# Patient Record
Sex: MALE | Race: WHITE | NOT HISPANIC OR LATINO | Employment: OTHER | URBAN - METROPOLITAN AREA
[De-identification: names, ages, dates, MRNs, and addresses within clinical notes are randomized per-mention and may not be internally consistent; named-entity substitution may affect disease eponyms.]

---

## 2017-01-01 ENCOUNTER — MEDICAL CORRESPONDENCE (OUTPATIENT)
Dept: HEALTH INFORMATION MANAGEMENT | Facility: CLINIC | Age: 62
End: 2017-01-01

## 2017-01-03 ENCOUNTER — OFFICE VISIT (OUTPATIENT)
Dept: FAMILY MEDICINE | Facility: CLINIC | Age: 62
End: 2017-01-03
Payer: COMMERCIAL

## 2017-01-03 ENCOUNTER — TELEPHONE (OUTPATIENT)
Dept: PHARMACY | Facility: OTHER | Age: 62
End: 2017-01-03

## 2017-01-03 VITALS
SYSTOLIC BLOOD PRESSURE: 112 MMHG | RESPIRATION RATE: 18 BRPM | TEMPERATURE: 97.6 F | HEIGHT: 72 IN | OXYGEN SATURATION: 96 % | HEART RATE: 84 BPM | BODY MASS INDEX: 33.18 KG/M2 | WEIGHT: 245 LBS | DIASTOLIC BLOOD PRESSURE: 73 MMHG

## 2017-01-03 DIAGNOSIS — E11.9 TYPE 2 DIABETES MELLITUS WITHOUT COMPLICATION, WITHOUT LONG-TERM CURRENT USE OF INSULIN (H): ICD-10-CM

## 2017-01-03 DIAGNOSIS — K21.9 GASTROESOPHAGEAL REFLUX DISEASE WITHOUT ESOPHAGITIS: ICD-10-CM

## 2017-01-03 DIAGNOSIS — M10.022 IDIOPATHIC GOUT OF LEFT ELBOW, UNSPECIFIED CHRONICITY: Primary | ICD-10-CM

## 2017-01-03 DIAGNOSIS — M79.674 PAIN OF TOE OF RIGHT FOOT: ICD-10-CM

## 2017-01-03 LAB
BASOPHILS # BLD AUTO: 0.1 10E9/L (ref 0–0.2)
BASOPHILS NFR BLD AUTO: 0.6 %
DIFFERENTIAL METHOD BLD: NORMAL
EOSINOPHIL # BLD AUTO: 0.1 10E9/L (ref 0–0.7)
EOSINOPHIL NFR BLD AUTO: 1.8 %
ERYTHROCYTE [DISTWIDTH] IN BLOOD BY AUTOMATED COUNT: 13.4 % (ref 10–15)
HBA1C MFR BLD: 6.5 % (ref 4.3–6)
HCT VFR BLD AUTO: 42.2 % (ref 40–53)
HGB BLD-MCNC: 14.5 G/DL (ref 13.3–17.7)
LYMPHOCYTES # BLD AUTO: 1.7 10E9/L (ref 0.8–5.3)
LYMPHOCYTES NFR BLD AUTO: 21 %
MCH RBC QN AUTO: 30.7 PG (ref 26.5–33)
MCHC RBC AUTO-ENTMCNC: 34.4 G/DL (ref 31.5–36.5)
MCV RBC AUTO: 89 FL (ref 78–100)
MONOCYTES # BLD AUTO: 0.6 10E9/L (ref 0–1.3)
MONOCYTES NFR BLD AUTO: 7.8 %
NEUTROPHILS # BLD AUTO: 5.5 10E9/L (ref 1.6–8.3)
NEUTROPHILS NFR BLD AUTO: 68.8 %
PLATELET # BLD AUTO: 198 10E9/L (ref 150–450)
RBC # BLD AUTO: 4.73 10E12/L (ref 4.4–5.9)
WBC # BLD AUTO: 8 10E9/L (ref 4–11)

## 2017-01-03 PROCEDURE — 36415 COLL VENOUS BLD VENIPUNCTURE: CPT | Performed by: FAMILY MEDICINE

## 2017-01-03 PROCEDURE — 85025 COMPLETE CBC W/AUTO DIFF WBC: CPT | Performed by: FAMILY MEDICINE

## 2017-01-03 PROCEDURE — 99214 OFFICE O/P EST MOD 30 MIN: CPT | Performed by: FAMILY MEDICINE

## 2017-01-03 PROCEDURE — 80048 BASIC METABOLIC PNL TOTAL CA: CPT | Performed by: FAMILY MEDICINE

## 2017-01-03 PROCEDURE — 83036 HEMOGLOBIN GLYCOSYLATED A1C: CPT | Performed by: FAMILY MEDICINE

## 2017-01-03 RX ORDER — COLCHICINE 0.6 MG/1
TABLET ORAL
Qty: 90 TABLET | Refills: 1 | Status: SHIPPED | OUTPATIENT
Start: 2017-01-03 | End: 2017-01-19

## 2017-01-03 RX ORDER — PREGABALIN 50 MG/1
50 CAPSULE ORAL 3 TIMES DAILY
Qty: 90 CAPSULE | Refills: 1 | Status: SHIPPED | OUTPATIENT
Start: 2017-01-03 | End: 2017-04-03

## 2017-01-03 ASSESSMENT — ANXIETY QUESTIONNAIRES
IF YOU CHECKED OFF ANY PROBLEMS ON THIS QUESTIONNAIRE, HOW DIFFICULT HAVE THESE PROBLEMS MADE IT FOR YOU TO DO YOUR WORK, TAKE CARE OF THINGS AT HOME, OR GET ALONG WITH OTHER PEOPLE: SOMEWHAT DIFFICULT
7. FEELING AFRAID AS IF SOMETHING AWFUL MIGHT HAPPEN: NOT AT ALL
1. FEELING NERVOUS, ANXIOUS, OR ON EDGE: SEVERAL DAYS
6. BECOMING EASILY ANNOYED OR IRRITABLE: NOT AT ALL
GAD7 TOTAL SCORE: 3
5. BEING SO RESTLESS THAT IT IS HARD TO SIT STILL: NOT AT ALL
2. NOT BEING ABLE TO STOP OR CONTROL WORRYING: SEVERAL DAYS
3. WORRYING TOO MUCH ABOUT DIFFERENT THINGS: NOT AT ALL

## 2017-01-03 ASSESSMENT — PATIENT HEALTH QUESTIONNAIRE - PHQ9: 5. POOR APPETITE OR OVEREATING: SEVERAL DAYS

## 2017-01-03 NOTE — MR AVS SNAPSHOT
After Visit Summary   1/3/2017    Toby Mcgrath    MRN: [id number]           Patient Information     Date Of Birth          1955        Visit Information        Provider Department      1/3/2017 10:30 AM Connie Haskins MD [facility]        Today's Diagnoses     Idiopathic gout of left elbow, unspecified chronicity    -  1     Pain of toe of right foot         Type 2 diabetes mellitus without complication, without long-term current use of insulin (H)         Gastroesophageal reflux disease without esophagitis           Care Instructions    If Lyrica is covered then taper off the gabapentin by replacing it with Lyrica    Start the Colchicine for reducing the gout symptoms and once you are pain free for 2 weeks come back an have a lab only draw for this and see Noelle/MTM at the same time    STOP the Simvastatin while you are on the Colchicine    If insurance does not cover Noelle you can see another provider here to go over the gout and the medications too         Follow-ups after your visit        Additional Services     MED THERAPY MANAGE REFERRAL       Your provider has referred you to: **[facility] Scheduling (numerous locations) [phone]   [email or web address]  [facility]: [facility] [phone]   [email or web address]    Reason for Referral: gout flare and polypharmacy    The [facility] department will contact you to schedule an appointment.  You may also schedule the appointment by calling [phone].  For [facility] patients, please call [phone] to confirm/schedule your appointment on the next business day.    This service is designed to help you get the most from your medications.  A specially trained Pharmacist will work closely with you and your providers to solve any questions,  "concerns, issues or problems related to your medications.    Please bring all of your prescription and non-prescription medications (such as vitamins, over-the-counter medications, and herbals) or a detailed medication list to your appointment.    If you have a glucose meter or other home monitoring information, please also bring this to your appointment (i.e. blood glucose log, blood pressure log, pain log, etc.).                  Your next 10 appointments already scheduled     Jan 23, 2017  9:30 AM   Return Visit with JIMBO Sutton CNP   [facility] ([facility])    [address]  [address]  [facility] [address] [phone]              Future tests that were ordered for you today     Open Standing Orders        Priority Remaining Interval Expires Ordered    Uric acid Routine 8/8 every 2 weeks  1/3/2018 1/3/2017            Who to contact     If you have questions or need follow up information about today's clinic visit or your schedule please contact [facility] directly at [phone].  Normal or non-critical lab and imaging results will be communicated to you by MyChart, letter or phone within 4 business days after the clinic has received the results. If you do not hear from us within 7 days, please contact the clinic through [employer] or phone. If you have a critical or abnormal lab result, we will notify you by phone as soon as possible.  Submit refill requests through [employer] or call your pharmacy and they will forward the refill request to us. Please allow 3 business days for your refill to be completed.          Additional Information About Your Visit        [employer] Information     [employer] lets you send messages to your doctor, view your test results, renew your prescriptions, schedule appointments and more. To sign up, go to [email or web address] . Click on \"Log in\" on the left side of the screen, which will take you to the Welcome page. Then " "click on \"Sign up Now\" on the right side of the page.     You will be asked to enter the access code listed below, as well as some personal information. Please follow the directions to create your username and password.     Your access code is: [email or web address]  Expires: 4/3/2017 11:19 AM     Your access code will  in 90 days. If you need help or a new code, please call your [place] clinic or [phone].        Care EveryWhere ID     This is your Care EveryWhere ID. This could be used by other organizations to access your [place] medical records  [id number]        Your Vitals Were     Pulse Temperature Respirations Height BMI (Body Mass Index) Pulse Oximetry    84 97.6  F (36.4  C) (Oral) 18 6' (1.829 m) 33.22 kg/m2 96%       Blood Pressure from Last 3 Encounters:   17 112/73   16 138/80   16 126/74    Weight from Last 3 Encounters:   17 245 lb (111.131 kg)   16 244 lb 12.8 oz (111.041 kg)   10/24/16 248 lb (112.492 kg)              We Performed the Following     CBC with platelets differential     Hemoglobin A1c     MED THERAPY MANAGE REFERRAL          Today's Medication Changes          These changes are accurate as of: 1/3/17 11:19 AM.  If you have any questions, ask your nurse or doctor.               Start taking these medicines.        Dose/Directions    omeprazole 20 MG CR capsule   Commonly known as:  priLOSEC   Used for:  Gastroesophageal reflux disease without esophagitis   Started by:  Connie Haskins MD        Use every other day as needed   Quantity:  90 capsule   Refills:  1       pregabalin 50 MG capsule   Commonly known as:  LYRICA   Used for:  Type 2 diabetes mellitus without complication, without long-term current use of insulin (H)   Started by:  Connie Haskins MD        Dose:  50 mg   Take 1 capsule (50 mg) by mouth 3 times daily   Quantity:  90 capsule   Refills:  1         These medicines have changed or have updated prescriptions.        " Dose/Directions    colchicine 0.6 MG tablet   Commonly known as:  COLCRYS   This may have changed:  additional instructions   Used for:  Idiopathic gout of left elbow, unspecified chronicity   Changed by:  Connie Haskins MD        Take 2 tablets at the first sign of flare then take 1 daily for acute prevention   Quantity:  90 tablet   Refills:  1            Where to get your medicines      These medications were sent to [facility] [address] [place] [address] - [address] [facility]  [address] [facility] [address]     Phone:  [phone]    - colchicine 0.6 MG tablet  - omeprazole 20 MG CR capsule      Some of these will need a paper prescription and others can be bought over the counter.  Ask your nurse if you have questions.     Bring a paper prescription for each of these medications    - pregabalin 50 MG capsule             Primary Care Provider Office Phone # Fax #    Connie Haskins [phone]       [facility] [address] [address]        Thank you!     Thank you for choosing [facility]  for your care. Our goal is always to provide you with excellent care. Hearing back from our patients is one way we can continue to improve our services. Please take a few minutes to complete the written survey that you may receive in the mail after your visit with us. Thank you!             Your Updated Medication List - Protect others around you: Learn how to safely use, store and throw away your medicines at www.disposemymeds.org.          This list is accurate as of: 1/3/17 11:19 AM.  Always use your most recent med list.                   Brand Name Dispense Instructions for use    aspirin 81 MG tablet     100 tablet    Take 1 tablet (81 mg) by mouth daily       blood glucose monitoring lancets     3 Box    Use to test blood sugars 2-3 times daily as directed (lena contour meter)       blood glucose monitoring test strip     no brand specified    3 Box    Use to test blood sugar 2-3 times daily (lena contour meter)       cetirizine 10 MG tablet    zyrTEC    30 tablet    Take 1 tablet (10 mg) by mouth every evening Use for allergies and nasal congestion       colchicine 0.6 MG tablet    COLCRYS    90 tablet    Take 2 tablets at the first sign of flare then take 1 daily for acute prevention       finasteride 5 MG tablet    PROSCAR    30 tablet    Take 1 tablet (5 mg) by mouth daily For prostate enlargement. Please fill on $4 list       * FLUoxetine 20 MG capsule    PROzac    90 capsule    Take 3 capsules (60 mg) by mouth daily Start with 1 tablet daily for 1 week then increase to 2 tablets daily for 1 week then increase to 3 tablets daily after that       * FLUoxetine 20 MG capsule    PROzac    90 capsule    TAKE THREE CAPSULES BY MOUTH ONCE DAILY       fluticasone 50 MCG/ACT spray    FLONASE    3 Bottle    Spray 1-2 sprays into both nostrils daily       gabapentin 600 MG tablet    NEURONTIN    180 tablet    Take 1 tablet (600 mg) by mouth 3 times daily       metFORMIN 500 MG 24 hr tablet    GLUCOPHAGE-XR    180 tablet    Take 2 tablets (1,000 mg) by mouth daily (with dinner)       omeprazole 20 MG CR capsule    priLOSEC    90 capsule    Use every other day as needed       oxyCODONE 5 MG IR tablet    ROXICODONE    180 tablet    Take 1-2 tablets (5-10 mg) by mouth every 6 hours as needed for moderate to severe pain (Max 6 tabs per day.) May dispense on/after 12/23/16 to start taking on/after 12/27/16       pregabalin 50 MG capsule    LYRICA    90 capsule    Take 1 capsule (50 mg) by mouth 3 times daily       ranitidine 150 MG tablet    ZANTAC    60 tablet    Take 1 tablet (150 mg) by mouth 2 times daily       senna-docusate 8.6-50 MG per tablet    SENOKOT-S;PERICOLACE    60 tablet    Take 1 tablet by mouth 2 times daily       tamsulosin 0.4 MG capsule    FLOMAX    180 capsule    TAKE TWO CAPSULES BY MOUTH ONCE DAILY       triamcinolone  0.1 % cream    KENALOG    30 g    Apply sparingly to affected area twice daily for 7 days. Then stop and use only for flares       * Notice:  This list has 2 medication(s) that are the same as other medications prescribed for you. Read the directions carefully, and ask your doctor or other care provider to review them with you.

## 2017-01-03 NOTE — TELEPHONE ENCOUNTER
MTM referral from: [facility] visit (referral by provider)    MTM referral outreach attempt #1 on January 3, 2017 at 2:40 PM      Outcome: Patient scheduled for MTM appointment on 1/12    Mica Kaufman MTM Coordinator

## 2017-01-03 NOTE — PATIENT INSTRUCTIONS
If Lyrica is covered then taper off the gabapentin by replacing it with Lyrica    Start the Colchicine for reducing the gout symptoms and once you are pain free for 2 weeks come back an have a lab only draw for this and see Noelle/MTM at the same time    STOP the Simvastatin while you are on the Colchicine    If insurance does not cover Noelle you can see another provider here to go over the gout and the medications too

## 2017-01-03 NOTE — PROGRESS NOTES
SUBJECTIVE:                                                    Toby Mcgrath is a 61 year old male who presents to clinic today for the following health issues:      GERD/Heartburn     Onset: this episode has been for about a month     Description:     Burning in chest: no     Intensity: moderate    Progression of Symptoms: worsening and intermittent    Accompanying Signs & Symptoms:  Does it feel like food gets stuck: no  Nausea: YES  Vomiting (bloody?): YES  Abdominal Pain: no   Black-Tarry stools: no :  Bloody stools: no    History:   Previous ulcers: no     Precipitating factors:   Caffeine use: YES  Alcohol use: no   NSAID/Aspirin use: YES  Tobacco use: no   Worse with fatty foods, spicy foods, caffeinated drinks and hot candy.    Alleviating factors:  Nothing          Therapies Tried and outcome:Patient is taking Zantac 3 tablets a day it helps more but not really     Gout/ single inflamed joint      Onset: this episode started 1 week ago      Description:   Location: big toe side of right foot  - right  Joint Swelling: YES  Redness: YES- a little   Pain: YES    Intensity: moderate    Progression of Symptoms:  same and intermittent    Accompanying Signs & Symptoms:  Fevers: no    History:   Trauma to the area: no   Previous history of gout: no    Recent illness:  no     Precipitating factors:   Diet-rich in purine: no  Alcohol use: no   Diuretic use: no     Alleviating factors:       Therapies Tried and outcome: Gabapentin is not helping per patient     Patient states he also want provider to look at right toe that there is dry blood underneath it noticed it yesterday.     (M10.022) Idiopathic gout of left elbow, unspecified chronicity  (primary encounter diagnosis)  Comment: He is here for recurrent gout. He called recently for refills of colchicine because of onset of gout he did take a single days worth of tablets and this helped significantly however symptoms came right back. It has been present for  the last few months. He has had this in the past and we did discuss allopurinol in the past but he's never use this regularly. His right great toe is affected and is significantly tender. He is also worried that the area under the toenail is a little bit darkened he is not sure if this is due to gout. He denies any injuries or falls however. He denies any new medications.  Plan: colchicine (COLCRYS) 0.6 MG tablet, CBC with         platelets differential, Uric acid, Basic         metabolic panel            (M79.674) Pain of toe of right foot  Comment:   Plan: CBC with platelets differential            (E11.9) Type 2 diabetes mellitus without complication, without long-term current use of insulin (H)  Comment: Due for recheck of A1c. Has some chronic pain and the gabapentin is not helping but it's causing a lot of daytime somnolence. He is been advised to try pregabalin and we can go ahead and do this. He usually sees the pain clinic for pain related issues. Is not due to see them until later this month  He does not check sugars regularly is mostly diet controlled his last A1c was 6.6  Plan: pregabalin (LYRICA) 50 MG capsule, Hemoglobin         A1c, Basic metabolic panel            (K21.9) Gastroesophageal reflux disease without esophagitis  Comment: He's also had some worsening heartburn. He like to go back on some thing stronger. Had been taking omeprazole and was using this every other day or as needed. It seemed to work well. We tried switching him to Zantac and this has not covered his symptoms well he takes 2 in the morning and one at night  Plan: omeprazole (PRILOSEC) 20 MG CR capsule               Problem list and histories reviewed & adjusted, as indicated.  Additional history: as documented    Patient Active Problem List   Diagnosis     Thoracic or lumbosacral neuritis or radiculitis, unspecified     Osteoarthrosis, unspecified whether generalized or localized, pelvic region and thigh     Hypertrophy of  prostate with urinary obstruction     Chronic rhinitis     Chronic pain syndrome     Disorder of bursae and tendons in shoulder region     Major depressive disorder, recurrent episode (H)     Anxiety     Gout     Type 2 diabetes mellitus without complication (H)     Hypertension goal BP (blood pressure) < 140/90     Advanced directives, counseling/discussion     DJD (degenerative joint disease), lumbar     Hyperlipidemia LDL goal <100     DDD (degenerative disc disease), cervical     GERD (gastroesophageal reflux disease)     Lumbar radiculopathy     S/P lumbar fusion     Left-sided low back pain with left-sided sciatica     BMI 32.0-32.9,adult     History of hepatitis C     S/P lumbar discectomy     Acute post-operative pain     S/P laminectomy     Chronic pain     Bilateral low back pain without sciatica     Right-sided low back pain with right-sided sciatica     Acute gouty arthritis     Acute gout of right elbow, unspecified cause     Idiopathic gout of left elbow, unspecified chronicity     Gastroesophageal reflux disease without esophagitis     Past Surgical History   Procedure Laterality Date     C nonspecific procedure  1995     neck cyst     C nonspecific procedure  1979     laminectomy L5-S1 x 2     Hc ugi endoscopy diag w or w/o brush/wash  3/04     ok     Hc colonoscopy thru stoma, diagnostic  3/04     normal     C shoulder surg proc unlisted  2005, '07     repairs,later manipulate,inject LT, RT     Right hip replacement  10,2010     Both shoulder repair  2006, 2008     Head & neck surgery       Back surgery       Fusion lumbar anterior, fusion lumbar posterior two levels, combined N/A 9/17/2014     Procedure: COMBINED FUSION LUMBAR ANTERIOR, FUSION LUMBAR POSTERIOR TWO LEVELS;  Surgeon: Chris Lugo MD;  Location: [facility] OR     Hemilaminectomy, discectomy lumbar one level, combined Left 9/8/2015     Procedure: COMBINED HEMILAMINECTOMY, DISCECTOMY LUMBAR ONE LEVEL;  Surgeon: Jer Irby,  MD;  Location:  OR       Social History   Substance Use Topics     Smoking status: Former Smoker -- 40 years     Types: Cigarettes     Smokeless tobacco: Former User     Quit date: 02/01/2013     Alcohol Use: No     Family History   Problem Relation Age of Onset     C.A.D. Father      DIABETES Mother      DIABETES Father      Hypertension Father          Current Outpatient Prescriptions   Medication Sig Dispense Refill     colchicine (COLCRYS) 0.6 MG tablet Take 2 tablets at the first sign of flare then take 1 daily for acute prevention 90 tablet 1     omeprazole (PRILOSEC) 20 MG CR capsule Use every other day as needed 90 capsule 1     pregabalin (LYRICA) 50 MG capsule Take 1 capsule (50 mg) by mouth 3 times daily 90 capsule 1     oxyCODONE (ROXICODONE) 5 MG immediate release tablet Take 1-2 tablets (5-10 mg) by mouth every 6 hours as needed for moderate to severe pain (Max 6 tabs per day.) May dispense on/after 12/23/16 to start taking on/after 12/27/16 180 tablet 0     finasteride (PROSCAR) 5 MG tablet Take 1 tablet (5 mg) by mouth daily For prostate enlargement. Please fill on $4 list 30 tablet 5     fluticasone (FLONASE) 50 MCG/ACT nasal spray Spray 1-2 sprays into both nostrils daily 3 Bottle 11     triamcinolone (KENALOG) 0.1 % cream Apply sparingly to affected area twice daily for 7 days. Then stop and use only for flares 30 g 3     metFORMIN (GLUCOPHAGE-XR) 500 MG 24 hr tablet Take 2 tablets (1,000 mg) by mouth daily (with dinner) 180 tablet 3     ranitidine (ZANTAC) 150 MG tablet Take 1 tablet (150 mg) by mouth 2 times daily 60 tablet 3     FLUoxetine (PROZAC) 20 MG capsule TAKE THREE CAPSULES BY MOUTH ONCE DAILY 90 capsule 3     tamsulosin (FLOMAX) 0.4 MG 24 hr capsule TAKE TWO CAPSULES BY MOUTH ONCE DAILY 180 capsule 1     gabapentin (NEURONTIN) 600 MG tablet Take 1 tablet (600 mg) by mouth 3 times daily 180 tablet 3     blood glucose monitoring (NO BRAND SPECIFIED) test strip Use to test blood sugar  2-3 times daily (lena contour meter) 3 Box 0     blood glucose monitoring (LENA MICROLET) lancets Use to test blood sugars 2-3 times daily as directed (lena contour meter) 3 Box 0     FLUoxetine (PROZAC) 20 MG capsule Take 3 capsules (60 mg) by mouth daily Start with 1 tablet daily for 1 week then increase to 2 tablets daily for 1 week then increase to 3 tablets daily after that 90 capsule 0     aspirin 81 MG tablet Take 1 tablet (81 mg) by mouth daily 100 tablet 3     senna-docusate (SENOKOT-S;PERICOLACE) 8.6-50 MG per tablet Take 1 tablet by mouth 2 times daily 60 tablet 1     cetirizine (ZYRTEC) 10 MG tablet Take 1 tablet (10 mg) by mouth every evening Use for allergies and nasal congestion 30 tablet 11     Recent Labs   Lab Test  01/03/17   1123  07/14/16   1042  02/18/16   1237  09/14/15   1030   08/13/15   1635   11/10/14   1037   10/14/13   1038   01/28/13   1014   08/15/12   0744   A1C  6.5*  6.6*  7.0*   --    --   6.9*   < >   --    < >  6.6*   < >  6.6*   --   6.1*   LDL   --   92   --    --    --   70   --   75   --   60   --   73   --   43   HDL   --   32*   --    --    --    --    --   36*   --   30*   --   31*   --   28*   TRIG   --    --    --    --    --    --    --    --    --   264*   --   240*   --   378*   ALT   --   56  49  26   --    --    --    --    < >  61   --    --    < >   --    CR   --   1.12  1.04   --    < >   --    < >   --    < >  1.26*   --    --    < >   --    GFRESTIMATED   --   66  72   --    < >   --    < >   --    < >  58*   < >   --    < >   --    GFRESTBLACK   --   80  87   --    < >   --    < >   --    < >  71   < >   --    < >   --    POTASSIUM   --   4.8  4.6   --    < >   --    < >   --    < >  4.4   --    --    < >   --    TSH   --    --    --    --    --   1.78   --    --    --   1.32   --    --    --    --     < > = values in this interval not displayed.      BP Readings from Last 3 Encounters:   01/03/17 112/73   11/23/16 138/80   11/03/16 126/74    Wt Readings  from Last 3 Encounters:   01/03/17 245 lb (111.131 kg)   11/03/16 244 lb 12.8 oz (111.041 kg)   10/24/16 248 lb (112.492 kg)                  Labs reviewed in EPIC  Problem list, Medication list, Allergies, and Medical/Social/Surgical histories reviewed in [facility] and updated as appropriate.    ROS:  Constitutional, HEENT, cardiovascular, pulmonary, gi and gu systems are negative, except as otherwise noted.    OBJECTIVE:                                                    /73 mmHg  Pulse 84  Temp(Src) 97.6  F (36.4  C) (Oral)  Resp 18  Ht 6' (1.829 m)  Wt 245 lb (111.131 kg)  BMI 33.22 kg/m2  SpO2 96%  Body mass index is 33.22 kg/(m^2).  GENERAL APPEARANCE: healthy, alert and no distress  MS: He has some darkening of the right great toenail. It does appear to be consistent almost with a small bruise beneath the nail. There is no swelling there is no erythema but the great toe and its distal joint is tender to touch. There is no warmth.  SKIN: no suspicious lesions or rashes    Diagnostic test results:  Diagnostic Test Results:  Results for orders placed or performed in visit on 01/03/17 (from the past 24 hour(s))   CBC with platelets differential   Result Value Ref Range    WBC 8.0 4.0 - 11.0 10e9/L    RBC Count 4.73 4.4 - 5.9 10e12/L    Hemoglobin 14.5 13.3 - 17.7 g/dL    Hematocrit 42.2 40.0 - 53.0 %    MCV 89 78 - 100 fl    MCH 30.7 26.5 - 33.0 pg    MCHC 34.4 31.5 - 36.5 g/dL    RDW 13.4 10.0 - 15.0 %    Platelet Count 198 150 - 450 10e9/L    Diff Method Automated Method     % Neutrophils 68.8 %    % Lymphocytes 21.0 %    % Monocytes 7.8 %    % Eosinophils 1.8 %    % Basophils 0.6 %    Absolute Neutrophil 5.5 1.6 - 8.3 10e9/L    Absolute Lymphocytes 1.7 0.8 - 5.3 10e9/L    Absolute Monocytes 0.6 0.0 - 1.3 10e9/L    Absolute Eosinophils 0.1 0.0 - 0.7 10e9/L    Absolute Basophils 0.1 0.0 - 0.2 10e9/L   Hemoglobin A1c   Result Value Ref Range    Hemoglobin A1C 6.5 (H) 4.3 - 6.0 %        ASSESSMENT/PLAN:                                                     1. Idiopathic gout of left elbow, unspecified chronicity  We discussed acute prophylaxis for his ongoing gout flare. Will have been started acute treatment and then continue daily treatment until his symptoms are gone. Once they're gone for 2 weeks we'll have him come back and check labs for uric acid levels. We'll have him plan to continue the colchicine for several months until his uric acid levels are consistently under 6. And discussed long-term management with combining allopurinol to the colchicine and then slowly discontinuing the colchicine. We'll have him stay hydrated and would like to have him see our pharmacologist for follow-up if he has any questions. But also to review other medications and changes that he might make to help reduce symptoms. Certainly hydration is one thing that he can work on.  - colchicine (COLCRYS) 0.6 MG tablet; Take 2 tablets at the first sign of flare then take 1 daily for acute prevention  Dispense: 90 tablet; Refill: 1  - CBC with platelets differential  - Uric acid; Standing  - Basic metabolic panel    2. Pain of toe of right foot  Toe does not look infected but it does look irritated or inflamed. Possibly due to gout we'll check labs to make sure that there is no left shift and follow this clinically as the gouty flare improves.  - CBC with platelets differential    3. Type 2 diabetes mellitus without complication, without long-term current use of insulin (H)  A1c is excellent. He's been managing this nicely with diet changes on his own. Still awaiting basic metabolic panel and kidney function     - Hemoglobin A1c  - Basic metabolic panel    4. Gastroesophageal reflux disease without esophagitis  Restarting omeprazole he has been on this in the past we'll see if he can use this sparingly to avoid rebound  - omeprazole (PRILOSEC) 20 MG CR capsule; Use every other day as needed  Dispense: 90 capsule; Refill: 1    5. CHronic pain-  pregabalin (LYRICA) 50 MG capsule; Take 1 capsule (50 mg) by mouth 3 times daily  Dispense: 90 capsule; Refill: 1  Follow up with Provider - in 2-3 weeks or once pain improves    See GENE Haskins MD  [facility]

## 2017-01-04 LAB
ANION GAP SERPL CALCULATED.3IONS-SCNC: 10 MMOL/L (ref 3–14)
BUN SERPL-MCNC: 14 MG/DL (ref 7–30)
CALCIUM SERPL-MCNC: 9.1 MG/DL (ref 8.5–10.1)
CHLORIDE SERPL-SCNC: 106 MMOL/L (ref 94–109)
CO2 SERPL-SCNC: 28 MMOL/L (ref 20–32)
CREAT SERPL-MCNC: 1.09 MG/DL (ref 0.66–1.25)
GFR SERPL CREATININE-BSD FRML MDRD: 68 ML/MIN/1.7M2
GLUCOSE SERPL-MCNC: 175 MG/DL (ref 70–99)
POTASSIUM SERPL-SCNC: 4.4 MMOL/L (ref 3.4–5.3)
SODIUM SERPL-SCNC: 144 MMOL/L (ref 133–144)

## 2017-01-04 ASSESSMENT — ANXIETY QUESTIONNAIRES: GAD7 TOTAL SCORE: 3

## 2017-01-06 DIAGNOSIS — F41.9 ANXIETY: Primary | ICD-10-CM

## 2017-01-06 DIAGNOSIS — F33.9 MAJOR DEPRESSIVE DISORDER, RECURRENT EPISODE (H): ICD-10-CM

## 2017-01-06 NOTE — PROGRESS NOTES
Quick Note:    Please call patient with normal results - aic is stable and low - very good  Can you also see of the colchicine was approved      Thank you!    Connie  ______

## 2017-01-06 NOTE — TELEPHONE ENCOUNTER
Prescription approved per [facility] [place] Refill Protocol.  Ivis SMALLS, RN    Pending Prescriptions:                       Disp   Refills    FLUoxetine (PROZAC) 20 MG capsule [Pharma*90 cap*0            Sig: TAKE THREE CAPSULES BY MOUTH ONCE DAILY         Last Written Prescription Date: 9/2/2016  Last Fill Quantity: 90; # refills: 3  Last Office Visit with [facility] [place] [place], [facility] or [facility] prescribing provider:  1/3/2017   Next 5 appointments (look out 90 days)     Jan 12, 2017 11:00 AM   Office Visit with Mary Last RPH   [facility] ([facility])    [address]  [address]  [facility] [id number] [phone]            Jan 23, 2017  9:30 AM   Return Visit with JIMBO Stuton CNP   [facility]    [address]  [address]  [facility] [id number] [phone]                   Last PHQ-9 score on record=   PHQ-9 SCORE 11/3/2016   Total Score -   Total Score 5       AST       34   7/14/2016  ALT       56   7/14/2016

## 2017-01-10 RX ORDER — COLCHICINE 0.6 MG/1
1 CAPSULE ORAL DAILY
Qty: 90 CAPSULE | Refills: 0 | Status: SHIPPED | OUTPATIENT
Start: 2017-01-10 | End: 2017-02-14

## 2017-01-19 ENCOUNTER — OFFICE VISIT (OUTPATIENT)
Dept: PHARMACY | Facility: CLINIC | Age: 62
End: 2017-01-19
Payer: COMMERCIAL

## 2017-01-19 VITALS — DIASTOLIC BLOOD PRESSURE: 74 MMHG | BODY MASS INDEX: 33.13 KG/M2 | SYSTOLIC BLOOD PRESSURE: 138 MMHG | WEIGHT: 244.3 LBS

## 2017-01-19 DIAGNOSIS — M10.9 ACUTE GOUT OF RIGHT ELBOW, UNSPECIFIED CAUSE: Primary | ICD-10-CM

## 2017-01-19 DIAGNOSIS — E78.5 HYPERLIPIDEMIA LDL GOAL <100: ICD-10-CM

## 2017-01-19 DIAGNOSIS — L85.3 DRY SKIN: ICD-10-CM

## 2017-01-19 DIAGNOSIS — N13.8 HYPERTROPHY OF PROSTATE WITH URINARY OBSTRUCTION: ICD-10-CM

## 2017-01-19 DIAGNOSIS — J31.0 CHRONIC RHINITIS: ICD-10-CM

## 2017-01-19 DIAGNOSIS — G89.4 CHRONIC PAIN SYNDROME: ICD-10-CM

## 2017-01-19 DIAGNOSIS — K21.9 GASTROESOPHAGEAL REFLUX DISEASE WITHOUT ESOPHAGITIS: ICD-10-CM

## 2017-01-19 DIAGNOSIS — E11.9 TYPE 2 DIABETES MELLITUS WITHOUT COMPLICATION, WITHOUT LONG-TERM CURRENT USE OF INSULIN (H): ICD-10-CM

## 2017-01-19 DIAGNOSIS — N40.1 HYPERTROPHY OF PROSTATE WITH URINARY OBSTRUCTION: ICD-10-CM

## 2017-01-19 DIAGNOSIS — F33.0 MILD EPISODE OF RECURRENT MAJOR DEPRESSIVE DISORDER (H): ICD-10-CM

## 2017-01-19 DIAGNOSIS — K59.03 DRUG-INDUCED CONSTIPATION: ICD-10-CM

## 2017-01-19 PROCEDURE — 99607 MTMS BY PHARM ADDL 15 MIN: CPT | Performed by: PHARMACIST

## 2017-01-19 PROCEDURE — 99605 MTMS BY PHARM NP 15 MIN: CPT | Performed by: PHARMACIST

## 2017-01-19 RX ORDER — METFORMIN HCL 500 MG
500 TABLET, EXTENDED RELEASE 24 HR ORAL
Qty: 90 TABLET | Refills: 1 | Status: SHIPPED | OUTPATIENT
Start: 2017-01-19 | End: 2017-05-17

## 2017-01-19 RX ORDER — ALLOPURINOL 100 MG/1
100 TABLET ORAL DAILY
Qty: 90 TABLET | Refills: 3 | Status: SHIPPED | OUTPATIENT
Start: 2017-01-19 | End: 2017-02-14

## 2017-01-19 NOTE — PATIENT INSTRUCTIONS
Recommendations from today's MTM visit:                                                    MTM (medication therapy management) is a service provided by a clinical pharmacist designed to help you get the most of out of your medicines.   Today we reviewed what your medicines are for, how to know if they are working, that your medicines are safe and how to make your medicine regimen as easy as possible.     1. When you  the colchicine and allopurinol, call Mary at [phone]  Week 1: Allopurinol 100mg (1 pill) once a day - colchicine 0.6mg once a day  Week 2: Allopurinol 200mg (2 pills) once a day- colchicine 0.6mg once a day  Week 3: Allopurinol 300mg (3 pills) once a day- colchicine 0.6mg once a day  Week 4: Continue Allopurinol 300mg daily (3 pills) - colchicine 0.6mg once a day  Week 5: Uric acid test, stop colchicine     2. For your dry nose/allergies try Nasal saline. In the spring, we can talk about what may work better for your allergies.     3. Decrease metformin to just one tablet once a day. It would be best to take this with Dinner.     4. There are refills of the triamcinolone cream at your pharmacy. Pick that up for your hands - just used a thin layer for 7 days at a time. In between use lotion - Eucerin, Udder Cream, Am-Lactin are good brands.     Next MTM visit: To be determined - call Mary when you are able to  the colchicine and allopurinol for your gout and we'll go from there.     To schedule another MTM appointment, please call the clinic directly or you may call the MTM scheduling line at [phone] or toll-free at [phone].     My Clinical Pharmacist's contact information:                                                      It was a pleasure seeing you today!  Please feel free to contact me with any questions or concerns you have.      Mary Last, Pharm.D., M.B.A., [facility]  MTM Pharmacist, [facility]  Pager: [phone]  Email:  [email or web address]     You may receive a survey about the [facility] services you received.  I would appreciate your feedback to help me serve you better in the future. Please fill it out and return it when you can. Your comments will be anonymous.      My healthcare goals:                                                              Gout    Gout or is an inflammation of a joint due to a build-up of gout crystals in the joint fluid. This occurs when there is an excess of uric acid (a normal waste product) in the body. Uric acid builds up in the body when the kidneys are unable to filter enough of it from the blood. This may occur with age. It is also associated with kidney disease. Gout occurs more often in persons with obesity, diabetes, hypertension, or high levels of fats in the blood. It may be run in families. Gout tends to come and go. A flare up of gout is called an attack. Drinking alcohol or eating certain foods (such as shellfish or foods with additives such as high-fructose corn syrup) may increase uric acid levels in the blood and cause a gout attack.  During a gout attack, the affected joint may become a hot, red, swollen and painful. If you have had one attack of gout, you are likely to have another. An attack of gout can be treated with medicine. If these attacks become frequent, a daily medicine may be prescribed to help the kidneys remove uric acid from the body.  Home care  During a gout attack:    Rest painful joints. If gout affects the joints of your foot or leg, you may want to use crutches for the first few days to keep from bearing weight on the affected joint.    When sitting or lying down, raise the painful joint to a level higher than your heart.    Apply an ice pack (ice cubes in a plastic bag wrapped in a thin towel) over the injured area for 20 minutes every 1-2 hours the first day for pain relief. Continue this 3-4 times a day for swelling and pain.    Avoid alcohol and foods listed below  (see Preventing attacks) during a gout attack. Drink extra fluid to help flush the uric acid through your kidneys.    If you were prescribed a medication to treat gout, take it as your healthcare provider has instructed. Don't skip doses.    Take anti-inflammatory medicine as directed.     If pain medicines have been prescribed, take them exactly as directed.    Preventing attacks    Minimize or avoid alcohol use. Excess alcohol intake can cause a gout attack.    Limit these foods and beverages:    Organ meats, such as kidneys and liver    Certain seafoods (anchovies, sardines, shrimp, scallops, herring, mackerel)    Wild game, meat extracts and meat gravies    Foods and beverages sweetened with high-fructose corn syrup, such as sodas    Eat a healthy diet including low-fat and nonfat dairy, whole grains, and vegetables.    If you are overweight, talk to your healthcare provider about a weight reduction plan. Avoid fasting or extreme low calorie diets (less than 900 calories per day). This will increase uric acid levels in the body.    If you have diabetes or high blood pressure, work with your doctor to manage these conditions.    Protect the joint from injury. Trauma can trigger a gout attack.  Follow-up care  Follow up with your healthcare provider or as advised.   When to seek medical advice  Call your healthcare provider if you have any of the following:    Fever over 100.4 F (38. C) with worsening joint pain    Increasing redness around the joint    Pain developing in another joint    Repeated vomiting, abdominal pain, or blood in the vomit or stool (black or red color)    [id number] [employer]. [address] [place] [address], [place] [address]. All rights reserved. This information is not intended as a substitute for professional medical care. Always follow your healthcare professional's instructions.

## 2017-01-19 NOTE — MR AVS SNAPSHOT
After Visit Summary   1/19/2017    Toby Mcgrath    MRN: [id number]           Patient Information     Date Of Birth          1955        Visit Information        Provider Department      1/19/2017 10:30 AM Mary Last, [facility] MTM        Today's Diagnoses     Acute gout of right elbow, unspecified cause    -  1     Type 2 diabetes mellitus without complication, without long-term current use of insulin (H)           Care Instructions    Recommendations from today's MTM visit:                                                    MTM (medication therapy management) is a service provided by a clinical pharmacist designed to help you get the most of out of your medicines.   Today we reviewed what your medicines are for, how to know if they are working, that your medicines are safe and how to make your medicine regimen as easy as possible.     1. When you  the colchicine and allopurinol, call Mary at [phone]  Week 1: Allopurinol 100mg (1 pill) once a day - colchicine 0.6mg once a day  Week 2: Allopurinol 200mg (2 pills) once a day- colchicine 0.6mg once a day  Week 3: Allopurinol 300mg (3 pills) once a day- colchicine 0.6mg once a day  Week 4: Continue Allopurinol 300mg daily (3 pills) - colchicine 0.6mg once a day  Week 5: Uric acid test, stop colchicine     2. For your dry nose/allergies try Nasal saline. In the spring, we can talk about what may work better for your allergies.     3. Decrease metformin to just one tablet once a day. It would be best to take this with Dinner.     4. There are refills of the triamcinolone cream at your pharmacy. Pick that up for your hands - just used a thin layer for 7 days at a time. In between use lotion - Eucerin, Udder Cream, Am-Lactin are good brands.     Next MTM visit: To be determined - call Mary when you are able to  the colchicine and allopurinol for your gout and we'll go from there.     To schedule  another MTM appointment, please call the clinic directly or you may call the MTM scheduling line at [phone] or toll-free at [phone].     My Clinical Pharmacist's contact information:                                                      It was a pleasure seeing you today!  Please feel free to contact me with any questions or concerns you have.      Mary Last, Pharm.D., M.B.A., BCACP  [facility] Pharmacist, [facility]  Pager: [phone]  Email: [email or web address]     You may receive a survey about the MT services you received.  I would appreciate your feedback to help me serve you better in the future. Please fill it out and return it when you can. Your comments will be anonymous.      My healthcare goals:                                                              Gout    Gout or is an inflammation of a joint due to a build-up of gout crystals in the joint fluid. This occurs when there is an excess of uric acid (a normal waste product) in the body. Uric acid builds up in the body when the kidneys are unable to filter enough of it from the blood. This may occur with age. It is also associated with kidney disease. Gout occurs more often in persons with obesity, diabetes, hypertension, or high levels of fats in the blood. It may be run in families. Gout tends to come and go. A flare up of gout is called an attack. Drinking alcohol or eating certain foods (such as shellfish or foods with additives such as high-fructose corn syrup) may increase uric acid levels in the blood and cause a gout attack.  During a gout attack, the affected joint may become a hot, red, swollen and painful. If you have had one attack of gout, you are likely to have another. An attack of gout can be treated with medicine. If these attacks become frequent, a daily medicine may be prescribed to help the kidneys remove uric acid from the body.  Home care  During a gout attack:    Rest painful joints. If gout affects  the joints of your foot or leg, you may want to use crutches for the first few days to keep from bearing weight on the affected joint.    When sitting or lying down, raise the painful joint to a level higher than your heart.    Apply an ice pack (ice cubes in a plastic bag wrapped in a thin towel) over the injured area for 20 minutes every 1-2 hours the first day for pain relief. Continue this 3-4 times a day for swelling and pain.    Avoid alcohol and foods listed below (see Preventing attacks) during a gout attack. Drink extra fluid to help flush the uric acid through your kidneys.    If you were prescribed a medication to treat gout, take it as your healthcare provider has instructed. Don't skip doses.    Take anti-inflammatory medicine as directed.     If pain medicines have been prescribed, take them exactly as directed.    Preventing attacks    Minimize or avoid alcohol use. Excess alcohol intake can cause a gout attack.    Limit these foods and beverages:    Organ meats, such as kidneys and liver    Certain seafoods (anchovies, sardines, shrimp, scallops, herring, mackerel)    Wild game, meat extracts and meat gravies    Foods and beverages sweetened with high-fructose corn syrup, such as sodas    Eat a healthy diet including low-fat and nonfat dairy, whole grains, and vegetables.    If you are overweight, talk to your healthcare provider about a weight reduction plan. Avoid fasting or extreme low calorie diets (less than 900 calories per day). This will increase uric acid levels in the body.    If you have diabetes or high blood pressure, work with your doctor to manage these conditions.    Protect the joint from injury. Trauma can trigger a gout attack.  Follow-up care  Follow up with your healthcare provider or as advised.   When to seek medical advice  Call your healthcare provider if you have any of the following:    Fever over 100.4 F (38. C) with worsening joint pain    Increasing redness around the  "joint    Pain developing in another joint    Repeated vomiting, abdominal pain, or blood in the vomit or stool (black or red color)    7576-8511 [employer]. [address] [place] [address], [place] [address]. All rights reserved. This information is not intended as a substitute for professional medical care. Always follow your healthcare professional's instructions.              Follow-ups after your visit        Your next 10 appointments already scheduled     Jan 23, 2017  9:30 AM   Return Visit with JIMBO Sutton CNP   [facility] ([facility]    [address] [place] [id number] [phone]              Who to contact     If you have questions or need follow up information about today's clinic visit or your schedule please contact [facility] directly at [phone].  Normal or non-critical lab and imaging results will be communicated to you by MyChart, letter or phone within 4 business days after the clinic has received the results. If you do not hear from us within 7 days, please contact the clinic through MyChart or phone. If you have a critical or abnormal lab result, we will notify you by phone as soon as possible.  Submit refill requests through [facility] or call your pharmacy and they will forward the refill request to us. Please allow 3 business days for your refill to be completed.          Additional Information About Your Visit        MyChart Information     [facility] lets you send messages to your doctor, view your test results, renew your prescriptions, schedule appointments and more. To sign up, go to [email or web address] . Click on \"Log in\" on the left side of the screen, which will take you to the Welcome page. Then click on \"Sign up Now\" on the right side of the page.     You will be asked to enter the access code listed below, as well as some personal information. Please follow the directions to create your " username and password.     Your access code is: [email or web address]  Expires: 4/3/2017 11:19 AM     Your access code will  in 90 days. If you need help or a new code, please call your [facility] or [phone].        Care EveryWhere ID     This is your Care EveryWhere ID. This could be used by other organizations to access your [place] medical records  [id number]         Blood Pressure from Last 3 Encounters:   17 138/74   17 112/73   16 138/80    Weight from Last 3 Encounters:   17 244 lb 4.8 oz (110.814 kg)   17 245 lb (111.131 kg)   16 244 lb 12.8 oz (111.041 kg)              Today, you had the following     No orders found for display         Today's Medication Changes          These changes are accurate as of: 17 11:18 AM.  If you have any questions, ask your nurse or doctor.               Start taking these medicines.        Dose/Directions    allopurinol 100 MG tablet   Commonly known as:  ZYLOPRIM   Used for:  Acute gout of right elbow, unspecified cause        Dose:  100 mg   Take 1 tablet (100 mg) by mouth daily Increase by one tablet every week until you get to a dose of 300mg (3 tablets) once per day.   Quantity:  90 tablet   Refills:  3         These medicines have changed or have updated prescriptions.        Dose/Directions    metFORMIN 500 MG 24 hr tablet   Commonly known as:  GLUCOPHAGE-XR   This may have changed:  how much to take   Used for:  Type 2 diabetes mellitus without complication, without long-term current use of insulin (H)        Dose:  500 mg   Take 1 tablet (500 mg) by mouth daily (with dinner)   Quantity:  90 tablet   Refills:  1            Where to get your medicines      These medications were sent to [facility] [address] [place] [address] - [address] [facility]  [address] [facility] [address]     Phone:  [phone]    - allopurinol 100 MG tablet  - metFORMIN 500 MG 24 hr tablet             Primary Care Provider  Office Phone # Fax #    [place] SydneyNiyah Haskins [phone]       [facility] [address] [facility] [address]        Thank you!     Thank you for choosing [facility]  for your care. Our goal is always to provide you with excellent care. Hearing back from our patients is one way we can continue to improve our services. Please take a few minutes to complete the written survey that you may receive in the mail after your visit with us. Thank you!             Your Updated Medication List - Protect others around you: Learn how to safely use, store and throw away your medicines at www.disposemymeds.org.          This list is accurate as of: 1/19/17 11:18 AM.  Always use your most recent med list.                   Brand Name Dispense Instructions for use    allopurinol 100 MG tablet    ZYLOPRIM    90 tablet    Take 1 tablet (100 mg) by mouth daily Increase by one tablet every week until you get to a dose of 300mg (3 tablets) once per day.       aspirin 81 MG tablet     100 tablet    Take 1 tablet (81 mg) by mouth daily       blood glucose monitoring lancets     3 Box    Use to test blood sugars 2-3 times daily as directed (lena contour meter)       blood glucose monitoring test strip    no brand specified    3 Box    Use to test blood sugar 2-3 times daily (lena contour meter)       Colchicine 0.6 MG Caps    MITIGARE    90 capsule    Take 0.6 mg by mouth daily Start with 2 capsules daily and then use 1 capsule daily thereafter for supression       finasteride 5 MG tablet    PROSCAR    30 tablet    Take 1 tablet (5 mg) by mouth daily For prostate enlargement. Please fill on $4 list       FLUoxetine 20 MG capsule    PROzac    90 capsule    TAKE THREE CAPSULES BY MOUTH ONCE DAILY       fluticasone 50 MCG/ACT spray    FLONASE    3 Bottle    Spray 1-2 sprays into both nostrils daily       metFORMIN 500 MG 24 hr tablet    GLUCOPHAGE-XR    90 tablet    Take 1 tablet (500  mg) by mouth daily (with dinner)       omeprazole 20 MG CR capsule    priLOSEC    90 capsule    Use every other day as needed       oxyCODONE 5 MG IR tablet    ROXICODONE    180 tablet    Take 1-2 tablets (5-10 mg) by mouth every 6 hours as needed for moderate to severe pain (Max 6 tabs per day.) May dispense on/after 12/23/16 to start taking on/after 12/27/16       pregabalin 50 MG capsule    LYRICA    90 capsule    Take 1 capsule (50 mg) by mouth 3 times daily       senna-docusate 8.6-50 MG per tablet    SENOKOT-S;PERICOLACE    60 tablet    Take 1 tablet by mouth 2 times daily       tamsulosin 0.4 MG capsule    FLOMAX    180 capsule    TAKE TWO CAPSULES BY MOUTH ONCE DAILY       triamcinolone 0.1 % cream    KENALOG    30 g    Apply sparingly to affected area twice daily for 7 days. Then stop and use only for flares

## 2017-01-19 NOTE — PROGRESS NOTES
SUBJECTIVE/OBJECTIVE:                                                    Toby Mcgrath is a 61 year old male coming in for an initial visit for Medication Therapy Management (although he is a well-known patient to me)  He was referred to me from Dr. Haskins.     Chief Complaint: Needs help straightening out his med list.  Also won't have enough money for colchicine until after first of the month - did have 3 tabs at home he could take for last flare.     Allergies/ADRs: Reviewed in Epic  Tobacco: No tobacco use   Alcohol: Less than 1 beverage / month  Caffeine: about a pot (maybe about 8 cups)/day of coffee, 3 regular sodas/day  PMH: Reviewed in Epic    Medication Adherence: issues found and discussed below    Depression:  Current medications include: Fluoxetine 60mg once daily. Pt reports that depression symptoms are unchanged. Thinking about decreasing to two capsules/day, but not sure because his son is going through a divorce which is affecting his mental health. He has tried stopping this before, but became depressed so he restarted.   PHQ-9 SCORE 2016 2016 11/3/2016   Total Score - - -   Total Score 1 18 5     Gout: Not currently taking meds. Has prescription for colchicine and has not been able to fill due to price. [facility]. Pt reports recent flare. Pt is experiencing the following medication side effects: none. Has been on allopurinol in the past, chart review shows he stopped this because he hadn't had a gout flare, so he didn't think he needed to continue. He is currently getting >2 gout flares/year and in multiple joints - toe, knee and fingers are the most commonly affected.   URIC ACID   Date Value Ref Range Status   2016 7.1 3.5 - 7.2 mg/dL Final       Diabetes:  Pt currently taking Metformin 500mg - one tablet twice daily.  Pt is experiencing the following side effects:  GI upset, diarrhea.   SMB-2 times a week.   Ranges (patient  "reported): \"good\"  Frequency of hypoglycemia? never.  Recent symptoms of high blood sugar? none  Eye exam: up to date  Foot exam: due  Microalbumin is not < 30 mg/g. Pt is not taking an ACEi/ARB.  Aspirin: Taking 81mg daily and denies side effects    Chronic pain:  Current medications include: Oxycodone 5mg - 6 tablets per day, Lyrica 50mg three times daily. Working with pain clinic on this. Pain relief is going up and down, states he's talked with the pain clinic about changing to something longer acting (mentions Oxycontin).   Pain location: back  Pain is described as sore and stiff.  How is your pain?  Tolerable with discomfort  How has your pain been with medication? better than before  Amount of pain relief you are now obtaining from your current pain reliever(s) enough to make a real difference in your life:   YES   Are you satisfied with your pain control? Could be better  Are you able to sleep? Awake with occasional pain  Patient reports the following side effects:  Denies Side Effects.    Hyperlipidemia: Current therapy includes None, currently holding simvastatin while taking colchicine due to interaction.  Pt reports no significant myalgias or other side effects.     Seasonal Allergies: Currently taking Flonase - works well for sx, but causes bloody noses. Doesn't take this consistently. Trouble allergy times - spring and fall.     BPH: Currently taking finasteride 5 mg daily, tamsulosin 0.8 mg daily. Patient reports no significant side effects.    Constipation: Currently taking senna-docusate 8.6-50 mg twice daily. No issues with bowel movements, but does note diarrhea which he attributes to Metformin.     GERD: Current medications include: Prilosec (omeprazole) 20 mg  Every other day as needed. Patient has tried a trial off of therapy and had terrible sx despite use of ranitidine. He is not interested in trying this again. Patient feels that current regimen is effective.    Skin: Current medications " include triamcinolone 0.1% cream twice daily for 7 day periods as needed.     Current labs include:  BP Readings from Last 3 Encounters:   01/03/17 112/73   11/23/16 138/80   11/03/16 126/74     A1C      6.5   1/3/2017.  CHOL      154   7/14/2016  TRIG      264   10/14/2013  HDL       32   7/14/2016  LDL       92   7/14/2016  LDL       60   10/14/2013    Liver Function Studies -   Recent Labs   Lab Test  07/14/16   1042   PROTTOTAL  7.5   ALBUMIN  4.1   BILITOTAL  0.5   ALKPHOS  61   AST  34   ALT  56       Lab Results   Component Value Date    UCRR 73 07/14/2016    MICROL <5 07/14/2016    UMALCR Unable to calculate due to low value 07/14/2016       Last Basic Metabolic Panel:  NA      144   1/3/2017   POTASSIUM      4.4   1/3/2017  CHLORIDE      106   1/3/2017  BUN       14   1/3/2017  CR     1.09   1/3/2017    CrCl (AdjBW) = 91.3ml/min  GFR ESTIMATE   Date Value Ref Range Status   01/03/2017 68 >60 mL/min/1.7m2 Final     Comment:     Non  GFR Calc   07/14/2016 66 >60 mL/min/1.7m2 Final     Comment:     Non  GFR Calc   02/18/2016 72 >60 mL/min/1.7m2 Final     Comment:     Non  GFR Calc       TSH   Date Value Ref Range Status   08/13/2015 1.78 0.40 - 4.00 mU/L Final   ]  /74 mmHg  Wt 244 lb 4.8 oz (110.814 kg)    Most Recent Immunizations   Administered Date(s) Administered     Influenza (IIV3) 10/08/2014     Influenza Vaccine IM 3yrs+ 4 Valent IIV4 09/22/2016     Pneumococcal 23 valent 08/17/2012     TD (ADULT, 7+) 12/08/2011     TDAP (ADACEL AGES 11-64) 08/14/2009     ASSESSMENT:                                                       Current medications were reviewed today.     Medication Adherence: Issues identified, may benefit from pillbox, however most medications are once daily when he wakes up. We did offer him a pill box today, but he wasn't terribly interested. Many of his issues are around the cost of his meds.     Depression:  Stable - discussed might  not be the best time (due to family stress) to try to change this, will address in the future.     Gout: Decreasing gout-causing foods would help. Patient would benefit from uric acid lowering medication such as allopurinol to prevent further recurrent flares. No dosage adjustment is needed for Layo's renal function.  ACP recommends overlap of an acute-gout treatment agent (NSAID or colchicine) for 8 weeks after starting urate lowering therapy. ACR guidelines recommend 3-6 months of cross coverage depending on uric acid level and presence of tophi and if there is any active gout left. Financially Layo likely can't likely cover for either of these time frames so will try to do this for at least a month. At this point he can't  the meds until after the first of the month. If needed low-dose naproxen (220mg) BID could be used in place of colchicine but will need to be mindful of his renal fxn.     Diabetes:  A1c at goal of <7%. Would benefit from decreasing intake of regular soda for this too.  GI SE on Metformin may be improved with a dose reduction.     Chronic pain:  Plan in place to f/u with pain clinic on the 23rd.     Hyperlipidemia: Stable. Will address once gout flare has subsided    Seasonal Allergies: Stable. Patient reporting adverse effect of dry and bloody nose when taking flonase. Nasal saline spray may help symptoms without drying patient out.      BPH: Stable.     Constipation: Stable    GERD: Stable. Decreasing foods and beverages that cause GERD would help.    Skin: Would benefit from sparingly as needed use of triamcinolone cream, patient has refills for this medications.    PLAN:                                                      1. Will start ULT -   Week 1: Allopurinol 100mg (1 pill) once a day - colchicine 0.6mg once a day  Week 2: Allopurinol 200mg (2 pills) once a day- colchicine 0.6mg once a day  Week 3: Allopurinol 300mg (3 pills) once a day- colchicine 0.6mg once a day  Week 4:  Continue Allopurinol 300mg daily (3 pills) - colchicine 0.6mg once a day  Week 5: Uric acid test, stop colchicine     2. For your dry nose/allergies try Nasal saline. In the spring, we can talk about what may work better for your allergies.     3. Decrease metformin to just one tablet once a day. It would be best to take this with Dinner.     4. There are refills of the triamcinolone cream at your pharmacy. Pick that up for your hands - just used a thin layer for 7 days at a time. In between use lotion - Eucerin, Udder Cream, Am-Lactin are good brands.     I spent 60 minutes with this patient today.  All changes were made via collaborative practice agreement with Connie Haskins. A copy of the visit note was provided to the patient's primary care provider.    Will follow up after the first of the month.    The patient was given a summary of these recommendations as an after visit summary.     Mary Last, LloydD, NEIDA, BCACP  MTM Pharmacist, [facility]

## 2017-01-23 ENCOUNTER — OFFICE VISIT (OUTPATIENT)
Dept: PALLIATIVE MEDICINE | Facility: CLINIC | Age: 62
End: 2017-01-23
Payer: COMMERCIAL

## 2017-01-23 VITALS
OXYGEN SATURATION: 95 % | RESPIRATION RATE: 18 BRPM | DIASTOLIC BLOOD PRESSURE: 75 MMHG | HEART RATE: 80 BPM | SYSTOLIC BLOOD PRESSURE: 149 MMHG

## 2017-01-23 DIAGNOSIS — M96.1 FAILED BACK SYNDROME OF LUMBAR SPINE: Primary | ICD-10-CM

## 2017-01-23 PROCEDURE — 99214 OFFICE O/P EST MOD 30 MIN: CPT | Performed by: NURSE PRACTITIONER

## 2017-01-23 RX ORDER — OXYCODONE HCL 10 MG/1
10 TABLET, FILM COATED, EXTENDED RELEASE ORAL EVERY 12 HOURS
Qty: 60 TABLET | Refills: 0 | Status: SHIPPED | OUTPATIENT
Start: 2017-01-23 | End: 2017-01-23

## 2017-01-23 RX ORDER — OXYCODONE HYDROCHLORIDE 5 MG/1
5-10 TABLET ORAL 2 TIMES DAILY PRN
Qty: 180 TABLET | Refills: 0 | Status: SHIPPED | OUTPATIENT
Start: 2017-01-23 | End: 2017-02-21

## 2017-01-23 RX ORDER — OXYCODONE HYDROCHLORIDE 5 MG/1
5 TABLET ORAL 2 TIMES DAILY PRN
Qty: 60 TABLET | Refills: 0 | Status: SHIPPED | OUTPATIENT
Start: 2017-01-23 | End: 2017-01-23

## 2017-01-23 ASSESSMENT — PAIN SCALES - GENERAL: PAINLEVEL: SEVERE PAIN (7)

## 2017-01-23 NOTE — Clinical Note
Layo Joe decided he wants to try PT again so I will keep him for now. We can revisit transferring care to you after your leave is completed. Best wishes! Jayashree

## 2017-01-23 NOTE — PROGRESS NOTES
"[facility]    CHIEF COMPLAINT: Back pain    INTERVAL HISTORY:  Last seen on 11/23/16.        Recommendations/plan at the last visit included:  1. Schedule follow-up with JIMBO Ernst NP-C in 8 weeks  2. Procedures recommended: Spinal cord stimulator, we will talk about this in the future.    3. Medication recommendations: No change.     Since his last visit, Toby Mcgrath reports:  - Noting a \"heaviness\" in legs. Has not had this evaluated by primary care.    Pain Information:   Pain quality: Aching, Burning, Exhausting, Gnawing, Miserable, Nagging, Numb, Penetrating, Sharp, Shooting, Stabbing, Tender, Throbbing, Tiring and Unbearable    Pain timing: Constant     Pain rating: intensity ranges from 6/10 to 7/10, and averages 6/10 on a 0-10 scale.   Aggravating factors include: Sitting   Relieving factors include: Standing      Annual Controlled Substance Agreement/UDS due date: July 2017    CURRENT RELEVANT PAIN MEDICATIONS:   Oxycodone 5 mg, 1-2 tabs every 6 hours, max 6 tabs per day  Gabapentin 600 mg t.i.d.    Patient is using the medication as prescribed: Yes  Is your medication helpful?  Lyrica was more helpful than gabapentin  Medication side effects? denies any problems    Previous Pain Relevant Medications: (H--helped; HI--Helped initially; NH--No help; W--worse; SE--side effects)   NOTE: This medication information taken from patient's intake form, not medical records.    Opiates: Oxycodone:H   NSAIDS: Ibuprofen:H     Muscle Relaxants: Clonzepam: H   Anti-migraine mediations: none   Anti-depressants: Prozac:H    Sleep aids:none   Anti-convulsants: Gabapentin: H     Topicals: lioderm patches:NH   Other medications not covered above: none     [place] Board of Pharmacy Data Base Reviewed:    YES; As expected, no concern for misuse/abuse.    SELF CARE:   How often do you practice SELF-CARE (relaxing, stretching, pacing, monitoring posture, taking mini-breaks) in a typical day:  " 2 times per week    Medications:  Current Outpatient Prescriptions   Medication Sig Dispense Refill     allopurinol (ZYLOPRIM) 100 MG tablet Take 1 tablet (100 mg) by mouth daily Increase by one tablet every week until you get to a dose of 300mg (3 tablets) once per day. 90 tablet 3     metFORMIN (GLUCOPHAGE-XR) 500 MG 24 hr tablet Take 1 tablet (500 mg) by mouth daily (with dinner) 90 tablet 1     Colchicine (MITIGARE) 0.6 MG CAPS Take 0.6 mg by mouth daily Start with 2 capsules daily and then use 1 capsule daily thereafter for supression 90 capsule 0     FLUoxetine (PROZAC) 20 MG capsule TAKE THREE CAPSULES BY MOUTH ONCE DAILY 90 capsule 0     omeprazole (PRILOSEC) 20 MG CR capsule Use every other day as needed 90 capsule 1     pregabalin (LYRICA) 50 MG capsule Take 1 capsule (50 mg) by mouth 3 times daily 90 capsule 1     oxyCODONE (ROXICODONE) 5 MG immediate release tablet Take 1-2 tablets (5-10 mg) by mouth every 6 hours as needed for moderate to severe pain (Max 6 tabs per day.) May dispense on/after 12/23/16 to start taking on/after 12/27/16 180 tablet 0     finasteride (PROSCAR) 5 MG tablet Take 1 tablet (5 mg) by mouth daily For prostate enlargement. Please fill on $4 list 30 tablet 5     fluticasone (FLONASE) 50 MCG/ACT nasal spray Spray 1-2 sprays into both nostrils daily 3 Bottle 11     triamcinolone (KENALOG) 0.1 % cream Apply sparingly to affected area twice daily for 7 days. Then stop and use only for flares 30 g 3     tamsulosin (FLOMAX) 0.4 MG 24 hr capsule TAKE TWO CAPSULES BY MOUTH ONCE DAILY 180 capsule 1     blood glucose monitoring (NO BRAND SPECIFIED) test strip Use to test blood sugar 2-3 times daily (lena contour meter) 3 Box 0     blood glucose monitoring (LENA MICROLET) lancets Use to test blood sugars 2-3 times daily as directed (lena contour meter) 3 Box 0     aspirin 81 MG tablet Take 1 tablet (81 mg) by mouth daily 100 tablet 3     senna-docusate (SENOKOT-S;PERICOLACE) 8.6-50 MG per  tablet Take 1 tablet by mouth 2 times daily 60 tablet 1       Review of Systems: A 10-point review of systems was negative, with the exception of chronic pain issues.      Social History: Reviewed; unchanged from initial consultation.      Family history: Reviewed; unchanged from initial consultation.     PHYSICAL EXAM    Filed Vitals:    01/23/17 0925   BP: 149/75   Pulse: 80   Resp: 18   SpO2: 95%       Constitutional: healthy, alert, no distress    HEENT: Head atraumatic, normocephalic. Eyes without conjunctival injection or jaundice. Neck supple. No obvious neck masses.  Cardiovascular: Regular rate/rhythm; no murmurs/rubs/gallops appreciated.   Respiratory: Lungs clear to auscultation bilaterally.   Gastrointestinal: Normoactive bowel sounds. Abdomen soft, non-tender, without guarding/rebound.   : Deferred  Skin: No rash, lesions, or petechiae of exposed skin.   Extremities: Peripheral pulses intact. No clubbing, cyanosis, or edema.   Psychiatric/mental status: Alert, without lethargy or stupor. Appropriate affect. Mood normal.   Neurologic exam:  CN:  Cranial nerves 2-12 are grossly normal.  Motor:  4/5 UE and LE strength, which is his baseline    Musculoskeletal exam:  Lumbar/Sacral spine:   Forward Flexion:  60 degrees   Ext: 20 degrees   Rotation/ext to right: painful    Rotation/ext to left:: painful   Tenderness in the lumbar spine at midline:  Yes   Tenderness in the lumbar paraspinal muscles:  Yes   Straight leg exam:positive  Sensory exam:   Light touch: normal bilateral upper and lower extremities    Vibration: normal in LE   No allodynia, dysesthesia, or hyperalgesia.    DIRE Score for ongoing opioid management is calculated as follows:    Diagnosis = 3 pts (advanced condition; severe pain/objective findings)    Intractability = 2 pts (most treatments tried; patient not fully engaged/barriers)    Risk        Psych = 2 pts (personality dysfunction/mental illness that moderately interferes with care)          Chem Hlth = 2 pts (use of medications to cope with stress; chemical dependency in remission)       Reliability = 2 pts (occasional difficulties with compliance; generally reliable)       Social = 2 pts (reduction in some relationships/life rolls)       (Psych + Chem hlth + Reliability + Social) = 13    Efficacy = 2 pts (moderate benefit/function; low med dose; too early/not tried meds)      DIRE Score = 15        7-13: likely NOT suitable candidate for long-term opioid analgesia       14-21: may be a suitable candidate for long-term opioid analgesia       DIAGNOSTIC TESTS:  Imaging Studies:   No new imaging    Assessment:   1.  Degenerative disc disease, lumbar radiculopathy  2.  Long-term use of opiate medications    Layo presents for medication management regarding chronic pain issues. We had an extensive discussion regarding whether or not he needs to continue with the pain center as he has completed the multidisciplinary program. He would like to try a combination of long acting and short acting medication again. Unfortunately his insurance coverage for OxyContin is poor and we decided to stick with the oxycodone alone. He would like to try physical therapy again. I will have him stay with the pain clinic while he completes another course of physical therapy and then will transfer his care back to primary care at that time. I have reviewed with his primary care provider and she is willing to assume prescribing. She is going on maternity leave so I will have him stay with me and tells her leave is completed.    Plan:    Diagnosis reviewed, treatment option addressed, and risk/benifits discussed.  Self-care instructions given.  I am recommending a multidisciplinary treatment plan to help this patient better manage pain.      1. Schedule physical therapy visits   2. Schedule follow-up with JIMBO Ernst, NP-C in 4 weeks after you have been to PT.   3. Medication recommendations:   1. Oxycodone 5 mg:  1-2  tablet 3 times daily as needed for pain. Max 6 tablets per day    Total time spent face to face was 30 minutes and more than 50% of face to face time was spent in counseling and/or coordination of care regarding the diagnosis and recommendations above.      JIMBO Tran, NP-C   [facility]

## 2017-01-23 NOTE — NURSING NOTE
Chief Complaint   Patient presents with     Pain       Initial /75 mmHg  Pulse 80  Resp 18  SpO2 95% Estimated body mass index is 33.13 kg/(m^2) as calculated from the following:    Height as of 1/3/17: 1.829 m (6').    Weight as of 1/19/17: 110.814 kg (244 lb 4.8 oz).  BP completed using cuff size: regular    Artemio Cole MA  [facility]

## 2017-01-23 NOTE — PATIENT INSTRUCTIONS
After Visit Instructions:     Thank you for coming to [facility] for your care. It is my goal to partner with you to help you reach your optimal state of health.     I am recommending multidisciplinary care at this time.  The focus of care will be to continue gradual rehabilitation and pain management with medication adjustments as needed.    Continue daily self-care, identifying contributing factors, and monitoring variations in pain level. Continue to integrate self-care into your life.      1. Schedule physical therapy visits   2. Schedule follow-up with JIMBO Ernst NP-C in 4 weeks after you have been to PT.   3. Medication recommendations:   1. Oxycodone 5 m-2  tablet 3 times daily as needed for pain. Max 6 tablets per day    JIMBO Tran NP-C  [facility] [place]    Contact information: [facility]    Please call if any side effects, questions, or concerns arise.    Nurse Triage line:  [phone]   Call this number with any questions or concerns. You may leave a detailed message anytime. Calls are typically returned Monday through Friday between 8 AM and 4:30 PM. We usually get back to you within 2 business days depending on the issue/request.    Scheduling number: [phone].  Call this number to schedule or change appointments.          Medication Refills Policy:    For non-narcotic medications, please your pharmacy directly to request a refill and the pharmacy will call the Pain Management Center for authorization. Please allow 3-4 days for these refills.    For narcotic refills, call the nurse triage line and leave a message requesting your refill along with the name of the pharmacy that you use. Narcotic prescriptions will be mailed to your pharmacy or you may pick them up at the clinic.  Please call 7-10 days before your refill is due  The above policy allows adequate time so that you do not run out of  medication.    No Show - Late Cancellation - Late Arrival Policy  We believe regular attendance is key to your success in our program.    Any time you are unable to keep your appointment we ask that you call us at  least 24 hours in advance to let us know. This will allow us to offer the appointment time to another patient. The following is our policy for missed appointments. This also applies to appointments cancelled with less than 24 hours notice.    You will receive a letter after missing your 1st and 2nd appointments without contacting the clinic before your scheduled appointment time.     After missing 3 appointments without calling first, we will cancel all of your future appointments at [facility].    At that point, you will not be able to resume services unless approved by your care team  We understand that unforseen circumstances arise, however, out of respect for all concerned and to provide this appointment to another patient, this policy will be enforced.    Please note that most follow up appointments are 30 minutes long. If you arrive late, your provider may not be able to see you for the entire 30 minutes. Please also note that if you arrive more than 15 minutes for any appointment, it may be rescheduled.

## 2017-01-23 NOTE — MR AVS SNAPSHOT
After Visit Summary   2017    Toby Mcgrath    MRN: [id number]           Patient Information     Date Of Birth          1955        Visit Information        Provider Department      2017 9:30 AM Jayashree Goodman APRN CNP [facility]        Today's Diagnoses     Failed back syndrome of lumbar spine    -  1       Care Instructions    After Visit Instructions:     Thank you for coming to [facility] for your care. It is my goal to partner with you to help you reach your optimal state of health.     I am recommending multidisciplinary care at this time.  The focus of care will be to continue gradual rehabilitation and pain management with medication adjustments as needed.    Continue daily self-care, identifying contributing factors, and monitoring variations in pain level. Continue to integrate self-care into your life.      1. Schedule physical therapy visits   2. Schedule follow-up with JIMBO Ernst NP-C in 4 weeks after you have been to PT.   3. Medication recommendations:   1. Oxycodone 5 m-2  tablet 3 times daily as needed for pain. Max 6 tablets per day    JIMBO Tran NP-C  [facility]    Contact information: [facility]    Please call if any side effects, questions, or concerns arise.    Nurse Triage line:  [phone]   Call this number with any questions or concerns. You may leave a detailed message anytime. Calls are typically returned Monday through Friday between 8 AM and 4:30 PM. We usually get back to you within 2 business days depending on the issue/request.    Scheduling number: [phone].  Call this number to schedule or change appointments.          Medication Refills Policy:    For non-narcotic medications, please your pharmacy directly to request a refill and the pharmacy will call the Pain Management Center for authorization. Please allow  3-4 days for these refills.    For narcotic refills, call the nurse triage line and leave a message requesting your refill along with the name of the pharmacy that you use. Narcotic prescriptions will be mailed to your pharmacy or you may pick them up at the clinic.  Please call 7-10 days before your refill is due  The above policy allows adequate time so that you do not run out of medication.    No Show - Late Cancellation - Late Arrival Policy  We believe regular attendance is key to your success in our program.    Any time you are unable to keep your appointment we ask that you call us at  least 24 hours in advance to let us know. This will allow us to offer the appointment time to another patient. The following is our policy for missed appointments. This also applies to appointments cancelled with less than 24 hours notice.    You will receive a letter after missing your 1st and 2nd appointments without contacting the clinic before your scheduled appointment time.     After missing 3 appointments without calling first, we will cancel all of your future appointments at [facility].    At that point, you will not be able to resume services unless approved by your care team  We understand that unforseen circumstances arise, however, out of respect for all concerned and to provide this appointment to another patient, this policy will be enforced.    Please note that most follow up appointments are 30 minutes long. If you arrive late, your provider may not be able to see you for the entire 30 minutes. Please also note that if you arrive more than 15 minutes for any appointment, it may be rescheduled.                                   Follow-ups after your visit        Additional Services     PAIN PT EVAL AND TREAT                 Your next 10 appointments already scheduled     Feb 20, 2017  9:30 AM   Return Visit with JIMBO Sutton CNP   [facility] ([facility]  "[facility])    [address]  [facility] [address] [phone]              Who to contact     If you have questions or need follow up information about today's clinic visit or your schedule please contact [facility] directly at [phone].  Normal or non-critical lab and imaging results will be communicated to you by MyChart, letter or phone within 4 business days after the clinic has received the results. If you do not hear from us within 7 days, please contact the clinic through MyChart or phone. If you have a critical or abnormal lab result, we will notify you by phone as soon as possible.  Submit refill requests through [email or web address] or call your pharmacy and they will forward the refill request to us. Please allow 3 business days for your refill to be completed.          Additional Information About Your Visit        MyChart Information     [email or web address] lets you send messages to your doctor, view your test results, renew your prescriptions, schedule appointments and more. To sign up, go to [email or web address] . Click on \"Log in\" on the left side of the screen, which will take you to the Welcome page. Then click on \"Sign up Now\" on the right side of the page.     You will be asked to enter the access code listed below, as well as some personal information. Please follow the directions to create your username and password.     Your access code is: [email or web address]  Expires: 4/3/2017 11:19 AM     Your access code will  in 90 days. If you need help or a new code, please call your [place] clinic or [phone].        Care EveryWhere ID     This is your Care EveryWhere ID. This could be used by other organizations to access your [place] medical records  [id number]        Your Vitals Were     Pulse Respirations Pulse Oximetry             80 18 95%          Blood Pressure from Last 3 Encounters:   17 149/75   17 138/74   17 112/73    Weight from Last 3 " Encounters:   01/19/17 110.814 kg (244 lb 4.8 oz)   01/03/17 111.131 kg (245 lb)   11/03/16 111.041 kg (244 lb 12.8 oz)              We Performed the Following     PAIN PT EVAL AND TREAT          Today's Medication Changes          These changes are accurate as of: 1/23/17 10:05 AM.  If you have any questions, ask your nurse or doctor.               Start taking these medicines.        Dose/Directions    oxyCODONE 5 MG IR tablet   Commonly known as:  ROXICODONE   Used for:  Failed back syndrome of lumbar spine   Started by:  Jayashree Goodman APRN CNP        Dose:  5-10 mg   Take 1-2 tablets (5-10 mg) by mouth 2 times daily as needed for moderate to severe pain (Max 6 tabs per day.) May dispense on/after 1/23/17 to start taking on/after 1/26/17   Quantity:  180 tablet   Refills:  0            Where to get your medicines      Some of these will need a paper prescription and others can be bought over the counter.  Ask your nurse if you have questions.     Bring a paper prescription for each of these medications    - oxyCODONE 5 MG IR tablet             Primary Care Provider Office Phone # Fax #    [place] [phone]       [place] [id number]        Thank you!     Thank you for choosing [facility]  for your care. Our goal is always to provide you with excellent care. Hearing back from our patients is one way we can continue to improve our services. Please take a few minutes to complete the written survey that you may receive in the mail after your visit with us. Thank you!             Your Updated Medication List - Protect others around you: Learn how to safely use, store and throw away your medicines at www.disposemymeds.org.          This list is accurate as of: 1/23/17 10:05 AM.  Always use your most recent med list.                   Brand Name Dispense Instructions for use    allopurinol 100 MG tablet     ZYLOPRIM    90 tablet    Take 1 tablet (100 mg) by mouth daily Increase by one tablet every week until you get to a dose of 300mg (3 tablets) once per day.       aspirin 81 MG tablet     100 tablet    Take 1 tablet (81 mg) by mouth daily       blood glucose monitoring lancets     3 Box    Use to test blood sugars 2-3 times daily as directed (lena contour meter)       blood glucose monitoring test strip    no brand specified    3 Box    Use to test blood sugar 2-3 times daily (lena contour meter)       Colchicine 0.6 MG Caps    MITIGARE    90 capsule    Take 0.6 mg by mouth daily Start with 2 capsules daily and then use 1 capsule daily thereafter for supression       finasteride 5 MG tablet    PROSCAR    30 tablet    Take 1 tablet (5 mg) by mouth daily For prostate enlargement. Please fill on $4 list       FLUoxetine 20 MG capsule    PROzac    90 capsule    TAKE THREE CAPSULES BY MOUTH ONCE DAILY       fluticasone 50 MCG/ACT spray    FLONASE    3 Bottle    Spray 1-2 sprays into both nostrils daily       metFORMIN 500 MG 24 hr tablet    GLUCOPHAGE-XR    90 tablet    Take 1 tablet (500 mg) by mouth daily (with dinner)       omeprazole 20 MG CR capsule    priLOSEC    90 capsule    Use every other day as needed       oxyCODONE 5 MG IR tablet    ROXICODONE    180 tablet    Take 1-2 tablets (5-10 mg) by mouth 2 times daily as needed for moderate to severe pain (Max 6 tabs per day.) May dispense on/after 1/23/17 to start taking on/after 1/26/17       pregabalin 50 MG capsule    LYRICA    90 capsule    Take 1 capsule (50 mg) by mouth 3 times daily       senna-docusate 8.6-50 MG per tablet    SENOKOT-S;PERICOLACE    60 tablet    Take 1 tablet by mouth 2 times daily       tamsulosin 0.4 MG capsule    FLOMAX    180 capsule    TAKE TWO CAPSULES BY MOUTH ONCE DAILY       triamcinolone 0.1 % cream    KENALOG    30 g    Apply sparingly to affected area twice daily for 7 days. Then stop and use only for flares

## 2017-01-30 ENCOUNTER — TELEPHONE (OUTPATIENT)
Dept: PHARMACY | Facility: CLINIC | Age: 62
End: 2017-01-30

## 2017-01-30 ENCOUNTER — OFFICE VISIT (OUTPATIENT)
Dept: PALLIATIVE MEDICINE | Facility: CLINIC | Age: 62
End: 2017-01-30
Payer: COMMERCIAL

## 2017-01-30 DIAGNOSIS — M96.1 FAILED BACK SYNDROME OF LUMBAR SPINE: ICD-10-CM

## 2017-01-30 DIAGNOSIS — G89.29 CHRONIC PAIN: Primary | ICD-10-CM

## 2017-01-30 PROCEDURE — 97162 PT EVAL MOD COMPLEX 30 MIN: CPT | Mod: GP | Performed by: PHYSICAL THERAPIST

## 2017-01-30 PROCEDURE — 97110 THERAPEUTIC EXERCISES: CPT | Mod: GP | Performed by: PHYSICAL THERAPIST

## 2017-01-30 NOTE — PROGRESS NOTES
"PHYSICAL THERAPY INITIAL EVALUATION and PLAN OF CARE    Patient Name: Toby Mcgrath     : 1955    MRN: [id number]   Pain Management Provider:   Jayashree Goodman NP and Florence Amato PT      SUBJECTIVE:  PRESENTATION AND ETIOLOGY  Chief Complaint: Chronic right LBP and \"nerve pain\" down into both legs (down to feet), decreased ROM and decreased flexibility limiting the ability to perform activities of daily living.      Onset / Etiology: longstanding (20+ years)    Pattern Since Onset: Unchanged    Pertinent Medical  / Surgical History: Epic Snapshot Reviewed:  Contributing factors to the severity / complexity of the patient's chief complaint include: refer to provider's notes in EPIC; note hx lumbar fusion , discectomy/laminectomy      Symptom Pattern: Morning: \"really bad\"     Pain: Frequency: Constant           Intensity: Best 4-5/10, Worst 8-9/10, ADP 4-9/10    LEVEL OF FUNCTION AT START OF CARE  Current Aggrevating Activities / Functional Limitations:   Walking tolerance: during summer was walking about 1.5 miles (RT) but then was uncomfortable (.75 miles was within comfortable range - pain started to increase at this point so he would turn around) ; doesn't know his tolerance currently as he hasn't been doing a lot of walking  Standing tolerance: static: not asked, dynamic 30-60 minutes  Sitting tolerance: \"the worst\" - \"have to shift and get up, shift and get up\" - could tolerate about 30 min with shifting in seated position  Disturbed sleep: up and down to use bathroom so not limited d/t pain  Transitions: most not more challenging (except off the floor and to retrieve things from low positions - doesn't do these things much anymore)  Household Activity: NA  Work limitations: NA - on disability  Recreational limitations: yes has cut back (can't do sports, go on picnics etc)  Other: steps are challenging (going up especially); driving longer distances (> metro distances) - but getting out " "of car after any distance is uncomfortable     Prior Functional Level: Gradual onset of limitations.      Home or Community Barriers: None    Patient's selected goals for physical therapy: \"relax my back and relax my brain\"    CURRENT / PREVIOUS INTERVENTION(S):     Relieving Activities:  - Self Care: None  - Current HEP: Stretching -  Only trunk extension stretch in standing, Aerobic - in nicer weather was getting out for a walk about 1-2x/wk,   - Relaxation Practice: does pause on the couch, puts his head back and takes a deep breath - this helps his anxiety    Previous / Current therapies for current chief complaint: PT: had rehab PT after his most recent back surgery and also worked with this recording therapist here at the pain clinic just prior to his surgery     DEMOGRAPHICS  Employment Status: On disability    Social Support: lives with family    Patient's perceived quality of life:  Reports he is feeing frustrated that he continues to have such high levels of pain and that the pain is limiting him to the degree that it is    Knowledge/understanding of the role of stress/MH on pain experience:  Has some understanding    ===============================================================  OBJECTIVE:  POSTURE/MOBILITY:  Observation: Pleasant and engaged.  Pt up and down from sitting to standing, pacing around throughout the PT eval.  Shoulders elevated in both sitting and standing.    Static and Dynamic: Transitions independently - sit to stand takes effort effort and is slow    GAIT, LOCOMOTION, and BALANCE:  Gait and Locomotion: Antalgic: using SEC      RANGE OF MOTION:   Active: Trunk: flexion decreased by 50% - stops d/t tightness and he reports apprehension that it will hurt so he stops, bilateral SB decreased by 30% - reports increased tightness in contralateral QLs;  LEs WFL - tight and guarded at end ranges of hip ROM      MUSCLE PERFORMANCE:   Strength: 10 heel raises with light HHA for balance; bilateral " DF 4+/5, partial squat to stand with UE assist on furniture and moderate difficulty.  No core engagement observed to support posture.   Flexibility: Note tightness in bilateral QLs, gluts, piriformis, TFLs, HS, gastrocs.    Pain behaviors: Frequent but appropriate shifting in position or moving in/out of sit and stand      ===============================================================  Today's Treatment:  Initial evaluation  Therapeutic Procedures/Exercises: Instructed in walking program - use hallways of his apt building. (10 min)   Educated on pain PT philosophy of 1) HEP instruction 2) posture /kinesthetic awareness education and 3) self care/self regulation.   ===============================================================  ASSESSMENT:    Patient requires PT intervention for the following impairments: Limited knowledge of condition and / or self care - inability to control symptoms, Impaired posture / muscle imbalance, Muscle flexibility limitations, Muscle strength and endurance limitations, Pain and Deconditioning    Anticipated Goals and Expected Outcomes:  STG (attempt to meet in 8-12 weeks):  Pt will report daily HEP/stretching program.  Pt will report two forms of mini breaks taken each day as a self regulation tool.    Pt will report two pacing strategies used to better manage daily activity.  Pt will demonstrate how to find a neutral position in sitting, standing and with ambulation.   Report increased sitting tolerance for 30-40 min.   Retrieve objects from lower positions (low cupboards/off floor) with increased ease.     Rehab potential for achieving goals: good-fair.  Pt appears to have mild-moderate anxiety which will influence his pain experience.  Prognosis will depend on concurrent addressing of psychosocial contributing factors.   ===============================================================  PLAN:   Patient will benefit from skilled physical therapy consisting of:   -neuromuscular  reeducation for improved kinesthetic sense/body awareness and posture as well as education in ANS regulation techniques ;   -education in self care / home management training to include instruction in: daily symptom control techniques and flare management;   -therapeutic activities to achieve improved functional performance in daily activities through use of self care including pacing and energy conservation, good body mechanics and restorative positioning;   -therapeutic exercise to develop: strength, endurance, flexibility and core stability through HEP instruction.    Assessment will be ongoing with changes in treatment as indicated.  Benefits/risks/alternatives to treatment have been reviewed and the patient has been instructed to contact this office if they have any questions or concerns.  This plan of care has been discussed with the patient and the patient is in agreement.     Frequency / Duration:  Patient will be seen for a total of up to 12 visits; at a frequency of QW x 6, QOW x 4, 1x/mo x 2.    Total Visit Time: 50  minutes            Florence Amato            PT                              Date:  1/30/2017    =====================================================  **  Referring Provider Certification: Referring Provider reviewing certifies that the above treatment / plan of care is required and authorized, and that the patient's plan will be reviewed every ninety (90) days **.   ======================================================     PRESENT: NA    MULTIDISCIPLINARY PATIENT / FAMILY EDUCATION RECORD  Department:  Physical Therapy    Readiness to Learn: Ability to understand verbal instructions, Ability to understand written instructions, Knowledge of educational needs / treatment plan  Specific Barriers to Learning: None  Referrals: None  Learning Needs: Pain management education to improve daily activity tolerance.   Who: Patient  How: Demonstration, Verbal instructions, Written  instructions  Response: Appropriate verbal response, Asked questions, Demonstrated ability, Verbalized recall / understanding

## 2017-01-30 NOTE — TELEPHONE ENCOUNTER
Spoke with patient today - has started allopurinol 100mg - doing well, hasn't noticed a difference. Increasing to 200mg starting tomorrow (and then up to 300mg the following week). No signs/sx of gout. He is NOT taking any colchicine at this time (plan at last MTM visit was not to start anything until he could  allopurinol and colchicine after the first of the month). Discussed having it available should he get a gout attack.     Will f/u in one week - pt to call sooner if he runs into issues.     Mary Last, PharmD, NEIDA, BCACP  MTM Pharmacist, [facility]

## 2017-02-03 DIAGNOSIS — F33.0 MILD EPISODE OF RECURRENT MAJOR DEPRESSIVE DISORDER (H): Primary | ICD-10-CM

## 2017-02-06 NOTE — TELEPHONE ENCOUNTER
Prescription approved per [facility] Refill Protocol.  Ivis SMALLS, RN    Pending Prescriptions:                       Disp   Refills    FLUoxetine (PROZAC) 20 MG capsule [Pharma*90 cap*0            Sig: TAKE THREE CAPSULES BY MOUTH ONCE DAILY         Last Written Prescription Date: 1/6/2017  Last Fill Quantity: 90; # refills: 0  Last Office Visit with [facility], [facility] or [facility] prescribing provider:  1/3/2017   Next 5 appointments (look out 90 days)     Feb 14, 2017 11:15 AM   Return Visit with Florence Amato PT   [facility] ([facility]    [address]  [facility] [address]   [phone]            Feb 20, 2017  9:30 AM   Return Visit with JIMBO Sutton CNP   [facility] ([facility])    [address]  [facility] [address] [phone]            Feb 21, 2017 11:15 AM   Return Visit with Florence Amato PT   [facility] ([facility])    [address]  [facility] [address]   [phone]            Feb 28, 2017 11:15 AM   Return Visit with Florence Amato PT   [facility] ([facility])    [address]  [facility] [address] [phone]            Mar 07, 2017 11:15 AM   Return Visit with Florence Amato PT   [facility] ([facility])    [address]  [facility] [address] [phone]                   Last PHQ-9 score on record=   PHQ-9 SCORE 11/3/2016   Total Score -   Total Score 5       AST       34   7/14/2016  ALT       56   7/14/2016

## 2017-02-07 ENCOUNTER — TELEPHONE (OUTPATIENT)
Dept: PHARMACY | Facility: CLINIC | Age: 62
End: 2017-02-07

## 2017-02-07 NOTE — TELEPHONE ENCOUNTER
Message left for patient to f/u on allopurinol start.     Mary Last, LloydD, NEIDA, BCACP  MTM Pharmacist, [facility]

## 2017-02-14 ENCOUNTER — ALLIED HEALTH/NURSE VISIT (OUTPATIENT)
Dept: PHARMACY | Facility: CLINIC | Age: 62
End: 2017-02-14
Payer: COMMERCIAL

## 2017-02-14 DIAGNOSIS — E11.9 TYPE 2 DIABETES MELLITUS WITHOUT COMPLICATION, WITHOUT LONG-TERM CURRENT USE OF INSULIN (H): ICD-10-CM

## 2017-02-14 DIAGNOSIS — M10.9 ACUTE GOUTY ARTHRITIS: ICD-10-CM

## 2017-02-14 DIAGNOSIS — M10.9 ACUTE GOUT OF RIGHT ELBOW, UNSPECIFIED CAUSE: ICD-10-CM

## 2017-02-14 DIAGNOSIS — E78.5 HYPERLIPIDEMIA LDL GOAL <100: Primary | ICD-10-CM

## 2017-02-14 PROCEDURE — 99607 MTMS BY PHARM ADDL 15 MIN: CPT | Mod: U4 | Performed by: PHARMACIST

## 2017-02-14 PROCEDURE — 99606 MTMS BY PHARM EST 15 MIN: CPT | Mod: U4 | Performed by: PHARMACIST

## 2017-02-14 RX ORDER — SIMVASTATIN 20 MG
20 TABLET ORAL AT BEDTIME
Qty: 90 TABLET | Refills: 0 | Status: SHIPPED | OUTPATIENT
Start: 2017-02-14 | End: 2017-05-17

## 2017-02-14 RX ORDER — ALLOPURINOL 100 MG/1
300 TABLET ORAL DAILY
Qty: 270 TABLET | Refills: 0 | Status: SHIPPED | OUTPATIENT
Start: 2017-02-14 | End: 2018-05-31

## 2017-02-14 NOTE — MR AVS SNAPSHOT
After Visit Summary   2/14/2017    Toby Mcgrath    MRN: [id number]           Patient Information     Date Of Birth          1955        Visit Information        Provider Department      2/14/2017 2:00 PM Mary Last, [facility]        Today's Diagnoses     Hyperlipidemia LDL goal <100    -  1    Acute gout of right elbow, unspecified cause        Type 2 diabetes mellitus without complication, without long-term current use of insulin (H)           Follow-ups after your visit        Your next 10 appointments already scheduled     Feb 16, 2017  1:00 PM CST   Return Visit with Florence Amato PT   [facility] ([facility])    [address]  [facility] [address] [phone]            Feb 20, 2017  9:30 AM CST   Return Visit with JIMBO Sutton CNP   [facility] ([facility])    [address]  [facility] [address] [phone]            Feb 21, 2017 11:15 AM CST   Return Visit with Florence Amato PT   [facility] ([facility])    [address]  [facility] [address] [phone]            Feb 28, 2017 11:15 AM CST   Return Visit with Florence Amato PT   [facility] ([facility])    [address]  [facility] [address] [phone]            Mar 07, 2017 11:15 AM CST   Return Visit with Florence Amato PT   [facility] ([facility])    [address]  [facility] [address] [phone]            Mar 14, 2017 11:15 AM CDT   Return Visit with Florence Amato PT   [facility] ([facility])    [address]  [facility] [address] [phone]            Mar 21, 2017 11:15 AM CDT   Return Visit with Florence Amato PT   [facility]    [address] [facility]  [facility] [address]  [facility] [address] [phone]              Who to contact     If you have questions or need follow up information about today's clinic visit or your schedule please contact [facility] directly at [phone].  Normal or non-critical lab and imaging results will be communicated to you by MyChart, letter or phone within 4 business days after the clinic has received the results. If you do not hear from us within 7 days, please contact the clinic through MyChart or phone. If you have a critical or abnormal lab result, we will notify you by phone as soon as possible.  Submit refill requests through [employer] or call your pharmacy and they will forward the refill request to us. Please allow 3 business days for your refill to be completed.          Additional Information About Your Visit        MyChart Information     [employer] lets you send messages to your doctor, view your test results, renew your prescriptions, schedule appointments and more. To sign up, go to [email or web address] . Click on \"Log in\" on the left side of the screen, which will take you to the Welcome page. Then click on \"Sign up Now\" on the right side of the page.     You will be asked to enter the access code listed below, as well as some personal information. Please follow the directions to create your username and password.     Your access code is: [email or web address]  Expires: 4/3/2017 11:19 AM     Your access code will  in 90 days. If you need help or a new code, please call your [place] clinic or [phone].        Care EveryWhere ID     This is your Care EveryWhere ID. This could be used by other organizations to access your [place] medical records  [id number]         Blood Pressure from Last 3 Encounters:   17 149/75   17 138/74   17 112/73    Weight from Last 3 Encounters:   17 244 lb 4.8 oz (110.8 kg)   17 245 lb (111.1 kg)   16 244 " lb 12.8 oz (111 kg)              Today, you had the following     No orders found for display         Today's Medication Changes          These changes are accurate as of: 2/14/17  4:03 PM.  If you have any questions, ask your nurse or doctor.               Start taking these medicines.        Dose/Directions    simvastatin 20 MG tablet   Commonly known as:  ZOCOR   Used for:  Hyperlipidemia LDL goal <100        Dose:  20 mg   Take 1 tablet (20 mg) by mouth At Bedtime   Quantity:  90 tablet   Refills:  0         These medicines have changed or have updated prescriptions.        Dose/Directions    allopurinol 100 MG tablet   Commonly known as:  ZYLOPRIM   This may have changed:    - how much to take  - additional instructions   Used for:  Acute gout of right elbow, unspecified cause        Dose:  300 mg   Take 3 tablets (300 mg) by mouth daily   Quantity:  270 tablet   Refills:  0            Where to get your medicines      These medications were sent to [facility] [address] [place] [address] [address] [facility]  [address] [facility] [address]     Phone:  [phone]     allopurinol 100 MG tablet    simvastatin 20 MG tablet                Primary Care Provider Office Phone # Fax #    Deer ParkNiyah Haskins [phone]       [facility] [address] [address]        Thank you!     Thank you for choosing [facility]  for your care. Our goal is always to provide you with excellent care. Hearing back from our patients is one way we can continue to improve our services. Please take a few minutes to complete the written survey that you may receive in the mail after your visit with us. Thank you!             Your Updated Medication List - Protect others around you: Learn how to safely use, store and throw away your medicines at www.disposemymeds.org.          This list is accurate as of: 2/14/17  4:03 PM.  Always use your most recent med  list.                   Brand Name Dispense Instructions for use    allopurinol 100 MG tablet    ZYLOPRIM    270 tablet    Take 3 tablets (300 mg) by mouth daily       aspirin 81 MG tablet     100 tablet    Take 1 tablet (81 mg) by mouth daily       blood glucose monitoring lancets     3 Box    Use to test blood sugars 2-3 times daily as directed (lena contour meter)       blood glucose monitoring test strip    no brand specified    3 Box    Use to test blood sugar 2-3 times daily (lena contour meter)       finasteride 5 MG tablet    PROSCAR    30 tablet    Take 1 tablet (5 mg) by mouth daily For prostate enlargement. Please fill on $4 list       FLUoxetine 20 MG capsule    PROzac    270 capsule    TAKE THREE CAPSULES BY MOUTH ONCE DAILY       fluticasone 50 MCG/ACT spray    FLONASE    3 Bottle    Spray 1-2 sprays into both nostrils daily       metFORMIN 500 MG 24 hr tablet    GLUCOPHAGE-XR    90 tablet    Take 1 tablet (500 mg) by mouth daily (with dinner)       omeprazole 20 MG CR capsule    priLOSEC    90 capsule    Use every other day as needed       oxyCODONE 5 MG IR tablet    ROXICODONE    180 tablet    Take 1-2 tablets (5-10 mg) by mouth 2 times daily as needed for moderate to severe pain (Max 6 tabs per day.) May dispense on/after 1/23/17 to start taking on/after 1/26/17       pregabalin 50 MG capsule    LYRICA    90 capsule    Take 1 capsule (50 mg) by mouth 3 times daily       senna-docusate 8.6-50 MG per tablet    SENOKOT-S;PERICOLACE    60 tablet    Take 1 tablet by mouth 2 times daily       simvastatin 20 MG tablet    ZOCOR    90 tablet    Take 1 tablet (20 mg) by mouth At Bedtime       tamsulosin 0.4 MG capsule    FLOMAX    180 capsule    TAKE TWO CAPSULES BY MOUTH ONCE DAILY       triamcinolone 0.1 % cream    KENALOG    30 g    Apply sparingly to affected area twice daily for 7 days. Then stop and use only for flares

## 2017-02-14 NOTE — TELEPHONE ENCOUNTER
Called again - no answer, message left.   Mary Last, LloydD, NEIAD, BCACP  MTM Pharmacist, [facility]

## 2017-02-14 NOTE — PROGRESS NOTES
"SUBJECTIVE/OBJECTIVE:                                                    Toby Mcgrath is a 61 year old male called for a follow-up visit for Medication Therapy Management.  He was referred to me from Dr. Haskins.     Chief Complaint: Follow up from our visit on .  Recheck of Allopurinol start.   Tobacco: No tobacco use   Alcohol: not currently using    Medication Adherence: no issues reported    Gout: Currently taking allopurinol 300mg daily. He has had no issues with gout pain/flares since starting this (despite not being able to cross cover with colchicine). He reports dramatic reduction in inflammation all over his body - elbows, knees, finger joints. He's very happy with how he feels.     Diabetes:  Pt currently taking Metformin 500mg - one tablet twice daily.  Pt is experiencing the following side effects:  GI upset, diarrhea.   SMB-2 times a week.   Ranges (patient reported): \"good\"  Frequency of hypoglycemia? never.    Hyperlipidemia: Not currently taking any medications for this. He was previously on simvastatin 20mg once daily, but this was stopped/held to avoid drug interaction with colchicine. He reports no significant myalgias or other side effects when he was taking the medication.      Current labs include:  BP Readings from Last 3 Encounters:   17 149/75   17 138/74   17 112/73     Lab Results   Component Value Date    A1C 6.5 2017   .  Lab Results   Component Value Date    CHOL 154 2016     Lab Results   Component Value Date    TRIG 264 10/14/2013     Lab Results   Component Value Date    HDL 32 2016     Lab Results   Component Value Date    LDL 92 2016    LDL 60 10/14/2013       Liver Function Studies -   Recent Labs   Lab Test  16   1042   PROTTOTAL  7.5   ALBUMIN  4.1   BILITOTAL  0.5   ALKPHOS  61   AST  34   ALT  56       Lab Results   Component Value Date    UCRR 73 2016    MICROL <5 2016    UMALCR Unable to calculate " due to low value 07/14/2016       Last Basic Metabolic Panel:  Lab Results   Component Value Date     01/03/2017      Lab Results   Component Value Date    POTASSIUM 4.4 01/03/2017     Lab Results   Component Value Date    CHLORIDE 106 01/03/2017     Lab Results   Component Value Date    BUN 14 01/03/2017     Lab Results   Component Value Date    CR 1.09 01/03/2017     GFR Estimate   Date Value Ref Range Status   01/03/2017 68 >60 mL/min/1.7m2 Final     Comment:     Non  GFR Calc   07/14/2016 66 >60 mL/min/1.7m2 Final     Comment:     Non  GFR Calc   02/18/2016 72 >60 mL/min/1.7m2 Final     Comment:     Non  GFR Calc       TSH   Date Value Ref Range Status   08/13/2015 1.78 0.40 - 4.00 mU/L Final   ]    Most Recent Immunizations   Administered Date(s) Administered     Influenza (IIV3) 10/08/2014     Influenza Vaccine IM 3yrs+ 4 Valent IIV4 09/22/2016     Pneumococcal 23 valent 08/17/2012     TD (ADULT, 7+) 12/08/2011     TDAP (ADACEL AGES 11-64) 08/14/2009     ASSESSMENT:                                                    Current medications were reviewed today as discussed above.      Medication Adherence: no issues identified    Gout: Doing well - will need uric acid recheck in about a month. Did ask today if he would prefer to switch to allopurinol 300mg tablet vs 3-100mg tablets. He doesn't have a preference and would like to leave it as is. Will refill allopurinol.     Diabetes:  Stable.     Hyperlipidemia: Needs to restart statin. ASCVD risk is > 7.5% so he should be on a high-intensity statin. He's reluctant to make more changes right now, so will restart simvastatin at his previous dose.      PLAN:                                                      1. Continue allopurinol 300mg daily. Uric acid in one month (med refilled)  2. Restart simvastatin 20mg once daily (med refilled)    I spent 30 minutes with this patient today.  All changes were made via  collaborative practice agreement with Connei Haskins. A copy of the visit note was provided to the patient's primary care provider.     Will follow up in 1 month in clinic for labs.    The patient declined a summary of these recommendations as an after visit summary.    Mary Last, LloydD, NEIDA, BCACP  MTM Pharmacist, [facility]

## 2017-02-16 ENCOUNTER — OFFICE VISIT (OUTPATIENT)
Dept: PALLIATIVE MEDICINE | Facility: CLINIC | Age: 62
End: 2017-02-16
Payer: COMMERCIAL

## 2017-02-16 DIAGNOSIS — G89.29 CHRONIC PAIN: Primary | ICD-10-CM

## 2017-02-16 DIAGNOSIS — M96.1 FAILED BACK SYNDROME OF LUMBAR SPINE: ICD-10-CM

## 2017-02-16 PROCEDURE — 97112 NEUROMUSCULAR REEDUCATION: CPT | Mod: GP | Performed by: PHYSICAL THERAPIST

## 2017-02-16 PROCEDURE — 97110 THERAPEUTIC EXERCISES: CPT | Mod: GP | Performed by: PHYSICAL THERAPIST

## 2017-02-16 PROCEDURE — 97530 THERAPEUTIC ACTIVITIES: CPT | Mod: GP | Performed by: PHYSICAL THERAPIST

## 2017-02-16 NOTE — MR AVS SNAPSHOT
After Visit Summary   2/16/2017    Toby Mcgrath    MRN: [id number]           Patient Information     Date Of Birth          1955        Visit Information        Provider Department      2/16/2017 1:00 PM Florence Amato, PT [facility]        Today's Diagnoses     Chronic pain    -  1    Failed back syndrome of lumbar spine           Follow-ups after your visit        Your next 10 appointments already scheduled     Mar 02, 2017  1:00 PM CST   Return Visit with Florence Amato PT   [facility] ([facility])    [address]  [address]  [facility] [address] [phone]            Mar 08, 2017  9:30 AM CST   Return Visit with JIMBO Sutton CNP   [facility] ([facility])    [address]  [address]  [facility] [id number] [phone]            Mar 09, 2017  1:00 PM CST   Return Visit with Florence Amato PT   [facility] ([facility])    [address]  [facility] [id number] [phone]            Mar 16, 2017  1:00 PM CDT   Return Visit with Florence Amato PT   [facility] ([facility])    [address]  [facility] [address] [phone]            Mar 23, 2017  1:00 PM CDT   Return Visit with Florence Amato PT   [facility] ([facility])    [address]  [facility] [id number] [phone]              Who to contact     If you have questions or need follow up information about today's clinic visit or your schedule please contact [facility] directly at [phone].  Normal or non-critical lab and imaging results will be communicated to you by MyChart, letter or phone within 4 business days after the clinic has received the results. If you do not hear from us within 7 days, please contact the clinic through  "Classiphixhart or phone. If you have a critical or abnormal lab result, we will notify you by phone as soon as possible.  Submit refill requests through EventWith or call your pharmacy and they will forward the refill request to us. Please allow 3 business days for your refill to be completed.          Additional Information About Your Visit        Classiphixhart Information     EventWith lets you send messages to your doctor, view your test results, renew your prescriptions, schedule appointments and more. To sign up, go to [email or web address] . Click on \"Log in\" on the left side of the screen, which will take you to the Welcome page. Then click on \"Sign up Now\" on the right side of the page.     You will be asked to enter the access code listed below, as well as some personal information. Please follow the directions to create your username and password.     Your access code is: [email or web address]  Expires: 4/3/2017 11:19 AM     Your access code will  in 90 days. If you need help or a new code, please call your [place] clinic or [phone].        Care EveryWhere ID     This is your Care EveryWhere ID. This could be used by other organizations to access your [place] medical records  [id number]         Blood Pressure from Last 3 Encounters:   17 150/85   17 149/75   17 138/74    Weight from Last 3 Encounters:   17 110.8 kg (244 lb 4.8 oz)   17 111.1 kg (245 lb)   16 111 kg (244 lb 12.8 oz)              We Performed the Following     NEUROMUSCULAR RE-EDUCATION     THERAPEUTIC ACTIVITIES     THERAPEUTIC EXERCISES        Primary Care Provider Office Phone # Fax #    Connie Haskins [phone]       [facility] [address] [address]        Thank you!     Thank you for choosing [facility] [place]  for your care. Our goal is always to provide you with excellent care. Hearing back from our patients is one way we can " continue to improve our services. Please take a few minutes to complete the written survey that you may receive in the mail after your visit with us. Thank you!             Your Updated Medication List - Protect others around you: Learn how to safely use, store and throw away your medicines at www.disposemymeds.org.          This list is accurate as of: 2/16/17 11:59 PM.  Always use your most recent med list.                   Brand Name Dispense Instructions for use    allopurinol 100 MG tablet    ZYLOPRIM    270 tablet    Take 3 tablets (300 mg) by mouth daily       aspirin 81 MG tablet     100 tablet    Take 1 tablet (81 mg) by mouth daily       blood glucose monitoring lancets     3 Box    Use to test blood sugars 2-3 times daily as directed (lena contour meter)       blood glucose monitoring test strip    no brand specified    3 Box    Use to test blood sugar 2-3 times daily (lena contour meter)       finasteride 5 MG tablet    PROSCAR    30 tablet    Take 1 tablet (5 mg) by mouth daily For prostate enlargement. Please fill on $4 list       FLUoxetine 20 MG capsule    PROzac    270 capsule    TAKE THREE CAPSULES BY MOUTH ONCE DAILY       fluticasone 50 MCG/ACT spray    FLONASE    3 Bottle    Spray 1-2 sprays into both nostrils daily       metFORMIN 500 MG 24 hr tablet    GLUCOPHAGE-XR    90 tablet    Take 1 tablet (500 mg) by mouth daily (with dinner)       omeprazole 20 MG CR capsule    priLOSEC    90 capsule    Use every other day as needed       pregabalin 50 MG capsule    LYRICA    90 capsule    Take 1 capsule (50 mg) by mouth 3 times daily       senna-docusate 8.6-50 MG per tablet    SENOKOT-S;PERICOLACE    60 tablet    Take 1 tablet by mouth 2 times daily       simvastatin 20 MG tablet    ZOCOR    90 tablet    Take 1 tablet (20 mg) by mouth At Bedtime       tamsulosin 0.4 MG capsule    FLOMAX    180 capsule    TAKE TWO CAPSULES BY MOUTH ONCE DAILY       triamcinolone 0.1 % cream    KENALOG    30 g     Apply sparingly to affected area twice daily for 7 days. Then stop and use only for flares

## 2017-02-16 NOTE — PROGRESS NOTES
"Patient Name: Toby Mcgrath      YOB: 1955     Medical Record Number: [id number]  Diagnosis:    Chronic pain  Failed back syndrome of lumbar spine  Visit Info Length of Visit: 45 min  Arrived: On Time  Therapeutic Procedures/Exercises: 10 min; Therapeutic Activities: 10 min; Neuromuscular Re-education: 20 min   Self Care / Home Management Trainin min   Exercise/Activity Education Instruction:  Energy Conservation - general strategies including importance of and how it relates to pain management.  Discussed how regular use of his SEC at this point in time would be a form of energy conservation.      Self Care - restorative positioning ideas:  Considerations for improved sitting arrangement on his sofa which can be uncomfortable and at times difficult to get out of - use of foam cushion to raise seat, pillows behind his back if he sits forward on the sofa.   Activity:  Aerobic Conditioning - Three laps of hallway walk (900 ft) with use of SEC, no rests in 7'22\"; focused on neutral posture during walking; (recommended use of SEC for his home walking program for improved stability, improved energy conservation)     Postural Training:  Neutral posture during ambulation - instructed in and practiced during hallway walk.    Neutral positioning for supine and sidelying : focused on feet together vs feet slightly apart (in hooklying, sitting and translated to sidelying with use of supports) comparing and contrasting effect on LBP/hip/LE pain.   He reported almost complete cessation of LE sx / discomfort when his feet were in neutral position (feet slightly apart).  Pt to practice this awareness off and on throughout the day.     Notes: Patient reports: no change from PT eval.      HEP/Self Care/Home Management Training:   Has been doing hallway walking - one long length lap - takes about 15-20 min - hasn't been using his SEC while doing this walk - thinks he should challenge himself.           "   Demonstrates/Verbalizes Technique: 3 (1= poor technique-difficulty performing exercises,significant cues required; 5= good technique-performs exercises without cues)  Body Awareness: 2, 3 (1=low awareness; 5=high awareness)  Posture/Stability: 2, 3 (1= poor posture, stability; 5= good posture, stability)   Motivational Level:   Eager to learn and Cooperative Participation:  Full   Patient Satisfaction:  satisfied   Response to Teaching:   demonstrated ability and verbalized, recall/understanding  Factors that affect learning: None   Plan of Care  Cont PT to support reactivation and integration of self regulation pain management skills. (next visit consider:  Core, LE stretches)   Therapist: Florence Amato PT                 Date: 2/16/2017

## 2017-02-20 ENCOUNTER — TELEPHONE (OUTPATIENT)
Dept: PALLIATIVE MEDICINE | Facility: CLINIC | Age: 62
End: 2017-02-20

## 2017-02-20 NOTE — TELEPHONE ENCOUNTER
Jayashree Goodman is out of the office today. Patient was scheduled to see her. He is on his last dose for medication and will be out today. He wants a call to get his medication refilled today still.     Tram Jenkins    [facility]

## 2017-02-20 NOTE — TELEPHONE ENCOUNTER
Toby's last prescription started on 1/26/17. He should have enough medication to last through the end of 2/24/17.  I called Toby to help figure out a way to reschedule his appointment and to offer the first appointment of the day tomorrow morning. He proceeded to swear and to scream at me into the phone saying he is out of medication. What do I ecxpect him to do followed by many expletives and then he hung up on me.     MARYAM MayN, RN  Care Coordinator  [facility] [place]

## 2017-02-20 NOTE — TELEPHONE ENCOUNTER
I will address his behavior when he is next in clinic.     JIMBO Tran, NP-C  [facility]

## 2017-02-21 ENCOUNTER — OFFICE VISIT (OUTPATIENT)
Dept: PALLIATIVE MEDICINE | Facility: CLINIC | Age: 62
End: 2017-02-21
Payer: COMMERCIAL

## 2017-02-21 VITALS
OXYGEN SATURATION: 96 % | SYSTOLIC BLOOD PRESSURE: 150 MMHG | DIASTOLIC BLOOD PRESSURE: 85 MMHG | HEART RATE: 103 BPM | RESPIRATION RATE: 18 BRPM

## 2017-02-21 DIAGNOSIS — M96.1 FAILED BACK SYNDROME OF LUMBAR SPINE: ICD-10-CM

## 2017-02-21 PROCEDURE — 99214 OFFICE O/P EST MOD 30 MIN: CPT | Performed by: NURSE PRACTITIONER

## 2017-02-21 RX ORDER — OXYCODONE HYDROCHLORIDE 5 MG/1
TABLET ORAL
Qty: 90 TABLET | Refills: 0 | Status: SHIPPED | OUTPATIENT
Start: 2017-02-21 | End: 2017-02-22

## 2017-02-21 ASSESSMENT — PAIN SCALES - GENERAL: PAINLEVEL: EXTREME PAIN (8)

## 2017-02-21 NOTE — MR AVS SNAPSHOT
After Visit Summary   2/21/2017    Toby Mcgrath    MRN: [id number]           Patient Information     Date Of Birth          1955        Visit Information        Provider Department      2/21/2017 9:30 AM Jayashree Goodman APRN CNP [facility] [place]        Today's Diagnoses     Failed back syndrome of lumbar spine          Care Instructions    After Visit Instructions:     Thank you for coming to [facility] for your care. It is my goal to partner with you to help you reach your optimal state of health.     I am recommending multidisciplinary care at this time.  The focus of care will be to continue gradual rehabilitation and pain management with medication adjustments as needed.    Continue daily self-care, identifying contributing factors, and monitoring variations in pain level. Continue to integrate self-care into your life.      1. Continue physical therapy visits  2. Schedule follow-up with JIMBO Ernst NP-C in 2 weeks  3. Bring all pain related medications to every visit.  4. Medication recommendations: No change.   5. You agree to keep all medications locked up at all times and to take your medication only as prescribed. Any violation of this agreement will result in no further controlled medications begin prescribed by this clinic.       JIMBO Tran NP-C  [facility]    Contact information: [facility]    Please call if any side effects, questions, or concerns arise.    Nurse Triage line:  [phone]   Call this number with any questions or concerns. You may leave a detailed message anytime. Calls are typically returned Monday through Friday between 8 AM and 4:30 PM. We usually get back to you within 2 business days depending on the issue/request.    Scheduling number: [phone].  Call this number to schedule or change appointments.          Medication Refills  Policy:    For non-narcotic medications, please your pharmacy directly to request a refill and the pharmacy will call the [facility] for authorization. Please allow 3-4 days for these refills.    For narcotic refills, call the nurse triage line and leave a message requesting your refill along with the name of the pharmacy that you use. Narcotic prescriptions will be mailed to your pharmacy or you may pick them up at the clinic.  Please call 7-10 days before your refill is due  The above policy allows adequate time so that you do not run out of medication.    No Show - Late Cancellation - Late Arrival Policy  We believe regular attendance is key to your success in our program.    Any time you are unable to keep your appointment we ask that you call us at  least 24 hours in advance to let us know. This will allow us to offer the appointment time to another patient. The following is our policy for missed appointments. This also applies to appointments cancelled with less than 24 hours notice.    You will receive a letter after missing your 1st and 2nd appointments without contacting the clinic before your scheduled appointment time.     After missing 3 appointments without calling first, we will cancel all of your future appointments at [facility].    At that point, you will not be able to resume services unless approved by your care team  We understand that unforseen circumstances arise, however, out of respect for all concerned and to provide this appointment to another patient, this policy will be enforced.    Please note that most follow up appointments are 30 minutes long. If you arrive late, your provider may not be able to see you for the entire 30 minutes. Please also note that if you arrive more than 15 minutes for any appointment, it may be rescheduled.                                   Follow-ups after your visit        Your next 10 appointments already scheduled     Mar 02,  "2017  1:00 PM CST   Return Visit with Florence Amato, PT   [facility] ([facility])    [address]  [address]  [facility] [address] [phone]            Mar 09, 2017  1:00 PM CST   Return Visit with Florence Amato, PT   [facility] ([facility])    [address]  [facility] [address] [phone]            Mar 16, 2017  1:00 PM CDT   Return Visit with Florence Amato, PT   [facility] ([facility])    [address]  [facility] [address] [phone]            Mar 23, 2017  1:00 PM CDT   Return Visit with Florence Amato, PT   [facility] ([facility])    [address]  [facility] [address] [phone]              Who to contact     If you have questions or need follow up information about today's clinic visit or your schedule please contact [facility] directly at [phone].  Normal or non-critical lab and imaging results will be communicated to you by MyChart, letter or phone within 4 business days after the clinic has received the results. If you do not hear from us within 7 days, please contact the clinic through evidanzahart or phone. If you have a critical or abnormal lab result, we will notify you by phone as soon as possible.  Submit refill requests through Peoplematics or call your pharmacy and they will forward the refill request to us. Please allow 3 business days for your refill to be completed.          Additional Information About Your Visit        Peoplematics Information     Peoplematics lets you send messages to your doctor, view your test results, renew your prescriptions, schedule appointments and more. To sign up, go to [email or web address] . Click on \"Log in\" on the left side of the screen, which will take you to the Welcome page. Then click on \"Sign up Now\" on the right side of the page. "     You will be asked to enter the access code listed below, as well as some personal information. Please follow the directions to create your username and password.     Your access code is: [email or web address]  Expires: 4/3/2017 11:19 AM     Your access code will  in 90 days. If you need help or a new code, please call your [place] clinic or [phone].        Care EveryWhere ID     This is your Care EveryWhere ID. This could be used by other organizations to access your [place] medical records  [id number]        Your Vitals Were     Pulse Respirations Pulse Oximetry             103 18 96%          Blood Pressure from Last 3 Encounters:   17 150/85   17 149/75   17 138/74    Weight from Last 3 Encounters:   17 110.8 kg (244 lb 4.8 oz)   17 111.1 kg (245 lb)   16 111 kg (244 lb 12.8 oz)              Today, you had the following     No orders found for display         Today's Medication Changes          These changes are accurate as of: 17  9:45 AM.  If you have any questions, ask your nurse or doctor.               These medicines have changed or have updated prescriptions.        Dose/Directions    oxyCODONE 5 MG IR tablet   Commonly known as:  ROXICODONE   This may have changed:    - how much to take  - how to take this  - when to take this  - reasons to take this  - additional instructions   Used for:  Failed back syndrome of lumbar spine   Changed by:  Jayashree Goodman APRN CNP        May dispense and begin taking on/after 17. 15 days supply   Quantity:  90 tablet   Refills:  0            Where to get your medicines      Some of these will need a paper prescription and others can be bought over the counter.  Ask your nurse if you have questions.     Bring a paper prescription for each of these medications     oxyCODONE 5 MG IR tablet                Primary Care Provider Office Phone # Fax #    Connie Haskins [phone]        [facility] [address]  [facility] [address]        Thank you!     Thank you for choosing [facility]  for your care. Our goal is always to provide you with excellent care. Hearing back from our patients is one way we can continue to improve our services. Please take a few minutes to complete the written survey that you may receive in the mail after your visit with us. Thank you!             Your Updated Medication List - Protect others around you: Learn how to safely use, store and throw away your medicines at www.disposemymeds.org.          This list is accurate as of: 2/21/17  9:45 AM.  Always use your most recent med list.                   Brand Name Dispense Instructions for use    allopurinol 100 MG tablet    ZYLOPRIM    270 tablet    Take 3 tablets (300 mg) by mouth daily       aspirin 81 MG tablet     100 tablet    Take 1 tablet (81 mg) by mouth daily       blood glucose monitoring lancets     3 Box    Use to test blood sugars 2-3 times daily as directed (lena contour meter)       blood glucose monitoring test strip    no brand specified    3 Box    Use to test blood sugar 2-3 times daily (lena contour meter)       finasteride 5 MG tablet    PROSCAR    30 tablet    Take 1 tablet (5 mg) by mouth daily For prostate enlargement. Please fill on $4 list       FLUoxetine 20 MG capsule    PROzac    270 capsule    TAKE THREE CAPSULES BY MOUTH ONCE DAILY       fluticasone 50 MCG/ACT spray    FLONASE    3 Bottle    Spray 1-2 sprays into both nostrils daily       metFORMIN 500 MG 24 hr tablet    GLUCOPHAGE-XR    90 tablet    Take 1 tablet (500 mg) by mouth daily (with dinner)       omeprazole 20 MG CR capsule    priLOSEC    90 capsule    Use every other day as needed       oxyCODONE 5 MG IR tablet    ROXICODONE    90 tablet    May dispense and begin taking on/after 2/22/17. 15 days supply       pregabalin 50 MG capsule    LYRICA    90 capsule    Take 1 capsule (50 mg) by  mouth 3 times daily       senna-docusate 8.6-50 MG per tablet    SENOKOT-S;PERICOLACE    60 tablet    Take 1 tablet by mouth 2 times daily       simvastatin 20 MG tablet    ZOCOR    90 tablet    Take 1 tablet (20 mg) by mouth At Bedtime       tamsulosin 0.4 MG capsule    FLOMAX    180 capsule    TAKE TWO CAPSULES BY MOUTH ONCE DAILY       triamcinolone 0.1 % cream    KENALOG    30 g    Apply sparingly to affected area twice daily for 7 days. Then stop and use only for flares

## 2017-02-21 NOTE — NURSING NOTE
Chief Complaint   Patient presents with     Pain       Initial /85 (BP Location: Right arm, Patient Position: Chair, Cuff Size: Adult Regular)  Pulse 103  Resp 18  SpO2 96% Estimated body mass index is 33.13 kg/(m^2) as calculated from the following:    Height as of 1/3/17: 1.829 m (6').    Weight as of 1/19/17: 110.8 kg (244 lb 4.8 oz).  Medication Reconciliation: complete     Artemio Cole MA  [facility]

## 2017-02-21 NOTE — PATIENT INSTRUCTIONS
After Visit Instructions:     Thank you for coming to [facility] for your care. It is my goal to partner with you to help you reach your optimal state of health.     I am recommending multidisciplinary care at this time.  The focus of care will be to continue gradual rehabilitation and pain management with medication adjustments as needed.    Continue daily self-care, identifying contributing factors, and monitoring variations in pain level. Continue to integrate self-care into your life.      1. Continue physical therapy visits  2. Schedule follow-up with JIMBO Ernst NP-C in 2 weeks  3. Bring all pain related medications to every visit.  4. Medication recommendations: No change.   5. You agree to keep all medications locked up at all times and to take your medication only as prescribed. Any violation of this agreement will result in no further controlled medications begin prescribed by this clinic.       JIMBO Tran NP-C  [facility]    Contact information: [facility]    Please call if any side effects, questions, or concerns arise.    Nurse Triage line:  [phone]   Call this number with any questions or concerns. You may leave a detailed message anytime. Calls are typically returned Monday through Friday between 8 AM and 4:30 PM. We usually get back to you within 2 business days depending on the issue/request.    Scheduling number: [phone].  Call this number to schedule or change appointments.          Medication Refills Policy:    For non-narcotic medications, please your pharmacy directly to request a refill and the pharmacy will call the [facility] [place] for authorization. Please allow 3-4 days for these refills.    For narcotic refills, call the nurse triage line and leave a message requesting your refill along with the name of the pharmacy that you use. Narcotic prescriptions will be mailed to  your pharmacy or you may pick them up at the clinic.  Please call 7-10 days before your refill is due  The above policy allows adequate time so that you do not run out of medication.    No Show - Late Cancellation - Late Arrival Policy  We believe regular attendance is key to your success in our program.    Any time you are unable to keep your appointment we ask that you call us at  least 24 hours in advance to let us know. This will allow us to offer the appointment time to another patient. The following is our policy for missed appointments. This also applies to appointments cancelled with less than 24 hours notice.    You will receive a letter after missing your 1st and 2nd appointments without contacting the clinic before your scheduled appointment time.     After missing 3 appointments without calling first, we will cancel all of your future appointments at [facility] [place].    At that point, you will not be able to resume services unless approved by your care team  We understand that unforseen circumstances arise, however, out of respect for all concerned and to provide this appointment to another patient, this policy will be enforced.    Please note that most follow up appointments are 30 minutes long. If you arrive late, your provider may not be able to see you for the entire 30 minutes. Please also note that if you arrive more than 15 minutes for any appointment, it may be rescheduled.

## 2017-02-21 NOTE — LETTER
[facility]    02/21/17    Patient: Toby Mcgrath  YOB: 1955  Medical Record Number: [id number]                                                                  Controlled Substance Agreement  I understand that my care provider has prescribed controlled substances (narcotics, tranquilizers, and/or stimulants) to help manage my condition(s).  I am taking this medicine to help me function or work.  I know that this is strong medicine.  It could have serious side effects and even cause a dependency on the drug.  If I stop these medicines suddenly, I could have severe withdrawal symptoms.    The risks, benefits, and side effects of these medication(s) were explained to me.  I agree that:  1. I will take part in other treatments as advised by my provider.  This may be psychiatry or counseling, physical therapy, behavioral therapy, group treatment, or a referral to a pain clinic.  I will reduce or stop my medicine when my provider tells me to do so.   2. I will take my medicines as prescribed.  I will not change the dose or schedule unless my provider tells me to.  There will be no refills if I  run out early.   I may be contacted at any time without warning and asked to complete a drug test or pill count.   3. I will keep all my appointments at the clinic.  If I miss appointments or fail to follow instructions, my provider may stop my medicine.  4. I will not ask other providers to prescribe controlled substances. And I will not accept controlled substances from other people. If I need another prescribed controlled substance for a new reason, I will notify my provider within one business day.  5. If I enroll in the [place] Medical Marijuana program, I will tell my provider.  I will also sign an agreement to share my medical records with my provider.  6. I will use one pharmacy to fill all of my controlled substance prescriptions.  If my prescription is mailed to my pharmacy, it may  take 5 to 7 days for my medicine to be ready.  7. I understand that my provider, clinic care team, and pharmacy can track controlled substance prescriptions from other providers through a central database (prescription monitoring program).  8. I will bring in my list of medications (or my medicine bottles) each time I come to the clinic.  REV- 04/2016                                                                                                                                            Page 1 of 2      [facility]    02/21/17    Patient: Toby Mcgrath  YOB: 1955  Medical Record Number: [id number]    9. Refills of controlled substances will be made only during office hours.  It is up to me to make sure that I do not run out of my medicines on weekends or holidays.    10. I am responsible for my prescriptions.  If the medicine is lost or stolen, it will not be replaced.   I also agree not to share these medicines with anyone.  11. I agree to not use ANY illegal or recreational drugs.  This includes marijuana, cocaine, bath salts or other drugs.  I agree not to use alcohol unless my provider says I may.  I agree to give urine samples whenever asked.  If I fail to give a urine sample, the provider may stop my medicine.     12. I will tell my nurse or provider right away if I become pregnant or have a new medical problem treated outside of [facility].  13. I understand that this medicine can affect my thinking and judgment.  It may be unsafe for me to drive, use machinery and do dangerous tasks.  I will not do any of these things until I know how the medicine affects me.  If my dose changes, I will wait to see how it affects me.  I will contact my provider if I have concerns about medicine side effects.  I understand that if I do not follow any of the conditions above, my prescriptions or treatment may be stopped.    I agree that my provider, clinic care team, and pharmacy may  work with any city, state or federal law enforcement agency that investigates the misuse, sale, or other diversion of my controlled medicine. I will allow my provider to discuss my care with or share a copy of this agreement with any other treating provider, pharmacy or emergency room where I receive care.  I agree to give up (waive) any right of privacy or confidentiality with respect to these authorizations.   I have read this agreement and have asked questions about anything I did not understand.   ___________________________________    ___________________________  Patient Signature                                                           Date and Time  ___________________________________     ____________________________  Witness                                                                            Date and Time  ___________________________________  JIMBO Sutton CNP  REV-  04/2016                                                                                                                                                                 Page 2 of 2

## 2017-02-22 ENCOUNTER — TELEPHONE (OUTPATIENT)
Dept: PALLIATIVE MEDICINE | Facility: CLINIC | Age: 62
End: 2017-02-22

## 2017-02-22 DIAGNOSIS — M96.1 FAILED BACK SYNDROME OF LUMBAR SPINE: ICD-10-CM

## 2017-02-22 RX ORDER — OXYCODONE HYDROCHLORIDE 5 MG/1
TABLET ORAL
Qty: 90 TABLET | Refills: 0
Start: 2017-02-22 | End: 2017-03-08

## 2017-02-22 NOTE — TELEPHONE ENCOUNTER
"Called [facility] pharmacy and provided updated directions to pharmacistQuang.  He explained that the patient took the prescription with him but that he would document these directions just in case he returns to [facility].     Called pt. He was on his way to the pain clinic in [facility] stating that he needs his medications today and that he is going through withdrawal \"bad.\"  Let him know that I spoke with the pharmacy and that he has the directions and can fill the prescription. Apologized for the problem and inconvenience. Pt states that he is still closer to [place] than [facility] so will return to his pharmacy.      Called [facility] again and informed pharmacist that the pt is returning to get his medication filled there. He states that he will fill it right away.     Medication list updated.     MARYAM SanchezN, RN-BC  Patient Care Supervisor/Care Coordinator  [facility]    "

## 2017-02-22 NOTE — TELEPHONE ENCOUNTER
Message left at 0919:    Quang at [facility] pharmacy in [place]; states that the pt is waiting. Rx written yesterday for oxycodone 5 mg doesn't have directions about how often to take it. Needs a call back immediately to clarify.   ---------  Reviewed chart and the Rx states:    oxyCODONE (ROXICODONE) 5 MG IR tablet 90 tablet 0 2/21/2017  No      Sig: May dispense and begin taking on/after 2/22/17. 15 days supply     Will check with Jayashree for clarification.     MARYAM SanchezN, RN-BC  Patient Care Supervisor/Care Coordinator  [facility]
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
## 2017-03-02 ENCOUNTER — OFFICE VISIT (OUTPATIENT)
Dept: PALLIATIVE MEDICINE | Facility: CLINIC | Age: 62
End: 2017-03-02
Payer: COMMERCIAL

## 2017-03-02 DIAGNOSIS — G89.29 CHRONIC PAIN: Primary | ICD-10-CM

## 2017-03-02 DIAGNOSIS — M96.1 FAILED BACK SYNDROME OF LUMBAR SPINE: ICD-10-CM

## 2017-03-02 PROCEDURE — 97110 THERAPEUTIC EXERCISES: CPT | Mod: GP | Performed by: PHYSICAL THERAPIST

## 2017-03-02 PROCEDURE — 97535 SELF CARE MNGMENT TRAINING: CPT | Mod: GP | Performed by: PHYSICAL THERAPIST

## 2017-03-02 NOTE — MR AVS SNAPSHOT
After Visit Summary   3/2/2017    Toby Mcgrath    MRN: [id number]           Patient Information     Date Of Birth          1955        Visit Information        Provider Department      3/2/2017 1:00 PM Florence Amato, PT [facility]        Today's Diagnoses     Chronic pain    -  1    Failed back syndrome of lumbar spine           Follow-ups after your visit        Your next 10 appointments already scheduled     Mar 21, 2017  9:30 AM CDT   Return Visit with JIMBO Sutton CNP   [facility] ([facility]    [address]  [address]  [facility] [id number] [phone]            Mar 23, 2017  1:00 PM CDT   Return Visit with Florence Amato PT   [facility])    [address]  [address]  [facility] [id number] [phone]            Apr 03, 2017  9:30 AM CDT   Return Visit with JIMBO Sutton CNP   [facility] ([facility])    [address]  [address]  [facility] [id number] [phone]              Who to contact     If you have questions or need follow up information about today's clinic visit or your schedule please contact [facility] directly at [phone].  Normal or non-critical lab and imaging results will be communicated to you by [employer], letter or phone within 4 business days after the clinic has received the results. If you do not hear from us within 7 days, please contact the clinic through MyChart or phone. If you have a critical or abnormal lab result, we will notify you by phone as soon as possible.  Submit refill requests through [employer] or call your pharmacy and they will forward the refill request to us. Please allow 3 business days for your refill to be completed.          Additional Information About Your Visit        [employer] Information     [employer] lets you send messages to your doctor,  "view your test results, renew your prescriptions, schedule appointments and more. To sign up, go to [email or web address] . Click on \"Log in\" on the left side of the screen, which will take you to the Welcome page. Then click on \"Sign up Now\" on the right side of the page.     You will be asked to enter the access code listed below, as well as some personal information. Please follow the directions to create your username and password.     Your access code is: [email or web address]  Expires: 4/3/2017 12:19 PM     Your access code will  in 90 days. If you need help or a new code, please call your [place] clinic or [phone].        Care EveryWhere ID     This is your Care EveryWhere ID. This could be used by other organizations to access your [place] medical records  [id number]         Blood Pressure from Last 3 Encounters:   17 143/73   17 150/85   17 149/75    Weight from Last 3 Encounters:   17 120.2 kg (265 lb)   17 110.8 kg (244 lb 4.8 oz)   17 111.1 kg (245 lb)              We Performed the Following     SELF CARE MNGMENT TRAINING     THERAPEUTIC EXERCISES        Primary Care Provider Office Phone # Fax #    Connie Haskins [phone]       [facility] [address] [address]        Thank you!     Thank you for choosing [facility]  for your care. Our goal is always to provide you with excellent care. Hearing back from our patients is one way we can continue to improve our services. Please take a few minutes to complete the written survey that you may receive in the mail after your visit with us. Thank you!             Your Updated Medication List - Protect others around you: Learn how to safely use, store and throw away your medicines at www.disposemymeds.org.          This list is accurate as of: 3/2/17 11:59 PM.  Always use your most recent med list.                   Brand Name Dispense " Instructions for use    allopurinol 100 MG tablet    ZYLOPRIM    270 tablet    Take 3 tablets (300 mg) by mouth daily       aspirin 81 MG tablet     100 tablet    Take 1 tablet (81 mg) by mouth daily       blood glucose monitoring lancets     3 Box    Use to test blood sugars 2-3 times daily as directed (lena contour meter)       blood glucose monitoring test strip    no brand specified    3 Box    Use to test blood sugar 2-3 times daily (lena contour meter)       finasteride 5 MG tablet    PROSCAR    30 tablet    Take 1 tablet (5 mg) by mouth daily For prostate enlargement. Please fill on $4 list       FLUoxetine 20 MG capsule    PROzac    270 capsule    TAKE THREE CAPSULES BY MOUTH ONCE DAILY       fluticasone 50 MCG/ACT spray    FLONASE    3 Bottle    Spray 1-2 sprays into both nostrils daily       metFORMIN 500 MG 24 hr tablet    GLUCOPHAGE-XR    90 tablet    Take 1 tablet (500 mg) by mouth daily (with dinner)       omeprazole 20 MG CR capsule    priLOSEC    90 capsule    Use every other day as needed       pregabalin 50 MG capsule    LYRICA    90 capsule    Take 1 capsule (50 mg) by mouth 3 times daily       simvastatin 20 MG tablet    ZOCOR    90 tablet    Take 1 tablet (20 mg) by mouth At Bedtime       tamsulosin 0.4 MG capsule    FLOMAX    180 capsule    TAKE TWO CAPSULES BY MOUTH ONCE DAILY       triamcinolone 0.1 % cream    KENALOG    30 g    Apply sparingly to affected area twice daily for 7 days. Then stop and use only for flares

## 2017-03-02 NOTE — PROGRESS NOTES
"Patient Name: Toby Mcgrath      YOB: 1955     Medical Record Number: [id number]  Diagnosis:    Chronic pain  Failed back syndrome of lumbar spine  Visit Info Length of Visit: 45 min  Arrived: On Time  Therapeutic Procedures/Exercises: 30 min; Therapeutic Activities: 5 min; Neuromuscular Re-education: NA   Self Care / Home Management Training: 10 min   Exercise/Activity Education Instruction:  Self Care  - flare management - instructed to use ice 2-3x/day - can follow it with heat if desired; modifying stretches when in a flare.    Activity:  Core Stabilization  - instructed in \"zipper\" (TA) abdominal set - use with functional mobility: when transitioning, moving his LEs.    Aerobic Conditioning - hallway walk - 2 laps (400 ft) 4'38\" with use of SEC.  Reports increased inner thigh pain after ambulation.   Stretching:  Reviewed SKTC (and how to modify according to his pain level), instructed in hip ADDuctor str.  Pt reports \"pain feels better\" after completing stretches.      Instructed in log roll technique for supine --> sit.     Notes: Patient reports: he has had a recent onset of some \"new\" pain in his left posteromedial thigh and buttock.  States it has been very uncomfortable over past couple days - but does note some slow improvement.    Pain ranges: today 5-6/10    Activity limitations: having some increased difficulty with sitting, getting up from sitting or bending activities (to pick things up).      HEP/Self Care/Home Management Training:   Pacing/Mini Breaks/Self Care: hasn't done anything to try and get relief from his pain flare ;   Relaxation Practice: none ;   Posture Corrections: not addressed today.  ;   Walking program: does walk hallways at his apt building but didn't when the pain flared ;   Heat/Ice/TENS: none currently  ;   HEP stopped his exs when pain flared.     Visit 3/12     Demonstrates/Verbalizes Technique: 3 (1= poor technique-difficulty performing " exercises,significant cues required; 5= good technique-performs exercises without cues)  Body Awareness: 1, 2 (1=low awareness; 5=high awareness)  Posture/Stability: 2 (1= poor posture, stability; 5= good posture, stability)   Motivational Level:   Cooperative, somewhat anxious d/t increased pain Participation:  Full   Patient Satisfaction:  satisfied   Response to Teaching:   demonstrated ability and verbalized, recall/understanding  Factors that affect learning: None   Plan of Care  Cont PT to support reactivation and integration of self regulation pain management skills.    Therapist: Florence Amato PT                 Date: 3/2/2017

## 2017-03-08 ENCOUNTER — OFFICE VISIT (OUTPATIENT)
Dept: PALLIATIVE MEDICINE | Facility: CLINIC | Age: 62
End: 2017-03-08
Payer: COMMERCIAL

## 2017-03-08 VITALS
WEIGHT: 265 LBS | DIASTOLIC BLOOD PRESSURE: 73 MMHG | BODY MASS INDEX: 35.94 KG/M2 | OXYGEN SATURATION: 95 % | HEART RATE: 85 BPM | RESPIRATION RATE: 18 BRPM | SYSTOLIC BLOOD PRESSURE: 143 MMHG

## 2017-03-08 DIAGNOSIS — M54.16 LUMBAR RADICULOPATHY: ICD-10-CM

## 2017-03-08 DIAGNOSIS — M96.1 FAILED BACK SYNDROME OF LUMBAR SPINE: ICD-10-CM

## 2017-03-08 PROCEDURE — 99214 OFFICE O/P EST MOD 30 MIN: CPT | Performed by: NURSE PRACTITIONER

## 2017-03-08 RX ORDER — OXYCODONE HYDROCHLORIDE 5 MG/1
TABLET ORAL
Qty: 90 TABLET | Refills: 0 | Status: SHIPPED | OUTPATIENT
Start: 2017-03-08 | End: 2017-03-21

## 2017-03-08 RX ORDER — AMOXICILLIN 250 MG
1 CAPSULE ORAL 2 TIMES DAILY
Qty: 60 TABLET | Refills: 3 | Status: SHIPPED | OUTPATIENT
Start: 2017-03-08 | End: 2018-05-31

## 2017-03-08 ASSESSMENT — PAIN SCALES - GENERAL: PAINLEVEL: SEVERE PAIN (7)

## 2017-03-08 NOTE — MR AVS SNAPSHOT
After Visit Summary   3/8/2017    Toby Mcgrath    MRN: [id number]           Patient Information     Date Of Birth          1955        Visit Information        Provider Department      3/8/2017 9:30 AM Jayashree Goodman APRN CNP [facility]        Today's Diagnoses     Lumbar radiculopathy        Failed back syndrome of lumbar spine          Care Instructions    After Visit Instructions:     Thank you for coming to [facility] for your care. It is my goal to partner with you to help you reach your optimal state of health.     I am recommending multidisciplinary care at this time.  The focus of care will be to continue gradual rehabilitation and pain management with medication adjustments as needed.    Continue daily self-care, identifying contributing factors, and monitoring variations in pain level. Continue to integrate self-care into your life.      1. Schedule physical therapy visits two times per month  2. Schedule follow-up with JIMBO Ernst NP-C in 4 weeks  3. Schedule a nurse visit on  for pill count and to receive your next prescription.   4. Medication recommendations:   1. Oxycodone 5 m tabs max per day.     JIMBO Tran, NP-C  [facility]    Contact information: [facility]    Please call if any side effects, questions, or concerns arise.    Nurse Triage line:  [phone]   Call this number with any questions or concerns. You may leave a detailed message anytime. Calls are typically returned Monday through Friday between 8 AM and 4:30 PM. We usually get back to you within 2 business days depending on the issue/request.    Scheduling number: [phone].  Call this number to schedule or change appointments.          Medication Refills Policy:    For non-narcotic medications, please your pharmacy directly to request a refill and the pharmacy  will call the [facility] for authorization. Please allow 3-4 days for these refills.    For narcotic refills, call the nurse triage line and leave a message requesting your refill along with the name of the pharmacy that you use. Narcotic prescriptions will be mailed to your pharmacy or you may pick them up at the clinic.  Please call 7-10 days before your refill is due  The above policy allows adequate time so that you do not run out of medication.    No Show - Late Cancellation - Late Arrival Policy  We believe regular attendance is key to your success in our program.    Any time you are unable to keep your appointment we ask that you call us at  least 24 hours in advance to let us know. This will allow us to offer the appointment time to another patient. The following is our policy for missed appointments. This also applies to appointments cancelled with less than 24 hours notice.    You will receive a letter after missing your 1st and 2nd appointments without contacting the clinic before your scheduled appointment time.     After missing 3 appointments without calling first, we will cancel all of your future appointments at [facility].    At that point, you will not be able to resume services unless approved by your care team  We understand that unforseen circumstances arise, however, out of respect for all concerned and to provide this appointment to another patient, this policy will be enforced.    Please note that most follow up appointments are 30 minutes long. If you arrive late, your provider may not be able to see you for the entire 30 minutes. Please also note that if you arrive more than 15 minutes for any appointment, it may be rescheduled.                                   Follow-ups after your visit        Your next 10 appointments already scheduled     Mar 09, 2017  1:00 PM CST   Return Visit with Florence Amato, PT   [facility] ([facility]    [address]  [address]  [facility] [address]   [phone]            Mar 16, 2017  1:00 PM CDT   Return Visit with Florence Amato PT   [facility]    [address]  [address]  [facility] [address] [phone]            Mar 23, 2017  1:00 PM CDT   Return Visit with Florence Amato PT   [facility] ([facility])    [address]  [address]  [facility] [address] [phone]              Who to contact     If you have questions or need follow up information about today's clinic visit or your schedule please contact [facility] directly at [phone].  Normal or non-critical lab and imaging results will be communicated to you by you[employer], letter or phone within 4 business days after the clinic has received the results. If you do not hear from us within 7 days, please contact the clinic through you[employer] or phone. If you have a critical or abnormal lab result, we will notify you by phone as soon as possible.  Submit refill requests through [employer] App or call your pharmacy and they will forward the refill request to us. Please allow 3 business days for your refill to be completed.          Additional Information About Your Visit        youbeQ - [employer] Information     [employer] App lets you send messages to your doctor, view your test results, renew your prescriptions, schedule appointments and more. To sign up, go to [email or web address] . Click on \"Log in\" on the left side of the screen, which will take you to the Welcome page. Then click on \"Sign up Now\" on the right side of the page.     You will be asked to enter the access code listed below, as well as some personal information. Please follow the directions to create your username and password.     Your access code is: [email or web address]  Expires: 4/3/2017 11:19 AM     Your access code will  in 90 days. If you need help or a new code, please " call your [facility] clinic or [phone].        Care EveryWhere ID     This is your Care EveryWhere ID. This could be used by other organizations to access your [facility] medical records  [id number]        Your Vitals Were     Pulse Respirations Pulse Oximetry BMI (Body Mass Index)          85 18 95% 35.94 kg/m2         Blood Pressure from Last 3 Encounters:   03/08/17 143/73   02/21/17 150/85   01/23/17 149/75    Weight from Last 3 Encounters:   03/08/17 120.2 kg (265 lb)   01/19/17 110.8 kg (244 lb 4.8 oz)   01/03/17 111.1 kg (245 lb)              Today, you had the following     No orders found for display         Today's Medication Changes          These changes are accurate as of: 3/8/17  9:42 AM.  If you have any questions, ask your nurse or doctor.               These medicines have changed or have updated prescriptions.        Dose/Directions    oxyCODONE 5 MG IR tablet   Commonly known as:  ROXICODONE   This may have changed:  additional instructions   Used for:  Failed back syndrome of lumbar spine   Changed by:  Jayashree Goodman APRN CNP        1 tab q4h, PRN max 6 per day. May dispense on/after 3/8/17. 15 days supply   Quantity:  90 tablet   Refills:  0            Where to get your medicines      These medications were sent to [facility] [address] [place] [address]  [address] [facility] [address]     Phone:  [phone]     senna-docusate 8.6-50 MG per tablet         Some of these will need a paper prescription and others can be bought over the counter.  Ask your nurse if you have questions.     Bring a paper prescription for each of these medications     oxyCODONE 5 MG IR tablet                Primary Care Provider Office Phone # Fax #    Connie Haskins [phone]       [facility] [address] [address]        Thank you!     Thank you for choosing [facility] [place]  for your care.  Our goal is always to provide you with excellent care. Hearing back from our patients is one way we can continue to improve our services. Please take a few minutes to complete the written survey that you may receive in the mail after your visit with us. Thank you!             Your Updated Medication List - Protect others around you: Learn how to safely use, store and throw away your medicines at www.disposemymeds.org.          This list is accurate as of: 3/8/17  9:42 AM.  Always use your most recent med list.                   Brand Name Dispense Instructions for use    allopurinol 100 MG tablet    ZYLOPRIM    270 tablet    Take 3 tablets (300 mg) by mouth daily       aspirin 81 MG tablet     100 tablet    Take 1 tablet (81 mg) by mouth daily       blood glucose monitoring lancets     3 Box    Use to test blood sugars 2-3 times daily as directed (lena contour meter)       blood glucose monitoring test strip    no brand specified    3 Box    Use to test blood sugar 2-3 times daily (lena contour meter)       finasteride 5 MG tablet    PROSCAR    30 tablet    Take 1 tablet (5 mg) by mouth daily For prostate enlargement. Please fill on $4 list       FLUoxetine 20 MG capsule    PROzac    270 capsule    TAKE THREE CAPSULES BY MOUTH ONCE DAILY       fluticasone 50 MCG/ACT spray    FLONASE    3 Bottle    Spray 1-2 sprays into both nostrils daily       metFORMIN 500 MG 24 hr tablet    GLUCOPHAGE-XR    90 tablet    Take 1 tablet (500 mg) by mouth daily (with dinner)       omeprazole 20 MG CR capsule    priLOSEC    90 capsule    Use every other day as needed       oxyCODONE 5 MG IR tablet    ROXICODONE    90 tablet    1 tab q4h, PRN max 6 per day. May dispense on/after 3/8/17. 15 days supply       pregabalin 50 MG capsule    LYRICA    90 capsule    Take 1 capsule (50 mg) by mouth 3 times daily       senna-docusate 8.6-50 MG per tablet    SENOKOT-S;PERICOLACE    60 tablet    Take 1 tablet by mouth 2 times daily        simvastatin 20 MG tablet    ZOCOR    90 tablet    Take 1 tablet (20 mg) by mouth At Bedtime       tamsulosin 0.4 MG capsule    FLOMAX    180 capsule    TAKE TWO CAPSULES BY MOUTH ONCE DAILY       triamcinolone 0.1 % cream    KENALOG    30 g    Apply sparingly to affected area twice daily for 7 days. Then stop and use only for flares

## 2017-03-08 NOTE — PATIENT INSTRUCTIONS
After Visit Instructions:     Thank you for coming to [facility] for your care. It is my goal to partner with you to help you reach your optimal state of health.     I am recommending multidisciplinary care at this time.  The focus of care will be to continue gradual rehabilitation and pain management with medication adjustments as needed.    Continue daily self-care, identifying contributing factors, and monitoring variations in pain level. Continue to integrate self-care into your life.      1. Schedule physical therapy visits two times per month  2. Schedule follow-up with JIMBO Ernst NP-C in 4 weeks  3. Schedule a nurse visit on  for pill count and to receive your next prescription.   4. Medication recommendations:   1. Oxycodone 5 m tabs max per day.     JIMBO Tran NP-C  [facility]    Contact information: [facility]    Please call if any side effects, questions, or concerns arise.    Nurse Triage line:  [phone]   Call this number with any questions or concerns. You may leave a detailed message anytime. Calls are typically returned Monday through Friday between 8 AM and 4:30 PM. We usually get back to you within 2 business days depending on the issue/request.    Scheduling number: [phone].  Call this number to schedule or change appointments.          Medication Refills Policy:    For non-narcotic medications, please your pharmacy directly to request a refill and the pharmacy will call the [facility] for authorization. Please allow 3-4 days for these refills.    For narcotic refills, call the nurse triage line and leave a message requesting your refill along with the name of the pharmacy that you use. Narcotic prescriptions will be mailed to your pharmacy or you may pick them up at the clinic.  Please call 7-10 days before your refill is due  The above policy allows  adequate time so that you do not run out of medication.    No Show - Late Cancellation - Late Arrival Policy  We believe regular attendance is key to your success in our program.    Any time you are unable to keep your appointment we ask that you call us at  least 24 hours in advance to let us know. This will allow us to offer the appointment time to another patient. The following is our policy for missed appointments. This also applies to appointments cancelled with less than 24 hours notice.    You will receive a letter after missing your 1st and 2nd appointments without contacting the clinic before your scheduled appointment time.     After missing 3 appointments without calling first, we will cancel all of your future appointments at [facility].    At that point, you will not be able to resume services unless approved by your care team  We understand that unforseen circumstances arise, however, out of respect for all concerned and to provide this appointment to another patient, this policy will be enforced.    Please note that most follow up appointments are 30 minutes long. If you arrive late, your provider may not be able to see you for the entire 30 minutes. Please also note that if you arrive more than 15 minutes for any appointment, it may be rescheduled.

## 2017-03-08 NOTE — NURSING NOTE
Chief Complaint   Patient presents with     Pain       Initial /73  Pulse 85  Resp 18  Wt 120.2 kg (265 lb)  SpO2 95%  BMI 35.94 kg/m2 Estimated body mass index is 35.94 kg/(m^2) as calculated from the following:    Height as of 1/3/17: 1.829 m (6').    Weight as of this encounter: 120.2 kg (265 lb).  Medication Reconciliation: complete       Artemio Cole MA  [facility]
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
## 2017-03-09 ENCOUNTER — OFFICE VISIT (OUTPATIENT)
Dept: PALLIATIVE MEDICINE | Facility: CLINIC | Age: 62
End: 2017-03-09
Payer: COMMERCIAL

## 2017-03-09 DIAGNOSIS — G89.29 CHRONIC PAIN: Primary | ICD-10-CM

## 2017-03-09 DIAGNOSIS — M96.1 FAILED BACK SYNDROME OF LUMBAR SPINE: ICD-10-CM

## 2017-03-09 PROCEDURE — 97112 NEUROMUSCULAR REEDUCATION: CPT | Mod: GP | Performed by: PHYSICAL THERAPIST

## 2017-03-09 PROCEDURE — 97110 THERAPEUTIC EXERCISES: CPT | Mod: GP | Performed by: PHYSICAL THERAPIST

## 2017-03-09 NOTE — PROGRESS NOTES
"Patient Name: Toby Mcgrath      YOB: 1955     Medical Record Number: [id number]  Diagnosis:    Chronic pain  Failed back syndrome of lumbar spine  Visit Info Length of Visit: 45 min  Arrived: On Time  Therapeutic Procedures/Exercises: 10 min; Therapeutic Activities: NA; Neuromuscular Re-education: 35 min    Exercise/Activity Education Instruction:  Therapeutic Neuroscience Education:  Pt educated on the concept that in some people with chronic pain, the nervous system can become extra sensitive and act like a hypersensitive alarm system.  Discussed the role exercise and body centered imagery/relaxation practices can have on desensitizing/calming the nervous system and reducing pain.      Activity:  Aerobic Conditioning - Hallway walk 3 laps (900 ft) in 7'17\" included one brief rest at 2.5 laps - pt used SEC     Postural Training:  Neutral posture with ambulation - practiced posture awareness with walk (keeping his head up in neutral) - reports increased comfort in his back when he assumes a more neutral head position when walking.  Instructed to set mindful walk paths in his home and in the community to increase his awareness of neutral walking posture.    Led through seated kinesthetic awareness activity (body scan) for taking inventory of how his body is feeling.  Pt will practice this as mini breaks off and on throughout the day to take inventory of how his body feeling and then take 3-5 mindful breaths to relax the nervous system.       Notes: Patient reports: he is feeling better today.  left posteromedial thigh pain is gone.  Today his feet are more problematic.  Usual pains go up and down.    Acknowledges that he is often anxious feeling during the day.      HEP/Self Care/Home Management Training:   Pacing/Mini Breaks/Self Care: just uses frequent change of position out of habit - \"I can't stay in any one position for very long\" ;   Relaxation Practice: nothing consistent ;   Posture " Corrections: none - addressed in session today ;   Walking program: walking hallways for exercise about QOD ;   Heat/Ice/TENS: not addressed today.   ;   HEP working on the stretches about QOD     Visit 4/12     Demonstrates/Verbalizes Technique: 2, 3 (1= poor technique-difficulty performing exercises,significant cues required; 5= good technique-performs exercises without cues)  Body Awareness: 2 (1=low awareness; 5=high awareness)  Posture/Stability: 2 (1= poor posture, stability; 5= good posture, stability)   Motivational Level:   Cooperative Participation:  Full   Patient Satisfaction:  satisfied   Response to Teaching:   demonstrated ability and verbalized, recall/understanding  Factors that affect learning: None   Plan of Care  Cont PT to support reactivation and integration of self regulation pain management skills.    Therapist: Florence Amato PT                 Date: 3/9/2017

## 2017-03-09 NOTE — MR AVS SNAPSHOT
After Visit Summary   3/9/2017    Toby Mcgrath    MRN: [id number]           Patient Information     Date Of Birth          1955        Visit Information        Provider Department      3/9/2017 1:00 PM Florence Amato, PT [facility]        Today's Diagnoses     Chronic pain    -  1    Failed back syndrome of lumbar spine           Follow-ups after your visit        Your next 10 appointments already scheduled     Mar 21, 2017  9:30 AM CDT   (Arrive by 5:30 AM)   Nurse Only with PPC NURSE   [facility] ([facility]    [address]  [id number]  [facility] [id number] [phone]            Mar 23, 2017  1:00 PM CDT   Return Visit with Florence Amato PT   [facility] ([facility])    [address]  [address]  [facility] [id number] [phone]            Apr 03, 2017  9:30 AM CDT   Return Visit with JIMBO Sutton CNP   [facility] ([facility])    [address]  [address]  [facility] [id number] [phone]              Who to contact     If you have questions or need follow up information about today's clinic visit or your schedule please contact [facility] directly at [phone].  Normal or non-critical lab and imaging results will be communicated to you by Technimarkhart, letter or phone within 4 business days after the clinic has received the results. If you do not hear from us within 7 days, please contact the clinic through MyChart or phone. If you have a critical or abnormal lab result, we will notify you by phone as soon as possible.  Submit refill requests through Mantex or call your pharmacy and they will forward the refill request to us. Please allow 3 business days for your refill to be completed.          Additional Information About Your Visit        Mantex Information     Mantex lets you send messages to your doctor,  "view your test results, renew your prescriptions, schedule appointments and more. To sign up, go to [email or web address] . Click on \"Log in\" on the left side of the screen, which will take you to the Welcome page. Then click on \"Sign up Now\" on the right side of the page.     You will be asked to enter the access code listed below, as well as some personal information. Please follow the directions to create your username and password.     Your access code is: [email or web address]  Expires: 4/3/2017 12:19 PM     Your access code will  in 90 days. If you need help or a new code, please call your [place] clinic or [phone].        Care EveryWhere ID     This is your Care EveryWhere ID. This could be used by other organizations to access your [place] medical records  [id number]         Blood Pressure from Last 3 Encounters:   17 143/73   17 150/85   17 149/75    Weight from Last 3 Encounters:   17 120.2 kg (265 lb)   17 110.8 kg (244 lb 4.8 oz)   17 111.1 kg (245 lb)              We Performed the Following     NEUROMUSCULAR RE-EDUCATION     THERAPEUTIC EXERCISES        Primary Care Provider Office Phone # Fax #    Connie Haskins [phone]       [facility] [address] [address]        Thank you!     Thank you for choosing [facility]  for your care. Our goal is always to provide you with excellent care. Hearing back from our patients is one way we can continue to improve our services. Please take a few minutes to complete the written survey that you may receive in the mail after your visit with us. Thank you!             Your Updated Medication List - Protect others around you: Learn how to safely use, store and throw away your medicines at www.disposemymeds.org.          This list is accurate as of: 3/9/17 11:59 PM.  Always use your most recent med list.                   Brand Name Dispense " Instructions for use    allopurinol 100 MG tablet    ZYLOPRIM    270 tablet    Take 3 tablets (300 mg) by mouth daily       aspirin 81 MG tablet     100 tablet    Take 1 tablet (81 mg) by mouth daily       blood glucose monitoring lancets     3 Box    Use to test blood sugars 2-3 times daily as directed (lena contour meter)       blood glucose monitoring test strip    no brand specified    3 Box    Use to test blood sugar 2-3 times daily (lena contour meter)       finasteride 5 MG tablet    PROSCAR    30 tablet    Take 1 tablet (5 mg) by mouth daily For prostate enlargement. Please fill on $4 list       FLUoxetine 20 MG capsule    PROzac    270 capsule    TAKE THREE CAPSULES BY MOUTH ONCE DAILY       fluticasone 50 MCG/ACT spray    FLONASE    3 Bottle    Spray 1-2 sprays into both nostrils daily       metFORMIN 500 MG 24 hr tablet    GLUCOPHAGE-XR    90 tablet    Take 1 tablet (500 mg) by mouth daily (with dinner)       omeprazole 20 MG CR capsule    priLOSEC    90 capsule    Use every other day as needed       oxyCODONE 5 MG IR tablet    ROXICODONE    90 tablet    1 tab q4h, PRN max 6 per day. May dispense on/after 3/8/17. 15 days supply       pregabalin 50 MG capsule    LYRICA    90 capsule    Take 1 capsule (50 mg) by mouth 3 times daily       senna-docusate 8.6-50 MG per tablet    SENOKOT-S;PERICOLACE    60 tablet    Take 1 tablet by mouth 2 times daily       simvastatin 20 MG tablet    ZOCOR    90 tablet    Take 1 tablet (20 mg) by mouth At Bedtime       tamsulosin 0.4 MG capsule    FLOMAX    180 capsule    TAKE TWO CAPSULES BY MOUTH ONCE DAILY       triamcinolone 0.1 % cream    KENALOG    30 g    Apply sparingly to affected area twice daily for 7 days. Then stop and use only for flares

## 2017-03-16 ENCOUNTER — OFFICE VISIT (OUTPATIENT)
Dept: PALLIATIVE MEDICINE | Facility: CLINIC | Age: 62
End: 2017-03-16
Payer: COMMERCIAL

## 2017-03-16 DIAGNOSIS — G89.29 CHRONIC PAIN: Primary | ICD-10-CM

## 2017-03-16 DIAGNOSIS — M96.1 FAILED BACK SYNDROME OF LUMBAR SPINE: ICD-10-CM

## 2017-03-16 PROCEDURE — 97112 NEUROMUSCULAR REEDUCATION: CPT | Mod: GP | Performed by: PHYSICAL THERAPIST

## 2017-03-16 PROCEDURE — 97110 THERAPEUTIC EXERCISES: CPT | Mod: GP | Performed by: PHYSICAL THERAPIST

## 2017-03-16 NOTE — PROGRESS NOTES
"Patient Name: Toby Mcgrath      YOB: 1955     Medical Record Number: [id number]  Diagnosis:    Chronic pain  Failed back syndrome of lumbar spine  Visit Info Length of Visit: 45 min  Arrived: On Time  Therapeutic Procedures/Exercises: 25 min; Therapeutic Activities: NA; Neuromuscular Re-education: 20 min    Exercise/Activity Education Instruction:  Reviewed use of mindful walk \"paths\" in his apt as a way to help him be mindful of walking posture (head position) - any time he walks to bathroom or kitchen.    Activity:  Core Stabilization - reviewed \"zipper\" (TA) abdominal set - set reminder cues and practice throughout the day - red lights, commercial breaks when watching tv.  Aerobic Conditioning - 3 laps hallway walk (900 ft), no rest breaks, 6'02\".   Stretching:  Reviewed SKTC and adductor stretches - pt required moderate v.cues - he was moving his legs as if marching vs stretching.        Led through seated kinesthetic awareness activity (body scan) to practice taking inventory of how he is feeling - especially mm tension.  Reviewed benefits of stopping periodically throughout the day for sx self regulation: taking inventory in how he is feeling to quiet his high levels of mm tension and guarding and / or choose other brief interventions to help his pain.          Notes: Patient reports: he is having a \"good day\" today.    HEP/Self Care/Home Management Training:   Pacing/Mini Breaks/Self Care: forgot about mini breaks - but does get up and move/change position frequently - mostly out of habit ;   Relaxation Practice: none - forgot ;   Posture Corrections: reports improved body awareness: 1) keeping his head up when he is walking in the hallways - it feels better so is more aware - but also using doorways in halls as reminder cues - he is less aware when in his apt ; 2) keeping his feet hip width apart when in sitting and lying down - again notes a positive benefit in his LE sx when he uses this " more neutral positioning  Walking program: hallway walking QOD ;   Heat/Ice/TENS: not addressed today.   ;   HEP stretching most days - not quite every day     Visit 5/12     Demonstrates/Verbalizes Technique: 3 (1= poor technique-difficulty performing exercises,significant cues required; 5= good technique-performs exercises without cues)  Body Awareness: 3 (1=low awareness; 5=high awareness)  Posture/Stability: 3 (1= poor posture, stability; 5= good posture, stability)   Motivational Level:   Cooperative Participation:  Full   Patient Satisfaction:  satisfied   Response to Teaching:   demonstrated ability and verbalized, recall/understanding  Factors that affect learning: None   Plan of Care  Cont PT to support reactivation and integration of self regulation pain management skills. (next visit consider:  HS and gastroc stretches, progress core, check on mini breaks)   Therapist: Florence Amato PT                 Date: 3/16/2017

## 2017-03-21 ENCOUNTER — ALLIED HEALTH/NURSE VISIT (OUTPATIENT)
Dept: PALLIATIVE MEDICINE | Facility: CLINIC | Age: 62
End: 2017-03-21
Payer: COMMERCIAL

## 2017-03-21 DIAGNOSIS — G89.4 CHRONIC PAIN SYNDROME: Primary | ICD-10-CM

## 2017-03-21 DIAGNOSIS — M96.1 FAILED BACK SYNDROME OF LUMBAR SPINE: ICD-10-CM

## 2017-03-21 PROCEDURE — 99207 ZZC NO CHARGE NURSE ONLY: CPT

## 2017-03-21 RX ORDER — OXYCODONE HYDROCHLORIDE 5 MG/1
TABLET ORAL
Qty: 90 TABLET | Refills: 0 | Status: SHIPPED | OUTPATIENT
Start: 2017-03-21 | End: 2017-04-03

## 2017-03-21 NOTE — NURSING NOTE
Toby here this am and a pill count was done on the Oxycodone 5 mg tabs. He had #13 left . This reported to Jayashree Goodman and a 15 day script given to Pt. He will f/u with Jayashree on 4/3/17.  Donna phan rn

## 2017-03-21 NOTE — MR AVS SNAPSHOT
"              After Visit Summary   3/21/2017    Toby Mcgrath    MRN: [id number]           Patient Information     Date Of Birth          1955        Visit Information        Provider Department      3/21/2017 9:30 AM PPC NURSE [facility]        Today's Diagnoses     Chronic pain syndrome    -  1    Failed back syndrome of lumbar spine           Follow-ups after your visit        Your next 10 appointments already scheduled     Mar 23, 2017  1:00 PM CDT   Return Visit with Florence Amato PT   [facility] ([facility])    [address]  [address]  [facility] [address] [phone]            Apr 03, 2017  9:30 AM CDT   Return Visit with JIMBO Sutton CNP   [facility] ([facility])    [address]  [address]  [facility] [address] [phone]              Who to contact     If you have questions or need follow up information about today's clinic visit or your schedule please contact [facility] directly at [phone].  Normal or non-critical lab and imaging results will be communicated to you by Polyerahart, letter or phone within 4 business days after the clinic has received the results. If you do not hear from us within 7 days, please contact the clinic through [employer] or phone. If you have a critical or abnormal lab result, we will notify you by phone as soon as possible.  Submit refill requests through [employer]ar or call your pharmacy and they will forward the refill request to us. Please allow 3 business days for your refill to be completed.          Additional Information About Your Visit        Polyerahart Information     TaxJar lets you send messages to your doctor, view your test results, renew your prescriptions, schedule appointments and more. To sign up, go to [email or web address] . Click on \"Log in\" on the left side of the screen, which will take you to the Welcome page. " "Then click on \"Sign up Now\" on the right side of the page.     You will be asked to enter the access code listed below, as well as some personal information. Please follow the directions to create your username and password.     Your access code is: [email or web address]  Expires: 4/3/2017 12:19 PM     Your access code will  in 90 days. If you need help or a new code, please call your [place] clinic or [phone].        Care EveryWhere ID     This is your Care EveryWhere ID. This could be used by other organizations to access your [place] medical records  [id number]         Blood Pressure from Last 3 Encounters:   17 143/73   17 150/85   17 149/75    Weight from Last 3 Encounters:   17 120.2 kg (265 lb)   17 110.8 kg (244 lb 4.8 oz)   17 111.1 kg (245 lb)              Today, you had the following     No orders found for display         Today's Medication Changes          These changes are accurate as of: 3/21/17 11:59 PM.  If you have any questions, ask your nurse or doctor.               These medicines have changed or have updated prescriptions.        Dose/Directions    oxyCODONE 5 MG IR tablet   Commonly known as:  ROXICODONE   This may have changed:  additional instructions   Used for:  Failed back syndrome of lumbar spine        1 tab q4h, PRN max 6 per day. May dispense on/after 3/22/17. 15 days supply   Quantity:  90 tablet   Refills:  0            Where to get your medicines      Some of these will need a paper prescription and others can be bought over the counter.  Ask your nurse if you have questions.     Bring a paper prescription for each of these medications     oxyCODONE 5 MG IR tablet                Primary Care Provider Office Phone # Fax #    Connie Haskins [phone]       [facility] [address] [address]        Thank you!     Thank you for choosing [facility]  for your care. " Our goal is always to provide you with excellent care. Hearing back from our patients is one way we can continue to improve our services. Please take a few minutes to complete the written survey that you may receive in the mail after your visit with us. Thank you!             Your Updated Medication List - Protect others around you: Learn how to safely use, store and throw away your medicines at www.disposemymeds.org.          This list is accurate as of: 3/21/17 11:59 PM.  Always use your most recent med list.                   Brand Name Dispense Instructions for use    allopurinol 100 MG tablet    ZYLOPRIM    270 tablet    Take 3 tablets (300 mg) by mouth daily       aspirin 81 MG tablet     100 tablet    Take 1 tablet (81 mg) by mouth daily       blood glucose monitoring lancets     3 Box    Use to test blood sugars 2-3 times daily as directed (lena contour meter)       blood glucose monitoring test strip    no brand specified    3 Box    Use to test blood sugar 2-3 times daily (lena contour meter)       finasteride 5 MG tablet    PROSCAR    30 tablet    Take 1 tablet (5 mg) by mouth daily For prostate enlargement. Please fill on $4 list       FLUoxetine 20 MG capsule    PROzac    270 capsule    TAKE THREE CAPSULES BY MOUTH ONCE DAILY       fluticasone 50 MCG/ACT spray    FLONASE    3 Bottle    Spray 1-2 sprays into both nostrils daily       metFORMIN 500 MG 24 hr tablet    GLUCOPHAGE-XR    90 tablet    Take 1 tablet (500 mg) by mouth daily (with dinner)       omeprazole 20 MG CR capsule    priLOSEC    90 capsule    Use every other day as needed       oxyCODONE 5 MG IR tablet    ROXICODONE    90 tablet    1 tab q4h, PRN max 6 per day. May dispense on/after 3/22/17. 15 days supply       pregabalin 50 MG capsule    LYRICA    90 capsule    Take 1 capsule (50 mg) by mouth 3 times daily       senna-docusate 8.6-50 MG per tablet    SENOKOT-S;PERICOLACE    60 tablet    Take 1 tablet by mouth 2 times daily        simvastatin 20 MG tablet    ZOCOR    90 tablet    Take 1 tablet (20 mg) by mouth At Bedtime       tamsulosin 0.4 MG capsule    FLOMAX    180 capsule    TAKE TWO CAPSULES BY MOUTH ONCE DAILY       triamcinolone 0.1 % cream    KENALOG    30 g    Apply sparingly to affected area twice daily for 7 days. Then stop and use only for flares

## 2017-03-23 ENCOUNTER — OFFICE VISIT (OUTPATIENT)
Dept: PALLIATIVE MEDICINE | Facility: CLINIC | Age: 62
End: 2017-03-23
Payer: COMMERCIAL

## 2017-03-23 DIAGNOSIS — G89.29 CHRONIC PAIN: Primary | ICD-10-CM

## 2017-03-23 DIAGNOSIS — M96.1 FAILED BACK SYNDROME OF LUMBAR SPINE: ICD-10-CM

## 2017-03-23 PROCEDURE — 97535 SELF CARE MNGMENT TRAINING: CPT | Mod: GP | Performed by: PHYSICAL THERAPIST

## 2017-03-23 PROCEDURE — 97110 THERAPEUTIC EXERCISES: CPT | Mod: GP | Performed by: PHYSICAL THERAPIST

## 2017-03-23 PROCEDURE — 97112 NEUROMUSCULAR REEDUCATION: CPT | Mod: GP | Performed by: PHYSICAL THERAPIST

## 2017-03-23 NOTE — PROGRESS NOTES
"Patient Name: Toby Mcgrath      YOB: 1955     Medical Record Number: [id number]  Diagnosis:    Chronic pain  Failed back syndrome of lumbar spine  Visit Info Length of Visit: 45 min  Arrived: On Time  Therapeutic Procedures/Exercises: 15 min; Therapeutic Activities: NA; Neuromuscular Re-education: 15 min   Self Care / Home Management Training: 15 min   Exercise/Activity Education Instruction:  Flare management - wrote out a flare plan for when pt encounters a \"throbbing\" flare of his sx so he has tools to use besides just stopping his activity completely.    Activity:  Stretching:  Worked on helping pt modify his stretching routine for when he experiences a flare.  Pt was able to tolerate modifications well.  Encouraged to continue stretching even when in a flare.      Led through supine kinesthetic awareness activity (body scan) with mindful breathing.  Pt repots difficult getting his body to relax during the activity, but by session's end he reported that focus on mindful breathing was a significant help and that his throbbing pain was at a 6-7/10 down from 9/10.  Pt to practice the relaxation breathing frequently throughout his day.     Notes: Patient reports: he is having a bad day today, \"I'm having bad throbbing in my back.\" Indicates he usually just withdraws and sits more when he experiences this throbbing.  No other self care and becomes emotionally reactive (frustrated, worried) by the throbbing.  Indicates he experiences this throbbing episodically so it not a new set of sx and he is aware that he does move through the flare - it is hard to remember this when he is in the midst of it.     Rates his pain at 9/10 today    Reports some elevated stressors currently - son going through a divorce so he anticipates he won't see his grandchildren as frequently.     Standing more problematic when in a \"throbbing flare\" - under 20 min.  Needs to take more frequent sit breaks and then gets " "frustrated.    HEP/Self Care/Home Management Training:   Pacing/Mini Breaks/Self Care: did the mini breaks for a couple days and then became less consistent in using ;   Relaxation Practice: mindful relaxation breathing is very helpful but he doesn't always remember to use it when he is in the midst of the flare;   Posture Corrections: was less consistent over the interval ;   Walking program: hallway walking a few times/wk - stops in times of a flare ;   Heat/Ice/TENS: doesn't use consistently - forgets, \"it would probably help\"  ;   HEP stretching several times/wk - but stops when in a flare.    PT visit 6/12     Demonstrates/Verbalizes Technique: 2 (1= poor technique-difficulty performing exercises,significant cues required; 5= good technique-performs exercises without cues)  Body Awareness: 2 (1=low awareness; 5=high awareness)  Posture/Stability: 3 (1= poor posture, stability; 5= good posture, stability)   Motivational Level:   Cooperative but distracted - anxious and in increased pain Participation:  Full   Patient Satisfaction:  satisfied   Response to Teaching:   demonstrated ability and verbalized, recall/understanding  Factors that affect learning: None   Plan of Care  Cont PT to support reactivation and integration of self regulation pain management skills. (next visit consider:  HS and gastroc stretches, progress core, check on mini breaks and consistent use of mindful breathing )   Therapist: Florence Amato PT                 Date: 3/23/2017                                                                                               "

## 2017-03-23 NOTE — MR AVS SNAPSHOT
After Visit Summary   3/23/2017    Toby Mcgrath    MRN: [id number]           Patient Information     Date Of Birth          1955        Visit Information        Provider Department      3/23/2017 1:00 PM Florence Amato, PT [facility]        Today's Diagnoses     Chronic pain    -  1    Failed back syndrome of lumbar spine           Follow-ups after your visit        Your next 10 appointments already scheduled     Apr 03, 2017  9:30 AM CDT   Return Visit with JIMBO Sutton CNP   [facility] ([facility])    [address]  [address]  [facility] [address] [phone]            Apr 11, 2017  9:15 AM CDT   Return Visit with Florence Amato PT   [facility] ([facility])    [address]  [address]  [facility] [id number] [phone]            Apr 20, 2017  9:15 AM CDT   Return Visit with Florence Amato PT   [facility] ([facility])    [address]  [facility] [address] [phone]            May 04, 2017 10:00 AM CDT   Return Visit with Florence Amato PT   [facility] ([facility])    [address]  [address]  [facility] [address] [phone]            May 16, 2017  9:15 AM CDT   Return Visit with Florence Amato PT   [facility] ([facility])    [address]  [address]  [facility] [address] [phone]              Who to contact     If you have questions or need follow up information about today's clinic visit or your schedule please contact [facility] directly at [phone].  Normal or non-critical lab and imaging results will be communicated to you by MyChart, letter or phone within 4 business days after the clinic has received the results. If you do not hear from us within 7 days, please contact the clinic through  "[employer] or phone. If you have a critical or abnormal lab result, we will notify you by phone as soon as possible.  Submit refill requests through [employer] or call your pharmacy and they will forward the refill request to us. Please allow 3 business days for your refill to be completed.          Additional Information About Your Visit        [employer] Information     [employer] lets you send messages to your doctor, view your test results, renew your prescriptions, schedule appointments and more. To sign up, go to [email or web address] . Click on \"Log in\" on the left side of the screen, which will take you to the Welcome page. Then click on \"Sign up Now\" on the right side of the page.     You will be asked to enter the access code listed below, as well as some personal information. Please follow the directions to create your username and password.     Your access code is: [email or web address]  Expires: 4/3/2017 12:19 PM     Your access code will  in 90 days. If you need help or a new code, please call your [place] clinic or [phone].        Care EveryWhere ID     This is your Care EveryWhere ID. This could be used by other organizations to access your [place] medical records  [id number]         Blood Pressure from Last 3 Encounters:   17 143/73   17 150/85   17 149/75    Weight from Last 3 Encounters:   17 120.2 kg (265 lb)   17 110.8 kg (244 lb 4.8 oz)   17 111.1 kg (245 lb)              We Performed the Following     NEUROMUSCULAR RE-EDUCATION     SELF CARE MNGMENT TRAINING     THERAPEUTIC EXERCISES        Primary Care Provider Office Phone # Fax #    Connie Haskins [phone]       [facility] [address] [address]        Thank you!     Thank you for choosing [facility] [place]  for your care. Our goal is always to provide you with excellent care. Hearing back from our patients is one way we can continue " to improve our services. Please take a few minutes to complete the written survey that you may receive in the mail after your visit with us. Thank you!             Your Updated Medication List - Protect others around you: Learn how to safely use, store and throw away your medicines at www.disposemymeds.org.          This list is accurate as of: 3/23/17  5:48 PM.  Always use your most recent med list.                   Brand Name Dispense Instructions for use    allopurinol 100 MG tablet    ZYLOPRIM    270 tablet    Take 3 tablets (300 mg) by mouth daily       aspirin 81 MG tablet     100 tablet    Take 1 tablet (81 mg) by mouth daily       blood glucose monitoring lancets     3 Box    Use to test blood sugars 2-3 times daily as directed (lena contour meter)       blood glucose monitoring test strip    no brand specified    3 Box    Use to test blood sugar 2-3 times daily (lena contour meter)       finasteride 5 MG tablet    PROSCAR    30 tablet    Take 1 tablet (5 mg) by mouth daily For prostate enlargement. Please fill on $4 list       FLUoxetine 20 MG capsule    PROzac    270 capsule    TAKE THREE CAPSULES BY MOUTH ONCE DAILY       fluticasone 50 MCG/ACT spray    FLONASE    3 Bottle    Spray 1-2 sprays into both nostrils daily       metFORMIN 500 MG 24 hr tablet    GLUCOPHAGE-XR    90 tablet    Take 1 tablet (500 mg) by mouth daily (with dinner)       omeprazole 20 MG CR capsule    priLOSEC    90 capsule    Use every other day as needed       oxyCODONE 5 MG IR tablet    ROXICODONE    90 tablet    1 tab q4h, PRN max 6 per day. May dispense on/after 3/22/17. 15 days supply       pregabalin 50 MG capsule    LYRICA    90 capsule    Take 1 capsule (50 mg) by mouth 3 times daily       senna-docusate 8.6-50 MG per tablet    SENOKOT-S;PERICOLACE    60 tablet    Take 1 tablet by mouth 2 times daily       simvastatin 20 MG tablet    ZOCOR    90 tablet    Take 1 tablet (20 mg) by mouth At Bedtime       tamsulosin 0.4 MG  capsule    FLOMAX    180 capsule    TAKE TWO CAPSULES BY MOUTH ONCE DAILY       triamcinolone 0.1 % cream    KENALOG    30 g    Apply sparingly to affected area twice daily for 7 days. Then stop and use only for flares

## 2017-04-03 ENCOUNTER — OFFICE VISIT (OUTPATIENT)
Dept: PALLIATIVE MEDICINE | Facility: CLINIC | Age: 62
End: 2017-04-03
Payer: COMMERCIAL

## 2017-04-03 VITALS
SYSTOLIC BLOOD PRESSURE: 149 MMHG | WEIGHT: 281 LBS | BODY MASS INDEX: 38.11 KG/M2 | OXYGEN SATURATION: 97 % | HEART RATE: 87 BPM | DIASTOLIC BLOOD PRESSURE: 78 MMHG | RESPIRATION RATE: 18 BRPM

## 2017-04-03 DIAGNOSIS — E08.41 DIABETIC MONONEUROPATHY ASSOCIATED WITH DIABETES MELLITUS DUE TO UNDERLYING CONDITION (H): Primary | ICD-10-CM

## 2017-04-03 DIAGNOSIS — M62.830 BACK MUSCLE SPASM: ICD-10-CM

## 2017-04-03 DIAGNOSIS — M96.1 FAILED BACK SYNDROME OF LUMBAR SPINE: ICD-10-CM

## 2017-04-03 DIAGNOSIS — G89.4 CHRONIC PAIN SYNDROME: ICD-10-CM

## 2017-04-03 DIAGNOSIS — E11.9 TYPE 2 DIABETES MELLITUS WITHOUT COMPLICATION, WITHOUT LONG-TERM CURRENT USE OF INSULIN (H): ICD-10-CM

## 2017-04-03 PROCEDURE — 99214 OFFICE O/P EST MOD 30 MIN: CPT | Performed by: NURSE PRACTITIONER

## 2017-04-03 RX ORDER — PREGABALIN 200 MG/1
200 CAPSULE ORAL 2 TIMES DAILY
Qty: 60 CAPSULE | Refills: 3 | Status: SHIPPED | OUTPATIENT
Start: 2017-04-03 | End: 2017-06-09

## 2017-04-03 RX ORDER — CYCLOBENZAPRINE HCL 5 MG
5-10 TABLET ORAL
Qty: 30 TABLET | Refills: 1 | Status: SHIPPED | OUTPATIENT
Start: 2017-04-03 | End: 2018-05-31

## 2017-04-03 RX ORDER — OXYCODONE HYDROCHLORIDE 5 MG/1
TABLET ORAL
Qty: 180 TABLET | Refills: 0 | Status: SHIPPED | OUTPATIENT
Start: 2017-04-03 | End: 2017-04-26

## 2017-04-03 ASSESSMENT — PAIN SCALES - GENERAL: PAINLEVEL: EXTREME PAIN (8)

## 2017-04-03 NOTE — MR AVS SNAPSHOT
After Visit Summary   4/3/2017    Toby Mcgrath    MRN: [id number]           Patient Information     Date Of Birth          1955        Visit Information        Provider Department      4/3/2017 9:30 AM Jayashree Goodman APRN CNP [facility]        Today's Diagnoses     Diabetic mononeuropathy associated with diabetes mellitus due to underlying condition (H)    -  1    Chronic pain syndrome        Type 2 diabetes mellitus without complication, without long-term current use of insulin (H)        Failed back syndrome of lumbar spine        Back muscle spasm          Care Instructions    After Visit Instructions:     Thank you for coming to [facility] for your care. It is my goal to partner with you to help you reach your optimal state of health.     I am recommending multidisciplinary care at this time.  The focus of care will be to continue gradual rehabilitation and pain management with medication adjustments as needed.    Continue daily self-care, identifying contributing factors, and monitoring variations in pain level. Continue to integrate self-care into your life.      1. Schedule physical therapy visits at least 2-3 times per month  2. Schedule follow-up with JIMBO Ernst NP-C in 4 weeks  3. Medication recommendations:   1. Lyrica 200 mg two times daily. Stay with same dose until the new dose is approved.   2. Oxycodone 5 mg: Stay with same dose. Max 6 tabs per day.   3. Cyclobenzaprine 5 m-2 tabs at bedtime. Start with 1 tablet.       JIMBO Tran NP-C  [facility]    Contact information: [facility]    Please call if any side effects, questions, or concerns arise.    Nurse Triage line:  [phone]   Call this number with any questions or concerns. You may leave a detailed message anytime. Calls are typically returned Monday through Friday between 8 AM and 4:30  PM. We usually get back to you within 2 business days depending on the issue/request.    Scheduling number: [phone].  Call this number to schedule or change appointments.          Medication Refills Policy:    For non-narcotic medications, please your pharmacy directly to request a refill and the pharmacy will call the [facility] for authorization. Please allow 3-4 days for these refills.    For narcotic refills, call the nurse triage line and leave a message requesting your refill along with the name of the pharmacy that you use. Narcotic prescriptions will be mailed to your pharmacy or you may pick them up at the clinic.  Please call 7-10 days before your refill is due  The above policy allows adequate time so that you do not run out of medication.    No Show - Late Cancellation - Late Arrival Policy  We believe regular attendance is key to your success in our program.    Any time you are unable to keep your appointment we ask that you call us at  least 24 hours in advance to let us know. This will allow us to offer the appointment time to another patient. The following is our policy for missed appointments. This also applies to appointments cancelled with less than 24 hours notice.    You will receive a letter after missing your 1st and 2nd appointments without contacting the clinic before your scheduled appointment time.     After missing 3 appointments without calling first, we will cancel all of your future appointments at [facility].    At that point, you will not be able to resume services unless approved by your care team  We understand that unforseen circumstances arise, however, out of respect for all concerned and to provide this appointment to another patient, this policy will be enforced.    Please note that most follow up appointments are 30 minutes long. If you arrive late, your provider may not be able to see you for the entire 30 minutes. Please also note that  if you arrive more than 15 minutes for any appointment, it may be rescheduled.                                   Follow-ups after your visit        Your next 10 appointments already scheduled     Apr 11, 2017  9:15 AM CDT   Return Visit with Florence Amato PT   [facility] ([facility])    [address]  [address]  [facility] [address] [phone]            Apr 20, 2017  9:15 AM CDT   Return Visit with Florence Amato PT   [facility] ([facility])    [address]  [address]  [facility] [address] [phone]            May 04, 2017 10:00 AM CDT   Return Visit with Florence Amato PT   [facility] ([facility])    [address]  [address]  [facility] [address] [phone]            May 16, 2017  9:15 AM CDT   Return Visit with Florence Amato PT   [facility] ([facility])    [address]  [address]  [facility] [id number] [phone]              Who to contact     If you have questions or need follow up information about today's clinic visit or your schedule please contact [facility] directly at [phone].  Normal or non-critical lab and imaging results will be communicated to you by Giftlyhart, letter or phone within 4 business days after the clinic has received the results. If you do not hear from us within 7 days, please contact the clinic through MyChart or phone. If you have a critical or abnormal lab result, we will notify you by phone as soon as possible.  Submit refill requests through Mobypark or call your pharmacy and they will forward the refill request to us. Please allow 3 business days for your refill to be completed.          Additional Information About Your Visit        Mobypark Information     Mobypark lets you send messages to your doctor, view your test results, renew your prescriptions, schedule appointments and  "more. To sign up, go to [email or web address] . Click on \"Log in\" on the left side of the screen, which will take you to the Welcome page. Then click on \"Sign up Now\" on the right side of the page.     You will be asked to enter the access code listed below, as well as some personal information. Please follow the directions to create your username and password.     Your access code is: [email or web address]  Expires: 4/3/2017 12:19 PM     Your access code will  in 90 days. If you need help or a new code, please call your [facility] clinic or [phone].        Care EveryWhere ID     This is your Care EveryWhere ID. This could be used by other organizations to access your [facility] medical records  [id number]        Your Vitals Were     Pulse Respirations Pulse Oximetry BMI (Body Mass Index)          87 18 97% 38.11 kg/m2         Blood Pressure from Last 3 Encounters:   17 149/78   17 143/73   17 150/85    Weight from Last 3 Encounters:   17 127.5 kg (281 lb)   17 120.2 kg (265 lb)   17 110.8 kg (244 lb 4.8 oz)              Today, you had the following     No orders found for display         Today's Medication Changes          These changes are accurate as of: 4/3/17  9:36 AM.  If you have any questions, ask your nurse or doctor.               Start taking these medicines.        Dose/Directions    cyclobenzaprine 5 MG tablet   Commonly known as:  FLEXERIL   Used for:  Back muscle spasm   Started by:  Jayashree Goodman APRN CNP        Dose:  5-10 mg   Take 1-2 tablets (5-10 mg) by mouth nightly as needed for muscle spasms   Quantity:  30 tablet   Refills:  1         These medicines have changed or have updated prescriptions.        Dose/Directions    oxyCODONE 5 MG IR tablet   Commonly known as:  ROXICODONE   This may have changed:  additional instructions   Used for:  Failed back syndrome of lumbar spine   Changed by:  Jayashree Goodman APRN CNP        1 tab q4h, PRN max 6 " per day. May dispense on/after 4/5/17 30 days supply   Quantity:  180 tablet   Refills:  0       Pregabalin 200 MG capsule   Commonly known as:  LYRICA   This may have changed:    - medication strength  - how much to take  - when to take this   Used for:  Type 2 diabetes mellitus without complication, without long-term current use of insulin (H), Diabetic mononeuropathy associated with diabetes mellitus due to underlying condition (H)   Changed by:  Jayashree Goodman APRN CNP        Dose:  200 mg   Take 1 capsule (200 mg) by mouth 2 times daily   Quantity:  60 capsule   Refills:  3            Where to get your medicines      These medications were sent to [facility] [address] [place] [address] [address] [facility]  [address] [facility] [address]     Phone:  [phone]     cyclobenzaprine 5 MG tablet         Some of these will need a paper prescription and others can be bought over the counter.  Ask your nurse if you have questions.     Bring a paper prescription for each of these medications     oxyCODONE 5 MG IR tablet    Pregabalin 200 MG capsule                Primary Care Provider Office Phone # Fax #    [place]ah Haskins [phone]       [facility] [address] [address]        Thank you!     Thank you for choosing [facility]  for your care. Our goal is always to provide you with excellent care. Hearing back from our patients is one way we can continue to improve our services. Please take a few minutes to complete the written survey that you may receive in the mail after your visit with us. Thank you!             Your Updated Medication List - Protect others around you: Learn how to safely use, store and throw away your medicines at www.disposemymeds.org.          This list is accurate as of: 4/3/17  9:36 AM.  Always use your most recent med list.                   Brand Name Dispense Instructions for use     allopurinol 100 MG tablet    ZYLOPRIM    270 tablet    Take 3 tablets (300 mg) by mouth daily       aspirin 81 MG tablet     100 tablet    Take 1 tablet (81 mg) by mouth daily       blood glucose monitoring lancets     3 Box    Use to test blood sugars 2-3 times daily as directed (lena contour meter)       blood glucose monitoring test strip    no brand specified    3 Box    Use to test blood sugar 2-3 times daily (lena contour meter)       cyclobenzaprine 5 MG tablet    FLEXERIL    30 tablet    Take 1-2 tablets (5-10 mg) by mouth nightly as needed for muscle spasms       finasteride 5 MG tablet    PROSCAR    30 tablet    Take 1 tablet (5 mg) by mouth daily For prostate enlargement. Please fill on $4 list       FLUoxetine 20 MG capsule    PROzac    270 capsule    TAKE THREE CAPSULES BY MOUTH ONCE DAILY       fluticasone 50 MCG/ACT spray    FLONASE    3 Bottle    Spray 1-2 sprays into both nostrils daily       metFORMIN 500 MG 24 hr tablet    GLUCOPHAGE-XR    90 tablet    Take 1 tablet (500 mg) by mouth daily (with dinner)       omeprazole 20 MG CR capsule    priLOSEC    90 capsule    Use every other day as needed       oxyCODONE 5 MG IR tablet    ROXICODONE    180 tablet    1 tab q4h, PRN max 6 per day. May dispense on/after 4/5/17 30 days supply       Pregabalin 200 MG capsule    LYRICA    60 capsule    Take 1 capsule (200 mg) by mouth 2 times daily       senna-docusate 8.6-50 MG per tablet    SENOKOT-S;PERICOLACE    60 tablet    Take 1 tablet by mouth 2 times daily       simvastatin 20 MG tablet    ZOCOR    90 tablet    Take 1 tablet (20 mg) by mouth At Bedtime       tamsulosin 0.4 MG capsule    FLOMAX    180 capsule    TAKE TWO CAPSULES BY MOUTH ONCE DAILY       triamcinolone 0.1 % cream    KENALOG    30 g    Apply sparingly to affected area twice daily for 7 days. Then stop and use only for flares

## 2017-04-03 NOTE — PROGRESS NOTES
"[facility]    CHIEF COMPLAINT: back pain    INTERVAL HISTORY:  Last seen on 3/8/17.        Recommendations/plan at the last visit included:    1. Schedule physical therapy visits two times per month  2. Schedule follow-up with JIMBO Ernst NP-C in 4 weeks  3. Schedule a nurse visit on  for pill count and to receive your next prescription.   4. Medication recommendations:   1. Oxycodone 5 m tabs max per day.     Since his last visit, Toby Mcgrath reports:  - Went walking with family on Saturday. Having  more pain in back, right calf. Also notes neuropathic pain is worse. R great toe nail has blood underneath. He is not sure when this started. Doing gentle stretches which help with pain in calf.     Pain Information:   Pain quality: Aching, Burning, Exhausting, Gnawing, Miserable, Nagging, Numb, Penetrating, Sharp, Shooting, Stabbing, Tender, Throbbing and Tiring    Pain timing: worst in the morning     Pain rating: intensity ranges from 8/10 to 8/10, and averages 8/10 on a 0-10 scale.   Aggravating factors include: \"everything\"   Relieving factors include: Nothing noted      Annual Controlled Substance Agreement/UDS due date: 2017     CURRENT RELEVANT PAIN MEDICATIONS:   Oxycodone 5 mg, 1-2 tabs every 6 hours, max 6 tabs per day  Gabapentin 600 mg t.i.d.     Patient is using the medication as prescribed: Yes  Is your medication helpful?  Lyrica was more helpful than gabapentin  Medication side effects? denies any problems     Previous Pain Relevant Medications: (H--helped; HI--Helped initially; NH--No help; W--worse; SE--side effects)   NOTE: This medication information taken from patient's intake form, not medical records.   Opiates: Oxycodone:H  NSAIDS: Ibuprofen:H   Muscle Relaxants: Clonzepam: H  Anti-migraine mediations: none  Anti-depressants: Prozac:H   Sleep aids:none  Anti-convulsants: Gabapentin: H    Topicals: lioderm patches:NH  Other medications " not covered above: none      [place] Board of Pharmacy Data Base Reviewed: YES; As expected, no concern for misuse/abuse.     SELF CARE:   How often do you practice SELF-CARE (relaxing, stretching, pacing, monitoring posture, taking mini-breaks) in a typical day: 2 times per week      Medications:  Current Outpatient Prescriptions   Medication Sig Dispense Refill     oxyCODONE (ROXICODONE) 5 MG IR tablet 1 tab q4h, PRN max 6 per day. May dispense on/after 3/22/17. 15 days supply 90 tablet 0     senna-docusate (SENOKOT-S;PERICOLACE) 8.6-50 MG per tablet Take 1 tablet by mouth 2 times daily 60 tablet 3     allopurinol (ZYLOPRIM) 100 MG tablet Take 3 tablets (300 mg) by mouth daily 270 tablet 0     simvastatin (ZOCOR) 20 MG tablet Take 1 tablet (20 mg) by mouth At Bedtime 90 tablet 0     FLUoxetine (PROZAC) 20 MG capsule TAKE THREE CAPSULES BY MOUTH ONCE DAILY 270 capsule 0     metFORMIN (GLUCOPHAGE-XR) 500 MG 24 hr tablet Take 1 tablet (500 mg) by mouth daily (with dinner) 90 tablet 1     omeprazole (PRILOSEC) 20 MG CR capsule Use every other day as needed 90 capsule 1     pregabalin (LYRICA) 50 MG capsule Take 1 capsule (50 mg) by mouth 3 times daily 90 capsule 1     finasteride (PROSCAR) 5 MG tablet Take 1 tablet (5 mg) by mouth daily For prostate enlargement. Please fill on $4 list 30 tablet 5     fluticasone (FLONASE) 50 MCG/ACT nasal spray Spray 1-2 sprays into both nostrils daily 3 Bottle 11     triamcinolone (KENALOG) 0.1 % cream Apply sparingly to affected area twice daily for 7 days. Then stop and use only for flares 30 g 3     tamsulosin (FLOMAX) 0.4 MG 24 hr capsule TAKE TWO CAPSULES BY MOUTH ONCE DAILY 180 capsule 1     blood glucose monitoring (NO BRAND SPECIFIED) test strip Use to test blood sugar 2-3 times daily (lena contour meter) 3 Box 0     blood glucose monitoring (LENA MICROLET) lancets Use to test blood sugars 2-3 times daily as directed (lena contour meter) 3 Box 0     aspirin 81 MG tablet  Take 1 tablet (81 mg) by mouth daily 100 tablet 3       Review of Systems: A 10-point review of systems was negative, with the exception of chronic pain issues.      Social History: Reviewed; unchanged from initial consultation.      Family history: Reviewed; unchanged from initial consultation.     PHYSICAL EXAM    There were no vitals filed for this visit.    Constitutional: healthy, alert, no distress  HEENT: Head atraumatic, normocephalic. Eyes without conjunctival injection or jaundice. Neck supple. No obvious neck masses.  Cardiovascular: Regular rate/rhythm; no murmurs/rubs/gallops appreciated.   Respiratory: Lungs clear to auscultation bilaterally.   Gastrointestinal: Normoactive bowel sounds. Abdomen soft, non-tender, without guarding/rebound.   : Deferred  Skin: No rash, lesions, or petechiae of exposed skin.   Extremities: Peripheral pulses intact. No clubbing, cyanosis, or edema.   Psychiatric/mental status: Alert, without lethargy or stupor. Appropriate affect. Mood normal.     Neurologic exam:  CN: Cranial nerves 2-12 are grossly normal.  Motor:  4/5 UE and LE strength, which is his baseline     Musculoskeletal exam:  Lumbar/Sacral spine:  Forward Flexion: 60 degrees  Ext: 20 degrees  Rotation/ext to right: painful   Rotation/ext to left:: painful  Tenderness in the lumbar spine at midline: Yes  Tenderness in the lumbar paraspinal muscles: Yes  Straight leg exam:positive  Sensory exam:  Light touch: normal bilateral upper and lower extremities   Vibration: normal in LE  No allodynia, dysesthesia, or hyperalgesia.     DIRE Score for ongoing opioid management is calculated as follows:  Diagnosis = 3 pts (advanced condition; severe pain/objective findings)  Intractability = 2 pts (most treatments tried; patient not fully engaged/barriers)  Risk   Psych = 2 pts (personality dysfunction/mental illness that moderately interferes with care)  inability to control anger, outbursts, verbal abuse of staff  Chem  "Hlth = 2 pts (use of medications to cope with stress; chemical dependency in remission) medication focused  Reliability = 1 pt (medication misuse; missed appointments; rarely follows through) unsafe handling of medication, 36 tablets of oxycodone missing from recent prescription.  Social = 2 pts (reduction in some relationships/life rolls)  (Psych + Chem hlth + Reliability + Social) = 12  Efficacy = 1 pt (poor function; minimal pain relief despite mod/high med dose)  DIRE Score = 13   7-13: likely NOT suitable candidate for long-term opioid analgesia  14-21: may be a suitable candidate for long-term opioid analgesia      DIAGNOSTIC TESTS:  Imaging Studies:   No new imaging     Assessment:   1. Degenerative disc disease, lumbar radiculopathy  2. Long-term use of opiate medications  3. History of inappropriate interactions with staff, verbally abusive    Layo notes increased back and RLE pain after walking at [place] this weekend. States he \"tore the muscles\". On exam, no brusing, warmth, swelling or redness noted. R great toe nail has blood underneath the nail. Advised that he f/u with PCP re: nail. Gentle stretching and continue light walking for LLE and back pain. Will give Flexeril to help with spasm. Increasing Lyrica dose for increased diabetic neuropathy.     Plan:    Diagnosis reviewed, treatment option addressed, and risk/benifits discussed.  Self-care instructions given.  I am recommending a multidisciplinary treatment plan to help this patient better manage pain.      1. Schedule physical therapy visits at least 2-3 times per month  2. Schedule follow-up with JIMBO Ernst, NP-C in 4 weeks  3. Medication recommendations:    Lyrica 200 mg two times daily. Stay with same dose until the new dose is approved.    Oxycodone 5 mg: Stay with same dose. Max 6 tabs per day.    Cyclobenzaprine 5 m-2 tabs at bedtime. Start with 1 tablet.      Total time spent face to face was 30 minutes and more than " 50% of face to face time was spent in counseling and/or coordination of care regarding the diagnosis and recommendations above.      JIMBO Tran, NP-C   [facility]

## 2017-04-03 NOTE — NURSING NOTE
Chief Complaint   Patient presents with     Pain       Initial /78  Pulse 87  Resp 18  Wt 127.5 kg (281 lb)  SpO2 97%  BMI 38.11 kg/m2 Estimated body mass index is 38.11 kg/(m^2) as calculated from the following:    Height as of 1/3/17: 1.829 m (6').    Weight as of this encounter: 127.5 kg (281 lb).  Medication Reconciliation: complete       Artemio Cole MA  [facility]

## 2017-04-03 NOTE — PATIENT INSTRUCTIONS
After Visit Instructions:     Thank you for coming to [facility] for your care. It is my goal to partner with you to help you reach your optimal state of health.     I am recommending multidisciplinary care at this time.  The focus of care will be to continue gradual rehabilitation and pain management with medication adjustments as needed.    Continue daily self-care, identifying contributing factors, and monitoring variations in pain level. Continue to integrate self-care into your life.      1. Schedule physical therapy visits at least 2-3 times per month  2. Schedule follow-up with JIMBO Ernst NP-C in 4 weeks  3. Medication recommendations:   1. Lyrica 200 mg two times daily. Stay with same dose until the new dose is approved.   2. Oxycodone 5 mg: Stay with same dose. Max 6 tabs per day.   3. Cyclobenzaprine 5 m-2 tabs at bedtime. Start with 1 tablet.       JIMBO Tarn NP-C  [facility]3]    Contact information: [facility]    Please call if any side effects, questions, or concerns arise.    Nurse Triage line:  [phone]   Call this number with any questions or concerns. You may leave a detailed message anytime. Calls are typically returned Monday through Friday between 8 AM and 4:30 PM. We usually get back to you within 2 business days depending on the issue/request.    Scheduling number: [phone].  Call this number to schedule or change appointments.          Medication Refills Policy:    For non-narcotic medications, please your pharmacy directly to request a refill and the pharmacy will call the [facility] for authorization. Please allow 3-4 days for these refills.    For narcotic refills, call the nurse triage line and leave a message requesting your refill along with the name of the pharmacy that you use. Narcotic prescriptions will be mailed to your pharmacy or you may pick them up at  the clinic.  Please call 7-10 days before your refill is due  The above policy allows adequate time so that you do not run out of medication.    No Show - Late Cancellation - Late Arrival Policy  We believe regular attendance is key to your success in our program.    Any time you are unable to keep your appointment we ask that you call us at  least 24 hours in advance to let us know. This will allow us to offer the appointment time to another patient. The following is our policy for missed appointments. This also applies to appointments cancelled with less than 24 hours notice.    You will receive a letter after missing your 1st and 2nd appointments without contacting the clinic before your scheduled appointment time.     After missing 3 appointments without calling first, we will cancel all of your future appointments at [facility].    At that point, you will not be able to resume services unless approved by your care team  We understand that unforseen circumstances arise, however, out of respect for all concerned and to provide this appointment to another patient, this policy will be enforced.    Please note that most follow up appointments are 30 minutes long. If you arrive late, your provider may not be able to see you for the entire 30 minutes. Please also note that if you arrive more than 15 minutes for any appointment, it may be rescheduled.

## 2017-04-11 ENCOUNTER — OFFICE VISIT (OUTPATIENT)
Dept: PALLIATIVE MEDICINE | Facility: CLINIC | Age: 62
End: 2017-04-11
Payer: COMMERCIAL

## 2017-04-11 DIAGNOSIS — G89.29 CHRONIC PAIN: Primary | ICD-10-CM

## 2017-04-11 DIAGNOSIS — M96.1 FAILED BACK SYNDROME OF LUMBAR SPINE: ICD-10-CM

## 2017-04-11 PROCEDURE — 97535 SELF CARE MNGMENT TRAINING: CPT | Mod: GP | Performed by: PHYSICAL THERAPIST

## 2017-04-11 PROCEDURE — 97110 THERAPEUTIC EXERCISES: CPT | Mod: GP | Performed by: PHYSICAL THERAPIST

## 2017-04-11 NOTE — PROGRESS NOTES
"Patient Name: Toby Mcgrath      YOB: 1955     Medical Record Number: [id number]  Diagnosis:    Chronic pain  Failed back syndrome of lumbar spine  Visit Info Length of Visit: 45 min  Arrived: On Time  Therapeutic Procedures/Exercises: 15 min; Therapeutic Activities: 10 min; Neuromuscular Re-education: 5 min  Self Care / Home Management Training: 15 min   Exercise/Activity Education Instruction:  Self Care - Reviewed  flare management including how he handled the right calf pain episode.  Pt used some of the strategies he has learned to manage an increase in his sx. - some immediately, some within 24-48H. Reinforced what he did and discussed how he could refine what he did in the aftermath of the flare of pain.  Encouraged pt to continue to use ice and consider alternating ice/heat for his right calf sx.  - 2-3x/day.    Therapeutic Neuroscience Education:  Reviewed the concept that in some people with chronic pain, the nervous system can become extra sensitive and act like a hypersensitive alarm system.  Included how his emotional reactivity contributes to this experience.  Continue to reinforce consistent use of mindful breathing, remind himself that his system is extra sensitive.       Activity:  Aerobic Conditioning: hallway walk 3 laps (900 ft) in 7'07\", no rest breaks.  Reviewed walking program - encouraged pt to increase his frequency of walking to 5x/wk - if motivation is at issue then consider adding shorter walks into his current schedule (eg if 2x/wk he walks 25 min, add 3 walks in at 10-15 min).    Stretching:  Instructed in standing calf str - performed x 3 with mindful breathing.  Issued h/o for ex.  Do several times/day.      Body Mechanics:  Seated reaching (off to side/behind) - pt reports he has a tendency to twist in his torso and this tweaks his back and is painful.  Practiced swiveling from his hips vs his back - keeping his trunk aligned as a unit. Pt could feel a positive " "difference with neutral alignment - he will practice at home.         Notes: Patient reports: he had an episode of debilitating right calf mm pain approximately 10 days ago; went to [employer] and climbed multiple stairs - ended up with large right calf cramp and fell to the ground.  Acknowledges high reactivity to pain when it happens, \"I'm a baby with pain.\" Is worried this right calf mm pain and tightness won't go away - though acknowledges the pain levels are not as high currently as they were when they first started.        Activity limitations: stair climbing is challenging; walking tolerance is about 25 min with rest breaks; needs to take frequent rest breaks with daily activity.    HEP/Self Care/Home Management Training:   Pacing/Mini Breaks/Self Care: takes frequent rest breaks with activity ;   Relaxation Practice: uses mindful breathing off and on during the day ;   Posture Corrections: not addressed today.  ;   Walking program: walks about 2x/wk - hallways laps (a lap of two floors in his apt building - takes about 25 min) - cites motivation as part of why he isn't walking more frequently (days/wk) ;   Heat/Ice/TENS: uses ice/ heat - prefers ice at times - did try using ice a few days after right calf tightness - did not use right away;   HEP stretching several times/wk.     Visit 7/12     Demonstrates/Verbalizes Technique: 3 (1= poor technique-difficulty performing exercises,significant cues required; 5= good technique-performs exercises without cues)  Body Awareness: 3 (1=low awareness; 5=high awareness)  Posture/Stability: 3 (1= poor posture, stability; 5= good posture, stability)   Motivational Level:   Cooperative Participation:  Full   Patient Satisfaction:  satisfied   Response to Teaching:   demonstrated ability and verbalized, recall/understanding  Factors that affect learning: None   Plan of Care  Cont PT to support reactivation and integration of self regulation pain management skills. " (next visit consider:  Progress core, add HS str and functional strengthening (sit to stand, heel raises), check on mini breaks as  sx self regulation.)   Therapist: Florence Amato PT                 Date: 4/11/2017

## 2017-04-11 NOTE — MR AVS SNAPSHOT
After Visit Summary   4/11/2017    Toby Mcgrath    MRN: [id number]           Patient Information     Date Of Birth          1955        Visit Information        Provider Department      4/11/2017 9:15 AM Florence Amato, [facility]LY[facility]        Today's Diagnoses     Chronic pain    -  1    Failed back syndrome of lumbar spine           Follow-ups after your visit        Your next 10 appointments already scheduled     Apr 13, 2017 10:30 AM CDT   Office Visit with Toby Henley MD   [facility] ([facility])    [address] [facility] [id number] [phone]           Bring a current list of meds and any records pertaining to this visit.  For Physicals, please bring immunization records and any forms needing to be filled out.  Please arrive 10 minutes early to complete paperwork.            Apr 20, 2017  9:15 AM CDT   Return Visit with Florence Amato PT   [facility] ([facility])    [address]  [facility] [address] [phone]            Apr 26, 2017  8:30 AM CDT   Return Visit with JIMBO Sutton CNP   [facility] ([facility])    [address]  [address]  [facility] [address] [phone]            May 04, 2017 10:00 AM CDT   Return Visit with Florence Amato PT   [facility] ([facility])    [address]  [facility] [id number] [phone]            May 16, 2017  9:15 AM CDT   Return Visit with Florence Amato PT   [facility] ([facility])    [address]  [address]  [facility] [id number] [phone]              Who to contact     If you have questions or need follow up information about today's clinic visit or your schedule please contact [facility] directly at [phone].  Normal or non-critical lab and  "imaging results will be communicated to you by MyChart, letter or phone within 4 business days after the clinic has received the results. If you do not hear from us within 7 days, please contact the clinic through [employer] or phone. If you have a critical or abnormal lab result, we will notify you by phone as soon as possible.  Submit refill requests through [employer] or call your pharmacy and they will forward the refill request to us. Please allow 3 business days for your refill to be completed.          Additional Information About Your Visit        [employer] Information     [employer] lets you send messages to your doctor, view your test results, renew your prescriptions, schedule appointments and more. To sign up, go to [email or web address] . Click on \"Log in\" on the left side of the screen, which will take you to the Welcome page. Then click on \"Sign up Now\" on the right side of the page.     You will be asked to enter the access code listed below, as well as some personal information. Please follow the directions to create your username and password.     Your access code is: [email or web address]  Expires: 7/10/2017  1:17 PM     Your access code will  in 90 days. If you need help or a new code, please call your [facility] clinic or [phone].        Care EveryWhere ID     This is your Care EveryWhere ID. This could be used by other organizations to access your [facility] medical records  [id number]         Blood Pressure from Last 3 Encounters:   17 149/78   17 143/73   17 150/85    Weight from Last 3 Encounters:   17 127.5 kg (281 lb)   17 120.2 kg (265 lb)   17 110.8 kg (244 lb 4.8 oz)              We Performed the Following     SELF CARE MNGMENT TRAINING     THERAPEUTIC ACTIVITIES     THERAPEUTIC EXERCISES        Primary Care Provider Office Phone # Fax #    Connie Haskins [phone]       [facility] [address] [place] [address] " [phone]        Thank you!     Thank you for choosing [facility]  for your care. Our goal is always to provide you with excellent care. Hearing back from our patients is one way we can continue to improve our services. Please take a few minutes to complete the written survey that you may receive in the mail after your visit with us. Thank you!             Your Updated Medication List - Protect others around you: Learn how to safely use, store and throw away your medicines at www.disposemymeds.org.          This list is accurate as of: 4/11/17  1:17 PM.  Always use your most recent med list.                   Brand Name Dispense Instructions for use    allopurinol 100 MG tablet    ZYLOPRIM    270 tablet    Take 3 tablets (300 mg) by mouth daily       aspirin 81 MG tablet     100 tablet    Take 1 tablet (81 mg) by mouth daily       blood glucose monitoring lancets     3 Box    Use to test blood sugars 2-3 times daily as directed (lena contour meter)       blood glucose monitoring test strip    no brand specified    3 Box    Use to test blood sugar 2-3 times daily (lena contour meter)       cyclobenzaprine 5 MG tablet    FLEXERIL    30 tablet    Take 1-2 tablets (5-10 mg) by mouth nightly as needed for muscle spasms       finasteride 5 MG tablet    PROSCAR    30 tablet    Take 1 tablet (5 mg) by mouth daily For prostate enlargement. Please fill on $4 list       FLUoxetine 20 MG capsule    PROzac    270 capsule    TAKE THREE CAPSULES BY MOUTH ONCE DAILY       fluticasone 50 MCG/ACT spray    FLONASE    3 Bottle    Spray 1-2 sprays into both nostrils daily       metFORMIN 500 MG 24 hr tablet    GLUCOPHAGE-XR    90 tablet    Take 1 tablet (500 mg) by mouth daily (with dinner)       omeprazole 20 MG CR capsule    priLOSEC    90 capsule    Use every other day as needed       oxyCODONE 5 MG IR tablet    ROXICODONE    180 tablet    1 tab q4h, PRN max 6 per day. May dispense on/after 4/5/17 30 days supply        Pregabalin 200 MG capsule    LYRICA    60 capsule    Take 1 capsule (200 mg) by mouth 2 times daily       senna-docusate 8.6-50 MG per tablet    SENOKOT-S;PERICOLACE    60 tablet    Take 1 tablet by mouth 2 times daily       simvastatin 20 MG tablet    ZOCOR    90 tablet    Take 1 tablet (20 mg) by mouth At Bedtime       tamsulosin 0.4 MG capsule    FLOMAX    180 capsule    TAKE TWO CAPSULES BY MOUTH ONCE DAILY       triamcinolone 0.1 % cream    KENALOG    30 g    Apply sparingly to affected area twice daily for 7 days. Then stop and use only for flares

## 2017-04-13 ENCOUNTER — OFFICE VISIT (OUTPATIENT)
Dept: FAMILY MEDICINE | Facility: CLINIC | Age: 62
End: 2017-04-13
Payer: COMMERCIAL

## 2017-04-13 VITALS
RESPIRATION RATE: 14 BRPM | OXYGEN SATURATION: 97 % | HEART RATE: 84 BPM | BODY MASS INDEX: 34.13 KG/M2 | TEMPERATURE: 96.9 F | WEIGHT: 252 LBS | SYSTOLIC BLOOD PRESSURE: 116 MMHG | HEIGHT: 72 IN | DIASTOLIC BLOOD PRESSURE: 66 MMHG

## 2017-04-13 DIAGNOSIS — K59.01 SLOW TRANSIT CONSTIPATION: Primary | ICD-10-CM

## 2017-04-13 DIAGNOSIS — Z12.11 SCREEN FOR COLON CANCER: ICD-10-CM

## 2017-04-13 LAB — TSH SERPL DL<=0.005 MIU/L-ACNC: 1.1 MU/L (ref 0.4–4)

## 2017-04-13 PROCEDURE — 36415 COLL VENOUS BLD VENIPUNCTURE: CPT | Performed by: FAMILY MEDICINE

## 2017-04-13 PROCEDURE — 99213 OFFICE O/P EST LOW 20 MIN: CPT | Performed by: FAMILY MEDICINE

## 2017-04-13 PROCEDURE — 84443 ASSAY THYROID STIM HORMONE: CPT | Performed by: FAMILY MEDICINE

## 2017-04-13 NOTE — NURSING NOTE
Chief Complaint   Patient presents with     Constipation     small nugget shaped stools-works hard to get them out-no blood in stool that pt recognizes but stool is dark in color-usually lasts about a week then dissapates-this episodes lasting 3 weeks-using exlax, fiber and no changes in stool consistancy-chronic prostate enlargement-       Initial /66 (BP Location: Left arm, Patient Position: Chair, Cuff Size: Adult Large)  Pulse 84  Temp 96.9  F (36.1  C) (Oral)  Resp 14  Ht 6' (1.829 m)  Wt 252 lb (114.3 kg)  SpO2 97%  BMI 34.18 kg/m2 Estimated body mass index is 34.18 kg/(m^2) as calculated from the following:    Height as of this encounter: 6' (1.829 m).    Weight as of this encounter: 252 lb (114.3 kg).  BP completed using cuff size: large    Health Maintenance that is potentially due pending provider review:  Health Maintenance Due   Topic Date Due     FOOT EXAM Q1 YEAR( NO INBASKET)  04/14/2016     ADVANCE DIRECTIVE PLANNING Q5 YRS (NO INBASKET)  12/08/2016     DEPRESSION ACTION PLAN Q1 YR (NO INBASKET)  02/18/2017     COLON CANCER SCREEN (SYSTEM ASSIGNED)  03/09/2017     PHQ-9 Q6 MONTHS (NO INBASKET)  05/03/2017         MAMMO/COLON--Gave pt phone number, and pended order for Mammo and/or Colonoscopy, PHQ/ACT/KAYLA--Gave pt questionnare and foot and

## 2017-04-13 NOTE — PROGRESS NOTES
Chief Complaint   Patient presents with     Constipation     small nugget shaped stools-works hard to get them out-no blood in stool that pt recognizes but stool is dark in color-usually lasts about a week then dissapates-this episodes lasting 3 weeks-using exlax, fiber and no changes in stool consistancy-chronic prostate enlargement-     Subjective: He has had problems with constipation in the past but it only lasts a few days, takes a stool softener every day, but about 3 weeks ago he started having trouble and it just keeps  Happening, just getting small nuggets coming out, feels plugged up, makes his BPH symptoms worse when he is constipated, and he tried MiraLAX and it didn't do a thing,, not sure why this is happening. He is not very physically active and he does know that more physical activity will help. He tried taking Metamucil but it gets thick and he doesn't like to take it.He had a colonoscopy 10 years ago and was told to do it again in 7 years and I gave him the number to call to set that up.    Objective: Abdominal exam is normal. Rectal exam is normal. He appears well.    Assessment and plan: Constipation. I think the Metamucil would be the most useful and I explained how to take a tablespoon or 2 at a time, mixes up quick in water and drink it down and then have 2 or 3 glasses of water afterwards. I think he will find that this keeps things regular and it something he can do every day indefinitely.

## 2017-04-13 NOTE — MR AVS SNAPSHOT
After Visit Summary   4/13/2017    Toby Mcgrath    MRN: [id number]           Patient Information     Date Of Birth          1955        Visit Information        Provider Department      4/13/2017 10:30 AM Toby Henley MD [facility]        Today's Diagnoses     Slow transit constipation    -  1    Screen for colon cancer           Follow-ups after your visit        Additional Services     GASTROENTEROLOGY ADULT REF PROCEDURE ONLY       Last Lab Result: Creatinine (mg/dL)       Date                     Value                 01/03/2017               1.09             ----------  Body mass index is 34.18 kg/(m^2).      Patient will be contacted to schedule procedure.     Please be aware that coverage of these services is subject to the terms and limitations of your health insurance plan.  Call member services at your health plan with any benefit or coverage questions.  Any procedures must be performed at a [facility] facility OR coordinated by your clinic's referral office.    Please bring the following with you to your appointment:    (1) Any X-Rays, CTs or MRIs which have been performed.  Contact the facility where they were done to arrange for  prior to your scheduled appointment.    (2) List of current medications   (3) This referral request   (4) Any documents/labs given to you for this referral                  Your next 10 appointments already scheduled     Apr 20, 2017  9:15 AM CDT   Return Visit with Florence Amato PT   [facility] ([facility])    [address]  [facility] [address]   [phone]            Apr 26, 2017  8:30 AM CDT   Return Visit with JIMBO Sutton CNP   [facility] ([facility])    [address]  [facility] [address]   [phone]            May 04, 2017 10:00 AM CDT   Return Visit with Florence Amato PT   [facility] ([facility])    [address]  [address]  [facility] [id number] [phone]            May 16, 2017  9:15 AM CDT   Return Visit with Florence Amato PT   [facility] ([facility])    [address]  [facility] [id number] [phone]              Who to contact     If you have questions or need follow up information about today's clinic visit or your schedule please contact [facility] directly at [phone].  Normal or non-critical lab and imaging results will be communicated to you by [employer], letter or phone within 4 business days after the clinic has received the results. If you do not hear from us within 7 days, please contact the clinic through [employer] or phone. If you have a critical or abnormal lab result, we will notify you by phone as soon as possible.  Submit refill requests through [employer] or call your pharmacy and they will forward the refill request to us. Please allow 3 business days for your refill to be completed.          Additional Information About Your Visit        MyChart Information     [employer] lets you send messages to your doctor, view your test results, renew your prescriptions, schedule appointments and more. To sign up, go to [email or web address] . Click on \"Log in\" on the left side of the screen, which will take you to the Welcome page. Then click on \"Sign up Now\" on the right side of the page.     You will be asked to enter the access code listed below, as well as some personal information. Please follow the directions to create your username and password.     Your access code is: [email or web address]  Expires: 7/10/2017  1:17 PM     Your access code will  in 90 days. If you need help or a new code, please call your [facility] or [phone].        Care EveryWhere ID     This is your Care EveryWhere ID. This could be used by other organizations to access your [facility] medical " records  [id number]        Your Vitals Were     Pulse Temperature Respirations Height Pulse Oximetry BMI (Body Mass Index)    84 96.9  F (36.1  C) (Oral) 14 6' (1.829 m) 97% 34.18 kg/m2       Blood Pressure from Last 3 Encounters:   04/13/17 116/66   04/03/17 149/78   03/08/17 143/73    Weight from Last 3 Encounters:   04/13/17 252 lb (114.3 kg)   04/03/17 281 lb (127.5 kg)   03/08/17 265 lb (120.2 kg)              We Performed the Following     DEPRESSION ACTION PLAN (DAP)     GASTROENTEROLOGY ADULT REF PROCEDURE ONLY     TSH with free T4 reflex        Primary Care Provider Office Phone # Fax #    Connie Haskins [phone]       [facility] [address] [facility] [address]  [facility] [address]        Thank you!     Thank you for choosing [facility]  for your care. Our goal is always to provide you with excellent care. Hearing back from our patients is one way we can continue to improve our services. Please take a few minutes to complete the written survey that you may receive in the mail after your visit with us. Thank you!             Your Updated Medication List - Protect others around you: Learn how to safely use, store and throw away your medicines at www.disposemymeds.org.          This list is accurate as of: 4/13/17 10:59 AM.  Always use your most recent med list.                   Brand Name Dispense Instructions for use    allopurinol 100 MG tablet    ZYLOPRIM    270 tablet    Take 3 tablets (300 mg) by mouth daily       aspirin 81 MG tablet     100 tablet    Take 1 tablet (81 mg) by mouth daily       blood glucose monitoring lancets     3 Box    Use to test blood sugars 2-3 times daily as directed (lena contour meter)       blood glucose monitoring test strip    no brand specified    3 Box    Use to test blood sugar 2-3 times daily (lena contour meter)       cyclobenzaprine 5 MG tablet    FLEXERIL    30 tablet    Take 1-2 tablets (5-10 mg) by mouth nightly as  needed for muscle spasms       finasteride 5 MG tablet    PROSCAR    30 tablet    Take 1 tablet (5 mg) by mouth daily For prostate enlargement. Please fill on $4 list       FLUoxetine 20 MG capsule    PROzac    270 capsule    TAKE THREE CAPSULES BY MOUTH ONCE DAILY       fluticasone 50 MCG/ACT spray    FLONASE    3 Bottle    Spray 1-2 sprays into both nostrils daily       metFORMIN 500 MG 24 hr tablet    GLUCOPHAGE-XR    90 tablet    Take 1 tablet (500 mg) by mouth daily (with dinner)       omeprazole 20 MG CR capsule    priLOSEC    90 capsule    Use every other day as needed       oxyCODONE 5 MG IR tablet    ROXICODONE    180 tablet    1 tab q4h, PRN max 6 per day. May dispense on/after 4/5/17 30 days supply       Pregabalin 200 MG capsule    LYRICA    60 capsule    Take 1 capsule (200 mg) by mouth 2 times daily       senna-docusate 8.6-50 MG per tablet    SENOKOT-S;PERICOLACE    60 tablet    Take 1 tablet by mouth 2 times daily       simvastatin 20 MG tablet    ZOCOR    90 tablet    Take 1 tablet (20 mg) by mouth At Bedtime       tamsulosin 0.4 MG capsule    FLOMAX    180 capsule    TAKE TWO CAPSULES BY MOUTH ONCE DAILY       triamcinolone 0.1 % cream    KENALOG    30 g    Apply sparingly to affected area twice daily for 7 days. Then stop and use only for flares

## 2017-04-20 ENCOUNTER — OFFICE VISIT (OUTPATIENT)
Dept: PALLIATIVE MEDICINE | Facility: CLINIC | Age: 62
End: 2017-04-20
Payer: COMMERCIAL

## 2017-04-20 DIAGNOSIS — G89.29 CHRONIC PAIN: Primary | ICD-10-CM

## 2017-04-20 DIAGNOSIS — M96.1 FAILED BACK SYNDROME OF LUMBAR SPINE: ICD-10-CM

## 2017-04-20 PROCEDURE — 97530 THERAPEUTIC ACTIVITIES: CPT | Mod: GP | Performed by: PHYSICAL THERAPIST

## 2017-04-20 PROCEDURE — 97110 THERAPEUTIC EXERCISES: CPT | Mod: GP | Performed by: PHYSICAL THERAPIST

## 2017-04-20 NOTE — MR AVS SNAPSHOT
After Visit Summary   4/20/2017    oTby Mcgrath    MRN: [id number]           Patient Information     Date Of Birth          1955        Visit Information        Provider Department      4/20/2017 9:15 AM Florence Amato, PT [facility]        Today's Diagnoses     Chronic pain    -  1    Failed back syndrome of lumbar spine           Follow-ups after your visit        Your next 10 appointments already scheduled     Apr 26, 2017  8:30 AM CDT   Return Visit with JIMBO Sutton CNP   [facility] ([facility])    [address]  [address]  [facility] [id number] [phone]            May 04, 2017 10:00 AM CDT   Return Visit with Florence Amato PT   [facility] ([facility])    [address]  [address]  [facility] [id number] [phone]            May 16, 2017  9:15 AM CDT   Return Visit with Florence Amato PT   [facility] ([facility])    [address]  [address]  [facility] [phone] [phone]              Who to contact     If you have questions or need follow up information about today's clinic visit or your schedule please contact [facility] directly at [phone].  Normal or non-critical lab and imaging results will be communicated to you by [employer], letter or phone within 4 business days after the clinic has received the results. If you do not hear from us within 7 days, please contact the clinic through [employer] or phone. If you have a critical or abnormal lab result, we will notify you by phone as soon as possible.  Submit refill requests through [employer] or call your pharmacy and they will forward the refill request to us. Please allow 3 business days for your refill to be completed.          Additional Information About Your Visit        [employer] Information     [employer] lets you send messages to your doctor, view  "your test results, renew your prescriptions, schedule appointments and more. To sign up, go to [email or web address] . Click on \"Log in\" on the left side of the screen, which will take you to the Welcome page. Then click on \"Sign up Now\" on the right side of the page.     You will be asked to enter the access code listed below, as well as some personal information. Please follow the directions to create your username and password.     Your access code is: [email or web address]  Expires: 7/10/2017  1:17 PM     Your access code will  in 90 days. If you need help or a new code, please call your [place] clinic or [phone].        Care EveryWhere ID     This is your Care EveryWhere ID. This could be used by other organizations to access your [place] medical records  [id number]         Blood Pressure from Last 3 Encounters:   17 116/66   17 149/78   17 143/73    Weight from Last 3 Encounters:   17 114.3 kg (252 lb)   17 127.5 kg (281 lb)   17 120.2 kg (265 lb)              We Performed the Following     THERAPEUTIC ACTIVITIES     THERAPEUTIC EXERCISES        Primary Care Provider Office Phone # Fax #    Connie Haskins [phone]       [facility] [address] [address]        Thank you!     Thank you for choosing [facility]  for your care. Our goal is always to provide you with excellent care. Hearing back from our patients is one way we can continue to improve our services. Please take a few minutes to complete the written survey that you may receive in the mail after your visit with us. Thank you!             Your Updated Medication List - Protect others around you: Learn how to safely use, store and throw away your medicines at www.disposemymeds.org.          This list is accurate as of: 17  6:06 PM.  Always use your most recent med list.                   Brand Name Dispense Instructions for " use    allopurinol 100 MG tablet    ZYLOPRIM    270 tablet    Take 3 tablets (300 mg) by mouth daily       aspirin 81 MG tablet     100 tablet    Take 1 tablet (81 mg) by mouth daily       blood glucose monitoring lancets     3 Box    Use to test blood sugars 2-3 times daily as directed (lena contour meter)       blood glucose monitoring test strip    no brand specified    3 Box    Use to test blood sugar 2-3 times daily (lena contour meter)       cyclobenzaprine 5 MG tablet    FLEXERIL    30 tablet    Take 1-2 tablets (5-10 mg) by mouth nightly as needed for muscle spasms       finasteride 5 MG tablet    PROSCAR    30 tablet    Take 1 tablet (5 mg) by mouth daily For prostate enlargement. Please fill on $4 list       FLUoxetine 20 MG capsule    PROzac    270 capsule    TAKE THREE CAPSULES BY MOUTH ONCE DAILY       fluticasone 50 MCG/ACT spray    FLONASE    3 Bottle    Spray 1-2 sprays into both nostrils daily       metFORMIN 500 MG 24 hr tablet    GLUCOPHAGE-XR    90 tablet    Take 1 tablet (500 mg) by mouth daily (with dinner)       omeprazole 20 MG CR capsule    priLOSEC    90 capsule    Use every other day as needed       oxyCODONE 5 MG IR tablet    ROXICODONE    180 tablet    1 tab q4h, PRN max 6 per day. May dispense on/after 4/5/17 30 days supply       Pregabalin 200 MG capsule    LYRICA    60 capsule    Take 1 capsule (200 mg) by mouth 2 times daily       senna-docusate 8.6-50 MG per tablet    SENOKOT-S;PERICOLACE    60 tablet    Take 1 tablet by mouth 2 times daily       simvastatin 20 MG tablet    ZOCOR    90 tablet    Take 1 tablet (20 mg) by mouth At Bedtime       tamsulosin 0.4 MG capsule    FLOMAX    180 capsule    TAKE TWO CAPSULES BY MOUTH ONCE DAILY       triamcinolone 0.1 % cream    KENALOG    30 g    Apply sparingly to affected area twice daily for 7 days. Then stop and use only for flares

## 2017-04-26 ENCOUNTER — OFFICE VISIT (OUTPATIENT)
Dept: PALLIATIVE MEDICINE | Facility: CLINIC | Age: 62
End: 2017-04-26
Payer: COMMERCIAL

## 2017-04-26 VITALS
HEART RATE: 69 BPM | WEIGHT: 252 LBS | SYSTOLIC BLOOD PRESSURE: 134 MMHG | DIASTOLIC BLOOD PRESSURE: 81 MMHG | RESPIRATION RATE: 16 BRPM | BODY MASS INDEX: 34.18 KG/M2

## 2017-04-26 DIAGNOSIS — M96.1 FAILED BACK SYNDROME OF LUMBAR SPINE: ICD-10-CM

## 2017-04-26 PROCEDURE — 99214 OFFICE O/P EST MOD 30 MIN: CPT | Performed by: NURSE PRACTITIONER

## 2017-04-26 RX ORDER — OXYCODONE HYDROCHLORIDE 5 MG/1
TABLET ORAL
Qty: 180 TABLET | Refills: 0 | Status: SHIPPED | OUTPATIENT
Start: 2017-04-26 | End: 2017-05-31

## 2017-04-26 ASSESSMENT — PAIN SCALES - GENERAL: PAINLEVEL: SEVERE PAIN (7)

## 2017-04-26 NOTE — NURSING NOTE
Chief Complaint   Patient presents with     Pain       Initial /81  Pulse 69  Resp 16  Wt 114.3 kg (252 lb)  BMI 34.18 kg/m2 Estimated body mass index is 34.18 kg/(m^2) as calculated from the following:    Height as of 4/13/17: 1.829 m (6').    Weight as of this encounter: 114.3 kg (252 lb).  Medication Reconciliation: complete       Artemio Cole MA  [facility]

## 2017-04-26 NOTE — PATIENT INSTRUCTIONS
After Visit Instructions:     Thank you for coming to [facility] for your care. It is my goal to partner with you to help you reach your optimal state of health.     I am recommending multidisciplinary care at this time.  The focus of care will be to continue gradual rehabilitation and pain management with medication adjustments as needed.    Continue daily self-care, identifying contributing factors, and monitoring variations in pain level. Continue to integrate self-care into your life.      1. Schedule physical therapy visits two times per month  2. Schedule follow-up with JIMBO Ernst NP-C in last week of May or early June.   3. Medication recommendations: No changes to current pain regimen.       JIMBO Tran NP-C  [facility]    Contact information: [facility]    Please call if any side effects, questions, or concerns arise.    Nurse Triage line:  [phone]   Call this number with any questions or concerns. You may leave a detailed message anytime. Calls are typically returned Monday through Friday between 8 AM and 4:30 PM. We usually get back to you within 2 business days depending on the issue/request.    Scheduling number: [phone].  Call this number to schedule or change appointments.          Medication Refills Policy:    For non-narcotic medications, please your pharmacy directly to request a refill and the pharmacy will call the [facility] for authorization. Please allow 3-4 days for these refills.    For narcotic refills, call the nurse triage line and leave a message requesting your refill along with the name of the pharmacy that you use. Narcotic prescriptions will be mailed to your pharmacy or you may pick them up at the clinic.  Please call 7-10 days before your refill is due  The above policy allows adequate time so that you do not run out of medication.    No Show - Late  Cancellation - Late Arrival Policy  We believe regular attendance is key to your success in our program.    Any time you are unable to keep your appointment we ask that you call us at  least 24 hours in advance to let us know. This will allow us to offer the appointment time to another patient. The following is our policy for missed appointments. This also applies to appointments cancelled with less than 24 hours notice.    You will receive a letter after missing your 1st and 2nd appointments without contacting the clinic before your scheduled appointment time.     After missing 3 appointments without calling first, we will cancel all of your future appointments at [facility].    At that point, you will not be able to resume services unless approved by your care team  We understand that unforseen circumstances arise, however, out of respect for all concerned and to provide this appointment to another patient, this policy will be enforced.    Please note that most follow up appointments are 30 minutes long. If you arrive late, your provider may not be able to see you for the entire 30 minutes. Please also note that if you arrive more than 15 minutes for any appointment, it may be rescheduled.

## 2017-04-26 NOTE — PROGRESS NOTES
[facility]    CHIEF COMPLAINT: Back pain    INTERVAL HISTORY:  Last seen on 4/3/17 .        Recommendations/plan at the last visit included:  1. Schedule physical therapy visits at least 2-3 times per month  2. Schedule follow-up with JIMBO Ernst NP-C in 4 weeks  3. Medication recommendations:   Lyrica 200 mg two times daily. Stay with same dose until the new dose is approved.   Oxycodone 5 mg: Stay with same dose. Max 6 tabs per day.   Cyclobenzaprine 5 m-2 tabs at bedtime. Start with 1 tablet.    Since his last visit, Toby IVERSON Mcgrath reports:  - PT x2 since last office visit, notes improvement with nerve pain on Lyrica.   - Saw PCP re: constipation    Pain Information:   Pain quality: Aching, Burning, Exhausting, Gnawing, Miserable, Nagging, Numb, Penetrating, Sharp, Shooting, Stabbing, Tender, Throbbing, Tiring and Unbearable    Pain timing: Morning     Pain rating: intensity ranges from 7/10 to 7/10, and averages 7/10 on a 0-10 scale.   Aggravating factors include: Sitting   Relieving factors include: Walking    Annual Controlled Substance Agreement/UDS due date: 2017      CURRENT RELEVANT PAIN MEDICATIONS:   Oxycodone 5 mg, 1-2 tabs every 6 hours, max 6 tabs per day  Gabapentin 600 mg t.i.d.      Patient is using the medication as prescribed: Yes  Is your medication helpful? Lyrica was more helpful than gabapentin  Medication side effects? denies any problems      Previous Pain Relevant Medications: (H--helped; HI--Helped initially; NH--No help; W--worse; SE--side effects)   NOTE: This medication information taken from patient's intake form, not medical records.   Opiates: Oxycodone:H  NSAIDS: Ibuprofen:H   Muscle Relaxants: Clonzepam: H  Anti-migraine mediations: none  Anti-depressants: Prozac:H   Sleep aids:none  Anti-convulsants: Gabapentin: H   Topicals: lioderm patches:NH  Other medications not covered above: none       [place] Board of Pharmacy Data Base Reviewed:  YES; As expected, no concern for misuse/abuse.      SELF CARE:   How often do you practice SELF-CARE (relaxing, stretching, pacing, monitoring posture, taking mini-breaks) in a typical day: 2 times per week    Medications:  Current Outpatient Prescriptions   Medication Sig Dispense Refill     pregabalin (LYRICA) 200 MG capsule Take 1 capsule (200 mg) by mouth 2 times daily 60 capsule 3     oxyCODONE (ROXICODONE) 5 MG IR tablet 1 tab q4h, PRN max 6 per day. May dispense on/after 4/5/17 30 days supply 180 tablet 0     cyclobenzaprine (FLEXERIL) 5 MG tablet Take 1-2 tablets (5-10 mg) by mouth nightly as needed for muscle spasms 30 tablet 1     senna-docusate (SENOKOT-S;PERICOLACE) 8.6-50 MG per tablet Take 1 tablet by mouth 2 times daily 60 tablet 3     allopurinol (ZYLOPRIM) 100 MG tablet Take 3 tablets (300 mg) by mouth daily 270 tablet 0     simvastatin (ZOCOR) 20 MG tablet Take 1 tablet (20 mg) by mouth At Bedtime 90 tablet 0     FLUoxetine (PROZAC) 20 MG capsule TAKE THREE CAPSULES BY MOUTH ONCE DAILY 270 capsule 0     metFORMIN (GLUCOPHAGE-XR) 500 MG 24 hr tablet Take 1 tablet (500 mg) by mouth daily (with dinner) 90 tablet 1     omeprazole (PRILOSEC) 20 MG CR capsule Use every other day as needed 90 capsule 1     finasteride (PROSCAR) 5 MG tablet Take 1 tablet (5 mg) by mouth daily For prostate enlargement. Please fill on $4 list 30 tablet 5     fluticasone (FLONASE) 50 MCG/ACT nasal spray Spray 1-2 sprays into both nostrils daily 3 Bottle 11     triamcinolone (KENALOG) 0.1 % cream Apply sparingly to affected area twice daily for 7 days. Then stop and use only for flares 30 g 3     tamsulosin (FLOMAX) 0.4 MG 24 hr capsule TAKE TWO CAPSULES BY MOUTH ONCE DAILY 180 capsule 1     blood glucose monitoring (NO BRAND SPECIFIED) test strip Use to test blood sugar 2-3 times daily (lena contour meter) 3 Box 0     blood glucose monitoring (LENA MICROLET) lancets Use to test blood sugars 2-3 times daily as directed  (lena contour meter) 3 Box 0     aspirin 81 MG tablet Take 1 tablet (81 mg) by mouth daily 100 tablet 3       Review of Systems: A 10-point review of systems was negative, with the exception of chronic pain issues.      Social History: Reviewed; unchanged from initial consultation.      Family history: Reviewed; unchanged from initial consultation.     PHYSICAL EXAM    Vitals:    04/26/17 0819   BP: 134/81   Pulse: 69   Resp: 16   Weight: 114.3 kg (252 lb)       Constitutional: healthy, alert, no distress  HEENT: Head atraumatic, normocephalic. Eyes without conjunctival injection or jaundice. Neck supple. No obvious neck masses.  Cardiovascular: Regular rate/rhythm; no murmurs/rubs/gallops appreciated.   Respiratory: Lungs clear to auscultation bilaterally.   Gastrointestinal: Normoactive bowel sounds. Abdomen soft, non-tender, without guarding/rebound.   : Deferred  Skin: No rash, lesions, or petechiae of exposed skin.   Extremities: Peripheral pulses intact. No clubbing, cyanosis, or edema.   Psychiatric/mental status: Alert, without lethargy or stupor. Appropriate affect. Mood normal.      Neurologic exam:  CN: Cranial nerves 2-12 are grossly normal.  Motor:  4/5 UE and LE strength, which is his baseline      Musculoskeletal exam:  Lumbar/Sacral spine:  Forward Flexion: 60 degrees  Ext: 20 degrees  Rotation/ext to right: painful   Rotation/ext to left:: painful  Tenderness in the lumbar spine at midline: Yes  Tenderness in the lumbar paraspinal muscles: Yes  Straight leg exam:positive    Sensory exam:  Light touch: normal bilateral upper and lower extremities   No allodynia, dysesthesia, or hyperalgesia.      DIRE Score for ongoing opioid management is calculated as follows:  Diagnosis = 3 pts (advanced condition; severe pain/objective findings)  Intractability = 2 pts (most treatments tried; patient not fully engaged/barriers)  Risk   Psych = 2 pts (personality dysfunction/mental illness that moderately  interferes with care)  inability to control anger, outbursts, verbal abuse of staff  Chem Hlth = 2 pts (use of medications to cope with stress; chemical dependency in remission) medication focused  Reliability = 1 pt (medication misuse; missed appointments; rarely follows through) unsafe handling of medication, 36 tablets of oxycodone missing from recent prescription.  Social = 2 pts (reduction in some relationships/life rolls)  (Psych + Chem hlth + Reliability + Social) = 12  Efficacy = 1 pt (poor function; minimal pain relief despite mod/high med dose)  DIRE Score = 13   7-13: likely NOT suitable candidate for long-term opioid analgesia  14-21: may be a suitable candidate for long-term opioid analgesia      DIAGNOSTIC TESTS:  Imaging Studies:   No new imaging      Assessment:   1. Degenerative disc disease, lumbar radiculopathy  2. Long-term use of opiate medications  3. History of inappropriate interactions with pain center staff, verbally abusive    Layo notes constipation improving with metamucil from PCP. Nerve pain much better with increase in Lyrica. Pill count is on track for this month. No change to current regimen.     Plan:    Diagnosis reviewed, treatment option addressed, and risk/benifits discussed.  Self-care instructions given.  I am recommending a multidisciplinary treatment plan to help this patient better manage pain.        1. Schedule physical therapy visits two times per month  2. Schedule follow-up with JIMBO Ernst, NP-FLORIN in last week of May or early June.   3. Medication recommendations: No changes to current pain regimen.     Total time spent face to face was 30 minutes and more than 50% of face to face time was spent in counseling and/or coordination of care regarding the diagnosis and recommendations above.      JIMBO Tran, NP-C   [facility]

## 2017-04-26 NOTE — MR AVS SNAPSHOT
After Visit Summary   4/26/2017    Toby Mcgrath    MRN: [id number]           Patient Information     Date Of Birth          1955        Visit Information        Provider Department      4/26/2017 8:30 AM Jayashree Goodman APRN CNP [facility]        Today's Diagnoses     Failed back syndrome of lumbar spine          Care Instructions    After Visit Instructions:     Thank you for coming to [facility] for your care. It is my goal to partner with you to help you reach your optimal state of health.     I am recommending multidisciplinary care at this time.  The focus of care will be to continue gradual rehabilitation and pain management with medication adjustments as needed.    Continue daily self-care, identifying contributing factors, and monitoring variations in pain level. Continue to integrate self-care into your life.      1. Schedule physical therapy visits two times per month  2. Schedule follow-up with JIMBO Ernst, NP-C in last week of May or early June.   3. Medication recommendations: No changes to current pain regimen.       JIMBO Tran, NP-C  [facility]    Contact information: [facility]    Please call if any side effects, questions, or concerns arise.    Nurse Triage line:  [phone]   Call this number with any questions or concerns. You may leave a detailed message anytime. Calls are typically returned Monday through Friday between 8 AM and 4:30 PM. We usually get back to you within 2 business days depending on the issue/request.    Scheduling number: [phone].  Call this number to schedule or change appointments.          Medication Refills Policy:    For non-narcotic medications, please your pharmacy directly to request a refill and the pharmacy will call the [facility] [address] for authorization. Please allow 3-4 days for these refills.    For  narcotic refills, call the nurse triage line and leave a message requesting your refill along with the name of the pharmacy that you use. Narcotic prescriptions will be mailed to your pharmacy or you may pick them up at the clinic.  Please call 7-10 days before your refill is due  The above policy allows adequate time so that you do not run out of medication.    No Show - Late Cancellation - Late Arrival Policy  We believe regular attendance is key to your success in our program.    Any time you are unable to keep your appointment we ask that you call us at  least 24 hours in advance to let us know. This will allow us to offer the appointment time to another patient. The following is our policy for missed appointments. This also applies to appointments cancelled with less than 24 hours notice.    You will receive a letter after missing your 1st and 2nd appointments without contacting the clinic before your scheduled appointment time.     After missing 3 appointments without calling first, we will cancel all of your future appointments at [facility].    At that point, you will not be able to resume services unless approved by your care team  We understand that unforseen circumstances arise, however, out of respect for all concerned and to provide this appointment to another patient, this policy will be enforced.    Please note that most follow up appointments are 30 minutes long. If you arrive late, your provider may not be able to see you for the entire 30 minutes. Please also note that if you arrive more than 15 minutes for any appointment, it may be rescheduled.                                   Follow-ups after your visit        Your next 10 appointments already scheduled     May 04, 2017 10:00 AM CDT   Return Visit with Florence Amato, PT   [facility] ([facility])    [address] [place] [address] [address]  [facility] [address]   [phone]            May 16, 2017   "9:15 AM CDT   Return Visit with Florence Amato PT   [facility] ([facility])    [address]  [facility] [address]  [facility] [address] [phone]              Who to contact     If you have questions or need follow up information about today's clinic visit or your schedule please contact [facility] [place] directly at [phone].  Normal or non-critical lab and imaging results will be communicated to you by [employer], letter or phone within 4 business days after the clinic has received the results. If you do not hear from us within 7 days, please contact the clinic through [employer] or phone. If you have a critical or abnormal lab result, we will notify you by phone as soon as possible.  Submit refill requests through [employer] or call your pharmacy and they will forward the refill request to us. Please allow 3 business days for your refill to be completed.          Additional Information About Your Visit        [employer] Information     [employer] lets you send messages to your doctor, view your test results, renew your prescriptions, schedule appointments and more. To sign up, go to [email or web address] . Click on \"Log in\" on the left side of the screen, which will take you to the Welcome page. Then click on \"Sign up Now\" on the right side of the page.     You will be asked to enter the access code listed below, as well as some personal information. Please follow the directions to create your username and password.     Your access code is: [email or web address]  Expires: 7/10/2017  1:17 PM     Your access code will  in 90 days. If you need help or a new code, please call your [facility] clinic or [phone].        Care EveryWhere ID     This is your Care EveryWhere ID. This could be used by other organizations to access your [facility] medical records  [id number]        Your Vitals Were     Pulse Respirations BMI (Body Mass Index)             69 16 34.18 kg/m2          " Blood Pressure from Last 3 Encounters:   04/26/17 134/81   04/13/17 116/66   04/03/17 149/78    Weight from Last 3 Encounters:   04/26/17 114.3 kg (252 lb)   04/13/17 114.3 kg (252 lb)   04/03/17 127.5 kg (281 lb)              Today, you had the following     No orders found for display         Today's Medication Changes          These changes are accurate as of: 4/26/17  8:40 AM.  If you have any questions, ask your nurse or doctor.               These medicines have changed or have updated prescriptions.        Dose/Directions    oxyCODONE 5 MG IR tablet   Commonly known as:  ROXICODONE   This may have changed:  additional instructions   Used for:  Failed back syndrome of lumbar spine   Changed by:  Jayashree Goodman APRN CNP        1 tab q4h, PRN max 6 per day. May dispense on/after 5/5/17 30 days supply   Quantity:  180 tablet   Refills:  0            Where to get your medicines      Some of these will need a paper prescription and others can be bought over the counter.  Ask your nurse if you have questions.     Bring a paper prescription for each of these medications     oxyCODONE 5 MG IR tablet                Primary Care Provider Office Phone # Fax #    Connie Haskins [phone]       [facility] [address] [address]        Thank you!     Thank you for choosing [facility] [place]  for your care. Our goal is always to provide you with excellent care. Hearing back from our patients is one way we can continue to improve our services. Please take a few minutes to complete the written survey that you may receive in the mail after your visit with us. Thank you!             Your Updated Medication List - Protect others around you: Learn how to safely use, store and throw away your medicines at www.disposemymeds.org.          This list is accurate as of: 4/26/17  8:40 AM.  Always use your most recent med list.                   Brand Name  Dispense Instructions for use    allopurinol 100 MG tablet    ZYLOPRIM    270 tablet    Take 3 tablets (300 mg) by mouth daily       aspirin 81 MG tablet     100 tablet    Take 1 tablet (81 mg) by mouth daily       blood glucose monitoring lancets     3 Box    Use to test blood sugars 2-3 times daily as directed (lena contour meter)       blood glucose monitoring test strip    no brand specified    3 Box    Use to test blood sugar 2-3 times daily (lena contour meter)       cyclobenzaprine 5 MG tablet    FLEXERIL    30 tablet    Take 1-2 tablets (5-10 mg) by mouth nightly as needed for muscle spasms       finasteride 5 MG tablet    PROSCAR    30 tablet    Take 1 tablet (5 mg) by mouth daily For prostate enlargement. Please fill on $4 list       FLUoxetine 20 MG capsule    PROzac    270 capsule    TAKE THREE CAPSULES BY MOUTH ONCE DAILY       fluticasone 50 MCG/ACT spray    FLONASE    3 Bottle    Spray 1-2 sprays into both nostrils daily       metFORMIN 500 MG 24 hr tablet    GLUCOPHAGE-XR    90 tablet    Take 1 tablet (500 mg) by mouth daily (with dinner)       omeprazole 20 MG CR capsule    priLOSEC    90 capsule    Use every other day as needed       oxyCODONE 5 MG IR tablet    ROXICODONE    180 tablet    1 tab q4h, PRN max 6 per day. May dispense on/after 5/5/17 30 days supply       Pregabalin 200 MG capsule    LYRICA    60 capsule    Take 1 capsule (200 mg) by mouth 2 times daily       senna-docusate 8.6-50 MG per tablet    SENOKOT-S;PERICOLACE    60 tablet    Take 1 tablet by mouth 2 times daily       simvastatin 20 MG tablet    ZOCOR    90 tablet    Take 1 tablet (20 mg) by mouth At Bedtime       tamsulosin 0.4 MG capsule    FLOMAX    180 capsule    TAKE TWO CAPSULES BY MOUTH ONCE DAILY       triamcinolone 0.1 % cream    KENALOG    30 g    Apply sparingly to affected area twice daily for 7 days. Then stop and use only for flares

## 2017-05-01 DIAGNOSIS — N13.8 HYPERTROPHY OF PROSTATE WITH URINARY OBSTRUCTION: ICD-10-CM

## 2017-05-01 DIAGNOSIS — N40.1 HYPERTROPHY OF PROSTATE WITH URINARY OBSTRUCTION: ICD-10-CM

## 2017-05-01 RX ORDER — TAMSULOSIN HYDROCHLORIDE 0.4 MG/1
CAPSULE ORAL
Qty: 180 CAPSULE | Refills: 0 | Status: SHIPPED | OUTPATIENT
Start: 2017-05-01 | End: 2017-05-17

## 2017-05-01 NOTE — TELEPHONE ENCOUNTER
Flomax         Last Written Prescription Date: 8-9-16  Last Fill Quantity: 180, # refills: 1    Last Office Visit with [facility] or [facility] prescribing provider:  4-13-17   Future Office Visit:    Next 5 appointments (look out 90 days)     May 04, 2017 10:00 AM CDT   Return Visit with Florence Amato PT   [facility] ([facility])    [address]  [place] [address]  [facility] [address] [phone]            May 16, 2017  9:15 AM CDT   Return Visit with Florence Amato PT   [facility] ([facility])    [address]  [place] [address]  [facility] [address] [phone]            May 31, 2017 10:30 AM CDT   Return Visit with JIMBO Sutton CNP   [facility] ([facility])    [address]  [place] [address]  [facility] [address] [phone]                  BP Readings from Last 3 Encounters:   04/26/17 134/81   04/13/17 116/66   04/03/17 149/78

## 2017-05-01 NOTE — TELEPHONE ENCOUNTER
Prescription approved per [facility] Refill Protocol.  Gayathri Wade RN

## 2017-05-08 DIAGNOSIS — F33.0 MILD EPISODE OF RECURRENT MAJOR DEPRESSIVE DISORDER (H): ICD-10-CM

## 2017-05-08 NOTE — TELEPHONE ENCOUNTER
Medication is being filled for 1 time refill only due to:  Patient needs to be seen because due for diabetes f/u appt and PHQ9.   Ivis SMALLS RN    Pending Prescriptions:                       Disp   Refills    FLUoxetine (PROZAC) 20 MG capsule [Pharmac*270 ca*0        Sig: TAKE THREE CAPSULES BY MOUTH ONCE DAILY         Last Written Prescription Date: 2/6/2017  Last Fill Quantity: 270; # refills: 0  Last Office Visit with [facility] or [facility] prescribing provider:  4/13/2017   Next 5 appointments (look out 90 days)     May 16, 2017  9:15 AM CDT   Return Visit with Florence Amato, PT   [facility] ([facility])    [address]  [place] [address]  [facility] [id number] [phone]            May 31, 2017 10:30 AM CDT   Return Visit with JIMBO Sutton CNP   [facility] ([facility])    [address]  [place] [address]  [facility] [id number] [phone]                   Last PHQ-9 score on record=   PHQ-9 SCORE 11/3/2016   Total Score 5       Lab Results   Component Value Date    AST 34 07/14/2016     Lab Results   Component Value Date    ALT 56 07/14/2016

## 2017-05-11 ENCOUNTER — TELEPHONE (OUTPATIENT)
Dept: PHARMACY | Facility: CLINIC | Age: 62
End: 2017-05-11

## 2017-05-16 ENCOUNTER — OFFICE VISIT (OUTPATIENT)
Dept: PALLIATIVE MEDICINE | Facility: CLINIC | Age: 62
End: 2017-05-16
Payer: COMMERCIAL

## 2017-05-16 DIAGNOSIS — G89.29 CHRONIC PAIN: Primary | ICD-10-CM

## 2017-05-16 DIAGNOSIS — M96.1 FAILED BACK SYNDROME OF LUMBAR SPINE: ICD-10-CM

## 2017-05-16 PROCEDURE — 97112 NEUROMUSCULAR REEDUCATION: CPT | Mod: GP | Performed by: PHYSICAL THERAPIST

## 2017-05-16 PROCEDURE — 97535 SELF CARE MNGMENT TRAINING: CPT | Mod: GP | Performed by: PHYSICAL THERAPIST

## 2017-05-16 PROCEDURE — 97110 THERAPEUTIC EXERCISES: CPT | Mod: GP | Performed by: PHYSICAL THERAPIST

## 2017-05-16 NOTE — PROGRESS NOTES
"Patient Name: Toby Mcgrath      YOB: 1955     Medical Record Number: [id number]  Diagnosis:    Chronic pain  Failed back syndrome of lumbar spine  Visit Info Length of Visit: 50 min  Arrived: On Time  Therapeutic Procedures/Exercises: 15 min; Therapeutic Activities: NA; Neuromuscular Re-education: 15 min   Self Care / Home Management Trainin min   Exercise/Activity Education Instruction:  Self Care - 1) alternate ice with heat - over right SI/LB region; 2) reviewed  flare management tools;  3) continue frequent use of mindful breathing / relaxation breathing - attempt to stay in present moment vs reliving pain events from past (previous surgeries, etc) as that only contributes negatively to pain experience and may increase sympathetic nervous system response; also discussed how his high reactivity tendencies contribute to his pain experience.    Activity:  Aerobic Conditioning - hallway walk x one lap (300 ft).  Stretching:  Attempted SKTC (tolerable on left, not tolerable on right); gentle LTR oscillations, calf str review.      Led through supine kinesthetic awareness activity (body scan) with mindful breathing.  Indicated his right LE was able to relax slightly but his LB and hips stayed tight - expressed frustration about this - \"it's not working like it usually does\".  Discussed how small amounts of change toward relaxation response are helpful and that during a flare relaxation effects may be more challenging to elicit.  Doesn't mean it is \"not working\".       Notes: Patient reports: he is in a bad state today.  Experiencing constant numbness in right LE from buttock to foot - worst from right SI region to knee - also right SI region pain.  This increase in sx has been over past one week.  \"I can't figure out why.\" \"It feels like it is going to lock up.\"  Expresses worry that it is just like what happened when he needed his initial back surgery.    In addition, he is experiencing some " "financial worries so overall he acknowledges he is pretty anxious (pain increase + financial worries).     Pain ranges: today 7/10  Activity tolerance: poor tolerance of most activity over past week - even walking has been uncomfortable.  Activity limitations: sitting feels worse  HEP/Self Care/Home Management Training:   Pacing/Mini Breaks/Self Care: moves/changes position every 15-20 minutes ;   Relaxation Practice: using relaxation breathing off and on throughout the day - \"it doesn't seem to be working\" to help his current flare ;   Posture Corrections: not addressed today.  ;   Walking program: walking almost QD - shortening the time as sx have worsened over this past week (generally out for about 30 min when not in flare) ;   Heat/Ice/TENS: has been using heat  ;   HEP some stretching most days - supine SKTC (but this has been challenging d/t increased pain), standing calf str    Pt with small set back d/t flare of failed back syndrome sx.  Easily reactive to flares when they happen.  Pt would continue to benefit from PT to work on developing sustainable and self directed self care strategies for pain management through HEP instruction, posture / body awareness training and self care education. Additionally, to increase confidence that he can work through flares when they happen.       [facility] visit 9/12 (auth ends 6/30/17)     Demonstrates/Verbalizes Technique: 3 (1= poor technique-difficulty performing exercises,significant cues required; 5= good technique-performs exercises without cues)  Body Awareness: 2, 3 (1=low awareness; 5=high awareness)  Posture/Stability: 3 (1= poor posture, stability; 5= good posture, stability)   Motivational Level:   Cooperative - somewhat anxious d/t increase in pain Participation:  Full; fair tolerance   Patient Satisfaction:  satisfied   Response to Teaching:   demonstrated ability and verbalized, recall/understanding, required reassurance  Factors that affect learning: None "   Plan of Care  Cont PT to support reactivation and integration of self regulation pain management skills. (next visit consider:  Progress core, functional strengthening, mini breaks as  sx self regulation)   Therapist: Florence Amato PT                 Date: 5/16/2017

## 2017-05-16 NOTE — MR AVS SNAPSHOT
After Visit Summary   5/16/2017    Toby Mcgrath    MRN: [id number]           Patient Information     Date Of Birth          1955        Visit Information        Provider Department      5/16/2017 9:15 AM Florence Amato PT [facility]        Today's Diagnoses     Chronic pain    -  1    Failed back syndrome of lumbar spine           Follow-ups after your visit        Your next 10 appointments already scheduled     May 17, 2017  8:30 AM CDT   Office Visit with Connie Haskins MD   [facility] ([facility])    [address] [facility] [id number] [phone]           Bring a current list of meds and any records pertaining to this visit.  For Physicals, please bring immunization records and any forms needing to be filled out.  Please arrive 10 minutes early to complete paperwork.            May 30, 2017 10:00 AM CDT   Return Visit with Florence Amato PT   [facility] ([facility])    [address]  [address] [address]  [facility] [address] [phone]            May 31, 2017 10:30 AM CDT   Return Visit with JIMBO Sutton CNP   [facility])    [address]  [address] [address]  [facility] [address] [phone]            Valentin 15, 2017 10:00 AM CDT   Return Visit with Florence Amato PT   [facility])    [address]  [address] [address]  [facility] [address] [phone]            Jun 27, 2017 11:15 AM CDT   Return Visit with Florence Amato PT   [facility])    [address]  [address] [address]  [facility] [address] [phone]              Who to contact     If you have questions or need follow up information about today's clinic visit or your schedule please contact [facility] directly at [phone].  Normal or non-critical lab  "and imaging results will be communicated to you by MyChart, letter or phone within 4 business days after the clinic has received the results. If you do not hear from us within 7 days, please contact the clinic through [employer] or phone. If you have a critical or abnormal lab result, we will notify you by phone as soon as possible.  Submit refill requests through [employer] or call your pharmacy and they will forward the refill request to us. Please allow 3 business days for your refill to be completed.          Additional Information About Your Visit        [employer] Information     [employer] lets you send messages to your doctor, view your test results, renew your prescriptions, schedule appointments and more. To sign up, go to [email or web address] . Click on \"Log in\" on the left side of the screen, which will take you to the Welcome page. Then click on \"Sign up Now\" on the right side of the page.     You will be asked to enter the access code listed below, as well as some personal information. Please follow the directions to create your username and password.     Your access code is: [email or web address]  Expires: 7/10/2017  1:17 PM     Your access code will  in 90 days. If you need help or a new code, please call your [facility] clinic or [phone].        Care EveryWhere ID     This is your Care EveryWhere ID. This could be used by other organizations to access your [facility] medical records  [id number]         Blood Pressure from Last 3 Encounters:   17 134/81   17 116/66   17 149/78    Weight from Last 3 Encounters:   17 114.3 kg (252 lb)   17 114.3 kg (252 lb)   17 127.5 kg (281 lb)              We Performed the Following     NEUROMUSCULAR RE-EDUCATION     SELF CARE MNGMENT TRAINING     THERAPEUTIC EXERCISES        Primary Care Provider Office Phone # Fax #    Connie Haskins [phone]       [facility] [address] " [address]        Thank you!     Thank you for choosing [facility]  for your care. Our goal is always to provide you with excellent care. Hearing back from our patients is one way we can continue to improve our services. Please take a few minutes to complete the written survey that you may receive in the mail after your visit with us. Thank you!             Your Updated Medication List - Protect others around you: Learn how to safely use, store and throw away your medicines at www.disposemymeds.org.          This list is accurate as of: 5/16/17 11:58 AM.  Always use your most recent med list.                   Brand Name Dispense Instructions for use    allopurinol 100 MG tablet    ZYLOPRIM    270 tablet    Take 3 tablets (300 mg) by mouth daily       aspirin 81 MG tablet     100 tablet    Take 1 tablet (81 mg) by mouth daily       blood glucose monitoring lancets     3 Box    Use to test blood sugars 2-3 times daily as directed (lena contour meter)       blood glucose monitoring test strip    no brand specified    3 Box    Use to test blood sugar 2-3 times daily (lena contour meter)       cyclobenzaprine 5 MG tablet    FLEXERIL    30 tablet    Take 1-2 tablets (5-10 mg) by mouth nightly as needed for muscle spasms       finasteride 5 MG tablet    PROSCAR    30 tablet    Take 1 tablet (5 mg) by mouth daily For prostate enlargement. Please fill on $4 list       FLUoxetine 20 MG capsule    PROzac    90 capsule    TAKE THREE CAPSULES BY MOUTH ONCE DAILY       fluticasone 50 MCG/ACT spray    FLONASE    3 Bottle    Spray 1-2 sprays into both nostrils daily       metFORMIN 500 MG 24 hr tablet    GLUCOPHAGE-XR    90 tablet    Take 1 tablet (500 mg) by mouth daily (with dinner)       omeprazole 20 MG CR capsule    priLOSEC    90 capsule    Use every other day as needed       oxyCODONE 5 MG IR tablet    ROXICODONE    180 tablet    1 tab q4h, PRN max 6 per day. May dispense on/after 5/5/17 30 days supply        Pregabalin 200 MG capsule    LYRICA    60 capsule    Take 1 capsule (200 mg) by mouth 2 times daily       senna-docusate 8.6-50 MG per tablet    SENOKOT-S;PERICOLACE    60 tablet    Take 1 tablet by mouth 2 times daily       simvastatin 20 MG tablet    ZOCOR    90 tablet    Take 1 tablet (20 mg) by mouth At Bedtime       tamsulosin 0.4 MG capsule    FLOMAX    180 capsule    TAKE TWO CAPSULES BY MOUTH ONCE DAILY       triamcinolone 0.1 % cream    KENALOG    30 g    Apply sparingly to affected area twice daily for 7 days. Then stop and use only for flares

## 2017-05-17 ENCOUNTER — MEDICAL CORRESPONDENCE (OUTPATIENT)
Dept: HEALTH INFORMATION MANAGEMENT | Facility: CLINIC | Age: 62
End: 2017-05-17

## 2017-05-17 ENCOUNTER — TELEPHONE (OUTPATIENT)
Dept: FAMILY MEDICINE | Facility: CLINIC | Age: 62
End: 2017-05-17

## 2017-05-17 ENCOUNTER — OFFICE VISIT (OUTPATIENT)
Dept: FAMILY MEDICINE | Facility: CLINIC | Age: 62
End: 2017-05-17
Payer: COMMERCIAL

## 2017-05-17 VITALS
TEMPERATURE: 97.6 F | HEART RATE: 84 BPM | WEIGHT: 251 LBS | BODY MASS INDEX: 34 KG/M2 | OXYGEN SATURATION: 96 % | DIASTOLIC BLOOD PRESSURE: 82 MMHG | HEIGHT: 72 IN | SYSTOLIC BLOOD PRESSURE: 117 MMHG

## 2017-05-17 DIAGNOSIS — N40.1 HYPERTROPHY OF PROSTATE WITH URINARY OBSTRUCTION: ICD-10-CM

## 2017-05-17 DIAGNOSIS — Z13.89 SCREENING FOR DIABETIC PERIPHERAL NEUROPATHY: ICD-10-CM

## 2017-05-17 DIAGNOSIS — N40.0 BENIGN PROSTATIC HYPERPLASIA WITHOUT LOWER URINARY TRACT SYMPTOMS, UNSPECIFIED MORPHOLOGY: ICD-10-CM

## 2017-05-17 DIAGNOSIS — N13.8 HYPERTROPHY OF PROSTATE WITH URINARY OBSTRUCTION: ICD-10-CM

## 2017-05-17 DIAGNOSIS — E78.5 HYPERLIPIDEMIA LDL GOAL <100: ICD-10-CM

## 2017-05-17 DIAGNOSIS — Z12.11 SCREEN FOR COLON CANCER: ICD-10-CM

## 2017-05-17 DIAGNOSIS — M25.50 MULTIPLE JOINT PAIN: ICD-10-CM

## 2017-05-17 DIAGNOSIS — F33.0 MILD EPISODE OF RECURRENT MAJOR DEPRESSIVE DISORDER (H): ICD-10-CM

## 2017-05-17 DIAGNOSIS — E11.9 TYPE 2 DIABETES MELLITUS WITHOUT COMPLICATION, WITHOUT LONG-TERM CURRENT USE OF INSULIN (H): Primary | ICD-10-CM

## 2017-05-17 LAB
CHOLEST SERPL-MCNC: 137 MG/DL
HDLC SERPL-MCNC: 34 MG/DL
LDLC SERPL CALC-MCNC: 63 MG/DL
NONHDLC SERPL-MCNC: 103 MG/DL
RHEUMATOID FACT SER NEPH-ACNC: <20 IU/ML (ref 0–20)
TRIGL SERPL-MCNC: 199 MG/DL
URATE SERPL-MCNC: 4.3 MG/DL (ref 3.5–7.2)

## 2017-05-17 PROCEDURE — 86431 RHEUMATOID FACTOR QUANT: CPT | Performed by: FAMILY MEDICINE

## 2017-05-17 PROCEDURE — 84550 ASSAY OF BLOOD/URIC ACID: CPT | Performed by: FAMILY MEDICINE

## 2017-05-17 PROCEDURE — 99214 OFFICE O/P EST MOD 30 MIN: CPT | Performed by: FAMILY MEDICINE

## 2017-05-17 PROCEDURE — 99207 C FOOT EXAM  NO CHARGE: CPT | Performed by: FAMILY MEDICINE

## 2017-05-17 PROCEDURE — 80061 LIPID PANEL: CPT | Performed by: FAMILY MEDICINE

## 2017-05-17 PROCEDURE — 36415 COLL VENOUS BLD VENIPUNCTURE: CPT | Performed by: FAMILY MEDICINE

## 2017-05-17 PROCEDURE — 86200 CCP ANTIBODY: CPT | Performed by: FAMILY MEDICINE

## 2017-05-17 RX ORDER — METFORMIN HCL 500 MG
500 TABLET, EXTENDED RELEASE 24 HR ORAL
Qty: 90 TABLET | Refills: 1 | Status: SHIPPED | OUTPATIENT
Start: 2017-05-17 | End: 2017-08-07

## 2017-05-17 RX ORDER — FINASTERIDE 5 MG/1
5 TABLET, FILM COATED ORAL DAILY
Qty: 30 TABLET | Refills: 5 | Status: SHIPPED | OUTPATIENT
Start: 2017-05-17 | End: 2017-05-17

## 2017-05-17 RX ORDER — SIMVASTATIN 20 MG
20 TABLET ORAL AT BEDTIME
Qty: 90 TABLET | Refills: 3 | Status: SHIPPED | OUTPATIENT
Start: 2017-05-17 | End: 2018-05-31

## 2017-05-17 RX ORDER — SIMVASTATIN 20 MG
20 TABLET ORAL AT BEDTIME
Qty: 90 TABLET | Refills: 3 | Status: SHIPPED | OUTPATIENT
Start: 2017-05-17 | End: 2017-05-17

## 2017-05-17 RX ORDER — FINASTERIDE 5 MG/1
5 TABLET, FILM COATED ORAL DAILY
Qty: 30 TABLET | Refills: 5 | Status: SHIPPED | OUTPATIENT
Start: 2017-05-17 | End: 2018-05-31

## 2017-05-17 RX ORDER — TAMSULOSIN HYDROCHLORIDE 0.4 MG/1
CAPSULE ORAL
Qty: 180 CAPSULE | Refills: 3 | Status: SHIPPED | OUTPATIENT
Start: 2017-05-17 | End: 2017-08-07

## 2017-05-17 NOTE — PROGRESS NOTES
SUBJECTIVE:                                                    Toby Mcgrath is a 61 year old male who presents to clinic today for the following health issues:  Due for Colonoscopy  - no family hx, doesn't want to do didn't like prep    Medication Followup of  Prozac    Taking Medication as prescribed: yes    Side Effects:  None    Medication Helping Symptoms:  yes       Diabetes Follow-up      Patient is checking blood sugars: rarely  Few times a week Results range from 115 to 175    Diabetic concerns: swelling in hands and face sx for 1 month      Symptoms of hypoglycemia (low blood sugar): none     Paresthesias (numbness or burning in feet) or sores: Yes      Date of last diabetic eye exam:  Almost a year patient will make appointment     He  Here for follow-up   Reviewed medications and refilled them     He's noted good control with diabetes taking metformin and tolerating this without side effects. Last A1c was 6.5 in January we'll repeat this later in the summer. Up-to-date on vaccines due for cholesterol and he is fasting today    Noting more joint pain in several small joints of his hands and wrists and knees. He does have a history of gout and is on allopurinol this seems to be much better however the hand pain is a fairly new change. He has not noticed if it is better with washing dishes are warm environment. He does feel it gets better as the day goes on.    Depression is stable he does have some difficulty with sleep but otherwise is fine. Needs refills of this.    Well adult screens is due for colon screening, does not want to do colonoscopy but is open to doing sit screening.      Problem list and histories reviewed & adjusted, as indicated.  Additional history: as documented    Patient Active Problem List   Diagnosis     Thoracic or lumbosacral neuritis or radiculitis, unspecified     Osteoarthrosis, unspecified whether generalized or localized, pelvic region and thigh     Hypertrophy of  prostate with urinary obstruction     Chronic rhinitis     Chronic pain syndrome     Disorder of bursae and tendons in shoulder region     Major depressive disorder, recurrent episode (H)     Anxiety     Gout     Type 2 diabetes mellitus without complication (H)     Hypertension goal BP (blood pressure) < 140/90     Advanced directives, counseling/discussion     DJD (degenerative joint disease), lumbar     Hyperlipidemia LDL goal <100     DDD (degenerative disc disease), cervical     GERD (gastroesophageal reflux disease)     Lumbar radiculopathy     S/P lumbar fusion     Left-sided low back pain with left-sided sciatica     BMI 32.0-32.9,adult     History of hepatitis C     S/P lumbar discectomy     Acute post-operative pain     S/P laminectomy     Chronic pain     Bilateral low back pain without sciatica     Right-sided low back pain with right-sided sciatica     Acute gouty arthritis     Acute gout of right elbow, unspecified cause     Idiopathic gout of left elbow, unspecified chronicity     Gastroesophageal reflux disease without esophagitis     Failed back syndrome of lumbar spine     Slow transit constipation     Past Surgical History:   Procedure Laterality Date     BACK SURGERY       both shoulder repair  2006, 2008     C NONSPECIFIC PROCEDURE  1995    neck cyst     C NONSPECIFIC PROCEDURE  1979    laminectomy L5-S1 x 2     C SHOULDER SURG PROC UNLISTED  2005, '07    repairs,later manipulate,inject LT, RT     FUSION LUMBAR ANTERIOR, FUSION LUMBAR POSTERIOR TWO LEVELS, COMBINED N/A 9/17/2014    Procedure: COMBINED FUSION LUMBAR ANTERIOR, FUSION LUMBAR POSTERIOR TWO LEVELS;  Surgeon: Chris Lugo MD;  Location: [facility] OR     HC COLONOSCOPY THRU STOMA, DIAGNOSTIC  3/04    normal     HC UGI ENDOSCOPY DIAG W OR W/O BRUSH/WASH  3/04    ok     HEAD & NECK SURGERY       HEMILAMINECTOMY, DISCECTOMY LUMBAR ONE LEVEL, COMBINED Left 9/8/2015    Procedure: COMBINED HEMILAMINECTOMY, DISCECTOMY LUMBAR ONE LEVEL;   Surgeon: Jer Irby MD;  Location: [facility] OR     right hip replacement  10,2010       Social History   Substance Use Topics     Smoking status: Former Smoker     Years: 40.00     Types: Cigarettes     Smokeless tobacco: Former User     Quit date: 2/1/2013     Alcohol use No     Family History   Problem Relation Age of Onset     DIABETES Mother      C.A.D. Father      DIABETES Father      Hypertension Father          Current Outpatient Prescriptions   Medication Sig Dispense Refill     FLUoxetine (PROZAC) 20 MG capsule TAKE THREE CAPSULES BY MOUTH ONCE DAILY 270 capsule 3     tamsulosin (FLOMAX) 0.4 MG capsule TAKE TWO CAPSULES BY MOUTH ONCE DAILY 180 capsule 3     simvastatin (ZOCOR) 20 MG tablet Take 1 tablet (20 mg) by mouth At Bedtime 90 tablet 3     metFORMIN (GLUCOPHAGE-XR) 500 MG 24 hr tablet Take 1 tablet (500 mg) by mouth daily (with dinner) 90 tablet 1     finasteride (PROSCAR) 5 MG tablet Take 1 tablet (5 mg) by mouth daily For prostate enlargement. Please fill on $4 list 30 tablet 5     oxyCODONE (ROXICODONE) 5 MG IR tablet 1 tab q4h, PRN max 6 per day. May dispense on/after 5/5/17 30 days supply 180 tablet 0     pregabalin (LYRICA) 200 MG capsule Take 1 capsule (200 mg) by mouth 2 times daily 60 capsule 3     cyclobenzaprine (FLEXERIL) 5 MG tablet Take 1-2 tablets (5-10 mg) by mouth nightly as needed for muscle spasms 30 tablet 1     senna-docusate (SENOKOT-S;PERICOLACE) 8.6-50 MG per tablet Take 1 tablet by mouth 2 times daily 60 tablet 3     allopurinol (ZYLOPRIM) 100 MG tablet Take 3 tablets (300 mg) by mouth daily 270 tablet 0     omeprazole (PRILOSEC) 20 MG CR capsule Use every other day as needed 90 capsule 1     fluticasone (FLONASE) 50 MCG/ACT nasal spray Spray 1-2 sprays into both nostrils daily 3 Bottle 11     triamcinolone (KENALOG) 0.1 % cream Apply sparingly to affected area twice daily for 7 days. Then stop and use only for flares 30 g 3     blood glucose monitoring (NO BRAND  SPECIFIED) test strip Use to test blood sugar 2-3 times daily (lena contour meter) 3 Box 0     blood glucose monitoring (LENA MICROLET) lancets Use to test blood sugars 2-3 times daily as directed (lena contour meter) 3 Box 0     aspirin 81 MG tablet Take 1 tablet (81 mg) by mouth daily 100 tablet 3     [DISCONTINUED] FLUoxetine (PROZAC) 20 MG capsule TAKE THREE CAPSULES BY MOUTH ONCE DAILY 90 capsule 0     [DISCONTINUED] simvastatin (ZOCOR) 20 MG tablet Take 1 tablet (20 mg) by mouth At Bedtime 90 tablet 0     [DISCONTINUED] metFORMIN (GLUCOPHAGE-XR) 500 MG 24 hr tablet Take 1 tablet (500 mg) by mouth daily (with dinner) 90 tablet 1     [DISCONTINUED] finasteride (PROSCAR) 5 MG tablet Take 1 tablet (5 mg) by mouth daily For prostate enlargement. Please fill on $4 list 30 tablet 5       Reviewed and updated as needed this visit by clinical staff       Reviewed and updated as needed this visit by Provider         ROS:  Constitutional, HEENT, cardiovascular, pulmonary, gi and gu systems are negative, except as otherwise noted.    OBJECTIVE:                                                    /82  Pulse 84  Temp 97.6  F (36.4  C) (Oral)  Ht 6' (1.829 m)  Wt 251 lb (113.9 kg)  SpO2 96%  BMI 34.04 kg/m2  Body mass index is 34.04 kg/(m^2).  GENERAL APPEARANCE: healthy, alert and no distress  RESP: lungs clear to auscultation - no rales, rhonchi or wheezes  CV: regular rates and rhythm, normal S1 S2, no S3 or S4 and no murmur, click or rub  MS: extremities normal- no gross deformities noted and no warmth or redness noted over the joints    Labs are pending      ASSESSMENT/PLAN:                                                    (E11.9) Type 2 diabetes mellitus without complication, without long-term current use of insulin (H)  (primary encounter diagnosis)  Comment: Diabetes under good control. Blood sugars are at goal see notes above. Plan to recheck A1c in the summer  Plan: metFORMIN (GLUCOPHAGE-XR) 500 MG  24 hr tablet,         Lipid panel reflex to direct LDL            (F33.0) Mild episode of recurrent major depressive disorder (H)  Comment: Depression under good control taking medications without side effects refilled  Plan: FLUoxetine (PROZAC) 20 MG capsule            (E78.5) Hyperlipidemia LDL goal <100  Comment: Rechecking cholesterol today we'll plan to refill simvastatin he is tolerating this without side effects  Plan: simvastatin (ZOCOR) 20 MG tablet            (M25.50) Multiple joint pain  Comment: More joint pain and smaller joints such as fingers and hands. We'll run some labs as noted below for rheumatoid arthritis it does sound like this might be osteoarthritis based on his history. We also talked about increasing water and fluids he does not drink a lot of water he admits. We talked about dietary changes that can help with inflammation  Plan: Uric acid, Cyclic Citrullinated Peptide         Antibody IgG, Rheumatoid factor            (N40.1,  N13.8) Hypertrophy of prostate with urinary obstruction  Comment:   Plan: tamsulosin (FLOMAX) 0.4 MG capsule            (N40.0) Benign prostatic hyperplasia without lower urinary tract symptoms, unspecified morphology  Comment:   Plan: finasteride (PROSCAR) 5 MG tablet            (Z12.11) Screen for colon cancer  Comment: Reviewed: Screening okay for fit test however if this is positive would need colonoscopy, he is aware  Plan: Fecal colorectal cancer screen (FIT)             Total time was over 25 minutes with >50% spent in counseling     Follow up with Provider - follow-up in 6 months for a physical and recheck diabetic exam     Connie Haskins MD  [facility]

## 2017-05-17 NOTE — TELEPHONE ENCOUNTER
Received form(s) from [employer]- Rx for Diabetic shoes   Placed form(s) in/on SN's box.  Forms need to be filled out and signed and faxed to number listed on form.    Call pt to verify form was sent: No  Copy needs to be sent for scanning after completion: Yes

## 2017-05-17 NOTE — NURSING NOTE
Chief Complaint   Patient presents with     Recheck Medication     Diabetes     follow up     There were no vitals taken for this visit. Estimated body mass index is 34.18 kg/(m^2) as calculated from the following:    Height as of 4/13/17: 6' (1.829 m).    Weight as of 4/26/17: 252 lb (114.3 kg).  BP completed using cuff size: large       Health Maintenance due pending provider review:  Colonoscopy/FIT    MAMMO/COLON--Gave pt phone number, and pended order   Colonoscopy - no family hx, doesn't want to do didn't like prep    Brandy Jamison, PENELOPE

## 2017-05-17 NOTE — MR AVS SNAPSHOT
After Visit Summary   5/17/2017    Toby Mcgrath    MRN: [id number]           Patient Information     Date Of Birth          1955        Visit Information        Provider Department      5/17/2017 8:30 AM Connie Haskins MD [facility]        Today's Diagnoses     Type 2 diabetes mellitus without complication, without long-term current use of insulin (H)    -  1    Mild episode of recurrent major depressive disorder (H)        Hyperlipidemia LDL goal <100        Multiple joint pain        Hypertrophy of prostate with urinary obstruction        Benign prostatic hyperplasia without lower urinary tract symptoms, unspecified morphology        Screening for diabetic peripheral neuropathy        Screen for colon cancer           Follow-ups after your visit        Your next 10 appointments already scheduled     May 30, 2017 10:00 AM CDT   Return Visit with Florence Amato PT   [facility]    [address]  [address]  [facility] [address] [phone]            May 31, 2017 10:30 AM CDT   Return Visit with JIMBO Sutton CNP   [facility])    [address]  [address]  [facility] [address] [phone]            Valentin 15, 2017 10:00 AM CDT   Return Visit with Florence Amato PT   [facility])    [address]  [address]  [facility] [address] [phone]            Jun 27, 2017 11:15 AM CDT   Return Visit with Florence Amato PT   [facility] ([facility])    [address]  [address]  [facility] [address] [phone]              Future tests that were ordered for you today     Open Future Orders        Priority Expected Expires Ordered    Fecal colorectal cancer screen (FIT) Routine 6/7/2017 8/9/2017 5/17/2017            Who to contact     If you have questions or need follow up  "information about today's clinic visit or your schedule please contact [facility] directly at [phone].  Normal or non-critical lab and imaging results will be communicated to you by MyChart, letter or phone within 4 business days after the clinic has received the results. If you do not hear from us within 7 days, please contact the clinic through [employer] or phone. If you have a critical or abnormal lab result, we will notify you by phone as soon as possible.  Submit refill requests through "[employer] or call your pharmacy and they will forward the refill request to us. Please allow 3 business days for your refill to be completed.          Additional Information About Your Visit        [employer] Information     "[employer] lets you send messages to your doctor, view your test results, renew your prescriptions, schedule appointments and more. To sign up, go to [email or web address] . Click on \"Log in\" on the left side of the screen, which will take you to the Welcome page. Then click on \"Sign up Now\" on the right side of the page.     You will be asked to enter the access code listed below, as well as some personal information. Please follow the directions to create your username and password.     Your access code is: [email or web address]  Expires: 7/10/2017  1:17 PM     Your access code will  in 90 days. If you need help or a new code, please call your [facility] clinic or [phone].        Care EveryWhere ID     This is your Care EveryWhere ID. This could be used by other organizations to access your [facility] medical records  [id number]        Your Vitals Were     Pulse Temperature Height Pulse Oximetry BMI (Body Mass Index)       84 97.6  F (36.4  C) (Oral) 6' (1.829 m) 96% 34.04 kg/m2        Blood Pressure from Last 3 Encounters:   17 117/82   17 134/81   17 116/66    Weight from Last 3 Encounters:   17 251 lb (113.9 kg)   17 252 lb (114.3 kg)   17 252 lb (114.3 kg) "              We Performed the Following     Cyclic Citrullinated Peptide Antibody IgG     FOOT EXAM  NO CHARGE [47289.114]     Lipid panel reflex to direct LDL     Rheumatoid factor     Uric acid          Today's Medication Changes          These changes are accurate as of: 5/17/17  9:14 AM.  If you have any questions, ask your nurse or doctor.               These medicines have changed or have updated prescriptions.        Dose/Directions    FLUoxetine 20 MG capsule   Commonly known as:  PROzac   This may have changed:  See the new instructions.   Used for:  Mild episode of recurrent major depressive disorder (H)   Changed by:  Connie Haskins MD        TAKE THREE CAPSULES BY MOUTH ONCE DAILY   Quantity:  270 capsule   Refills:  3       tamsulosin 0.4 MG capsule   Commonly known as:  FLOMAX   This may have changed:  See the new instructions.   Used for:  Hypertrophy of prostate with urinary obstruction   Changed by:  Connie Haskins MD        TAKE TWO CAPSULES BY MOUTH ONCE DAILY   Quantity:  180 capsule   Refills:  3            Where to get your medicines      These medications were sent to [facility] [address] [place] [id number]     Phone:  [phone]     finasteride 5 MG tablet    FLUoxetine 20 MG capsule    metFORMIN 500 MG 24 hr tablet    simvastatin 20 MG tablet    tamsulosin 0.4 MG capsule                Primary Care Provider Office Phone # Fax #    Connie Haskins [phone]       [facility] [address] [address]        Thank you!     Thank you for choosing [facility]  for your care. Our goal is always to provide you with excellent care. Hearing back from our patients is one way we can continue to improve our services. Please take a few minutes to complete the written survey that you may receive in the mail after your visit with us. Thank you!              Your Updated Medication List - Protect others around you: Learn how to safely use, store and throw away your medicines at www.disposemymeds.org.          This list is accurate as of: 5/17/17  9:14 AM.  Always use your most recent med list.                   Brand Name Dispense Instructions for use    allopurinol 100 MG tablet    ZYLOPRIM    270 tablet    Take 3 tablets (300 mg) by mouth daily       aspirin 81 MG tablet     100 tablet    Take 1 tablet (81 mg) by mouth daily       blood glucose monitoring lancets     3 Box    Use to test blood sugars 2-3 times daily as directed (lena contour meter)       blood glucose monitoring test strip    no brand specified    3 Box    Use to test blood sugar 2-3 times daily (lena contour meter)       cyclobenzaprine 5 MG tablet    FLEXERIL    30 tablet    Take 1-2 tablets (5-10 mg) by mouth nightly as needed for muscle spasms       finasteride 5 MG tablet    PROSCAR    30 tablet    Take 1 tablet (5 mg) by mouth daily For prostate enlargement. Please fill on $4 list       FLUoxetine 20 MG capsule    PROzac    270 capsule    TAKE THREE CAPSULES BY MOUTH ONCE DAILY       fluticasone 50 MCG/ACT spray    FLONASE    3 Bottle    Spray 1-2 sprays into both nostrils daily       metFORMIN 500 MG 24 hr tablet    GLUCOPHAGE-XR    90 tablet    Take 1 tablet (500 mg) by mouth daily (with dinner)       omeprazole 20 MG CR capsule    priLOSEC    90 capsule    Use every other day as needed       oxyCODONE 5 MG IR tablet    ROXICODONE    180 tablet    1 tab q4h, PRN max 6 per day. May dispense on/after 5/5/17 30 days supply       Pregabalin 200 MG capsule    LYRICA    60 capsule    Take 1 capsule (200 mg) by mouth 2 times daily       senna-docusate 8.6-50 MG per tablet    SENOKOT-S;PERICOLACE    60 tablet    Take 1 tablet by mouth 2 times daily       simvastatin 20 MG tablet    ZOCOR    90 tablet    Take 1 tablet (20 mg) by mouth At Bedtime       tamsulosin 0.4 MG capsule    FLOMAX     180 capsule    TAKE TWO CAPSULES BY MOUTH ONCE DAILY       triamcinolone 0.1 % cream    KENALOG    30 g    Apply sparingly to affected area twice daily for 7 days. Then stop and use only for flares

## 2017-05-18 LAB — CCP AB SER IA-ACNC: 1 U/ML

## 2017-05-18 ASSESSMENT — PATIENT HEALTH QUESTIONNAIRE - PHQ9: SUM OF ALL RESPONSES TO PHQ QUESTIONS 1-9: 4

## 2017-05-18 NOTE — PROGRESS NOTES
Please call patient with normal results  His joint pain is likely due to osteoarthritis which is the typical form of arthritis.  No signs of rheumatoid and no sgns of the gout.    Tylenol 1000 mg 2-3 times per day over a few weeks can help the pain  Warm water soaks to the joints can help  Staying hydrated - lots of water also can help    His cholesterol was great!  Meds are helping.  The uric acid was also nice and low - the allopurinol is helping a lot      Thank you!    Connie

## 2017-05-30 ENCOUNTER — OFFICE VISIT (OUTPATIENT)
Dept: PALLIATIVE MEDICINE | Facility: CLINIC | Age: 62
End: 2017-05-30
Payer: COMMERCIAL

## 2017-05-30 DIAGNOSIS — M96.1 FAILED BACK SYNDROME OF LUMBAR SPINE: ICD-10-CM

## 2017-05-30 DIAGNOSIS — G89.29 CHRONIC PAIN: Primary | ICD-10-CM

## 2017-05-30 PROCEDURE — 97110 THERAPEUTIC EXERCISES: CPT | Mod: GP | Performed by: PHYSICAL THERAPIST

## 2017-05-30 PROCEDURE — 97535 SELF CARE MNGMENT TRAINING: CPT | Mod: GP | Performed by: PHYSICAL THERAPIST

## 2017-05-30 PROCEDURE — 97112 NEUROMUSCULAR REEDUCATION: CPT | Mod: GP | Performed by: PHYSICAL THERAPIST

## 2017-05-30 NOTE — MR AVS SNAPSHOT
After Visit Summary   5/30/2017    Toby Mcgrath    MRN: [id number]           Patient Information     Date Of Birth          1955        Visit Information        Provider Department      5/30/2017 10:00 AM Florence Amato, PT [facility]        Today's Diagnoses     Chronic pain    -  1    Failed back syndrome of lumbar spine           Follow-ups after your visit        Your next 10 appointments already scheduled     May 31, 2017 10:30 AM CDT   Return Visit with JIMBO Sutton CNP   [facility] ([facility])    [address]  [address]  [facility] [id number] [phone]            Valentin 15, 2017 10:00 AM CDT   Return Visit with Florence Amato PT   [facility] ([facility])    [address]  [address]  [facility] [phone] [phone]            Jun 27, 2017 11:15 AM CDT   Return Visit with Florence Amato PT   [facility] ([facility])    [address]  [address]  [facility] [id number] [phone]              Who to contact     If you have questions or need follow up information about today's clinic visit or your schedule please contact [facility] directly at [phone].  Normal or non-critical lab and imaging results will be communicated to you by Gigalocalhart, letter or phone within 4 business days after the clinic has received the results. If you do not hear from us within 7 days, please contact the clinic through Gigalocalhart or phone. If you have a critical or abnormal lab result, we will notify you by phone as soon as possible.  Submit refill requests through "[employer]" or call your pharmacy and they will forward the refill request to us. Please allow 3 business days for your refill to be completed.          Additional Information About Your Visit        "[employer]" Information     "[employer]" lets you send messages to your doctor, view  "your test results, renew your prescriptions, schedule appointments and more. To sign up, go to [email or web address] . Click on \"Log in\" on the left side of the screen, which will take you to the Welcome page. Then click on \"Sign up Now\" on the right side of the page.     You will be asked to enter the access code listed below, as well as some personal information. Please follow the directions to create your username and password.     Your access code is: [email or web address]  Expires: 7/10/2017  1:17 PM     Your access code will  in 90 days. If you need help or a new code, please call your [place] clinic or [phone].        Care EveryWhere ID     This is your Care EveryWhere ID. This could be used by other organizations to access your [place] medical records  [id number]         Blood Pressure from Last 3 Encounters:   17 117/82   17 134/81   17 116/66    Weight from Last 3 Encounters:   17 113.9 kg (251 lb)   17 114.3 kg (252 lb)   17 114.3 kg (252 lb)              We Performed the Following     NEUROMUSCULAR RE-EDUCATION     SELF CARE MNGMENT TRAINING     THERAPEUTIC EXERCISES        Primary Care Provider Office Phone # Fax #    Connie Haskins [phone]       [facility] [address] [address]        Thank you!     Thank you for choosing [facility]  for your care. Our goal is always to provide you with excellent care. Hearing back from our patients is one way we can continue to improve our services. Please take a few minutes to complete the written survey that you may receive in the mail after your visit with us. Thank you!             Your Updated Medication List - Protect others around you: Learn how to safely use, store and throw away your medicines at www.disposemymeds.org.          This list is accurate as of: 17  1:06 PM.  Always use your most recent med list.                   " Brand Name Dispense Instructions for use    allopurinol 100 MG tablet    ZYLOPRIM    270 tablet    Take 3 tablets (300 mg) by mouth daily       aspirin 81 MG tablet     100 tablet    Take 1 tablet (81 mg) by mouth daily       blood glucose monitoring lancets     3 Box    Use to test blood sugars 2-3 times daily as directed (lena contour meter)       blood glucose monitoring test strip    no brand specified    3 Box    Use to test blood sugar 2-3 times daily (lena contour meter)       cyclobenzaprine 5 MG tablet    FLEXERIL    30 tablet    Take 1-2 tablets (5-10 mg) by mouth nightly as needed for muscle spasms       finasteride 5 MG tablet    PROSCAR    30 tablet    Take 1 tablet (5 mg) by mouth daily For prostate enlargement. Please fill on $4 list       FLUoxetine 20 MG capsule    PROzac    270 capsule    TAKE THREE CAPSULES BY MOUTH ONCE DAILY       fluticasone 50 MCG/ACT spray    FLONASE    3 Bottle    Spray 1-2 sprays into both nostrils daily       metFORMIN 500 MG 24 hr tablet    GLUCOPHAGE-XR    90 tablet    Take 1 tablet (500 mg) by mouth daily (with dinner)       omeprazole 20 MG CR capsule    priLOSEC    90 capsule    Use every other day as needed       oxyCODONE 5 MG IR tablet    ROXICODONE    180 tablet    1 tab q4h, PRN max 6 per day. May dispense on/after 5/5/17 30 days supply       Pregabalin 200 MG capsule    LYRICA    60 capsule    Take 1 capsule (200 mg) by mouth 2 times daily       senna-docusate 8.6-50 MG per tablet    SENOKOT-S;PERICOLACE    60 tablet    Take 1 tablet by mouth 2 times daily       simvastatin 20 MG tablet    ZOCOR    90 tablet    Take 1 tablet (20 mg) by mouth At Bedtime       tamsulosin 0.4 MG capsule    FLOMAX    180 capsule    TAKE TWO CAPSULES BY MOUTH ONCE DAILY       triamcinolone 0.1 % cream    KENALOG    30 g    Apply sparingly to affected area twice daily for 7 days. Then stop and use only for flares

## 2017-05-30 NOTE — PROGRESS NOTES
"Patient Name: Toby Mcgrath      YOB: 1955     Medical Record Number: [id number]  Diagnosis:    Chronic pain  Failed back syndrome of lumbar spine  Visit Info Length of Visit: 45 min  Arrived: On Time  Therapeutic Procedures/Exercises: 10 min; Therapeutic Activities: NA; Neuromuscular Re-education: 20 min   Self Care / Home Management Training: 15 min   Exercise/Activity Education Instruction:  Self Care - reviewed  flare management strategies.    Therapeutic Neuroscience Education: Reviewed the concept that in some people with chronic pain, the nervous system can become extra sensitive and act like a hypersensitive alarm system.  Discussed how anxiety and worry contribute to this alarm system and ultimately contribute to the pain experience.  Reviewed the role exercise and body centered imagery/relaxation practices can have on desensitizing/calming the nervous system and reducing pain.      Activity:  Aerobic Conditioning - hallway walk, 2 laps in 5'15\".    He declined stretching today d/t increased pain levels.    Led through seated kinesthetic awareness activity (body scan) with mindful breathing.  Pt tolerated for 5 min.  At start of activity pt reported his pain level at 9/10, his anxiety levels at 10/10 and his mm tension levels at 9/10; upon completion he reported his pain levels at 9/10, his anxiety levels down to 8/10 and his mm tension levels at 7/10.  A few moments later he reported further decreases in his anxiety levels (down to 6-7/10) and mm tension levels (down to 0/10).  \"I can feel a big difference throughout my body, I just feel more relaxed\" and \"while the pain is still high, I don't feel as bad\".  Practiced a second time and recorded it on the voice recorder pritesh on his phone so he can use it at home.  Encouraged the patient to practice 1x/H at home during his current flare.       Notes: Patient reports: he's in a great deal of pain and distress today.  Is worried something is " "wrong with his back/right LE.  \"I don't think it is just a flare b/c flares don't last a month!\" Reports over the interval he continued to feel increased sx (numbness mostly) from his right LB into his lateral thigh down to his foot but when he woke up this past Thursday (5 days ago) he had high levels of pain in his lateral thigh, concentrated around his superior lateral knee and his leg was \"locked up\".  He has been in high levels of pain ever since and is having a hard time finding any relief.  He does indicate his knee feels less like it is going to stay \"locked up\".  He continues to worry that something more serious is wrong b/c these sx seem like what he felt back when he needed surgeries initially.      Also reports increased stress levels over weekend - watched his young grandkids over weekend and needed to go with his sister out to [place] [employer] on Memorial Day holiday.      Pain ranges: today rates his pain at 9/10 (anxiety levels at 10/10 and muscle tension levels at 9/10)    Activity limitations: sitting and walking tolerance are further reduced from baseline of 15-20 min, sleep was very disruptive over past few nights - all more limited d/t increased sx  HEP/Self Care/Home Management Training:   Pacing/Mini Breaks/Self Care: is constantly changing positions, taking rest breaks as he doesn't tolerate much activity nor any one position for much of any length of time ;   Relaxation Practice: does use relaxation breathing more consistently - states it has been a bit more difficult as he has gotten more anxious over increased sx. ;   Posture Corrections: not addressed today.  ;   Walking program: reduced over past week as he isn't tolerating much ;   Heat/Ice/TENS: tried ice but it just made things burn and he didn't like it - using heat multiple times/day  ;   HEP didn't do much stretching since sx flared, \"I don't want to do anything to make it worse\"    Pt presents with poor tolerance to activity " today.  Appears very anxious RE: his pain sx, more agitated than usual.      Pt's anxiety and high reactivity response play a significant role in his pain experience.  When he is in a flare he has greater difficulty with self regulation.  Slow progression toward PT goals.  Anticipate continued need for PT beyond two remaining authorized visits d/t recent setback d/t pain flare as well as slow progression toward his goals.       [facility] visit 10/12 (auth ends 6/30/17)     Demonstrates/Verbalizes Technique: 3 (1= poor technique-difficulty performing exercises,significant cues required; 5= good technique-performs exercises without cues)  Body Awareness: 3 (1=low awareness; 5=high awareness)  Posture/Stability: 3 (1= poor posture, stability; 5= good posture, stability)   Motivational Level:   Cooperative but very anxious/agitated at beginning of session; by end of session he was considerably calmer. Participation:  Full   Patient Satisfaction:  satisfied   Response to Teaching:   demonstrated ability and verbalized, recall/understanding  Factors that affect learning: None   Plan of Care  Cont PT to support reactivation and integration of self regulation pain management skills. (next visit consider:  Progress core, functional strengthening, mini breaks as  sx self regulation)   Therapist: Florence Amato PT                 Date: 5/30/2017

## 2017-05-31 ENCOUNTER — OFFICE VISIT (OUTPATIENT)
Dept: PALLIATIVE MEDICINE | Facility: CLINIC | Age: 62
End: 2017-05-31
Payer: COMMERCIAL

## 2017-05-31 VITALS
DIASTOLIC BLOOD PRESSURE: 82 MMHG | HEART RATE: 79 BPM | BODY MASS INDEX: 34.04 KG/M2 | WEIGHT: 251 LBS | SYSTOLIC BLOOD PRESSURE: 142 MMHG

## 2017-05-31 DIAGNOSIS — M96.1 FAILED BACK SYNDROME OF LUMBAR SPINE: ICD-10-CM

## 2017-05-31 PROCEDURE — 99214 OFFICE O/P EST MOD 30 MIN: CPT | Performed by: NURSE PRACTITIONER

## 2017-05-31 RX ORDER — OXYCODONE HYDROCHLORIDE 5 MG/1
TABLET ORAL
Qty: 180 TABLET | Refills: 0 | Status: SHIPPED | OUTPATIENT
Start: 2017-05-31 | End: 2017-06-29

## 2017-05-31 ASSESSMENT — PAIN SCALES - GENERAL: PAINLEVEL: EXTREME PAIN (8)

## 2017-05-31 NOTE — NURSING NOTE
Chief Complaint   Patient presents with     Pain       Initial /82 (BP Location: Right arm, Patient Position: Chair, Cuff Size: Adult Regular)  Pulse 79  Wt 113.9 kg (251 lb)  BMI 34.04 kg/m2 Estimated body mass index is 34.04 kg/(m^2) as calculated from the following:    Height as of 5/17/17: 1.829 m (6').    Weight as of this encounter: 113.9 kg (251 lb).  Medication Reconciliation: complete       Artemio Coel MA  [facility]

## 2017-05-31 NOTE — PATIENT INSTRUCTIONS
After Visit Instructions:     Thank you for coming to [facility] for your care. It is my goal to partner with you to help you reach your optimal state of health.     I am recommending multidisciplinary care at this time.  The focus of care will be to continue gradual rehabilitation and pain management with medication adjustments as needed.    Continue daily self-care, identifying contributing factors, and monitoring variations in pain level. Continue to integrate self-care into your life.      1. Schedule physical therapy visits two times per month  2. Schedule follow-up with JIMBO Ernst NP-C in 4 weeks  3. Follow up with primary care regarding knee pain   4. Medication recommendations: No changes to pain medications.   5. ABSOLUTELY no more than 4 tablets of ibuprofen three times per day. Stay very well hydrated and always eat with this medication.   6. If you decide to try Aleve, follow package instructions. Stay very well hydrated and always eat with this medication. Do not take Aleve if you ibuprofen.       JIMBO Tran NP-C  [facility]    Contact information: [facility]    Please call if any side effects, questions, or concerns arise.    Nurse Triage line:  [phone]   Call this number with any questions or concerns. You may leave a detailed message anytime. Calls are typically returned Monday through Friday between 8 AM and 4:30 PM. We usually get back to you within 2 business days depending on the issue/request.    Scheduling number: [phone].  Call this number to schedule or change appointments.          Medication Refills Policy:    For non-narcotic medications, please your pharmacy directly to request a refill and the pharmacy will call the [facility] for authorization. Please allow 3-4 days for these refills.    For narcotic refills, call the nurse triage line and leave a message  requesting your refill along with the name of the pharmacy that you use. Narcotic prescriptions will be mailed to your pharmacy or you may pick them up at the clinic.  Please call 7-10 days before your refill is due  The above policy allows adequate time so that you do not run out of medication.    No Show - Late Cancellation - Late Arrival Policy  We believe regular attendance is key to your success in our program.    Any time you are unable to keep your appointment we ask that you call us at  least 24 hours in advance to let us know. This will allow us to offer the appointment time to another patient. The following is our policy for missed appointments. This also applies to appointments cancelled with less than 24 hours notice.    You will receive a letter after missing your 1st and 2nd appointments without contacting the clinic before your scheduled appointment time.     After missing 3 appointments without calling first, we will cancel all of your future appointments at [facility].    At that point, you will not be able to resume services unless approved by your care team  We understand that unforseen circumstances arise, however, out of respect for all concerned and to provide this appointment to another patient, this policy will be enforced.    Please note that most follow up appointments are 30 minutes long. If you arrive late, your provider may not be able to see you for the entire 30 minutes. Please also note that if you arrive more than 15 minutes for any appointment, it may be rescheduled.

## 2017-05-31 NOTE — PROGRESS NOTES
[facility]    CHIEF COMPLAINT: Pain     INTERVAL HISTORY:  Last seen on 4/26/17.        Recommendations/plan at the last visit included:  1. Schedule physical therapy visits two times per month  2. Schedule follow-up with JIMBO Ernst NP-C in last week of May or early June.   3. Medication recommendations:                No changes to current pain regimen.     Since his last visit, Toby Mcgrath reports:  - Having a lot of pain in right knee. He suspects that it is a gout flare although he is not having the usual swelling.    Pain Information:   Pain quality: No descriptor given    Pain timing: worst in the morning     Pain rating: intensity ranges from 8/10 to 9/10, and averages 8/10 on a 0-10 scale.   Aggravating factors include: Sitting   Relieving factors include: Walking      Annual Controlled Substance Agreement/UDS due date: July 2017      CURRENT RELEVANT PAIN MEDICATIONS:   Oxycodone 5 mg, 1-2 tabs every 6 hours, max 6 tabs per day  Gabapentin 600 mg t.i.d.      Patient is using the medication as prescribed: Yes  Is your medication helpful? Lyrica was more helpful than gabapentin  Medication side effects? denies any problems      Previous Pain Relevant Medications: (H--helped; HI--Helped initially; NH--No help; W--worse; SE--side effects)   NOTE: This medication information taken from patient's intake form, not medical records.   Opiates: Oxycodone:H  NSAIDS: Ibuprofen:H   Muscle Relaxants: Clonzepam: H  Anti-migraine mediations: none  Anti-depressants: Prozac:H   Sleep aids:none  Anti-convulsants: Gabapentin: H   Topicals: lioderm patches:NH  Other medications not covered above: none       [place] Board of Pharmacy Data Base Reviewed: YES; As expected, no concern for misuse/abuse.       SELF CARE:   How often do you practice SELF-CARE (relaxing, stretching, pacing, monitoring posture, taking mini-breaks) in a typical day:  Not at all      Medications:  Current Outpatient  Prescriptions   Medication Sig Dispense Refill     FLUoxetine (PROZAC) 20 MG capsule TAKE THREE CAPSULES BY MOUTH ONCE DAILY 270 capsule 3     tamsulosin (FLOMAX) 0.4 MG capsule TAKE TWO CAPSULES BY MOUTH ONCE DAILY 180 capsule 3     metFORMIN (GLUCOPHAGE-XR) 500 MG 24 hr tablet Take 1 tablet (500 mg) by mouth daily (with dinner) 90 tablet 1     finasteride (PROSCAR) 5 MG tablet Take 1 tablet (5 mg) by mouth daily For prostate enlargement. Please fill on $4 list 30 tablet 5     simvastatin (ZOCOR) 20 MG tablet Take 1 tablet (20 mg) by mouth At Bedtime 90 tablet 3     oxyCODONE (ROXICODONE) 5 MG IR tablet 1 tab q4h, PRN max 6 per day. May dispense on/after 5/5/17 30 days supply 180 tablet 0     pregabalin (LYRICA) 200 MG capsule Take 1 capsule (200 mg) by mouth 2 times daily 60 capsule 3     cyclobenzaprine (FLEXERIL) 5 MG tablet Take 1-2 tablets (5-10 mg) by mouth nightly as needed for muscle spasms 30 tablet 1     senna-docusate (SENOKOT-S;PERICOLACE) 8.6-50 MG per tablet Take 1 tablet by mouth 2 times daily 60 tablet 3     allopurinol (ZYLOPRIM) 100 MG tablet Take 3 tablets (300 mg) by mouth daily 270 tablet 0     omeprazole (PRILOSEC) 20 MG CR capsule Use every other day as needed 90 capsule 1     fluticasone (FLONASE) 50 MCG/ACT nasal spray Spray 1-2 sprays into both nostrils daily 3 Bottle 11     triamcinolone (KENALOG) 0.1 % cream Apply sparingly to affected area twice daily for 7 days. Then stop and use only for flares 30 g 3     blood glucose monitoring (NO BRAND SPECIFIED) test strip Use to test blood sugar 2-3 times daily (lena contour meter) 3 Box 0     blood glucose monitoring (LENA MICROLET) lancets Use to test blood sugars 2-3 times daily as directed (lena contour meter) 3 Box 0     aspirin 81 MG tablet Take 1 tablet (81 mg) by mouth daily 100 tablet 3       Review of Systems: A 10-point review of systems was negative, with the exception of chronic pain issues.      Social History: Reviewed;  unchanged from initial consultation.      Family history: Reviewed; unchanged from initial consultation.     PHYSICAL EXAM    There were no vitals filed for this visit.    Constitutional: healthy, alert, no distress  HEENT: Head atraumatic, normocephalic. Eyes without conjunctival injection or jaundice. Neck supple. No obvious neck masses.  Cardiovascular: Regular rate/rhythm; no murmurs/rubs/gallops appreciated.   Respiratory: Lungs clear to auscultation bilaterally.   Gastrointestinal: Normoactive bowel sounds. Abdomen soft, non-tender, without guarding/rebound.   : Deferred  Skin: No rash, lesions, or petechiae of exposed skin.   Extremities: Peripheral pulses intact. No clubbing, cyanosis, or edema.   Psychiatric/mental status: Alert, without lethargy or stupor. Appropriate affect. Mood normal.      Neurologic exam:  CN: Cranial nerves 2-12 are grossly normal.  Motor:  4/5 UE and LE strength, which is his baseline      Musculoskeletal exam:  Lumbar/Sacral spine:  Forward Flexion: 60 degrees  Ext: 20 degrees  Rotation/ext to right: painful   Rotation/ext to left:: painful  Tenderness in the lumbar spine at midline: Yes  Tenderness in the lumbar paraspinal muscles: Yes  Straight leg exam:positive     Sensory exam:  Light touch: normal bilateral upper and lower extremities   No allodynia, dysesthesia, or hyperalgesia.      DIRE Score for ongoing opioid management is calculated as follows:  Diagnosis = 3 pts (advanced condition; severe pain/objective findings)  Intractability = 2 pts (most treatments tried; patient not fully engaged/barriers)  Risk   Psych = 2 pts (personality dysfunction/mental illness that moderately interferes with care)  inability to control anger, outbursts, verbal abuse of staff  Chem Hlth = 2 pts (use of medications to cope with stress; chemical dependency in remission) medication focused  Reliability = 1 pt (medication misuse; missed appointments; rarely follows through) unsafe handling of  medication, 36 tablets of oxycodone missing from recent prescription.  Social = 2 pts (reduction in some relationships/life rolls)  (Psych + Chem hlth + Reliability + Social) = 12  Efficacy = 1 pt (poor function; minimal pain relief despite mod/high med dose)  DIRE Score = 13   7-13: likely NOT suitable candidate for long-term opioid analgesia  14-21: may be a suitable candidate for long-term opioid analgesia      DIAGNOSTIC TESTS:  Imaging Studies:   No new imaging      Assessment:   1. Degenerative disc disease, lumbar radiculopathy  2. Long-term use of opiate medications  3. History of inappropriate interactions with pain center staff, verbally abusive    Layo notes significant pain in right knee since last Thursday. No inciting event/accident noted. He vacillates between saying it feels like a flare of gout and that it can't be gout. No redness, warmth of significant swelling noted. As this is an acute issue, will have him see PCP for evaluation. Layo is working on emotional self regulation using skills learned in Pain PT. He is able to demonstrate this today in clinic when he became upset discussing medications. He has been overusing OTC Ibuprofen and Aleve. Reviewed appropriate dosing of these medications and potential consequences of over use.      Plan:    Diagnosis reviewed, treatment option addressed, and risk/benifits discussed.  Self-care instructions given.  I am recommending a multidisciplinary treatment plan to help this patient better manage pain.      1. Schedule physical therapy visits two times per month  2. Schedule follow-up with JIMBO Ernst, NP-C in 4 weeks  3. Follow up with primary care regarding knee pain   4. Medication recommendations: No changes to pain medications.   5. ABSOLUTELY no more than 4 tablets of ibuprofen three times per day. Stay very well hydrated and always eat with this medication.   6. If you decide to try Aleve, follow package instructions. Stay very well  hydrated and always eat with this medication. Do not take Aleve if you ibuprofen.       Total time spent face to face was 30 minutes and more than 50% of face to face time was spent in counseling and/or coordination of care regarding the diagnosis and recommendations above.      JIMBO Tran, NP-C   [facility]

## 2017-05-31 NOTE — MR AVS SNAPSHOT
After Visit Summary   5/31/2017    Toby Mcgrath    MRN: [id number]           Patient Information     Date Of Birth          1955        Visit Information        Provider Department      5/31/2017 10:30 AM Jayashree Goodman APRN CNP [facility] [place]        Today's Diagnoses     Failed back syndrome of lumbar spine          Care Instructions    After Visit Instructions:     Thank you for coming to [facility] for your care. It is my goal to partner with you to help you reach your optimal state of health.     I am recommending multidisciplinary care at this time.  The focus of care will be to continue gradual rehabilitation and pain management with medication adjustments as needed.    Continue daily self-care, identifying contributing factors, and monitoring variations in pain level. Continue to integrate self-care into your life.      1. Schedule physical therapy visits two times per month  2. Schedule follow-up with JIMBO Ernst, NP-C in 4 weeks  3. Follow up with primary care regarding knee pain   4. Medication recommendations: No changes to pain medications.   5. ABSOLUTELY no more than 4 tablets of ibuprofen three times per day. Stay very well hydrated and always eat with this medication.   6. If you decide to try Aleve, follow package instructions. Stay very well hydrated and always eat with this medication. Do not take Aleve if you ibuprofen.       JIMBO Tran, NP-C  [facility]    Contact information: [facility]    Please call if any side effects, questions, or concerns arise.    Nurse Triage line:  [phone]   Call this number with any questions or concerns. You may leave a detailed message anytime. Calls are typically returned Monday through Friday between 8 AM and 4:30 PM. We usually get back to you within 2 business days depending on the issue/request.    Scheduling  number: [phone].  Call this number to schedule or change appointments.          Medication Refills Policy:    For non-narcotic medications, please your pharmacy directly to request a refill and the pharmacy will call the [facility] for authorization. Please allow 3-4 days for these refills.    For narcotic refills, call the nurse triage line and leave a message requesting your refill along with the name of the pharmacy that you use. Narcotic prescriptions will be mailed to your pharmacy or you may pick them up at the clinic.  Please call 7-10 days before your refill is due  The above policy allows adequate time so that you do not run out of medication.    No Show - Late Cancellation - Late Arrival Policy  We believe regular attendance is key to your success in our program.    Any time you are unable to keep your appointment we ask that you call us at  least 24 hours in advance to let us know. This will allow us to offer the appointment time to another patient. The following is our policy for missed appointments. This also applies to appointments cancelled with less than 24 hours notice.    You will receive a letter after missing your 1st and 2nd appointments without contacting the clinic before your scheduled appointment time.     After missing 3 appointments without calling first, we will cancel all of your future appointments at [facility].    At that point, you will not be able to resume services unless approved by your care team  We understand that unforseen circumstances arise, however, out of respect for all concerned and to provide this appointment to another patient, this policy will be enforced.    Please note that most follow up appointments are 30 minutes long. If you arrive late, your provider may not be able to see you for the entire 30 minutes. Please also note that if you arrive more than 15 minutes for any appointment, it may be rescheduled.                     "                 Follow-ups after your visit        Your next 10 appointments already scheduled     Valentin 15, 2017 10:00 AM CDT   Return Visit with Florence Amato PT   [facility] ([facility])    [address]  [address]  [facility] [id number] [phone]            2017 11:15 AM CDT   Return Visit with Florence Amato PT   [facility] ([facility])    [address]  [address]  [facility] [address] [phone]              Who to contact     If you have questions or need follow up information about today's clinic visit or your schedule please contact [facility] directly at [phone].  Normal or non-critical lab and imaging results will be communicated to you by MyChart, letter or phone within 4 business days after the clinic has received the results. If you do not hear from us within 7 days, please contact the clinic through MyChart or phone. If you have a critical or abnormal lab result, we will notify you by phone as soon as possible.  Submit refill requests through [employer] or call your pharmacy and they will forward the refill request to us. Please allow 3 business days for your refill to be completed.          Additional Information About Your Visit        [employer] Information     [employer] lets you send messages to your doctor, view your test results, renew your prescriptions, schedule appointments and more. To sign up, go to [email or web address] . Click on \"Log in\" on the left side of the screen, which will take you to the Welcome page. Then click on \"Sign up Now\" on the right side of the page.     You will be asked to enter the access code listed below, as well as some personal information. Please follow the directions to create your username and password.     Your access code is: [email or web address]  Expires: 7/10/2017  1:17 PM     Your access code will  in 90 days. If you need help or a new code, " please call your [place] clinic or [phone].        Care EveryWhere ID     This is your Care EveryWhere ID. This could be used by other organizations to access your [place] medical records  [id number]        Your Vitals Were     Pulse BMI (Body Mass Index)                79 34.04 kg/m2           Blood Pressure from Last 3 Encounters:   05/31/17 142/82   05/17/17 117/82   04/26/17 134/81    Weight from Last 3 Encounters:   05/31/17 113.9 kg (251 lb)   05/17/17 113.9 kg (251 lb)   04/26/17 114.3 kg (252 lb)              Today, you had the following     No orders found for display         Today's Medication Changes          These changes are accurate as of: 5/31/17 10:38 AM.  If you have any questions, ask your nurse or doctor.               These medicines have changed or have updated prescriptions.        Dose/Directions    oxyCODONE 5 MG IR tablet   Commonly known as:  ROXICODONE   This may have changed:  additional instructions   Used for:  Failed back syndrome of lumbar spine   Changed by:  Jayashree Goodman APRN CNP        1 tab q4h, PRN max 6 per day. May dispense on/after 6/4/17 30 days supply   Quantity:  180 tablet   Refills:  0            Where to get your medicines      Some of these will need a paper prescription and others can be bought over the counter.  Ask your nurse if you have questions.     Bring a paper prescription for each of these medications     oxyCODONE 5 MG IR tablet                Primary Care Provider Office Phone # Fax #    Connie Haskins [phone]       [facility] [address] [address]        Thank you!     Thank you for choosing [facility] [place]  for your care. Our goal is always to provide you with excellent care. Hearing back from our patients is one way we can continue to improve our services. Please take a few minutes to complete the written survey that you may receive in the mail after your  visit with us. Thank you!             Your Updated Medication List - Protect others around you: Learn how to safely use, store and throw away your medicines at www.disposemymeds.org.          This list is accurate as of: 5/31/17 10:38 AM.  Always use your most recent med list.                   Brand Name Dispense Instructions for use    allopurinol 100 MG tablet    ZYLOPRIM    270 tablet    Take 3 tablets (300 mg) by mouth daily       aspirin 81 MG tablet     100 tablet    Take 1 tablet (81 mg) by mouth daily       blood glucose monitoring lancets     3 Box    Use to test blood sugars 2-3 times daily as directed (lena contour meter)       blood glucose monitoring test strip    no brand specified    3 Box    Use to test blood sugar 2-3 times daily (lena contour meter)       cyclobenzaprine 5 MG tablet    FLEXERIL    30 tablet    Take 1-2 tablets (5-10 mg) by mouth nightly as needed for muscle spasms       finasteride 5 MG tablet    PROSCAR    30 tablet    Take 1 tablet (5 mg) by mouth daily For prostate enlargement. Please fill on $4 list       FLUoxetine 20 MG capsule    PROzac    270 capsule    TAKE THREE CAPSULES BY MOUTH ONCE DAILY       fluticasone 50 MCG/ACT spray    FLONASE    3 Bottle    Spray 1-2 sprays into both nostrils daily       metFORMIN 500 MG 24 hr tablet    GLUCOPHAGE-XR    90 tablet    Take 1 tablet (500 mg) by mouth daily (with dinner)       omeprazole 20 MG CR capsule    priLOSEC    90 capsule    Use every other day as needed       oxyCODONE 5 MG IR tablet    ROXICODONE    180 tablet    1 tab q4h, PRN max 6 per day. May dispense on/after 6/4/17 30 days supply       Pregabalin 200 MG capsule    LYRICA    60 capsule    Take 1 capsule (200 mg) by mouth 2 times daily       senna-docusate 8.6-50 MG per tablet    SENOKOT-S;PERICOLACE    60 tablet    Take 1 tablet by mouth 2 times daily       simvastatin 20 MG tablet    ZOCOR    90 tablet    Take 1 tablet (20 mg) by mouth At Bedtime       tamsulosin  0.4 MG capsule    FLOMAX    180 capsule    TAKE TWO CAPSULES BY MOUTH ONCE DAILY       triamcinolone 0.1 % cream    KENALOG    30 g    Apply sparingly to affected area twice daily for 7 days. Then stop and use only for flares

## 2017-06-02 ENCOUNTER — OFFICE VISIT (OUTPATIENT)
Dept: FAMILY MEDICINE | Facility: CLINIC | Age: 62
End: 2017-06-02
Payer: COMMERCIAL

## 2017-06-02 VITALS
TEMPERATURE: 97.7 F | OXYGEN SATURATION: 96 % | WEIGHT: 250.19 LBS | BODY MASS INDEX: 33.89 KG/M2 | HEIGHT: 72 IN | SYSTOLIC BLOOD PRESSURE: 128 MMHG | HEART RATE: 88 BPM | DIASTOLIC BLOOD PRESSURE: 78 MMHG

## 2017-06-02 DIAGNOSIS — M25.561 ACUTE PAIN OF RIGHT KNEE: Primary | ICD-10-CM

## 2017-06-02 PROCEDURE — 99213 OFFICE O/P EST LOW 20 MIN: CPT | Performed by: PHYSICIAN ASSISTANT

## 2017-06-02 RX ORDER — COLCHICINE 0.6 MG/1
1 CAPSULE ORAL 2 TIMES DAILY PRN
Qty: 20 CAPSULE | Refills: 0 | Status: SHIPPED | OUTPATIENT
Start: 2017-06-02 | End: 2018-09-27

## 2017-06-02 NOTE — MR AVS SNAPSHOT
After Visit Summary   6/2/2017    Toby Mcgrath    MRN: [id number]           Patient Information     Date Of Birth          1955        Visit Information        Provider Department      6/2/2017 10:40 AM [place] Kwong PA-C [facility]        Today's Diagnoses     Acute pain of right knee    -  1      Care Instructions    Please hold your simvastatin while you are taking the medicine for your knee.  Call me on Monday to let me know how things are progressing.  Please ice your knee 3 times a day for 10 minutes.          Follow-ups after your visit        Your next 10 appointments already scheduled     Jun 02, 2017 10:40 AM CDT   Office Visit with [place] Kwong PA-C   [facility] ([facility])    [address] [facility] [address] [phone]           Bring a current list of meds and any records pertaining to this visit.  For Physicals, please bring immunization records and any forms needing to be filled out.  Please arrive 10 minutes early to complete paperwork.            Valentin 15, 2017 10:00 AM CDT   Return Visit with Florence Amato PT   [facility] ([facility])    [address]  [address]  [facility] [address] [phone]            Jun 27, 2017 10:00 AM CDT   Return Visit with JIMBO Sutton CNP   [facility] ([facility])    [address]  [address]  [facility] [address] [phone]            Jun 27, 2017 11:15 AM CDT   Return Visit with Florence Amato PT   [facility] ([facility])    [address]  [address]  [facility] [id number] [phone]              Who to contact     If you have questions or need follow up information about today's clinic visit or your schedule please contact [facility] directly at [phone].  Normal or non-critical lab and imaging results will be  "communicated to you by [employer], letter or phone within 4 business days after the clinic has received the results. If you do not hear from us within 7 days, please contact the clinic through [employer] or phone. If you have a critical or abnormal lab result, we will notify you by phone as soon as possible.  Submit refill requests through [employer]izer or call your pharmacy and they will forward the refill request to us. Please allow 3 business days for your refill to be completed.          Additional Information About Your Visit        [employer]izer Information     Organizer lets you send messages to your doctor, view your test results, renew your prescriptions, schedule appointments and more. To sign up, go to [email or web address] . Click on \"Log in\" on the left side of the screen, which will take you to the Welcome page. Then click on \"Sign up Now\" on the right side of the page.     You will be asked to enter the access code listed below, as well as some personal information. Please follow the directions to create your username and password.     Your access code is: [email or web address]  Expires: 7/10/2017  1:17 PM     Your access code will  in 90 days. If you need help or a new code, please call your [place] clinic or [phone].        Care EveryWhere ID     This is your Care EveryWhere ID. This could be used by other organizations to access your [place] medical records  [id number]        Your Vitals Were     Pulse Temperature Height Pulse Oximetry BMI (Body Mass Index)       88 97.7  F (36.5  C) (Oral) 6' (1.829 m) 96% 33.93 kg/m2        Blood Pressure from Last 3 Encounters:   17 128/78   17 142/82   17 117/82    Weight from Last 3 Encounters:   17 250 lb 3 oz (113.5 kg)   17 251 lb (113.9 kg)   17 251 lb (113.9 kg)              Today, you had the following     No orders found for display         Today's Medication Changes          These changes are accurate as of: 17 10:32 AM.  If " you have any questions, ask your nurse or doctor.               Start taking these medicines.        Dose/Directions    Colchicine 0.6 MG Caps   Commonly known as:  MITIGARE   Used for:  Acute pain of right knee   Started by:  Campbell Kwong PA-C        Dose:  1 capsule   Take 0.6 mg by mouth 2 times daily as needed   Quantity:  20 capsule   Refills:  0            Where to get your medicines      These medications were sent to [facility] [address] [place] [address] [address] [facility]  [address] [facility] [place] [place] [address]     Phone:  [phone]     Colchicine 0.6 MG Caps                Primary Care Provider Office Phone # Fax #    [place] Haskins [phone]       [facility] [address] [address]        Thank you!     Thank you for choosing [facility]  for your care. Our goal is always to provide you with excellent care. Hearing back from our patients is one way we can continue to improve our services. Please take a few minutes to complete the written survey that you may receive in the mail after your visit with us. Thank you!             Your Updated Medication List - Protect others around you: Learn how to safely use, store and throw away your medicines at www.disposemymeds.org.          This list is accurate as of: 6/2/17 10:32 AM.  Always use your most recent med list.                   Brand Name Dispense Instructions for use    allopurinol 100 MG tablet    ZYLOPRIM    270 tablet    Take 3 tablets (300 mg) by mouth daily       aspirin 81 MG tablet     100 tablet    Take 1 tablet (81 mg) by mouth daily       blood glucose monitoring lancets     3 Box    Use to test blood sugars 2-3 times daily as directed (lena contour meter)       blood glucose monitoring test strip    no brand specified    3 Box    Use to test blood sugar 2-3 times daily (lena contour meter)       Colchicine 0.6 MG Caps    MITIGARE    20 capsule     Take 0.6 mg by mouth 2 times daily as needed       cyclobenzaprine 5 MG tablet    FLEXERIL    30 tablet    Take 1-2 tablets (5-10 mg) by mouth nightly as needed for muscle spasms       finasteride 5 MG tablet    PROSCAR    30 tablet    Take 1 tablet (5 mg) by mouth daily For prostate enlargement. Please fill on $4 list       FLUoxetine 20 MG capsule    PROzac    270 capsule    TAKE THREE CAPSULES BY MOUTH ONCE DAILY       fluticasone 50 MCG/ACT spray    FLONASE    3 Bottle    Spray 1-2 sprays into both nostrils daily       metFORMIN 500 MG 24 hr tablet    GLUCOPHAGE-XR    90 tablet    Take 1 tablet (500 mg) by mouth daily (with dinner)       omeprazole 20 MG CR capsule    priLOSEC    90 capsule    Use every other day as needed       oxyCODONE 5 MG IR tablet    ROXICODONE    180 tablet    1 tab q4h, PRN max 6 per day. May dispense on/after 6/4/17 30 days supply       Pregabalin 200 MG capsule    LYRICA    60 capsule    Take 1 capsule (200 mg) by mouth 2 times daily       senna-docusate 8.6-50 MG per tablet    SENOKOT-S;PERICOLACE    60 tablet    Take 1 tablet by mouth 2 times daily       simvastatin 20 MG tablet    ZOCOR    90 tablet    Take 1 tablet (20 mg) by mouth At Bedtime       tamsulosin 0.4 MG capsule    FLOMAX    180 capsule    TAKE TWO CAPSULES BY MOUTH ONCE DAILY       triamcinolone 0.1 % cream    KENALOG    30 g    Apply sparingly to affected area twice daily for 7 days. Then stop and use only for flares

## 2017-06-02 NOTE — PROGRESS NOTES
SUBJECTIVE:                                                    Toby Mcgrath is a 61 year old male who presents to clinic today for the following health issues:      Musculoskeletal problem/pain      Duration: Thrusday    Description  Location: Right knee right alteral side     Intensity:  moderate, severe    Accompanying signs and symptoms: radiation of pain to  Upper thigh - cramps     History  Previous similar problem: YES- unsure patient has back issue and gout  Previous evaluation:  none    Precipitating or alleviating factors:  Trauma or overuse: no   Aggravating factors include: sitting, standing, walking, climbing stairs and  With certain movements    Therapies tried and outcome: rest/inactivity and NSAID - advil   All.DIVYA Sorenson          Problem list and histories reviewed & adjusted, as indicated.  Additional history: 62 y/o new to me male here for evaluation of acute onset of right knee pain.  Pain essentially started yesterday.  Pain with movements.  He has had gout in the past, unsure if this feel the same.  The pain is more just on the side, where gout usually affects the whole knee.  Prior to this, he was having some swelling, so he tooks ome advil, and that did help.    He cannot think of any injury, just woke up with some pain.  No redness, some mild swelling.    He did have visit with pain clinic and they did not feel that it is coming from his back.      BP Readings from Last 3 Encounters:   06/02/17 128/78   05/31/17 142/82   05/17/17 117/82    Wt Readings from Last 3 Encounters:   06/02/17 250 lb 3 oz (113.5 kg)   05/31/17 251 lb (113.9 kg)   05/17/17 251 lb (113.9 kg)                    Reviewed and updated as needed this visit by clinical staff       Reviewed and updated as needed this visit by Provider         ROS:  Constitutional, HEENT, cardiovascular, pulmonary, gi and gu systems are negative, except as otherwise noted.    OBJECTIVE:                                                     /78 (BP Location: Left arm, Patient Position: Left side, Cuff Size: Adult Large)  Pulse 88  Temp 97.7  F (36.5  C) (Oral)  Ht 6' (1.829 m)  Wt 250 lb 3 oz (113.5 kg)  SpO2 96%  BMI 33.93 kg/m2  Body mass index is 33.93 kg/(m^2).  GENERAL: alert and no distress  EYES: Eyes grossly normal to inspection  RESP: lungs clear to auscultation - no rales, rhonchi or wheezes  CV: regular rate and rhythm, normal S1 S2, no S3 or S4, no murmur, click or rub, no peripheral edema and peripheral pulses strong  MS: mild swelling over right knee with some generalized tenderness.  Has good active ROM.      Diagnostic Test Results:  none      ASSESSMENT/PLAN:                                                            1. Acute pain of right knee  Very well could be gout with his history.  Will ice, rest, and trial colchicine over the weekend.  If symptoms persist or worsen into next week, will have him follow up.  Will hold simvastatin while on.  - Colchicine (MITIGARE) 0.6 MG CAPS; Take 0.6 mg by mouth 2 times daily as needed  Dispense: 20 capsule; Refill: 0        [place] Kwong PA-C  [facility]

## 2017-06-08 ENCOUNTER — TELEPHONE (OUTPATIENT)
Dept: PALLIATIVE MEDICINE | Facility: CLINIC | Age: 62
End: 2017-06-08

## 2017-06-08 DIAGNOSIS — M54.50 CHRONIC LUMBOSACRAL PAIN: Primary | ICD-10-CM

## 2017-06-08 DIAGNOSIS — G89.29 CHRONIC LUMBOSACRAL PAIN: Primary | ICD-10-CM

## 2017-06-08 NOTE — TELEPHONE ENCOUNTER
Pt calling to say Lyrica medication jumped from $40 to $157.   Cannot afford this medication. Wondering if he stops this medication would he have withdrawals?  Please advise.     Sara BRAVO    [facility] [place]

## 2017-06-08 NOTE — TELEPHONE ENCOUNTER
I called the patient back and left a VM asking that he not quit using his Lyrica abruptly. I asked him to call back and specify his current dose as well as how many capsules he has left. Lyrica sig is 200 MG bid. Will route to provider Barnes for review and instructions.    MARYAM MayN, RN  Care Coordinator  [facility] [place]

## 2017-06-08 NOTE — TELEPHONE ENCOUNTER
VM left at 4:15pm:    Pt would like know if there are any alternatives for lyrica. Pt is unable to  med due to cost. Please adv

## 2017-06-08 NOTE — TELEPHONE ENCOUNTER
Ideally, we would decrease slower than 200mg at a time. If he is unable to purchase a small script , then we could do 200mg daily x 5 days, then stop.  If he is nearly out, then I would do 100mg BID x 3 days, then 100mg daily x 3 days, then stop.    Charisse Chatterjee MD  [facility]

## 2017-06-09 RX ORDER — GABAPENTIN 300 MG/1
600 CAPSULE ORAL 3 TIMES DAILY
Qty: 180 CAPSULE | Refills: 0 | Status: SHIPPED | OUTPATIENT
Start: 2017-06-09 | End: 2017-06-29

## 2017-06-09 NOTE — TELEPHONE ENCOUNTER
The below instructions were provided to the patient. We went over the instructions several times until he said he understood and had no further questions.     DIAN May, RN  Care Coordinator  [facility] [place]

## 2017-06-09 NOTE — TELEPHONE ENCOUNTER
Patient is currently taking Lyrica 200 mg BID. Patient's renal function is within normal limits. Discussed with both inpatient and outpatient pharmacy, who suggested conversion to total daily dose of 1800 mg. Patient would finish his Lyrica today (2 tabs left). As of tomorrow, he would start Gabapentin 600 mg BID. If well tolerated, he would increase after 3-5 days to 600 mg TID. Continue 600 mg TID thereafter. More common risks/side effects include: fatigue, incoordination, weight gain/water retention, mood changes.      Prescription sent to pharmacy. One month provided. Patient has follow up with JOHN Ernst CNP on 06/29/17.    Britany Garduno MD  [facility]

## 2017-06-09 NOTE — TELEPHONE ENCOUNTER
This patient has 2 caps of Lyrica 200 MG left. He cannot afford the next refill. He reports the co-pay is over $150.00. He would like to switch to Gabapentin. He reports he has tolerated that medication in the past. He is requesting a script for Gabapentin be faxed to the [facility] in [place].    MARYAM MayN, RN  Care Coordinator  [facility] [place]

## 2017-06-15 ENCOUNTER — OFFICE VISIT (OUTPATIENT)
Dept: PALLIATIVE MEDICINE | Facility: CLINIC | Age: 62
End: 2017-06-15
Payer: COMMERCIAL

## 2017-06-15 DIAGNOSIS — G89.29 CHRONIC PAIN: Primary | ICD-10-CM

## 2017-06-15 DIAGNOSIS — M96.1 FAILED BACK SYNDROME OF LUMBAR SPINE: ICD-10-CM

## 2017-06-15 PROCEDURE — 97110 THERAPEUTIC EXERCISES: CPT | Mod: GP | Performed by: PHYSICAL THERAPIST

## 2017-06-15 NOTE — MR AVS SNAPSHOT
"              After Visit Summary   6/15/2017    Toby Mcgrath    MRN: [id number]           Patient Information     Date Of Birth          1955        Visit Information        Provider Department      6/15/2017 10:00 AM Florence Amato, PT [facility]        Today's Diagnoses     Chronic pain    -  1    Failed back syndrome of lumbar spine           Follow-ups after your visit        Your next 10 appointments already scheduled     Jun 27, 2017 11:15 AM CDT   Return Visit with Florence Amato PT   [facility] ([facility])    [address]  [address]  [facility] [address] [phone]            Jun 29, 2017  9:30 AM CDT   Return Visit with JIMBO Sutton CNP   [facility] ([facility])    [address]  [address]  [facility] [address] [phone]              Who to contact     If you have questions or need follow up information about today's clinic visit or your schedule please contact [facility] directly at [phone].  Normal or non-critical lab and imaging results will be communicated to you by [facility], letter or phone within 4 business days after the clinic has received the results. If you do not hear from us within 7 days, please contact the clinic through [facility] or phone. If you have a critical or abnormal lab result, we will notify you by phone as soon as possible.  Submit refill requests through [facility] or call your pharmacy and they will forward the refill request to us. Please allow 3 business days for your refill to be completed.          Additional Information About Your Visit        [facility] Information     [facility] lets you send messages to your doctor, view your test results, renew your prescriptions, schedule appointments and more. To sign up, go to [email or web address] . Click on \"Log in\" on the left side of the screen, which will take you to the Welcome " "page. Then click on \"Sign up Now\" on the right side of the page.     You will be asked to enter the access code listed below, as well as some personal information. Please follow the directions to create your username and password.     Your access code is: [email or web address]  Expires: 7/10/2017  1:17 PM     Your access code will  in 90 days. If you need help or a new code, please call your [place] clinic or [phone].        Care EveryWhere ID     This is your Care EveryWhere ID. This could be used by other organizations to access your [facility] medical records  [id number]         Blood Pressure from Last 3 Encounters:   17 128/78   17 142/82   17 117/82    Weight from Last 3 Encounters:   17 113.5 kg (250 lb 3 oz)   17 113.9 kg (251 lb)   17 113.9 kg (251 lb)              We Performed the Following     THERAPEUTIC EXERCISES        Primary Care Provider Office Phone # Fax #    AnvikNiyah Haskins [phone]       [facility] [address] [address]        Equal Access to Services     ALMA WEBER : Hadii aad ku hadasho Soomaali, waaxda luqadaha, qaybta kaalmada adeegyada, waxay idiin hayleahn milagros smith . So [facility] [phone].    ATENCIÓN: Si habla español, tiene a holliday disposición servicios gratuitos de asistencia lingüística. Llame al [phone].    We comply with applicable federal civil rights laws and [place] laws. We do not discriminate on the basis of race, color, national origin, age, disability sex, sexual orientation or gender identity.            Thank you!     Thank you for choosing [facility]  for your care. Our goal is always to provide you with excellent care. Hearing back from our patients is one way we can continue to improve our services. Please take a few minutes to complete the written survey that you may receive in the mail after your visit with us. Thank you!           "   Your Updated Medication List - Protect others around you: Learn how to safely use, store and throw away your medicines at www.disposemymeds.org.          This list is accurate as of: 6/15/17 11:59 PM.  Always use your most recent med list.                   Brand Name Dispense Instructions for use Diagnosis    allopurinol 100 MG tablet    ZYLOPRIM    270 tablet    Take 3 tablets (300 mg) by mouth daily    Acute gout of right elbow, unspecified cause       aspirin 81 MG tablet     100 tablet    Take 1 tablet (81 mg) by mouth daily    Type 2 diabetes, HbA1C goal < 8% (H)       blood glucose monitoring lancets     3 Box    Use to test blood sugars 2-3 times daily as directed (lena contour meter)    Type 2 diabetes, HbA1C goal < 8% (H)       blood glucose monitoring test strip    no brand specified    3 Box    Use to test blood sugar 2-3 times daily (lena contour meter)    Type 2 diabetes, HbA1C goal < 8% (H)       Colchicine 0.6 MG Caps    MITIGARE    20 capsule    Take 0.6 mg by mouth 2 times daily as needed    Acute pain of right knee       cyclobenzaprine 5 MG tablet    FLEXERIL    30 tablet    Take 1-2 tablets (5-10 mg) by mouth nightly as needed for muscle spasms    Back muscle spasm       finasteride 5 MG tablet    PROSCAR    30 tablet    Take 1 tablet (5 mg) by mouth daily For prostate enlargement. Please fill on $4 list    Benign prostatic hyperplasia without lower urinary tract symptoms, unspecified morphology       FLUoxetine 20 MG capsule    PROzac    270 capsule    TAKE THREE CAPSULES BY MOUTH ONCE DAILY    Mild episode of recurrent major depressive disorder (H)       fluticasone 50 MCG/ACT spray    FLONASE    3 Bottle    Spray 1-2 sprays into both nostrils daily    Chronic rhinitis       gabapentin 300 MG capsule    NEURONTIN    180 capsule    Take 2 capsules (600 mg) by mouth 3 times daily . Follow MD instructions for titration.    Chronic lumbosacral pain       metFORMIN 500 MG 24 hr tablet     GLUCOPHAGE-XR    90 tablet    Take 1 tablet (500 mg) by mouth daily (with dinner)    Type 2 diabetes mellitus without complication, without long-term current use of insulin (H)       omeprazole 20 MG CR capsule    priLOSEC    90 capsule    Use every other day as needed    Gastroesophageal reflux disease without esophagitis       oxyCODONE 5 MG IR tablet    ROXICODONE    180 tablet    1 tab q4h, PRN max 6 per day. May dispense on/after 6/4/17 30 days supply    Failed back syndrome of lumbar spine       senna-docusate 8.6-50 MG per tablet    SENOKOT-S;PERICOLACE    60 tablet    Take 1 tablet by mouth 2 times daily    Lumbar radiculopathy       simvastatin 20 MG tablet    ZOCOR    90 tablet    Take 1 tablet (20 mg) by mouth At Bedtime    Hyperlipidemia LDL goal <100       tamsulosin 0.4 MG capsule    FLOMAX    180 capsule    TAKE TWO CAPSULES BY MOUTH ONCE DAILY    Hypertrophy of prostate with urinary obstruction       triamcinolone 0.1 % cream    KENALOG    30 g    Apply sparingly to affected area twice daily for 7 days. Then stop and use only for flares    Eczema, unspecified type

## 2017-06-15 NOTE — PROGRESS NOTES
"Patient Name: Toby Mcgrath      YOB: 1955     Medical Record Number: [id number]  Diagnosis:    Chronic pain  Failed back syndrome of lumbar spine  Visit Info Length of Visit: 30 min (pt had to leave early to get to another appt so PT session shortened)  Arrived: On Time  Therapeutic Procedures/Exercises: 30 min; Therapeutic Activities: NA; Neuromuscular Re-education: NA    Exercise/Activity Education Instruction and Activity:  Strength Training - Wall squat, standing hip ABD with \"zipper\" (TA) abdominal set, supine SLR (with opposite knee bent and \"zipper\" (TA) abdominal set) - mod cues for all;   Core Stabilization - reviewed \"zipper\" (TA) abdominal set - set reminder cues to practice off and on throughout the day.  Reviewed the importance of activating core mms for spine support with his functional mobility.    Stretching: seated outer thigh str - drop knee out to side (hip ER) since he does not tolerate pretzel str.  Perform with mindful breathing.     Notes: Pt reports his right knee pain is about gone - went to his doctor and learned it was his gout - started on meds and sx decreased significantly.     HEP/Self Care: Continues stretching and walking several times / wk.  Has really been focusing his efforts on relaxation breathing and the body scan (made brief recording on his phone at last session for pt to use at home) - states this has been the most helpful tool.  Would like to do more strengthening.  Has forgotten to practice \"zipper\" (TA) abdominal set - \"I don't remember it.\"    Pt more consistently incorporating self regulation techniques for relaxation and pain reactivity response.  He is also more consistently recognizing the impact his anxiety is playing on his pain experience; despite this he is still easily triggered to react emotionally vs respond to his pain when there is a change in his sx but the duration of his reactivity response is lessening.      Pt would continue to benefit " from PT to work on developing sustainable and self directed self care strategies for pain management through HEP instruction, posture / body awareness training and self care education. Pt has one remaining visit in his original authorization period.  Will request authorization for additional visits to further support efforts of pt becoming more independent in sustainable self care strategies for managing his chroinc pain.      [facility] auth visit 11/12     Demonstrates/Verbalizes Technique: 3 (1= poor technique-difficulty performing exercises,significant cues required; 5= good technique-performs exercises without cues)  Body Awareness: 3 (1=low awareness; 5=high awareness)  Posture/Stability: 3 (1= poor posture, stability; 5= good posture, stability)   Motivational Level:   Cooperative Participation:  Full   Patient Satisfaction:  satisfied   Response to Teaching:   demonstrated ability and verbalized, recall/understanding  Factors that affect learning: None   Plan of Care  Cont PT to support reactivation and integration of self regulation pain management skills. (next visit consider:  progress core, pedometer?,  flare management )   Therapist: Florence Amato PT                 Date: 6/15/2017

## 2017-06-27 ENCOUNTER — OFFICE VISIT (OUTPATIENT)
Dept: PALLIATIVE MEDICINE | Facility: CLINIC | Age: 62
End: 2017-06-27
Payer: COMMERCIAL

## 2017-06-27 DIAGNOSIS — G89.29 CHRONIC PAIN: Primary | ICD-10-CM

## 2017-06-27 DIAGNOSIS — M96.1 FAILED BACK SYNDROME OF LUMBAR SPINE: ICD-10-CM

## 2017-06-27 PROCEDURE — 97535 SELF CARE MNGMENT TRAINING: CPT | Mod: GP | Performed by: PHYSICAL THERAPIST

## 2017-06-27 PROCEDURE — 97110 THERAPEUTIC EXERCISES: CPT | Mod: GP | Performed by: PHYSICAL THERAPIST

## 2017-06-27 NOTE — PROGRESS NOTES
"Patient Name: Toby Mcgrath      YOB: 1955     Medical Record Number: [id number]  Diagnosis:    Chronic pain  Failed back syndrome of lumbar spine  Visit Info Length of Visit: 45 min  Arrived: On Time  Therapeutic Procedures/Exercises: 20 min; Therapeutic Activities: NA; Neuromuscular Re-education: NA   Self Care / Home Management Trainin min   Exercise/Activity Education Instruction:  Self Care - reviewed flare management - wrote out a flare plan  Activity:  Strength Training - standing hip ABD, wall squat all x 10 reps - required moderate cueing with each exercise to add stabilization with \"zipper\" (TA) abdominal set and for form (reduce compensatory movements and increase comfort).    Core Stabilization: review of \"zipper\" (TA) abdominal set - instructed to add to his functional mobility /transitional movements  Stretching:  Reviewed seated outer hip stretch.      Offered resource of the [employer] program to inquire about reachers.  Or consider obtaining from [employer].    Notes: Patient reports: his gout pain sx are all gone.  Generally he is feeling pretty good - \"it's a good day\".  Still feels his back pain is flared up (started end of March) but is slowly resolving.  \"Usually when I have a flare it doesn't last this long.\"   Pain ranges: at best 5/10, at worst 9/10, ADP 5-9/10.   Activity tolerance: on a good day walking tolerance is just under a mile before his sx start to bother him (last summer was able to walk 1.5 miles before sx started to increase); sitting tolerance ranges from 15-30 min - states before this latest flare he was able to sit for longer at a stretch before his pain prompted him to get up; continues to have difficulty retrieving things from low position - states he either gets help or leaves it at times until he can manage it.      HEP/Self Care/Home Management Training:   Pacing/Mini Breaks/Self Care: using pacing fairly regularly - partially b/c he " "can't tolerate doing any one activity for very long; is more consistently taking mini breaks to take inventory of how he is feeling for  sx self regulation - but still forgets at times and may not do so for a few days at a stretch ;   Relaxation Practice: turns to relaxation / mindful breathing and use of relaxation/body scan recording frequently throughout the day to manage both his pain and his mood; states this has been the most beneficial tool he has learned - \"it has made the most difference for me\" ;   Posture Corrections: not addressed today.  ;   Walking program: walking at least 4-5x/wk - about 1/4-1/2 mile - as tolerated ;   Heat/Ice/TENS: uses heat and ice regularly  ;   HEP has been focusing on his LE strengthening, does stretches most days.       Pt more consistently incorporating self regulation techniques for relaxation and pain reactivity response.  He is also more consistently recognizing the impact his anxiety is playing on his pain experience; despite this he is still easily triggered to react emotionally vs respond to his pain when there is a change in his sx but the duration of his reactivity response is lessening.  He has made partial progress toward all his PT goals thus far (more consistent use of self care tools and practicing a HEP, siting tolerance, walking tolerance).  PT goals continue to be appropriate.       Pt would continue to benefit from PT to work on developing sustainable and self directed self care strategies for pain management through HEP instruction, posture / body awareness training and self care education. Requesting authorization for 8 additional visits to further support efforts of pt becoming more independent in sustainable self care strategies for managing his chroinc pain.  Plan will be to see patient at reduced frequency of 1x/3 weeks x 4, 1x/mo x 4.        Demonstrates/Verbalizes Technique: 3, 4 (1= poor technique-difficulty performing exercises,significant cues " required; 5= good technique-performs exercises without cues)  Body Awareness: 3 (1=low awareness; 5=high awareness)  Posture/Stability: 3 (1= poor posture, stability; 5= good posture, stability)   Motivational Level:   Eager to learn and Cooperative Participation:  Full   Patient Satisfaction:  satisfied   Response to Teaching:   demonstrated ability and verbalized, recall/understanding  Factors that affect learning: None   Plan of Care  Cont PT to support reactivation and integration of self regulation pain management skills.  (next visit consider:  Progress functional strengthening)   Therapist: Florence Amato PT                 Date: 6/27/2017

## 2017-06-29 ENCOUNTER — OFFICE VISIT (OUTPATIENT)
Dept: PALLIATIVE MEDICINE | Facility: CLINIC | Age: 62
End: 2017-06-29
Payer: COMMERCIAL

## 2017-06-29 VITALS — RESPIRATION RATE: 16 BRPM | HEART RATE: 91 BPM | DIASTOLIC BLOOD PRESSURE: 72 MMHG | SYSTOLIC BLOOD PRESSURE: 120 MMHG

## 2017-06-29 DIAGNOSIS — M96.1 FAILED BACK SYNDROME OF LUMBAR SPINE: ICD-10-CM

## 2017-06-29 DIAGNOSIS — M54.50 CHRONIC LUMBOSACRAL PAIN: ICD-10-CM

## 2017-06-29 DIAGNOSIS — G89.29 CHRONIC LUMBOSACRAL PAIN: ICD-10-CM

## 2017-06-29 PROCEDURE — 99214 OFFICE O/P EST MOD 30 MIN: CPT | Performed by: NURSE PRACTITIONER

## 2017-06-29 RX ORDER — OXYCODONE HYDROCHLORIDE 5 MG/1
TABLET ORAL
Qty: 180 TABLET | Refills: 0 | Status: SHIPPED | OUTPATIENT
Start: 2017-06-29 | End: 2017-07-27

## 2017-06-29 RX ORDER — GABAPENTIN 300 MG/1
600 CAPSULE ORAL 3 TIMES DAILY
Qty: 180 CAPSULE | Refills: 3 | Status: SHIPPED | OUTPATIENT
Start: 2017-06-29 | End: 2017-09-28

## 2017-06-29 ASSESSMENT — PAIN SCALES - GENERAL: PAINLEVEL: SEVERE PAIN (7)

## 2017-06-29 NOTE — MR AVS SNAPSHOT
After Visit Summary   6/29/2017    Toby Mcgrath    MRN: [id number]           Patient Information     Date Of Birth          1955        Visit Information        Provider Department      6/29/2017 9:30 AM Jayashree Goodman APRN CNP [facility]        Today's Diagnoses     Chronic lumbosacral pain        Failed back syndrome of lumbar spine          Care Instructions    After Visit Instructions:     Thank you for coming to [facility] for your care. It is my goal to partner with you to help you reach your optimal state of health.     I am recommending multidisciplinary care at this time.  The focus of care will be to continue gradual rehabilitation and pain management with medication adjustments as needed.    Continue daily self-care, identifying contributing factors, and monitoring variations in pain level. Continue to integrate self-care into your life.        1. Schedule physical therapy visits with Florence  2. Schedule follow-up with JIMBO Ernst, NP-C in 4 weeks  3. Medication recommendations: No change to current medications. Refills of Oxycodone and Gabapentin provided.       JIMBO Tran, NP-C  [facility]    Contact information: [facility]    Please call if any side effects, questions, or concerns arise.    Nurse Triage line:  [phone]   Call this number with any questions or concerns. You may leave a detailed message anytime. Calls are typically returned Monday through Friday between 8 AM and 4:30 PM. We usually get back to you within 2 business days depending on the issue/request.    Scheduling number: [phone].  Call this number to schedule or change appointments.          Medication Refills Policy:    For non-narcotic medications, please your pharmacy directly to request a refill and the pharmacy will call the [facility] for authorization. Please  allow 3-4 days for these refills.    For narcotic refills, call the nurse triage line and leave a message requesting your refill along with the name of the pharmacy that you use. Narcotic prescriptions will be mailed to your pharmacy or you may pick them up at the clinic.  Please call 7-10 days before your refill is due  The above policy allows adequate time so that you do not run out of medication.    No Show - Late Cancellation - Late Arrival Policy  We believe regular attendance is key to your success in our program.    Any time you are unable to keep your appointment we ask that you call us at  least 24 hours in advance to let us know. This will allow us to offer the appointment time to another patient. The following is our policy for missed appointments. This also applies to appointments cancelled with less than 24 hours notice.    You will receive a letter after missing your 1st and 2nd appointments without contacting the clinic before your scheduled appointment time.     After missing 3 appointments without calling first, we will cancel all of your future appointments at [facility].    At that point, you will not be able to resume services unless approved by your care team  We understand that unforseen circumstances arise, however, out of respect for all concerned and to provide this appointment to another patient, this policy will be enforced.    Please note that most follow up appointments are 30 minutes long. If you arrive late, your provider may not be able to see you for the entire 30 minutes. Please also note that if you arrive more than 15 minutes for any appointment, it may be rescheduled.                                     Follow-ups after your visit        Who to contact     If you have questions or need follow up information about today's clinic visit or your schedule please contact [facility] directly at [phone].  Normal or non-critical lab and  "imaging results will be communicated to you by MyChart, letter or phone within 4 business days after the clinic has received the results. If you do not hear from us within 7 days, please contact the clinic through [employer] or phone. If you have a critical or abnormal lab result, we will notify you by phone as soon as possible.  Submit refill requests through [employer] or call your pharmacy and they will forward the refill request to us. Please allow 3 business days for your refill to be completed.          Additional Information About Your Visit        [employer] Information     [employer] lets you send messages to your doctor, view your test results, renew your prescriptions, schedule appointments and more. To sign up, go to [email or web address] . Click on \"Log in\" on the left side of the screen, which will take you to the Welcome page. Then click on \"Sign up Now\" on the right side of the page.     You will be asked to enter the access code listed below, as well as some personal information. Please follow the directions to create your username and password.     Your access code is: [email or web address]  Expires: 7/10/2017  1:17 PM     Your access code will  in 90 days. If you need help or a new code, please call your [place] clinic or [phone].        Care EveryWhere ID     This is your Care EveryWhere ID. This could be used by other organizations to access your [place] medical records  [id number]        Your Vitals Were     Pulse Respirations                91 16           Blood Pressure from Last 3 Encounters:   17 120/72   17 128/78   17 142/82    Weight from Last 3 Encounters:   17 113.5 kg (250 lb 3 oz)   17 113.9 kg (251 lb)   17 113.9 kg (251 lb)              Today, you had the following     No orders found for display         Today's Medication Changes          These changes are accurate as of: 17  9:47 AM.  If you have any questions, ask your nurse or doctor.       "         These medicines have changed or have updated prescriptions.        Dose/Directions    gabapentin 300 MG capsule   Commonly known as:  NEURONTIN   This may have changed:  additional instructions   Used for:  Chronic lumbosacral pain   Changed by:  Jayashree Goodman APRN CNP        Dose:  600 mg   Take 2 capsules (600 mg) by mouth 3 times daily   Quantity:  180 capsule   Refills:  3       oxyCODONE 5 MG IR tablet   Commonly known as:  ROXICODONE   This may have changed:  additional instructions   Used for:  Failed back syndrome of lumbar spine   Changed by:  Jayashree Goodman APRN CNP        1 tab q4h, PRN max 6 per day. May dispense on/after 7/3/17 30 days supply   Quantity:  180 tablet   Refills:  0            Where to get your medicines      These medications were sent to [facility] [address] [place] [address] [address] [facility]  [address] [facility] [address]     Phone:  [phone]     gabapentin 300 MG capsule         Some of these will need a paper prescription and others can be bought over the counter.  Ask your nurse if you have questions.     Bring a paper prescription for each of these medications     oxyCODONE 5 MG IR tablet                Primary Care Provider Office Phone # Fax #    Connie Haskins [phone]       [facility] [address] [address]        Equal Access to Services     ALMA WEBER AH: Hadsharifa stallings hadasho Soomaali, waaxda luqadaha, qaybta kaalmada adeegyada, gayle ramirez. So [facility] [phone].    ATENCIÓN: Si habla español, tiene a holliday disposición servicios gratuitos de asistencia lingüística. Llame al [phone].    We comply with applicable federal civil rights laws and [place] laws. We do not discriminate on the basis of race, color, national origin, age, disability sex, sexual orientation or gender identity.            Thank you!     Thank you for choosing  [facility]  for your care. Our goal is always to provide you with excellent care. Hearing back from our patients is one way we can continue to improve our services. Please take a few minutes to complete the written survey that you may receive in the mail after your visit with us. Thank you!             Your Updated Medication List - Protect others around you: Learn how to safely use, store and throw away your medicines at www.disposemymeds.org.          This list is accurate as of: 6/29/17  9:47 AM.  Always use your most recent med list.                   Brand Name Dispense Instructions for use Diagnosis    allopurinol 100 MG tablet    ZYLOPRIM    270 tablet    Take 3 tablets (300 mg) by mouth daily    Acute gout of right elbow, unspecified cause       aspirin 81 MG tablet     100 tablet    Take 1 tablet (81 mg) by mouth daily    Type 2 diabetes, HbA1C goal < 8% (H)       blood glucose monitoring lancets     3 Box    Use to test blood sugars 2-3 times daily as directed (lena contour meter)    Type 2 diabetes, HbA1C goal < 8% (H)       blood glucose monitoring test strip    no brand specified    3 Box    Use to test blood sugar 2-3 times daily (lena contour meter)    Type 2 diabetes, HbA1C goal < 8% (H)       Colchicine 0.6 MG Caps    MITIGARE    20 capsule    Take 0.6 mg by mouth 2 times daily as needed    Acute pain of right knee       cyclobenzaprine 5 MG tablet    FLEXERIL    30 tablet    Take 1-2 tablets (5-10 mg) by mouth nightly as needed for muscle spasms    Back muscle spasm       finasteride 5 MG tablet    PROSCAR    30 tablet    Take 1 tablet (5 mg) by mouth daily For prostate enlargement. Please fill on $4 list    Benign prostatic hyperplasia without lower urinary tract symptoms, unspecified morphology       FLUoxetine 20 MG capsule    PROzac    270 capsule    TAKE THREE CAPSULES BY MOUTH ONCE DAILY    Mild episode of recurrent major depressive disorder (H)       fluticasone 50  MCG/ACT spray    FLONASE    3 Bottle    Spray 1-2 sprays into both nostrils daily    Chronic rhinitis       gabapentin 300 MG capsule    NEURONTIN    180 capsule    Take 2 capsules (600 mg) by mouth 3 times daily    Chronic lumbosacral pain       metFORMIN 500 MG 24 hr tablet    GLUCOPHAGE-XR    90 tablet    Take 1 tablet (500 mg) by mouth daily (with dinner)    Type 2 diabetes mellitus without complication, without long-term current use of insulin (H)       omeprazole 20 MG CR capsule    priLOSEC    90 capsule    Use every other day as needed    Gastroesophageal reflux disease without esophagitis       oxyCODONE 5 MG IR tablet    ROXICODONE    180 tablet    1 tab q4h, PRN max 6 per day. May dispense on/after 7/3/17 30 days supply    Failed back syndrome of lumbar spine       senna-docusate 8.6-50 MG per tablet    SENOKOT-S;PERICOLACE    60 tablet    Take 1 tablet by mouth 2 times daily    Lumbar radiculopathy       simvastatin 20 MG tablet    ZOCOR    90 tablet    Take 1 tablet (20 mg) by mouth At Bedtime    Hyperlipidemia LDL goal <100       tamsulosin 0.4 MG capsule    FLOMAX    180 capsule    TAKE TWO CAPSULES BY MOUTH ONCE DAILY    Hypertrophy of prostate with urinary obstruction       triamcinolone 0.1 % cream    KENALOG    30 g    Apply sparingly to affected area twice daily for 7 days. Then stop and use only for flares    Eczema, unspecified type

## 2017-06-29 NOTE — PATIENT INSTRUCTIONS
After Visit Instructions:     Thank you for coming to [facility] for your care. It is my goal to partner with you to help you reach your optimal state of health.     I am recommending multidisciplinary care at this time.  The focus of care will be to continue gradual rehabilitation and pain management with medication adjustments as needed.    Continue daily self-care, identifying contributing factors, and monitoring variations in pain level. Continue to integrate self-care into your life.        1. Continue PT with Florence  2. No changes to current opiate doses      JIMBO Tran, NP-C  [facility]    Contact information: [facility]    Please call if any side effects, questions, or concerns arise.    Nurse Triage line:  [phone]   Call this number with any questions or concerns. You may leave a detailed message anytime. Calls are typically returned Monday through Friday between 8 AM and 4:30 PM. We usually get back to you within 2 business days depending on the issue/request.    Scheduling number: [phone].  Call this number to schedule or change appointments.          Medication Refills Policy:    For non-narcotic medications, please your pharmacy directly to request a refill and the pharmacy will call the [facility] for authorization. Please allow 3-4 days for these refills.    For narcotic refills, call the nurse triage line and leave a message requesting your refill along with the name of the pharmacy that you use. Narcotic prescriptions will be mailed to your pharmacy or you may pick them up at the clinic.  Please call 7-10 days before your refill is due  The above policy allows adequate time so that you do not run out of medication.    No Show - Late Cancellation - Late Arrival Policy  We believe regular attendance is key to your success in our program.    Any time you are unable to keep your  appointment we ask that you call us at  least 24 hours in advance to let us know. This will allow us to offer the appointment time to another patient. The following is our policy for missed appointments. This also applies to appointments cancelled with less than 24 hours notice.    You will receive a letter after missing your 1st and 2nd appointments without contacting the clinic before your scheduled appointment time.     After missing 3 appointments without calling first, we will cancel all of your future appointments at [facility].    At that point, you will not be able to resume services unless approved by your care team  We understand that unforseen circumstances arise, however, out of respect for all concerned and to provide this appointment to another patient, this policy will be enforced.    Please note that most follow up appointments are 30 minutes long. If you arrive late, your provider may not be able to see you for the entire 30 minutes. Please also note that if you arrive more than 15 minutes for any appointment, it may be rescheduled.

## 2017-06-29 NOTE — PROGRESS NOTES
[facility]    CHIEF COMPLAINT: Pain     INTERVAL HISTORY:  Last seen on 5/31/17.        Recommendations/plan at the last visit included:  1.                  Schedule physical therapy visits two times per month  2.                  Schedule follow-up with JIMBO Ernst NP-C in 4 weeks  3.                  Follow up with primary care regarding knee pain   4. Medication recommendations:                No changes to pain medications.   5. ABSOLUTELY no more than 4 tablets of ibuprofen three times per day. Stay very well hydrated and always eat with this medication.   6. If you decide to try Aleve, follow package instructions. Stay very well hydrated and always eat with this medication. Do not take Aleve if you ibuprofen.     Since his last visit, Toby Mcgrath reports:  - Having a pretty good day today as far as his pain goes.. Has transitioned to gabapentin due to insurance issues. Notes that it is not as good as Lyrica but Lyrica is just too expensive. Had an episode with knee pain which was determined to be gout and was treated with colchicine. Has been to physical therapy ×2 and feels that it is going well.    Pain Information:   Pain quality: Aching, Burning, Exhausting, Gnawing, Miserable, Nagging, Numb, Penetrating, Sharp, Shooting, Stabbing, Tender, Throbbing and Tiring    Pain timing: Pain is worst in the morning     Pain rating: intensity ranges from 6/10 to 9/10, and averages 7/10 on a 0-10 scale.   Aggravating factors include: Sitting, twisting   Relieving factors include: Standing, walking      Annual Controlled Substance Agreement/UDS due date: July 2017      CURRENT RELEVANT PAIN MEDICATIONS:   Oxycodone 5 mg, 1-2 tabs every 6 hours, max 6 tabs per day  Gabapentin 600 mg t.i.d.      Patient is using the medication as prescribed: Yes  Is your medication helpful? Lyrica was more helpful than gabapentin  Medication side effects? denies any problems      Previous Pain Relevant  Medications: (H--helped; HI--Helped initially; NH--No help; W--worse; SE--side effects)   NOTE: This medication information taken from patient's intake form, not medical records.   Opiates: Oxycodone:H  NSAIDS: Ibuprofen:H   Muscle Relaxants: Clonzepam: H  Anti-migraine mediations: none  Anti-depressants: Prozac:H   Sleep aids:none  Anti-convulsants: Gabapentin: H   Topicals: lioderm patches:NH  Other medications not covered above: none       [place] Board of Pharmacy Data Base Reviewed: YES; As expected, no concern for misuse/abuse.         SELF CARE:   How often do you practice SELF-CARE (relaxing, stretching, pacing, monitoring posture, taking mini-breaks) in a typical day:  Not at all     Medications:  Current Outpatient Prescriptions   Medication Sig Dispense Refill     gabapentin (NEURONTIN) 300 MG capsule Take 2 capsules (600 mg) by mouth 3 times daily . Follow MD instructions for titration. 180 capsule 0     Colchicine (MITIGARE) 0.6 MG CAPS Take 0.6 mg by mouth 2 times daily as needed 20 capsule 0     oxyCODONE (ROXICODONE) 5 MG IR tablet 1 tab q4h, PRN max 6 per day. May dispense on/after 6/4/17 30 days supply 180 tablet 0     FLUoxetine (PROZAC) 20 MG capsule TAKE THREE CAPSULES BY MOUTH ONCE DAILY 270 capsule 3     tamsulosin (FLOMAX) 0.4 MG capsule TAKE TWO CAPSULES BY MOUTH ONCE DAILY 180 capsule 3     metFORMIN (GLUCOPHAGE-XR) 500 MG 24 hr tablet Take 1 tablet (500 mg) by mouth daily (with dinner) 90 tablet 1     finasteride (PROSCAR) 5 MG tablet Take 1 tablet (5 mg) by mouth daily For prostate enlargement. Please fill on $4 list 30 tablet 5     simvastatin (ZOCOR) 20 MG tablet Take 1 tablet (20 mg) by mouth At Bedtime 90 tablet 3     cyclobenzaprine (FLEXERIL) 5 MG tablet Take 1-2 tablets (5-10 mg) by mouth nightly as needed for muscle spasms 30 tablet 1     senna-docusate (SENOKOT-S;PERICOLACE) 8.6-50 MG per tablet Take 1 tablet by mouth 2 times daily 60 tablet 3     allopurinol (ZYLOPRIM) 100 MG  tablet Take 3 tablets (300 mg) by mouth daily 270 tablet 0     omeprazole (PRILOSEC) 20 MG CR capsule Use every other day as needed 90 capsule 1     fluticasone (FLONASE) 50 MCG/ACT nasal spray Spray 1-2 sprays into both nostrils daily 3 Bottle 11     triamcinolone (KENALOG) 0.1 % cream Apply sparingly to affected area twice daily for 7 days. Then stop and use only for flares 30 g 3     blood glucose monitoring (NO BRAND SPECIFIED) test strip Use to test blood sugar 2-3 times daily (lena contour meter) 3 Box 0     blood glucose monitoring (LENA MICROLET) lancets Use to test blood sugars 2-3 times daily as directed (lena contour meter) 3 Box 0     aspirin 81 MG tablet Take 1 tablet (81 mg) by mouth daily 100 tablet 3       Review of Systems: A 10-point review of systems was negative, with the exception of chronic pain issues.      Social History: Reviewed; unchanged from initial consultation.      Family history: Reviewed; unchanged from initial consultation.     PHYSICAL EXAM    Vitals:    06/29/17 0921   BP: 120/72   BP Location: Left arm   Patient Position: Chair   Cuff Size: Adult Regular   Pulse: 91   Resp: 16       Constitutional: healthy, alert, no distress  HEENT: Head atraumatic, normocephalic. Eyes without conjunctival injection or jaundice. Neck supple. No obvious neck masses.  Cardiovascular: Regular rate/rhythm; no murmurs/rubs/gallops appreciated.   Respiratory: Lungs clear to auscultation bilaterally.   Gastrointestinal: Normoactive bowel sounds. Abdomen soft, non-tender, without guarding/rebound.   : Deferred  Skin: No rash, lesions, or petechiae of exposed skin.   Extremities: Peripheral pulses intact. No clubbing, cyanosis, or edema.   Psychiatric/mental status: Alert, without lethargy or stupor. Appropriate affect. Mood normal.      Neurologic exam:  CN: Cranial nerves 2-12 are grossly normal.  Motor:  4/5 UE and LE strength, which is his baseline      Musculoskeletal exam:  Lumbar/Sacral  spine:  Forward Flexion: 60 degrees  Ext: 20 degrees  Rotation/ext to right: painful   Rotation/ext to left:: painful  Tenderness in the lumbar spine at midline: Yes  Tenderness in the lumbar paraspinal muscles: Yes  Straight leg exam:positive      Sensory exam:  Light touch: normal bilateral upper and lower extremities   No allodynia, dysesthesia, or hyperalgesia.      DIRE Score for ongoing opioid management is calculated as follows:  Diagnosis = 3 pts (advanced condition; severe pain/objective findings)  Intractability = 2 pts (most treatments tried; patient not fully engaged/barriers)  Risk   Psych = 2 pts (personality dysfunction/mental illness that moderately interferes with care)  inability to control anger, outbursts, verbal abuse of staff  Chem Hlth = 2 pts (use of medications to cope with stress; chemical dependency in remission) medication focused  Reliability = 1 pt (medication misuse; missed appointments; rarely follows through) unsafe handling of medication, 36 tablets of oxycodone missing from recent prescription.  Social = 2 pts (reduction in some relationships/life rolls)  (Psych + Chem hlth + Reliability + Social) = 12  Efficacy = 1 pt (poor function; minimal pain relief despite mod/high med dose)  DIRE Score = 13   7-13: likely NOT suitable candidate for long-term opioid analgesia  14-21: may be a suitable candidate for long-term opioid analgesia      DIAGNOSTIC TESTS:  Imaging Studies:   No new imaging      Assessment:   1. Degenerative disc disease, lumbar radiculopathy  2. Long-term use of opiate medications    Layo returns for monthly medication management appointment. He is doing quite well and says he is having a very good day related to pain. He is attending physical therapy and really working on behavioral modification, calming himself down as he is becoming more aware that his emotional upset increases his pain. His interactions with staff have been pleasant and  appropriate.    Plan:    Diagnosis reviewed, treatment option addressed, and risk/benifits discussed.  Self-care instructions given.  I am recommending a multidisciplinary treatment plan to help this patient better manage pain.      1. Continue PT with Florence  2. No changes to current opiate doses      Total time spent face to face was 30 minutes and more than 50% of face to face time was spent in counseling and/or coordination of care regarding the diagnosis and recommendations above.      JIMBO Tran, NP-C   [facility]

## 2017-07-26 NOTE — PROGRESS NOTES
"[facility]    CHIEF COMPLAINT: Pain    INTERVAL HISTORY:  Last seen on 6/29/17.        Recommendations/plan at the last visit included:  1. Continue PT with [facility]  2. No changes to current opiate doses    Since his last visit, Toby Mcgrath reports:  - Change in bowel movements. Coming in \"balls and strings\". Does not feel constipated or bloated. Has appt with PCP for physical and knows he is overdue for colonoscopy.   - Otherwise pain management is going well. Pill count is accurate.    Pain Information:   Pain quality: Aching, Burning, Exhausting, Gnawing, Miserable, Nagging, Numb, Penetrating, Sharp, Shooting, Stabbing, Tender, Throbbing, Tiring and Unbearable    Pain timing: Constant     Pain rating: intensity ranges from 7/10 to 8/10, and averages 7/10 on a 0-10 scale.   Aggravating factors include: Standing, lay down   Relieving factors include: Sitting    Annual Controlled Substance Agreement/UDS due date: Today       CURRENT RELEVANT PAIN MEDICATIONS:   Oxycodone 5 mg, 1-2 tabs every 6 hours, max 6 tabs per day   Total   Gabapentin 600 mg t.i.d.      Patient is using the medication as prescribed: Yes  Is your medication helpful? Lyrica was more helpful than gabapentin  Medication side effects? denies any problems      Previous Pain Relevant Medications: (H--helped; HI--Helped initially; NH--No help; W--worse; SE--side effects)   NOTE: This medication information taken from patient's intake form, not medical records.   Opiates: Oxycodone:H  NSAIDS: Ibuprofen:H   Muscle Relaxants: Clonzepam: H  Anti-migraine mediations: none  Anti-depressants: Prozac:H   Sleep aids:none  Anti-convulsants: Gabapentin: H   Topicals: lioderm patches:NH  Other medications not covered above: none       [place] Board of Pharmacy Data Base Reviewed: YES; As expected, no concern for misuse/abuse.    SELF CARE:   How often do you practice SELF-CARE (relaxing, stretching, pacing, monitoring posture, taking " mini-breaks) in a typical day:  rare      Medications:  Current Outpatient Prescriptions   Medication Sig Dispense Refill     gabapentin (NEURONTIN) 300 MG capsule Take 2 capsules (600 mg) by mouth 3 times daily 180 capsule 3     oxyCODONE (ROXICODONE) 5 MG IR tablet 1 tab q4h, PRN max 6 per day. May dispense on/after 7/3/17 30 days supply 180 tablet 0     Colchicine (MITIGARE) 0.6 MG CAPS Take 0.6 mg by mouth 2 times daily as needed 20 capsule 0     FLUoxetine (PROZAC) 20 MG capsule TAKE THREE CAPSULES BY MOUTH ONCE DAILY 270 capsule 3     tamsulosin (FLOMAX) 0.4 MG capsule TAKE TWO CAPSULES BY MOUTH ONCE DAILY 180 capsule 3     metFORMIN (GLUCOPHAGE-XR) 500 MG 24 hr tablet Take 1 tablet (500 mg) by mouth daily (with dinner) 90 tablet 1     finasteride (PROSCAR) 5 MG tablet Take 1 tablet (5 mg) by mouth daily For prostate enlargement. Please fill on $4 list 30 tablet 5     simvastatin (ZOCOR) 20 MG tablet Take 1 tablet (20 mg) by mouth At Bedtime 90 tablet 3     cyclobenzaprine (FLEXERIL) 5 MG tablet Take 1-2 tablets (5-10 mg) by mouth nightly as needed for muscle spasms 30 tablet 1     senna-docusate (SENOKOT-S;PERICOLACE) 8.6-50 MG per tablet Take 1 tablet by mouth 2 times daily 60 tablet 3     allopurinol (ZYLOPRIM) 100 MG tablet Take 3 tablets (300 mg) by mouth daily 270 tablet 0     omeprazole (PRILOSEC) 20 MG CR capsule Use every other day as needed 90 capsule 1     fluticasone (FLONASE) 50 MCG/ACT nasal spray Spray 1-2 sprays into both nostrils daily 3 Bottle 11     triamcinolone (KENALOG) 0.1 % cream Apply sparingly to affected area twice daily for 7 days. Then stop and use only for flares 30 g 3     blood glucose monitoring (NO BRAND SPECIFIED) test strip Use to test blood sugar 2-3 times daily (lena contour meter) 3 Box 0     blood glucose monitoring (LENA MICROLET) lancets Use to test blood sugars 2-3 times daily as directed (lena contour meter) 3 Box 0     aspirin 81 MG tablet Take 1 tablet (81 mg)  by mouth daily 100 tablet 3       Review of Systems: A 10-point review of systems was negative, with the exception of chronic pain issues.      Social History: Reviewed; unchanged from initial consultation.      Family history: Reviewed; unchanged from initial consultation.     PHYSICAL EXAM    Vitals:    07/27/17 0758   BP: 146/86   BP Location: Right arm   Patient Position: Chair   Cuff Size: Adult Regular   Pulse: 100       Constitutional: healthy, alert, no distress  HEENT: Head atraumatic, normocephalic. Eyes without conjunctival injection or jaundice. Neck supple. No obvious neck masses.  Cardiovascular: Regular rate/rhythm; no murmurs/rubs/gallops appreciated.   Respiratory: Lungs clear to auscultation bilaterally.   Gastrointestinal: Normoactive bowel sounds. Abdomen soft, non-tender, without guarding/rebound.   : Deferred  Skin: No rash, lesions, or petechiae of exposed skin.   Extremities: Peripheral pulses intact. No clubbing, cyanosis, or edema.   Psychiatric/mental status: Alert, without lethargy or stupor. Appropriate affect. Mood normal.      Neurologic exam:  CN: Cranial nerves 2-12 are grossly normal.  Motor:  4/5 UE and LE strength, which is his baseline      Musculoskeletal exam:  Lumbar/Sacral spine:  Forward Flexion: 60 degrees  Ext: 20 degrees  Rotation/ext to right: painful   Rotation/ext to left:: painful  Tenderness in the lumbar spine at midline: Yes  Tenderness in the lumbar paraspinal muscles: Yes  Straight leg exam:positive      Sensory exam:  Light touch: normal bilateral upper and lower extremities   No allodynia, dysesthesia, or hyperalgesia.      DIRE Score for ongoing opioid management is calculated as follows:    Diagnosis = 3 pts (advanced condition; severe pain/objective findings)    Intractability = 2 pts (most treatments tried; patient not fully engaged/barriers)    Risk        Psych = 2 pts (personality dysfunction/mental illness that moderately interferes with care)          Chem Hlth = 2 pts (use of medications to cope with stress; chemical dependency in remission) Medication focused        Reliability = 2 pts (occasional difficulties with compliance; generally reliable)       Social = 2 pts (reduction in some relationships/life rolls)       (Psych + Chem hlth + Reliability + Social) = 13    Efficacy = 3 pts (good improvement/function; good quality of life; stable med doses)    DIRE Score = 16        7-13: likely NOT suitable candidate for long-term opioid analgesia       14-21: may be a suitable candidate for long-term opioid analgesia      DIAGNOSTIC TESTS:  Imaging Studies:   No new imaging      Assessment:   1. Degenerative disc disease, lumbar radiculopathy  2. Long-term use of opiate medications    Layo returns for medication management appt. He feels he is done with PT and no monger wishes to remain engaged in the multidisciplinary pain management program. At this time we will transfer care and prescribing back to PCP. I had previously communicated with Connie Haskins MD, prior to her maternity this Spring and she was willing to assume care. If his pain management needs change in the future, he is always welcome to return to the pain center. If it is more than one year he will need a new referral from his pain management provider.     As he is due for annual UDS, we will collect that today so PCP does not have to do so at his visit with her. We are not renewing the controlled substance agreement.      Plan:    Diagnosis reviewed, treatment option addressed, and risk/benifits discussed.  Self-care instructions given.     At this time you have completed the multidisciplinary pain management program and are ready to transfer care to your primary care provider.     1. Labs: Urine drug screen today.   2. Medication recommendations: No change to current medications. Future refills to come from primary care provider.   3. Follow up with Jayashree as needed. If it is more than 1  year, you will need a new referral from primary care provider.     Total time spent face to face was 30 minutes and more than 50% of face to face time was spent in counseling and/or coordination of care regarding the diagnosis and recommendations above.      JIMBO Tran, NP-C   [facility]

## 2017-07-27 ENCOUNTER — HOSPITAL ENCOUNTER (OUTPATIENT)
Facility: CLINIC | Age: 62
Setting detail: SPECIMEN
Discharge: HOME | End: 2017-07-27
Attending: NURSE PRACTITIONER | Admitting: NURSE PRACTITIONER
Payer: COMMERCIAL

## 2017-07-27 ENCOUNTER — OFFICE VISIT (OUTPATIENT)
Dept: PALLIATIVE MEDICINE | Facility: CLINIC | Age: 62
End: 2017-07-27
Payer: COMMERCIAL

## 2017-07-27 VITALS — DIASTOLIC BLOOD PRESSURE: 86 MMHG | HEART RATE: 100 BPM | SYSTOLIC BLOOD PRESSURE: 146 MMHG

## 2017-07-27 DIAGNOSIS — Z79.891 LONG TERM (CURRENT) USE OF OPIATE ANALGESIC: Primary | ICD-10-CM

## 2017-07-27 DIAGNOSIS — M96.1 FAILED BACK SYNDROME OF LUMBAR SPINE: ICD-10-CM

## 2017-07-27 PROCEDURE — 80307 DRUG TEST PRSMV CHEM ANLYZR: CPT | Performed by: NURSE PRACTITIONER

## 2017-07-27 PROCEDURE — 99214 OFFICE O/P EST MOD 30 MIN: CPT | Performed by: NURSE PRACTITIONER

## 2017-07-27 RX ORDER — OXYCODONE HYDROCHLORIDE 5 MG/1
TABLET ORAL
Qty: 180 TABLET | Refills: 0 | Status: SHIPPED | OUTPATIENT
Start: 2017-07-27 | End: 2017-08-30

## 2017-07-27 ASSESSMENT — PAIN SCALES - GENERAL: PAINLEVEL: EXTREME PAIN (8)

## 2017-07-27 NOTE — PATIENT INSTRUCTIONS
After Visit Instructions:     Thank you for coming to [facility] for your care. It is my goal to partner with you to help you reach your optimal state of health.     At this time you have completed the multidisciplinary pain management program and are ready to transfer care to your primary care provider.     1. Labs: Urine drug screen today.   2. Medication recommendations: No change to current medications. Future refills to come from primary care provider.   3. Follow up with Jayashree as needed. If it is more than 1 year, you will need a new referral from primary care provider.       JIMBO Tran, NP-C  [facility]    Contact information: [facility]    Please call if any side effects, questions, or concerns arise.    Nurse Triage line:  [phone]   Call this number with any questions or concerns. You may leave a detailed message anytime. Calls are typically returned Monday through Friday between 8 AM and 4:30 PM. We usually get back to you within 2 business days depending on the issue/request.    Scheduling number: [phone].  Call this number to schedule or change appointments.          Medication Refills Policy:    For non-narcotic medications, please your pharmacy directly to request a refill and the pharmacy will call the [facility] for authorization. Please allow 3-4 days for these refills.    For narcotic refills, call the nurse triage line and leave a message requesting your refill along with the name of the pharmacy that you use. Narcotic prescriptions will be mailed to your pharmacy or you may pick them up at the clinic.  Please call 7-10 days before your refill is due  The above policy allows adequate time so that you do not run out of medication.    No Show - Late Cancellation - Late Arrival Policy  We believe regular attendance is key to your success in our program.    Any time you are unable to keep  your appointment we ask that you call us at  least 24 hours in advance to let us know. This will allow us to offer the appointment time to another patient. The following is our policy for missed appointments. This also applies to appointments cancelled with less than 24 hours notice.    You will receive a letter after missing your 1st and 2nd appointments without contacting the clinic before your scheduled appointment time.     After missing 3 appointments without calling first, we will cancel all of your future appointments at [facility].    At that point, you will not be able to resume services unless approved by your care team  We understand that unforseen circumstances arise, however, out of respect for all concerned and to provide this appointment to another patient, this policy will be enforced.    Please note that most follow up appointments are 30 minutes long. If you arrive late, your provider may not be able to see you for the entire 30 minutes. Please also note that if you arrive more than 15 minutes for any appointment, it may be rescheduled.

## 2017-07-27 NOTE — MR AVS SNAPSHOT
After Visit Summary   7/27/2017    Toby Mcgrath    MRN: [id number]           Patient Information     Date Of Birth          1955        Visit Information        Provider Department      7/27/2017 8:00 AM Jayashree Goodman APRN CNP [facility]        Today's Diagnoses     Long term (current) use of opiate analgesic    -  1    Failed back syndrome of lumbar spine          Care Instructions    After Visit Instructions:     Thank you for coming to [facility] for your care. It is my goal to partner with you to help you reach your optimal state of health.     At this time you have completed the multidisciplinary pain management program and are ready to transfer care to your primary care provider.     1. Labs: Urine drug screen today.   2. Medication recommendations: No change to current medications. Future refills to come from primary care provider.   3. Follow up with Jayashree as needed. If it is more than 1 year, you will need a new referral from primary care provider.       JIMBO Tran, NP-C  [facility]    Contact information: [facility]    Please call if any side effects, questions, or concerns arise.    Nurse Triage line:  [phone]   Call this number with any questions or concerns. You may leave a detailed message anytime. Calls are typically returned Monday through Friday between 8 AM and 4:30 PM. We usually get back to you within 2 business days depending on the issue/request.    Scheduling number: [phone].  Call this number to schedule or change appointments.          Medication Refills Policy:    For non-narcotic medications, please your pharmacy directly to request a refill and the pharmacy will call the [facility] for authorization. Please allow 3-4 days for these refills.    For narcotic refills, call the nurse triage line and leave a message requesting your  refill along with the name of the pharmacy that you use. Narcotic prescriptions will be mailed to your pharmacy or you may pick them up at the clinic.  Please call 7-10 days before your refill is due  The above policy allows adequate time so that you do not run out of medication.    No Show - Late Cancellation - Late Arrival Policy  We believe regular attendance is key to your success in our program.    Any time you are unable to keep your appointment we ask that you call us at  least 24 hours in advance to let us know. This will allow us to offer the appointment time to another patient. The following is our policy for missed appointments. This also applies to appointments cancelled with less than 24 hours notice.    You will receive a letter after missing your 1st and 2nd appointments without contacting the clinic before your scheduled appointment time.     After missing 3 appointments without calling first, we will cancel all of your future appointments at [facility].    At that point, you will not be able to resume services unless approved by your care team  We understand that unforseen circumstances arise, however, out of respect for all concerned and to provide this appointment to another patient, this policy will be enforced.    Please note that most follow up appointments are 30 minutes long. If you arrive late, your provider may not be able to see you for the entire 30 minutes. Please also note that if you arrive more than 15 minutes for any appointment, it may be rescheduled.                                     Follow-ups after your visit        Your next 10 appointments already scheduled     Aug 07, 2017  8:00 AM CDT   PHYSICAL with Connie Haskins MD   [facility]    [address] [place]  [facility] [address] [phone]              Who to contact     If you have questions or need follow up information about today's clinic visit  "or your schedule please contact [facility] directly at [phone].  Normal or non-critical lab and imaging results will be communicated to you by MyChart, letter or phone within 4 business days after the clinic has received the results. If you do not hear from us within 7 days, please contact the clinic through [employer] or phone. If you have a critical or abnormal lab result, we will notify you by phone as soon as possible.  Submit refill requests through [employer] or call your pharmacy and they will forward the refill request to us. Please allow 3 business days for your refill to be completed.          Additional Information About Your Visit        [employer] Information     [employer] lets you send messages to your doctor, view your test results, renew your prescriptions, schedule appointments and more. To sign up, go to [email or web address] . Click on \"Log in\" on the left side of the screen, which will take you to the Welcome page. Then click on \"Sign up Now\" on the right side of the page.     You will be asked to enter the access code listed below, as well as some personal information. Please follow the directions to create your username and password.     Your access code is: [email or web address]  Expires: 10/25/2017  8:18 AM     Your access code will  in 90 days. If you need help or a new code, please call your [facility] clinic or [phone].        Care EveryWhere ID     This is your Care EveryWhere ID. This could be used by other organizations to access your [facility] medical records  [id number]        Your Vitals Were     Pulse                   100            Blood Pressure from Last 3 Encounters:   17 146/86   17 120/72   17 128/78    Weight from Last 3 Encounters:   17 113.5 kg (250 lb 3 oz)   17 113.9 kg (251 lb)   17 113.9 kg (251 lb)              We Performed the Following     Pain Drug Scr UR W Rptd Meds          Today's Medication Changes        "   These changes are accurate as of: 7/27/17  8:18 AM.  If you have any questions, ask your nurse or doctor.               These medicines have changed or have updated prescriptions.        Dose/Directions    oxyCODONE 5 MG IR tablet   Commonly known as:  ROXICODONE   This may have changed:  additional instructions   Used for:  Failed back syndrome of lumbar spine        1 tab q4h, PRN max 6 per day. May dispense on/after 8/2/17 30 days supply   Quantity:  180 tablet   Refills:  0            Where to get your medicines      Some of these will need a paper prescription and others can be bought over the counter.  Ask your nurse if you have questions.     Bring a paper prescription for each of these medications     oxyCODONE 5 MG IR tablet                Primary Care Provider Office Phone # Fax #    GrapevineNiyah Haskins [phone]       [facility] [address] [address]        Equal Access to Services     ALMA WEBER : Hadii joselito ku hadasho Soomaali, waaxda luqadaha, qaybta kaalmada adeegyada, gayle loyolain liv smith . So [facility] [phone].    ATENCIÓN: Si habla español, tiene a holliday disposición servicios gratuitos de asistencia lingüística. Llame al [phone].    We comply with applicable federal civil rights laws and [place] laws. We do not discriminate on the basis of race, color, national origin, age, disability sex, sexual orientation or gender identity.            Thank you!     Thank you for choosing [facility] [employer]  for your care. Our goal is always to provide you with excellent care. Hearing back from our patients is one way we can continue to improve our services. Please take a few minutes to complete the written survey that you may receive in the mail after your visit with us. Thank you!             Your Updated Medication List - Protect others around you: Learn how to safely use, store and throw away your medicines at  www.disposemymeds.org.          This list is accurate as of: 7/27/17  8:18 AM.  Always use your most recent med list.                   Brand Name Dispense Instructions for use Diagnosis    allopurinol 100 MG tablet    ZYLOPRIM    270 tablet    Take 3 tablets (300 mg) by mouth daily    Acute gout of right elbow, unspecified cause       aspirin 81 MG tablet     100 tablet    Take 1 tablet (81 mg) by mouth daily    Type 2 diabetes, HbA1C goal < 8% (H)       blood glucose monitoring lancets     3 Box    Use to test blood sugars 2-3 times daily as directed (lena contour meter)    Type 2 diabetes, HbA1C goal < 8% (H)       blood glucose monitoring test strip    no brand specified    3 Box    Use to test blood sugar 2-3 times daily (lena contour meter)    Type 2 diabetes, HbA1C goal < 8% (H)       Colchicine 0.6 MG Caps    MITIGARE    20 capsule    Take 0.6 mg by mouth 2 times daily as needed    Acute pain of right knee       cyclobenzaprine 5 MG tablet    FLEXERIL    30 tablet    Take 1-2 tablets (5-10 mg) by mouth nightly as needed for muscle spasms    Back muscle spasm       finasteride 5 MG tablet    PROSCAR    30 tablet    Take 1 tablet (5 mg) by mouth daily For prostate enlargement. Please fill on $4 list    Benign prostatic hyperplasia without lower urinary tract symptoms, unspecified morphology       FLUoxetine 20 MG capsule    PROzac    270 capsule    TAKE THREE CAPSULES BY MOUTH ONCE DAILY    Mild episode of recurrent major depressive disorder (H)       fluticasone 50 MCG/ACT spray    FLONASE    3 Bottle    Spray 1-2 sprays into both nostrils daily    Chronic rhinitis       gabapentin 300 MG capsule    NEURONTIN    180 capsule    Take 2 capsules (600 mg) by mouth 3 times daily    Chronic lumbosacral pain       metFORMIN 500 MG 24 hr tablet    GLUCOPHAGE-XR    90 tablet    Take 1 tablet (500 mg) by mouth daily (with dinner)    Type 2 diabetes mellitus without complication, without long-term current use of  insulin (H)       omeprazole 20 MG CR capsule    priLOSEC    90 capsule    Use every other day as needed    Gastroesophageal reflux disease without esophagitis       oxyCODONE 5 MG IR tablet    ROXICODONE    180 tablet    1 tab q4h, PRN max 6 per day. May dispense on/after 8/2/17 30 days supply    Failed back syndrome of lumbar spine       senna-docusate 8.6-50 MG per tablet    SENOKOT-S;PERICOLACE    60 tablet    Take 1 tablet by mouth 2 times daily    Lumbar radiculopathy       simvastatin 20 MG tablet    ZOCOR    90 tablet    Take 1 tablet (20 mg) by mouth At Bedtime    Hyperlipidemia LDL goal <100       tamsulosin 0.4 MG capsule    FLOMAX    180 capsule    TAKE TWO CAPSULES BY MOUTH ONCE DAILY    Hypertrophy of prostate with urinary obstruction       triamcinolone 0.1 % cream    KENALOG    30 g    Apply sparingly to affected area twice daily for 7 days. Then stop and use only for flares    Eczema, unspecified type

## 2017-07-27 NOTE — NURSING NOTE
Chief Complaint   Patient presents with     Pain       Initial /86 (BP Location: Right arm, Patient Position: Chair, Cuff Size: Adult Regular)  Pulse 100 Estimated body mass index is 33.93 kg/(m^2) as calculated from the following:    Height as of 6/2/17: 1.829 m (6').    Weight as of 6/2/17: 113.5 kg (250 lb 3 oz).  Medication Reconciliation: complete       Artemio Cole MA  [facility]

## 2017-08-04 DIAGNOSIS — N13.8 HYPERTROPHY OF PROSTATE WITH URINARY OBSTRUCTION: ICD-10-CM

## 2017-08-04 DIAGNOSIS — N40.1 HYPERTROPHY OF PROSTATE WITH URINARY OBSTRUCTION: ICD-10-CM

## 2017-08-04 RX ORDER — TAMSULOSIN HYDROCHLORIDE 0.4 MG/1
CAPSULE ORAL
Refills: 0
Start: 2017-08-04

## 2017-08-06 LAB — PAIN DRUG SCR UR W RPTD MEDS: NORMAL

## 2017-08-07 ENCOUNTER — TELEPHONE (OUTPATIENT)
Dept: GASTROENTEROLOGY | Facility: CLINIC | Age: 62
End: 2017-08-07

## 2017-08-07 ENCOUNTER — OFFICE VISIT (OUTPATIENT)
Dept: FAMILY MEDICINE | Facility: CLINIC | Age: 62
End: 2017-08-07
Payer: COMMERCIAL

## 2017-08-07 VITALS
WEIGHT: 250 LBS | SYSTOLIC BLOOD PRESSURE: 130 MMHG | DIASTOLIC BLOOD PRESSURE: 80 MMHG | BODY MASS INDEX: 33.86 KG/M2 | HEIGHT: 72 IN | RESPIRATION RATE: 16 BRPM | TEMPERATURE: 98 F | HEART RATE: 74 BPM | OXYGEN SATURATION: 95 %

## 2017-08-07 DIAGNOSIS — N13.8 HYPERTROPHY OF PROSTATE WITH URINARY OBSTRUCTION: ICD-10-CM

## 2017-08-07 DIAGNOSIS — G89.4 CHRONIC PAIN SYNDROME: ICD-10-CM

## 2017-08-07 DIAGNOSIS — N40.1 HYPERTROPHY OF PROSTATE WITH URINARY OBSTRUCTION: ICD-10-CM

## 2017-08-07 DIAGNOSIS — Z00.00 ROUTINE GENERAL MEDICAL EXAMINATION AT A HEALTH CARE FACILITY: Primary | ICD-10-CM

## 2017-08-07 DIAGNOSIS — E11.9 TYPE 2 DIABETES MELLITUS WITHOUT COMPLICATION, WITHOUT LONG-TERM CURRENT USE OF INSULIN (H): ICD-10-CM

## 2017-08-07 LAB
CREAT UR-MCNC: 198 MG/DL
HBA1C MFR BLD: 6.7 % (ref 4.3–6)
MICROALBUMIN UR-MCNC: 5 MG/L
MICROALBUMIN/CREAT UR: 2.65 MG/G CR (ref 0–17)

## 2017-08-07 PROCEDURE — 36415 COLL VENOUS BLD VENIPUNCTURE: CPT | Performed by: FAMILY MEDICINE

## 2017-08-07 PROCEDURE — 99213 OFFICE O/P EST LOW 20 MIN: CPT | Mod: 25 | Performed by: FAMILY MEDICINE

## 2017-08-07 PROCEDURE — 82043 UR ALBUMIN QUANTITATIVE: CPT | Performed by: FAMILY MEDICINE

## 2017-08-07 PROCEDURE — 83036 HEMOGLOBIN GLYCOSYLATED A1C: CPT | Performed by: FAMILY MEDICINE

## 2017-08-07 PROCEDURE — 99396 PREV VISIT EST AGE 40-64: CPT | Performed by: FAMILY MEDICINE

## 2017-08-07 RX ORDER — TAMSULOSIN HYDROCHLORIDE 0.4 MG/1
CAPSULE ORAL
Qty: 180 CAPSULE | Refills: 3 | Status: SHIPPED | OUTPATIENT
Start: 2017-08-07 | End: 2018-02-26

## 2017-08-07 RX ORDER — METFORMIN HCL 500 MG
1000 TABLET, EXTENDED RELEASE 24 HR ORAL
Qty: 180 TABLET | Refills: 3 | Status: SHIPPED | OUTPATIENT
Start: 2017-08-07 | End: 2018-03-29

## 2017-08-07 NOTE — Clinical Note
Can you let him know that I spoke to Jayashree at the pain clinic and it is fine for me to take over his pain medications.  I will need to see him in 3 weeks to sign a new CSA and refill meds then and will see him monthly for this  Thanks Sn

## 2017-08-07 NOTE — PROGRESS NOTES
"  SUBJECTIVE:   CC: Toby Mcgrath is an 62 year old male who presents for preventative health visit.     Healthy Habits:    Do you get at least three servings of calcium containing foods daily (dairy, green leafy vegetables, etc.)? yes    Amount of exercise or daily activities, outside of work: 2-3 day(s) per week    Problems taking medications regularly No    Medication side effects: No    Have you had an eye exam in the past two years? yes    Do you see a dentist twice per year? no    Do you have sleep apnea, excessive snoring or daytime drowsiness?no      (Z00.00) Routine general medical examination at a health care facility  (primary encounter diagnosis)  Comment: due for colon screening  Plan: GASTROENTEROLOGY ADULT REF PROCEDURE ONLY            (E11.9) Type 2 diabetes mellitus without complication, without long-term current use of insulin (H)  Comment: drinking soda - knows this is bad  sugars have risen to 250s in morning  Also stopped metformin for about a week   Now is back on this  Sugars still high in Ams  Due for eye exam - [facility]  Due for a1c and other labs  Lab Results   Component Value Date    A1C 6.7 08/07/2017    A1C 6.5 01/03/2017    A1C 6.6 07/14/2016    A1C 7.0 02/18/2016    A1C 6.9 08/13/2015         Plan: HEMOGLOBIN A1C, Albumin Random Urine         Quantitative, metFORMIN (GLUCOPHAGE-XR) 500 MG         24 hr tablet            (N40.1,  N13.8) Hypertrophy of prostate with urinary obstruction  Comment: needs refills  Plan: tamsulosin (FLOMAX) 0.4 MG capsule            (G89.4) Chronic pain syndrome  Comment: does not want to do therapy with his pain control program  Does feel parts if this have helepd - the anxiety piece is better  learinign about deep breathing and exercises have been good  Just feels \"he could teach this\" at this point  Pain is stable - has not increased the dose of meds  Takes 6 tablets per day maximum  Uses gabapentin 600 mg as well  Hs " participated in PT and therapy   Plan:            Today's PHQ-2 Score: PHQ-2 ( 1999 Pfizer) 1/3/2017 4/1/2016   Q1: Little interest or pleasure in doing things 0 3   Q2: Feeling down, depressed or hopeless 0 3   PHQ-2 Score 0 6         Abuse: Current or Past(Physical, Sexual or Emotional)- No  Do you feel safe in your environment - Yes  Social History   Substance Use Topics     Smoking status: Former Smoker     Years: 40.00     Types: Cigarettes     Smokeless tobacco: Former User     Quit date: 2/1/2013     Alcohol use No     The patient does not drink >3 drinks per day nor >7 drinks per week.    Last PSA:   PSA   Date Value Ref Range Status   05/09/2013 1.45 0 - 4 ug/L Final       Reviewed orders with patient. Reviewed health maintenance and updated orders accordingly - Yes  Labs reviewed in EPIC  BP Readings from Last 3 Encounters:   08/07/17 130/80   07/27/17 146/86   06/29/17 120/72    Wt Readings from Last 3 Encounters:   08/07/17 250 lb (113.4 kg)   06/02/17 250 lb 3 oz (113.5 kg)   05/31/17 251 lb (113.9 kg)                  Patient Active Problem List   Diagnosis     Thoracic or lumbosacral neuritis or radiculitis, unspecified     Osteoarthrosis, unspecified whether generalized or localized, pelvic region and thigh     Hypertrophy of prostate with urinary obstruction     Chronic rhinitis     Chronic pain syndrome     Disorder of bursae and tendons in shoulder region     Major depressive disorder, recurrent episode (H)     Anxiety     Gout     Type 2 diabetes mellitus without complication (H)     Hypertension goal BP (blood pressure) < 140/90     Advanced directives, counseling/discussion     DJD (degenerative joint disease), lumbar     Hyperlipidemia LDL goal <100     DDD (degenerative disc disease), cervical     GERD (gastroesophageal reflux disease)     Lumbar radiculopathy     S/P lumbar fusion     Left-sided low back pain with left-sided sciatica     BMI 32.0-32.9,adult     History of hepatitis C     S/P  lumbar discectomy     Acute post-operative pain     S/P laminectomy     Chronic pain     Bilateral low back pain without sciatica     Right-sided low back pain with right-sided sciatica     Acute gouty arthritis     Acute gout of right elbow, unspecified cause     Idiopathic gout of left elbow, unspecified chronicity     Gastroesophageal reflux disease without esophagitis     Failed back syndrome of lumbar spine     Slow transit constipation     Past Surgical History:   Procedure Laterality Date     BACK SURGERY       both shoulder repair  2006, 2008     C NONSPECIFIC PROCEDURE  1995    neck cyst     C NONSPECIFIC PROCEDURE  1979    laminectomy L5-S1 x 2     C SHOULDER SURG PROC UNLISTED  2005, '07    repairs,later manipulate,inject LT, RT     FUSION LUMBAR ANTERIOR, FUSION LUMBAR POSTERIOR TWO LEVELS, COMBINED N/A 9/17/2014    Procedure: COMBINED FUSION LUMBAR ANTERIOR, FUSION LUMBAR POSTERIOR TWO LEVELS;  Surgeon: Chris Lugo MD;  Location: [facility] OR     HC COLONOSCOPY THRU STOMA, DIAGNOSTIC  3/04    normal     HC UGI ENDOSCOPY DIAG W OR W/O BRUSH/WASH  3/04    ok     HEAD & NECK SURGERY       HEMILAMINECTOMY, DISCECTOMY LUMBAR ONE LEVEL, COMBINED Left 9/8/2015    Procedure: COMBINED HEMILAMINECTOMY, DISCECTOMY LUMBAR ONE LEVEL;  Surgeon: Jer Irby MD;  Location: [facility] OR     right hip replacement  10,2010       Social History   Substance Use Topics     Smoking status: Former Smoker     Years: 40.00     Types: Cigarettes     Smokeless tobacco: Former User     Quit date: 2/1/2013     Alcohol use No     Family History   Problem Relation Age of Onset     DIABETES Mother      C.A.D. Father      DIABETES Father      Hypertension Father          Current Outpatient Prescriptions   Medication Sig Dispense Refill     tamsulosin (FLOMAX) 0.4 MG capsule TAKE TWO CAPSULES BY MOUTH ONCE DAILY 180 capsule 3     metFORMIN (GLUCOPHAGE-XR) 500 MG 24 hr tablet Take 2 tablets (1,000 mg) by mouth daily (with dinner)  180 tablet 3     oxyCODONE (ROXICODONE) 5 MG IR tablet 1 tab q4h, PRN max 6 per day. May dispense on/after 8/2/17 30 days supply 180 tablet 0     gabapentin (NEURONTIN) 300 MG capsule Take 2 capsules (600 mg) by mouth 3 times daily 180 capsule 3     Colchicine (MITIGARE) 0.6 MG CAPS Take 0.6 mg by mouth 2 times daily as needed 20 capsule 0     FLUoxetine (PROZAC) 20 MG capsule TAKE THREE CAPSULES BY MOUTH ONCE DAILY 270 capsule 3     finasteride (PROSCAR) 5 MG tablet Take 1 tablet (5 mg) by mouth daily For prostate enlargement. Please fill on $4 list 30 tablet 5     simvastatin (ZOCOR) 20 MG tablet Take 1 tablet (20 mg) by mouth At Bedtime 90 tablet 3     cyclobenzaprine (FLEXERIL) 5 MG tablet Take 1-2 tablets (5-10 mg) by mouth nightly as needed for muscle spasms 30 tablet 1     senna-docusate (SENOKOT-S;PERICOLACE) 8.6-50 MG per tablet Take 1 tablet by mouth 2 times daily 60 tablet 3     omeprazole (PRILOSEC) 20 MG CR capsule Use every other day as needed 90 capsule 1     fluticasone (FLONASE) 50 MCG/ACT nasal spray Spray 1-2 sprays into both nostrils daily 3 Bottle 11     triamcinolone (KENALOG) 0.1 % cream Apply sparingly to affected area twice daily for 7 days. Then stop and use only for flares 30 g 3     blood glucose monitoring (NO BRAND SPECIFIED) test strip Use to test blood sugar 2-3 times daily (lena contour meter) 3 Box 0     blood glucose monitoring (LENA MICROLET) lancets Use to test blood sugars 2-3 times daily as directed (lena contour meter) 3 Box 0     aspirin 81 MG tablet Take 1 tablet (81 mg) by mouth daily 100 tablet 3     [DISCONTINUED] metFORMIN (GLUCOPHAGE-XR) 500 MG 24 hr tablet Take 1 tablet (500 mg) by mouth daily (with dinner) 90 tablet 1     allopurinol (ZYLOPRIM) 100 MG tablet Take 3 tablets (300 mg) by mouth daily 270 tablet 0           Reviewed and updated as needed this visit by clinical staffTobacco  Allergies  Meds  Med Hx  Surg Hx  Fam Hx  Soc Hx        Reviewed and  updated as needed this visit by Provider        Past Medical History:   Diagnosis Date     Anxiety state, unspecified      BMI 32.0-32.9,adult 9/4/2015     Chronic pain syndrome 3/29/2007    Narcotic refill protocol Will return every 2-3 months for clinic visits Controlled substance aggreement on file from this  6/26/12 Documentation in problem list: no, appears to be compliant based on last visit He is responding best to Oxycontin 10 mg twice daily # 60 per month.  This is costly and looking into [place] for coverage.  Nausea with MS Contin Has meds refilled 16 of every month Last [facility] website verification:  done on 7/8/2015  [email or web address]   2/10/2015:  PCP will take over narcotic prescription Tapering course: using 5 mg tablets 10 mg morning 15 mg noon, 15 mg night for 1 week then 10 mg morning, 10 mg noon, 15 mg night for 1 week then 10 mg TID for 1 week then see me for refills  Then ongoing taper: 5 mg morning, 10 mg noon and 10 mg night for 1 week then  5 mg morning and noon and 10 mg night for 1 week then 5 mg tid for 1 week Then 5 mg morning no noon dose and 5 mg night for 1 week then No morning or non dose and 5 mg nightly for 1 week then off  Goal is co     Chronic rhinitis 3/29/2007     DDD (degenerative disc disease), cervical 2/8/2013    Normal.  C2-C3: Normal disc, facet joints, spinal canal and neural foramina.  C3-C4: Mild broad-based posterior disc bulge. Otherwise normal.  C4-C5: Normal disc, facet joints, spinal canal and neural foramina.  C5-C6: Moderate degenerative disc disease with loss of disc height, circumferential disc bulge and vertebral endplate osteophytes. Mild spinal canal stenosis and moderate left foraminal stenosis. Normal right foramen and facet joints.  C6-C7: Mild circumferential disc bulge. Slight impression on the thecal sac. Mild left foraminal stenosis. Normal right foramen and facet joints.  C7-T1: Normal disc, facet joints, spinal canal and neural foramina.   T1-T2: Mild central posterior disc protrusion.  T2-T3: Mild central posterior disc protrusion. Slight impression on the spinal cord.         DJD (degenerative joint disease), lumbar 1/10/2012     Esophageal reflux     ,refluxes on upper GI     Gout 4/8/2011     Gout, unspecified      Hyperlipidemia LDL goal <100 10/25/2012     Hypertension goal BP (blood pressure) < 140/90 10/21/2011     HYPERTROPHY PROSTATE WITH OBST 8/3/2006     Impotence of organic origin      Lumbar radiculopathy 9/17/2014     Major depressive disorder, recurrent episode, unspecified 7/9/2008     Osteoarthrosis, unspecified whether generalized or localized, pelvic region and thigh      Pure hyperglyceridemia      ROTATOR CUFF SYND NOS 4/17/2008     S/P lumbar fusion 10/14/2014     Thoracic or lumbosacral neuritis or radiculitis, unspecified      Tobacco use disorder      Type 2 diabetes, HbA1C goal < 8% (H) 9/9/2011      Past Surgical History:   Procedure Laterality Date     BACK SURGERY       both shoulder repair  2006, 2008     C NONSPECIFIC PROCEDURE  1995    neck cyst     C NONSPECIFIC PROCEDURE  1979    laminectomy L5-S1 x 2     C SHOULDER SURG PROC UNLISTED  2005, '07    repairs,later manipulate,inject LT, RT     FUSION LUMBAR ANTERIOR, FUSION LUMBAR POSTERIOR TWO LEVELS, COMBINED N/A 9/17/2014    Procedure: COMBINED FUSION LUMBAR ANTERIOR, FUSION LUMBAR POSTERIOR TWO LEVELS;  Surgeon: Chris Lugo MD;  Location: [facility] OR     HC COLONOSCOPY THRU STOMA, DIAGNOSTIC  3/04    normal     HC UGI ENDOSCOPY DIAG W OR W/O BRUSH/WASH  3/04    ok     HEAD & NECK SURGERY       HEMILAMINECTOMY, DISCECTOMY LUMBAR ONE LEVEL, COMBINED Left 9/8/2015    Procedure: COMBINED HEMILAMINECTOMY, DISCECTOMY LUMBAR ONE LEVEL;  Surgeon: Jer Irby MD;  Location: [facility] OR     right hip replacement  10,2010       ROS:  C: NEGATIVE for fever, chills, change in weight  I: NEGATIVE for worrisome rashes, moles or lesions  E: NEGATIVE for vision changes or  irritation  ENT: NEGATIVE for ear, mouth and throat problems  R: NEGATIVE for significant cough or SOB  CV: NEGATIVE for chest pain, palpitations or peripheral edema  GI: NEGATIVE for nausea, abdominal pain, heartburn, or change in bowel habits   male: negative for dysuria, hematuria, decreased urinary stream, erectile dysfunction, urethral discharge  M: NEGATIVE for significant arthralgias or myalgia  N: NEGATIVE for weakness, dizziness or paresthesias  P: NEGATIVE for changes in mood or affect    OBJECTIVE:   /80  Pulse 74  Temp 98  F (36.7  C) (Oral)  Resp 16  Ht 6' (1.829 m)  Wt 250 lb (113.4 kg)  SpO2 95%  BMI 33.91 kg/m2  EXAM:  GENERAL: healthy, alert and no distress  EYES: Eyes grossly normal to inspection, PERRL and conjunctivae and sclerae normal  HENT: ear canals and TM's normal, nose and mouth without ulcers or lesions  NECK: no adenopathy, no asymmetry, masses, or scars and thyroid normal to palpation  RESP: lungs clear to auscultation - no rales, rhonchi or wheezes  CV: regular rate and rhythm, normal S1 S2, no S3 or S4, no murmur, click or rub, no peripheral edema and peripheral pulses strong  ABDOMEN: soft, nontender, no hepatosplenomegaly, no masses and bowel sounds normal  MS: no gross musculoskeletal defects noted, no edema  SKIN: no suspicious lesions or rashes  NEURO: Normal strength and tone, mentation intact and speech normal  PSYCH: mentation appears normal, affect normal/bright    ASSESSMENT/PLAN:   1. Routine general medical examination at a health care facility  See AVS  - GASTROENTEROLOGY ADULT REF PROCEDURE ONLY    2. Type 2 diabetes mellitus without complication, without long-term current use of insulin (H)    - HEMOGLOBIN A1C  - Albumin Random Urine Quantitative  - metFORMIN (GLUCOPHAGE-XR) 500 MG 24 hr tablet; Take 2 tablets (1,000 mg) by mouth daily (with dinner)  Dispense: 180 tablet; Refill: 3    3. Hypertrophy of prostate with urinary obstruction  refilled  -  tamsulosin (FLOMAX) 0.4 MG capsule; TAKE TWO CAPSULES BY MOUTH ONCE DAILY  Dispense: 180 capsule; Refill: 3    4. Chronic pain syndrome  Reviewed this with his pain team - appreciate their service  I can see the patient monthly for his chronic narcotics and other medications  Will need to see him in 1 month to refill these and fill out new CSA  Discussed with Toby that I think comprehensive approach to pain including therapy for depression/anxiety as well as PT and medications is ideal    Total time in addition to routine physical was over 15 minutes with >50% spent in counseling    COUNSELING:  Reviewed preventive health counseling, as reflected in patient instructions       Regular exercise       Healthy diet/nutrition       Colon cancer screening         reports that he has quit smoking. His smoking use included Cigarettes. He quit after 40.00 years of use. He quit smokeless tobacco use about 4 years ago.      Estimated body mass index is 33.91 kg/(m^2) as calculated from the following:    Height as of this encounter: 6' (1.829 m).    Weight as of this encounter: 250 lb (113.4 kg).         Counseling Resources:  ATP IV Guidelines  Pooled Cohorts Equation Calculator  FRAX Risk Assessment  ICSI Preventive Guidelines  Dietary Guidelines for Americans, 2010  USDA's MyPlate  ASA Prophylaxis  Lung CA Screening    Connie Haskins MD  [facility]

## 2017-08-07 NOTE — PROGRESS NOTES
Test results were as expected with no cause for concern. Will discuss further at the next appointment.     JIMBO Tran, NP-C   [facility] [place]

## 2017-08-07 NOTE — MR AVS SNAPSHOT
After Visit Summary   8/7/2017    Toby Mcgrath    MRN: [id number]           Patient Information     Date Of Birth          1955        Visit Information        Provider Department      8/7/2017 8:00 AM Connie Haskins MD [facility]        Today's Diagnoses     Routine general medical examination at a health care facility    -  1    Hypertrophy of prostate with urinary obstruction        Type 2 diabetes mellitus without complication, without long-term current use of insulin (H)          Care Instructions      Preventive Health Recommendations  Male Ages 50 - 64    Yearly exam:             See your health care provider every year in order to  o   Review health changes.   o   Discuss preventive care.    o   Review your medicines if your doctor has prescribed any.     Have a cholesterol test every 5 years, or more frequently if you are at risk for high cholesterol/heart disease.     Have a diabetes test (fasting glucose) every three years. If you are at risk for diabetes, you should have this test more often.     Have a colonoscopy at age 50, or have a yearly FIT test (stool test). These exams will check for colon cancer.      Talk with your health care provider about whether or not a prostate cancer screening test (PSA) is right for you.    You should be tested each year for STDs (sexually transmitted diseases), if you re at risk.     Shots: Get a flu shot each year. Get a tetanus shot every 10 years.     Nutrition:    Eat at least 5 servings of fruits and vegetables daily.     Eat whole-grain bread, whole-wheat pasta and brown rice instead of white grains and rice.     Talk to your provider about Calcium and Vitamin D.     Lifestyle    Exercise for at least 150 minutes a week (30 minutes a day, 5 days a week). This will help you control your weight and prevent disease.     Limit alcohol to one drink per day.     No smoking.     Wear sunscreen to prevent skin cancer.     See  "your dentist every six months for an exam and cleaning.     See your eye doctor every 1 to 2 years.            Follow-ups after your visit        Who to contact     If you have questions or need follow up information about today's clinic visit or your schedule please contact [facility] directly at [phone].  Normal or non-critical lab and imaging results will be communicated to you by MyChart, letter or phone within 4 business days after the clinic has received the results. If you do not hear from us within 7 days, please contact the clinic through [employer] or phone. If you have a critical or abnormal lab result, we will notify you by phone as soon as possible.  Submit refill requests through [employer] or call your pharmacy and they will forward the refill request to us. Please allow 3 business days for your refill to be completed.          Additional Information About Your Visit        MyChart Information     [employer] lets you send messages to your doctor, view your test results, renew your prescriptions, schedule appointments and more. To sign up, go to [email or web address] . Click on \"Log in\" on the left side of the screen, which will take you to the Welcome page. Then click on \"Sign up Now\" on the right side of the page.     You will be asked to enter the access code listed below, as well as some personal information. Please follow the directions to create your username and password.     Your access code is: [email or web address]  Expires: 10/25/2017  8:18 AM     Your access code will  in 90 days. If you need help or a new code, please call your [facility] clinic or [phone].        Care EveryWhere ID     This is your Care EveryWhere ID. This could be used by other organizations to access your [facility] medical records  [id number]        Your Vitals Were     Pulse Temperature Respirations Height Pulse Oximetry BMI (Body Mass Index)    74 98  F (36.7  C) (Oral) 16 6' (1.829 m) 95% 33.91 kg/m2       " Blood Pressure from Last 3 Encounters:   08/07/17 130/80   07/27/17 146/86   06/29/17 120/72    Weight from Last 3 Encounters:   08/07/17 250 lb (113.4 kg)   06/02/17 250 lb 3 oz (113.5 kg)   05/31/17 251 lb (113.9 kg)              Today, you had the following     No orders found for display       Primary Care Provider Office Phone # Fax #    JensenNiyah Haskins [phone]       [facility] [address] [facility] [address]  [facility] [address]        Equal Access to Services     [facility] : Hadii joselito Ansari, wagerardo hamilton, qaybta kaalmada francis, gayle smith . So [facility] [phone].    ATENCIÓN: Si habla español, tiene a holliday disposición servicios gratuitos de asistencia lingüística. [facility] [phone].    We comply with applicable federal civil rights laws and [place] laws. We do not discriminate on the basis of race, color, national origin, age, disability sex, sexual orientation or gender identity.            Thank you!     Thank you for choosing [facility]  for your care. Our goal is always to provide you with excellent care. Hearing back from our patients is one way we can continue to improve our services. Please take a few minutes to complete the written survey that you may receive in the mail after your visit with us. Thank you!             Your Updated Medication List - Protect others around you: Learn how to safely use, store and throw away your medicines at www.disposemymeds.org.          This list is accurate as of: 8/7/17  8:34 AM.  Always use your most recent med list.                   Brand Name Dispense Instructions for use Diagnosis    allopurinol 100 MG tablet    ZYLOPRIM    270 tablet    Take 3 tablets (300 mg) by mouth daily    Acute gout of right elbow, unspecified cause       aspirin 81 MG tablet     100 tablet    Take 1 tablet (81 mg) by mouth daily    Type 2 diabetes, HbA1C goal < 8% (H)       blood  glucose monitoring lancets     3 Box    Use to test blood sugars 2-3 times daily as directed (lena contour meter)    Type 2 diabetes, HbA1C goal < 8% (H)       blood glucose monitoring test strip    no brand specified    3 Box    Use to test blood sugar 2-3 times daily (lena contour meter)    Type 2 diabetes, HbA1C goal < 8% (H)       Colchicine 0.6 MG Caps    MITIGARE    20 capsule    Take 0.6 mg by mouth 2 times daily as needed    Acute pain of right knee       cyclobenzaprine 5 MG tablet    FLEXERIL    30 tablet    Take 1-2 tablets (5-10 mg) by mouth nightly as needed for muscle spasms    Back muscle spasm       finasteride 5 MG tablet    PROSCAR    30 tablet    Take 1 tablet (5 mg) by mouth daily For prostate enlargement. Please fill on $4 list    Benign prostatic hyperplasia without lower urinary tract symptoms, unspecified morphology       FLUoxetine 20 MG capsule    PROzac    270 capsule    TAKE THREE CAPSULES BY MOUTH ONCE DAILY    Mild episode of recurrent major depressive disorder (H)       fluticasone 50 MCG/ACT spray    FLONASE    3 Bottle    Spray 1-2 sprays into both nostrils daily    Chronic rhinitis       gabapentin 300 MG capsule    NEURONTIN    180 capsule    Take 2 capsules (600 mg) by mouth 3 times daily    Chronic lumbosacral pain       metFORMIN 500 MG 24 hr tablet    GLUCOPHAGE-XR    90 tablet    Take 1 tablet (500 mg) by mouth daily (with dinner)    Type 2 diabetes mellitus without complication, without long-term current use of insulin (H)       omeprazole 20 MG CR capsule    priLOSEC    90 capsule    Use every other day as needed    Gastroesophageal reflux disease without esophagitis       oxyCODONE 5 MG IR tablet    ROXICODONE    180 tablet    1 tab q4h, PRN max 6 per day. May dispense on/after 8/2/17 30 days supply    Failed back syndrome of lumbar spine       senna-docusate 8.6-50 MG per tablet    SENOKOT-S;PERICOLACE    60 tablet    Take 1 tablet by mouth 2 times daily    Lumbar  radiculopathy       simvastatin 20 MG tablet    ZOCOR    90 tablet    Take 1 tablet (20 mg) by mouth At Bedtime    Hyperlipidemia LDL goal <100       tamsulosin 0.4 MG capsule    FLOMAX    180 capsule    TAKE TWO CAPSULES BY MOUTH ONCE DAILY    Hypertrophy of prostate with urinary obstruction       triamcinolone 0.1 % cream    KENALOG    30 g    Apply sparingly to affected area twice daily for 7 days. Then stop and use only for flares    Eczema, unspecified type

## 2017-08-07 NOTE — NURSING NOTE
Chief Complaint   Patient presents with     Physical     /80  Pulse 74  Temp 98  F (36.7  C) (Oral)  Resp 16  Ht 6' (1.829 m)  Wt 250 lb (113.4 kg)  SpO2 95%  BMI 33.91 kg/m2 Estimated body mass index is 33.91 kg/(m^2) as calculated from the following:    Height as of this encounter: 6' (1.829 m).    Weight as of this encounter: 250 lb (113.4 kg).  bp completed using cuff size: large       Health Maintenance addressed:  Colonoscopy/FIT    Pt will schedule colon appt.states he needs to find a ride     Heather Pierson MA

## 2017-08-08 ENCOUNTER — TELEPHONE (OUTPATIENT)
Dept: FAMILY MEDICINE | Facility: CLINIC | Age: 62
End: 2017-08-08

## 2017-08-08 NOTE — TELEPHONE ENCOUNTER
Copied from cc'd charts:    Can you let him know that I spoke to Jayashree at the pain clinic and it is fine for me to take over his pain medications.  I will need to see him in 3 weeks to sign a new CSA and refill meds then and will see him monthly for this     Thanks       Pt informed and scheduled f/u appointment for 8/30/17

## 2017-08-10 ENCOUNTER — TELEPHONE (OUTPATIENT)
Dept: GASTROENTEROLOGY | Facility: CLINIC | Age: 62
End: 2017-08-10

## 2017-08-14 NOTE — PROGRESS NOTES
Please call patient with normal results - the a1c is starting to rise - lets have him cut out the soda and sugar - this will help us limit the medications we need to use    Recheck  this in 3 months  Thank you!    Connie

## 2017-08-30 ENCOUNTER — TELEPHONE (OUTPATIENT)
Dept: FAMILY MEDICINE | Facility: CLINIC | Age: 62
End: 2017-08-30

## 2017-08-30 ENCOUNTER — TELEPHONE (OUTPATIENT)
Dept: PHARMACY | Facility: OTHER | Age: 62
End: 2017-08-30

## 2017-08-30 ENCOUNTER — OFFICE VISIT (OUTPATIENT)
Dept: FAMILY MEDICINE | Facility: CLINIC | Age: 62
End: 2017-08-30
Payer: COMMERCIAL

## 2017-08-30 VITALS
TEMPERATURE: 98.5 F | WEIGHT: 250 LBS | HEIGHT: 72 IN | DIASTOLIC BLOOD PRESSURE: 74 MMHG | RESPIRATION RATE: 14 BRPM | HEART RATE: 66 BPM | BODY MASS INDEX: 33.86 KG/M2 | SYSTOLIC BLOOD PRESSURE: 126 MMHG | OXYGEN SATURATION: 97 %

## 2017-08-30 DIAGNOSIS — E11.9 TYPE 2 DIABETES MELLITUS WITHOUT COMPLICATION, WITHOUT LONG-TERM CURRENT USE OF INSULIN (H): ICD-10-CM

## 2017-08-30 DIAGNOSIS — G89.4 CHRONIC PAIN SYNDROME: ICD-10-CM

## 2017-08-30 DIAGNOSIS — M50.30 DDD (DEGENERATIVE DISC DISEASE), CERVICAL: ICD-10-CM

## 2017-08-30 DIAGNOSIS — M54.16 LUMBAR RADICULOPATHY: Primary | ICD-10-CM

## 2017-08-30 DIAGNOSIS — Z98.1 S/P LUMBAR FUSION: ICD-10-CM

## 2017-08-30 DIAGNOSIS — M96.1 FAILED BACK SYNDROME OF LUMBAR SPINE: ICD-10-CM

## 2017-08-30 DIAGNOSIS — Z12.11 SPECIAL SCREENING FOR MALIGNANT NEOPLASMS, COLON: ICD-10-CM

## 2017-08-30 PROCEDURE — 99214 OFFICE O/P EST MOD 30 MIN: CPT | Performed by: FAMILY MEDICINE

## 2017-08-30 RX ORDER — OXYCODONE HYDROCHLORIDE 5 MG/1
TABLET ORAL
Qty: 180 TABLET | Refills: 0 | Status: SHIPPED | OUTPATIENT
Start: 2017-09-01 | End: 2017-08-30

## 2017-08-30 RX ORDER — OXYCODONE HYDROCHLORIDE 5 MG/1
TABLET ORAL
Qty: 180 TABLET | Refills: 0 | Status: SHIPPED | OUTPATIENT
Start: 2017-09-01 | End: 2017-09-28

## 2017-08-30 RX ORDER — OXYCODONE HYDROCHLORIDE 5 MG/1
TABLET ORAL
Qty: 180 TABLET | Refills: 0 | Status: SHIPPED | OUTPATIENT
Start: 2017-08-30 | End: 2017-08-30

## 2017-08-30 NOTE — MR AVS SNAPSHOT
After Visit Summary   8/30/2017    Toby Mcgrath    MRN: [id number]           Patient Information     Date Of Birth          1955        Visit Information        Provider Department      8/30/2017 10:30 AM Connie Haskins MD [facility]        Today's Diagnoses     Lumbar radiculopathy    -  1    Failed back syndrome of lumbar spine        S/P lumbar fusion        DDD (degenerative disc disease), cervical        Type 2 diabetes mellitus without complication, without long-term current use of insulin (H)        Special screening for malignant neoplasms, colon          Care Instructions    Jer Schneider - neurosurgery at [facility]          Follow-ups after your visit        Additional Services     MED THERAPY MANAGE REFERRAL       Your provider has referred you to: **[facility] (numerous locations) [phone]   [email or web address]  [facility]: [facility] [phone]   [email or web address]    Reason for Referral: diabetes management  Help with diabetes glucometer and rising sugars    The [facility] department will contact you to schedule an appointment.  You may also schedule the appointment by calling [phone].  For [facility] [place] patients, please call [phone] to confirm/schedule your appointment on the next business day.    This service is designed to help you get the most from your medications.  A specially trained Pharmacist will work closely with you and your providers to solve any questions, concerns, issues or problems related to your medications.    Please bring all of your prescription and non-prescription medications (such as vitamins, over-the-counter medications, and herbals) or a detailed medication list to your appointment.    If you have a glucose meter or other home  monitoring information, please also bring this to your appointment (i.e. blood glucose log, blood pressure log, pain log, etc.).            NEUROSURGERY REFERRAL       Your provider has referred you to: [facility]: Neurosurgery Clinic [facility] [phone]   [email or web address]  Jer Schneider    Please be aware that coverage of these services is subject to the terms and limitations of your health insurance plan.  Call member services at your health plan with any benefit or coverage questions.      Please bring the following with you to your appointment:    (1) Any X-Rays, CTs or MRIs which have been performed.  Contact the facility where they were done to arrange for  prior to your scheduled appointment.   (2) List of current medications  (3) This referral request   (4) Any documents/labs given to you for this referral            OPHTHALMOLOGY ADULT REFERRAL       Your provider has referred you to:  [facility] [phone]   [email or web address]      Please be aware that coverage of these services is subject to the terms and limitations of your health insurance plan.  Call member services at your health plan with any benefit or coverage questions.      Please bring the following to your appointment:  >>   Any x-rays, CTs or MRIs which have been performed.  Contact the facility where they were done to arrange for  prior to your scheduled appointment.  Any new CT, MRI or other procedures ordered by your specialist must be performed at a [facility] facility or coordinated by your clinic's referral office.    >>   List of current medications   >>   This referral request   >>   Any documents/labs given to you for this referral                  Future tests that were ordered for you today     Open Future Orders        Priority Expected Expires Ordered    Fecal  "colorectal cancer screen FIT Routine 2017            Who to contact     If you have questions or need follow up information about today's clinic visit or your schedule please contact [facility] directly at [phone].  Normal or non-critical lab and imaging results will be communicated to you by MyChart, letter or phone within 4 business days after the clinic has received the results. If you do not hear from us within 7 days, please contact the clinic through MyChart or phone. If you have a critical or abnormal lab result, we will notify you by phone as soon as possible.  Submit refill requests through [employer] or call your pharmacy and they will forward the refill request to us. Please allow 3 business days for your refill to be completed.          Additional Information About Your Visit        [facility] Information     [employer] lets you send messages to your doctor, view your test results, renew your prescriptions, schedule appointments and more. To sign up, go to [email or web address] . Click on \"Log in\" on the left side of the screen, which will take you to the Welcome page. Then click on \"Sign up Now\" on the right side of the page.     You will be asked to enter the access code listed below, as well as some personal information. Please follow the directions to create your username and password.     Your access code is: [email or web address]  Expires: 10/25/2017  8:18 AM     Your access code will  in 90 days. If you need help or a new code, please call your [place] clinic or [phone].        Care EveryWhere ID     This is your Care EveryWhere ID. This could be used by other organizations to access your [place] medical records  [id number]        Your Vitals Were     Pulse Temperature Respirations Height Pulse Oximetry BMI (Body Mass Index)    66 98.5  F (36.9  C) (Oral) 14 6' (1.829 m) 97% 33.91 kg/m2       Blood Pressure from Last 3 Encounters:   17 126/74 "   08/07/17 130/80   07/27/17 146/86    Weight from Last 3 Encounters:   08/30/17 250 lb (113.4 kg)   08/07/17 250 lb (113.4 kg)   06/02/17 250 lb 3 oz (113.5 kg)              We Performed the Following     MED THERAPY MANAGE REFERRAL     NEUROSURGERY REFERRAL     OPHTHALMOLOGY ADULT REFERRAL     PAF COMPLETED          Today's Medication Changes          These changes are accurate as of: 8/30/17 10:59 AM.  If you have any questions, ask your nurse or doctor.               Start taking these medicines.        Dose/Directions    blood glucose monitoring meter device kit   Commonly known as:  no brand specified   Used for:  Type 2 diabetes mellitus without complication, without long-term current use of insulin (H)   Started by:  Connie Haskins MD        Use to test blood sugar 2 times daily or as directed.   Quantity:  1 kit   Refills:  0       oxyCODONE 5 MG IR tablet   Commonly known as:  ROXICODONE   Used for:  Failed back syndrome of lumbar spine   Started by:  Connie Haskins MD        Start taking on:  9/1/2017   1 tab q4h, PRN max 6 per day. May dispense on/after 9/1/17 30 days supply   Quantity:  180 tablet   Refills:  0         These medicines have changed or have updated prescriptions.        Dose/Directions    * blood glucose monitoring test strip   Commonly known as:  no brand specified   This may have changed:  Another medication with the same name was added. Make sure you understand how and when to take each.   Used for:  Type 2 diabetes, HbA1C goal < 8% (H)   Changed by:  Connie Haskins MD        Use to test blood sugar 2-3 times daily (lena contour meter)   Quantity:  3 Box   Refills:  0       * blood glucose monitoring test strip   Commonly known as:  no brand specified   This may have changed:  You were already taking a medication with the same name, and this prescription was added. Make sure you understand how and when to take each.   Used for:  Type 2 diabetes mellitus  without complication, without long-term current use of insulin (H)   Changed by:  Connie Haskins MD        Use to test blood sugars 2 times daily or as directed   Quantity:  100 strip   Refills:  3       * Notice:  This list has 2 medication(s) that are the same as other medications prescribed for you. Read the directions carefully, and ask your doctor or other care provider to review them with you.         Where to get your medicines      These medications were sent to [facility] [address] [place] [address] [place] [address] [place] [facility] [address]     Phone:  [phone]     blood glucose monitoring meter device kit    blood glucose monitoring test strip         Some of these will need a paper prescription and others can be bought over the counter.  Ask your nurse if you have questions.     Bring a paper prescription for each of these medications     oxyCODONE 5 MG IR tablet                Primary Care Provider Office Phone # Fax #    Connie Haskins [phone] [address] [address]        Equal Access to Services     [facility]: Hadii aad ku hadasho Soomaali, waaxda luqadaha, qaybta kaalmada adeegyada, waxay nirin hayhemanth smith . So [facility] [place] [phone].    ATENCIÓN: Si habla español, tiene a holliday disposición servicios gratuitos de asistencia lingüística. Llame al [phone].    We comply with applicable federal civil rights laws and [place] laws. We do not discriminate on the basis of race, color, national origin, age, disability sex, sexual orientation or gender identity.            Thank you!     Thank you for choosing [facility]  for your care. Our goal is always to provide you with excellent care. Hearing back from our patients is one way we can continue to improve our services. Please take a few minutes to complete the written survey that you may receive in the mail after your visit  with us. Thank you!             Your Updated Medication List - Protect others around you: Learn how to safely use, store and throw away your medicines at www.disposemymeds.org.          This list is accurate as of: 8/30/17 10:59 AM.  Always use your most recent med list.                   Brand Name Dispense Instructions for use Diagnosis    allopurinol 100 MG tablet    ZYLOPRIM    270 tablet    Take 3 tablets (300 mg) by mouth daily    Acute gout of right elbow, unspecified cause       aspirin 81 MG tablet     100 tablet    Take 1 tablet (81 mg) by mouth daily    Type 2 diabetes, HbA1C goal < 8% (H)       blood glucose monitoring lancets     3 Box    Use to test blood sugars 2-3 times daily as directed (lena contour meter)    Type 2 diabetes, HbA1C goal < 8% (H)       blood glucose monitoring meter device kit    no brand specified    1 kit    Use to test blood sugar 2 times daily or as directed.    Type 2 diabetes mellitus without complication, without long-term current use of insulin (H)       * blood glucose monitoring test strip    no brand specified    3 Box    Use to test blood sugar 2-3 times daily (lena contour meter)    Type 2 diabetes, HbA1C goal < 8% (H)       * blood glucose monitoring test strip    no brand specified    100 strip    Use to test blood sugars 2 times daily or as directed    Type 2 diabetes mellitus without complication, without long-term current use of insulin (H)       Colchicine 0.6 MG Caps    MITIGARE    20 capsule    Take 0.6 mg by mouth 2 times daily as needed    Acute pain of right knee       cyclobenzaprine 5 MG tablet    FLEXERIL    30 tablet    Take 1-2 tablets (5-10 mg) by mouth nightly as needed for muscle spasms    Back muscle spasm       finasteride 5 MG tablet    PROSCAR    30 tablet    Take 1 tablet (5 mg) by mouth daily For prostate enlargement. Please fill on $4 list    Benign prostatic hyperplasia without lower urinary tract symptoms, unspecified morphology        FLUoxetine 20 MG capsule    PROzac    270 capsule    TAKE THREE CAPSULES BY MOUTH ONCE DAILY    Mild episode of recurrent major depressive disorder (H)       fluticasone 50 MCG/ACT spray    FLONASE    3 Bottle    Spray 1-2 sprays into both nostrils daily    Chronic rhinitis       gabapentin 300 MG capsule    NEURONTIN    180 capsule    Take 2 capsules (600 mg) by mouth 3 times daily    Chronic lumbosacral pain       metFORMIN 500 MG 24 hr tablet    GLUCOPHAGE-XR    180 tablet    Take 2 tablets (1,000 mg) by mouth daily (with dinner)    Type 2 diabetes mellitus without complication, without long-term current use of insulin (H)       omeprazole 20 MG CR capsule    priLOSEC    90 capsule    Use every other day as needed    Gastroesophageal reflux disease without esophagitis       oxyCODONE 5 MG IR tablet   Start taking on:  9/1/2017    ROXICODONE    180 tablet    1 tab q4h, PRN max 6 per day. May dispense on/after 9/1/17 30 days supply    Failed back syndrome of lumbar spine       senna-docusate 8.6-50 MG per tablet    SENOKOT-S;PERICOLACE    60 tablet    Take 1 tablet by mouth 2 times daily    Lumbar radiculopathy       simvastatin 20 MG tablet    ZOCOR    90 tablet    Take 1 tablet (20 mg) by mouth At Bedtime    Hyperlipidemia LDL goal <100       tamsulosin 0.4 MG capsule    FLOMAX    180 capsule    TAKE TWO CAPSULES BY MOUTH ONCE DAILY    Hypertrophy of prostate with urinary obstruction       triamcinolone 0.1 % cream    KENALOG    30 g    Apply sparingly to affected area twice daily for 7 days. Then stop and use only for flares    Eczema, unspecified type       * Notice:  This list has 2 medication(s) that are the same as other medications prescribed for you. Read the directions carefully, and ask your doctor or other care provider to review them with you.

## 2017-08-30 NOTE — NURSING NOTE
Chief Complaint   Patient presents with     Recheck Medication     follow up for refills and CSA       Initial /74  Pulse 66  Temp 98.5  F (36.9  C) (Oral)  Resp 14  Ht 6' (1.829 m)  Wt 250 lb (113.4 kg)  SpO2 97%  BMI 33.91 kg/m2 Estimated body mass index is 33.91 kg/(m^2) as calculated from the following:    Height as of this encounter: 6' (1.829 m).    Weight as of this encounter: 250 lb (113.4 kg).  BP completed using cuff size: large    Health Maintenance that is potentially due pending provider review:  Health Maintenance Due   Topic Date Due     ADVANCE DIRECTIVE PLANNING Q5 YRS  12/08/2016     COLON CANCER SCREEN (SYSTEM ASSIGNED)  03/09/2017     EYE EXAM Q1 YEAR  08/22/2017         MAMMO/COLON--Gave pt phone number, and pended order for Mammo and/or Colonoscopy and eye exam ordered

## 2017-08-30 NOTE — TELEPHONE ENCOUNTER
MTM referral from: [place] clinic visit (referral by provider)    MTM referral outreach attempt #1 on August 30, 2017 at 12:58 PM      Outcome: Left Message    Mica Kaufman MTM Coordinator

## 2017-08-30 NOTE — TELEPHONE ENCOUNTER
Reviewed his most recent labs since returning to our clinic for pain management  His last urine screen for pain medication use showed oxazepam and  Temazepam both of which could have metabolized from diazepam.  It does not look like he was prescribed any of these based on  data.    I noticed this after he had left  Tried to call but no answer    Will reach out to him later today or tomorrow    SN

## 2017-08-30 NOTE — LETTER
[facility]    08/30/17    Patient: Toby Mcgrath  YOB: 1955  Medical Record Number: [id number]                                                                  Controlled Substance Agreement  I understand that my care provider has prescribed controlled substances (narcotics, tranquilizers, and/or stimulants) to help manage my condition(s).  I am taking this medicine to help me function or work.  I know that this is strong medicine.  It could have serious side effects and even cause a dependency on the drug.  If I stop these medicines suddenly, I could have severe withdrawal symptoms.    The risks, benefits, and side effects of these medication(s) were explained to me.  I agree that:  1. I will take part in other treatments as advised by my provider.  This may be psychiatry or counseling, physical therapy, behavioral therapy, group treatment, or a referral to a pain clinic.  I will reduce or stop my medicine when my provider tells me to do so.   2. I will take my medicines as prescribed.  I will not change the dose or schedule unless my provider tells me to.  There will be no refills if I  run out early.   I may be contacted at any time without warning and asked to complete a drug test or pill count.   3. I will keep all my appointments at the clinic.  If I miss appointments or fail to follow instructions, my provider may stop my medicine.  4. I will not ask other providers to prescribe controlled substances. And I will not accept controlled substances from other people. If I need another prescribed controlled substance for a new reason, I will notify my provider within one business day.  5. If I enroll in the [place] Medical Marijuana program, I will tell my provider.  I will also sign an agreement to share my medical records with my provider.  6. I will use one pharmacy to fill all of my controlled substance prescriptions.  If my prescription is mailed to my pharmacy, it may take 5 to  7 days for my medicine to be ready.  7. I understand that my provider, clinic care team, and pharmacy can track controlled substance prescriptions from other providers through a central database (prescription monitoring program).  8. I will bring in my list of medications (or my medicine bottles) each time I come to the clinic.  REV- 04/2016                                                                                                                                            Page 1 of 2      [facility]    08/30/17    Patient: Toby Mcgrath  YOB: 1955  Medical Record Number: [id number]    9. Refills of controlled substances will be made only during office hours.  It is up to me to make sure that I do not run out of my medicines on weekends or holidays.    10. I am responsible for my prescriptions.  If the medicine is lost or stolen, it will not be replaced.   I also agree not to share these medicines with anyone.  11. I agree to not use ANY illegal or recreational drugs.  This includes marijuana, cocaine, bath salts or other drugs.  I agree not to use alcohol unless my provider says I may.  I agree to give urine samples whenever asked.  If I fail to give a urine sample, the provider may stop my medicine.     12. I will tell my nurse or provider right away if I become pregnant or have a new medical problem treated outside of [facility].  13. I understand that this medicine can affect my thinking and judgment.  It may be unsafe for me to drive, use machinery and do dangerous tasks.  I will not do any of these things until I know how the medicine affects me.  If my dose changes, I will wait to see how it affects me.  I will contact my provider if I have concerns about medicine side effects.  I understand that if I do not follow any of the conditions above, my prescriptions or treatment may be stopped.    I agree that my provider, clinic care team, and pharmacy may work with any city,  state or federal law enforcement agency that investigates the misuse, sale, or other diversion of my controlled medicine. I will allow my provider to discuss my care with or share a copy of this agreement with any other treating provider, pharmacy or emergency room where I receive care.  I agree to give up (waive) any right of privacy or confidentiality with respect to these authorizations.   I have read this agreement and have asked questions about anything I did not understand.   ___________________________________    ___________________________  Patient Signature                                                           Date and Time  ___________________________________     ____________________________  Witness                                                                            Date and Time  ___________________________________  Connie Haskins MD  REV-  04/2016                                                                                                                                                                 Page 2 of 2  Opioid Pain Medicines (also known as Narcotics)  What You Need to Know      What are opioids?   Opioids are pain medicines that must be prescribed by a doctor. Examples are:     morphine (MS Contin, Danitza)    oxycodone (Oxycontin)    oxycodone and acetaminophen (Percocet)    hydrocodone and acetaminophen (Vicodin, Norco)     fentanyl patch (Duragesic)     hydromorphone (Dilaudid)     methadone     What do opioids do well?   Opioids are best for short-term pain after a surgery or injury. They also work well for cancer pain. Unlike other pain medicines, they do not cause liver or kidney failure or ulcers. They may help some people with long-lasting (chronic) pain.     What do opioids NOT do well?   Opioids never get rid of pain entirely, and they do not work well for most patients with chronic pain. Opioids do not reduce swelling, one of the causes of pain. They also don t  work well for nerve pain.     Side effects  Talk to your doctor before you start or decide to keep taking one of these medicines. Side effects include:    Lowers your breathing rate enough that it could cause death    Death due to taking more than the prescribed dose    Serious lifelong opioid use      Dependence is not the same as addiction. Addiction is when people keep using a substance that harms their body, their mind or their relations with others. If you have a history of drug or alcohol abuse, taking opioids can cause a relapse.  Over time, opioids don t work as well. Most people will need higher and higher doses. The higher the dose, the more serious the side effects. We don t know the long-term effects of opioids.   People who have used opioids for a long time have a lower quality of life, worse depression, higher levels of pain and more visits to doctors.  Overdose from prescription drugs is the second leading cause of death in the [place] The risk of overdose rises when opioids are taken with other drugs such as:    Medicines used for anxiety and panic attacks (such as lorazepam, alprazolam, clonazepam    Other sedatives    Alcohol    Illegal drugs such as heroin  Never share your opioids with others. Be sure to store opioids in a secure place, locked if possible.Young children can easily swallow them and overdose.     Are there other ways to manage pain?  Ways to help reduce pain:    Exercise every day.    Treat health problems that may be causing pain.    Treat mental health problems like depression and anxiety.     Worse depression symptoms; Less pleasure in things you usually enjoy    Feeling tired or sluggish    Slower thoughts or cloudy thinking    Being more sensitive to pain over time; Pain is harder to control.    Trouble sleeping or restless sleep    Changes in hormone levels (for example, less testosterone).     Changes in sex drive or ability to have sex    Long lasting nausea and  constipation    Trouble breathing while asleep; This is worse with lung problems like COPD or sleep apnea.    Unsafe driving    Getting sick more often    Itching    Feeling dizzy    Dry mouth    Sweating    Trouble emptying the bladder (peeing). This is worse if you have an enlarged prostate or get urinary tract infections (UTIs).    What else should I know about opioids?  When someone takes opioids for too long or too often, they become dependent. This means that if you stop or reduce the medicine too quickly, you will have withdrawal symptoms.          Practice good sleep habits.  Try to go to bed and get up at the same time every day.    Stop smoking.  Tobacco use can make pain worse.    Do things that you enjoy.    Find a way to work through pain without drugs.  Try deep breathing, meditation, visual imagery and aromatherapy.    Ask your doctor to help you create a plan to manage your pain.

## 2017-08-30 NOTE — PROGRESS NOTES
SUBJECTIVE:   Toby Mcgrath is a 62 year old male who presents to clinic today for the following health issues:      Medication Followup of Roxicodone    Taking Medication as prescribed: yes    Side Effects:  None    Medication Helping Symptoms:  yes     Chief Complaint   Patient presents with     Recheck Medication     follow up for refills and CSA     Has been working with the pain clinic for failed back pain syndrome  Has had a good response  Is maintained on stable dose of Roxicodone up to 6 tablets per day in addition to gabapentin and home PT exercises.  Now transferring care back to PCP      Due for new CSA  Had urine testing done recently per pain clinic    (Z98.1) S/P lumbar fusion    (M50.30) DDD (degenerative disc disease), cervical    (E11.9) Type 2 diabetes mellitus without complication, without long-term current use of insulin (H)  Comment: stable control with diet and taking metformin tolerating this well  Lab Results   Component Value Date    A1C 6.7 08/07/2017    A1C 6.5 01/03/2017    A1C 6.6 07/14/2016    A1C 7.0 02/18/2016    A1C 6.9 08/13/2015       Plan: OPHTHALMOLOGY ADULT REFERRAL, blood glucose         monitoring (NO BRAND SPECIFIED) meter device         kit, blood glucose monitoring (NO BRAND         SPECIFIED) test strip            (Z12.11) Special screening for malignant neoplasms, colon  Comment: due for screening  Plan: Fecal colorectal cancer screen FIT             Current Outpatient Prescriptions   Medication     blood glucose monitoring (NO BRAND SPECIFIED) meter device kit     blood glucose monitoring (NO BRAND SPECIFIED) test strip     oxyCODONE (ROXICODONE) 5 MG IR tablet     tamsulosin (FLOMAX) 0.4 MG capsule     metFORMIN (GLUCOPHAGE-XR) 500 MG 24 hr tablet     gabapentin (NEURONTIN) 300 MG capsule     Colchicine (MITIGARE) 0.6 MG CAPS     FLUoxetine (PROZAC) 20 MG capsule     finasteride (PROSCAR) 5 MG tablet     simvastatin (ZOCOR) 20 MG tablet     cyclobenzaprine  (FLEXERIL) 5 MG tablet     senna-docusate (SENOKOT-S;PERICOLACE) 8.6-50 MG per tablet     allopurinol (ZYLOPRIM) 100 MG tablet     omeprazole (PRILOSEC) 20 MG CR capsule     fluticasone (FLONASE) 50 MCG/ACT nasal spray     triamcinolone (KENALOG) 0.1 % cream     blood glucose monitoring (NO BRAND SPECIFIED) test strip     blood glucose monitoring (WOODY MICROLET) lancets     aspirin 81 MG tablet     hydrOXYzine (ATARAX) 25 MG tablet     No current facility-administered medications for this visit.              Problem list and histories reviewed & adjusted, as indicated.  Additional history: as documented    Patient Active Problem List   Diagnosis     Thoracic or lumbosacral neuritis or radiculitis, unspecified     Osteoarthrosis, unspecified whether generalized or localized, pelvic region and thigh     Hypertrophy of prostate with urinary obstruction     Chronic rhinitis     Chronic pain syndrome     Disorder of bursae and tendons in shoulder region     Major depressive disorder, recurrent episode (H)     Anxiety     Gout     Type 2 diabetes mellitus without complication (H)     Hypertension goal BP (blood pressure) < 140/90     Advanced directives, counseling/discussion     DJD (degenerative joint disease), lumbar     Hyperlipidemia LDL goal <100     DDD (degenerative disc disease), cervical     GERD (gastroesophageal reflux disease)     Lumbar radiculopathy     S/P lumbar fusion     Left-sided low back pain with left-sided sciatica     BMI 32.0-32.9,adult     History of hepatitis C     S/P lumbar discectomy     Acute post-operative pain     S/P laminectomy     Chronic pain     Bilateral low back pain without sciatica     Right-sided low back pain with right-sided sciatica     Acute gouty arthritis     Acute gout of right elbow, unspecified cause     Idiopathic gout of left elbow, unspecified chronicity     Gastroesophageal reflux disease without esophagitis     Failed back syndrome of lumbar spine     Slow transit  constipation     Past Surgical History:   Procedure Laterality Date     BACK SURGERY       both shoulder repair  2006, 2008     C NONSPECIFIC PROCEDURE  1995    neck cyst     C NONSPECIFIC PROCEDURE  1979    laminectomy L5-S1 x 2     C SHOULDER SURG PROC UNLISTED  2005, '07    repairs,later manipulate,inject LT, RT     FUSION LUMBAR ANTERIOR, FUSION LUMBAR POSTERIOR TWO LEVELS, COMBINED N/A 9/17/2014    Procedure: COMBINED FUSION LUMBAR ANTERIOR, FUSION LUMBAR POSTERIOR TWO LEVELS;  Surgeon: Chris Lugo MD;  Location: [facility] OR     HC COLONOSCOPY THRU STOMA, DIAGNOSTIC  3/04    normal     HC UGI ENDOSCOPY DIAG W OR W/O BRUSH/WASH  3/04    ok     HEAD & NECK SURGERY       HEMILAMINECTOMY, DISCECTOMY LUMBAR ONE LEVEL, COMBINED Left 9/8/2015    Procedure: COMBINED HEMILAMINECTOMY, DISCECTOMY LUMBAR ONE LEVEL;  Surgeon: Jer Irby MD;  Location: [facility] OR     right hip replacement  10,2010       Social History   Substance Use Topics     Smoking status: Former Smoker     Years: 40.00     Types: Cigarettes     Smokeless tobacco: Former User     Quit date: 2/1/2013     Alcohol use No     Family History   Problem Relation Age of Onset     DIABETES Mother      C.A.D. Father      DIABETES Father      Hypertension Father          Allergies   Allergen Reactions     Codeine Nausea and Vomiting     Tolerating other opiates      Recent Labs   Lab Test  08/07/17   0840  05/17/17   0905  04/13/17   1022  01/03/17   1123  07/14/16   1042  02/18/16   1237  09/14/15   1030   08/13/15   1635   11/10/14   1037   10/14/13   1038   01/28/13   1014   A1C  6.7*   --    --   6.5*  6.6*  7.0*   --    --   6.9*   < >   --    < >  6.6*   < >  6.6*   LDL   --   63   --    --   92   --    --    --   70   --   75   --   60   --   73   HDL   --   34*   --    --   32*   --    --    --    --    --   36*   --   30*   --   31*   TRIG   --   199*   --    --    --    --    --    --    --    --    --    --   264*   --   240*   ALT   --    --     --    --   56  49  26   --    --    --    --    < >  61   --    --    CR   --    --    --   1.09  1.12  1.04   --    < >   --    < >   --    < >  1.26*   --    --    GFRESTIMATED   --    --    --   68  66  72   --    < >   --    < >   --    < >  58*   < >   --    GFRESTBLACK   --    --    --   83  80  87   --    < >   --    < >   --    < >  71   < >   --    POTASSIUM   --    --    --   4.4  4.8  4.6   --    < >   --    < >   --    < >  4.4   --    --    TSH   --    --   1.10   --    --    --    --    --   1.78   --    --    --   1.32   --    --     < > = values in this interval not displayed.      BP Readings from Last 3 Encounters:   09/07/17 130/78   08/30/17 126/74   08/07/17 130/80    Wt Readings from Last 3 Encounters:   09/07/17 247 lb (112 kg)   08/30/17 250 lb (113.4 kg)   08/07/17 250 lb (113.4 kg)                          Reviewed and updated as needed this visit by clinical staffTobacco  Allergies  Meds  Med Hx  Surg Hx  Fam Hx  Soc Hx      Reviewed and updated as needed this visit by Provider         ROS:  Constitutional, HEENT, cardiovascular, pulmonary, gi and gu systems are negative, except as otherwise noted.      OBJECTIVE:                                                    /74  Pulse 66  Temp 98.5  F (36.9  C) (Oral)  Resp 14  Ht 6' (1.829 m)  Wt 250 lb (113.4 kg)  SpO2 97%  BMI 33.91 kg/m2  Body mass index is 33.91 kg/(m^2).  GENERAL APPEARANCE: healthy, alert and no distress  MS: extremities normal- no gross deformities noted and decreased range of motion at LS spine - walks with use of a cane  Slow transition to standing         ASSESSMENT/PLAN:                                                    1. Failed back syndrome of lumbar spine  He would like to talk to his surgeon about his procedure - he has some questions  Referred back for consultation  - NEUROSURGERY REFERRAL  - oxyCODONE (ROXICODONE) 5 MG IR tablet; 1 tab q4h, PRN max 6 per day. May dispense on/after 9/1/17 30  days supply  Dispense: 180 tablet; Refill: 0    2. Lumbar radiculopathy    - NEUROSURGERY REFERRAL    3. S/P lumbar fusion      4. DDD (degenerative disc disease), cervical      5. Type 2 diabetes mellitus without complication, without long-term current use of insulin (H)  Excellent A1C and control  Continue to monitor Q 6 months  Tolerating metformin well  Due for eye exam  - OPHTHALMOLOGY ADULT REFERRAL  - blood glucose monitoring (NO BRAND SPECIFIED) meter device kit; Use to test blood sugar 2 times daily or as directed.  Dispense: 1 kit; Refill: 0  - blood glucose monitoring (NO BRAND SPECIFIED) test strip; Use to test blood sugars 2 times daily or as directed  Dispense: 100 strip; Refill: 3    6. Special screening for malignant neoplasms, colon    - Fecal colorectal cancer screen FIT; Future      Follow up with Provider - in 3 months for routine chronic pain management     Connie Haskins MD  [facility]    Addenda:  Upon reviewing results of his urine drug testing there were signs of benzodiazepine present  The patient had left at this point  Unable to contact him by phone  Contacted his pain team and this was not prescribed by them  Asked him to return to clinic to discuss this    Connie Haskins MD

## 2017-08-30 NOTE — TELEPHONE ENCOUNTER
----- Message from JIMBO Sutton CNP sent at 8/30/2017  1:57 PM CDT -----  Regarding: RE: drug urine screening  Connie,     I do not believe Lyrica would show as a benzo. To my knowledge he does not have any prescriptions for benzos.     Let me know if there is anything I can help with.     Jayashree  ----- Message -----     From: Connie Haskins MD     Sent: 8/30/2017  11:45 AM       To: JIMBO Sutton CNP  Subject: drug urine screening                             Christian Blanc,    I just saw Toby and I noted that he had benzos on his last urine test - are you aware of any new scripts that would cause this?  If not I'll ask him directly  His  did not show any  But did show Lyrica recently - not sure if this would trigger?    Connie

## 2017-08-31 PROCEDURE — 82274 ASSAY TEST FOR BLOOD FECAL: CPT | Performed by: FAMILY MEDICINE

## 2017-09-01 DIAGNOSIS — Z12.11 SPECIAL SCREENING FOR MALIGNANT NEOPLASMS, COLON: ICD-10-CM

## 2017-09-04 LAB — HEMOCCULT STL QL IA: NEGATIVE

## 2017-09-06 NOTE — TELEPHONE ENCOUNTER
Can you let him know I need to see him again this next month  Please schedule for follow up on some of his labs which were abnormal  Can be this week or next but prior to his next fill for narcotics    Thanks  SN

## 2017-09-07 ENCOUNTER — OFFICE VISIT (OUTPATIENT)
Dept: FAMILY MEDICINE | Facility: CLINIC | Age: 62
End: 2017-09-07
Payer: COMMERCIAL

## 2017-09-07 VITALS
WEIGHT: 247 LBS | SYSTOLIC BLOOD PRESSURE: 130 MMHG | HEIGHT: 72 IN | BODY MASS INDEX: 33.46 KG/M2 | HEART RATE: 75 BPM | TEMPERATURE: 98.1 F | DIASTOLIC BLOOD PRESSURE: 78 MMHG | RESPIRATION RATE: 17 BRPM | OXYGEN SATURATION: 99 %

## 2017-09-07 DIAGNOSIS — G89.4 CHRONIC PAIN SYNDROME: ICD-10-CM

## 2017-09-07 PROCEDURE — 99000 SPECIMEN HANDLING OFFICE-LAB: CPT | Performed by: FAMILY MEDICINE

## 2017-09-07 PROCEDURE — 80307 DRUG TEST PRSMV CHEM ANLYZR: CPT | Mod: 90 | Performed by: FAMILY MEDICINE

## 2017-09-07 PROCEDURE — 99214 OFFICE O/P EST MOD 30 MIN: CPT | Performed by: FAMILY MEDICINE

## 2017-09-07 RX ORDER — HYDROXYZINE HYDROCHLORIDE 25 MG/1
50-100 TABLET, FILM COATED ORAL
Qty: 30 TABLET | Refills: 1 | Status: SHIPPED | OUTPATIENT
Start: 2017-09-07 | End: 2017-11-27

## 2017-09-07 NOTE — MR AVS SNAPSHOT
After Visit Summary   9/7/2017    Toby Mcgrath    MRN: [id number]           Patient Information     Date Of Birth          1955        Visit Information        Provider Department      9/7/2017 12:00 PM Connie Haskins MD [facility]        Today's Diagnoses     Chronic pain syndrome           Follow-ups after your visit        Your next 10 appointments already scheduled     Sep 12, 2017 10:30 AM CDT   Office Visit with [facility] Last [facility] ([facility] [place])    [address]  [address]  [facility] [id number] [phone]           Bring a current list of meds and any records pertaining to this visit. For Physicals, please bring immunization records and any forms needing to be filled out. Please arrive 10 minutes early to complete paperwork.            Oct 30, 2017 10:30 AM CDT   Office Visit with Connie Haskins MD   [facility] ([facility]    [address] [id number] [phone]           Bring a current list of meds and any records pertaining to this visit. For Physicals, please bring immunization records and any forms needing to be filled out. Please arrive 10 minutes early to complete paperwork.              Who to contact     If you have questions or need follow up information about today's clinic visit or your schedule please contact [facility] directly at [phone].  Normal or non-critical lab and imaging results will be communicated to you by MyChart, letter or phone within 4 business days after the clinic has received the results. If you do not hear from us within 7 days, please contact the clinic through Ounerhart or phone. If you have a critical or abnormal lab result, we will notify you by phone as soon as possible.  Submit refill requests through Discourse or call your pharmacy and they will forward the refill request to us. Please  "allow 3 business days for your refill to be completed.          Additional Information About Your Visit        MyChart Information     Deeplinkhart lets you send messages to your doctor, view your test results, renew your prescriptions, schedule appointments and more. To sign up, go to [email or web address] . Click on \"Log in\" on the left side of the screen, which will take you to the Welcome page. Then click on \"Sign up Now\" on the right side of the page.     You will be asked to enter the access code listed below, as well as some personal information. Please follow the directions to create your username and password.     Your access code is: [email or web address]  Expires: 10/25/2017  8:18 AM     Your access code will  in 90 days. If you need help or a new code, please call your [facility] clinic or [phone].        Care EveryWhere ID     This is your Care EveryWhere ID. This could be used by other organizations to access your [facility] medical records  [id number]        Your Vitals Were     Pulse Temperature Respirations Height Pulse Oximetry BMI (Body Mass Index)    75 98.1  F (36.7  C) (Oral) 17 6' (1.829 m) 99% 33.5 kg/m2       Blood Pressure from Last 3 Encounters:   17 130/78   17 126/74   17 130/80    Weight from Last 3 Encounters:   17 247 lb (112 kg)   17 250 lb (113.4 kg)   17 250 lb (113.4 kg)              We Performed the Following     Pain Drug Scr UR W Rptd Meds          Today's Medication Changes          These changes are accurate as of: 17 12:39 PM.  If you have any questions, ask your nurse or doctor.               Start taking these medicines.        Dose/Directions    hydrOXYzine 25 MG tablet   Commonly known as:  ATARAX   Used for:  Chronic pain syndrome   Started by:  Connie Haskins MD        Dose:   mg   Take 2-4 tablets ( mg) by mouth nightly as needed for anxiety (for panic at night)   Quantity:  30 tablet   Refills:  1          "   Where to get your medicines      These medications were sent to [facility] [address] [place] [address] - [address]  [address] [facility] [address]     Phone:  [phone]     hydrOXYzine 25 MG tablet                Primary Care Provider Office Phone # Fax #    [place] Haskins [phone] [address] [address]        Equal Access to Services     ALMA WEBER : Hadii aad ku hadasho Soomaali, waaxda luqadaha, qaybta kaalmada adeegyada, waxay idiin hayaan adeeg kharalynette la'hemanth . So [facility] [phone].    ATENCIÓN: Si habla español, tiene a holliday disposición servicios gratuitos de asistencia lingüística. [facility] [phone].    We comply with applicable federal civil rights laws and [place] laws. We do not discriminate on the basis of race, color, national origin, age, disability sex, sexual orientation or gender identity.            Thank you!     Thank you for choosing [facility]  for your care. Our goal is always to provide you with excellent care. Hearing back from our patients is one way we can continue to improve our services. Please take a few minutes to complete the written survey that you may receive in the mail after your visit with us. Thank you!             Your Updated Medication List - Protect others around you: Learn how to safely use, store and throw away your medicines at www.disposemymeds.org.          This list is accurate as of: 9/7/17 12:39 PM.  Always use your most recent med list.                   Brand Name Dispense Instructions for use Diagnosis    allopurinol 100 MG tablet    ZYLOPRIM    270 tablet    Take 3 tablets (300 mg) by mouth daily    Acute gout of right elbow, unspecified cause       aspirin 81 MG tablet     100 tablet    Take 1 tablet (81 mg) by mouth daily    Type 2 diabetes, HbA1C goal < 8% (H)       blood glucose monitoring lancets     3 Box    Use to test blood sugars 2-3 times daily  as directed (lena contour meter)    Type 2 diabetes, HbA1C goal < 8% (H)       blood glucose monitoring meter device kit    no brand specified    1 kit    Use to test blood sugar 2 times daily or as directed.    Type 2 diabetes mellitus without complication, without long-term current use of insulin (H)       * blood glucose monitoring test strip    no brand specified    3 Box    Use to test blood sugar 2-3 times daily (lena contour meter)    Type 2 diabetes, HbA1C goal < 8% (H)       * blood glucose monitoring test strip    no brand specified    100 strip    Use to test blood sugars 2 times daily or as directed    Type 2 diabetes mellitus without complication, without long-term current use of insulin (H)       Colchicine 0.6 MG Caps    MITIGARE    20 capsule    Take 0.6 mg by mouth 2 times daily as needed    Acute pain of right knee       cyclobenzaprine 5 MG tablet    FLEXERIL    30 tablet    Take 1-2 tablets (5-10 mg) by mouth nightly as needed for muscle spasms    Back muscle spasm       finasteride 5 MG tablet    PROSCAR    30 tablet    Take 1 tablet (5 mg) by mouth daily For prostate enlargement. Please fill on $4 list    Benign prostatic hyperplasia without lower urinary tract symptoms, unspecified morphology       FLUoxetine 20 MG capsule    PROzac    270 capsule    TAKE THREE CAPSULES BY MOUTH ONCE DAILY    Mild episode of recurrent major depressive disorder (H)       fluticasone 50 MCG/ACT spray    FLONASE    3 Bottle    Spray 1-2 sprays into both nostrils daily    Chronic rhinitis       gabapentin 300 MG capsule    NEURONTIN    180 capsule    Take 2 capsules (600 mg) by mouth 3 times daily    Chronic lumbosacral pain       hydrOXYzine 25 MG tablet    ATARAX    30 tablet    Take 2-4 tablets ( mg) by mouth nightly as needed for anxiety (for panic at night)    Chronic pain syndrome       metFORMIN 500 MG 24 hr tablet    GLUCOPHAGE-XR    180 tablet    Take 2 tablets (1,000 mg) by mouth daily (with  dinner)    Type 2 diabetes mellitus without complication, without long-term current use of insulin (H)       omeprazole 20 MG CR capsule    priLOSEC    90 capsule    Use every other day as needed    Gastroesophageal reflux disease without esophagitis       oxyCODONE 5 MG IR tablet    ROXICODONE    180 tablet    1 tab q4h, PRN max 6 per day. May dispense on/after 9/1/17 30 days supply    Failed back syndrome of lumbar spine       senna-docusate 8.6-50 MG per tablet    SENOKOT-S;PERICOLACE    60 tablet    Take 1 tablet by mouth 2 times daily    Lumbar radiculopathy       simvastatin 20 MG tablet    ZOCOR    90 tablet    Take 1 tablet (20 mg) by mouth At Bedtime    Hyperlipidemia LDL goal <100       tamsulosin 0.4 MG capsule    FLOMAX    180 capsule    TAKE TWO CAPSULES BY MOUTH ONCE DAILY    Hypertrophy of prostate with urinary obstruction       triamcinolone 0.1 % cream    KENALOG    30 g    Apply sparingly to affected area twice daily for 7 days. Then stop and use only for flares    Eczema, unspecified type       * Notice:  This list has 2 medication(s) that are the same as other medications prescribed for you. Read the directions carefully, and ask your doctor or other care provider to review them with you.

## 2017-09-07 NOTE — PROGRESS NOTES
"  SUBJECTIVE:   Toby Mcgrath is a 62 year old male who presents to clinic today for the following health issues:      Here for follow up discussion on urine findings  Had urine testing done at the end of last month for routine chronic pain surveillance and signs of diazepam were noted (oxazepam and temazepam)  See recent results  Reviewed this with his last provider through the pain clinic ad this was not prescribed hbe her    Was unable to reach the patient by phone    I asked him to be seen to talk about this  He admitted that he took one of his brother's diazepam tablets due to anxiety at night.  Has done this a few times but \"not every night\"  We reviewed that this is a high risk use of both his narcotic and the diazepam due to respiratory depression  Reviewed  that both together can cause death even at normal and appropriate doses.  Advised that he must not do this  I did ask if I would find anything on a repeat urine test which we should do today and he reported nothing should be found other than prescribed medications    Reviewed  other safer options for panic and anxiety at night - discussed trial of hydroxyzine which he agreed to      Problem list and histories reviewed & adjusted, as indicated.  Additional history: as documented    Patient Active Problem List   Diagnosis     Thoracic or lumbosacral neuritis or radiculitis, unspecified     Osteoarthrosis, unspecified whether generalized or localized, pelvic region and thigh     Hypertrophy of prostate with urinary obstruction     Chronic rhinitis     Chronic pain syndrome     Disorder of bursae and tendons in shoulder region     Major depressive disorder, recurrent episode (H)     Anxiety     Gout     Type 2 diabetes mellitus without complication (H)     Hypertension goal BP (blood pressure) < 140/90     Advanced directives, counseling/discussion     DJD (degenerative joint disease), lumbar     Hyperlipidemia LDL goal <100     DDD (degenerative disc " disease), cervical     GERD (gastroesophageal reflux disease)     Lumbar radiculopathy     S/P lumbar fusion     Left-sided low back pain with left-sided sciatica     BMI 32.0-32.9,adult     History of hepatitis C     S/P lumbar discectomy     Acute post-operative pain     S/P laminectomy     Chronic pain     Bilateral low back pain without sciatica     Right-sided low back pain with right-sided sciatica     Acute gouty arthritis     Acute gout of right elbow, unspecified cause     Idiopathic gout of left elbow, unspecified chronicity     Gastroesophageal reflux disease without esophagitis     Failed back syndrome of lumbar spine     Slow transit constipation     Past Surgical History:   Procedure Laterality Date     BACK SURGERY       both shoulder repair  2006, 2008     C NONSPECIFIC PROCEDURE  1995    neck cyst     C NONSPECIFIC PROCEDURE  1979    laminectomy L5-S1 x 2     C SHOULDER SURG PROC UNLISTED  2005, '07    repairs,later manipulate,inject LT, RT     FUSION LUMBAR ANTERIOR, FUSION LUMBAR POSTERIOR TWO LEVELS, COMBINED N/A 9/17/2014    Procedure: COMBINED FUSION LUMBAR ANTERIOR, FUSION LUMBAR POSTERIOR TWO LEVELS;  Surgeon: Chris Lugo MD;  Location: [facility] OR     HC COLONOSCOPY THRU STOMA, DIAGNOSTIC  3/04    normal     HC UGI ENDOSCOPY DIAG W OR W/O BRUSH/WASH  3/04    ok     HEAD & NECK SURGERY       HEMILAMINECTOMY, DISCECTOMY LUMBAR ONE LEVEL, COMBINED Left 9/8/2015    Procedure: COMBINED HEMILAMINECTOMY, DISCECTOMY LUMBAR ONE LEVEL;  Surgeon: Jer Irby MD;  Location: [facility] OR     right hip replacement  10,2010       Social History   Substance Use Topics     Smoking status: Former Smoker     Years: 40.00     Types: Cigarettes     Smokeless tobacco: Former User     Quit date: 2/1/2013     Alcohol use No     Family History   Problem Relation Age of Onset     DIABETES Mother      C.A.D. Father      DIABETES Father      Hypertension Father          Current Outpatient Prescriptions    Medication Sig Dispense Refill     hydrOXYzine (ATARAX) 25 MG tablet Take 2-4 tablets ( mg) by mouth nightly as needed for anxiety (for panic at night) 30 tablet 1     blood glucose monitoring (NO BRAND SPECIFIED) meter device kit Use to test blood sugar 2 times daily or as directed. 1 kit 0     blood glucose monitoring (NO BRAND SPECIFIED) test strip Use to test blood sugars 2 times daily or as directed 100 strip 3     oxyCODONE (ROXICODONE) 5 MG IR tablet 1 tab q4h, PRN max 6 per day. May dispense on/after 9/1/17 30 days supply 180 tablet 0     tamsulosin (FLOMAX) 0.4 MG capsule TAKE TWO CAPSULES BY MOUTH ONCE DAILY 180 capsule 3     metFORMIN (GLUCOPHAGE-XR) 500 MG 24 hr tablet Take 2 tablets (1,000 mg) by mouth daily (with dinner) 180 tablet 3     gabapentin (NEURONTIN) 300 MG capsule Take 2 capsules (600 mg) by mouth 3 times daily 180 capsule 3     Colchicine (MITIGARE) 0.6 MG CAPS Take 0.6 mg by mouth 2 times daily as needed 20 capsule 0     FLUoxetine (PROZAC) 20 MG capsule TAKE THREE CAPSULES BY MOUTH ONCE DAILY 270 capsule 3     finasteride (PROSCAR) 5 MG tablet Take 1 tablet (5 mg) by mouth daily For prostate enlargement. Please fill on $4 list 30 tablet 5     simvastatin (ZOCOR) 20 MG tablet Take 1 tablet (20 mg) by mouth At Bedtime 90 tablet 3     cyclobenzaprine (FLEXERIL) 5 MG tablet Take 1-2 tablets (5-10 mg) by mouth nightly as needed for muscle spasms 30 tablet 1     senna-docusate (SENOKOT-S;PERICOLACE) 8.6-50 MG per tablet Take 1 tablet by mouth 2 times daily 60 tablet 3     allopurinol (ZYLOPRIM) 100 MG tablet Take 3 tablets (300 mg) by mouth daily 270 tablet 0     omeprazole (PRILOSEC) 20 MG CR capsule Use every other day as needed 90 capsule 1     fluticasone (FLONASE) 50 MCG/ACT nasal spray Spray 1-2 sprays into both nostrils daily 3 Bottle 11     triamcinolone (KENALOG) 0.1 % cream Apply sparingly to affected area twice daily for 7 days. Then stop and use only for flares 30 g 3      blood glucose monitoring (NO BRAND SPECIFIED) test strip Use to test blood sugar 2-3 times daily (lena contour meter) 3 Box 0     blood glucose monitoring (LENA MICROLET) lancets Use to test blood sugars 2-3 times daily as directed (lena contour meter) 3 Box 0     aspirin 81 MG tablet Take 1 tablet (81 mg) by mouth daily 100 tablet 3         Reviewed and updated as needed this visit by clinical staffTobacco  Allergies  Meds  Problems  Med Hx  Surg Hx  Fam Hx  Soc Hx        Reviewed and updated as needed this visit by Provider         ROS:  Constitutional, HEENT, cardiovascular, pulmonary, gi and gu systems are negative, except as otherwise noted.      OBJECTIVE:                                                    /78  Pulse 75  Temp 98.1  F (36.7  C) (Oral)  Resp 17  Ht 6' (1.829 m)  Wt 247 lb (112 kg)  SpO2 99%  BMI 33.5 kg/m2  Body mass index is 33.5 kg/(m^2).  GENERAL APPEARANCE: healthy, alert and no distress         ASSESSMENT/PLAN:                                                    1. Chronic pain syndrome  Repeat urine testing due to inappropriate use of diazepam  Reviewed  risks of combining diazepam and any narcotics including Oxycodone see notes above  Reviewed trial of a safer medication to try   - Pain Drug Scr UR W Rptd Meds  - hydrOXYzine (ATARAX) 25 MG tablet; Take 2-4 tablets ( mg) by mouth nightly as needed for anxiety (for panic at night)  Dispense: 30 tablet; Refill: 1      Follow up with Provider - will contact him with results  Follow up in 2 months  Will see him every 3 months      Connie Haskins MD  [facility]

## 2017-09-12 ENCOUNTER — OFFICE VISIT (OUTPATIENT)
Dept: PHARMACY | Facility: CLINIC | Age: 62
End: 2017-09-12
Payer: COMMERCIAL

## 2017-09-12 DIAGNOSIS — F41.9 ANXIETY: ICD-10-CM

## 2017-09-12 DIAGNOSIS — F33.0 MILD EPISODE OF RECURRENT MAJOR DEPRESSIVE DISORDER (H): ICD-10-CM

## 2017-09-12 DIAGNOSIS — G89.4 CHRONIC PAIN SYNDROME: ICD-10-CM

## 2017-09-12 DIAGNOSIS — K21.9 GASTROESOPHAGEAL REFLUX DISEASE WITHOUT ESOPHAGITIS: Primary | ICD-10-CM

## 2017-09-12 DIAGNOSIS — N40.1 HYPERTROPHY OF PROSTATE WITH URINARY OBSTRUCTION: ICD-10-CM

## 2017-09-12 DIAGNOSIS — K59.03 DRUG-INDUCED CONSTIPATION: ICD-10-CM

## 2017-09-12 DIAGNOSIS — E78.5 HYPERLIPIDEMIA LDL GOAL <100: ICD-10-CM

## 2017-09-12 DIAGNOSIS — M10.9 ACUTE GOUTY ARTHRITIS: ICD-10-CM

## 2017-09-12 DIAGNOSIS — E11.9 TYPE 2 DIABETES MELLITUS WITHOUT COMPLICATION, WITHOUT LONG-TERM CURRENT USE OF INSULIN (H): ICD-10-CM

## 2017-09-12 DIAGNOSIS — N13.8 HYPERTROPHY OF PROSTATE WITH URINARY OBSTRUCTION: ICD-10-CM

## 2017-09-12 DIAGNOSIS — J31.0 CHRONIC RHINITIS, UNSPECIFIED TYPE: ICD-10-CM

## 2017-09-12 DIAGNOSIS — E11.42 DIABETIC POLYNEUROPATHY ASSOCIATED WITH TYPE 2 DIABETES MELLITUS (H): ICD-10-CM

## 2017-09-12 PROCEDURE — 99607 MTMS BY PHARM ADDL 15 MIN: CPT | Performed by: PHARMACIST

## 2017-09-12 PROCEDURE — 99606 MTMS BY PHARM EST 15 MIN: CPT | Performed by: PHARMACIST

## 2017-09-12 NOTE — PROGRESS NOTES
SUBJECTIVE/OBJECTIVE:                                                    Toby Mcgrath is a 61 year old male called for a follow-up visit for Medication Therapy Management.  He was referred to me from Dr. Haskins.     Chief Complaint: Follow up from our visit on .  Gabapentin not touching nerve pain and making him feel loopy. Recently has been having trouble with his meter  Tobacco: No tobacco use   Alcohol: not currently using    Medication Adherence: issues found and discussed below - most concerns are related to eliminating or decreasing doses of drugs due to cost of medications    Gout: Currently not taking allopurinol or colchicine. Reports allopurinol was making pain worse which lasted for a while. Pt is not currently experiencing any gout symptoms. Uric acid was 4.3 mg/dL on 17.    Diabetes:  Pt currently taking Metformin 500mg - one tablet twice daily.  Reports skipping metformin if has low sugars (120s). Pt is not experiencing any side effects. Pt recently got new meter which is working much better than his old meter.  SMB-2 times a week, before dinner.  Ranges (patient reported): 130-190, was 250 with old meter.  Patient is not experiencing hypoglycemia  Recent symptoms of high blood sugar? neuropathy  Microalbumin is < 30 mg/g. Pt is not taking an ACEi/ARB.  Aspirin: Taking 81mg daily and denies side effects    Hyperlipidemia: Current therapy includes simvastatin 20mg once daily.  Pt reports no significant myalgias or other side effects.     Neuropathy: Currently taking gabapentin 300mg - 2 tablets 3 times daily. Pt reports gabapentin isn't working well and is making him feel loopy and tired. Has been on Lyrica in past which worked well, but switched because he was having trouble affording it.  He is asking if he can switch back to Lyrica today.     GERD: Current medications include: Omeprazole 20mg as needed. Pt c/o heartburn especially at night. Pt feels that current regimen is not  effective.    Anxiety: Hasn't started taking hydroxyzine yet (see last OV note with SN for additional details).     Chronic pain:  Current medications include: oxycodone 5mg as needed (takes 6 tablets daily) and cyclobenzaprine 5mg as needed (rarely uses). Pt reports current therapy is maintaining his pain - pain is still there but he is able to do normal activities.    BPH: Currently taking finasteride and tamsulosin, but doesn't feel like it's working well. Pt reports difficulty emptying bladder and is getting worse over time. Pt feels like he should go see a urologist. Cost of finasteride and tamsulosin is burdensome to him.     Constipation: Currently taking senna-S every other day because taking it every day causes diarrhea. Pt is also using fiber supplement. Despite this regimen, he's still not happy with his inconsistent BMs.     Depression:  Current medications include: fluoxetine 60mg (sometimes 40mg- decreases due to cost) daily.   PHQ-9 SCORE 4/1/2016 11/3/2016 5/17/2017   Total Score - - -   Total Score 18 5 4     Allergic rhinitis: Current medications include fluticasone nasal spray as needed (uses more in winter time). Pt reports side effects of nose bleeds. Pt feels that current therapy is effective when he needs it.    Current labs include:  BP Readings from Last 3 Encounters:   09/07/17 130/78   08/30/17 126/74   08/07/17 130/80     /74  Wt 247 lb 6.4 oz (112.2 kg)  BMI 33.55 kg/m2  Lab Results   Component Value Date    A1C 6.5 01/03/2017   .  Lab Results   Component Value Date    CHOL 154 07/14/2016     Lab Results   Component Value Date    TRIG 264 10/14/2013     Lab Results   Component Value Date    HDL 32 07/14/2016     Lab Results   Component Value Date    LDL 92 07/14/2016    LDL 60 10/14/2013       Liver Function Studies -   Recent Labs   Lab Test  07/14/16   1042   PROTTOTAL  7.5   ALBUMIN  4.1   BILITOTAL  0.5   ALKPHOS  61   AST  34   ALT  56       Lab Results   Component Value  Date    UCRR 198 08/07/2017    MICROL 5 08/07/2017    UMALCR 2.65 08/07/2017       Last Basic Metabolic Panel:  Lab Results   Component Value Date     01/03/2017      Lab Results   Component Value Date    POTASSIUM 4.4 01/03/2017     Lab Results   Component Value Date    CHLORIDE 106 01/03/2017     Lab Results   Component Value Date    BUN 14 01/03/2017     Lab Results   Component Value Date    CR 1.09 01/03/2017     GFR Estimate   Date Value Ref Range Status   01/03/2017 68 >60 mL/min/1.7m2 Final     Comment:     Non  GFR Calc   07/14/2016 66 >60 mL/min/1.7m2 Final     Comment:     Non  GFR Calc   02/18/2016 72 >60 mL/min/1.7m2 Final     Comment:     Non  GFR Calc       TSH   Date Value Ref Range Status   04/13/2017 1.10 0.40 - 4.00 mU/L Final   ]    Most Recent Immunizations   Administered Date(s) Administered     Influenza (IIV3) 10/08/2014     Influenza Vaccine IM 3yrs+ 4 Valent IIV4 09/22/2016     Pneumococcal 23 valent 08/17/2012     TD (ADULT, 7+) 12/08/2011     TDAP Vaccine (Adacel) 08/14/2009     ASSESSMENT:                                                    Current medications were reviewed today as discussed above.      Medication Adherence: needs improvement - see below. Pt would benefit from patient assistance to help with medication costs.    Gout: Stable. Consider restarting allopurinol in future.    Diabetes: Needs Improvement. Patient is meeting A1c goal of < 7%. Pt would benefit from taking metformin twice daily as directed.    Hyperlipidemia: Stable. Pt is on moderate intensity statin which is indicated based on 2013 ACC/AHA guidelines for lipid management.      Neuropathy: Needs improvement. Pt reports Lyrica to be more effective and have fewer side effects than gabapentin. Pt would benefit from switching gabapentin to Lyrica, but will need to ask patient assistance to help with cost. Per Epic review, pt's last Lyrica dose was at 200mg BID and  this would be an appropriate dose to restart.    GERD: Needs improvement. Taking PPIs as needed is not expected to be effective. Pt would benefit from trying famotidine as needed. Also educated pt about lifestyle modifications.    Anxiety: Stable.     Chronic pain:  Stable.     BPH: Needs improvement. Pt may benefit from visit with urology or PCP.    Constipation: Needs improvement. Pt would benefit from using senna-S on a daily basis for more consistent bowel movements.     Depression: Stable.     Allergic rhinitis: Stable.     PLAN:                                                      1. Pt to try famotidine 1 tablet up to twice daily as needed. Rx sent to his pharmacy. Use this instead of omeprazole.      2. Refer to patient assistance to help with medication costs. Will ask Dr. Haskins about switching gabapentin to Lyrica at a dose of 200mg BID.    3. Pt to try taking Senna-S half tablet every day.    4. Pt to take metformin twice daily every day.    Pt gave verbal permission to leave detailed voicemail messages regarding his MTM care.     I spent 40 minutes with this patient today.  All changes were made via collaborative practice agreement with Connie Haskins. A copy of the visit note was provided to the patient's primary care provider.     Will follow up in 1 month.      The patient was given a summary of these recommendations as an after visit summary.    Jessi Bashir, PharmD  Medication Therapy Management Resident  Mary Last, PharmD, NEIDA, BCACP   Medication Therapy Management Pharmacist

## 2017-09-12 NOTE — MR AVS SNAPSHOT
After Visit Summary   9/12/2017    Toby Mcgrath    MRN: [id number]           Patient Information     Date Of Birth          1955        Visit Information        Provider Department      9/12/2017 10:30 AM Mary Last, [facility] MTM        Care Instructions    Recommendations from today's MTM visit:                                                      1. Start using Pepcid (famotidine) 1 tablet up to twice daily as needed for heartburn. This should work better for on demand heartburn than omeprazole (the purple pill). Also try to avoid heartburn triggers such as laying down after eating and drinking less coffee.    2. We will talk to patient assistance to see if we can get you some help with your medication costs.    3. Try taking Senna-S half tablet every day.    4. We will look into switching gabapentin to Lyrica, if possible.    5. Take your metformin twice daily every day.    Next MTM visit: 1 month    To schedule another MTM appointment, please call the clinic directly or you may call the MTM scheduling line at [phone] or toll-free at [phone].     My Clinical Pharmacist's contact information:                                                      It was a pleasure seeing you today!  Please feel free to contact me with any questions or concerns you have.      Lloyd ShipmanD  Medication Therapy Management Resident, [facility]  Pager: [phone]  Email: [email or web address]     Mary Last, Pharm.D., M.B.A., [facility]  Medication Therapy Management Pharmacist, [facility]  Pager: [phone]  Email: [email or web address]    You may receive a survey about the MTM services you received.  I would appreciate your feedback to help me serve you better in the future. Please fill it out and return it when you can. Your comments will be anonymous.                Follow-ups after your visit        Your next 10  "appointments already scheduled     Oct 30, 2017 10:30 AM CDT   Office Visit with Connie Haskins MD   [facility]    [address] [facility] [address] [address] [phone]           Bring a current list of meds and any records pertaining to this visit. For Physicals, please bring immunization records and any forms needing to be filled out. Please arrive 10 minutes early to complete paperwork.              Who to contact     If you have questions or need follow up information about today's clinic visit or your schedule please contact [facility] MTM directly at [phone].  Normal or non-critical lab and imaging results will be communicated to you by QuinStreethart, letter or phone within 4 business days after the clinic has received the results. If you do not hear from us within 7 days, please contact the clinic through QuinStreethart or phone. If you have a critical or abnormal lab result, we will notify you by phone as soon as possible.  Submit refill requests through [employer] [facility] or call your pharmacy and they will forward the refill request to us. Please allow 3 business days for your refill to be completed.          Additional Information About Your Visit        QuinStreetharInter[facility] ENT Information     The Muse lets you send messages to your doctor, view your test results, renew your prescriptions, schedule appointments and more. To sign up, go to [email or web address] . Click on \"Log in\" on the left side of the screen, which will take you to the Welcome page. Then click on \"Sign up Now\" on the right side of the page.     You will be asked to enter the access code listed below, as well as some personal information. Please follow the directions to create your username and password.     Your access code is: [email or web address]  Expires: 10/25/2017  8:18 AM     Your access code will  in 90 days. If you need help or a new code, please call your [place] clinic or [phone].      "   Care EveryWhere ID     This is your Care EveryWhere ID. This could be used by other organizations to access your [place] medical records  [id number]         Blood Pressure from Last 3 Encounters:   09/07/17 130/78   08/30/17 126/74   08/07/17 130/80    Weight from Last 3 Encounters:   09/07/17 247 lb (112 kg)   08/30/17 250 lb (113.4 kg)   08/07/17 250 lb (113.4 kg)              Today, you had the following     No orders found for display       Primary Care Provider Office Phone # Fax #    FargoNiyah Haskins [phone]       [address] [address]        Equal Access to Services     ALMA WEBER : Hadii joselito bettso Somaral, waaxda luqadaha, qaybta kaalmada adeegyada, gayle smith . So [facility] [phone].    ATENCIÓN: Si habla español, tiene a holliday disposición servicios gratuitos de asistencia lingüística. Rylie al [phone].    We comply with applicable federal civil rights laws and [place] laws. We do not discriminate on the basis of race, color, national origin, age, disability sex, sexual orientation or gender identity.            Thank you!     Thank you for choosing [facility]  for your care. Our goal is always to provide you with excellent care. Hearing back from our patients is one way we can continue to improve our services. Please take a few minutes to complete the written survey that you may receive in the mail after your visit with us. Thank you!             Your Updated Medication List - Protect others around you: Learn how to safely use, store and throw away your medicines at www.disposemymeds.org.          This list is accurate as of: 9/12/17 11:06 AM.  Always use your most recent med list.                   Brand Name Dispense Instructions for use Diagnosis    allopurinol 100 MG tablet    ZYLOPRIM    270 tablet    Take 3 tablets (300 mg) by mouth daily    Acute gout of right elbow, unspecified cause        aspirin 81 MG tablet     100 tablet    Take 1 tablet (81 mg) by mouth daily    Type 2 diabetes, HbA1C goal < 8% (H)       blood glucose monitoring lancets     3 Box    Use to test blood sugars 2-3 times daily as directed (lena contour meter)    Type 2 diabetes, HbA1C goal < 8% (H)       blood glucose monitoring meter device kit    no brand specified    1 kit    Use to test blood sugar 2 times daily or as directed.    Type 2 diabetes mellitus without complication, without long-term current use of insulin (H)       * blood glucose monitoring test strip    no brand specified    3 Box    Use to test blood sugar 2-3 times daily (lena contour meter)    Type 2 diabetes, HbA1C goal < 8% (H)       * blood glucose monitoring test strip    no brand specified    100 strip    Use to test blood sugars 2 times daily or as directed    Type 2 diabetes mellitus without complication, without long-term current use of insulin (H)       Colchicine 0.6 MG Caps    MITIGARE    20 capsule    Take 0.6 mg by mouth 2 times daily as needed    Acute pain of right knee       cyclobenzaprine 5 MG tablet    FLEXERIL    30 tablet    Take 1-2 tablets (5-10 mg) by mouth nightly as needed for muscle spasms    Back muscle spasm       finasteride 5 MG tablet    PROSCAR    30 tablet    Take 1 tablet (5 mg) by mouth daily For prostate enlargement. Please fill on $4 list    Benign prostatic hyperplasia without lower urinary tract symptoms, unspecified morphology       FLUoxetine 20 MG capsule    PROzac    270 capsule    TAKE THREE CAPSULES BY MOUTH ONCE DAILY    Mild episode of recurrent major depressive disorder (H)       fluticasone 50 MCG/ACT spray    FLONASE    3 Bottle    Spray 1-2 sprays into both nostrils daily    Chronic rhinitis       gabapentin 300 MG capsule    NEURONTIN    180 capsule    Take 2 capsules (600 mg) by mouth 3 times daily    Chronic lumbosacral pain       hydrOXYzine 25 MG tablet    ATARAX    30 tablet    Take 2-4 tablets  ( mg) by mouth nightly as needed for anxiety (for panic at night)    Chronic pain syndrome       metFORMIN 500 MG 24 hr tablet    GLUCOPHAGE-XR    180 tablet    Take 2 tablets (1,000 mg) by mouth daily (with dinner)    Type 2 diabetes mellitus without complication, without long-term current use of insulin (H)       omeprazole 20 MG CR capsule    priLOSEC    90 capsule    Use every other day as needed    Gastroesophageal reflux disease without esophagitis       oxyCODONE 5 MG IR tablet    ROXICODONE    180 tablet    1 tab q4h, PRN max 6 per day. May dispense on/after 9/1/17 30 days supply    Failed back syndrome of lumbar spine       senna-docusate 8.6-50 MG per tablet    SENOKOT-S;PERICOLACE    60 tablet    Take 1 tablet by mouth 2 times daily    Lumbar radiculopathy       simvastatin 20 MG tablet    ZOCOR    90 tablet    Take 1 tablet (20 mg) by mouth At Bedtime    Hyperlipidemia LDL goal <100       tamsulosin 0.4 MG capsule    FLOMAX    180 capsule    TAKE TWO CAPSULES BY MOUTH ONCE DAILY    Hypertrophy of prostate with urinary obstruction       triamcinolone 0.1 % cream    KENALOG    30 g    Apply sparingly to affected area twice daily for 7 days. Then stop and use only for flares    Eczema, unspecified type       * Notice:  This list has 2 medication(s) that are the same as other medications prescribed for you. Read the directions carefully, and ask your doctor or other care provider to review them with you.

## 2017-09-12 NOTE — PATIENT INSTRUCTIONS
Recommendations from today's MTM visit:                                                      1. Start using Pepcid (famotidine) 1 tablet up to twice daily as needed for heartburn. This should work better for on demand heartburn than omeprazole (the purple pill). Also try to avoid heartburn triggers such as laying down after eating and drinking less coffee.    2. We will talk to patient assistance to see if we can get you some help with your medication costs.    3. Try taking Senna-S half tablet every day.    4. We will look into switching gabapentin to Lyrica, if possible.    5. Take your metformin twice daily every day.    Next MTM visit: 1 month    To schedule another MTM appointment, please call the clinic directly or you may call the MTM scheduling line at [phone] or toll-free at [phone].     My Clinical Pharmacist's contact information:                                                      It was a pleasure seeing you today!  Please feel free to contact me with any questions or concerns you have.      Jessi Bashir PharmD  Medication Therapy Management Resident, [facility]  Pager: [phone]  Email: [email or web address]     Mary Last, Pharm.D., M.B.A., BCACP  Medication Therapy Management Pharmacist, [facility]  Pager: [phone]  Email: [email or web address]    You may receive a survey about the MTM services you received.  I would appreciate your feedback to help me serve you better in the future. Please fill it out and return it when you can. Your comments will be anonymous.

## 2017-09-13 NOTE — PROGRESS NOTES
Please send this patient a normal results letter    Good news.  Normal FIT test results  Repeat this in a year    Thank you!    Connie

## 2017-09-14 ENCOUNTER — TELEPHONE (OUTPATIENT)
Dept: PHARMACY | Facility: CLINIC | Age: 62
End: 2017-09-14

## 2017-09-14 VITALS — BODY MASS INDEX: 33.55 KG/M2 | DIASTOLIC BLOOD PRESSURE: 74 MMHG | WEIGHT: 247.4 LBS | SYSTOLIC BLOOD PRESSURE: 122 MMHG

## 2017-09-14 DIAGNOSIS — G89.4 CHRONIC PAIN SYNDROME: Primary | ICD-10-CM

## 2017-09-14 LAB — PAIN DRUG SCR UR W RPTD MEDS: NORMAL

## 2017-09-14 RX ORDER — PREGABALIN 100 MG/1
200 CAPSULE ORAL 2 TIMES DAILY
Qty: 120 CAPSULE | Refills: 5 | Status: SHIPPED | OUTPATIENT
Start: 2017-09-14 | End: 2017-09-27

## 2017-09-14 RX ORDER — FAMOTIDINE 20 MG/1
20 TABLET, FILM COATED ORAL 2 TIMES DAILY PRN
Qty: 60 TABLET | Refills: 1 | Status: SHIPPED | OUTPATIENT
Start: 2017-09-14 | End: 2017-11-27

## 2017-09-14 NOTE — TELEPHONE ENCOUNTER
Thanks for jennifer ing me know  Agree with taper of gabapentin off and review of safety with the Lyrica and other sedating medications    Will place with triage for pt    SN

## 2017-09-14 NOTE — TELEPHONE ENCOUNTER
Pt requesting to switch back to Lyrica from gabapentin (he was initially switched due to cost issues). See MTM note from 9/12 for additional details. MTM happy to call patient once complete and can create cross taper plan - please route back to us if approved.     Mary Last, LloydD, NEIDA, BCACP  MTM Pharmacist, [facility]

## 2017-09-15 NOTE — TELEPHONE ENCOUNTER
Called and left pt message about changing prescriptions - asked for a return call.     Mary Last PharmD, NEIDA, BCACP  MTM Pharmacist, [facility]

## 2017-09-21 ENCOUNTER — TELEPHONE (OUTPATIENT)
Dept: FAMILY MEDICINE | Facility: CLINIC | Age: 62
End: 2017-09-21

## 2017-09-21 NOTE — TELEPHONE ENCOUNTER
Reviewed SN's note with pt, see below message  Scheduled appt for end of the month  Sara Mclaughlin RN

## 2017-09-21 NOTE — PROGRESS NOTES
Please call patient  -  I'd like him to return to see me at the end of this month  He is not to use any medications that are not directly prescribed to him    Thank you!    Connie

## 2017-09-21 NOTE — TELEPHONE ENCOUNTER
Connie Haskins MD  P Up [place] Triage                   Please call patient  -  I'd like him to return to see me at the end of this month   He is not to use any medications that are not directly prescribed to him     Thank you!     Connie

## 2017-09-27 ENCOUNTER — TELEPHONE (OUTPATIENT)
Dept: FAMILY MEDICINE | Facility: CLINIC | Age: 62
End: 2017-09-27

## 2017-09-27 DIAGNOSIS — G89.4 CHRONIC PAIN SYNDROME: ICD-10-CM

## 2017-09-27 RX ORDER — PREGABALIN 100 MG/1
200 CAPSULE ORAL 2 TIMES DAILY
Qty: 120 CAPSULE | Refills: 5 | Status: SHIPPED | OUTPATIENT
Start: 2017-09-27 | End: 2017-10-06

## 2017-09-27 NOTE — TELEPHONE ENCOUNTER
Pt was approved for  Pharmacy Assistance Fund.  This gives him up to $500 of free medication.  He has asked us to get an rx for Lyrica to fulfill this.    Thank you!  Treasure Almanzar, Pharmacy [facility]

## 2017-09-27 NOTE — TELEPHONE ENCOUNTER
FYI~ Sept 21, 2017, I spoke with Layo, he is in need of financial assistance for medication.    We reviewed the Pharmacy Assistance Fund $500, the Prescription Assistance Program for manfacturer brand name assistance programs, gross income, insurance and Rx list.    I have approved the Pharmacy Assistance Fund $500 to be filled at the [facility].  I will complete applications for Lyrica & Colcrys.  Once approved, Layo will receive these medications at no cost through December 2017.    Zenaida Plasencia  Prescription

## 2017-09-27 NOTE — TELEPHONE ENCOUNTER
Written RX for Lyrica 100mg caps-#120, Refill x5 wasa faxed to [facility] at [phone].ELIJAH Ewing, CMA

## 2017-09-28 ENCOUNTER — OFFICE VISIT (OUTPATIENT)
Dept: FAMILY MEDICINE | Facility: CLINIC | Age: 62
End: 2017-09-28
Payer: COMMERCIAL

## 2017-09-28 VITALS
WEIGHT: 246 LBS | OXYGEN SATURATION: 96 % | SYSTOLIC BLOOD PRESSURE: 110 MMHG | HEART RATE: 74 BPM | TEMPERATURE: 98.3 F | DIASTOLIC BLOOD PRESSURE: 64 MMHG | RESPIRATION RATE: 14 BRPM | HEIGHT: 72 IN | BODY MASS INDEX: 33.32 KG/M2

## 2017-09-28 DIAGNOSIS — G89.4 CHRONIC PAIN SYNDROME: Primary | ICD-10-CM

## 2017-09-28 DIAGNOSIS — M96.1 FAILED BACK SYNDROME OF LUMBAR SPINE: ICD-10-CM

## 2017-09-28 PROCEDURE — 99000 SPECIMEN HANDLING OFFICE-LAB: CPT | Performed by: FAMILY MEDICINE

## 2017-09-28 PROCEDURE — 99213 OFFICE O/P EST LOW 20 MIN: CPT | Performed by: FAMILY MEDICINE

## 2017-09-28 PROCEDURE — 80307 DRUG TEST PRSMV CHEM ANLYZR: CPT | Mod: 90 | Performed by: FAMILY MEDICINE

## 2017-09-28 RX ORDER — OXYCODONE HYDROCHLORIDE 5 MG/1
TABLET ORAL
Qty: 42 TABLET | Refills: 0 | Status: SHIPPED | OUTPATIENT
Start: 2017-09-28 | End: 2017-10-05

## 2017-09-28 NOTE — PROGRESS NOTES
"  SUBJECTIVE:   Toby Mcgrath is a 62 year old male who presents to clinic today for the following health issues:      Medication Followup of Lyrica    Taking Medication as prescribed: yes- using 1 tab twice daily    Side Effects:  None    Medication Helping Symptoms:  Yes-much improvement     Follow-up on chronic pain control:    See previous notes. He has recently transferred management of his chronic pain back to our clinic and to my care. He had worked with chronic pain team for the last few years and has had good improvement in stable control. Unfortunately his last urine screen did show signs of unauthorized medications including what appears to be diazepam. Did admit that he took a few tablets of his brothers medications because of problems with anxiety and sleep however he reports that he took one dose and that was it. He later reported that he didn't \"do this often\" . Unfortunately on repeat lab which was about 10 days later there were still elements of days of pain present in the urine. He is here today to discuss this. He reports he cannot explain why this is happening. Denies taking unauthorized medication. Reports that he is very compliant and will submit urine tests until they are clear. I did review diazepam half life and there is a 2-3 day half-life. Which might result in persistent signs after a few days. I'm not sure if this would last for linger for 10 days. He agrees to repeat urine today and reports that he has not taken any medicines that are not his were not prescribed.    Separately he did have clearance and approval of Lyrica this is now described and this is helping he started this just a few days ago side effects.    Problem list and histories reviewed & adjusted, as indicated.  Additional history: as documented    Patient Active Problem List   Diagnosis     Thoracic or lumbosacral neuritis or radiculitis, unspecified     Osteoarthrosis, unspecified whether generalized or localized, " pelvic region and thigh     Hypertrophy of prostate with urinary obstruction     Chronic rhinitis     Chronic pain syndrome     Disorder of bursae and tendons in shoulder region     Major depressive disorder, recurrent episode (H)     Anxiety     Gout     Type 2 diabetes mellitus without complication (H)     Hypertension goal BP (blood pressure) < 140/90     Advanced directives, counseling/discussion     DJD (degenerative joint disease), lumbar     Hyperlipidemia LDL goal <100     DDD (degenerative disc disease), cervical     GERD (gastroesophageal reflux disease)     Lumbar radiculopathy     S/P lumbar fusion     Left-sided low back pain with left-sided sciatica     BMI 32.0-32.9,adult     History of hepatitis C     S/P lumbar discectomy     Acute post-operative pain     S/P laminectomy     Chronic pain     Bilateral low back pain without sciatica     Right-sided low back pain with right-sided sciatica     Acute gouty arthritis     Acute gout of right elbow, unspecified cause     Idiopathic gout of left elbow, unspecified chronicity     Gastroesophageal reflux disease without esophagitis     Failed back syndrome of lumbar spine     Slow transit constipation        Reviewed and updated as needed this visit by clinical staffTobacco  Allergies  Meds  Med Hx  Surg Hx  Fam Hx  Soc Hx      Reviewed and updated as needed this visit by Provider         ROS:  Constitutional, HEENT, cardiovascular, pulmonary, gi and gu systems are negative, except as otherwise noted.      OBJECTIVE:                                                    /64  Pulse 74  Temp 98.3  F (36.8  C) (Oral)  Resp 14  Ht 6' (1.829 m)  Wt 246 lb (111.6 kg)  SpO2 96%  BMI 33.36 kg/m2  Body mass index is 33.36 kg/(m^2).  GENERAL APPEARANCE: healthy, alert and no distress    Diagnostic test results:  Diagnostic Test Results:  Labs are pending       ASSESSMENT/PLAN:                                                    1. Chronic pain  syndrome  Here for repeat urine testing. Has had unauthorized substances including what appears to be diazepam on recent urine testing  This was discussed with him over the phone after his last visit. At that point he denied taking any unauthorized medicines. Will repeat urine today and look for clearance  If there are still on arthritis medications in the urine will have to have him transferred back to the pain clinic   Discussed risks of combining benzodiazepines and chronic pain medication and specific concerns of respiratory depression with  - Pain Drug Scr UR W Rptd Meds; Standing  - Pain Drug Scr UR W Rptd Meds    2. Failed back syndrome of lumbar spine  I gave one week supply to the patient  Plans to see him again in 1 week  - oxyCODONE (ROXICODONE) 5 MG IR tablet; 1 tab q4h, PRN max 6 per day. May dispense on/after 9/30/17 seven days supply  Dispense: 42 tablet; Refill: 0      Follow up with Provider - 1 week     Connie Haskins MD  [facility]

## 2017-09-28 NOTE — MR AVS SNAPSHOT
After Visit Summary   9/28/2017    Toby Mcgrath    MRN: [id number]           Patient Information     Date Of Birth          1955        Visit Information        Provider Department      9/28/2017 12:00 PM Connie Haskins MD [facility]        Today's Diagnoses     Chronic pain syndrome    -  1    Failed back syndrome of lumbar spine           Follow-ups after your visit        Your next 10 appointments already scheduled     Oct 05, 2017 11:30 AM CDT   Office Visit with Connie Haskins MD   [facility] ([facility]    [address] [id number] [phone]           Bring a current list of meds and any records pertaining to this visit. For Physicals, please bring immunization records and any forms needing to be filled out. Please arrive 10 minutes early to complete paperwork.            Oct 30, 2017 10:30 AM CDT   Office Visit with Connie Haskins MD   [facility] ([facility]    [address] [address] [phone]           Bring a current list of meds and any records pertaining to this visit. For Physicals, please bring immunization records and any forms needing to be filled out. Please arrive 10 minutes early to complete paperwork.              Future tests that were ordered for you today     Open Standing Orders        Priority Remaining Interval Expires Ordered    Pain Drug Scr UR W Rptd Meds Routine 6/6 as needed 9/28/2018 9/28/2017            Who to contact     If you have questions or need follow up information about today's clinic visit or your schedule please contact [facility] directly at [phone].  Normal or non-critical lab and imaging results will be communicated to you by MyChart, letter or phone within 4 business days after the clinic has received the results. If you do not hear from us within 7 days, please contact the clinic  "through [employer] or phone. If you have a critical or abnormal lab result, we will notify you by phone as soon as possible.  Submit refill requests through [employer] or call your pharmacy and they will forward the refill request to us. Please allow 3 business days for your refill to be completed.          Additional Information About Your Visit        [employer] Information     [employer] lets you send messages to your doctor, view your test results, renew your prescriptions, schedule appointments and more. To sign up, go to [email or web address] . Click on \"Log in\" on the left side of the screen, which will take you to the Welcome page. Then click on \"Sign up Now\" on the right side of the page.     You will be asked to enter the access code listed below, as well as some personal information. Please follow the directions to create your username and password.     Your access code is: [email or web address]  Expires: 10/25/2017  8:18 AM     Your access code will  in 90 days. If you need help or a new code, please call your [facility] clinic or [phone].        Care EveryWhere ID     This is your Care EveryWhere ID. This could be used by other organizations to access your [facility] medical records  [id number]        Your Vitals Were     Pulse Temperature Respirations Height Pulse Oximetry BMI (Body Mass Index)    74 98.3  F (36.8  C) (Oral) 14 6' (1.829 m) 96% 33.36 kg/m2       Blood Pressure from Last 3 Encounters:   17 110/64   17 122/74   17 130/78    Weight from Last 3 Encounters:   17 246 lb (111.6 kg)   17 247 lb 6.4 oz (112.2 kg)   17 247 lb (112 kg)              We Performed the Following     Pain Drug Scr UR W Rptd Meds          Today's Medication Changes          These changes are accurate as of: 17  4:26 PM.  If you have any questions, ask your nurse or doctor.               These medicines have changed or have updated prescriptions.        Dose/Directions    oxyCODONE 5 MG IR " tablet   Commonly known as:  ROXICODONE   This may have changed:  additional instructions   Used for:  Failed back syndrome of lumbar spine   Changed by:  Connie Haskins MD        1 tab q4h, PRN max 6 per day. May dispense on/after 9/30/17 seven days supply   Quantity:  42 tablet   Refills:  0       pregabalin 100 MG capsule   Commonly known as:  LYRICA   This may have changed:  how much to take   Used for:  Chronic pain syndrome        Dose:  200 mg   Take 2 capsules (200 mg) by mouth 2 times daily   Quantity:  120 capsule   Refills:  5            Where to get your medicines      Some of these will need a paper prescription and others can be bought over the counter.  Ask your nurse if you have questions.     Bring a paper prescription for each of these medications     oxyCODONE 5 MG IR tablet                Primary Care Provider Office Phone # Fax #    Connie Haskins [phone] [address] [address]        Equal Access to Services     [facility] AH: Hadii aad ku hadasho Soomaali, waaxda luqadaha, qaybta kaalmada adeegyada, waxay idiin hayhemanth smith . So [facility] [phone].    ATENCIÓN: Si habla español, tiene a holliday disposición servicios gratuitos de asistencia lingüística. [facility] [phone].    We comply with applicable federal civil rights laws and [place] laws. We do not discriminate on the basis of race, color, national origin, age, disability sex, sexual orientation or gender identity.            Thank you!     Thank you for choosing [facility]  for your care. Our goal is always to provide you with excellent care. Hearing back from our patients is one way we can continue to improve our services. Please take a few minutes to complete the written survey that you may receive in the mail after your visit with us. Thank you!             Your Updated Medication List - Protect others around you: Learn how to safely use,  store and throw away your medicines at www.disposemymeds.org.          This list is accurate as of: 9/28/17  4:26 PM.  Always use your most recent med list.                   Brand Name Dispense Instructions for use Diagnosis    allopurinol 100 MG tablet    ZYLOPRIM    270 tablet    Take 3 tablets (300 mg) by mouth daily    Acute gout of right elbow, unspecified cause       aspirin 81 MG tablet     100 tablet    Take 1 tablet (81 mg) by mouth daily    Type 2 diabetes, HbA1C goal < 8% (H)       blood glucose monitoring lancets     3 Box    Use to test blood sugars 2-3 times daily as directed (lena contour meter)    Type 2 diabetes, HbA1C goal < 8% (H)       blood glucose monitoring meter device kit    no brand specified    1 kit    Use to test blood sugar 2 times daily or as directed.    Type 2 diabetes mellitus without complication, without long-term current use of insulin (H)       * blood glucose monitoring test strip    no brand specified    3 Box    Use to test blood sugar 2-3 times daily (lena contour meter)    Type 2 diabetes, HbA1C goal < 8% (H)       * blood glucose monitoring test strip    no brand specified    100 strip    Use to test blood sugars 2 times daily or as directed    Type 2 diabetes mellitus without complication, without long-term current use of insulin (H)       Colchicine 0.6 MG Caps    MITIGARE    20 capsule    Take 0.6 mg by mouth 2 times daily as needed    Acute pain of right knee       cyclobenzaprine 5 MG tablet    FLEXERIL    30 tablet    Take 1-2 tablets (5-10 mg) by mouth nightly as needed for muscle spasms    Back muscle spasm       famotidine 20 MG tablet    PEPCID    60 tablet    Take 1 tablet (20 mg) by mouth 2 times daily as needed    Gastroesophageal reflux disease without esophagitis       finasteride 5 MG tablet    PROSCAR    30 tablet    Take 1 tablet (5 mg) by mouth daily For prostate enlargement. Please fill on $4 list    Benign prostatic hyperplasia without lower urinary  tract symptoms, unspecified morphology       FLUoxetine 20 MG capsule    PROzac    270 capsule    TAKE THREE CAPSULES BY MOUTH ONCE DAILY    Mild episode of recurrent major depressive disorder (H)       fluticasone 50 MCG/ACT spray    FLONASE    3 Bottle    Spray 1-2 sprays into both nostrils daily    Chronic rhinitis       hydrOXYzine 25 MG tablet    ATARAX    30 tablet    Take 2-4 tablets ( mg) by mouth nightly as needed for anxiety (for panic at night)    Chronic pain syndrome       metFORMIN 500 MG 24 hr tablet    GLUCOPHAGE-XR    180 tablet    Take 2 tablets (1,000 mg) by mouth daily (with dinner)    Type 2 diabetes mellitus without complication, without long-term current use of insulin (H)       omeprazole 20 MG CR capsule    priLOSEC    90 capsule    Use every other day as needed    Gastroesophageal reflux disease without esophagitis       oxyCODONE 5 MG IR tablet    ROXICODONE    42 tablet    1 tab q4h, PRN max 6 per day. May dispense on/after 9/30/17 seven days supply    Failed back syndrome of lumbar spine       pregabalin 100 MG capsule    LYRICA    120 capsule    Take 2 capsules (200 mg) by mouth 2 times daily    Chronic pain syndrome       senna-docusate 8.6-50 MG per tablet    SENOKOT-S;PERICOLACE    60 tablet    Take 1 tablet by mouth 2 times daily    Lumbar radiculopathy       simvastatin 20 MG tablet    ZOCOR    90 tablet    Take 1 tablet (20 mg) by mouth At Bedtime    Hyperlipidemia LDL goal <100       tamsulosin 0.4 MG capsule    FLOMAX    180 capsule    TAKE TWO CAPSULES BY MOUTH ONCE DAILY    Hypertrophy of prostate with urinary obstruction       triamcinolone 0.1 % cream    KENALOG    30 g    Apply sparingly to affected area twice daily for 7 days. Then stop and use only for flares    Eczema, unspecified type       * Notice:  This list has 2 medication(s) that are the same as other medications prescribed for you. Read the directions carefully, and ask your doctor or other care provider to  review them with you.

## 2017-09-28 NOTE — NURSING NOTE
Chief Complaint   Patient presents with     Recheck Medication      follow up Lyrica       Initial /64  Pulse 74  Temp 98.3  F (36.8  C) (Oral)  Resp 14  Ht 6' (1.829 m)  Wt 246 lb (111.6 kg)  SpO2 96%  BMI 33.36 kg/m2 Estimated body mass index is 33.36 kg/(m^2) as calculated from the following:    Height as of this encounter: 6' (1.829 m).    Weight as of this encounter: 246 lb (111.6 kg).  BP completed using cuff size: large    Health Maintenance that is potentially due pending provider review:  Health Maintenance Due   Topic Date Due     ADVANCE DIRECTIVE PLANNING Q5 YRS  12/08/2016     COLON CANCER SCREEN (SYSTEM ASSIGNED)  03/09/2017     EYE EXAM Q1 YEAR  08/22/2017     INFLUENZA VACCINE (SYSTEM ASSIGNED)  09/01/2017

## 2017-10-04 LAB — PAIN DRUG SCR UR W RPTD MEDS: NORMAL

## 2017-10-05 ENCOUNTER — OFFICE VISIT (OUTPATIENT)
Dept: FAMILY MEDICINE | Facility: CLINIC | Age: 62
End: 2017-10-05
Payer: COMMERCIAL

## 2017-10-05 VITALS
HEART RATE: 80 BPM | TEMPERATURE: 95.8 F | OXYGEN SATURATION: 97 % | DIASTOLIC BLOOD PRESSURE: 66 MMHG | BODY MASS INDEX: 33.32 KG/M2 | SYSTOLIC BLOOD PRESSURE: 116 MMHG | HEIGHT: 72 IN | RESPIRATION RATE: 14 BRPM | WEIGHT: 246 LBS

## 2017-10-05 DIAGNOSIS — Z23 NEED FOR PROPHYLACTIC VACCINATION AND INOCULATION AGAINST INFLUENZA: ICD-10-CM

## 2017-10-05 DIAGNOSIS — G89.4 CHRONIC PAIN SYNDROME: Primary | ICD-10-CM

## 2017-10-05 DIAGNOSIS — M96.1 FAILED BACK SYNDROME OF LUMBAR SPINE: ICD-10-CM

## 2017-10-05 PROCEDURE — 90471 IMMUNIZATION ADMIN: CPT | Performed by: FAMILY MEDICINE

## 2017-10-05 PROCEDURE — 90686 IIV4 VACC NO PRSV 0.5 ML IM: CPT | Performed by: FAMILY MEDICINE

## 2017-10-05 PROCEDURE — 99213 OFFICE O/P EST LOW 20 MIN: CPT | Mod: 25 | Performed by: FAMILY MEDICINE

## 2017-10-05 RX ORDER — OXYCODONE HYDROCHLORIDE 5 MG/1
TABLET ORAL
Qty: 180 TABLET | Refills: 0 | Status: SHIPPED | OUTPATIENT
Start: 2017-10-05 | End: 2017-10-05

## 2017-10-05 RX ORDER — OXYCODONE HYDROCHLORIDE 5 MG/1
TABLET ORAL
Qty: 180 TABLET | Refills: 0 | Status: SHIPPED | OUTPATIENT
Start: 2017-10-05 | End: 2017-11-01

## 2017-10-05 NOTE — PROGRESS NOTES
SUBJECTIVE:   Toby Mcgrath is a 62 year old male who presents to clinic today for the following health issues:    1 week follow up    Medication Followup of Oxycodone    Taking Medication as prescribed: yes    Side Effects:  None    Medication Helping Symptoms:  yes     Here for follow up on abnormal urine results.  His last few urine levels have shown diazepam and related metabolites.  He admitted to taking a brothers mediation but reorts he has stopped this and was not doing this habitually.  His last urine did not show any unauthorized medications/substances.  Reviewed  with him dangers of taking other medications not prescribed to him especially with narcotics on board.  Reviewed riks of respiratiory depression and drug drug interaction.    He agrees to no longer use any other medications         Problem list and histories reviewed & adjusted, as indicated.  Additional history: as documented    Patient Active Problem List   Diagnosis     Thoracic or lumbosacral neuritis or radiculitis, unspecified     Osteoarthrosis, unspecified whether generalized or localized, pelvic region and thigh     Hypertrophy of prostate with urinary obstruction     Chronic rhinitis     Chronic pain syndrome     Disorder of bursae and tendons in shoulder region     Major depressive disorder, recurrent episode (H)     Anxiety     Gout     Type 2 diabetes mellitus without complication (H)     Hypertension goal BP (blood pressure) < 140/90     Advanced directives, counseling/discussion     DJD (degenerative joint disease), lumbar     Hyperlipidemia LDL goal <100     DDD (degenerative disc disease), cervical     GERD (gastroesophageal reflux disease)     Lumbar radiculopathy     S/P lumbar fusion     Left-sided low back pain with left-sided sciatica     BMI 32.0-32.9,adult     History of hepatitis C     S/P lumbar discectomy     Acute post-operative pain     S/P laminectomy     Chronic pain     Bilateral low back pain without sciatica      Right-sided low back pain with right-sided sciatica     Acute gouty arthritis     Acute gout of right elbow, unspecified cause     Idiopathic gout of left elbow, unspecified chronicity     Gastroesophageal reflux disease without esophagitis     Failed back syndrome of lumbar spine     Slow transit constipation     Past Surgical History:   Procedure Laterality Date     BACK SURGERY       both shoulder repair  2006, 2008     C NONSPECIFIC PROCEDURE  1995    neck cyst     C NONSPECIFIC PROCEDURE  1979    laminectomy L5-S1 x 2     C SHOULDER SURG PROC UNLISTED  2005, '07    repairs,later manipulate,inject LT, RT     FUSION LUMBAR ANTERIOR, FUSION LUMBAR POSTERIOR TWO LEVELS, COMBINED N/A 9/17/2014    Procedure: COMBINED FUSION LUMBAR ANTERIOR, FUSION LUMBAR POSTERIOR TWO LEVELS;  Surgeon: Chris Lugo MD;  Location: [facility] OR     HC COLONOSCOPY THRU STOMA, DIAGNOSTIC  3/04    normal     HC UGI ENDOSCOPY DIAG W OR W/O BRUSH/WASH  3/04    ok     HEAD & NECK SURGERY       HEMILAMINECTOMY, DISCECTOMY LUMBAR ONE LEVEL, COMBINED Left 9/8/2015    Procedure: COMBINED HEMILAMINECTOMY, DISCECTOMY LUMBAR ONE LEVEL;  Surgeon: Jer Irby MD;  Location: [facility] OR     right hip replacement  10,2010       Social History   Substance Use Topics     Smoking status: Former Smoker     Years: 40.00     Types: Cigarettes     Smokeless tobacco: Former User     Quit date: 2/1/2013     Alcohol use No     Family History   Problem Relation Age of Onset     DIABETES Mother      C.A.D. Father      DIABETES Father      Hypertension Father          Current Outpatient Prescriptions   Medication Sig Dispense Refill     oxyCODONE (ROXICODONE) 5 MG IR tablet 1 tab q4h, PRN max 6 per day. May dispense on/after 10/5/2017 180 tablet 0     pregabalin (LYRICA) 100 MG capsule Take 2 capsules (200 mg) by mouth 2 times daily (Patient taking differently: Take 100 mg by mouth 2 times daily ) 120 capsule 5     famotidine (PEPCID) 20 MG tablet Take 1  tablet (20 mg) by mouth 2 times daily as needed 60 tablet 1     hydrOXYzine (ATARAX) 25 MG tablet Take 2-4 tablets ( mg) by mouth nightly as needed for anxiety (for panic at night) 30 tablet 1     blood glucose monitoring (NO BRAND SPECIFIED) meter device kit Use to test blood sugar 2 times daily or as directed. 1 kit 0     blood glucose monitoring (NO BRAND SPECIFIED) test strip Use to test blood sugars 2 times daily or as directed 100 strip 3     tamsulosin (FLOMAX) 0.4 MG capsule TAKE TWO CAPSULES BY MOUTH ONCE DAILY 180 capsule 3     metFORMIN (GLUCOPHAGE-XR) 500 MG 24 hr tablet Take 2 tablets (1,000 mg) by mouth daily (with dinner) 180 tablet 3     Colchicine (MITIGARE) 0.6 MG CAPS Take 0.6 mg by mouth 2 times daily as needed 20 capsule 0     FLUoxetine (PROZAC) 20 MG capsule TAKE THREE CAPSULES BY MOUTH ONCE DAILY 270 capsule 3     finasteride (PROSCAR) 5 MG tablet Take 1 tablet (5 mg) by mouth daily For prostate enlargement. Please fill on $4 list 30 tablet 5     simvastatin (ZOCOR) 20 MG tablet Take 1 tablet (20 mg) by mouth At Bedtime 90 tablet 3     cyclobenzaprine (FLEXERIL) 5 MG tablet Take 1-2 tablets (5-10 mg) by mouth nightly as needed for muscle spasms 30 tablet 1     senna-docusate (SENOKOT-S;PERICOLACE) 8.6-50 MG per tablet Take 1 tablet by mouth 2 times daily 60 tablet 3     allopurinol (ZYLOPRIM) 100 MG tablet Take 3 tablets (300 mg) by mouth daily 270 tablet 0     omeprazole (PRILOSEC) 20 MG CR capsule Use every other day as needed 90 capsule 1     fluticasone (FLONASE) 50 MCG/ACT nasal spray Spray 1-2 sprays into both nostrils daily 3 Bottle 11     triamcinolone (KENALOG) 0.1 % cream Apply sparingly to affected area twice daily for 7 days. Then stop and use only for flares 30 g 3     blood glucose monitoring (NO BRAND SPECIFIED) test strip Use to test blood sugar 2-3 times daily (lena contour meter) 3 Box 0     blood glucose monitoring (LENA MICROLET) lancets Use to test blood sugars  2-3 times daily as directed (lena contour meter) 3 Box 0     aspirin 81 MG tablet Take 1 tablet (81 mg) by mouth daily 100 tablet 3     Labs reviewed in EPIC  Results for orders placed or performed in visit on 09/28/17   Pain Drug Scr UR W Rptd Meds   Result Value Ref Range    Pain Drug SCR UR W RPTD Meds FINAL      *Note: Due to a large number of results and/or encounters for the requested time period, some results have not been displayed. A complete set of results can be found in Results Review.       Reviewed and updated as needed this visit by clinical staffTobacco  Allergies  Meds  Med Hx  Surg Hx  Fam Hx  Soc Hx      Reviewed and updated as needed this visit by Provider         ROS:  Constitutional, HEENT, cardiovascular, pulmonary, gi and gu systems are negative, except as otherwise noted.      OBJECTIVE:                                                    /66  Pulse 80  Temp 95.8  F (35.4  C) (Oral)  Resp 14  Ht 6' (1.829 m)  Wt 246 lb (111.6 kg)  SpO2 97%  BMI 33.36 kg/m2  Body mass index is 33.36 kg/(m^2).  GENERAL APPEARANCE: healthy, alert and no distress    Diagnostic test results:  Reviewed see chart       ASSESSMENT/PLAN:                                                    1. Chronic pain syndrome  refills for the next month are given  Can call and  next month of medications does not need to be seen  He was very sincere in his desire to avoid potentially harmful medications and voiced understanding of potential drug interactions with narcotics    2. Failed back syndrome of lumbar spine    - oxyCODONE (ROXICODONE) 5 MG IR tablet; 1 tab q4h, PRN max 6 per day. May dispense on/after 10/5/2017  Dispense: 180 tablet; Refill: 0    3. Need for prophylactic vaccination and inoculation against influenza    - HC FLU VAC PRESRV FREE QUAD SPLIT VIR 3+YRS IM      Follow up with Provider - for diabetes in 5 months and for pain control every 3 months     Connie Haskins MD  [place]  [facility] [place]

## 2017-10-05 NOTE — PATIENT INSTRUCTIONS
You had a normal FIT test this year - no need for colonoscopy  Repeat the FIT every year    Due for an Eye exam - give them my card when this is done and we will get records    Return in 1 month for the narcotic medications - no visit needed just  in clinic

## 2017-10-05 NOTE — MR AVS SNAPSHOT
After Visit Summary   10/5/2017    Toby Mcgrath    MRN: [id number]           Patient Information     Date Of Birth          1955        Visit Information        Provider Department      10/5/2017 11:30 AM Connie Haskins MD [facility]        Today's Diagnoses     Need for prophylactic vaccination and inoculation against influenza    -  1    Failed back syndrome of lumbar spine          Care Instructions    You had a normal FIT test this year - no need for colonoscopy  Repeat the FIT every year    Due for an Eye exam - give them my card when this is done and we will get records    Return in 1 month for the narcotic medications - no visit needed just  in clinic              Follow-ups after your visit        Your next 10 appointments already scheduled     Oct 05, 2017 11:30 AM CDT   Office Visit with Connie Haskins MD   [facility] ([facility]    [address] [place] [id number] [phone]           Bring a current list of meds and any records pertaining to this visit. For Physicals, please bring immunization records and any forms needing to be filled out. Please arrive 10 minutes early to complete paperwork.            Oct 30, 2017 10:30 AM CDT   Office Visit with Connie Haskins MD   [facility] ([facility]    [address] [id number] [phone]           Bring a current list of meds and any records pertaining to this visit. For Physicals, please bring immunization records and any forms needing to be filled out. Please arrive 10 minutes early to complete paperwork.              Who to contact     If you have questions or need follow up information about today's clinic visit or your schedule please contact [facility] directly at [phone].  Normal or non-critical lab and imaging results will be communicated to you by MyChart, letter or  "phone within 4 business days after the clinic has received the results. If you do not hear from us within 7 days, please contact the clinic through [employer] or phone. If you have a critical or abnormal lab result, we will notify you by phone as soon as possible.  Submit refill requests through [employer] or call your pharmacy and they will forward the refill request to us. Please allow 3 business days for your refill to be completed.          Additional Information About Your Visit        [employer] Information     [employer] lets you send messages to your doctor, view your test results, renew your prescriptions, schedule appointments and more. To sign up, go to [email or web address] . Click on \"Log in\" on the left side of the screen, which will take you to the Welcome page. Then click on \"Sign up Now\" on the right side of the page.     You will be asked to enter the access code listed below, as well as some personal information. Please follow the directions to create your username and password.     Your access code is: [email or web address]  Expires: 10/25/2017  8:18 AM     Your access code will  in 90 days. If you need help or a new code, please call your [place] clinic or [phone].        Care EveryWhere ID     This is your Care EveryWhere ID. This could be used by other organizations to access your [place] medical records  [id number]        Your Vitals Were     Pulse Temperature Respirations Height Pulse Oximetry BMI (Body Mass Index)    80 95.8  F (35.4  C) (Oral) 14 6' (1.829 m) 97% 33.36 kg/m2       Blood Pressure from Last 3 Encounters:   10/05/17 116/66   17 110/64   17 122/74    Weight from Last 3 Encounters:   10/05/17 246 lb (111.6 kg)   17 246 lb (111.6 kg)   17 247 lb 6.4 oz (112.2 kg)              We Performed the Following     HC FLU VAC PRESRV FREE QUAD SPLIT VIR 3+YRS IM          Today's Medication Changes          These changes are accurate as of: 10/5/17 11:25 AM.  If you " have any questions, ask your nurse or doctor.               Start taking these medicines.        Dose/Directions    oxyCODONE 5 MG IR tablet   Commonly known as:  ROXICODONE   Used for:  Failed back syndrome of lumbar spine   Started by:  Connie Haskins MD        1 tab q4h, PRN max 6 per day. May dispense on/after 10/5/2017   Quantity:  180 tablet   Refills:  0         These medicines have changed or have updated prescriptions.        Dose/Directions    pregabalin 100 MG capsule   Commonly known as:  LYRICA   This may have changed:  how much to take   Used for:  Chronic pain syndrome        Dose:  200 mg   Take 2 capsules (200 mg) by mouth 2 times daily   Quantity:  120 capsule   Refills:  5            Where to get your medicines      Some of these will need a paper prescription and others can be bought over the counter.  Ask your nurse if you have questions.     Bring a paper prescription for each of these medications     oxyCODONE 5 MG IR tablet                Primary Care Provider Office Phone # Fax #    Connie Haskins [phone]       [facility] [address]        Equal Access to Services     : Hadii joselito stallings hadasho Somaral, waaxda luqadaha, qaybta kaalmada francis, gayle ramirez. So [facility] [phone].    ATENCIÓN: Si habla español, tiene a holliday disposición servicios gratuitos de asistencia lingüística. [facility] [phone].    We comply with applicable federal civil rights laws and [place] laws. We do not discriminate on the basis of race, color, national origin, age, disability, sex, sexual orientation, or gender identity.            Thank you!     Thank you for choosing [facility]  for your care. Our goal is always to provide you with excellent care. Hearing back from our patients is one way we can continue to improve our services. Please take a few minutes to complete the written survey that you  may receive in the mail after your visit with us. Thank you!             Your Updated Medication List - Protect others around you: Learn how to safely use, store and throw away your medicines at www.disposemymeds.org.          This list is accurate as of: 10/5/17 11:25 AM.  Always use your most recent med list.                   Brand Name Dispense Instructions for use Diagnosis    allopurinol 100 MG tablet    ZYLOPRIM    270 tablet    Take 3 tablets (300 mg) by mouth daily    Acute gout of right elbow, unspecified cause       aspirin 81 MG tablet     100 tablet    Take 1 tablet (81 mg) by mouth daily    Type 2 diabetes, HbA1C goal < 8% (H)       blood glucose monitoring lancets     3 Box    Use to test blood sugars 2-3 times daily as directed (lena contour meter)    Type 2 diabetes, HbA1C goal < 8% (H)       blood glucose monitoring meter device kit    no brand specified    1 kit    Use to test blood sugar 2 times daily or as directed.    Type 2 diabetes mellitus without complication, without long-term current use of insulin (H)       * blood glucose monitoring test strip    no brand specified    3 Box    Use to test blood sugar 2-3 times daily (lena contour meter)    Type 2 diabetes, HbA1C goal < 8% (H)       * blood glucose monitoring test strip    no brand specified    100 strip    Use to test blood sugars 2 times daily or as directed    Type 2 diabetes mellitus without complication, without long-term current use of insulin (H)       Colchicine 0.6 MG Caps    MITIGARE    20 capsule    Take 0.6 mg by mouth 2 times daily as needed    Acute pain of right knee       cyclobenzaprine 5 MG tablet    FLEXERIL    30 tablet    Take 1-2 tablets (5-10 mg) by mouth nightly as needed for muscle spasms    Back muscle spasm       famotidine 20 MG tablet    PEPCID    60 tablet    Take 1 tablet (20 mg) by mouth 2 times daily as needed    Gastroesophageal reflux disease without esophagitis       finasteride 5 MG tablet     PROSCAR    30 tablet    Take 1 tablet (5 mg) by mouth daily For prostate enlargement. Please fill on $4 list    Benign prostatic hyperplasia without lower urinary tract symptoms, unspecified morphology       FLUoxetine 20 MG capsule    PROzac    270 capsule    TAKE THREE CAPSULES BY MOUTH ONCE DAILY    Mild episode of recurrent major depressive disorder (H)       fluticasone 50 MCG/ACT spray    FLONASE    3 Bottle    Spray 1-2 sprays into both nostrils daily    Chronic rhinitis       hydrOXYzine 25 MG tablet    ATARAX    30 tablet    Take 2-4 tablets ( mg) by mouth nightly as needed for anxiety (for panic at night)    Chronic pain syndrome       metFORMIN 500 MG 24 hr tablet    GLUCOPHAGE-XR    180 tablet    Take 2 tablets (1,000 mg) by mouth daily (with dinner)    Type 2 diabetes mellitus without complication, without long-term current use of insulin (H)       omeprazole 20 MG CR capsule    priLOSEC    90 capsule    Use every other day as needed    Gastroesophageal reflux disease without esophagitis       oxyCODONE 5 MG IR tablet    ROXICODONE    180 tablet    1 tab q4h, PRN max 6 per day. May dispense on/after 10/5/2017    Failed back syndrome of lumbar spine       pregabalin 100 MG capsule    LYRICA    120 capsule    Take 2 capsules (200 mg) by mouth 2 times daily    Chronic pain syndrome       senna-docusate 8.6-50 MG per tablet    SENOKOT-S;PERICOLACE    60 tablet    Take 1 tablet by mouth 2 times daily    Lumbar radiculopathy       simvastatin 20 MG tablet    ZOCOR    90 tablet    Take 1 tablet (20 mg) by mouth At Bedtime    Hyperlipidemia LDL goal <100       tamsulosin 0.4 MG capsule    FLOMAX    180 capsule    TAKE TWO CAPSULES BY MOUTH ONCE DAILY    Hypertrophy of prostate with urinary obstruction       triamcinolone 0.1 % cream    KENALOG    30 g    Apply sparingly to affected area twice daily for 7 days. Then stop and use only for flares    Eczema, unspecified type       * Notice:  This list has 2  medication(s) that are the same as other medications prescribed for you. Read the directions carefully, and ask your doctor or other care provider to review them with you.

## 2017-10-05 NOTE — NURSING NOTE
Chief Complaint   Patient presents with     RECHECK     1 week follow up for med refill       Initial /66  Pulse 80  Temp 95.8  F (35.4  C) (Oral)  Resp 14  Ht 6' (1.829 m)  Wt 246 lb (111.6 kg)  SpO2 97%  BMI 33.36 kg/m2 Estimated body mass index is 33.36 kg/(m^2) as calculated from the following:    Height as of this encounter: 6' (1.829 m).    Weight as of this encounter: 246 lb (111.6 kg).  BP completed using cuff size: large    Health Maintenance that is potentially due pending provider review:  Health Maintenance Due   Topic Date Due     ADVANCE DIRECTIVE PLANNING Q5 YRS  12/08/2016     COLON CANCER SCREEN (SYSTEM ASSIGNED)  03/09/2017     EYE EXAM Q1 YEAR  08/22/2017     INFLUENZA VACCINE (SYSTEM ASSIGNED)  09/01/2017         Flu vaccine today  Will make eye appt today

## 2017-10-06 DIAGNOSIS — G89.4 CHRONIC PAIN SYNDROME: ICD-10-CM

## 2017-10-06 NOTE — TELEPHONE ENCOUNTER
SN,  Please see below message  You wrote Rx for patient 9/27/2017 #120 with 5 refills.  Will you write as noted below so pt can get assistance?  Please advise.  Ivis SMALLS RN

## 2017-10-06 NOTE — TELEPHONE ENCOUNTER
I am in process of applying to the Lyrica assistance program through [employer].  [employer] requires a hand signed, brand name, hard copy script be submitted with their application.    Please hand sign a BRAND name, hard copy script for: LYRICA     Please write script as you want Layo to take it, not as he is choosing to taking it.           Please write for a 90 day supply with 1 refill    Please send the script to me via interoffice mail F[employer] Julio, to Zenaida Plasencia or via US mail  at:   [facility]   Zenaida Plasencia   [address] [place]  [address]    Thanks so much for your help!    Zenaida Plasencia  Prescription

## 2017-10-12 RX ORDER — PREGABALIN 100 MG
200 CAPSULE ORAL 2 TIMES DAILY
Qty: 360 CAPSULE | Refills: 1 | Status: SHIPPED | OUTPATIENT
Start: 2017-10-12 | End: 2018-05-01

## 2017-10-12 NOTE — TELEPHONE ENCOUNTER
Please see instructions - needs to be sent in to pharmacy directly  ? Mailed with original signature I think - please confirm    Connie Haskins MD

## 2017-10-18 ENCOUNTER — TELEPHONE (OUTPATIENT)
Dept: FAMILY MEDICINE | Facility: CLINIC | Age: 62
End: 2017-10-18

## 2017-10-18 NOTE — TELEPHONE ENCOUNTER
GREGG Vasques has been approved to the [employer] assistance program for Colcrys through December 2017.  He will receive this medication at no cost for the enrollment period.    A 90 day supply of COLCRYS will be delivered to Layo's home within 7-10 business days.    Layo's application for Lyrica will be submitted to the [employer] assistance program when we receive a current, clear copy of his [place] 's license.    Thanks so much for your help!    Zenaida Plasencia  Prescription

## 2017-11-01 DIAGNOSIS — M96.1 FAILED BACK SYNDROME OF LUMBAR SPINE: ICD-10-CM

## 2017-11-01 DIAGNOSIS — G89.4 CHRONIC PAIN SYNDROME: ICD-10-CM

## 2017-11-01 NOTE — TELEPHONE ENCOUNTER
Reason for Call:   Prescription refill     Detailed comments: calling in refill for Oxycodone for  plz call when Ready for     Phone Number Patient can be reached at: Home number on file [phone] (home)    Best Time: by Friday    Can we leave a detailed message on this number? YES    Call taken on 11/1/2017 at 8:11 AM by Glenna Kay

## 2017-11-02 NOTE — TELEPHONE ENCOUNTER
Controlled Substance Refill Request for Oxycodone 5 mg    Last refill: 10/6/2017 - #180    Last clinic visit: 10/4/2017     Clinic visit frequency required: Q 3 months  Next appt: No future OV scheduled    Controlled substance agreement on file: Yes:  Date 7/30/2016.    Documentation in problem list reviewed:  Yes    Processing:  Patient will  in clinic    [place] reviewed:  reviewed- no concerns  [place]  [email or web address]  Please authorize if appropriate.  Thanks, Gayathri Wade RN

## 2017-11-03 RX ORDER — OXYCODONE HYDROCHLORIDE 5 MG/1
TABLET ORAL
Qty: 180 TABLET | Refills: 0 | Status: SHIPPED | OUTPATIENT
Start: 2017-11-06 | End: 2017-11-27

## 2017-11-03 NOTE — TELEPHONE ENCOUNTER
Patient informed Rx ready to .  Aware refill not due until Monday.  Verbalizes understanding.  Gayathri Wade RN

## 2017-11-03 NOTE — TELEPHONE ENCOUNTER
Pt calling to check on status of Rx. Pt would like to stress that he wants the Rx filled before the weekend. States he does not want to have to wait until Monday

## 2017-11-07 ENCOUNTER — TELEPHONE (OUTPATIENT)
Dept: FAMILY MEDICINE | Facility: CLINIC | Age: 62
End: 2017-11-07

## 2017-11-07 NOTE — TELEPHONE ENCOUNTER
GREGG Vasques has been approved to the [employer] assistance program for Lyrica until December 2018.  He will receive this medication at no cost for the enrollment period.    A 90 day supply of LYRICA will be delivered to Layo's home within 7-10 business days.    Layo will contact my office for refills as we must work directly with the .  I will note EPIC as each refill is requested.    Thanks so much for your help!    Zenaida Plasencia  Prescription

## 2017-11-08 NOTE — TELEPHONE ENCOUNTER
Thanks  Let me know if you need anything  OR if we need to adjust this dose due to his narcotics    Connie Haskins MD    no loss of consciousness, no gait abnormality, no headache, no sensory deficits, and no weakness.

## 2017-11-27 ENCOUNTER — OFFICE VISIT (OUTPATIENT)
Dept: FAMILY MEDICINE | Facility: CLINIC | Age: 62
End: 2017-11-27
Payer: COMMERCIAL

## 2017-11-27 ENCOUNTER — VIRTUAL VISIT (OUTPATIENT)
Dept: FAMILY MEDICINE | Facility: CLINIC | Age: 62
End: 2017-11-27
Payer: COMMERCIAL

## 2017-11-27 VITALS
TEMPERATURE: 97.3 F | BODY MASS INDEX: 33.46 KG/M2 | HEIGHT: 72 IN | OXYGEN SATURATION: 98 % | SYSTOLIC BLOOD PRESSURE: 130 MMHG | WEIGHT: 247 LBS | RESPIRATION RATE: 16 BRPM | HEART RATE: 92 BPM | DIASTOLIC BLOOD PRESSURE: 73 MMHG

## 2017-11-27 DIAGNOSIS — K21.9 GASTROESOPHAGEAL REFLUX DISEASE WITHOUT ESOPHAGITIS: ICD-10-CM

## 2017-11-27 DIAGNOSIS — M96.1 FAILED BACK SYNDROME OF LUMBAR SPINE: ICD-10-CM

## 2017-11-27 DIAGNOSIS — R35.0 BENIGN PROSTATIC HYPERPLASIA WITH URINARY FREQUENCY: ICD-10-CM

## 2017-11-27 DIAGNOSIS — M62.830 BACK MUSCLE SPASM: ICD-10-CM

## 2017-11-27 DIAGNOSIS — G89.4 CHRONIC PAIN SYNDROME: ICD-10-CM

## 2017-11-27 DIAGNOSIS — M75.41 IMPINGEMENT SYNDROME OF SHOULDER REGION, RIGHT: Primary | ICD-10-CM

## 2017-11-27 DIAGNOSIS — N40.1 BENIGN PROSTATIC HYPERPLASIA WITH URINARY FREQUENCY: ICD-10-CM

## 2017-11-27 DIAGNOSIS — Z98.890 HISTORY OF SHOULDER SURGERY: ICD-10-CM

## 2017-11-27 PROCEDURE — 99442 ZZC PHYSICIAN TELEPHONE EVALUATION 11-20 MIN: CPT | Performed by: FAMILY MEDICINE

## 2017-11-27 RX ORDER — FINASTERIDE 5 MG/1
5 TABLET, FILM COATED ORAL DAILY
Qty: 90 TABLET | Refills: 3 | Status: SHIPPED | OUTPATIENT
Start: 2017-11-27 | End: 2018-04-20

## 2017-11-27 RX ORDER — FAMOTIDINE 20 MG/1
20 TABLET, FILM COATED ORAL 2 TIMES DAILY PRN
Qty: 60 TABLET | Refills: 1 | Status: CANCELLED | OUTPATIENT
Start: 2017-11-27

## 2017-11-27 RX ORDER — HYDROXYZINE HYDROCHLORIDE 25 MG/1
50-100 TABLET, FILM COATED ORAL
Qty: 30 TABLET | Refills: 1 | Status: CANCELLED | OUTPATIENT
Start: 2017-11-27

## 2017-11-27 RX ORDER — HYDROXYZINE HYDROCHLORIDE 25 MG/1
50-100 TABLET, FILM COATED ORAL
Qty: 30 TABLET | Refills: 5 | Status: SHIPPED | OUTPATIENT
Start: 2017-11-27 | End: 2018-05-31

## 2017-11-27 RX ORDER — OXYCODONE HYDROCHLORIDE 5 MG/1
TABLET ORAL
Qty: 180 TABLET | Refills: 0 | Status: SHIPPED | OUTPATIENT
Start: 2017-12-06 | End: 2017-12-28

## 2017-11-27 RX ORDER — OXYCODONE HYDROCHLORIDE 5 MG/1
TABLET ORAL
Qty: 180 TABLET | Refills: 0 | Status: CANCELLED | OUTPATIENT
Start: 2017-11-27

## 2017-11-27 RX ORDER — FINASTERIDE 5 MG/1
5 TABLET, FILM COATED ORAL DAILY
Qty: 30 TABLET | Refills: 5 | Status: CANCELLED | OUTPATIENT
Start: 2017-11-27

## 2017-11-27 RX ORDER — CYCLOBENZAPRINE HCL 5 MG
5-10 TABLET ORAL
Qty: 30 TABLET | Refills: 1 | Status: CANCELLED | OUTPATIENT
Start: 2017-11-27

## 2017-11-27 NOTE — PROGRESS NOTES
SUBJECTIVE:   Toby Mcgrath is a 62 year old male who presents to clinic today for the following health issues:      Joint Pain    Onset: 1 month     Description:   Location: right arm and shoulder   Character: Sharp and Dull ache    Intensity: moderate    Progression of Symptoms: intermittent    Accompanying Signs & Symptoms:  Other symptoms: numbness and tingling    History:   Previous similar pain: YES      Precipitating factors:   Trauma or overuse: no     Alleviating factors:  Improved by: nothing    Therapies Tried and outcome: medications prescribed and ibuprofen     Provider note:  Patient left prior to being seen. I called the patient to ask what had happened. He reported that he was here early. He thought his appointment was at 11 a.m. Was actually roomed over an hour early and felt that the room was warm and that he would've been more comfortable in the waiting room. He reports that he voiced this to rooming staff and  but he was roomed anyways. When I did go to see him at his 1145 a.m. appointment time he was gone. I did hear from staff that there was some swearing as he was leaving in frustration.    Review additional telephone notes for this encounter    Connie Haskins MD

## 2017-11-27 NOTE — PROGRESS NOTES
"Toby Mcgrath is a 62 year old male who is being evaluated via a telephone visit.      The patient has been notified of following:     \"This telephone visit will be conducted via a call between you and your physician/provider. We have found that certain health care needs can be provided without the need for a physical exam.  This service lets us provide the care you need with a short phone conversation.  If a prescription is necessary we can send it directly to your pharmacy.  If lab work is needed we can place an order for that and you can then stop by our lab to have the test done at a later time.    We will bill your insurance company for this service.  Please check with your medical insurance if this type of visit is covered. You may be responsible for the cost of this type of visit if insurance coverage is denied.  The typical cost is $30 (10min), $59 (11-20min) and $85 (21-30min).  Most often these visits are shorter than 10 minutes.    If during the course of the call the physician/provider feels a telephone visit is not appropriate, you will not be charged for this service.\"       Consent has been obtained for this service by 2 care team members: yes. See the scanned image in the medical record.    Toby Mcgrath complains of  Musculoskeletal Problem (Right arm and shoulder pain numbness and tingling )      I have reviewed and updated the patient's Past Medical History, Social History, Family History and Medication List.    ALLERGIES  Dejuanine    Heather Pierson MA    (MA signature)    Additional provider notes: Provider note  See previous notes from today, patient was to be seen for an in person visit but left prior to being seen.  Telephone visit was arranged to discuss medications, shoulder pain and chronic narcotics.    #1: Right arm pain, describes numbness and tingling. He does have a history of previous shoulder replacement several years ago. He was seen a different orthopedic group. No " complications but he does have some weakness as a result. He does not do any heavy lifting and has not had any recent injury.    #2: Refills of Proscar for BPH, refills of hydroxyzine which he uses as needed at night for anxiety.    #3: GERD: Has persistent heartburn. Still eats some spicy foods, feels that omeprazole really helped his symptoms. Was on this for several years until we discussed potential side effects and specifically concern for bone density effect and possible kidney cancer. We opted to try histamine blocker instead however he has not had good response to Pepcid. We talked about trying a higher dose of his different histamine blocker such as ranitidine and he agreed to that. He has not had an EGD and we talked about this as another option given ongoing symptoms for several years. He has not had any weight loss.    #4: Chronic pain: His last prescription for oxycodone was filled November sixth. Discussed that we can have next month medication ready for him to  anytime this week to be filled December sixth.    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 He was concerned that last month he had to have another provider fill the medication because I was not available. Discussed that it's a good idea to try to request these medications at least 3 days before when he are needed to allow for this. He is fine with this plan  Plan: oxyCODONE IR (ROXICODONE) 5 MG  tablet            (K21.9) Gastroesophageal reflux disease without esophagitis  Comment: Plans for EGD and trial of a new medication. If this is not working can resume omeprazole. I did discuss potential risks of prolonged use including bone density and possible kidney cancer  Plan: ranitidine (ZANTAC) 300 MG tablet,         GASTROENTEROLOGY ADULT REF PROCEDURE ONLY            (N40.1,  R35.0) Benign prostatic hyperplasia with urinary frequency  Comment:   Plan: finasteride (PROSCAR) 5 MG tablet             I have reviewed the note as documented above.  This accurately captures the substance of my conversation with the patient,    Total time of call between patient and provider was 17 minutes

## 2017-11-27 NOTE — MR AVS SNAPSHOT
After Visit Summary   11/27/2017    Toby Mcgrath    MRN: [id number]           Patient Information     Date Of Birth          1955        Visit Information        Provider Department      11/27/2017 1:30 PM Connie Haskins MD [facility]        Today's Diagnoses     Impingement syndrome of shoulder region, right    -  1    History of shoulder surgery        Chronic pain syndrome        Failed back syndrome of lumbar spine        Gastroesophageal reflux disease without esophagitis        Benign prostatic hyperplasia with urinary frequency           Follow-ups after your visit        Additional Services     GASTROENTEROLOGY ADULT REF PROCEDURE ONLY       Last Lab Result: Creatinine (mg/dL)       Date                     Value                 01/03/2017               1.09             ----------  There is no height or weight on file to calculate BMI.      Patient will be contacted to schedule procedure.     Please be aware that coverage of these services is subject to the terms and limitations of your health insurance plan.  Call member services at your health plan with any benefit or coverage questions.  Any procedures must be performed at a [facility] facility OR coordinated by your clinic's referral office.    Please bring the following with you to your appointment:    (1) Any X-Rays, CTs or MRIs which have been performed.  Contact the facility where they were done to arrange for  prior to your scheduled appointment.    (2) List of current medications   (3) This referral request   (4) Any documents/labs given to you for this referral            ORTHO  REFERRAL       [facility] is referring you to the Orthopedic  Services at [facility].       The  Representative will assist you in the coordination of your Orthopedic and Musculoskeletal Care as prescribed by your physician.    The  Representative will  "call you within 1 business day to help schedule your appointment, or you may contact the [facility] Representative at:    All areas ~ [phone]     Type of Referral : Non Surgical right shoulder weakness numbness and tingling and history of surgery to both shoulders      Timeframe requested: Within 2 weeks    Coverage of these services is subject to the terms and limitations of your health insurance plan.  Please call member services at your health plan with any benefit or coverage questions.      If X-rays, CT or MRI's have been performed, please contact the facility where they were done to arrange for , prior to your scheduled appointment.  Please bring this referral request to your appointment and present it to your specialist.                  Who to contact     If you have questions or need follow up information about today's clinic visit or your schedule please contact [facility] directly at [phone].  Normal or non-critical lab and imaging results will be communicated to you by MyChart, letter or phone within 4 business days after the clinic has received the results. If you do not hear from us within 7 days, please contact the clinic through MyChart or phone. If you have a critical or abnormal lab result, we will notify you by phone as soon as possible.  Submit refill requests through [employer] or call your pharmacy and they will forward the refill request to us. Please allow 3 business days for your refill to be completed.          Additional Information About Your Visit        [facility] Information     [employer] lets you send messages to your doctor, view your test results, renew your prescriptions, schedule appointments and more. To sign up, go to [email or web address] . Click on \"Log in\" on the left side of the screen, which will take you to the Welcome page. Then click on \"Sign up Now\" on the right side of the page.     You will be asked to enter the access code listed below, as " well as some personal information. Please follow the directions to create your username and password.     Your access code is: [email or web address]  Expires: 2018  3:06 PM     Your access code will  in 90 days. If you need help or a new code, please call your [place] clinic or [phone].        Care EveryWhere ID     This is your Care EveryWhere ID. This could be used by other organizations to access your [place] medical records  [id number]         Blood Pressure from Last 3 Encounters:   17 130/73   10/05/17 116/66   17 110/64    Weight from Last 3 Encounters:   17 247 lb (112 kg)   10/05/17 246 lb (111.6 kg)   17 246 lb (111.6 kg)              We Performed the Following     GASTROENTEROLOGY ADULT REF PROCEDURE ONLY     ORTHO  REFERRAL          Today's Medication Changes          These changes are accurate as of: 17  4:01 PM.  If you have any questions, ask your nurse or doctor.               Start taking these medicines.        Dose/Directions    ranitidine 300 MG tablet   Commonly known as:  ZANTAC   Used for:  Gastroesophageal reflux disease without esophagitis   Started by:  Connie Haskins MD        Dose:  300 mg   Take 1 tablet (300 mg) by mouth At Bedtime To reduce stomach acid   Quantity:  90 tablet   Refills:  3         These medicines have changed or have updated prescriptions.        Dose/Directions    * finasteride 5 MG tablet   Commonly known as:  PROSCAR   This may have changed:  Another medication with the same name was added. Make sure you understand how and when to take each.   Used for:  Benign prostatic hyperplasia without lower urinary tract symptoms, unspecified morphology   Changed by:  Connie Haskins MD        Dose:  5 mg   Take 1 tablet (5 mg) by mouth daily For prostate enlargement. Please fill on $4 list   Quantity:  30 tablet   Refills:  5       * finasteride 5 MG tablet   Commonly known as:  PROSCAR   This may have changed:   You were already taking a medication with the same name, and this prescription was added. Make sure you understand how and when to take each.   Used for:  Benign prostatic hyperplasia with urinary frequency   Changed by:  Connie Haskins MD        Dose:  5 mg   Take 1 tablet (5 mg) by mouth daily   Quantity:  90 tablet   Refills:  3       oxyCODONE IR 5 MG tablet   Commonly known as:  ROXICODONE   This may have changed:  additional instructions   Used for:  Failed back syndrome of lumbar spine   Changed by:  Connie Haskins MD        Start taking on:  12/6/2017   1 tab q4h, PRN max 6 per day. May dispense on or after 12/6/17   Quantity:  180 tablet   Refills:  0       * Notice:  This list has 2 medication(s) that are the same as other medications prescribed for you. Read the directions carefully, and ask your doctor or other care provider to review them with you.      Stop taking these medicines if you haven't already. Please contact your care team if you have questions.     famotidine 20 MG tablet   Commonly known as:  PEPCID   Stopped by:  Connie Haskins MD                Where to get your medicines      These medications were sent to [facility] [address] [place] [address]     Phone:  [phone]     finasteride 5 MG tablet    hydrOXYzine 25 MG tablet    ranitidine 300 MG tablet         Some of these will need a paper prescription and others can be bought over the counter.  Ask your nurse if you have questions.     Bring a paper prescription for each of these medications     oxyCODONE IR 5 MG tablet                Primary Care Provider Office Phone # Fax #    Connie Haskins [phone] [address] [address]        Equal Access to Services     [facility] : Nellie Ansari, marge hamilton, gayle marie  la'hemanth ramirez. So [facility] [phone].    ATENCIÓN: Si habla jenifer, tiene a holliday disposición servicios gratuitos de asistencia lingüística. Rylie al [phone].    We comply with applicable federal civil rights laws and [place] laws. We do not discriminate on the basis of race, color, national origin, age, disability, sex, sexual orientation, or gender identity.            Thank you!     Thank you for choosing [facility]  for your care. Our goal is always to provide you with excellent care. Hearing back from our patients is one way we can continue to improve our services. Please take a few minutes to complete the written survey that you may receive in the mail after your visit with us. Thank you!             Your Updated Medication List - Protect others around you: Learn how to safely use, store and throw away your medicines at www.disposemymeds.org.          This list is accurate as of: 11/27/17  4:01 PM.  Always use your most recent med list.                   Brand Name Dispense Instructions for use Diagnosis    allopurinol 100 MG tablet    ZYLOPRIM    270 tablet    Take 3 tablets (300 mg) by mouth daily    Acute gout of right elbow, unspecified cause       aspirin 81 MG tablet     100 tablet    Take 1 tablet (81 mg) by mouth daily    Type 2 diabetes, HbA1C goal < 8% (H)       blood glucose monitoring lancets     3 Box    Use to test blood sugars 2-3 times daily as directed (lena contour meter)    Type 2 diabetes, HbA1C goal < 8% (H)       blood glucose monitoring meter device kit    no brand specified    1 kit    Use to test blood sugar 2 times daily or as directed.    Type 2 diabetes mellitus without complication, without long-term current use of insulin (H)       * blood glucose monitoring test strip    no brand specified    3 Box    Use to test blood sugar 2-3 times daily (lena contour meter)    Type 2 diabetes, HbA1C goal < 8% (H)       * blood glucose monitoring test strip    no brand specified     100 strip    Use to test blood sugars 2 times daily or as directed    Type 2 diabetes mellitus without complication, without long-term current use of insulin (H)       Colchicine 0.6 MG Caps    MITIGARE    20 capsule    Take 0.6 mg by mouth 2 times daily as needed    Acute pain of right knee       cyclobenzaprine 5 MG tablet    FLEXERIL    30 tablet    Take 1-2 tablets (5-10 mg) by mouth nightly as needed for muscle spasms    Back muscle spasm       * finasteride 5 MG tablet    PROSCAR    30 tablet    Take 1 tablet (5 mg) by mouth daily For prostate enlargement. Please fill on $4 list    Benign prostatic hyperplasia without lower urinary tract symptoms, unspecified morphology       * finasteride 5 MG tablet    PROSCAR    90 tablet    Take 1 tablet (5 mg) by mouth daily    Benign prostatic hyperplasia with urinary frequency       FLUoxetine 20 MG capsule    PROzac    270 capsule    TAKE THREE CAPSULES BY MOUTH ONCE DAILY    Mild episode of recurrent major depressive disorder (H)       fluticasone 50 MCG/ACT spray    FLONASE    3 Bottle    Spray 1-2 sprays into both nostrils daily    Chronic rhinitis       hydrOXYzine 25 MG tablet    ATARAX    30 tablet    Take 2-4 tablets ( mg) by mouth nightly as needed for anxiety (for panic at night)    Chronic pain syndrome       LYRICA 100 MG capsule   Generic drug:  pregabalin     360 capsule    Take 2 capsules (200 mg) by mouth 2 times daily    Chronic pain syndrome       metFORMIN 500 MG 24 hr tablet    GLUCOPHAGE-XR    180 tablet    Take 2 tablets (1,000 mg) by mouth daily (with dinner)    Type 2 diabetes mellitus without complication, without long-term current use of insulin (H)       oxyCODONE IR 5 MG tablet   Start taking on:  12/6/2017    ROXICODONE    180 tablet    1 tab q4h, PRN max 6 per day. May dispense on or after 12/6/17    Failed back syndrome of lumbar spine       ranitidine 300 MG tablet    ZANTAC    90 tablet    Take 1 tablet (300 mg) by mouth At  Bedtime To reduce stomach acid    Gastroesophageal reflux disease without esophagitis       senna-docusate 8.6-50 MG per tablet    SENOKOT-S;PERICOLACE    60 tablet    Take 1 tablet by mouth 2 times daily    Lumbar radiculopathy       simvastatin 20 MG tablet    ZOCOR    90 tablet    Take 1 tablet (20 mg) by mouth At Bedtime    Hyperlipidemia LDL goal <100       tamsulosin 0.4 MG capsule    FLOMAX    180 capsule    TAKE TWO CAPSULES BY MOUTH ONCE DAILY    Hypertrophy of prostate with urinary obstruction       triamcinolone 0.1 % cream    KENALOG    30 g    Apply sparingly to affected area twice daily for 7 days. Then stop and use only for flares    Eczema, unspecified type       * Notice:  This list has 4 medication(s) that are the same as other medications prescribed for you. Read the directions carefully, and ask your doctor or other care provider to review them with you.

## 2017-11-27 NOTE — MR AVS SNAPSHOT
"              After Visit Summary   2017    Toby Mcgrath    MRN: [id number]           Patient Information     Date Of Birth          1955        Visit Information        Provider Department      2017 11:45 AM Connie Haskins MD [facility]        Today's Diagnoses     Failed back syndrome of lumbar spine        Gastroesophageal reflux disease without esophagitis        Chronic pain syndrome        Back muscle spasm           Follow-ups after your visit        Who to contact     If you have questions or need follow up information about today's clinic visit or your schedule please contact [facility] directly at [phone].  Normal or non-critical lab and imaging results will be communicated to you by [employer], letter or phone within 4 business days after the clinic has received the results. If you do not hear from us within 7 days, please contact the clinic through [employer] or phone. If you have a critical or abnormal lab result, we will notify you by phone as soon as possible.  Submit refill requests through [employer] or call your pharmacy and they will forward the refill request to us. Please allow 3 business days for your refill to be completed.          Additional Information About Your Visit        MyChart Information     [employer] lets you send messages to your doctor, view your test results, renew your prescriptions, schedule appointments and more. To sign up, go to [email or web address] . Click on \"Log in\" on the left side of the screen, which will take you to the Welcome page. Then click on \"Sign up Now\" on the right side of the page.     You will be asked to enter the access code listed below, as well as some personal information. Please follow the directions to create your username and password.     Your access code is: [email or web address]  Expires: 2018  3:06 PM     Your access code will  in 90 days. If you need help or a new code, please call your " [facility] or [phone].        Care EveryWhere ID     This is your Care EveryWhere ID. This could be used by other organizations to access your [place] medical records  [id number]        Your Vitals Were     Pulse Temperature Respirations Height Pulse Oximetry BMI (Body Mass Index)    92 97.3  F (36.3  C) (Oral) 16 6' (1.829 m) 98% 33.5 kg/m2       Blood Pressure from Last 3 Encounters:   11/27/17 130/73   10/05/17 116/66   09/28/17 110/64    Weight from Last 3 Encounters:   11/27/17 247 lb (112 kg)   10/05/17 246 lb (111.6 kg)   09/28/17 246 lb (111.6 kg)              Today, you had the following     No orders found for display         Today's Medication Changes          These changes are accurate as of: 11/27/17  3:06 PM.  If you have any questions, ask your nurse or doctor.               Start taking these medicines.        Dose/Directions    ranitidine 300 MG tablet   Commonly known as:  ZANTAC   Used for:  Gastroesophageal reflux disease without esophagitis   Started by:  Connie Haskins MD        Dose:  300 mg   Take 1 tablet (300 mg) by mouth At Bedtime To reduce stomach acid   Quantity:  90 tablet   Refills:  3         These medicines have changed or have updated prescriptions.        Dose/Directions    * finasteride 5 MG tablet   Commonly known as:  PROSCAR   This may have changed:  Another medication with the same name was added. Make sure you understand how and when to take each.   Used for:  Benign prostatic hyperplasia without lower urinary tract symptoms, unspecified morphology   Changed by:  Connie Haskins MD        Dose:  5 mg   Take 1 tablet (5 mg) by mouth daily For prostate enlargement. Please fill on $4 list   Quantity:  30 tablet   Refills:  5       * finasteride 5 MG tablet   Commonly known as:  PROSCAR   This may have changed:  You were already taking a medication with the same name, and this prescription was added. Make sure you understand how and when to take  each.   Used for:  Benign prostatic hyperplasia with urinary frequency   Changed by:  Connie Haskins MD        Dose:  5 mg   Take 1 tablet (5 mg) by mouth daily   Quantity:  90 tablet   Refills:  3       oxyCODONE IR 5 MG tablet   Commonly known as:  ROXICODONE   This may have changed:  additional instructions   Used for:  Failed back syndrome of lumbar spine   Changed by:  Connie Haskins MD        Start taking on:  12/6/2017   1 tab q4h, PRN max 6 per day. May dispense on or after 12/6/17   Quantity:  180 tablet   Refills:  0       * Notice:  This list has 2 medication(s) that are the same as other medications prescribed for you. Read the directions carefully, and ask your doctor or other care provider to review them with you.      Stop taking these medicines if you haven't already. Please contact your care team if you have questions.     famotidine 20 MG tablet   Commonly known as:  PEPCID   Stopped by:  Connie Haskins MD                Where to get your medicines      These medications were sent to [facility] [address] [place] [address]     Phone:  [phone]     finasteride 5 MG tablet    hydrOXYzine 25 MG tablet    ranitidine 300 MG tablet         Some of these will need a paper prescription and others can be bought over the counter.  Ask your nurse if you have questions.     Bring a paper prescription for each of these medications     oxyCODONE IR 5 MG tablet                Primary Care Provider Office Phone # Fax #    Connie Haskins [phone] [address] [address]        Equal Access to Services     [facility]: Hadsharifa dixon Somaral, waaxda luqadaha, qaybta kaalmagayle cannon. So [facility] [phone].    ATENCIÓN: Si habla español, tiene a holliday disposición servicios gratuitos de asistencia lingüística. Maríaame  al [phone].    We comply with applicable federal civil rights laws and [place] laws. We do not discriminate on the basis of race, color, national origin, age, disability, sex, sexual orientation, or gender identity.            Thank you!     Thank you for choosing [facility]  for your care. Our goal is always to provide you with excellent care. Hearing back from our patients is one way we can continue to improve our services. Please take a few minutes to complete the written survey that you may receive in the mail after your visit with us. Thank you!             Your Updated Medication List - Protect others around you: Learn how to safely use, store and throw away your medicines at www.disposemymeds.org.          This list is accurate as of: 11/27/17  3:06 PM.  Always use your most recent med list.                   Brand Name Dispense Instructions for use Diagnosis    allopurinol 100 MG tablet    ZYLOPRIM    270 tablet    Take 3 tablets (300 mg) by mouth daily    Acute gout of right elbow, unspecified cause       aspirin 81 MG tablet     100 tablet    Take 1 tablet (81 mg) by mouth daily    Type 2 diabetes, HbA1C goal < 8% (H)       blood glucose monitoring lancets     3 Box    Use to test blood sugars 2-3 times daily as directed (lena contour meter)    Type 2 diabetes, HbA1C goal < 8% (H)       blood glucose monitoring meter device kit    no brand specified    1 kit    Use to test blood sugar 2 times daily or as directed.    Type 2 diabetes mellitus without complication, without long-term current use of insulin (H)       * blood glucose monitoring test strip    no brand specified    3 Box    Use to test blood sugar 2-3 times daily (lena contour meter)    Type 2 diabetes, HbA1C goal < 8% (H)       * blood glucose monitoring test strip    no brand specified    100 strip    Use to test blood sugars 2 times daily or as directed    Type 2 diabetes mellitus without complication, without long-term  current use of insulin (H)       Colchicine 0.6 MG Caps    MITIGARE    20 capsule    Take 0.6 mg by mouth 2 times daily as needed    Acute pain of right knee       cyclobenzaprine 5 MG tablet    FLEXERIL    30 tablet    Take 1-2 tablets (5-10 mg) by mouth nightly as needed for muscle spasms    Back muscle spasm       * finasteride 5 MG tablet    PROSCAR    30 tablet    Take 1 tablet (5 mg) by mouth daily For prostate enlargement. Please fill on $4 list    Benign prostatic hyperplasia without lower urinary tract symptoms, unspecified morphology       * finasteride 5 MG tablet    PROSCAR    90 tablet    Take 1 tablet (5 mg) by mouth daily    Benign prostatic hyperplasia with urinary frequency       FLUoxetine 20 MG capsule    PROzac    270 capsule    TAKE THREE CAPSULES BY MOUTH ONCE DAILY    Mild episode of recurrent major depressive disorder (H)       fluticasone 50 MCG/ACT spray    FLONASE    3 Bottle    Spray 1-2 sprays into both nostrils daily    Chronic rhinitis       hydrOXYzine 25 MG tablet    ATARAX    30 tablet    Take 2-4 tablets ( mg) by mouth nightly as needed for anxiety (for panic at night)    Chronic pain syndrome       LYRICA 100 MG capsule   Generic drug:  pregabalin     360 capsule    Take 2 capsules (200 mg) by mouth 2 times daily    Chronic pain syndrome       metFORMIN 500 MG 24 hr tablet    GLUCOPHAGE-XR    180 tablet    Take 2 tablets (1,000 mg) by mouth daily (with dinner)    Type 2 diabetes mellitus without complication, without long-term current use of insulin (H)       oxyCODONE IR 5 MG tablet   Start taking on:  12/6/2017    ROXICODONE    180 tablet    1 tab q4h, PRN max 6 per day. May dispense on or after 12/6/17    Failed back syndrome of lumbar spine       ranitidine 300 MG tablet    ZANTAC    90 tablet    Take 1 tablet (300 mg) by mouth At Bedtime To reduce stomach acid    Gastroesophageal reflux disease without esophagitis       senna-docusate 8.6-50 MG per tablet     SENOKOT-S;PERICOLACE    60 tablet    Take 1 tablet by mouth 2 times daily    Lumbar radiculopathy       simvastatin 20 MG tablet    ZOCOR    90 tablet    Take 1 tablet (20 mg) by mouth At Bedtime    Hyperlipidemia LDL goal <100       tamsulosin 0.4 MG capsule    FLOMAX    180 capsule    TAKE TWO CAPSULES BY MOUTH ONCE DAILY    Hypertrophy of prostate with urinary obstruction       triamcinolone 0.1 % cream    KENALOG    30 g    Apply sparingly to affected area twice daily for 7 days. Then stop and use only for flares    Eczema, unspecified type       * Notice:  This list has 4 medication(s) that are the same as other medications prescribed for you. Read the directions carefully, and ask your doctor or other care provider to review them with you.

## 2017-11-29 ENCOUNTER — OFFICE VISIT (OUTPATIENT)
Dept: URGENT CARE | Facility: URGENT CARE | Age: 62
End: 2017-11-29
Payer: COMMERCIAL

## 2017-11-29 VITALS
WEIGHT: 247.13 LBS | TEMPERATURE: 97.3 F | OXYGEN SATURATION: 93 % | HEART RATE: 88 BPM | DIASTOLIC BLOOD PRESSURE: 77 MMHG | SYSTOLIC BLOOD PRESSURE: 114 MMHG | BODY MASS INDEX: 33.52 KG/M2

## 2017-11-29 DIAGNOSIS — E11.9 TYPE 2 DIABETES MELLITUS WITHOUT COMPLICATION, WITHOUT LONG-TERM CURRENT USE OF INSULIN (H): ICD-10-CM

## 2017-11-29 DIAGNOSIS — R05.8 PRODUCTIVE COUGH: ICD-10-CM

## 2017-11-29 DIAGNOSIS — K04.7 INFECTED DENTAL CARRIES: ICD-10-CM

## 2017-11-29 DIAGNOSIS — A46 ERYSIPELAS: Primary | ICD-10-CM

## 2017-11-29 DIAGNOSIS — K02.9 INFECTED DENTAL CARRIES: ICD-10-CM

## 2017-11-29 PROCEDURE — 99214 OFFICE O/P EST MOD 30 MIN: CPT | Performed by: FAMILY MEDICINE

## 2017-11-29 NOTE — PROGRESS NOTES
SUBJECTIVE: Toby Mcgrath is a 62 year old male presenting with a chief complaint of facial pain/pressure and productive cough.  Onset of symptoms was day(s) ago.  Course of illness is worsening.    Severity moderate  Current and Associated symptoms: none  Treatment measures tried include None tried.  Predisposing factors include dental carries and diabetes.    Past Medical History:   Diagnosis Date     Anxiety state, unspecified      BMI 32.0-32.9,adult 9/4/2015     Chronic pain syndrome 3/29/2007    Narcotic refill protocol Will return every 2-3 months for clinic visits Controlled substance aggreement on file from this  6/26/12 Documentation in problem list: no, appears to be compliant based on last visit He is responding best to Oxycontin 10 mg twice daily # 60 per month.  This is costly and looking into PA for coverage.  Nausea with MS Contin Has meds refilled 16 of every month Last [facility] website verification:  done on 7/8/2015  [email or web address]   2/10/2015:  PCP will take over narcotic prescription Tapering course: using 5 mg tablets 10 mg morning 15 mg noon, 15 mg night for 1 week then 10 mg morning, 10 mg noon, 15 mg night for 1 week then 10 mg TID for 1 week then see me for refills  Then ongoing taper: 5 mg morning, 10 mg noon and 10 mg night for 1 week then  5 mg morning and noon and 10 mg night for 1 week then 5 mg tid for 1 week Then 5 mg morning no noon dose and 5 mg night for 1 week then No morning or non dose and 5 mg nightly for 1 week then off  Goal is co     Chronic rhinitis 3/29/2007     DDD (degenerative disc disease), cervical 2/8/2013    Normal.  C2-C3: Normal disc, facet joints, spinal canal and neural foramina.  C3-C4: Mild broad-based posterior disc bulge. Otherwise normal.  C4-C5: Normal disc, facet joints, spinal canal and neural foramina.  C5-C6: Moderate degenerative disc disease with loss of disc height, circumferential disc bulge and vertebral endplate osteophytes. Mild  spinal canal stenosis and moderate left foraminal stenosis. Normal right foramen and facet joints.  C6-C7: Mild circumferential disc bulge. Slight impression on the thecal sac. Mild left foraminal stenosis. Normal right foramen and facet joints.  C7-T1: Normal disc, facet joints, spinal canal and neural foramina.  T1-T2: Mild central posterior disc protrusion.  T2-T3: Mild central posterior disc protrusion. Slight impression on the spinal cord.         DJD (degenerative joint disease), lumbar 1/10/2012     Esophageal reflux     ,refluxes on upper GI     Gout 4/8/2011     Gout, unspecified      Hyperlipidemia LDL goal <100 10/25/2012     Hypertension goal BP (blood pressure) < 140/90 10/21/2011     HYPERTROPHY PROSTATE WITH OBST 8/3/2006     Impotence of organic origin      Lumbar radiculopathy 9/17/2014     Major depressive disorder, recurrent episode, unspecified 7/9/2008     Osteoarthrosis, unspecified whether generalized or localized, pelvic region and thigh      Pure hyperglyceridemia      ROTATOR CUFF SYND NOS 4/17/2008     S/P lumbar fusion 10/14/2014     Thoracic or lumbosacral neuritis or radiculitis, unspecified      Tobacco use disorder      Type 2 diabetes, HbA1C goal < 8% (H) 9/9/2011     Allergies   Allergen Reactions     Codeine Nausea and Vomiting     Tolerating other opiates      Social History   Substance Use Topics     Smoking status: Former Smoker     Years: 40.00     Types: Cigarettes     Smokeless tobacco: Former User     Quit date: 2/1/2013     Alcohol use No       ROS:  SKIN: no rash  GI: no vomiting    OBJECTIVE:  /77  Pulse 88  Temp 97.3  F (36.3  C) (Oral)  Wt 247 lb 2 oz (112.1 kg)  SpO2 93%  BMI 33.52 kg/g3LBNSIMS APPEARANCE: healthy, alert and no distress  EYES: EOMI,  PERRL, conjunctiva clear  HENT: TM's normal bilaterally, oral mucous membranes moist, no erythema noted and dental carries  NECK: supple, nontender, no lymphadenopathy  RESP: lungs clear to auscultation - no  rales, rhonchi or wheezes  CV: regular rates and rhythm, normal S1 S2, no murmur noted  SKIN: left facial redness/tenderness and swelling      ICD-10-CM    1. Erysipelas A46    2. Productive cough R05    3. Infected dental carries K02.9     K04.7    4. Type 2 diabetes mellitus without complication, without long-term current use of insulin (H) E11.9      F/u Dental  Fluids/Rest, f/u if worse/not any better

## 2017-11-29 NOTE — NURSING NOTE
Chief Complaint   Patient presents with     Facial Swelling     facial swelling with pain for two days.      Cough     deep chest cough, sob chest congestion for one week/       Initial /77  Pulse 88  Temp 97.3  F (36.3  C) (Oral)  Wt 247 lb 2 oz (112.1 kg)  SpO2 93%  BMI 33.52 kg/m2 Estimated body mass index is 33.52 kg/(m^2) as calculated from the following:    Height as of 11/27/17: 6' (1.829 m).    Weight as of this encounter: 247 lb 2 oz (112.1 kg).  Medication Reconciliation: complete

## 2017-12-28 DIAGNOSIS — M96.1 FAILED BACK SYNDROME OF LUMBAR SPINE: ICD-10-CM

## 2017-12-28 DIAGNOSIS — G89.4 CHRONIC PAIN SYNDROME: ICD-10-CM

## 2017-12-28 NOTE — TELEPHONE ENCOUNTER
Reason for Call:  Medication or medication refill:    Do you use a [facility]?  Name of the pharmacy and phone number for the current request:     WRITTEN PRESCRIPTION      Name of the medication requested: oxyCODONE IR (ROXICODONE) 5 MG tablet    Can we leave a detailed message on this number? YES    Phone number patient can be reached at: Home number on file [phone] (home)    Best Time: Anytime    Call taken on 12/28/2017 at 9:13 AM by Lisa Alas

## 2018-01-02 NOTE — TELEPHONE ENCOUNTER
Too early.  Last [place] 12/6/17 [place].  Maximum 6 daily. #180 needs to last a month.  Will call patient to inform.  Gayathri Wade RN

## 2018-01-02 NOTE — TELEPHONE ENCOUNTER
SN  Controlled Substance Refill Request for Oxycodone    Last refill: 12/6/2017 - #180    Last clinic visit: 11/27/2017     Clinic visit frequency required: Q 3 months  Next appt: No future OV scheduled    Controlled substance agreement on file: Yes:  Date 7/27/2016.    Documentation in problem list reviewed:  Yes    Processing:  Patient will  in clinic    [place] reviewed:  reviewed- no concerns  [place]  [email or web address]  Patient aware refill not due until 1/6.  Will be in Friday 1/5 to .  Thanks, Gayathri Wade RN

## 2018-01-04 RX ORDER — OXYCODONE HYDROCHLORIDE 5 MG/1
TABLET ORAL
Qty: 180 TABLET | Refills: 0 | Status: SHIPPED | OUTPATIENT
Start: 2018-01-06 | End: 2018-02-05

## 2018-01-04 NOTE — TELEPHONE ENCOUNTER
ANN MARIE left informing patient Rx ready to  at [facility].  Gayathri Wade RN

## 2018-02-01 ENCOUNTER — APPOINTMENT (OUTPATIENT)
Dept: GENERAL RADIOLOGY | Facility: CLINIC | Age: 63
End: 2018-02-01
Attending: EMERGENCY MEDICINE
Payer: COMMERCIAL

## 2018-02-01 ENCOUNTER — HOSPITAL ENCOUNTER (EMERGENCY)
Facility: CLINIC | Age: 63
Discharge: HOME | End: 2018-02-01
Attending: EMERGENCY MEDICINE | Admitting: EMERGENCY MEDICINE
Payer: COMMERCIAL

## 2018-02-01 VITALS
TEMPERATURE: 97.7 F | BODY MASS INDEX: 31.69 KG/M2 | HEIGHT: 72 IN | RESPIRATION RATE: 20 BRPM | SYSTOLIC BLOOD PRESSURE: 146 MMHG | OXYGEN SATURATION: 99 % | WEIGHT: 234 LBS | DIASTOLIC BLOOD PRESSURE: 88 MMHG

## 2018-02-01 DIAGNOSIS — M96.1 FAILED BACK SYNDROME OF LUMBAR SPINE: ICD-10-CM
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
## 2018-02-01 DIAGNOSIS — S20.211A CHEST WALL CONTUSION, RIGHT, INITIAL ENCOUNTER: ICD-10-CM

## 2018-02-01 DIAGNOSIS — S80.02XA CONTUSION OF LEFT KNEE, INITIAL ENCOUNTER: ICD-10-CM

## 2018-02-01 PROCEDURE — 73562 X-RAY EXAM OF KNEE 3: CPT | Mod: LT

## 2018-02-01 PROCEDURE — 99285 EMERGENCY DEPT VISIT HI MDM: CPT

## 2018-02-01 PROCEDURE — 25000132 ZZH RX MED GY IP 250 OP 250 PS 637: Performed by: EMERGENCY MEDICINE

## 2018-02-01 PROCEDURE — 71101 X-RAY EXAM UNILAT RIBS/CHEST: CPT | Mod: RT

## 2018-02-01 RX ORDER — OXYCODONE HYDROCHLORIDE 5 MG/1
TABLET ORAL
Qty: 180 TABLET | Refills: 0 | Status: CANCELLED | OUTPATIENT
Start: 2018-02-01

## 2018-02-01 RX ORDER — OXYCODONE HYDROCHLORIDE 5 MG/1
5 TABLET ORAL ONCE
Status: COMPLETED | OUTPATIENT
Start: 2018-02-01 | End: 2018-02-01

## 2018-02-01 RX ADMIN — OXYCODONE HYDROCHLORIDE 5 MG: 5 TABLET ORAL at 17:33

## 2018-02-01 ASSESSMENT — ENCOUNTER SYMPTOMS
VOMITING: 0
HEADACHES: 0
NECK PAIN: 0
ARTHRALGIAS: 1
CONFUSION: 0

## 2018-02-01 NOTE — ED AVS SNAPSHOT
[facility] Emergency Department    [address]    [facility] [address]    Phone:  [phone]    Fax:  [phone]                                       Toby Mcgrath   MRN: [id number]    Department:  [facility] Emergency Department   Date of Visit:  2/1/2018           After Visit Summary Signature Page     I have received my discharge instructions, and my questions have been answered. I have discussed any challenges I see with this plan with the nurse or doctor.    ..........................................................................................................................................  Patient/Patient Representative Signature      ..........................................................................................................................................  Patient Representative Print Name and Relationship to Patient    ..................................................               ................................................  Date                                            Time    ..........................................................................................................................................  Reviewed by Signature/Title    ...................................................              ..............................................  Date                                                            Time

## 2018-02-01 NOTE — TELEPHONE ENCOUNTER
Reason for Call:  Other prescription    Detailed comments: Pt called this morning and is calling for his February oxycodone. Pt said he would be able to pick it up on Monday. Please give pt a call once this is ready.     Phone Number Patient can be reached at: Home number on file [phone] (home)    Best Time:     Can we leave a detailed message on this number? YES    Call taken on 2/1/2018 at 8:03 AM by Lynn Camara

## 2018-02-01 NOTE — ED PROVIDER NOTES
History     Chief Complaint:  Fall    The history is provided by the patient.      Toby Mcgrath is a 62 year old male with history of chronic back pain, HTN, HLD, and DM2 who presents for evaluation after a fall. The patient slipped on a patch of ice outside his apartment at 1230 this afternoon and fell onto the pavement. He fell forward and landed on his anterior chest. Since then he has had pain in his right chest wall as well as his right side, worse with breathing. He also endorses pain in his left knee, worse with flexion. The patient reports that he did hit his head but he denies any loss of consciousness, headache, neck pain, confusion, or vomiting. The patient has no other medical concerns.    Allergies:  Codeine      Medications:    Oxycodone  Zantac  Lyrica  Flomax  Metformin  Prozac  Proscar  Zocor  Zyloprim  Aspirin  Atarax  Proscar  Flexeril  Senokot-S    Past Medical History:    Anxiety state, unspecified   BMI 32.0-32.9,adult   Chronic pain syndrome   Chronic rhinitis   DDD (degenerative disc disease), cervical   DJD (degenerative joint disease), lumbar   Esophageal reflux   Gout   Gout, unspecified   Hyperlipidemia LDL goal <100   Hypertension goal BP (blood pressure) < 140/90   HYPERTROPHY PROSTATE WITH OBST   Impotence of organic origin   Lumbar radiculopathy   Major depressive disorder, recurrent episode, unspecified   Osteoarthrosis, unspecified whether generalized or localized, pelvic region and thigh   Pure hyperglyceridemia   ROTATOR CUFF SYND NOS   S/P lumbar fusion   Thoracic or lumbosacral neuritis or radiculitis, unspecified   Tobacco use disorder   Type 2 diabetes, HbA1C goal < 8%      Past Surgical History:   Neck cyst removal  Laminectomy  Repair shoulder - bilateral  Fusion lumbar anterior, fusion lumbar posterior, two levels  Colonoscopy  Endoscopy  Hemilaminectomy, discectomy lumbar  Right hip replacement    Family History:    Diabetes  CAD  HTN    Social History:  Presents  with son   Tobacco use: Former smoker of 40 years. QD 2/1/2013  Alcohol use: No  PCP: Connie Haskins    Marital Status:       Review of Systems   Cardiovascular: Positive for chest pain.   Gastrointestinal: Negative for vomiting.   Musculoskeletal: Positive for arthralgias and gait problem. Negative for neck pain.   Neurological: Negative for syncope and headaches.   Psychiatric/Behavioral: Negative for confusion.   All other systems reviewed and are negative.    Physical Exam     Patient Vitals for the past 24 hrs:   BP Temp Temp src Heart Rate Resp SpO2 Height Weight   02/01/18 1730 114/82 - - - - - - -   02/01/18 1715 (!) 132/109 - - - - 97 % - -   02/01/18 1700 (!) 131/94 - - - - - - -   02/01/18 1423 128/88 97.7  F (36.5  C) Temporal 74 20 97 % 1.829 m (6') 106.1 kg (234 lb)      Physical Exam  General: Patient is alert and cooperative.  HENT:  Normal nose, oropharynx.  Minor abrasion right brow, no hematoma.  Eyes: EOMI. Normal conjunctiva.  Neck:  Normal range of motion and appearance. No tenderness.   Cardiovascular:  Normal rate, regular rhythm and normal heart sounds.   Pulmonary/Chest:  Effort normal. No wheezing or crackles.  Tender right lateral chest wall, no deformity, abrasion, or bruising.   Abdominal: Soft. No distension or tenderness.     Musculoskeletal: superficial abrasion anterior left knee, minimal swelling, no deformity; limited ROM.  Remainder musculoskeletal no trauma.  Neurological: oriented, normal strength, sensation, and coordination.   Skin: Warm and dry. No rash or bruising.   Psychiatric: Normal mood and affect. Normal behavior and judgement.      Emergency Department Course   Imaging:  Radiographic findings were communicated with the patient who voiced understanding of the findings.  Ribs XR, unilat 3 views + PA chest, right  IMPRESSION: No evidence of acute rib fracture. The lungs are clear. No focal pulmonary opacities. Heart and mediastinum are unremarkable. No  acute cardiopulmonary abnormalities.    BURTON MUNSON MD    Knee XR, 3 views, left  IMPRESSION: No evidence of acute fracture or malalignment. There is complete lateral patellofemoral joint space loss with subchondral sclerosis and osteophyte formation. No knee effusion.    BURTON MUNSON MD    Imaging independently reviewed and agree with radiologist interpretation.     Interventions:  1733: Oxycodone 5 mg PO    Emergency Department Course:  Past medical records, nursing notes, and vitals reviewed.  1714: I performed an exam of the patient and obtained history, as documented above.     1822: I rechecked the patient. Findings and plan explained to the Patient. Patient discharged home with instructions regarding supportive care, medications, and reasons to return. The importance of close follow-up was reviewed.    Impression & Plan    Medical Decision Making:  Toby Mcgrath is a sober 62 year old male who slipped on the ice and fell, sustaining right chest wall and left knee contusions. He did bump his head but did not lose consciousness nor does he have a headache or neck pain. Testing was limited to plain films of the right ribs, chest, and left knee, which are negative for fractures or other acute abnormalities. We discussed indications for neuro imaging and will be deferring this but will be discharged with closed head injury instructions as well as information on chest wall and knee injuries. He was offered crutches but he declined as he has a cane at home which he uses for his chronic back discomfort. He also takes oxycodone for the same reason and does not need any additional prescriptions. He can follow up as needed.    Diagnosis:    ICD-10-CM   1. Chest wall contusion, right, initial encounter S20.211A   2. Contusion of left knee, initial encounter S80.02XA   3. Fall from slipping on ice, initial encounter W00.9XXA       Disposition:  Discharged to home with plan as outlined.      Jer Staples  2/1/2018    [facility] EMERGENCY DEPARTMENT  I, Jer Staples, am serving as a scribe at 5:14 PM on 2/1/2018 to document services personally performed by Praveen Wong MD based on my observations and the provider's statements to me.       Praveen Wong MD  02/02/18 1002

## 2018-02-01 NOTE — ED NOTES
Pt slipped and fell on ice about 2 hours ago at his apartment.  He has pain in right side of chest and right back.  He also has left knee pain.  He hit head on the ice.   He has scrapes on hands.

## 2018-02-01 NOTE — TELEPHONE ENCOUNTER
Due for OV every 3 months.  Last seen in November  VM left asking patient to call and schedule f/u appointment.  Last refill per [facility] - 1/16/18 - #180.  Gayathri Wade RN

## 2018-02-01 NOTE — ED AVS SNAPSHOT
[facility] Emergency Department    [address]    [facility] [address]    Phone:  [phone]    Fax:  [phone]                                       Toby Mcgrath   MRN: [id number]    Department:  [facility] Emergency Department   Date of Visit:  2/1/2018           Patient Information     Date Of Birth          1955        Your diagnoses for this visit were:     Chest wall contusion, right, initial encounter     Contusion of left knee, initial encounter     Fall from slipping on ice, initial encounter        You were seen by Praveen Wong MD.      Follow-up Information     Follow up with Connie Haskins MD.    Specialty:  Family Practice    Why:  As needed, for re-evaluation of your symptoms    Contact information:    [address] [address]  [phone]          Discharge Instructions         Chest Contusion    A contusion is a bruise to the skin, muscle, or ribs. It may cause pain, tenderness, and swelling. It may turn the skin purple until it heals. Contusions take a few days to a few weeks to heal.  Home care  Follow these guidelines when caring for yourself at home:    Rest. Don t do any heavy lifting or strenuous activity. Don t do any activity that causes pain.    Put an ice pack on the injured area. Do this for 20 minutes every 1 to 2 hours the first day. You can make an ice pack by wrapping a plastic bag of ice cubes in a thin towel. Continue to use the ice pack 3 to 4 times a day for the next 2 days. Then use the ice pack as needed to ease pain and swelling.    After 1 to 2 days you may put a warm compress on the area. Do this for 10 minutes several times a day. A warm compress is a clean cloth that s damp with warm water.    Hold a pillow to the affected area when you cough. This will help ease pain.    You may use over-the-counter pain medicine such as acetaminophen or ibuprofen to control pain, unless another pain  medicine was prescribed. If you have chronic liver or kidney disease, talk with your healthcare provider before using these medicines. Also talk with your provider if you ve had a stomach ulcer or gastrointestinal bleeding.  Follow-up care  Follow up with your healthcare provider, or as advised.  When to seek medical advice  Call your healthcare provider right away if any of these occur:    New abdominal pain or abdominal pain that gets worse    Fever of 100.4 F (38 C) or higher, or as directed by your healthcare provider  When to call 911  Call 911 or get immediate medical attention if any of these occur:     Dizziness, weakness, or fainting    Shortness of breath, trouble breathing, or breathing fast    Chest pain gets worse when you breathe    Severe pain that comes on suddenly or lasts more than an hour  Date Last Reviewed: 5/1/2017 2000-2017 [employer]. [address] [place] [address]. All rights reserved. This information is not intended as a substitute for professional medical care. Always follow your healthcare professional's instructions.      Discharge Instructions  Head Injury    You have been seen today for a head injury. Your evaluation included a history and physical examination. You may have had a CT (CAT) scan performed, though most head injuries do not require a scan. Based on this evaluation, your provider today does not feel that your head injury is serious.    Generally, every Emergency Department visit should have a follow-up clinic visit with either a primary or a specialty clinic/provider. Please follow-up as instructed by your emergency provider today.  Return to the Emergency Department if:    You are confused or you are not acting right.    Your headache gets worse or you start to have a really bad headache even with your recommended treatment plan.    You vomit (throw up) more than once.    You have a seizure.    You have trouble walking.    You have weakness or  paralysis (cannot move) in an arm or a leg.    You have blood or fluid coming from your ears or nose.    You have new symptoms or anything that worries you.    Sleeping:  It is okay for you to sleep, but someone should wake you up if instructed by your provider, and someone should check on you at your usual time to wake up.     Activity:    Do not drive for at least 24 hours.    Do not drive if you have dizzy spells or trouble concentrating, or remembering things.    Do not return to any contact sports until cleared by your regular provider.     MORE INFORMATION:    Concussion:  A concussion is a minor head injury that may cause temporary problems with the way the brain works. Although concussions are important, they are generally not an emergency or a reason that a person needs to be hospitalized. Some concussion symptoms include confusion, amnesia (forgetful), nausea (sick to your stomach) and vomiting (throwing up), dizziness, fatigue, memory or concentration problems, irritability and sleep problems. For most people, concussions are mild and temporary but some will have more severe and persistent symptoms that require on-going care and treatment.  CT Scans: Your evaluation today may have included a CT scan (CAT scan) to look for things like bleeding or a skull fracture (broken bone).  CT scans involve radiation and too many CT scans can cause serious health problems like cancer, especially in children.  Because of this, your provider may not have ordered a CT scan today if they think you are at low risk for a serious or life threatening problem.    If you were given a prescription for medicine here today, be sure to read all of the information (including the package insert) that comes with your prescription.  This will include important information about the medicine, its side effects, and any warnings that you need to know about.  The pharmacist who fills the prescription can provide more information and answer  questions you may have about the medicine.  If you have questions or concerns that the pharmacist cannot address, please call or return to the Emergency Department.     Remember that you can always come back to the Emergency Department if you are not able to see your regular provider in the amount of time listed above, if you get any new symptoms, or if there is anything that worries you.    Knee Sprain    A sprain is an injury to the ligaments or capsule that holds a joint together. There are no broken bones. Most sprains take 3 to 6 weeks to heal. If it a severe sprain where the ligament is completely torn, it can take months to recover.  Most knee sprains are treated with a splint, knee immobilizer brace, or elastic wrap for support. Severe sprains may require surgery.  Home care    Stay off the injured leg as much as possible until you can walk on it without pain. If you have a lot of pain with walking, crutches or a walker may be prescribed. (These can be rented or purchased at many pharmacies and surgical or orthopedic supply stores). Follow your healthcare provider's advice about when to begin putting weight on that leg.    Keep your leg elevated to reduce pain and swelling. When sleeping, place a pillow under the injured leg. When sitting, support the injured leg so it is level with your waist. This is very important during the first 48 hours.    Apply an ice pack over the injured area for 15 to 20 minutes every 3 to 6 hours. You should do this for the first 24 to 48 hours. You can make an ice pack by filling a plastic bag that seals at the top with ice cubes and then wrapping it with a thin towel. Continue to use ice packs for relief of pain and swelling as needed. As the ice melts, be careful to avoid getting your wrap, splint, or cast wet. After 48 hours, apply heat (warm shower or warm bath) for 15 to 20 minutes several times a day, or alternate ice and heat. You can place the ice pack directly over the  splint. If you have to wear a hook-and-loop knee brace, you can open it to apply the ice pack, or heat, directly to the knee. Never put ice directly on the skin. Always wrap the ice in a towel or other type of cloth.    You may use over-the-counter pain medicine to control pain, unless another pain medicine was prescribed.If you have chronic liver or kidney disease or ever had a stomach ulcer or GI bleeding, talk with your healthcare provider before using these medicines.    If you were given a splint, keep it completely dry at all times. Bathe with your splint out of the water, protected with 2 large plastic bags, rubber-banded at the top end. If a fiberglass splint gets wet, you can dry it with a hair dryer. If you have a hook-and-loop knee brace, you can remove this to bathe, unless told otherwise.  Follow-up care  Follow up with your doctor as advised. Any X-rays you had today don t show any broken bones, breaks, or fractures. Sometimes fractures don t show up on the first X-ray. Bruises and sprains can sometimes hurt as much as a fracture. These injuries can take time to heal completely. If your symptoms don t improve or they get worse, talk with your doctor. You may need a repeat X-ray. If X-rays were taken, you will be told of any new findings that may affect your care.  Call 911  Call 911 if you have:     Shortness of breath     Chest pain  When to seek medical advice  Call your healthcare provider right away if any of these occur:    The splint or knee immobilizer brace becomes wet or soft    The fiberglass cast or splint remains wet for more than 24 hours    Pain or swelling increases    The injured leg or toes become cold, blue, numb, or tingly  Date Last Reviewed: 11/20/2015 2000-2017 [employer]. [address] [place] [address], [place] [address]. All rights reserved. This information is not intended as a substitute for professional medical care. Always follow your healthcare professional's  instructions.          24 Hour Appointment Hotline       To make an appointment at any [facility], call [phone] ([phone]). If you don't have a family doctor or clinic, we will help you find one. [place] clinics are conveniently located to serve the needs of you and your family.             Review of your medicines      Our records show that you are taking the medicines listed below. If these are incorrect, please call your family doctor or clinic.        Dose / Directions Last dose taken    allopurinol 100 MG tablet   Commonly known as:  ZYLOPRIM   Dose:  300 mg   Quantity:  270 tablet        Take 3 tablets (300 mg) by mouth daily   Refills:  0        aspirin 81 MG tablet   Dose:  81 mg   Quantity:  100 tablet        Take 1 tablet (81 mg) by mouth daily   Refills:  3        blood glucose monitoring lancets   Quantity:  3 Box        Use to test blood sugars 2-3 times daily as directed (lena contour meter)   Refills:  0        blood glucose monitoring meter device kit   Commonly known as:  no brand specified   Quantity:  1 kit        Use to test blood sugar 2 times daily or as directed.   Refills:  0        * blood glucose monitoring test strip   Commonly known as:  no brand specified   Quantity:  3 Box        Use to test blood sugar 2-3 times daily (lena contour meter)   Refills:  0        * blood glucose monitoring test strip   Commonly known as:  no brand specified   Quantity:  100 strip        Use to test blood sugars 2 times daily or as directed   Refills:  3        Colchicine 0.6 MG Caps   Commonly known as:  MITIGARE   Dose:  1 capsule   Quantity:  20 capsule        Take 0.6 mg by mouth 2 times daily as needed   Refills:  0        cyclobenzaprine 5 MG tablet   Commonly known as:  FLEXERIL   Dose:  5-10 mg   Quantity:  30 tablet        Take 1-2 tablets (5-10 mg) by mouth nightly as needed for muscle spasms   Refills:  1        * finasteride 5 MG tablet   Commonly known as:  PROSCAR   Dose:   5 mg   Quantity:  30 tablet        Take 1 tablet (5 mg) by mouth daily For prostate enlargement. Please fill on $4 list   Refills:  5        * finasteride 5 MG tablet   Commonly known as:  PROSCAR   Dose:  5 mg   Quantity:  90 tablet        Take 1 tablet (5 mg) by mouth daily   Refills:  3        FLUoxetine 20 MG capsule   Commonly known as:  PROzac   Quantity:  270 capsule        TAKE THREE CAPSULES BY MOUTH ONCE DAILY   Refills:  3        fluticasone 50 MCG/ACT spray   Commonly known as:  FLONASE   Dose:  1-2 spray   Quantity:  3 Bottle        Spray 1-2 sprays into both nostrils daily   Refills:  11        hydrOXYzine 25 MG tablet   Commonly known as:  ATARAX   Dose:   mg   Quantity:  30 tablet        Take 2-4 tablets ( mg) by mouth nightly as needed for anxiety (for panic at night)   Refills:  5        LYRICA 100 MG capsule   Dose:  200 mg   Quantity:  360 capsule   Generic drug:  pregabalin        Take 2 capsules (200 mg) by mouth 2 times daily   Refills:  1        metFORMIN 500 MG 24 hr tablet   Commonly known as:  GLUCOPHAGE-XR   Dose:  1000 mg   Quantity:  180 tablet        Take 2 tablets (1,000 mg) by mouth daily (with dinner)   Refills:  3        oxyCODONE IR 5 MG tablet   Commonly known as:  ROXICODONE   Quantity:  180 tablet        1 tab q4h, PRN max 6 per day.   Refills:  0        ranitidine 300 MG tablet   Commonly known as:  ZANTAC   Dose:  300 mg   Quantity:  90 tablet        Take 1 tablet (300 mg) by mouth At Bedtime To reduce stomach acid   Refills:  3        senna-docusate 8.6-50 MG per tablet   Commonly known as:  SENOKOT-S;PERICOLACE   Dose:  1 tablet   Quantity:  60 tablet        Take 1 tablet by mouth 2 times daily   Refills:  3        simvastatin 20 MG tablet   Commonly known as:  ZOCOR   Dose:  20 mg   Quantity:  90 tablet        Take 1 tablet (20 mg) by mouth At Bedtime   Refills:  3        tamsulosin 0.4 MG capsule   Commonly known as:  FLOMAX   Quantity:  180 capsule         TAKE TWO CAPSULES BY MOUTH ONCE DAILY   Refills:  3        triamcinolone 0.1 % cream   Commonly known as:  KENALOG   Quantity:  30 g        Apply sparingly to affected area twice daily for 7 days. Then stop and use only for flares   Refills:  3        * Notice:  This list has 4 medication(s) that are the same as other medications prescribed for you. Read the directions carefully, and ask your doctor or other care provider to review them with you.            Procedures and tests performed during your visit     Knee XR, 3 views, left    Ribs XR, unilat 3 views + PA chest, right      Orders Needing Specimen Collection     None      Pending Results     No orders found from 1/30/2018 to 2/2/2018.            Pending Culture Results     No orders found from 1/30/2018 to 2/2/2018.            Pending Results Instructions     If you had any lab results that were not finalized at the time of your Discharge, you can call the ED Lab Result RN at [phone]. You will be contacted by this team for any positive Lab results or changes in treatment. The nurses are available 7 days a week from 10A to 6:30P.  You can leave a message 24 hours per day and they will return your call.        Test Results From Your Hospital Stay        2/1/2018  6:02 PM      Narrative     XR RIBS & CHEST RT 3VW 2/1/2018 5:58 PM    HISTORY: Trauma.    COMPARISON: February 18, 2016.        Impression     IMPRESSION: No evidence of acute rib fracture. The lungs are clear. No  focal pulmonary opacities. Heart and mediastinum are unremarkable. No  acute cardiopulmonary abnormalities.    BURTON MUNSON MD         2/1/2018  6:03 PM      Narrative     XR KNEE LT 3 VW 2/1/2018 5:59 PM    HISTORY: Trauma.    COMPARISON: None.        Impression     IMPRESSION: No evidence of acute fracture or malalignment. There is  complete lateral patellofemoral joint space loss with subchondral  sclerosis and osteophyte formation. No knee effusion.    UBRTON MUNSON MD                 Clinical Quality Measure: Blood Pressure Screening     Your blood pressure was checked while you were in the emergency department today. The last reading we obtained was  BP: 114/82 . Please read the guidelines below about what these numbers mean and what you should do about them.  If your systolic blood pressure (the top number) is less than 120 and your diastolic blood pressure (the bottom number) is less than 80, then your blood pressure is normal. There is nothing more that you need to do about it.  If your systolic blood pressure (the top number) is 120-139 or your diastolic blood pressure (the bottom number) is 80-89, your blood pressure may be higher than it should be. You should have your blood pressure rechecked within a year by a primary care provider.  If your systolic blood pressure (the top number) is 140 or greater or your diastolic blood pressure (the bottom number) is 90 or greater, you may have high blood pressure. High blood pressure is treatable, but if left untreated over time it can put you at risk for heart attack, stroke, or kidney failure. You should have your blood pressure rechecked by a primary care provider within the next 4 weeks.  If your provider in the emergency department today gave you specific instructions to follow-up with your doctor or provider even sooner than that, you should follow that instruction and not wait for up to 4 weeks for your follow-up visit.        Thank you for choosing [facility]       Thank you for choosing [facility] for your care. Our goal is always to provide you with excellent care. Hearing back from our patients is one way we can continue to improve our services. Please take a few minutes to complete the written survey that you may receive in the mail after you visit with us. Thank you!        [facility] Information     [employer] lets you send messages to your doctor, view your test results, renew your prescriptions, schedule appointments and more. To sign up,  "go to [email or web address] . Click on \"Log in\" on the left side of the screen, which will take you to the Welcome page. Then click on \"Sign up Now\" on the right side of the page.     You will be asked to enter the access code listed below, as well as some personal information. Please follow the directions to create your username and password.     Your access code is: [email or web address]  Expires: 2018  3:06 PM     Your access code will  in 90 days. If you need help or a new code, please call your [facility] clinic or [phone].        Care EveryWhere ID     This is your Care EveryWhere ID. This could be used by other organizations to access your [facility] medical records  [id number]        Equal Access to Services     ALMA WEBER : Nellie Ansari, marge hamilton, shirley blanco, gayle ramirez. [facility] [phone].    ATENCIÓN: Si habla español, tiene a holliday disposición servicios gratuitos de asistencia lingüística. Rylie al [phone].    We comply with applicable federal civil rights laws and [place] laws. We do not discriminate on the basis of race, color, national origin, age, disability, sex, sexual orientation, or gender identity.            After Visit Summary       This is your record. Keep this with you and show to your community pharmacist(s) and doctor(s) at your next visit.                  "

## 2018-02-02 NOTE — DISCHARGE INSTRUCTIONS
Chest Contusion    A contusion is a bruise to the skin, muscle, or ribs. It may cause pain, tenderness, and swelling. It may turn the skin purple until it heals. Contusions take a few days to a few weeks to heal.  Home care  Follow these guidelines when caring for yourself at home:    Rest. Don t do any heavy lifting or strenuous activity. Don t do any activity that causes pain.    Put an ice pack on the injured area. Do this for 20 minutes every 1 to 2 hours the first day. You can make an ice pack by wrapping a plastic bag of ice cubes in a thin towel. Continue to use the ice pack 3 to 4 times a day for the next 2 days. Then use the ice pack as needed to ease pain and swelling.    After 1 to 2 days you may put a warm compress on the area. Do this for 10 minutes several times a day. A warm compress is a clean cloth that s damp with warm water.    Hold a pillow to the affected area when you cough. This will help ease pain.    You may use over-the-counter pain medicine such as acetaminophen or ibuprofen to control pain, unless another pain medicine was prescribed. If you have chronic liver or kidney disease, talk with your healthcare provider before using these medicines. Also talk with your provider if you ve had a stomach ulcer or gastrointestinal bleeding.  Follow-up care  Follow up with your healthcare provider, or as advised.  When to seek medical advice  Call your healthcare provider right away if any of these occur:    New abdominal pain or abdominal pain that gets worse    Fever of 100.4 F (38 C) or higher, or as directed by your healthcare provider  When to call 911  Call 911 or get immediate medical attention if any of these occur:     Dizziness, weakness, or fainting    Shortness of breath, trouble breathing, or breathing fast    Chest pain gets worse when you breathe    Severe pain that comes on suddenly or lasts more than an hour  Date Last Reviewed: 5/1/2017 2000-2017 [employer] [address]  [place] [address]. All rights reserved. This information is not intended as a substitute for professional medical care. Always follow your healthcare professional's instructions.      Discharge Instructions  Head Injury    You have been seen today for a head injury. Your evaluation included a history and physical examination. You may have had a CT (CAT) scan performed, though most head injuries do not require a scan. Based on this evaluation, your provider today does not feel that your head injury is serious.    Generally, every Emergency Department visit should have a follow-up clinic visit with either a primary or a specialty clinic/provider. Please follow-up as instructed by your emergency provider today.  Return to the Emergency Department if:    You are confused or you are not acting right.    Your headache gets worse or you start to have a really bad headache even with your recommended treatment plan.    You vomit (throw up) more than once.    You have a seizure.    You have trouble walking.    You have weakness or paralysis (cannot move) in an arm or a leg.    You have blood or fluid coming from your ears or nose.    You have new symptoms or anything that worries you.    Sleeping:  It is okay for you to sleep, but someone should wake you up if instructed by your provider, and someone should check on you at your usual time to wake up.     Activity:    Do not drive for at least 24 hours.    Do not drive if you have dizzy spells or trouble concentrating, or remembering things.    Do not return to any contact sports until cleared by your regular provider.     MORE INFORMATION:    Concussion:  A concussion is a minor head injury that may cause temporary problems with the way the brain works. Although concussions are important, they are generally not an emergency or a reason that a person needs to be hospitalized. Some concussion symptoms include confusion, amnesia (forgetful), nausea (sick to  your stomach) and vomiting (throwing up), dizziness, fatigue, memory or concentration problems, irritability and sleep problems. For most people, concussions are mild and temporary but some will have more severe and persistent symptoms that require on-going care and treatment.  CT Scans: Your evaluation today may have included a CT scan (CAT scan) to look for things like bleeding or a skull fracture (broken bone).  CT scans involve radiation and too many CT scans can cause serious health problems like cancer, especially in children.  Because of this, your provider may not have ordered a CT scan today if they think you are at low risk for a serious or life threatening problem.    If you were given a prescription for medicine here today, be sure to read all of the information (including the package insert) that comes with your prescription.  This will include important information about the medicine, its side effects, and any warnings that you need to know about.  The pharmacist who fills the prescription can provide more information and answer questions you may have about the medicine.  If you have questions or concerns that the pharmacist cannot address, please call or return to the Emergency Department.     Remember that you can always come back to the Emergency Department if you are not able to see your regular provider in the amount of time listed above, if you get any new symptoms, or if there is anything that worries you.    Knee Sprain    A sprain is an injury to the ligaments or capsule that holds a joint together. There are no broken bones. Most sprains take 3 to 6 weeks to heal. If it a severe sprain where the ligament is completely torn, it can take months to recover.  Most knee sprains are treated with a splint, knee immobilizer brace, or elastic wrap for support. Severe sprains may require surgery.  Home care    Stay off the injured leg as much as possible until you can walk on it without pain. If you  have a lot of pain with walking, crutches or a walker may be prescribed. (These can be rented or purchased at many pharmacies and surgical or orthopedic supply stores). Follow your healthcare provider's advice about when to begin putting weight on that leg.    Keep your leg elevated to reduce pain and swelling. When sleeping, place a pillow under the injured leg. When sitting, support the injured leg so it is level with your waist. This is very important during the first 48 hours.    Apply an ice pack over the injured area for 15 to 20 minutes every 3 to 6 hours. You should do this for the first 24 to 48 hours. You can make an ice pack by filling a plastic bag that seals at the top with ice cubes and then wrapping it with a thin towel. Continue to use ice packs for relief of pain and swelling as needed. As the ice melts, be careful to avoid getting your wrap, splint, or cast wet. After 48 hours, apply heat (warm shower or warm bath) for 15 to 20 minutes several times a day, or alternate ice and heat. You can place the ice pack directly over the splint. If you have to wear a hook-and-loop knee brace, you can open it to apply the ice pack, or heat, directly to the knee. Never put ice directly on the skin. Always wrap the ice in a towel or other type of cloth.    You may use over-the-counter pain medicine to control pain, unless another pain medicine was prescribed.If you have chronic liver or kidney disease or ever had a stomach ulcer or GI bleeding, talk with your healthcare provider before using these medicines.    If you were given a splint, keep it completely dry at all times. Bathe with your splint out of the water, protected with 2 large plastic bags, rubber-banded at the top end. If a fiberglass splint gets wet, you can dry it with a hair dryer. If you have a hook-and-loop knee brace, you can remove this to bathe, unless told otherwise.  Follow-up care  Follow up with your doctor as advised. Any X-rays you had  today don t show any broken bones, breaks, or fractures. Sometimes fractures don t show up on the first X-ray. Bruises and sprains can sometimes hurt as much as a fracture. These injuries can take time to heal completely. If your symptoms don t improve or they get worse, talk with your doctor. You may need a repeat X-ray. If X-rays were taken, you will be told of any new findings that may affect your care.  Call 911  Call 911 if you have:     Shortness of breath     Chest pain  When to seek medical advice  Call your healthcare provider right away if any of these occur:    The splint or knee immobilizer brace becomes wet or soft    The fiberglass cast or splint remains wet for more than 24 hours    Pain or swelling increases    The injured leg or toes become cold, blue, numb, or tingly  Date Last Reviewed: 11/20/2015 2000-2017 [employer]. [address] [place] [address], [place] [address]. All rights reserved. This information is not intended as a substitute for professional medical care. Always follow your healthcare professional's instructions.

## 2018-02-05 ENCOUNTER — TELEPHONE (OUTPATIENT)
Dept: FAMILY MEDICINE | Facility: CLINIC | Age: 63
End: 2018-02-05

## 2018-02-05 ENCOUNTER — NURSE TRIAGE (OUTPATIENT)
Dept: NURSING | Facility: CLINIC | Age: 63
End: 2018-02-05

## 2018-02-05 DIAGNOSIS — M96.1 FAILED BACK SYNDROME OF LUMBAR SPINE: ICD-10-CM

## 2018-02-05 RX ORDER — OXYCODONE HYDROCHLORIDE 5 MG/1
TABLET ORAL
Qty: 180 TABLET | Refills: 0 | Status: SHIPPED | OUTPATIENT
Start: 2018-02-05 | End: 2018-02-26

## 2018-02-05 NOTE — TELEPHONE ENCOUNTER
SN  Patient stopped by clinic thinking he had an appointment with you today  Needs OV every 3 months  Last seen 11/27/17.    Needs Oxycodone refill.  Per [facility]: Last refill 1/6/18 for #180    Can you refill today and have him schedule follow up with you in March/when next refill due?  Patient currently waiting in [facility]  Thanks, Gayathri Wade RN

## 2018-02-05 NOTE — TELEPHONE ENCOUNTER
Rx given to patient.  Will schedule him for OV in March before he leaves clinic.  Gayathri Wade RN

## 2018-02-06 NOTE — TELEPHONE ENCOUNTER
"  Additional Information    Negative: Drug overdose and nurse unable to answer question    Negative: Caller requesting information not related to medicine    Negative: Caller requesting a prescription for Strep throat and has a positive culture result    Negative: Rash while taking a medication or within 3 days of stopping it    Negative: Immunization reaction suspected    Negative: [1] Asthma and [2] having symptoms of asthma (cough, wheezing, etc)    Negative: MORE THAN A DOUBLE DOSE of a prescription or over-the-counter (OTC) drug    Negative: [1] DOUBLE DOSE (an extra dose or lesser amount) of over-the-counter (OTC) drug AND [2] any symptoms (e.g., dizziness, nausea, pain, sleepiness)    Negative: [1] DOUBLE DOSE (an extra dose or lesser amount) of prescription drug AND [2] any symptoms (e.g., dizziness, nausea, pain, sleepiness)    Negative: Took another person's prescription drug    Negative: [1] DOUBLE DOSE (an extra dose or lesser amount) of prescription drug AND [2] NO symptoms (Exception: a double dose of antibiotics)    Negative: Diabetes drug error or overdose (e.g., insulin or extra dose)    Negative: [1] Request for URGENT new prescription or refill of \"essential\" medication (i.e., likelihood of harm to patient if not taken) AND [2] triager unable to fill per unit policy    Negative: [1] Prescription not at pharmacy AND [2] was prescribed today by PCP    Negative: Pharmacy calling with prescription questions and triager unable to answer question    Negative: Caller has URGENT medication question about med that PCP prescribed and triager unable to answer question    Negative: Caller has NON-URGENT medication question about med that PCP prescribed and triager unable to answer question    Negative: Caller requesting a NON-URGENT new prescription or refill and triager unable to refill per unit policy    Negative: Caller has medication question about med not prescribed by PCP and triager unable to answer " question (e.g., compatibility with other med, storage)    Negative: [1] DOUBLE DOSE (an extra dose or lesser amount) of over-the-counter (OTC) drug AND [2] NO symptoms (all triage questions negative)    Negative: [1] DOUBLE DOSE (an extra dose or lesser amount) of antibiotic drug AND [2] NO symptoms (all triage questions negative)    Negative: Caller has medication question only, adult not sick, and triager answers question    Negative: Caller has medication question, adult has minor symptoms, caller declines triage, and triager answers question    Negative: Caller requesting information about medication during pregnancy; adult is not ill and triager answers question    Negative: Caller requesting information about medication use with breastfeeding; neither adult nor infant is ill, and triager answers question    Negative: Caller requesting a refill, no triage required, and triager able to refill per unit policy    Pharmacy calling with prescription question and triager answers question     Pharmacy is calling for medication clarifcation regarding pain medication see epic: Note oxyCODONE IR (ROXICODONE) 5 MG tablet 180 tablet 0 2018  No  Si tab q4h, PRN max 6 per day.           Ok to fill     Will see him next month     SN      Per MD    Protocols used: MEDICATION QUESTION CALL-ADULT-

## 2018-02-23 ENCOUNTER — TELEPHONE (OUTPATIENT)
Dept: FAMILY MEDICINE | Facility: CLINIC | Age: 63
End: 2018-02-23

## 2018-02-23 NOTE — TELEPHONE ENCOUNTER
FYI~ A refill request has been made to the [employer] assistance program for Lyrica.    A 90 day supply of LYRICA was delivered to to Layo's home.    Zenaida Plasencia  Prescription

## 2018-02-26 ENCOUNTER — TELEPHONE (OUTPATIENT)
Dept: FAMILY MEDICINE | Facility: CLINIC | Age: 63
End: 2018-02-26

## 2018-02-26 ENCOUNTER — OFFICE VISIT (OUTPATIENT)
Dept: FAMILY MEDICINE | Facility: CLINIC | Age: 63
End: 2018-02-26
Payer: COMMERCIAL

## 2018-02-26 VITALS
TEMPERATURE: 98.1 F | DIASTOLIC BLOOD PRESSURE: 69 MMHG | HEART RATE: 89 BPM | BODY MASS INDEX: 33.26 KG/M2 | OXYGEN SATURATION: 97 % | HEIGHT: 72 IN | WEIGHT: 245.6 LBS | SYSTOLIC BLOOD PRESSURE: 108 MMHG

## 2018-02-26 DIAGNOSIS — I10 HYPERTENSION GOAL BP (BLOOD PRESSURE) < 140/90: ICD-10-CM

## 2018-02-26 DIAGNOSIS — R07.89 OTHER CHEST PAIN: ICD-10-CM

## 2018-02-26 DIAGNOSIS — Z98.1 S/P LUMBAR FUSION: ICD-10-CM

## 2018-02-26 DIAGNOSIS — E11.9 TYPE 2 DIABETES MELLITUS WITHOUT COMPLICATION, WITHOUT LONG-TERM CURRENT USE OF INSULIN (H): ICD-10-CM

## 2018-02-26 DIAGNOSIS — M54.16 LUMBAR RADICULOPATHY: ICD-10-CM

## 2018-02-26 DIAGNOSIS — M96.1 FAILED BACK SYNDROME OF LUMBAR SPINE: ICD-10-CM

## 2018-02-26 DIAGNOSIS — G89.4 CHRONIC PAIN SYNDROME: Primary | ICD-10-CM

## 2018-02-26 LAB
ALBUMIN SERPL-MCNC: 4.2 G/DL (ref 3.4–5)
ALP SERPL-CCNC: 97 U/L (ref 40–150)
ALT SERPL W P-5'-P-CCNC: 44 U/L (ref 0–70)
ANION GAP SERPL CALCULATED.3IONS-SCNC: 7 MMOL/L (ref 3–14)
AST SERPL W P-5'-P-CCNC: 28 U/L (ref 0–45)
BILIRUB SERPL-MCNC: 0.4 MG/DL (ref 0.2–1.3)
BUN SERPL-MCNC: 16 MG/DL (ref 7–30)
CALCIUM SERPL-MCNC: 9.4 MG/DL (ref 8.5–10.1)
CHLORIDE SERPL-SCNC: 103 MMOL/L (ref 94–109)
CO2 SERPL-SCNC: 29 MMOL/L (ref 20–32)
CREAT SERPL-MCNC: 1.15 MG/DL (ref 0.66–1.25)
GFR SERPL CREATININE-BSD FRML MDRD: 64 ML/MIN/1.7M2
GLUCOSE SERPL-MCNC: 105 MG/DL (ref 70–99)
HBA1C MFR BLD: 6.6 % (ref 4.3–6)
POTASSIUM SERPL-SCNC: 4.2 MMOL/L (ref 3.4–5.3)
PROT SERPL-MCNC: 8.1 G/DL (ref 6.8–8.8)
SODIUM SERPL-SCNC: 139 MMOL/L (ref 133–144)

## 2018-02-26 PROCEDURE — 80053 COMPREHEN METABOLIC PANEL: CPT | Performed by: FAMILY MEDICINE

## 2018-02-26 PROCEDURE — 93000 ELECTROCARDIOGRAM COMPLETE: CPT | Performed by: FAMILY MEDICINE

## 2018-02-26 PROCEDURE — 36415 COLL VENOUS BLD VENIPUNCTURE: CPT | Performed by: FAMILY MEDICINE

## 2018-02-26 PROCEDURE — 83036 HEMOGLOBIN GLYCOSYLATED A1C: CPT | Performed by: FAMILY MEDICINE

## 2018-02-26 PROCEDURE — 99214 OFFICE O/P EST MOD 30 MIN: CPT | Performed by: FAMILY MEDICINE

## 2018-02-26 RX ORDER — OXYCODONE HYDROCHLORIDE 5 MG/1
TABLET ORAL
Qty: 180 TABLET | Refills: 0 | Status: SHIPPED | OUTPATIENT
Start: 2018-03-05 | End: 2018-03-29

## 2018-02-26 ASSESSMENT — ANXIETY QUESTIONNAIRES
5. BEING SO RESTLESS THAT IT IS HARD TO SIT STILL: NOT AT ALL
1. FEELING NERVOUS, ANXIOUS, OR ON EDGE: SEVERAL DAYS
3. WORRYING TOO MUCH ABOUT DIFFERENT THINGS: NOT AT ALL
2. NOT BEING ABLE TO STOP OR CONTROL WORRYING: SEVERAL DAYS
IF YOU CHECKED OFF ANY PROBLEMS ON THIS QUESTIONNAIRE, HOW DIFFICULT HAVE THESE PROBLEMS MADE IT FOR YOU TO DO YOUR WORK, TAKE CARE OF THINGS AT HOME, OR GET ALONG WITH OTHER PEOPLE: NOT DIFFICULT AT ALL
GAD7 TOTAL SCORE: 3
7. FEELING AFRAID AS IF SOMETHING AWFUL MIGHT HAPPEN: NOT AT ALL
6. BECOMING EASILY ANNOYED OR IRRITABLE: NOT AT ALL

## 2018-02-26 ASSESSMENT — PATIENT HEALTH QUESTIONNAIRE - PHQ9: 5. POOR APPETITE OR OVEREATING: SEVERAL DAYS

## 2018-02-26 NOTE — LETTER
March 2, 2018      Toby Mcgrath  [id number]   [facility] [address]        Dear ,    We are writing to inform you of your test results.    Your test results fall within the expected range(s) or remain unchanged from previous results.  Please continue with current treatment plan.    Resulted Orders   Hemoglobin A1c   Result Value Ref Range    Hemoglobin A1C 6.6 (H) 4.3 - 6.0 %   Comprehensive metabolic panel   Result Value Ref Range    Sodium 139 133 - 144 mmol/L    Potassium 4.2 3.4 - 5.3 mmol/L    Chloride 103 94 - 109 mmol/L    Carbon Dioxide 29 20 - 32 mmol/L    Anion Gap 7 3 - 14 mmol/L    Glucose 105 (H) 70 - 99 mg/dL    Urea Nitrogen 16 7 - 30 mg/dL    Creatinine 1.15 0.66 - 1.25 mg/dL    GFR Estimate 64 >60 mL/min/1.7m2      Comment:      Non  GFR Calc    GFR Estimate If Black 77 >60 mL/min/1.7m2      Comment:       GFR Calc    Calcium 9.4 8.5 - 10.1 mg/dL    Bilirubin Total 0.4 0.2 - 1.3 mg/dL    Albumin 4.2 3.4 - 5.0 g/dL    Protein Total 8.1 6.8 - 8.8 g/dL    Alkaline Phosphatase 97 40 - 150 U/L    ALT 44 0 - 70 U/L    AST 28 0 - 45 U/L       If you have any questions or concerns, please call the clinic at the number listed above.       Sincerely,        Connie Haskins MD

## 2018-02-26 NOTE — Clinical Note
Can you give him the number for [facility] for his back.  He can call to schedule physical therapy  Thanks Sn

## 2018-02-26 NOTE — TELEPHONE ENCOUNTER
Left message for patient to callback.  [facility] should call him to schedule appt  Can give him # though if he doesn't hear from them ([phone])  Ivis SMALLS RN

## 2018-02-26 NOTE — TELEPHONE ENCOUNTER
Connie Haskins MD  [facility] T[facility]                   Can you give him the number for [facility] for his back.  He can call to schedule physical therapy     Thanks   Sn

## 2018-02-26 NOTE — NURSING NOTE
Chief Complaint   Patient presents with     Medication Request       Initial /69  Pulse 89  Temp 98.1  F (36.7  C) (Oral)  Ht 6' (1.829 m)  Wt 245 lb 9.6 oz (111.4 kg)  SpO2 97%  BMI 33.31 kg/m2 Estimated body mass index is 33.31 kg/(m^2) as calculated from the following:    Height as of this encounter: 6' (1.829 m).    Weight as of this encounter: 245 lb 9.6 oz (111.4 kg).  Medication Reconciliation: complete      Health Maintenance that is potentially due pending provider review:  PHQ9 and GAD7    Completing forms today.    TERRENCE Kaufman

## 2018-02-26 NOTE — PROGRESS NOTES
SUBJECTIVE:   Toby Mcgrath is a 62 year old male who presents to clinic today for the following health issues:      Medication Followup of Oxycodone 5mg    Taking Medication as prescribed: yes    Side Effects:  None    Medication Helping Symptoms:  yes     (E11.9) Type 2 diabetes mellitus without complication, without long-term current use of insulin (H)  (primary encounter diagnosis)  Comment: He has had well-controlled diabetes. Taking metformin alone. Last A1c was at goal. Due for labs as noted below he is due for an eye exam due to financial constriction he is going to defer this until later in the year.  Plan: EKG 12-lead complete w/read - Clinics,         Hemoglobin A1c, Comprehensive metabolic panel            (R07.89) Other chest pain  Comment: He's noted some chest pain which is left-sided in the last several weeks. He does not associate it with exercise and does describe it as sharp but deep. He does have a history of several years of smoking ever since the age of 12 up until about 48. He also has diagnosis of diabetes in the last several  Years and more recently has been much more sedate since his back surgeries. In the past we have sent him for stress testing because of exertional chest pain and this did show small blockage which was noted but not considerable for coronary artery disease.   plan: EKG 12-lead complete w/read - Clinics, NM         Lexiscan stress test, CARDIOLOGY EVAL ADULT         REFERRAL             (I10) Hypertension goal BP (blood pressure) < 140/90  Comment:  blood pressure is at goal  maggie: Hemoglobin A1c, Comprehensive metabolic panel            (Z98.1) S/P lumbar fusion  CommContinues to have chronic pain. Managed with 6 tablets of oxycodone daily. He does have a controlled-release substance signed and has been seeing the pain clinic. He transferred care to us after several months of working with the pain clinic he does feel he would benefit from ongoing physical therapy and  would like a referral for this.  Plan:     (G89.4) Chronic pain syndrome  Comment: See notes above  Plan:     (M54.16) Lumbar radiculopathy  Comment: See notes above  Plan:     (M96.1) Failed back syndrome of lumbar spine  Comment: See notes above  Plan: oxyCODONE IR (ROXICODONE) 5 MG tablet             Current Outpatient Prescriptions   Medication     [START ON 3/5/2018] oxyCODONE IR (ROXICODONE) 5 MG tablet     hydrOXYzine (ATARAX) 25 MG tablet     finasteride (PROSCAR) 5 MG tablet     ranitidine (ZANTAC) 300 MG tablet     LYRICA 100 MG capsule     blood glucose monitoring (NO BRAND SPECIFIED) meter device kit     blood glucose monitoring (NO BRAND SPECIFIED) test strip     metFORMIN (GLUCOPHAGE-XR) 500 MG 24 hr tablet     Colchicine (MITIGARE) 0.6 MG CAPS     FLUoxetine (PROZAC) 20 MG capsule     finasteride (PROSCAR) 5 MG tablet     simvastatin (ZOCOR) 20 MG tablet     cyclobenzaprine (FLEXERIL) 5 MG tablet     senna-docusate (SENOKOT-S;PERICOLACE) 8.6-50 MG per tablet     allopurinol (ZYLOPRIM) 100 MG tablet     fluticasone (FLONASE) 50 MCG/ACT nasal spray     triamcinolone (KENALOG) 0.1 % cream     blood glucose monitoring (NO BRAND SPECIFIED) test strip     blood glucose monitoring (WOODY MICROLET) lancets     aspirin 81 MG tablet     No current facility-administered medications for this visit.            Problem list and histories reviewed & adjusted, as indicated.  Additional history: as documented    Patient Active Problem List   Diagnosis     Thoracic or lumbosacral neuritis or radiculitis, unspecified     Osteoarthrosis, unspecified whether generalized or localized, pelvic region and thigh     Hypertrophy of prostate with urinary obstruction     Chronic rhinitis     Chronic pain syndrome     Disorder of bursae and tendons in shoulder region     Major depressive disorder, recurrent episode (H)     Anxiety     Gout     Type 2 diabetes mellitus without complication (H)     Hypertension goal BP (blood  pressure) < 140/90     Advanced directives, counseling/discussion     DJD (degenerative joint disease), lumbar     Hyperlipidemia LDL goal <100     DDD (degenerative disc disease), cervical     GERD (gastroesophageal reflux disease)     Lumbar radiculopathy     S/P lumbar fusion     Left-sided low back pain with left-sided sciatica     BMI 32.0-32.9,adult     History of hepatitis C     S/P lumbar discectomy     Acute post-operative pain     S/P laminectomy     Chronic pain     Bilateral low back pain without sciatica     Right-sided low back pain with right-sided sciatica     Acute gouty arthritis     Acute gout of right elbow, unspecified cause     Idiopathic gout of left elbow, unspecified chronicity     Gastroesophageal reflux disease without esophagitis     Failed back syndrome of lumbar spine     Slow transit constipation     Benign prostatic hyperplasia with urinary frequency     Past Surgical History:   Procedure Laterality Date     BACK SURGERY       both shoulder repair  2006, 2008     C NONSPECIFIC PROCEDURE  1995    neck cyst     C NONSPECIFIC PROCEDURE  1979    laminectomy L5-S1 x 2     C SHOULDER SURG PROC UNLISTED  2005, '07    repairs,later manipulate,inject LT, RT     FUSION LUMBAR ANTERIOR, FUSION LUMBAR POSTERIOR TWO LEVELS, COMBINED N/A 9/17/2014    Procedure: COMBINED FUSION LUMBAR ANTERIOR, FUSION LUMBAR POSTERIOR TWO LEVELS;  Surgeon: Chris Lugo MD;  Location: [facility] OR     HC COLONOSCOPY THRU STOMA, DIAGNOSTIC  3/04    normal     HC UGI ENDOSCOPY DIAG W OR W/O BRUSH/WASH  3/04    ok     HEAD & NECK SURGERY       HEMILAMINECTOMY, DISCECTOMY LUMBAR ONE LEVEL, COMBINED Left 9/8/2015    Procedure: COMBINED HEMILAMINECTOMY, DISCECTOMY LUMBAR ONE LEVEL;  Surgeon: Jer Irby MD;  Location: [facility] OR     right hip replacement  10,2010       Social History   Substance Use Topics     Smoking status: Former Smoker     Years: 40.00     Types: Cigarettes     Smokeless tobacco: Former User      Quit date: 2/1/2013     Alcohol use No     Family History   Problem Relation Age of Onset     DIABETES Mother      C.A.D. Father      DIABETES Father      Hypertension Father          Current Outpatient Prescriptions   Medication Sig Dispense Refill     [START ON 3/5/2018] oxyCODONE IR (ROXICODONE) 5 MG tablet 1 tab q4h, PRN max 6 per day.  Ok to fill 3/5 180 tablet 0     hydrOXYzine (ATARAX) 25 MG tablet Take 2-4 tablets ( mg) by mouth nightly as needed for anxiety (for panic at night) 30 tablet 5     finasteride (PROSCAR) 5 MG tablet Take 1 tablet (5 mg) by mouth daily 90 tablet 3     ranitidine (ZANTAC) 300 MG tablet Take 1 tablet (300 mg) by mouth At Bedtime To reduce stomach acid 90 tablet 3     LYRICA 100 MG capsule Take 2 capsules (200 mg) by mouth 2 times daily 360 capsule 1     blood glucose monitoring (NO BRAND SPECIFIED) meter device kit Use to test blood sugar 2 times daily or as directed. 1 kit 0     blood glucose monitoring (NO BRAND SPECIFIED) test strip Use to test blood sugars 2 times daily or as directed 100 strip 3     metFORMIN (GLUCOPHAGE-XR) 500 MG 24 hr tablet Take 2 tablets (1,000 mg) by mouth daily (with dinner) 180 tablet 3     Colchicine (MITIGARE) 0.6 MG CAPS Take 0.6 mg by mouth 2 times daily as needed 20 capsule 0     FLUoxetine (PROZAC) 20 MG capsule TAKE THREE CAPSULES BY MOUTH ONCE DAILY 270 capsule 3     finasteride (PROSCAR) 5 MG tablet Take 1 tablet (5 mg) by mouth daily For prostate enlargement. Please fill on $4 list 30 tablet 5     simvastatin (ZOCOR) 20 MG tablet Take 1 tablet (20 mg) by mouth At Bedtime 90 tablet 3     cyclobenzaprine (FLEXERIL) 5 MG tablet Take 1-2 tablets (5-10 mg) by mouth nightly as needed for muscle spasms 30 tablet 1     senna-docusate (SENOKOT-S;PERICOLACE) 8.6-50 MG per tablet Take 1 tablet by mouth 2 times daily 60 tablet 3     allopurinol (ZYLOPRIM) 100 MG tablet Take 3 tablets (300 mg) by mouth daily 270 tablet 0     fluticasone (FLONASE) 50  MCG/ACT nasal spray Spray 1-2 sprays into both nostrils daily 3 Bottle 11     triamcinolone (KENALOG) 0.1 % cream Apply sparingly to affected area twice daily for 7 days. Then stop and use only for flares 30 g 3     blood glucose monitoring (NO BRAND SPECIFIED) test strip Use to test blood sugar 2-3 times daily (lena contour meter) 3 Box 0     blood glucose monitoring (LENA MICROLET) lancets Use to test blood sugars 2-3 times daily as directed (lena contour meter) 3 Box 0     aspirin 81 MG tablet Take 1 tablet (81 mg) by mouth daily 100 tablet 3       Reviewed and updated as needed this visit by clinical staff       Reviewed and updated as needed this visit by Provider         ROS:  Constitutional, HEENT, cardiovascular, pulmonary, gi and gu systems are negative, except as otherwise noted.    OBJECTIVE:                                                    /69  Pulse 89  Temp 98.1  F (36.7  C) (Oral)  Ht 6' (1.829 m)  Wt 245 lb 9.6 oz (111.4 kg)  SpO2 97%  BMI 33.31 kg/m2  Body mass index is 33.31 kg/(m^2).  GENERAL APPEARANCE: healthy, alert and no distress  RESP: lungs clear to auscultation - no rales, rhonchi or wheezes  CV: regular rates and rhythm, normal S1 S2, no S3 or S4 and no murmur, click or rub  MS: decreased range of motion noted along lumbar spine. He does maneuver about the room stiffly, often standing to avoid seated positioning which exacerbates pain. Walks with the aid of a cane  PSYCH: mentation appears normal and worried    Diagnostic test results:  Diagnostic Test Results:  Results for orders placed or performed in visit on 02/26/18 (from the past 24 hour(s))   Hemoglobin A1c   Result Value Ref Range    Hemoglobin A1C 6.6 (H) 4.3 - 6.0 %     *Note: Due to a large number of results and/or encounters for the requested time period, some results have not been displayed. A complete set of results can be found in Results Review.        ASSESSMENT/PLAN:                                                     1. Type 2 diabetes mellitus without complication, without long-term current use of insulin (H)   Diabetes continues to be well controlled with minimal medication and diet changes. Encouraged eye exam later in the year we'll see him again in 6 months  - EKG 12-lead complete w/read - Clinics  - Hemoglobin A1c  - Comprehensive metabolic panel    2. Other chest pain   EKG did show a new right bundle branch. His clinical symptoms of sharp chest pain seem more likely to be chest wall related however given his risk factors of diabetes age and prolonged tobacco use I recommended that he repeat a stress test. In the past he was able to do a bike test currently he does not feel he would be able to do that. Alternately if he has concerns about the cost of stress testing he could see cardiology but I strongly recommended stress testing given the above.  - EKG 12-lead complete w/read - Clinics  - NM Lexiscan stress test; Future  - CARDIOLOGY EVAL ADULT REFERRAL    3. Hypertension goal BP (blood pressure) < 140/90   Blood pressure is at goal routine labs for renal function and electrolytes  - Hemoglobin A1c  - Comprehensive metabolic panel    4. S/P lumbar fusion   Chronic pain managed with 6 tablets of oxycodone daily. Will resume physical therapy for his back and if symptoms are progressing can always consider resumed care to the pain clinic medications are refilled    5. Chronic pain syndrome      6. Lumbar radiculopathy      7. Failed back syndrome of lumbar spine    - oxyCODONE IR (ROXICODONE) 5 MG tablet; 1 tab q4h, PRN max 6 per day.  Ok to fill 3/5  Dispense: 180 tablet; Refill: 0      Follow up with Provider - if worsening symptoms will see him for diabetes in 6 months and for pain medications every 3-6 months     Connie Haskins MD  [facility]

## 2018-02-26 NOTE — MR AVS SNAPSHOT
"              After Visit Summary   2/26/2018    Toby Mcgrath    MRN: [id number]           Patient Information     Date Of Birth          1955        Visit Information        Provider Department      2/26/2018 9:30 AM Connie Haskins MD [facility]        Today's Diagnoses     Type 2 diabetes mellitus without complication, without long-term current use of insulin (H)    -  1    Hypertension goal BP (blood pressure) < 140/90        S/P lumbar fusion        Lumbar radiculopathy        Chronic pain syndrome        Other chest pain        Failed back syndrome of lumbar spine           Follow-ups after your visit        Future tests that were ordered for you today     Open Future Orders        Priority Expected Expires Ordered    NM Lexiscan stress test Routine  2/26/2019 2/26/2018            Who to contact     If you have questions or need follow up information about today's clinic visit or your schedule please contact [facility] directly at [phone].  Normal or non-critical lab and imaging results will be communicated to you by MyChart, letter or phone within 4 business days after the clinic has received the results. If you do not hear from us within 7 days, please contact the clinic through [employer] or phone. If you have a critical or abnormal lab result, we will notify you by phone as soon as possible.  Submit refill requests through [employer] or call your pharmacy and they will forward the refill request to us. Please allow 3 business days for your refill to be completed.          Additional Information About Your Visit        [employer] Information     [employer] lets you send messages to your doctor, view your test results, renew your prescriptions, schedule appointments and more. To sign up, go to [email or web address] . Click on \"Log in\" on the left side of the screen, which will take you to the Welcome page. Then click on \"Sign up Now\" on the right side of the page.     You " will be asked to enter the access code listed below, as well as some personal information. Please follow the directions to create your username and password.     Your access code is: [email or web address]  Expires: 2018 10:34 AM     Your access code will  in 90 days. If you need help or a new code, please call your [facility] or [phone].        Care EveryWhere ID     This is your Care EveryWhere ID. This could be used by other organizations to access your [facility] medical records  [id number]        Your Vitals Were     Pulse Temperature Height Pulse Oximetry BMI (Body Mass Index)       89 98.1  F (36.7  C) (Oral) 6' (1.829 m) 97% 33.31 kg/m2        Blood Pressure from Last 3 Encounters:   18 108/69   18 146/88   17 114/77    Weight from Last 3 Encounters:   18 245 lb 9.6 oz (111.4 kg)   18 234 lb (106.1 kg)   17 247 lb 2 oz (112.1 kg)              We Performed the Following     Comprehensive metabolic panel     EKG 12-lead complete w/read - Clinics     Hemoglobin A1c          Today's Medication Changes          These changes are accurate as of 18 10:34 AM.  If you have any questions, ask your nurse or doctor.               These medicines have changed or have updated prescriptions.        Dose/Directions    oxyCODONE IR 5 MG tablet   Commonly known as:  ROXICODONE   This may have changed:  additional instructions   Used for:  Failed back syndrome of lumbar spine   Changed by:  Connie Haskins MD        Start taking on:  3/5/2018   1 tab q4h, PRN max 6 per day.  Ok to fill 3/5   Quantity:  180 tablet   Refills:  0            Where to get your medicines      Some of these will need a paper prescription and others can be bought over the counter.  Ask your nurse if you have questions.     Bring a paper prescription for each of these medications     oxyCODONE IR 5 MG tablet                Primary Care Provider Office Phone # Fax #    Connie Haskins MD  [phone]       [address] [facility] [address]  [facility] [address]        Equal Access to Services     ALMA WEBER : Hadii joselito stallings zack Ansari, waluchoda luqrubio, qatravista kaalmada francis, gayle ramirez. So [facility] [phone].    ATENCIÓN: Si habla español, tiene a holliday disposición servicios gratuitos de asistencia lingüística. Llame al [phone].    We comply with applicable federal civil rights laws and [place] laws. We do not discriminate on the basis of race, color, national origin, age, disability, sex, sexual orientation, or gender identity.            Thank you!     Thank you for choosing [facility]  for your care. Our goal is always to provide you with excellent care. Hearing back from our patients is one way we can continue to improve our services. Please take a few minutes to complete the written survey that you may receive in the mail after your visit with us. Thank you!             Your Updated Medication List - Protect others around you: Learn how to safely use, store and throw away your medicines at www.disposemymeds.org.          This list is accurate as of 2/26/18 10:34 AM.  Always use your most recent med list.                   Brand Name Dispense Instructions for use Diagnosis    allopurinol 100 MG tablet    ZYLOPRIM    270 tablet    Take 3 tablets (300 mg) by mouth daily    Acute gout of right elbow, unspecified cause       aspirin 81 MG tablet     100 tablet    Take 1 tablet (81 mg) by mouth daily    Type 2 diabetes, HbA1C goal < 8% (H)       blood glucose monitoring lancets     3 Box    Use to test blood sugars 2-3 times daily as directed (lena contour meter)    Type 2 diabetes, HbA1C goal < 8% (H)       blood glucose monitoring meter device kit    no brand specified    1 kit    Use to test blood sugar 2 times daily or as directed.    Type 2 diabetes mellitus without complication, without long-term current use of insulin (H)       *  blood glucose monitoring test strip    no brand specified    3 Box    Use to test blood sugar 2-3 times daily (lena contour meter)    Type 2 diabetes, HbA1C goal < 8% (H)       * blood glucose monitoring test strip    no brand specified    100 strip    Use to test blood sugars 2 times daily or as directed    Type 2 diabetes mellitus without complication, without long-term current use of insulin (H)       Colchicine 0.6 MG Caps    MITIGARE    20 capsule    Take 0.6 mg by mouth 2 times daily as needed    Acute pain of right knee       cyclobenzaprine 5 MG tablet    FLEXERIL    30 tablet    Take 1-2 tablets (5-10 mg) by mouth nightly as needed for muscle spasms    Back muscle spasm       * finasteride 5 MG tablet    PROSCAR    30 tablet    Take 1 tablet (5 mg) by mouth daily For prostate enlargement. Please fill on $4 list    Benign prostatic hyperplasia without lower urinary tract symptoms, unspecified morphology       * finasteride 5 MG tablet    PROSCAR    90 tablet    Take 1 tablet (5 mg) by mouth daily    Benign prostatic hyperplasia with urinary frequency       FLUoxetine 20 MG capsule    PROzac    270 capsule    TAKE THREE CAPSULES BY MOUTH ONCE DAILY    Mild episode of recurrent major depressive disorder (H)       fluticasone 50 MCG/ACT spray    FLONASE    3 Bottle    Spray 1-2 sprays into both nostrils daily    Chronic rhinitis       hydrOXYzine 25 MG tablet    ATARAX    30 tablet    Take 2-4 tablets ( mg) by mouth nightly as needed for anxiety (for panic at night)    Chronic pain syndrome       LYRICA 100 MG capsule   Generic drug:  pregabalin     360 capsule    Take 2 capsules (200 mg) by mouth 2 times daily    Chronic pain syndrome       metFORMIN 500 MG 24 hr tablet    GLUCOPHAGE-XR    180 tablet    Take 2 tablets (1,000 mg) by mouth daily (with dinner)    Type 2 diabetes mellitus without complication, without long-term current use of insulin (H)       oxyCODONE IR 5 MG tablet   Start taking on:   3/5/2018    ROXICODONE    180 tablet    1 tab q4h, PRN max 6 per day.  Ok to fill 3/5    Failed back syndrome of lumbar spine       ranitidine 300 MG tablet    ZANTAC    90 tablet    Take 1 tablet (300 mg) by mouth At Bedtime To reduce stomach acid    Gastroesophageal reflux disease without esophagitis       senna-docusate 8.6-50 MG per tablet    SENOKOT-S;PERICOLACE    60 tablet    Take 1 tablet by mouth 2 times daily    Lumbar radiculopathy       simvastatin 20 MG tablet    ZOCOR    90 tablet    Take 1 tablet (20 mg) by mouth At Bedtime    Hyperlipidemia LDL goal <100       triamcinolone 0.1 % cream    KENALOG    30 g    Apply sparingly to affected area twice daily for 7 days. Then stop and use only for flares    Eczema, unspecified type       * Notice:  This list has 4 medication(s) that are the same as other medications prescribed for you. Read the directions carefully, and ask your doctor or other care provider to review them with you.

## 2018-02-27 ASSESSMENT — ANXIETY QUESTIONNAIRES: GAD7 TOTAL SCORE: 3

## 2018-02-27 ASSESSMENT — PATIENT HEALTH QUESTIONNAIRE - PHQ9: SUM OF ALL RESPONSES TO PHQ QUESTIONS 1-9: 4

## 2018-03-01 ENCOUNTER — TELEPHONE (OUTPATIENT)
Dept: FAMILY MEDICINE | Facility: CLINIC | Age: 63
End: 2018-03-01

## 2018-03-01 DIAGNOSIS — E11.9 TYPE 2 DIABETES MELLITUS WITHOUT COMPLICATION, WITHOUT LONG-TERM CURRENT USE OF INSULIN (H): ICD-10-CM

## 2018-03-01 DIAGNOSIS — E11.9 TYPE 2 DIABETES, HBA1C GOAL < 8% (H): ICD-10-CM

## 2018-03-01 NOTE — TELEPHONE ENCOUNTER
Reason for Call:   prescription    Detailed comments: pt states that his insurance is no longer paying for his glucose monitoring test strips. He is currently completely out of test strips. He's requesting a different RX to be sent over.      [facility] [address] [place] [address]        Phone Number Patient can be reached at: Home number on file [phone] (home)    Best Time: any    Can we leave a detailed message on this number? YES    Call taken on 3/1/2018 at 9:58 AM by Jose Garvey

## 2018-03-01 NOTE — TELEPHONE ENCOUNTER
Sent Rx as generic so pharmacy can fill whatever his insurance will cover  Pt informed  Ivis SMALLS RN

## 2018-03-16 ENCOUNTER — TELEPHONE (OUTPATIENT)
Dept: FAMILY MEDICINE | Facility: CLINIC | Age: 63
End: 2018-03-16

## 2018-03-16 NOTE — TELEPHONE ENCOUNTER
Received form(s) from [facility] pharmacy service for PAP.  Placed form(s) in/on SN's box.  Forms need to be signed and mailed..    Call pt to verify form was sent: No  Copy needs to be sent for scanning after completion: No

## 2018-03-29 ENCOUNTER — OFFICE VISIT (OUTPATIENT)
Dept: FAMILY MEDICINE | Facility: CLINIC | Age: 63
End: 2018-03-29
Payer: COMMERCIAL

## 2018-03-29 VITALS
BODY MASS INDEX: 33.79 KG/M2 | DIASTOLIC BLOOD PRESSURE: 78 MMHG | SYSTOLIC BLOOD PRESSURE: 118 MMHG | HEART RATE: 88 BPM | WEIGHT: 249.5 LBS | HEIGHT: 72 IN | OXYGEN SATURATION: 95 % | RESPIRATION RATE: 18 BRPM | TEMPERATURE: 97.3 F

## 2018-03-29 DIAGNOSIS — J01.00 ACUTE NON-RECURRENT MAXILLARY SINUSITIS: Primary | ICD-10-CM

## 2018-03-29 DIAGNOSIS — M96.1 FAILED BACK SYNDROME OF LUMBAR SPINE: ICD-10-CM

## 2018-03-29 DIAGNOSIS — E11.9 TYPE 2 DIABETES MELLITUS WITHOUT COMPLICATION, WITHOUT LONG-TERM CURRENT USE OF INSULIN (H): ICD-10-CM

## 2018-03-29 DIAGNOSIS — J31.0 CHRONIC RHINITIS, UNSPECIFIED TYPE: ICD-10-CM

## 2018-03-29 PROCEDURE — 99214 OFFICE O/P EST MOD 30 MIN: CPT | Performed by: FAMILY MEDICINE

## 2018-03-29 RX ORDER — AMOXICILLIN 500 MG/1
500 CAPSULE ORAL 3 TIMES DAILY
Qty: 30 CAPSULE | Refills: 0 | Status: SHIPPED | OUTPATIENT
Start: 2018-03-29 | End: 2018-04-20

## 2018-03-29 RX ORDER — OXYCODONE HYDROCHLORIDE 5 MG/1
TABLET ORAL
Qty: 180 TABLET | Refills: 0 | Status: SHIPPED | OUTPATIENT
Start: 2018-03-29 | End: 2018-05-01

## 2018-03-29 RX ORDER — METFORMIN HCL 500 MG
1500 TABLET, EXTENDED RELEASE 24 HR ORAL
Qty: 180 TABLET | Refills: 3 | Status: SHIPPED | OUTPATIENT
Start: 2018-03-29 | End: 2018-05-31

## 2018-03-29 RX ORDER — FLUTICASONE PROPIONATE 50 MCG
1-2 SPRAY, SUSPENSION (ML) NASAL DAILY
Qty: 1 BOTTLE | Refills: 11 | Status: SHIPPED | OUTPATIENT
Start: 2018-03-29 | End: 2018-06-28

## 2018-03-29 NOTE — PROGRESS NOTES
"  SUBJECTIVE:   Toby Mcgrath is a 62 year old male who presents to clinic today for the following health issues:      Sore throat      Duration: intermittently for few weeks    Description (location/character/radiation): sore throat comes and goes, \"tickle in throat\", scratchy and irritated throat    Intensity:  moderate    Accompanying signs and symptoms: none    History (similar episodes/previous evaluation): None    Precipitating or alleviating factors: None    Therapies tried and outcome: None     Has lots of phlegm and post nasal drainage  Left side feels most of the drainage  Has facial pain and pressure bilaterally    emily any Lower respi symptoms     Due for refills of medications.  He does take a maximum of 6 tablets per day and has his medications filled on the fifth of every month.  Has had recent urinary screening which is in accordance with his medications.      Problem list and histories reviewed & adjusted, as indicated.  Additional history: as documented    Patient Active Problem List   Diagnosis     Thoracic or lumbosacral neuritis or radiculitis, unspecified     Osteoarthrosis, unspecified whether generalized or localized, pelvic region and thigh     Hypertrophy of prostate with urinary obstruction     Chronic rhinitis     Chronic pain syndrome     Disorder of bursae and tendons in shoulder region     Major depressive disorder, recurrent episode (H)     Anxiety     Gout     Type 2 diabetes mellitus without complication (H)     Hypertension goal BP (blood pressure) < 140/90     Advanced directives, counseling/discussion     DJD (degenerative joint disease), lumbar     Hyperlipidemia LDL goal <100     DDD (degenerative disc disease), cervical     GERD (gastroesophageal reflux disease)     Lumbar radiculopathy     S/P lumbar fusion     Left-sided low back pain with left-sided sciatica     BMI 32.0-32.9,adult     History of hepatitis C     S/P lumbar discectomy     Acute post-operative pain     " S/P laminectomy     Chronic pain     Bilateral low back pain without sciatica     Right-sided low back pain with right-sided sciatica     Acute gouty arthritis     Acute gout of right elbow, unspecified cause     Idiopathic gout of left elbow, unspecified chronicity     Gastroesophageal reflux disease without esophagitis     Failed back syndrome of lumbar spine     Slow transit constipation     Benign prostatic hyperplasia with urinary frequency     Past Surgical History:   Procedure Laterality Date     BACK SURGERY       both shoulder repair  2006, 2008     C NONSPECIFIC PROCEDURE  1995    neck cyst     C NONSPECIFIC PROCEDURE  1979    laminectomy L5-S1 x 2     C SHOULDER SURG PROC UNLISTED  2005, '07    repairs,later manipulate,inject LT, RT     FUSION LUMBAR ANTERIOR, FUSION LUMBAR POSTERIOR TWO LEVELS, COMBINED N/A 9/17/2014    Procedure: COMBINED FUSION LUMBAR ANTERIOR, FUSION LUMBAR POSTERIOR TWO LEVELS;  Surgeon: Chris Lugo MD;  Location: [facility] OR     HC COLONOSCOPY THRU STOMA, DIAGNOSTIC  3/04    normal     HC UGI ENDOSCOPY DIAG W OR W/O BRUSH/WASH  3/04    ok     HEAD & NECK SURGERY       HEMILAMINECTOMY, DISCECTOMY LUMBAR ONE LEVEL, COMBINED Left 9/8/2015    Procedure: COMBINED HEMILAMINECTOMY, DISCECTOMY LUMBAR ONE LEVEL;  Surgeon: Jer Irby MD;  Location: [facility] OR     right hip replacement  10,2010       Social History   Substance Use Topics     Smoking status: Former Smoker     Years: 40.00     Types: Cigarettes     Smokeless tobacco: Former User     Quit date: 2/1/2013     Alcohol use No     Family History   Problem Relation Age of Onset     DIABETES Mother      C.A.D. Father      DIABETES Father      Hypertension Father          Current Outpatient Prescriptions   Medication Sig Dispense Refill     amoxicillin (AMOXIL) 500 MG capsule Take 1 capsule (500 mg) by mouth 3 times daily 30 capsule 0     oxyCODONE IR (ROXICODONE) 5 MG tablet 1 tab q4h, PRN max 6 per day.  Ok to fill 4/5 180  tablet 0     fluticasone (FLONASE) 50 MCG/ACT spray Spray 1-2 sprays into both nostrils daily 1 Bottle 11     metFORMIN (GLUCOPHAGE-XR) 500 MG 24 hr tablet Take 3 tablets (1,500 mg) by mouth daily (with dinner) 180 tablet 3     blood glucose monitoring (NO BRAND SPECIFIED) test strip Use to test blood sugars 2 times daily or as directed 200 strip 1     blood glucose monitoring (NO BRAND SPECIFIED) meter device kit Use to test blood sugar 2 times daily or as directed. 1 kit 0     blood glucose monitoring (LENA MICROLET) lancets Use to test blood sugars 2-3 times daily as directed (lena contour meter) 200 each 1     hydrOXYzine (ATARAX) 25 MG tablet Take 2-4 tablets ( mg) by mouth nightly as needed for anxiety (for panic at night) 30 tablet 5     finasteride (PROSCAR) 5 MG tablet Take 1 tablet (5 mg) by mouth daily 90 tablet 3     ranitidine (ZANTAC) 300 MG tablet Take 1 tablet (300 mg) by mouth At Bedtime To reduce stomach acid 90 tablet 3     LYRICA 100 MG capsule Take 2 capsules (200 mg) by mouth 2 times daily 360 capsule 1     Colchicine (MITIGARE) 0.6 MG CAPS Take 0.6 mg by mouth 2 times daily as needed 20 capsule 0     FLUoxetine (PROZAC) 20 MG capsule TAKE THREE CAPSULES BY MOUTH ONCE DAILY 270 capsule 3     finasteride (PROSCAR) 5 MG tablet Take 1 tablet (5 mg) by mouth daily For prostate enlargement. Please fill on $4 list 30 tablet 5     simvastatin (ZOCOR) 20 MG tablet Take 1 tablet (20 mg) by mouth At Bedtime 90 tablet 3     cyclobenzaprine (FLEXERIL) 5 MG tablet Take 1-2 tablets (5-10 mg) by mouth nightly as needed for muscle spasms 30 tablet 1     senna-docusate (SENOKOT-S;PERICOLACE) 8.6-50 MG per tablet Take 1 tablet by mouth 2 times daily 60 tablet 3     allopurinol (ZYLOPRIM) 100 MG tablet Take 3 tablets (300 mg) by mouth daily 270 tablet 0     triamcinolone (KENALOG) 0.1 % cream Apply sparingly to affected area twice daily for 7 days. Then stop and use only for flares 30 g 3     blood  glucose monitoring (NO BRAND SPECIFIED) test strip Use to test blood sugar 2-3 times daily (lena contour meter) 3 Box 0     aspirin 81 MG tablet Take 1 tablet (81 mg) by mouth daily 100 tablet 3     [DISCONTINUED] metFORMIN (GLUCOPHAGE-XR) 500 MG 24 hr tablet Take 2 tablets (1,000 mg) by mouth daily (with dinner) 180 tablet 3       Reviewed and updated as needed this visit by clinical staff       Reviewed and updated as needed this visit by Provider         ROS:  Constitutional, HEENT, cardiovascular, pulmonary, gi and gu systems are negative, except as otherwise noted.    OBJECTIVE:                                                    /78  Pulse 88  Temp 97.3  F (36.3  C)  Resp 18  Ht 6' (1.829 m)  Wt 249 lb 8 oz (113.2 kg)  SpO2 95%  BMI 33.84 kg/m2  Body mass index is 33.84 kg/(m^2).  GENERAL APPEARANCE: healthy, alert and no distress  HENT: ear canals and TM's normal, nose and mouth without ulcers or lesions and maxillary sinus tenderness bilateral  NECK: no adenopathy, no asymmetry, masses, or scars and thyroid normal to palpation         ASSESSMENT/PLAN:                                                    1. Acute non-recurrent maxillary sinusitis   We will plan for antibiotics because of ongoing sinus pain and pressure also in the face of diabetes.    - amoxicillin (AMOXIL) 500 MG capsule; Take 1 capsule (500 mg) by mouth 3 times daily  Dispense: 30 capsule; Refill: 0    2. Failed back syndrome of lumbar spine  Refills of medicationsFor the month of April  - oxyCODONE IR (ROXICODONE) 5 MG tablet; 1 tab q4h, PRN max 6 per day.  Ok to fill 4/5  Dispense: 180 tablet; Refill: 0    3. Chronic rhinitis, unspecified type  Discussed importance of self-cares but Flonase nasal saline and warm compresses.  - fluticasone (FLONASE) 50 MCG/ACT spray; Spray 1-2 sprays into both nostrils daily  Dispense: 1 Bottle; Refill: 11    4. Type 2 diabetes mellitus without complication, without long-term current use of  insulin (H)  Has had recent A1c of 6.7.  Discussed 3 tablets of metformin daily.  - metFORMIN (GLUCOPHAGE-XR) 500 MG 24 hr tablet; Take 3 tablets (1,500 mg) by mouth daily (with dinner)  Dispense: 180 tablet; Refill: 3      Follow up with Provider -we will see him monthly for chronic pain medication and can repeat A1c in 3 months from last   Labs.    Connie Haskins MD  [facility]

## 2018-03-29 NOTE — MR AVS SNAPSHOT
"              After Visit Summary   3/29/2018    Toby Mcgrath    MRN: [id number]           Patient Information     Date Of Birth          1955        Visit Information        Provider Department      3/29/2018 9:30 AM Connie Haskins MD [facility]        Today's Diagnoses     Acute non-recurrent maxillary sinusitis    -  1    Failed back syndrome of lumbar spine        Chronic rhinitis, unspecified type        Type 2 diabetes mellitus without complication, without long-term current use of insulin (H)           Follow-ups after your visit        Who to contact     If you have questions or need follow up information about today's clinic visit or your schedule please contact [facility] directly at [phone].  Normal or non-critical lab and imaging results will be communicated to you by MyChart, letter or phone within 4 business days after the clinic has received the results. If you do not hear from us within 7 days, please contact the clinic through [facility] or phone. If you have a critical or abnormal lab result, we will notify you by phone as soon as possible.  Submit refill requests through [employer] or call your pharmacy and they will forward the refill request to us. Please allow 3 business days for your refill to be completed.          Additional Information About Your Visit        MyChart Information     [employer] lets you send messages to your doctor, view your test results, renew your prescriptions, schedule appointments and more. To sign up, go to [email or web address] . Click on \"Log in\" on the left side of the screen, which will take you to the Welcome page. Then click on \"Sign up Now\" on the right side of the page.     You will be asked to enter the access code listed below, as well as some personal information. Please follow the directions to create your username and password.     Your access code is: [email or web address]  Expires: 5/27/2018 11:34 AM     Your access code " will  in 90 days. If you need help or a new code, please call your [place] clinic or [phone].        Care EveryWhere ID     This is your Care EveryWhere ID. This could be used by other organizations to access your [place] medical records  [id number]        Your Vitals Were     Pulse Temperature Respirations Height Pulse Oximetry BMI (Body Mass Index)    88 97.3  F (36.3  C) 18 6' (1.829 m) 95% 33.84 kg/m2       Blood Pressure from Last 3 Encounters:   18 118/78   18 108/69   18 146/88    Weight from Last 3 Encounters:   18 249 lb 8 oz (113.2 kg)   18 245 lb 9.6 oz (111.4 kg)   18 234 lb (106.1 kg)              Today, you had the following     No orders found for display         Today's Medication Changes          These changes are accurate as of 3/29/18  4:53 PM.  If you have any questions, ask your nurse or doctor.               Start taking these medicines.        Dose/Directions    amoxicillin 500 MG capsule   Commonly known as:  AMOXIL   Used for:  Acute non-recurrent maxillary sinusitis   Started by:  Connie Haskins MD        Dose:  500 mg   Take 1 capsule (500 mg) by mouth 3 times daily   Quantity:  30 capsule   Refills:  0         These medicines have changed or have updated prescriptions.        Dose/Directions    metFORMIN 500 MG 24 hr tablet   Commonly known as:  GLUCOPHAGE-XR   This may have changed:  how much to take   Used for:  Type 2 diabetes mellitus without complication, without long-term current use of insulin (H)   Changed by:  Connie Haskins MD        Dose:  1500 mg   Take 3 tablets (1,500 mg) by mouth daily (with dinner)   Quantity:  180 tablet   Refills:  3       oxyCODONE IR 5 MG tablet   Commonly known as:  ROXICODONE   This may have changed:  additional instructions   Used for:  Failed back syndrome of lumbar spine   Changed by:  Connie Haskins MD        1 tab q4h, PRN max 6 per day.  Ok to fill 4/5   Quantity:   180 tablet   Refills:  0            Where to get your medicines      These medications were sent to [facility] [address]  [address] [facility] [address]     Phone:  [phone]     amoxicillin 500 MG capsule    fluticasone 50 MCG/ACT spray    metFORMIN 500 MG 24 hr tablet         Some of these will need a paper prescription and others can be bought over the counter.  Ask your nurse if you have questions.     Bring a paper prescription for each of these medications     oxyCODONE IR 5 MG tablet                Primary Care Provider Office Phone # Fax #    [place] Haskins [phone] [address] [address]        Equal Access to Services     ALMA WEBER : Nellie Ansari, waaxda luqadaha, qaybta kaalmada francis, gayle ramirez. So [facility] [phone].    ATENCIÓN: Si habla español, tiene a holliday disposición servicios gratuitos de asistencia lingüística. [facility] [phone].    We comply with applicable federal civil rights laws and [place] laws. We do not discriminate on the basis of race, color, national origin, age, disability, sex, sexual orientation, or gender identity.            Thank you!     Thank you for choosing [facility]  for your care. Our goal is always to provide you with excellent care. Hearing back from our patients is one way we can continue to improve our services. Please take a few minutes to complete the written survey that you may receive in the mail after your visit with us. Thank you!             Your Updated Medication List - Protect others around you: Learn how to safely use, store and throw away your medicines at www.disposemymeds.org.          This list is accurate as of 3/29/18  4:53 PM.  Always use your most recent med list.                   Brand Name Dispense Instructions for use Diagnosis    allopurinol 100 MG tablet     ZYLOPRIM    270 tablet    Take 3 tablets (300 mg) by mouth daily    Acute gout of right elbow, unspecified cause       amoxicillin 500 MG capsule    AMOXIL    30 capsule    Take 1 capsule (500 mg) by mouth 3 times daily    Acute non-recurrent maxillary sinusitis       aspirin 81 MG tablet     100 tablet    Take 1 tablet (81 mg) by mouth daily    Type 2 diabetes, HbA1C goal < 8% (H)       blood glucose monitoring lancets     200 each    Use to test blood sugars 2-3 times daily as directed (lena contour meter)    Type 2 diabetes, HbA1C goal < 8% (H)       blood glucose monitoring meter device kit    no brand specified    1 kit    Use to test blood sugar 2 times daily or as directed.    Type 2 diabetes mellitus without complication, without long-term current use of insulin (H)       * blood glucose monitoring test strip    no brand specified    3 Box    Use to test blood sugar 2-3 times daily (lena contour meter)    Type 2 diabetes, HbA1C goal < 8% (H)       * blood glucose monitoring test strip    no brand specified    200 strip    Use to test blood sugars 2 times daily or as directed    Type 2 diabetes mellitus without complication, without long-term current use of insulin (H)       Colchicine 0.6 MG Caps    MITIGARE    20 capsule    Take 0.6 mg by mouth 2 times daily as needed    Acute pain of right knee       cyclobenzaprine 5 MG tablet    FLEXERIL    30 tablet    Take 1-2 tablets (5-10 mg) by mouth nightly as needed for muscle spasms    Back muscle spasm       * finasteride 5 MG tablet    PROSCAR    30 tablet    Take 1 tablet (5 mg) by mouth daily For prostate enlargement. Please fill on $4 list    Benign prostatic hyperplasia without lower urinary tract symptoms, unspecified morphology       * finasteride 5 MG tablet    PROSCAR    90 tablet    Take 1 tablet (5 mg) by mouth daily    Benign prostatic hyperplasia with urinary frequency       FLUoxetine 20 MG capsule    PROzac    270 capsule    TAKE THREE CAPSULES  BY MOUTH ONCE DAILY    Mild episode of recurrent major depressive disorder (H)       fluticasone 50 MCG/ACT spray    FLONASE    1 Bottle    Spray 1-2 sprays into both nostrils daily    Chronic rhinitis, unspecified type       hydrOXYzine 25 MG tablet    ATARAX    30 tablet    Take 2-4 tablets ( mg) by mouth nightly as needed for anxiety (for panic at night)    Chronic pain syndrome       LYRICA 100 MG capsule   Generic drug:  pregabalin     360 capsule    Take 2 capsules (200 mg) by mouth 2 times daily    Chronic pain syndrome       metFORMIN 500 MG 24 hr tablet    GLUCOPHAGE-XR    180 tablet    Take 3 tablets (1,500 mg) by mouth daily (with dinner)    Type 2 diabetes mellitus without complication, without long-term current use of insulin (H)       oxyCODONE IR 5 MG tablet    ROXICODONE    180 tablet    1 tab q4h, PRN max 6 per day.  Ok to fill 4/5    Failed back syndrome of lumbar spine       ranitidine 300 MG tablet    ZANTAC    90 tablet    Take 1 tablet (300 mg) by mouth At Bedtime To reduce stomach acid    Gastroesophageal reflux disease without esophagitis       senna-docusate 8.6-50 MG per tablet    SENOKOT-S;PERICOLACE    60 tablet    Take 1 tablet by mouth 2 times daily    Lumbar radiculopathy       simvastatin 20 MG tablet    ZOCOR    90 tablet    Take 1 tablet (20 mg) by mouth At Bedtime    Hyperlipidemia LDL goal <100       triamcinolone 0.1 % cream    KENALOG    30 g    Apply sparingly to affected area twice daily for 7 days. Then stop and use only for flares    Eczema, unspecified type       * Notice:  This list has 4 medication(s) that are the same as other medications prescribed for you. Read the directions carefully, and ask your doctor or other care provider to review them with you.

## 2018-03-29 NOTE — NURSING NOTE
Chief Complaint   Patient presents with     Pharyngitis     with cough     /78  Pulse 88  Temp 97.3  F (36.3  C)  Resp 18  Ht 6' (1.829 m)  Wt 249 lb 8 oz (113.2 kg)  SpO2 95%  BMI 33.84 kg/m2 Estimated body mass index is 33.84 kg/(m^2) as calculated from the following:    Height as of this encounter: 6' (1.829 m).    Weight as of this encounter: 249 lb 8 oz (113.2 kg).  Medication Reconciliation: complete      Health Maintenance due pending provider review:  NONE    n/a    Mary Beth Cotton CMA

## 2018-04-20 ENCOUNTER — OFFICE VISIT (OUTPATIENT)
Dept: FAMILY MEDICINE | Facility: CLINIC | Age: 63
End: 2018-04-20
Payer: COMMERCIAL

## 2018-04-20 VITALS
TEMPERATURE: 96.2 F | SYSTOLIC BLOOD PRESSURE: 134 MMHG | BODY MASS INDEX: 34.3 KG/M2 | RESPIRATION RATE: 16 BRPM | DIASTOLIC BLOOD PRESSURE: 76 MMHG | OXYGEN SATURATION: 97 % | WEIGHT: 252.9 LBS | HEART RATE: 94 BPM

## 2018-04-20 DIAGNOSIS — K02.9 DENTAL CARIES: ICD-10-CM

## 2018-04-20 DIAGNOSIS — D18.01 HEMANGIOMA OF SKIN: ICD-10-CM

## 2018-04-20 DIAGNOSIS — K04.7 DENTAL ABSCESS: Primary | ICD-10-CM

## 2018-04-20 PROCEDURE — 99213 OFFICE O/P EST LOW 20 MIN: CPT | Performed by: FAMILY MEDICINE

## 2018-04-20 NOTE — PROGRESS NOTES
SUBJECTIVE:   Toby Mcgrath is a 62 year old male who presents to clinic today for the following health issues:      Facial and dental pain       Duration: x3-4 days    Description (location/character/radiation): right side of the face and upper gum area    Intensity:  Mild, due to taking ibuprofen     Accompanying signs and symptoms: redness in face     History (similar episodes/previous evaluation): a couple years ago     Precipitating or alleviating factors: None    Therapies tried and outcome: ibuprofen     62-year-old who presents to the clinic for evaluation of right facial swelling as well as quite calm pain.  The patient has multiple tooth caries, (all of his molars and premolars).  He had recurrent episodes of dental pain over the past 5 years.  Current episode started around 3-4 days ago.  He was using toothpicks to clean the roots of his teeth which might have triggered an infection.  He denies any fevers or chills.  Pain is well controlled with over-the-counter medications.    Also, the patient has 2 small lesions one on the right cheek and the other one on the right upper chest just below the clavicle.  He noticed that the color of the lesion in the right anterior chest is getting darker.  Size of the lesion on the right cheek is getting bigger.  Desires to have both of them removed.    Problem list and histories reviewed & adjusted, as indicated.  Additional history: as documented    Patient Active Problem List   Diagnosis     Thoracic or lumbosacral neuritis or radiculitis, unspecified     Osteoarthrosis, unspecified whether generalized or localized, pelvic region and thigh     Hypertrophy of prostate with urinary obstruction     Chronic rhinitis     Chronic pain syndrome     Disorder of bursae and tendons in shoulder region     Major depressive disorder, recurrent episode (H)     Anxiety     Gout     Type 2 diabetes mellitus without complication (H)     Hypertension goal BP (blood pressure) <  140/90     Advanced directives, counseling/discussion     DJD (degenerative joint disease), lumbar     Hyperlipidemia LDL goal <100     DDD (degenerative disc disease), cervical     GERD (gastroesophageal reflux disease)     Lumbar radiculopathy     S/P lumbar fusion     Left-sided low back pain with left-sided sciatica     BMI 32.0-32.9,adult     History of hepatitis C     S/P lumbar discectomy     Acute post-operative pain     S/P laminectomy     Chronic pain     Bilateral low back pain without sciatica     Right-sided low back pain with right-sided sciatica     Acute gouty arthritis     Acute gout of right elbow, unspecified cause     Idiopathic gout of left elbow, unspecified chronicity     Gastroesophageal reflux disease without esophagitis     Failed back syndrome of lumbar spine     Slow transit constipation     Benign prostatic hyperplasia with urinary frequency     Past Surgical History:   Procedure Laterality Date     BACK SURGERY       both shoulder repair  2006, 2008     C NONSPECIFIC PROCEDURE  1995    neck cyst     C NONSPECIFIC PROCEDURE  1979    laminectomy L5-S1 x 2     C SHOULDER SURG PROC UNLISTED  2005, '07    repairs,later manipulate,inject LT, RT     FUSION LUMBAR ANTERIOR, FUSION LUMBAR POSTERIOR TWO LEVELS, COMBINED N/A 9/17/2014    Procedure: COMBINED FUSION LUMBAR ANTERIOR, FUSION LUMBAR POSTERIOR TWO LEVELS;  Surgeon: Chris Lugo MD;  Location: [facility] OR     HC COLONOSCOPY THRU STOMA, DIAGNOSTIC  3/04    normal     HC UGI ENDOSCOPY DIAG W OR W/O BRUSH/WASH  3/04    ok     HEAD & NECK SURGERY       HEMILAMINECTOMY, DISCECTOMY LUMBAR ONE LEVEL, COMBINED Left 9/8/2015    Procedure: COMBINED HEMILAMINECTOMY, DISCECTOMY LUMBAR ONE LEVEL;  Surgeon: Jer Irby MD;  Location: [facility] OR     right hip replacement  10,2010       Social History   Substance Use Topics     Smoking status: Former Smoker     Years: 40.00     Types: Cigarettes     Smokeless tobacco: Former User     Quit date:  2/1/2013     Alcohol use No     Family History   Problem Relation Age of Onset     DIABETES Mother      C.A.D. Father      DIABETES Father      Hypertension Father            Reviewed and updated as needed this visit by clinical staff  Tobacco  Allergies  Med Hx  Surg Hx  Fam Hx       ROS:  Constitutional, HEENT, cardiovascular, pulmonary, gi and gu systems are negative, except as otherwise noted.    OBJECTIVE:     /76  Pulse 94  Temp 96.2  F (35.7  C) (Tympanic)  Resp 16  Wt 252 lb 14.4 oz (114.7 kg)  SpO2 97%  BMI 34.3 kg/m2  Body mass index is 34.3 kg/(m^2).  GENERAL: healthy, alert and no distress  HENT: ear canals and TM's normal. Multiple tooth caries with erosion of all molars and premolars. Erythema and swelling of gum around tooth # 13 and # 14.   RESP: lungs clear to auscultation - no rales, rhonchi or wheezes  CV: regular rate and rhythm, normal S1 S2.  SKIN: Lesion 1: 0.5 cm ×0.5 cm raised lesion on the lower right cheek.  Purple/red with small capillaries at the base.  Another lesion on the anterior chest 0.5 cmx 0.5 cm raised lesion on the lower right cheek.  Purple/red with small capillaries at the base.    ASSESSMENT/PLAN:   1. Dental abscess  Assessment: Dental abscess in the root of tooth #13 of 14.  Patient was started on antibiotics and was advised to continue with NSAIDs as well as opiates prescription given previously.  He was informed that if symptoms does not get better he must be seen by a dentist.  Plan:  - amoxicillin-clavulanate (AUGMENTIN) 875-125 MG per tablet; Take 1 tablet by mouth 2 times daily  Dispense: 20 tablet; Refill: 0    2. Dental caries  Assessment: Multiple dental caries.  Patient will benefit from extraction remaining teeth.  Plan:   -Patient was advised to see a dentist for teeth extractions.    3. Hemangioma of skin  - DERMATOLOGY REFERRAL    Amy Guo MD  [facility]

## 2018-04-20 NOTE — PATIENT INSTRUCTIONS
"  Dental Abscess    An abscess is a pocket of pus at the tip of a tooth root in your jaw bone. It is caused by an infection at the root of the tooth. It can cause pain and swelling of the gum, cheek, or jaw. Pain may spread from the tooth to your ear or the area of your jaw on the same side. If the abscess isn t treated, it appears as a bubble or swelling on the gum near the tooth. The pressure that builds in this swelling is the source of the pain. More serious infections cause your face to swell.  An abscess can be caused by a crack in the tooth, a cavity, a gum infection, or a combination of these. Once the pulp of the tooth is exposed, bacteria can spread down the roots to the tip. If the bacteria are not stopped, they can damage the bone and soft tissue, and an abscess can form.  Home care  Follow these guidelines when caring for yourself at home:    Don't have hot and cold foods and drinks. Your tooth may be sensitive to changes in temperature. Don t chew on the side of the infected tooth.    If your tooth is chipped or cracked, or if there is a large open cavity, put oil of cloves directly on the tooth to relieve pain. You can buy oil of cloves at drugstores. Some pharmacies carry an over-the-counter \"toothache kit.\" This contains a paste that you can put on the exposed tooth to make it less sensitive.    Put a cold pack on your jaw over the sore area to help reduce pain.    You may use over-the-counter medicine to ease pain, unless another medicine was prescribed. If you have chronic liver or kidney disease, talk with your healthcare provider before using acetaminophen or ibuprofen. Also talk with your provider if you ve had a stomach ulcer or GI bleeding.    An antibiotic will be prescribed. Take it until finished, even if you are feeling better after a few days.  Follow-up care  Follow up with your dentist or an oral surgeon, or as advised. Once an infection occurs in a tooth, it will continue to be a " problem until the infection is drained. This is done through surgery or a root canal. Or you may need to have your tooth pulled.  Call 911  Call 911 if any of these occur:    Unusual drowsiness    Headache or stiff neck    Weakness or fainting    Difficulty swallowing, breathing, or opening your mouth    Swollen eyelids  When to seek medical advice  Call your healthcare provider right away if any of these occur:    Your face becomes more swollen or red    Pain gets worse or spreads to your neck    Fever of 100.4  F (38.0  C) or higher, or as directed by your healthcare provider    Pus drains from the tooth  Date Last Reviewed: 10/1/2016    5608-4755 [employer]. [address] [place] [address], [place] [address]. All rights reserved. This information is not intended as a substitute for professional medical care. Always follow your healthcare professional's instructions.

## 2018-04-20 NOTE — MR AVS SNAPSHOT
"              After Visit Summary   4/20/2018    Toby Mcgrath    MRN: [id number]           Patient Information     Date Of Birth          1955        Visit Information        Provider Department      4/20/2018 9:00 AM Amy Guo MD [facility]        Today's Diagnoses     Erysipelas    -  1    Dental abscess        Hemangioma of skin          Care Instructions      Dental Abscess    An abscess is a pocket of pus at the tip of a tooth root in your jaw bone. It is caused by an infection at the root of the tooth. It can cause pain and swelling of the gum, cheek, or jaw. Pain may spread from the tooth to your ear or the area of your jaw on the same side. If the abscess isn t treated, it appears as a bubble or swelling on the gum near the tooth. The pressure that builds in this swelling is the source of the pain. More serious infections cause your face to swell.  An abscess can be caused by a crack in the tooth, a cavity, a gum infection, or a combination of these. Once the pulp of the tooth is exposed, bacteria can spread down the roots to the tip. If the bacteria are not stopped, they can damage the bone and soft tissue, and an abscess can form.  Home care  Follow these guidelines when caring for yourself at home:    Don't have hot and cold foods and drinks. Your tooth may be sensitive to changes in temperature. Don t chew on the side of the infected tooth.    If your tooth is chipped or cracked, or if there is a large open cavity, put oil of cloves directly on the tooth to relieve pain. You can buy oil of cloves at drugstores. Some pharmacies carry an over-the-counter \"toothache kit.\" This contains a paste that you can put on the exposed tooth to make it less sensitive.    Put a cold pack on your jaw over the sore area to help reduce pain.    You may use over-the-counter medicine to ease pain, unless another medicine was prescribed. If you have chronic liver or kidney disease, talk with " your healthcare provider before using acetaminophen or ibuprofen. Also talk with your provider if you ve had a stomach ulcer or GI bleeding.    An antibiotic will be prescribed. Take it until finished, even if you are feeling better after a few days.  Follow-up care  Follow up with your dentist or an oral surgeon, or as advised. Once an infection occurs in a tooth, it will continue to be a problem until the infection is drained. This is done through surgery or a root canal. Or you may need to have your tooth pulled.  Call 911  Call 911 if any of these occur:    Unusual drowsiness    Headache or stiff neck    Weakness or fainting    Difficulty swallowing, breathing, or opening your mouth    Swollen eyelids  When to seek medical advice  Call your healthcare provider right away if any of these occur:    Your face becomes more swollen or red    Pain gets worse or spreads to your neck    Fever of 100.4  F (38.0  C) or higher, or as directed by your healthcare provider    Pus drains from the tooth  Date Last Reviewed: 10/1/2016    4834-3164 [employer]. [address] [place] [address]. All rights reserved. This information is not intended as a substitute for professional medical care. Always follow your healthcare professional's instructions.                Follow-ups after your visit        Additional Services     DERMATOLOGY REFERRAL       Your provider has referred you to: [facility]: Dermatology Clinic - [place] [phone]   [email or web address]    Please be aware that coverage of these services is subject to the terms and limitations of your health insurance plan.  Call member services at your health plan with any benefit or coverage questions.      Please bring the following with you to your appointment:    (1) Any X-Rays, CTs or MRIs which have been performed.  Contact the facility where they were done to arrange for  prior to your scheduled  "appointment.    (2) List of current medications  (3) This referral request   (4) Any documents/labs given to you for this referral                  Who to contact     If you have questions or need follow up information about today's clinic visit or your schedule please contact [facility] directly at [phone].  Normal or non-critical lab and imaging results will be communicated to you by MyChart, letter or phone within 4 business days after the clinic has received the results. If you do not hear from us within 7 days, please contact the clinic through [employer] or phone. If you have a critical or abnormal lab result, we will notify you by phone as soon as possible.  Submit refill requests through [email or web address] or call your pharmacy and they will forward the refill request to us. Please allow 3 business days for your refill to be completed.          Additional Information About Your Visit        MyChart Information     [email or web address] lets you send messages to your doctor, view your test results, renew your prescriptions, schedule appointments and more. To sign up, go to [email or web address] . Click on \"Log in\" on the left side of the screen, which will take you to the Welcome page. Then click on \"Sign up Now\" on the right side of the page.     You will be asked to enter the access code listed below, as well as some personal information. Please follow the directions to create your username and password.     Your access code is: [email or web address]  Expires: 2018 11:34 AM     Your access code will  in 90 days. If you need help or a new code, please call your [facility] clinic or [phone].        Care EveryWhere ID     This is your Care EveryWhere ID. This could be used by other organizations to access your [facility] medical records  [id number]        Your Vitals Were     Pulse Temperature Respirations Pulse Oximetry BMI (Body Mass Index)       94 96.2  F (35.7  C) (Tympanic) 16 97% 34.3 kg/m2        Blood " Pressure from Last 3 Encounters:   04/20/18 134/76   03/29/18 118/78   02/26/18 108/69    Weight from Last 3 Encounters:   04/20/18 252 lb 14.4 oz (114.7 kg)   03/29/18 249 lb 8 oz (113.2 kg)   02/26/18 245 lb 9.6 oz (111.4 kg)              We Performed the Following     DERMATOLOGY REFERRAL          Today's Medication Changes          These changes are accurate as of 4/20/18  9:13 AM.  If you have any questions, ask your nurse or doctor.               Start taking these medicines.        Dose/Directions    amoxicillin-clavulanate 875-125 MG per tablet   Commonly known as:  AUGMENTIN   Used for:  Dental abscess   Started by:  Amy Guo MD        Dose:  1 tablet   Take 1 tablet by mouth 2 times daily   Quantity:  20 tablet   Refills:  0            Where to get your medicines      These medications were sent to [facility] [address] [place] [address]     Phone:  [phone]     amoxicillin-clavulanate 875-125 MG per tablet                Primary Care Provider Office Phone # Fax #    Connie Haskins [phone] [address] [place] [id number]        Equal Access to Services     ALMA WEBER AH: Hadii joselito ku hadasho Soomaali, waaxda luqadaha, qaybta kaalmada adeegyada, gayle malcolm hayleahn milagros ramirez. So [facility] [phone].    ATENCIÓN: Si habla español, tiene a holliday disposición servicios gratuitos de asistencia lingüística. Rylie al [phone].    We comply with applicable federal civil rights laws and [place] laws. We do not discriminate on the basis of race, color, national origin, age, disability, sex, sexual orientation, or gender identity.            Thank you!     Thank you for choosing [facility]  for your care. Our goal is always to provide you with excellent care. Hearing back from our patients is one way we can continue to improve our services. Please take a few  minutes to complete the written survey that you may receive in the mail after your visit with us. Thank you!             Your Updated Medication List - Protect others around you: Learn how to safely use, store and throw away your medicines at www.disposemymeds.org.          This list is accurate as of 4/20/18  9:13 AM.  Always use your most recent med list.                   Brand Name Dispense Instructions for use Diagnosis    allopurinol 100 MG tablet    ZYLOPRIM    270 tablet    Take 3 tablets (300 mg) by mouth daily    Acute gout of right elbow, unspecified cause       amoxicillin 500 MG capsule    AMOXIL    30 capsule    Take 1 capsule (500 mg) by mouth 3 times daily    Acute non-recurrent maxillary sinusitis       amoxicillin-clavulanate 875-125 MG per tablet    AUGMENTIN    20 tablet    Take 1 tablet by mouth 2 times daily    Dental abscess       aspirin 81 MG tablet     100 tablet    Take 1 tablet (81 mg) by mouth daily    Type 2 diabetes, HbA1C goal < 8% (H)       blood glucose monitoring lancets     200 each    Use to test blood sugars 2-3 times daily as directed (lena contour meter)    Type 2 diabetes, HbA1C goal < 8% (H)       blood glucose monitoring meter device kit    no brand specified    1 kit    Use to test blood sugar 2 times daily or as directed.    Type 2 diabetes mellitus without complication, without long-term current use of insulin (H)       * blood glucose monitoring test strip    no brand specified    3 Box    Use to test blood sugar 2-3 times daily (lena contour meter)    Type 2 diabetes, HbA1C goal < 8% (H)       * blood glucose monitoring test strip    no brand specified    200 strip    Use to test blood sugars 2 times daily or as directed    Type 2 diabetes mellitus without complication, without long-term current use of insulin (H)       Colchicine 0.6 MG Caps    MITIGARE    20 capsule    Take 0.6 mg by mouth 2 times daily as needed    Acute pain of right knee       cyclobenzaprine 5  MG tablet    FLEXERIL    30 tablet    Take 1-2 tablets (5-10 mg) by mouth nightly as needed for muscle spasms    Back muscle spasm       * finasteride 5 MG tablet    PROSCAR    30 tablet    Take 1 tablet (5 mg) by mouth daily For prostate enlargement. Please fill on $4 list    Benign prostatic hyperplasia without lower urinary tract symptoms, unspecified morphology       * finasteride 5 MG tablet    PROSCAR    90 tablet    Take 1 tablet (5 mg) by mouth daily    Benign prostatic hyperplasia with urinary frequency       FLUoxetine 20 MG capsule    PROzac    270 capsule    TAKE THREE CAPSULES BY MOUTH ONCE DAILY    Mild episode of recurrent major depressive disorder (H)       fluticasone 50 MCG/ACT spray    FLONASE    1 Bottle    Spray 1-2 sprays into both nostrils daily    Chronic rhinitis, unspecified type       hydrOXYzine 25 MG tablet    ATARAX    30 tablet    Take 2-4 tablets ( mg) by mouth nightly as needed for anxiety (for panic at night)    Chronic pain syndrome       LYRICA 100 MG capsule   Generic drug:  pregabalin     360 capsule    Take 2 capsules (200 mg) by mouth 2 times daily    Chronic pain syndrome       metFORMIN 500 MG 24 hr tablet    GLUCOPHAGE-XR    180 tablet    Take 3 tablets (1,500 mg) by mouth daily (with dinner)    Type 2 diabetes mellitus without complication, without long-term current use of insulin (H)       oxyCODONE IR 5 MG tablet    ROXICODONE    180 tablet    1 tab q4h, PRN max 6 per day.  Ok to fill 4/5    Failed back syndrome of lumbar spine       ranitidine 300 MG tablet    ZANTAC    90 tablet    Take 1 tablet (300 mg) by mouth At Bedtime To reduce stomach acid    Gastroesophageal reflux disease without esophagitis       senna-docusate 8.6-50 MG per tablet    SENOKOT-S;PERICOLACE    60 tablet    Take 1 tablet by mouth 2 times daily    Lumbar radiculopathy       simvastatin 20 MG tablet    ZOCOR    90 tablet    Take 1 tablet (20 mg) by mouth At Bedtime    Hyperlipidemia LDL goal  <100       triamcinolone 0.1 % cream    KENALOG    30 g    Apply sparingly to affected area twice daily for 7 days. Then stop and use only for flares    Eczema, unspecified type       * Notice:  This list has 4 medication(s) that are the same as other medications prescribed for you. Read the directions carefully, and ask your doctor or other care provider to review them with you.

## 2018-04-21 ASSESSMENT — PATIENT HEALTH QUESTIONNAIRE - PHQ9: SUM OF ALL RESPONSES TO PHQ QUESTIONS 1-9: 4

## 2018-04-23 ENCOUNTER — TELEPHONE (OUTPATIENT)
Dept: FAMILY MEDICINE | Facility: CLINIC | Age: 63
End: 2018-04-23

## 2018-04-23 ENCOUNTER — OFFICE VISIT (OUTPATIENT)
Dept: URGENT CARE | Facility: URGENT CARE | Age: 63
End: 2018-04-23
Payer: COMMERCIAL

## 2018-04-23 VITALS
WEIGHT: 252.2 LBS | SYSTOLIC BLOOD PRESSURE: 120 MMHG | DIASTOLIC BLOOD PRESSURE: 68 MMHG | TEMPERATURE: 98 F | RESPIRATION RATE: 20 BRPM | OXYGEN SATURATION: 95 % | HEART RATE: 94 BPM | BODY MASS INDEX: 34.2 KG/M2

## 2018-04-23 DIAGNOSIS — E11.9 TYPE 2 DIABETES MELLITUS WITHOUT COMPLICATION, WITHOUT LONG-TERM CURRENT USE OF INSULIN (H): ICD-10-CM

## 2018-04-23 DIAGNOSIS — K21.9 GASTROESOPHAGEAL REFLUX DISEASE WITHOUT ESOPHAGITIS: Primary | ICD-10-CM

## 2018-04-23 DIAGNOSIS — I45.10 INCOMPLETE RIGHT BUNDLE BRANCH BLOCK: ICD-10-CM

## 2018-04-23 DIAGNOSIS — R61 GENERALIZED HYPERHIDROSIS: ICD-10-CM

## 2018-04-23 LAB
ALBUMIN SERPL-MCNC: 3.9 G/DL (ref 3.4–5)
ALP SERPL-CCNC: 60 U/L (ref 40–150)
ALT SERPL W P-5'-P-CCNC: 51 U/L (ref 0–70)
ANION GAP SERPL CALCULATED.3IONS-SCNC: 6 MMOL/L (ref 3–14)
AST SERPL W P-5'-P-CCNC: 33 U/L (ref 0–45)
BASOPHILS # BLD AUTO: 0 10E9/L (ref 0–0.2)
BASOPHILS NFR BLD AUTO: 0.5 %
BILIRUB SERPL-MCNC: 0.3 MG/DL (ref 0.2–1.3)
BUN SERPL-MCNC: 15 MG/DL (ref 7–30)
CALCIUM SERPL-MCNC: 9.1 MG/DL (ref 8.5–10.1)
CHLORIDE SERPL-SCNC: 112 MMOL/L (ref 94–109)
CO2 SERPL-SCNC: 26 MMOL/L (ref 20–32)
CREAT SERPL-MCNC: 1.14 MG/DL (ref 0.66–1.25)
DIFFERENTIAL METHOD BLD: NORMAL
EOSINOPHIL # BLD AUTO: 0.1 10E9/L (ref 0–0.7)
EOSINOPHIL NFR BLD AUTO: 1.1 %
ERYTHROCYTE [DISTWIDTH] IN BLOOD BY AUTOMATED COUNT: 14.2 % (ref 10–15)
GFR SERPL CREATININE-BSD FRML MDRD: 65 ML/MIN/1.7M2
GLUCOSE SERPL-MCNC: 173 MG/DL (ref 70–99)
HCT VFR BLD AUTO: 40.8 % (ref 40–53)
HGB BLD-MCNC: 13.9 G/DL (ref 13.3–17.7)
LYMPHOCYTES # BLD AUTO: 1.5 10E9/L (ref 0.8–5.3)
LYMPHOCYTES NFR BLD AUTO: 19.2 %
MCH RBC QN AUTO: 30.2 PG (ref 26.5–33)
MCHC RBC AUTO-ENTMCNC: 34.1 G/DL (ref 31.5–36.5)
MCV RBC AUTO: 89 FL (ref 78–100)
MONOCYTES # BLD AUTO: 0.5 10E9/L (ref 0–1.3)
MONOCYTES NFR BLD AUTO: 6.6 %
NEUTROPHILS # BLD AUTO: 5.5 10E9/L (ref 1.6–8.3)
NEUTROPHILS NFR BLD AUTO: 72.6 %
PLATELET # BLD AUTO: 170 10E9/L (ref 150–450)
POTASSIUM SERPL-SCNC: 4.6 MMOL/L (ref 3.4–5.3)
PROT SERPL-MCNC: 7.4 G/DL (ref 6.8–8.8)
RBC # BLD AUTO: 4.6 10E12/L (ref 4.4–5.9)
SODIUM SERPL-SCNC: 144 MMOL/L (ref 133–144)
TSH SERPL DL<=0.005 MIU/L-ACNC: 0.91 MU/L (ref 0.4–4)
WBC # BLD AUTO: 7.5 10E9/L (ref 4–11)

## 2018-04-23 PROCEDURE — 84443 ASSAY THYROID STIM HORMONE: CPT | Performed by: PHYSICIAN ASSISTANT

## 2018-04-23 PROCEDURE — 99214 OFFICE O/P EST MOD 30 MIN: CPT | Performed by: PHYSICIAN ASSISTANT

## 2018-04-23 PROCEDURE — 85025 COMPLETE CBC W/AUTO DIFF WBC: CPT | Performed by: PHYSICIAN ASSISTANT

## 2018-04-23 PROCEDURE — 80053 COMPREHEN METABOLIC PANEL: CPT | Performed by: PHYSICIAN ASSISTANT

## 2018-04-23 PROCEDURE — 93000 ELECTROCARDIOGRAM COMPLETE: CPT | Performed by: PHYSICIAN ASSISTANT

## 2018-04-23 PROCEDURE — 36415 COLL VENOUS BLD VENIPUNCTURE: CPT | Performed by: PHYSICIAN ASSISTANT

## 2018-04-23 NOTE — TELEPHONE ENCOUNTER
SN,  Patient called because he was sweating and became confused during lunchtime  Thinks it was side effect of not having Lyrica  States he left home without any Lyrica on hand this AM  Patient states he takes Lyrica 200mg BID - sometimes will take 100mg in the afternoon if needed and then 100mg at supper time - breaks up the dosing more  Took 200mg this AM at 5am, just ran home and took 200mg more  Feeling better now - confusion improved and no longer sweating  Denies chest pain, SOB, heart racing  Feels weak   He wanted you to know about this episode  Please advise.  Ivis SMALLS RN

## 2018-04-23 NOTE — TELEPHONE ENCOUNTER
Reason for call:  Patient reporting a symptom    Symptom or request: sweats/confusion    Duration (how long have symptoms been present): today    Have you been treated for this before? No    Additional comments: pt has been having sweats and confusion. Pt thinks from not taken his rx lyrica. Trans to triage    Phone Number patient can be reached at:  Home number on file [phone] (home)    Best Time:  any    Can we leave a detailed message on this number:  YES    Call taken on 4/23/2018 at 11:46 AM by Erickson Barry

## 2018-04-23 NOTE — PROGRESS NOTES
SUBJECTIVE:   Toby Mcgrath is a 62 year old male presenting with a chief complaint of having an episode of sweating while eating today at lunch. .  Onset of symptoms was 1 hour(s) ago.  Course of illness is resolved.    Severity moderate  Current and Associated symptoms: none at this time.  He did not experience any chest pain, pressure SOB, nausea, vomiting or feeling of weakness, dizziness, headaches  Treatment measures tried include fluids.  Predisposing factors include non known .    Past Medical History:   Diagnosis Date     Anxiety state, unspecified      BMI 32.0-32.9,adult 9/4/2015     Chronic pain syndrome 3/29/2007    Narcotic refill protocol Will return every 2-3 months for clinic visits Controlled substance aggreement on file from this  6/26/12 Documentation in problem list: no, appears to be compliant based on last visit He is responding best to Oxycontin 10 mg twice daily # 60 per month.  This is costly and looking into [place] for coverage.  Nausea with MS Contin Has meds refilled 16 of every month Last [facility] website verification:  done on 7/8/2015  [email or web address]   2/10/2015:  PCP will take over narcotic prescription Tapering course: using 5 mg tablets 10 mg morning 15 mg noon, 15 mg night for 1 week then 10 mg morning, 10 mg noon, 15 mg night for 1 week then 10 mg TID for 1 week then see me for refills  Then ongoing taper: 5 mg morning, 10 mg noon and 10 mg night for 1 week then  5 mg morning and noon and 10 mg night for 1 week then 5 mg tid for 1 week Then 5 mg morning no noon dose and 5 mg night for 1 week then No morning or non dose and 5 mg nightly for 1 week then off  Goal is co     Chronic rhinitis 3/29/2007     DDD (degenerative disc disease), cervical 2/8/2013    Normal.  C2-C3: Normal disc, facet joints, spinal canal and neural foramina.  C3-C4: Mild broad-based posterior disc bulge. Otherwise normal.  C4-C5: Normal disc, facet joints, spinal canal and neural foramina.  C5-C6:  Moderate degenerative disc disease with loss of disc height, circumferential disc bulge and vertebral endplate osteophytes. Mild spinal canal stenosis and moderate left foraminal stenosis. Normal right foramen and facet joints.  C6-C7: Mild circumferential disc bulge. Slight impression on the thecal sac. Mild left foraminal stenosis. Normal right foramen and facet joints.  C7-T1: Normal disc, facet joints, spinal canal and neural foramina.  T1-T2: Mild central posterior disc protrusion.  T2-T3: Mild central posterior disc protrusion. Slight impression on the spinal cord.         DJD (degenerative joint disease), lumbar 1/10/2012     Esophageal reflux     ,refluxes on upper GI     Gout 4/8/2011     Gout, unspecified      Hyperlipidemia LDL goal <100 10/25/2012     Hypertension goal BP (blood pressure) < 140/90 10/21/2011     HYPERTROPHY PROSTATE WITH OBST 8/3/2006     Impotence of organic origin      Lumbar radiculopathy 9/17/2014     Major depressive disorder, recurrent episode, unspecified 7/9/2008     Osteoarthrosis, unspecified whether generalized or localized, pelvic region and thigh      Pure hyperglyceridemia      ROTATOR CUFF SYND NOS 4/17/2008     S/P lumbar fusion 10/14/2014     Thoracic or lumbosacral neuritis or radiculitis, unspecified      Tobacco use disorder      Type 2 diabetes, HbA1C goal < 8% (H) 9/9/2011     Fam HX  Diabetes  HTN  OA  Depression  Back Pain  Thyroid Disorder    Allergies   Allergen Reactions     Codeine Nausea and Vomiting     Tolerating other opiates          Social History   Substance Use Topics     Smoking status: Former Smoker     Years: 40.00     Types: Cigarettes     Smokeless tobacco: Former User     Quit date: 2/1/2013     Alcohol use No       ROS:  CONSTITUTIONAL:NEGATIVE  for dizziness, vertigo, headache  INTEGUMENTARY/SKIN: Positive for episode of sweating profusely   EYES: NEGATIVE for vision changes or irritation  ENT/MOUTH: NEGATIVE for ear, mouth and throat  problems  RESP:NEGATIVE for significant cough or SOB  CV: NEGATIVE for chest pain, palpitations or peripheral edema  GI: NEGATIVE for nausea, abdominal pain, heartburn, or change in bowel habits  : negative for dysuria, hematuria, decreased urinary stream, erectile dysfunction  MUSCULOSKELETAL: NEGATIVE for significant arthralgias or myalgia  NEURO: NEGATIVE for weakness, dizziness or paresthesias    OBJECTIVE  :/68  Pulse 94  Temp 98  F (36.7  C) (Oral)  Resp 20  Wt 252 lb 3.2 oz (114.4 kg)  SpO2 95%  BMI 34.2 kg/m2  GENERAL APPEARANCE: healthy, alert and no distress  EYES: EOMI,  PERRL, conjunctiva clear  HENT: ear canals and TM's normal.  Nose and mouth without ulcers, erythema or lesions  NECK: supple, nontender, no lymphadenopathy  RESP: lungs clear to auscultation - no rales, rhonchi or wheezes  CV: regular rates and rhythm, normal S1 S2, no murmur noted  ABDOMEN:  soft, nontender, no HSM or masses and bowel sounds normal  Extremities: no peripheral edema or tenderness, peripheral pulses normal  MS: extremities normal- no gross deformities noted, no erythema, FROM noted in all extremities  NEURO: Normal strength and tone, sensory exam grossly normal,  normal speech and mentation  SKIN: no suspicious lesions or rashes    EKG:  Positive for incomplete right bundle branch block, normal sinus rhythm     Results for orders placed or performed in visit on 04/23/18   Comprehensive metabolic panel   Result Value Ref Range    Sodium 144 133 - 144 mmol/L    Potassium 4.6 3.4 - 5.3 mmol/L    Chloride 112 (H) 94 - 109 mmol/L    Carbon Dioxide 26 20 - 32 mmol/L    Anion Gap 6 3 - 14 mmol/L    Glucose 173 (H) 70 - 99 mg/dL    Urea Nitrogen 15 7 - 30 mg/dL    Creatinine 1.14 0.66 - 1.25 mg/dL    GFR Estimate 65 >60 mL/min/1.7m2    GFR Estimate If Black 78 >60 mL/min/1.7m2    Calcium 9.1 8.5 - 10.1 mg/dL    Bilirubin Total 0.3 0.2 - 1.3 mg/dL    Albumin 3.9 3.4 - 5.0 g/dL    Protein Total 7.4 6.8 - 8.8 g/dL     Alkaline Phosphatase 60 40 - 150 U/L    ALT 51 0 - 70 U/L    AST 33 0 - 45 U/L   CBC with platelets differential   Result Value Ref Range    WBC 7.5 4.0 - 11.0 10e9/L    RBC Count 4.60 4.4 - 5.9 10e12/L    Hemoglobin 13.9 13.3 - 17.7 g/dL    Hematocrit 40.8 40.0 - 53.0 %    MCV 89 78 - 100 fl    MCH 30.2 26.5 - 33.0 pg    MCHC 34.1 31.5 - 36.5 g/dL    RDW 14.2 10.0 - 15.0 %    Platelet Count 170 150 - 450 10e9/L    Diff Method Automated Method     % Neutrophils 72.6 %    % Lymphocytes 19.2 %    % Monocytes 6.6 %    % Eosinophils 1.1 %    % Basophils 0.5 %    Absolute Neutrophil 5.5 1.6 - 8.3 10e9/L    Absolute Lymphocytes 1.5 0.8 - 5.3 10e9/L    Absolute Monocytes 0.5 0.0 - 1.3 10e9/L    Absolute Eosinophils 0.1 0.0 - 0.7 10e9/L    Absolute Basophils 0.0 0.0 - 0.2 10e9/L   TSH with free T4 reflex   Result Value Ref Range    TSH 0.91 0.40 - 4.00 mU/L     *Note: Due to a large number of results and/or encounters for the requested time period, some results have not been displayed. A complete set of results can be found in Results Review.       ASSESSMENT/PLAN:      ICD-10-CM    1. Gastroesophageal reflux disease without esophagitis K21.9 Comprehensive metabolic panel   2. Type 2 diabetes mellitus without complication, without long-term current use of insulin (H) E11.9 Comprehensive metabolic panel   3. Generalized hyperhidrosis R61 CBC with platelets differential     TSH with free T4 reflex     EKG 12-lead complete w/read - Clinics   4. Incomplete right bundle branch block I45.10 EKG 12-lead complete w/read - Clinics       Orders Placed This Encounter     Comprehensive metabolic panel     CBC with platelets differential     TSH with free T4 reflex       No source of patients sweating was found.  EKG shows no changes  CMP to evaluate for blood sugar levels  CBC to evaluate for hemoglobin   TSH to evaluate for thyroid disease  Patients symptoms are fully resolved, at this time he feels normal.  Evaluate for any returning  symptoms, especially with eating.  If this occurs then go to the ED for further evaluation  See orders in Epic

## 2018-04-23 NOTE — MR AVS SNAPSHOT
"              After Visit Summary   4/23/2018    Toby Mcgrath    MRN: [id number]           Patient Information     Date Of Birth          1955        Visit Information        Provider Department      4/23/2018 1:35 PM Alhaji Pierre PA-C [facility]        Today's Diagnoses     Gastroesophageal reflux disease without esophagitis    -  1    Type 2 diabetes mellitus without complication, without long-term current use of insulin (H)        Generalized hyperhidrosis        Incomplete right bundle branch block           Follow-ups after your visit        Who to contact     If you have questions or need follow up information about today's clinic visit or your schedule please contact [facility] directly at [phone].  Normal or non-critical lab and imaging results will be communicated to you by [email or web address], letter or phone within 4 business days after the clinic has received the results. If you do not hear from us within 7 days, please contact the clinic through [email or web address] or phone. If you have a critical or abnormal lab result, we will notify you by phone as soon as possible.  Submit refill requests through [employer] or call your pharmacy and they will forward the refill request to us. Please allow 3 business days for your refill to be completed.          Additional Information About Your Visit        MyChart Information     [employer] lets you send messages to your doctor, view your test results, renew your prescriptions, schedule appointments and more. To sign up, go to [email or web address] . Click on \"Log in\" on the left side of the screen, which will take you to the Welcome page. Then click on \"Sign up Now\" on the right side of the page.     You will be asked to enter the access code listed below, as well as some personal information. Please follow the directions to create your username and password.     Your access code is: [email or web address]  Expires: 5/27/2018 " 11:34 AM     Your access code will  in 90 days. If you need help or a new code, please call your [facility] clinic or [phone].        Care EveryWhere ID     This is your Care EveryWhere ID. This could be used by other organizations to access your [facility] medical records  [id number]        Your Vitals Were     Pulse Temperature Respirations Pulse Oximetry BMI (Body Mass Index)       94 98  F (36.7  C) (Oral) 20 95% 34.2 kg/m2        Blood Pressure from Last 3 Encounters:   18 120/68   18 134/76   18 118/78    Weight from Last 3 Encounters:   18 252 lb 3.2 oz (114.4 kg)   18 252 lb 14.4 oz (114.7 kg)   18 249 lb 8 oz (113.2 kg)              We Performed the Following     CBC with platelets differential     Comprehensive metabolic panel     EKG 12-lead complete w/read - Clinics     TSH with free T4 reflex        Primary Care Provider Office Phone # Fax #    [facility] [phone] [address] [address]        Equal Access to Services     NICK WEBER : Hadii aad ku hadcarisao Somaral, waaxda luqadaha, qaybta kaalmada adedanielleyada, gayle ramirez. So [facility] [phone].    ATENCIÓN: Si habla español, tiene a holliday disposición servicios gratuitos de asistencia lingüística. [facility] [phone].    We comply with applicable federal civil rights laws and [place] laws. We do not discriminate on the basis of race, color, national origin, age, disability, sex, sexual orientation, or gender identity.            Thank you!     Thank you for choosing [facility]  for your care. Our goal is always to provide you with excellent care. Hearing back from our patients is one way we can continue to improve our services. Please take a few minutes to complete the written survey that you may receive in the mail after your visit with us. Thank you!             Your Updated Medication  List - Protect others around you: Learn how to safely use, store and throw away your medicines at www.disposemymeds.org.          This list is accurate as of 4/23/18  4:20 PM.  Always use your most recent med list.                   Brand Name Dispense Instructions for use Diagnosis    allopurinol 100 MG tablet    ZYLOPRIM    270 tablet    Take 3 tablets (300 mg) by mouth daily    Acute gout of right elbow, unspecified cause       amoxicillin-clavulanate 875-125 MG per tablet    AUGMENTIN    20 tablet    Take 1 tablet by mouth 2 times daily    Dental abscess       aspirin 81 MG tablet     100 tablet    Take 1 tablet (81 mg) by mouth daily    Type 2 diabetes, HbA1C goal < 8% (H)       blood glucose monitoring lancets     200 each    Use to test blood sugars 2-3 times daily as directed (lena contour meter)    Type 2 diabetes, HbA1C goal < 8% (H)       blood glucose monitoring meter device kit    no brand specified    1 kit    Use to test blood sugar 2 times daily or as directed.    Type 2 diabetes mellitus without complication, without long-term current use of insulin (H)       blood glucose monitoring test strip    no brand specified    200 strip    Use to test blood sugars 2 times daily or as directed    Type 2 diabetes mellitus without complication, without long-term current use of insulin (H)       Colchicine 0.6 MG Caps    MITIGARE    20 capsule    Take 0.6 mg by mouth 2 times daily as needed    Acute pain of right knee       cyclobenzaprine 5 MG tablet    FLEXERIL    30 tablet    Take 1-2 tablets (5-10 mg) by mouth nightly as needed for muscle spasms    Back muscle spasm       finasteride 5 MG tablet    PROSCAR    30 tablet    Take 1 tablet (5 mg) by mouth daily For prostate enlargement. Please fill on $4 list    Benign prostatic hyperplasia without lower urinary tract symptoms, unspecified morphology       FLUoxetine 20 MG capsule    PROzac    270 capsule    TAKE THREE CAPSULES BY MOUTH ONCE DAILY    Mild  episode of recurrent major depressive disorder (H)       fluticasone 50 MCG/ACT spray    FLONASE    1 Bottle    Spray 1-2 sprays into both nostrils daily    Chronic rhinitis, unspecified type       hydrOXYzine 25 MG tablet    ATARAX    30 tablet    Take 2-4 tablets ( mg) by mouth nightly as needed for anxiety (for panic at night)    Chronic pain syndrome       LYRICA 100 MG capsule   Generic drug:  pregabalin     360 capsule    Take 2 capsules (200 mg) by mouth 2 times daily    Chronic pain syndrome       metFORMIN 500 MG 24 hr tablet    GLUCOPHAGE-XR    180 tablet    Take 3 tablets (1,500 mg) by mouth daily (with dinner)    Type 2 diabetes mellitus without complication, without long-term current use of insulin (H)       oxyCODONE IR 5 MG tablet    ROXICODONE    180 tablet    1 tab q4h, PRN max 6 per day.  Ok to fill 4/5    Failed back syndrome of lumbar spine       ranitidine 300 MG tablet    ZANTAC    90 tablet    Take 1 tablet (300 mg) by mouth At Bedtime To reduce stomach acid    Gastroesophageal reflux disease without esophagitis       senna-docusate 8.6-50 MG per tablet    SENOKOT-S;PERICOLACE    60 tablet    Take 1 tablet by mouth 2 times daily    Lumbar radiculopathy       simvastatin 20 MG tablet    ZOCOR    90 tablet    Take 1 tablet (20 mg) by mouth At Bedtime    Hyperlipidemia LDL goal <100       triamcinolone 0.1 % cream    KENALOG    30 g    Apply sparingly to affected area twice daily for 7 days. Then stop and use only for flares    Eczema, unspecified type

## 2018-05-01 DIAGNOSIS — G89.4 CHRONIC PAIN SYNDROME: ICD-10-CM

## 2018-05-01 DIAGNOSIS — M96.1 FAILED BACK SYNDROME OF LUMBAR SPINE: ICD-10-CM

## 2018-05-01 NOTE — TELEPHONE ENCOUNTER
Reason for Call:  Medication or medication refill:    Do you use a [facility]?  Name of the pharmacy and phone number for the current request:    Pt will  in clinic    Name of the medication requested:   oxyCODONE IR (ROXICODONE) 5 MG tablet 180 tablet 0       Can we leave a detailed message on this number? YES    Phone number patient can be reached at: Cell number on file:    Telephone Information:   Mobile [phone]       Best Time: Any     Call taken on 5/1/2018 at 8:09 AM by Sierra Martínez

## 2018-05-01 NOTE — TELEPHONE ENCOUNTER
It is time to refill Layo's Lyrica through the [employer] assistance program.  [employer] requires a hand signed, hard copy, brand name script for this fill.    Please hand sign a BRAND name, hard copy script for:      LYRICA    Please send the script to me via interoffice mail FPS Julio, to Zenaida Plasencia or via [place] mail  at:   [facility]   Zenaida Plasencia   [address] [place]  [address]    Thanks so much for your help!    Zenaida Plasencia  Prescription

## 2018-05-01 NOTE — TELEPHONE ENCOUNTER
SN  Controlled Substance Refill Request for Oxycodone    Last refill: 4/5/2018 - #180    Last clinic visit: 3/29/2018     Clinic visit frequency required: Q 3 months  Next appt: No future OV scheduled    Controlled substance agreement on file: Yes:  Date 7/12/2016.    Documentation in problem list reviewed:  Yes    Processing:  Patient will  in clinic    [place] reviewed:  reviewed- no concerns  [place]  [email or web address]  Thanks, Gayathri Wade RN

## 2018-05-01 NOTE — TELEPHONE ENCOUNTER
SN  Please see message below.  Last refill: 2/20/18 - #360 [place]  Last OV: 3/29/18.    Thanks, Gayathri Wade RN

## 2018-05-02 RX ORDER — OXYCODONE HYDROCHLORIDE 5 MG/1
TABLET ORAL
Qty: 180 TABLET | Refills: 0 | Status: SHIPPED | OUTPATIENT
Start: 2018-05-05 | End: 2018-05-31

## 2018-05-02 RX ORDER — PREGABALIN 100 MG
200 CAPSULE ORAL 2 TIMES DAILY
Qty: 360 CAPSULE | Refills: 1 | Status: SHIPPED | OUTPATIENT
Start: 2018-05-20 | End: 2018-05-31

## 2018-05-02 NOTE — TELEPHONE ENCOUNTER
Informed Rx is ready for  and placed at . He will try to p/u today this afternoon. Told to bring photo ID.    Sara Mclaughlin RN

## 2018-05-18 ENCOUNTER — TELEPHONE (OUTPATIENT)
Dept: FAMILY MEDICINE | Facility: CLINIC | Age: 63
End: 2018-05-18

## 2018-05-18 NOTE — TELEPHONE ENCOUNTER
FYI~ A refill request was been made to the [employer] assistance program for Lyrica.    A 90 day supply of LYRICA will be delivered to Layo's home within 3-5 business days.    Zenaida Plasencia  Prescription

## 2018-05-31 ENCOUNTER — OFFICE VISIT (OUTPATIENT)
Dept: FAMILY MEDICINE | Facility: CLINIC | Age: 63
End: 2018-05-31
Payer: COMMERCIAL

## 2018-05-31 VITALS
HEIGHT: 72 IN | BODY MASS INDEX: 34.13 KG/M2 | DIASTOLIC BLOOD PRESSURE: 79 MMHG | TEMPERATURE: 98.4 F | SYSTOLIC BLOOD PRESSURE: 121 MMHG | WEIGHT: 252 LBS | RESPIRATION RATE: 18 BRPM | HEART RATE: 68 BPM | OXYGEN SATURATION: 97 %

## 2018-05-31 DIAGNOSIS — G89.4 CHRONIC PAIN SYNDROME: ICD-10-CM

## 2018-05-31 DIAGNOSIS — N40.0 BENIGN PROSTATIC HYPERPLASIA WITHOUT LOWER URINARY TRACT SYMPTOMS: ICD-10-CM

## 2018-05-31 DIAGNOSIS — K21.9 GASTROESOPHAGEAL REFLUX DISEASE WITHOUT ESOPHAGITIS: ICD-10-CM

## 2018-05-31 DIAGNOSIS — E11.9 TYPE 2 DIABETES MELLITUS WITHOUT COMPLICATION, WITHOUT LONG-TERM CURRENT USE OF INSULIN (H): Primary | ICD-10-CM

## 2018-05-31 DIAGNOSIS — J01.90 ACUTE SINUSITIS TREATED WITH ANTIBIOTICS IN THE PAST 60 DAYS: ICD-10-CM

## 2018-05-31 DIAGNOSIS — K59.03 DRUG-INDUCED CONSTIPATION: ICD-10-CM

## 2018-05-31 DIAGNOSIS — L30.9 ECZEMA, UNSPECIFIED TYPE: ICD-10-CM

## 2018-05-31 DIAGNOSIS — E78.5 HYPERLIPIDEMIA LDL GOAL <100: ICD-10-CM

## 2018-05-31 DIAGNOSIS — M10.9 ACUTE GOUT OF RIGHT ELBOW, UNSPECIFIED CAUSE: ICD-10-CM

## 2018-05-31 DIAGNOSIS — M62.830 BACK MUSCLE SPASM: ICD-10-CM

## 2018-05-31 DIAGNOSIS — M96.1 FAILED BACK SYNDROME OF LUMBAR SPINE: ICD-10-CM

## 2018-05-31 LAB
ALBUMIN SERPL-MCNC: 4 G/DL (ref 3.4–5)
ALP SERPL-CCNC: 61 U/L (ref 40–150)
ALT SERPL W P-5'-P-CCNC: 65 U/L (ref 0–70)
ANION GAP SERPL CALCULATED.3IONS-SCNC: 8 MMOL/L (ref 3–14)
AST SERPL W P-5'-P-CCNC: 41 U/L (ref 0–45)
BILIRUB SERPL-MCNC: 0.3 MG/DL (ref 0.2–1.3)
BUN SERPL-MCNC: 14 MG/DL (ref 7–30)
CALCIUM SERPL-MCNC: 9.1 MG/DL (ref 8.5–10.1)
CHLORIDE SERPL-SCNC: 107 MMOL/L (ref 94–109)
CHOLEST SERPL-MCNC: 122 MG/DL
CO2 SERPL-SCNC: 26 MMOL/L (ref 20–32)
CREAT SERPL-MCNC: 1.18 MG/DL (ref 0.66–1.25)
CREAT UR-MCNC: 94 MG/DL
GFR SERPL CREATININE-BSD FRML MDRD: 62 ML/MIN/1.7M2
GLUCOSE SERPL-MCNC: 171 MG/DL (ref 70–99)
HBA1C MFR BLD: 6.7 % (ref 0–5.6)
HDLC SERPL-MCNC: 29 MG/DL
LDLC SERPL DIRECT ASSAY-MCNC: 74 MG/DL
MICROALBUMIN UR-MCNC: <5 MG/L
MICROALBUMIN/CREAT UR: NORMAL MG/G CR (ref 0–17)
POTASSIUM SERPL-SCNC: 4.6 MMOL/L (ref 3.4–5.3)
PROT SERPL-MCNC: 7.7 G/DL (ref 6.8–8.8)
SODIUM SERPL-SCNC: 141 MMOL/L (ref 133–144)
URATE SERPL-MCNC: 6.8 MG/DL (ref 3.5–7.2)

## 2018-05-31 PROCEDURE — 82043 UR ALBUMIN QUANTITATIVE: CPT | Performed by: FAMILY MEDICINE

## 2018-05-31 PROCEDURE — 36415 COLL VENOUS BLD VENIPUNCTURE: CPT | Performed by: FAMILY MEDICINE

## 2018-05-31 PROCEDURE — 83721 ASSAY OF BLOOD LIPOPROTEIN: CPT | Mod: 59 | Performed by: FAMILY MEDICINE

## 2018-05-31 PROCEDURE — 83718 ASSAY OF LIPOPROTEIN: CPT | Performed by: FAMILY MEDICINE

## 2018-05-31 PROCEDURE — 83036 HEMOGLOBIN GLYCOSYLATED A1C: CPT | Performed by: FAMILY MEDICINE

## 2018-05-31 PROCEDURE — 82465 ASSAY BLD/SERUM CHOLESTEROL: CPT | Performed by: FAMILY MEDICINE

## 2018-05-31 PROCEDURE — 99215 OFFICE O/P EST HI 40 MIN: CPT | Performed by: FAMILY MEDICINE

## 2018-05-31 PROCEDURE — 80053 COMPREHEN METABOLIC PANEL: CPT | Performed by: FAMILY MEDICINE

## 2018-05-31 PROCEDURE — 84550 ASSAY OF BLOOD/URIC ACID: CPT | Performed by: FAMILY MEDICINE

## 2018-05-31 RX ORDER — DOXYCYCLINE 100 MG/1
100 CAPSULE ORAL 2 TIMES DAILY
Qty: 20 CAPSULE | Refills: 0 | Status: SHIPPED | OUTPATIENT
Start: 2018-05-31 | End: 2018-06-10

## 2018-05-31 RX ORDER — PREDNISONE 20 MG/1
40 TABLET ORAL DAILY
Qty: 10 TABLET | Refills: 0 | Status: SHIPPED | OUTPATIENT
Start: 2018-05-31 | End: 2018-06-05

## 2018-05-31 RX ORDER — OXYCODONE HYDROCHLORIDE 5 MG/1
TABLET ORAL
Qty: 180 TABLET | Refills: 0 | Status: SHIPPED | OUTPATIENT
Start: 2018-05-31 | End: 2018-07-02

## 2018-05-31 RX ORDER — OXYCODONE HYDROCHLORIDE 5 MG/1
TABLET ORAL
Qty: 180 TABLET | Refills: 0 | Status: SHIPPED | OUTPATIENT
Start: 2018-05-31 | End: 2018-05-31

## 2018-05-31 RX ORDER — TRIAMCINOLONE ACETONIDE 1 MG/G
CREAM TOPICAL
Qty: 30 G | Refills: 3 | Status: SHIPPED | OUTPATIENT
Start: 2018-05-31 | End: 2020-07-16

## 2018-05-31 RX ORDER — ALLOPURINOL 100 MG/1
300 TABLET ORAL DAILY
Qty: 270 TABLET | Refills: 0 | Status: SHIPPED | OUTPATIENT
Start: 2018-05-31 | End: 2018-07-12

## 2018-05-31 RX ORDER — PREGABALIN 100 MG
200 CAPSULE ORAL 2 TIMES DAILY
Qty: 360 CAPSULE | Refills: 1 | Status: SHIPPED | OUTPATIENT
Start: 2018-05-31 | End: 2018-05-31

## 2018-05-31 RX ORDER — METFORMIN HCL 500 MG
2000 TABLET, EXTENDED RELEASE 24 HR ORAL
Qty: 360 TABLET | Refills: 3 | Status: SHIPPED | OUTPATIENT
Start: 2018-05-31 | End: 2019-04-08

## 2018-05-31 RX ORDER — AMOXICILLIN 250 MG
1 CAPSULE ORAL 2 TIMES DAILY
Qty: 60 TABLET | Refills: 3 | Status: SHIPPED | OUTPATIENT
Start: 2018-05-31 | End: 2018-09-27

## 2018-05-31 RX ORDER — SIMVASTATIN 20 MG
20 TABLET ORAL AT BEDTIME
Qty: 90 TABLET | Refills: 3 | Status: SHIPPED | OUTPATIENT
Start: 2018-05-31 | End: 2019-06-11

## 2018-05-31 RX ORDER — CYCLOBENZAPRINE HCL 5 MG
5-10 TABLET ORAL
Qty: 30 TABLET | Refills: 1 | Status: SHIPPED | OUTPATIENT
Start: 2018-05-31 | End: 2018-09-27

## 2018-05-31 RX ORDER — HYDROXYZINE HYDROCHLORIDE 25 MG/1
50-100 TABLET, FILM COATED ORAL
Qty: 30 TABLET | Refills: 5 | Status: SHIPPED | OUTPATIENT
Start: 2018-05-31 | End: 2018-09-27

## 2018-05-31 RX ORDER — FINASTERIDE 5 MG/1
5 TABLET, FILM COATED ORAL DAILY
Qty: 30 TABLET | Refills: 5 | Status: SHIPPED | OUTPATIENT
Start: 2018-05-31 | End: 2018-09-27

## 2018-05-31 RX ORDER — COLCHICINE 0.6 MG/1
0.6 CAPSULE ORAL 2 TIMES DAILY PRN
Qty: 20 CAPSULE | Refills: 0 | Status: CANCELLED | OUTPATIENT
Start: 2018-05-31

## 2018-05-31 NOTE — PROGRESS NOTES
SUBJECTIVE:   Toby Mcgrath is a 62 year old male who presents to clinic today for the following health issues:      Chronic Pain Follow-Up     Type / Location of Pain: all over  Analgesia/pain control:       Recent changes:  Same, intermittent sits around 7/8      Overall control: Tolerable with discomfort  Activity level/function:      Daily activities:  Getting worst with chores but driving is okay, going to the restroom is hard     Work:  Unable to work  Adverse effects:  No  Adherance    Taking medication as directed?  Yes    Participating in other treatments: yes  Risk Factors:    Sleep:  Good    Mood/anxiety:  Short temper is getting worst     Recent family or social stressors:  none noted    Other aggravating factors: none  PHQ-9 SCORE 5/17/2017 2/26/2018 4/20/2018   Total Score - - -   Total Score 4 4 4     KAYLA-7 SCORE 1/3/2017 2/26/2018   Total Score 3 3     Encounter-Level CSA - 08/30/2017:          Controlled Substance Agreement - Scan on 8/31/2017  8:35 AM : CONTROLLED SUBSTANCE AGREEMENT 08/30/17 (below)            Encounter-Level CSA - 08/30/2017:          Controlled Substance Agreement - Scan on 2/24/2017 11:25 AM : CONTROLLED SUBSTANCE AGREEMENT (below)            Encounter-Level CSA - 08/30/2017:          Controlled Substance Agreement - Scan on 7/30/2016  7:23 AM : CONTROLLED SUBSTANCE AGREEMENT (below)            Encounter-Level CSA - 08/30/2017:          Controlled Substance Agreement - Scan on 8/10/2015  1:58 PM : CONTROLLED SUBSTANCE AGREEMENT 08/07/15 (below)                Amount of exercise or physical activity: 2-3 days/week for an average of 15-30 minutes    Problems taking medications regularly: Yes,  side effects    Medication side effects: constipation     Diet: eating a lot of greasy foods and bread       (E11.9) Type 2 diabetes mellitus without complication, without long-term current use of insulin (H)  (primary encounter diagnosis)  Comment: He does report his blood sugars have  been higher in the 200s-260 sometimes 190.  Was recently ill with a sinus infection and was on antibiotics for this.  He is due for an A1c today and other labs as noted below.  Due for an eye exam as well.  Plan: metFORMIN (GLUCOPHAGE-XR) 500 MG 24 hr tablet,         Hemoglobin A1c, Albumin Random Urine         Quantitative with Creat Ratio, LDL cholesterol         direct, HDL cholesterol, Cholesterol            (M62.830) Back muscle spasm  Comment: Refills  Plan: cyclobenzaprine (FLEXERIL) 5 MG tablet             (M96.1) Failed back syndrome of lumbar spine  Comment: Also here for his monthly refill of chronic pain medications.  He continues to takes no more than 6 tablets per day and he fills his medications on the fifth of every month.  I did review the [place] prescribing site and there are no concerns.  Plan: oxyCODONE IR (ROXICODONE) 5 MG tablet,         DISCONTINUED: oxyCODONE IR (ROXICODONE) 5 MG         tablet            (E78.5) Hyperlipidemia LDL goal <100  Comment: Due for fasting lipid panel to check on progress of Zocor  Plan: simvastatin (ZOCOR) 20 MG tablet            Constipation  Comment:   Plan: senna-docusate (SENOKOT-S;PERICOLACE) 8.6-50 MG        per tablet        PRn use    (J01.90) Acute sinusitis treated with antibiotics in the past 60 days  Comment: As noted above did have a recent sinus infection at the end of last month.  Did complete a course of antibiotic including Augmentin.  Did feel that this helped a little bit while he was on the medication but now has puffiness swelling and pain over both cheeks left side more than right side.  Plan: predniSONE (DELTASONE) 20 MG tablet,         doxycycline (VIBRAMYCIN) 100 MG capsule            (L30.9) Eczema, unspecified type  Comment:   Plan: triamcinolone (KENALOG) 0.1 % cream            (G89.4) Chronic pain syndrome  Comment: He recently had Lyrica prescribed by our pharmacologist on 16 May so he should be good with his refills.  Hydroxyzine  is used as needed  Plan: hydrOXYzine (ATARAX) 25 MG tablet,         DISCONTINUED: LYRICA 100 MG capsule          (M10.9) Acute gout of right elbow, unspecified cause  Comment: He is not sure if he is taking this medication or not.  I recommended a visit with our pharmacist to go over all of his medications.  I also recommended that we check his kidney function before starting it.  He has been using colchicine intermittently for gouty flares but has not been on this recently.  Discussed that this controller medication should be started along with an acute rescue to avoid flares  Plan: allopurinol (ZYLOPRIM) 100 MG tablet, Uric         acid, Comprehensive metabolic panel            (K21.9) Gastroesophageal reflux disease without esophagitis  Comment:   Plan: ranitidine (ZANTAC) 300 MG tablet        Has been taking Zantac twice daily feels that this works really well for his heartburn.  Has been off of omeprazole and feels that that is okay but does still have heartburn    (N40.0) Benign prostatic hyperplasia without lower urinary tract symptoms  Comment:    Plan: finasteride (PROSCAR) 5 MG tablet                  Problem list and histories reviewed & adjusted, as indicated.  Additional history: as documented    Patient Active Problem List   Diagnosis     Thoracic or lumbosacral neuritis or radiculitis, unspecified     Osteoarthrosis, unspecified whether generalized or localized, pelvic region and thigh     Hypertrophy of prostate with urinary obstruction     Chronic rhinitis     Chronic pain syndrome     Disorder of bursae and tendons in shoulder region     Major depressive disorder, recurrent episode (H)     Anxiety     Gout     Type 2 diabetes mellitus without complication (H)     Hypertension goal BP (blood pressure) < 140/90     Advanced directives, counseling/discussion     DJD (degenerative joint disease), lumbar     Hyperlipidemia LDL goal <100     DDD (degenerative disc disease), cervical     GERD  (gastroesophageal reflux disease)     Lumbar radiculopathy     S/P lumbar fusion     Left-sided low back pain with left-sided sciatica     BMI 32.0-32.9,adult     History of hepatitis C     S/P lumbar discectomy     Acute post-operative pain     S/P laminectomy     Chronic pain     Bilateral low back pain without sciatica     Right-sided low back pain with right-sided sciatica     Acute gouty arthritis     Acute gout of right elbow, unspecified cause     Idiopathic gout of left elbow, unspecified chronicity     Gastroesophageal reflux disease without esophagitis     Failed back syndrome of lumbar spine     Slow transit constipation     Benign prostatic hyperplasia with urinary frequency     Past Surgical History:   Procedure Laterality Date     BACK SURGERY       both shoulder repair  2006, 2008     C NONSPECIFIC PROCEDURE  1995    neck cyst     C NONSPECIFIC PROCEDURE  1979    laminectomy L5-S1 x 2     C SHOULDER SURG PROC UNLISTED  2005, '07    repairs,later manipulate,inject LT, RT     FUSION LUMBAR ANTERIOR, FUSION LUMBAR POSTERIOR TWO LEVELS, COMBINED N/A 9/17/2014    Procedure: COMBINED FUSION LUMBAR ANTERIOR, FUSION LUMBAR POSTERIOR TWO LEVELS;  Surgeon: Chris Lugo MD;  Location: [facility] OR     HC COLONOSCOPY THRU STOMA, DIAGNOSTIC  3/04    normal     HC UGI ENDOSCOPY DIAG W OR W/O BRUSH/WASH  3/04    ok     HEAD & NECK SURGERY       HEMILAMINECTOMY, DISCECTOMY LUMBAR ONE LEVEL, COMBINED Left 9/8/2015    Procedure: COMBINED HEMILAMINECTOMY, DISCECTOMY LUMBAR ONE LEVEL;  Surgeon: Jer Irby MD;  Location: [facility] OR     right hip replacement  10,2010       Social History   Substance Use Topics     Smoking status: Former Smoker     Years: 40.00     Types: Cigarettes     Smokeless tobacco: Former User     Quit date: 2/1/2013     Alcohol use No     Family History   Problem Relation Age of Onset     DIABETES Mother      C.A.D. Father      DIABETES Father      Hypertension Father          Current  Outpatient Prescriptions   Medication Sig Dispense Refill     allopurinol (ZYLOPRIM) 100 MG tablet Take 3 tablets (300 mg) by mouth daily 270 tablet 0     aspirin 81 MG tablet Take 1 tablet (81 mg) by mouth daily 100 tablet 3     blood glucose monitoring (LENA MICROLET) lancets Use to test blood sugars 2-3 times daily as directed (lena contour meter) 200 each 1     blood glucose monitoring (NO BRAND SPECIFIED) meter device kit Use to test blood sugar 2 times daily or as directed. 1 kit 0     blood glucose monitoring (NO BRAND SPECIFIED) test strip Use to test blood sugars 2 times daily or as directed 200 strip 1     Colchicine (MITIGARE) 0.6 MG CAPS Take 0.6 mg by mouth 2 times daily as needed 20 capsule 0     cyclobenzaprine (FLEXERIL) 5 MG tablet Take 1-2 tablets (5-10 mg) by mouth nightly as needed for muscle spasms 30 tablet 1     doxycycline (VIBRAMYCIN) 100 MG capsule Take 1 capsule (100 mg) by mouth 2 times daily for 10 days 20 capsule 0     finasteride (PROSCAR) 5 MG tablet Take 1 tablet (5 mg) by mouth daily For prostate enlargement. Please fill on $4 list 30 tablet 5     FLUoxetine (PROZAC) 20 MG capsule TAKE THREE CAPSULES BY MOUTH ONCE DAILY 270 capsule 3     fluticasone (FLONASE) 50 MCG/ACT spray Spray 1-2 sprays into both nostrils daily 1 Bottle 11     hydrOXYzine (ATARAX) 25 MG tablet Take 2-4 tablets ( mg) by mouth nightly as needed for anxiety (for panic at night) 30 tablet 5     metFORMIN (GLUCOPHAGE-XR) 500 MG 24 hr tablet Take 4 tablets (2,000 mg) by mouth daily (with dinner) 360 tablet 3     oxyCODONE IR (ROXICODONE) 5 MG tablet 1 tab q4h, PRN max 6 per day. OK to fill on 6/5/2018 180 tablet 0     predniSONE (DELTASONE) 20 MG tablet Take 2 tablets (40 mg) by mouth daily for 5 days 10 tablet 0     ranitidine (ZANTAC) 300 MG tablet Take 1 tablet (300 mg) by mouth 2 times daily To reduce stomach acid 180 tablet 3     senna-docusate (SENOKOT-S;PERICOLACE) 8.6-50 MG per tablet Take 1 tablet  by mouth 2 times daily 60 tablet 3     simvastatin (ZOCOR) 20 MG tablet Take 1 tablet (20 mg) by mouth At Bedtime 90 tablet 3     triamcinolone (KENALOG) 0.1 % cream Apply sparingly to affected area twice daily for 7 days. Then stop and use only for flares 30 g 3     [DISCONTINUED] finasteride (PROSCAR) 5 MG tablet Take 1 tablet (5 mg) by mouth daily For prostate enlargement. Please fill on $4 list 30 tablet 5     [DISCONTINUED] metFORMIN (GLUCOPHAGE-XR) 500 MG 24 hr tablet Take 3 tablets (1,500 mg) by mouth daily (with dinner) 180 tablet 3     [DISCONTINUED] simvastatin (ZOCOR) 20 MG tablet Take 1 tablet (20 mg) by mouth At Bedtime 90 tablet 3     Labs reviewed in EPIC    Reviewed and updated as needed this visit by clinical staff       Reviewed and updated as needed this visit by Provider         ROS:  Constitutional, HEENT, cardiovascular, pulmonary, gi and gu systems are negative, except as otherwise noted.    OBJECTIVE:                                                    /79  Pulse 68  Temp 98.4  F (36.9  C) (Oral)  Resp 18  Ht 6' (1.829 m)  Wt 252 lb (114.3 kg)  SpO2 97%  BMI 34.18 kg/m2  Body mass index is 34.18 kg/(m^2).  GENERAL APPEARANCE: healthy, alert and no distress  PSYCH: mentation appears normal and affect normal/bright    Diagnostic test results:  Diagnostic Test Results:  Results for orders placed or performed in visit on 05/31/18 (from the past 24 hour(s))   Hemoglobin A1c   Result Value Ref Range    Hemoglobin A1C 6.7 (H) 0 - 5.6 %     *Note: Due to a large number of results and/or encounters for the requested time period, some results have not been displayed. A complete set of results can be found in Results Review.        ASSESSMENT/PLAN:                                                    1. Type 2 diabetes mellitus without complication, without long-term current use of insulin (H)  His A1c did come back higher than it had been in the past.  I recommended that he increase metformin  to 4 tablets daily I also talked about the importance of careful diabetic diet and walking or exercise as tolerated.  Other labs are pending.  He is due for an eye exam  - metFORMIN (GLUCOPHAGE-XR) 500 MG 24 hr tablet; Take 4 tablets (2,000 mg) by mouth daily (with dinner)  Dispense: 360 tablet; Refill: 3  - Hemoglobin A1c  - Albumin Random Urine Quantitative with Creat Ratio  - LDL cholesterol direct  - HDL cholesterol  - Cholesterol    2. Back muscle spasm    - cyclobenzaprine (FLEXERIL) 5 MG tablet; Take 1-2 tablets (5-10 mg) by mouth nightly as needed for muscle spasms  Dispense: 30 tablet; Refill: 1    3. Failed back syndrome of lumbar spine  Refills of his chronic narcotic today.  He continues to use it appropriately and feels medication is working to help maintain pain.  See notes above he is refilling it on a regular basis no concerns on prescribing website  - oxyCODONE IR (ROXICODONE) 5 MG tablet; 1 tab q4h, PRN max 6 per day. OK to fill on 6/5/2018  Dispense: 180 tablet; Refill: 0    4. Hyperlipidemia LDL goal <100    - simvastatin (ZOCOR) 20 MG tablet; Take 1 tablet (20 mg) by mouth At Bedtime  Dispense: 90 tablet; Refill: 3    5. Chronic constipation/narcotic use   has intermittent use of this  - senna-docusate (SENOKOT-S;PERICOLACE) 8.6-50 MG per tablet; Take 1 tablet by mouth 2 times daily  Dispense: 60 tablet; Refill: 3    6. Acute sinusitis treated with antibiotics in the past 60 days  He did recently have Augmentin in the last 60 days and today has reproducible pressure and pain over both maxillary sinuses.  Recommended that he try a combination of 5 days of prednisone along with doxycycline.  Discussed unfortunately that his sugars will go up but I think that this will help get his symptoms to improve and resolve.  - predniSONE (DELTASONE) 20 MG tablet; Take 2 tablets (40 mg) by mouth daily for 5 days  Dispense: 10 tablet; Refill: 0  - doxycycline (VIBRAMYCIN) 100 MG capsule; Take 1 capsule (100  mg) by mouth 2 times daily for 10 days  Dispense: 20 capsule; Refill: 0    7. Eczema, unspecified type    - triamcinolone (KENALOG) 0.1 % cream; Apply sparingly to affected area twice daily for 7 days. Then stop and use only for flares  Dispense: 30 g; Refill: 3    8. Chronic pain syndrome    - hydrOXYzine (ATARAX) 25 MG tablet; Take 2-4 tablets ( mg) by mouth nightly as needed for anxiety (for panic at night)  Dispense: 30 tablet; Refill: 5    9. Acute gout of right elbow, unspecified cause  Discussed importance of not taking medication until he meets with our pharmacologist and until lab results are back.  Reviewed that chronic gout is usually managed with a controller medication however we will need to review his labs and if they are stable talk about the option of starting this medication along with seeing which she does have at home.  - allopurinol (ZYLOPRIM) 100 MG tablet; Take 3 tablets (300 mg) by mouth daily  Dispense: 270 tablet; Refill: 0  - Uric acid  - Comprehensive metabolic panel    11. Gastroesophageal reflux disease without esophagitis    - ranitidine (ZANTAC) 300 MG tablet; Take 1 tablet (300 mg) by mouth 2 times daily To reduce stomach acid  Dispense: 180 tablet; Refill: 3    12. Benign prostatic hyperplasia without lower urinary tract symptoms    - finasteride (PROSCAR) 5 MG tablet; Take 1 tablet (5 mg) by mouth daily For prostate enlargement. Please fill on $4 list  Dispense: 30 tablet; Refill: 5    Total time was over 40 minutes with >50% spent in counseling     Follow up with Provider -  1 month for pete Haskins MD  [facility]

## 2018-05-31 NOTE — NURSING NOTE
No chief complaint on file.    /79  Pulse 68  Temp 98.4  F (36.9  C) (Oral)  Resp 18  Ht 6' (1.829 m)  Wt 252 lb (114.3 kg)  SpO2 97%  BMI 34.18 kg/m2 Estimated body mass index is 34.18 kg/(m^2) as calculated from the following:    Height as of this encounter: 6' (1.829 m).    Weight as of this encounter: 252 lb (114.3 kg).  bp completed using cuff size: large       Health Maintenance addressed:  NONE    n/a    Heather Pierson MA

## 2018-05-31 NOTE — Clinical Note
Please call and remind him NOT to start the Allopurinol until he meets with Mary/CASEY.  He should bring all his meds to that visit with her  Thanks Sn

## 2018-05-31 NOTE — MR AVS SNAPSHOT
"              After Visit Summary   5/31/2018    Toby Mcgrath    MRN: [id number]           Patient Information     Date Of Birth          1955        Visit Information        Provider Department      5/31/2018 10:00 AM Connie Haskins MD [facility]        Today's Diagnoses     Acute sinusitis treated with antibiotics in the past 60 days    -  1    Back muscle spasm        Failed back syndrome of lumbar spine        Hyperlipidemia LDL goal <100        Lumbar radiculopathy        Eczema, unspecified type        Chronic pain syndrome        Acute pain of right knee        Acute gout of right elbow, unspecified cause        Gastroesophageal reflux disease without esophagitis        Type 2 diabetes mellitus without complication, without long-term current use of insulin (H)        Benign prostatic hyperplasia without lower urinary tract symptoms           Follow-ups after your visit        Who to contact     If you have questions or need follow up information about today's clinic visit or your schedule please contact [facility] directly at [phone].  Normal or non-critical lab and imaging results will be communicated to you by MyChart, letter or phone within 4 business days after the clinic has received the results. If you do not hear from us within 7 days, please contact the clinic through [employer] or phone. If you have a critical or abnormal lab result, we will notify you by phone as soon as possible.  Submit refill requests through [employer] or call your pharmacy and they will forward the refill request to us. Please allow 3 business days for your refill to be completed.          Additional Information About Your Visit        [employer] Information     [employer]ault lets you send messages to your doctor, view your test results, renew your prescriptions, schedule appointments and more. To sign up, go to [email or web address] . Click on \"Log in\" on the left side of the screen, which " "will take you to the Welcome page. Then click on \"Sign up Now\" on the right side of the page.     You will be asked to enter the access code listed below, as well as some personal information. Please follow the directions to create your username and password.     Your access code is: [email or web address]  Expires: 2018 10:45 AM     Your access code will  in 90 days. If you need help or a new code, please call your [facility] or [phone].        Care EveryWhere ID     This is your Care EveryWhere ID. This could be used by other organizations to access your [place] medical records  [id number]        Your Vitals Were     Pulse Temperature Respirations Height Pulse Oximetry BMI (Body Mass Index)    68 98.4  F (36.9  C) (Oral) 18 6' (1.829 m) 97% 34.18 kg/m2       Blood Pressure from Last 3 Encounters:   18 121/79   18 120/68   18 134/76    Weight from Last 3 Encounters:   18 252 lb (114.3 kg)   18 252 lb 3.2 oz (114.4 kg)   18 252 lb 14.4 oz (114.7 kg)              We Performed the Following     Albumin Random Urine Quantitative with Creat Ratio     Cholesterol     Comprehensive metabolic panel     HDL cholesterol     Hemoglobin A1c     LDL cholesterol direct     PAF COMPLETED     Uric acid          Today's Medication Changes          These changes are accurate as of 18 10:45 AM.  If you have any questions, ask your nurse or doctor.               Start taking these medicines.        Dose/Directions    doxycycline 100 MG capsule   Commonly known as:  VIBRAMYCIN   Used for:  Acute sinusitis treated with antibiotics in the past 60 days   Started by:  Connie Haskins MD        Dose:  100 mg   Take 1 capsule (100 mg) by mouth 2 times daily for 10 days   Quantity:  20 capsule   Refills:  0       predniSONE 20 MG tablet   Commonly known as:  DELTASONE   Used for:  Acute sinusitis treated with antibiotics in the past 60 days   Started by:  Connie Haskins MD "        Dose:  40 mg   Take 2 tablets (40 mg) by mouth daily for 5 days   Quantity:  10 tablet   Refills:  0         These medicines have changed or have updated prescriptions.        Dose/Directions    metFORMIN 500 MG 24 hr tablet   Commonly known as:  GLUCOPHAGE-XR   This may have changed:  how much to take   Used for:  Type 2 diabetes mellitus without complication, without long-term current use of insulin (H)   Changed by:  Connie Haskins MD        Dose:  2000 mg   Take 4 tablets (2,000 mg) by mouth daily (with dinner)   Quantity:  360 tablet   Refills:  3       ranitidine 300 MG tablet   Commonly known as:  ZANTAC   This may have changed:  when to take this   Used for:  Gastroesophageal reflux disease without esophagitis   Changed by:  Connie Haskins MD        Dose:  300 mg   Take 1 tablet (300 mg) by mouth 2 times daily To reduce stomach acid   Quantity:  180 tablet   Refills:  3         Stop taking these medicines if you haven't already. Please contact your care team if you have questions.     amoxicillin-clavulanate 875-125 MG per tablet   Commonly known as:  AUGMENTIN   Stopped by:  Connie Haskins MD                Where to get your medicines      These medications were sent to [facility] [address] [place] [address]     Phone:  [phone]     allopurinol 100 MG tablet    cyclobenzaprine 5 MG tablet    doxycycline 100 MG capsule    finasteride 5 MG tablet    hydrOXYzine 25 MG tablet    metFORMIN 500 MG 24 hr tablet    ranitidine 300 MG tablet    senna-docusate 8.6-50 MG per tablet    simvastatin 20 MG tablet    triamcinolone 0.1 % cream         Some of these will need a paper prescription and others can be bought over the counter.  Ask your nurse if you have questions.     Bring a paper prescription for each of these medications     LYRICA 100 MG capsule    oxyCODONE IR 5 MG tablet         Call your pharmacy to  confirm that your medication is ready for pickup. It may take up to 24 hours for them to receive the prescription. If the prescription is not ready within 3 business days, please contact your clinic or your provider.     We will let you know when these medications are ready. If you don't hear back within 3 business days, please contact us.     predniSONE 20 MG tablet               Information about OPIOIDS     PRESCRIPTION OPIOIDS: WHAT YOU NEED TO KNOW   You have a prescription for an opioid (narcotic) pain medicine. Opioids can cause addiction. If you have a history of chemical dependency of any type, you are at a higher risk of becoming addicted to opioids. Only take this medicine after all other options have been tried. Take it for as short a time and as few doses as possible.     Do not:    Drive. If you drive while taking these medicines, you could be arrested for driving under the influence (DUI).    Operate heavy machinery    Do any other dangerous activities while taking these medicines.     Drink any alcohol while taking these medicines.      Take with any other medicines that contain acetaminophen. Read all labels carefully. Look for the word  acetaminophen  or  Tylenol.  Ask your pharmacist if you have questions or are unsure.    Store your pills in a secure place, locked if possible. We will not replace any lost or stolen medicine. If you don t finish your medicine, please throw away (dispose) as directed by your pharmacist. The [place] [employer]ution [employer] has more information about safe disposal: https://www.pca.state.[place]/living-green/managing-unwanted-medications    All opioids tend to cause constipation. Drink plenty of water and eat foods that have a lot of fiber, such as fruits, vegetables, prune juice, apple juice and high-fiber cereal. Take a laxative (Miralax, milk of magnesia, Colace, Senna) if you don t move your bowels at least every other day.          Primary Care Provider Office  Phone # Fax #    BoykinNiyah Haskins [phone] [address] [address]        Equal Access to Services     ALMA WEBER : Hadsharifa joselito stallings roxaneo Sopranavali, waaxda luqadaha, qaybta kaalmada adezach, gayle rivera laAnilhemanth ramirez. So [facility] [phone].    ATENCIÓN: Si habla español, tiene a holliday disposición servicios gratuitos de asistencia lingüística. Llame al [phone].    We comply with applicable federal civil rights laws and [place] laws. We do not discriminate on the basis of race, color, national origin, age, disability, sex, sexual orientation, or gender identity.            Thank you!     Thank you for choosing [facility]  for your care. Our goal is always to provide you with excellent care. Hearing back from our patients is one way we can continue to improve our services. Please take a few minutes to complete the written survey that you may receive in the mail after your visit with us. Thank you!             Your Updated Medication List - Protect others around you: Learn how to safely use, store and throw away your medicines at www.disposemymeds.org.          This list is accurate as of 5/31/18 10:45 AM.  Always use your most recent med list.                   Brand Name Dispense Instructions for use Diagnosis    allopurinol 100 MG tablet    ZYLOPRIM    270 tablet    Take 3 tablets (300 mg) by mouth daily    Acute gout of right elbow, unspecified cause       aspirin 81 MG tablet     100 tablet    Take 1 tablet (81 mg) by mouth daily    Type 2 diabetes, HbA1C goal < 8% (H)       blood glucose monitoring lancets     200 each    Use to test blood sugars 2-3 times daily as directed (lena contour meter)    Type 2 diabetes, HbA1C goal < 8% (H)       blood glucose monitoring meter device kit    no brand specified    1 kit    Use to test blood sugar 2 times daily or as directed.    Type 2 diabetes mellitus without complication, without  long-term current use of insulin (H)       blood glucose monitoring test strip    no brand specified    200 strip    Use to test blood sugars 2 times daily or as directed    Type 2 diabetes mellitus without complication, without long-term current use of insulin (H)       colchicine 0.6 MG tablet    MITIGARE    20 capsule    Take 0.6 mg by mouth 2 times daily as needed    Acute pain of right knee       cyclobenzaprine 5 MG tablet    FLEXERIL    30 tablet    Take 1-2 tablets (5-10 mg) by mouth nightly as needed for muscle spasms    Back muscle spasm       doxycycline 100 MG capsule    VIBRAMYCIN    20 capsule    Take 1 capsule (100 mg) by mouth 2 times daily for 10 days    Acute sinusitis treated with antibiotics in the past 60 days       finasteride 5 MG tablet    PROSCAR    30 tablet    Take 1 tablet (5 mg) by mouth daily For prostate enlargement. Please fill on $4 list    Benign prostatic hyperplasia without lower urinary tract symptoms       FLUoxetine 20 MG capsule    PROzac    270 capsule    TAKE THREE CAPSULES BY MOUTH ONCE DAILY    Mild episode of recurrent major depressive disorder (H)       fluticasone 50 MCG/ACT spray    FLONASE    1 Bottle    Spray 1-2 sprays into both nostrils daily    Chronic rhinitis, unspecified type       hydrOXYzine 25 MG tablet    ATARAX    30 tablet    Take 2-4 tablets ( mg) by mouth nightly as needed for anxiety (for panic at night)    Chronic pain syndrome       LYRICA 100 MG capsule   Generic drug:  pregabalin     360 capsule    Take 2 capsules (200 mg) by mouth 2 times daily    Chronic pain syndrome       metFORMIN 500 MG 24 hr tablet    GLUCOPHAGE-XR    360 tablet    Take 4 tablets (2,000 mg) by mouth daily (with dinner)    Type 2 diabetes mellitus without complication, without long-term current use of insulin (H)       oxyCODONE IR 5 MG tablet    ROXICODONE    180 tablet    1 tab q4h, PRN max 6 per day.    Failed back syndrome of lumbar spine       predniSONE 20 MG  tablet    DELTASONE    10 tablet    Take 2 tablets (40 mg) by mouth daily for 5 days    Acute sinusitis treated with antibiotics in the past 60 days       ranitidine 300 MG tablet    ZANTAC    180 tablet    Take 1 tablet (300 mg) by mouth 2 times daily To reduce stomach acid    Gastroesophageal reflux disease without esophagitis       senna-docusate 8.6-50 MG per tablet    SENOKOT-S;PERICOLACE    60 tablet    Take 1 tablet by mouth 2 times daily    Lumbar radiculopathy       simvastatin 20 MG tablet    ZOCOR    90 tablet    Take 1 tablet (20 mg) by mouth At Bedtime    Hyperlipidemia LDL goal <100       triamcinolone 0.1 % cream    KENALOG    30 g    Apply sparingly to affected area twice daily for 7 days. Then stop and use only for flares    Eczema, unspecified type

## 2018-05-31 NOTE — Clinical Note
He is not sure about all of his medications.  I asked him to meet with your team and NOT start Allopurinol until he see you.  Needs to have renal functionand uric acid leves return and to talk about startin this with colchicine on board    Connie

## 2018-06-06 ENCOUNTER — TELEPHONE (OUTPATIENT)
Dept: FAMILY MEDICINE | Facility: CLINIC | Age: 63
End: 2018-06-06

## 2018-06-06 NOTE — PROGRESS NOTES
Please call patient with  Results    He does have a rising uric acid level and this placed him at risk for another gout flare.  I would recommend that he start Mitigare which is generic colchicine once daily and then talk to Mary about when to start the Allopurinol.  He should not start this Allopurinol yet.  If he has any pain he needs to hydrate and take the colchicine daily and once pain is gone 2 weeks can then start Allopurinol  Can he schedule with MTM?    Thank you!    Connie

## 2018-06-06 NOTE — TELEPHONE ENCOUNTER
SN,  Informed pt below.  Pt is scheduled with Mary 6/28 and says he will not take any allopurinol until he speaks with her.    His bottle of colcrys (Colchicine) is 0.6 mg bid instead of once daily as noted below.  Ok to just continue his bid dosing?  Please advise.  Thanks,  Lavinia Ortega RN    Ok to leave a detailed message [phone]     Result note 6/6  Notes Recorded by Connie Haskins MD on 6/6/2018 at 2:15 PM  Please call patient with  Results    He does have a rising uric acid level and this placed him at risk for another gout flare.  I would recommend that he start Mitigare which is generic colchicine once daily and then talk to Mary about when to start the Allopurinol.  He should not start this Allopurinol yet.  If he has any pain he needs to hydrate and take the colchicine daily and once pain is gone 2 weeks can then start Allopurinol  Can he schedule with MTM?    Thank you!    Connie

## 2018-06-09 DIAGNOSIS — F33.0 MILD EPISODE OF RECURRENT MAJOR DEPRESSIVE DISORDER (H): ICD-10-CM

## 2018-06-11 NOTE — TELEPHONE ENCOUNTER
"Prescription approved per [facility] Refill Protocol.  Ivis SMALLS RN    Requested Prescriptions   Pending Prescriptions Disp Refills     FLUoxetine (PROZAC) 20 MG capsule [Pharmacy Med Name: FLUOXETINE 20MG     CAP] 90 capsule 11     Sig: TAKE THREE CAPSULES BY MOUTH ONCE DAILY    SSRIs Protocol Passed    6/9/2018 10:21 AM       Passed - PHQ-9 score less than 5 in past 6 months    Please review last PHQ-9 score.          Passed - Patient is age 18 or older       Passed - Recent (6 mo) or future (30 days) visit within the authorizing provider's specialty    Patient had office visit in the last 6 months or has a visit in the next 30 days with authorizing provider or within the authorizing provider's specialty.  See \"Patient Info\" tab in inbasket, or \"Choose Columns\" in Meds & Orders section of the refill encounter.            Next 5 appointments (look out 90 days)     Jun 28, 2018 10:30 AM CDT   Office Visit with Mary Last RPH   [facility] ([facility])    [address]  [address] [place] [id number] [phone]                  "

## 2018-06-25 ENCOUNTER — OFFICE VISIT (OUTPATIENT)
Dept: URGENT CARE | Facility: URGENT CARE | Age: 63
End: 2018-06-25
Payer: COMMERCIAL

## 2018-06-25 VITALS
TEMPERATURE: 98.2 F | SYSTOLIC BLOOD PRESSURE: 124 MMHG | RESPIRATION RATE: 16 BRPM | BODY MASS INDEX: 34.08 KG/M2 | WEIGHT: 251.31 LBS | DIASTOLIC BLOOD PRESSURE: 90 MMHG | HEART RATE: 86 BPM

## 2018-06-25 DIAGNOSIS — J31.0 CHRONIC RHINITIS, UNSPECIFIED TYPE: ICD-10-CM

## 2018-06-25 DIAGNOSIS — E11.9 TYPE 2 DIABETES MELLITUS WITHOUT COMPLICATION, WITHOUT LONG-TERM CURRENT USE OF INSULIN (H): ICD-10-CM

## 2018-06-25 DIAGNOSIS — R22.0 FACIAL SWELLING: Primary | ICD-10-CM

## 2018-06-25 PROCEDURE — 99214 OFFICE O/P EST MOD 30 MIN: CPT | Performed by: PHYSICIAN ASSISTANT

## 2018-06-25 RX ORDER — LORATADINE 10 MG/1
10 TABLET ORAL DAILY
Qty: 30 TABLET | Refills: 3 | Status: SHIPPED | OUTPATIENT
Start: 2018-06-25 | End: 2021-05-06

## 2018-06-25 RX ORDER — FLUTICASONE PROPIONATE 50 MCG
1-2 SPRAY, SUSPENSION (ML) NASAL DAILY
Qty: 1 BOTTLE | Refills: 3 | Status: SHIPPED | OUTPATIENT
Start: 2018-06-25 | End: 2018-08-23

## 2018-06-25 NOTE — MR AVS SNAPSHOT
After Visit Summary   6/25/2018    Toby Mcgrath    MRN: [id number]           Patient Information     Date Of Birth          1955        Visit Information        Provider Department      6/25/2018 9:35 AM Alhaji Pierre PA-C [facility]        Today's Diagnoses     Facial swelling    -  1    Type 2 diabetes mellitus without complication, without long-term current use of insulin (H)        Chronic rhinitis, unspecified type           Follow-ups after your visit        Additional Services     OTOLARYNGOLOGY REFERRAL       Your provider has referred you to: N: [facility] [phone]   [email or web address]    Please be aware that coverage of these services is subject to the terms and limitations of your health insurance plan.  Call member services at your health plan with any benefit or coverage questions.      Please bring the following with you to your appointment:    (1) Any X-Rays, CTs or MRIs which have been performed.  Contact the facility where they were done to arrange for  prior to your scheduled appointment.   (2) List of current medications  (3) This referral request   (4) Any documents/labs given to you for this referral                  Your next 10 appointments already scheduled     Jun 28, 2018 10:30 AM CDT   Office Visit with [facility] ([facility])    [address]  [address]  [facility] [address] [phone]           Bring a current list of meds and any records pertaining to this visit. For Physicals, please bring immunization records and any forms needing to be filled out. Please arrive 10 minutes early to complete paperwork.              Who to contact     If you have questions or need follow up information about today's clinic visit or your schedule please contact [place]  [facility] directly at [phone].  Normal or non-critical lab and imaging results will be communicated to you by MyChart, letter or phone within 4 business days after the clinic has received the results. If you do not hear from us within 7 days, please contact the clinic through [employer] or phone. If you have a critical or abnormal lab result, we will notify you by phone as soon as possible.  Submit refill requests through [employer] or call your pharmacy and they will forward the refill request to us. Please allow 3 business days for your refill to be completed.          Additional Information About Your Visit        [employer]" Information     [employer] lets you send messages to your doctor, view your test results, renew your prescriptions, schedule appointments and more. To sign up, go to [email or web address] . Click on \"Log in\" on the left side of the screen, which will take you to the Welcome page. Then click on \"Sign up Now\" on the right side of the page.     You will be asked to enter the access code listed below, as well as some personal information. Please follow the directions to create your username and password.     Your access code is: [email or web address]  Expires: 2018 10:45 AM     Your access code will  in 90 days. If you need help or a new code, please call your [place] clinic or [phone].        Care EveryWhere ID     This is your Care EveryWhere ID. This could be used by other organizations to access your [facility] medical records  [id number]        Your Vitals Were     Pulse Temperature Respirations BMI (Body Mass Index)          86 98.2  F (36.8  C) (Oral) 16 34.08 kg/m2         Blood Pressure from Last 3 Encounters:   18 124/90   18 121/79   18 120/68    Weight from Last 3 Encounters:   18 251 lb 5 oz (114 kg)   18 252 lb (114.3 kg)   18 252 lb 3.2 oz (114.4 kg)              We Performed the Following     OTOLARYNGOLOGY REFERRAL     "      Today's Medication Changes          These changes are accurate as of 6/25/18 11:46 AM.  If you have any questions, ask your nurse or doctor.               Start taking these medicines.        Dose/Directions    loratadine 10 MG tablet   Commonly known as:  CLARITIN   Used for:  Facial swelling, Chronic rhinitis, unspecified type   Started by:  Alhaji Pierer PA-C        Dose:  10 mg   Take 1 tablet (10 mg) by mouth daily   Quantity:  30 tablet   Refills:  3         These medicines have changed or have updated prescriptions.        Dose/Directions    * fluticasone 50 MCG/ACT spray   Commonly known as:  FLONASE   This may have changed:  Another medication with the same name was added. Make sure you understand how and when to take each.   Used for:  Chronic rhinitis, unspecified type   Changed by:  Alhaji Pierre PA-C        Dose:  1-2 spray   Spray 1-2 sprays into both nostrils daily   Quantity:  1 Bottle   Refills:  11       * fluticasone 50 MCG/ACT spray   Commonly known as:  FLONASE   This may have changed:  You were already taking a medication with the same name, and this prescription was added. Make sure you understand how and when to take each.   Used for:  Chronic rhinitis, unspecified type, Facial swelling   Changed by:  Alhaji Pierre PA-C        Dose:  1-2 spray   Spray 1-2 sprays into both nostrils daily   Quantity:  1 Bottle   Refills:  3       * Notice:  This list has 2 medication(s) that are the same as other medications prescribed for you. Read the directions carefully, and ask your doctor or other care provider to review them with you.         Where to get your medicines      These medications were sent to [address] [address]     Phone:  [phone]     fluticasone 50 MCG/ACT spray    loratadine 10 MG tablet                Primary Care Provider Office Phone # Fax #    Connie Haskins MD  [phone]       [address] [facility]   [facility] [address]        Equal Access to Services     ALMA WEBER : Hadii joselito stallings zack Ansari, waluchoda luqadaha, qaybta kaalmada francis, gayle ramirez. So [facility] [phone].    ATENCIÓN: Si habla español, tiene a holliday disposición servicios gratuitos de asistencia lingüística. Llame al [phone].    We comply with applicable federal civil rights laws and [place] laws. We do not discriminate on the basis of race, color, national origin, age, disability, sex, sexual orientation, or gender identity.            Thank you!     Thank you for choosing [facility]  for your care. Our goal is always to provide you with excellent care. Hearing back from our patients is one way we can continue to improve our services. Please take a few minutes to complete the written survey that you may receive in the mail after your visit with us. Thank you!             Your Updated Medication List - Protect others around you: Learn how to safely use, store and throw away your medicines at www.disposemymeds.org.          This list is accurate as of 6/25/18 11:46 AM.  Always use your most recent med list.                   Brand Name Dispense Instructions for use Diagnosis    allopurinol 100 MG tablet    ZYLOPRIM    270 tablet    Take 3 tablets (300 mg) by mouth daily    Acute gout of right elbow, unspecified cause       aspirin 81 MG tablet     100 tablet    Take 1 tablet (81 mg) by mouth daily    Type 2 diabetes, HbA1C goal < 8% (H)       blood glucose monitoring lancets     200 each    Use to test blood sugars 2-3 times daily as directed (lena contour meter)    Type 2 diabetes, HbA1C goal < 8% (H)       blood glucose monitoring meter device kit    no brand specified    1 kit    Use to test blood sugar 2 times daily or as directed.    Type 2 diabetes mellitus without complication, without long-term current use of  insulin (H)       blood glucose monitoring test strip    no brand specified    200 strip    Use to test blood sugars 2 times daily or as directed    Type 2 diabetes mellitus without complication, without long-term current use of insulin (H)       colchicine 0.6 MG tablet    MITIGARE    20 capsule    Take 0.6 mg by mouth 2 times daily as needed    Acute pain of right knee       cyclobenzaprine 5 MG tablet    FLEXERIL    30 tablet    Take 1-2 tablets (5-10 mg) by mouth nightly as needed for muscle spasms    Back muscle spasm       finasteride 5 MG tablet    PROSCAR    30 tablet    Take 1 tablet (5 mg) by mouth daily For prostate enlargement. Please fill on $4 list    Benign prostatic hyperplasia without lower urinary tract symptoms       FLUoxetine 20 MG capsule    PROzac    270 capsule    TAKE THREE CAPSULES BY MOUTH ONCE DAILY    Mild episode of recurrent major depressive disorder (H)       * fluticasone 50 MCG/ACT spray    FLONASE    1 Bottle    Spray 1-2 sprays into both nostrils daily    Chronic rhinitis, unspecified type       * fluticasone 50 MCG/ACT spray    FLONASE    1 Bottle    Spray 1-2 sprays into both nostrils daily    Chronic rhinitis, unspecified type, Facial swelling       hydrOXYzine 25 MG tablet    ATARAX    30 tablet    Take 2-4 tablets ( mg) by mouth nightly as needed for anxiety (for panic at night)    Chronic pain syndrome       loratadine 10 MG tablet    CLARITIN    30 tablet    Take 1 tablet (10 mg) by mouth daily    Facial swelling, Chronic rhinitis, unspecified type       metFORMIN 500 MG 24 hr tablet    GLUCOPHAGE-XR    360 tablet    Take 4 tablets (2,000 mg) by mouth daily (with dinner)    Type 2 diabetes mellitus without complication, without long-term current use of insulin (H)       oxyCODONE IR 5 MG tablet    ROXICODONE    180 tablet    1 tab q4h, PRN max 6 per day. OK to fill on 6/5/2018    Failed back syndrome of lumbar spine       ranitidine 300 MG tablet    ZANTAC    180  tablet    Take 1 tablet (300 mg) by mouth 2 times daily To reduce stomach acid    Gastroesophageal reflux disease without esophagitis       senna-docusate 8.6-50 MG per tablet    SENOKOT-S;PERICOLACE    60 tablet    Take 1 tablet by mouth 2 times daily    Drug-induced constipation       simvastatin 20 MG tablet    ZOCOR    90 tablet    Take 1 tablet (20 mg) by mouth At Bedtime    Hyperlipidemia LDL goal <100       triamcinolone 0.1 % cream    KENALOG    30 g    Apply sparingly to affected area twice daily for 7 days. Then stop and use only for flares    Eczema, unspecified type       * Notice:  This list has 2 medication(s) that are the same as other medications prescribed for you. Read the directions carefully, and ask your doctor or other care provider to review them with you.

## 2018-06-25 NOTE — PROGRESS NOTES
SUBJECTIVE:  Toby Mcgrath is a 62 year old male who presents to the clinic today for a rash.  Onset of rash was weeks ago but seems to keep coming back .   Rash is around forehead and face.  Location of the rash: forehead and face.  Quality/symptoms of rash: swelling, localized   Symptoms are mild to moderate and rash seems to be coming and going.  Previous history of a similar rash? yes  Recent exposure history: allergies potentially  Recent new medications: none  Associated symptoms include: swelling of face, cheeks.    Past Medical History:   Diagnosis Date     Anxiety state, unspecified      BMI 32.0-32.9,adult 9/4/2015     Chronic pain syndrome 3/29/2007    Narcotic refill protocol Will return every 2-3 months for clinic visits Controlled substance aggreement on file from this  6/26/12 Documentation in problem list: no, appears to be compliant based on last visit He is responding best to Oxycontin 10 mg twice daily # 60 per month.  This is costly and looking into PA for coverage.  Nausea with MS Contin Has meds refilled 16 of every month Last [facility] website verification:  done on 7/8/2015  [email or web address]   2/10/2015:  PCP will take over narcotic prescription Tapering course: using 5 mg tablets 10 mg morning 15 mg noon, 15 mg night for 1 week then 10 mg morning, 10 mg noon, 15 mg night for 1 week then 10 mg TID for 1 week then see me for refills  Then ongoing taper: 5 mg morning, 10 mg noon and 10 mg night for 1 week then  5 mg morning and noon and 10 mg night for 1 week then 5 mg tid for 1 week Then 5 mg morning no noon dose and 5 mg night for 1 week then No morning or non dose and 5 mg nightly for 1 week then off  Goal is co     Chronic rhinitis 3/29/2007     DDD (degenerative disc disease), cervical 2/8/2013    Normal.  C2-C3: Normal disc, facet joints, spinal canal and neural foramina.  C3-C4: Mild broad-based posterior disc bulge. Otherwise normal.  C4-C5: Normal disc, facet joints, spinal  canal and neural foramina.  C5-C6: Moderate degenerative disc disease with loss of disc height, circumferential disc bulge and vertebral endplate osteophytes. Mild spinal canal stenosis and moderate left foraminal stenosis. Normal right foramen and facet joints.  C6-C7: Mild circumferential disc bulge. Slight impression on the thecal sac. Mild left foraminal stenosis. Normal right foramen and facet joints.  C7-T1: Normal disc, facet joints, spinal canal and neural foramina.  T1-T2: Mild central posterior disc protrusion.  T2-T3: Mild central posterior disc protrusion. Slight impression on the spinal cord.         DJD (degenerative joint disease), lumbar 1/10/2012     Esophageal reflux     ,refluxes on upper GI     Gout 4/8/2011     Gout, unspecified      Hyperlipidemia LDL goal <100 10/25/2012     Hypertension goal BP (blood pressure) < 140/90 10/21/2011     HYPERTROPHY PROSTATE WITH OBST 8/3/2006     Impotence of organic origin      Lumbar radiculopathy 9/17/2014     Major depressive disorder, recurrent episode, unspecified 7/9/2008     Osteoarthrosis, unspecified whether generalized or localized, pelvic region and thigh      Pure hyperglyceridemia      ROTATOR CUFF SYND NOS 4/17/2008     S/P lumbar fusion 10/14/2014     Thoracic or lumbosacral neuritis or radiculitis, unspecified      Tobacco use disorder      Type 2 diabetes, HbA1C goal < 8% (H) 9/9/2011        Allergies   Allergen Reactions     Codeine Nausea and Vomiting     Tolerating other opiates      Social History   Substance Use Topics     Smoking status: Former Smoker     Years: 40.00     Types: Cigarettes     Smokeless tobacco: Former User     Quit date: 2/1/2013     Alcohol use No       ROS:  CONSTITUTIONAL:NEGATIVE for fever, chills, change in weight  INTEGUMENTARY/SKIN: POSITIVE for facial swelling, rash, localized  EYES: NEGATIVE for vision changes or irritation  ENT/MOUTH: NEGATIVE for ear, mouth and throat problems  RESP:NEGATIVE for significant  cough or SOB  CV: NEGATIVE for chest pain, palpitations or peripheral edema  GI: NEGATIVE for nausea, abdominal pain, heartburn, or change in bowel habits  MUSCULOSKELETAL: NEGATIVE for significant arthralgias or myalgia  NEURO: NEGATIVE for weakness, dizziness or paresthesias    EXAM:   /90 (Cuff Size: Adult Large)  Pulse 86  Temp 98.2  F (36.8  C) (Oral)  Resp 16  Wt 251 lb 5 oz (114 kg)  BMI 34.08 kg/m2  GENERAL: alert, no acute distress.  SKIN: Rash description:    Distribution: localized  Location: above eyebrows    Color: skin color,  Lesion type: localized swelling of eyebrows and face, isolated   NECK: supple, non-tender to palpation, no adenopathy noted  RESP: lungs clear to auscultation - no rales, rhonchi or wheezes  CV: regular rates and rhythm, normal S1 S2, no murmur noted  NEURO: Normal strength and tone, sensory exam grossly normal,  normal speech and mentation    ASSESSMENT/PLAN:    ICD-10-CM    1. Facial swelling R22.0 loratadine (CLARITIN) 10 MG tablet     fluticasone (FLONASE) 50 MCG/ACT spray     OTOLARYNGOLOGY REFERRAL   2. Type 2 diabetes mellitus without complication, without long-term current use of insulin (H) E11.9    3. Chronic rhinitis, unspecified type J31.0 loratadine (CLARITIN) 10 MG tablet     fluticasone (FLONASE) 50 MCG/ACT spray     OTOLARYNGOLOGY REFERRAL       Orders Placed This Encounter     OTOLARYNGOLOGY REFERRAL     loratadine (CLARITIN) 10 MG tablet     fluticasone (FLONASE) 50 MCG/ACT spray       1) See today's orders.  2) Follow-up with primary clinic if not improving

## 2018-06-27 ENCOUNTER — TELEPHONE (OUTPATIENT)
Dept: FAMILY MEDICINE | Facility: CLINIC | Age: 63
End: 2018-06-27

## 2018-06-27 NOTE — TELEPHONE ENCOUNTER
Received form(s) from [employer] for diabetic shoes  Placed form(s) in/on SN's desk.  Forms need to be filled out and signed and faxed to [phone]..    Call pt to verify form was sent: No  Copy needs to be sent for scanning after completion: Yes    Thank you, ELIJAH Ewing, CMA

## 2018-06-28 ENCOUNTER — OFFICE VISIT (OUTPATIENT)
Dept: PHARMACY | Facility: CLINIC | Age: 63
End: 2018-06-28
Payer: COMMERCIAL

## 2018-06-28 VITALS — WEIGHT: 253.6 LBS | BODY MASS INDEX: 34.39 KG/M2 | SYSTOLIC BLOOD PRESSURE: 115 MMHG | DIASTOLIC BLOOD PRESSURE: 75 MMHG

## 2018-06-28 DIAGNOSIS — E11.9 TYPE 2 DIABETES MELLITUS WITHOUT COMPLICATION, WITHOUT LONG-TERM CURRENT USE OF INSULIN (H): ICD-10-CM

## 2018-06-28 DIAGNOSIS — M10.9 ACUTE GOUTY ARTHRITIS: Primary | ICD-10-CM

## 2018-06-28 DIAGNOSIS — E11.42 DIABETIC POLYNEUROPATHY ASSOCIATED WITH TYPE 2 DIABETES MELLITUS (H): ICD-10-CM

## 2018-06-28 DIAGNOSIS — J31.0 CHRONIC RHINITIS, UNSPECIFIED TYPE: ICD-10-CM

## 2018-06-28 PROCEDURE — 99605 MTMS BY PHARM NP 15 MIN: CPT | Performed by: PHARMACIST

## 2018-06-28 PROCEDURE — 99607 MTMS BY PHARM ADDL 15 MIN: CPT | Performed by: PHARMACIST

## 2018-06-28 NOTE — MR AVS SNAPSHOT
After Visit Summary   6/28/2018    Toby Mcgrath    MRN: [id number]           Patient Information     Date Of Birth          1955        Visit Information        Provider Department      6/28/2018 10:30 AM Mary Last, [facility] MTM        Care Instructions    Recommendations from today's MTM visit:                                                    MTM (medication therapy management) is a service provided by a clinical pharmacist designed to help you get the most of out of your medicines.   Today we reviewed what your medicines are for, how to know if they are working, that your medicines are safe and how to make your medicine regimen as easy as possible.     1. When you  the colchicine and allopurinol, call Mary at [phone].  Week 1: Allopurinol 100mg (1 pill) once a day - colchicine 0.6mg once a day  Week 2: Allopurinol 200mg (2 pills) once a day- colchicine 0.6mg once a day  Week 3: Allopurinol 300mg (3 pills) once a day- colchicine 0.6mg once a day  Week 4-8: Continue Allopurinol 300mg daily (3 pills) - colchicine 0.6mg once a day    2. Try to cut down to 1 pop per day to lower your blood sugars.     Next MTM visit: 1 month to recheck labs    To schedule another MTM appointment, please call the clinic directly or you may call the MTM scheduling line at [phone] or toll-free at [phone].     My Clinical Pharmacist's contact information:                                                      It was a pleasure seeing you today!  Please feel free to contact me with any questions or concerns you have.      Jessi Bashir, PharmD  Pharmaceutical Care Resident  Pager: [phone]    You may receive a survey about the MTM services you received.  I would appreciate your feedback to help me serve you better in the future. Please fill it out and return it when you can. Your comments will be anonymous.            Follow-ups after your visit        Who to  "contact     If you have questions or need follow up information about today's clinic visit or your schedule please contact [facility] directly at [phone].  Normal or non-critical lab and imaging results will be communicated to you by MyChart, letter or phone within 4 business days after the clinic has received the results. If you do not hear from us within 7 days, please contact the clinic through MyChart or phone. If you have a critical or abnormal lab result, we will notify you by phone as soon as possible.  Submit refill requests through [employer] or call your pharmacy and they will forward the refill request to us. Please allow 3 business days for your refill to be completed.          Additional Information About Your Visit        [facility] Information     [employer] lets you send messages to your doctor, view your test results, renew your prescriptions, schedule appointments and more. To sign up, go to [email or web address] . Click on \"Log in\" on the left side of the screen, which will take you to the Welcome page. Then click on \"Sign up Now\" on the right side of the page.     You will be asked to enter the access code listed below, as well as some personal information. Please follow the directions to create your username and password.     Your access code is: [email or web address]  Expires: 2018 10:45 AM     Your access code will  in 90 days. If you need help or a new code, please call your [facility] clinic or [phone].        Care EveryWhere ID     This is your Care EveryWhere ID. This could be used by other organizations to access your [facility] medical records  [id number]        Your Vitals Were     BMI (Body Mass Index)                   34.39 kg/m2            Blood Pressure from Last 3 Encounters:   18 115/75   18 124/90   18 121/79    Weight from Last 3 Encounters:   18 253 lb 9.6 oz (115 kg)   18 251 lb 5 oz (114 kg)   18 252 lb (114.3 kg)    "           Today, you had the following     No orders found for display       Primary Care Provider Office Phone # Fax #    Connie Haskins [phone] [address] [address]        Equal Access to Services     ARIANNANICK TIA : Hadii aad ku hadcarisao Soomaali, waaxda luqadaha, qaybta kaalmada adeegyada, waxay nirin hayleahn adedanielle rivera lakaylioswald ramirez. So [facility] [phone].    ATENCIÓN: Si habla español, tiene a holliday disposición servicios gratuitos de asistencia lingüística. Llame al [phone].    We comply with applicable federal civil rights laws and [place] laws. We do not discriminate on the basis of race, color, national origin, age, disability, sex, sexual orientation, or gender identity.            Thank you!     Thank you for choosing [facility]  for your care. Our goal is always to provide you with excellent care. Hearing back from our patients is one way we can continue to improve our services. Please take a few minutes to complete the written survey that you may receive in the mail after your visit with us. Thank you!             Your Updated Medication List - Protect others around you: Learn how to safely use, store and throw away your medicines at www.disposemymeds.org.          This list is accurate as of 6/28/18 11:03 AM.  Always use your most recent med list.                   Brand Name Dispense Instructions for use Diagnosis    allopurinol 100 MG tablet    ZYLOPRIM    270 tablet    Take 3 tablets (300 mg) by mouth daily    Acute gout of right elbow, unspecified cause       aspirin 81 MG tablet     100 tablet    Take 1 tablet (81 mg) by mouth daily    Type 2 diabetes, HbA1C goal < 8% (H)       blood glucose monitoring lancets     200 each    Use to test blood sugars 2-3 times daily as directed (lena contour meter)    Type 2 diabetes, HbA1C goal < 8% (H)       blood glucose monitoring meter device kit    no brand specified    1 kit    Use  to test blood sugar 2 times daily or as directed.    Type 2 diabetes mellitus without complication, without long-term current use of insulin (H)       blood glucose monitoring test strip    no brand specified    200 strip    Use to test blood sugars 2 times daily or as directed    Type 2 diabetes mellitus without complication, without long-term current use of insulin (H)       colchicine 0.6 MG tablet    MITIGARE    20 capsule    Take 0.6 mg by mouth 2 times daily as needed    Acute pain of right knee       cyclobenzaprine 5 MG tablet    FLEXERIL    30 tablet    Take 1-2 tablets (5-10 mg) by mouth nightly as needed for muscle spasms    Back muscle spasm       finasteride 5 MG tablet    PROSCAR    30 tablet    Take 1 tablet (5 mg) by mouth daily For prostate enlargement. Please fill on $4 list    Benign prostatic hyperplasia without lower urinary tract symptoms       FLUoxetine 20 MG capsule    PROzac    270 capsule    TAKE THREE CAPSULES BY MOUTH ONCE DAILY    Mild episode of recurrent major depressive disorder (H)       fluticasone 50 MCG/ACT spray    FLONASE    1 Bottle    Spray 1-2 sprays into both nostrils daily    Chronic rhinitis, unspecified type, Facial swelling       hydrOXYzine 25 MG tablet    ATARAX    30 tablet    Take 2-4 tablets ( mg) by mouth nightly as needed for anxiety (for panic at night)    Chronic pain syndrome       loratadine 10 MG tablet    CLARITIN    30 tablet    Take 1 tablet (10 mg) by mouth daily    Facial swelling, Chronic rhinitis, unspecified type       LYRICA PO      Take 200 mg by mouth 2 times daily        metFORMIN 500 MG 24 hr tablet    GLUCOPHAGE-XR    360 tablet    Take 4 tablets (2,000 mg) by mouth daily (with dinner)    Type 2 diabetes mellitus without complication, without long-term current use of insulin (H)       oxyCODONE IR 5 MG tablet    ROXICODONE    180 tablet    1 tab q4h, PRN max 6 per day. OK to fill on 6/5/2018    Failed back syndrome of lumbar spine        ranitidine 300 MG tablet    ZANTAC    180 tablet    Take 1 tablet (300 mg) by mouth 2 times daily To reduce stomach acid    Gastroesophageal reflux disease without esophagitis       senna-docusate 8.6-50 MG per tablet    SENOKOT-S;PERICOLACE    60 tablet    Take 1 tablet by mouth 2 times daily    Drug-induced constipation       simvastatin 20 MG tablet    ZOCOR    90 tablet    Take 1 tablet (20 mg) by mouth At Bedtime    Hyperlipidemia LDL goal <100       triamcinolone 0.1 % cream    KENALOG    30 g    Apply sparingly to affected area twice daily for 7 days. Then stop and use only for flares    Eczema, unspecified type

## 2018-06-28 NOTE — PATIENT INSTRUCTIONS
Recommendations from today's MTM visit:                                                    MTM (medication therapy management) is a service provided by a clinical pharmacist designed to help you get the most of out of your medicines.   Today we reviewed what your medicines are for, how to know if they are working, that your medicines are safe and how to make your medicine regimen as easy as possible.     1. When you  the colchicine and allopurinol, call Mary at [phone].  Week 1: Allopurinol 100mg (1 pill) once a day - colchicine 0.6mg once a day  Week 2: Allopurinol 200mg (2 pills) once a day- colchicine 0.6mg once a day  Week 3: Allopurinol 300mg (3 pills) once a day- colchicine 0.6mg once a day  Week 4-8: Continue Allopurinol 300mg daily (3 pills) - colchicine 0.6mg once a day    2. Try to cut down to 1 pop per day to lower your blood sugars.     Next MTM visit: 1 month to recheck labs    To schedule another MTM appointment, please call the clinic directly or you may call the MTM scheduling line at [phone] or toll-free at [phone].     My Clinical Pharmacist's contact information:                                                      It was a pleasure seeing you today!  Please feel free to contact me with any questions or concerns you have.      Jessi Bashir, PharmD  Pharmaceutical Care Resident  Pager: [phone]    You may receive a survey about the MTM services you received.  I would appreciate your feedback to help me serve you better in the future. Please fill it out and return it when you can. Your comments will be anonymous.

## 2018-06-28 NOTE — PROGRESS NOTES
SUBJECTIVE/OBJECTIVE:                Toby Mcgrath is a 62 year old male coming in for a follow-up visit for Medication Therapy Management.  He was referred to me from [facility].     Chief Complaint: Follow up from visit on 17 with Mary Last, PharmD. Here to f/up on gout management.    Tobacco: No tobacco use  Alcohol: not currently using    Medication Adherence/Access:  Issues reported - see below.    Gout: Pt hasn't been taking colchicine 0.6mg once daily as directed. Gout hasn't been bothering him for a long time, last major flare was years ago. Sometimes he has minor pain in his big toe, but none in last 2 weeks. Pt has issues affording medications. He was able to get a large bottle of colchicine for a good price. He thinks he can start allopurinol on Tuesday after he gets paid. Reports allopurinol made pain worse in the past. Uric acid was 6.8 mg/dL on 18.    Diabetes:  Pt currently taking Metformin XR 1000mg BID. Metformin dose was recently increased due to rising A1c. Pt is not experiencing any side effects.   SMB-2 times a week, before dinner.  Ranges (patient reported): 200s  Patient is not experiencing hypoglycemia.  Recent symptoms of high blood sugar? neuropathy  Microalbumin is < 30 mg/g. Pt is not taking an ACEi/ARB.  Aspirin: Taking 81mg daily and denies side effects  Diet/exercise: He has been eating more bread lately, lots of sandwiches. He has been drinking a lot of pop, has 2-3 cans per day.     Neuropathy: Pt is currently taking Lyrica 200mg BID. This has been working really well. He reports no side effects.     Allergic rhinitis: Current medications include fluticasone nasal spray daily and loratadine 10mg daily. He complains of swollen sinuses. In the mornings he has a lot of nasal symptoms - sneezing and rhinorrhea. This improves over the course of the day. Pt reports side effects of nose bleeds.    Other medications/conditions were not discussed in detail due to  time.    Current labs include:  Today's Vitals: /75  Wt 253 lb 9.6 oz (115 kg)  BMI 34.39 kg/m2  BP Readings from Last 3 Encounters:   06/25/18 124/90   05/31/18 121/79   04/23/18 120/68     Lab Results   Component Value Date    A1C 6.7 05/31/2018   .  Lab Results   Component Value Date    CHOL 122 05/31/2018     Lab Results   Component Value Date    TRIG 199 05/17/2017     Lab Results   Component Value Date    HDL 29 05/31/2018     Lab Results   Component Value Date    LDL 74 05/31/2018    LDL 63 05/17/2017       Liver Function Studies -   Recent Labs   Lab Test  05/31/18   1057   PROTTOTAL  7.7   ALBUMIN  4.0   BILITOTAL  0.3   ALKPHOS  61   AST  41   ALT  65       Lab Results   Component Value Date    UCRR 94 05/31/2018    MICROL <5 05/31/2018    UMALCR Unable to calculate due to low value 05/31/2018       Last Basic Metabolic Panel:  Lab Results   Component Value Date     05/31/2018      Lab Results   Component Value Date    POTASSIUM 4.6 05/31/2018     Lab Results   Component Value Date    CHLORIDE 107 05/31/2018     Lab Results   Component Value Date    BUN 14 05/31/2018     Lab Results   Component Value Date    CR 1.18 05/31/2018     GFR Estimate   Date Value Ref Range Status   05/31/2018 62 >60 mL/min/1.7m2 Final     Comment:     Non  GFR Calc   04/23/2018 65 >60 mL/min/1.7m2 Final     Comment:     Non  GFR Calc   02/26/2018 64 >60 mL/min/1.7m2 Final     Comment:     Non  GFR Calc       Most Recent Immunizations   Administered Date(s) Administered     Influenza (IIV3) PF 10/08/2014     Influenza Vaccine IM 3yrs+ 4 Valent IIV4 10/05/2017     Pneumococcal 23 valent 08/17/2012     TD (ADULT, 7+) 12/08/2011     TDAP Vaccine (Adacel) 08/14/2009       ASSESSMENT:              Current medications were reviewed today as discussed above.      Medication Adherence: Needs improvement - see below    Gout: Needs improvement. Pt is not at goal of uric acid  <6mg/dl. Pt may benefit from uric acid lowering therapy such as allopurinol to prevent recurrent gout flares. ACP recommends overlap of an acute-gout treatment agent (NSAID or colchicine) for 8 weeks after starting urate lowering therapy. ACR guidelines recommend 3-6 months of cross coverage depending on uric acid level and presence of tophi and if there is any active gout left.     Diabetes: Needs Improvement. Patient is meeting A1c goal of < 7%. Self monitoring of blood glucose is not at goal of pre-prandial  mg/dL. Pt would benefit from lifestyle modifications.     Neuropathy: Stable.    Allergic rhinitis: Needs improvement. Discussed trying to elevate head of bed to see if helps with nasal symptoms. Could consider trying ipratropium nasal spray in the future.      PLAN:                  1. Pt set goal for himself to decrease pop intake to 1 pop per day.    2. Start urate lowering therapy (Dr. Haskins already sent allopurinol order to pharmacy on 5/31/18).  Week 1: Allopurinol 100mg (1 pill) once a day - colchicine 0.6mg once a day  Week 2: Allopurinol 200mg (2 pills) once a day- colchicine 0.6mg once a day  Week 3: Allopurinol 300mg (3 pills) once a day- colchicine 0.6mg once a day  Week 4-8: Continue Allopurinol 300mg daily (3 pills) - colchicine 0.6mg once a day  *Recheck uric acid level after 1-2 months on urate lowering therapy     3. Future considerations: try ipratropium nasal spray    I spent 30 minutes with this patient today. I offer these suggestions for consideration by the PCP. A copy of the visit note was provided to the patient's primary care provider.     Will follow up in 2 weeks by phone and 1 month in clinic.    The patient was given a summary of these recommendations as an after visit summary.    Jessi Bashir, PharmD  Pharmaceutical Care Resident  Pager: [phone]    Tie-in: Toby Mcgrath was seen independently by Dr. Bashir. I have reviewed the assessment and plan. Mary SOLIZ  Berhane, PharmD, NEIDA, BCACP

## 2018-07-02 DIAGNOSIS — M96.1 FAILED BACK SYNDROME OF LUMBAR SPINE: ICD-10-CM

## 2018-07-02 DIAGNOSIS — G89.4 CHRONIC PAIN SYNDROME: ICD-10-CM

## 2018-07-03 RX ORDER — OXYCODONE HYDROCHLORIDE 5 MG/1
TABLET ORAL
Qty: 180 TABLET | Refills: 0 | Status: SHIPPED | OUTPATIENT
Start: 2018-07-03 | End: 2018-07-30

## 2018-07-03 RX ORDER — OXYCODONE HYDROCHLORIDE 5 MG/1
TABLET ORAL
Qty: 180 TABLET | Refills: 0 | Status: SHIPPED | OUTPATIENT
Start: 2018-07-03 | End: 2018-07-03

## 2018-07-03 NOTE — TELEPHONE ENCOUNTER
The patient said he is almost out and that he will be out on Thursday   He wants to come and get this today if possible

## 2018-07-03 NOTE — TELEPHONE ENCOUNTER
Rx signed.   The patient needs to schedule an appointment with PCP for more refills of controlled substances. Please inform patient that he should contact the clinic or schedule an appointment at least 2 weeks prior to running out of his prescription.     Sari

## 2018-07-03 NOTE — TELEPHONE ENCOUNTER
FS  Rx was printed.  Start date said 7/3, comment in sig was to be filled on 7/5 or after.  Need new Rx.  Thanks, Gayathri Wade RN

## 2018-07-03 NOTE — TELEPHONE ENCOUNTER
FS  Please address due to SN's absence.  Controlled Substance Refill Request for Oxycodone    Last refill: 7/5/2018 - #180    Last clinic visit: 5/31/2018    Clinic visit frequency required: Q 3 months  Next appt: No future OV scheduled    Controlled substance agreement on file: Yes:  Date 8/10/2015.    Documentation in problem list reviewed:  Yes    Processing:  Patient will  in clinic    [place] reviewed:  reviewed- no concerns  [place]:  [email or web address]  Refill not needed until 7/5 when SN is back.  Do you want to have SN address when she returns?  Gayathri Wade RN

## 2018-07-12 ENCOUNTER — RADIANT APPOINTMENT (OUTPATIENT)
Dept: GENERAL RADIOLOGY | Facility: CLINIC | Age: 63
End: 2018-07-12
Attending: FAMILY MEDICINE
Payer: COMMERCIAL

## 2018-07-12 ENCOUNTER — OFFICE VISIT (OUTPATIENT)
Dept: FAMILY MEDICINE | Facility: CLINIC | Age: 63
End: 2018-07-12
Payer: COMMERCIAL

## 2018-07-12 VITALS
SYSTOLIC BLOOD PRESSURE: 122 MMHG | HEIGHT: 72 IN | OXYGEN SATURATION: 95 % | DIASTOLIC BLOOD PRESSURE: 81 MMHG | RESPIRATION RATE: 18 BRPM | WEIGHT: 252 LBS | BODY MASS INDEX: 34.13 KG/M2 | HEART RATE: 68 BPM | TEMPERATURE: 98.3 F

## 2018-07-12 DIAGNOSIS — R07.89 CHEST WALL PAIN: ICD-10-CM

## 2018-07-12 DIAGNOSIS — Z12.11 SPECIAL SCREENING FOR MALIGNANT NEOPLASMS, COLON: ICD-10-CM

## 2018-07-12 DIAGNOSIS — M10.9 ACUTE GOUT OF RIGHT ELBOW, UNSPECIFIED CAUSE: ICD-10-CM

## 2018-07-12 DIAGNOSIS — E11.9 TYPE 2 DIABETES MELLITUS WITHOUT COMPLICATION, WITHOUT LONG-TERM CURRENT USE OF INSULIN (H): Primary | ICD-10-CM

## 2018-07-12 DIAGNOSIS — F33.0 MILD EPISODE OF RECURRENT MAJOR DEPRESSIVE DISORDER (H): ICD-10-CM

## 2018-07-12 PROCEDURE — 71046 X-RAY EXAM CHEST 2 VIEWS: CPT | Mod: FY

## 2018-07-12 PROCEDURE — 99214 OFFICE O/P EST MOD 30 MIN: CPT | Performed by: FAMILY MEDICINE

## 2018-07-12 PROCEDURE — 71101 X-RAY EXAM UNILAT RIBS/CHEST: CPT | Mod: LT

## 2018-07-12 RX ORDER — ALLOPURINOL 100 MG/1
300 TABLET ORAL DAILY
Qty: 270 TABLET | Refills: 0 | Status: SHIPPED | OUTPATIENT
Start: 2018-07-12 | End: 2018-09-27

## 2018-07-12 RX ORDER — LIDOCAINE 50 MG/G
PATCH TOPICAL
Qty: 30 PATCH | Refills: 0 | Status: SHIPPED | OUTPATIENT
Start: 2018-07-12 | End: 2018-10-31

## 2018-07-12 NOTE — PROGRESS NOTES
SUBJECTIVE:   Toby Mcgrath is a 62 year old male who presents to clinic today for the following health issues:      Concern - right side under arm pit lesion   Onset: a couple of month ago     Description:   Right side under arm pit lesion    Intensity: moderate    Progression of Symptoms:  intermittent    Accompanying Signs & Symptoms:  Sharp pain and numb pain right side back     Previous history of similar problem:   None     Precipitating factors:   Worsened by: nothing     Alleviating factors:  Improved by: nothing     Therapies Tried and outcome: nothing    Worse with reaching or stretching    (E11.9) Type 2 diabetes mellitus without complication, without long-term current use of insulin (H)  (primary encounter diagnosis)  Comment: Has had some higher sugars in the evenings sometimes in the 200s at one point it was as high as 290.  He feels that this is due to dietary indiscretion and possibly not enough exercise.  He does continue to take the metformin and takes 2 tablets twice daily and we discussed that if his sugars continue to be elevated we can add medications to this.  His last A1c was at goal at 6.8.  Plan: ACE/ARB/ARNI NOT PRESCRIBED, INTENTIONAL, due for eye exam - he'll schedule            (F33.0) Mild episode of recurrent major depressive disorder (H)  Comment: Refilled medications in the past and these are working well  Plan:     (M10.9) Acute gout of right elbow, unspecified cause  Comment: He was recently seen by our pharmacology team and unfortunately did not get the allopurinol.  I resent this with strict instructions on ramping up slowly starting with 1 tablet daily.  See previous MTM notes and see after visit summary.  Plan: allopurinol (ZYLOPRIM) 100 MG tablet        Due to have a uric acid checked in the next 1-2 months    (R07.89) Chest wall pain  Comment: Has noticed pain as noted above  Plan: XR Chest 2 Views, XR Ribs & Chest Left G/E 3         Views, lidocaine (LIDODERM) 5 %  Patch            (Z12.11) Special screening for malignant neoplasms, colon  Comment:   Plan: Fecal colorectal cancer screen (FIT)                   Problem list and histories reviewed & adjusted, as indicated.  Additional history: as documented    Patient Active Problem List   Diagnosis     Thoracic or lumbosacral neuritis or radiculitis, unspecified     Osteoarthrosis, unspecified whether generalized or localized, pelvic region and thigh     Hypertrophy of prostate with urinary obstruction     Chronic rhinitis     Chronic pain syndrome     Disorder of bursae and tendons in shoulder region     Major depressive disorder, recurrent episode (H)     Anxiety     Gout     Type 2 diabetes mellitus without complication (H)     Hypertension goal BP (blood pressure) < 140/90     Advanced directives, counseling/discussion     DJD (degenerative joint disease), lumbar     Hyperlipidemia LDL goal <100     DDD (degenerative disc disease), cervical     GERD (gastroesophageal reflux disease)     Lumbar radiculopathy     S/P lumbar fusion     Left-sided low back pain with left-sided sciatica     BMI 32.0-32.9,adult     History of hepatitis C     S/P lumbar discectomy     Acute post-operative pain     S/P laminectomy     Chronic pain     Bilateral low back pain without sciatica     Right-sided low back pain with right-sided sciatica     Acute gouty arthritis     Acute gout of right elbow, unspecified cause     Idiopathic gout of left elbow, unspecified chronicity     Gastroesophageal reflux disease without esophagitis     Failed back syndrome of lumbar spine     Slow transit constipation     Benign prostatic hyperplasia with urinary frequency     Past Surgical History:   Procedure Laterality Date     BACK SURGERY       both shoulder repair  2006, 2008     C NONSPECIFIC PROCEDURE  1995    neck cyst     C NONSPECIFIC PROCEDURE  1979    laminectomy L5-S1 x 2     C SHOULDER SURG PROC UNLISTED  2005, '07    repairs,later manipulate,inject  LT, RT     FUSION LUMBAR ANTERIOR, FUSION LUMBAR POSTERIOR TWO LEVELS, COMBINED N/A 9/17/2014    Procedure: COMBINED FUSION LUMBAR ANTERIOR, FUSION LUMBAR POSTERIOR TWO LEVELS;  Surgeon: Chris Lugo MD;  Location: [facility] OR     HC COLONOSCOPY THRU STOMA, DIAGNOSTIC  3/04    normal     HC UGI ENDOSCOPY DIAG W OR W/O BRUSH/WASH  3/04    ok     HEAD & NECK SURGERY       HEMILAMINECTOMY, DISCECTOMY LUMBAR ONE LEVEL, COMBINED Left 9/8/2015    Procedure: COMBINED HEMILAMINECTOMY, DISCECTOMY LUMBAR ONE LEVEL;  Surgeon: Jer Irby MD;  Location: [facility] OR     right hip replacement  10,2010       Social History   Substance Use Topics     Smoking status: Former Smoker     Years: 40.00     Types: Cigarettes     Smokeless tobacco: Former User     Quit date: 2/1/2013     Alcohol use No     Family History   Problem Relation Age of Onset     Diabetes Mother      C.A.D. Father      Diabetes Father      Hypertension Father          Current Outpatient Prescriptions   Medication Sig Dispense Refill     ACE/ARB/ARNI NOT PRESCRIBED, INTENTIONAL, Please choose reason not prescribed, below       allopurinol (ZYLOPRIM) 100 MG tablet Take 3 tablets (300 mg) by mouth daily 270 tablet 0     aspirin 81 MG tablet Take 1 tablet (81 mg) by mouth daily 100 tablet 3     blood glucose monitoring (LENA MICROLET) lancets Use to test blood sugars 2-3 times daily as directed (lena contour meter) 200 each 1     blood glucose monitoring (NO BRAND SPECIFIED) meter device kit Use to test blood sugar 2 times daily or as directed. 1 kit 0     blood glucose monitoring (NO BRAND SPECIFIED) test strip Use to test blood sugars 2 times daily or as directed 200 strip 1     Colchicine (MITIGARE) 0.6 MG CAPS Take 0.6 mg by mouth 2 times daily as needed 20 capsule 0     cyclobenzaprine (FLEXERIL) 5 MG tablet Take 1-2 tablets (5-10 mg) by mouth nightly as needed for muscle spasms 30 tablet 1     finasteride (PROSCAR) 5 MG tablet Take 1 tablet (5 mg) by  mouth daily For prostate enlargement. Please fill on $4 list 30 tablet 5     FLUoxetine (PROZAC) 20 MG capsule TAKE THREE CAPSULES BY MOUTH ONCE DAILY 270 capsule 0     fluticasone (FLONASE) 50 MCG/ACT spray Spray 1-2 sprays into both nostrils daily 1 Bottle 3     hydrOXYzine (ATARAX) 25 MG tablet Take 2-4 tablets ( mg) by mouth nightly as needed for anxiety (for panic at night) 30 tablet 5     lidocaine (LIDODERM) 5 % Patch Apply up to 3 patches to painful area at once for up to 12 h within a 24 h period.  Remove after 12 hours. 30 patch 0     loratadine (CLARITIN) 10 MG tablet Take 1 tablet (10 mg) by mouth daily 30 tablet 3     metFORMIN (GLUCOPHAGE-XR) 500 MG 24 hr tablet Take 4 tablets (2,000 mg) by mouth daily (with dinner) 360 tablet 3     oxyCODONE IR (ROXICODONE) 5 MG tablet 1 tab q4h, PRN max 6 per day. 180 tablet 0     Pregabalin (LYRICA PO) Take 200 mg by mouth 2 times daily       ranitidine (ZANTAC) 300 MG tablet Take 1 tablet (300 mg) by mouth 2 times daily To reduce stomach acid 180 tablet 3     senna-docusate (SENOKOT-S;PERICOLACE) 8.6-50 MG per tablet Take 1 tablet by mouth 2 times daily 60 tablet 3     simvastatin (ZOCOR) 20 MG tablet Take 1 tablet (20 mg) by mouth At Bedtime 90 tablet 3     triamcinolone (KENALOG) 0.1 % cream Apply sparingly to affected area twice daily for 7 days. Then stop and use only for flares 30 g 3       Reviewed and updated as needed this visit by clinical staff  Allergies  Meds       Reviewed and updated as needed this visit by Provider         ROS:  Constitutional, HEENT, cardiovascular, pulmonary, gi and gu systems are negative, except as otherwise noted.    OBJECTIVE:                                                    /81  Pulse 68  Temp 98.3  F (36.8  C) (Oral)  Resp 18  Ht 6' (1.829 m)  Wt 252 lb (114.3 kg)  SpO2 95%  BMI 34.18 kg/m2  Body mass index is 34.18 kg/(m^2).  GENERAL APPEARANCE: healthy, alert and no distress  RESP: lungs clear to  auscultation - no rales, rhonchi or wheezes  CV: regular rates and rhythm, normal S1 S2, no S3 or S4 and no murmur, click or rub  MS: extremities normal- no gross deformities noted and Focal reproducible superficial pain noted along leftlateral chest wall area just inferior to the axilla.      Diagnostic test results:  Diagnostic Test Results:   Chest x-ray and rib films show no signs of pulmonary disease mass and no signs of rib fracture, awaiting radiology interpretation as well     ASSESSMENT/PLAN:                                                    1. Type 2 diabetes mellitus without complication, without long-term current use of insulin (H)  Discussed the need to follow his blood sugars closely.  We will see him back in the next 6 weeks and discuss if they are still elevated whether we should add new medications  - ACE/ARB/ARNI NOT PRESCRIBED, INTENTIONAL,; Please choose reason not prescribed, below    2. Mild episode of recurrent major depressive disorder (H)     PHQ-9 SCORE 5/17/2017 2/26/2018 4/20/2018   Total Score - - -   Total Score 4 4 4     Stable and tolerating medications well    3. Acute gout of right elbow, unspecified cause  Recent the allopurinol and specified in the instructions that he will slowly increase to 3 tablets daily see after visit summary notes which I highlighted for him and we reviewed these together in clinic.  Will repeat uric acid level in 6 weeks when I see him  - allopurinol (ZYLOPRIM) 100 MG tablet; Take 3 tablets (300 mg) by mouth daily  Dispense: 270 tablet; Refill: 0    4. Chest wall pain  Very reproducible chest wall pain and no signs of underlying disease.  His symptoms are sharp and superficial and likely represent nerve or muscle tenderness in between the ribs.  This could have been from coughing or from reaching.  Recommended either warm or cool compresses, application of lidocaine patch or possibly BenGay or icy hot he has used these in the past without much help in  other areas.  - XR Chest 2 Views; Future  - XR Ribs & Chest Left G/E 3 Views; Future  - lidocaine (LIDODERM) 5 % Patch; Apply up to 3 patches to painful area at once for up to 12 h within a 24 h period.  Remove after 12 hours.  Dispense: 30 patch; Refill: 0    5. Special screening for malignant neoplasms, colon    - Fecal colorectal cancer screen (FIT); Future      Follow up with Provider - 6 weeks     Connie Haskins MD  [facility]

## 2018-07-12 NOTE — MR AVS SNAPSHOT
After Visit Summary   7/12/2018    Toby Mcgrath    MRN: [id number]           Patient Information     Date Of Birth          1955        Visit Information        Provider Department      7/12/2018 10:30 AM Connie Haskins MD [facility]        Today's Diagnoses     Type 2 diabetes mellitus without complication, without long-term current use of insulin (H)    -  1    Mild episode of recurrent major depressive disorder (H)        Acute gout of right elbow, unspecified cause        Chest wall pain        Tobacco abuse        Tobacco abuse counseling        Special screening for malignant neoplasms, colon          Care Instructions    Start urate lowering therapy (Dr. Haskins already sent allopurinol order to pharmacy on 5/31/18).  Week 1: Allopurinol 100mg (1 pill) once a day - colchicine 0.6mg once a day  Week 2: Allopurinol 200mg (2 pills) once a day- colchicine 0.6mg once a day  Week 3: Allopurinol 300mg (3 pills) once a day- colchicine 0.6mg once a day  Week 4-8: Continue Allopurinol 300mg daily (3 pills) - colchicine 0.6mg once a day  *Recheck uric acid level after 1-2 months on urate lowering therapy     HOW TO QUIT SMOKING  Smoking is one of the hardest habits to break. About half of all those who have ever smoked have been able to quit, and most of those (about 70%) who still smoke want to quit. Here are some of the best ways to stop smoking.     KEEP TRYING:  It takes most smokers about 8 tries before they are finally able to fully quit. So, the more often you try and fail, the better your chance of quitting the next time! So, don't give up!    GO COLD TURKEY:  Most ex-smokers quit cold turkey. Trying to cut back gradually doesn't seem to work as well, perhaps because it continues the smoking habit. Also, it is possible to fool yourself by inhaling more while smoking fewer cigarettes. This results in the same amount of nicotine in your body!    GET SUPPORT:  Support  programs can make an important difference, especially for the heavy smoker. These groups offer lectures, methods to change your behavior and peer support. Call the free national Quitline for more information. 800-QUIT-NOW (879-283-7528). Low-cost or free programs are offered by many hospitals, local chapters of the American Lung Association (095-485-8253) and the American Cancer Society (797-381-0186). Support at home is important too. Non-smokers can help by offering praise and encouragement. If the smoker fails to quit, encourage them to try again!    OVER-THE-COUNTER MEDICINES:  For those who can't quit on their own, Nicotine Replacement Therapy (NRT) may make quitting much easier. Certain aids such as the nicotine patch, gum and lozenge are available without a prescription. However, it is best to use these under the guidance of your doctor. The skin patch provides a steady supply of nicotine to the body. Nicotine gum and lozenge gives temporary bursts of low levels of nicotine. Both methods take the edge off the craving for cigarettes. WARNING: If you feel symptoms of nicotine overdose, such as nausea, vomiting, dizziness, weakness, or fast heartbeat, stop using these and see your doctor.    PRESCRIPTION MEDICINES:  After evaluating your smoking patterns and prior attempts at quitting, your doctor may offer a prescription medicine such as bupropion (Zyban, Wellbutrin), varenicline (Chantix, Champix), a niocotine inhaler or nasal spray. Each has its unique advantage and side effects which your doctor can review with you.    HEALTH BENEFITS OF QUITTING:  The benefits of quitting start right away and keep improving the longer you go without smokin minutes: blood pressure and pulse return to normal  8 hours: oxygen levels return to normal  2 days: ability to smell and taste begins to improve as damaged nerves start to regrow  2-3 weeks: circulation and lung function improves  1-9 months: decreased cough,  congestion and shortness of breath; less tired  1 year: risk of heart attack decreases by half  5 years: risk of lung cancer decreases by half; risk of stroke becomes the same as a non-smoker  For information about how to quit smoking, visit the following links:  National Cancer [place] ,   Clearing the Air, Quit Smoking Today   - an online booklet. http://www.smokefree.gov/pubs/clearing_the_air.pdf  Smokefree.gov http://smokefree.gov/  QuitNet http://www.quitnet.com/    2930-7301 [employer], [address] [place] [address]. All rights reserved. This information is not intended as a substitute for professional medical care. Always follow your healthcare professional's instructions.    The Benefits of Living Smoke Free  What do you want to gain from quitting? Check off some reasons to quit.  Health Benefits  ___ Reduce my risk of lung cancer, heart disease, chronic lung disease  ___ Have fewer wrinkles and softer skin  ___ Improve my sense of taste and smell  ___ For pregnant women--reduce the risk of having a miscarriage, stillbirth, premature birth, or low-birth-weight baby  Personal Benefits  ___ Feel more in control of my life  ___ Have better-smelling hair, breath, clothes, home, and car  ___ Save time by not having to take smoke breaks, buy cigarettes, or hunt for a light  ___ Have whiter teeth  Family Benefits  ___ Reduce my children s respiratory tract infections  ___ Set a good example for my children  ___ Reduce my family s cancer risk  Financial Benefits  ___ Save hundreds of dollars each year that would be spent on cigarettes  ___ Save money on medical bills  ___ Save on life, health, and car insurance premiums    Those Dollars Add Up!  Cigarettes are expensive, and getting more expensive all the time. Do you realize how much money you are spending on cigarettes per year? What is the average amount you spend on a pack of cigarettes? What is the average number of packs that you smoke per  day? Using your answers to these questions, fill in this formula to help you find out:  ($ _____ per pack) ×  ( _____ number of packs per day) × (365 days) =  $ _____ yearly cost of smoking  Besides tobacco, there are other costs, including extra cleaning bills and replacement costs for clothing and furniture; medical expenses for smoking-related illnesses; and higher health, life, and car insurance premiums.    Cigars and Pipes Count Too!  Cigars and pipes are also dangerous. So are smokeless (chewing) tobacco and snuff. All of these products contain nicotine, a highly addictive substance that has harmful effects on your body. Quitting smoking means giving up all tobacco products.      9694-3626 [facility], [address] [place] [address], [place] [address]. All rights reserved. This information is not intended as a substitute for professional medical care. Always follow your healthcare professional's instructions.          Follow-ups after your visit        Future tests that were ordered for you today     Open Future Orders        Priority Expected Expires Ordered    Fecal colorectal cancer screen (FIT) Routine 8/2/2018 10/4/2018 7/12/2018            Who to contact     If you have questions or need follow up information about today's clinic visit or your schedule please contact [facility] directly at [phone].  Normal or non-critical lab and imaging results will be communicated to you by MyChart, letter or phone within 4 business days after the clinic has received the results. If you do not hear from us within 7 days, please contact the clinic through MyChart or phone. If you have a critical or abnormal lab result, we will notify you by phone as soon as possible.  Submit refill requests through [employer] or call your pharmacy and they will forward the refill request to us. Please allow 3 business days for your refill to be completed.          Additional Information About Your Visit        [facility]  "Information     [employer] lets you send messages to your doctor, view your test results, renew your prescriptions, schedule appointments and more. To sign up, go to [email or web address] . Click on \"Log in\" on the left side of the screen, which will take you to the Welcome page. Then click on \"Sign up Now\" on the right side of the page.     You will be asked to enter the access code listed below, as well as some personal information. Please follow the directions to create your username and password.     Your access code is: [email or web address]  Expires: 2018 10:45 AM     Your access code will  in 90 days. If you need help or a new code, please call your [place] clinic or [phone].        Care EveryWhere ID     This is your [facility] EveryWhere ID. This could be used by other organizations to access your [place] medical records  [id number]        Your Vitals Were     Pulse Temperature Respirations Height Pulse Oximetry BMI (Body Mass Index)    68 98.3  F (36.8  C) (Oral) 18 6' (1.829 m) 95% 34.18 kg/m2       Blood Pressure from Last 3 Encounters:   18 122/81   18 115/75   18 124/90    Weight from Last 3 Encounters:   18 252 lb (114.3 kg)   18 253 lb 9.6 oz (115 kg)   18 251 lb 5 oz (114 kg)                 Today's Medication Changes          These changes are accurate as of 18 11:44 AM.  If you have any questions, ask your nurse or doctor.               Start taking these medicines.        Dose/Directions    ACE/ARB/ARNI NOT PRESCRIBED (INTENTIONAL)   Used for:  Type 2 diabetes mellitus without complication, without long-term current use of insulin (H)   Started by:  Connie Haskins MD        Please choose reason not prescribed, below   Refills:  0       lidocaine 5 % Patch   Commonly known as:  LIDODERM   Used for:  Chest wall pain   Started by:  Connie Haskins MD        Apply up to 3 patches to painful area at once for up to 12 h within a 24 h period. "  Remove after 12 hours.   Quantity:  30 patch   Refills:  0            Where to get your medicines      These medications were sent to [facility] [address]  [address] [address]     Phone:  [phone]     allopurinol 100 MG tablet         Some of these will need a paper prescription and others can be bought over the counter.  Ask your nurse if you have questions.     You don't need a prescription for these medications     ACE/ARB/ARNI NOT PRESCRIBED (INTENTIONAL)         Call your pharmacy to confirm that your medication is ready for pickup. It may take up to 24 hours for them to receive the prescription. If the prescription is not ready within 3 business days, please contact your clinic or your provider.     We will let you know when these medications are ready. If you don't hear back within 3 business days, please contact us.     lidocaine 5 % Patch                Primary Care Provider Office Phone # Fax #    Connie Haskins [phone] [address] [id number]        Equal Access to Services     [facility]: Hadii joselito stallings hadasho Soomaali, waaxda luqadaha, qaybta kaalmada adezach, gayle smith . So [facility] [phone].    ATENCIÓN: Si habla español, tiene a holliday disposición servicios gratuitos de asistencia lingüística. Rylie al [phone].    We comply with applicable federal civil rights laws and [place] laws. We do not discriminate on the basis of race, color, national origin, age, disability, sex, sexual orientation, or gender identity.            Thank you!     Thank you for choosing [facility]  for your care. Our goal is always to provide you with excellent care. Hearing back from our patients is one way we can continue to improve our services. Please take a few minutes to complete the written survey that you may receive in the mail after your visit  with us. Thank you!             Your Updated Medication List - Protect others around you: Learn how to safely use, store and throw away your medicines at www.disposemymeds.org.          This list is accurate as of 7/12/18 11:44 AM.  Always use your most recent med list.                   Brand Name Dispense Instructions for use Diagnosis    ACE/ARB/ARNI NOT PRESCRIBED (INTENTIONAL)      Please choose reason not prescribed, below    Type 2 diabetes mellitus without complication, without long-term current use of insulin (H)       allopurinol 100 MG tablet    ZYLOPRIM    270 tablet    Take 3 tablets (300 mg) by mouth daily    Acute gout of right elbow, unspecified cause       aspirin 81 MG tablet     100 tablet    Take 1 tablet (81 mg) by mouth daily    Type 2 diabetes, HbA1C goal < 8% (H)       blood glucose monitoring lancets     200 each    Use to test blood sugars 2-3 times daily as directed (Animoca contour meter)    Type 2 diabetes, HbA1C goal < 8% (H)       blood glucose monitoring meter device kit    no brand specified    1 kit    Use to test blood sugar 2 times daily or as directed.    Type 2 diabetes mellitus without complication, without long-term current use of insulin (H)       blood glucose monitoring test strip    no brand specified    200 strip    Use to test blood sugars 2 times daily or as directed    Type 2 diabetes mellitus without complication, without long-term current use of insulin (H)       colchicine 0.6 MG capsule    MITIGARE    20 capsule    Take 0.6 mg by mouth 2 times daily as needed    Acute pain of right knee       cyclobenzaprine 5 MG tablet    FLEXERIL    30 tablet    Take 1-2 tablets (5-10 mg) by mouth nightly as needed for muscle spasms    Back muscle spasm       finasteride 5 MG tablet    PROSCAR    30 tablet    Take 1 tablet (5 mg) by mouth daily For prostate enlargement. Please fill on $4 list    Benign prostatic hyperplasia without lower urinary tract symptoms       FLUoxetine 20  MG capsule    PROzac    270 capsule    TAKE THREE CAPSULES BY MOUTH ONCE DAILY    Mild episode of recurrent major depressive disorder (H)       fluticasone 50 MCG/ACT spray    FLONASE    1 Bottle    Spray 1-2 sprays into both nostrils daily    Chronic rhinitis, unspecified type, Facial swelling       hydrOXYzine 25 MG tablet    ATARAX    30 tablet    Take 2-4 tablets ( mg) by mouth nightly as needed for anxiety (for panic at night)    Chronic pain syndrome       lidocaine 5 % Patch    LIDODERM    30 patch    Apply up to 3 patches to painful area at once for up to 12 h within a 24 h period.  Remove after 12 hours.    Chest wall pain       loratadine 10 MG tablet    CLARITIN    30 tablet    Take 1 tablet (10 mg) by mouth daily    Facial swelling, Chronic rhinitis, unspecified type       LYRICA PO      Take 200 mg by mouth 2 times daily        metFORMIN 500 MG 24 hr tablet    GLUCOPHAGE-XR    360 tablet    Take 4 tablets (2,000 mg) by mouth daily (with dinner)    Type 2 diabetes mellitus without complication, without long-term current use of insulin (H)       oxyCODONE IR 5 MG tablet    ROXICODONE    180 tablet    1 tab q4h, PRN max 6 per day.    Failed back syndrome of lumbar spine       ranitidine 300 MG tablet    ZANTAC    180 tablet    Take 1 tablet (300 mg) by mouth 2 times daily To reduce stomach acid    Gastroesophageal reflux disease without esophagitis       senna-docusate 8.6-50 MG per tablet    SENOKOT-S;PERICOLACE    60 tablet    Take 1 tablet by mouth 2 times daily    Drug-induced constipation       simvastatin 20 MG tablet    ZOCOR    90 tablet    Take 1 tablet (20 mg) by mouth At Bedtime    Hyperlipidemia LDL goal <100       triamcinolone 0.1 % cream    KENALOG    30 g    Apply sparingly to affected area twice daily for 7 days. Then stop and use only for flares    Eczema, unspecified type

## 2018-07-12 NOTE — NURSING NOTE
Chief Complaint   Patient presents with     Pain     /81  Pulse 68  Temp 98.3  F (36.8  C) (Oral)  Resp 18  Ht 6' (1.829 m)  Wt 252 lb (114.3 kg)  SpO2 95%  BMI 34.18 kg/m2 Estimated body mass index is 34.18 kg/(m^2) as calculated from the following:    Height as of this encounter: 6' (1.829 m).    Weight as of this encounter: 252 lb (114.3 kg).  bp completed using cuff size: large       Health Maintenance addressed:  NONE    Possibly completing today per provider review.    Heather Pierson MA

## 2018-07-12 NOTE — PATIENT INSTRUCTIONS
Start urate lowering therapy (Dr. Haskins already sent allopurinol order to pharmacy on 5/31/18).  Week 1: Allopurinol 100mg (1 pill) once a day - colchicine 0.6mg once a day  Week 2: Allopurinol 200mg (2 pills) once a day- colchicine 0.6mg once a day  Week 3: Allopurinol 300mg (3 pills) once a day- colchicine 0.6mg once a day  Week 4-8: Continue Allopurinol 300mg daily (3 pills) - colchicine 0.6mg once a day  *Recheck uric acid level after 1-2 months on urate lowering therapy

## 2018-07-13 PROCEDURE — 82274 ASSAY TEST FOR BLOOD FECAL: CPT | Performed by: FAMILY MEDICINE

## 2018-07-16 NOTE — PROGRESS NOTES
Please call patient with normal results - the radiologist did not see any abnormalities on his xrays either  This is likely a sore muscle or nerve.  Lets see if the patches and time helps this resolve    Thank you!    Connie

## 2018-07-20 LAB — HEMOCCULT STL QL IA: NEGATIVE

## 2018-07-25 ENCOUNTER — TELEPHONE (OUTPATIENT)
Dept: FAMILY MEDICINE | Facility: CLINIC | Age: 63
End: 2018-07-25

## 2018-07-25 NOTE — TELEPHONE ENCOUNTER
FYI~ A refill has been made to the  assistance program for Lyrica.    A 90 day supply of LYRICA will be delivered to Toby's home within 3-5 business days.    Thank you,    Ana Luisa Cabrera  Prescription Assistance

## 2018-07-30 DIAGNOSIS — M96.1 FAILED BACK SYNDROME OF LUMBAR SPINE: ICD-10-CM

## 2018-07-30 DIAGNOSIS — G89.4 CHRONIC PAIN SYNDROME: ICD-10-CM

## 2018-07-30 NOTE — TELEPHONE ENCOUNTER
Reason for Call:  Medication or medication refill:    Do you use a [facility]?  Name of the pharmacy and phone number for the current request:    [facility] [address] [place] [address]      Name of the medication requested: Oxycodone    Other request: call once ready for     Can we leave a detailed message on this number? YES    Phone number patient can be reached at: Home number on file [phone] (home)    Best Time: anytime    Call taken on 7/30/2018 at 8:13 AM by Seamus Cosby

## 2018-07-31 NOTE — TELEPHONE ENCOUNTER
SN  Controlled Substance Refill Request for Oxycodone    Last refill: 7/3/2018 - #180    Last clinic visit: 7/12/2018    Clinic visit frequency required: Q 3 months  Next appt: No future OV scheduled    Controlled substance agreement on file: Yes:  Date 8/30/2017.   Per CSA patient is to fill at one pharmacy.  He fills at different pharmacy each time    Documentation in problem list reviewed:  Yes    Processing:  Patient will  in clinic    RX [place] reviewed:  reviewed- recommend provider review. Per CSA patient is to fill at one pharmacy.  He fills at different pharmacy each time    [place]:  [email or web address]  Thanks, Gayathri Wade RN

## 2018-08-01 RX ORDER — OXYCODONE HYDROCHLORIDE 5 MG/1
TABLET ORAL
Qty: 180 TABLET | Refills: 0 | Status: SHIPPED | OUTPATIENT
Start: 2018-08-01 | End: 2018-08-23

## 2018-08-02 NOTE — TELEPHONE ENCOUNTER
Rx in tower- called informed patient Rx is Ready at   [facility] Unit Coordinator

## 2018-08-23 ENCOUNTER — OFFICE VISIT (OUTPATIENT)
Dept: FAMILY MEDICINE | Facility: CLINIC | Age: 63
End: 2018-08-23
Payer: COMMERCIAL

## 2018-08-23 VITALS
DIASTOLIC BLOOD PRESSURE: 77 MMHG | SYSTOLIC BLOOD PRESSURE: 135 MMHG | RESPIRATION RATE: 16 BRPM | HEART RATE: 69 BPM | WEIGHT: 250 LBS | TEMPERATURE: 98 F | BODY MASS INDEX: 33.91 KG/M2 | OXYGEN SATURATION: 95 %

## 2018-08-23 DIAGNOSIS — M96.1 FAILED BACK SYNDROME OF LUMBAR SPINE: ICD-10-CM

## 2018-08-23 DIAGNOSIS — J31.0 CHRONIC RHINITIS, UNSPECIFIED TYPE: ICD-10-CM

## 2018-08-23 DIAGNOSIS — E11.9 TYPE 2 DIABETES MELLITUS WITHOUT COMPLICATION, WITHOUT LONG-TERM CURRENT USE OF INSULIN (H): Primary | ICD-10-CM

## 2018-08-23 DIAGNOSIS — Z23 NEED FOR PNEUMOCOCCAL VACCINATION: ICD-10-CM

## 2018-08-23 DIAGNOSIS — Z87.891 PERSONAL HISTORY OF TOBACCO USE: ICD-10-CM

## 2018-08-23 DIAGNOSIS — R22.0 FACIAL SWELLING: ICD-10-CM

## 2018-08-23 DIAGNOSIS — M10.9 GOUT, UNSPECIFIED CAUSE, UNSPECIFIED CHRONICITY, UNSPECIFIED SITE: ICD-10-CM

## 2018-08-23 DIAGNOSIS — F33.0 MILD EPISODE OF RECURRENT MAJOR DEPRESSIVE DISORDER (H): ICD-10-CM

## 2018-08-23 PROCEDURE — 36415 COLL VENOUS BLD VENIPUNCTURE: CPT | Performed by: FAMILY MEDICINE

## 2018-08-23 PROCEDURE — 99207 C FOOT EXAM  NO CHARGE: CPT | Performed by: FAMILY MEDICINE

## 2018-08-23 PROCEDURE — 84550 ASSAY OF BLOOD/URIC ACID: CPT | Performed by: FAMILY MEDICINE

## 2018-08-23 PROCEDURE — 99214 OFFICE O/P EST MOD 30 MIN: CPT | Mod: 25 | Performed by: FAMILY MEDICINE

## 2018-08-23 PROCEDURE — 90670 PCV13 VACCINE IM: CPT | Performed by: FAMILY MEDICINE

## 2018-08-23 PROCEDURE — G0296 VISIT TO DETERM LDCT ELIG: HCPCS | Performed by: FAMILY MEDICINE

## 2018-08-23 PROCEDURE — 90471 IMMUNIZATION ADMIN: CPT | Performed by: FAMILY MEDICINE

## 2018-08-23 PROCEDURE — 80048 BASIC METABOLIC PNL TOTAL CA: CPT | Performed by: FAMILY MEDICINE

## 2018-08-23 RX ORDER — FLUTICASONE PROPIONATE 50 MCG
1-2 SPRAY, SUSPENSION (ML) NASAL DAILY
Qty: 1 BOTTLE | Refills: 3 | Status: SHIPPED | OUTPATIENT
Start: 2018-08-23 | End: 2019-01-28

## 2018-08-23 RX ORDER — OXYCODONE HYDROCHLORIDE 5 MG/1
TABLET ORAL
Qty: 180 TABLET | Refills: 0 | Status: SHIPPED | OUTPATIENT
Start: 2018-08-23 | End: 2018-09-27

## 2018-08-23 NOTE — PATIENT INSTRUCTIONS
Call Cristine Betancourt for nail trimming - [phone]    Always eat food before taking metformin    I will let you know if you can stop the Colchicine/mitigare    See me in NOvember

## 2018-08-23 NOTE — MR AVS SNAPSHOT
"              After Visit Summary   8/23/2018    Toby Mcgrath    MRN: [id number]           Patient Information     Date Of Birth          1955        Visit Information        Provider Department      8/23/2018 10:30 AM Connie Haskins MD [facility]        Today's Diagnoses     Mild episode of recurrent major depressive disorder (H)    -  1    Chronic rhinitis, unspecified type        Facial swelling        Failed back syndrome of lumbar spine        Need for pneumococcal vaccination        Gout, unspecified cause, unspecified chronicity, unspecified site          Care Instructions    Call [facility] for nail trimming - [phone]    Always eat food before taking metformin    I will let you know if you can stop the Colchicine/mitigare    See me in NOvember          Follow-ups after your visit        Who to contact     If you have questions or need follow up information about today's clinic visit or your schedule please contact [facility] directly at [phone].  Normal or non-critical lab and imaging results will be communicated to you by Quality Systemshart, letter or phone within 4 business days after the clinic has received the results. If you do not hear from us within 7 days, please contact the clinic through Quality Systemshart or phone. If you have a critical or abnormal lab result, we will notify you by phone as soon as possible.  Submit refill requests through JustShareIt or call your pharmacy and they will forward the refill request to us. Please allow 3 business days for your refill to be completed.          Additional Information About Your Visit        Quality SystemsharRotaryView Information     JustShareIt lets you send messages to your doctor, view your test results, renew your prescriptions, schedule appointments and more. To sign up, go to [email or web address] . Click on \"Log in\" on the left side of the screen, which will take you to the Welcome page. Then click on \"Sign up Now\" on the right side of " the page.     You will be asked to enter the access code listed below, as well as some personal information. Please follow the directions to create your username and password.     Your access code is: [email or web address]  Expires: 2018 10:45 AM     Your access code will  in 90 days. If you need help or a new code, please call your [facility] clinic or [phone].        Care EveryWhere ID     This is your Care EveryWhere ID. This could be used by other organizations to access your [facility] medical records  [id number]        Your Vitals Were     Pulse Temperature Respirations Pulse Oximetry BMI (Body Mass Index)       69 98  F (36.7  C) (Oral) 16 95% 33.91 kg/m2        Blood Pressure from Last 3 Encounters:   18 135/77   18 122/81   18 115/75    Weight from Last 3 Encounters:   18 250 lb (113.4 kg)   18 252 lb (114.3 kg)   18 253 lb 9.6 oz (115 kg)              We Performed the Following     Basic metabolic panel     PNEUMOCOCCAL CONJ VACCINE 13 VALENT IM     Uric acid          Today's Medication Changes          These changes are accurate as of 18 11:14 AM.  If you have any questions, ask your nurse or doctor.               These medicines have changed or have updated prescriptions.        Dose/Directions    oxyCODONE IR 5 MG tablet   Commonly known as:  ROXICODONE   This may have changed:  additional instructions   Used for:  Failed back syndrome of lumbar spine   Changed by:  Connie Haskins MD        1 tab q4h, PRN max 6 per day. fill 9/3/18   Quantity:  180 tablet   Refills:  0            Where to get your medicines      These medications were sent to [facility] [address] [place] [id number]     Phone:  [phone]     FLUoxetine 20 MG capsule    fluticasone 50 MCG/ACT spray         Some of these will need a paper prescription and others can be bought over the counter.  Ask your nurse if you  have questions.     Bring a paper prescription for each of these medications     oxyCODONE IR 5 MG tablet               Information about OPIOIDS     PRESCRIPTION OPIOIDS: WHAT YOU NEED TO KNOW   We gave you an opioid (narcotic) pain medicine. It is important to manage your pain, but opioids are not always the best choice. You should first try all the other options your care team gave you. Take this medicine for as short a time (and as few doses) as possible.    Some activities can increase your pain, such as bandage changes or therapy sessions. It may help to take your pain medicine 30 to 60 minutes before these activities. Reduce your stress by getting enough sleep, working on hobbies you enjoy and practicing relaxation or meditation. Talk to your care team about ways to manage your pain beyond prescription opioids.    These medicines have risks:    DO NOT drive when on new or higher doses of pain medicine. These medicines can affect your alertness and reaction times, and you could be arrested for driving under the influence (DUI). If you need to use opioids long-term, talk to your care team about driving.    DO NOT operate heavy machinery    DO NOT do any other dangerous activities while taking these medicines.    DO NOT drink any alcohol while taking these medicines.     If the opioid prescribed includes acetaminophen, DO NOT take with any other medicines that contain acetaminophen. Read all labels carefully. Look for the word  acetaminophen  or  Tylenol.  Ask your pharmacist if you have questions or are unsure.    You can get addicted to pain medicines, especially if you have a history of addiction (chemical, alcohol or substance dependence). Talk to your care team about ways to reduce this risk.    All opioids tend to cause constipation. Drink plenty of water and eat foods that have a lot of fiber, such as fruits, vegetables, prune juice, apple juice and high-fiber cereal. Take a laxative (Miralax, milk of  magnesia, Colace, Senna) if you don t move your bowels at least every other day. Other side effects include upset stomach, sleepiness, dizziness, throwing up, tolerance (needing more of the medicine to have the same effect), physical dependence and slowed breathing.    Store your pills in a secure place, locked if possible. We will not replace any lost or stolen medicine. If you don t finish your medicine, please throw away (dispose) as directed by your pharmacist. The [place] [employer]ution [employer] has more information about safe disposal: [email or web address]         Primary Care Provider Office Phone # Fax #    Connie Haskins [phone]       [address] [address]        Equal Access to Services     ALMA WEBER : Nellie dixon Somaral, waaxda lushastaadaha, qaybta kaalmagilson blanco, gayle smith . So [facility] [phone].    ATENCIÓN: Si habla español, tiene a holliday disposición servicios gratuitos de asistencia lingüística. Rylie al [phone].    We comply with applicable federal civil rights laws and [place] laws. We do not discriminate on the basis of race, color, national origin, age, disability, sex, sexual orientation, or gender identity.            Thank you!     Thank you for choosing [facility]  for your care. Our goal is always to provide you with excellent care. Hearing back from our patients is one way we can continue to improve our services. Please take a few minutes to complete the written survey that you may receive in the mail after your visit with us. Thank you!             Your Updated Medication List - Protect others around you: Learn how to safely use, store and throw away your medicines at www.disposemymeds.org.          This list is accurate as of 8/23/18 11:14 AM.  Always use your most recent med list.                   Brand Name Dispense  Instructions for use Diagnosis    ACE/ARB/ARNI NOT PRESCRIBED (INTENTIONAL)      Please choose reason not prescribed, below    Type 2 diabetes mellitus without complication, without long-term current use of insulin (H)       allopurinol 100 MG tablet    ZYLOPRIM    270 tablet    Take 3 tablets (300 mg) by mouth daily    Acute gout of right elbow, unspecified cause       aspirin 81 MG tablet     100 tablet    Take 1 tablet (81 mg) by mouth daily    Type 2 diabetes, HbA1C goal < 8% (H)       blood glucose monitoring lancets     200 each    Use to test blood sugars 2-3 times daily as directed (lena contour meter)    Type 2 diabetes, HbA1C goal < 8% (H)       blood glucose monitoring meter device kit    no brand specified    1 kit    Use to test blood sugar 2 times daily or as directed.    Type 2 diabetes mellitus without complication, without long-term current use of insulin (H)       blood glucose monitoring test strip    no brand specified    200 strip    Use to test blood sugars 2 times daily or as directed    Type 2 diabetes mellitus without complication, without long-term current use of insulin (H)       colchicine 0.6 MG capsule    MITIGARE    20 capsule    Take 0.6 mg by mouth 2 times daily as needed    Acute pain of right knee       cyclobenzaprine 5 MG tablet    FLEXERIL    30 tablet    Take 1-2 tablets (5-10 mg) by mouth nightly as needed for muscle spasms    Back muscle spasm       finasteride 5 MG tablet    PROSCAR    30 tablet    Take 1 tablet (5 mg) by mouth daily For prostate enlargement. Please fill on $4 list    Benign prostatic hyperplasia without lower urinary tract symptoms       FLUoxetine 20 MG capsule    PROzac    270 capsule    TAKE THREE CAPSULES BY MOUTH ONCE DAILY    Mild episode of recurrent major depressive disorder (H)       fluticasone 50 MCG/ACT spray    FLONASE    1 Bottle    Spray 1-2 sprays into both nostrils daily    Chronic rhinitis, unspecified type, Facial swelling        hydrOXYzine 25 MG tablet    ATARAX    30 tablet    Take 2-4 tablets ( mg) by mouth nightly as needed for anxiety (for panic at night)    Chronic pain syndrome       lidocaine 5 % Patch    LIDODERM    30 patch    Apply up to 3 patches to painful area at once for up to 12 h within a 24 h period.  Remove after 12 hours.    Chest wall pain       loratadine 10 MG tablet    CLARITIN    30 tablet    Take 1 tablet (10 mg) by mouth daily    Facial swelling, Chronic rhinitis, unspecified type       LYRICA PO      Take 200 mg by mouth 2 times daily        metFORMIN 500 MG 24 hr tablet    GLUCOPHAGE-XR    360 tablet    Take 4 tablets (2,000 mg) by mouth daily (with dinner)    Type 2 diabetes mellitus without complication, without long-term current use of insulin (H)       oxyCODONE IR 5 MG tablet    ROXICODONE    180 tablet    1 tab q4h, PRN max 6 per day. fill 9/3/18    Failed back syndrome of lumbar spine       ranitidine 300 MG tablet    ZANTAC    180 tablet    Take 1 tablet (300 mg) by mouth 2 times daily To reduce stomach acid    Gastroesophageal reflux disease without esophagitis       senna-docusate 8.6-50 MG per tablet    SENOKOT-S;PERICOLACE    60 tablet    Take 1 tablet by mouth 2 times daily    Drug-induced constipation       simvastatin 20 MG tablet    ZOCOR    90 tablet    Take 1 tablet (20 mg) by mouth At Bedtime    Hyperlipidemia LDL goal <100       triamcinolone 0.1 % cream    KENALOG    30 g    Apply sparingly to affected area twice daily for 7 days. Then stop and use only for flares    Eczema, unspecified type

## 2018-08-23 NOTE — PROGRESS NOTES
Lung Cancer Screening Shared Decision Making Visit     I discussed screening with the patient. Toby Mcgrath is eligible for lung cancer screening on the basis of his smoking history:  History   Smoking Status     Former Smoker     Years: 40.00     Types: Cigarettes   Smokeless Tobacco     Former User     Quit date: 2/1/2013       I have discussed with patient the risks and benefits of screening for lung cancer with low-dose CT.     The risks include:  radiation exposure: one low dose chest CT has as much ionizing radiation as about 15 chest x-rays or 6 months of background radiation living in [place]    false positives: 96% of positive findings/nodules are NOT cancer, but some might still require additional diagnostic evaluation, including biopsy  over-diagnosis: some slow growing cancers that might never have been clinically significant will be detected and treated unnecessarily }    The benefit of early detection of lung cancer is contingent upon adherence to annual screening or more frequent follow up if indicated.     Furthermore, reaping the benefits of screening requires Toby Mcgrath to be willing and physically able to undergo diagnostic procedures, if indicated. Although no specific guide is available for determining severity of comorbidities, it is reasonable to withhold screening in patients who have greater mortality risk from other diseases.     We did discuss that the only way to prevent lung cancer is to not smoke. Smoking cessation assistance was not offered since he is no longer smoking  I did not offer risk estimation using a calculator such as this one:    ShouldIScreen    Toby Mcgrath declines lung cancer screening on the basis of would like to think about this further - readdress at his next visit.

## 2018-08-23 NOTE — PROGRESS NOTES
SUBJECTIVE:   Toby Mcgrath is a 63 year old male who presents to clinic today for the following health issues:      Diabetes Follow-up    Patient is checking blood sugars: once daily.  Results are as follows:         am - 200         bedtime - 177    Diabetic concerns: other - sweating and nauseated      Symptoms of hypoglycemia (low blood sugar): unsure if nausea and sweats are related to low sugars      Paresthesias (numbness or burning in feet) or sores: No     Date of last diabetic eye exam: DUE     BP Readings from Last 2 Encounters:   07/12/18 122/81   06/28/18 115/75     Hemoglobin A1C (%)   Date Value   05/31/2018 6.7 (H)   02/26/2018 6.6 (H)     LDL Cholesterol Calculated (mg/dL)   Date Value   05/17/2017 63   10/14/2013 60     LDL Cholesterol Direct (mg/dL)   Date Value   05/31/2018 74   07/14/2016 92     He does report that he had 2 episodes where he felt lightheaded and sweaty after taking his metformin on an empty stomach.  He also felt nauseous.  We discussed trying to always couple this with the meal.  It happened on 2 occasions in the evening when he took it before he ate.  Usually in the morning when he eats breakfast he has no symptoms.    Separate from this he does notice some numbness generalized along both feet and feels that this stems from his back surgery.  He is taking Lyrica and feels that this helps a lot with chronic pain but has not really helped with numbness in his feet he does have diabetic shoes and inserts  He would like toenail clipping referral    Diabetes Management Resources    Amount of exercise or physical activity: 2-3 days/week for an average of walking     Problems taking medications regularly: No    Medication side effects: none    Diet: regular (no restrictions)      Patient state that armpit is feeling better but still has a little pain.    (F33.0) Mild episode of recurrent major depressive disorder (H)  Comment:   Plan: FLUoxetine (PROZAC) 20 MG capsule             (M10.9) Gout, unspecified cause, unspecified chronicity, unspecified site  Comment: He has been using allopurinol 300 mg daily coupled with colchicine.  Currently not having any pain due for uric acid and creatinine recheck  Plan: Uric acid, Basic metabolic panel            (M96.1) Failed back syndrome of lumbar spine  Comment:  He feels his medications on the third of every month  Plan: oxyCODONE IR (ROXICODONE) 5 MG tablet            (Z87.891) Personal history of tobacco use  Comment: We briefly reviewed lung cancer screening.  He was pre-contemplative about this but I did bring up the issue and he would like to think about it more.  Plan: Prof Fee: Shared Decision Making Visit for Lung        Cancer Screening            (J31.0) Chronic rhinitis, unspecified type  Comment:   Plan: fluticasone (FLONASE) 50 MCG/ACT spray            (R22.0) Facial swelling  Comment:   Plan: fluticasone (FLONASE) 50 MCG/ACT spray            (Z23) Need for pneumococcal vaccination  Comment:   Plan: PNEUMOCOCCAL CONJ VACCINE 13 VALENT IM                 Problem list and histories reviewed & adjusted, as indicated.  Additional history: as documented    Patient Active Problem List   Diagnosis     Thoracic or lumbosacral neuritis or radiculitis, unspecified     Osteoarthrosis, unspecified whether generalized or localized, pelvic region and thigh     Hypertrophy of prostate with urinary obstruction     Chronic rhinitis     Chronic pain syndrome     Disorder of bursae and tendons in shoulder region     Major depressive disorder, recurrent episode (H)     Anxiety     Gout     Type 2 diabetes mellitus without complication (H)     Hypertension goal BP (blood pressure) < 140/90     Advanced directives, counseling/discussion     DJD (degenerative joint disease), lumbar     Hyperlipidemia LDL goal <100     DDD (degenerative disc disease), cervical     GERD (gastroesophageal reflux disease)     Lumbar radiculopathy     S/P lumbar fusion      Left-sided low back pain with left-sided sciatica     BMI 32.0-32.9,adult     History of hepatitis C     S/P lumbar discectomy     Acute post-operative pain     S/P laminectomy     Chronic pain     Bilateral low back pain without sciatica     Right-sided low back pain with right-sided sciatica     Acute gouty arthritis     Acute gout of right elbow, unspecified cause     Idiopathic gout of left elbow, unspecified chronicity     Gastroesophageal reflux disease without esophagitis     Failed back syndrome of lumbar spine     Slow transit constipation     Benign prostatic hyperplasia with urinary frequency     Past Surgical History:   Procedure Laterality Date     BACK SURGERY       both shoulder repair  2006, 2008     C NONSPECIFIC PROCEDURE  1995    neck cyst     C NONSPECIFIC PROCEDURE  1979    laminectomy L5-S1 x 2     C SHOULDER SURG PROC UNLISTED  2005, '07    repairs,later manipulate,inject LT, RT     FUSION LUMBAR ANTERIOR, FUSION LUMBAR POSTERIOR TWO LEVELS, COMBINED N/A 9/17/2014    Procedure: COMBINED FUSION LUMBAR ANTERIOR, FUSION LUMBAR POSTERIOR TWO LEVELS;  Surgeon: Chris Lugo MD;  Location: [facility] OR     HC COLONOSCOPY THRU STOMA, DIAGNOSTIC  3/04    normal     HC UGI ENDOSCOPY DIAG W OR W/O BRUSH/WASH  3/04    ok     HEAD & NECK SURGERY       HEMILAMINECTOMY, DISCECTOMY LUMBAR ONE LEVEL, COMBINED Left 9/8/2015    Procedure: COMBINED HEMILAMINECTOMY, DISCECTOMY LUMBAR ONE LEVEL;  Surgeon: Jer Irby MD;  Location: [facility] OR     right hip replacement  10,2010       Social History   Substance Use Topics     Smoking status: Former Smoker     Years: 40.00     Types: Cigarettes     Smokeless tobacco: Former User     Quit date: 2/1/2013     Alcohol use No     Family History   Problem Relation Age of Onset     Diabetes Mother      C.A.D. Father      Diabetes Father      Hypertension Father          Current Outpatient Prescriptions   Medication Sig Dispense Refill     ACE/ARB/ARNI NOT PRESCRIBED,  INTENTIONAL, Please choose reason not prescribed, below       allopurinol (ZYLOPRIM) 100 MG tablet Take 3 tablets (300 mg) by mouth daily 270 tablet 0     aspirin 81 MG tablet Take 1 tablet (81 mg) by mouth daily 100 tablet 3     blood glucose monitoring (LENA MICROLET) lancets Use to test blood sugars 2-3 times daily as directed (lena contour meter) 200 each 1     blood glucose monitoring (NO BRAND SPECIFIED) meter device kit Use to test blood sugar 2 times daily or as directed. 1 kit 0     blood glucose monitoring (NO BRAND SPECIFIED) test strip Use to test blood sugars 2 times daily or as directed 200 strip 1     Colchicine (MITIGARE) 0.6 MG CAPS Take 0.6 mg by mouth 2 times daily as needed 20 capsule 0     cyclobenzaprine (FLEXERIL) 5 MG tablet Take 1-2 tablets (5-10 mg) by mouth nightly as needed for muscle spasms 30 tablet 1     finasteride (PROSCAR) 5 MG tablet Take 1 tablet (5 mg) by mouth daily For prostate enlargement. Please fill on $4 list 30 tablet 5     FLUoxetine (PROZAC) 20 MG capsule TAKE THREE CAPSULES BY MOUTH ONCE DAILY 270 capsule 3     fluticasone (FLONASE) 50 MCG/ACT spray Spray 1-2 sprays into both nostrils daily 1 Bottle 3     hydrOXYzine (ATARAX) 25 MG tablet Take 2-4 tablets ( mg) by mouth nightly as needed for anxiety (for panic at night) 30 tablet 5     lidocaine (LIDODERM) 5 % Patch Apply up to 3 patches to painful area at once for up to 12 h within a 24 h period.  Remove after 12 hours. 30 patch 0     loratadine (CLARITIN) 10 MG tablet Take 1 tablet (10 mg) by mouth daily 30 tablet 3     metFORMIN (GLUCOPHAGE-XR) 500 MG 24 hr tablet Take 4 tablets (2,000 mg) by mouth daily (with dinner) 360 tablet 3     oxyCODONE IR (ROXICODONE) 5 MG tablet 1 tab q4h, PRN max 6 per day. fill 9/3/18 180 tablet 0     Pregabalin (LYRICA PO) Take 200 mg by mouth 2 times daily       ranitidine (ZANTAC) 300 MG tablet Take 1 tablet (300 mg) by mouth 2 times daily To reduce stomach acid 180 tablet 3      senna-docusate (SENOKOT-S;PERICOLACE) 8.6-50 MG per tablet Take 1 tablet by mouth 2 times daily 60 tablet 3     simvastatin (ZOCOR) 20 MG tablet Take 1 tablet (20 mg) by mouth At Bedtime 90 tablet 3     triamcinolone (KENALOG) 0.1 % cream Apply sparingly to affected area twice daily for 7 days. Then stop and use only for flares 30 g 3     [DISCONTINUED] FLUoxetine (PROZAC) 20 MG capsule TAKE THREE CAPSULES BY MOUTH ONCE DAILY 270 capsule 0       Reviewed and updated as needed this visit by clinical staff       Reviewed and updated as needed this visit by Provider         ROS:  Constitutional, HEENT, cardiovascular, pulmonary, gi and gu systems are negative, except as otherwise noted.    OBJECTIVE:                                                    /77 (BP Location: Left arm, Patient Position: Sitting, Cuff Size: Adult Large)  Pulse 69  Temp 98  F (36.7  C) (Oral)  Resp 16  Wt 250 lb (113.4 kg)  SpO2 95%  BMI 33.91 kg/m2  Body mass index is 33.91 kg/(m^2).  GENERAL APPEARANCE: healthy, alert and no distress  RESP: lungs clear to auscultation - no rales, rhonchi or wheezes  CV: regular rates and rhythm, normal S1 S2, no S3 or S4 and no murmur, click or rub  ABDOMEN: soft, nontender, without hepatosplenomegaly or masses and bowel sounds normal  DIABETIC FOOT EXAM: normal DP and PT pulses, no trophic changes or ulcerative lesions and reduced sensation at the top of both feet and at the ball of his left foot there is some numbness as well no ulcerations noted  PSYCH: mentation appears normal and affect normal/bright         ASSESSMENT/PLAN:                                                    1. Type 2 diabetes mellitus without complication, without long-term current use of insulin (H)  Will recheck A1c with his next visit in November  - FOOT EXAM    2. Mild episode of recurrent major depressive disorder (H)     - FLUoxetine (PROZAC) 20 MG capsule; TAKE THREE CAPSULES BY MOUTH ONCE DAILY  Dispense: 270  capsule; Refill: 3    3. Gout, unspecified cause, unspecified chronicity, unspecified site  Rechecking labs as noted below if his uric acid is below 6 can stop colchicine and continue allopurinol as long as kidney function is adequate  - Uric acid  - Basic metabolic panel    4. Failed back syndrome of lumbar spine     - oxyCODONE IR (ROXICODONE) 5 MG tablet; 1 tab q4h, PRN max 6 per day. fill 9/3/18  Dispense: 180 tablet; Refill: 0    5. Personal history of tobacco use  See notes above.  We did discuss lung cancer screening he is pre-contemplative at this point I would like to readdress this at our next visit  - Prof Fee: Shared Decision Making Visit for Lung Cancer Screening    6. Chronic rhinitis, unspecified type    - fluticasone (FLONASE) 50 MCG/ACT spray; Spray 1-2 sprays into both nostrils daily  Dispense: 1 Bottle; Refill: 3    7. Facial swelling    - fluticasone (FLONASE) 50 MCG/ACT spray; Spray 1-2 sprays into both nostrils daily  Dispense: 1 Bottle; Refill: 3    8. Need for pneumococcal vaccination    - PNEUMOCOCCAL CONJ VACCINE 13 VALENT IM      Follow up with Provider -November    Connie Haskins MD  [facility]

## 2018-08-24 ENCOUNTER — TELEPHONE (OUTPATIENT)
Dept: FAMILY MEDICINE | Facility: CLINIC | Age: 63
End: 2018-08-24

## 2018-08-24 LAB
ANION GAP SERPL CALCULATED.3IONS-SCNC: 7 MMOL/L (ref 3–14)
BUN SERPL-MCNC: 14 MG/DL (ref 7–30)
CALCIUM SERPL-MCNC: 9.1 MG/DL (ref 8.5–10.1)
CHLORIDE SERPL-SCNC: 109 MMOL/L (ref 94–109)
CO2 SERPL-SCNC: 28 MMOL/L (ref 20–32)
CREAT SERPL-MCNC: 1.24 MG/DL (ref 0.66–1.25)
GFR SERPL CREATININE-BSD FRML MDRD: 59 ML/MIN/1.7M2
GLUCOSE SERPL-MCNC: 119 MG/DL (ref 70–99)
POTASSIUM SERPL-SCNC: 4.4 MMOL/L (ref 3.4–5.3)
SODIUM SERPL-SCNC: 144 MMOL/L (ref 133–144)
URATE SERPL-MCNC: 7.2 MG/DL (ref 3.5–7.2)

## 2018-08-24 NOTE — PROGRESS NOTES
Please call patient  - his labs show that his URic acid is still a little too high - meaning he could get another gout flare if he stops his 2 meds.  Lets have him stay on the 300 mg allopurinol as well as the daily colchicine/mitigare for another month and then recheck.  Have him really hydrate as this will help reduce levels too  Let me kow if he needs refills  Thank you!    Connie

## 2018-08-24 NOTE — TELEPHONE ENCOUNTER
Left message for pt to call.  Lavinia Ortega RN      Notes Recorded by Candie Avalos MA on 8/24/2018 at 9:38 AM  No notes recorded by provider  ------    Notes Recorded by Connie Haskins MD on 8/24/2018 at 9:21 AM  Please call patient  - his labs show that his URic acid is still a little too high - meaning he could get another gout flare if he stops his 2 meds.  Lets have him stay on the 300 mg allopurinol as well as the daily colchicine/mitigare for another month and then recheck.  Have him really hydrate as this will help reduce levels too  Let me kow if he needs refills  Thank you!    Connie

## 2018-09-18 DIAGNOSIS — M54.16 LUMBAR RADICULOPATHY: ICD-10-CM

## 2018-09-18 DIAGNOSIS — G89.4 CHRONIC PAIN SYNDROME: Primary | ICD-10-CM

## 2018-09-18 NOTE — TELEPHONE ENCOUNTER
It is time to refill Layo's Lyrica through the [employer] assistance program.  [employer] requires a hand signed, hard copy, brand name script be submitted for this fill.    Please hand sign a BRAND name, hard copy script for:  LYRICA     Please send the script to me via interoffice mail [facility] Kim or via [place] mail  at:      [facility]   Zenaida Plasencia   [address] [place]  [address]    Thanks so much for your help!    Zenaida Plasencia  Prescription

## 2018-09-18 NOTE — TELEPHONE ENCOUNTER
SN,   See below   Rx needs to be printed and mailed to Rx assistance person    Reviewed [place]   Last filled 8/3/2018 #360   No concerns noted    Please authorize if appropriate.  Thanks,  Ivis SMALLS RN

## 2018-09-19 RX ORDER — PREGABALIN 100 MG/1
200 CAPSULE ORAL 2 TIMES DAILY
Qty: 360 CAPSULE | Refills: 1 | Status: SHIPPED | OUTPATIENT
Start: 2018-11-02 | End: 2018-09-27

## 2018-09-27 ENCOUNTER — OFFICE VISIT (OUTPATIENT)
Dept: FAMILY MEDICINE | Facility: CLINIC | Age: 63
End: 2018-09-27
Payer: COMMERCIAL

## 2018-09-27 ENCOUNTER — TELEPHONE (OUTPATIENT)
Dept: FAMILY MEDICINE | Facility: CLINIC | Age: 63
End: 2018-09-27

## 2018-09-27 VITALS
DIASTOLIC BLOOD PRESSURE: 74 MMHG | SYSTOLIC BLOOD PRESSURE: 108 MMHG | BODY MASS INDEX: 33.59 KG/M2 | WEIGHT: 248 LBS | HEART RATE: 82 BPM | TEMPERATURE: 97.9 F | RESPIRATION RATE: 16 BRPM | OXYGEN SATURATION: 98 % | HEIGHT: 72 IN

## 2018-09-27 DIAGNOSIS — E11.9 TYPE 2 DIABETES MELLITUS WITHOUT COMPLICATION, WITHOUT LONG-TERM CURRENT USE OF INSULIN (H): ICD-10-CM

## 2018-09-27 DIAGNOSIS — M96.1 FAILED BACK SYNDROME OF LUMBAR SPINE: Primary | ICD-10-CM

## 2018-09-27 DIAGNOSIS — R07.89 CHEST WALL PAIN: ICD-10-CM

## 2018-09-27 DIAGNOSIS — M25.561 ACUTE PAIN OF RIGHT KNEE: ICD-10-CM

## 2018-09-27 DIAGNOSIS — N40.0 BENIGN PROSTATIC HYPERPLASIA WITHOUT LOWER URINARY TRACT SYMPTOMS: ICD-10-CM

## 2018-09-27 DIAGNOSIS — K59.03 DRUG-INDUCED CONSTIPATION: ICD-10-CM

## 2018-09-27 DIAGNOSIS — M62.830 BACK MUSCLE SPASM: ICD-10-CM

## 2018-09-27 DIAGNOSIS — M10.9 ACUTE GOUT OF RIGHT ELBOW, UNSPECIFIED CAUSE: ICD-10-CM

## 2018-09-27 DIAGNOSIS — Z23 NEED FOR PROPHYLACTIC VACCINATION AND INOCULATION AGAINST INFLUENZA: ICD-10-CM

## 2018-09-27 DIAGNOSIS — M54.16 LUMBAR RADICULOPATHY: ICD-10-CM

## 2018-09-27 DIAGNOSIS — G89.4 CHRONIC PAIN SYNDROME: ICD-10-CM

## 2018-09-27 LAB
HBA1C MFR BLD: 6.6 % (ref 0–5.6)
URATE SERPL-MCNC: 7.7 MG/DL (ref 3.5–7.2)

## 2018-09-27 PROCEDURE — 84550 ASSAY OF BLOOD/URIC ACID: CPT | Performed by: FAMILY MEDICINE

## 2018-09-27 PROCEDURE — 83036 HEMOGLOBIN GLYCOSYLATED A1C: CPT | Performed by: FAMILY MEDICINE

## 2018-09-27 PROCEDURE — 99214 OFFICE O/P EST MOD 30 MIN: CPT | Mod: 25 | Performed by: FAMILY MEDICINE

## 2018-09-27 PROCEDURE — 36415 COLL VENOUS BLD VENIPUNCTURE: CPT | Performed by: FAMILY MEDICINE

## 2018-09-27 PROCEDURE — 90471 IMMUNIZATION ADMIN: CPT | Performed by: FAMILY MEDICINE

## 2018-09-27 PROCEDURE — 90662 IIV NO PRSV INCREASED AG IM: CPT | Performed by: FAMILY MEDICINE

## 2018-09-27 RX ORDER — AMOXICILLIN 250 MG
1 CAPSULE ORAL 2 TIMES DAILY
Qty: 60 TABLET | Refills: 11 | Status: SHIPPED | OUTPATIENT
Start: 2018-09-27 | End: 2019-06-26

## 2018-09-27 RX ORDER — PREGABALIN 100 MG/1
200 CAPSULE ORAL 2 TIMES DAILY
Qty: 360 CAPSULE | Refills: 3 | Status: SHIPPED | OUTPATIENT
Start: 2018-11-02 | End: 2019-01-23

## 2018-09-27 RX ORDER — ALLOPURINOL 100 MG/1
300 TABLET ORAL DAILY
Qty: 270 TABLET | Refills: 3 | Status: SHIPPED | OUTPATIENT
Start: 2018-09-27 | End: 2020-11-05

## 2018-09-27 RX ORDER — OXYCODONE HYDROCHLORIDE 5 MG/1
TABLET ORAL
Qty: 180 TABLET | Refills: 0 | Status: SHIPPED | OUTPATIENT
Start: 2018-09-27 | End: 2018-10-30

## 2018-09-27 RX ORDER — LIDOCAINE 50 MG/G
PATCH TOPICAL
Qty: 30 PATCH | Refills: 0 | Status: CANCELLED | OUTPATIENT
Start: 2018-09-27

## 2018-09-27 RX ORDER — FINASTERIDE 5 MG/1
5 TABLET, FILM COATED ORAL DAILY
Qty: 30 TABLET | Refills: 5 | Status: SHIPPED | OUTPATIENT
Start: 2018-09-27 | End: 2019-04-08

## 2018-09-27 RX ORDER — COLCHICINE 0.6 MG/1
0.6 CAPSULE ORAL DAILY
Qty: 30 CAPSULE | Refills: 3 | Status: SHIPPED | OUTPATIENT
Start: 2018-09-27 | End: 2020-07-16

## 2018-09-27 NOTE — NURSING NOTE
Screening Questionnaire for Adult Immunization    Are you sick today?   No   Do you have allergies to medications, food, a vaccine component or latex?   No   Have you ever had a serious reaction after receiving a vaccination?   No   Do you have a long-term health problem with heart disease, lung disease, asthma, kidney disease, metabolic disease (e.g. diabetes), anemia, or other blood disorder?   No   Do you have cancer, leukemia, HIV/AIDS, or any other immune system problem?   No   In the past 3 months, have you taken medications that affect  your immune system, such as prednisone, other steroids, or anticancer drugs; drugs for the treatment of rheumatoid arthritis, Crohn s disease, or psoriasis; or have you had radiation treatments?   No   Have you had a seizure, or a brain or other nervous system problem?   No   During the past year, have you received a transfusion of blood or blood     products, or been given immune (gamma) globulin or antiviral drug?   No   For women: Are you pregnant or is there a chance you could become        pregnant during the next month?   No   Have you received any vaccinations in the past 4 weeks?   No     Immunization questionnaire answers were all negative.      Prior to injection verified patient identity using patient's name and date of birth.  Due to injection administration, patient instructed to remain in clinic for 15 minutes  afterwards, and to report any adverse reaction to me immediately.      Per orders of Dr. Rosales, injection of Flu given by Heather Pierson. Patient instructed to remain in clinic for 15 minutes afterwards, and to report any adverse reaction to me immediately.       Screening performed by Heather Pierson on 9/27/2018 at 11:30 AM.

## 2018-09-27 NOTE — PATIENT INSTRUCTIONS
Gout Diet  Gout is a painful condition caused by an excess of uric acid, a waste product made by the body. Uric acid forms crystals that collect in the joints. The immune response to these crystals brings on symptoms of joint pain and swelling. This is called a gout attack. Often, medications and diet changes are combined to manage gout. Below are some guidelines for changing your diet to help you manage gout and prevent attacks. Your health care provider will help you determine the best eating plan for you.     Eating to manage gout  Weight loss for those who are overweight may help reduce gout attacks.  Eat less of these foods  Eating too many foods containing purines may raise the levels of uric acid in your body. This raises your risk for a gout attack. Try to limit these foods and drinks:    Alcohol, such as beer and red wine. You may be told to avoid alcohol completely.    Soft drinks that contain sugar or high fructose corn syrup    Certain fish, including anchovies, sardines, fish eggs, and herring    Shellfish    Certain meats, such as red meat, hot dogs, luncheon meats, and turkey    Organ meats, such as liver, kidneys, and sweetbreads    Legumes, such as dried beans and peas    Other high fat foods such as gravy, whole milk, and high fat cheeses    Vegetables such as asparagus, cauliflower, spinach, and mushrooms used to be thought to contribute to an increased risk for a gout attack, but recent studies show that high purine vegetables don't increase the risk for a gout attack.  Eat more of these foods  Other foods may be helpful for people with gout. Add some of these foods to your diet:    Cherries contain chemicals that may lower uric acid.    Omega fatty acids. These are found in some fatty fish such as salmon, certain oils (flax, olive, or nut), and nuts themselves. Omega fatty acids may help prevent inflammation due to gout.    Dairy products that are low-fat or fat-free, such as cheese and  yogurt    Complex carbohydrate foods, including whole grains, brown rice, oats, and beans    Coffee, in moderation    Water, approximately 64 ounces per day  Follow-up care  Follow up with your healthcare provider as advised.  When to seek medical advice  Call your healthcare provider right away if any of these occur:    Return of gout symptoms, usually at night:    Severe pain, swelling, and heat in a joint, especially the base of the big toe    Affected joint is hard to move    Skin of the affected joint is purple or red    Fever of 100.4 F (38 C) or higher    Pain that doesn't get better even with prescribed medicine   Date Last Reviewed: 1/12/2016 2000-2017 [employer]. [address] [place] [address], [place] [address]. All rights reserved. This information is not intended as a substitute for professional medical care. Always follow your healthcare professional's instructions.

## 2018-09-27 NOTE — PROGRESS NOTES
SUBJECTIVE:   Toby Mcgrath is a 63 year old male who presents to clinic today for the following health issues:      RESPIRATORY SYMPTOMS      Duration: 1 week     Description  Cough deep cough     Severity: moderate    Accompanying signs and symptoms: wheezing and ear plugs     History (predisposing factors):  none    Precipitating or alleviating factors: None    Therapies tried and outcome:  none    (M96.1) Failed back syndrome of lumbar spine  (primary encounter diagnosis)  Comment:  here for refills.  Medications working well no changes  Plan: oxyCODONE IR (ROXICODONE) 5 MG tablet            (E11.9) Type 2 diabetes mellitus without complication, without long-term current use of insulin (H)  Comment:  Due for labs  Plan: blood glucose monitoring (NO BRAND SPECIFIED)         test strip, Hemoglobin A1c            (M10.9) Acute gout of right elbow, unspecified cause  Comment: due for labs - not taking colchicine  Has been on allopurinol but ran out of colchicine  Need to recheck UA levels  No pain noted  Plan: Uric acid, allopurinol (ZYLOPRIM) 100 MG tablet            (G89.4) Chronic pain syndrome  Comment: meds refilled  Plan: pregabalin (LYRICA) 100 MG capsule            (M54.16) Lumbar radiculopathy  Comment:   Plan: pregabalin (LYRICA) 100 MG capsule            (K59.03) Drug-induced constipation  Comment: has had constipation - has a medication at home not sure the name feels it is not working  Plan: senna-docusate (SENOKOT-S;PERICOLACE) 8.6-50 MG        per tablet            (N40.0) Benign prostatic hyperplasia without lower urinary tract symptoms  Comment:   Plan: finasteride (PROSCAR) 5 MG tablet            (Z23) Need for prophylactic vaccination and inoculation against influenza  Comment:   Plan: FLU VACCINE, INCREASED ANTIGEN, PRESV FREE,         ADMIN 1st VACCINE                   Problem list and histories reviewed & adjusted, as indicated.  Additional history: as documented    Patient Active Problem  List   Diagnosis     Thoracic or lumbosacral neuritis or radiculitis, unspecified     Osteoarthrosis, unspecified whether generalized or localized, pelvic region and thigh     Hypertrophy of prostate with urinary obstruction     Chronic rhinitis     Chronic pain syndrome     Disorder of bursae and tendons in shoulder region     Major depressive disorder, recurrent episode (H)     Anxiety     Gout     Type 2 diabetes mellitus without complication (H)     Hypertension goal BP (blood pressure) < 140/90     Advanced directives, counseling/discussion     DJD (degenerative joint disease), lumbar     Hyperlipidemia LDL goal <100     DDD (degenerative disc disease), cervical     GERD (gastroesophageal reflux disease)     Lumbar radiculopathy     S/P lumbar fusion     Left-sided low back pain with left-sided sciatica     BMI 32.0-32.9,adult     History of hepatitis C     S/P lumbar discectomy     Acute post-operative pain     S/P laminectomy     Chronic pain     Bilateral low back pain without sciatica     Right-sided low back pain with right-sided sciatica     Acute gouty arthritis     Acute gout of right elbow, unspecified cause     Idiopathic gout of left elbow, unspecified chronicity     Gastroesophageal reflux disease without esophagitis     Failed back syndrome of lumbar spine     Slow transit constipation     Benign prostatic hyperplasia with urinary frequency     Past Surgical History:   Procedure Laterality Date     BACK SURGERY       both shoulder repair  2006, 2008     C NONSPECIFIC PROCEDURE  1995    neck cyst     C NONSPECIFIC PROCEDURE  1979    laminectomy L5-S1 x 2     C SHOULDER SURG PROC UNLISTED  2005, '07    repairs,later manipulate,inject LT, RT     FUSION LUMBAR ANTERIOR, FUSION LUMBAR POSTERIOR TWO LEVELS, COMBINED N/A 9/17/2014    Procedure: COMBINED FUSION LUMBAR ANTERIOR, FUSION LUMBAR POSTERIOR TWO LEVELS;  Surgeon: Chris Lugo MD;  Location: [facility] OR     HC COLONOSCOPY THRU STOMA, DIAGNOSTIC   3/04    normal     HC UGI ENDOSCOPY DIAG W OR W/O BRUSH/WASH  3/04    ok     HEAD & NECK SURGERY       HEMILAMINECTOMY, DISCECTOMY LUMBAR ONE LEVEL, COMBINED Left 9/8/2015    Procedure: COMBINED HEMILAMINECTOMY, DISCECTOMY LUMBAR ONE LEVEL;  Surgeon: Jer Irby MD;  Location: [facility] OR     right hip replacement  10,2010       Social History   Substance Use Topics     Smoking status: Former Smoker     Years: 40.00     Types: Cigarettes     Smokeless tobacco: Former User     Quit date: 2/1/2013     Alcohol use No     Family History   Problem Relation Age of Onset     Diabetes Mother      C.A.D. Father      Diabetes Father      Hypertension Father          Current Outpatient Prescriptions   Medication Sig Dispense Refill     ACE/ARB/ARNI NOT PRESCRIBED, INTENTIONAL, Please choose reason not prescribed, below       allopurinol (ZYLOPRIM) 100 MG tablet Take 3 tablets (300 mg) by mouth daily 270 tablet 3     aspirin 81 MG tablet Take 1 tablet (81 mg) by mouth daily 100 tablet 3     blood glucose monitoring (LENA MICROLET) lancets Use to test blood sugars 2-3 times daily as directed (lena contour meter) 200 each 1     blood glucose monitoring (NO BRAND SPECIFIED) meter device kit Use to test blood sugar 2 times daily or as directed. 1 kit 0     blood glucose monitoring (NO BRAND SPECIFIED) test strip Use to test blood sugars 2 times daily or as directed 200 strip 3     colchicine (MITIGARE) 0.6 MG capsule Take 1 capsule (0.6 mg) by mouth daily 30 capsule 3     finasteride (PROSCAR) 5 MG tablet Take 1 tablet (5 mg) by mouth daily For prostate enlargement. Please fill on $4 list 30 tablet 5     FLUoxetine (PROZAC) 20 MG capsule TAKE THREE CAPSULES BY MOUTH ONCE DAILY 270 capsule 3     fluticasone (FLONASE) 50 MCG/ACT spray Spray 1-2 sprays into both nostrils daily 1 Bottle 3     lidocaine (LIDODERM) 5 % Patch Apply up to 3 patches to painful area at once for up to 12 h within a 24 h period.  Remove after 12 hours.  30 patch 0     loratadine (CLARITIN) 10 MG tablet Take 1 tablet (10 mg) by mouth daily 30 tablet 3     metFORMIN (GLUCOPHAGE-XR) 500 MG 24 hr tablet Take 4 tablets (2,000 mg) by mouth daily (with dinner) 360 tablet 3     oxyCODONE IR (ROXICODONE) 5 MG tablet 1 tab q4h, PRN max 6 per day. fill 10/3/18 180 tablet 0     [START ON 11/2/2018] pregabalin (LYRICA) 100 MG capsule Take 2 capsules (200 mg) by mouth 2 times daily 360 capsule 3     ranitidine (ZANTAC) 300 MG tablet Take 1 tablet (300 mg) by mouth 2 times daily To reduce stomach acid 180 tablet 3     senna-docusate (SENOKOT-S;PERICOLACE) 8.6-50 MG per tablet Take 1 tablet by mouth 2 times daily Use for constipation due to your medication 60 tablet 11     simvastatin (ZOCOR) 20 MG tablet Take 1 tablet (20 mg) by mouth At Bedtime 90 tablet 3     triamcinolone (KENALOG) 0.1 % cream Apply sparingly to affected area twice daily for 7 days. Then stop and use only for flares 30 g 3     [DISCONTINUED] blood glucose monitoring (NO BRAND SPECIFIED) test strip Use to test blood sugars 2 times daily or as directed 200 strip 1     [DISCONTINUED] finasteride (PROSCAR) 5 MG tablet Take 1 tablet (5 mg) by mouth daily For prostate enlargement. Please fill on $4 list 30 tablet 5     [DISCONTINUED] pregabalin (LYRICA) 100 MG capsule Take 2 capsules (200 mg) by mouth 2 times daily 360 capsule 1       Reviewed and updated as needed this visit by clinical staff       Reviewed and updated as needed this visit by Provider         ROS:  Constitutional, HEENT, cardiovascular, pulmonary, gi and gu systems are negative, except as otherwise noted.    OBJECTIVE:                                                    /74  Pulse 82  Temp 97.9  F (36.6  C) (Oral)  Resp 16  Ht 6' (1.829 m)  Wt 248 lb (112.5 kg)  SpO2 98%  BMI 33.63 kg/m2  Body mass index is 33.63 kg/(m^2).  GENERAL APPEARANCE: healthy, alert and no distress  RESP: lungs clear to auscultation - no rales, rhonchi or  wheezes  CV: regular rates and rhythm, normal S1 S2, no S3 or S4 and no murmur, click or rub  ABDOMEN: soft, nontender, without hepatosplenomegaly or masses, bowel sounds normal and obese  PSYCH: mentation appears normal and affect normal/bright    Labs pending     ASSESSMENT/PLAN:                                                    1. Failed back syndrome of lumbar spine   refills given  No concerns withuse  Will plan for repeat urine drug screen with next visit  - oxyCODONE IR (ROXICODONE) 5 MG tablet; 1 tab q4h, PRN max 6 per day. fill 10/3/18  Dispense: 180 tablet; Refill: 0    2. Type 2 diabetes mellitus without complication, without long-term current use of insulin (H)   flu vaccine due  Due for eye exam - has to schedule  Repeat labs  - blood glucose monitoring (NO BRAND SPECIFIED) test strip; Use to test blood sugars 2 times daily or as directed  Dispense: 200 strip; Refill: 3  - Hemoglobin A1c    3. Acute gout of right elbow, unspecified cause   has not odell on cochicine/mitigare  Will check labs and inform him if this is needed  Stay on allopurinol for now  Stable renal function  - Uric acid  - allopurinol (ZYLOPRIM) 100 MG tablet; Take 3 tablets (300 mg) by mouth daily  Dispense: 270 tablet; Refill: 3    4. Chronic pain syndrome  Refills needed seeing MTM soon   - pregabalin (LYRICA) 100 MG capsule; Take 2 capsules (200 mg) by mouth 2 times daily  Dispense: 360 capsule; Refill: 3    5. Lumbar radiculopathy     - pregabalin (LYRICA) 100 MG capsule; Take 2 capsules (200 mg) by mouth 2 times daily  Dispense: 360 capsule; Refill: 3    6. Drug-induced constipation   will have him start senna  Should return with all medications to review what he has been taking ? Colace?  - senna-docusate (SENOKOT-S;PERICOLACE) 8.6-50 MG per tablet; Take 1 tablet by mouth 2 times daily Use for constipation due to your medication  Dispense: 60 tablet; Refill: 11    9. Acute pain of right knee   send in medications but will contact  him first to see if medications are indicated  - colchicine (MITIGARE) 0.6 MG capsule; Take 1 capsule (0.6 mg) by mouth daily  Dispense: 30 capsule; Refill: 3    10. Benign prostatic hyperplasia without lower urinary tract symptoms    - finasteride (PROSCAR) 5 MG tablet; Take 1 tablet (5 mg) by mouth daily For prostate enlargement. Please fill on $4 list  Dispense: 30 tablet; Refill: 5    11. Need for prophylactic vaccination and inoculation against influenza    - FLU VACCINE, INCREASED ANTIGEN, PRESV FREE  - ADMIN 1st VACCINE      Follow up with Provider - based on results     Connie Haskins MD  [facility]

## 2018-09-27 NOTE — TELEPHONE ENCOUNTER
Patient was in seeing SN today  Asked her about his Lyrica Rx    See TE from 9/18/2018  Pt in Rx Assistance Program for this med  I mailed Lyrica Rx to [facility]  on 9/19    SN brought new Lyrica Rx to me today   [facility]  She has Lyrica Rx but it's too early to fill at this time  She cannot sent to [facility] yet  She states she will be sending Rx to them soon and med will be mailed to pt's home address    Left message for patient to callback.  Destroyed Lyrica SN wrote for today with witness DIVYA Null, RN

## 2018-09-27 NOTE — NURSING NOTE
Chief Complaint   Patient presents with     Cough     Recheck Medication     Lyrica      /74  Pulse 82  Temp 97.9  F (36.6  C) (Oral)  Resp 16  Ht 6' (1.829 m)  Wt 248 lb (112.5 kg)  SpO2 98%  BMI 33.63 kg/m2 Estimated body mass index is 33.63 kg/(m^2) as calculated from the following:    Height as of this encounter: 6' (1.829 m).    Weight as of this encounter: 248 lb (112.5 kg).  bp completed using cuff size: large       Health Maintenance addressed:  PHQ9    Possibly completing today per provider review.    Heather Pierson MA

## 2018-09-27 NOTE — MR AVS SNAPSHOT
After Visit Summary   9/27/2018    Toby Mcgrath    MRN: [id number]           Patient Information     Date Of Birth          1955        Visit Information        Provider Department      9/27/2018 10:30 AM Connie Haskins MD [facility]        Today's Diagnoses     Need for prophylactic vaccination and inoculation against influenza    -  1    Failed back syndrome of lumbar spine        Chronic pain syndrome        Lumbar radiculopathy        Drug-induced constipation        Chest wall pain        Back muscle spasm        Acute pain of right knee        Type 2 diabetes mellitus without complication, without long-term current use of insulin (H)        Acute gout of right elbow, unspecified cause        Benign prostatic hyperplasia without lower urinary tract symptoms          Care Instructions      Gout Diet  Gout is a painful condition caused by an excess of uric acid, a waste product made by the body. Uric acid forms crystals that collect in the joints. The immune response to these crystals brings on symptoms of joint pain and swelling. This is called a gout attack. Often, medications and diet changes are combined to manage gout. Below are some guidelines for changing your diet to help you manage gout and prevent attacks. Your health care provider will help you determine the best eating plan for you.     Eating to manage gout  Weight loss for those who are overweight may help reduce gout attacks.  Eat less of these foods  Eating too many foods containing purines may raise the levels of uric acid in your body. This raises your risk for a gout attack. Try to limit these foods and drinks:    Alcohol, such as beer and red wine. You may be told to avoid alcohol completely.    Soft drinks that contain sugar or high fructose corn syrup    Certain fish, including anchovies, sardines, fish eggs, and herring    Shellfish    Certain meats, such as red meat, hot dogs, luncheon meats, and  turkey    Organ meats, such as liver, kidneys, and sweetbreads    Legumes, such as dried beans and peas    Other high fat foods such as gravy, whole milk, and high fat cheeses    Vegetables such as asparagus, cauliflower, spinach, and mushrooms used to be thought to contribute to an increased risk for a gout attack, but recent studies show that high purine vegetables don't increase the risk for a gout attack.  Eat more of these foods  Other foods may be helpful for people with gout. Add some of these foods to your diet:    Cherries contain chemicals that may lower uric acid.    Omega fatty acids. These are found in some fatty fish such as salmon, certain oils (flax, olive, or nut), and nuts themselves. Omega fatty acids may help prevent inflammation due to gout.    Dairy products that are low-fat or fat-free, such as cheese and yogurt    Complex carbohydrate foods, including whole grains, brown rice, oats, and beans    Coffee, in moderation    Water, approximately 64 ounces per day  Follow-up care  Follow up with your healthcare provider as advised.  When to seek medical advice  Call your healthcare provider right away if any of these occur:    Return of gout symptoms, usually at night:    Severe pain, swelling, and heat in a joint, especially the base of the big toe    Affected joint is hard to move    Skin of the affected joint is purple or red    Fever of 100.4 F (38 C) or higher    Pain that doesn't get better even with prescribed medicine   Date Last Reviewed: 1/12/2016 2000-2017 [employer]. [address] [place] [address]. All rights reserved. This information is not intended as a substitute for professional medical care. Always follow your healthcare professional's instructions.                Follow-ups after your visit        Your next 10 appointments already scheduled     Nov 14, 2018 10:30 AM CST   Office Visit with Connie Haskins MD   [facility] ([place]  [facility])    [address]  [facility] [address] [phone]           Bring a current list of meds and any records pertaining to this visit. For Physicals, please bring immunization records and any forms needing to be filled out. Please arrive 10 minutes early to complete paperwork.              Who to contact     If you have questions or need follow up information about today's clinic visit or your schedule please contact [facility] directly at [phone].  Normal or non-critical lab and imaging results will be communicated to you by mobiliThinkhart, letter or phone within 4 business days after the clinic has received the results. If you do not hear from us within 7 days, please contact the clinic through [employer] or phone. If you have a critical or abnormal lab result, we will notify you by phone as soon as possible.  Submit refill requests through [employer] or call your pharmacy and they will forward the refill request to us. Please allow 3 business days for your refill to be completed.          Additional Information About Your Visit        [facility] [employer] Information     DropMat lets you send messages to your doctor, view your test results, renew your prescriptions, schedule appointments and more. To sign up, go to [email or web address] . Click on \"Log in\" on the left side of the screen, which will take you to the Welcome page. Then click on \"Sign up Now\" on the right side of the page.     You will be asked to enter the access code listed below, as well as some personal information. Please follow the directions to create your username and password.     Your access code is: [email or web address]  Expires: 2018 11:19 AM     Your access code will  in 90 days. If you need help or a new code, please call your [facility] clinic or [phone].        Care EveryWhere ID     This is your Care EveryWhere ID. This could be used by other organizations to access your [facility] medical " records  [id number]        Your Vitals Were     Pulse Temperature Respirations Height Pulse Oximetry BMI (Body Mass Index)    82 97.9  F (36.6  C) (Oral) 16 6' (1.829 m) 98% 33.63 kg/m2       Blood Pressure from Last 3 Encounters:   09/27/18 108/74   08/23/18 135/77   07/12/18 122/81    Weight from Last 3 Encounters:   09/27/18 248 lb (112.5 kg)   08/23/18 250 lb (113.4 kg)   07/12/18 252 lb (114.3 kg)              We Performed the Following     ADMIN 1st VACCINE     FLU VACCINE, INCREASED ANTIGEN, PRESV FREE     Hemoglobin A1c     Uric acid          Today's Medication Changes          These changes are accurate as of 9/27/18 11:19 AM.  If you have any questions, ask your nurse or doctor.               These medicines have changed or have updated prescriptions.        Dose/Directions    colchicine 0.6 MG capsule   Commonly known as:  MITIGARE   This may have changed:    - when to take this  - reasons to take this   Used for:  Acute pain of right knee   Changed by:  Connie Haskins MD        Dose:  0.6 mg   Take 1 capsule (0.6 mg) by mouth daily   Quantity:  30 capsule   Refills:  3       oxyCODONE IR 5 MG tablet   Commonly known as:  ROXICODONE   This may have changed:  additional instructions   Used for:  Failed back syndrome of lumbar spine   Changed by:  Connie Haskins MD        1 tab q4h, PRN max 6 per day. fill 10/3/18   Quantity:  180 tablet   Refills:  0       pregabalin 100 MG capsule   Commonly known as:  LYRICA   This may have changed:  These instructions start on 11/2/2018. If you are unsure what to do until then, ask your doctor or other care provider.   Used for:  Chronic pain syndrome, Lumbar radiculopathy   Changed by:  Connie Haskins MD        Dose:  200 mg   Start taking on:  11/2/2018   Take 2 capsules (200 mg) by mouth 2 times daily   Quantity:  360 capsule   Refills:  3       senna-docusate 8.6-50 MG per tablet   Commonly known as:  SENOKOT-S;PERICOLACE   This may have  changed:  additional instructions   Used for:  Drug-induced constipation   Changed by:  Connie Haskins MD        Dose:  1 tablet   Take 1 tablet by mouth 2 times daily Use for constipation due to your medication   Quantity:  60 tablet   Refills:  11         Stop taking these medicines if you haven't already. Please contact your care team if you have questions.     cyclobenzaprine 5 MG tablet   Commonly known as:  FLEXERIL   Stopped by:  Connie Haskins MD           hydrOXYzine 25 MG tablet   Commonly known as:  ATARAX   Stopped by:  Connie Haskins MD                Where to get your medicines      These medications were sent to [facility] [address] [place] [address]     Phone:  [phone]     allopurinol 100 MG tablet    blood glucose monitoring test strip    colchicine 0.6 MG capsule    finasteride 5 MG tablet    senna-docusate 8.6-50 MG per tablet         Some of these will need a paper prescription and others can be bought over the counter.  Ask your nurse if you have questions.     Bring a paper prescription for each of these medications     oxyCODONE IR 5 MG tablet    pregabalin 100 MG capsule               Information about OPIOIDS     PRESCRIPTION OPIOIDS: WHAT YOU NEED TO KNOW   We gave you an opioid (narcotic) pain medicine. It is important to manage your pain, but opioids are not always the best choice. You should first try all the other options your care team gave you. Take this medicine for as short a time (and as few doses) as possible.    Some activities can increase your pain, such as bandage changes or therapy sessions. It may help to take your pain medicine 30 to 60 minutes before these activities. Reduce your stress by getting enough sleep, working on hobbies you enjoy and practicing relaxation or meditation. Talk to your care team about ways to manage your pain beyond prescription opioids.    These  medicines have risks:    DO NOT drive when on new or higher doses of pain medicine. These medicines can affect your alertness and reaction times, and you could be arrested for driving under the influence (DUI). If you need to use opioids long-term, talk to your care team about driving.    DO NOT operate heavy machinery    DO NOT do any other dangerous activities while taking these medicines.    DO NOT drink any alcohol while taking these medicines.     If the opioid prescribed includes acetaminophen, DO NOT take with any other medicines that contain acetaminophen. Read all labels carefully. Look for the word  acetaminophen  or  Tylenol.  Ask your pharmacist if you have questions or are unsure.    You can get addicted to pain medicines, especially if you have a history of addiction (chemical, alcohol or substance dependence). Talk to your care team about ways to reduce this risk.    All opioids tend to cause constipation. Drink plenty of water and eat foods that have a lot of fiber, such as fruits, vegetables, prune juice, apple juice and high-fiber cereal. Take a laxative (Miralax, milk of magnesia, Colace, Senna) if you don t move your bowels at least every other day. Other side effects include upset stomach, sleepiness, dizziness, throwing up, tolerance (needing more of the medicine to have the same effect), physical dependence and slowed breathing.    Store your pills in a secure place, locked if possible. We will not replace any lost or stolen medicine. If you don t finish your medicine, please throw away (dispose) as directed by your pharmacist. The [place] Pollution Control Agency has more information about safe disposal: [email or web address]         Primary Care Provider Office Phone # Fax #    Connie Haskins [phone]       [address] [address]        Equal Access to Services     ALMA MAYORGA: Nellie yee  chandrakant Ansari, waluchoda luqadaha, qaybta kaalmada francis, gayle nirin hayaan briandanielle elleroswald ashley. So [facility] [phone].    ATENCIÓN: Si habla quinnañol, tiene a holliday disposición servicios gratuitos de asistencia lingüística. Rylie al [phone].    We comply with applicable federal civil rights laws and [place] laws. We do not discriminate on the basis of race, color, national origin, age, disability, sex, sexual orientation, or gender identity.            Thank you!     Thank you for choosing [facility]  for your care. Our goal is always to provide you with excellent care. Hearing back from our patients is one way we can continue to improve our services. Please take a few minutes to complete the written survey that you may receive in the mail after your visit with us. Thank you!             Your Updated Medication List - Protect others around you: Learn how to safely use, store and throw away your medicines at www.disposemymeds.org.          This list is accurate as of 9/27/18 11:19 AM.  Always use your most recent med list.                   Brand Name Dispense Instructions for use Diagnosis    ACE/ARB/ARNI NOT PRESCRIBED (INTENTIONAL)      Please choose reason not prescribed, below    Type 2 diabetes mellitus without complication, without long-term current use of insulin (H)       allopurinol 100 MG tablet    ZYLOPRIM    270 tablet    Take 3 tablets (300 mg) by mouth daily    Acute gout of right elbow, unspecified cause       aspirin 81 MG tablet     100 tablet    Take 1 tablet (81 mg) by mouth daily    Type 2 diabetes, HbA1C goal < 8% (H)       blood glucose monitoring lancets     200 each    Use to test blood sugars 2-3 times daily as directed (lena contour meter)    Type 2 diabetes, HbA1C goal < 8% (H)       blood glucose monitoring meter device kit    no brand specified    1 kit    Use to test blood sugar 2 times daily or as directed.    Type 2 diabetes mellitus without complication,  without long-term current use of insulin (H)       blood glucose monitoring test strip    no brand specified    200 strip    Use to test blood sugars 2 times daily or as directed    Type 2 diabetes mellitus without complication, without long-term current use of insulin (H)       colchicine 0.6 MG capsule    MITIGARE    30 capsule    Take 1 capsule (0.6 mg) by mouth daily    Acute pain of right knee       finasteride 5 MG tablet    PROSCAR    30 tablet    Take 1 tablet (5 mg) by mouth daily For prostate enlargement. Please fill on $4 list    Benign prostatic hyperplasia without lower urinary tract symptoms       FLUoxetine 20 MG capsule    PROzac    270 capsule    TAKE THREE CAPSULES BY MOUTH ONCE DAILY    Mild episode of recurrent major depressive disorder (H)       fluticasone 50 MCG/ACT spray    FLONASE    1 Bottle    Spray 1-2 sprays into both nostrils daily    Chronic rhinitis, unspecified type, Facial swelling       lidocaine 5 % Patch    LIDODERM    30 patch    Apply up to 3 patches to painful area at once for up to 12 h within a 24 h period.  Remove after 12 hours.    Chest wall pain       loratadine 10 MG tablet    CLARITIN    30 tablet    Take 1 tablet (10 mg) by mouth daily    Facial swelling, Chronic rhinitis, unspecified type       metFORMIN 500 MG 24 hr tablet    GLUCOPHAGE-XR    360 tablet    Take 4 tablets (2,000 mg) by mouth daily (with dinner)    Type 2 diabetes mellitus without complication, without long-term current use of insulin (H)       oxyCODONE IR 5 MG tablet    ROXICODONE    180 tablet    1 tab q4h, PRN max 6 per day. fill 10/3/18    Failed back syndrome of lumbar spine       pregabalin 100 MG capsule   Start taking on:  11/2/2018    LYRICA    360 capsule    Take 2 capsules (200 mg) by mouth 2 times daily    Chronic pain syndrome, Lumbar radiculopathy       ranitidine 300 MG tablet    ZANTAC    180 tablet    Take 1 tablet (300 mg) by mouth 2 times daily To reduce stomach acid     Gastroesophageal reflux disease without esophagitis       senna-docusate 8.6-50 MG per tablet    SENOKOT-S;PERICOLACE    60 tablet    Take 1 tablet by mouth 2 times daily Use for constipation due to your medication    Drug-induced constipation       simvastatin 20 MG tablet    ZOCOR    90 tablet    Take 1 tablet (20 mg) by mouth At Bedtime    Hyperlipidemia LDL goal <100       triamcinolone 0.1 % cream    KENALOG    30 g    Apply sparingly to affected area twice daily for 7 days. Then stop and use only for flares    Eczema, unspecified type

## 2018-10-02 ENCOUNTER — TELEPHONE (OUTPATIENT)
Dept: FAMILY MEDICINE | Facility: CLINIC | Age: 63
End: 2018-10-02

## 2018-10-02 DIAGNOSIS — E11.9 TYPE 2 DIABETES MELLITUS WITHOUT COMPLICATION, WITHOUT LONG-TERM CURRENT USE OF INSULIN (H): ICD-10-CM

## 2018-10-02 NOTE — TELEPHONE ENCOUNTER
[facility] pharmacy message to prescriber: The patient now has [employer] and Onetouch Ultra Blue test strips are no longer covered.  Formulary alternatives: AccuChek or True Metrix.  [facility] ph#[phone]

## 2018-10-04 NOTE — TELEPHONE ENCOUNTER
Pt is at pharmacy and needing to  strips.  The pharmacy is saying that they have no Rx for such.  Please fax over rx for accuchek test strips so that pt can  as soon as possible.  Pt is completely out of strips at the moment.

## 2018-10-05 ENCOUNTER — TELEPHONE (OUTPATIENT)
Dept: FAMILY MEDICINE | Facility: CLINIC | Age: 63
End: 2018-10-05

## 2018-10-05 DIAGNOSIS — E11.9 TYPE 2 DIABETES MELLITUS WITHOUT COMPLICATION (H): Primary | ICD-10-CM

## 2018-10-05 RX ORDER — BLOOD-GLUCOSE METER
EACH MISCELLANEOUS
Qty: 1 KIT | Refills: 0 | Status: SHIPPED | OUTPATIENT
Start: 2018-10-05 | End: 2020-02-21

## 2018-10-05 NOTE — TELEPHONE ENCOUNTER
"SN,    Spoke to pharm and ordered new glucometer and lancets (Accucheck Valeria Plus- per pharm).  Pt aware.    Pt says he has \"a whole bunch at home\" of the colchicine and is going to get the Allopurinol today.     He says he needs a new glucometer as his insurance only covers accuchek now. He has accuchek strips (his glucometer is a one touch ultra 2 however he says his insurance will not cover strips for his machine anymore.)  He says he has not checked his BG in a couple of days.    If anything else,  Let us know,  Thanks,  Lavinia Haskins, Connie Linder MD  [facility] [place] [facility]                   He needs to go back on the colchicine - mitigare and stay on the allopurinol  - can you see if he was able to fill the colchicine?  He needs to be on both until his uric acid level is under 6 - might take 2 months   Then we can recheck labs     SN           "

## 2018-10-30 DIAGNOSIS — G89.4 CHRONIC PAIN SYNDROME: ICD-10-CM

## 2018-10-30 DIAGNOSIS — M96.1 FAILED BACK SYNDROME OF LUMBAR SPINE: ICD-10-CM

## 2018-10-30 NOTE — TELEPHONE ENCOUNTER
Reason for Call:  Medication or medication refill:    Do you use a [facility]?  Name of the pharmacy and phone number for the current request:    Patient will  in clinic    Name of the medication requested: oxyCODONE IR (ROXICODONE) 5 MG tablet    Other request: please call patient when rx is ready for     Can we leave a detailed message on this number? YES    Phone number patient can be reached at: Home number on file [phone] (home)    Best Time: anytime    Call taken on 10/30/2018 at 12:07 PM by Sara Hamm

## 2018-10-31 ENCOUNTER — OFFICE VISIT (OUTPATIENT)
Dept: FAMILY MEDICINE | Facility: CLINIC | Age: 63
End: 2018-10-31
Payer: COMMERCIAL

## 2018-10-31 VITALS
HEIGHT: 72 IN | TEMPERATURE: 97.6 F | BODY MASS INDEX: 33.72 KG/M2 | DIASTOLIC BLOOD PRESSURE: 70 MMHG | WEIGHT: 249 LBS | SYSTOLIC BLOOD PRESSURE: 111 MMHG | HEART RATE: 69 BPM | OXYGEN SATURATION: 96 %

## 2018-10-31 DIAGNOSIS — M1A.9XX0 CHRONIC GOUT WITHOUT TOPHUS, UNSPECIFIED CAUSE, UNSPECIFIED SITE: ICD-10-CM

## 2018-10-31 DIAGNOSIS — E11.9 TYPE 2 DIABETES MELLITUS WITHOUT COMPLICATION, WITHOUT LONG-TERM CURRENT USE OF INSULIN (H): ICD-10-CM

## 2018-10-31 DIAGNOSIS — L03.032 PARONYCHIA OF TOE, LEFT: Primary | ICD-10-CM

## 2018-10-31 PROCEDURE — 99214 OFFICE O/P EST MOD 30 MIN: CPT | Performed by: FAMILY MEDICINE

## 2018-10-31 RX ORDER — CEPHALEXIN 500 MG/1
500 CAPSULE ORAL 4 TIMES DAILY
Qty: 40 CAPSULE | Refills: 0 | Status: SHIPPED | OUTPATIENT
Start: 2018-10-31 | End: 2019-02-28

## 2018-10-31 RX ORDER — OXYCODONE HYDROCHLORIDE 5 MG/1
TABLET ORAL
Qty: 180 TABLET | Refills: 0 | Status: SHIPPED | OUTPATIENT
Start: 2018-11-03 | End: 2018-11-27

## 2018-10-31 NOTE — Clinical Note
Diabetic foot exam through podiatry he is looking for a comprehensive exam and toenail trim.  Can you see who does this our podiatrist does toenail trim  Connie Haskins MD

## 2018-10-31 NOTE — TELEPHONE ENCOUNTER
SN,     Controlled Substance Refill Request for Oxycodone    Last refill: 10/3/2018 #180    Last clinic visit: 9/27/2018     Clinic visit frequency required: Q 3 months  Next appt:   Next 5 appointments (look out 90 days)     Nov 14, 2018 10:30 AM CST   Office Visit with Connie Haskins MD   [facility] ([facility])    [address] [facility] [id number] [phone]                Controlled substance agreement on file: Yes:  Date 8/30/2017.    Documentation in problem list reviewed:  Yes    Processing:  Patient will  in clinic    [place]  reviewed- [place] concerns  [place]:  [email or web address]    Please authorize if appropriate.  Thanks,  Ivis SMALLS RN

## 2018-10-31 NOTE — MR AVS SNAPSHOT
After Visit Summary   10/31/2018    Toby Mcgrath    MRN: [id number]           Patient Information     Date Of Birth          1955        Visit Information        Provider Department      10/31/2018 11:30 AM Connie Haskins MD [facility]        Today's Diagnoses     Paronychia of toe, left    -  1    Chronic gout without tophus, unspecified cause, unspecified site        Type 2 diabetes mellitus without complication, without long-term current use of insulin (H)           Follow-ups after your visit        Additional Services     MED THERAPY MANAGE REFERRAL       Your provider has referred you to: **[facility]uling (numerous locations) [phone]   [email or web address]  [facility]: [facility] [phone]   [email or web address]    Reason for Referral: needs help with gout management - unable ot afford allopurinol.  Has been taking mitigare/colchicine but this is expensive also    The [facility] department will contact you to schedule an appointment.  You may also schedule the appointment by calling [phone].  For [place] patients, please call [phone] to confirm/schedule your appointment on the next business day.    This service is designed to help you get the most from your medications.  A specially trained Pharmacist will work closely with you and your providers to solve any questions, concerns, issues or problems related to your medications.    Please bring all of your prescription and non-prescription medications (such as vitamins, over-the-counter medications, and herbals) or a detailed medication list to your appointment.    If you have a glucose meter or other home monitoring information, please also bring this to your appointment (i.e. blood glucose log, blood pressure log,  pain log, etc.).            PODIATRY/FOOT & ANKLE SURGERY REFERRAL       Your provider has referred you to: [facility]: [facility] [phone]   [email or web address]  [facility]: [facility]: [address] [place] [address] Patient Scheduling Line: [phone] option 1 (scheduling and directions)    Please be aware that coverage of these services is subject to the terms and limitations of your health insurance plan.  Call member services at your health plan with any benefit or coverage questions.      Please bring the following to your appointment:  >>   Any x-rays, CTs or MRIs which have been performed.  Contact the facility where they were done to arrange for  prior to your scheduled appointment.    >>   List of current medications   >>   This referral request   >>   Any documents/labs given to you for this referral                  Your next 10 appointments already scheduled     Nov 14, 2018 10:30 AM CST   Office Visit with Connie Haskins MD   [facility] ([facility])    [address] [address] [phone]           Bring a current list of meds and any records pertaining to this visit. For Physicals, please bring immunization records and any forms needing to be filled out. Please arrive 10 minutes early to complete paperwork.              Who to contact     If you have questions or need follow up information about today's clinic visit or your schedule please contact [facility] directly at [phone].  Normal or non-critical lab and imaging results will be communicated to you by MyChart, letter or phone within 4 business days after the clinic has received the results. If you do not hear from us within 7 days, please contact the clinic through MyChart or phone. If you have a critical or abnormal lab result, we will notify you by phone as soon as possible.  Submit refill requests  through [facility] or call your pharmacy and they will forward the refill request to us. Please allow 3 business days for your refill to be completed.          Additional Information About Your Visit        Care EveryWhere ID     This is your Care EveryWhere ID. This could be used by other organizations to access your [facility] medical records  [id number]        Your Vitals Were     Pulse Temperature Height Pulse Oximetry BMI (Body Mass Index)       69 97.6  F (36.4  C) (Oral) 6' (1.829 m) 96% 33.77 kg/m2        Blood Pressure from Last 3 Encounters:   10/31/18 111/70   09/27/18 108/74   08/23/18 135/77    Weight from Last 3 Encounters:   10/31/18 249 lb (112.9 kg)   09/27/18 248 lb (112.5 kg)   08/23/18 250 lb (113.4 kg)              We Performed the Following     MED THERAPY MANAGE REFERRAL     PODIATRY/FOOT & ANKLE SURGERY REFERRAL          Today's Medication Changes          These changes are accurate as of 10/31/18 12:25 PM.  If you have any questions, ask your nurse or doctor.               Start taking these medicines.        Dose/Directions    cephALEXin 500 MG capsule   Commonly known as:  KEFLEX   Used for:  Paronychia of toe, left   Started by:  Connie Haskins MD        Dose:  500 mg   Take 1 capsule (500 mg) by mouth 4 times daily for 10 days   Quantity:  40 capsule   Refills:  0         These medicines have changed or have updated prescriptions.        Dose/Directions    blood glucose monitoring lancets   This may have changed:  Another medication with the same name was removed. Continue taking this medication, and follow the directions you see here.   Used for:  Type 2 diabetes mellitus without complication (H)   Changed by:  Connie Haskins MD        Use to test blood sugar 2 times daily or as directed.   Quantity:  200 each   Refills:  1            Where to get your medicines      These medications were sent to [address]  [facility] [address]     Phone:  [phone]     cephALEXin 500 MG capsule                Primary Care Provider Office Phone # Fax #    MinaNiyah Haskins [phone]       [address] [address]        Equal Access to Services     ALMA WEBRE : Hadii aad ku hadasho Soomaali, waaxda luqadaha, qaybta kaalmada adeegyada, gayle malcolm rashaunn milagros ruckersharanlynette smith . So [facility] [phone].    ATENCIÓN: Si habla español, tiene a holliday disposición servicios gratuitos de asistencia lingüística. Rylie al [phone].    We comply with applicable federal civil rights laws and [place] laws. We do not discriminate on the basis of race, color, national origin, age, disability, sex, sexual orientation, or gender identity.            Thank you!     Thank you for choosing [facility]  for your care. Our goal is always to provide you with excellent care. Hearing back from our patients is one way we can continue to improve our services. Please take a few minutes to complete the written survey that you may receive in the mail after your visit with us. Thank you!             Your Updated Medication List - Protect others around you: Learn how to safely use, store and throw away your medicines at www.disposemymeds.org.          This list is accurate as of 10/31/18 12:25 PM.  Always use your most recent med list.                   Brand Name Dispense Instructions for use Diagnosis    ACE/ARB/ARNI NOT PRESCRIBED (INTENTIONAL)      Please choose reason not prescribed, below    Type 2 diabetes mellitus without complication, without long-term current use of insulin (H)       allopurinol 100 MG tablet    ZYLOPRIM    270 tablet    Take 3 tablets (300 mg) by mouth daily    Acute gout of right elbow, unspecified cause       aspirin 81 MG tablet     100 tablet    Take 1 tablet (81 mg) by mouth daily    Type 2 diabetes, HbA1C goal < 8% (H)       blood glucose monitoring lancets      200 each    Use to test blood sugar 2 times daily or as directed.    Type 2 diabetes mellitus without complication (H)       blood glucose monitoring meter device kit     1 kit    Use to test blood sugars 2 times daily or as directed.    Type 2 diabetes mellitus without complication (H)       blood glucose monitoring test strip    ACCU-CHEK DANIELE    200 strip    Use to test blood sugars 2 times daily or as directed.    Type 2 diabetes mellitus without complication, without long-term current use of insulin (H)       cephALEXin 500 MG capsule    KEFLEX    40 capsule    Take 1 capsule (500 mg) by mouth 4 times daily for 10 days    Paronychia of toe, left       colchicine 0.6 MG capsule    MITIGARE    30 capsule    Take 1 capsule (0.6 mg) by mouth daily    Acute pain of right knee       finasteride 5 MG tablet    PROSCAR    30 tablet    Take 1 tablet (5 mg) by mouth daily For prostate enlargement. Please fill on $4 list    Benign prostatic hyperplasia without lower urinary tract symptoms       FLUoxetine 20 MG capsule    PROzac    270 capsule    TAKE THREE CAPSULES BY MOUTH ONCE DAILY    Mild episode of recurrent major depressive disorder (H)       fluticasone 50 MCG/ACT spray    FLONASE    1 Bottle    Spray 1-2 sprays into both nostrils daily    Chronic rhinitis, unspecified type, Facial swelling       loratadine 10 MG tablet    CLARITIN    30 tablet    Take 1 tablet (10 mg) by mouth daily    Facial swelling, Chronic rhinitis, unspecified type       metFORMIN 500 MG 24 hr tablet    GLUCOPHAGE-XR    360 tablet    Take 4 tablets (2,000 mg) by mouth daily (with dinner)    Type 2 diabetes mellitus without complication, without long-term current use of insulin (H)       oxyCODONE IR 5 MG tablet   Start taking on:  11/3/2018    ROXICODONE    180 tablet    1 tab q4h, PRN max 6 per day. fill 11/3/18    Failed back syndrome of lumbar spine       pregabalin 100 MG capsule   Start taking on:  11/2/2018    LYRICA    360 capsule     Take 2 capsules (200 mg) by mouth 2 times daily    Chronic pain syndrome, Lumbar radiculopathy       ranitidine 300 MG tablet    ZANTAC    180 tablet    Take 1 tablet (300 mg) by mouth 2 times daily To reduce stomach acid    Gastroesophageal reflux disease without esophagitis       senna-docusate 8.6-50 MG per tablet    SENOKOT-S;PERICOLACE    60 tablet    Take 1 tablet by mouth 2 times daily Use for constipation due to your medication    Drug-induced constipation       simvastatin 20 MG tablet    ZOCOR    90 tablet    Take 1 tablet (20 mg) by mouth At Bedtime    Hyperlipidemia LDL goal <100       triamcinolone 0.1 % cream    KENALOG    30 g    Apply sparingly to affected area twice daily for 7 days. Then stop and use only for flares    Eczema, unspecified type

## 2018-10-31 NOTE — PROGRESS NOTES
SUBJECTIVE:   Toby Mcgrath is a 63 year old male who presents to clinic today for the following health issues:      Left big toe pain x 2 days  Redness tenderness  Has diabetes - does not check sugars regularly.  Is taking max dose of metformin and tolerating this well.  Has not seen podiatry  Lab Results   Component Value Date    A1C 6.6 09/27/2018    A1C 6.7 05/31/2018    A1C 6.6 02/26/2018    A1C 6.7 08/07/2017    A1C 6.5 01/03/2017     Last A1c was excellent    Is having a hard time getting allopurinol covered.  Has chronic gout  Does have assistance and has a large bottle of colchicine at home but struggling to keep up with allopurinol.  Will refer to MTM for their opinion        Problem list and histories reviewed & adjusted, as indicated.  Additional history: as documented    Patient Active Problem List   Diagnosis     Thoracic or lumbosacral neuritis or radiculitis, unspecified     Osteoarthrosis, unspecified whether generalized or localized, pelvic region and thigh     Hypertrophy of prostate with urinary obstruction     Chronic rhinitis     Chronic pain syndrome     Disorder of bursae and tendons in shoulder region     Major depressive disorder, recurrent episode (H)     Anxiety     Gout     Type 2 diabetes mellitus without complication (H)     Hypertension goal BP (blood pressure) < 140/90     Advanced directives, counseling/discussion     DJD (degenerative joint disease), lumbar     Hyperlipidemia LDL goal <100     DDD (degenerative disc disease), cervical     GERD (gastroesophageal reflux disease)     Lumbar radiculopathy     S/P lumbar fusion     Left-sided low back pain with left-sided sciatica     BMI 32.0-32.9,adult     History of hepatitis C     S/P lumbar discectomy     Acute post-operative pain     S/P laminectomy     Chronic pain     Bilateral low back pain without sciatica     Right-sided low back pain with right-sided sciatica     Acute gouty arthritis     Acute gout of right elbow,  unspecified cause     Idiopathic gout of left elbow, unspecified chronicity     Gastroesophageal reflux disease without esophagitis     Failed back syndrome of lumbar spine     Slow transit constipation     Benign prostatic hyperplasia with urinary frequency     Past Surgical History:   Procedure Laterality Date     BACK SURGERY       both shoulder repair  2006, 2008     C NONSPECIFIC PROCEDURE  1995    neck cyst     C NONSPECIFIC PROCEDURE  1979    laminectomy L5-S1 x 2     C SHOULDER SURG PROC UNLISTED  2005, '07    repairs,later manipulate,inject LT, RT     FUSION LUMBAR ANTERIOR, FUSION LUMBAR POSTERIOR TWO LEVELS, COMBINED N/A 9/17/2014    Procedure: COMBINED FUSION LUMBAR ANTERIOR, FUSION LUMBAR POSTERIOR TWO LEVELS;  Surgeon: Chris Lugo MD;  Location: [facility] OR     HC COLONOSCOPY THRU STOMA, DIAGNOSTIC  3/04    normal     HC UGI ENDOSCOPY DIAG W OR W/O BRUSH/WASH  3/04    ok     HEAD & NECK SURGERY       HEMILAMINECTOMY, DISCECTOMY LUMBAR ONE LEVEL, COMBINED Left 9/8/2015    Procedure: COMBINED HEMILAMINECTOMY, DISCECTOMY LUMBAR ONE LEVEL;  Surgeon: Jer Irby MD;  Location: [facility] OR     right hip replacement  10,2010       Social History   Substance Use Topics     Smoking status: Former Smoker     Years: 40.00     Types: Cigarettes     Smokeless tobacco: Former User     Quit date: 2/1/2013     Alcohol use No     Family History   Problem Relation Age of Onset     Diabetes Mother      C.A.D. Father      Diabetes Father      Hypertension Father          Current Outpatient Prescriptions   Medication Sig Dispense Refill     ACE/ARB/ARNI NOT PRESCRIBED, INTENTIONAL, Please choose reason not prescribed, below       allopurinol (ZYLOPRIM) 100 MG tablet Take 3 tablets (300 mg) by mouth daily 270 tablet 3     aspirin 81 MG tablet Take 1 tablet (81 mg) by mouth daily 100 tablet 3     blood glucose monitoring (ACCU-CHEK DANIELE PLUS) meter device kit Use to test blood sugars 2 times daily or as directed. 1  kit 0     blood glucose monitoring (ACCU-CHEK DANIELE) test strip Use to test blood sugars 2 times daily or as directed. 200 strip 1     blood glucose monitoring (ACCU-CHEK MULTICLIX) lancets Use to test blood sugar 2 times daily or as directed. 200 each 1     cephALEXin (KEFLEX) 500 MG capsule Take 1 capsule (500 mg) by mouth 4 times daily for 10 days 40 capsule 0     colchicine (MITIGARE) 0.6 MG capsule Take 1 capsule (0.6 mg) by mouth daily 30 capsule 3     finasteride (PROSCAR) 5 MG tablet Take 1 tablet (5 mg) by mouth daily For prostate enlargement. Please fill on $4 list 30 tablet 5     FLUoxetine (PROZAC) 20 MG capsule TAKE THREE CAPSULES BY MOUTH ONCE DAILY 270 capsule 3     fluticasone (FLONASE) 50 MCG/ACT spray Spray 1-2 sprays into both nostrils daily 1 Bottle 3     loratadine (CLARITIN) 10 MG tablet Take 1 tablet (10 mg) by mouth daily 30 tablet 3     metFORMIN (GLUCOPHAGE-XR) 500 MG 24 hr tablet Take 4 tablets (2,000 mg) by mouth daily (with dinner) 360 tablet 3     [START ON 11/3/2018] oxyCODONE IR (ROXICODONE) 5 MG tablet 1 tab q4h, PRN max 6 per day. fill 11/3/18 180 tablet 0     [START ON 11/2/2018] pregabalin (LYRICA) 100 MG capsule Take 2 capsules (200 mg) by mouth 2 times daily 360 capsule 3     ranitidine (ZANTAC) 300 MG tablet Take 1 tablet (300 mg) by mouth 2 times daily To reduce stomach acid 180 tablet 3     senna-docusate (SENOKOT-S;PERICOLACE) 8.6-50 MG per tablet Take 1 tablet by mouth 2 times daily Use for constipation due to your medication 60 tablet 11     simvastatin (ZOCOR) 20 MG tablet Take 1 tablet (20 mg) by mouth At Bedtime 90 tablet 3     triamcinolone (KENALOG) 0.1 % cream Apply sparingly to affected area twice daily for 7 days. Then stop and use only for flares 30 g 3     [DISCONTINUED] blood glucose monitoring (LENA MICROLET) lancets Use to test blood sugars 2-3 times daily as directed (lena contour meter) 200 each 1     [DISCONTINUED] blood glucose monitoring (NO BRAND  SPECIFIED) test strip Use to test blood sugars 2 times daily or as directed 200 strip 3       Reviewed and updated as needed this visit by clinical staff  Tobacco  Allergies  Meds  Soc Hx      Reviewed and updated as needed this visit by Provider         ROS:  Constitutional, HEENT, cardiovascular, pulmonary, gi and gu systems are negative, except as otherwise noted.    OBJECTIVE:                                                    /70 (BP Location: Right arm, Cuff Size: Adult Large)  Pulse 69  Temp 97.6  F (36.4  C) (Oral)  Ht 6' (1.829 m)  Wt 249 lb (112.9 kg)  SpO2 96%  BMI 33.77 kg/m2  Body mass index is 33.77 kg/(m^2).  GENERAL APPEARANCE: healthy, alert and no distress  MS:   Left great toe has redness distal to the DIP joint.  It does look like the nail might be partially ingrown and there is some additional or adjacent erythema and tenderness where the nail is embedded.         ASSESSMENT/PLAN:                                                    1. Paronychia of toe, left     Will treat with antibiotics have him do daily foot soaks  And apply topical bacitracin to the crease where the nail meets the skin if no better  In 1 week needs to return  - cephALEXin (KEFLEX) 500 MG capsule; Take 1 capsule (500 mg) by mouth 4 times daily for 10 days  Dispense: 40 capsule; Refill: 0  - PODIATRY/FOOT & ANKLE SURGERY REFERRAL    2. Chronic gout without tophus, unspecified cause, unspecified site       3. Type 2 diabetes mellitus without complication, without long-term current use of insulin (H)    - PODIATRY/FOOT & ANKLE SURGERY REFERRAL      Follow up with Provider -  1 week as needed     Connie Haskins MD  [facility]

## 2018-10-31 NOTE — TELEPHONE ENCOUNTER
Informed pt Rx is ready for p/u. Mentioned he was going to [facility], noticed red area on his left big toe. Looks swollen and red, possibly infected. Is diabetic so would like to have it checked out. Offered appt with SN (PCP) and can p/u Rx at same time.    Scheduled pt with SN at 11:30 today Sara Mclaughlin RN

## 2018-11-02 ENCOUNTER — TELEPHONE (OUTPATIENT)
Dept: PHARMACY | Facility: OTHER | Age: 63
End: 2018-11-02

## 2018-11-02 NOTE — TELEPHONE ENCOUNTER
MTM referral from: [facility] visit (referral by provider)    MTM referral outreach attempt #2 on November 2, 2018 at 10:23 AM      Outcome: Patient not reachable after several attempts, will route to MTM Pharmacist/Provider as an FYI. Thank you for the referral.    Mica Kaufman, MTM Coordinator

## 2018-11-02 NOTE — TELEPHONE ENCOUNTER
Patient called back and stated since he is not covered for MTM he does not want to schedule.  Thanks!  Mica Kaufman, MTM Coordinator

## 2018-11-27 DIAGNOSIS — G89.4 CHRONIC PAIN SYNDROME: ICD-10-CM

## 2018-11-27 DIAGNOSIS — M96.1 FAILED BACK SYNDROME OF LUMBAR SPINE: ICD-10-CM

## 2018-11-27 NOTE — TELEPHONE ENCOUNTER
Reason for Call:  Medication or medication refill:    Do you use a [facility]?  Name of the pharmacy and phone number for the current request:    Written RX Only      Name of the medication requested: oxyCODONE IR (ROXICODONE) 5 MG tablet    Other request: Please call to     Can we leave a detailed message on this number? YES    Phone number patient can be reached at: Home number on file [phone] (home)    Best Time: anytime    Call taken on 11/27/2018 at 1:06 PM by Mignon Apodaca

## 2018-11-28 NOTE — TELEPHONE ENCOUNTER
Roxicodone     Last Written Prescription Date:  11-3-18  Last Fill Quantity: 180,   # refills: 0  Last Office Visit: 10-31-18  Future Office visit:       Routing refill request to provider for review/approval because:  Drug not on the [facility], [facility] or [facility] refill protocol or controlled substance

## 2018-11-28 NOTE — TELEPHONE ENCOUNTER
LS  Please address due to SN's absence.  Controlled Substance Refill Request for Roxicodone    Last refill: 11/3/2018 - #180    Last clinic visit: 10/31/2018     Clinic visit frequency required: Q 3 months  Next appt: No future OV scheduled    Controlled substance agreement on file: Yes:  Date 7/27/2016.    Documentation in problem list reviewed:  Yes    Processing:  Patient will  in clinic    [place] reviewed:  reviewed- no concerns  [place]  [email or web address]  Thanks, Gayathri Wade RN

## 2018-11-30 RX ORDER — OXYCODONE HYDROCHLORIDE 5 MG/1
TABLET ORAL
Qty: 180 TABLET | Refills: 0 | Status: SHIPPED | OUTPATIENT
Start: 2018-12-02 | End: 2018-12-26

## 2018-11-30 NOTE — TELEPHONE ENCOUNTER
LS  Refill request sent to you Wed. 11/28)  (Pt. Aware refills are addressed within 72 hours)  Thanks, Gayathri Wade RN

## 2018-11-30 NOTE — TELEPHONE ENCOUNTER
Pt calling back about the status of his refill. Pt frustrated that he cannot   Speak with the RN. I let pt know that the RX has been sent to MD for review

## 2018-11-30 NOTE — TELEPHONE ENCOUNTER
"Pt is calling to check on the status of this RX. Pt would like a callback from  \"Dr Rosales's RN\" pt phone number [phone]  "

## 2018-11-30 NOTE — TELEPHONE ENCOUNTER
Patient called at 12 to see if his prescription is ready.  He was VERY rude and frustrated that this clinic cannot get their sh** together!  He is coming into the clinic to get this straightened out NOW!

## 2018-12-12 ENCOUNTER — OFFICE VISIT (OUTPATIENT)
Dept: FAMILY MEDICINE | Facility: CLINIC | Age: 63
End: 2018-12-12
Payer: COMMERCIAL

## 2018-12-12 VITALS
BODY MASS INDEX: 34.46 KG/M2 | RESPIRATION RATE: 17 BRPM | WEIGHT: 254.4 LBS | HEIGHT: 72 IN | OXYGEN SATURATION: 99 % | TEMPERATURE: 98.4 F | HEART RATE: 70 BPM | SYSTOLIC BLOOD PRESSURE: 135 MMHG | DIASTOLIC BLOOD PRESSURE: 84 MMHG

## 2018-12-12 DIAGNOSIS — E11.9 TYPE 2 DIABETES MELLITUS WITHOUT COMPLICATION, WITHOUT LONG-TERM CURRENT USE OF INSULIN (H): ICD-10-CM

## 2018-12-12 DIAGNOSIS — K05.00 GINGIVITIS, ACUTE: Primary | ICD-10-CM

## 2018-12-12 PROCEDURE — 99214 OFFICE O/P EST MOD 30 MIN: CPT | Performed by: FAMILY MEDICINE

## 2018-12-12 ASSESSMENT — MIFFLIN-ST. JEOR: SCORE: 1976.95

## 2018-12-12 NOTE — PATIENT INSTRUCTIONS
Plan: good oral hygiene,.warm water gargles . Flossing if possible- dental consultation  - amoxicillin-clavulanate (AUGMENTIN) 875-125 MG tablet; Take 1 tablet by mouth 2 times daily for 10 days  Dispense: 20 tablet; Refill: 0  Discussed with patient in detail that the premolars infected and needs dental surgeon attention- if pain continues to increase as antibiotic alone may not be sufficient   He has his pain medications- does not need refill

## 2018-12-12 NOTE — PROGRESS NOTES
SUBJECTIVE:   Toby Mcgrath is a 63 year old male who presents to clinic today for the following health issues:      JAW PAIN      Duration: 1 day    Description (location/character/radiation): right jaw    Intensity:  mild    Accompanying signs and symptoms: none    History (similar episodes/previous evaluation): has had issues with his jaw/teeth/mouth in the past    Precipitating or alleviating factors: None    Therapies tried and outcome: Advil with some relief.       PROBLEMS TO ADD ON...acute pain quinn in right lower jaw- started yesterday  Has tried to find a dentist but unable to find one with his insurance- changing insurance from [employer] to [employer]  Has Diabetes  As well- and accheck is looking good.    Problem list and histories reviewed & adjusted, as indicated.  Additional history: as documented    Patient Active Problem List   Diagnosis     Thoracic or lumbosacral neuritis or radiculitis, unspecified     Osteoarthrosis, unspecified whether generalized or localized, pelvic region and thigh     Hypertrophy of prostate with urinary obstruction     Chronic rhinitis     Chronic pain syndrome     Disorder of bursae and tendons in shoulder region     Major depressive disorder, recurrent episode (H)     Anxiety     Gout     Type 2 diabetes mellitus without complication (H)     Hypertension goal BP (blood pressure) < 140/90     Advanced directives, counseling/discussion     DJD (degenerative joint disease), lumbar     Hyperlipidemia LDL goal <100     DDD (degenerative disc disease), cervical     GERD (gastroesophageal reflux disease)     Lumbar radiculopathy     S/P lumbar fusion     Left-sided low back pain with left-sided sciatica     BMI 32.0-32.9,adult     History of hepatitis C     S/P lumbar discectomy     Acute post-operative pain     S/P laminectomy     Chronic pain     Bilateral low back pain without sciatica     Right-sided low back pain with right-sided sciatica     Acute gouty arthritis     Acute  gout of right elbow, unspecified cause     Idiopathic gout of left elbow, unspecified chronicity     Gastroesophageal reflux disease without esophagitis     Failed back syndrome of lumbar spine     Slow transit constipation     Benign prostatic hyperplasia with urinary frequency     Past Surgical History:   Procedure Laterality Date     BACK SURGERY       both shoulder repair  2006, 2008     C NONSPECIFIC PROCEDURE  1995    neck cyst     C NONSPECIFIC PROCEDURE  1979    laminectomy L5-S1 x 2     C SHOULDER SURG PROC UNLISTED  2005, '07    repairs,later manipulate,inject LT, RT     FUSION LUMBAR ANTERIOR, FUSION LUMBAR POSTERIOR TWO LEVELS, COMBINED N/A 9/17/2014    Procedure: COMBINED FUSION LUMBAR ANTERIOR, FUSION LUMBAR POSTERIOR TWO LEVELS;  Surgeon: Chris Lugo MD;  Location: [facility] OR     HC COLONOSCOPY THRU STOMA, DIAGNOSTIC  3/04    normal     HC UGI ENDOSCOPY DIAG W OR W/O BRUSH/WASH  3/04    ok     HEAD & NECK SURGERY       HEMILAMINECTOMY, DISCECTOMY LUMBAR ONE LEVEL, COMBINED Left 9/8/2015    Procedure: COMBINED HEMILAMINECTOMY, DISCECTOMY LUMBAR ONE LEVEL;  Surgeon: Jer Irby MD;  Location: [facility] OR     right hip replacement  10,2010       Social History     Tobacco Use     Smoking status: Former Smoker     Years: 40.00     Types: Cigarettes     Smokeless tobacco: Former User     Quit date: 2/1/2013   Substance Use Topics     Alcohol use: No     Alcohol/week: 0.0 oz     Family History   Problem Relation Age of Onset     Diabetes Mother      C.A.D. Father      Diabetes Father      Hypertension Father            Reviewed and updated as needed this visit by clinical staff  Tobacco  Allergies  Meds       Reviewed and updated as needed this visit by Provider         ROS:  Constitutional, HEENT, cardiovascular, pulmonary, gi and gu systems are negative, except as otherwise noted.    OBJECTIVE:     /84   Pulse 70   Temp 98.4  F (36.9  C) (Oral)   Resp 17   Ht 1.829 m (6')   Wt  115.4 kg (254 lb 6.4 oz)   SpO2 99%   BMI 34.50 kg/m    Body mass index is 34.5 kg/m .  GENERAL: healthy, alert and no distress  Oral exam- general gingivitis- more tender over the left lower pre-molar- that appear to be inflammed- without abcess. Multiple missing teeth.  NECK: no adenopathy, no asymmetry, masses, or scars and thyroid normal to palpation  RESP: lungs clear to auscultation - no rales, rhonchi or wheezes  CV: regular rate and rhythm  Diagnostic Test Results:    ASSESSMENT/PLAN:   1. Gingivitis, acute  Plan: good oral hygiene,.warm water gargles . Flossing if possible- dental consultation  - amoxicillin-clavulanate (AUGMENTIN) 875-125 MG tablet; Take 1 tablet by mouth 2 times daily for 10 days  Dispense: 20 tablet; Refill: 0  Discussed with patient in detail that the premolars infected and needs dental surgeon attention- if pain continues to increase as antibiotic alone may not be sufficient   He has his pain medications- does not need refill      2. Diabetes  -appears to be controlled  Lab Results   Component Value Date    A1C 6.6 09/27/2018    A1C 6.7 05/31/2018    A1C 6.6 02/26/2018    A1C 6.7 08/07/2017    A1C 6.5 01/03/2017       Potential medication side effects were discussed with the patient; let me know if any occur.    Margo Delgado MD  [facility]

## 2018-12-24 DIAGNOSIS — M96.1 FAILED BACK SYNDROME OF LUMBAR SPINE: ICD-10-CM

## 2018-12-24 DIAGNOSIS — G89.4 CHRONIC PAIN SYNDROME: ICD-10-CM

## 2018-12-24 NOTE — TELEPHONE ENCOUNTER
Reason for Call:  Medication or medication refill:    Do you use a [facility]?  Name of the pharmacy and phone number for the current request:  PT will  paper copy    Name of the medication requested: oxyCODONE (ROXICODONE) 5 MG tablet    Other request:     Can we leave a detailed message on this number? YES    Phone number patient can be reached at: Home number on file [phone] (home)    Best Time: any    Call taken on 12/24/2018 at 7:49 AM by Betty Drake

## 2018-12-26 RX ORDER — OXYCODONE HYDROCHLORIDE 5 MG/1
TABLET ORAL
Qty: 180 TABLET | Refills: 0 | Status: SHIPPED | OUTPATIENT
Start: 2018-12-31 | End: 2019-01-28

## 2018-12-26 NOTE — TELEPHONE ENCOUNTER
LS,   Please advise in SN's absence    Controlled Substance Refill Request for Oxycodone    Last refill: 12/2/2018 #180 (refill 1 wk early - post dated)    Last clinic visit: 9/27/2018 for pain, seen since then for other reasons    Clinic visit frequency required: Q 3 months  Next appt: none    Controlled substance agreement on file: Yes:  Date 8/30/2017.    Documentation in problem list reviewed:  Yes    Processing:  Patient will  in clinic    RX [place] reviewed:  reviewed- no concerns  [place]:  [email or web address]    Please authorize if appropriate.  Thanks,  Ivis SMALLS RN

## 2018-12-27 NOTE — TELEPHONE ENCOUNTER
Spoke with pt let him know Rx is ready for p/u. He will p/u personally either today or tomorrow.    Placed at .    Sara Mclaughlin RN

## 2019-01-15 ENCOUNTER — TELEPHONE (OUTPATIENT)
Dept: FAMILY MEDICINE | Facility: CLINIC | Age: 64
End: 2019-01-15

## 2019-01-15 DIAGNOSIS — M54.16 LUMBAR RADICULOPATHY: ICD-10-CM

## 2019-01-15 DIAGNOSIS — G89.4 CHRONIC PAIN SYNDROME: ICD-10-CM

## 2019-01-15 NOTE — TELEPHONE ENCOUNTER
FYI~ Lawrence 15, 2019 I spoke with Layo, he is in need of financial assistance for medication.    We reviewed the Prescription Assistance Program for manfacturer brand name assistance programs, gross income, insurance and Rx list.    I will apply for the Colcrys & Lyrica programs.  If approved, Layo will receive these medications at no cost for 2019.    Zenaida Plasencia  Prescription

## 2019-01-17 NOTE — TELEPHONE ENCOUNTER
Pt called this morning asking for his Lyrica prescription be sent for   1 month worth to the [facility] [address] [place] [address] [address] [place] [address].    Please call pt. [phone]

## 2019-01-18 NOTE — TELEPHONE ENCOUNTER
Rx was sent to pharmacy 11/2/18 for #360 with 3 refills.  [facility]  Will call [facility] to see if they have the Rx from 11/2/18.  Pharmacy not open until 9:00 am.  Gayathri Wade RN

## 2019-01-18 NOTE — TELEPHONE ENCOUNTER
Pt is calling back today again regarding his Lyrica. He really needs this by today.  Please call pt back     [phone]

## 2019-01-18 NOTE — TELEPHONE ENCOUNTER
Called patient.   He will need to get one Lyrica refill from [facility] while he is waiting for re-application for prescription assistance program.  Last refill for Lyrica 12/30/18  Next refill 1/30/19  Gayathri Wade RN

## 2019-01-21 NOTE — TELEPHONE ENCOUNTER
Patient informed us in previous message he received refill 12/30  Per [place] received refill on 10/25/18 for #360 (3 month supply).    If this is correct then refill due 1/25/19.  He would need one month supply to get him through until prescription assistance paper work is complete.  Gayathri Wade RN

## 2019-01-21 NOTE — TELEPHONE ENCOUNTER
Patient called regarding his 1 month request for a 1 month Reill on his Lyrica  Pt called [facility] pharmacy in [facility] and they do not have the prescription.    Please call pt back to verify its been sent    Home#[phone]

## 2019-01-23 ENCOUNTER — TELEPHONE (OUTPATIENT)
Dept: FAMILY MEDICINE | Facility: CLINIC | Age: 64
End: 2019-01-23

## 2019-01-23 DIAGNOSIS — M54.16 LUMBAR RADICULOPATHY: ICD-10-CM

## 2019-01-23 DIAGNOSIS — G89.4 CHRONIC PAIN SYNDROME: ICD-10-CM

## 2019-01-23 RX ORDER — PREGABALIN 100 MG/1
200 CAPSULE ORAL 2 TIMES DAILY
Qty: 120 CAPSULE | Refills: 0 | Status: SHIPPED | OUTPATIENT
Start: 2019-01-23 | End: 2019-02-06

## 2019-01-23 NOTE — TELEPHONE ENCOUNTER
Pt is calling in requesting Gabapentin and also to speak with a nurse.  He says he is having withdrawals from not having his lyrica filled.  He is currently working on assistance, however the state could take up some time.    [phone] and it is ok to leave [facility] [address] [facility] [address] [place] [address]

## 2019-01-23 NOTE — TELEPHONE ENCOUNTER
Reason for Call:  Other Pharmacy Change     Detailed comments: Per the patient His Lyrica need to go to  [facility]  He just found out him self  Can we please send to [facility]  PH # [phone]    Phone Number Patient can be reached at: Home number on file [phone] (home)    Best Time: anytime    Can we leave a detailed message on this number? YES    Call taken on 1/23/2019 at 2:02 PM by Mignon Apodaca

## 2019-01-23 NOTE — TELEPHONE ENCOUNTER
FS  Please address due to SN's absence.  Patient needs one month supply of Lyrica sent to [facility].    Usually gets from prescription assistance program.  Due to New Year he has to reapply.  Application has be completed. Could take up to one month before he hears if approved or not.    Out of Lyrica.  Thanks, Gayathri Wade RN

## 2019-01-23 NOTE — TELEPHONE ENCOUNTER
Rx faxed to [facility].  Called pharmacy and they have received and will refill right away.    Informed patient Rx faxed successfully.  Gayathri Wade RN

## 2019-01-23 NOTE — TELEPHONE ENCOUNTER
FYI~ I have aproved Layo for $500 of the Pharmacy Assistance Fund to be filled at the [facility].    Zenaida Plasencia  Prescription

## 2019-01-28 DIAGNOSIS — R22.0 FACIAL SWELLING: ICD-10-CM

## 2019-01-28 DIAGNOSIS — J31.0 CHRONIC RHINITIS: ICD-10-CM

## 2019-01-28 DIAGNOSIS — M96.1 FAILED BACK SYNDROME OF LUMBAR SPINE: ICD-10-CM

## 2019-01-28 RX ORDER — OXYCODONE HYDROCHLORIDE 5 MG/1
TABLET ORAL
Qty: 180 TABLET | Refills: 0 | Status: SHIPPED | OUTPATIENT
Start: 2019-01-30 | End: 2019-02-28

## 2019-01-28 RX ORDER — FLUTICASONE PROPIONATE 50 MCG
1-2 SPRAY, SUSPENSION (ML) NASAL DAILY
Qty: 48 G | Refills: 1 | Status: SHIPPED | OUTPATIENT
Start: 2019-01-28 | End: 2019-05-30

## 2019-01-28 NOTE — TELEPHONE ENCOUNTER
"Prescription approved per [facility].  Ivis SMALLS RN    Requested Prescriptions   Pending Prescriptions Disp Refills     fluticasone (FLONASE) 50 MCG/ACT nasal spray [Pharmacy Med Name: FLUTICASONE 50MCG   [facility]]  3     Sig: SPRAY 1-2 SPRAYS INTO BOTH NOSTRILS DAILY    Inhaled Steroids Protocol Passed - 1/28/2019  9:55 AM       Passed - Patient is age 12 or older       Passed - Recent (12 mo) or future (30 days) visit within the authorizing provider's specialty    Patient had office visit in the last 12 months or has a visit in the next 30 days with authorizing provider or within the authorizing provider's specialty.  See \"Patient Info\" tab in inbasket, or \"Choose Columns\" in Meds & Orders section of the refill encounter.             Passed - Medication is active on med list            "

## 2019-01-28 NOTE — TELEPHONE ENCOUNTER
JF  Please address due to SN's absence.    Controlled Substance Refill Request for Oxycodone    Last refill: 12/31/2017 - #180    Last clinic visit: Oxycodone/chronic pain last addressed 9/27/18.   Damian been seen twice since for diabetes    Clinic visit frequency required: Q 3 months  Next appt: No future OV scheduled    Controlled substance agreement on file: Yes:  Date 8/30/2017.    Documentation in problem list reviewed:  Yes    Processing:  Patient will  in clinic    RX [place] program [place]) reviewed:  reviewed- no concerns  [place] profile:  [email or web address]    Thanks,   Gayathri Wade RN

## 2019-01-28 NOTE — TELEPHONE ENCOUNTER
Patient informed Rx ready to be picked up.  States he was told earlier by person who answered phone today that he was to call his pharmacy which he's never done before, he has always called clinic.  Informed patient he can continue to call clinic since controlled substance has to go through PCP anyway.    Rx at .  Gayathri Wade RN

## 2019-01-28 NOTE — TELEPHONE ENCOUNTER
Reason for Call:  Medication or medication refill:    Do you use a [facility]?  Name of the pharmacy and phone number for the current request:  Pt will  Rx    Name of the medication requested: oxyCODONE (ROXICODONE) 5 MG tablet    Other request:     Can we leave a detailed message on this number? YES    Phone number patient can be reached at: Home number on file [phone] (home)    Best Time: any    Call taken on 1/28/2019 at 8:17 AM by Betty Drake

## 2019-01-30 ENCOUNTER — TELEPHONE (OUTPATIENT)
Dept: FAMILY MEDICINE | Facility: CLINIC | Age: 64
End: 2019-01-30

## 2019-01-30 NOTE — TELEPHONE ENCOUNTER
Reason for Call:  Medication Prior Authorization      Name of the medication requested: oxyCODONE (ROXICODONE) 5 MG tablet    Other request: this is a new medication for patient, and [facility] is needing a PA type 5 for him to be able to get a full prescription for this medication.    Please call patient with questions/concerns.    Can we leave a detailed message on this number? YES    Phone number patient can be reached at: Home number on file [phone] (home)    Best Time: anytime    Call taken on 1/30/2019 at 12:31 PM by Tawnya Mclaughlin  .

## 2019-01-31 NOTE — TELEPHONE ENCOUNTER
Prior Authorization Retail Medication Request    Medication/DoseoxyCODONE (ROXICODONE) 5 MG tablet:   ICD code (if different than what is on RX):    Previously Tried and Failed:MS Contin, Naproxen, Morphine Sulfate, Meloxicam, Percocet, Dilaudid, Flexeril, Elavil, Lyrica, Tylenol, Cyclobenzaprine, Gabapentin    Rationale:      Insurance Name:  [facility] [phone]  Insurance ID:  [id number]      Pharmacy Information (if different than what is on RX)  Name:  [facility]  Phone: [phone]

## 2019-02-05 NOTE — TELEPHONE ENCOUNTER
CENTRAL PRIOR AUTHORIZATION  [phone]    PA Initiation    Medication: oxyCODONE (ROXICODONE) 5 MG tablet - INITAITED 02/05/2019  Insurance Company: [employer] Part D - Phone [phone] Fax [phone]  Pharmacy Filling the Rx: [facility] [address] [place] [address]  Filling Pharmacy Phone: [phone]  Filling Pharmacy Fax:    Start Date: 2/5/2019

## 2019-02-06 DIAGNOSIS — M54.16 LUMBAR RADICULOPATHY: ICD-10-CM

## 2019-02-06 DIAGNOSIS — G89.4 CHRONIC PAIN SYNDROME: ICD-10-CM

## 2019-02-06 RX ORDER — PREGABALIN 100 MG
200 CAPSULE ORAL 2 TIMES DAILY
Qty: 360 CAPSULE | Refills: 3 | Status: SHIPPED | OUTPATIENT
Start: 2019-02-06 | End: 2019-07-19

## 2019-02-06 NOTE — TELEPHONE ENCOUNTER
Prior Authorization Approval    Authorization Effective Date: 2/5/2019  Authorization Expiration Date: 12/31/2019  Medication: oxyCODONE (ROXICODONE) 5 MG tablet -APPROVED 02/05/2019  Approved Dose/Quantity:   Reference #: [id number]   Insurance Company: [email or web address] Part D - Phone [phone] Fax [phone]  Expected CoPay:       CoPay Card Available:      Foundation Assistance Needed:    Which Pharmacy is filling the prescription (Not needed for infusion/clinic administered): [facility] [address] [place] [address]  Pharmacy Notified: Yes  Patient Notified: Yes      I did have to call [employer] a call to find out why the claim was still not processing as the start was effective as of 02/05/2019. The patient did pay out of pocket for the Rx on 01/30/2019 and per the [employer] representative, his plan did allow for the prior authorization start date of the 01/30/2019 as he did pay out of pocket.  I was instructed by the  that if the pharmacy was still not getting a paid claim, that they should call the help desk at [phone]. They were able to get the claim to process through. The pharmacy did take my name whom I was for their documentation.

## 2019-02-06 NOTE — TELEPHONE ENCOUNTER
I am in process of applying to the  Lyrica assistance program through [employer].  [employer] requires a hand signed, brand name, hard copy script be submitted with their application.    Please hand sign a BRAND name, hard copy script for:    LYRICA, 100mg, 2 po bid, #360, 3 refills    *Please have Dr. Delgado sign the script as she signed the application.*    Please send the script to me via interoffice mail FPS Zenaida Kim or via [place] mail  at:      [facility]   Zenaida Plasencia   [address] [place]  [address]    Thanks so much for your help!    Zenaida Plasencia  Prescription

## 2019-02-08 ENCOUNTER — TELEPHONE (OUTPATIENT)
Dept: FAMILY MEDICINE | Facility: CLINIC | Age: 64
End: 2019-02-08

## 2019-02-08 NOTE — TELEPHONE ENCOUNTER
Layo has been approved to the Colcrys assistance program for  Through [employer].   He will receive this medication at no cost through the enrollment period.    A 90 day supply of Colcrys will be delivered to Layo's home within 7-10 business days.  He has been informed.    Layo will contact my office for refills as we must work directly with the .  I will note EPIC as each refill is requested.    Thanks so much for your help!    Zenaida Plasencia  Prescription

## 2019-02-15 ENCOUNTER — TELEPHONE (OUTPATIENT)
Dept: FAMILY MEDICINE | Facility: CLINIC | Age: 64
End: 2019-02-15

## 2019-02-15 NOTE — TELEPHONE ENCOUNTER
Layo has been approved to the [employer] assistance program for Lyrica through December 2019.  He will receive this medication at no cost through the enrollment period.    A 90 day supply of LYRICA will be delivered to Layo's home within 7-10 business days. Layo has been informed of this approval.    Layo will contact my office for refills as we must work directly with the .  I will note EPIC as each refill is requested.  .  Thanks so much for your help!    Zenaida Plasencia  Prescription

## 2019-02-25 DIAGNOSIS — M96.1 FAILED BACK SYNDROME OF LUMBAR SPINE: ICD-10-CM

## 2019-02-25 RX ORDER — OXYCODONE HYDROCHLORIDE 5 MG/1
TABLET ORAL
Qty: 180 TABLET | Refills: 0 | Status: CANCELLED | OUTPATIENT
Start: 2019-02-25

## 2019-02-25 NOTE — TELEPHONE ENCOUNTER
Reason for Call:  Medication or medication refill:    Do you use a [facility]?  Name of the pharmacy and phone number for the current request:    [facility] [address] [place] [address]      Name of the medication requested: Oxycodone 5mg    Other request: call once the hard copy is ready for     Can we leave a detailed message on this number? YES    Phone number patient can be reached at: Home number on file [phone] (home)    Best Time: anytime    Call taken on 2/25/2019 at 8:00 AM by Seamus Cosby

## 2019-02-26 NOTE — TELEPHONE ENCOUNTER
Due for OV.  Last seen for chronic pain 9/2018.  Has been seen since but not for chronic pain.  OV required every 3 months.    Called patient.  Scheduled him to see SN Thursday 2/28 at 9:45 am.  Gayathri Wade RN

## 2019-02-28 ENCOUNTER — OFFICE VISIT (OUTPATIENT)
Dept: FAMILY MEDICINE | Facility: CLINIC | Age: 64
End: 2019-02-28
Payer: COMMERCIAL

## 2019-02-28 VITALS
HEIGHT: 72 IN | BODY MASS INDEX: 33.32 KG/M2 | DIASTOLIC BLOOD PRESSURE: 78 MMHG | HEART RATE: 74 BPM | WEIGHT: 246 LBS | TEMPERATURE: 97.5 F | OXYGEN SATURATION: 95 % | SYSTOLIC BLOOD PRESSURE: 114 MMHG

## 2019-02-28 DIAGNOSIS — G89.4 CHRONIC PAIN SYNDROME: Primary | ICD-10-CM

## 2019-02-28 DIAGNOSIS — F33.0 MILD EPISODE OF RECURRENT MAJOR DEPRESSIVE DISORDER (H): ICD-10-CM

## 2019-02-28 DIAGNOSIS — R49.0 DYSPHONIA: ICD-10-CM

## 2019-02-28 DIAGNOSIS — M96.1 FAILED BACK SYNDROME OF LUMBAR SPINE: ICD-10-CM

## 2019-02-28 DIAGNOSIS — M54.16 LUMBAR RADICULOPATHY: ICD-10-CM

## 2019-02-28 DIAGNOSIS — E11.42 DIABETIC POLYNEUROPATHY ASSOCIATED WITH TYPE 2 DIABETES MELLITUS (H): ICD-10-CM

## 2019-02-28 DIAGNOSIS — M10.9 ACUTE GOUTY ARTHRITIS: ICD-10-CM

## 2019-02-28 DIAGNOSIS — K21.9 GASTROESOPHAGEAL REFLUX DISEASE, ESOPHAGITIS PRESENCE NOT SPECIFIED: ICD-10-CM

## 2019-02-28 LAB
ANION GAP SERPL CALCULATED.3IONS-SCNC: 8 MMOL/L (ref 3–14)
BUN SERPL-MCNC: 15 MG/DL (ref 7–30)
CALCIUM SERPL-MCNC: 9.5 MG/DL (ref 8.5–10.1)
CHLORIDE SERPL-SCNC: 105 MMOL/L (ref 94–109)
CO2 SERPL-SCNC: 28 MMOL/L (ref 20–32)
CREAT SERPL-MCNC: 1.13 MG/DL (ref 0.66–1.25)
GFR SERPL CREATININE-BSD FRML MDRD: 68 ML/MIN/{1.73_M2}
GLUCOSE SERPL-MCNC: 139 MG/DL (ref 70–99)
HBA1C MFR BLD: 6.7 % (ref 0–5.6)
POTASSIUM SERPL-SCNC: 4.2 MMOL/L (ref 3.4–5.3)
SODIUM SERPL-SCNC: 141 MMOL/L (ref 133–144)
URATE SERPL-MCNC: 6.7 MG/DL (ref 3.5–7.2)

## 2019-02-28 PROCEDURE — 84550 ASSAY OF BLOOD/URIC ACID: CPT | Performed by: FAMILY MEDICINE

## 2019-02-28 PROCEDURE — 83036 HEMOGLOBIN GLYCOSYLATED A1C: CPT | Performed by: FAMILY MEDICINE

## 2019-02-28 PROCEDURE — 99214 OFFICE O/P EST MOD 30 MIN: CPT | Performed by: FAMILY MEDICINE

## 2019-02-28 PROCEDURE — 36415 COLL VENOUS BLD VENIPUNCTURE: CPT | Performed by: FAMILY MEDICINE

## 2019-02-28 PROCEDURE — 80048 BASIC METABOLIC PNL TOTAL CA: CPT | Performed by: FAMILY MEDICINE

## 2019-02-28 RX ORDER — OMEPRAZOLE 20 MG/1
20 TABLET, DELAYED RELEASE ORAL DAILY
Qty: 90 TABLET | Refills: 3 | Status: SHIPPED | OUTPATIENT
Start: 2019-02-28 | End: 2019-03-01

## 2019-02-28 RX ORDER — OXYCODONE HYDROCHLORIDE 5 MG/1
TABLET ORAL
Qty: 180 TABLET | Refills: 0 | Status: SHIPPED | OUTPATIENT
Start: 2019-02-28 | End: 2019-03-26

## 2019-02-28 ASSESSMENT — MIFFLIN-ST. JEOR: SCORE: 1938.85

## 2019-02-28 ASSESSMENT — PATIENT HEALTH QUESTIONNAIRE - PHQ9: SUM OF ALL RESPONSES TO PHQ QUESTIONS 1-9: 2

## 2019-02-28 NOTE — Clinical Note
Hello - I'm trying ot see if he can afford the allopurinol and eventually wean off the colchicine - do you have any suggestions?Connie Haskins MD

## 2019-02-28 NOTE — PROGRESS NOTES
SUBJECTIVE:   Toby Mcgrath is a 63 year old male who presents to clinic today for the following health issues:      Back Pain Follow Up      Description:   Location of pain:  Back pain, chronic nerve pain, sciatic  Character of pain: constant  Pain radiation: Does not radiate  Since last visit, pain is:  unchanged  New numbness or weakness in legs, not attributed to pain:  no     Intensity:moderate    History:   Pain interferes with job: YES,   Therapies tried without relief: multiple  Therapies tried with relief: fusion in 2014 , oxycodone, lyrica          Accompanying Signs & Symptoms:  Risk of Fracture:  None  Risk of Cauda Equina:  None  Risk of Infection:  None  Risk of Cancer:  None        Amount of exercise or physical activity: None    Problems taking medications regularly: No    Medication side effects: none    Diet: diabetic    (G89.4) Chronic pain syndrome  (primary encounter diagnosis)  Comment: Here for refill of pain medication patient has prescribed concerns  Plan:     (M54.16) Lumbar radiculopathy  Comment: See notes above pain continues to be managed with medications but does wake him up at night he is able to get back to sleep.  Plan:     (E11.42) Diabetic polyneuropathy associated with type 2 diabetes mellitus (H)  Comment: Blood sugars are improved at her last visit they were elevated rechecking A1c today  Plan: Hemoglobin A1c            (F33.0) Mild episode of recurrent major depressive disorder (H)  Comment: Mood is stable his PHQ is at goal he continues to take medications as indicated no side effects or problems  Plan:     (M96.1) Failed back syndrome of lumbar spine  Comment:   Plan: oxyCODONE (ROXICODONE) 5 MG tablet            (M10.9) Acute gouty arthritis  Comment: He is unable to afford allopurinol.  Has been taking colchicine regularly.  I will reach out to his pharmacist assistance and see if they have any thoughts or suggestions.  Rechecking labs today  Plan: Uric acid, Basic  metabolic panel            (R49.0) Dysphonia  Comment: He has had some increasing heartburn and GERD.  We recently transitioned him off of omeprazole to ranitidine but this does not seem to be working.  He has been not had imaging of the esophagus or stomach in several years.  He does have a history of smoking actually has noticed some voice changes.  Will check H. pylori today  Plan: H Pylori antigen, stool            (K21.9) Gastroesophageal reflux disease, esophagitis presence not specified  Comment:   Plan: GASTROENTEROLOGY ADULT REF PROCEDURE ONLY         Giancar[facility] Jude [phone]; No Provider        Preference, omeprazole (PRILOSEC OTC) 20 MG EC         tablet, H Pylori antigen, stool             Scratchy throat  Voice being affected  Has humidifier  Smoked in the past - stopped 5 years ago  Has gerd          Problem list and histories reviewed & adjusted, as indicated.  Additional history: as documented    Patient Active Problem List   Diagnosis     Thoracic or lumbosacral neuritis or radiculitis, unspecified     Osteoarthrosis, unspecified whether generalized or localized, pelvic region and thigh     Hypertrophy of prostate with urinary obstruction     Chronic rhinitis     Chronic pain syndrome     Disorder of bursae and tendons in shoulder region     Major depressive disorder, recurrent episode (H)     Anxiety     Gout     Type 2 diabetes mellitus without complication (H)     Hypertension goal BP (blood pressure) < 140/90     Advanced directives, counseling/discussion     DJD (degenerative joint disease), lumbar     Hyperlipidemia LDL goal <100     DDD (degenerative disc disease), cervical     GERD (gastroesophageal reflux disease)     Lumbar radiculopathy     S/P lumbar fusion     Left-sided low back pain with left-sided sciatica     BMI 32.0-32.9,adult     History of hepatitis C     S/P lumbar discectomy     Acute post-operative pain     S/P laminectomy     Chronic pain     Bilateral low back  pain without sciatica     Right-sided low back pain with right-sided sciatica     Acute gouty arthritis     Acute gout of right elbow, unspecified cause     Idiopathic gout of left elbow, unspecified chronicity     Gastroesophageal reflux disease without esophagitis     Failed back syndrome of lumbar spine     Slow transit constipation     Benign prostatic hyperplasia with urinary frequency     Diabetic polyneuropathy associated with type 2 diabetes mellitus (H)     Past Surgical History:   Procedure Laterality Date     BACK SURGERY       both shoulder repair  2006, 2008     C NONSPECIFIC PROCEDURE  1995    neck cyst     C NONSPECIFIC PROCEDURE  1979    laminectomy L5-S1 x 2     C SHOULDER SURG PROC UNLISTED  2005, '07    repairs,later manipulate,inject LT, RT     FUSION LUMBAR ANTERIOR, FUSION LUMBAR POSTERIOR TWO LEVELS, COMBINED N/A 9/17/2014    Procedure: COMBINED FUSION LUMBAR ANTERIOR, FUSION LUMBAR POSTERIOR TWO LEVELS;  Surgeon: Chris Lugo MD;  Location: [facility] OR     HC COLONOSCOPY THRU STOMA, DIAGNOSTIC  3/04    normal     HC UGI ENDOSCOPY DIAG W OR W/O BRUSH/WASH  3/04    ok     HEAD & NECK SURGERY       HEMILAMINECTOMY, DISCECTOMY LUMBAR ONE LEVEL, COMBINED Left 9/8/2015    Procedure: COMBINED HEMILAMINECTOMY, DISCECTOMY LUMBAR ONE LEVEL;  Surgeon: Jer Irby MD;  Location: [facility] OR     right hip replacement  10,2010       Social History     Tobacco Use     Smoking status: Former Smoker     Years: 40.00     Types: Cigarettes     Smokeless tobacco: Former User     Quit date: 2/1/2013   Substance Use Topics     Alcohol use: No     Alcohol/week: 0.0 oz     Family History   Problem Relation Age of Onset     Diabetes Mother      C.A.D. Father      Diabetes Father      Hypertension Father          Current Outpatient Medications   Medication Sig Dispense Refill     ACE/ARB/ARNI NOT PRESCRIBED, INTENTIONAL, Please choose reason not prescribed, below       allopurinol (ZYLOPRIM) 100 MG tablet Take  3 tablets (300 mg) by mouth daily 270 tablet 3     aspirin 81 MG tablet Take 1 tablet (81 mg) by mouth daily 100 tablet 3     blood glucose monitoring (ACCU-CHEK DANIELE PLUS) meter device kit Use to test blood sugars 2 times daily or as directed. 1 kit 0     blood glucose monitoring (ACCU-CHEK DANIELE) test strip Use to test blood sugars 2 times daily or as directed. 200 strip 1     blood glucose monitoring (ACCU-CHEK MULTICLIX) lancets Use to test blood sugar 2 times daily or as directed. 200 each 1     colchicine (MITIGARE) 0.6 MG capsule Take 1 capsule (0.6 mg) by mouth daily 30 capsule 3     finasteride (PROSCAR) 5 MG tablet Take 1 tablet (5 mg) by mouth daily For prostate enlargement. Please fill on $4 list 30 tablet 5     FLUoxetine (PROZAC) 20 MG capsule TAKE THREE CAPSULES BY MOUTH ONCE DAILY 270 capsule 3     fluticasone (FLONASE) 50 MCG/ACT nasal spray SPRAY 1-2 SPRAYS INTO BOTH NOSTRILS DAILY 48 g 1     loratadine (CLARITIN) 10 MG tablet Take 1 tablet (10 mg) by mouth daily 30 tablet 3     LYRICA 100 MG capsule Take 2 capsules (200 mg) by mouth 2 times daily 360 capsule 3     metFORMIN (GLUCOPHAGE-XR) 500 MG 24 hr tablet Take 4 tablets (2,000 mg) by mouth daily (with dinner) 360 tablet 3     omeprazole (PRILOSEC OTC) 20 MG EC tablet Take 1 tablet (20 mg) by mouth daily 90 tablet 3     oxyCODONE (ROXICODONE) 5 MG tablet 1 tab q4h, PRN max 6 per day. 180 tablet 0     ranitidine (ZANTAC) 300 MG tablet Take 1 tablet (300 mg) by mouth 2 times daily To reduce stomach acid 180 tablet 3     senna-docusate (SENOKOT-S;PERICOLACE) 8.6-50 MG per tablet Take 1 tablet by mouth 2 times daily Use for constipation due to your medication 60 tablet 11     simvastatin (ZOCOR) 20 MG tablet Take 1 tablet (20 mg) by mouth At Bedtime 90 tablet 3     triamcinolone (KENALOG) 0.1 % cream Apply sparingly to affected area twice daily for 7 days. Then stop and use only for flares 30 g 3     Recent Labs   Lab Test 09/27/18  1123  08/23/18  1125 05/31/18  1057 04/23/18  1401 02/26/18  1045  05/17/17  0905 04/13/17  1022  07/14/16  1042  10/14/13  1038  01/28/13  1014   A1C 6.6*  --  6.7*  --  6.6*   < >  --   --    < > 6.6*   < > 6.6*   < > 6.6*   LDL  --   --  74  --   --   --  63  --   --  92   < > 60  --  73   HDL  --   --  29*  --   --   --  34*  --   --  32*   < > 30*  --  31*   TRIG  --   --   --   --   --   --  199*  --   --   --   --  264*  --  240*   ALT  --   --  65 51 44  --   --   --   --  56   < > 61  --   --    CR  --  1.24 1.18 1.14 1.15  --   --   --    < > 1.12   < > 1.26*  --   --    GFRESTIMATED  --  59* 62 65 64  --   --   --    < > 66   < > 58*   < >  --    GFRESTBLACK  --  71 75 78 77  --   --   --    < > 80   < > 71   < >  --    POTASSIUM  --  4.4 4.6 4.6 4.2  --   --   --    < > 4.8   < > 4.4  --   --    TSH  --   --   --  0.91  --   --   --  1.10  --   --    < > 1.32  --   --     < > = values in this interval not displayed.      Labs reviewed in EPIC    Reviewed and updated as needed this visit by clinical staff       Reviewed and updated as needed this visit by Provider         ROS:  Constitutional, neuro, ENT, endocrine, pulmonary, cardiac, gastrointestinal, genitourinary, musculoskeletal, integument and psychiatric systems are negative, except as otherwise noted.    OBJECTIVE:                                                    /78   Pulse 74   Temp 97.5  F (36.4  C) (Tympanic)   Ht 1.829 m (6')   Wt 111.6 kg (246 lb)   SpO2 95%   BMI 33.36 kg/m    Body mass index is 33.36 kg/m .  GENERAL APPEARANCE: healthy, alert and no distress  PSYCH: mentation appears normal and affect normal/bright    Diagnostic test results:  Diagnostic Test Results:  Labs are pending     ASSESSMENT/PLAN:                                                    1. Chronic pain syndrome  Refilling medications he is using them appropriately    2. Lumbar radiculopathy  Seen    3. Diabetic polyneuropathy associated with type 2 diabetes  mellitus (H)  Due for recheck of his A1c his numbers have been elevated but are now improved with dietary changes.  He is also due for an eye exam we talked about scheduling as he has the referral and phone number  - Hemoglobin A1c    4. Mild episode of recurrent major depressive disorder (H)  Medications are stable and he still continue to help his PHQ is at goal    5. Failed back syndrome of lumbar spine     - oxyCODONE (ROXICODONE) 5 MG tablet; 1 tab q4h, PRN max 6 per day.  Dispense: 180 tablet; Refill: 0    6. Acute gouty arthritis  Has had ongoing gouty symptoms but colchicine symptoms are much better.  Unfortunately he has not been able to afford allopurinol will look into seeing if coupons or initial systems can help this.  Ideally would like to transition him off colchicine once he is on allopurinol labs as noted below for evaluation and surveillance  - Uric acid  - Basic metabolic panel    7. Dysphonia  Concern for history of prolonged smoking chronic GERD and now voice changes.  We will send him for an EGD and check H. pylori  - H Pylori antigen, stool; Future    8. Gastroesophageal reflux disease, esophagitis presence not specified  As noted above  - GASTROENTEROLOGY ADULT REF PROCEDURE ONLY [facility] [phone]; No Provider Preference  - omeprazole (PRILOSEC OTC) 20 MG EC tablet; Take 1 tablet (20 mg) by mouth daily  Dispense: 90 tablet; Refill: 3  - H Pylori antigen, stool; Future    Total time was over 25 minutes with >50% spent in counseling     Follow up with Provider - 3 months     Connie Haskins MD  [facility]

## 2019-02-28 NOTE — NURSING NOTE
Chief Complaint   Patient presents with     Pain     check     Lab Only     A1C     /78   Pulse 74   Temp 97.5  F (36.4  C) (Tympanic)   Ht 1.829 m (6')   Wt 111.6 kg (246 lb)   SpO2 95%   BMI 33.36 kg/m   Estimated body mass index is 33.36 kg/m  as calculated from the following:    Height as of this encounter: 1.829 m (6').    Weight as of this encounter: 111.6 kg (246 lb).        Health Maintenance due pending provider review:  PHQ9    Given to pt    Mary Beth Cotton, CMA

## 2019-02-28 NOTE — PATIENT INSTRUCTIONS
Call to set up the eye exam with the [facility] eye clinic    Ask your insurance - pharmacy about the SHINGRIX vaccine

## 2019-03-01 ENCOUNTER — TELEPHONE (OUTPATIENT)
Dept: FAMILY MEDICINE | Facility: CLINIC | Age: 64
End: 2019-03-01

## 2019-03-01 DIAGNOSIS — K21.00 GASTROESOPHAGEAL REFLUX DISEASE WITH ESOPHAGITIS: Primary | ICD-10-CM

## 2019-03-01 NOTE — TELEPHONE ENCOUNTER
Reason for Call:  Other prescription    Detailed comments: Pt needs omeprazole (PRILOSEC OTC) 20 MG EC tablet-  **  ins doesnt cover tablet, Pt needs capsuls **    Please give PT call once sent to [facility] [address] [place] [address]      Phone Number Patient can be reached at: Cell number on file:    Telephone Information:   Mobile [phone]       Best Time: any    Can we leave a detailed message on this number? YES    Call taken on 3/1/2019 at 10:05 AM by Subha Lugo

## 2019-03-04 NOTE — RESULT ENCOUNTER NOTE
Please call patient with normal results - his uric acid level is much lower - this is great.  He can stop the colchicine.  And have him stay hydrated to avoid flares of gout  Also avoiding lots of high protein meals is a good thing to try  The allopurinol was too expensive to start so we can skip this.    The diabetes is slowly creeping up - no need to change medications yet but he should really try to avoid carbs and work on weight loss.  Repeat the A1c in 6 months    The kidney function is stable and this is good      Thank you!    Connie

## 2019-03-25 DIAGNOSIS — G89.29 CHRONIC PAIN: ICD-10-CM

## 2019-03-25 DIAGNOSIS — G89.4 CHRONIC PAIN SYNDROME: ICD-10-CM

## 2019-03-25 DIAGNOSIS — M96.1 FAILED BACK SYNDROME OF LUMBAR SPINE: ICD-10-CM

## 2019-03-25 NOTE — TELEPHONE ENCOUNTER
Reason for Call:  Other prescription    Detailed comments: calling in refill for  of Oxycodone, please call when ready for     Phone Number Patient can be reached at: Home number on file [phone] (home)    Best Time: when Ready    Can we leave a detailed message on this number? YES    Call taken on 3/25/2019 at 7:51 AM by Glenna Kay

## 2019-03-26 NOTE — TELEPHONE ENCOUNTER
SN  Controlled Substance Refill Request for Oxycodone    Last refill: 2/28/2019 - #180    Last clinic visit: 2/28/2019     Clinic visit frequency required: Q 3 months  Next appt: No future OV scheduled    Controlled substance agreement on file: Yes:  Date 7/27/2016.    Documentation in problem list reviewed:  Yes    Processing:  Patient will  in clinic    [place] reviewed:  reviewed- no concerns  [place]  [email or web address]    Thanks,  Gayathri Wade RN

## 2019-03-27 RX ORDER — OXYCODONE HYDROCHLORIDE 5 MG/1
TABLET ORAL
Qty: 180 TABLET | Refills: 0 | Status: SHIPPED | OUTPATIENT
Start: 2019-03-29 | End: 2019-04-23

## 2019-03-27 NOTE — TELEPHONE ENCOUNTER
Pt informed Rx is ready for p/u, placed at [facility]. Informed to bring photo ID.    Sara Mclaughlin RN

## 2019-03-28 RX ORDER — OXYCODONE HYDROCHLORIDE 5 MG/1
TABLET ORAL
Qty: 180 TABLET | Refills: 0 | Status: CANCELLED | OUTPATIENT
Start: 2019-03-28

## 2019-03-28 NOTE — TELEPHONE ENCOUNTER
Huddled with SN  Disregard below  Fill date of tomorrow is correct  Cannot fill today  Pt informed by    Ivis SMALLS RN

## 2019-04-05 DIAGNOSIS — F33.0 MILD EPISODE OF RECURRENT MAJOR DEPRESSIVE DISORDER (H): ICD-10-CM

## 2019-04-05 DIAGNOSIS — E11.9 TYPE 2 DIABETES MELLITUS WITHOUT COMPLICATION, WITHOUT LONG-TERM CURRENT USE OF INSULIN (H): ICD-10-CM

## 2019-04-05 DIAGNOSIS — N40.0 BENIGN PROSTATIC HYPERPLASIA WITHOUT LOWER URINARY TRACT SYMPTOMS: ICD-10-CM

## 2019-04-08 RX ORDER — FINASTERIDE 5 MG/1
5 TABLET, FILM COATED ORAL DAILY
Qty: 90 TABLET | Refills: 0 | Status: SHIPPED | OUTPATIENT
Start: 2019-04-08 | End: 2019-06-06

## 2019-04-08 RX ORDER — METFORMIN HCL 500 MG
2000 TABLET, EXTENDED RELEASE 24 HR ORAL
Qty: 360 TABLET | Refills: 0 | Status: SHIPPED | OUTPATIENT
Start: 2019-04-08 | End: 2019-06-26

## 2019-04-08 NOTE — TELEPHONE ENCOUNTER
"Prescription approved per [facility] Refill Protocol.  Ivis SMALLS RN    Requested Prescriptions   Pending Prescriptions Disp Refills     FLUoxetine (PROZAC) 20 MG capsule 270 capsule 3     Sig: TAKE THREE CAPSULES BY MOUTH ONCE DAILY       SSRIs Protocol Passed - 4/5/2019  9:29 AM        Passed - PHQ-9 score less than 5 in past 6 months     Please review last PHQ-9 score.           Passed - Medication is active on med list        Passed - Patient is age 18 or older        Passed - Recent (6 mo) or future (30 days) visit within the authorizing provider's specialty     Patient had office visit in the last 6 months or has a visit in the next 30 days with authorizing provider or within the authorizing provider's specialty.  See \"Patient Info\" tab in inbasket, or \"Choose Columns\" in Meds & Orders section of the refill encounter.            metFORMIN (GLUCOPHAGE-XR) 500 MG 24 hr tablet 360 tablet 3     Sig: Take 4 tablets (2,000 mg) by mouth daily (with dinner)       Biguanide Agents Passed - 4/5/2019  9:29 AM        Passed - Blood pressure less than 140/90 in past 6 months     BP Readings from Last 3 Encounters:   02/28/19 114/78   12/12/18 135/84   10/31/18 111/70                 Passed - Patient has documented LDL within the past 12 mos.     Recent Labs   Lab Test 05/31/18  1057   LDL 74             Passed - Patient has had a Microalbumin in the past 15 mos.     Recent Labs   Lab Test 05/31/18  1106   MICROL <5   UMALCR Unable to calculate due to low value             Passed - Patient is age 10 or older        Passed - Patient has documented A1c within the specified period of time.     If HgbA1C is 8 or greater, it needs to be on file within the past 3 months.  If less than 8, must be on file within the past 6 months.     Recent Labs   Lab Test 02/28/19  1036   A1C 6.7*             Passed - Patient's CR is NOT>1.4 OR Patient's EGFR is NOT<45 within past 12 mos.     Recent Labs   Lab Test 02/28/19  1036   GFRESTIMATED 68 " "  GFRESTBLACK 78       Recent Labs   Lab Test 02/28/19  1036   CR 1.13             Passed - Patient does NOT have a diagnosis of CHF.        Passed - Medication is active on med list        Passed - Recent (6 mo) or future (30 days) visit within the authorizing provider's specialty     Patient had office visit in the last 6 months or has a visit in the next 30 days with authorizing provider or within the authorizing provider's specialty.  See \"Patient Info\" tab in inbasket, or \"Choose Columns\" in Meds & Orders section of the refill encounter.            finasteride (PROSCAR) 5 MG tablet 30 tablet 5     Sig: Take 1 tablet (5 mg) by mouth daily For prostate enlargement. Please fill on $4 list       Alpha Blockers Passed - 4/5/2019  9:29 AM        Passed - Blood pressure under 140/90 in past 12 months     BP Readings from Last 3 Encounters:   02/28/19 114/78   12/12/18 135/84   10/31/18 111/70                 Passed - Recent (12 mo) or future (30 days) visit within the authorizing provider's specialty     Patient had office visit in the last 12 months or has a visit in the next 30 days with authorizing provider or within the authorizing provider's specialty.  See \"Patient Info\" tab in inbasket, or \"Choose Columns\" in Meds & Orders section of the refill encounter.              Passed - Patient does not have Tadalafil, Vardenafil, or Sildenafil on their medication list        Passed - Medication is active on med list        Passed - Patient is 18 years of age or older            "

## 2019-04-22 DIAGNOSIS — M96.1 FAILED BACK SYNDROME OF LUMBAR SPINE: ICD-10-CM

## 2019-04-22 DIAGNOSIS — G89.4 CHRONIC PAIN SYNDROME: ICD-10-CM

## 2019-04-22 NOTE — TELEPHONE ENCOUNTER
Patient called today.    Patient would like a medication request for Oxycodone.    Please contact patient.    Thank you.    Central Scheduling  Carolina ANGEL

## 2019-04-23 NOTE — TELEPHONE ENCOUNTER
SN    Controlled Substance Refill Request for Oxycodone 5 mg    Last refill: 3/29/19 #180    Last clinic visit: 2/28/19     Clinic visit frequency required: Q 3 months  Next appt: None    Controlled substance agreement on file: Yes:  Date 7/30/16.    Documentation in problem list reviewed:  Yes    Processing:  Patient will  in clinic    [place] reviewed:  reviewed- no concerns  [place]  [email or web address]  Please approve if appropriate  Sara Mclaughlin RN

## 2019-04-24 ENCOUNTER — TELEPHONE (OUTPATIENT)
Dept: FAMILY MEDICINE | Facility: CLINIC | Age: 64
End: 2019-04-24

## 2019-04-24 NOTE — TELEPHONE ENCOUNTER
FYI~ A refill has been made to the  assistance program for Lyrica.    A 90 day supply of LYRICA will be delivered to Layo's home within 7-10 business days.    Thank you,    Ana Luisa Cabrera  Prescription Assistance

## 2019-04-25 RX ORDER — OXYCODONE HYDROCHLORIDE 5 MG/1
TABLET ORAL
Qty: 180 TABLET | Refills: 0 | Status: SHIPPED | OUTPATIENT
Start: 2019-04-29 | End: 2019-05-28

## 2019-05-08 ENCOUNTER — TELEPHONE (OUTPATIENT)
Dept: FAMILY MEDICINE | Facility: CLINIC | Age: 64
End: 2019-05-08

## 2019-05-08 ENCOUNTER — MEDICAL CORRESPONDENCE (OUTPATIENT)
Dept: HEALTH INFORMATION MANAGEMENT | Facility: CLINIC | Age: 64
End: 2019-05-08

## 2019-05-08 NOTE — TELEPHONE ENCOUNTER
Forms received from [facility] for completion and signature  Placed forms:  On your desk  Forms need to be faxed to [phone]    Patient call? no  Copy sent to scanning? yes    All.TERRENCE Sorenson

## 2019-05-20 ENCOUNTER — OFFICE VISIT (OUTPATIENT)
Dept: FAMILY MEDICINE | Facility: CLINIC | Age: 64
End: 2019-05-20
Payer: COMMERCIAL

## 2019-05-20 ENCOUNTER — ANCILLARY PROCEDURE (OUTPATIENT)
Dept: GENERAL RADIOLOGY | Facility: CLINIC | Age: 64
End: 2019-05-20
Attending: FAMILY MEDICINE
Payer: COMMERCIAL

## 2019-05-20 VITALS
OXYGEN SATURATION: 98 % | TEMPERATURE: 98.5 F | HEART RATE: 75 BPM | WEIGHT: 253.7 LBS | DIASTOLIC BLOOD PRESSURE: 78 MMHG | BODY MASS INDEX: 34.36 KG/M2 | RESPIRATION RATE: 15 BRPM | HEIGHT: 72 IN | SYSTOLIC BLOOD PRESSURE: 118 MMHG

## 2019-05-20 DIAGNOSIS — T84.7XXS: ICD-10-CM

## 2019-05-20 DIAGNOSIS — M16.11 ARTHRITIS OF RIGHT HIP: Primary | ICD-10-CM

## 2019-05-20 DIAGNOSIS — L82.1 SEBORRHEIC KERATOSES: ICD-10-CM

## 2019-05-20 DIAGNOSIS — L21.9 SEBORRHEIC DERMATITIS: ICD-10-CM

## 2019-05-20 PROCEDURE — 73502 X-RAY EXAM HIP UNI 2-3 VIEWS: CPT | Mod: FY

## 2019-05-20 PROCEDURE — 99214 OFFICE O/P EST MOD 30 MIN: CPT | Performed by: FAMILY MEDICINE

## 2019-05-20 RX ORDER — KETOCONAZOLE 20 MG/ML
SHAMPOO TOPICAL DAILY PRN
Qty: 120 ML | Refills: 1 | Status: SHIPPED | OUTPATIENT
Start: 2019-05-20 | End: 2019-09-02

## 2019-05-20 ASSESSMENT — ANXIETY QUESTIONNAIRES
7. FEELING AFRAID AS IF SOMETHING AWFUL MIGHT HAPPEN: NOT AT ALL
2. NOT BEING ABLE TO STOP OR CONTROL WORRYING: SEVERAL DAYS
1. FEELING NERVOUS, ANXIOUS, OR ON EDGE: SEVERAL DAYS
6. BECOMING EASILY ANNOYED OR IRRITABLE: NOT AT ALL
5. BEING SO RESTLESS THAT IT IS HARD TO SIT STILL: NOT AT ALL
GAD7 TOTAL SCORE: 2
IF YOU CHECKED OFF ANY PROBLEMS ON THIS QUESTIONNAIRE, HOW DIFFICULT HAVE THESE PROBLEMS MADE IT FOR YOU TO DO YOUR WORK, TAKE CARE OF THINGS AT HOME, OR GET ALONG WITH OTHER PEOPLE: NOT DIFFICULT AT ALL
3. WORRYING TOO MUCH ABOUT DIFFERENT THINGS: NOT AT ALL

## 2019-05-20 ASSESSMENT — MIFFLIN-ST. JEOR: SCORE: 1973.78

## 2019-05-20 ASSESSMENT — PATIENT HEALTH QUESTIONNAIRE - PHQ9
SUM OF ALL RESPONSES TO PHQ QUESTIONS 1-9: 2
5. POOR APPETITE OR OVEREATING: NOT AT ALL

## 2019-05-20 NOTE — NURSING NOTE
Chief Complaint   Patient presents with     RECHECK     Pt here today for follow up appt- Right hip pain and dry spots on scalp.     /78   Pulse 75   Temp 98.5  F (36.9  C) (Oral)   Resp 15   Ht 1.829 m (6')   Wt 115.1 kg (253 lb 11.2 oz)   SpO2 98%   BMI 34.41 kg/m   Estimated body mass index is 34.41 kg/m  as calculated from the following:    Height as of this encounter: 1.829 m (6').    Weight as of this encounter: 115.1 kg (253 lb 11.2 oz).  Medication Reconciliation: complete        Health Maintenance Due Pending Provider Review:  GAD7    Completing today.    Reba Dodson MA  [facility]  [phone]

## 2019-05-20 NOTE — RESULT ENCOUNTER NOTE
Send lab & letter      Dear Toby    The surgical change of the right hip within Normal limits.  The hardware is intact- that's good  See orthopedic for worsening pain as discussed today .    Please keep us posted with questions or concerns .      Best Regards,    Margo Delgado MD  [facility]  [phone]

## 2019-05-20 NOTE — PROGRESS NOTES
Subjective     Toby Mcgrath is a 63 year old male who presents to clinic today for the following health issues:    HPI   Pt has been experiencing some Right hip pain recently- he did have surgery on this hip in 2010, but recently he's having some of the same pain sx that he has had in the past.    He would also like to discuss some dry patches/bumps that he has on top of his head.    Patient Active Problem List   Diagnosis     Thoracic or lumbosacral neuritis or radiculitis, unspecified     Osteoarthrosis, unspecified whether generalized or localized, pelvic region and thigh     Hypertrophy of prostate with urinary obstruction     Chronic rhinitis     Chronic pain syndrome     Disorder of bursae and tendons in shoulder region     Major depressive disorder, recurrent episode (H)     Anxiety     Gout     Type 2 diabetes mellitus without complication (H)     Hypertension goal BP (blood pressure) < 140/90     Advanced directives, counseling/discussion     DJD (degenerative joint disease), lumbar     Hyperlipidemia LDL goal <100     DDD (degenerative disc disease), cervical     GERD (gastroesophageal reflux disease)     Lumbar radiculopathy     S/P lumbar fusion     Left-sided low back pain with left-sided sciatica     BMI 32.0-32.9,adult     History of hepatitis C     S/P lumbar discectomy     Acute post-operative pain     S/P laminectomy     Chronic pain     Bilateral low back pain without sciatica     Right-sided low back pain with right-sided sciatica     Acute gouty arthritis     Acute gout of right elbow, unspecified cause     Idiopathic gout of left elbow, unspecified chronicity     Gastroesophageal reflux disease without esophagitis     Failed back syndrome of lumbar spine     Slow transit constipation     Benign prostatic hyperplasia with urinary frequency     Diabetic polyneuropathy associated with type 2 diabetes mellitus (H)     Seborrheic keratoses     Past Surgical History:   Procedure Laterality Date      BACK SURGERY       both shoulder repair  2006, 2008     C NONSPECIFIC PROCEDURE  1995    neck cyst     C NONSPECIFIC PROCEDURE  1979    laminectomy L5-S1 x 2     C SHOULDER SURG PROC UNLISTED  2005, '07    repairs,later manipulate,inject LT, RT     FUSION LUMBAR ANTERIOR, FUSION LUMBAR POSTERIOR TWO LEVELS, COMBINED N/A 9/17/2014    Procedure: COMBINED FUSION LUMBAR ANTERIOR, FUSION LUMBAR POSTERIOR TWO LEVELS;  Surgeon: Chris Lugo MD;  Location: [facility] OR     HC COLONOSCOPY THRU STOMA, DIAGNOSTIC  3/04    normal     HC UGI ENDOSCOPY DIAG W OR W/O BRUSH/WASH  3/04    ok     HEAD & NECK SURGERY       HEMILAMINECTOMY, DISCECTOMY LUMBAR ONE LEVEL, COMBINED Left 9/8/2015    Procedure: COMBINED HEMILAMINECTOMY, DISCECTOMY LUMBAR ONE LEVEL;  Surgeon: Jer Irby MD;  Location: [facility] OR     right hip replacement  10,2010       Social History     Tobacco Use     Smoking status: Former Smoker     Years: 40.00     Types: Cigarettes     Smokeless tobacco: Former User     Quit date: 2/1/2013   Substance Use Topics     Alcohol use: No     Alcohol/week: 0.0 oz     Family History   Problem Relation Age of Onset     Diabetes Mother      C.A.D. Father      Diabetes Father      Hypertension Father            Reviewed and updated as needed this visit by Provider  Meds         Review of Systems   ROS COMP: Constitutional, HEENT, cardiovascular, pulmonary, GI, , musculoskeletal, neuro, skin, endocrine and psych systems are negative, except as otherwise noted.      Objective    /78   Pulse 75   Temp 98.5  F (36.9  C) (Oral)   Resp 15   Ht 1.829 m (6')   Wt 115.1 kg (253 lb 11.2 oz)   SpO2 98%   BMI 34.41 kg/m    Body mass index is 34.41 kg/m .  Physical Exam   GENERAL: healthy, alert and no distress  NECK: no adenopathy, no asymmetry, masses, or scars and thyroid normal to palpation  RESP: lungs clear to auscultation - no rales, rhonchi or wheezes  CV: regular rate and rhythm, normal S1 S2, no S3 or S4,  no murmur, click or rub, no peripheral edema and peripheral pulses strong  ABDOMEN: soft, nontender, no hepatosplenomegaly, no masses and bowel sounds normal  Skin; seborrheic keratosis on scalp, waxy uniflammed 2-3 mm, kenn stuck on waxy lesion on scaple. Some dry patches of flky skin on nasolabial folds and eye brows.  MSK. He is walking with a cane- unable to flex the hip, limited ROM because of pain    Diagnostic Test Results:  Labs reviewed in Epic        Assessment & Plan     (M16.11) Arthritis of right hip  (primary encounter diagnosis)  Comment: Plan: XR Pelvis and Hip Right 2 Views,   ORTHO        REFERRAL          (T84.7XXS) Internal fixation device (pin, alea, or screw) infection-inflammation, sequela  Comment: Plan: ORTHO  REFERRAL          (L82.1) Seborrheic keratoses  Comment: Plan: benign- avoid scraping it. No treatment     (L21.9) Seborrheic dermatitis  Comment: Plan: ketoconazole (NIZORAL) 2 % external shampoo    Leave on, rinse in 2-3 mins, use 2-3d/w  Follow up as needed                       Return in about 1 month (around 6/17/2019) for concerns,unresolved.    Margo Delgado MD  [facility]

## 2019-05-20 NOTE — LETTER
May 21, 2019      Toby Mcgrath  [address]    [place] [address]        Dear ,    We are writing to inform you of your test results.    The surgical change of the right hip within Normal limits.  The hardware is intact- that's good  See orthopedic for worsening pain as discussed today .    Resulted Orders   XR Pelvis and Hip Right 2 Views    Narrative    PELVIS AND RIGHT HIP TWO VIEWS   5/20/2019 12:05 PM    HISTORY: Arthritis of right hip.    COMPARISON: None.    FINDINGS: There are surgical changes of a right total hip  arthroplasty. The hardware is intact and unremarkable. There are  surgical changes in the visualized upper pelvis and sacrum. The left  hip joint space is well-preserved. There are scattered phleboliths in  the pelvis. No other abnormality is seen.      Impression    IMPRESSION: Surgical changes with no acute abnormality demonstrated.     GAIL TODD MD   If you have any questions or concerns, please call the clinic at the number listed above.     Sincerely,      Margo Delgado MD

## 2019-05-20 NOTE — NURSING NOTE
Chief Complaint   Patient presents with     RECHECK     /78   Pulse 75   Temp 98.5  F (36.9  C) (Oral)   Resp 15   Ht 1.829 m (6')   Wt 115.1 kg (253 lb 11.2 oz)   SpO2 98%   BMI 34.41 kg/m   Estimated body mass index is 34.41 kg/m  as calculated from the following:    Height as of this encounter: 1.829 m (6').    Weight as of this encounter: 115.1 kg (253 lb 11.2 oz).  Medication Reconciliation: complete        Health Maintenance Due Pending Provider Review:  [id number]    Completing today.    Reba Dodson MA  [facility]  [phone]

## 2019-05-20 NOTE — PATIENT INSTRUCTIONS
M16.11) Arthritis of right hip  (primary encounter diagnosis)  Comment: Plan: XR Pelvis and Hip Right 2 Views,   ORTHO        REFERRAL          (T84.7XXS) Internal fixation device (pin, alea, or screw) infection-inflammation, sequela  Comment: Plan: ORTHO  REFERRAL          (L82.1) Seborrheic keratoses  Comment: Plan: benign- avoid scraping it. No treatment     (L21.9) Seborrheic dermatitis  Comment: Plan: ketoconazole (NIZORAL) 2 % external shampoo    Leave on, rinse in 2-3 mins, use 2-3d/w  Follow up as needed

## 2019-05-21 ASSESSMENT — ANXIETY QUESTIONNAIRES: GAD7 TOTAL SCORE: 2

## 2019-05-24 DIAGNOSIS — M96.1 FAILED BACK SYNDROME OF LUMBAR SPINE: ICD-10-CM

## 2019-05-24 DIAGNOSIS — G89.4 CHRONIC PAIN SYNDROME: Primary | ICD-10-CM

## 2019-05-24 NOTE — TELEPHONE ENCOUNTER
Reason for Call:  Medication or medication refill:    Do you use a [facility]?  Name of the pharmacy and phone number for the current request:      Name of the medication requested: oxyCODONE (ROXICODONE) 5 MG tablet     Other request: refill available 5.29.19    Can we leave a detailed message on this number? YES    Phone number patient can be reached at: Cell number on file:    Telephone Information:   Mobile [phone]     Best Time: any    Call taken on 5/24/2019 at 9:32 AM by Meliza Alfonso

## 2019-05-27 DIAGNOSIS — E11.9 TYPE 2 DIABETES MELLITUS WITHOUT COMPLICATION, WITHOUT LONG-TERM CURRENT USE OF INSULIN (H): ICD-10-CM

## 2019-05-27 DIAGNOSIS — F33.0 MILD EPISODE OF RECURRENT MAJOR DEPRESSIVE DISORDER (H): ICD-10-CM

## 2019-05-28 RX ORDER — OXYCODONE HYDROCHLORIDE 5 MG/1
TABLET ORAL
Qty: 180 TABLET | Refills: 0 | Status: SHIPPED | OUTPATIENT
Start: 2019-05-28 | End: 2019-06-26

## 2019-05-28 RX ORDER — METFORMIN HCL 500 MG
TABLET, EXTENDED RELEASE 24 HR ORAL
Start: 2019-05-28

## 2019-05-28 NOTE — TELEPHONE ENCOUNTER
Oxycodone   Last Written Prescription Date:  2019  Last Fill Quantity: 180,   # refills: 0  Last Office Visit: 2019  Future Office visit:       Routing refill request to provider for review/approval because:  Drug not on the [facility], [facility] or [facility] refill protocol or controlled substance  Requested Prescriptions   Pending Prescriptions Disp Refills     oxyCODONE (ROXICODONE) 5 MG tablet 180 tablet 0     Si tab q4h, PRN max 6 per day.       There is no refill protocol information for this order

## 2019-05-28 NOTE — TELEPHONE ENCOUNTER
"Metformin   Last Written Prescription Date:  04/08/2019  Last Fill Quantity: 360,  # refills: 0   Last office visit: 5/20/2019 with prescribing provider:  Yes    Future Office Visit:      Prozac  Last Written Prescription Date:  04/08/2019  Last Fill Quantity: 270,  # refills: 0   Last office visit: 5/20/2019 with prescribing provider:  Yes    Future Office Visit:      Requested Prescriptions   Pending Prescriptions Disp Refills     FLUoxetine (PROZAC) 20 MG capsule [Pharmacy Med Name: FLUOXETINE  20MG  CAP] 270 capsule 0     Sig: TAKE THREE CAPSULES BY  MOUTH ONCE DAILY       SSRIs Protocol Passed - 5/28/2019 10:31 AM        Passed - PHQ-9 score less than 5 in past 6 months     Please review last PHQ-9 score.           Passed - Medication is active on med list        Passed - Patient is age 18 or older        Passed - Recent (6 mo) or future (30 days) visit within the authorizing provider's specialty     Patient had office visit in the last 6 months or has a visit in the next 30 days with authorizing provider or within the authorizing provider's specialty.  See \"Patient Info\" tab in inbasket, or \"Choose Columns\" in Meds & Orders section of the refill encounter.            metFORMIN (GLUCOPHAGE-XR) 500 MG 24 hr tablet [Pharmacy Med Name: METFORMIN ER 500MG TABLET] 360 tablet 0     Sig: TAKE 4 TABLETS BY MOUTH  DAILY WITH DINNER       Biguanide Agents Passed - 5/28/2019 10:31 AM        Passed - Blood pressure less than 140/90 in past 6 months     BP Readings from Last 3 Encounters:   05/20/19 118/78   02/28/19 114/78   12/12/18 135/84                 Passed - Patient has documented LDL within the past 12 mos.     Recent Labs   Lab Test 05/31/18  1057   LDL 74             Passed - Patient has had a Microalbumin in the past 15 mos.     Recent Labs   Lab Test 05/31/18  1106   MICROL <5   UMALCR Unable to calculate due to low value             Passed - Patient is age 10 or older        Passed - Patient has documented A1c " "within the specified period of time.     If HgbA1C is 8 or greater, it needs to be on file within the past 3 months.  If less than 8, must be on file within the past 6 months.     Recent Labs   Lab Test 02/28/19  1036   A1C 6.7*             Passed - Patient's CR is NOT>1.4 OR Patient's EGFR is NOT<45 within past 12 mos.     Recent Labs   Lab Test 02/28/19  1036   GFRESTIMATED 68   GFRESTBLACK 78       Recent Labs   Lab Test 02/28/19  1036   CR 1.13             Passed - Patient does NOT have a diagnosis of CHF.        Passed - Medication is active on med list        Passed - Recent (6 mo) or future (30 days) visit within the authorizing provider's specialty     Patient had office visit in the last 6 months or has a visit in the next 30 days with authorizing provider or within the authorizing provider's specialty.  See \"Patient Info\" tab in inbasket, or \"Choose Columns\" in Meds & Orders section of the refill encounter.              "

## 2019-05-29 DIAGNOSIS — J31.0 CHRONIC RHINITIS: ICD-10-CM

## 2019-05-29 DIAGNOSIS — R22.0 FACIAL SWELLING: ICD-10-CM

## 2019-05-30 ENCOUNTER — TELEPHONE (OUTPATIENT)
Dept: FAMILY MEDICINE | Facility: CLINIC | Age: 64
End: 2019-05-30

## 2019-05-30 RX ORDER — FLUTICASONE PROPIONATE 50 MCG
1-2 SPRAY, SUSPENSION (ML) NASAL DAILY
Qty: 48 G | Refills: 1 | Status: SHIPPED | OUTPATIENT
Start: 2019-05-30 | End: 2019-09-02

## 2019-05-30 NOTE — TELEPHONE ENCOUNTER
"Prescription approved per [facility] Refill Protocol.  Ivis SMALLS RN    Last Written Prescription Date:  1/28/2019  Last Fill Quantity: 48,  # refills: 1   Last office visit: 5/20/2019 with prescribing provider:     Future Office Visit:    Requested Prescriptions   Pending Prescriptions Disp Refills     fluticasone (FLONASE) 50 MCG/ACT nasal spray 48 g 1     Sig: Spray 1-2 sprays into both nostrils daily       Inhaled Steroids Protocol Passed - 5/29/2019  4:23 PM        Passed - Patient is age 12 or older        Passed - Recent (12 mo) or future (30 days) visit within the authorizing provider's specialty     Patient had office visit in the last 12 months or has a visit in the next 30 days with authorizing provider or within the authorizing provider's specialty.  See \"Patient Info\" tab in inbasket, or \"Choose Columns\" in Meds & Orders section of the refill encounter.              Passed - Medication is active on med list          "

## 2019-05-31 NOTE — TELEPHONE ENCOUNTER
SN,  Below reference # wrong FYI, correct is [id number]  Per mail order, clarification needed on Ketoconazole  Two directions listed - daily PRN and 2-3x/wk  Wanting to know which direction you want   Please advise  Thanks,  Ivis SMALLS RN

## 2019-06-06 DIAGNOSIS — N40.0 BENIGN PROSTATIC HYPERPLASIA WITHOUT LOWER URINARY TRACT SYMPTOMS: ICD-10-CM

## 2019-06-06 RX ORDER — FINASTERIDE 5 MG/1
TABLET, FILM COATED ORAL
Qty: 90 TABLET | Refills: 1 | Status: SHIPPED | OUTPATIENT
Start: 2019-06-06 | End: 2019-11-06

## 2019-06-06 NOTE — TELEPHONE ENCOUNTER
Prescription approved per [facility] Refill Protocol.  Ivis SMALLS RN

## 2019-06-06 NOTE — TELEPHONE ENCOUNTER
"Proscar  Last Written Prescription Date:  04/08/2019  Last Fill Quantity: 90,  # refills: 0   Last office visit: 5/20/2019 with prescribing provider:  Connie, last office visit with pcp was on 02/28/2019   Future Office Visit:      Requested Prescriptions   Pending Prescriptions Disp Refills     finasteride (PROSCAR) 5 MG tablet [Pharmacy Med Name: FINASTERIDE 5MG TABLET] 90 tablet 0     Sig: TAKE 1 TABLET BY MOUTH  DAILY FOR PROSTATE  ENLARGEMENT       Alpha Blockers Passed - 6/6/2019  5:12 AM        Passed - Blood pressure under 140/90 in past 12 months     BP Readings from Last 3 Encounters:   05/20/19 118/78   02/28/19 114/78   12/12/18 135/84                 Passed - Recent (12 mo) or future (30 days) visit within the authorizing provider's specialty     Patient had office visit in the last 12 months or has a visit in the next 30 days with authorizing provider or within the authorizing provider's specialty.  See \"Patient Info\" tab in inbasket, or \"Choose Columns\" in Meds & Orders section of the refill encounter.              Passed - Patient does not have Tadalafil, Vardenafil, or Sildenafil on their medication list        Passed - Medication is active on med list        Passed - Patient is 18 years of age or older          "

## 2019-06-11 DIAGNOSIS — E78.5 HYPERLIPIDEMIA LDL GOAL <100: ICD-10-CM

## 2019-06-11 RX ORDER — SIMVASTATIN 20 MG
20 TABLET ORAL AT BEDTIME
Qty: 30 TABLET | Refills: 0 | Status: SHIPPED | OUTPATIENT
Start: 2019-06-11 | End: 2019-07-05

## 2019-06-11 NOTE — TELEPHONE ENCOUNTER
Medication is being filled for 1 time refill only due to:  Patient needs to be seen because due for fasting physical.   Ivis SMALLS RN

## 2019-06-11 NOTE — TELEPHONE ENCOUNTER
"simvastatin (ZOCOR) 20 MG tablet  Last Written Prescription Date:  05/31/2018  Last Fill Quantity: 90,  # refills: 3   Last office visit: 5/20/2019 with prescribing provider:  AS   Future Office Visit:  Nothing at this time scheduled.    Requested Prescriptions   Pending Prescriptions Disp Refills     simvastatin (ZOCOR) 20 MG tablet 90 tablet 3     Sig: Take 1 tablet (20 mg) by mouth At Bedtime       Statins Protocol Failed - 6/11/2019  9:55 AM        Failed - LDL on file in past 12 months     Recent Labs   Lab Test 05/31/18  1057   LDL 74             Passed - No abnormal creatine kinase in past 12 months     No lab results found.             Passed - Recent (12 mo) or future (30 days) visit within the authorizing provider's specialty     Patient had office visit in the last 12 months or has a visit in the next 30 days with authorizing provider or within the authorizing provider's specialty.  See \"Patient Info\" tab in inbasket, or \"Choose Columns\" in Meds & Orders section of the refill encounter.              Passed - Medication is active on med list        Passed - Patient is age 18 or older          "

## 2019-06-17 DIAGNOSIS — E78.5 HYPERLIPIDEMIA LDL GOAL <100: ICD-10-CM

## 2019-06-17 DIAGNOSIS — F33.0 MILD EPISODE OF RECURRENT MAJOR DEPRESSIVE DISORDER (H): ICD-10-CM

## 2019-06-17 DIAGNOSIS — E11.9 TYPE 2 DIABETES MELLITUS WITHOUT COMPLICATION, WITHOUT LONG-TERM CURRENT USE OF INSULIN (H): ICD-10-CM

## 2019-06-18 RX ORDER — SIMVASTATIN 20 MG
TABLET ORAL
Start: 2019-06-18

## 2019-06-18 RX ORDER — METFORMIN HCL 500 MG
TABLET, EXTENDED RELEASE 24 HR ORAL
Qty: 360 TABLET | Refills: 0 | OUTPATIENT
Start: 2019-06-18

## 2019-06-18 NOTE — TELEPHONE ENCOUNTER
"Metformin  Last Written Prescription Date:  04/08/2019  Last Fill Quantity: 360,  # refills: 0   Last office visit: 5/20/2019 with prescribing provider:  Connie, last office visit with provider was on 02/28/2019   Future Office Visit:      Simvastatin  Last Written Prescription Date:  06/11/2019  Last Fill Quantity: 30,  # refills: 0   Last office visit: 5/20/2019 with prescribing provider:  Connie, last office visit with provider was on 02/28/2019   Future Office Visit:      Prozac  Last Written Prescription Date:  04/08/2019  Last Fill Quantity: 270,  # refills: 0   Last office visit: 5/20/2019 with prescribing provider:  Connie, last office visit with provider was on 02/28/219   Future Office Visit:      Requested Prescriptions   Pending Prescriptions Disp Refills     simvastatin (ZOCOR) 20 MG tablet [Pharmacy Med Name: SIMVASTATIN  20MG  TAB] 30 tablet 0     Sig: TAKE 1 TABLET BY MOUTH AT  BEDTIME       Statins Protocol Failed - 6/17/2019  8:34 AM        Failed - LDL on file in past 12 months     Recent Labs   Lab Test 05/31/18  1057   LDL 74             Passed - No abnormal creatine kinase in past 12 months     No lab results found.             Passed - Recent (12 mo) or future (30 days) visit within the authorizing provider's specialty     Patient had office visit in the last 12 months or has a visit in the next 30 days with authorizing provider or within the authorizing provider's specialty.  See \"Patient Info\" tab in inbasket, or \"Choose Columns\" in Meds & Orders section of the refill encounter.              Passed - Medication is active on med list        Passed - Patient is age 18 or older        FLUoxetine (PROZAC) 20 MG capsule [Pharmacy Med Name: FLUOXETINE  20MG  CAP] 270 capsule 0     Sig: TAKE THREE CAPSULES BY  MOUTH ONCE DAILY       SSRIs Protocol Passed - 6/17/2019  8:34 AM        Passed - PHQ-9 score less than 5 in past 6 months     Please review last PHQ-9 score.           Passed - Medication is active on " "med list        Passed - Patient is age 18 or older        Passed - Recent (6 mo) or future (30 days) visit within the authorizing provider's specialty     Patient had office visit in the last 6 months or has a visit in the next 30 days with authorizing provider or within the authorizing provider's specialty.  See \"Patient Info\" tab in inbasket, or \"Choose Columns\" in Meds & Orders section of the refill encounter.            metFORMIN (GLUCOPHAGE-XR) 500 MG 24 hr tablet [Pharmacy Med Name: METFORMIN ER 500MG TABLET] 360 tablet 0     Sig: TAKE 4 TABLETS BY MOUTH  DAILY WITH DINNER       Biguanide Agents Failed - 6/17/2019  8:34 AM        Failed - Patient has documented LDL within the past 12 mos.     Recent Labs   Lab Test 05/31/18  1057   LDL 74             Passed - Blood pressure less than 140/90 in past 6 months     BP Readings from Last 3 Encounters:   05/20/19 118/78   02/28/19 114/78   12/12/18 135/84                 Passed - Patient has had a Microalbumin in the past 15 mos.     Recent Labs   Lab Test 05/31/18  1106   MICROL <5   UMALCR Unable to calculate due to low value             Passed - Patient is age 10 or older        Passed - Patient has documented A1c within the specified period of time.     If HgbA1C is 8 or greater, it needs to be on file within the past 3 months.  If less than 8, must be on file within the past 6 months.     Recent Labs   Lab Test 02/28/19  1036   A1C 6.7*             Passed - Patient's CR is NOT>1.4 OR Patient's EGFR is NOT<45 within past 12 mos.     Recent Labs   Lab Test 02/28/19  1036   GFRESTIMATED 68   GFRESTBLACK 78       Recent Labs   Lab Test 02/28/19  1036   CR 1.13             Passed - Patient does NOT have a diagnosis of CHF.        Passed - Medication is active on med list        Passed - Recent (6 mo) or future (30 days) visit within the authorizing provider's specialty     Patient had office visit in the last 6 months or has a visit in the next 30 days with " "authorizing provider or within the authorizing provider's specialty.  See \"Patient Info\" tab in inbasket, or \"Choose Columns\" in Meds & Orders section of the refill encounter.              "

## 2019-06-18 NOTE — TELEPHONE ENCOUNTER
Too soon.  Fluoxetine/Metformin:  3 month supply sent 4/8/19  Simvastatin: 1 month supply sent 6/11/19.    Due for physical and fasting labs.  Gayathri Wade RN

## 2019-06-24 DIAGNOSIS — L21.9 SEBORRHEIC DERMATITIS: ICD-10-CM

## 2019-06-24 DIAGNOSIS — F33.0 MILD EPISODE OF RECURRENT MAJOR DEPRESSIVE DISORDER (H): ICD-10-CM

## 2019-06-24 NOTE — TELEPHONE ENCOUNTER
Reason for Call:  Medication or medication refill:    Do you use a [facility]?  Name of the pharmacy and phone number for the current request:         [facility] - [address]      Name of the medication requested: Ketoconazole 2%    Other request: none Pharmacy Tech called this request in      Call taken on 6/24/2019 at 2:47 PM by Seamus Cosby

## 2019-06-25 RX ORDER — KETOCONAZOLE 20 MG/ML
SHAMPOO TOPICAL DAILY PRN
Qty: 120 ML | Refills: 1 | Status: CANCELLED | OUTPATIENT
Start: 2019-06-25

## 2019-06-25 NOTE — TELEPHONE ENCOUNTER
"Not sure which med was requested notes from rep say one med but different med pended to get refilled so I did both notes.     Ketoconazole   Last Written Prescription Date:  05/20/2019  Last Fill Quantity: 120ml,  # refills: 1   Last office visit: 5/20/2019 with prescribing provider:  No, last office visit with prescribing provider was on 02/28/2019   Future Office Visit:   Next 5 appointments (look out 90 days)    Jun 26, 2019 11:00 AM CDT  Office Visit with Connie Haskins MD  [facility] [address] [address]  [phone]         Prozac  Last Written Prescription Date:  04/08/2019  Last Fill Quantity: 270,  # refills: 0   Last office visit: 5/20/2019 with prescribing provider:  No, last office visit with prescribing provider was on 02/28/2019   Future Office Visit:   Next 5 appointments (look out 90 days)    Jun 26, 2019 11:00 AM CDT  Office Visit with Connie Haskins MD  [facility] ([facility]) [address] [address]  [phone]         Requested Prescriptions   Pending Prescriptions Disp Refills     FLUoxetine (PROZAC) 20 MG capsule [Pharmacy Med Name: FLUOXETINE  20MG  CAP] 270 capsule 0     Sig: TAKE THREE CAPSULES BY  MOUTH ONCE DAILY       SSRIs Protocol Passed - 6/24/2019  2:52 PM        Passed - PHQ-9 score less than 5 in past 6 months     Please review last PHQ-9 score.           Passed - Medication is active on med list        Passed - Patient is age 18 or older        Passed - Recent (6 mo) or future (30 days) visit within the authorizing provider's specialty     Patient had office visit in the last 6 months or has a visit in the next 30 days with authorizing provider or within the authorizing provider's specialty.  See \"Patient Info\" tab in inbasket, or \"Choose Columns\" in Meds & Orders section of the refill encounter.            ketoconazole (NIZORAL) 2 % external " shampoo 120 mL 1     Sig: Apply topically daily as needed for itching or irritation Leave on 2 mins, wash off, use 2-3/week       There is no refill protocol information for this order

## 2019-06-26 ENCOUNTER — OFFICE VISIT (OUTPATIENT)
Dept: FAMILY MEDICINE | Facility: CLINIC | Age: 64
End: 2019-06-26
Payer: COMMERCIAL

## 2019-06-26 VITALS
DIASTOLIC BLOOD PRESSURE: 79 MMHG | OXYGEN SATURATION: 98 % | WEIGHT: 247 LBS | BODY MASS INDEX: 33.46 KG/M2 | HEART RATE: 79 BPM | RESPIRATION RATE: 12 BRPM | SYSTOLIC BLOOD PRESSURE: 119 MMHG | TEMPERATURE: 98 F | HEIGHT: 72 IN

## 2019-06-26 DIAGNOSIS — N40.1 BENIGN PROSTATIC HYPERPLASIA WITH URINARY FREQUENCY: ICD-10-CM

## 2019-06-26 DIAGNOSIS — M10.9 GOUT, UNSPECIFIED CAUSE, UNSPECIFIED CHRONICITY, UNSPECIFIED SITE: ICD-10-CM

## 2019-06-26 DIAGNOSIS — E78.5 HYPERLIPIDEMIA LDL GOAL <100: ICD-10-CM

## 2019-06-26 DIAGNOSIS — F33.0 MILD EPISODE OF RECURRENT MAJOR DEPRESSIVE DISORDER (H): ICD-10-CM

## 2019-06-26 DIAGNOSIS — D22.9 BENIGN MOLE: ICD-10-CM

## 2019-06-26 DIAGNOSIS — M96.1 FAILED BACK SYNDROME OF LUMBAR SPINE: Primary | ICD-10-CM

## 2019-06-26 DIAGNOSIS — E11.9 TYPE 2 DIABETES MELLITUS WITHOUT COMPLICATION, WITHOUT LONG-TERM CURRENT USE OF INSULIN (H): ICD-10-CM

## 2019-06-26 DIAGNOSIS — F11.90 CHRONIC, CONTINUOUS USE OF OPIOIDS: ICD-10-CM

## 2019-06-26 DIAGNOSIS — K59.03 DRUG-INDUCED CONSTIPATION: ICD-10-CM

## 2019-06-26 DIAGNOSIS — R35.0 BENIGN PROSTATIC HYPERPLASIA WITH URINARY FREQUENCY: ICD-10-CM

## 2019-06-26 DIAGNOSIS — Z00.00 WELL ADULT EXAM: ICD-10-CM

## 2019-06-26 DIAGNOSIS — L91.8 SKIN TAG: ICD-10-CM

## 2019-06-26 LAB
ALBUMIN UR-MCNC: NEGATIVE MG/DL
APPEARANCE UR: CLEAR
BILIRUB UR QL STRIP: NEGATIVE
COLOR UR AUTO: YELLOW
GLUCOSE UR STRIP-MCNC: NEGATIVE MG/DL
HBA1C MFR BLD: 7.1 % (ref 0–5.6)
HGB UR QL STRIP: NEGATIVE
KETONES UR STRIP-MCNC: NEGATIVE MG/DL
LEUKOCYTE ESTERASE UR QL STRIP: NEGATIVE
NITRATE UR QL: NEGATIVE
PH UR STRIP: 5 PH (ref 5–7)
RBC #/AREA URNS AUTO: NORMAL /HPF
SOURCE: NORMAL
SP GR UR STRIP: 1.02 (ref 1–1.03)
UROBILINOGEN UR STRIP-ACNC: 0.2 EU/DL (ref 0.2–1)
WBC #/AREA URNS AUTO: NORMAL /HPF

## 2019-06-26 PROCEDURE — 84550 ASSAY OF BLOOD/URIC ACID: CPT | Performed by: FAMILY MEDICINE

## 2019-06-26 PROCEDURE — 80307 DRUG TEST PRSMV CHEM ANLYZR: CPT | Performed by: FAMILY MEDICINE

## 2019-06-26 PROCEDURE — 99215 OFFICE O/P EST HI 40 MIN: CPT | Performed by: FAMILY MEDICINE

## 2019-06-26 PROCEDURE — 83036 HEMOGLOBIN GLYCOSYLATED A1C: CPT | Performed by: FAMILY MEDICINE

## 2019-06-26 PROCEDURE — 82043 UR ALBUMIN QUANTITATIVE: CPT | Performed by: FAMILY MEDICINE

## 2019-06-26 PROCEDURE — 36415 COLL VENOUS BLD VENIPUNCTURE: CPT | Performed by: FAMILY MEDICINE

## 2019-06-26 PROCEDURE — 80061 LIPID PANEL: CPT | Performed by: FAMILY MEDICINE

## 2019-06-26 PROCEDURE — 84153 ASSAY OF PSA TOTAL: CPT | Performed by: FAMILY MEDICINE

## 2019-06-26 PROCEDURE — 81001 URINALYSIS AUTO W/SCOPE: CPT | Mod: 59 | Performed by: FAMILY MEDICINE

## 2019-06-26 PROCEDURE — 99000 SPECIMEN HANDLING OFFICE-LAB: CPT | Performed by: FAMILY MEDICINE

## 2019-06-26 RX ORDER — OXYCODONE HYDROCHLORIDE 5 MG/1
TABLET ORAL
Qty: 180 TABLET | Refills: 0 | Status: SHIPPED | OUTPATIENT
Start: 2019-06-26 | End: 2019-07-22

## 2019-06-26 RX ORDER — METFORMIN HCL 500 MG
2000 TABLET, EXTENDED RELEASE 24 HR ORAL
Qty: 360 TABLET | Refills: 0 | Status: SHIPPED | OUTPATIENT
Start: 2019-06-26 | End: 2019-09-02

## 2019-06-26 RX ORDER — AMOXICILLIN 250 MG
1 CAPSULE ORAL 2 TIMES DAILY
Qty: 60 TABLET | Refills: 11 | Status: SHIPPED | OUTPATIENT
Start: 2019-06-26 | End: 2020-01-07

## 2019-06-26 RX ORDER — TAMSULOSIN HYDROCHLORIDE 0.4 MG/1
0.4 CAPSULE ORAL DAILY
Qty: 90 CAPSULE | Refills: 3 | Status: SHIPPED | OUTPATIENT
Start: 2019-06-26 | End: 2019-09-23

## 2019-06-26 ASSESSMENT — MIFFLIN-ST. JEOR: SCORE: 1943.38

## 2019-06-26 NOTE — NURSING NOTE
Chief Complaint   Patient presents with     Recheck Medication     /79 (BP Location: Right arm, Patient Position: Sitting, Cuff Size: Adult Large)   Pulse 79   Temp 98  F (36.7  C) (Oral)   Resp 12   Ht 1.829 m (6')   Wt 112 kg (247 lb)   SpO2 98%   BMI 33.50 kg/m   Estimated body mass index is 33.5 kg/m  as calculated from the following:    Height as of this encounter: 1.829 m (6').    Weight as of this encounter: 112 kg (247 lb).  bp completed using cuff size: large       Health Maintenance addressed:  shingles and FIT     Possibly completing today per provider review.    Nicolas Hoskins MA

## 2019-06-26 NOTE — PROGRESS NOTES
Subjective     Toby Mcgrath is a 63 year old male who presents to clinic today for the following health issues:    HPI   Chronic/Recurring Back Pain Follow Up      Where is your back pain located? (Select all that apply) low back bilateral    How would you describe your back pain?  dull ache and sharp    Where does your back pain spread? the right and left buttock and legs     Since your last clinic visit for back pain, how has your pain changed? unchanged    Does your back pain interfere with your job? No    Since your last visit, have you tried any new treatment? No      Amount of exercise or physical activity: patient states that he tries to do a lot of walking almost everyday     Problems taking medications regularly: No    Medication side effects: none    Diet: regular (no restrictions)      Derm       Duration: noticed about 7-8 months ago     Description (location/character/radiation): skin tag located on left leg inner thigh     Intensity:  7/10    Accompanying signs and symptoms: none     History (similar episodes/previous evaluation): None    Precipitating or alleviating factors: None    Therapies tried and outcome: None       (Z00.00) Well adult exam  (primary encounter diagnosis)  Comment: due for routine screens  Discussed zoster vaccine  Plan: Fecal colorectal cancer screen (FIT), UROLOGY         ADULT REFERRAL            (F33.0) Mild episode of recurrent major depressive disorder (H)  Comment: refills and meds working well he feels  Plan: FLUoxetine (PROZAC) 20 MG capsule            (E11.9) Type 2 diabetes mellitus without complication, without long-term current use of insulin (H)  Comment: due for recheck has been taking metformin 4 times per day 1 tablet and feels this might be causing some lower sugars  Plan: Albumin Random Urine Quantitative with Creat         Ratio, Lipid panel reflex to direct LDL         Fasting, Hemoglobin A1c, metFORMIN         (GLUCOPHAGE-XR) 500 MG 24 hr tablet         "    (M96.1) Failed back syndrome of lumbar spine  Comment: due for labs and refills of meds  Plan: oxyCODONE (ROXICODONE) 5 MG tablet, Pain Drug         Scr UR W Rptd Meds            (G89.4) Chronic pain syndrome  Comment:   Plan: oxyCODONE (ROXICODONE) 5 MG tablet            (K59.03) Drug-induced constipation  Comment:   Plan: senna-docusate (SENOKOT-S/PERICOLACE) 8.6-50 MG        tablet            (E78.5) Hyperlipidemia LDL goal <100  Comment:  Due for lipids is taking cholestero lowering meds.  Tolerating them well  Plan:     (N40.1,  R35.0) Benign prostatic hyperplasia with urinary frequency  Comment: worsening urination  -- feels his streamis \"thicker\" wonders if this is prostate related?  No nblood noted no change to frequency - slower stream noted   Plan: tamsulosin (FLOMAX) 0.4 MG capsule, UA with         Microscopic reflex to Culture, PSA, screen            (M10.9) Gout, unspecified cause, unspecified chronicity, unspecified site  Comment: stbale and no flares - taking both colchicine and allopurinol daily due for labs  Plan: Uric acid                 Reviewed and updated as needed this visit by Provider         Review of Systems   ROS COMP: Constitutional, HEENT, cardiovascular, pulmonary, gi and gu systems are negative, except as otherwise noted.      Objective    There were no vitals taken for this visit.  There is no height or weight on file to calculate BMI.  Physical Exam   GENERAL: healthy, alert and no distress  EYES: Eyes grossly normal to inspection, PERRL and conjunctivae and sclerae normal  HENT: ear canals and TM's normal, nose and mouth without ulcers or lesions  NECK: no adenopathy, no asymmetry, masses, or scars and thyroid normal to palpation  RESP: lungs clear to auscultation - no rales, rhonchi or wheezes  CV: regular rate and rhythm, normal S1 S2, no S3 or S4, no murmur, click or rub, no peripheral edema and peripheral pulses strong  ABDOMEN: soft, nontender, no hepatosplenomegaly, no " masses and bowel sounds normal  MS: no gross musculoskeletal defects noted, no edema  SKIN: no suspicious lesions or rashes  SKIN: small flat skin tag noted along buttock area left side normal pigmentation - slightly tender  NEURO: Normal strength and tone, mentation intact and speech normal  PSYCH: mentation appears normal, affect normal/bright    Diagnostic Test Results:  pending        Assessment & Plan     1. Well adult exam  Reviewed well adult screens and guidance   - Fecal colorectal cancer screen (FIT); Future  - UROLOGY ADULT REFERRAL    2. Mild episode of recurrent major depressive disorder (H)  Refills   - FLUoxetine (PROZAC) 20 MG capsule; TAKE THREE CAPSULES BY MOUTH ONCE DAILY  Dispense: 270 capsule; Refill: 0    3. Type 2 diabetes mellitus without complication, without long-term current use of insulin (H)  recehcking A1C  Discussed that metformin cannot cause lows but he might be getting more accustomed to normal blood sugars  - Albumin Random Urine Quantitative with Creat Ratio  - Lipid panel reflex to direct LDL Fasting  - Hemoglobin A1c  - metFORMIN (GLUCOPHAGE-XR) 500 MG 24 hr tablet; Take 4 tablets (2,000 mg) by mouth daily (with dinner)  Dispense: 360 tablet; Refill: 0    4. Failed back syndrome of lumbar spine  Refills and he is using medications as prescribed  - oxyCODONE (ROXICODONE) 5 MG tablet; 1 tab q4h, PRN max 6 per day. Fill on 6/28/19  Dispense: 180 tablet; Refill: 0  - Pain Drug Scr UR W Rptd Meds    5. Chronic pain syndrome    - oxyCODONE (ROXICODONE) 5 MG tablet; 1 tab q4h, PRN max 6 per day. Fill on 6/28/19  Dispense: 180 tablet; Refill: 0    6. Drug-induced constipation    - senna-docusate (SENOKOT-S/PERICOLACE) 8.6-50 MG tablet; Take 1 tablet by mouth 2 times daily Use for constipation due to your medication  Dispense: 60 tablet; Refill: 11    7. Hyperlipidemia LDL goal <100  Labs today    8. Benign prostatic hyperplasia with urinary frequency  Restarting flomax and psa today if  no better in 6 weeks will see urology  - tamsulosin (FLOMAX) 0.4 MG capsule; Take 1 capsule (0.4 mg) by mouth daily  Dispense: 90 capsule; Refill: 3  - UA with Microscopic reflex to Culture  - PSA, screen    9. Gout, unspecified cause, unspecified chronicity, unspecified site  Due for labs might be able to stop colchicine  - Uric acid    10. Skin tag  Referred - has a few moles which are vascular appearing    11. Benign mole    - SKIN CARE REFERRAL     BMI:   Estimated body mass index is 33.5 kg/m  as calculated from the following:    Height as of this encounter: 1.829 m (6').    Weight as of this encounter: 112 kg (247 lb).   Weight management plan: Discussed healthy diet and exercise guidelines    Total time was over 40 minutes with >50% spent in counseling     See Patient Instructions    No follow-ups on file.    Connie Haskins MD  [facility]

## 2019-06-27 LAB
CHOLEST SERPL-MCNC: 124 MG/DL
CREAT UR-MCNC: 155 MG/DL
HDLC SERPL-MCNC: 33 MG/DL
LDLC SERPL CALC-MCNC: 50 MG/DL
MICROALBUMIN UR-MCNC: 5 MG/L
MICROALBUMIN/CREAT UR: 3.41 MG/G CR (ref 0–17)
NONHDLC SERPL-MCNC: 91 MG/DL
PSA SERPL-ACNC: 0.36 UG/L (ref 0–4)
TRIGL SERPL-MCNC: 206 MG/DL
URATE SERPL-MCNC: 6.8 MG/DL (ref 3.5–7.2)

## 2019-06-30 LAB — PAIN DRUG SCR UR W RPTD MEDS: NORMAL

## 2019-07-05 DIAGNOSIS — E78.5 HYPERLIPIDEMIA LDL GOAL <100: ICD-10-CM

## 2019-07-08 NOTE — TELEPHONE ENCOUNTER
"Routing refill request to provider for review/approval because:  Drug interaction warning  Ivis SMALLS RN    Last Written Prescription Date:  6/11/2019  Last Fill Quantity: 30,  # refills: 0   Last office visit: 6/26/2019 with prescribing provider:     Future Office Visit:    Requested Prescriptions   Pending Prescriptions Disp Refills     simvastatin (ZOCOR) 20 MG tablet [Pharmacy Med Name: SIMVASTATIN  20MG  TAB] 30 tablet 0     Sig: TAKE 1 TABLET BY MOUTH AT  BEDTIME       Statins Protocol Passed - 7/5/2019  3:58 PM        Passed - LDL on file in past 12 months     Recent Labs   Lab Test 06/26/19  1149   LDL 50             Passed - No abnormal creatine kinase in past 12 months     No lab results found.             Passed - Recent (12 mo) or future (30 days) visit within the authorizing provider's specialty     Patient had office visit in the last 12 months or has a visit in the next 30 days with authorizing provider or within the authorizing provider's specialty.  See \"Patient Info\" tab in inbasket, or \"Choose Columns\" in Meds & Orders section of the refill encounter.              Passed - Medication is active on med list        Passed - Patient is age 18 or older          "

## 2019-07-10 RX ORDER — SIMVASTATIN 20 MG
TABLET ORAL
Qty: 90 TABLET | Refills: 3 | Status: SHIPPED | OUTPATIENT
Start: 2019-07-10 | End: 2020-04-29

## 2019-07-16 ENCOUNTER — OFFICE VISIT (OUTPATIENT)
Dept: URGENT CARE | Facility: URGENT CARE | Age: 64
End: 2019-07-16
Payer: COMMERCIAL

## 2019-07-16 VITALS
OXYGEN SATURATION: 94 % | WEIGHT: 256 LBS | HEART RATE: 57 BPM | DIASTOLIC BLOOD PRESSURE: 88 MMHG | BODY MASS INDEX: 34.72 KG/M2 | SYSTOLIC BLOOD PRESSURE: 152 MMHG | TEMPERATURE: 97.2 F

## 2019-07-16 DIAGNOSIS — R60.0 BILATERAL LOWER EXTREMITY EDEMA: Primary | ICD-10-CM

## 2019-07-16 DIAGNOSIS — R03.0 ELEVATED BP WITHOUT DIAGNOSIS OF HYPERTENSION: ICD-10-CM

## 2019-07-16 DIAGNOSIS — I44.4 LEFT ANTERIOR FASCICULAR BLOCK: ICD-10-CM

## 2019-07-16 DIAGNOSIS — I45.10 RIGHT BUNDLE BRANCH BLOCK: ICD-10-CM

## 2019-07-16 DIAGNOSIS — E11.42 DIABETIC POLYNEUROPATHY ASSOCIATED WITH TYPE 2 DIABETES MELLITUS (H): ICD-10-CM

## 2019-07-16 LAB
ALBUMIN SERPL-MCNC: 3.6 G/DL (ref 3.4–5)
ALP SERPL-CCNC: 59 U/L (ref 40–150)
ALT SERPL W P-5'-P-CCNC: 65 U/L (ref 0–70)
ANION GAP SERPL CALCULATED.3IONS-SCNC: 3 MMOL/L (ref 3–14)
AST SERPL W P-5'-P-CCNC: 44 U/L (ref 0–45)
BILIRUB SERPL-MCNC: 0.4 MG/DL (ref 0.2–1.3)
BUN SERPL-MCNC: 12 MG/DL (ref 7–30)
CALCIUM SERPL-MCNC: 8.6 MG/DL (ref 8.5–10.1)
CHLORIDE SERPL-SCNC: 111 MMOL/L (ref 94–109)
CO2 SERPL-SCNC: 29 MMOL/L (ref 20–32)
CREAT SERPL-MCNC: 1.03 MG/DL (ref 0.66–1.25)
ERYTHROCYTE [DISTWIDTH] IN BLOOD BY AUTOMATED COUNT: 13.5 % (ref 10–15)
GFR SERPL CREATININE-BSD FRML MDRD: 75 ML/MIN/{1.73_M2}
GLUCOSE SERPL-MCNC: 128 MG/DL (ref 70–99)
HCT VFR BLD AUTO: 38.1 % (ref 40–53)
HGB BLD-MCNC: 13.2 G/DL (ref 13.3–17.7)
MCH RBC QN AUTO: 29.9 PG (ref 26.5–33)
MCHC RBC AUTO-ENTMCNC: 34.6 G/DL (ref 31.5–36.5)
MCV RBC AUTO: 86 FL (ref 78–100)
PLATELET # BLD AUTO: 169 10E9/L (ref 150–450)
POTASSIUM SERPL-SCNC: 4.3 MMOL/L (ref 3.4–5.3)
PROT SERPL-MCNC: 6.8 G/DL (ref 6.8–8.8)
RBC # BLD AUTO: 4.41 10E12/L (ref 4.4–5.9)
SODIUM SERPL-SCNC: 143 MMOL/L (ref 133–144)
TROPONIN I SERPL-MCNC: <0.015 UG/L (ref 0–0.04)
WBC # BLD AUTO: 5.6 10E9/L (ref 4–11)

## 2019-07-16 PROCEDURE — 36415 COLL VENOUS BLD VENIPUNCTURE: CPT | Performed by: FAMILY MEDICINE

## 2019-07-16 PROCEDURE — 93000 ELECTROCARDIOGRAM COMPLETE: CPT | Performed by: FAMILY MEDICINE

## 2019-07-16 PROCEDURE — 85027 COMPLETE CBC AUTOMATED: CPT | Performed by: FAMILY MEDICINE

## 2019-07-16 PROCEDURE — 99214 OFFICE O/P EST MOD 30 MIN: CPT | Performed by: FAMILY MEDICINE

## 2019-07-16 PROCEDURE — 84484 ASSAY OF TROPONIN QUANT: CPT | Performed by: FAMILY MEDICINE

## 2019-07-16 PROCEDURE — 80053 COMPREHEN METABOLIC PANEL: CPT | Performed by: FAMILY MEDICINE

## 2019-07-16 NOTE — PROGRESS NOTES
SUBJECTIVE:  Chief Complaint   Patient presents with     Urgent Care     Pt states swollen feet and ankles sxs      Toby Mcgrath is a 63 year old male who presents with a chief complaint of bilateral leg and  Foot swelling and tenderness.  Symptoms began 2 week(s) ago, are moderate and constant  Has had a little foot puffiness in the past year, but now worse with edema to the knee level for 2 weeks  With finger pressure to the swelling a depression remains in the skin  no  open wounds or weeping of the legs    The swelling decreases some with elevating the extremities and in the morning after sleeping    history of past DVT or hypercoagulable state  - none    history of varicose veins/ venous insufficiency - - little  History of heart failure/ chest pain/  Shortness of breath- none  Had treadmill evaluation 2014,    History of kidney or liver disease - none  Has history of prolonged sitting or inactivity - not more than usual  Increased salt in the diet - uses little added salt- but he likes chinese food and soup  Medications that are associated with the swelling -  Takes  Oxycodone for chronic back pain and lyrica-   Seasonal or other allergic exposures  none    Injury:No.    Has diabetes with polyneuropathy     He treated it initially with no therapy.    Patient denies chest pain, shortness of breath, orthopnea, , weakness, pallor, dysuria, change in urine characteristics,     Past Medical History:   Diagnosis Date     Anxiety state, unspecified      BMI 32.0-32.9,adult 9/4/2015     Chronic pain syndrome 3/29/2007    Narcotic refill protocol Will return every 2-3 months for clinic visits Controlled substance aggreement on file from this  6/26/12 Documentation in problem list: no, appears to be compliant based on last visit He is responding best to Oxycontin 10 mg twice daily # 60 per month.  This is costly and looking into [place] for coverage.  Nausea with MS Contin Has meds refilled 16 of every month Last [place]  website verification:  done on 7/8/2015  [email or web address]   2/10/2015:  PCP will take over narcotic prescription Tapering course: using 5 mg tablets 10 mg morning 15 mg noon, 15 mg night for 1 week then 10 mg morning, 10 mg noon, 15 mg night for 1 week then 10 mg TID for 1 week then see me for refills  Then ongoing taper: 5 mg morning, 10 mg noon and 10 mg night for 1 week then  5 mg morning and noon and 10 mg night for 1 week then 5 mg tid for 1 week Then 5 mg morning no noon dose and 5 mg night for 1 week then No morning or non dose and 5 mg nightly for 1 week then off  Goal is co     Chronic rhinitis 3/29/2007     DDD (degenerative disc disease), cervical 2/8/2013    Normal.  C2-C3: Normal disc, facet joints, spinal canal and neural foramina.  C3-C4: Mild broad-based posterior disc bulge. Otherwise normal.  C4-C5: Normal disc, facet joints, spinal canal and neural foramina.  C5-C6: Moderate degenerative disc disease with loss of disc height, circumferential disc bulge and vertebral endplate osteophytes. Mild spinal canal stenosis and moderate left foraminal stenosis. Normal right foramen and facet joints.  C6-C7: Mild circumferential disc bulge. Slight impression on the thecal sac. Mild left foraminal stenosis. Normal right foramen and facet joints.  C7-T1: Normal disc, facet joints, spinal canal and neural foramina.  T1-T2: Mild central posterior disc protrusion.  T2-T3: Mild central posterior disc protrusion. Slight impression on the spinal cord.         DJD (degenerative joint disease), lumbar 1/10/2012     Esophageal reflux     ,refluxes on upper GI     Gout 4/8/2011     Gout, unspecified      Hyperlipidemia LDL goal <100 10/25/2012     Hypertension goal BP (blood pressure) < 140/90 10/21/2011     HYPERTROPHY PROSTATE WITH OBST 8/3/2006     Impotence of organic origin      Lumbar radiculopathy 9/17/2014     Major depressive disorder, recurrent episode, unspecified 7/9/2008     Osteoarthrosis,  unspecified whether generalized or localized, pelvic region and thigh      Pure hyperglyceridemia      ROTATOR CUFF SYND NOS 4/17/2008     S/P lumbar fusion 10/14/2014     Thoracic or lumbosacral neuritis or radiculitis, unspecified      Tobacco use disorder      Type 2 diabetes, HbA1C goal < 8% (H) 9/9/2011     Patient Active Problem List   Diagnosis     Thoracic or lumbosacral neuritis or radiculitis, unspecified     Osteoarthrosis, unspecified whether generalized or localized, pelvic region and thigh     Hypertrophy of prostate with urinary obstruction     Chronic rhinitis     Chronic pain syndrome     Disorder of bursae and tendons in shoulder region     Major depressive disorder, recurrent episode (H)     Anxiety     Gout     Type 2 diabetes mellitus without complication (H)     Hypertension goal BP (blood pressure) < 140/90     Advanced directives, counseling/discussion     DJD (degenerative joint disease), lumbar     Hyperlipidemia LDL goal <100     DDD (degenerative disc disease), cervical     GERD (gastroesophageal reflux disease)     Lumbar radiculopathy     S/P lumbar fusion     Left-sided low back pain with left-sided sciatica     BMI 32.0-32.9,adult     History of hepatitis C     S/P lumbar discectomy     Acute post-operative pain     S/P laminectomy     Chronic, continuous use of opioids     Bilateral low back pain without sciatica     Right-sided low back pain with right-sided sciatica     Acute gouty arthritis     Acute gout of right elbow, unspecified cause     Idiopathic gout of left elbow, unspecified chronicity     Gastroesophageal reflux disease without esophagitis     Failed back syndrome of lumbar spine     Slow transit constipation     Benign prostatic hyperplasia with urinary frequency     Diabetic polyneuropathy associated with type 2 diabetes mellitus (H)     Seborrheic keratoses       ALLERGIES:  Codeine      Current Outpatient Medications on File Prior to Visit:  ACE/ARB/ARNI NOT  PRESCRIBED, INTENTIONAL, Please choose reason not prescribed, below   allopurinol (ZYLOPRIM) 100 MG tablet Take 3 tablets (300 mg) by mouth daily   aspirin 81 MG tablet Take 1 tablet (81 mg) by mouth daily   blood glucose monitoring (ACCU-CHEK DANIELE PLUS) meter device kit Use to test blood sugars 2 times daily or as directed.   blood glucose monitoring (ACCU-CHEK DANIELE) test strip Use to test blood sugars 2 times daily or as directed.   blood glucose monitoring (ACCU-CHEK MULTICLIX) lancets Use to test blood sugar 2 times daily or as directed.   colchicine (MITIGARE) 0.6 MG capsule Take 1 capsule (0.6 mg) by mouth daily   finasteride (PROSCAR) 5 MG tablet TAKE 1 TABLET BY MOUTH  DAILY FOR PROSTATE  ENLARGEMENT   FLUoxetine (PROZAC) 20 MG capsule TAKE THREE CAPSULES BY MOUTH ONCE DAILY   fluticasone (FLONASE) 50 MCG/ACT nasal spray Spray 1-2 sprays into both nostrils daily   ketoconazole (NIZORAL) 2 % external shampoo Apply topically daily as needed for itching or irritation Leave on 2 mins, wash off, use 2-3/week   loratadine (CLARITIN) 10 MG tablet Take 1 tablet (10 mg) by mouth daily   LYRICA 100 MG capsule Take 2 capsules (200 mg) by mouth 2 times daily   metFORMIN (GLUCOPHAGE-XR) 500 MG 24 hr tablet Take 4 tablets (2,000 mg) by mouth daily (with dinner)   omeprazole (PRILOSEC) 20 MG DR capsule Take 1 capsule (20 mg) by mouth daily   oxyCODONE (ROXICODONE) 5 MG tablet 1 tab q4h, PRN max 6 per day. Fill on 6/28/19   ranitidine (ZANTAC) 300 MG tablet Take 1 tablet (300 mg) by mouth 2 times daily To reduce stomach acid   senna-docusate (SENOKOT-S/PERICOLACE) 8.6-50 MG tablet Take 1 tablet by mouth 2 times daily Use for constipation due to your medication   simvastatin (ZOCOR) 20 MG tablet TAKE 1 TABLET BY MOUTH AT  BEDTIME   tamsulosin (FLOMAX) 0.4 MG capsule Take 1 capsule (0.4 mg) by mouth daily   triamcinolone (KENALOG) 0.1 % cream Apply sparingly to affected area twice daily for 7 days. Then stop and use only  for flares     No current facility-administered medications on file prior to visit.     Social History     Tobacco Use     Smoking status: Former Smoker     Years: 40.00     Types: Cigarettes     Smokeless tobacco: Former User     Quit date: 2/1/2013   Substance Use Topics     Alcohol use: No     Alcohol/week: 0.0 oz       Family History   Problem Relation Age of Onset     Diabetes Mother      C.A.D. Father      Diabetes Father      Hypertension Father          ROS:  CONSTITUTIONAL:NEGATIVE for fever, chills   INTEGUMENTARY/SKIN: NEGATIVE for worrisome rashes,  or lesions  EYES: NEGATIVE for vision changes or irritation  ENT/MOUTH: NEGATIVE for ear, mouth and throat problems  RESP:NEGATIVE for significant cough or SOB  GI: NEGATIVE for nausea, abdominal pain,  or change in bowel habits       EXAM:   /81   Pulse 57   Temp 97.2  F (36.2  C) (Oral)   Wt 116.1 kg (256 lb)   SpO2 94%   BMI 34.72 kg/m    M/S Exam:bilateral lower leg edema, with tenderness, without erythema     Right leg  Edema with pitting is noted to below the knee  Left  leg  Edema with pitting is noted to below the knee  Skin:  has varicose veins  Little sparse bronze discoloration to the skin of the lower legs  no skin thickening / fibrosis of the skin of the bilateral lower legs    GENERAL APPEARANCE: alert, mild distress and cooperative  NECK: supple, non-tender to palpation, FROM   CHEST: clear to auscultation  CV: regular rate and rhythm  EXTREMITIES: peripheral pulses normal  MS: no gross deformities noted, no evidence of inflammation in joints, FROM in all extremities.  SKIN: no suspicious lesions or rashes  NEURO: Normal strength and tone, sensory exam grossly normal, mentation intact and speech normal    EKG:  Rate 63-  No ischemic ST changes,  Incomplete RBBB (present in the past 10 years)-  Left axis anterior fascicular block-  (present 4/23/2019)        ASSESSMENT:  Bilateral lower extremity edema      - EKG 12-lead complete  w/read - Clinics  - CBC with platelets  - Comprehensive metabolic panel  - Troponin I  - CARDIOLOGY EVAL ADULT REFERRAL    Patient reassured that physical exam and labs show no acute changes of   renal,   or hematologic function.    He does not have signs of acute cardiac ischemia, but serial EKG shows progressive heart damage/ scarring in the past months  We discussed that he has many conditions may produce edema/ swelling including    Inactivity, use of  ,  Some  Medications, like his opiates and Lyrica,  Venous insufficiency,  Hot weather, increased salt intake, heart disease,     He has no signs of skin breakdown from the edema  His weight is up 9 lbs - likely from water weight with elevated BP- (also likely related to retained fluid)         Recommend elevation of extremities to help reduce swelling and to perform ROM exercises to help with mobilize the fluid in the tissue    Given elastic bandages to compress the edema areas  He will try to reduce salt in the diet    Symptomatic treatment with OTC acetaminophen/ Ibuprofen for discomfort associated with the swelling  Follow-up with primary care if persistent concerns         Right bundle branch block  Left anterior fascicular block    - CARDIOLOGY EVAL ADULT REFERRAL-  For further evaluation and management of his progressive heart damage noted on EKG-  Does not require acute hospitalization    Also follow-up with primary care for further evaluation in the next week     Diabetic polyneuropathy associated with type 2 diabetes mellitus (H)  Discussed that diabetic damage to blood vessels can damage blood flow to his lower extremities- worsening edema     Elevated BP without diagnosis of hypertension  Blood pressure taken today was   elevated.     . NSAIDs should be used sparingly only for severe pain,  Instead acetaminophen or topical muscle rubs should be used primarily for musculoskeletal pain.     Frequent home/ store blood pressure checks are encouraged at  different times of day.  Patient should keep a diary of blood pressure levels and share that information with the primary care provider.

## 2019-07-16 NOTE — PATIENT INSTRUCTIONS
Patient Education     Leg Swelling in Both Legs    Swelling of the feet, ankles, and legs is called edema. It is caused by excess fluid that has collected in the tissues. Extra fluid in the body settles in the lowest part because of gravity. This is why the legs and feet are most affected.  Some of the causes for edema include:    Disease of the heart like congestive heart failure    Standing or sitting for long periods of time    Infection of the feet or legs    Blood pooling in the veins of your legs (venous insufficiency)    Dilated veins in your lower leg (varicose veins)    Garters or other clothing that is tight on your legs. This will cause blood to pool in your legs because the clothing limits blood flow.    Some medicines such as hormones like birth control pills, some blood pressure medicines like calcium channel blockers (amlodipine) and steroids, some antidepressants like MAO inhibitors and tricyclics    Menstrual periods that cause you to retain fluids    Many types of renal disease    Liver failure or cirrhosis    Pregnancy, some swelling is normal, but a sudden increase in leg swelling or weight gain can be a sign of a dangerous complication of pregnancy    Poor nutrition    Thyroid disease  Medical treatment will depend on what is causing the swelling in your legs. Your healthcare provider may prescribe water pills (diuretics) to get rid of the extra fluid.  Home care  Follow these guidelines when caring for yourself at home:    Don't wear clothing like garters that is tight on your legs.    Keep your legs up while lying or sitting.    If infection, injury, or recent surgery is causing the swelling, stay off your legs as much as possible until symptoms get better.    If your healthcare provider says that your leg swelling is caused by venous insufficiency or varicose veins, don't sit or  one place for long periods of time. Take breaks and walk about every few hours. Brisk walking is a good  exercise. It helps circulate the blood that has collected in your leg. Talk with your provider about using support stockings to stop daytime leg swelling.    If your provider says that heart disease is causing your leg swelling, follow a low-salt diet to stop extra fluid from staying in your body. You may also need medicine.  Follow-up care  Follow up with your healthcare provider, or as advised.  When to seek medical advice  Call your healthcare provider right away if any of these occur:    New shortness of breath or chest pain    Shortness of breath or chest pain that gets worse    Swelling in both legs or ankles that gets worse    Swelling of the abdomen    Redness, warmth, or swelling in one leg    Fever of 100.4 F (38 C) or higher, or as directed by your healthcare provider    Yellow color to your skin or eyes    Rapid, unexplained weight gain    Having to sleep upright or use an increased number of pillows  Date Last Reviewed: 3/31/2016    [id number] [employer]. [address] [place] [address]. All rights reserved. This information is not intended as a substitute for professional medical care. Always follow your healthcare professional's instructions.           Patient Education     Right Bundle Branch Block    Right bundle branch block is a problem in the heart s electrical system. Your heart uses electrical signals to keep pumping normally. Normal heartbeats are generated in the upper right heart chamber called the right atrium. The electrical signals reach the ventricles, the main heart pumping chambers. They travel over specialized wires called bundle branches. There are 2 main bundle branches with one in the right ventricle and the other in the left ventricle. In right bundle branch block, the right bundle branch fails to conduct electricity. The signals from the atria reach the ventricles only through the left bundle branch. Instead of the left and right ventricles squeezing at  the same time, the right ventricle squeezes just a little later.  What causes right bundle branch block?  Right bundle branch block can result from a number of conditions, such as:    Heart disease from high blood pressure    Chronic obstructive lung disease (COPD)    Pulmonary embolism    Cardiomyopathy    Myocarditis    Heart attack    Congenital heart disease    Surgery or other procedures on the heart  In some case, the cause of right bundle branch block is not known. The heart otherwise appears normal.  What are the symptoms of right bundle branch block?  Right bundle branch block does not cause symptoms on its own. It may make symptoms of other heart conditions worse, such as heart failure.  How is right bundle branch block diagnosed?  Right bundle branch block is diagnosed with an electrocardiogram (ECG). This test looks at the heart s rhythm. The condition is often found during an ECG for another reason. Your doctor may want to check you for other health conditions. He or she may ask about your medical history and give you a physical exam. You may also have other tests, such as:    Echocardiogram, to look at your heart s motion and blood flow through the heart    Testing to check the health and function of your lungs    Blood tests to look at your overall health  How is right bundle branch block treated?  If you have no symptoms and no other heart conditions, no treatment is recommended. If you have or may have heart disease, your healthcare provider will want to keep track of your heart health. In particular, if you develop right bundle branch block after a heart attack, it increases the risk of death and therefore requires more intensive treatment.  Living with right bundle branch block  Your doctor may give you more instructions about how to manage your overall heart health. You might need to make lifestyle changes. These may include losing weight, quitting smoking, or improving your diet.  Keep track of  any heart symptoms you have. See your doctor regularly, even if you don t have any symptoms. Make sure all your healthcare providers know about your right bundle branch block.     When should I call my healthcare provider?  Call your healthcare provider right away if you have any of these:    New symptoms    Chest pain    Fainting    Shortness of breath   Date Last Reviewed: 5/1/2016 2000-2018 [employer]. [address] [place] [address]. All rights reserved. This information is not intended as a substitute for professional medical care. Always follow your healthcare professional's instructions.

## 2019-07-19 ENCOUNTER — TELEPHONE (OUTPATIENT)
Dept: FAMILY MEDICINE | Facility: CLINIC | Age: 64
End: 2019-07-19

## 2019-07-19 DIAGNOSIS — F11.90 CHRONIC, CONTINUOUS USE OF OPIOIDS: ICD-10-CM

## 2019-07-19 DIAGNOSIS — M54.16 LUMBAR RADICULOPATHY: ICD-10-CM

## 2019-07-19 DIAGNOSIS — M96.1 FAILED BACK SYNDROME OF LUMBAR SPINE: ICD-10-CM

## 2019-07-19 DIAGNOSIS — G89.4 CHRONIC PAIN SYNDROME: ICD-10-CM

## 2019-07-19 NOTE — TELEPHONE ENCOUNTER
Dr. Haskins~    It is time to refill Layo's Lyrica through the [employer] assistance program.  [employer] requires a hand signed, hard copy, brand name script for this fill.    Please hand sign a BRAND name, hard copy script for:     LYRICA 10mg, 2 po bid, #360, 1 refill    Please send the script to me via interoffice mail FPS Zenaida Kim or via [place] mail  at:      [facility]   Zenaida Plasencia   [address] [place]  [address]    Thanks so much for your help!    Zenaida Plasencia  Prescription   Pharmacy Assistance  [phone]

## 2019-07-22 ENCOUNTER — TELEPHONE (OUTPATIENT)
Dept: FAMILY MEDICINE | Facility: CLINIC | Age: 64
End: 2019-07-22

## 2019-07-22 RX ORDER — OXYCODONE HYDROCHLORIDE 5 MG/1
TABLET ORAL
Qty: 180 TABLET | Refills: 0 | Status: SHIPPED | OUTPATIENT
Start: 2019-07-28 | End: 2019-08-23

## 2019-07-22 RX ORDER — PREGABALIN 100 MG
200 CAPSULE ORAL 2 TIMES DAILY
Qty: 360 CAPSULE | Refills: 1 | Status: SHIPPED | OUTPATIENT
Start: 2019-07-22 | End: 2019-12-13

## 2019-07-22 NOTE — TELEPHONE ENCOUNTER
FYI~ A refill has been made to the  assistance program for Colcrys.    A 90 day supply of COLCRYS will be delivered to Layo's home within 3-5 business days.    Thank you,    Ana Luisa Cabrera  Prescription Assistance

## 2019-07-22 NOTE — TELEPHONE ENCOUNTER
SN    Pt says he also needs oxycodone.  And wanting to know if you want to see him.  Please advise.  Thanks,  Lavinia Ortega RN    Controlled Substance Refill Request for Oxycodone    Last refill: 6/28/19    Last clinic visit: 6/26/19     Clinic visit frequency required: Q3 months  Next appt:     Controlled substance agreement on file: Yes:  Date 7/30/16.    Documentation in problem list reviewed:  Yes    Processing:  Patient will  in clinic    RX [place] reviewed:  reviewed- no concerns  [place] profile:  [email or web address]        Informed pt and mailed to Joni via interoffice mail.    Pt says he was told to schedule apt with SN.He was questioning his simvastatin order.  Did let him know simvastatin rx was ordered for a year. He says he got a letter that he needed to schedule an apt. He will martin his pharm to clarify.

## 2019-07-22 NOTE — TELEPHONE ENCOUNTER
Patient calling back re his Medications and would like a call back re this  # [phone]    Thank you

## 2019-08-02 ENCOUNTER — TELEPHONE (OUTPATIENT)
Dept: FAMILY MEDICINE | Facility: CLINIC | Age: 64
End: 2019-08-02

## 2019-08-02 NOTE — TELEPHONE ENCOUNTER
FYI~ A refill has been made to the  assistance program for Lyrica.    A 90 day supply of LYRICA will be delivered to Layo's home within 3-5 business days.    Thank you,    Ana Luisa Cabrera  Prescription Assistance

## 2019-08-11 ENCOUNTER — OFFICE VISIT (OUTPATIENT)
Dept: URGENT CARE | Facility: URGENT CARE | Age: 64
End: 2019-08-11
Payer: COMMERCIAL

## 2019-08-11 VITALS
RESPIRATION RATE: 20 BRPM | SYSTOLIC BLOOD PRESSURE: 130 MMHG | DIASTOLIC BLOOD PRESSURE: 90 MMHG | BODY MASS INDEX: 34.18 KG/M2 | HEART RATE: 84 BPM | WEIGHT: 252 LBS

## 2019-08-11 DIAGNOSIS — Z23 VACCINE FOR DIPHTHERIA-TETANUS: ICD-10-CM

## 2019-08-11 DIAGNOSIS — S61.219A FINGER LACERATION, INITIAL ENCOUNTER: Primary | ICD-10-CM

## 2019-08-11 DIAGNOSIS — S61.213A LACERATION OF LEFT MIDDLE FINGER WITHOUT FOREIGN BODY WITHOUT DAMAGE TO NAIL, INITIAL ENCOUNTER: ICD-10-CM

## 2019-08-11 PROCEDURE — 12001 RPR S/N/AX/GEN/TRNK 2.5CM/<: CPT | Performed by: FAMILY MEDICINE

## 2019-08-11 PROCEDURE — 90471 IMMUNIZATION ADMIN: CPT | Performed by: FAMILY MEDICINE

## 2019-08-11 PROCEDURE — 90714 TD VACC NO PRESV 7 YRS+ IM: CPT | Performed by: FAMILY MEDICINE

## 2019-08-11 NOTE — NURSING NOTE
Screening Questionnaire for Adult Immunization    Are you sick today?   No   Do you have allergies to medications, food, a vaccine component or latex?   yes   Have you ever had a serious reaction after receiving a vaccination?   No   Do you have a long-term health problem with heart disease, lung disease, asthma, kidney disease, metabolic disease (e.g. diabetes), anemia, or other blood disorder?   No   Do you have cancer, leukemia, HIV/AIDS, or any other immune system problem?   No   In the past 3 months, have you taken medications that affect  your immune system, such as prednisone, other steroids, or anticancer drugs; drugs for the treatment of rheumatoid arthritis, Crohn s disease, or psoriasis; or have you had radiation treatments?   No   Have you had a seizure, or a brain or other nervous system problem?   No   During the past year, have you received a transfusion of blood or blood     products, or been given immune (gamma) globulin or antiviral drug?   No   For women: Are you pregnant or is there a chance you could become        pregnant during the next month?   n/a   Have you received any vaccinations in the past 4 weeks?   No     Immunization questionnaire was positive for at least one answer.  Notified Dr Najera.        Per orders of Dr. Najera, injection of TD given by Mercedes Abel. Patient instructed to remain in clinic for 15 minutes afterwards, and to report any adverse reaction to me immediately.       Screening performed by Mercedes Abel on 8/11/2019 at 12:19 PM.

## 2019-08-11 NOTE — PATIENT INSTRUCTIONS
Patient Education     Extremity Laceration: Stitches, Staples, or Tape  A laceration is a cut through the skin. If it is deep, it may require stitches or staples to close so it can heal. Minor cuts may be treated with surgical tape closures, or skin glue.  X-rays may be done if something may have entered the skin through the cut. You may also need a tetanus shot if you are not up to date on this vaccine.  Home care    Follow the healthcare provider s instructions on how to care for the cut.    Wash your hands with soap and warm water before and after caring for your wound. This is to help prevent infection.    Keep the wound clean and dry. If a bandage was applied and it becomes wet or dirty, replace it. Otherwise, leave it in place for the first 24 hours, then change it once a day or as directed.    If stitches or staples were used, clean the wound daily:  ? After removing the bandage, wash the area with soap and water. Use a wet cotton swab to loosen and remove any blood or crust that forms.  ? After cleaning, keep the wound clean and dry. Talk with your healthcare provider before putting any antibiotic ointment on the wound. Reapply the bandage.    You may remove the bandage to shower as usual after the first 24 hours, but don't soak the area in water (no swimming) until the stitches or staples are removed.    If surgical tape closures were used, keep the area clean and dry. If it becomes wet, blot it dry with a towel. Let the surgical tape fall off on its own.    The healthcare provider may prescribe an antibiotic cream or ointment to prevent infection. He or she may also prescribe an antibiotic pill. Don't stop taking this medicine until you have finished it all or the provider tells you to stop.    The provider may also prescribe medicine for pain. Follow the instructions for taking these medicines.    Don't do activities that may reopen your wound.  Follow-up care  Follow up with your healthcare provider,  or as advised. Most skin wounds heal within 10 days. But an infection may sometimes occur even with proper treatment. Check the wound daily for the signs of infection listed below. Stitches and staples should be removed within 7 to14 days. If surgical tape closures were used, you may remove them after 10 days if they have not fallen off by then.   When to seek medical advice  Call your healthcare provider right away if any of these occur:    Wound bleeding not controlled by direct pressure    Signs of infection, including increasing pain in the wound, increasing wound redness or swelling, or pus or bad odor coming from the wound    Fever of 100.4 F (38 C) or higher, or as directed by your healthcare provider    Stitches or staples come apart or fall out or surgical tape falls off before 7 days    Wound edges reopen    Wound changes colors    Numbness occurs around the wound     Decreased movement around the injured area  Date Last Reviewed: 7/1/2017 2000-2018 [employer]. [address] [place] [address]. All rights reserved. This information is not intended as a substitute for professional medical care. Always follow your healthcare professional's instructions.

## 2019-08-11 NOTE — PROGRESS NOTES
SUBJECTIVE:     Chief Complaint   Patient presents with     Laceration     laceration to lt 3rd finger sustained on a knife aprox 1/2 hr ago     Toby Mcgrath is a 64 year old male who presents to the clinic with a laceration on the left finger sustained 30 minutes ago.  This is a non-work related injury.    Mechanism of injury: knife.while cutting pizza pockets     Associated symptoms: Denies numbness, weakness, or loss of function  Last tetanus booster within 10 years: yes    EXAM:   The patient appears today in alert,no apparent distress distress  VITALS: BP (!) 130/90 (Cuff Size: Adult Regular)   Pulse 84   Resp 20   Wt 114.3 kg (252 lb)   BMI 34.18 kg/m      Size of laceration: 1.5 centimeters  Characteristics of the laceration: active bleeding  Tendon function intact: yes  Sensation to light touch intact: yes  Pulses intact: not applicable  Picture included in patient's chart: no    Assessment:     Finger laceration, initial encounter  Laceration of left middle finger without foreign body without damage to nail, initial encounter  Vaccine for diphtheria-tetanus  Toby was seen today for laceration.    Diagnoses and all orders for this visit:    Finger laceration, initial encounter    Laceration of left middle finger without foreign body without damage to nail, initial encounter  -     REPAIR SUPERFICIAL, WOUND BODY < =2.5CM    Vaccine for diphtheria-tetanus    Other orders  -     TD PRESERV FREE, IM (7+ YRS)        PLAN:  PROCEDURE NOTE::  Wound was locally injected with 1 cc's of Lidocaine 1% plain  Prepped and draped in the usual sterile fashion  Wound cleaned with HIBICLENS  Laceration was closed using 4 0 sutures interrupted sutures  After care instructions:  Keep wound clean and dry for the next 24-48 hours  Sutures out in 8 days  Signs of infection discussed today  Follow up if  symptoms fail to improve or worsens   Pt understood and agreed with plan     Flaca Najera MD

## 2019-08-14 DIAGNOSIS — F33.0 MILD EPISODE OF RECURRENT MAJOR DEPRESSIVE DISORDER (H): ICD-10-CM

## 2019-08-14 DIAGNOSIS — E11.9 TYPE 2 DIABETES MELLITUS WITHOUT COMPLICATION, WITHOUT LONG-TERM CURRENT USE OF INSULIN (H): ICD-10-CM

## 2019-08-14 RX ORDER — METFORMIN HCL 500 MG
TABLET, EXTENDED RELEASE 24 HR ORAL
Start: 2019-08-14

## 2019-08-14 NOTE — TELEPHONE ENCOUNTER
Prozac 20MG  Last Written Prescription Date:  06/26/2019  Last Fill Quantity: 270,  # refills: 0   Last office visit: 6/26/2019 with prescribing provider:  Yes    Future Office Visit:   Next 5 appointments (look out 90 days)    Aug 19, 2019 10:20 AM CDT  Office Visit with Amy Guo MD  [facility] ([facility]) [address] [address]  [phone]         Metformin 500MG  Last Written Prescription Date:  06/26/2019  Last Fill Quantity: 360,  # refills: 0   Last office visit: 6/26/2019 with prescribing provider:  Yes    Future Office Visit:   Next 5 appointments (look out 90 days)    Aug 19, 2019 10:20 AM CDT  Office Visit with Amy Guo MD  [facility] ([facility]) [address] [address]  [phone]         Requested Prescriptions   Pending Prescriptions Disp Refills     metFORMIN (GLUCOPHAGE-XR) 500 MG 24 hr tablet [Pharmacy Med Name: METFORMIN ER 500MG TABLET] 360 tablet 0     Sig: TAKE 4 TABLETS BY MOUTH  DAILY WITH DINNER       Biguanide Agents Failed - 8/14/2019  4:29 AM        Failed - Blood pressure less than 140/90 in past 6 months     BP Readings from Last 3 Encounters:   08/11/19 (!) 130/90   07/16/19 152/88   06/26/19 119/79                 Passed - Patient has documented LDL within the past 12 mos.     Recent Labs   Lab Test 06/26/19  1149   LDL 50             Passed - Patient has had a Microalbumin in the past 15 mos.     Recent Labs   Lab Test 06/26/19  1149   MICROL 5   UMALCR 3.41             Passed - Patient is age 10 or older        Passed - Patient has documented A1c within the specified period of time.     If HgbA1C is 8 or greater, it needs to be on file within the past 3 months.  If less than 8, must be on file within the past 6 months.     Recent Labs   Lab Test 06/26/19  1149   A1C 7.1*             Passed - Patient's CR is NOT>1.4 OR Patient's EGFR is NOT<45 within  "past 12 mos.     Recent Labs   Lab Test 07/16/19  1048   GFRESTIMATED 75   GFRESTBLACK 87       Recent Labs   Lab Test 07/16/19  1048   CR 1.03             Passed - Patient does NOT have a diagnosis of CHF.        Passed - Medication is active on med list        Passed - Recent (6 mo) or future (30 days) visit within the authorizing provider's specialty     Patient had office visit in the last 6 months or has a visit in the next 30 days with authorizing provider or within the authorizing provider's specialty.  See \"Patient Info\" tab in inbasket, or \"Choose Columns\" in Meds & Orders section of the refill encounter.            FLUoxetine (PROZAC) 20 MG capsule [Pharmacy Med Name: FLUOXETINE  20MG  CAP] 270 capsule 0     Sig: TAKE THREE CAPSULES BY  MOUTH ONCE DAILY       SSRIs Protocol Passed - 8/14/2019  4:29 AM        Passed - PHQ-9 score less than 5 in past 6 months     Please review last PHQ-9 score.           Passed - Medication is active on med list        Passed - Patient is age 18 or older        Passed - Recent (6 mo) or future (30 days) visit within the authorizing provider's specialty     Patient had office visit in the last 6 months or has a visit in the next 30 days with authorizing provider or within the authorizing provider's specialty.  See \"Patient Info\" tab in inbasket, or \"Choose Columns\" in Meds & Orders section of the refill encounter.              "

## 2019-08-19 ENCOUNTER — OFFICE VISIT (OUTPATIENT)
Dept: FAMILY MEDICINE | Facility: CLINIC | Age: 64
End: 2019-08-19
Payer: COMMERCIAL

## 2019-08-19 VITALS
OXYGEN SATURATION: 96 % | DIASTOLIC BLOOD PRESSURE: 74 MMHG | TEMPERATURE: 97.9 F | BODY MASS INDEX: 34.23 KG/M2 | HEART RATE: 81 BPM | HEIGHT: 72 IN | WEIGHT: 252.7 LBS | SYSTOLIC BLOOD PRESSURE: 124 MMHG

## 2019-08-19 DIAGNOSIS — S61.219D LACERATION OF SKIN OF FINGER, SUBSEQUENT ENCOUNTER: Primary | ICD-10-CM

## 2019-08-19 PROCEDURE — 99213 OFFICE O/P EST LOW 20 MIN: CPT | Performed by: FAMILY MEDICINE

## 2019-08-19 ASSESSMENT — MIFFLIN-ST. JEOR: SCORE: 1964.24

## 2019-08-19 NOTE — NURSING NOTE
Chief Complaint   Patient presents with     Suture Removal     4 stitches on LT middle finger      /74   Pulse 81   Temp 97.9  F (36.6  C) (Oral)   Ht 1.829 m (6')   Wt 114.6 kg (252 lb 11.2 oz)   SpO2 96%   BMI 34.27 kg/m   Estimated body mass index is 34.27 kg/m  as calculated from the following:    Height as of this encounter: 1.829 m (6').    Weight as of this encounter: 114.6 kg (252 lb 11.2 oz).  Medication Reconciliation: complete      Health Maintenance that is potentially due pending provider review:  Colonoscopy/FIT    Pt will discuss FIT with provider.    TERRENCE Kaufman

## 2019-08-19 NOTE — PROGRESS NOTES
Subjective     Toby Mcgrath is a 64 year old male who presents to clinic today for the following health issues:    HPI   ED/UC Followup:    Facility: [facility]  Date of visit: 8/11/19  Reason for visit: finger laceration  Current Status: healing well, needs 4 stitches removed from LT middle finger      Patient accidentally lacerated the tip of his middle finger on the left. He went to the ER, received tdap vaccine and had 4 sutures placed.   He continues to heal as expected.   States numbness at the tip of the finger.     Patient Active Problem List   Diagnosis     Thoracic or lumbosacral neuritis or radiculitis, unspecified     Osteoarthrosis, unspecified whether generalized or localized, pelvic region and thigh     Hypertrophy of prostate with urinary obstruction     Chronic rhinitis     Chronic pain syndrome     Disorder of bursae and tendons in shoulder region     Major depressive disorder, recurrent episode (H)     Anxiety     Gout     Type 2 diabetes mellitus without complication (H)     Hypertension goal BP (blood pressure) < 140/90     Advanced directives, counseling/discussion     DJD (degenerative joint disease), lumbar     Hyperlipidemia LDL goal <100     DDD (degenerative disc disease), cervical     GERD (gastroesophageal reflux disease)     Lumbar radiculopathy     S/P lumbar fusion     Left-sided low back pain with left-sided sciatica     BMI 32.0-32.9,adult     History of hepatitis C     S/P lumbar discectomy     Acute post-operative pain     S/P laminectomy     Chronic, continuous use of opioids     Bilateral low back pain without sciatica     Right-sided low back pain with right-sided sciatica     Acute gouty arthritis     Acute gout of right elbow, unspecified cause     Idiopathic gout of left elbow, unspecified chronicity     Gastroesophageal reflux disease without esophagitis     Failed back syndrome of lumbar spine     Slow transit constipation     Benign prostatic  hyperplasia with urinary frequency     Diabetic polyneuropathy associated with type 2 diabetes mellitus (H)     Seborrheic keratoses     Past Surgical History:   Procedure Laterality Date     BACK SURGERY       both shoulder repair  2006, 2008     C NONSPECIFIC PROCEDURE  1995    neck cyst     C NONSPECIFIC PROCEDURE  1979    laminectomy L5-S1 x 2     C SHOULDER SURG PROC UNLISTED  2005, '07    repairs,later manipulate,inject LT, RT     FUSION LUMBAR ANTERIOR, FUSION LUMBAR POSTERIOR TWO LEVELS, COMBINED N/A 9/17/2014    Procedure: COMBINED FUSION LUMBAR ANTERIOR, FUSION LUMBAR POSTERIOR TWO LEVELS;  Surgeon: Chris Lugo MD;  Location: [facility] OR     HC COLONOSCOPY THRU STOMA, DIAGNOSTIC  3/04    normal     HC UGI ENDOSCOPY DIAG W OR W/O BRUSH/WASH  3/04    ok     HEAD & NECK SURGERY       HEMILAMINECTOMY, DISCECTOMY LUMBAR ONE LEVEL, COMBINED Left 9/8/2015    Procedure: COMBINED HEMILAMINECTOMY, DISCECTOMY LUMBAR ONE LEVEL;  Surgeon: Jer Irby MD;  Location: [facility] OR     right hip replacement  10,2010       Social History     Tobacco Use     Smoking status: Former Smoker     Years: 40.00     Types: Cigarettes     Smokeless tobacco: Former User     Quit date: 2/1/2013   Substance Use Topics     Alcohol use: No     Alcohol/week: 0.0 oz     Family History   Problem Relation Age of Onset     Diabetes Mother      C.A.D. Father      Diabetes Father      Hypertension Father          Reviewed and updated as needed this visit by Provider         Review of Systems   ROS COMP: Constitutional, HEENT, cardiovascular, pulmonary, gi and gu systems are negative, except as otherwise noted.      Objective    /74   Pulse 81   Temp 97.9  F (36.6  C) (Oral)   Ht 1.829 m (6')   Wt 114.6 kg (252 lb 11.2 oz)   SpO2 96%   BMI 34.27 kg/m    Body mass index is 34.27 kg/m .  Physical Exam   GENERAL: healthy, alert and no distress  SKIN: 4 nylon sutures removed. Skin is intact.     Toby Mcgrath presents to the  clinic today for removal of sutures.  The patient has had the sutures in place for 8 days.  There has been no history of infection or drainage.  4 sutures are seen located on the tip of the finger.  The wound is healing well with no signs of infection.  Tetanus status is up to date.   All sutures were easily removed today.  Routine wound care discussed.  The patient will follow up as needed.  Diagnostic Test Results:  Labs reviewed in Epic        Assessment & Plan       ICD-10-CM    1. Laceration of skin of finger, subsequent encounter S61.219D       return to clinic as needed.     Return in about 6 months (around 2/19/2020) for Physical Exam.    Amy Guo MD  [facility]

## 2019-08-23 DIAGNOSIS — M96.1 FAILED BACK SYNDROME OF LUMBAR SPINE: ICD-10-CM

## 2019-08-23 DIAGNOSIS — F11.90 CHRONIC, CONTINUOUS USE OF OPIOIDS: ICD-10-CM

## 2019-08-23 DIAGNOSIS — G89.4 CHRONIC PAIN SYNDROME: ICD-10-CM

## 2019-08-23 NOTE — TELEPHONE ENCOUNTER
SN,     Controlled Substance Refill Request for Oxycodone    Last refill: 7/28/2019 #180    Last clinic visit: 6/26/2019     Clinic visit frequency required: Q 3 months  Next appt: none    Controlled substance agreement on file: Yes:  Date 8/30/2017.    Documentation in problem list reviewed:  Yes    Processing:  Unsure if SN can eRx this yet    RX [place] ([place] reviewed:  reviewed- no concerns  [place] profile:  [email or web address]    Please authorize if appropriate.  Thanks,  Ivis SMALLS RN

## 2019-08-23 NOTE — TELEPHONE ENCOUNTER
Reason for Call:  Medication or medication refill:    Do you use a [facility]?  Name of the pharmacy and phone number for the current request:       [facility] [address] [place] [address]      Name of the medication requested: Oxycodone    Other request: call once the hard copy is ready for        Can we leave a detailed message on this number? YES    Phone number patient can be reached at: Home number on file [phone] (home)    Best Time: anytime    Call taken on 8/23/2019 at 10:44 AM by Seamus Cosby

## 2019-08-23 NOTE — TELEPHONE ENCOUNTER
oxyCODONE (ROXICODONE) 5 MG tablet  Last Written Prescription Date:  07/22/2019  Last Fill Quantity: 180,   # refills: 0  Last Office Visit: [facility] 08/19/2019  Future Office visit:   Nothing at this time scheduled.      Routing refill request to provider for review/approval because:  Drug not on the [facility], [facility] or [facility] refill protocol or controlled substance

## 2019-08-27 RX ORDER — OXYCODONE HYDROCHLORIDE 5 MG/1
5 TABLET ORAL EVERY 4 HOURS PRN
Qty: 180 TABLET | Refills: 0 | Status: SHIPPED | OUTPATIENT
Start: 2019-08-27 | End: 2019-09-26

## 2019-08-27 NOTE — TELEPHONE ENCOUNTER
Pt called again and is upset about his refill of Oxycodone.  He will be out tomorrow and requesting to   rx today.  Pt has a car today but will not have access to transportation tomorrow.  Toby may be reached at [phone].  Ok to leave a detailed message.

## 2019-09-02 DIAGNOSIS — F33.0 MILD EPISODE OF RECURRENT MAJOR DEPRESSIVE DISORDER (H): ICD-10-CM

## 2019-09-02 DIAGNOSIS — R22.0 FACIAL SWELLING: ICD-10-CM

## 2019-09-02 DIAGNOSIS — E11.9 TYPE 2 DIABETES MELLITUS WITHOUT COMPLICATION, WITHOUT LONG-TERM CURRENT USE OF INSULIN (H): ICD-10-CM

## 2019-09-02 DIAGNOSIS — L21.9 SEBORRHEIC DERMATITIS: ICD-10-CM

## 2019-09-02 DIAGNOSIS — J31.0 CHRONIC RHINITIS: ICD-10-CM

## 2019-09-03 RX ORDER — FLUTICASONE PROPIONATE 50 MCG
SPRAY, SUSPENSION (ML) NASAL
Qty: 48 G | Refills: 3 | Status: SHIPPED | OUTPATIENT
Start: 2019-09-03 | End: 2020-07-16

## 2019-09-03 RX ORDER — METFORMIN HCL 500 MG
TABLET, EXTENDED RELEASE 24 HR ORAL
Qty: 360 TABLET | Refills: 1 | Status: SHIPPED | OUTPATIENT
Start: 2019-09-03 | End: 2019-09-23

## 2019-09-03 RX ORDER — KETOCONAZOLE 20 MG/ML
SHAMPOO TOPICAL
Qty: 240 ML | Refills: 1 | Status: SHIPPED | OUTPATIENT
Start: 2019-09-03 | End: 2019-12-16

## 2019-09-03 NOTE — TELEPHONE ENCOUNTER
"FLUOXETINE  20MG  CAP  Last Written Prescription Date:  06/26/2019  Last Fill Quantity: 270,  # refills: 0   Last office visit: 8/19/2019 with prescribing provider:  SRIDHAR   Future Office Visit: Nothing at this time scheduled.       METFORMIN ER 500MG TABLET  Last Written Prescription Date:  06/26/2019  Last Fill Quantity: 360,  # refills: 0   Last office visit: 8/19/2019 with prescribing provider:  SRIDHAR   Future Office Visit:  Nothing at this time scheduled.      FLUTICASONE  50MCG  SPRAY  PROP.  Last Written Prescription Date:  05/30/2019  Last Fill Quantity: 48G,  # refills: 1   Last office visit: 8/19/2019 with prescribing provider:  SRIDHAR   Future Office Visit:  Nothing at this time scheduled.    Requested Prescriptions   Pending Prescriptions Disp Refills     FLUoxetine (PROZAC) 20 MG capsule [Pharmacy Med Name: FLUOXETINE  20MG  CAP] 270 capsule 0     Sig: TAKE THREE CAPSULES BY  MOUTH ONCE DAILY       SSRIs Protocol Passed - 9/2/2019  3:54 PM        Passed - PHQ-9 score less than 5 in past 6 months     Please review last PHQ-9 score.           Passed - Medication is active on med list        Passed - Patient is age 18 or older        Passed - Recent (6 mo) or future (30 days) visit within the authorizing provider's specialty     Patient had office visit in the last 6 months or has a visit in the next 30 days with authorizing provider or within the authorizing provider's specialty.  See \"Patient Info\" tab in inbasket, or \"Choose Columns\" in Meds & Orders section of the refill encounter.            metFORMIN (GLUCOPHAGE-XR) 500 MG 24 hr tablet [Pharmacy Med Name: METFORMIN ER 500MG TABLET] 360 tablet 0     Sig: TAKE 4 TABLETS BY MOUTH  DAILY WITH DINNER       Biguanide Agents Passed - 9/2/2019  3:54 PM        Passed - Blood pressure less than 140/90 in past 6 months     BP Readings from Last 3 Encounters:   08/19/19 124/74   08/11/19 (!) 130/90   07/16/19 152/88                 Passed - Patient has documented LDL " "within the past 12 mos.     Recent Labs   Lab Test 06/26/19  1149   LDL 50             Passed - Patient has had a Microalbumin in the past 15 mos.     Recent Labs   Lab Test 06/26/19  1149   MICROL 5   UMALCR 3.41             Passed - Patient is age 10 or older        Passed - Patient has documented A1c within the specified period of time.     If HgbA1C is 8 or greater, it needs to be on file within the past 3 months.  If less than 8, must be on file within the past 6 months.     Recent Labs   Lab Test 06/26/19  1149   A1C 7.1*             Passed - Patient's CR is NOT>1.4 OR Patient's EGFR is NOT<45 within past 12 mos.     Recent Labs   Lab Test 07/16/19  1048   GFRESTIMATED 75   GFRESTBLACK 87       Recent Labs   Lab Test 07/16/19  1048   CR 1.03             Passed - Patient does NOT have a diagnosis of CHF.        Passed - Medication is active on med list        Passed - Recent (6 mo) or future (30 days) visit within the authorizing provider's specialty     Patient had office visit in the last 6 months or has a visit in the next 30 days with authorizing provider or within the authorizing provider's specialty.  See \"Patient Info\" tab in inbasket, or \"Choose Columns\" in Meds & Orders section of the refill encounter.            fluticasone (FLONASE) 50 MCG/ACT nasal spray [Pharmacy Med Name: FLUTICASONE  50MCG  SPRAY  PROP.] 48 g 1     Sig: USE 1 TO 2 SPRAYS INTO BOTH NOSTRILS DAILY       Inhaled Steroids Protocol Passed - 9/2/2019  3:54 PM        Passed - Patient is age 12 or older        Passed - Recent (12 mo) or future (30 days) visit within the authorizing provider's specialty     Patient had office visit in the last 12 months or has a visit in the next 30 days with authorizing provider or within the authorizing provider's specialty.  See \"Patient Info\" tab in inbasket, or \"Choose Columns\" in Meds & Orders section of the refill encounter.              Passed - Medication is active on med list        "

## 2019-09-03 NOTE — TELEPHONE ENCOUNTER
"KETOCONAZOLE  2% SHAMPOO  SHA  Last Written Prescription Date:  05/20/2019  Last Fill Quantity: 120ML,  # refills: 1   Last office visit: 8/19/2019 with prescribing provider:  SRIDHAR   Future Office Visit:  Nothing at this time scheduled.    Requested Prescriptions   Pending Prescriptions Disp Refills     ketoconazole (NIZORAL) 2 % external shampoo [Pharmacy Med Name: KETOCONAZOLE  2% SHAMPOO  SHA] 240 mL      Sig: APPLY TOPICALLY 2 TO 3  TIMES PER WEEK AS NEEDED  FOR ITCHING OR IRRITATION,  LEAVE ON FOR 2 MINUTES,  THEN WASH OFF       Antifungal Agents Passed - 9/2/2019  3:54 PM        Passed - Recent (12 mo) or future (30 days) visit within the authorizing provider's specialty     Patient had office visit in the last 12 months or has a visit in the next 30 days with authorizing provider or within the authorizing provider's specialty.  See \"Patient Info\" tab in inbasket, or \"Choose Columns\" in Meds & Orders section of the refill encounter.              Passed - Not Fluconazole or Terconazole      If oral Fluconazole or Terconazole, may refill if indicated in progress notes.           Passed - Medication is active on med list        "

## 2019-09-23 ENCOUNTER — OFFICE VISIT (OUTPATIENT)
Dept: FAMILY MEDICINE | Facility: CLINIC | Age: 64
End: 2019-09-23
Payer: COMMERCIAL

## 2019-09-23 VITALS
SYSTOLIC BLOOD PRESSURE: 138 MMHG | OXYGEN SATURATION: 96 % | TEMPERATURE: 98.1 F | HEART RATE: 74 BPM | DIASTOLIC BLOOD PRESSURE: 77 MMHG

## 2019-09-23 DIAGNOSIS — H90.41 SENSORINEURAL HEARING LOSS (SNHL) OF RIGHT EAR WITH UNRESTRICTED HEARING OF LEFT EAR: ICD-10-CM

## 2019-09-23 DIAGNOSIS — M25.551 HIP PAIN, RIGHT: ICD-10-CM

## 2019-09-23 DIAGNOSIS — M96.1 FAILED BACK SYNDROME OF LUMBAR SPINE: ICD-10-CM

## 2019-09-23 DIAGNOSIS — G89.4 CHRONIC PAIN SYNDROME: Primary | ICD-10-CM

## 2019-09-23 DIAGNOSIS — R35.0 BENIGN PROSTATIC HYPERPLASIA WITH URINARY FREQUENCY: ICD-10-CM

## 2019-09-23 DIAGNOSIS — E11.9 TYPE 2 DIABETES MELLITUS WITHOUT COMPLICATION, WITHOUT LONG-TERM CURRENT USE OF INSULIN (H): ICD-10-CM

## 2019-09-23 DIAGNOSIS — N40.1 BENIGN PROSTATIC HYPERPLASIA WITH URINARY FREQUENCY: ICD-10-CM

## 2019-09-23 DIAGNOSIS — F11.90 CHRONIC, CONTINUOUS USE OF OPIOIDS: ICD-10-CM

## 2019-09-23 PROCEDURE — G0008 ADMIN INFLUENZA VIRUS VAC: HCPCS | Performed by: FAMILY MEDICINE

## 2019-09-23 PROCEDURE — 90662 IIV NO PRSV INCREASED AG IM: CPT | Performed by: FAMILY MEDICINE

## 2019-09-23 PROCEDURE — 99214 OFFICE O/P EST MOD 30 MIN: CPT | Mod: 25 | Performed by: FAMILY MEDICINE

## 2019-09-23 RX ORDER — METFORMIN HCL 500 MG
TABLET, EXTENDED RELEASE 24 HR ORAL
Qty: 360 TABLET | Refills: 3 | Status: SHIPPED | OUTPATIENT
Start: 2019-09-23 | End: 2020-02-03

## 2019-09-23 RX ORDER — TAMSULOSIN HYDROCHLORIDE 0.4 MG/1
0.8 CAPSULE ORAL DAILY
Qty: 180 CAPSULE | Refills: 3 | Status: SHIPPED | OUTPATIENT
Start: 2019-09-23 | End: 2019-10-25

## 2019-09-23 NOTE — PROGRESS NOTES
"Chief Complaint   Patient presents with     Recheck Medication     initial /77 (BP Location: Left arm, Patient Position: Sitting, Cuff Size: Adult Regular)   Pulse 74   Temp 98.1  F (36.7  C)   SpO2 96%  Estimated body mass index is 34.27 kg/m  as calculated from the following:    Height as of 8/19/19: 1.829 m (6').    Weight as of 8/19/19: 114.6 kg (252 lb 11.2 oz).  BP completed using cuff size: regular.  L  R arm      Oxycodone refill.  Right ear feels \"plugged.\"  Proscar  Discussion.    Lavinia Ortega RN          Right ear pain  Stopped using q tips  Hard to hear    Subjective     Toby IVERSON Mcgrath is a 64 year old male who presents to clinic today for the following health issues:    HPI   Needs pain medications refilled  Has worsening pain in back and right hip  Had surgery to lower back  Has been taking meds as prescribed by lately has been taking 3 tablets at one time in morning then 1 tablet every 6 hours afterwards for total of 6 per day.  Has stopped doing PT but arnold feel this helped.  Was finding this hard to pay for.  Was seeing the pain clinic and graduated from their program.    Concern that right ear if plugged - hard to hear out of  present for few weeks  No pain no fluid or drainage  Denies using Q tips - used to hit his TM and caused pain  Denies allergies        Patient Active Problem List   Diagnosis     Thoracic or lumbosacral neuritis or radiculitis, unspecified     Osteoarthrosis, unspecified whether generalized or localized, pelvic region and thigh     Hypertrophy of prostate with urinary obstruction     Chronic rhinitis     Chronic pain syndrome     Disorder of bursae and tendons in shoulder region     Major depressive disorder, recurrent episode (H)     Anxiety     Gout     Type 2 diabetes mellitus without complication (H)     Hypertension goal BP (blood pressure) < 140/90     Advanced directives, counseling/discussion     DJD (degenerative joint disease), lumbar     Hyperlipidemia LDL " goal <100     DDD (degenerative disc disease), cervical     GERD (gastroesophageal reflux disease)     Lumbar radiculopathy     S/P lumbar fusion     Left-sided low back pain with left-sided sciatica     BMI 32.0-32.9,adult     History of hepatitis C     S/P lumbar discectomy     Acute post-operative pain     S/P laminectomy     Chronic, continuous use of opioids     Bilateral low back pain without sciatica     Right-sided low back pain with right-sided sciatica     Acute gouty arthritis     Acute gout of right elbow, unspecified cause     Idiopathic gout of left elbow, unspecified chronicity     Gastroesophageal reflux disease without esophagitis     Failed back syndrome of lumbar spine     Slow transit constipation     Benign prostatic hyperplasia with urinary frequency     Diabetic polyneuropathy associated with type 2 diabetes mellitus (H)     Seborrheic keratoses     Past Surgical History:   Procedure Laterality Date     BACK SURGERY       both shoulder repair  2006, 2008     C NONSPECIFIC PROCEDURE  1995    neck cyst     C NONSPECIFIC PROCEDURE  1979    laminectomy L5-S1 x 2     C SHOULDER SURG PROC UNLISTED  2005, '07    repairs,later manipulate,inject LT, RT     FUSION LUMBAR ANTERIOR, FUSION LUMBAR POSTERIOR TWO LEVELS, COMBINED N/A 9/17/2014    Procedure: COMBINED FUSION LUMBAR ANTERIOR, FUSION LUMBAR POSTERIOR TWO LEVELS;  Surgeon: Chris Lugo MD;  Location: [facility] OR     HC COLONOSCOPY THRU STOMA, DIAGNOSTIC  3/04    normal     HC UGI ENDOSCOPY DIAG W OR W/O BRUSH/WASH  3/04    ok     HEAD & NECK SURGERY       HEMILAMINECTOMY, DISCECTOMY LUMBAR ONE LEVEL, COMBINED Left 9/8/2015    Procedure: COMBINED HEMILAMINECTOMY, DISCECTOMY LUMBAR ONE LEVEL;  Surgeon: Jer Irby MD;  Location: [facility] OR     right hip replacement  10,2010       Social History     Tobacco Use     Smoking status: Former Smoker     Years: 40.00     Types: Cigarettes     Smokeless tobacco: Former User     Quit date: 2/1/2013    Substance Use Topics     Alcohol use: No     Alcohol/week: 0.0 standard drinks     Family History   Problem Relation Age of Onset     Diabetes Mother      C.A.D. Father      Diabetes Father      Hypertension Father          Current Outpatient Medications   Medication Sig Dispense Refill     allopurinol (ZYLOPRIM) 100 MG tablet Take 3 tablets (300 mg) by mouth daily 270 tablet 3     aspirin 81 MG tablet Take 1 tablet (81 mg) by mouth daily 100 tablet 3     blood glucose monitoring (ACCU-CHEK DANIELE PLUS) meter device kit Use to test blood sugars 2 times daily or as directed. 1 kit 0     blood glucose monitoring (ACCU-CHEK DANIELE) test strip Use to test blood sugars 2 times daily or as directed. 200 strip 1     blood glucose monitoring (ACCU-CHEK MULTICLIX) lancets Use to test blood sugar 2 times daily or as directed. 200 each 1     colchicine (MITIGARE) 0.6 MG capsule Take 1 capsule (0.6 mg) by mouth daily 30 capsule 3     finasteride (PROSCAR) 5 MG tablet TAKE 1 TABLET BY MOUTH  DAILY FOR PROSTATE  ENLARGEMENT 90 tablet 1     FLUoxetine (PROZAC) 20 MG capsule TAKE THREE CAPSULES BY  MOUTH ONCE DAILY 270 capsule 1     fluticasone (FLONASE) 50 MCG/ACT nasal spray USE 1 TO 2 SPRAYS INTO BOTH NOSTRILS DAILY 48 g 3     ketoconazole (NIZORAL) 2 % external shampoo APPLY TOPICALLY 2 TO 3  TIMES PER WEEK AS NEEDED  FOR ITCHING OR IRRITATION,  LEAVE ON FOR 2 MINUTES,  THEN WASH  mL 1     loratadine (CLARITIN) 10 MG tablet Take 1 tablet (10 mg) by mouth daily 30 tablet 3     LYRICA 100 MG capsule Take 2 capsules (200 mg) by mouth 2 times daily 360 capsule 1     metFORMIN (GLUCOPHAGE-XR) 500 MG 24 hr tablet TAKE 4 TABLETS BY MOUTH  DAILY WITH DINNER 360 tablet 3     naloxone (NARCAN) 4 MG/0.1ML nasal spray Spray 1 spray (4 mg) into one nostril alternating nostrils once as needed for opioid reversal Every 2-3 minutes until patient responsive or EMS arrives 0.2 mL 0     omeprazole (PRILOSEC) 20 MG DR capsule Take 1 capsule  (20 mg) by mouth daily 90 capsule 3     oxyCODONE (ROXICODONE) 5 MG tablet Take 1 tablet (5 mg) by mouth every 4 hours as needed for severe pain Fill on 8/27  Max #6 per day 180 tablet 0     ranitidine (ZANTAC) 300 MG tablet Take 1 tablet (300 mg) by mouth 2 times daily To reduce stomach acid 180 tablet 3     simvastatin (ZOCOR) 20 MG tablet TAKE 1 TABLET BY MOUTH AT  BEDTIME 90 tablet 3     tamsulosin (FLOMAX) 0.4 MG capsule Take 2 capsules (0.8 mg) by mouth daily 180 capsule 3     ACE/ARB/ARNI NOT PRESCRIBED, INTENTIONAL, Please choose reason not prescribed, below       senna-docusate (SENOKOT-S/PERICOLACE) 8.6-50 MG tablet Take 1 tablet by mouth 2 times daily Use for constipation due to your medication (Patient not taking: Reported on 9/23/2019) 60 tablet 11     triamcinolone (KENALOG) 0.1 % cream Apply sparingly to affected area twice daily for 7 days. Then stop and use only for flares (Patient not taking: Reported on 9/23/2019) 30 g 3         Reviewed and updated as needed this visit by Provider         Review of Systems   ROS COMP: Constitutional, HEENT, cardiovascular, pulmonary, gi and gu systems are negative, except as otherwise noted.      Objective    /77 (BP Location: Left arm, Patient Position: Sitting, Cuff Size: Adult Regular)   Pulse 74   Temp 98.1  F (36.7  C)   SpO2 96%   There is no height or weight on file to calculate BMI.  Physical Exam   GENERAL: healthy, alert and no distress  HENT: ear canals and TM's normal, nose and mouth without ulcers or lesions  NECK: no adenopathy, no asymmetry, masses, or scars and thyroid normal to palpation  MS: no gross musculoskeletal defects noted, no edema  NEURO: weakness of core muscles - slow to arise from seated position.  Uses gregorio to walk and arise, sensory exam grossly normal and mentation intact    Diagnostic Test Results:  Labs reviewed in Epic        Assessment & Plan     1. Chronic pain syndrome  Discussed safety with chronic  narcotics  prescribed narcan to have on hand  Discussed possible physiatry referral or return to pain clinic  - [facility] REFERR[facility]; Future  - naloxone (NARCAN) 4 MG/0.1ML nasal spray; Spray 1 spray (4 mg) into one nostril alternating nostrils once as needed for opioid reversal Every 2-3 minutes until patient responsive or EMS arrives  Dispense: 0.2 mL; Refill: 0    2. Failed back syndrome of lumbar spine    - [facility], [facility]RA[facility] REFERRAL; Future  - PHYSIATRY REFERRAL  - naloxone (NARCAN) 4 MG/0.1ML nasal spray; Spray 1 spray (4 mg) into one nostril alternating nostrils once as needed for opioid reversal Every 2-3 minutes until patient responsive or EMS arrives  Dispense: 0.2 mL; Refill: 0    3. Hip pain, right    - PHYSIATRY REFERRAL    4. Chronic, continuous use of opioids  As noted above    5. Benign prostatic hyperplasia with urinary frequency  Increased flomax   Continue finasteride  - tamsulosin (FLOMAX) 0.4 MG capsule; Take 2 capsules (0.8 mg) by mouth daily  Dispense: 180 capsule; Refill: 3    6. Type 2 diabetes mellitus without complication, without long-term current use of insulin (H)  Due for visit for this - will return to discuss  - metFORMIN (GLUCOPHAGE-XR) 500 MG 24 hr tablet; TAKE 4 TABLETS BY MOUTH  DAILY WITH DINNER  Dispense: 360 tablet; Refill: 3    7. Sensorineural hearing loss (SNHL) of right ear with unrestricted hearing of left ear  Normal exam, possibly has some fluid behind TM - discussed valsalva, trial of antihistamines flonase and if no better hearing eval  - AUDIOLOGY ADULT REFERRAL     BMI:   Estimated body mass index is 34.27 kg/m  as calculated from the following:    Height as of 8/19/19: 1.829 m (6').    Weight as of 8/19/19: 114.6 kg (252 lb 11.2 oz).   Weight management plan: Discussed healthy diet and exercise guidelines        See Patient Instructions    No follow-ups on file.    Connie Haskins MD  [facility]

## 2019-09-26 ENCOUNTER — TELEPHONE (OUTPATIENT)
Dept: FAMILY MEDICINE | Facility: CLINIC | Age: 64
End: 2019-09-26

## 2019-09-26 DIAGNOSIS — F11.90 CHRONIC, CONTINUOUS USE OF OPIOIDS: ICD-10-CM

## 2019-09-26 DIAGNOSIS — M96.1 FAILED BACK SYNDROME OF LUMBAR SPINE: ICD-10-CM

## 2019-09-26 DIAGNOSIS — G89.4 CHRONIC PAIN SYNDROME: ICD-10-CM

## 2019-09-26 RX ORDER — OXYCODONE HYDROCHLORIDE 5 MG/1
5 TABLET ORAL EVERY 4 HOURS PRN
Qty: 180 TABLET | Refills: 0 | Status: SHIPPED | OUTPATIENT
Start: 2019-09-27 | End: 2019-10-23

## 2019-09-26 NOTE — TELEPHONE ENCOUNTER
SN  Controlled Substance Refill Request for Oxycodone    Last refill: 8/27/2019    Last clinic visit: 9/23/2019    Clinic visit frequency required: Q 3 months  Next appt: None    Controlled substance agreement on file: Yes:  Date 7/27/2016.    Documentation in problem list reviewed:  Yes    Processing:  [facility] in [place]    [place] reviewed:  reviewed- no concerns  [place] profile:  [email or web address]    Thanks,  Gayathri Wade RN

## 2019-09-26 NOTE — TELEPHONE ENCOUNTER
Reason for Call:  Medication or medication refill:    Do you use a [facility]?  Name of the pharmacy and phone number for the current request:       [facility] [address] [place] [address]    Name of the medication requested: oxyCODONE (ROXICODONE) 5 MG tablet    Other request: Please Send today if possible  Patient called upset that everything else was sent except this     Can we leave a detailed message on this number? YES    Phone number patient can be reached at: Home number on file [phone] (home)    Best Time: anytime    Call taken on 9/26/2019 at 3:01 PM by Mignon Apodaca

## 2019-10-14 ENCOUNTER — TELEPHONE (OUTPATIENT)
Dept: FAMILY MEDICINE | Facility: CLINIC | Age: 64
End: 2019-10-14

## 2019-10-14 NOTE — TELEPHONE ENCOUNTER
FYI~ A refill has been made to the  assistance program for Lyrica.    A 90 day supply of LYRICA will be delivered to Layo's home on October 17th, 2019.    Thank you,    Ana Luisa Cabrera  Prescription Assistance

## 2019-10-22 DIAGNOSIS — M96.1 FAILED BACK SYNDROME OF LUMBAR SPINE: ICD-10-CM

## 2019-10-22 DIAGNOSIS — F11.90 CHRONIC, CONTINUOUS USE OF OPIOIDS: ICD-10-CM

## 2019-10-22 DIAGNOSIS — G89.4 CHRONIC PAIN SYNDROME: ICD-10-CM

## 2019-10-22 NOTE — TELEPHONE ENCOUNTER
Reason for Call:  Medication or medication refill: oxyCODONE (ROXICODONE) 5 MG tablet    Do you use a [facility]?  Name of the pharmacy and phone number for the current request:      [facility] [address] [place], [place] [id number] [address] [place] [address]      Name of the medication requested:oxyCODONE (ROXICODONE) 5 MG tablet    Other request: Pt called requesting a refill of the above medication.     Can we leave a detailed message on this number? YES    Phone number patient can be reached at: Home number on file [phone] (home)    Best Time: Anytime     Call taken on 10/22/2019 at 9:10 AM by Tyler Burgos

## 2019-10-22 NOTE — TELEPHONE ENCOUNTER
Oxycodone 5MG      Last Written Prescription Date:  09/27/2019  Last Fill Quantity: 180,   # refills: 0  Last Office Visit: 09/23/2019  Future Office visit:       Routing refill request to provider for review/approval because:  Drug not on the [facility], [facility] or [facility] refill protocol or controlled substance    Requested Prescriptions   Pending Prescriptions Disp Refills     oxyCODONE (ROXICODONE) 5 MG tablet 180 tablet 0     Sig: Take 1 tablet (5 mg) by mouth every 4 hours as needed for severe pain Fill on 9/27  Max #6 per day       There is no refill protocol information for this order

## 2019-10-23 NOTE — TELEPHONE ENCOUNTER
SN    Controlled Substance Refill Request for Oxycodone    Last refill: 9/27/2019 - #180. Put start date on Rx at 10/27    Last clinic visit: 9/23/2019     Clinic visit frequency required: Q 3 months  Next appt: None scheduled    Controlled substance agreement on file: Yes:  Date 7/27/2016.    Documentation in problem list reviewed:  Yes    Processing:  Rx to be electronically transmitted to pharmacy by provider     [place] reviewed:  reviewed- no concerns  [place]:  [email or web address]    Thanks,  Gayathri Wade RN

## 2019-10-25 ENCOUNTER — TELEPHONE (OUTPATIENT)
Dept: FAMILY MEDICINE | Facility: CLINIC | Age: 64
End: 2019-10-25

## 2019-10-25 DIAGNOSIS — N40.1 BENIGN PROSTATIC HYPERPLASIA WITH URINARY FREQUENCY: ICD-10-CM

## 2019-10-25 DIAGNOSIS — R35.0 BENIGN PROSTATIC HYPERPLASIA WITH URINARY FREQUENCY: ICD-10-CM

## 2019-10-25 RX ORDER — OXYCODONE HYDROCHLORIDE 5 MG/1
5 TABLET ORAL EVERY 4 HOURS PRN
Qty: 180 TABLET | Refills: 0 | Status: SHIPPED | OUTPATIENT
Start: 2019-10-27 | End: 2019-10-25

## 2019-10-25 RX ORDER — TAMSULOSIN HYDROCHLORIDE 0.4 MG/1
0.8 CAPSULE ORAL DAILY
Qty: 180 CAPSULE | Refills: 2 | Status: SHIPPED | OUTPATIENT
Start: 2019-10-25 | End: 2020-05-21

## 2019-10-25 RX ORDER — OXYCODONE HYDROCHLORIDE 5 MG/1
5 TABLET ORAL EVERY 4 HOURS PRN
Qty: 180 TABLET | Refills: 0 | Status: SHIPPED | OUTPATIENT
Start: 2019-10-27 | End: 2019-12-26

## 2019-10-25 NOTE — TELEPHONE ENCOUNTER
Pt is still waiting for his rx and wanted to  at pharmacy on 10/27 . Please call he wants to speak with a nurse

## 2019-10-25 NOTE — TELEPHONE ENCOUNTER
CW,  Please advise in SN's absence.  Pt is calling upset he does not have a refill yet.  He is concerned he will not be able to  on Sunday at pharm when he is due.    He would also like a call back from triage when addressed.    Please advise.  Thanks,  Lavinia Ortega RN

## 2019-10-25 NOTE — TELEPHONE ENCOUNTER
Reviewed chart, seems appropriate, just waited a bit to see if his PCP had a chance to review and sign as she knows pt's situation.  Rx sent now, but next refill should be done by PCP.  First rx sent mistakenly to [facility] - pharmacy set to there, unsure why.  Triage calling to cancel, and will resend to [facility] as was initially requested.  Thanks,  CW

## 2019-10-25 NOTE — TELEPHONE ENCOUNTER
Called patient and informed Rx was sent  Realize Rx sent to mail order  Pt won't be able to get it Sunday - needs at [facility] per below  Called pt back to confirm he needed at [facility]  Called [facility] to cancel Rx - cancelled  Ivis SMALLS RN

## 2019-10-25 NOTE — TELEPHONE ENCOUNTER
SN,    FYI-    Pt says he requested Flomax to go to [facility] mail order pharm instead of [facility].  Reordered at mail order per previous order.  I called [facility] in [place] and they said Flomax was cancelled on 9/23/19 but they had no notes of who cancelled it and was still active in current med list.    I just wanted to confirm with you that it is a current med as I reordered at mail order pharm.    Please advise.  Thanks,  Lavinia Ortega RN

## 2019-11-01 ENCOUNTER — TELEPHONE (OUTPATIENT)
Dept: FAMILY MEDICINE | Facility: CLINIC | Age: 64
End: 2019-11-01

## 2019-11-01 NOTE — TELEPHONE ENCOUNTER
Spoke with patient  Received a letter from [facility] that they can fill his Oxycodone if he wants.    Patient does not want Oxycodone Rx/refills to go [facility]. Doesn't want to have to wait to get in the mail.  Requests Oxycodone Rx/refills be sent to [facility] in [place].    Put note in demographics, clinical information tab.  Advised patient when he call for refill to let person taking  Message know that he wants sent to [facility] in     Gayathri Wade RN

## 2019-11-01 NOTE — TELEPHONE ENCOUNTER
Reason for Call:  Other call back    Detailed comments: Pt called and reported that he got a letter from his pharmacy about one of his medications. He is confused about what this means. Caller is going to call [facility] and ask why they sent this letter. However, he would like a call back to talk to talk about this with someone.       Phone Number Patient can be reached at: Cell number on file:    Telephone Information:   Mobile [phone]       Best Time: Any    Can we leave a detailed message on this number? YES    Call taken on 11/1/2019 at 12:51 PM by Mica Kemp

## 2019-11-06 DIAGNOSIS — N40.0 BENIGN PROSTATIC HYPERPLASIA WITHOUT LOWER URINARY TRACT SYMPTOMS: ICD-10-CM

## 2019-11-06 RX ORDER — FINASTERIDE 5 MG/1
TABLET, FILM COATED ORAL
Qty: 90 TABLET | Refills: 1 | Status: SHIPPED | OUTPATIENT
Start: 2019-11-06 | End: 2020-06-09

## 2019-11-06 NOTE — TELEPHONE ENCOUNTER
"Last Written Prescription Date:  6/6/2019  Last Fill Quantity: 90,  # refills: 1   Last office visit: 9/23/2019 with prescribing provider:  Connie   Future Office Visit:      Requested Prescriptions   Pending Prescriptions Disp Refills     finasteride (PROSCAR) 5 MG tablet [Pharmacy Med Name: FINASTERIDE 5MG TABLET] 90 tablet 1     Sig: TAKE 1 TABLET BY MOUTH  DAILY FOR PROSTATE  ENLARGEMENT       Alpha Blockers Passed - 11/6/2019  5:09 AM        Passed - Blood pressure under 140/90 in past 12 months     BP Readings from Last 3 Encounters:   09/23/19 138/77   08/19/19 124/74   08/11/19 (!) 130/90                 Passed - Recent (12 mo) or future (30 days) visit within the authorizing provider's specialty     Patient has had an office visit with the authorizing provider or a provider within the authorizing providers department within the previous 12 mos or has a future within next 30 days. See \"Patient Info\" tab in inbasket, or \"Choose Columns\" in Meds & Orders section of the refill encounter.              Passed - Patient does not have Tadalafil, Vardenafil, or Sildenafil on their medication list        Passed - Medication is active on med list        Passed - Patient is 18 years of age or older          "

## 2019-11-22 DIAGNOSIS — M96.1 FAILED BACK SYNDROME OF LUMBAR SPINE: ICD-10-CM

## 2019-11-22 DIAGNOSIS — F11.90 CHRONIC, CONTINUOUS USE OF OPIOIDS: ICD-10-CM

## 2019-11-22 NOTE — TELEPHONE ENCOUNTER
Reason for Call:  Medication or medication refill:    Do you use a [facility]?  Name of the pharmacy and phone number for the current request:     [facility] [address] [place] [address]    Name of the medication requested:   oxyCODONE (ROXICODONE) 5 MG tablet    Other request: none    Can we leave a detailed message on this number? YES    Phone number patient can be reached at: Cell number on file:    Telephone Information:   Mobile [phone]       Best Time: any     Call taken on 11/22/2019 at 10:41 AM by Dolly Murdock

## 2019-11-25 ENCOUNTER — NURSE TRIAGE (OUTPATIENT)
Dept: NURSING | Facility: CLINIC | Age: 64
End: 2019-11-25

## 2019-11-25 NOTE — TELEPHONE ENCOUNTER
Clinic Action Needed:Yes, Patient requesting call back [phone] (home)   Reason for Call:Patient calling stating he had made a request for a refill of Oxycodone. Reporting he had called after finding out this was sent to his mail order pharmacy. Stating Dr Haskins was going to cancel this order. Patient reporting it came by mail.  Patient is requesting to speak with Dr Haskins nursing team regarding Oxycodone refill.    Connie Marquez RN  [facility]

## 2019-11-25 NOTE — TELEPHONE ENCOUNTER
Clinic Action Needed:Yes, Patient requesting call back [phone] (home)   Reason for Call:Patient calling stating he had made a request for a refill of Oxycodone. Reporting he had called after finding out this was sent to his mail order pharmacy. Stating Dr Haskins was going to cancel this order. Patient reporting it came by mail.  Patient is requesting to speak with Dr Haskins nursing team regarding Oxycodone refill.    Connie Marquez RN  [facility]

## 2019-11-27 RX ORDER — OXYCODONE HYDROCHLORIDE 5 MG/1
5 TABLET ORAL EVERY 4 HOURS PRN
Qty: 180 TABLET | Refills: 0 | Status: CANCELLED | OUTPATIENT
Start: 2019-11-27

## 2019-11-27 NOTE — TELEPHONE ENCOUNTER
Hello - I am confused.  It sounds like he has 2 montsh of medications already so does he need another month now?  Could you look into this further?    SN

## 2019-11-27 NOTE — TELEPHONE ENCOUNTER
Called pt   He's upset - only wants SN to refill his meds because it causes issues when other providers get involved  Told pt SN was out of office when he needed last refill  That's why pt got refill from [facility] instead  I personally called and cancelled Rx at mail order (see TE from 10/22/2019)  Unsure why they still mailed out to him   Pt scheduled to see SN    Next 5 appointments (look out 90 days)    Dec 26, 2019 10:15 AM CST  Office Visit with Connie Haskins MD  [facility] ([facility]) [address]  [facility] [address] [phone]        Doesn't need refill of Oxycodone  Ivis SMALLS RN

## 2019-12-13 DIAGNOSIS — G89.4 CHRONIC PAIN SYNDROME: ICD-10-CM

## 2019-12-13 DIAGNOSIS — M54.16 LUMBAR RADICULOPATHY: ICD-10-CM

## 2019-12-13 NOTE — TELEPHONE ENCOUNTER
Laoy has requested a refill for his Lyrica through the [employer] assistance program. [employer] requires a hard copy, hand signed, brand name script for this fill.    Please hand sign a BRAND name, hard copy script for:   LYRICA    Please send the script to me via interoffice mail FPS Zenaida Kim or via US mail  at:      [facility]   Zenaida Plasencia   [address] [place]  [address]    Thanks so much for your help!    Zenaida Plasencia  Prescription   Pharmacy Assistance  [phone]

## 2019-12-16 DIAGNOSIS — F33.0 MILD EPISODE OF RECURRENT MAJOR DEPRESSIVE DISORDER (H): ICD-10-CM

## 2019-12-16 DIAGNOSIS — L21.9 SEBORRHEIC DERMATITIS: ICD-10-CM

## 2019-12-16 RX ORDER — PREGABALIN 100 MG
200 CAPSULE ORAL 2 TIMES DAILY
Qty: 360 CAPSULE | Refills: 1 | Status: SHIPPED | OUTPATIENT
Start: 2019-12-16 | End: 2020-01-31

## 2019-12-16 NOTE — TELEPHONE ENCOUNTER
ARELIS Velazquez this has been sent to you via interoffice.  Thanks,  Lavinia Ortega RN      Placed in ineroffice mail to Zenaida.  Lavinia Ortega RN

## 2019-12-17 RX ORDER — KETOCONAZOLE 20 MG/ML
SHAMPOO TOPICAL
Qty: 240 ML | Refills: 1 | Status: SHIPPED | OUTPATIENT
Start: 2019-12-17 | End: 2020-11-05

## 2019-12-17 NOTE — TELEPHONE ENCOUNTER
"Prescription approved per [facility] [facility].  Ivis SMALLS RN    Last Written Prescription Date:  9/3/2019  Last Fill Quantity: 240,  # refills: 1   Last office visit: 9/23/2019 with prescribing provider:     Future Office Visit:   Next 5 appointments (look out 90 days)    Dec 26, 2019 10:15 AM CST  Office Visit with Connie Haskins MD  [facility] ([facility]) [address] [facility] [address] [phone]         Requested Prescriptions   Pending Prescriptions Disp Refills     ketoconazole (NIZORAL) 2 % external shampoo [Pharmacy Med Name: KETOCONAZOLE  2% SHAMPOO  SHA] 240 mL 1     Sig: APPLY TOPICALLY 2 TO 3  TIMES PER WEEK AS NEEDED  FOR ITCHING OR IRRITATION,  LEAVE ON FOR 2 MINUTES,  THEN WASH OFF       Antifungal Agents Passed - 12/16/2019  3:54 PM        Passed - Recent (12 mo) or future (30 days) visit within the authorizing provider's specialty     Patient has had an office visit with the authorizing provider or a provider within the authorizing providers department within the previous 12 mos or has a future within next 30 days. See \"Patient Info\" tab in inbasket, or \"Choose Columns\" in Meds & Orders section of the refill encounter.              Passed - Not Fluconazole or Terconazole      If oral Fluconazole or Terconazole, may refill if indicated in progress notes.           Passed - Medication is active on med list        "

## 2019-12-17 NOTE — TELEPHONE ENCOUNTER
"Denied  Too early; Rx sent 9/3/2019 for 6 months  Ivis SMALLS RN    Last Written Prescription Date:  9/3/2019  Last Fill Quantity: 270,  # refills: 1   Last office visit: 9/23/2019 with prescribing provider:     Future Office Visit:   Next 5 appointments (look out 90 days)    Dec 26, 2019 10:15 AM CST  Office Visit with Connie Haskins MD  [facility] ([facility]) [address] [facility] [address] [phone]         Requested Prescriptions   Pending Prescriptions Disp Refills     FLUoxetine (PROZAC) 20 MG capsule [Pharmacy Med Name: FLUOXETINE  20MG  CAP] 270 capsule 1     Sig: TAKE THREE CAPSULES BY  MOUTH ONCE DAILY       SSRIs Protocol Failed - 12/16/2019  3:54 PM        Failed - PHQ-9 score less than 5 in past 6 months     Please review last PHQ-9 score.           Passed - Medication is active on med list        Passed - Patient is age 18 or older        Passed - Recent (6 mo) or future (30 days) visit within the authorizing provider's specialty     Patient had office visit in the last 6 months or has a visit in the next 30 days with authorizing provider or within the authorizing provider's specialty.  See \"Patient Info\" tab in inbasket, or \"Choose Columns\" in Meds & Orders section of the refill encounter.            "

## 2019-12-20 ENCOUNTER — TELEPHONE (OUTPATIENT)
Dept: FAMILY MEDICINE | Facility: CLINIC | Age: 64
End: 2019-12-20

## 2019-12-20 NOTE — TELEPHONE ENCOUNTER
FYI~ A refill has been made to the  assistance program for Lyrica.    A 90 day supply of Lyrica will be delivered to Layo's home on December 24th.    Thank you,    Ana Luisa Cabrera  Prescription Assistance

## 2019-12-26 ENCOUNTER — OFFICE VISIT (OUTPATIENT)
Dept: FAMILY MEDICINE | Facility: CLINIC | Age: 64
End: 2019-12-26
Payer: COMMERCIAL

## 2019-12-26 VITALS
SYSTOLIC BLOOD PRESSURE: 136 MMHG | BODY MASS INDEX: 33.72 KG/M2 | TEMPERATURE: 97.4 F | OXYGEN SATURATION: 98 % | WEIGHT: 249 LBS | DIASTOLIC BLOOD PRESSURE: 82 MMHG | HEART RATE: 81 BPM | HEIGHT: 72 IN

## 2019-12-26 DIAGNOSIS — E11.9 TYPE 2 DIABETES MELLITUS WITHOUT COMPLICATION, WITHOUT LONG-TERM CURRENT USE OF INSULIN (H): Primary | ICD-10-CM

## 2019-12-26 DIAGNOSIS — M96.1 FAILED BACK SYNDROME OF LUMBAR SPINE: ICD-10-CM

## 2019-12-26 DIAGNOSIS — M47.26 OSTEOARTHRITIS OF SPINE WITH RADICULOPATHY, LUMBAR REGION: ICD-10-CM

## 2019-12-26 DIAGNOSIS — F11.90 CHRONIC, CONTINUOUS USE OF OPIOIDS: ICD-10-CM

## 2019-12-26 LAB — HBA1C MFR BLD: 6.8 % (ref 0–5.6)

## 2019-12-26 PROCEDURE — 36415 COLL VENOUS BLD VENIPUNCTURE: CPT | Performed by: FAMILY MEDICINE

## 2019-12-26 PROCEDURE — 99207 C FOOT EXAM  NO CHARGE: CPT | Performed by: FAMILY MEDICINE

## 2019-12-26 PROCEDURE — 99214 OFFICE O/P EST MOD 30 MIN: CPT | Performed by: FAMILY MEDICINE

## 2019-12-26 PROCEDURE — 83036 HEMOGLOBIN GLYCOSYLATED A1C: CPT | Performed by: FAMILY MEDICINE

## 2019-12-26 RX ORDER — OXYCODONE HYDROCHLORIDE 5 MG/1
5 TABLET ORAL EVERY 4 HOURS PRN
Qty: 180 TABLET | Refills: 0 | Status: SHIPPED | OUTPATIENT
Start: 2019-12-26 | End: 2020-01-22

## 2019-12-26 ASSESSMENT — MIFFLIN-ST. JEOR: SCORE: 1947.46

## 2019-12-26 NOTE — PROGRESS NOTES
Subjective     Toby Mcgrath is a 64 year old male who presents to clinic today for the following health issues:    HPI   Patient here to recheck medication.     (E11.9) Type 2 diabetes mellitus without complication, without long-term current use of insulin (H)  (primary encounter diagnosis)  Comment:  Due for labs does not check sugars regularly  Due for eye exam and foot exam    Plan: Hemoglobin A1c, FOOT EXAM            (M96.1) Failed back syndrome of lumbar spine  Comment: worsening pain into both legs - pain starts around back of calves and goes down into tops of both feet.  Gets better with walking or standing  pain is burning  Or numb at times  Has had some heaviness of his feet and elt some weakness.  No bowel or bladder issues  Some swelling in legs but this resolves with elevation    Plan: oxyCODONE (ROXICODONE) 5 MG tablet, SPINE         SURGERY REFERRAL        Refills of pain meds. Refilled meds on 10/27 and 11/18 just now running out of meds    (F11.90) Chronic, continuous use of opioids  Comment:   Plan: oxyCODONE (ROXICODONE) 5 MG tablet             (M47.26) Osteoarthritis of spine with radiculopathy, lumbar region  Comment:    Plan:           Patient Active Problem List   Diagnosis     Thoracic or lumbosacral neuritis or radiculitis, unspecified     Osteoarthrosis, unspecified whether generalized or localized, pelvic region and thigh     Hypertrophy of prostate with urinary obstruction     Chronic rhinitis     Chronic pain syndrome     Disorder of bursae and tendons in shoulder region     Major depressive disorder, recurrent episode (H)     Anxiety     Gout     Type 2 diabetes mellitus without complication (H)     Hypertension goal BP (blood pressure) < 140/90     Advanced directives, counseling/discussion     DJD (degenerative joint disease), lumbar     Hyperlipidemia LDL goal <100     DDD (degenerative disc disease), cervical     GERD (gastroesophageal reflux disease)     Lumbar radiculopathy      S/P lumbar fusion     Left-sided low back pain with left-sided sciatica     BMI 32.0-32.9,adult     History of hepatitis C     S/P lumbar discectomy     Acute post-operative pain     S/P laminectomy     Chronic, continuous use of opioids     Bilateral low back pain without sciatica     Right-sided low back pain with right-sided sciatica     Acute gouty arthritis     Acute gout of right elbow, unspecified cause     Idiopathic gout of left elbow, unspecified chronicity     Gastroesophageal reflux disease without esophagitis     Failed back syndrome of lumbar spine     Slow transit constipation     Benign prostatic hyperplasia with urinary frequency     Diabetic polyneuropathy associated with type 2 diabetes mellitus (H)     Seborrheic keratoses     Past Surgical History:   Procedure Laterality Date     BACK SURGERY       both shoulder repair  2006, 2008     C NONSPECIFIC PROCEDURE  1995    neck cyst     C NONSPECIFIC PROCEDURE  1979    laminectomy L5-S1 x 2     C SHOULDER SURG PROC UNLISTED  2005, '07    repairs,later manipulate,inject LT, RT     FUSION LUMBAR ANTERIOR, FUSION LUMBAR POSTERIOR TWO LEVELS, COMBINED N/A 9/17/2014    Procedure: COMBINED FUSION LUMBAR ANTERIOR, FUSION LUMBAR POSTERIOR TWO LEVELS;  Surgeon: Chris Lugo MD;  Location: [facility] OR     HC COLONOSCOPY THRU STOMA, DIAGNOSTIC  3/04    normal     HC UGI ENDOSCOPY DIAG W OR W/O BRUSH/WASH  3/04    ok     HEAD & NECK SURGERY       HEMILAMINECTOMY, DISCECTOMY LUMBAR ONE LEVEL, COMBINED Left 9/8/2015    Procedure: COMBINED HEMILAMINECTOMY, DISCECTOMY LUMBAR ONE LEVEL;  Surgeon: Jer Irby MD;  Location: [facility] OR     right hip replacement  10,2010       Social History     Tobacco Use     Smoking status: Former Smoker     Years: 40.00     Types: Cigarettes     Smokeless tobacco: Former User     Quit date: 2/1/2013   Substance Use Topics     Alcohol use: No     Alcohol/week: 0.0 standard drinks     Family History   Problem Relation Age  of Onset     Diabetes Mother      C.A.D. Father      Diabetes Father      Hypertension Father          Current Outpatient Medications   Medication Sig Dispense Refill     ACE/ARB/ARNI NOT PRESCRIBED, INTENTIONAL, Please choose reason not prescribed, below       allopurinol (ZYLOPRIM) 100 MG tablet Take 3 tablets (300 mg) by mouth daily 270 tablet 3     aspirin 81 MG tablet Take 1 tablet (81 mg) by mouth daily 100 tablet 3     blood glucose monitoring (ACCU-CHEK DANIELE PLUS) meter device kit Use to test blood sugars 2 times daily or as directed. 1 kit 0     blood glucose monitoring (ACCU-CHEK DANIELE) test strip Use to test blood sugars 2 times daily or as directed. 200 strip 1     blood glucose monitoring (ACCU-CHEK MULTICLIX) lancets Use to test blood sugar 2 times daily or as directed. 200 each 1     colchicine (MITIGARE) 0.6 MG capsule Take 1 capsule (0.6 mg) by mouth daily 30 capsule 3     finasteride (PROSCAR) 5 MG tablet TAKE 1 TABLET BY MOUTH  DAILY FOR PROSTATE  ENLARGEMENT 90 tablet 1     FLUoxetine (PROZAC) 20 MG capsule TAKE THREE CAPSULES BY  MOUTH ONCE DAILY 270 capsule 1     fluticasone (FLONASE) 50 MCG/ACT nasal spray USE 1 TO 2 SPRAYS INTO BOTH NOSTRILS DAILY 48 g 3     ketoconazole (NIZORAL) 2 % external shampoo APPLY TOPICALLY 2 TO 3  TIMES PER WEEK AS NEEDED  FOR ITCHING OR IRRITATION,  LEAVE ON FOR 2 MINUTES,  THEN WASH  mL 1     loratadine (CLARITIN) 10 MG tablet Take 1 tablet (10 mg) by mouth daily 30 tablet 3     LYRICA 100 MG capsule Take 2 capsules (200 mg) by mouth 2 times daily 360 capsule 1     metFORMIN (GLUCOPHAGE-XR) 500 MG 24 hr tablet TAKE 4 TABLETS BY MOUTH  DAILY WITH DINNER 360 tablet 3     naloxone (NARCAN) 4 MG/0.1ML nasal spray Spray 1 spray (4 mg) into one nostril alternating nostrils once as needed for opioid reversal Every 2-3 minutes until patient responsive or EMS arrives 0.2 mL 0     omeprazole (PRILOSEC) 20 MG DR capsule Take 1 capsule (20 mg) by mouth daily 90  capsule 3     oxyCODONE (ROXICODONE) 5 MG tablet Take 1 tablet (5 mg) by mouth every 4 hours as needed for severe pain Fill on 12/27 Max #6 per day 180 tablet 0     ranitidine (ZANTAC) 300 MG tablet Take 1 tablet (300 mg) by mouth 2 times daily To reduce stomach acid 180 tablet 3     senna-docusate (SENOKOT-S/PERICOLACE) 8.6-50 MG tablet Take 1 tablet by mouth 2 times daily Use for constipation due to your medication 60 tablet 11     simvastatin (ZOCOR) 20 MG tablet TAKE 1 TABLET BY MOUTH AT  BEDTIME 90 tablet 3     tamsulosin (FLOMAX) 0.4 MG capsule Take 2 capsules (0.8 mg) by mouth daily 180 capsule 2     triamcinolone (KENALOG) 0.1 % cream Apply sparingly to affected area twice daily for 7 days. Then stop and use only for flares 30 g 3       Reviewed and updated as needed this visit by Provider         Review of Systems   ROS COMP: Constitutional, HEENT, cardiovascular, pulmonary, gi and gu systems are negative, except as otherwise noted.      Objective    /82   Pulse 81   Temp 97.4  F (36.3  C) (Oral)   Ht 1.829 m (6')   Wt 112.9 kg (249 lb)   SpO2 98%   BMI 33.77 kg/m    Body mass index is 33.77 kg/m .  Physical Exam   GENERAL: healthy, alert and no distress  MS: no clots of coreds no selling of legs  Tender along both calves  Better/resolved with standing up    NEURO: Normal strength and tone, sensory exam grossly normal and mentation intact    Diagnostic Test Results:  Labs reviewed in Epic        Assessment & Plan     1. Type 2 diabetes mellitus without complication, without long-term current use of insulin (H)     - Hemoglobin A1c  - FOOT EXAM    2. Failed back syndrome of lumbar spine   referred to see a spine specialist - has failed injection sin the past but might be open to trying this again  - oxyCODONE (ROXICODONE) 5 MG tablet; Take 1 tablet (5 mg) by mouth every 4 hours as needed for severe pain Fill on 12/27 Max #6 per day  Dispense: 180 tablet; Refill: 0  - SPINE SURGERY REFERRAL    3.  Chronic, continuous use of opioids     - oxyCODONE (ROXICODONE) 5 MG tablet; Take 1 tablet (5 mg) by mouth every 4 hours as needed for severe pain Fill on 12/27 Max #6 per day  Dispense: 180 tablet; Refill: 0    4. Osteoarthritis of spine with radiculopathy, lumbar region            See Patient Instructions    No follow-ups on file.    Connie Haskins MD  [facility]

## 2020-01-07 ENCOUNTER — OFFICE VISIT (OUTPATIENT)
Dept: FAMILY MEDICINE | Facility: CLINIC | Age: 65
End: 2020-01-07
Payer: COMMERCIAL

## 2020-01-07 VITALS
WEIGHT: 250 LBS | OXYGEN SATURATION: 96 % | HEART RATE: 68 BPM | TEMPERATURE: 97.6 F | BODY MASS INDEX: 33.86 KG/M2 | SYSTOLIC BLOOD PRESSURE: 128 MMHG | HEIGHT: 72 IN | DIASTOLIC BLOOD PRESSURE: 78 MMHG

## 2020-01-07 DIAGNOSIS — K64.4 EXTERNAL HEMORRHOIDS: ICD-10-CM

## 2020-01-07 DIAGNOSIS — K59.03 DRUG-INDUCED CONSTIPATION: Primary | ICD-10-CM

## 2020-01-07 DIAGNOSIS — K21.00 GASTROESOPHAGEAL REFLUX DISEASE WITH ESOPHAGITIS: ICD-10-CM

## 2020-01-07 PROCEDURE — 99213 OFFICE O/P EST LOW 20 MIN: CPT | Performed by: PHYSICIAN ASSISTANT

## 2020-01-07 RX ORDER — POLYETHYLENE GLYCOL 3350 17 G/17G
1 POWDER, FOR SOLUTION ORAL DAILY
Qty: 100 PACKET | Refills: 3 | Status: SHIPPED | OUTPATIENT
Start: 2020-01-07 | End: 2021-05-06

## 2020-01-07 ASSESSMENT — MIFFLIN-ST. JEOR: SCORE: 1951.99

## 2020-01-07 NOTE — PROGRESS NOTES
"Subjective     Toby Mcgrath is a 64 year old male who presents to clinic today for the following health issues:    HPI   Constipation      Duration: Chronic     Description:       Frequency of bowel movements: 1 week       Consistency of stool: \"like nuggets/bricks\"    Intensity:  severe    Accompanying signs and symptoms: Hemorrhoids       Abdominal pain: no        Rectal pain: YES       Blood in stool: YES       Nausea/vomitting: no     History:        Similar problems in past: YES    Precipitating or alleviating factors: unkown       Medications worsening symptoms: YES- possibly pain medication - Oxycodone    Therapies tried and outcome: multiple OTC therapies tried - colace, ex-lax, milk of magnesia. None are working. States he is NOT taking senna, it doesn't work.       Chronic laxative use: no     Long standing constipation that ebbs and flows.  He has tried multiple OTC, and they do not have long term help.  Colace worked the best, but expensive.  Is on chronic narcotics, most likely cause.  Never tried miralax.  Has been getting flares of hemorrhoids due to this.  Has used rx cream in the past, nothing recent.    BP Readings from Last 3 Encounters:   01/07/20 128/78   12/26/19 136/82   09/23/19 138/77    Wt Readings from Last 3 Encounters:   01/07/20 113.4 kg (250 lb)   12/26/19 112.9 kg (249 lb)   08/19/19 114.6 kg (252 lb 11.2 oz)                    Reviewed and updated as needed this visit by Provider         Review of Systems   ROS COMP: Constitutional, HEENT, cardiovascular, pulmonary, gi and gu systems are negative, except as otherwise noted.      Objective    /78   Pulse 68   Temp 97.6  F (36.4  C) (Tympanic)   Ht 1.829 m (6')   Wt 113.4 kg (250 lb)   SpO2 96%   BMI 33.91 kg/m    Body mass index is 33.91 kg/m .  Physical Exam   GENERAL: alert and no distress  EYES: Eyes grossly normal to inspection  RESP: lungs clear to auscultation - no rales, rhonchi or wheezes  CV: regular rate and " rhythm, normal S1 S2, no S3 or S4, no murmur, click or rub, no peripheral edema and peripheral pulses strong  PSYCH: mentation appears normal, affect normal/bright    Diagnostic Test Results:  Labs reviewed in Epic        Assessment & Plan     1. Drug-induced constipation  Will get him on daily miralax.  Also talked about lifestyle changes to help with bowels.  - polyethylene glycol (MIRALAX/GLYCOLAX) packet; Take 17 g by mouth daily  Dispense: 100 packet; Refill: 3    2. Gastroesophageal reflux disease with esophagitis    - omeprazole (PRILOSEC) 20 MG DR capsule; Take 1 capsule (20 mg) by mouth daily  Dispense: 90 capsule; Refill: 3    3. External hemorrhoids    - hydrocortisone (ANUSOL-HC) 2.5 % cream; Place rectally 2 times daily as needed for hemorrhoids  Dispense: 30 g; Refill: 0           Return in about 1 week (around 1/14/2020) for If symptoms persist or worsen.    Campbell Kwong PA-C  [facility]

## 2020-01-15 ENCOUNTER — TELEPHONE (OUTPATIENT)
Dept: FAMILY MEDICINE | Facility: CLINIC | Age: 65
End: 2020-01-15

## 2020-01-15 NOTE — TELEPHONE ENCOUNTER
Summary:    Patient is due/failing the following:   Diabetic Eye Exam    Type of outreach:  Phone, spoke to patient.  He will call to schedule eye exam soon - will get this completed before Spring 2020  Action needed: Followup with pt to make sure completed    Ivis SMALLS RN

## 2020-01-21 ENCOUNTER — TELEPHONE (OUTPATIENT)
Dept: FAMILY MEDICINE | Facility: CLINIC | Age: 65
End: 2020-01-21

## 2020-01-21 DIAGNOSIS — F11.90 CHRONIC, CONTINUOUS USE OF OPIOIDS: ICD-10-CM

## 2020-01-21 DIAGNOSIS — M96.1 FAILED BACK SYNDROME OF LUMBAR SPINE: ICD-10-CM

## 2020-01-21 DIAGNOSIS — G89.4 CHRONIC PAIN SYNDROME: ICD-10-CM

## 2020-01-21 NOTE — TELEPHONE ENCOUNTER
oxyCODONE (ROXICODONE) 5 MG tablet        Last Written Prescription Date:  12/26/2019  Last Fill Quantity: 180,   # refills: 0  Last Office Visit: 01/07/2020 WITH JS  Future Office visit:   Nothing at this time scheduled.      Routing refill request to provider for review/approval because:  Drug not on the [facility], [facility] or [facility] refill protocol or controlled substance  Requested Prescriptions   Pending Prescriptions Disp Refills     oxyCODONE (ROXICODONE) 5 MG tablet 180 tablet 0     Sig: Take 1 tablet (5 mg) by mouth every 4 hours as needed for severe pain Fill on 12/27 Max #6 per day       There is no refill protocol information for this order

## 2020-01-21 NOTE — TELEPHONE ENCOUNTER
Reason for Call:  Medication or medication refill:    Do you use a [facility]?  Name of the pharmacy and phone number for the current request:     [facility] [address]  [facility] - [address]    Name of the medication requested: oxyCODONE (ROXICODONE) 5 MG tablet     Other request:     Can we leave a detailed message on this number? YES    Phone number patient can be reached at: Cell number on file:    Telephone Information:   Mobile [phone]     Best Time: any    Call taken on 1/21/2020 at 8:11 AM by Meliza Alfonso

## 2020-01-21 NOTE — TELEPHONE ENCOUNTER
FYI~ January 21, 2020 I spoke with Layo, he is in need of financial assistance for medication.     We reviewed the Pharmacy Assistance Fund $500, the Prescription Assistance Program for manfacturer brand name assistance programs, gross income, Rx insurance and Rx list.     Layo stated he does not need the Pharmacy Assistance Funding at this time.  I will be completing  assistance re-enrollment applications for - Lyrica.    Thank you,    Ana Luisa Cabrera  Prescription Assistance

## 2020-01-22 RX ORDER — OXYCODONE HYDROCHLORIDE 5 MG/1
5 TABLET ORAL EVERY 4 HOURS PRN
Qty: 180 TABLET | Refills: 0 | Status: SHIPPED | OUTPATIENT
Start: 2020-01-22 | End: 2020-02-21

## 2020-01-22 NOTE — TELEPHONE ENCOUNTER
SN    Controlled Substance Refill Request for Oxycodone    Last refill: 12/27/2019 - #180    Last clinic visit: 12/26/2019     Clinic visit frequency required: Q 3 months  Next appt: none    Controlled substance agreement on file: Yes:  Date 7/27/2016.    Documentation in problem list reviewed:  Yes    Processing:  Rx to be electronically transmitted to pharmacy by provider     RX [place] reviewed: Narx scores:   Narcotic = 541  Sedative = 551  Overdose risk score = 650 (range 000-999)  [place] profile:  [email or web address]  Thanks,  Gayathri Wade RN

## 2020-01-31 DIAGNOSIS — G89.4 CHRONIC PAIN SYNDROME: ICD-10-CM

## 2020-01-31 DIAGNOSIS — M54.16 LUMBAR RADICULOPATHY: ICD-10-CM

## 2020-01-31 DIAGNOSIS — E11.9 TYPE 2 DIABETES MELLITUS WITHOUT COMPLICATION, WITHOUT LONG-TERM CURRENT USE OF INSULIN (H): ICD-10-CM

## 2020-01-31 DIAGNOSIS — K64.4 EXTERNAL HEMORRHOIDS: ICD-10-CM

## 2020-01-31 DIAGNOSIS — F33.0 MILD EPISODE OF RECURRENT MAJOR DEPRESSIVE DISORDER (H): ICD-10-CM

## 2020-01-31 NOTE — TELEPHONE ENCOUNTER
Dr. Memo AMAYA am in process of applying to the Lyrica assistance program through [employer].  [employer] requires a hand signed, brand name, hard copy script be submitted with their application.    Please hand sign a BRAND name, hard copy script for:    LYRICA 100mg 2 tablets twice daily, #360, 3 refills    Please send the script to me via interoffice mail FPS Zenaida Kim or via US mail  at:      [facility]   Zenaida Plasencia   [address] [place]  [address]    Thanks so much for your help!    Zenaida Plasencia  Prescription   Pharmacy Assistance  [phone]

## 2020-01-31 NOTE — TELEPHONE ENCOUNTER
METFORMIN ER 500MG TABLET  Last Written Prescription Date:  009/23/2019  Last Fill Quantity: 360,  # refills: 3   Last office visit: 1/7/2020 with prescribing provider:  GREGORY   Future Office Visit:  Nothing at this time scheduled.      FLUOXETINE  20MG  CAP  Last Written Prescription Date:  09/03/2019  Last Fill Quantity: 270,  # refills: 1   Last office visit: 1/7/2020 with prescribing provider:  GREGORY   Future Office Visit: Nothing at this time scheduled.      Requested Prescriptions   Pending Prescriptions Disp Refills     metFORMIN (GLUCOPHAGE-XR) 500 MG 24 hr tablet [Pharmacy Med Name: METFORMIN ER 500MG TABLET] 360 tablet 3     Sig: TAKE 4 TABLETS BY MOUTH  DAILY WITH DINNER       Biguanide Agents Passed - 1/31/2020  1:43 PM        Passed - Blood pressure less than 140/90 in past 6 months     BP Readings from Last 3 Encounters:   01/07/20 128/78   12/26/19 136/82   09/23/19 138/77                 Passed - Patient has documented LDL within the past 12 mos.     Recent Labs   Lab Test 06/26/19  1149   LDL 50             Passed - Patient has had a Microalbumin in the past 15 mos.     Recent Labs   Lab Test 06/26/19  1149   MICROL 5   UMALCR 3.41             Passed - Patient is age 10 or older        Passed - Patient has documented A1c within the specified period of time.     If HgbA1C is 8 or greater, it needs to be on file within the past 3 months.  If less than 8, must be on file within the past 6 months.     Recent Labs   Lab Test 12/26/19  1117   A1C 6.8*             Passed - Patient's CR is NOT>1.4 OR Patient's EGFR is NOT<45 within past 12 mos.     Recent Labs   Lab Test 07/16/19  1048   GFRESTIMATED 75   GFRESTBLACK 87       Recent Labs   Lab Test 07/16/19  1048   CR 1.03             Passed - Patient does NOT have a diagnosis of CHF.        Passed - Medication is active on med list        Passed - Recent (6 mo) or future (30 days) visit within the authorizing provider's specialty     Patient had office visit in  "the last 6 months or has a visit in the next 30 days with authorizing provider or within the authorizing provider's specialty.  See \"Patient Info\" tab in inbasket, or \"Choose Columns\" in Meds & Orders section of the refill encounter.            FLUoxetine (PROZAC) 20 MG capsule [Pharmacy Med Name: FLUOXETINE  20MG  CAP] 270 capsule 1     Sig: TAKE THREE CAPSULES BY  MOUTH ONCE DAILY       SSRIs Protocol Failed - 1/31/2020  1:43 PM        Failed - PHQ-9 score less than 5 in past 6 months     Please review last PHQ-9 score.           Passed - Medication is active on med list        Passed - Patient is age 18 or older        Passed - Recent (6 mo) or future (30 days) visit within the authorizing provider's specialty     Patient had office visit in the last 6 months or has a visit in the next 30 days with authorizing provider or within the authorizing provider's specialty.  See \"Patient Info\" tab in inbasket, or \"Choose Columns\" in Meds & Orders section of the refill encounter.            "

## 2020-02-03 RX ORDER — METFORMIN HCL 500 MG
TABLET, EXTENDED RELEASE 24 HR ORAL
Qty: 360 TABLET | Refills: 0 | Status: SHIPPED | OUTPATIENT
Start: 2020-02-03 | End: 2020-05-21

## 2020-02-03 NOTE — TELEPHONE ENCOUNTER
Prescription approved per [facility] Refill Protocol.  Ivis SMALLS RN

## 2020-02-10 RX ORDER — PREGABALIN 100 MG
200 CAPSULE ORAL 2 TIMES DAILY
Qty: 360 CAPSULE | Refills: 3 | Status: SHIPPED | OUTPATIENT
Start: 2020-02-10 | End: 2020-02-11

## 2020-02-11 DIAGNOSIS — G89.4 CHRONIC PAIN SYNDROME: ICD-10-CM

## 2020-02-11 DIAGNOSIS — M54.16 LUMBAR RADICULOPATHY: ICD-10-CM

## 2020-02-11 RX ORDER — PREGABALIN 100 MG
200 CAPSULE ORAL 2 TIMES DAILY
Qty: 360 CAPSULE | Refills: 3 | Status: CANCELLED | OUTPATIENT
Start: 2020-02-11

## 2020-02-11 NOTE — TELEPHONE ENCOUNTER
January 31, 2020 I requested a Lyrica script be sent to me to attach to the Pfizer assistance application.    This script was sent to [facility].  He cannot afford this medication.    Please hand sign a BRAND name, hard copy script for:    LYRICA, 100mg, 2 capsules by mouth 2 times a day, #360, 3 refills.    Please send the script to me via interoffice mail FPS Zenaida Kim or via [place] mail  at:      [facility]   Zenaida Plasencia   [address] [place]  [address]    Thanks so much for your help!    Zenaida Plasencia  Prescription   Pharmacy Assistance  [id number]

## 2020-02-12 RX ORDER — PREGABALIN 100 MG
200 CAPSULE ORAL 2 TIMES DAILY
Qty: 360 CAPSULE | Refills: 3 | Status: SHIPPED | OUTPATIENT
Start: 2020-02-12 | End: 2020-07-16

## 2020-02-17 ENCOUNTER — TELEPHONE (OUTPATIENT)
Dept: FAMILY MEDICINE | Facility: CLINIC | Age: 65
End: 2020-02-17

## 2020-02-18 ENCOUNTER — TELEPHONE (OUTPATIENT)
Dept: FAMILY MEDICINE | Facility: CLINIC | Age: 65
End: 2020-02-18

## 2020-02-18 DIAGNOSIS — M96.1 FAILED BACK SYNDROME OF LUMBAR SPINE: Primary | ICD-10-CM

## 2020-02-18 NOTE — TELEPHONE ENCOUNTER
Reason for Call: Request for an order or referral:    Order or referral being requested: Updated MRI with or with out contrast of the Lumbar Spine    Date needed: as soon as possible    Has the patient been seen by the PCP for this problem? YES    Additional comments: Charisse with the [facility] Neurosurgery department calling to request this.  Needs to be done before he can get schedule with them    Phone number Patient can be reached at:  [phone]-Charisse at the [facility]    Best Time:      Can we leave a detailed message on this number?  YES    Call taken on 2/18/2020 at 11:34 AM by Teresa Bradley

## 2020-02-18 NOTE — TELEPHONE ENCOUNTER
SN,   Please see below   You referred pt to spine surgeon  Per below they need updated MRI before pt can see them   Please advise on order if appropriate  Thanks,  Ivis SMALLS RN

## 2020-02-19 NOTE — TELEPHONE ENCOUNTER
Patient informed  States he decided not to go to spine specialist  States he's been dealing with this since 2014  Tired of trying to figure out back issues  Told pt referral and MRI order is in place if changes his mind  Ivis SMALLS RN

## 2020-02-20 DIAGNOSIS — E11.9 TYPE 2 DIABETES MELLITUS WITHOUT COMPLICATION (H): ICD-10-CM

## 2020-02-20 DIAGNOSIS — E11.9 TYPE 2 DIABETES MELLITUS WITHOUT COMPLICATION, WITHOUT LONG-TERM CURRENT USE OF INSULIN (H): ICD-10-CM

## 2020-02-21 ENCOUNTER — TELEPHONE (OUTPATIENT)
Dept: FAMILY MEDICINE | Facility: CLINIC | Age: 65
End: 2020-02-21

## 2020-02-21 DIAGNOSIS — M96.1 FAILED BACK SYNDROME OF LUMBAR SPINE: ICD-10-CM

## 2020-02-21 DIAGNOSIS — F11.90 CHRONIC, CONTINUOUS USE OF OPIOIDS: ICD-10-CM

## 2020-02-21 RX ORDER — BLOOD-GLUCOSE METER
EACH MISCELLANEOUS
Qty: 1 KIT | Refills: 0 | Status: SHIPPED | OUTPATIENT
Start: 2020-02-21 | End: 2020-06-15

## 2020-02-21 NOTE — TELEPHONE ENCOUNTER
Reason for Call:  Medication or medication refill:    Do you use a [facility]?  Name of the pharmacy and phone number for the current request:       [facility] [address] [place] [address]    Name of the medication requested: oxyCODONE (ROXICODONE) 5 MG tablet    Other request: Layo calling in to get refill on medication. Please refill.    Can we leave a detailed message on this number? YES    Phone number patient can be reached at: Cell number on file:    Telephone Information:   Mobile [phone]       Best Time: any    Call taken on 2/21/2020 at 2:39 PM by Mina Cannon

## 2020-02-21 NOTE — TELEPHONE ENCOUNTER
"Prescription approved per [facility] Refill Protocol.  Ivis SMALLS RN    Last Written Prescription Date:    Last Fill Quantity: ,  # refills:    Last office visit: 1/7/2020 with prescribing provider:    Future Office Visit:    Requested Prescriptions   Pending Prescriptions Disp Refills     blood glucose monitoring XX lancets 200 each 1     Sig: Use to test blood sugar 2 times daily or as directed.       Diabetic Supplies Protocol Passed - 2/20/2020  3:42 PM        Passed - Medication is active on med list        Passed - Patient is 18 years of age or older        Passed - Recent (6 mo) or future (30 days) visit within the authorizing provider's specialty     Patient had office visit in the last 6 months or has a visit in the next 30 days with authorizing provider.  See \"Patient Info\" tab in inbasket, or \"Choose Columns\" in Meds & Orders section of the refill encounter.            blood glucose monitoring XX meter device kit 1 kit 0     Sig: Use to test blood sugars 2 times daily or as directed.       Diabetic Supplies Protocol Passed - 2/20/2020  3:42 PM        Passed - Medication is active on med list        Passed - Patient is 18 years of age or older        Passed - Recent (6 mo) or future (30 days) visit within the authorizing provider's specialty     Patient had office visit in the last 6 months or has a visit in the next 30 days with authorizing provider.  See \"Patient Info\" tab in inbasket, or \"Choose Columns\" in Meds & Orders section of the refill encounter.            blood glucose (ACCU-CHEK DANIELE) VI test strip 200 strip 1     Sig: Use to test blood sugars 2 times daily or as directed.       Diabetic Supplies Protocol Passed - 2/20/2020  3:42 PM        Passed - Medication is active on med list        Passed - Patient is 18 years of age or older        Passed - Recent (6 mo) or future (30 days) visit within the authorizing provider's specialty     Patient had office visit in the last 6 months or has a visit in " "the next 30 days with authorizing provider.  See \"Patient Info\" tab in inbasket, or \"Choose Columns\" in Meds & Orders section of the refill encounter.            "

## 2020-02-24 RX ORDER — OXYCODONE HYDROCHLORIDE 5 MG/1
5 TABLET ORAL EVERY 4 HOURS PRN
Qty: 180 TABLET | Refills: 0 | Status: SHIPPED | OUTPATIENT
Start: 2020-02-24 | End: 2020-03-18

## 2020-02-24 NOTE — TELEPHONE ENCOUNTER
Oxycodone 5MG       Last Written Prescription Date:  01/22/2020  Last Fill Quantity: 180,   # refills: 0  Last Office Visit: 12/26/2019  Future Office visit:       Routing refill request to provider for review/approval because:  Drug not on the [facility], [facility] or [facility] refill protocol or controlled substance    Requested Prescriptions   Pending Prescriptions Disp Refills     oxyCODONE (ROXICODONE) 5 MG tablet 180 tablet 0     Sig: Take 1 tablet (5 mg) by mouth every 4 hours as needed for severe pain Fill on 1/27 Max #6 per day       There is no refill protocol information for this order

## 2020-02-24 NOTE — TELEPHONE ENCOUNTER
SN,     Controlled Substance Refill Request for Oxycodone    Last refill: 1/26/2020 #180    Last clinic visit: 12/26/2019     Clinic visit frequency required: Q 3 months  Next appt: none    Controlled substance agreement on file: Yes:  Date 8/30/2017.    Documentation in problem list reviewed:  Yes    Processing:  Rx to be electronically transmitted to pharmacy by provider     [place] reviewed:  reviewed- no concerns  [place]  [email or web address]    Please authorize if appropriate.  Thanks,  Ivis SMALLS RN

## 2020-02-25 NOTE — TELEPHONE ENCOUNTER
Patient is calling because he says last month refill was 01/25/2020 and now the refill date is 02/27/20 and he will run out of medication for 2 days. Can we change the date on the refill?

## 2020-02-26 NOTE — TELEPHONE ENCOUNTER
Called pharmacy to give ok on early fill, per pharmacist patient already picked up the oxycodone 5 mg.    Sara Mclaughlin RN

## 2020-03-17 DIAGNOSIS — F11.90 CHRONIC, CONTINUOUS USE OF OPIOIDS: ICD-10-CM

## 2020-03-17 DIAGNOSIS — F33.0 MILD EPISODE OF RECURRENT MAJOR DEPRESSIVE DISORDER (H): ICD-10-CM

## 2020-03-17 DIAGNOSIS — M96.1 FAILED BACK SYNDROME OF LUMBAR SPINE: ICD-10-CM

## 2020-03-17 NOTE — TELEPHONE ENCOUNTER
Fluoxetine:   Prescription approved per [facility] Refill Protocol.    Oxycodone:   Reviewed [facility]   No concerns  Last filled 2/26/2020 #180    Ivis SMALLS RN

## 2020-03-17 NOTE — TELEPHONE ENCOUNTER
Reason for Call:  Medication or medication refill:    Do you use a [facility]?  Name of the pharmacy and phone number for the current request:      [facility] [address]      [facility] [address]      Name of the medication requested:   oxyCODONE (ROXICODONE) 5 MG tablet  180 tablet      FLUoxetine (PROZAC) 20 MG capsule  270 capsule     Other request: not needed until 3/26/2020  [facility]    FLUoxetine to [employer] mail service    Can we leave a detailed message on this number? YES    Phone number patient can be reached at: Cell number on file:    Telephone Information:   Mobile [phone]       Best Time: any    Call taken on 3/17/2020 at 9:48 AM by Demi Lugo

## 2020-03-17 NOTE — TELEPHONE ENCOUNTER
"FLUoxetine (PROZAC) 20 MG capsule   Last Written Prescription Date:  02/03/2020  Last Fill Quantity: 270,  # refills: 0   Last office visit: 1/7/2020 with prescribing provider:  GREGORY   Future Office Visit:  Nothing at this time scheduled.          oxyCODONE (ROXICODONE) 5 MG tablet   Last Written Prescription Date:  02/24/2020  Last Fill Quantity: 180,   # refills: 0  Last Office Visit: GREGORY 01/07/2020  Future Office visit:   Nothing at this time scheduled.      Routing refill request to provider for review/approval because:  Drug not on the [facility], [facility] or [facility] refill protocol or controlled substance    Requested Prescriptions   Pending Prescriptions Disp Refills     FLUoxetine (PROZAC) 20 MG capsule 270 capsule 0     Sig: Take 3 capsules (60 mg) by mouth daily       SSRIs Protocol Failed - 3/17/2020 10:03 AM        Failed - PHQ-9 score less than 5 in past 6 months     Please review last PHQ-9 score.           Passed - Medication is active on med list        Passed - Patient is age 18 or older        Passed - Recent (6 mo) or future (30 days) visit within the authorizing provider's specialty     Patient had office visit in the last 6 months or has a visit in the next 30 days with authorizing provider or within the authorizing provider's specialty.  See \"Patient Info\" tab in inbasket, or \"Choose Columns\" in Meds & Orders section of the refill encounter.               oxyCODONE (ROXICODONE) 5 MG tablet 180 tablet 0     Sig: Take 1 tablet (5 mg) by mouth every 4 hours as needed for severe pain Fill on 1/27 Max #6 per day       There is no refill protocol information for this order          "

## 2020-03-18 RX ORDER — OXYCODONE HYDROCHLORIDE 5 MG/1
5 TABLET ORAL EVERY 4 HOURS PRN
Qty: 180 TABLET | Refills: 0 | Status: SHIPPED | OUTPATIENT
Start: 2020-03-18 | End: 2020-04-22

## 2020-04-12 DIAGNOSIS — E78.5 HYPERLIPIDEMIA LDL GOAL <100: ICD-10-CM

## 2020-04-13 RX ORDER — SIMVASTATIN 20 MG
TABLET ORAL
Start: 2020-04-13

## 2020-04-13 NOTE — TELEPHONE ENCOUNTER
"Denied  Too early; Rx sent 7/10/2019 for 1 year  Ivis SMALLS RN    Last Written Prescription Date:  7/10/2019  Last Fill Quantity: 90,  # refills: 3   Last office visit: 1/7/2020 with prescribing provider:     Future Office Visit:    Requested Prescriptions   Pending Prescriptions Disp Refills     simvastatin (ZOCOR) 20 MG tablet [Pharmacy Med Name: SIMVASTATIN  20MG  TAB] 90 tablet 3     Sig: TAKE 1 TABLET BY MOUTH AT  BEDTIME       Statins Protocol Passed - 4/12/2020  8:58 PM        Passed - LDL on file in past 12 months     Recent Labs   Lab Test 06/26/19  1149   LDL 50             Passed - No abnormal creatine kinase in past 12 months     No lab results found.             Passed - Recent (12 mo) or future (30 days) visit within the authorizing provider's specialty     Patient has had an office visit with the authorizing provider or a provider within the authorizing providers department within the previous 12 mos or has a future within next 30 days. See \"Patient Info\" tab in inbasket, or \"Choose Columns\" in Meds & Orders section of the refill encounter.              Passed - Medication is active on med list        Passed - Patient is age 18 or older           "

## 2020-04-21 DIAGNOSIS — M96.1 FAILED BACK SYNDROME OF LUMBAR SPINE: ICD-10-CM

## 2020-04-21 DIAGNOSIS — F11.90 CHRONIC, CONTINUOUS USE OF OPIOIDS: ICD-10-CM

## 2020-04-21 NOTE — TELEPHONE ENCOUNTER
Reason for Call:  Medication or medication refill:    Do you use a [facility]?  Name of the pharmacy and phone number for the current request:       [facility] [address] [place] [address]    Name of the medication requested: oxyCODONE (ROXICODONE) 5 MG tablet     Other request: Please refill medication.    Can we leave a detailed message on this number? YES    Phone number patient can be reached at: Cell number on file:    Telephone Information:   Mobile [phone]       Best Time: any    Call taken on 4/21/2020 at 8:27 AM by Mina Cannon

## 2020-04-21 NOTE — TELEPHONE ENCOUNTER
SN    Controlled Substance Refill Request for Roxicodone    Last refill: 3/26/20    Last clinic visit: 1/7/20     Clinic visit frequency required: Q 3 months  Next appt:     Controlled substance agreement on file: Yes:  Date 7/30/16.    Documentation in problem list reviewed:  Yes    Processing:  Rx to be electronically transmitted to pharmacy by provider     [place] reviewed:  reviewed- no concerns  [place]:  [email or web address]  Please approve if appropriate  Sara Mclaughlin RN

## 2020-04-21 NOTE — TELEPHONE ENCOUNTER
oxyCODONE (ROXICODONE) 5 MG tablet   Last Written Prescription Date:  03/18/2020  Last Fill Quantity: 180,   # refills: 0  Last Office Visit: GREGORY ON 01/07/2020  Future Office visit:   Nothing at this time scheduled.      Routing refill request to provider for review/approval because:  Drug not on the [facility], [facility] or [facility] refill protocol or controlled substance  Requested Prescriptions   Pending Prescriptions Disp Refills     oxyCODONE (ROXICODONE) 5 MG tablet 180 tablet 0     Sig: Take 1 tablet (5 mg) by mouth every 4 hours as needed for severe pain Fill on 3/26/20 Max #6 per day       There is no refill protocol information for this order

## 2020-04-22 RX ORDER — OXYCODONE HYDROCHLORIDE 5 MG/1
5 TABLET ORAL EVERY 4 HOURS PRN
Qty: 180 TABLET | Refills: 0 | Status: SHIPPED | OUTPATIENT
Start: 2020-04-22 | End: 2020-05-21

## 2020-04-23 DIAGNOSIS — E11.9 TYPE 2 DIABETES MELLITUS WITHOUT COMPLICATION (H): ICD-10-CM

## 2020-04-23 DIAGNOSIS — E11.9 TYPE 2 DIABETES MELLITUS WITHOUT COMPLICATION, WITHOUT LONG-TERM CURRENT USE OF INSULIN (H): ICD-10-CM

## 2020-04-23 RX ORDER — BLOOD SUGAR DIAGNOSTIC
STRIP MISCELLANEOUS
Qty: 200 STRIP | Refills: 0 | Status: SHIPPED | OUTPATIENT
Start: 2020-04-23 | End: 2022-01-14

## 2020-04-23 RX ORDER — LANCETS
EACH MISCELLANEOUS
Qty: 200 EACH | Refills: 0 | Status: SHIPPED | OUTPATIENT
Start: 2020-04-23 | End: 2022-01-14

## 2020-04-23 NOTE — TELEPHONE ENCOUNTER
"Prescription approved per [facility] Refill Protocol.  Ivis SMALLS RN    Last Written Prescription Date:    Last Fill Quantity: ,  # refills:    Last office visit: 1/7/2020 with prescribing provider:     Future Office Visit:    Requested Prescriptions   Pending Prescriptions Disp Refills     blood glucose monitoring (ACCU-CHEK FASTCLIX) lancets [Pharmacy Med Name: LANCET   FASTCLIX] 102 each      Sig: USE TO TEST BLOOD SUGAR TWO TIMES DAILY OR AS DIRECTED       Diabetic Supplies Protocol Passed - 4/23/2020  4:46 AM        Passed - Medication is active on med list        Passed - Patient is 18 years of age or older        Passed - Recent (6 mo) or future (30 days) visit within the authorizing provider's specialty     Patient had office visit in the last 6 months or has a visit in the next 30 days with authorizing provider.  See \"Patient Info\" tab in inbasket, or \"Choose Columns\" in Meds & Orders section of the refill encounter.               blood glucose (ACCU-CHEK DANIELE PLUS) test strip [Pharmacy Med Name: T STRIP  S ACCU CHK DANIELE PLUS] 200 strip 0     Sig: USE TO TEST BLOOD SUGAR TWO TIMES DAILY OR AS DIRECTED       Diabetic Supplies Protocol Passed - 4/23/2020  4:46 AM        Passed - Medication is active on med list        Passed - Patient is 18 years of age or older        Passed - Recent (6 mo) or future (30 days) visit within the authorizing provider's specialty     Patient had office visit in the last 6 months or has a visit in the next 30 days with authorizing provider.  See \"Patient Info\" tab in inbasket, or \"Choose Columns\" in Meds & Orders section of the refill encounter.               "

## 2020-04-28 DIAGNOSIS — E78.5 HYPERLIPIDEMIA LDL GOAL <100: ICD-10-CM

## 2020-04-29 RX ORDER — SIMVASTATIN 20 MG
TABLET ORAL
Qty: 90 TABLET | Refills: 0 | Status: SHIPPED | OUTPATIENT
Start: 2020-04-29 | End: 2020-06-24

## 2020-04-29 NOTE — TELEPHONE ENCOUNTER
"Requested Prescriptions   Pending Prescriptions Disp Refills     simvastatin (ZOCOR) 20 MG tablet [Pharmacy Med Name: SIMVASTATIN  20MG  TAB]  Last Written Prescription Date:  7/10/2019  Last Fill Quantity: 90 tablet,  # refills: 3   Last office visit: 1/7/2020 with prescribing provider:  Elenita   Future Office Visit:     90 tablet 3     Sig: TAKE 1 TABLET BY MOUTH AT  BEDTIME       Statins Protocol Passed - 4/28/2020  9:07 PM        Passed - LDL on file in past 12 months     Recent Labs   Lab Test 06/26/19  1149   LDL 50             Passed - No abnormal creatine kinase in past 12 months     No lab results found.             Passed - Recent (12 mo) or future (30 days) visit within the authorizing provider's specialty     Patient has had an office visit with the authorizing provider or a provider within the authorizing providers department within the previous 12 mos or has a future within next 30 days. See \"Patient Info\" tab in inbasket, or \"Choose Columns\" in Meds & Orders section of the refill encounter.              Passed - Medication is active on med list        Passed - Patient is age 18 or older              "

## 2020-05-20 DIAGNOSIS — M96.1 FAILED BACK SYNDROME OF LUMBAR SPINE: ICD-10-CM

## 2020-05-20 DIAGNOSIS — F11.90 CHRONIC, CONTINUOUS USE OF OPIOIDS: ICD-10-CM

## 2020-05-20 RX ORDER — OXYCODONE HYDROCHLORIDE 5 MG/1
5 TABLET ORAL EVERY 4 HOURS PRN
Qty: 180 TABLET | Refills: 0 | Status: CANCELLED | OUTPATIENT
Start: 2020-05-20

## 2020-05-20 NOTE — TELEPHONE ENCOUNTER
Reason for Call:  Medication or medication refill:    Do you use a [facility]?  Name of the pharmacy and phone number for the current request:  [facility] [address] [place] [address]    Name of the medication requested: oxyCODONE (ROXICODONE) 5 MG tablet    Other request:     Can we leave a detailed message on this number? YES    Phone number patient can be reached at: Cell number on file:    Telephone Information:   Mobile [phone]       Best Time:     Call taken on 5/20/2020 at 7:42 AM by Vannesa Weeks

## 2020-05-21 ENCOUNTER — VIRTUAL VISIT (OUTPATIENT)
Dept: FAMILY MEDICINE | Facility: CLINIC | Age: 65
End: 2020-05-21
Payer: COMMERCIAL

## 2020-05-21 DIAGNOSIS — M96.1 FAILED BACK SYNDROME OF LUMBAR SPINE: ICD-10-CM

## 2020-05-21 DIAGNOSIS — E11.9 TYPE 2 DIABETES MELLITUS WITHOUT COMPLICATION, WITHOUT LONG-TERM CURRENT USE OF INSULIN (H): ICD-10-CM

## 2020-05-21 DIAGNOSIS — Z12.11 COLON CANCER SCREENING: ICD-10-CM

## 2020-05-21 DIAGNOSIS — R35.0 BENIGN PROSTATIC HYPERPLASIA WITH URINARY FREQUENCY: ICD-10-CM

## 2020-05-21 DIAGNOSIS — F11.90 CHRONIC, CONTINUOUS USE OF OPIOIDS: ICD-10-CM

## 2020-05-21 DIAGNOSIS — E11.42 DIABETIC POLYNEUROPATHY ASSOCIATED WITH TYPE 2 DIABETES MELLITUS (H): Primary | ICD-10-CM

## 2020-05-21 DIAGNOSIS — N40.1 BENIGN PROSTATIC HYPERPLASIA WITH URINARY FREQUENCY: ICD-10-CM

## 2020-05-21 PROCEDURE — 99214 OFFICE O/P EST MOD 30 MIN: CPT | Mod: 95 | Performed by: FAMILY MEDICINE

## 2020-05-21 RX ORDER — METFORMIN HCL 500 MG
TABLET, EXTENDED RELEASE 24 HR ORAL
Qty: 360 TABLET | Refills: 1 | Status: SHIPPED | OUTPATIENT
Start: 2020-05-21 | End: 2020-11-04

## 2020-05-21 RX ORDER — OXYCODONE HYDROCHLORIDE 5 MG/1
5 TABLET ORAL EVERY 4 HOURS PRN
Qty: 180 TABLET | Refills: 0 | Status: SHIPPED | OUTPATIENT
Start: 2020-05-21 | End: 2020-06-22

## 2020-05-21 RX ORDER — TAMSULOSIN HYDROCHLORIDE 0.4 MG/1
0.8 CAPSULE ORAL DAILY
Qty: 180 CAPSULE | Refills: 2 | Status: SHIPPED | OUTPATIENT
Start: 2020-05-21 | End: 2020-11-05

## 2020-05-21 NOTE — TELEPHONE ENCOUNTER
Due for visit.  Per problem list OV required every 3 months.  Last seen 1/7/2020 by GREGORY SIMPSON schedule full.  Will schedule with MAYA today.    VM left asking patient to call back.  Gayathri Wade RN

## 2020-05-21 NOTE — NURSING NOTE
Chief Complaint   Patient presents with     Recheck Medication     oxycodone refill     initial There were no vitals taken for this visit. Estimated body mass index is 33.91 kg/m  as calculated from the following:    Height as of 1/7/20: 1.829 m (6').    Weight as of 1/7/20: 113.4 kg (250 lb).  BP completed using cuff size: regular.  L  R arm      Health Maintenance that is potentially due pending provider review:      Yordan Melara ma

## 2020-05-21 NOTE — PROGRESS NOTES
"Toby Mcgrath is a 64 year old male who is being evaluated via a billable telephone visit.      The patient has been notified of following:     \"This telephone visit will be conducted via a call between you and your physician/provider. We have found that certain health care needs can be provided without the need for a physical exam.  This service lets us provide the care you need with a short phone conversation.  If a prescription is necessary we can send it directly to your pharmacy.  If lab work is needed we can place an order for that and you can then stop by our lab to have the test done at a later time.    Telephone visits are billed at different rates depending on your insurance coverage. During this emergency period, for some insurers they may be billed the same as an in-person visit.  Please reach out to your insurance provider with any questions.    If during the course of the call the physician/provider feels a telephone visit is not appropriate, you will not be charged for this service.\"    Patient has given verbal consent for Telephone visit?  Yes    What phone number would you like to be contacted at? [phone]    How would you like to obtain your AVS? P[employer]    Subjective     Toby Mcgrath is a 64 year old male who presents via phone visit today for the following health issues:    HPI  Medication Followup of Oxycodone    Taking Medication as prescribed: yes    Side Effects:  None    Medication Helping Symptoms:  yes        (E11.9) Type 2 diabetes mellitus without complication, without long-term current use of insulin (H)  (primary encounter diagnosis)  Comment:  Due for labs does not check sugars regularly  D    (M96.1) Failed back syndrome of lumbar spine  Comment: worsening pain into both legs - pain starts around back of calves and goes down into tops of both feet.  Gets better with walking or standing  pain is burning  Or numb at times  Has had some heaviness of his feet and elt some " weakness.  No bowel or bladder issues  Some swelling in legs but this resolves with elevation         Review of Systems   CONSTITUTIONAL: NEGATIVE for fever, chills, change in weight  ENT/MOUTH: NEGATIVE for ear, mouth and throat problems  RESP: NEGATIVE for significant cough or SOB  CV: NEGATIVE for chest pain, palpitations or peripheral edema       Objective   Reported vitals:  There were no vitals taken for this visit.   healthy, alert and no distress  PSYCH: Alert and oriented times 3; coherent speech, normal   rate and volume, able to articulate logical thoughts, able   to abstract reason, no tangential thoughts, no hallucinations   or delusions  His affect is normal  RESP: No cough, no audible wheezing, able to talk in full sentences  Remainder of exam unable to be completed due to telephone visits    Diagnostic Test Results:  Labs reviewed in Epic        Assessment/Plan:  1. Diabetic polyneuropathy associated with type 2 diabetes mellitus (H)    - Lipid panel reflex to direct LDL Fasting  - Hemoglobin A1c  - TSH with free T4 reflex  - Basic metabolic panel  - Albumin Random Urine Quantitative with Creat Ratio    2. Colon cancer screening    - Fecal colorectal cancer screen (FIT); Future    3. Failed back syndrome of lumbar spine    - oxyCODONE (ROXICODONE) 5 MG tablet; Take 1 tablet (5 mg) by mouth every 4 hours as needed for severe pain Fill on 3/26/20 Max #6 per day  Dispense: 180 tablet; Refill: 0    4. Chronic, continuous use of opioids    - oxyCODONE (ROXICODONE) 5 MG tablet; Take 1 tablet (5 mg) by mouth every 4 hours as needed for severe pain Fill on 3/26/20 Max #6 per day  Dispense: 180 tablet; Refill: 0    5. Type 2 diabetes mellitus without complication, without long-term current use of insulin (H)    - metFORMIN (GLUCOPHAGE-XR) 500 MG 24 hr tablet; TAKE 4 TABLETS BY MOUTH  DAILY WITH DINNER  Dispense: 360 tablet; Refill: 1    6. Benign prostatic hyperplasia with urinary frequency    - tamsulosin  (FLOMAX) 0.4 MG capsule; Take 2 capsules (0.8 mg) by mouth daily  Dispense: 180 capsule; Refill: 2    No follow-ups on file.      Phone call duration:  11 minutes    Raman Ordaz MD

## 2020-05-24 DIAGNOSIS — F33.0 MILD EPISODE OF RECURRENT MAJOR DEPRESSIVE DISORDER (H): ICD-10-CM

## 2020-05-26 NOTE — TELEPHONE ENCOUNTER
"Requested Prescriptions   Pending Prescriptions Disp Refills     FLUoxetine (PROZAC) 20 MG capsule [Pharmacy Med Name: FLUOXETINE  20MG  CAP] 270 capsule 1     Sig: TAKE 3 CAPSULES BY MOUTH  ONCE DAILY       SSRIs Protocol Failed - 5/24/2020  7:18 AM        Failed - PHQ-9 score less than 5 in past 6 months     Please review last PHQ-9 score.           Passed - Medication is active on med list        Passed - Patient is age 18 or older        Passed - Recent (6 mo) or future (30 days) visit within the authorizing provider's specialty     Patient had office visit in the last 6 months or has a visit in the next 30 days with authorizing provider or within the authorizing provider's specialty.  See \"Patient Info\" tab in inbasket, or \"Choose Columns\" in Meds & Orders section of the refill encounter.                 Sent remainder of Rx to new pharmacy    Sara Mclaughlin RN    "

## 2020-05-28 DIAGNOSIS — E11.42 DIABETIC POLYNEUROPATHY ASSOCIATED WITH TYPE 2 DIABETES MELLITUS (H): ICD-10-CM

## 2020-05-28 LAB
ANION GAP SERPL CALCULATED.3IONS-SCNC: 5 MMOL/L (ref 3–14)
BUN SERPL-MCNC: 14 MG/DL (ref 7–30)
CALCIUM SERPL-MCNC: 8.8 MG/DL (ref 8.5–10.1)
CHLORIDE SERPL-SCNC: 109 MMOL/L (ref 94–109)
CHOLEST SERPL-MCNC: 125 MG/DL
CO2 SERPL-SCNC: 28 MMOL/L (ref 20–32)
CREAT SERPL-MCNC: 1.09 MG/DL (ref 0.66–1.25)
CREAT UR-MCNC: 72 MG/DL
GFR SERPL CREATININE-BSD FRML MDRD: 70 ML/MIN/{1.73_M2}
GLUCOSE SERPL-MCNC: 164 MG/DL (ref 70–99)
HBA1C MFR BLD: 7.8 % (ref 0–5.6)
HDLC SERPL-MCNC: 29 MG/DL
LDLC SERPL CALC-MCNC: 48 MG/DL
MICROALBUMIN UR-MCNC: <5 MG/L
MICROALBUMIN/CREAT UR: NORMAL MG/G CR (ref 0–17)
NONHDLC SERPL-MCNC: 96 MG/DL
POTASSIUM SERPL-SCNC: 4.4 MMOL/L (ref 3.4–5.3)
SODIUM SERPL-SCNC: 142 MMOL/L (ref 133–144)
TRIGL SERPL-MCNC: 239 MG/DL
TSH SERPL DL<=0.005 MIU/L-ACNC: 1.64 MU/L (ref 0.4–4)

## 2020-05-28 PROCEDURE — 83036 HEMOGLOBIN GLYCOSYLATED A1C: CPT | Performed by: FAMILY MEDICINE

## 2020-05-28 PROCEDURE — 82043 UR ALBUMIN QUANTITATIVE: CPT | Performed by: FAMILY MEDICINE

## 2020-05-28 PROCEDURE — 80048 BASIC METABOLIC PNL TOTAL CA: CPT | Performed by: FAMILY MEDICINE

## 2020-05-28 PROCEDURE — 80061 LIPID PANEL: CPT | Performed by: FAMILY MEDICINE

## 2020-05-28 PROCEDURE — 36415 COLL VENOUS BLD VENIPUNCTURE: CPT | Performed by: FAMILY MEDICINE

## 2020-05-28 PROCEDURE — 84443 ASSAY THYROID STIM HORMONE: CPT | Performed by: FAMILY MEDICINE

## 2020-06-07 PROCEDURE — 82274 ASSAY TEST FOR BLOOD FECAL: CPT | Performed by: FAMILY MEDICINE

## 2020-06-08 DIAGNOSIS — N40.0 BENIGN PROSTATIC HYPERPLASIA WITHOUT LOWER URINARY TRACT SYMPTOMS: ICD-10-CM

## 2020-06-09 DIAGNOSIS — Z12.11 COLON CANCER SCREENING: ICD-10-CM

## 2020-06-09 RX ORDER — FINASTERIDE 5 MG/1
TABLET, FILM COATED ORAL
Qty: 90 TABLET | Refills: 0 | Status: SHIPPED | OUTPATIENT
Start: 2020-06-09 | End: 2020-08-18

## 2020-06-09 NOTE — TELEPHONE ENCOUNTER
"Last Written Prescription Date:  11/6/2019  Last Fill Quantity: 90,  # refills: 1   Last office visit: 1/7/2020 with prescribing provider:  GREGORY   Virtual visit with MAYA 5/21/2020  Future Office Visit:      Requested Prescriptions   Pending Prescriptions Disp Refills     finasteride (PROSCAR) 5 MG tablet [Pharmacy Med Name: FINASTERIDE 5MG TABLET] 90 tablet 1     Sig: TAKE 1 TABLET BY MOUTH  DAILY FOR PROSTATE  ENLARGEMENT       BPH Agents Passed - 6/9/2020  9:03 AM        Passed - Recent (12 mo) or future (30 days) visit within the authorizing provider's department     Patient has had an office visit with the authorizing provider or a provider within the authorizing providers department within the previous 12 mos or has a future within next 30 days. See \"Patient Info\" tab in inbasket, or \"Choose Columns\" in Meds & Orders section of the refill encounter.              Passed - Medication is active on med list        Passed - Patient is 18 years of age or older           Prescription approved per [facility] Refill Protocol.  Gayathri Wade RN        "

## 2020-06-09 NOTE — TELEPHONE ENCOUNTER
"Requested Prescriptions   Pending Prescriptions Disp Refills     finasteride (PROSCAR) 5 MG tablet [Pharmacy Med Name: FINASTERIDE 5MG TABLET]  Last Written Prescription Date:  11/6/2019  Last Fill Quantity: 90 tablet,  # refills: 1   Last office visit: 5/21/2020 with prescribing provider:  Sushma   Future Office Visit:     90 tablet 1     Sig: TAKE 1 TABLET BY MOUTH  DAILY FOR PROSTATE  ENLARGEMENT       BPH Agents Passed - 6/8/2020  5:27 AM        Passed - Recent (12 mo) or future (30 days) visit within the authorizing provider's department     Patient has had an office visit with the authorizing provider or a provider within the authorizing providers department within the previous 12 mos or has a future within next 30 days. See \"Patient Info\" tab in inbasket, or \"Choose Columns\" in Meds & Orders section of the refill encounter.              Passed - Medication is active on med list        Passed - Patient is 18 years of age or older              "

## 2020-06-11 LAB — HEMOCCULT STL QL IA: NEGATIVE

## 2020-06-15 DIAGNOSIS — E11.9 TYPE 2 DIABETES MELLITUS WITHOUT COMPLICATION (H): ICD-10-CM

## 2020-06-15 DIAGNOSIS — E78.5 HYPERLIPIDEMIA LDL GOAL <100: ICD-10-CM

## 2020-06-15 RX ORDER — BLOOD-GLUCOSE METER
EACH MISCELLANEOUS
Qty: 1 KIT | Refills: 0 | Status: SHIPPED | OUTPATIENT
Start: 2020-06-15 | End: 2022-05-17

## 2020-06-15 RX ORDER — SIMVASTATIN 20 MG
TABLET ORAL
Start: 2020-06-15

## 2020-06-15 NOTE — TELEPHONE ENCOUNTER
Simvastatin:   Denied  Too early; Rx sent 4/29/2020 for 90 days    Blood Sugar Kit:  Prescription [place] per [facility] [place].    Ivis SMALLS RN

## 2020-06-17 ENCOUNTER — TELEPHONE (OUTPATIENT)
Dept: FAMILY MEDICINE | Facility: CLINIC | Age: 65
End: 2020-06-17

## 2020-06-17 NOTE — TELEPHONE ENCOUNTER
FYI~ A refill has been made to the  assistance program for Lyrica.    A 90 day supply of Lyrica will be delivered to Layo's home on June 19th, 2020.    Thank you,    Ana Luisa Cabrera  Prescription Assistance

## 2020-06-22 ENCOUNTER — TELEPHONE (OUTPATIENT)
Dept: FAMILY MEDICINE | Facility: CLINIC | Age: 65
End: 2020-06-22

## 2020-06-22 DIAGNOSIS — F11.90 CHRONIC, CONTINUOUS USE OF OPIOIDS: ICD-10-CM

## 2020-06-22 DIAGNOSIS — M96.1 FAILED BACK SYNDROME OF LUMBAR SPINE: ICD-10-CM

## 2020-06-22 RX ORDER — OXYCODONE HYDROCHLORIDE 5 MG/1
5 TABLET ORAL EVERY 4 HOURS PRN
Qty: 180 TABLET | Refills: 0 | Status: SHIPPED | OUTPATIENT
Start: 2020-06-22 | End: 2020-07-21

## 2020-06-22 NOTE — TELEPHONE ENCOUNTER
Patient called today.    Patient has 1 day left of medication Oxycodone.    Patient would like to pickup at [facility] in [place].    Please contact patient.    Thank you.    Central Scheduling  Carolina ANGEL

## 2020-06-22 NOTE — TELEPHONE ENCOUNTER
JF,  Please authorize if appropriate in SN's absence.  Thanks,  Lavinia Ortega RN      Controlled Substance Refill Request for Oxycodone    Last refill: 5/21/20 #180    Last clinic visit: 5/21/20     Clinic visit frequency required: Q 3 months  Next appt:     Controlled substance agreement on file: Yes:  Date 7/30/16.    Documentation in problem list reviewed:  Yes    Processing:  Rx to be electronically transmitted to pharmacy by provider     [place] reviewed:  reviewed,  [place]:  [email or web address]

## 2020-06-23 DIAGNOSIS — E78.5 HYPERLIPIDEMIA LDL GOAL <100: ICD-10-CM

## 2020-06-24 RX ORDER — SIMVASTATIN 20 MG
TABLET ORAL
Qty: 90 TABLET | Refills: 1 | Status: SHIPPED | OUTPATIENT
Start: 2020-06-24 | End: 2020-11-05

## 2020-06-24 NOTE — TELEPHONE ENCOUNTER
"Requested Prescriptions   Pending Prescriptions Disp Refills     simvastatin (ZOCOR) 20 MG tablet [Pharmacy Med Name: SIMVASTATIN  20MG  TAB] 90 tablet 0     Sig: TAKE 1 TABLET BY MOUTH AT  BEDTIME       Statins Protocol Passed - 6/23/2020  9:27 PM        Passed - LDL on file in past 12 months     Recent Labs   Lab Test 05/28/20  0903   LDL 48             Passed - No abnormal creatine kinase in past 12 months     No lab results found.             Passed - Recent (12 mo) or future (30 days) visit within the authorizing provider's specialty     Patient has had an office visit with the authorizing provider or a provider within the authorizing providers department within the previous 12 mos or has a future within next 30 days. See \"Patient Info\" tab in inbasket, or \"Choose Columns\" in Meds & Orders section of the refill encounter.              Passed - Medication is active on med list        Passed - Patient is age 18 or older             Prescription approved per [facility] [place].  "

## 2020-07-16 ENCOUNTER — HOSPITAL ENCOUNTER (OUTPATIENT)
Facility: CLINIC | Age: 65
Setting detail: OBSERVATION
Discharge: HOME | DRG: 310 | End: 2020-07-18
Attending: EMERGENCY MEDICINE | Admitting: HOSPITALIST
Payer: COMMERCIAL

## 2020-07-16 ENCOUNTER — OFFICE VISIT (OUTPATIENT)
Dept: FAMILY MEDICINE | Facility: CLINIC | Age: 65
End: 2020-07-16
Payer: COMMERCIAL

## 2020-07-16 ENCOUNTER — ANCILLARY PROCEDURE (OUTPATIENT)
Dept: GENERAL RADIOLOGY | Facility: CLINIC | Age: 65
End: 2020-07-16
Attending: FAMILY MEDICINE
Payer: COMMERCIAL

## 2020-07-16 VITALS
HEIGHT: 72 IN | HEART RATE: 126 BPM | RESPIRATION RATE: 18 BRPM | DIASTOLIC BLOOD PRESSURE: 83 MMHG | OXYGEN SATURATION: 96 % | BODY MASS INDEX: 34.61 KG/M2 | TEMPERATURE: 98 F | WEIGHT: 255.5 LBS | SYSTOLIC BLOOD PRESSURE: 117 MMHG

## 2020-07-16 DIAGNOSIS — R00.2 PALPITATIONS: ICD-10-CM

## 2020-07-16 DIAGNOSIS — R06.00 DYSPNEA, UNSPECIFIED TYPE: ICD-10-CM

## 2020-07-16 DIAGNOSIS — I48.91 ATRIAL FIBRILLATION, UNSPECIFIED TYPE (H): ICD-10-CM

## 2020-07-16 DIAGNOSIS — I48.92 ATRIAL FLUTTER WITH RAPID VENTRICULAR RESPONSE (H): ICD-10-CM

## 2020-07-16 DIAGNOSIS — I48.91 ATRIAL FIBRILLATION WITH RVR (H): Primary | ICD-10-CM

## 2020-07-16 DIAGNOSIS — I24.9 ACS (ACUTE CORONARY SYNDROME) (H): Primary | ICD-10-CM

## 2020-07-16 LAB
ALBUMIN SERPL-MCNC: 3.7 G/DL (ref 3.4–5)
ALP SERPL-CCNC: 65 U/L (ref 40–150)
ALT SERPL W P-5'-P-CCNC: 48 U/L (ref 0–70)
ANION GAP SERPL CALCULATED.3IONS-SCNC: 7 MMOL/L (ref 3–14)
AST SERPL W P-5'-P-CCNC: 39 U/L (ref 0–45)
BASOPHILS # BLD AUTO: 0.1 10E9/L (ref 0–0.2)
BASOPHILS NFR BLD AUTO: 1 %
BILIRUB SERPL-MCNC: 0.5 MG/DL (ref 0.2–1.3)
BUN SERPL-MCNC: 14 MG/DL (ref 7–30)
CALCIUM SERPL-MCNC: 9 MG/DL (ref 8.5–10.1)
CHLORIDE SERPL-SCNC: 109 MMOL/L (ref 94–109)
CO2 SERPL-SCNC: 25 MMOL/L (ref 20–32)
CREAT SERPL-MCNC: 1.1 MG/DL (ref 0.66–1.25)
DIFFERENTIAL METHOD BLD: ABNORMAL
EOSINOPHIL # BLD AUTO: 0.2 10E9/L (ref 0–0.7)
EOSINOPHIL NFR BLD AUTO: 2.8 %
ERYTHROCYTE [DISTWIDTH] IN BLOOD BY AUTOMATED COUNT: 13.6 % (ref 10–15)
GFR SERPL CREATININE-BSD FRML MDRD: 69 ML/MIN/{1.73_M2}
GLUCOSE BLDC GLUCOMTR-MCNC: 153 MG/DL (ref 70–99)
GLUCOSE SERPL-MCNC: 133 MG/DL (ref 70–99)
HCT VFR BLD AUTO: 39.7 % (ref 40–53)
HGB BLD-MCNC: 13.7 G/DL (ref 13.3–17.7)
IMM GRANULOCYTES # BLD: 0 10E9/L (ref 0–0.4)
IMM GRANULOCYTES NFR BLD: 0.3 %
LMWH PPP CHRO-ACNC: 0.32 IU/ML
LYMPHOCYTES # BLD AUTO: 1.9 10E9/L (ref 0.8–5.3)
LYMPHOCYTES NFR BLD AUTO: 31.5 %
MAGNESIUM SERPL-MCNC: 2.2 MG/DL (ref 1.6–2.3)
MCH RBC QN AUTO: 29.6 PG (ref 26.5–33)
MCHC RBC AUTO-ENTMCNC: 34.5 G/DL (ref 31.5–36.5)
MCV RBC AUTO: 86 FL (ref 78–100)
MONOCYTES # BLD AUTO: 0.5 10E9/L (ref 0–1.3)
MONOCYTES NFR BLD AUTO: 8.7 %
NEUTROPHILS # BLD AUTO: 3.3 10E9/L (ref 1.6–8.3)
NEUTROPHILS NFR BLD AUTO: 55.7 %
NRBC # BLD AUTO: 0 10*3/UL
NRBC BLD AUTO-RTO: 0 /100
NT-PROBNP SERPL-MCNC: 464 PG/ML (ref 0–900)
PLATELET # BLD AUTO: 185 10E9/L (ref 150–450)
POTASSIUM SERPL-SCNC: 4.6 MMOL/L (ref 3.4–5.3)
PROT SERPL-MCNC: 7.5 G/DL (ref 6.8–8.8)
RBC # BLD AUTO: 4.63 10E12/L (ref 4.4–5.9)
SODIUM SERPL-SCNC: 141 MMOL/L (ref 133–144)
TROPONIN I SERPL-MCNC: <0.015 UG/L (ref 0–0.04)
TROPONIN I SERPL-MCNC: <0.015 UG/L (ref 0–0.04)
TSH SERPL DL<=0.005 MIU/L-ACNC: 1.12 MU/L (ref 0.4–4)
WBC # BLD AUTO: 6 10E9/L (ref 4–11)

## 2020-07-16 PROCEDURE — 12000011 ZZH R&B MS OVERFLOW

## 2020-07-16 PROCEDURE — G0378 HOSPITAL OBSERVATION PER HR: HCPCS

## 2020-07-16 PROCEDURE — 99223 1ST HOSP IP/OBS HIGH 75: CPT | Mod: AI | Performed by: HOSPITALIST

## 2020-07-16 PROCEDURE — 96376 TX/PRO/DX INJ SAME DRUG ADON: CPT | Mod: 59

## 2020-07-16 PROCEDURE — 93005 ELECTROCARDIOGRAM TRACING: CPT

## 2020-07-16 PROCEDURE — 96366 THER/PROPH/DIAG IV INF ADDON: CPT

## 2020-07-16 PROCEDURE — 71046 X-RAY EXAM CHEST 2 VIEWS: CPT | Mod: FY

## 2020-07-16 PROCEDURE — 25000132 ZZH RX MED GY IP 250 OP 250 PS 637: Performed by: EMERGENCY MEDICINE

## 2020-07-16 PROCEDURE — 25000131 ZZH RX MED GY IP 250 OP 636 PS 637: Performed by: HOSPITALIST

## 2020-07-16 PROCEDURE — 96375 TX/PRO/DX INJ NEW DRUG ADDON: CPT

## 2020-07-16 PROCEDURE — 99285 EMERGENCY DEPT VISIT HI MDM: CPT | Mod: 25

## 2020-07-16 PROCEDURE — U0003 INFECTIOUS AGENT DETECTION BY NUCLEIC ACID (DNA OR RNA); SEVERE ACUTE RESPIRATORY SYNDROME CORONAVIRUS 2 (SARS-COV-2) (CORONAVIRUS DISEASE [COVID-19]), AMPLIFIED PROBE TECHNIQUE, MAKING USE OF HIGH THROUGHPUT TECHNOLOGIES AS DESCRIBED BY CMS-2020-01-R: HCPCS | Performed by: EMERGENCY MEDICINE

## 2020-07-16 PROCEDURE — 83880 ASSAY OF NATRIURETIC PEPTIDE: CPT | Performed by: EMERGENCY MEDICINE

## 2020-07-16 PROCEDURE — 85025 COMPLETE CBC W/AUTO DIFF WBC: CPT | Performed by: EMERGENCY MEDICINE

## 2020-07-16 PROCEDURE — 96372 THER/PROPH/DIAG INJ SC/IM: CPT | Mod: XS

## 2020-07-16 PROCEDURE — 80053 COMPREHEN METABOLIC PANEL: CPT | Performed by: EMERGENCY MEDICINE

## 2020-07-16 PROCEDURE — 84484 ASSAY OF TROPONIN QUANT: CPT | Mod: 91 | Performed by: HOSPITALIST

## 2020-07-16 PROCEDURE — 25000132 ZZH RX MED GY IP 250 OP 250 PS 637: Performed by: HOSPITALIST

## 2020-07-16 PROCEDURE — 84484 ASSAY OF TROPONIN QUANT: CPT | Performed by: EMERGENCY MEDICINE

## 2020-07-16 PROCEDURE — C9803 HOPD COVID-19 SPEC COLLECT: HCPCS

## 2020-07-16 PROCEDURE — 85520 HEPARIN ASSAY: CPT | Performed by: INTERNAL MEDICINE

## 2020-07-16 PROCEDURE — 25800030 ZZH RX IP 258 OP 636: Performed by: EMERGENCY MEDICINE

## 2020-07-16 PROCEDURE — 96365 THER/PROPH/DIAG IV INF INIT: CPT | Mod: 59

## 2020-07-16 PROCEDURE — 93000 ELECTROCARDIOGRAM COMPLETE: CPT | Performed by: FAMILY MEDICINE

## 2020-07-16 PROCEDURE — 00000146 ZZHCL STATISTIC GLUCOSE BY METER IP

## 2020-07-16 PROCEDURE — 99215 OFFICE O/P EST HI 40 MIN: CPT | Performed by: FAMILY MEDICINE

## 2020-07-16 PROCEDURE — 36415 COLL VENOUS BLD VENIPUNCTURE: CPT | Performed by: HOSPITALIST

## 2020-07-16 PROCEDURE — 25000128 H RX IP 250 OP 636: Performed by: EMERGENCY MEDICINE

## 2020-07-16 PROCEDURE — 84443 ASSAY THYROID STIM HORMONE: CPT | Performed by: EMERGENCY MEDICINE

## 2020-07-16 PROCEDURE — 25000125 ZZHC RX 250: Performed by: EMERGENCY MEDICINE

## 2020-07-16 PROCEDURE — 83735 ASSAY OF MAGNESIUM: CPT | Performed by: EMERGENCY MEDICINE

## 2020-07-16 RX ORDER — HEPARIN SODIUM 10000 [USP'U]/100ML
1100 INJECTION, SOLUTION INTRAVENOUS CONTINUOUS
Status: DISCONTINUED | OUTPATIENT
Start: 2020-07-16 | End: 2020-07-18

## 2020-07-16 RX ORDER — POTASSIUM CL/LIDO/0.9 % NACL 10MEQ/0.1L
10 INTRAVENOUS SOLUTION, PIGGYBACK (ML) INTRAVENOUS
Status: DISCONTINUED | OUTPATIENT
Start: 2020-07-16 | End: 2020-07-18 | Stop reason: HOSPADM

## 2020-07-16 RX ORDER — COLCHICINE 0.6 MG/1
0.6 TABLET ORAL DAILY PRN
Status: DISCONTINUED | OUTPATIENT
Start: 2020-07-16 | End: 2020-07-18 | Stop reason: HOSPADM

## 2020-07-16 RX ORDER — COLCHICINE 0.6 MG/1
0.6 TABLET ORAL DAILY PRN
COMMUNITY
End: 2020-11-05

## 2020-07-16 RX ORDER — LANOLIN ALCOHOL/MO/W.PET/CERES
3 CREAM (GRAM) TOPICAL
Status: DISCONTINUED | OUTPATIENT
Start: 2020-07-16 | End: 2020-07-18 | Stop reason: HOSPADM

## 2020-07-16 RX ORDER — PREGABALIN 100 MG/1
100 CAPSULE ORAL ONCE
Status: COMPLETED | OUTPATIENT
Start: 2020-07-16 | End: 2020-07-16

## 2020-07-16 RX ORDER — ALLOPURINOL 300 MG/1
300 TABLET ORAL DAILY
Status: DISCONTINUED | OUTPATIENT
Start: 2020-07-17 | End: 2020-07-18 | Stop reason: HOSPADM

## 2020-07-16 RX ORDER — POTASSIUM CHLORIDE 1500 MG/1
20-40 TABLET, EXTENDED RELEASE ORAL
Status: DISCONTINUED | OUTPATIENT
Start: 2020-07-16 | End: 2020-07-18 | Stop reason: HOSPADM

## 2020-07-16 RX ORDER — OXYCODONE HYDROCHLORIDE 5 MG/1
5 TABLET ORAL EVERY 4 HOURS PRN
Status: DISCONTINUED | OUTPATIENT
Start: 2020-07-16 | End: 2020-07-18 | Stop reason: HOSPADM

## 2020-07-16 RX ORDER — ONDANSETRON 4 MG/1
4 TABLET, ORALLY DISINTEGRATING ORAL EVERY 6 HOURS PRN
Status: DISCONTINUED | OUTPATIENT
Start: 2020-07-16 | End: 2020-07-18 | Stop reason: HOSPADM

## 2020-07-16 RX ORDER — FAMOTIDINE 20 MG/1
40 TABLET, FILM COATED ORAL 2 TIMES DAILY PRN
Status: DISCONTINUED | OUTPATIENT
Start: 2020-07-16 | End: 2020-07-18 | Stop reason: HOSPADM

## 2020-07-16 RX ORDER — DEXTROSE MONOHYDRATE 25 G/50ML
25-50 INJECTION, SOLUTION INTRAVENOUS
Status: DISCONTINUED | OUTPATIENT
Start: 2020-07-16 | End: 2020-07-18 | Stop reason: HOSPADM

## 2020-07-16 RX ORDER — MAGNESIUM SULFATE HEPTAHYDRATE 40 MG/ML
4 INJECTION, SOLUTION INTRAVENOUS EVERY 4 HOURS PRN
Status: DISCONTINUED | OUTPATIENT
Start: 2020-07-16 | End: 2020-07-18 | Stop reason: HOSPADM

## 2020-07-16 RX ORDER — TAMSULOSIN HYDROCHLORIDE 0.4 MG/1
0.8 CAPSULE ORAL DAILY
Status: DISCONTINUED | OUTPATIENT
Start: 2020-07-17 | End: 2020-07-18 | Stop reason: HOSPADM

## 2020-07-16 RX ORDER — POTASSIUM CHLORIDE 1.5 G/1.58G
20-40 POWDER, FOR SOLUTION ORAL
Status: DISCONTINUED | OUTPATIENT
Start: 2020-07-16 | End: 2020-07-18 | Stop reason: HOSPADM

## 2020-07-16 RX ORDER — FLUTICASONE PROPIONATE 50 MCG
1 SPRAY, SUSPENSION (ML) NASAL DAILY PRN
Status: DISCONTINUED | OUTPATIENT
Start: 2020-07-16 | End: 2020-07-18 | Stop reason: HOSPADM

## 2020-07-16 RX ORDER — POTASSIUM CHLORIDE 7.45 MG/ML
10 INJECTION INTRAVENOUS
Status: DISCONTINUED | OUTPATIENT
Start: 2020-07-16 | End: 2020-07-18 | Stop reason: HOSPADM

## 2020-07-16 RX ORDER — LIDOCAINE 40 MG/G
CREAM TOPICAL
Status: DISCONTINUED | OUTPATIENT
Start: 2020-07-16 | End: 2020-07-18 | Stop reason: HOSPADM

## 2020-07-16 RX ORDER — DILTIAZEM HYDROCHLORIDE 5 MG/ML
25 INJECTION INTRAVENOUS ONCE
Status: COMPLETED | OUTPATIENT
Start: 2020-07-16 | End: 2020-07-16

## 2020-07-16 RX ORDER — NALOXONE HYDROCHLORIDE 0.4 MG/ML
.1-.4 INJECTION, SOLUTION INTRAMUSCULAR; INTRAVENOUS; SUBCUTANEOUS
Status: DISCONTINUED | OUTPATIENT
Start: 2020-07-16 | End: 2020-07-18 | Stop reason: HOSPADM

## 2020-07-16 RX ORDER — METOPROLOL TARTRATE 25 MG/1
25 TABLET, FILM COATED ORAL 2 TIMES DAILY
Status: DISCONTINUED | OUTPATIENT
Start: 2020-07-16 | End: 2020-07-18

## 2020-07-16 RX ORDER — LORATADINE 10 MG/1
10 TABLET ORAL DAILY
Status: DISCONTINUED | OUTPATIENT
Start: 2020-07-17 | End: 2020-07-18 | Stop reason: HOSPADM

## 2020-07-16 RX ORDER — PREGABALIN 100 MG/1
100 CAPSULE ORAL 3 TIMES DAILY
Status: DISCONTINUED | OUTPATIENT
Start: 2020-07-16 | End: 2020-07-18 | Stop reason: HOSPADM

## 2020-07-16 RX ORDER — POLYETHYLENE GLYCOL 3350 17 G/17G
17 POWDER, FOR SOLUTION ORAL DAILY
Status: DISCONTINUED | OUTPATIENT
Start: 2020-07-17 | End: 2020-07-18 | Stop reason: HOSPADM

## 2020-07-16 RX ORDER — FUROSEMIDE 10 MG/ML
20 INJECTION INTRAMUSCULAR; INTRAVENOUS ONCE
Status: COMPLETED | OUTPATIENT
Start: 2020-07-16 | End: 2020-07-16

## 2020-07-16 RX ORDER — POTASSIUM CHLORIDE 29.8 MG/ML
20 INJECTION INTRAVENOUS
Status: DISCONTINUED | OUTPATIENT
Start: 2020-07-16 | End: 2020-07-18 | Stop reason: HOSPADM

## 2020-07-16 RX ORDER — FUROSEMIDE 10 MG/ML
40 INJECTION INTRAMUSCULAR; INTRAVENOUS
Status: DISCONTINUED | OUTPATIENT
Start: 2020-07-17 | End: 2020-07-17

## 2020-07-16 RX ORDER — METOPROLOL TARTRATE 1 MG/ML
5 INJECTION, SOLUTION INTRAVENOUS EVERY 4 HOURS PRN
Status: DISCONTINUED | OUTPATIENT
Start: 2020-07-16 | End: 2020-07-18 | Stop reason: HOSPADM

## 2020-07-16 RX ORDER — FINASTERIDE 5 MG/1
5 TABLET, FILM COATED ORAL DAILY
Status: DISCONTINUED | OUTPATIENT
Start: 2020-07-17 | End: 2020-07-18 | Stop reason: HOSPADM

## 2020-07-16 RX ORDER — NICOTINE POLACRILEX 4 MG
15-30 LOZENGE BUCCAL
Status: DISCONTINUED | OUTPATIENT
Start: 2020-07-16 | End: 2020-07-18 | Stop reason: HOSPADM

## 2020-07-16 RX ORDER — POTASSIUM CHLORIDE 1500 MG/1
20 TABLET, EXTENDED RELEASE ORAL 2 TIMES DAILY
Status: DISCONTINUED | OUTPATIENT
Start: 2020-07-16 | End: 2020-07-18 | Stop reason: HOSPADM

## 2020-07-16 RX ORDER — HEPARIN SODIUM 10000 [USP'U]/100ML
12 INJECTION, SOLUTION INTRAVENOUS ONCE
Status: COMPLETED | OUTPATIENT
Start: 2020-07-16 | End: 2020-07-16

## 2020-07-16 RX ORDER — SIMVASTATIN 10 MG
20 TABLET ORAL AT BEDTIME
Status: DISCONTINUED | OUTPATIENT
Start: 2020-07-17 | End: 2020-07-18 | Stop reason: HOSPADM

## 2020-07-16 RX ORDER — PREGABALIN 100 MG/1
100 CAPSULE ORAL 3 TIMES DAILY
COMMUNITY
End: 2020-07-30

## 2020-07-16 RX ORDER — FLUTICASONE PROPIONATE 50 MCG
1 SPRAY, SUSPENSION (ML) NASAL DAILY PRN
COMMUNITY
End: 2020-09-17

## 2020-07-16 RX ORDER — ONDANSETRON 2 MG/ML
4 INJECTION INTRAMUSCULAR; INTRAVENOUS EVERY 6 HOURS PRN
Status: DISCONTINUED | OUTPATIENT
Start: 2020-07-16 | End: 2020-07-18 | Stop reason: HOSPADM

## 2020-07-16 RX ORDER — OXYCODONE HYDROCHLORIDE 5 MG/1
5 TABLET ORAL ONCE
Status: COMPLETED | OUTPATIENT
Start: 2020-07-16 | End: 2020-07-16

## 2020-07-16 RX ADMIN — PREGABALIN 100 MG: 100 CAPSULE ORAL at 17:18

## 2020-07-16 RX ADMIN — FUROSEMIDE 20 MG: 10 INJECTION, SOLUTION INTRAVENOUS at 14:48

## 2020-07-16 RX ADMIN — INSULIN ASPART 1 UNITS: 100 INJECTION, SOLUTION INTRAVENOUS; SUBCUTANEOUS at 20:33

## 2020-07-16 RX ADMIN — DILTIAZEM HYDROCHLORIDE 5 MG/HR: 5 INJECTION INTRAVENOUS at 15:24

## 2020-07-16 RX ADMIN — METOPROLOL TARTRATE 25 MG: 25 TABLET, FILM COATED ORAL at 20:13

## 2020-07-16 RX ADMIN — HEPARIN SODIUM 12 UNITS/KG/HR: 10000 INJECTION, SOLUTION INTRAVENOUS at 15:31

## 2020-07-16 RX ADMIN — POTASSIUM CHLORIDE 20 MEQ: 1500 TABLET, EXTENDED RELEASE ORAL at 20:14

## 2020-07-16 RX ADMIN — OXYCODONE HYDROCHLORIDE 5 MG: 5 TABLET ORAL at 16:56

## 2020-07-16 RX ADMIN — Medication 5600 UNITS: at 15:30

## 2020-07-16 RX ADMIN — DILTIAZEM HYDROCHLORIDE 25 MG: 5 INJECTION INTRAVENOUS at 14:48

## 2020-07-16 ASSESSMENT — ENCOUNTER SYMPTOMS
SHORTNESS OF BREATH: 1
LIGHT-HEADEDNESS: 1
FEVER: 0

## 2020-07-16 ASSESSMENT — ACTIVITIES OF DAILY LIVING (ADL): ADLS_ACUITY_SCORE: 15

## 2020-07-16 ASSESSMENT — MIFFLIN-ST. JEOR: SCORE: 1976.94

## 2020-07-16 NOTE — H&P
[facility]  History and Physical - Hospitalist Service       Date of Admission:  7/16/2020    Assessment & Plan   Toby Mcgrath is a 64 year old male with medical history significant for DM 2, HTN, HL, gout, MDD, anxiety, GERD, chronic pain was sent to ER from his PCPs clinic when he was found to be in A. fib with RVR, is being admitted on 7/16/2020 for further management.    A. fib/flutter with RVR  Exertional dyspnea, rate related vs CHF vs angina equivalent  Patient with worsening exertional dyspnea without chest pain orthopnea PND but has increased leg swelling.  Noted A. fib/flutter with RVR at PCPs clinic. Chest x-ray does not show volume overload.  proBNP pending.  TSH normal.  -Feels better once his heart rate was controlled with Cardizem drip in ER and after receiving Lasix already.  -Continue Cardizem drip. Metoprolol 25 mg p.o. twice daily as well started.  -Has chronic feet swelling and neuropathic pain, but patient now has swelling around ankle and distal leg, also with pain. Rule out DVT as well.  -I will also diurese him with Lasix 40 mg IV twice daily.  Start KCl 20 M EQ p.o. twice daily while on IV diuresis.  -ITD3LD2 Vas score of minimum 3 (age, HTN, DM), pending echo. 2D echo ordered. Started on heparin drip in ER, continue.  -Cardiology consult  -Telemetry. Repeat troponin, check FLP.     DM 2  PTA is on metformin.  He is HbA1c was 7.82 months ago. Blood sugar 133 in ER.  -Hold PTA metformin, I will start him on basal Lantus 10 units subcu at bedtime and medium insulin resistance sliding scale.  -Hypoglycemia protocol order in place  -Moderate carb diet.    HTN, HL  PTA simvastatin 20 mg p.o. daily resumed.    Gout  PTA colchicine and allopurinol resumed.    Major depressive disorder/anxiety  Back pain/radiculopathy  Chronic pain  PTA fluoxetine 60 mg p.o. daily Lyrica 100 mg p.o. 3 times daily, oxycodone 5 mg p.o. every 4 hours as needed resumed.  Uses Cane for  mobility.     GERD  PTA PPI resumed, also takes Zantac as needed    BPH  -PTA finasteride and tamsulosin resumed    Symptomatic COVID-19 testing  Patient's presenting symptom of dyspnea and fatigue are likely related to underlying cardiac issues.  But given the pandemic, it is also possible that he could have contracted it and leading to the symptoms.  COVID-19 swab sent in ER was asymptomatic, but since he has above symptoms, will keep him on isolation until the test result are available.  -If negative, discontinue isolation.     Diet: Cardiac and moderate carbohydrate diet  DVT Prophylaxis: On heparin drip  Mckenzie Catheter: not present  Code Status: Full code by default         Disposition Plan   Expected discharge: 2 - 3 days, recommended to prior living arrangement once Rate controlled, cardiac work-up complete.  Entered: Kathleen Dong MD 07/16/2020, 4:44 PM     The patient's care was discussed with the Patient.    Kathleen Dong MD  [facility]    ______________________________________________________________________    Chief Complaint   Dyspnea and fatigue    History is obtained from the patient, chart review, discussion with ER physician    History of Present Illness   Toby Mcgrath is a 64 year old male with medical history significant for DM 2, HTN, HL, gout, MDD, anxiety, GERD, chronic pain was sent to ER from his PCPs clinic when he was found to be in A. fib with RVR.    Patient reports worsening shortness of breath for 1 week.  It has gradually progressed and he feels fatigue/with weakness in general.  No fall or injuries, no unilateral weakness.  Patient denies fever, cough, orthopnea, PND.  He used to have some swelling in the feet but has progressed to ankle/distal leg.  Also has neuropathy and leg pain but reports increased pain over his both calves also.      Patient went to his primary care provider's clinic to be evaluated, EKG there showed A. fib/flutter with RVR and  given his symptoms, patient was sent to ER for evaluation.    In ER, patient was evaluated by Dr. Banks.  Patient was in a flutter with RVR, with restarted on heparin and Cardizem drip.  Lasix 40 mg IV was given.  He feels a lot better already.  CMP unremarkable, troponin negative, TSH normal, CBC unremarkable, blood sugar 133.  Chest x-ray done before coming to ER was negative for any pulmonary vascular congestion.  An admission was recommended for further management.    Review of Systems    The 10 point Review of Systems is negative other than noted in the HPI or here.      Past Medical History    I have reviewed this patient's medical history and updated it with pertinent information if needed.   Past Medical History:   Diagnosis Date     Anxiety state, unspecified      BMI 32.0-32.9,adult 9/4/2015     Chronic pain syndrome 3/29/2007    Narcotic refill protocol Will return every 2-3 months for clinic visits Controlled substance aggreement on file from this  6/26/12 Documentation in problem list: no, appears to be compliant based on last visit He is responding best to Oxycontin 10 mg twice daily # 60 per month.  This is costly and looking into [place] for coverage.  Nausea with MS Contin Has meds refilled 16 of every month Last [facility] website verification:  done on 7/8/2015  [email or web address]   2/10/2015:  PCP will take over narcotic prescription Tapering course: using 5 mg tablets 10 mg morning 15 mg noon, 15 mg night for 1 week then 10 mg morning, 10 mg noon, 15 mg night for 1 week then 10 mg TID for 1 week then see me for refills  Then ongoing taper: 5 mg morning, 10 mg noon and 10 mg night for 1 week then  5 mg morning and noon and 10 mg night for 1 week then 5 mg tid for 1 week Then 5 mg morning no noon dose and 5 mg night for 1 week then No morning or non dose and 5 mg nightly for 1 week then off  Goal is co     Chronic rhinitis 3/29/2007     DDD (degenerative disc disease), cervical 2/8/2013     Normal.  C2-C3: Normal disc, facet joints, spinal canal and neural foramina.  C3-C4: Mild broad-based posterior disc bulge. Otherwise normal.  C4-C5: Normal disc, facet joints, spinal canal and neural foramina.  C5-C6: Moderate degenerative disc disease with loss of disc height, circumferential disc bulge and vertebral endplate osteophytes. Mild spinal canal stenosis and moderate left foraminal stenosis. Normal right foramen and facet joints.  C6-C7: Mild circumferential disc bulge. Slight impression on the thecal sac. Mild left foraminal stenosis. Normal right foramen and facet joints.  C7-T1: Normal disc, facet joints, spinal canal and neural foramina.  T1-T2: Mild central posterior disc protrusion.  T2-T3: Mild central posterior disc protrusion. Slight impression on the spinal cord.         DJD (degenerative joint disease), lumbar 1/10/2012     Esophageal reflux     ,refluxes on upper GI     Gout 4/8/2011     Gout, unspecified      Hyperlipidemia LDL goal <100 10/25/2012     Hypertension goal BP (blood pressure) < 140/90 10/21/2011     HYPERTROPHY PROSTATE WITH OBST 8/3/2006     Impotence of organic origin      Lumbar radiculopathy 9/17/2014     Major depressive disorder, recurrent episode, unspecified 7/9/2008     Osteoarthrosis, unspecified whether generalized or localized, pelvic region and thigh      Pure hyperglyceridemia      ROTATOR CUFF SYND NOS 4/17/2008     S/P lumbar fusion 10/14/2014     Thoracic or lumbosacral neuritis or radiculitis, unspecified      Tobacco use disorder      Type 2 diabetes, HbA1C goal < 8% (H) 9/9/2011       Past Surgical History   I have reviewed this patient's surgical history and updated it with pertinent information if needed.  Past Surgical History:   Procedure Laterality Date     BACK SURGERY       both shoulder repair  2006, 2008     C NONSPECIFIC PROCEDURE  1995    neck cyst     C NONSPECIFIC PROCEDURE  1979    laminectomy L5-S1 x 2     C SHOULDER SURG PROC UNLISTED  2005,  '07    repairs,later manipulate,inject LT, RT     FUSION LUMBAR ANTERIOR, FUSION LUMBAR POSTERIOR TWO LEVELS, COMBINED N/A 9/17/2014    Procedure: COMBINED FUSION LUMBAR ANTERIOR, FUSION LUMBAR POSTERIOR TWO LEVELS;  Surgeon: Chris Lugo MD;  Location: [facility] OR     HC COLONOSCOPY THRU STOMA, DIAGNOSTIC  3/04    normal     HC UGI ENDOSCOPY DIAG W OR W/O BRUSH/WASH  3/04    ok     HEAD & NECK SURGERY       HEMILAMINECTOMY, DISCECTOMY LUMBAR ONE LEVEL, COMBINED Left 9/8/2015    Procedure: COMBINED HEMILAMINECTOMY, DISCECTOMY LUMBAR ONE LEVEL;  Surgeon: Jer Irby MD;  Location: [facility] OR     right hip replacement  10,2010       Social History   I have reviewed this patient's social history and updated it with pertinent information if needed.      Family History   I have reviewed this patient's family history and updated it with pertinent information if needed.  Family History   Problem Relation Age of Onset     Diabetes Mother      C.A.D. Father      Diabetes Father      Hypertension Father        Prior to Admission Medications   Prior to Admission Medications   Prescriptions Last Dose Informant Patient Reported? Taking?   ACE/ARB/ARNI NOT PRESCRIBED, INTENTIONAL,  Self No No   Sig: Please choose reason not prescribed, below   FLUoxetine (PROZAC) 20 MG capsule 7/16/2020 at Unknown time Self No Yes   Sig: TAKE 3 CAPSULES BY MOUTH  ONCE DAILY   allopurinol (ZYLOPRIM) 100 MG tablet 7/16/2020 at Unknown time Self No Yes   Sig: Take 3 tablets (300 mg) by mouth daily   aspirin 81 MG tablet 7/16/2020 at Unknown time Self No Yes   Sig: Take 1 tablet (81 mg) by mouth daily   blood glucose (ACCU-CHEK DANIELE PLUS) test strip  Self No No   Sig: USE TO TEST BLOOD SUGAR TWO TIMES DAILY OR AS DIRECTED   blood glucose monitoring (ACCU-CHEK DANIELE PLUS) meter device kit  Self No No   Sig: USE TO TEST BLOOD SUGARS 2  TIMES DAILY OR AS DIRECTED.   blood glucose monitoring (ACCU-CHEK FASTCLIX) lancets  Self No No   Sig: USE  TO TEST BLOOD SUGAR TWO TIMES DAILY OR AS DIRECTED   colchicine (COLCYRS) 0.6 MG tablet Past Month at Unknown time Self Yes Yes   Sig: Take 0.6 mg by mouth daily as needed for moderate pain   finasteride (PROSCAR) 5 MG tablet 7/16/2020 at Unknown time Self No Yes   Sig: TAKE 1 TABLET BY MOUTH  DAILY FOR PROSTATE  ENLARGEMENT   fluticasone (FLONASE) 50 MCG/ACT nasal spray Past Month at Unknown time Self Yes Yes   Sig: Spray 1 spray into both nostrils daily as needed for rhinitis or allergies   ketoconazole (NIZORAL) 2 % external shampoo Past Month at Unknown time Self No Yes   Sig: APPLY TOPICALLY 2 TO 3  TIMES PER WEEK AS NEEDED  FOR ITCHING OR IRRITATION,  LEAVE ON FOR 2 MINUTES,  THEN WASH OFF   loratadine (CLARITIN) 10 MG tablet 7/16/2020 at Unknown time Self No Yes   Sig: Take 1 tablet (10 mg) by mouth daily   metFORMIN (GLUCOPHAGE-XR) 500 MG 24 hr tablet 7/15/2020 at Unknown time Self No Yes   Sig: TAKE 4 TABLETS BY MOUTH  DAILY WITH DINNER   naloxone (NARCAN) 4 MG/0.1ML nasal spray More than a month at Unknown time Self No No   Sig: Spray 1 spray (4 mg) into one nostril alternating nostrils once as needed for opioid reversal Every 2-3 minutes until patient responsive or EMS arrives   omeprazole (PRILOSEC) 20 MG DR capsule 7/16/2020 at Unknown time Self No Yes   Sig: Take 1 capsule (20 mg) by mouth daily   oxyCODONE (ROXICODONE) 5 MG tablet 7/16/2020 at Unknown time Self No Yes   Sig: Take 1 tablet (5 mg) by mouth every 4 hours as needed for severe pain Fill on 3/26/20 Max #6 per day   polyethylene glycol (MIRALAX/GLYCOLAX) packet 7/16/2020 at Unknown time Self No Yes   Sig: Take 17 g by mouth daily   pregabalin (LYRICA) 100 MG capsule 7/16/2020 at Unknown time Self Yes Yes   Sig: Take 100 mg by mouth 3 times daily   ranitidine (ZANTAC) 150 MG tablet More than a month at Unknown time Self Yes No   Sig: Take 300 mg by mouth 2 times daily as needed for heartburn   simvastatin (ZOCOR) 20 MG tablet 7/16/2020 at  Unknown time Self No Yes   Sig: TAKE 1 TABLET BY MOUTH AT  BEDTIME   tamsulosin (FLOMAX) 0.4 MG capsule 7/16/2020 at Unknown time Self No Yes   Sig: Take 2 capsules (0.8 mg) by mouth daily      Facility-Administered Medications: None     Allergies   Allergies   Allergen Reactions     Codeine Nausea and Vomiting     Tolerating other opiates        Physical Exam   Vital Signs: Temp: 98.8  F (37.1  C) Temp src: Temporal BP: 132/78 Pulse: 81 Heart Rate: 83 Resp: 12 SpO2: 94 %      Weight: 0 lbs 0 oz    General: AAOx3, appears comfortable.  HEENT: PERRLA EOMI. Mucosa moist.   Lungs: Bilateral equal air entry. Clear to auscultation, normal work of breathing.   CVS: S1S2 irregular, mildly tachycardic.  No murmur.  Abdomen: Soft, obese/distended ND. BS heard.  MSK: Bilateral ankle edema 2+.  Patient is tender over his calf and also ankle.  Neuro: AAOX3. CN 2-12 normal. Strength symmetrical.  Skin: No rash.       Data   Data reviewed today: I reviewed all medications, new labs and imaging results over the last 24 hours. I personally reviewed the EKG tracing showing A flutter with 2 is to 1 block, heart rate in 120s.  He does have ST depression in inferior leads.  CXR is negative for pulmonary edema.    Recent Labs   Lab 07/16/20  1440   WBC 6.0   HGB 13.7   MCV 86         POTASSIUM 4.6   CHLORIDE 109   CO2 25   BUN 14   CR 1.10   ANIONGAP 7   JOSE 9.0   *   ALBUMIN 3.7   PROTTOTAL 7.5   BILITOTAL 0.5   ALKPHOS 65   ALT 48   AST 39   TROPI <0.015     Recent Results (from the past 24 hour(s))   XR Chest 2 Views    Narrative    CHEST TWO VIEWS 7/16/2020 1:25 PM     HISTORY: Dyspnea, unspecified type. Palpitations.    COMPARISON: 7/12/2018    FINDINGS: Heart size and pulmonary vascularity are within normal  limits. The lungs are clear. No pneumothorax or pleural effusion.       Impression    IMPRESSION: No radiographic evidence of acute chest abnormality.     REGINA DAVIS MD

## 2020-07-16 NOTE — PHARMACY-MEDICATION REGIMEN REVIEW
RX [place] reviewed:  reviewed- no concerns    Recent fill history is as followed    Oxycodone 5mg tab #180 filled 6/23/20    pregabalin 100mg #360 filled 6/17/20     Phoebe Saunders PharmD

## 2020-07-16 NOTE — PHARMACY-ADMISSION MEDICATION HISTORY
Pharmacy Medication History  Admission medication history interview status for the 7/16/2020  admission is complete. See EPIC admission navigator for prior to admission medications     Medication history sources: Patient and sure scripts   Medication history source reliability: Good  Adherence assessment: Good    Significant changes made to the medication list:        Additional medication history information:   2  Medication reconciliation completed by provider prior to medication history? No    Time spent in this activity: 25min       Prior to Admission medications    Medication Sig Last Dose Taking? Auth Provider   allopurinol (ZYLOPRIM) 100 MG tablet Take 3 tablets (300 mg) by mouth daily 7/16/2020 at Unknown time Yes Connie Haskins MD   aspirin 81 MG tablet Take 1 tablet (81 mg) by mouth daily 7/16/2020 at Unknown time Yes Julio Hurley MD   colchicine (COLCYRS) 0.6 MG tablet Take 0.6 mg by mouth daily as needed for moderate pain Past Month at Unknown time Yes Unknown, Entered By History   finasteride (PROSCAR) 5 MG tablet TAKE 1 TABLET BY MOUTH  DAILY FOR PROSTATE  ENLARGEMENT 7/16/2020 at Unknown time Yes Connie Haskins MD   FLUoxetine (PROZAC) 20 MG capsule TAKE 3 CAPSULES BY MOUTH  ONCE DAILY 7/16/2020 at Unknown time Yes Connie Haskins MD   fluticasone (FLONASE) 50 MCG/ACT nasal spray Spray 1 spray into both nostrils daily as needed for rhinitis or allergies Past Month at Unknown time Yes Unknown, Entered By History   ketoconazole (NIZORAL) 2 % external shampoo APPLY TOPICALLY 2 TO 3  TIMES PER WEEK AS NEEDED  FOR ITCHING OR IRRITATION,  LEAVE ON FOR 2 MINUTES,  THEN WASH OFF Past Month at Unknown time Yes Margo Delgado MD   loratadine (CLARITIN) 10 MG tablet Take 1 tablet (10 mg) by mouth daily 7/16/2020 at Unknown time Yes Alhaji Pierre PA-C   metFORMIN (GLUCOPHAGE-XR) 500 MG 24 hr tablet TAKE 4 TABLETS BY MOUTH  DAILY WITH DINNER 7/15/2020 at Unknown time Yes  Raman Ordaz MD   omeprazole (PRILOSEC) 20 MG DR capsule Take 1 capsule (20 mg) by mouth daily 7/16/2020 at Unknown time Yes Campbell Kwong PA-C   oxyCODONE (ROXICODONE) 5 MG tablet Take 1 tablet (5 mg) by mouth every 4 hours as needed for severe pain Fill on 3/26/20 Max #6 per day 7/16/2020 at Unknown time Yes Raman Ordaz MD   polyethylene glycol (MIRALAX/GLYCOLAX) packet Take 17 g by mouth daily 7/16/2020 at Unknown time Yes Campbell Kwong PA-C   pregabalin (LYRICA) 100 MG capsule Take 100 mg by mouth 3 times daily 7/16/2020 at Unknown time Yes Unknown, Entered By History   simvastatin (ZOCOR) 20 MG tablet TAKE 1 TABLET BY MOUTH AT  BEDTIME 7/16/2020 at Unknown time Yes Connie Haskins MD   tamsulosin (FLOMAX) 0.4 MG capsule Take 2 capsules (0.8 mg) by mouth daily 7/16/2020 at Unknown time Yes Raman Ordaz MD   ACE/ARB/ARNI NOT PRESCRIBED, INTENTIONAL, Please choose reason not prescribed, below   Connie Haskins MD   blood glucose (ACCU-CHEK DANIELE PLUS) test strip USE TO TEST BLOOD SUGAR TWO TIMES DAILY OR AS DIRECTED   Connie Haskins MD   blood glucose monitoring (ACCU-CHEK DANIELE PLUS) meter device kit USE TO TEST BLOOD SUGARS 2  TIMES DAILY OR AS DIRECTED.   Connie Haskins MD   blood glucose monitoring (ACCU-CHEK FASTCLIX) lancets USE TO TEST BLOOD SUGAR TWO TIMES DAILY OR AS DIRECTED   Connie Haskins MD   naloxone (NARCAN) 4 MG/0.1ML nasal spray Spray 1 spray (4 mg) into one nostril alternating nostrils once as needed for opioid reversal Every 2-3 minutes until patient responsive or EMS arrives More than a month at Unknown time  Connie Haskins MD   ranitidine (ZANTAC) 150 MG tablet Take 300 mg by mouth 2 times daily as needed for heartburn More than a month at Unknown time  Unknown, Entered By History

## 2020-07-16 NOTE — PROGRESS NOTES
RECEIVING UNIT ED HANDOFF REVIEW    ED Nurse Handoff Report was reviewed by: Emperatriz Spencer RN on July 16, 2020 at 6:04 PM

## 2020-07-16 NOTE — ED NOTES
Bed: ED06  Expected date:   Expected time:   Means of arrival:   Comments:  [facility] [place] [facility] - 421 - 64 M SOB atrial fib eta 1410

## 2020-07-16 NOTE — ED PROVIDER NOTES
History   Chief Complaint:  Shortness of Breath     HPI   Toby Mcgrath is a 64 year old male with a history of hypertension, hyperlipidemia, and diabetes who presents via EMS with shortness of breath. The patient reports that one week ago he developed exertional shortness of breath that has worsened in the past 1-2 days. He has no shortness of breath at rest. He endorses developing lightheadedness as well after even small amounts of movement. He states that he has been lightheaded all day today. The patient went to the clinic for evaluation of these symptoms today where he was observed to be in a-fib flutter with RVR, therefore he was sent to the ED via EMS. The patient did have an x-ray at the clinic which was unremarkable (see below). EMS also noted the patient to be in a-flutter and administered aspirin. The patient's sats were around 90% en route. In the ED, the patient also attests to having recent leg swelling which is new for him. He does endorse baseline foot swelling but notes the swelling in his legs is new. He denies chest pain and fever. He does have a history of hypertension, hyperlipidemia, diabetes, and chronic back pain but denies history of heart disease.     XR Chest 2 Views- 07/16/2020:  No radiographic evidence of acute chest abnormality.    Allergies:  Codeine    Medications:   ACE/ARB/ARNI not prescribed, intentional   Allopurinol  Aspirin  Colchicine  Finasteride  Prozac  Lyrica  Metformin  Narcan  Prilosec  Oxycodone  Miralax  Zantac  Simvastatin  Flomax    Past Medical History:    Anxiety state, unspecified    Chronic pain syndrome   Chronic rhinitis   DDD, cervical   DJD, lumbar   GERD  Gout   Hyperlipidemia    Hypertension  Impotence of organic origin   Lumbar radiculopathy   Major depressive disorder, recurrent episode, unspecified   Osteoarthrosis, unspecified whether generalized or localized, pelvic region and thigh   Pure hyperglyceridemia    Thoracic or lumbosacral neuritis or  radiculitis, unspecified   Tobacco use disorder   Type 2 diabetes mellitus without complication  Seborrheic keratoses  Diabetic polyneuropathy associated with type II diabetes  BPH  Sow transit constipation  Failed back syndrome of lumbar spine  Idiopathic gout of left elbow  Bilateral low back pain without sciatica  History of hep C  Major depressive disorder, recurrent episode  Chronic rhinitis    Past Surgical History:    Shoulder repair, left  Shoulder repair, right  Neck cyst   Laminectomy x2  Fusion lumbar anterior, fusion lumbar posterior two levels   Head and neck surgery  Hemilaminectomy, discectomy lumbar one level  Right hip arthroplasty     Family History:    Diabetes  CAD  Hypertension     Social History:  Smoking status- former smoker  Alcohol use- no  Drug use- no  Marital status:      Review of Systems   Constitutional: Negative for fever.   Respiratory: Positive for shortness of breath (exertional).    Cardiovascular: Positive for leg swelling. Negative for chest pain.   Neurological: Positive for light-headedness.   All other systems reviewed and are negative.    Physical Exam     Patient Vitals for the past 24 hrs:   BP Temp Temp src Pulse Heart Rate Resp SpO2   07/16/20 1600 132/78 -- -- 81 83 12 --   07/16/20 1545 (!) 121/95 -- -- 81 80 9 --   07/16/20 1530 (!) 141/89 -- -- 101 93 16 --   07/16/20 1515 117/72 -- -- 72 -- -- 94 %   07/16/20 1500 99/74 -- -- 109 81 16 --   07/16/20 1459 -- -- -- -- 89 16 --   07/16/20 1458 -- -- -- -- 109 12 --   07/16/20 1457 99/74 -- -- 109 121 23 --   07/16/20 1456 93/75 -- -- 121 114 8 --   07/16/20 1419 -- -- -- -- -- 9 96 %   07/16/20 1418 (!) 118/98 98.8  F (37.1  C) Temporal 123 -- -- --     Physical Exam  Constitutional: Middle aged male, heavy-set, sitting. No respiratory distress, no stridor.  HENT: No signs of trauma.   Eyes: EOM are normal. Pupils are equal, round, and reactive to light.   Neck: Normal range of motion. No JVD present. No  cervical adenopathy.  Cardiovascular: Rapid rate, irregularly irregular rhythm. Exam reveals no gallop and no friction rub.    No murmur heard.  Pulmonary/Chest: Bilateral breath sounds normal. No wheezes, rhonchi or rales.  Abdominal: Soft. No tenderness. No rebound or guarding. Left lower quadrant incision. 1+ fem pulse.  Musculoskeletal: Trace lower extremity edema bilaterally. No tenderness.   Lymphadenopathy: No lymphadenopathy.   Neurological: Alert and oriented to person, place, and time. Normal strength. Coordination normal.   Skin: Skin is warm and dry. No rash noted. No erythema.     Emergency Department Course   ECG (14:15:14):  Rate 121 bpm. NH interval *. QRS duration 106. QT/QTc 344/488. P-R-T axes 260 -54 -9. Atrial flutter with 2:1 AV conduction. Pulmonary disease pattern. Incomplete RBBB. Left anterior fascicular block. ST depression, consider subendocardial injury. Interpreted by Francisco Roy MD.    Laboratory:  Laboratory findings were communicated with the patient who voiced understanding of the findings.    CBC: WBC 6.0, 13.7, 185  CMP: Glucose 133 o/w WNL (Creatinine 1.10)  Troponin (collected 1440): <0.015  TSH with free T4 reflex: 1.12    Asymptomatic COVID-19 virus PCR: pending    Interventions:  1448 Lasix 20 mg IV   diltiazem 25 mg IV  1524 diltiazem infusion  IV  1530 heparin loading dose 5,600 units IV  1531 heparin infusion  IV  1656 oxycodone 5 mg PO    Emergency Department Course:  The patient presents to the ED via EMS.     Past medical records, nursing notes, and vitals reviewed.   1411 I performed an exam of the patient and obtained history, as documented above.     EKG obtained see results above.    IV inserted and blood drawn. The patient was placed on continuous cardiac monitoring and pulse oximetry.    1530 I rechecked the patient and discussed the results of his workup thus far. Patient is feeling improved.    1539 I spoke to Dr. Dong of the hospitalist service who  accepts the patient for admission.     Findings and plan explained to the patient who consents to admission. Discussed the patient with Dr. Dong, who will admit the patient to a [facility] bed for further monitoring, evaluation, and treatment.    Impression & Plan   Covid-19  Toby Mcgrath was evaluated during a global COVID-19 pandemic, which necessitated consideration that the patient might be at risk for infection with the SARS-CoV-2 virus that causes COVID-19.   Applicable protocols for evaluation were followed during the patient's care.     COVID-19 was considered as part of the patient's evaluation. The plan for testing is: a test was obtained during this visit.    Medical Decision Making:  Toby Mcgrath is a 64 year old male who has not been feeling good the past week. Any time he tries to exert himself he gets short of breath and he feels lightheaded. He denies any chest pain or pressure. He has noted some increase swelling in his legs. He has chronic back pain issues. He has diabetes and hypertension. He went to the clinic today and they noticed he was in atrial fibrillation, gave him aspirin and he came to the hospital by paramedics. On exam here, he is in atrial flutter. Blood pressure is good, his heart rate is in the 120's. He does have some lower extremity edema. Patient was worked up, his chest x-ray was reviewed, this had been done out of hospital. He was started on both IV diltiazem bolus and drip and IV heparin bolus and drip. He also received a small dose of Lasix. He is feeling much better, his rate has come down. His blood pressure remains stable and we weill admit him for further evaluation and treatment.       Diagnosis:    ICD-10-CM   1. Atrial flutter with rapid ventricular response (H)  I48.92         Disposition:  Admitted to [facility].          Scribe Disclosure:  Don AMAYA, am serving as a scribe at 2:11 PM on 7/16/2020 to document services personally performed by Francisco Roy,  MD based on my observations and the provider's statements to me.        Francisco Roy MD  07/16/20 2123

## 2020-07-16 NOTE — PROGRESS NOTES
"Subjective     Toby Mcgrath is a 64 year old male who presents to clinic today for the following health issues:    HPI       Shortness of breath      Duration: couple of weeks    Dizzy and SOB    Head is light    Burning sensation in head and I chest    \"rushing feeling in head\"    Dyspnea worse with walking  Better with sitting and rest      Description (location/character/radiation): SOB    Intensity:  severe    Accompanying signs and symptoms: has to catch his breath after movement and dizziness, patient can walk a half a block before symptoms arrive    History (similar episodes/previous evaluation): None    Precipitating or alleviating factors: None    Therapies tried and outcome: sit down and then it passes         Patient Active Problem List   Diagnosis     Thoracic or lumbosacral neuritis or radiculitis, unspecified     Osteoarthrosis, unspecified whether generalized or localized, pelvic region and thigh     Hypertrophy of prostate with urinary obstruction     Chronic rhinitis     Chronic pain syndrome     Disorder of bursae and tendons in shoulder region     Major depressive disorder, recurrent episode (H)     Anxiety     Gout     Type 2 diabetes mellitus without complication (H)     Hypertension goal BP (blood pressure) < 140/90     Advanced directives, counseling/discussion     DJD (degenerative joint disease), lumbar     Hyperlipidemia LDL goal <100     DDD (degenerative disc disease), cervical     GERD (gastroesophageal reflux disease)     Lumbar radiculopathy     S/P lumbar fusion     Left-sided low back pain with left-sided sciatica     BMI 32.0-32.9,adult     History of hepatitis C     S/P lumbar discectomy     Acute post-operative pain     S/P laminectomy     Chronic, continuous use of opioids     Bilateral low back pain without sciatica     Right-sided low back pain with right-sided sciatica     Acute gouty arthritis     Acute gout of right elbow, unspecified cause     Idiopathic gout of left " elbow, unspecified chronicity     Gastroesophageal reflux disease without esophagitis     Failed back syndrome of lumbar spine     Slow transit constipation     Benign prostatic hyperplasia with urinary frequency     Diabetic polyneuropathy associated with type 2 diabetes mellitus (H)     Seborrheic keratoses     Past Surgical History:   Procedure Laterality Date     BACK SURGERY       both shoulder repair  2006, 2008     C NONSPECIFIC PROCEDURE  1995    neck cyst     C NONSPECIFIC PROCEDURE  1979    laminectomy L5-S1 x 2     C SHOULDER SURG PROC UNLISTED  2005, '07    repairs,later manipulate,inject LT, RT     FUSION LUMBAR ANTERIOR, FUSION LUMBAR POSTERIOR TWO LEVELS, COMBINED N/A 9/17/2014    Procedure: COMBINED FUSION LUMBAR ANTERIOR, FUSION LUMBAR POSTERIOR TWO LEVELS;  Surgeon: Chris Lugo MD;  Location: [facility] OR     HC COLONOSCOPY THRU STOMA, DIAGNOSTIC  3/04    normal     HC UGI ENDOSCOPY DIAG W OR W/O BRUSH/WASH  3/04    ok     HEAD & NECK SURGERY       HEMILAMINECTOMY, DISCECTOMY LUMBAR ONE LEVEL, COMBINED Left 9/8/2015    Procedure: COMBINED HEMILAMINECTOMY, DISCECTOMY LUMBAR ONE LEVEL;  Surgeon: Jer Irby MD;  Location: [facility] OR     right hip replacement  10,2010       Social History     Tobacco Use     Smoking status: Former Smoker     Years: 40.00     Types: Cigarettes     Smokeless tobacco: Former User     Quit date: 2/1/2013   Substance Use Topics     Alcohol use: No     Alcohol/week: 0.0 standard drinks     Family History   Problem Relation Age of Onset     Diabetes Mother      C.A.D. Father      Diabetes Father      Hypertension Father          Current Outpatient Medications   Medication Sig Dispense Refill     ACE/ARB/ARNI NOT PRESCRIBED, INTENTIONAL, Please choose reason not prescribed, below       allopurinol (ZYLOPRIM) 100 MG tablet Take 3 tablets (300 mg) by mouth daily 270 tablet 3     aspirin 81 MG tablet Take 1 tablet (81 mg) by mouth daily 100 tablet 3     blood glucose  (ACCU-CHEK DANIELE PLUS) test strip USE TO TEST BLOOD SUGAR TWO TIMES DAILY OR AS DIRECTED 200 strip 0     blood glucose monitoring (ACCU-CHEK DANIELE PLUS) meter device kit USE TO TEST BLOOD SUGARS 2  TIMES DAILY OR AS DIRECTED. 1 kit 0     blood glucose monitoring (ACCU-CHEK FASTCLIX) lancets USE TO TEST BLOOD SUGAR TWO TIMES DAILY OR AS DIRECTED 200 each 0     colchicine (MITIGARE) 0.6 MG capsule Take 1 capsule (0.6 mg) by mouth daily 30 capsule 3     finasteride (PROSCAR) 5 MG tablet TAKE 1 TABLET BY MOUTH  DAILY FOR PROSTATE  ENLARGEMENT 90 tablet 0     FLUoxetine (PROZAC) 20 MG capsule TAKE 3 CAPSULES BY MOUTH  ONCE DAILY 270 capsule 0     fluticasone (FLONASE) 50 MCG/ACT nasal spray USE 1 TO 2 SPRAYS INTO BOTH NOSTRILS DAILY 48 g 3     ketoconazole (NIZORAL) 2 % external shampoo APPLY TOPICALLY 2 TO 3  TIMES PER WEEK AS NEEDED  FOR ITCHING OR IRRITATION,  LEAVE ON FOR 2 MINUTES,  THEN WASH  mL 1     loratadine (CLARITIN) 10 MG tablet Take 1 tablet (10 mg) by mouth daily 30 tablet 3     LYRICA 100 MG capsule Take 2 capsules (200 mg) by mouth 2 times daily 360 capsule 3     metFORMIN (GLUCOPHAGE-XR) 500 MG 24 hr tablet TAKE 4 TABLETS BY MOUTH  DAILY WITH DINNER 360 tablet 1     naloxone (NARCAN) 4 MG/0.1ML nasal spray Spray 1 spray (4 mg) into one nostril alternating nostrils once as needed for opioid reversal Every 2-3 minutes until patient responsive or EMS arrives 0.2 mL 0     omeprazole (PRILOSEC) 20 MG DR capsule Take 1 capsule (20 mg) by mouth daily 90 capsule 3     oxyCODONE (ROXICODONE) 5 MG tablet Take 1 tablet (5 mg) by mouth every 4 hours as needed for severe pain Fill on 3/26/20 Max #6 per day 180 tablet 0     polyethylene glycol (MIRALAX/GLYCOLAX) packet Take 17 g by mouth daily 100 packet 3     PROCTOZONE-HC 2.5 % cream PLACE RECTALLY 2 TIMES  DAILY AS NEEDED FOR  HEMORRHOIDS 30 g 1     ranitidine (ZANTAC) 300 MG tablet Take 1 tablet (300 mg) by mouth 2 times daily To reduce stomach acid 180  tablet 3     simvastatin (ZOCOR) 20 MG tablet TAKE 1 TABLET BY MOUTH AT  BEDTIME 90 tablet 1     tamsulosin (FLOMAX) 0.4 MG capsule Take 2 capsules (0.8 mg) by mouth daily 180 capsule 2     triamcinolone (KENALOG) 0.1 % cream Apply sparingly to affected area twice daily for 7 days. Then stop and use only for flares 30 g 3     Recent Labs   Lab Test 05/28/20  0903 12/26/19  1117 07/16/19  1048 06/26/19  1149  05/31/18  1057 04/23/18  1401  05/17/17  0905   A1C 7.8* 6.8*  --  7.1*   < > 6.7*  --    < >  --    LDL 48  --   --  50  --  74  --   --  63   HDL 29*  --   --  33*  --  29*  --   --  34*   TRIG 239*  --   --  206*  --   --   --   --  199*   ALT  --   --  65  --   --  65 51   < >  --    CR 1.09  --  1.03  --    < > 1.18 1.14   < >  --    GFRESTIMATED 70  --  75  --    < > 62 65   < >  --    GFRESTBLACK 81  --  87  --    < > 75 78   < >  --    POTASSIUM 4.4  --  4.3  --    < > 4.6 4.6   < >  --    TSH 1.64  --   --   --   --   --  0.91  --   --     < > = values in this interval not displayed.      BP Readings from Last 3 Encounters:   07/16/20 117/83   01/07/20 128/78   12/26/19 136/82    Wt Readings from Last 3 Encounters:   07/16/20 115.9 kg (255 lb 8 oz)   01/07/20 113.4 kg (250 lb)   12/26/19 112.9 kg (249 lb)                    Reviewed and updated as needed this visit by Provider         Review of Systems   Constitutional, HEENT, cardiovascular, pulmonary, gi and gu systems are negative, except as otherwise noted.      Objective    /83   Pulse 126   Temp 98  F (36.7  C) (Tympanic)   Resp 18   Ht 1.829 m (6')   Wt 115.9 kg (255 lb 8 oz)   SpO2 96%   BMI 34.65 kg/m    Body mass index is 34.65 kg/m .  Physical Exam   GENERAL: moderate  distress  NECK: no adenopathy, no asymmetry, masses, or scars and thyroid normal to palpation  RESP: lungs clear to auscultation - no rales, rhonchi or wheezes  CV: tachycardia    Diagnostic Test Results:  Labs reviewed in Epic  No results found. However, due to  the size of the patient record, not all encounters were searched. Please check Results Review for a complete set of results.  EKG shows A fib Flutter and acute coronary changes        Assessment & Plan     1. Dyspnea, unspecified type  Sent to the ER via Ambulance for ACS and A fib flutter with RVR  - XR Chest 2 Views; Future  - EKG 12-lead complete w/read - Clinics  - CBC with platelets and differential  - TSH with free T4 reflex  - **Comprehensive metabolic panel FUTURE anytime  - Troponin I    2. Palpitations    - XR Chest 2 Views; Future  - EKG 12-lead complete w/read - Clinics  - CBC with platelets and differential  - TSH with free T4 reflex  - **Comprehensive metabolic panel FUTURE anytime  - Troponin I    3. ACS (acute coronary syndrome) (H)      4. Atrial fibrillation, unspecified type (H)  With RVR       BMI:   Estimated body mass index is 34.65 kg/m  as calculated from the following:    Height as of this encounter: 1.829 m (6').    Weight as of this encounter: 115.9 kg (255 lb 8 oz).         Total time was over 40 minutes in face to face consultation with >50% spent in counseling       No follow-ups on file.    Connie Haskins MD  [facility]

## 2020-07-16 NOTE — ED NOTES
[facility]  ED Nurse Handoff Report    ED Chief complaint: Shortness of Breath      ED Diagnosis:   Final diagnoses:   Atrial flutter with rapid ventricular response (H)       Code Status: Not addressed in ED    Allergies:   Allergies   Allergen Reactions     Codeine Nausea and Vomiting     Tolerating other opiates        Patient Story: SOB and dizziness for two weeks  Focused Assessment:  New onset afib with RVR    Treatments and/or interventions provided: Caredizem bolus and gtt, heparin, oxycontin,lyrica, lasix  Patient's response to treatments and/or interventions: A fib rate controlled, 80's currently, 1800mL of urine outa    To be done/followed up on inpatient unit:  none currently    Does this patient have any cognitive concerns?: none    Activity level - Baseline/Home:  Cane  Activity Level - Current:   Cane    Patient's Preferred language: English   Needed?: No    Isolation: None  Infection: Not Applicable  Bariatric?: No    Vital Signs:   Vitals:    07/16/20 1515 07/16/20 1530 07/16/20 1545 07/16/20 1600   BP: 117/72 (!) 141/89 (!) 121/95 132/78   Pulse: 72 101 81 81   Resp:  16 9 12   Temp:       TempSrc:       SpO2: 94%          Cardiac Rhythm:     Was the PSS-3 completed:   Yes  What interventions are required if any?               Family Comments: none  OBS brochure/video discussed/provided to patient/family: Yes               Name of person given brochure if not patient: na              Relationship to patient: na    For the majority of the shift this patient's behavior was Green.   Behavioral interventions performed were none.    ED NURSE PHONE NUMBER: [phone]

## 2020-07-17 ENCOUNTER — APPOINTMENT (OUTPATIENT)
Dept: ULTRASOUND IMAGING | Facility: CLINIC | Age: 65
DRG: 310 | End: 2020-07-17
Attending: HOSPITALIST
Payer: COMMERCIAL

## 2020-07-17 ENCOUNTER — APPOINTMENT (OUTPATIENT)
Dept: CARDIOLOGY | Facility: CLINIC | Age: 65
DRG: 310 | End: 2020-07-17
Attending: HOSPITALIST
Payer: COMMERCIAL

## 2020-07-17 LAB
ANION GAP SERPL CALCULATED.3IONS-SCNC: 2 MMOL/L (ref 3–14)
BUN SERPL-MCNC: 16 MG/DL (ref 7–30)
CALCIUM SERPL-MCNC: 9.5 MG/DL (ref 8.5–10.1)
CHLORIDE SERPL-SCNC: 105 MMOL/L (ref 94–109)
CHOLEST SERPL-MCNC: 124 MG/DL
CO2 SERPL-SCNC: 32 MMOL/L (ref 20–32)
CREAT SERPL-MCNC: 1.18 MG/DL (ref 0.66–1.25)
GFR SERPL CREATININE-BSD FRML MDRD: 63 ML/MIN/{1.73_M2}
GLUCOSE BLDC GLUCOMTR-MCNC: 155 MG/DL (ref 70–99)
GLUCOSE BLDC GLUCOMTR-MCNC: 159 MG/DL (ref 70–99)
GLUCOSE BLDC GLUCOMTR-MCNC: 160 MG/DL (ref 70–99)
GLUCOSE BLDC GLUCOMTR-MCNC: 174 MG/DL (ref 70–99)
GLUCOSE BLDC GLUCOMTR-MCNC: 195 MG/DL (ref 70–99)
GLUCOSE SERPL-MCNC: 193 MG/DL (ref 70–99)
HDLC SERPL-MCNC: 35 MG/DL
INTERPRETATION ECG - MUSE: NORMAL
LABORATORY COMMENT REPORT: NORMAL
LDLC SERPL CALC-MCNC: 55 MG/DL
LMWH PPP CHRO-ACNC: 0.33 IU/ML
NONHDLC SERPL-MCNC: 89 MG/DL
POTASSIUM SERPL-SCNC: 4.5 MMOL/L (ref 3.4–5.3)
SARS-COV-2 RNA SPEC QL NAA+PROBE: NEGATIVE
SARS-COV-2 RNA SPEC QL NAA+PROBE: NORMAL
SARS-COV-2 RNA SPEC QL NAA+PROBE: NOT DETECTED
SODIUM SERPL-SCNC: 139 MMOL/L (ref 133–144)
SPECIMEN SOURCE: NORMAL
TRIGL SERPL-MCNC: 169 MG/DL
TROPONIN I SERPL-MCNC: <0.015 UG/L (ref 0–0.04)

## 2020-07-17 PROCEDURE — 12000000 ZZH R&B MED SURG/OB

## 2020-07-17 PROCEDURE — 96372 THER/PROPH/DIAG INJ SC/IM: CPT

## 2020-07-17 PROCEDURE — 25000128 H RX IP 250 OP 636: Performed by: INTERNAL MEDICINE

## 2020-07-17 PROCEDURE — 80048 BASIC METABOLIC PNL TOTAL CA: CPT | Performed by: INTERNAL MEDICINE

## 2020-07-17 PROCEDURE — G0378 HOSPITAL OBSERVATION PER HR: HCPCS

## 2020-07-17 PROCEDURE — 93325 DOPPLER ECHO COLOR FLOW MAPG: CPT | Mod: 26 | Performed by: INTERNAL MEDICINE

## 2020-07-17 PROCEDURE — 99222 1ST HOSP IP/OBS MODERATE 55: CPT | Performed by: INTERNAL MEDICINE

## 2020-07-17 PROCEDURE — 25500064 ZZH RX 255 OP 636: Performed by: INTERNAL MEDICINE

## 2020-07-17 PROCEDURE — 80061 LIPID PANEL: CPT | Performed by: HOSPITALIST

## 2020-07-17 PROCEDURE — 93321 DOPPLER ECHO F-UP/LMTD STD: CPT | Mod: 26 | Performed by: INTERNAL MEDICINE

## 2020-07-17 PROCEDURE — 96376 TX/PRO/DX INJ SAME DRUG ADON: CPT | Mod: 59

## 2020-07-17 PROCEDURE — 93970 EXTREMITY STUDY: CPT

## 2020-07-17 PROCEDURE — 93308 TTE F-UP OR LMTD: CPT | Mod: 26 | Performed by: INTERNAL MEDICINE

## 2020-07-17 PROCEDURE — 85520 HEPARIN ASSAY: CPT | Performed by: HOSPITALIST

## 2020-07-17 PROCEDURE — 00000146 ZZHCL STATISTIC GLUCOSE BY METER IP

## 2020-07-17 PROCEDURE — 36415 COLL VENOUS BLD VENIPUNCTURE: CPT | Performed by: HOSPITALIST

## 2020-07-17 PROCEDURE — U0003 INFECTIOUS AGENT DETECTION BY NUCLEIC ACID (DNA OR RNA); SEVERE ACUTE RESPIRATORY SYNDROME CORONAVIRUS 2 (SARS-COV-2) (CORONAVIRUS DISEASE [COVID-19]), AMPLIFIED PROBE TECHNIQUE, MAKING USE OF HIGH THROUGHPUT TECHNOLOGIES AS DESCRIBED BY CMS-2020-01-R: HCPCS | Performed by: INTERNAL MEDICINE

## 2020-07-17 PROCEDURE — 93325 DOPPLER ECHO COLOR FLOW MAPG: CPT

## 2020-07-17 PROCEDURE — 96366 THER/PROPH/DIAG IV INF ADDON: CPT

## 2020-07-17 PROCEDURE — 25000132 ZZH RX MED GY IP 250 OP 250 PS 637: Performed by: HOSPITALIST

## 2020-07-17 PROCEDURE — 25000128 H RX IP 250 OP 636: Performed by: HOSPITALIST

## 2020-07-17 PROCEDURE — 84484 ASSAY OF TROPONIN QUANT: CPT | Performed by: HOSPITALIST

## 2020-07-17 PROCEDURE — 25000131 ZZH RX MED GY IP 250 OP 636 PS 637: Performed by: HOSPITALIST

## 2020-07-17 PROCEDURE — 99233 SBSQ HOSP IP/OBS HIGH 50: CPT | Performed by: INTERNAL MEDICINE

## 2020-07-17 PROCEDURE — 36415 COLL VENOUS BLD VENIPUNCTURE: CPT | Performed by: INTERNAL MEDICINE

## 2020-07-17 RX ORDER — FUROSEMIDE 10 MG/ML
20 INJECTION INTRAMUSCULAR; INTRAVENOUS
Status: DISCONTINUED | OUTPATIENT
Start: 2020-07-17 | End: 2020-07-18

## 2020-07-17 RX ADMIN — FINASTERIDE 5 MG: 5 TABLET, FILM COATED ORAL at 08:10

## 2020-07-17 RX ADMIN — PREGABALIN 100 MG: 100 CAPSULE ORAL at 08:10

## 2020-07-17 RX ADMIN — PREGABALIN 100 MG: 100 CAPSULE ORAL at 13:06

## 2020-07-17 RX ADMIN — METOPROLOL TARTRATE 25 MG: 25 TABLET, FILM COATED ORAL at 08:10

## 2020-07-17 RX ADMIN — ALLOPURINOL 300 MG: 300 TABLET ORAL at 08:10

## 2020-07-17 RX ADMIN — POLYETHYLENE GLYCOL 3350 17 G: 17 POWDER, FOR SOLUTION ORAL at 08:09

## 2020-07-17 RX ADMIN — FUROSEMIDE 20 MG: 10 INJECTION, SOLUTION INTRAMUSCULAR; INTRAVENOUS at 08:32

## 2020-07-17 RX ADMIN — OXYCODONE HYDROCHLORIDE 5 MG: 5 TABLET ORAL at 05:12

## 2020-07-17 RX ADMIN — OXYCODONE HYDROCHLORIDE 5 MG: 5 TABLET ORAL at 13:06

## 2020-07-17 RX ADMIN — INSULIN ASPART 1 UNITS: 100 INJECTION, SOLUTION INTRAVENOUS; SUBCUTANEOUS at 08:28

## 2020-07-17 RX ADMIN — OMEPRAZOLE 20 MG: 20 CAPSULE, DELAYED RELEASE ORAL at 08:09

## 2020-07-17 RX ADMIN — OXYCODONE HYDROCHLORIDE 5 MG: 5 TABLET ORAL at 17:15

## 2020-07-17 RX ADMIN — TAMSULOSIN HYDROCHLORIDE 0.8 MG: 0.4 CAPSULE ORAL at 08:09

## 2020-07-17 RX ADMIN — LORATADINE 10 MG: 10 TABLET ORAL at 08:10

## 2020-07-17 RX ADMIN — SIMVASTATIN 20 MG: 20 TABLET, FILM COATED ORAL at 21:11

## 2020-07-17 RX ADMIN — POTASSIUM CHLORIDE 20 MEQ: 1500 TABLET, EXTENDED RELEASE ORAL at 20:35

## 2020-07-17 RX ADMIN — HEPARIN SODIUM 1100 UNITS/HR: 10000 INJECTION, SOLUTION INTRAVENOUS at 08:41

## 2020-07-17 RX ADMIN — INSULIN GLARGINE 10 UNITS: 100 INJECTION, SOLUTION SUBCUTANEOUS at 21:19

## 2020-07-17 RX ADMIN — PREGABALIN 100 MG: 100 CAPSULE ORAL at 00:02

## 2020-07-17 RX ADMIN — INSULIN GLARGINE 10 UNITS: 100 INJECTION, SOLUTION SUBCUTANEOUS at 00:04

## 2020-07-17 RX ADMIN — PREGABALIN 100 MG: 100 CAPSULE ORAL at 20:35

## 2020-07-17 RX ADMIN — HUMAN ALBUMIN MICROSPHERES AND PERFLUTREN 9 ML: 10; .22 INJECTION, SOLUTION INTRAVENOUS at 15:31

## 2020-07-17 RX ADMIN — OXYCODONE HYDROCHLORIDE 5 MG: 5 TABLET ORAL at 09:36

## 2020-07-17 RX ADMIN — METOPROLOL TARTRATE 25 MG: 25 TABLET, FILM COATED ORAL at 20:35

## 2020-07-17 RX ADMIN — INSULIN ASPART 1 UNITS: 100 INJECTION, SOLUTION INTRAVENOUS; SUBCUTANEOUS at 17:15

## 2020-07-17 RX ADMIN — POTASSIUM CHLORIDE 20 MEQ: 1500 TABLET, EXTENDED RELEASE ORAL at 08:10

## 2020-07-17 RX ADMIN — FLUOXETINE 60 MG: 20 CAPSULE ORAL at 08:09

## 2020-07-17 RX ADMIN — INSULIN ASPART 1 UNITS: 100 INJECTION, SOLUTION INTRAVENOUS; SUBCUTANEOUS at 13:05

## 2020-07-17 RX ADMIN — OXYCODONE HYDROCHLORIDE 5 MG: 5 TABLET ORAL at 22:23

## 2020-07-17 RX ADMIN — FUROSEMIDE 20 MG: 10 INJECTION, SOLUTION INTRAMUSCULAR; INTRAVENOUS at 16:30

## 2020-07-17 ASSESSMENT — ACTIVITIES OF DAILY LIVING (ADL)
ADLS_ACUITY_SCORE: 13
ADLS_ACUITY_SCORE: 13
ADLS_ACUITY_SCORE: 11

## 2020-07-17 NOTE — PHARMACY-RX INSURANCE COVERAGE
Anticoagulation outpatient coverage check.   Patient has Medicare D with an unmet deductible.    Xarelto/Eliquis  July:  Upon receipt of RX, Discharge Pharmacy can dispense 1 month free.  Aug: $442 (fulfills $395 deductible)  Sept-Dec: $47/mo    Connie Moody CphT  [facility]  Discharge Pharmacy Liaison  Liaison Cell: [phone]

## 2020-07-17 NOTE — UTILIZATION REVIEW
Admission Status; Secondary Review Determination       Under the authority of the Utilization Management Committee, the utilization review process indicated a secondary review on the above patient. The review outcome is based on review of the medical records, discussions with staff, and applying clinical experience noted on the date of the review.     (x) Inpatient Status Appropriate - This patient's medical care is consistent with medical management for inpatient care and reasonable inpatient medical practice.     RATIONALE FOR DETERMINATION   64 year old male with medical history significant for DM 2, HTN, HL, gout, MDD, anxiety, GERD, chronic pain was sent to ER from his PCPs clinic when he was found to be in A. fib with RVR, is being admitted on 7/16/2020 for further management. Patient with worsening exertional dyspnea without chest pain orthopnea PND but has increased leg swelling.  Shunt was on Cardizem drip, he is still on heparin drip, still receiving intravenous Lasix on hospital day 2 requiring close monitoring of electrolytes, kidney function. The expected length of stay at the time of admission was more than 2 nights because of the severity of illness, intensity of service provided, and risk for adverse outcome. Inpatient admission is appropriate.     This document was produced using voice recognition software       The information on this document is developed by the utilization review team in order for the business office to ensure compliance. This only denotes the appropriateness of proper admission status and does not reflect the quality of care rendered.   The definitions of Inpatient Status and Observation Status used in making the determination above are those provided in the CMS Coverage Manual, Chapter 1 and Chapter 6, section 70.4.   Sincerely,   RIDGE MCKEE MD   System Medical Director   Utilization Management   [facility].

## 2020-07-17 NOTE — PROGRESS NOTES
[facility]    HOSPITALIST PROGRESS NOTE :   --------------------------------------------------    Date of Admission:  7/16/2020    Cumulative Summary: Toby Mcgrath is a 64 year old male with past medical history significant for type 2 diabetes mellitus, essential hypertension, hyperlipidemia, gout, major depressive disorder with anxiety, GERD and chronic pain who was sent to the emergency room from his PCP clinic when he was found to be in new onset atrial fibrillation/atrial flutter with RVR, patient was seen in the ER and was admitted on July 16 for further evaluation and management.    Assessment & Plan     Active Problems:  Atrial fibrillation/ flutter  with RVR (H) (7/16/2020): Patient seems to be having worsening exertional dyspnea without any chest pain with increased leg swelling for the past week.  Patient was found to be in new onset atrial fibrillation with RVR in the PCP office and was requested to be evaluated in the ER. Patient seems to be in atrial flutter this morning at the time of my evaluation.   Exertional dyspnea, secondary to rapid ventricular rate versus congestive heart failure versus angina equivalent: Patient has noticed worsening exertional dyspnea without chest pain.  He is denying any orthopnea or PND but has noticed increased leg swelling.  His chest x-ray did not show any volume overload and his proBNP came back in normal range his TSH is normal he did receive IV Lasix yesterday and is a scheduled to receive 40 mg of Lasix IV twice daily.    -- continue to monitor patient closely on telemetry, rate is controlled but seems to be in persistent atrial flutter   -- As  was concern about patient being high risk due to his presentation, will do the test as symptomatic so we can have results available soon for his further evaluation with ECHO and cards eval, might need EP evaluation and Ablation if continues to be in A flutter  -- Although his BP was slightly  towards the low side last night and required Cardizem to be stopped ,he was able to receive oral metoprolol which is started yesterday   -- Off Cardizem infusion  -- Continue heparin infusion for anticoagulation , will recalculate HENRIQUE's after ECHO is available , Current score is 3 due to age , HTN and DM , might need one of the DOAC agents , will ask pharmacy to check insurance coverage   -- will go ahead and will decrease Lasix to 20 mg IV BID from 40 mg BID  -- check BMP tomorrow   --Fasting lipid profile is checked, came back total cholesterol of 124, HDL of 35, LDL of 55 and triglycerides of 169.    DM 2:  PTA is on metformin.  He is HbA1c was 7.82 months ago. Blood sugar 133 in ER.    --Hold PTA metformin, Continue on basal Lantus 10 units subcu at bedtime  -- Continue medium insulin resistance sliding scale.  --Hypoglycemia protocol order in place  - -Moderate carb diet.     HTN, HL  -- PTA simvastatin 20 mg p.o. daily resumed.     Gout  --PTA colchicine and allopurinol resumed.     Major depressive disorder/anxiety  Back pain/radiculopathy  Chronic pain  --PTA fluoxetine 60 mg p.o. daily Lyrica 100 mg p.o. 3 times daily, oxycodone 5 mg p.o. every 4 hours as needed resumed.  --Uses Cane for mobility.      GERD  --PTA PPI resumed, also takes Zantac as needed     BPH  --PTA finasteride and tamsulosin resumed    Diet: Moderate Consistent CHO Diet    Mckenzie Catheter: not present  DVT Prophylaxis: on heparin infusion   Code Status: Full Code    The patient's care was discussed with the Bedside Nurse and Patient.    Disposition Plan   Expected discharge: 1-2 days , recommended to prior living arrangement once his cardiac workup is complete and he is making clinical improvement.    Stella Latif MD, FACP  Text Page (7am - 6pm)    ----------------------------------------------------------------------------------------------------------------------    Interval History   Patient care was assumed this morning, patient was  seen and examined.  Patient is sitting in bed, wearing facemask, currently on room air, denying any fever or chills, he is feeling much better as compared to the time when he came to the ER he is denying any palpitations at this point.  Reviewed plan of care in detail with patient discussed with him that his rate is controlled but he continues to be in atrial fibrillation he is aware about plan for echocardiogram and cardiology evaluation.  We also discussed about COVID testing due to his new symptoms of SOB, he is denying any contact with known patients and is always using masks when leaving home .    -Data reviewed today: I reviewed all new labs and imaging results over the last 24 hours.    I personally reviewed no images or EKG's today.    Physical Exam   Temp: 97.7  F (36.5  C) Temp src: Oral BP: (!) 125/90 Pulse: 58 Heart Rate: 80 Resp: 16 SpO2: 95 % O2 Device: None (Room air)    There were no vitals filed for this visit.  Vital Signs with Ranges  Temp:  [97.7  F (36.5  C)-98.8  F (37.1  C)] 97.7  F (36.5  C)  Pulse:  [] 58  Heart Rate:  [] 80  Resp:  [8-23] 16  BP: ()/() 125/90  SpO2:  [94 %-96 %] 95 %  I/O last 3 completed shifts:  In: 270 [P.O.:270]  Out: 1400 [Urine:1400]    GENERAL: Alert , awake and oriented. NAD. Conversational, appropriate.   HEENT: Normocephalic. EOMI. No icterus or injection. Nares normal.   LUNGS: Clear to auscultation. No dyspnea at rest.   HEART: Irregular rhythm with controlled  rate. Extremities perfused.   ABDOMEN: Soft, nontender, and nondistended. Positive bowel sounds.   EXTREMITIES: Trace LE edema noted.   NEUROLOGIC: Moves extremities x4 on command. No acute focal neurologic abnormalities noted.     Medications     diltiazem (CARDIZEM) infusion ADULT Stopped (07/17/20 0123)     HEParin 1,100 Units/hr (07/17/20 0841)     Reason anticoagulant not prescribed for atrial fibrillation       - MEDICATION INSTRUCTIONS -         allopurinol  300 mg Oral  Daily     finasteride  5 mg Oral Daily     FLUoxetine  60 mg Oral Daily     furosemide  20 mg Intravenous BID     insulin aspart  1-7 Units Subcutaneous TID AC     insulin aspart  1-5 Units Subcutaneous At Bedtime     insulin glargine  10 Units Subcutaneous At Bedtime     loratadine  10 mg Oral Daily     metoprolol tartrate  25 mg Oral BID     omeprazole  20 mg Oral Daily     polyethylene glycol  17 g Oral Daily     potassium chloride  20 mEq Oral BID     pregabalin  100 mg Oral TID     simvastatin  20 mg Oral At Bedtime     sodium chloride (PF)  3 mL Intracatheter Q8H     tamsulosin  0.8 mg Oral Daily       Data   Recent Labs   Lab 07/17/20  0317 07/16/20  2129 07/16/20  1440   WBC  --   --  6.0   HGB  --   --  13.7   MCV  --   --  86   PLT  --   --  185   NA  --   --  141   POTASSIUM  --   --  4.6   CHLORIDE  --   --  109   CO2  --   --  25   BUN  --   --  14   CR  --   --  1.10   ANIONGAP  --   --  7   JOSE  --   --  9.0   GLC  --   --  133*   ALBUMIN  --   --  3.7   PROTTOTAL  --   --  7.5   BILITOTAL  --   --  0.5   ALKPHOS  --   --  65   ALT  --   --  48   AST  --   --  39   TROPI <0.015 <0.015 <0.015       Imaging:   Recent Results (from the past 24 hour(s))   XR Chest 2 Views    Narrative    CHEST TWO VIEWS 7/16/2020 1:25 PM     HISTORY: Dyspnea, unspecified type. Palpitations.    COMPARISON: 7/12/2018    FINDINGS: Heart size and pulmonary vascularity are within normal  limits. The lungs are clear. No pneumothorax or pleural effusion.       Impression    IMPRESSION: No radiographic evidence of acute chest abnormality.     REGINA DAVIS MD

## 2020-07-17 NOTE — PLAN OF CARE
Pt admitted to the unit @ 1830. No complaints of pain. Aflutter RVR. Dilt gtt titrated up to 10/hr. Hep gtt verified. Pt settled, meal received. Will continue to monitor.

## 2020-07-17 NOTE — PROVIDER NOTIFICATION
Notified MD at 1801 PM regarding lab results.      Spoke with: Paged dr. Latif    Orders: Obtained orders to discontinue special precautioned.    Comments: COVID-19 Undetected.

## 2020-07-17 NOTE — PLAN OF CARE
Rate controlled, still in Aflutter, stable BP, COVID-19 PCR negative. Possible discharge tomorrow once anticoagulation therapy determined.

## 2020-07-17 NOTE — CONSULTS
CARDIOLOGY CONSULT    REASON FOR CONSULT: atrial flutter    PRIMARY CARE PHYSICIAN:  Connie Haskins    HISTORY OF PRESENT ILLNESS:  Mr. Mcgrath is a 63 y/o gentleman with PMH significant for hypertension, diabetes, tobacco abuse who presents with progressive shortness of breath over the past week.  He noted generalized fatigue and weakness.  He denies any chest pain, chest discomfort.  He went to his primary MD and was noted to be in atrial fibrillation/atrial flutter with RVR.  He was sent to the ED and started on a diltiazem gtt.  He is now in rate controlled flutter.  He feels well.  Reports shortness of breath is much improved.  He denies any chest pain, chest discomfort or shortness of breath.  He is otherwise without complaints.      PAST MEDICAL HISTORY:  1.  Anxiety  2.  Chronic pain syndrome  3.  Obesity  4.  GERD  5.  DJD  6.  Hypertension  7.  Diabetes Type II  8.  Tobacco abuse      MEDICATIONS:  Current Facility-Administered Medications   Medication     allopurinol (ZYLOPRIM) tablet 300 mg     colchicine (COLCYRS) tablet 0.6 mg     glucose gel 15-30 g    Or     dextrose 50 % injection 25-50 mL    Or     glucagon injection 1 mg     diltiazem (CARDIZEM) 125 mg in sodium chloride 0.9 % 125 mL infusion     famotidine (PEPCID) tablet 40 mg     finasteride (PROSCAR) tablet 5 mg     FLUoxetine (PROzac) capsule 60 mg     fluticasone (FLONASE) 50 MCG/ACT spray 1 spray     furosemide (LASIX) injection 20 mg     heparin 25,000 units in 0.45% NaCl 250 mL ANTICOAGULANT  infusion     insulin aspart (NovoLOG) injection (RAPID ACTING)     insulin aspart (NovoLOG) injection (RAPID ACTING)     insulin glargine (LANTUS PEN) injection 10 Units     lidocaine (LMX4) cream     lidocaine 1 % 0.1-1 mL     loratadine (CLARITIN) tablet 10 mg     magnesium sulfate 4 g in 100 mL sterile water (premade)     melatonin tablet 3 mg     metoprolol (LOPRESSOR) injection 5 mg     metoprolol tartrate (LOPRESSOR) tablet 25 mg      naloxone (NARCAN) injection 0.1-0.4 mg     No anticoagulants IF patient has had acute trauma/surgery or recent intracranial, GI or urinary tract bleeding.      omeprazole (priLOSEC) CR capsule 20 mg     ondansetron (ZOFRAN-ODT) ODT tab 4 mg    Or     ondansetron (ZOFRAN) injection 4 mg     oxyCODONE (ROXICODONE) tablet 5 mg     Patient is already receiving anticoagulation with heparin, enoxaparin (LOVENOX), warfarin (COUMADIN)  or other anticoagulant medication     polyethylene glycol (MIRALAX) Packet 17 g     potassium chloride (KLOR-CON) Packet 20-40 mEq     potassium chloride 10 mEq in 100 mL intermittent infusion with 10 mg lidocaine     potassium chloride 10 mEq in 100 mL sterile water intermittent infusion (premix)     potassium chloride 20 mEq in 50 mL intermittent infusion     potassium chloride ER (KLOR-CON M) CR tablet 20 mEq     potassium chloride ER (KLOR-CON M) CR tablet 20-40 mEq     pregabalin (LYRICA) capsule 100 mg     simvastatin (ZOCOR) tablet 20 mg     sodium chloride (PF) 0.9% PF flush 3 mL     sodium chloride (PF) 0.9% PF flush 3 mL     tamsulosin (FLOMAX) capsule 0.8 mg       ALLERGIES:  Allergies   Allergen Reactions     Codeine Nausea and Vomiting     Tolerating other opiates        SOCIAL HISTORY:  Remote smoking history.  No significant ETOH.      FAMILY HISTORY:  I have reviewed this patient's family history and updated it with pertinent information if needed.   Family History   Problem Relation Age of Onset     Diabetes Mother      C.A.D. Father      Diabetes Father      Hypertension Father        REVIEW OF SYSTEMS:  A complete ROS was obtained and the pertinent positives are outlined in the history of present illness above.  The remainder of systems is negative.        PHYSICAL EXAM:  Temp: 97.9  F (36.6  C) Temp src: Oral BP: 111/78 Pulse: 58 Heart Rate: 80 Resp: 17 SpO2: 95 % O2 Device: None (Room air)    Vital Signs with Ranges  Temp:  [97.7  F (36.5  C)-98.8  F (37.1  C)] 97.9  F  (36.6  C)  Pulse:  [] 58  Heart Rate:  [] 80  Resp:  [8-23] 17  BP: ()/() 111/78  SpO2:  [94 %-96 %] 95 %  0 lbs 0 oz    Constitutional: awake, alert, no distress  Eyes: PERRL, sclera nonicteric  ENT: trachea midline  Respiratory: CTAB  Cardiovascular: RRR no /r/g, no JVD  GI: nondistended, nontender, bowel sounds present  Lymph/Hematologic: no lymphadenopathy  Skin: dry, no rash  Musculoskeletal: good muscle tone, no edema bilaterally  Neurologic: no focal deficits  Neuropsychiatric: appropriate affact    DATA:  Labs: Reviewed in EPIC    EKG: ECG dated 7/16/2020 at 1:10 pm demonstrates afib/flutter at .  Serial ECG appears more consistent with atrial flutter with 2:1 conduction    Echocardiogram:  Pending          ASSESSMENT:  1.  New onset atrial fibrillation/atrial flutter:  Now rate controlled  2.  Hypertension  3.  Diabetes type II  4.  Tobacco abuse      RECOMMENDATIONS:  1. Reviewed pathophysiology of atrial fib/flutter, thromboembolic risk and treatment options including rate versus rhythm control.  Given atrial flutter, he may be a candidate for a flutter ablation, although less clear given concomitant presence of atrial fibrillation. For now will start with rate control strategy since he is rate controlled and minimally symptomatic and have him follow up with EP in the outpatient setting for definitive management.  Plan as follows:  -Pharmacy liason consult for NOAC pricing  -Echocardiogram pending  -Continue metoprolol  -Recommend outpatient sleep study  -If echocardiogram unremarkable, may discharge from a cardiac standpoint with follow up with EP cardiology in 2 weeks.      Tawnya Sidhu MD  Cardiology - [facility]  Pager:  [phone]  July 17, 2020

## 2020-07-17 NOTE — PLAN OF CARE
"A&O x4, tele Aflutter CVR with variable conduction. Diltiazem gtts stopped at 0123, due to low HR 55. Soft BP at times. Denies SOB/CP/palpitations/dizziness. Reported \"fine\" this am. Up ad racquel. Voids adequate urine. Pt plans to have Echo & cardiology today.    "

## 2020-07-18 VITALS
OXYGEN SATURATION: 95 % | DIASTOLIC BLOOD PRESSURE: 58 MMHG | RESPIRATION RATE: 16 BRPM | SYSTOLIC BLOOD PRESSURE: 112 MMHG | HEART RATE: 123 BPM | TEMPERATURE: 97.9 F

## 2020-07-18 LAB
GLUCOSE BLDC GLUCOMTR-MCNC: 161 MG/DL (ref 70–99)
GLUCOSE BLDC GLUCOMTR-MCNC: 161 MG/DL (ref 70–99)
GLUCOSE SERPL-MCNC: 186 MG/DL (ref 70–99)
LMWH PPP CHRO-ACNC: 0.28 IU/ML

## 2020-07-18 PROCEDURE — 85520 HEPARIN ASSAY: CPT | Performed by: HOSPITALIST

## 2020-07-18 PROCEDURE — 00000146 ZZHCL STATISTIC GLUCOSE BY METER IP

## 2020-07-18 PROCEDURE — 96372 THER/PROPH/DIAG INJ SC/IM: CPT

## 2020-07-18 PROCEDURE — 25000128 H RX IP 250 OP 636: Performed by: INTERNAL MEDICINE

## 2020-07-18 PROCEDURE — 25000128 H RX IP 250 OP 636: Performed by: HOSPITALIST

## 2020-07-18 PROCEDURE — 96376 TX/PRO/DX INJ SAME DRUG ADON: CPT

## 2020-07-18 PROCEDURE — 96366 THER/PROPH/DIAG IV INF ADDON: CPT

## 2020-07-18 PROCEDURE — G0378 HOSPITAL OBSERVATION PER HR: HCPCS

## 2020-07-18 PROCEDURE — 25000132 ZZH RX MED GY IP 250 OP 250 PS 637: Performed by: INTERNAL MEDICINE

## 2020-07-18 PROCEDURE — 99239 HOSP IP/OBS DSCHRG MGMT >30: CPT | Performed by: INTERNAL MEDICINE

## 2020-07-18 PROCEDURE — 36415 COLL VENOUS BLD VENIPUNCTURE: CPT | Performed by: HOSPITALIST

## 2020-07-18 PROCEDURE — 25000132 ZZH RX MED GY IP 250 OP 250 PS 637: Performed by: HOSPITALIST

## 2020-07-18 PROCEDURE — 82947 ASSAY GLUCOSE BLOOD QUANT: CPT | Performed by: HOSPITALIST

## 2020-07-18 RX ORDER — METOPROLOL TARTRATE 50 MG
50 TABLET ORAL 2 TIMES DAILY
Status: DISCONTINUED | OUTPATIENT
Start: 2020-07-18 | End: 2020-07-18 | Stop reason: HOSPADM

## 2020-07-18 RX ORDER — HEPARIN SODIUM 10000 [USP'U]/100ML
1100 INJECTION, SOLUTION INTRAVENOUS CONTINUOUS
Status: DISCONTINUED | OUTPATIENT
Start: 2020-07-18 | End: 2020-07-18

## 2020-07-18 RX ORDER — METOPROLOL TARTRATE 25 MG/1
25 TABLET, FILM COATED ORAL ONCE
Status: COMPLETED | OUTPATIENT
Start: 2020-07-18 | End: 2020-07-18

## 2020-07-18 RX ORDER — METOPROLOL TARTRATE 25 MG/1
25 TABLET, FILM COATED ORAL 3 TIMES DAILY
Status: DISCONTINUED | OUTPATIENT
Start: 2020-07-18 | End: 2020-07-18

## 2020-07-18 RX ORDER — METOPROLOL TARTRATE 50 MG
50 TABLET ORAL 2 TIMES DAILY
Qty: 60 TABLET | Refills: 0 | Status: SHIPPED | OUTPATIENT
Start: 2020-07-18 | End: 2020-07-31

## 2020-07-18 RX ADMIN — PREGABALIN 100 MG: 100 CAPSULE ORAL at 08:14

## 2020-07-18 RX ADMIN — METOPROLOL TARTRATE 25 MG: 25 TABLET, FILM COATED ORAL at 09:49

## 2020-07-18 RX ADMIN — OXYCODONE HYDROCHLORIDE 5 MG: 5 TABLET ORAL at 06:06

## 2020-07-18 RX ADMIN — OXYCODONE HYDROCHLORIDE 5 MG: 5 TABLET ORAL at 13:57

## 2020-07-18 RX ADMIN — FUROSEMIDE 20 MG: 10 INJECTION, SOLUTION INTRAMUSCULAR; INTRAVENOUS at 08:20

## 2020-07-18 RX ADMIN — INSULIN ASPART 1 UNITS: 100 INJECTION, SOLUTION INTRAVENOUS; SUBCUTANEOUS at 07:20

## 2020-07-18 RX ADMIN — FINASTERIDE 5 MG: 5 TABLET, FILM COATED ORAL at 08:15

## 2020-07-18 RX ADMIN — POTASSIUM CHLORIDE 20 MEQ: 1500 TABLET, EXTENDED RELEASE ORAL at 08:15

## 2020-07-18 RX ADMIN — PREGABALIN 100 MG: 100 CAPSULE ORAL at 13:23

## 2020-07-18 RX ADMIN — OXYCODONE HYDROCHLORIDE 5 MG: 5 TABLET ORAL at 09:49

## 2020-07-18 RX ADMIN — RIVAROXABAN 20 MG: 10 TABLET, FILM COATED ORAL at 10:15

## 2020-07-18 RX ADMIN — ALLOPURINOL 300 MG: 300 TABLET ORAL at 08:17

## 2020-07-18 RX ADMIN — OMEPRAZOLE 20 MG: 20 CAPSULE, DELAYED RELEASE ORAL at 08:16

## 2020-07-18 RX ADMIN — TAMSULOSIN HYDROCHLORIDE 0.8 MG: 0.4 CAPSULE ORAL at 08:15

## 2020-07-18 RX ADMIN — HEPARIN SODIUM 1100 UNITS/HR: 10000 INJECTION, SOLUTION INTRAVENOUS at 06:06

## 2020-07-18 RX ADMIN — FLUOXETINE 60 MG: 20 CAPSULE ORAL at 08:15

## 2020-07-18 RX ADMIN — POLYETHYLENE GLYCOL 3350 17 G: 17 POWDER, FOR SOLUTION ORAL at 08:20

## 2020-07-18 RX ADMIN — LORATADINE 10 MG: 10 TABLET ORAL at 08:19

## 2020-07-18 RX ADMIN — METOPROLOL TARTRATE 25 MG: 25 TABLET, FILM COATED ORAL at 08:16

## 2020-07-18 ASSESSMENT — ACTIVITIES OF DAILY LIVING (ADL)
ADLS_ACUITY_SCORE: 11

## 2020-07-18 NOTE — DISCHARGE SUMMARY
[facility]  Hospitalist Discharge Summary      Date of Admission:  7/16/2020  Date of Discharge:  7/18/2020  2:40 PM  Discharging Provider: Stella Latif MD, FACP    Discharge Diagnoses   Atrial fibrillation and flutter with RVR.  Exertional dyspnea, secondary to rapid ventricular rate.  Type 2 diabetes mellitus.  History of essential hypertension.  Hyperlipidemia.  History of gout.  Major depressive disorder and anxiety, stable.  History of chronic back pain with radiculopathy, stable.  GERD.  BPH.    Follow-ups Needed After Discharge   Follow-up Appointments     Follow-up and recommended labs and tests      Follow up with primary care provider, Connie Haskins, within 7   days to evaluate treatment change and for hospital follow- up.  The   following labs/tests are recommended: BMP.  Follow up with Cardiology EP in 1-2 weeks             Unresulted Labs Ordered in the Past 30 Days of this Admission     No orders found from 6/16/2020 to 7/17/2020.        Discharge Disposition   Discharged to home  Condition at discharge: Stable    Hospital Course     Toby Mcgrath is a 64 year old male with past medical history significant for type 2 diabetes mellitus, essential hypertension, hyperlipidemia, gout, major depressive disorder with anxiety, GERD and chronic pain who was sent to the emergency room from his PCP clinic when he was found to be in new onset atrial fibrillation/atrial flutter with RVR, patient was seen in the ER and was admitted on July 16th for further evaluation and management.  Here are further details regarding his current hospitalization.       Atrial fibrillation/ flutter  with RVR (H) (7/16/2020):  Patient presented with new onset atrial fibrillation flipping into atrial flutter with RVR.  He presented to the ER with worsening exertional dyspnea without any chest pain with increased leg swelling for the past week.His EKG was concerning for A. fib going into atrial flutter.   Patient seemed to be mostly in atrial flutter under my care during the hospitalization.  Exertional dyspnea, secondary to rapid ventricular rate versus congestive heart failure versus angina equivalent:   Patient has noticed worsening exertional dyspnea without chest pain.  He is denying any orthopnea or PND but has noticed increased leg swelling.  His chest x-ray did not show any volume overload and his proBNP came back in normal range his TSH is normal .  Initially patient was also given IV Lasix during the hospitalization.    --Patient was monitored closely on telemetry during the hospitalization.  --He was also ruled out for COVID-19 which came back negative.  --Cardiology was also consulted, echocardiogram was done.  Echo showed left ventricular ejection fraction around 55 to 60%, no clear wall motion abnormalities were noticed however global systolic function is probably at least mildly reduced.  --Initially he was also started on Cardizem infusion which was a stopped and he was a started on oral beta-blocker.  His beta-blocker was increased to 50 mg p.o. twice daily on the day of discharge.  --Initially he was maintained on heparin infusion for anticoagulation, after calculating his chads score which was 3 for age, hypertension and diabetes he was more interested in getting started on 1 of the DOAC agents.  --Pharmacy was consulted for insurance coverage, his Xarelto/apixaban coverage initially required 1 month payment of $442 before he can meet his deductible, he was able to receive 1 month of free prescription through the hospital.  --At this time patient is opting to be started on apixaban, he told me that he will work with his PCP and he has a financial medical assistance person who might be able to help him get his future prescription of Eliquis.  --We discussed about he can always be switched to warfarin if he is not able to afford apixaban.  --Patient was also evaluated by Dr. Sidhu from cardiology  who is in agreement with the plan, and recommended patient to be continued on beta-blocker and outpatient follow-up with EP in 2 weeks.  Cardiology also recommended outpatient sleep study.   --During the hospitalization his fasting lipid profile is checked, came back total cholesterol of 124, HDL of 35, LDL of 55 and triglycerides of 169.     DM 2:  PTA is on metformin.  He is HbA1c was 7.82 months ago. Blood sugar 133 in ER.     --Patient was discharged on his PTA metformin.     HTN, HL  -- As patient fasting lipid profile showed controlled lipid panel, he will be continued on his PTA simvastatin 20 mg p.o. daily on discharge.     Gout  --PTA colchicine and allopurinol resumed.     Major depressive disorder/anxiety  Back pain/radiculopathy  Chronic pain  --PTA fluoxetine 60 mg p.o. daily Lyrica 100 mg p.o. 3 times daily, oxycodone 5 mg p.o. every 4 hours as needed resumed.  --Uses Cane for mobility.      GERD  --PTA PPI resumed, also takes Zantac as needed     BPH  --PTA finasteride and tamsulosin resumed    Patient was seen and examined on the day of discharge , he is feeling well, does not have any complaints , I did review the discharge medications and instructions with the patient and plan for him to follow up with the PCP after the hospitalization .patient was in agreement , he is discharged in stable condition back to his home.      Consultations This Hospital Stay   CARDIOLOGY IP CONSULT  PHARMACY TO DOSE HEPARIN  PHARMACY LIAISON FOR MEDICATION COVERAGE CONSULT    Code Status   Full Code    Time Spent on this Encounter   I, Stella Latif MD, personally saw the patient today and spent greater than 30 minutes discharging this patient.     Stella Latif MD, FACP  [facility]  ______________________________________________________________________    Physical Exam   Vital Signs: Temp: 98  F (36.7  C) Temp src: Oral BP: 112/79 Pulse: 120 Heart Rate: 120 Resp: 16 SpO2: 94 % O2 Device: None (Room air)     Weight: 0 lbs 0 oz    Physical Exam  Vitals signs and nursing note reviewed.   Constitutional:       Appearance: He is well-developed.   HENT:      Head: Normocephalic and atraumatic.   Eyes:      Pupils: Pupils are equal, round, and reactive to light.   Neck:      Musculoskeletal: Normal range of motion and neck supple.      Thyroid: No thyromegaly.   Cardiovascular:      Rate and Rhythm: Normal rate and regular rhythm.      Heart sounds: Normal heart sounds.   Pulmonary:      Effort: Pulmonary effort is normal. No respiratory distress.      Breath sounds: Normal breath sounds.   Abdominal:      General: Bowel sounds are normal. There is no distension.      Palpations: Abdomen is soft.   Musculoskeletal: Normal range of motion.         General: No tenderness.   Skin:     General: Skin is warm and dry.   Neurological:      Mental Status: He is alert and oriented to person, place, and time.   Psychiatric:         Behavior: Behavior normal.          Primary Care Physician   Connie Haskins    Discharge Orders      Reason for your hospital stay    You were admitted to the hospital secondary to atrial fibrillation and atrial flutter with fast heart rate.     Follow-up and recommended labs and tests    Follow up with primary care provider, Connie Haskins, within 7 days to evaluate treatment change and for hospital follow- up.  The following labs/tests are recommended: BMP.  Follow up with Cardiology EP in 1-2 weeks     Activity    Your activity upon discharge: activity as tolerated and no driving for today     Discharge Instructions    Please stop taking your Aspirin as you will be taking Rivaroxiban 20 mg daily for blood thinner , first dose this evening , you will be discharged with one month free supply , please discuss with your family doctor, cardiology and insurance company for future coverage as you are planning to do . Please make sure you have a plan for anticoagulation ( blood thinner ) before  you run out of 30 day supply . You are also started on metoprolol 50 mg two times a day to control your heart rate . You are recommended to have close follow up with PCP and Cardiology , if you do not feel good in future , check your heart rate and call cardiology office or return to ER.     Full Code     Diet    Follow this diet upon discharge: Orders Placed This Encounter      Moderate Consistent CHO Diet         Significant Results and Procedures   Results for orders placed or performed during the hospital encounter of 20   US Lower Extremity Venous Duplex Bilateral    Narrative    VENOUS ULTRASOUND BILATERAL  2020 7:10 PM     HISTORY: Rule out deep venous thrombosis, leg swelling and pain.    COMPARISON: 2010.    FINDINGS: Examination of the deep veins with graded compression and  color flow Doppler with spectral wave form analysis shows no evidence  of thrombus in the common femoral vein, femoral vein, popliteal vein  or calf veins.      Impression    IMPRESSION: No evidence of deep venous thrombosis.    REBECCA FOURNIER MD   Echocardiogram Limited    Narrative    [id number]  [id number]  [id number]  [id number]^R           [facility]  Echocardiography Laboratory  [address] [place] [address]        Name: SANA ALMONTE  MRN: [id number]  : 1955  Study Date: 2020 03:01 PM  Age: 64 yrs  Gender: Male  Patient Location: [facility]  Reason For Study: Af  Ordering Physician: FREDA LIM  Referring Physician: KOLTON SALAZAR  Performed By: Alhaji Palomares RDCS     BSA: 2.4 m2  Height: 72 in  Weight: 255 lb  HR: 80  BP: 124/101 mmHg  _____________________________________________________________________________  __        Procedure  Limited Portable Echo Adult. Optison (NDC #7928-8810) given intravenously.  _____________________________________________________________________________  __        Interpretation Summary     Technically challenging study due to  patient body habitus, even with the use  of contrast imaging.     Left ventricular size, global systolic function are normal, estimated LVEF 55-  60%.  Endocardial borders are not optimally visualized, however no clear wall motion  abnormalities are noted.  Right ventricle is not well visualized, however global systolic function is  probably at least mildly reduced.  No significant valvular abnormalities, however visualization is limited.  Mild aortic root dilatation. Max diameter of the visualized portion 4 cm.     There are no prior studies available for comparison.  _____________________________________________________________________________  __        Left Ventricle  The left ventricle is normal in size. There is concentric remodeling present.  Left ventricular systolic function is normal. The visual ejection fraction is  estimated at 55-60%. Regional wall motion abnormalities cannot be excluded due  to limited visualization. Endocardial borders are not optimally visualized,  however no clear wall motion abnormalities are noted.     Right Ventricle  The right ventricle is not well visualized. Right ventricle is not well  visualized, however global systolic function is probably at least mildly  reduced.     Atria  Normal left atrial size. Right atrial size is normal.     Mitral Valve  The mitral valve is not well visualized. Valve morphology is not well  visualized, however it is functioning normally on Doppler interrogation.        Tricuspid Valve  The tricuspid valve is not well visualized, but is grossly normal. There is  trace tricuspid regurgitation. The right ventricular systolic pressure is  approximated at 16.1 mmHg plus the right atrial pressure. Right ventricular  systolic pressure could not be approximated due to inadequate tricuspid  regurgitation.     Aortic Valve  The aortic valve is not well visualized. Valve morphology is not well  visualized, however it is functioning normally on Doppler  interrogation.     Pulmonic Valve  The pulmonic valve is not well visualized. Valve morphology is not well  visualized, however it is functioning normally on Doppler interrogation.     Vessels  Mild aortic root dilatation. Max diameter of the visualized portion 4 cm.  Normal size ascending aorta. The inferior vena cava cannot be assessed.     Pericardium  There is no pericardial effusion. Prominent epicardial fat noted.     _____________________________________________________________________________  __  MMode/2D Measurements & Calculations  IVSd: 1.3 cm  LVIDd: 4.8 cm  LVIDs: 3.6 cm  LVPWd: 1.4 cm  FS: 25.4 %  LV mass(C)d: 263.3 grams  LV mass(C)dI: 111.5 grams/m2     Ao root diam: 4.0 cm  LA dimension: 3.8 cm  asc Aorta Diam: 3.6 cm  LA/Ao: 0.95  RWT: 0.57        Doppler Measurements & Calculations  TR max apple: 200.9 cm/sec  TR max P.1 mmHg           _____________________________________________________________________________  __           Report approved by: Davi Thibodeaux 2020 04:06 PM        *Note: Due to a large number of results and/or encounters for the requested time period, some results have not been displayed. A complete set of results can be found in Results Review.       Discharge Medications   Current Discharge Medication List      START taking these medications    Details   metoprolol tartrate (LOPRESSOR) 50 MG tablet Take 1 tablet (50 mg) by mouth 2 times daily  Qty: 60 tablet, Refills: 0    Comments: Future refills by PCP Dr. Connie Haskins with phone number [phone].  Associated Diagnoses: Atrial flutter with rapid ventricular response (H); Atrial fibrillation with RVR (H)      rivaroxaban ANTICOAGULANT (XARELTO) 20 MG TABS tablet Take 1 tablet (20 mg) by mouth daily (with dinner)  Qty: 30 tablet, Refills: 0    Comments: Please give patient one month free supply , he will be working with his PCP and insurance to look into future medication coverage .Future refills by PCP   Connie Haskins with phone number [phone].  Associated Diagnoses: Atrial flutter with rapid ventricular response (H); Atrial fibrillation with RVR (H)         CONTINUE these medications which have NOT CHANGED    Details   allopurinol (ZYLOPRIM) 100 MG tablet Take 3 tablets (300 mg) by mouth daily  Qty: 270 tablet, Refills: 3    Associated Diagnoses: Acute gout of right elbow, unspecified cause      colchicine (COLCYRS) 0.6 MG tablet Take 0.6 mg by mouth daily as needed for moderate pain      finasteride (PROSCAR) 5 MG tablet TAKE 1 TABLET BY MOUTH  DAILY FOR PROSTATE  ENLARGEMENT  Qty: 90 tablet, Refills: 0    Associated Diagnoses: Benign prostatic hyperplasia without lower urinary tract symptoms      FLUoxetine (PROZAC) 20 MG capsule TAKE 3 CAPSULES BY MOUTH  ONCE DAILY  Qty: 270 capsule, Refills: 0    Associated Diagnoses: Mild episode of recurrent major depressive disorder (H)      fluticasone (FLONASE) 50 MCG/ACT nasal spray Spray 1 spray into both nostrils daily as needed for rhinitis or allergies      ketoconazole (NIZORAL) 2 % external shampoo APPLY TOPICALLY 2 TO 3  TIMES PER WEEK AS NEEDED  FOR ITCHING OR IRRITATION,  LEAVE ON FOR 2 MINUTES,  THEN WASH OFF  Qty: 240 mL, Refills: 1    Associated Diagnoses: Seborrheic dermatitis      loratadine (CLARITIN) 10 MG tablet Take 1 tablet (10 mg) by mouth daily  Qty: 30 tablet, Refills: 3    Associated Diagnoses: Facial swelling; Chronic rhinitis, unspecified type      metFORMIN (GLUCOPHAGE-XR) 500 MG 24 hr tablet TAKE 4 TABLETS BY MOUTH  DAILY WITH DINNER  Qty: 360 tablet, Refills: 1    Associated Diagnoses: Type 2 diabetes mellitus without complication, without long-term current use of insulin (H)      omeprazole (PRILOSEC) 20 MG DR capsule Take 1 capsule (20 mg) by mouth daily  Qty: 90 capsule, Refills: 3    Associated Diagnoses: Gastroesophageal reflux disease with esophagitis      oxyCODONE (ROXICODONE) 5 MG tablet Take 1 tablet (5 mg) by mouth every  4 hours as needed for severe pain Fill on 3/26/20 Max #6 per day  Qty: 180 tablet, Refills: 0    Associated Diagnoses: Failed back syndrome of lumbar spine; Chronic, continuous use of opioids      polyethylene glycol (MIRALAX/GLYCOLAX) packet Take 17 g by mouth daily  Qty: 100 packet, Refills: 3    Associated Diagnoses: Drug-induced constipation      pregabalin (LYRICA) 100 MG capsule Take 100 mg by mouth 3 times daily      simvastatin (ZOCOR) 20 MG tablet TAKE 1 TABLET BY MOUTH AT  BEDTIME  Qty: 90 tablet, Refills: 1    Associated Diagnoses: Hyperlipidemia LDL goal <100      tamsulosin (FLOMAX) 0.4 MG capsule Take 2 capsules (0.8 mg) by mouth daily  Qty: 180 capsule, Refills: 2    Associated Diagnoses: Benign prostatic hyperplasia with urinary frequency      ACE/ARB/ARNI NOT PRESCRIBED, INTENTIONAL, Please choose reason not prescribed, below    Associated Diagnoses: Type 2 diabetes mellitus without complication, without long-term current use of insulin (H)      blood glucose (ACCU-CHEK DANIELE PLUS) test strip USE TO TEST BLOOD SUGAR TWO TIMES DAILY OR AS DIRECTED  Qty: 200 strip, Refills: 0    Associated Diagnoses: Type 2 diabetes mellitus without complication, without long-term current use of insulin (H)      blood glucose monitoring (ACCU-CHEK DANIELE PLUS) meter device kit USE TO TEST BLOOD SUGARS 2  TIMES DAILY OR AS DIRECTED.  Qty: 1 kit, Refills: 0    Associated Diagnoses: Type 2 diabetes mellitus without complication (H)      blood glucose monitoring (ACCU-CHEK FASTCLIX) lancets USE TO TEST BLOOD SUGAR TWO TIMES DAILY OR AS DIRECTED  Qty: 200 each, Refills: 0    Associated Diagnoses: Type 2 diabetes mellitus without complication (H)      naloxone (NARCAN) 4 MG/0.1ML nasal spray Spray 1 spray (4 mg) into one nostril alternating nostrils once as needed for opioid reversal Every 2-3 minutes until patient responsive or EMS arrives  Qty: 0.2 mL, Refills: 0    Associated Diagnoses: Chronic pain syndrome; Failed back  syndrome of lumbar spine      ranitidine (ZANTAC) 150 MG tablet Take 300 mg by mouth 2 times daily as needed for heartburn         STOP taking these medications       aspirin 81 MG tablet Comments:   Reason for Stopping:             Allergies   Allergies   Allergen Reactions     Codeine Nausea and Vomiting     Tolerating other opiates

## 2020-07-18 NOTE — PLAN OF CARE
Pt A&Ox4. VSS on RA. CMS intact. Tele A. Flutter, rate controlled. Voiding in urinal. Up in room independently. Chronic back pain managed with PRN oxycodone. No chest pain noted this shift. Will continue to montitor.

## 2020-07-18 NOTE — PLAN OF CARE
CMS intact.  Pain managed with oxy.  Up independent.  Started on xarelto.  discharge instruction went over with pt, verbalized understanding.

## 2020-07-18 NOTE — DISCHARGE INSTRUCTIONS
Please stop taking your Aspirin as you will be taking Rivaroxiban 20 mg daily for blood thinner , first dose this evening , you will be discharged with one month free supply , please discuss with your family doctor, cardiology and insurance company for future coverage as you are planning to do . Please make sure you have a plan for anticoagulation ( blood thinner ) before you run out of 30 day supply . You are also started on metoprolol 50 mg two times a day to control your heart rate . You are recommended to have close follow up with PCP and Cardiology , if you do not feel good in future , check your heart rate and call cardiology office or return to ER.

## 2020-07-20 ENCOUNTER — TELEPHONE (OUTPATIENT)
Dept: CARDIOLOGY | Facility: CLINIC | Age: 65
End: 2020-07-20

## 2020-07-20 ENCOUNTER — TELEPHONE (OUTPATIENT)
Dept: FAMILY MEDICINE | Facility: CLINIC | Age: 65
End: 2020-07-20

## 2020-07-20 DIAGNOSIS — M96.1 FAILED BACK SYNDROME OF LUMBAR SPINE: ICD-10-CM

## 2020-07-20 DIAGNOSIS — G89.4 CHRONIC PAIN SYNDROME: ICD-10-CM

## 2020-07-20 DIAGNOSIS — F11.90 CHRONIC, CONTINUOUS USE OF OPIOIDS: ICD-10-CM

## 2020-07-20 NOTE — TELEPHONE ENCOUNTER
Hospital F/U 7/18/2020 DX: Atrial Flutter With Rapid Ventricular Response (H), Atrial Fibrillation With Rvr (H) ED/ip 0/1    [phone] (home)

## 2020-07-20 NOTE — TELEPHONE ENCOUNTER
Reason for Call:  Medication or medication refill:    Do you use a [facility]?  Name of the pharmacy and phone number for the current request:     [facility] [address] [place] [address]      Name of the medication requested: oxyCODONE (ROXICODONE) 5 MG tablet [[id number]] (Order [id number])       Other request:     Can we leave a detailed message on this number? YES    Phone number patient can be reached at: Home number on file [phone] (home)    Best Time: any    Call taken on 7/20/2020 at 7:46 AM by Garcia Owen

## 2020-07-20 NOTE — TELEPHONE ENCOUNTER
Patient was evaluated by cardiology while inpatient for increased SOB x one week, fatigue and weakness. Found to be in A. Flutter with RVR by PMD and sent to ED. Rates controlled while on Diltiazem gtt. Goal is rate control and started on OAC and f/u with EP for subsequent plan of care. Started on Metoprolol and Xarelto prior to discharge. PTA ASA discontinued. Writer attempted to call patient to discuss any post hospital d/c questions he may have, review medication changes, and confirm f/u appts, but no answer. VM left instructing pt to call back if he has any non emergent concerns or medication questions. RN left reminder for patient that he has a video visit appt scheduled on 7/31/20 at 1310 with PRITESH Meron Whitaker. Patient advised to call clinic with any cardiac related questions or concerns prior to this pritesh't. Scheduling and writer's phone numbers were provided.  ANTONIETTA Triana RN.

## 2020-07-20 NOTE — TELEPHONE ENCOUNTER
ED/Discharge Protocol      VM left asking patient to call clinic.  Future OV for follow up 7/30/2020.    Gayathri Wade RN    Date of Admission:  7/16/2020  Date of Discharge:  7/18/2020  2:40 PM    Discharge Diagnoses     Atrial fibrillation and flutter with RVR.  Exertional dyspnea, secondary to rapid ventricular rate.  Type 2 diabetes mellitus.    Follow up with primary care provider, Connie Haskins, within 7   days to evaluate treatment change and for hospital follow- up.  The   following labs/tests are recommended: BMP.  Follow up with Cardiology EP in 1-2 weeks       Gayathri Wade RN

## 2020-07-21 RX ORDER — OXYCODONE HYDROCHLORIDE 5 MG/1
5 TABLET ORAL EVERY 4 HOURS PRN
Qty: 180 TABLET | Refills: 0 | Status: SHIPPED | OUTPATIENT
Start: 2020-07-23 | End: 2020-08-19

## 2020-07-21 NOTE — TELEPHONE ENCOUNTER
CW,    Please address due to SN's absence.    Controlled Substance Refill Request for Oxycodone    Last refill: 6/23/2020 - #180    Last clinic visit: 7/16/2020 - sent to ER    Clinic visit frequency required: Q 3 months  Next appt: None    Controlled substance agreement on file: Yes:  Date 7/27/2016.    Documentation in problem list reviewed:  Yes      [place] reviewed:  reviewed- recommend provider review  Overdose risk score = 540 (range 000-999)  [place]:  [email or web address]    Thanks,  Gayathri Wade RN

## 2020-07-30 ENCOUNTER — OFFICE VISIT (OUTPATIENT)
Dept: FAMILY MEDICINE | Facility: CLINIC | Age: 65
End: 2020-07-30
Payer: COMMERCIAL

## 2020-07-30 ENCOUNTER — OFFICE VISIT (OUTPATIENT)
Dept: PHARMACY | Facility: CLINIC | Age: 65
End: 2020-07-30
Payer: COMMERCIAL

## 2020-07-30 VITALS
OXYGEN SATURATION: 97 % | HEART RATE: 73 BPM | WEIGHT: 252.7 LBS | BODY MASS INDEX: 34.23 KG/M2 | RESPIRATION RATE: 18 BRPM | HEIGHT: 72 IN | TEMPERATURE: 97.2 F | SYSTOLIC BLOOD PRESSURE: 115 MMHG | DIASTOLIC BLOOD PRESSURE: 73 MMHG

## 2020-07-30 DIAGNOSIS — M54.50 CHRONIC BILATERAL LOW BACK PAIN WITHOUT SCIATICA: Primary | ICD-10-CM

## 2020-07-30 DIAGNOSIS — I48.91 ATRIAL FIBRILLATION WITH RVR (H): Primary | ICD-10-CM

## 2020-07-30 DIAGNOSIS — G89.29 CHRONIC BILATERAL LOW BACK PAIN WITHOUT SCIATICA: Primary | ICD-10-CM

## 2020-07-30 DIAGNOSIS — E11.9 TYPE 2 DIABETES MELLITUS WITHOUT COMPLICATION, WITHOUT LONG-TERM CURRENT USE OF INSULIN (H): Primary | ICD-10-CM

## 2020-07-30 PROCEDURE — 99214 OFFICE O/P EST MOD 30 MIN: CPT | Performed by: FAMILY MEDICINE

## 2020-07-30 PROCEDURE — 99207 ZZC NO CHARGE LOS: CPT | Performed by: PHARMACIST

## 2020-07-30 RX ORDER — GLIPIZIDE 5 MG/1
5 TABLET, FILM COATED, EXTENDED RELEASE ORAL DAILY
Qty: 90 TABLET | Refills: 0 | Status: SHIPPED | OUTPATIENT
Start: 2020-07-30 | End: 2020-10-27

## 2020-07-30 RX ORDER — PREGABALIN 100 MG/1
100 CAPSULE ORAL 3 TIMES DAILY
Qty: 270 CAPSULE | Refills: 3 | Status: SHIPPED | OUTPATIENT
Start: 2020-09-02 | End: 2020-08-14

## 2020-07-30 ASSESSMENT — PATIENT HEALTH QUESTIONNAIRE - PHQ9: SUM OF ALL RESPONSES TO PHQ QUESTIONS 1-9: 3

## 2020-07-30 ASSESSMENT — ANXIETY QUESTIONNAIRES
2. NOT BEING ABLE TO STOP OR CONTROL WORRYING: SEVERAL DAYS
7. FEELING AFRAID AS IF SOMETHING AWFUL MIGHT HAPPEN: NOT AT ALL
6. BECOMING EASILY ANNOYED OR IRRITABLE: SEVERAL DAYS
3. WORRYING TOO MUCH ABOUT DIFFERENT THINGS: NOT AT ALL
5. BEING SO RESTLESS THAT IT IS HARD TO SIT STILL: NOT AT ALL
1. FEELING NERVOUS, ANXIOUS, OR ON EDGE: SEVERAL DAYS

## 2020-07-30 ASSESSMENT — MIFFLIN-ST. JEOR: SCORE: 1964.24

## 2020-07-30 NOTE — PROGRESS NOTES
Subjective     Toby Mcgrath is a 64 year old male who presents to clinic today for the following health issues:    HPI         Hospital Follow-up Visit:    Hospital/Nursing Home/IP Rehab Facility: [facility]  Date of Admission: 07/16/2020  Date of Discharge: 07/18/2020  Reason(s) for Admission: Atrial fibrillation and flutter with RVR.      Was your hospitalization related to COVID-19? No   Problems taking medications regularly:  None  Medication changes since discharge: xarelto, metoprolol,   Problems adhering to non-medication therapy:  None    Summary of hospitalization:  [facility] discharge summary reviewed  Diagnostic Tests/Treatments reviewed.  Follow up needed:     Advised to have a sleep study  Plans to see cardiology electrophysiology in 1 to 2 weeks    Other Healthcare Providers Involved in Patient s Care:         Specialist appointment - Cardiology  Update since discharge: improved. Post Discharge Medication Reconciliation: discharge medications reconciled, continue medications without change.  Plan of care communicated with patient              Patient Active Problem List   Diagnosis     Thoracic or lumbosacral neuritis or radiculitis, unspecified     Osteoarthrosis, unspecified whether generalized or localized, pelvic region and thigh     Hypertrophy of prostate with urinary obstruction     Chronic rhinitis     Chronic pain syndrome     Disorder of bursae and tendons in shoulder region     Major depressive disorder, recurrent episode (H)     Anxiety     Gout     Type 2 diabetes mellitus without complication (H)     Hypertension goal BP (blood pressure) < 140/90     Advanced directives, counseling/discussion     DJD (degenerative joint disease), lumbar     Hyperlipidemia LDL goal <100     DDD (degenerative disc disease), cervical     GERD (gastroesophageal reflux disease)     Lumbar radiculopathy     S/P lumbar fusion     Left-sided low back pain with left-sided sciatica      BMI 32.0-32.9,adult     History of hepatitis C     S/P lumbar discectomy     Acute post-operative pain     S/P laminectomy     Chronic, continuous use of opioids     Bilateral low back pain without sciatica     Right-sided low back pain with right-sided sciatica     Acute gouty arthritis     Acute gout of right elbow, unspecified cause     Idiopathic gout of left elbow, unspecified chronicity     Gastroesophageal reflux disease without esophagitis     Failed back syndrome of lumbar spine     Slow transit constipation     Benign prostatic hyperplasia with urinary frequency     Diabetic polyneuropathy associated with type 2 diabetes mellitus (H)     Seborrheic keratoses     Atrial fibrillation with RVR (H)     Past Surgical History:   Procedure Laterality Date     BACK SURGERY       both shoulder repair  2006, 2008     C NONSPECIFIC PROCEDURE  1995    neck cyst     C NONSPECIFIC PROCEDURE  1979    laminectomy L5-S1 x 2     C SHOULDER SURG PROC UNLISTED  2005, '07    repairs,later manipulate,inject LT, RT     FUSION LUMBAR ANTERIOR, FUSION LUMBAR POSTERIOR TWO LEVELS, COMBINED N/A 9/17/2014    Procedure: COMBINED FUSION LUMBAR ANTERIOR, FUSION LUMBAR POSTERIOR TWO LEVELS;  Surgeon: Chris Lugo MD;  Location: [facility] OR     HC COLONOSCOPY THRU STOMA, DIAGNOSTIC  3/04    normal     HC UGI ENDOSCOPY DIAG W OR W/O BRUSH/WASH  3/04    ok     HEAD & NECK SURGERY       HEMILAMINECTOMY, DISCECTOMY LUMBAR ONE LEVEL, COMBINED Left 9/8/2015    Procedure: COMBINED HEMILAMINECTOMY, DISCECTOMY LUMBAR ONE LEVEL;  Surgeon: Jer Irby MD;  Location: [facility] OR     right hip replacement  10,2010       Social History     Tobacco Use     Smoking status: Former Smoker     Years: 40.00     Types: Cigarettes     Smokeless tobacco: Former User     Quit date: 2/1/2013   Substance Use Topics     Alcohol use: Yes     Alcohol/week: 0.0 standard drinks     Comment: occ.     Family History   Problem Relation Age of Onset     Diabetes  Mother      C.A.D. Father      Diabetes Father      Hypertension Father          Current Outpatient Medications   Medication Sig Dispense Refill     ACE/ARB/ARNI NOT PRESCRIBED, INTENTIONAL, Please choose reason not prescribed, below       allopurinol (ZYLOPRIM) 100 MG tablet Take 3 tablets (300 mg) by mouth daily 270 tablet 3     blood glucose (ACCU-CHEK DANIELE PLUS) test strip USE TO TEST BLOOD SUGAR TWO TIMES DAILY OR AS DIRECTED 200 strip 0     blood glucose monitoring (ACCU-CHEK DANIELE PLUS) meter device kit USE TO TEST BLOOD SUGARS 2  TIMES DAILY OR AS DIRECTED. 1 kit 0     blood glucose monitoring (ACCU-CHEK FASTCLIX) lancets USE TO TEST BLOOD SUGAR TWO TIMES DAILY OR AS DIRECTED 200 each 0     colchicine (COLCYRS) 0.6 MG tablet Take 0.6 mg by mouth daily as needed for moderate pain       finasteride (PROSCAR) 5 MG tablet TAKE 1 TABLET BY MOUTH  DAILY FOR PROSTATE  ENLARGEMENT 90 tablet 0     FLUoxetine (PROZAC) 20 MG capsule TAKE 3 CAPSULES BY MOUTH  ONCE DAILY 270 capsule 0     fluticasone (FLONASE) 50 MCG/ACT nasal spray Spray 1 spray into both nostrils daily as needed for rhinitis or allergies       glipiZIDE (GLUCOTROL XL) 5 MG 24 hr tablet Take 1 tablet (5 mg) by mouth daily 90 tablet 0     ketoconazole (NIZORAL) 2 % external shampoo APPLY TOPICALLY 2 TO 3  TIMES PER WEEK AS NEEDED  FOR ITCHING OR IRRITATION,  LEAVE ON FOR 2 MINUTES,  THEN WASH  mL 1     loratadine (CLARITIN) 10 MG tablet Take 1 tablet (10 mg) by mouth daily 30 tablet 3     metFORMIN (GLUCOPHAGE-XR) 500 MG 24 hr tablet TAKE 4 TABLETS BY MOUTH  DAILY WITH DINNER 360 tablet 1     metoprolol tartrate (LOPRESSOR) 50 MG tablet Take 1 tablet (50 mg) by mouth 2 times daily 180 tablet 3     naloxone (NARCAN) 4 MG/0.1ML nasal spray Spray 1 spray (4 mg) into one nostril alternating nostrils once as needed for opioid reversal Every 2-3 minutes until patient responsive or EMS arrives 0.2 mL 0     omeprazole (PRILOSEC) 20 MG DR capsule Take 1  capsule (20 mg) by mouth daily 90 capsule 3     oxyCODONE (ROXICODONE) 5 MG tablet Take 1 tablet (5 mg) by mouth every 4 hours as needed for severe pain Max #6 per day 180 tablet 0     polyethylene glycol (MIRALAX/GLYCOLAX) packet Take 17 g by mouth daily 100 packet 3     [START ON 9/2/2020] pregabalin (LYRICA) 100 MG capsule Take 1 capsule (100 mg) by mouth 3 times daily 270 capsule 3     ranitidine (ZANTAC) 150 MG tablet Take 300 mg by mouth 2 times daily as needed for heartburn       rivaroxaban ANTICOAGULANT (XARELTO) 20 MG TABS tablet Take 1 tablet (20 mg) by mouth daily (with dinner) 90 tablet 3     simvastatin (ZOCOR) 20 MG tablet TAKE 1 TABLET BY MOUTH AT  BEDTIME 90 tablet 1     tamsulosin (FLOMAX) 0.4 MG capsule Take 2 capsules (0.8 mg) by mouth daily 180 capsule 2     BP Readings from Last 3 Encounters:   07/30/20 115/73   07/18/20 112/58   07/16/20 117/83    Wt Readings from Last 3 Encounters:   07/30/20 114.6 kg (252 lb 11.2 oz)   07/16/20 115.9 kg (255 lb 8 oz)   01/07/20 113.4 kg (250 lb)                    Reviewed and updated as needed this visit by Provider         Review of Systems   Constitutional, HEENT, cardiovascular, pulmonary, gi and gu systems are negative, except as otherwise noted.      Objective    /73   Pulse 73   Temp 97.2  F (36.2  C) (Oral)   Resp 18   Ht 1.829 m (6')   Wt 114.6 kg (252 lb 11.2 oz)   SpO2 97%   BMI 34.27 kg/m    There is no height or weight on file to calculate BMI.  Physical Exam   GENERAL: healthy, alert and no distress  RESP: lungs clear to auscultation - no rales, rhonchi or wheezes  CV: regular rate and rhythm, normal S1 S2, no S3 or S4, no murmur, click or rub, no peripheral edema and peripheral pulses strong    Diagnostic Test Results:  Labs reviewed in Epic        Assessment & Plan     1. Atrial fibrillation with RVR (H)  He did start metoprolol and has good rate control.  He has plans to see cardiology in the next 1 to 2 weeks and we did set him  up for a sleep study.  He has plans to stay on the Eliquis and we will try to see if we can continue him on this.  If not we can we switch to Coumadin if cost is an issue.  - SLEEP EVALUATION & MANAGEMENT REFERRAL - ADULT -[facility] - [facility] [phone]  (Age 18 and up); Future     BMI:   Estimated body mass index is 34.27 kg/m  as calculated from the following:    Height as of this encounter: 1.829 m (6').    Weight as of this encounter: 114.6 kg (252 lb 11.2 oz).   Weight management plan: Discussed healthy diet and exercise guidelines            No follow-ups on file.    Connie Haskins MD  [facility]

## 2020-07-30 NOTE — TELEPHONE ENCOUNTER
It is time to refill Layo's Lyrica through the [employer] assistance program.  [employer] requires a hand signed, hard copy, brand name script for this fill and showing the dose decrease.    Please hand sign a BRAND name, hard copy script for:      LYRICA    Please send the HARD COPY script to me via interoffice mail FPS Zenaida Kim or via US mail  at:      [facility]   Zenaida Plasencia   [address] [place]  [address]    Thanks so much for your help!    Zenaida Plasencia  Prescription   Pharmacy Assistance  [id number]

## 2020-07-30 NOTE — TELEPHONE ENCOUNTER
SN,   Reviewed MNPMP  Lyrica early - last Rx 6/17/2020 #360  Pended for future Sept date (9/2)  Due for refill 9/9 but pended for 1 week sooner to make sure it gets to pt?  Please advise  Thanks,  Ivis SMALLS RN

## 2020-07-30 NOTE — PATIENT INSTRUCTIONS
1. Start Glipizide 5mg once a day in the morning.     Start testing your blood sugar twice a day - first thing in the morning (fasting) and 2 hours after a meal (post-prandial).    Goals: Fasting     Post prandial <180    2. Mary is going to check on getting your Xarelto and possibly a different diabetes med for free (like you do the Lyrica).     Mary Last, Pharm.D., M.B.A., BCACP  MT Pharmacist, [facility]  Pager: [phone]  Email: [email or web address]

## 2020-07-30 NOTE — PROGRESS NOTES
Clinical Pharmacy Consult:                                                    Toby Mcgrath is a 64 year old male coming in for a clinical pharmacist consult.  He was referred to me from Dr. Moore.     Reason for Consult: New onset of A.Flutter - having a hard time affording Xarelto. Also higher sugars - wondering about starting another agent. GLP1 preferred, but cost is a concern.     Discussion: Discussed DOAC vs. Warfarin - he would like to stick with Xarelto as long as we can get that through PAP like he does Lyrica.   Rgarding higher BG - he's eating a lot more fast foods due to COVID. He is bothered by his high BG and thinks he's noticed more blurred vision.     Plan:  1. Huddled with SN - Ok to start glipizide ER 5mg daily. Will see if we can get him a GLP-1 RA through PAP and then switch.   2. Emailed PAP team about PAP for a DOAC (either Xarelto or Eliquis) and a GLP1 RA  3. Answered pt questions about when to test glucose (BID fasting and 2hr PP)  4. Printed AVS from SN visit and reminded him about cardiology appointment tomorrow and to call and schedule sleep medicine visit.     Will f/u with pt in 1 week regarding information about patient assistance and make sure he got glipizide from mail order pharmacy.     Post Discharge Medication Reconciliation Status: discharge medications reconciled and changed, per note/orders.    Mary Last, LloydD, NEIDA, BCACP  MTM Pharmacist, [facility]

## 2020-07-31 ENCOUNTER — VIRTUAL VISIT (OUTPATIENT)
Dept: CARDIOLOGY | Facility: CLINIC | Age: 65
End: 2020-07-31
Payer: COMMERCIAL

## 2020-07-31 DIAGNOSIS — I48.92 ATRIAL FLUTTER WITH RAPID VENTRICULAR RESPONSE (H): ICD-10-CM

## 2020-07-31 DIAGNOSIS — I48.91 ATRIAL FIBRILLATION WITH RVR (H): Primary | ICD-10-CM

## 2020-07-31 PROCEDURE — 99213 OFFICE O/P EST LOW 20 MIN: CPT | Mod: 95 | Performed by: NURSE PRACTITIONER

## 2020-07-31 RX ORDER — METOPROLOL TARTRATE 50 MG
50 TABLET ORAL 2 TIMES DAILY
Qty: 180 TABLET | Refills: 3 | Status: SHIPPED | OUTPATIENT
Start: 2020-07-31 | End: 2020-11-05

## 2020-07-31 NOTE — LETTER
7/31/2020    Connie Haskins MD  [address]  [facility] [address]    RE: Toby Mcgrath       Dear Colleague,    I had the pleasure of seeing Toby Mcgrath in the [facility].    Toby Mcgrath is a 64 year old male who is being evaluated via a billable telephone visit.        Toby Mcgrath is a 64 year old male who is following up after being hospitalized.   This visit is being conducted as a virtual visit due to the emphasis on mitigation of the COVID-19 virus pandemic. The clinician has decided that the risk of an in-office visit outweighs the benefit for this patient.   He is a patient of Dr. Sidhu.  HE medical history includes     1. Atrial fibrillation and flutter with RVR.  Dx 7/2020  2. Tobacco abuse  3. Type 2 diabetes mellitus  4. essential hypertension  5. Hyperlipidemia  6. History of gout  7. Major depressive disorder and anxiety  8. chronic back pain with radiculopathy  9. GERD  10. BPH    Diagnostics:  ECHO (7/2020) revealed LVEF 55-60%.No significant valvular abnormalities, however visualization is limited Mild aortic root dilatation. Max diameter of the visualized portion 4 cm    In 7/16/2020, pt presented to the ED with 1-2 day history of exertional shortness of breath, LE edema, and lightheadedness.  He was found to be in atrial fibrillation with RVR.  He was started on IV diltiazem, IV heparin and a small dose of Lasix.  A rate control method was elected until evaluated by EP.  He was discharged on metoprolol tartrate twice daily and apixaban 5 mg twice daily     Today he reports his exertional shortness of breath, LE edema, and lightheadedness have disappeared.  He hasn't been taking his BP at home. At an OV visit on 7/30/2020, his blood pressure and HR were controlled.  He denies chest pain or pressure, dizziness, syncope, angina, dyspnea at rest or with exertion, orthopnea, PND, or edema.  Reports taking medications as prescribed  including Xarelto and denies bleeding, hematuria, melena, epistaxis and signs/symptoms of stroke.      ASSESSMENT AND PLAN    Atrial fibrillation/flutter    Dx in 7/2020 with symptoms of shortness of breath, LE edema, and lightheadedness.      For anticoagulation for CHADS VASC 2 (age, DM, HTN) he is taking Eliquis 5 mg twice daily    For rate control he currently taking metoprolol tartrate 50 mg twice daily     Today pt feels better but is unsure what rhythm he is in.  Will order a monitor.    For lifestyle interventions, recommended follow up with sleep medicine which has already been ordered    Tobacco abuse    Former smoker quit in 2013    Follow up with monitor to determine rhythm and rate.  Once this is determined will call pt, make adjustments to medications and plan follow up with an MD in [place].     20 telephone visit    Platform used for Video Visit: JIMBO Santos CNP          Thank you for allowing me to participate in the care of your patient.    Sincerely,     JIMBO Eli CNP     [facility]

## 2020-07-31 NOTE — PROGRESS NOTES
"Toby Mcgrath is a 64 year old male who is being evaluated via a billable telephone visit.      The patient has been notified of following:     \"This video visit will be conducted via a call between you and your physician/provider. We have found that certain health care needs can be provided without the need for an in-person physical exam.  This service lets us provide the care you need with a video conversation.  If a prescription is necessary we can send it directly to your pharmacy.  If lab work is needed we can place an order for that and you can then stop by our lab to have the test done at a later time.    Video visits are billed at different rates depending on your insurance coverage.  Please reach out to your insurance provider with any questions.    If during the course of the call the physician/provider feels a video visit is not appropriate, you will not be charged for this service.\"    Patient has given verbal consent for Video visit? Yes  How would you like to obtain your AVS? Mail a copy  If you are dropped from the video visit, the video invite should be resent to: Text to cell phone: [phone]  Will anyone else be joining your video visit? No        Review Of Systems  Skin: NEGATIVE  Eyes:Ears/Nose/Throat: NEGATIVE  Respiratory: NEGATIVE  Cardiovascular: light headedness, dizziness, edema  Gastrointestinal: nausea  Genitourinary:NEGATIVE   Musculoskeletal: joint pain/ chronic back pain  Neurologic: NEGATIVE  Psychiatric: NEGATIVE  Hematologic/Lymphatic/Immunologic: NEGATIVE  Endocrine:  Diabetic    Kim Mancuso LPN    Toby Mcgrath is a 64 year old male who is following up after being hospitalized.   This visit is being conducted as a virtual visit due to the emphasis on mitigation of the COVID-19 virus pandemic. The clinician has decided that the risk of an in-office visit outweighs the benefit for this patient.   He is a patient of Dr. Sidhu.  HE medical history includes     1. Atrial " fibrillation and flutter with RVR.  Dx 7/2020  2. Tobacco abuse  3. Type 2 diabetes mellitus  4. essential hypertension  5. Hyperlipidemia  6. History of gout  7. Major depressive disorder and anxiety  8. chronic back pain with radiculopathy  9. GERD  10. BPH    Diagnostics:  ECHO (7/2020) revealed LVEF 55-60%.No significant valvular abnormalities, however visualization is limited Mild aortic root dilatation. Max diameter of the visualized portion 4 cm    In 7/16/2020, pt presented to the ED with 1-2 day history of exertional shortness of breath, LE edema, and lightheadedness.  He was found to be in atrial fibrillation with RVR.  He was started on IV diltiazem, IV heparin and a small dose of Lasix.  A rate control method was elected until evaluated by EP.  He was discharged on metoprolol tartrate twice daily and apixaban 5 mg twice daily     Today he reports his exertional shortness of breath, LE edema, and lightheadedness have disappeared.  He hasn't been taking his BP at home. At an OV visit on 7/30/2020, his blood pressure and HR were controlled.  He denies chest pain or pressure, dizziness, syncope, angina, dyspnea at rest or with exertion, orthopnea, PND, or edema.  Reports taking medications as prescribed including Xarelto and denies bleeding, hematuria, melena, epistaxis and signs/symptoms of stroke.      ASSESSMENT AND PLAN    Atrial fibrillation/flutter    Dx in 7/2020 with symptoms of shortness of breath, LE edema, and lightheadedness.      For anticoagulation for CHADS VASC 2 (age, DM, HTN) he is taking Eliquis 5 mg twice daily    For rate control he currently taking metoprolol tartrate 50 mg twice daily     Today pt feels better but is unsure what rhythm he is in.  Will order a monitor.    For lifestyle interventions, recommended follow up with sleep medicine which has already been ordered    Tobacco abuse    Former smoker quit in 2013    Follow up with monitor to determine rhythm and rate.  Once this is  determined will call pt, make adjustments to medications and plan follow up with an MD in [place].     20 telephone visit    Platform used for Video Visit: JIMBO Santos CNP

## 2020-08-05 ENCOUNTER — TELEPHONE (OUTPATIENT)
Dept: PHARMACY | Facility: CLINIC | Age: 65
End: 2020-08-05

## 2020-08-05 NOTE — TELEPHONE ENCOUNTER
Checked into patient assistance for pt - Can get Trulicity and Ozempic just by filling out form.     Xarelto & Eliquis both require out of pocket expense to qualify.  He fills at [facility], so,  not sure if or how soon he would qualify   Xarelto/J&J  require 4% $825 and Eliquis 3% $620,     Message left for Layo to call back so we can talk about his out of pocket expenses and next steps.     Mary Last, LloydD, NEIDA, BCACP  MTM Pharmacist, [facility]

## 2020-08-06 NOTE — TELEPHONE ENCOUNTER
Called pt to follow up with the information below:     BG are still high in the AM - but he's taking his BG in the morning after eating. Seeing numbers in the 200's, but has seen some improvement.  In the afternoon his BG is down 170-180's.  We talked about switching to a once weekly injection, but he wants to give the glipizide a little bit longer.     Discussed blood thinners - he doesn't think he can afford the $825 (or %620) out of pocket. Discussed switching to warfarin with lovenox bridge.  This is very confusing for him over the phone.  In discussions about this (which he's not too excited about warfarin) it was determined that he has not yet checked with his pharmacy about the ongoing cost of Xarelto. Will have him do this before proceeding.      Mary Last, PharmD, NEIDA, BCACP  MTM Pharmacist, [facility]

## 2020-08-10 ENCOUNTER — TELEPHONE (OUTPATIENT)
Dept: CARDIOLOGY | Facility: CLINIC | Age: 65
End: 2020-08-10

## 2020-08-11 ENCOUNTER — TELEPHONE (OUTPATIENT)
Dept: FAMILY MEDICINE | Facility: CLINIC | Age: 65
End: 2020-08-11

## 2020-08-11 ENCOUNTER — TELEPHONE (OUTPATIENT)
Dept: PHARMACY | Facility: CLINIC | Age: 65
End: 2020-08-11

## 2020-08-11 ENCOUNTER — HOSPITAL ENCOUNTER (OUTPATIENT)
Dept: CARDIOLOGY | Facility: CLINIC | Age: 65
Discharge: HOME | End: 2020-08-11
Attending: NURSE PRACTITIONER | Admitting: NURSE PRACTITIONER
Payer: COMMERCIAL

## 2020-08-11 ENCOUNTER — TELEPHONE (OUTPATIENT)
Dept: CARDIOLOGY | Facility: CLINIC | Age: 65
End: 2020-08-11

## 2020-08-11 DIAGNOSIS — M54.50 CHRONIC BILATERAL LOW BACK PAIN WITHOUT SCIATICA: ICD-10-CM

## 2020-08-11 DIAGNOSIS — G89.29 CHRONIC BILATERAL LOW BACK PAIN WITHOUT SCIATICA: ICD-10-CM

## 2020-08-11 DIAGNOSIS — I48.91 ATRIAL FIBRILLATION WITH RVR (H): ICD-10-CM

## 2020-08-11 PROCEDURE — 0296T LEADLESS EKG MONITOR 3 TO 14 DAYS: CPT

## 2020-08-11 PROCEDURE — 0298T LEADLESS EKG MONITOR 3 TO 14 DAYS: CPT | Performed by: INTERNAL MEDICINE

## 2020-08-11 NOTE — TELEPHONE ENCOUNTER
FYI~ A refill has been made to the  assistance program for Lyrica.    A 90 day supply of Lyrica will be delivered to Layo's home on August 13th by end of day Via [employer].    Thank you,    Ana Luisa Cabrera  Prescription Assistance

## 2020-08-11 NOTE — TELEPHONE ENCOUNTER
Called pt to follow-up on addition of glipizide and ask about Xarelto and cost.     Layo went ahead and paid $500 out of pocket for Xarelto.  To be able to qualify for any PAP he needs to spend >$800/year which he won't hit in 2020.  Will need to decide what to do (continue Xarelto or switch to warfarin) prior to his next refill in November.     Regarding BG - reports they are doing much better - 118, 124, 140 are the few numbers he reads me. He was having some hypoglycemia sx so he moved his glipizide to nighttime admin so he wouldn't feel them (he did not test on his meter to see where his BG was at when he was having sx). We discussed I would rather have him feel this way during the day when he can treat it vs. Sleep through it, and that it may mean we need to lower the dose of glipizide. He will change back to morning dose and MTM will f/u on hypoglycemia in 1-2 weeks (he will call sooner if it's happening often, or if he sees numbers on his meter <70).     While on the phone he asks about his Lyrica Rx.  He has been taking Lyrica 2- 100mg capsules by mouth twice a day.  Recent Rx that was sent in was for 100mg TID.  It looks like this was incorrectly changed on his med list while he was in the hospital on 7/16. Dr. Haskins does not mention a dose change in her note when the Rx was refilled on 7/30.   Called Ana Luisa at [facility] to let her know we're working on this.  Routing note to Dr. Haskins to determine if this dose decrease was intentional or if Rx should be re-written to reflect pregabalin 100mg 2 tablets twice daily.     Mary Last, LloydD, NEIDA, BCACP  MTM Pharmacist, [facility]

## 2020-08-11 NOTE — TELEPHONE ENCOUNTER
PATIENT WELLNESS TELEPHONE SCREENING     Step 1 Screening Questions    In the past 3 weeks, have you been exposed to someone with a suspected or known illness?  COVID-19? No  Chickenpox? No   Measles? No  Pertussis? No    In the past 2 weeks, have you had any of the following symptoms?   Fever/Chills? No   Cough? No   Shortness of breath? No   New loss of taste or smell? No  Sore throat? No  Muscle or body aches? No  Headaches? No  Fatigue? No  Vomiting or diarrhea? No    Step 2 Screening Results (Skip if the patient is negative for symptoms)    If the patient is positive for new or worsening symptoms, contact the ordering provider to determine if the procedure is deemed necessary. Determine if patient can be re-scheduled when the patient is symptom free or has a negative COVID test.     If ordering provider deems the procedure is necessary, notify your manager/supervisor. Provide the patient with the procedural department phone number and inform the patient to call the procedural department upon arrival.  The patient will be registered over the phone.    Step 3 Review Visitor Policy  Patient informed of the updated visitor policy   1 visitor allowed per patient   Visitor must screen negative for COVID symptoms   Visitor must wear a mask    Pancho Ahumada

## 2020-08-14 ENCOUNTER — TELEPHONE (OUTPATIENT)
Dept: FAMILY MEDICINE | Facility: CLINIC | Age: 65
End: 2020-08-14

## 2020-08-14 RX ORDER — PREGABALIN 100 MG/1
200 CAPSULE ORAL 2 TIMES DAILY
Qty: 360 CAPSULE | Refills: 3 | Status: SHIPPED | OUTPATIENT
Start: 2020-08-14 | End: 2020-09-22

## 2020-08-14 NOTE — LETTER
[facility]  [address]  [facility] [id number] [phone]       August 14, 2020    Toby Mcgrath  [address] [address]    [place] [address]    Dear Layo,    We care about your health and have reviewed your health plan and are making recommendations based on this review, to optimize your health.    You are in particular need of attention regarding:  -Wellness (Physical) Visit     We are recommending that you:  -schedule a WELLNESS (Physical) APPOINTMENT with me.   I will check fasting labs the same day - nothing to eat except water and meds for 8-10 hours prior.      In addition, here is a list of due or overdue Health Maintenance reminders.    Health Maintenance Due   Topic Date Due     TREATMENT AGREEMENT FOR CHRONIC PAIN MANAGEMENT  1955     Hepatitis B Vaccine (1 of 3 - Risk 3-dose series) 08/03/1972     Zoster (Shingles) Vaccine (1 of 2) 08/03/2003     Discuss Advance Care Planning  12/08/2016     Eye Exam  08/22/2017     AORTIC ANEURYSM SCREENING (SYSTEM ASSIGNED)  08/03/2018     Annual Wellness Visit  08/07/2018     Pneumococcal Vaccine (2 of 2 - PPSV23) 08/23/2019     Anxiety Assessment  05/20/2020     URINE DRUG SCREEN  06/26/2020       To address the above recommendations, we encourage you to contact us at [phone], via [facility] or by contacting Central Scheduling toll free at [phone] 24 hours a day. They will assist you with finding the most convenient time and location.    Thank you for trusting [facility] and we appreciate the opportunity to serve you.  We look forward to supporting your healthcare needs in the future.    Healthy Regards,    Your [facility] Team

## 2020-08-14 NOTE — TELEPHONE ENCOUNTER
Summary:    Patient is due/failing the following:   PHYSICAL    Type of outreach:  Sent letter.  Action needed: Patient needs office visit for AW.    If need for provider review:    Please indicate OV, lab, MTM, or nurse appt if needed.  Indicate fasting or not fasting.                                                                                                                                          Noelle Sorenson, PENELOPE on 8/14/2020 at 4:26 PM

## 2020-08-17 VITALS — HEIGHT: 72 IN | WEIGHT: 230 LBS | BODY MASS INDEX: 31.15 KG/M2

## 2020-08-17 ASSESSMENT — MIFFLIN-ST. JEOR: SCORE: 1856.27

## 2020-08-17 NOTE — PROGRESS NOTES
"Toby Mcgrath is a 65 year old male who is being evaluated via a billable telephone visit.      The patient has been notified of following:     \"This telephone visit will be conducted via a call between you and your physician/provider. We have found that certain health care needs can be provided without the need for a physical exam.  This service lets us provide the care you need with a short phone conversation.  If a prescription is necessary we can send it directly to your pharmacy.  If lab work is needed we can place an order for that and you can then stop by our lab to have the test done at a later time.    Telephone visits are billed at different rates depending on your insurance coverage. During this emergency period, for some insurers they may be billed the same as an in-person visit.  Please reach out to your insurance provider with any questions.    If during the course of the call the physician/provider feels a telephone visit is not appropriate, you will not be charged for this service.\"    Patient has given verbal consent for Telephone visit?  Yes    What phone number would you like to be contacted at? [phone]    How would you like to obtain your AVS? MyChart    Phone call duration: 30 minutes  Phone call start time: 12:05 PM  Phone call end time: 12:35 PM    Brielle Alas MD  [facility]   Floor [address], [address]   [address] [place] [id number]   Appointments: [phone]        Sleep Consultation Note:    Date on this visit: 8/18/2020    Toby Mcgrath is sent by Connie Haskins for a sleep consultation regarding evaluation for possible sleep apnea    Primary Physician: Connie Haskins     Chief complaint: Evaluation for possible sleep apnea    Toby Mcgrath is a 65 year old male with A.fib, chr.pain,chronic opioid use, hypertension, hyperlipidemia, chronic opioid use, diabetes " mellitus type 2, diabetic neuropathy, anxiety and depression. He  was recently hospitalized for atrial fibrillation/flutter with rapid ventricular rate (at [facility] from 7/16/2020-7/18/2020).  He presents to sleep clinic for a telephone visit to obtain  evaluation for possible sleep apnea.  He has not had a previous sleep study.     Sleep Disordered Breathing  Toby reports  snoring, witnessed apneas, dry mouth, morning headaches.   Denies snort arousals, choking/gasping for air or morning headaches.    Sleep Schedule/Sleep Complaints//Daytime Functioning  Toby goes to bed at 8:30 PM and wakes up at 4:30AM(spontaneously).  Denies difficulty falling asleep. It usually takes 20 minutes to fall asleep.  He wakes up 3-4 times throughout the night.  Night time awakenings are usually to use bathroom  After awakening, He is able to fall back asleep fairly quickly though sometimes if awakens 3 AM it may be hard to resume sleep  Feels rested most mornings, but towards afternoon he feels fatigued, but denies daytime sleepiness  He naps 1-2 days/week for 1 hour, feels better after nap   Patient  denies drowsiness while driving.    Patient does not use electronics in bed.    SLEEP SCALES:  Patient's [place] Sleepiness score 6/24   Insomnia severity index:5    Restless Legs symptoms  Toby does not complain of restlessness feelings in the legs.    Sleep Behaviors  He denies any cataplexy, sleep paralysis, sleep hallucinations.  He denies any night time behaviors - sleep walking, sleep talking, sleep eating.  He does not complain acting out dreams.      Social History  Toby is retired. He is   2009. Lives with his son. He drinks coffee-1/2 pot, about 5 cups/day. Sometimes 5 can of pop until 3-4 pm . Last caffeine intake is not within 6 hours of bed time. He does drink alcohol. Patient is a former smoker - quit 8 years ago. Patient used cocaine at age 18-19 years old, but  no illicit drug use  since then.     Allergies:    Allergies   Allergen Reactions     Codeine Nausea and Vomiting     Tolerating other opiates        Medications:    Current Outpatient Medications   Medication Sig Dispense Refill     ACE/ARB/ARNI NOT PRESCRIBED, INTENTIONAL, Please choose reason not prescribed, below       allopurinol (ZYLOPRIM) 100 MG tablet Take 3 tablets (300 mg) by mouth daily 270 tablet 3     blood glucose (ACCU-CHEK DANIELE PLUS) test strip USE TO TEST BLOOD SUGAR TWO TIMES DAILY OR AS DIRECTED 200 strip 0     blood glucose monitoring (ACCU-CHEK DANIELE PLUS) meter device kit USE TO TEST BLOOD SUGARS 2  TIMES DAILY OR AS DIRECTED. 1 kit 0     blood glucose monitoring (ACCU-CHEK FASTCLIX) lancets USE TO TEST BLOOD SUGAR TWO TIMES DAILY OR AS DIRECTED 200 each 0     colchicine (COLCYRS) 0.6 MG tablet Take 0.6 mg by mouth daily as needed for moderate pain       fluticasone (FLONASE) 50 MCG/ACT nasal spray Spray 1 spray into both nostrils daily as needed for rhinitis or allergies       glipiZIDE (GLUCOTROL XL) 5 MG 24 hr tablet Take 1 tablet (5 mg) by mouth daily 90 tablet 0     ketoconazole (NIZORAL) 2 % external shampoo APPLY TOPICALLY 2 TO 3  TIMES PER WEEK AS NEEDED  FOR ITCHING OR IRRITATION,  LEAVE ON FOR 2 MINUTES,  THEN WASH  mL 1     loratadine (CLARITIN) 10 MG tablet Take 1 tablet (10 mg) by mouth daily 30 tablet 3     metFORMIN (GLUCOPHAGE-XR) 500 MG 24 hr tablet TAKE 4 TABLETS BY MOUTH  DAILY WITH DINNER 360 tablet 1     metoprolol tartrate (LOPRESSOR) 50 MG tablet Take 1 tablet (50 mg) by mouth 2 times daily 180 tablet 3     naloxone (NARCAN) 4 MG/0.1ML nasal spray Spray 1 spray (4 mg) into one nostril alternating nostrils once as needed for opioid reversal Every 2-3 minutes until patient responsive or EMS arrives 0.2 mL 0     omeprazole (PRILOSEC) 20 MG DR capsule Take 1 capsule (20 mg) by mouth daily 90 capsule 3     oxyCODONE (ROXICODONE) 5 MG tablet Take 1 tablet (5 mg) by mouth every 4 hours as  needed for severe pain Max #6 per day 180 tablet 0     polyethylene glycol (MIRALAX/GLYCOLAX) packet Take 17 g by mouth daily 100 packet 3     pregabalin (LYRICA) 100 MG capsule Take 2 capsules (200 mg) by mouth 2 times daily 360 capsule 3     ranitidine (ZANTAC) 150 MG tablet Take 300 mg by mouth 2 times daily as needed for heartburn       rivaroxaban ANTICOAGULANT (XARELTO) 20 MG TABS tablet Take 1 tablet (20 mg) by mouth daily (with dinner) 90 tablet 3     simvastatin (ZOCOR) 20 MG tablet TAKE 1 TABLET BY MOUTH AT  BEDTIME 90 tablet 1     tamsulosin (FLOMAX) 0.4 MG capsule Take 2 capsules (0.8 mg) by mouth daily 180 capsule 2     finasteride (PROSCAR) 5 MG tablet TAKE 1 TABLET BY MOUTH  DAILY FOR PROSTATE  ENLARGEMENT 90 tablet 1     FLUoxetine (PROZAC) 20 MG capsule TAKE 3 CAPSULES BY MOUTH  ONCE DAILY 270 capsule 1       Problem List:  Patient Active Problem List    Diagnosis Date Noted     Type 2 diabetes mellitus without complication (H) 09/09/2011     Priority: High     Atrial fibrillation with RVR (H) 07/16/2020     Priority: Medium     Seborrheic keratoses 05/20/2019     Priority: Medium     Diabetic polyneuropathy associated with type 2 diabetes mellitus (H) 02/28/2019     Priority: Medium     Benign prostatic hyperplasia with urinary frequency 11/27/2017     Priority: Medium     Slow transit constipation 04/13/2017     Priority: Medium     Failed back syndrome of lumbar spine 01/30/2017     Priority: Medium     Idiopathic gout of left elbow, unspecified chronicity 01/03/2017     Priority: Medium     Gastroesophageal reflux disease without esophagitis 01/03/2017     Priority: Medium     Acute gout of right elbow, unspecified cause 07/14/2016     Priority: Medium     Acute gouty arthritis 03/03/2016     Priority: Medium     Right-sided low back pain with right-sided sciatica 12/15/2015     Priority: Medium     Bilateral low back pain without sciatica 11/17/2015     Priority: Medium     Chronic, continuous  use of opioids 10/29/2015     Priority: Medium     See notes under chronic pain syndrome  Patient is followed by Connie Haskins for primary care management (who will be long term prescriber of pain medications).     Opioid medications are currently prescribed through [facility]  Lab [id number]    All chronic pain medication refills should be approved by pain clinic provider only at face-to-face appointments - not by phone request unless otherwise specified in [facility] office notes    Medication(s): See last Pain Center office note  Maximum quantity per month: See last Pain Center office note  Clinic visit frequency required: Schedule per pain clinic.    Controlled substance agreement on file: Yes       Date(s): July 2017    Pain Clinic evaluation in the past: Yes       Date/Location:  [facility], ongoing care    DIRE Total Score(s): Per [facility] notes.    Last [facility] website verification: Performed at each pain clinic visit and prior to each prescription refill.   [email or web address]           S/P laminectomy 10/15/2015     Priority: Medium     L2-3 hemilaminectomy 9/2015       Acute post-operative pain 09/15/2015     Priority: Medium     History of hepatitis C 09/14/2015     Priority: Medium     S/P lumbar discectomy 09/14/2015     Priority: Medium     BMI 32.0-32.9,adult 09/04/2015     Priority: Medium     Left-sided low back pain with left-sided sciatica 08/13/2015     Priority: Medium     S/P lumbar fusion 10/14/2014     Priority: Medium     Lumbar radiculopathy 09/17/2014     Priority: Medium     GERD (gastroesophageal reflux disease) 08/12/2013     Priority: Medium     DDD (degenerative disc disease), cervical 02/08/2013     Priority: Medium     Normal.    C2-C3: Normal disc, facet joints, spinal canal and neural foramina.    C3-C4: Mild broad-based posterior disc bulge. Otherwise normal.    C4-C5: Normal disc, facet joints, spinal canal and neural  foramina.    C5-C6: Moderate degenerative disc disease with loss of disc height,  circumferential disc bulge and vertebral endplate osteophytes. Mild  spinal canal stenosis and moderate left foraminal stenosis. Normal  right foramen and facet joints.    C6-C7: Mild circumferential disc bulge. Slight impression on the  thecal sac. Mild left foraminal stenosis. Normal right foramen and  facet joints.    C7-T1: Normal disc, facet joints, spinal canal and neural foramina.    T1-T2: Mild central posterior disc protrusion.    T2-T3: Mild central posterior disc protrusion. Slight impression on  the spinal cord.             Hyperlipidemia LDL goal <100 10/25/2012     Priority: Medium     DJD (degenerative joint disease), lumbar 01/10/2012     Priority: Medium     Advanced directives, counseling/discussion 12/08/2011     Priority: Medium     Advance Directive Problem List Overview:   Name Relationship Phone    Primary Health Care Agent            Alternative Health Care Agent          Discussed advance care planning with patient; information given to patient to review. 12/8/2011 Maranda Espinosa MA           Hypertension goal BP (blood pressure) < 140/90 10/21/2011     Priority: Medium     Gout 04/08/2011     Priority: Medium     Anxiety 04/05/2010     Priority: Medium     Major depressive disorder, recurrent episode (H) 07/09/2008     Priority: Medium     Problem list name updated by automated process. Provider to review       Disorder of bursae and tendons in shoulder region 04/17/2008     Priority: Medium     Problem list name updated by automated process. Provider to review       Chronic rhinitis 03/29/2007     Priority: Medium     Chronic pain syndrome 03/29/2007     Priority: Medium     Patient is followed by Connie Haskins MD for ongoing prescription of pain medication.  All refills should be approved by this provider, or covering partner.    Medication(s): Oxycodone  5 mg  Maximum quantity per month: 180  (max 6 per day)  Clinic visit frequency required: Q 3 months    Controlled substance agreement:  Encounter-Level CSA - 7/27/16:               Controlled Substance Agreement - Scan on 7/30/2016  7:23 AM : CONTROLLED SUBSTANCE AGREEMENT (below)          Encounter-Level CSA - 8/7/15:               Controlled Substance Agreement - Scan on 8/10/2015  1:58 PM : CONTROLLED SUBSTANCE AGREEMENT 08/07/15 (below)            Pain Clinic evaluation in the past: Yes       Date/Location:      DIRE Total Score(s):  No flowsheet data found.    Last [facility] website verification: 7/21/2020   [email or web address]               Osteoarthrosis, unspecified whether generalized or localized, pelvic region and thigh 08/03/2006     Priority: Medium     Sees [facility] for DJD hips       Hypertrophy of prostate with urinary obstruction 08/03/2006     Priority: Medium     Problem list name updated by automated process. Provider to review       Thoracic or lumbosacral neuritis or radiculitis, unspecified 10/13/2004     Priority: Medium        Past Medical/Surgical History:  Past Medical History:   Diagnosis Date     Anxiety state, unspecified      BMI 32.0-32.9,adult 9/4/2015     Chronic pain syndrome 3/29/2007    Narcotic refill protocol Will return every 2-3 months for clinic visits Controlled substance aggreement on file from this  6/26/12 Documentation in problem list: no, appears to be compliant based on last visit He is responding best to Oxycontin 10 mg twice daily # 60 per month.  This is costly and looking into [place] for coverage.  Nausea with MS Contin Has meds refilled 16 of every month Last [facility] website verification:  done on 7/8/2015  [email or web address]   2/10/2015:  PCP will take over narcotic prescription Tapering course: using 5 mg tablets 10 mg morning 15 mg noon, 15 mg night for 1 week then 10 mg morning, 10 mg noon, 15 mg night for 1 week then 10 mg TID for 1 week then see me for refills  Then ongoing taper: 5 mg  morning, 10 mg noon and 10 mg night for 1 week then  5 mg morning and noon and 10 mg night for 1 week then 5 mg tid for 1 week Then 5 mg morning no noon dose and 5 mg night for 1 week then No morning or non dose and 5 mg nightly for 1 week then off  Goal is co     Chronic rhinitis 3/29/2007     DDD (degenerative disc disease), cervical 2/8/2013    Normal.  C2-C3: Normal disc, facet joints, spinal canal and neural foramina.  C3-C4: Mild broad-based posterior disc bulge. Otherwise normal.  C4-C5: Normal disc, facet joints, spinal canal and neural foramina.  C5-C6: Moderate degenerative disc disease with loss of disc height, circumferential disc bulge and vertebral endplate osteophytes. Mild spinal canal stenosis and moderate left foraminal stenosis. Normal right foramen and facet joints.  C6-C7: Mild circumferential disc bulge. Slight impression on the thecal sac. Mild left foraminal stenosis. Normal right foramen and facet joints.  C7-T1: Normal disc, facet joints, spinal canal and neural foramina.  T1-T2: Mild central posterior disc protrusion.  T2-T3: Mild central posterior disc protrusion. Slight impression on the spinal cord.         DJD (degenerative joint disease), lumbar 1/10/2012     Esophageal reflux     ,refluxes on upper GI     Gout 4/8/2011     Gout, unspecified      Hyperlipidemia LDL goal <100 10/25/2012     Hypertension goal BP (blood pressure) < 140/90 10/21/2011     HYPERTROPHY PROSTATE WITH OBST 8/3/2006     Impotence of organic origin      Lumbar radiculopathy 9/17/2014     Major depressive disorder, recurrent episode, unspecified 7/9/2008     Osteoarthrosis, unspecified whether generalized or localized, pelvic region and thigh      Pure hyperglyceridemia      ROTATOR CUFF SYND NOS 4/17/2008     S/P lumbar fusion 10/14/2014     Thoracic or lumbosacral neuritis or radiculitis, unspecified      Tobacco use disorder      Type 2 diabetes, HbA1C goal < 8% (H) 9/9/2011     Past Surgical History:    Procedure Laterality Date     BACK SURGERY       both shoulder repair  2006, 2008     C NONSPECIFIC PROCEDURE  1995    neck cyst     C NONSPECIFIC PROCEDURE  1979    laminectomy L5-S1 x 2     C SHOULDER SURG PROC UNLISTED  2005, '07    repairs,later manipulate,inject LT, RT     FUSION LUMBAR ANTERIOR, FUSION LUMBAR POSTERIOR TWO LEVELS, COMBINED N/A 9/17/2014    Procedure: COMBINED FUSION LUMBAR ANTERIOR, FUSION LUMBAR POSTERIOR TWO LEVELS;  Surgeon: Chris Lugo MD;  Location: [facility] OR     HC COLONOSCOPY THRU STOMA, DIAGNOSTIC  3/04    normal     HC UGI ENDOSCOPY DIAG W OR W/O BRUSH/WASH  3/04    ok     HEAD & NECK SURGERY       HEMILAMINECTOMY, DISCECTOMY LUMBAR ONE LEVEL, COMBINED Left 9/8/2015    Procedure: COMBINED HEMILAMINECTOMY, DISCECTOMY LUMBAR ONE LEVEL;  Surgeon: Jer Irby MD;  Location: [facility] OR     right hip replacement  10,2010       Social History:  Social History     Socioeconomic History     Marital status:      Spouse name: Not on file     Number of children: 2     Years of education: 12     Highest education level: Not on file   Occupational History     Employer: DISABLED     Employer: UNEMPLOYED   Social Needs     Financial resource strain: Not on file     Food insecurity     Worry: Not on file     Inability: Not on file     Transportation needs     Medical: Not on file     Non-medical: Not on file   Tobacco Use     Smoking status: Former Smoker     Years: 40.00     Types: Cigarettes     Smokeless tobacco: Former User     Quit date: 2/1/2013   Substance and Sexual Activity     Alcohol use: Yes     Alcohol/week: 0.0 standard drinks     Comment: occ.     Drug use: No     Sexual activity: Never   Lifestyle     Physical activity     Days per week: Not on file     Minutes per session: Not on file     Stress: Not on file   Relationships     Social connections     Talks on phone: Not on file     Gets together: Not on file     Attends Buddhism service: Not on file     Active  member of club or organization: Not on file     Attends meetings of clubs or organizations: Not on file     Relationship status: Not on file     Intimate partner violence     Fear of current or ex partner: Not on file     Emotionally abused: Not on file     Physically abused: Not on file     Forced sexual activity: Not on file   Other Topics Concern      Service Yes     Blood Transfusions No     Caffeine Concern No     Occupational Exposure No     Hobby Hazards No     Sleep Concern No     Stress Concern Yes     Weight Concern No     Special Diet No     Back Care No     Exercise No     Bike Helmet Not Asked     Seat Belt Yes     Self-Exams No     Parent/sibling w/ CABG, MI or angioplasty before 65F 55M? No   Social History Narrative    On disability since 2009 for hip and shoulder. Worked for [employer] at [employer] for 20 yrs. Kids 2.  since 2009.               Family History:  Family history pertinent to sleep disorders: brother sleep walking during childhood  Family History   Problem Relation Age of Onset     Diabetes Mother      C.A.D. Father      Diabetes Father      Hypertension Father        Review of Systems:  A complete review of systems reviewed by me is negative with the exeption of what has been mentioned in the history of present illness,  and symptoms listed below, highlighted in red:  CONSTITUTIONAL: weight gain,night sweats, NEGATIVE for fever, chills    EYES: NEGATIVE for changes in vision, blind spots, double vision.  ENT: NEGATIVE for ear pain, sore throat, sinus pain, post-nasal drip, runny nose, bloody nose  CARDIAC: swollen legs or swollen feet. NEGATIVE for fast heartbeats or fluttering in chest, chest pain or pressure, breathlessness when lying flat  NEUROLOGIC: NEGATIVE headaches, weakness or numbness in the arms or legs.  DERMATOLOGIC: NEGATIVE for rashes, new moles or change in mole(s)  PULMONARY: NEGATIVE SOB at rest, SOB with activity, dry cough, productive  cough, coughing up blood, wheezing or whistling when breathing.    GASTROINTESTINAL: NEGATIVE for nausea or vomitting, loose or watery stools, fat or grease in stools, constipation, abdominal pain, bowel movements black in color or blood noted.  GENITOURINARY: , urinating more frequently than usual(prostate) NEGATIVE for pain during urination, blood in urine  MUSCULOSKELETAL:  joint pains-back.  ENDOCRINE: diabetes. NEGATIVE for increased thirst or urination  LYMPHATIC: NEGATIVE for swollen lymph nodes, lumps or bumps in the breasts or nipple discharge.  PSYCHE:  anxiety NEGATIVE for depression    Physical Examination:  Vitals: Ht 1.829 m (6')   Wt 104.3 kg (230 lb)   BMI 31.19 kg/m    BMI= Body mass index is 31.19 kg/m .         [place] Total Score 8/17/2020   Total score - [place] 6     General: No apparent distress   Chest: No cough, no audible wheezing, able to talk in full sentences  Psych: coherent speech, normal rate and volume, able to articulate logical thoughts, able   to abstract reason, no tangential thoughts, no hallucinations   or delusions  His affect is normal  Neuro:  Mental status: Alert and  Oriented X 3  Speech: normal     OTHER TESTS:  Last Comprehensive Metabolic Panel:  Sodium   Date Value Ref Range Status   07/17/2020 139 133 - 144 mmol/L Final     Potassium   Date Value Ref Range Status   07/17/2020 4.5 3.4 - 5.3 mmol/L Final     Chloride   Date Value Ref Range Status   07/17/2020 105 94 - 109 mmol/L Final     Carbon Dioxide   Date Value Ref Range Status   07/17/2020 32 20 - 32 mmol/L Final     Anion Gap   Date Value Ref Range Status   07/17/2020 2 (L) 3 - 14 mmol/L Final     Glucose   Date Value Ref Range Status   07/18/2020 186 (H) 70 - 99 mg/dL Final     Urea Nitrogen   Date Value Ref Range Status   07/17/2020 16 7 - 30 mg/dL Final     Creatinine   Date Value Ref Range Status   07/17/2020 1.18 0.66 - 1.25 mg/dL Final     GFR Estimate   Date Value Ref Range Status   07/17/2020 63 >60  mL/min/[1.73_m2] Final     Comment:     Non  GFR Calc  Starting 12/18/2018, serum creatinine based estimated GFR (eGFR) will be   calculated using the Chronic Kidney Disease Epidemiology Collaboration   (CKD-EPI) equation.       Calcium   Date Value Ref Range Status   07/17/2020 9.5 8.5 - 10.1 mg/dL Final     Bilirubin Total   Date Value Ref Range Status   07/16/2020 0.5 0.2 - 1.3 mg/dL Final     Alkaline Phosphatase   Date Value Ref Range Status   07/16/2020 65 40 - 150 U/L Final     ALT   Date Value Ref Range Status   07/16/2020 48 0 - 70 U/L Final     AST   Date Value Ref Range Status   07/16/2020 39 0 - 45 U/L Final     Comment:     Specimen is hemolyzed which can falsely elevate AST. Analysis of a   non-hemolyzed specimen may result in a lower value.       Echocardiogram(7/17/2020)  Interpretation Summary     Technically challenging study due to patient body habitus, even with the use  of contrast imaging.     Left ventricular size, global systolic function are normal, estimated LVEF 55-  60%.  Endocardial borders are not optimally visualized, however no clear wall motion  abnormalities are noted.  Right ventricle is not well visualized, however global systolic function is  probably at least mildly reduced.  No significant valvular abnormalities, however visualization is limited.  Mild aortic root dilatation. Max diameter of the visualized portion 4 cm.     There are no prior studies available for comparison.    Lipid panel:      Lab Results   Component Value Date    CHOL 124 07/17/2020     Lab Results   Component Value Date    HDL 35 07/17/2020     Lab Results   Component Value Date    LDL 55 07/17/2020     Lab Results   Component Value Date    TRIG 169 07/17/2020     HbA1C:  Lab Results   Component Value Date    A1C 7.8 05/28/2020         Impression/Report/Plan:  Snoring, observed apneas, fatigue, in the setting of A.Fib and HTN. Possible Obstructive sleep apnea  Patient has h/o chr opioid use  that can be associated with sleep related breathing disorder(SRBD) such as CSA, hypoxemia,hypoventilation.  STOP BANG score is 6/8. Patient likely has sleep apnea based on clinical history, male gender, body habitus and  h/o HTN.   HST/ Polysomnography reviewed.  Recommend in-lab sleep study to evaluate for SRBD particularly given the possibility of coexistent hypoventilation with the chr. opioid use, with elevated serum CO2 of 32 mmol/L(7/16/2020).  Obstructive sleep apnea reviewed.  Complications of untreated sleep apnea were reviewed .   Treatment for SARBJIT have been discussed.     Encouraged to follow good sleep hygiene/behavioral techniques. We discussed reducing caffeine consumption.    Atrial fibrillation, h/o HTN: he is on metoprolol tartrate 50 mg twice daily for rate control and is on anticoagulation.  We discussed the association of SARBJIT with  atrial fibrillation   and or other arrhythmias and cardiovascular disease. He will continue follow-up with cardiology.    Chronic pain syndrome. He is on chronic opioid use. We discussed the association of opioid use  with sleep disordered breathing.    Hyperlipidemia: He is on simvastatin 20 mg daily.    Diabetes mellitus type 2: last Hb A1C checked on 5/28/20 was high at 7.8%.  He is on glipizide and metformin.     Anxiety/depression: He is being treated with fluoxetine.  He will continue follow-up with his primary care provider for optimizing the management of  mood disorder.   Patient is a  former smoker and has been encouraged to continue to not smoke.    Obesity: We discussed weight management with healthy diet, and exercise.    Patient was strongly advised to avoid driving, operating any heavy machinery or other hazardous situations while drowsy or sleepy.  Patient was counseled on the importance of driving while alert, to pull over if drowsy, or nap before getting into the vehicle if sleepy.      Plan is to  communicate results of PSG with patient via telephone  "visit in 7-10 days after the test is completed.    CC: Connie Haskins      The above note was dictated using voice recognition software. Although reviewed after completion, some word and grammatical error may remain . Please contact the author for any clarifications.    \"I spent a total of 30 minutes with Toby Mcgrath during today's  Telephone visit. Over 50% of this time was spent counseling the patient and  coordinating care regarding sleep-related breathing disorder, HST/PSG, SRBD and cardiovascular disease, opioids and SRBD, sleep hygiene, weight management and chart review..\"     Brielle Alas MD  [facility]   [address], [address]   [address] [place] [address]. [place] [address]   Appointments: [phone]                    "

## 2020-08-17 NOTE — PATIENT INSTRUCTIONS
Your BMI is Body mass index is 31.19 kg/m .  Weight management is a personal decision.  If you are interested in exploring weight loss strategies, the following discussion covers the approaches that may be successful. Body mass index (BMI) is one way to tell whether you are at a healthy weight, overweight, or obese. It measures your weight in relation to your height.  A BMI of 18.5 to 24.9 is in the healthy range. A person with a BMI of 25 to 29.9 is considered overweight, and someone with a BMI of 30 or greater is considered obese. More than two-thirds of American adults are considered overweight or obese.  Being overweight or obese increases the risk for further weight gain. Excess weight may lead to heart disease and diabetes.  Creating and following plans for healthy eating and physical activity may help you improve your health.  Weight control is part of healthy lifestyle and includes exercise, emotional health, and healthy eating habits. Careful eating habits lifelong are the mainstay of weight control. Though there are significant health benefits from weight loss, long-term weight loss with diet alone may be very difficult to achieve- studies show long-term success with dietary management in less than 10% of people. Attaining a healthy weight may be especially difficult to achieve in those with severe obesity. In some cases, medications, devices and surgical management might be considered.  What can you do?  If you are overweight or obese and are interested in methods for weight loss, you should discuss this with your provider.     Consider reducing daily calorie intake by 500 calories.     Keep a food journal.     Avoiding skipping meals, consider cutting portions instead.    Diet combined with exercise helps maintain muscle while optimizing fat loss. Strength training is particularly important for building and maintaining muscle mass. Exercise helps reduce stress, increase energy, and improves fitness.  "Increasing exercise without diet control, however, may not burn enough calories to loose weight.       Start walking three days a week 10-20 minutes at a time    Work towards walking thirty minutes five days a week     Eventually, increase the speed of your walking for 1-2 minutes at time    In addition, we recommend that you review healthy lifestyles and methods for weight loss available through the National Institutes of Health patient information sites:  http://win.niddk.nih.gov/publications/index.htm    And look into health and wellness programs that may be available through your health insurance provider, employer, local community center, or hanh club.    Weight management plan: Patient was referred to their PCP to discuss a diet and exercise plan.  MY TREATMENT INFORMATION FOR SLEEP APNEA-  Toby Mcgrath    DOCTOR : Brielle Alas MD  SLEEP CENTER :      MY CONTACT NUMBER:     Am I having a sleep study at a sleep center?  Make sure you have an appointment for the study before you leave!    Am I having a home sleep study?  Watch this video:  /drop off device-   [email or web address]?v=yGGFBdELGhk  Disposable device sent out require phone/computer application-   [email or web address]?v=BCce_vbiwxE  Please verify your insurance coverage with your insurance carrier    Frequently asked questions:  1. What is Obstructive Sleep Apnea (SARBJIT)? SARBJIT is the most common type of sleep apnea. Apnea means, \"without breath.\"  Apnea is most often caused by narrowing or collapse of the upper airway as muscles relax during sleep.   Almost everyone has occasional apneas. Most people with sleep apnea have had brief interruptions at night frequently for many years.  The severity of sleep apnea is related to how frequent and severe the events are.   2. What are the consequences of SARBJIT? Symptoms include: feeling sleepy during the day, snoring loudly, gasping or stopping of breathing, " trouble sleeping, and occasionally morning headaches or heartburn at night.  Sleepiness can be serious and even increase the risk of falling asleep while driving. Other health consequences may include development of high blood pressure and other cardiovascular disease in persons who are susceptible. Untreated SARBJIT  can contribute to heart disease, stroke and diabetes.   3. What are the treatment options? In most situations, sleep apnea is a lifelong disease that must be managed with daily therapy. Medications are not effective for sleep apnea and surgery is generally not considered until other therapies have been tried. Your treatment is your choice . Continuous Positive Airway (CPAP) works right away and is the therapy that is effective in nearly everyone. An oral device to hold your jaw forward is usually the next most reliable option. Other options include postioning devices (to keep you off your back), weight loss, and surgery including a tongue pacing device. There is more detail about some of these options below.    Important tips for using CPAP and similar devices   Know your equipment:  CPAP is continuous positive airway pressure that prevents obstructive sleep apnea by keeping the throat from collapsing while you are sleeping. In most cases, the device is  smart  and can slowly self-adjusts if your throat collapses and keeps a record every day of how well you are treated-this information is available to you and your care team.  BPAP is bilevel positive airway pressure that keeps your throat open and also assists each breath with a pressure boost to maintain adequate breathing.  Special kinds of BPAP are used in patients who have inadequate breathing from lung or heart disease. In most cases, the device is  smart  and can slowly self-adjusts to assist breathing. Like CPAP, the device keeps a record of how well you are treated.  Your mask is your connection to the device. You get to choose what feels most  comfortable and the staff will help to make sure if fits. Here: are some examples of the different masks that are available:       Key points to remember on your journey with sleep apnea:  1. Sleep study.  PAP devices often need to be adjusted during a sleep study to show that they are effective and adjusted right.  2. Good tips to remember: Try wearing just the mask during a quiet time during the day so your body adapts to wearing it. A humidifier is recommended for comfort in most cases to prevent drying of your nose and throat. Allergy medication from your provider may help you if you are having nasal congestion.  3. Getting settled-in. It takes more than one night for most of us to get used to wearing a mask. Try wearing just the mask during a quiet time during the day so your body adapts to wearing it. A humidifier is recommended for comfort in most cases. Our team will work with you carefully on the first day and will be in contact within 4 days and again at 2 and 4 weeks for advice and remote device adjustments. Your therapy is evaluated by the device each day.   4. Use it every night. The more you are able to sleep naturally for 7-8 hours, the more likely you will have good sleep and to prevent health risks or symptoms from sleep apnea. Even if you use it 4 hours it helps. Occasionally all of us are unable to use a medical therapy, in sleep apnea, it is not dangerous to miss one night.   5. Communicate. Call our skilled team on the number provided on the first day if your visit for problems that make it difficult to wear the device. Over 2 out of 3 patients can learn to wear the device long-term with help from our team. Remember to call our team or your sleep providers if you are unable to wear the device as we may have other solutions for those who cannot adapt to mask CPAP therapy. It is recommended that you sleep your sleep provider within the first 3 months and yearly after that if you are not having  problems.   6. Use it for your health. We encourage use of CPAP masks during daytime quiet periods to allow your face and brain to adapt to the sensation of CPAP so that it will be a more natural sensation to awaken to at night or during naps. This can be very useful during the first few weeks or months of adapting to CPAP though it does not help medically to wear CPAP during wakefulness and  should not be used as a strategy just to meet guidelines.  7. Take care of your equipment. Make sure you clean your mask and tubing using directions every day and that your filter and mask are replaced as recommended or if they are not working.     BESIDES CPAP, WHAT OTHER THERAPIES ARE THERE?    Positioning Device  Positioning devices are generally used when sleep apnea is mild and only occurs on your back.This example shows a pillow that straps around the waist. It may be appropriate for those whose sleep study shows milder sleep apnea that occurs primarily when lying flat on one's back. Preliminary studies have shown benefit but effectiveness at home may need to be verified by a home sleep test. These devices are generally not covered by medical insurance.  Examples of devices that maintain sleeping on the back to prevent snoring and mild sleep apnea.    Belt type body positioner  Http://[employer]/    Electronic reminder  Http://nightshifttherapy.com/  Http://www.[employer].com.au/      Oral Appliance  What is oral appliance therapy?  An oral appliance device fits on your teeth at night like a retainer used after having braces. The device is made by a specialized dentist and requires several visits over 1-2 months before a manufactured device is made to fit your teeth and is adjusted to prevent your sleep apnea. Once an oral device is working properly, snoring should be improved. A home sleep test may be recommended at that time if to determine whether the sleep apnea is adequately treated.       Some things to  remember:  -Oral devices are often, but not always, covered by your medical insurance. Be sure to check with your insurance provider.   -If you are referred for oral therapy, you will be given a list of specialized dentists to consider or you may choose to visit the Web site of the American Academy of Dental Sleep Medicine  -Oral devices are less likely to work if you have severe sleep apnea or are extremely overweight.     More detailed information  An oral appliance is a small acrylic device that fits over the upper and lower teeth  (similar to a retainer or a mouth guard). This device slightly moves jaw forward, which moves the base of the tongue forward, opens the airway, improves breathing for effective treat snoring and obstructive sleep apnea in perhaps 7 out of 10 people .  The best working devices are custom-made by a dental device  after a mold is made of the teeth 1, 2, 3.  When is an oral appliance indicated?  Oral appliance therapy is recommended as a first-line treatment for patients with primary snoring, mild sleep apnea, and for patients with moderate sleep apnea who prefer appliance therapy to use of CPAP4, 5. Severity of sleep apnea is determined by sleep testing and is based on the number of respiratory events per hour of sleep.   How successful is oral appliance therapy?  The success rate of oral appliance therapy in patients with mild sleep apnea is 75-80% while in patients with moderate sleep apnea it is 50-70%. The chance of success in patients with severe sleep apnea is 40-50%. The research also shows that oral appliances have a beneficial effect on the cardiovascular health of SARBJIT patients at the same magnitude as CPAP therapy7.  Oral appliances should be a second-line treatment in cases of severe sleep apnea, but if not completely successful then a combination therapy utilizing CPAP plus oral appliance therapy may be effective. Oral appliances tend to be effective in a broad  range of patients although studies show that the patients who have the highest success are females, younger patients, those with milder disease, and less severe obesity. 3, 6.   Finding a dentist that practices dental sleep medicine  Specific training is available through the American Academy of Dental Sleep Medicine for dentists interested in working in the field of sleep. To find a dentist who is educated in the field of sleep and the use of oral appliances, near you, visit the Web site of the American Academy of Dental Sleep Medicine.    References  1. Moraima et al. Objectively measured vs self-reported compliance during oral appliance therapy for sleep-disordered breathing. Chest 2013; 144(5): 2915-0243.  2. Ari, et al. Objective measurement of compliance during oral appliance therapy for sleep-disordered breathing. Thorax 2013; 68(1): 91-96.  3. Mariana et al. Mandibular advancement devices in 620 men and women with SARBJIT and snoring: tolerability and predictors of treatment success. Chest 2004; 125: 2877-6847.  4. Paulino, et al. Oral appliances for snoring and SARBJIT: a review. Sleep 2006; 29: 244-262.  5. Maria Ines et al. Oral appliance treatment for SARBJIT: an update. J Clin Sleep Med 2014; 10(2): 215-227.  6. Michaela et al. Predictors of OSAH treatment outcome. J Dent Res 2007; 86: 3499-2001.      Weight Loss:    Weight loss is a long-term strategy that may improve sleep apnea in some patients.    Weight management is a personal decision and the decision should be based on your interest and the potential benefits.  If you are interested in exploring weight loss strategies, the following discussion covers the impact on weight loss on sleep apnea and the approaches that may be successful.    Being overweight does not necessarily mean you will have health consequences.  Those who have BMI over 35 or over 27 with existing medical conditions carries greater risk.   Weight loss decreases severity of  sleep apnea in most people with obesity. For those with mild obesity who have developed snoring with weight gain, even 15-30 pound weight loss can improve and occasionally eliminate sleep apnea.  Structured and life-long dietary and health habits are necessary to lose weight and keep healthier weight levels.     Though there may be significant health benefits from weight loss, long-term weight loss is very difficult to achieve- studies show success with dietary management in less than 10% of people. In addition, substantial weight loss may require years of dietary control and may be difficult if patients have severe obesity. In these cases, surgical management may be considered.  Finally, older individuals who have tolerated obesity without health complications may be less likely to benefit from weight loss strategies.        Your BMI is Body mass index is 31.19 kg/m .  Weight management is a personal decision.  If you are interested in exploring weight loss strategies, the following discussion covers the approaches that may be successful. Body mass index (BMI) is one way to tell whether you are at a healthy weight, overweight, or obese. It measures your weight in relation to your height.  A BMI of 18.5 to 24.9 is in the healthy range. A person with a BMI of 25 to 29.9 is considered overweight, and someone with a BMI of 30 or greater is considered obese. More than two-thirds of American adults are considered overweight or obese.  Being overweight or obese increases the risk for further weight gain. Excess weight may lead to heart disease and diabetes.  Creating and following plans for healthy eating and physical activity may help you improve your health.  Weight control is part of healthy lifestyle and includes exercise, emotional health, and healthy eating habits. Careful eating habits lifelong are the mainstay of weight control. Though there are significant health benefits from weight loss, long-term weight loss  with diet alone may be very difficult to achieve- studies show long-term success with dietary management in less than 10% of people. Attaining a healthy weight may be especially difficult to achieve in those with severe obesity. In some cases, medications, devices and surgical management might be considered.  What can you do?  If you are overweight or obese and are interested in methods for weight loss, you should discuss this with your provider.     Consider reducing daily calorie intake by 500 calories.     Keep a food journal.     Avoiding skipping meals, consider cutting portions instead.    Diet combined with exercise helps maintain muscle while optimizing fat loss. Strength training is particularly important for building and maintaining muscle mass. Exercise helps reduce stress, increase energy, and improves fitness. Increasing exercise without diet control, however, may not burn enough calories to loose weight.       Start walking three days a week 10-20 minutes at a time    Work towards walking thirty minutes five days a week     Eventually, increase the speed of your walking for 1-2 minutes at time    In addition, we recommend that you review healthy lifestyles and methods for weight loss available through the National Institutes of Health patient information sites:  http://win.niddk.nih.gov/publications/index.htm    And look into health and wellness programs that may be available through your health insurance provider, employer, local community center, or hanh club.    Surgery:    Surgery for obstructive sleep apnea is considered generally only when other therapies fail to work. Surgery may be discussed with you if you are having a difficult time tolerating CPAP and or when there is an abnormal structure that requires surgical correction.  Nose and throat surgeries often enlarge the airway to prevent collapse.  Most of these surgeries create pain for 1-2 weeks and up to half of the most common surgeries  are not effective throughout life.  You should carefully discuss the benefits and drawbacks to surgery with your sleep provider and surgeon to determine if it is the best solution for you.   More information  Surgery for SARBJIT is directed at areas that are responsible for narrowing or complete obstruction of the airway during sleep.  There are a wide range of procedures available to enlarge and/or stabilize the airway to prevent blockage of breathing in the three major areas where it can occur: the palate, tongue, and nasal regions.  Successful surgical treatment depends on the accurate identification of the factors responsible for obstructive sleep apnea in each person.  A personalized approach is required because there is no single treatment that works well for everyone.  Because of anatomic variation, consultation with an examination by a sleep surgeon is a critical first step in determining what surgical options are best for each patient.  In some cases, examination during sedation may be recommended in order to guide the selection of procedures.  Patients will be counseled about risks and benefits as well as the typical recovery course after surgery. Surgery is typically not a cure for a person s SARBJIT.  However, surgery will often significantly improve one s SARBJIT severity (termed  success rate ).  Even in the absence of a cure, surgery will decrease the cardiovascular risk associated with OSA7; improve overall quality of life8 (sleepiness, functionality, sleep quality, etc).      Palate Procedures:  Patients with SARBJIT often have narrowing of their airway in the region of their tonsils and uvula.  The goals of palate procedures are to widen the airway in this region as well as to help the tissues resist collapse.  Modern palate procedure techniques focus on tissue conservation and soft tissue rearrangement, rather than tissue removal.  Often the uvula is preserved in this procedure. Residual sleep apnea is common in  patient after pharyngoplasty with an average reduction in sleep apnea events of 33%2.      Tongue Procedures:  ExamWhile patients are awake, the muscles that surround the throat are active and keep this region open for breathing. These muscles relax during sleep, allowing the tongue and other structures to collapse and block breathing.  There are several different tongue procedures available.  Selection of a tongue base procedure depends on characteristics seen on physical exam.  Generally, procedures are aimed at removing bulky tissues in this area or preventing the back of the tongue from falling back during sleep.  Success rates for tongue surgery range from 50-62%3.    Hypoglossal Nerve Stimulation:  Hypoglossal nerve stimulation has recently received approval from the [place] Food and Drug Administration for the treatment of obstructive sleep apnea.  This is based on research showing that the system was safe and effective in treating sleep apnea6.  Results showed that the median AHI score decreased 68%, from 29.3 to 9.0. This therapy uses an implant system that senses breathing patterns and delivers mild stimulation to airway muscles, which keeps the airway open during sleep.  The system consists of three fully implanted components: a small generator (similar in size to a pacemaker), a breathing sensor, and a stimulation lead.  Using a small handheld remote, a patient turns the therapy on before bed and off upon awakening.    Candidates for this device must be greater than 22 years of age, have moderate to severe SARBJIT (AHI between 20-65), BMI less than 32, have tried CPAP/oral appliance without success, and have appropriate upper airway anatomy (determined by a sleep endoscopy performed by Dr. Williamson).    Hypoglossal Nerve Stimulation Pathway:    The sleep surgeon s office will work with the patient through the insurance prior-authorization process (including communications and appeals).    Nasal  Procedures:  Nasal obstruction can interfere with nasal breathing during the day and night.  Studies have shown that relief of nasal obstruction can improve the ability of some patients to tolerate positive airway pressure therapy for obstructive sleep apnea1.  Treatment options include medications such as nasal saline, topical corticosteroid and antihistamine sprays, and oral medications such as antihistamines or decongestants. Non-surgical treatments can include external nasal dilators for selected patients. If these are not successful by themselves, surgery can improve the nasal airway either alone or in combination with these other options.      Combination Procedures:  Combination of surgical procedures and other treatments may be recommended, particularly if patients have more than one area of narrowing or persistent positional disease.  The success rate of combination surgery ranges from 66-80%2,3.    References  1. Tristin CATHERINE. The Role of the Nose in Snoring and Obstructive Sleep Apnoea: An Update.  Eur Arch Otorhinolaryngol. 2011; 268: 1365-73.  2.  Elías SM; Herson JA; Florencio JR; Pallanch JF; Dangelo MB; Jennifer SG; Yariel MIRAMONTES. Surgical modifications of the upper airway for obstructive sleep apnea in adults: a systematic review and meta-analysis. SLEEP 2010;33(10):7864-9952. Lunarijessee FRANCO. Hypopharyngeal surgery in obstructive sleep apnea: an evidence-based medicine review.  Arch Otolaryngol Head Neck Surg. 2006 Feb;132(2):206-13.  3. Erick YH1, Joselin Y, Alejo VICTORIANO. The efficacy of anatomically based multilevel surgery for obstructive sleep apnea. Otolaryngol Head Neck Surg. 2003 Oct;129(4):327-35.  4. Boris FRANCO, Goldberg A. Hypopharyngeal Surgery in Obstructive Sleep Apnea: An Evidence-Based Medicine Review. Arch Otolaryngol Head Neck Surg. 2006 Feb;132(2):206-13.  5. Zaina DUNCAN et al. Upper-Airway Stimulation for Obstructive Sleep Apnea.  N Engl J Med. 2014 Jan 9;370(2):139-49.  6. Sue GR et al. Increased  Incidence of Cardiovascular Disease in Middle-aged Men with Obstructive Sleep Apnea. Am J Respir Crit Care Med; 2002 166: 159-165  7. Effie CONTRERAS et al. Studying Life Effects and Effectiveness of Palatopharyngoplasty (SLEEP) study: Subjective Outcomes of Isolated Uvulopalatopharyngoplasty. Otolaryngol Head Neck Surg. 2011; 144: 623-631.

## 2020-08-18 ENCOUNTER — VIRTUAL VISIT (OUTPATIENT)
Dept: SLEEP MEDICINE | Facility: CLINIC | Age: 65
End: 2020-08-18
Payer: COMMERCIAL

## 2020-08-18 DIAGNOSIS — F33.0 MILD EPISODE OF RECURRENT MAJOR DEPRESSIVE DISORDER (H): ICD-10-CM

## 2020-08-18 DIAGNOSIS — N40.0 BENIGN PROSTATIC HYPERPLASIA WITHOUT LOWER URINARY TRACT SYMPTOMS: ICD-10-CM

## 2020-08-18 DIAGNOSIS — G47.9 SLEEP DISTURBANCE: Primary | ICD-10-CM

## 2020-08-18 DIAGNOSIS — I48.91 ATRIAL FIBRILLATION WITH RVR (H): ICD-10-CM

## 2020-08-18 PROCEDURE — 99203 OFFICE O/P NEW LOW 30 MIN: CPT | Mod: 95 | Performed by: INTERNAL MEDICINE

## 2020-08-18 RX ORDER — FINASTERIDE 5 MG/1
TABLET, FILM COATED ORAL
Qty: 90 TABLET | Refills: 1 | Status: SHIPPED | OUTPATIENT
Start: 2020-08-18 | End: 2020-11-05

## 2020-08-18 NOTE — TELEPHONE ENCOUNTER
Thanks!  Let Zenaida and Ana Luisa know for the patient assistance program purposes.     Let Toby know over the phone.     Lloyd TempleD, NEIDA, BCACP  MTM Pharmacist, [facility]

## 2020-08-19 ENCOUNTER — TELEPHONE (OUTPATIENT)
Dept: FAMILY MEDICINE | Facility: CLINIC | Age: 65
End: 2020-08-19

## 2020-08-19 ENCOUNTER — TELEPHONE (OUTPATIENT)
Dept: PHARMACY | Facility: CLINIC | Age: 65
End: 2020-08-19

## 2020-08-19 NOTE — TELEPHONE ENCOUNTER
Central Prior Authorization Team   Phone: [phone]    PA Initiation    Medication: pregabalin (LYRICA) 100 MG capsule -Initiated  Insurance Company: [employer] Part D - Phone [phone] Fax [phone]  Pharmacy Filling the Rx: [facility] [address] [place] [address]  Filling Pharmacy Phone: [phone]  Filling Pharmacy Fax:    Start Date: 8/19/2020

## 2020-08-19 NOTE — TELEPHONE ENCOUNTER
Prior Authorization Approval    Authorization Effective Date: 8/19/2020  Authorization Expiration Date: 12/31/2020  Medication: pregabalin (LYRICA) 100 MG capsule -APPROVED  Approved Dose/Quantity:   Reference #:     Insurance Company: [employer] Part D - Phone [phone] Fax [phone]  Expected CoPay:       CoPay Card Available:      Foundation Assistance Needed:    Which Pharmacy is filling the prescription (Not needed for infusion/clinic administered): [facility] [address] [place] [address]  Pharmacy Notified: Yes  Patient Notified: No    Pharmacy will notify patient when medication is ready.

## 2020-08-19 NOTE — TELEPHONE ENCOUNTER
Called pt to discuss cost of Xarelto - just learned of a program for Medicare patients to help decrease the cost. Info below, I'm not sure if he qualifies, but will have him call and find out.      Also let him know about Lyrica dose staying at 200mg BID and that the PAP is aware and filling out new paperwork. Lyrica accidentally went to pharmacy - [facility] and let them know that he will not be picking this up.     Lastly, reviewed BG. He is in the 110's - 120's in the AM and 140-150's in the evening after eating. He is feeling a little woozy during the day and has seen some readings in the 80's (nothing lower than 80). He cannot tell me how often that is happening nor if there is a pattern.  Will have him track and call him back about this next week, potentially decreasing dose if needed.     Mary Last, LloydD, NEIDA, BCACP  MTM Pharmacist, [facility]     If your patient is being asked to pay more than $85 to fill their monthly prescription, "[employer] may be able to help. "[employer] from [employer] is a program for patients facing affordability challenges caused by gaps in drug insurance coverage such as high deductibles or the Medicare Part D Coverage Gap.      Eligible commercial patients or government-insured patients will have the option to pay $85/month (plus sales tax if applicable) outside of their insurance plans to have XARELTO  shipped directly from a dedicated pharmacy.      Your patients can learn more and hear full eligibility requirements by calling a "[employer] CarePath Care Coordinator at [phone] ([phone]) Monday - Friday, 8:00 AM to 8:00 PM ET. Multilingual phone support is available.       [employer] Program Requirements       Eligibility     To be eligible for [employer], your patient must:   ?  Currently have prescription drug insurance covering XARELTO     ?  Have met an out-of-pocket cost responsibility of $200 on XARELTO   in the current calendar year           This program excludes patients covered by certain health plans.         Other Requirements        This program is currently only available through the mail-order pharmacy contracted through [employer] It is not available at any other pharmacy or dispensary.         Your patient must authorize each monthly refill via text or phone call from the dedicated pharmacy.         This program is only available for patients who are taking XARELTO  in accordance with the Prescribing Information. It is not valid for any prescription written with off-label dosing.         This program is not insurance and should not take the place of insurance.         This program offer may not be combined with any other coupon, discount, prescription savings card, free trial, or other offer. Offer good only in the [place] and [place]. Void where prohibited or otherwise restricted by law. Program terms will  at the end of each calendar year. Before the calendar year ends, your patient will receive information and eligibility requirements for future participation. The program is subject to change or discontinuation without notice, including in specific states.         The $85 monthly cost paid to this program, plus sales tax if applicable (the Cost), will not count towards your patient's deductible or cumulative out-of-pocket spend. The Cost may not be submitted as a claim for payment to any third-party payer or pharmaceutical patient assistance foundation.         If your patient chooses, they may submit the Cost to a Flexible Spending Account (FSA), or a Health Savings Account (HSA). The patient should check with their employer if the cost may be submitted to a Health Reimbursement Account (HRA).         Before your patient enrolls in the Care IT program it is important they understand that they will be asked to provide personal information that may include their  name, address, phone number, email address, and information related to their prescription insurance and treatment. This information will be used by the mail-order pharmacy for the program, in accordance with their privacy practices, to determine their eligibility, enroll them in the program, and administer the program. The information will also be used to learn more about the people who use the program and will be shared with companies supporting the program and their insurance provider, as required by law.

## 2020-08-19 NOTE — TELEPHONE ENCOUNTER
Prior Authorization Retail Medication Request    Medication/Dose: pregabalin (LYRICA) 100 MG capsule   ICD code (if different than what is on RX):    Previously Tried and Failed:  Morphine, gabapentin  Rationale:  Has taken since 2/04/2016    Insurance Name:  [employer] [phone]  Insurance ID:  [id number]      Pharmacy Information (if different than what is on RX)  Name:  [facility]  Phone:  [phone]

## 2020-08-25 ENCOUNTER — TELEPHONE (OUTPATIENT)
Dept: PHARMACY | Facility: CLINIC | Age: 65
End: 2020-08-25

## 2020-08-25 NOTE — TELEPHONE ENCOUNTER
Called pt to follow-up on glipizide and low blood sugars. No answer, message left.     Mary Last, PharmD, NEIDA, BCACP  MTM Pharmacist, [facility]

## 2020-09-17 ENCOUNTER — APPOINTMENT (OUTPATIENT)
Dept: CT IMAGING | Facility: CLINIC | Age: 65
End: 2020-09-17
Attending: EMERGENCY MEDICINE
Payer: COMMERCIAL

## 2020-09-17 ENCOUNTER — HOSPITAL ENCOUNTER (EMERGENCY)
Facility: CLINIC | Age: 65
Discharge: HOME | End: 2020-09-17
Attending: EMERGENCY MEDICINE | Admitting: EMERGENCY MEDICINE
Payer: COMMERCIAL

## 2020-09-17 VITALS
SYSTOLIC BLOOD PRESSURE: 130 MMHG | HEART RATE: 63 BPM | RESPIRATION RATE: 16 BRPM | DIASTOLIC BLOOD PRESSURE: 93 MMHG | OXYGEN SATURATION: 96 % | TEMPERATURE: 98.4 F

## 2020-09-17 DIAGNOSIS — S00.03XA CONTUSION OF SCALP, INITIAL ENCOUNTER: ICD-10-CM

## 2020-09-17 DIAGNOSIS — R42 LIGHTHEADEDNESS: ICD-10-CM

## 2020-09-17 LAB
ANION GAP SERPL CALCULATED.3IONS-SCNC: 6 MMOL/L (ref 3–14)
BASOPHILS # BLD AUTO: 0.1 10E9/L (ref 0–0.2)
BASOPHILS NFR BLD AUTO: 1.1 %
BUN SERPL-MCNC: 17 MG/DL (ref 7–30)
CALCIUM SERPL-MCNC: 8.6 MG/DL (ref 8.5–10.1)
CHLORIDE SERPL-SCNC: 109 MMOL/L (ref 94–109)
CO2 SERPL-SCNC: 26 MMOL/L (ref 20–32)
CREAT SERPL-MCNC: 1.32 MG/DL (ref 0.66–1.25)
DIFFERENTIAL METHOD BLD: NORMAL
EOSINOPHIL # BLD AUTO: 0.2 10E9/L (ref 0–0.7)
EOSINOPHIL NFR BLD AUTO: 3 %
ERYTHROCYTE [DISTWIDTH] IN BLOOD BY AUTOMATED COUNT: 13.8 % (ref 10–15)
GFR SERPL CREATININE-BSD FRML MDRD: 55 ML/MIN/{1.73_M2}
GLUCOSE BLDC GLUCOMTR-MCNC: 164 MG/DL (ref 70–99)
GLUCOSE SERPL-MCNC: 151 MG/DL (ref 70–99)
HCT VFR BLD AUTO: 40 % (ref 40–53)
HGB BLD-MCNC: 13.3 G/DL (ref 13.3–17.7)
IMM GRANULOCYTES # BLD: 0 10E9/L (ref 0–0.4)
IMM GRANULOCYTES NFR BLD: 0.6 %
LYMPHOCYTES # BLD AUTO: 1.5 10E9/L (ref 0.8–5.3)
LYMPHOCYTES NFR BLD AUTO: 24 %
MCH RBC QN AUTO: 29.4 PG (ref 26.5–33)
MCHC RBC AUTO-ENTMCNC: 33.3 G/DL (ref 31.5–36.5)
MCV RBC AUTO: 89 FL (ref 78–100)
MONOCYTES # BLD AUTO: 0.6 10E9/L (ref 0–1.3)
MONOCYTES NFR BLD AUTO: 9.4 %
NEUTROPHILS # BLD AUTO: 3.9 10E9/L (ref 1.6–8.3)
NEUTROPHILS NFR BLD AUTO: 61.9 %
NRBC # BLD AUTO: 0 10*3/UL
NRBC BLD AUTO-RTO: 0 /100
PLATELET # BLD AUTO: 171 10E9/L (ref 150–450)
POTASSIUM SERPL-SCNC: 4.4 MMOL/L (ref 3.4–5.3)
RBC # BLD AUTO: 4.52 10E12/L (ref 4.4–5.9)
SODIUM SERPL-SCNC: 141 MMOL/L (ref 133–144)
TROPONIN I SERPL-MCNC: <0.015 UG/L (ref 0–0.04)
WBC # BLD AUTO: 6.3 10E9/L (ref 4–11)

## 2020-09-17 PROCEDURE — 99285 EMERGENCY DEPT VISIT HI MDM: CPT | Mod: 25

## 2020-09-17 PROCEDURE — 85025 COMPLETE CBC W/AUTO DIFF WBC: CPT | Performed by: EMERGENCY MEDICINE

## 2020-09-17 PROCEDURE — 80048 BASIC METABOLIC PNL TOTAL CA: CPT | Performed by: EMERGENCY MEDICINE

## 2020-09-17 PROCEDURE — 00000146 ZZHCL STATISTIC GLUCOSE BY METER IP

## 2020-09-17 PROCEDURE — 72125 CT NECK SPINE W/O DYE: CPT

## 2020-09-17 PROCEDURE — 25000132 ZZH RX MED GY IP 250 OP 250 PS 637: Performed by: EMERGENCY MEDICINE

## 2020-09-17 PROCEDURE — 70450 CT HEAD/BRAIN W/O DYE: CPT

## 2020-09-17 PROCEDURE — 84484 ASSAY OF TROPONIN QUANT: CPT | Performed by: EMERGENCY MEDICINE

## 2020-09-17 PROCEDURE — 93005 ELECTROCARDIOGRAM TRACING: CPT

## 2020-09-17 RX ORDER — OXYCODONE HYDROCHLORIDE 5 MG/1
5 TABLET ORAL ONCE
Status: COMPLETED | OUTPATIENT
Start: 2020-09-17 | End: 2020-09-17

## 2020-09-17 RX ADMIN — OXYCODONE HYDROCHLORIDE 5 MG: 5 TABLET ORAL at 15:45

## 2020-09-17 ASSESSMENT — ENCOUNTER SYMPTOMS
BACK PAIN: 1
COUGH: 0
FEVER: 0
ABDOMINAL PAIN: 0
WEAKNESS: 0
LIGHT-HEADEDNESS: 1
HEADACHES: 0
NECK PAIN: 0
NAUSEA: 0
CHILLS: 0

## 2020-09-17 NOTE — ED NOTES
Bed: ED30  Expected date: 9/17/20  Expected time: 2:44 PM  Means of arrival:   Comments:  [facility] 65 M

## 2020-09-17 NOTE — ED PROVIDER NOTES
"  History   Chief Complaint:  Lighteadedness and fall    HPI  Toby Mcgrath is a 65 year old male with a history of atrial fibrillation, on Xarelto, chronic pain syndrome, hypertension, hyperlipidemia, and type II diabetes who presents via EMS for evaluation of lightheadedness in the setting of a fall. Just prior to arrival, the patient was leaning forwards to fill up his tires with air at a gas station. While doing this, he reports starting to become lightheaded. As he tried to stand back up, he reports the \"dizziness\" worsened and caused him to lose his balance and fall onto his right side. He hit his the right side of his head on the ground but denies losing consciousness. As he could not get up on his own after, bystanders called 911. En route, the paramedics report he was normotensive with a blood glucose level of 138. Here, he reports pain to his upper back but denies any other new injuries or pain. He states the back pain is worse with movement. He denies any headache, chest pain, nausea/vomiting, stool changes, or other concerns. He also denies any alcohol use today.     Allergies:  Codeine    Medications:    Allopurinol  Colchicine   Finasteride  Prozac  Glipizide  Claritin  Metformin   Metoprolol  Narcan   Prilosec   Oxycodone   Miralax  Lyrica  Zantac  Xarelto  Simvastatin   Flomax    Past Medical History:    Anxiety   Chronic pain syndrome   Chronic rhinitis  Chronic back pain secondary to degenerative joint disease   Esophageal reflux   Gout   Hyperlipidemia   Hypertension   BPH   Lumbar radiculopathy   Depression   Osteoarthrosis   Type II diabetes  Rotator cuff syndrome   Atrial fibrillation     Past Surgical History:    Lumbar laminectomy x2  Neck cyst removal   Bilateral rotator cuff repairs   Lumbar fusion, two levels   Left hemilaminectomy/discectomy, one level   Right hip arthroplasty       Family History:    Diabetes  CAD  Hypertension     Social History:  Marital Status: .   Presents " with EMS; Reports son can pick him up.   Former smoker. Quit 2013  Positive for occasional alcohol use.   Negative for drug use.     Review of Systems   Constitutional: Negative for chills and fever.   Respiratory: Negative for cough.    Cardiovascular: Negative for chest pain.   Gastrointestinal: Negative for abdominal pain and nausea.   Musculoskeletal: Positive for back pain. Negative for neck pain.   Neurological: Positive for light-headedness. Negative for syncope, weakness and headaches.   All other systems reviewed and are negative.    Physical Exam     Patient Vitals for the past 24 hrs:   BP Temp Temp src Pulse Resp SpO2   09/17/20 1501 133/78 98.4  F (36.9  C) Oral -- 18 --   09/17/20 1500 133/78 -- -- 56 -- 96 %        Physical Exam  General: Alert. Appears comfortable  Head:  The scalp is with R. Frontal contusion  Eyes:  Sclera white; Pupils are equal and round  ENT:    External ears normal.  No hemotympanum.      External nares normal.  No septal hematoma.    Neck:  Mild paracervical tenderness, no bony step offs  CV:  Rate as above with regular rhythm   No murmur   2/2 radial and dorsal pedal pulses  Resp:  Breath sounds clear and equal bilaterally    Non-labored, no retractions or accessory muscle use  GI:  Abdomen soft, non-tender, non-distended    No rebound tenderness or guarding  MSK:  No midline tenderness or bony step-off    No deformity    Moves all extremities equally and symmetrically  Skin:  No rash or lesions noted.  Neuro:   No apparent deficit.    Strength 5/5 x4.  Sensation intact x4.     GCS: 15  Psych:  Normal affect.      Emergency Department Course     ECG:  Indication: Fall  Time: 1514  Vent. Rate 60 bpm. MI interval 150. QRS duration 110. QT/QTc 452/452. P-R-T axis 36 -45 11.    Normal sinus rhythm  Incomplete RBBB.  Left anterior fascicular block.   Abnormal ECG. Read time: 1515.    Imaging:  Radiologic findings were communicated with the patient who voiced understanding of the  findings.  CT Head without contrast:   No evidence of acute intracranial hemorrhage, mass, or   Herniation, as per radiology.    CT Cervical Spine without contrast:   1. No evidence of acute fracture or subluxation in the cervical spine.   2. Degenerative changes in the cervical spine, as per radiology.    Laboratory:  Laboratory findings were communicated with the patient who voiced understanding of the findings.  CBC: WBC: 6.3, HGB: 13.3, PLT: 171  BMP: Glucose 151 (H), Creatinine: 1.32 (H), GFR: 55 (L), o/w WNL     1510 Troponin: <0.015    1523 Glucose by meter: 164 (H)     Interventions:  1545 Oxycodone, 5 mg, PO    Emergency Department Course:  Nursing notes and vitals reviewed.   1513: I performed an exam of the patient as documented above.      IV was inserted and blood was drawn for laboratory testing, results above. Medicine administered as documented above.   EKG obtained in the ED, see results above.   The patient was sent for head and C-spine CTs while in the emergency department, results above.      1742: I rechecked the patient and discussed the results of he workup and recommendations for home management.     Findings and plan explained to the Patient. Patient discharged home with instructions regarding supportive care, medications, and reasons to return. The importance of close follow-up was reviewed.     I personally reviewed the laboratory and imaging results with the Patient and answered all related questions prior to discharge.    Impression & Plan      Medical Decision Making:   Toby Mcgrath is a 65 year old male who presents with lightheadedness s/p recent fall.  Kylah patient has a known history of anticoagulation.  He is without obvious focal neuro deficits though is noted to have a head contusion on exam.  Fortunately head CT without acute ischemia, hemorrhage or acute process.  CT cervical without focal fracture or dislocation.  EKG with noted incomplete right bundle branch block though  no STEMI.  He denies any active chest pain, screening troponin negative. No profound anemia or electrolyte derangements.  Strong suspicion patient's presentation was more secondary to a mechanical fall.  He ambulated with steady gait and feels comfortable going home.  I did discuss with patient in setting of anticoagulation he is at risk for intracranial bleeding and should he develop worsening headache, focal weakness, paresthesias, changes in mentation he should present emergently to the ER for evaluation.  Patient takes oxycodone at home for pain control.  Close PCP follow-up recommended.    Diagnosis:     ICD-10-CM    1. Contusion of scalp, initial encounter  S00.03XA    2. Lightheadedness  R42        Disposition:   Discharged to home.    Scribe Disclosure:  I, Connie Amaya, am serving as a scribe on 9/17/2020 at 3:13 PM to personally document services performed by Quin Bond DO based on my observations and the provider's statements to me.       EMERGENCY DEPARTMENT     Quin Bond DO  09/17/20 1488

## 2020-09-17 NOTE — ED AVS SNAPSHOT
[facility]  [address]  [facility] [address]  Phone:  [phone]  Fax:  [phone]                                    Toby Mcgrath   MRN: [id number]    Department:  [facility] Emergency Department   Date of Visit:  9/17/2020           After Visit Summary Signature Page    I have received my discharge instructions, and my questions have been answered. I have discussed any challenges I see with this plan with the nurse or doctor.    ..........................................................................................................................................  Patient/Patient Representative Signature      ..........................................................................................................................................  Patient Representative Print Name and Relationship to Patient    ..................................................               ................................................  Date                                   Time    ..........................................................................................................................................  Reviewed by Signature/Title    ...................................................              ..............................................  Date                                               Time          22EPIC Rev 08/18

## 2020-09-17 NOTE — ED TRIAGE NOTES
"Pt presents via EMS from the gas station. Per medics, pt was kneeling down to fill tire with air, had difficulty standing and fell on right side. Struck right side of head, abrasions noted. Denies LOC but felt \"everything was blurry for a little while after.\"  Pt is on Xarelto for A. Fib.  BG for medics 138, [id number], SB.  ABCs intact A&Ox4.  "

## 2020-09-18 ENCOUNTER — PATIENT OUTREACH (OUTPATIENT)
Dept: CARE COORDINATION | Facility: CLINIC | Age: 65
End: 2020-09-18

## 2020-09-18 DIAGNOSIS — G89.4 CHRONIC PAIN SYNDROME: ICD-10-CM

## 2020-09-18 DIAGNOSIS — J31.0 CHRONIC RHINITIS: ICD-10-CM

## 2020-09-18 DIAGNOSIS — F11.90 CHRONIC, CONTINUOUS USE OF OPIOIDS: ICD-10-CM

## 2020-09-18 DIAGNOSIS — M96.1 FAILED BACK SYNDROME OF LUMBAR SPINE: ICD-10-CM

## 2020-09-18 LAB — INTERPRETATION ECG - MUSE: NORMAL

## 2020-09-18 NOTE — PROGRESS NOTES
Clinic Care Coordination Contact  [facility]/Voicemail       Clinical Data: Care Coordinator Outreach  Outreach attempted x 1.  Left message on patient's voicemail with call back information and requested return call.  Plan:  Care Coordinator will try to reach patient again in 1-2 business days.

## 2020-09-18 NOTE — LETTER
[facility] CARE COORDINATION  [address]  [facility] [address]      September 21, 2020    Toby Mcgrath  [id number]    [facility] [address]      Dear Toby,    I am a clinic community health worker who works with Connie Haskins MD at [facility]. I have been trying to reach you recently to introduce Clinic Care Coordination and to see if there was anything I could assist you with.  Below is a description of clinic care coordination and how I can further assist you.      The clinic care coordination team is made up of a registered nurse,  and community health worker who understand the health care system. The goal of clinic care coordination is to help you manage your health and improve access to the health care system in the most efficient manner. The team can assist you in meeting your health care goals by providing education, coordinating services, strengthening the communication among your providers and supporting you with any resource needs.    Please feel free to contact me at [phone] with any questions or concerns. We are focused on providing you with the highest-quality healthcare experience possible and that all starts with you.     Sincerely,     Gabriela

## 2020-09-18 NOTE — TELEPHONE ENCOUNTER
Reason for Call:  Medication or medication refill:    Do you use a [facility]?  Name of the pharmacy and phone number for the current request:  [facility] [address] [place] [address]     Name of the medication requested: oxyCODONE (ROXICODONE) 5 MG tablet    Other request:     Can we leave a detailed message on this number? YES    Phone number patient can be reached at: Home number on file [phone] (home)    Best Time: any    Call taken on 9/18/2020 at 10:26 AM by Vannesa Weeks

## 2020-09-18 NOTE — TELEPHONE ENCOUNTER
SN,    Controlled Substance Refill Request for Oxycodone    Last refill: 8/22/2020 - #180    Last clinic visit: 7/30/2020    Clinic visit frequency required: Q 3 months  Next appt: None    Controlled substance agreement on file: Yes:  Date 7/27/2016.    Documentation in problem list reviewed:  Yes    Processing:  Rx to be electronically transmitted to pharmacy by provider     [place] reviewed:  reviewed- no concerns  [place]:  [email or web address]    Thanks,  Gayathri Wade RN

## 2020-09-21 RX ORDER — OXYCODONE HYDROCHLORIDE 5 MG/1
5 TABLET ORAL EVERY 4 HOURS PRN
Qty: 180 TABLET | Refills: 0 | Status: SHIPPED | OUTPATIENT
Start: 2020-09-21 | End: 2020-10-20

## 2020-09-21 NOTE — PROGRESS NOTES
Clinic Care Coordination Contact  [facility]/Voicemail       Clinical Data: Care Coordinator Outreach  Outreach attempted x 2.  Left message on patient's voicemail with call back information and requested return call.  Plan: Care Coordinator will send care coordination introduction letter with care coordinator contact information and explanation of care coordination services via [place]. Care Coordinator will do no further outreaches at this time.

## 2020-09-22 DIAGNOSIS — M54.50 CHRONIC BILATERAL LOW BACK PAIN WITHOUT SCIATICA: ICD-10-CM

## 2020-09-22 DIAGNOSIS — G89.29 CHRONIC BILATERAL LOW BACK PAIN WITHOUT SCIATICA: ICD-10-CM

## 2020-09-22 DIAGNOSIS — G89.4 CHRONIC PAIN SYNDROME: ICD-10-CM

## 2020-09-22 NOTE — TELEPHONE ENCOUNTER
It is time to refill Layo's Lyrica through the [employer] assistance program.  [employer] requires a hand signed, brand name, hard copy script for this fill.    Please hand sign a BRAND name, hard copy script for:      LYRICA    Please send the HARD COPY script to me via interoffice mail FPS Zenaida Kim or via [place] mail  at:      [facility]   Zenaida Plasencia   [address] [place]  [address]    Thanks so much for your help!    Zenaida Plasencia  Prescription   Pharmacy Assistance  [phone]

## 2020-09-23 RX ORDER — PREGABALIN 100 MG/1
200 CAPSULE ORAL 2 TIMES DAILY
Qty: 360 CAPSULE | Refills: 3 | Status: SHIPPED | OUTPATIENT
Start: 2020-09-23 | End: 2021-02-10

## 2020-09-23 NOTE — TELEPHONE ENCOUNTER
SN,     Controlled Substance Refill Request for Lyrica    Last refill: 8/11/2020 #270    Last clinic visit: 7/30/2020     Clinic visit frequency required: Q 3 months  Next appt: none    Controlled substance agreement on file: Yes:  Date 8/30/2017.    Documentation in problem list reviewed:  Yes    Processing:  Mail to Joni Plasencia - see below    RX [place] reviewed:  reviewed- no concerns  [place] profile:  [email or web address]    Please authorize if appropriate.  Thanks,  Ivis SMALLS RN

## 2020-10-20 ENCOUNTER — OFFICE VISIT (OUTPATIENT)
Dept: FAMILY MEDICINE | Facility: CLINIC | Age: 65
End: 2020-10-20
Payer: COMMERCIAL

## 2020-10-20 VITALS
SYSTOLIC BLOOD PRESSURE: 111 MMHG | OXYGEN SATURATION: 95 % | WEIGHT: 255.5 LBS | HEIGHT: 72 IN | DIASTOLIC BLOOD PRESSURE: 67 MMHG | BODY MASS INDEX: 34.61 KG/M2 | HEART RATE: 56 BPM | TEMPERATURE: 98.8 F | RESPIRATION RATE: 16 BRPM

## 2020-10-20 DIAGNOSIS — G89.4 CHRONIC PAIN SYNDROME: ICD-10-CM

## 2020-10-20 DIAGNOSIS — M96.1 FAILED BACK SYNDROME OF LUMBAR SPINE: ICD-10-CM

## 2020-10-20 DIAGNOSIS — F11.90 CHRONIC, CONTINUOUS USE OF OPIOIDS: ICD-10-CM

## 2020-10-20 DIAGNOSIS — F33.0 MILD EPISODE OF RECURRENT MAJOR DEPRESSIVE DISORDER (H): ICD-10-CM

## 2020-10-20 PROCEDURE — 99214 OFFICE O/P EST MOD 30 MIN: CPT | Performed by: FAMILY MEDICINE

## 2020-10-20 RX ORDER — OXYCODONE HYDROCHLORIDE 5 MG/1
5 TABLET ORAL EVERY 4 HOURS PRN
Qty: 180 TABLET | Refills: 0 | Status: SHIPPED | OUTPATIENT
Start: 2020-10-20 | End: 2020-11-18

## 2020-10-20 ASSESSMENT — MIFFLIN-ST. JEOR: SCORE: 1971.94

## 2020-10-20 NOTE — PROGRESS NOTES
Subjective     Toby Mcgrath is a 65 year old male who presents to clinic today for the following health issues:    HPI         Medication Followup of pain medication- he has been on the oxycodone 5 mg every 4 hrs or total of 6 pills per day for a few years now , for chronic low back pain, per pt he has had several surgeries , last one in 2014 , he has done PT steroid injections , nothing has helped him. He feels stable on the currently dose and denies any side effects . He has been taking metamucil daily and that helps with the constipation.    Taking Medication as prescribed: yes    Side Effects:  None    Medication Helping Symptoms:  yes         Review of Systems   Constitutional, HEENT, cardiovascular, pulmonary, GI, , musculoskeletal, neuro, skin, endocrine and psych systems are negative, except as otherwise noted.      Objective    Ht 1.829 m (6')   Wt 115.9 kg (255 lb 8 oz)   BMI 34.65 kg/m    Body mass index is 34.65 kg/m .  Physical Exam   GENERAL: healthy, alert and no distress  EYES: Eyes grossly normal to inspection, PERRL and conjunctivae and sclerae normal  NECK: no adenopathy, no asymmetry, masses, or scars and thyroid normal to palpation  RESP: lungs clear to auscultation - no rales, rhonchi or wheezes  CV: regular rate and rhythm, normal S1 S2, no S3 or S4, no murmur, click or rub, no peripheral edema and peripheral pulses strong  ABDOMEN: soft, nontender, no hepatosplenomegaly, no masses and bowel sounds normal  MS: no gross musculoskeletal defects noted, no edema EXCEPT there is a scar in the lumbar spine area and muscle spasms in the paraspinal muscles in the lumbar area   NEURO: Normal strength and tone, mentation intact and speech normal    No results found. However, due to the size of the patient record, not all encounters were searched. Please check Results Review for a complete set of results.        Assessment & Plan     Failed back syndrome of lumbar spine  Discussed seeing a spine  specialist but he states that he has seen them before and has had steroid injections and they did not help, he states that there have been no issues with his medication use and he does not have any side effects , no escalation of the dose - would recommend seeing a pain specialist at some point if he has not .  - oxyCODONE (ROXICODONE) 5 MG tablet; Take 1 tablet (5 mg) by mouth every 4 hours as needed for severe pain Max #6 per day  - SPINE CARE REFERRAL  - naloxone (NARCAN) 4 MG/0.1ML nasal spray; Spray 1 spray (4 mg) into one nostril alternating nostrils once as needed for opioid reversal Every 2-3 minutes until patient responsive or EMS arrives    Chronic, continuous use of opioids  As above   - oxyCODONE (ROXICODONE) 5 MG tablet; Take 1 tablet (5 mg) by mouth every 4 hours as needed for severe pain Max #6 per day    Chronic pain syndrome  Discuss with the pt the need to have this nd Rx given.  - naloxone (NARCAN) 4 MG/0.1ML nasal spray; Spray 1 spray (4 mg) into one nostril alternating nostrils once as needed for opioid reversal Every 2-3 minutes until patient responsive or EMS arrives          RTC if no improving or worsening.  Pt is aware  and comfortable with the current plan.      Franchesca Feng MD  [facility]

## 2020-10-20 NOTE — NURSING NOTE
Chief Complaint   Patient presents with     Recheck Medication     refill pain meds     initial /67 (BP Location: Left arm, Cuff Size: Adult Large)   Pulse 56   Temp 98.8  F (37.1  C) (Oral)   Resp 16   Ht 1.829 m (6')   Wt 115.9 kg (255 lb 8 oz)   SpO2 95%   BMI 34.65 kg/m   Estimated body mass index is 34.65 kg/m  as calculated from the following:    Height as of this encounter: 1.829 m (6').    Weight as of this encounter: 115.9 kg (255 lb 8 oz).  BP completed using cuff size: large.  L  arm      Health Maintenance that is potentially due pending provider review:  NONE    n/a    Yordan Melara ma

## 2020-10-23 DIAGNOSIS — E11.9 TYPE 2 DIABETES MELLITUS WITHOUT COMPLICATION, WITHOUT LONG-TERM CURRENT USE OF INSULIN (H): ICD-10-CM

## 2020-10-26 NOTE — TELEPHONE ENCOUNTER
"SN,  Routing refill request to provider for review/approval because:  Labs out of range:  Creatinine = 1.32 (9/17/2020).  A1C = 7.8 (5/28/2020)    ThanksGayathri RN    Last Written Prescription Date:  7/30/2020  Last Fill Quantity: 90,  # refills: 0   Last office visit: 7/30/2020 with prescribing provider:  Yes,   Future Office Visit:      Requested Prescriptions   Pending Prescriptions Disp Refills     glipiZIDE (GLUCOTROL XL) 5 MG 24 hr tablet [Pharmacy Med Name: GLIPIZIDE  5MG  TAB  XL] 90 tablet 3     Sig: TAKE 1 TABLET BY MOUTH  DAILY       Sulfonylurea Agents Failed - 10/23/2020  2:58 PM        Failed - Patient has a recent creatinine (normal) within the past 12 mos.     Recent Labs   Lab Test 09/17/20  1510 05/15/13  1457 05/15/13  1457   CR 1.32*   < >  --    CREAT  --   --  1.1    < > = values in this interval not displayed.       Ok to refill medication if creatinine is low          Passed - Patient has documented A1c within the specified period of time.     If HgbA1C is 8 or greater, it needs to be on file within the past 3 months.  If less than 8, must be on file within the past 6 months.     Recent Labs   Lab Test 05/28/20  0903   A1C 7.8*             Passed - Medication is active on med list        Passed - Patient is age 18 or older        Passed - Recent (6 mo) or future (30 days) visit within the authorizing provider's specialty     Patient had office visit in the last 6 months or has a visit in the next 30 days with authorizing provider or within the authorizing provider's specialty.  See \"Patient Info\" tab in inbasket, or \"Choose Columns\" in Meds & Orders section of the refill encounter.                 "

## 2020-10-27 ENCOUNTER — TELEPHONE (OUTPATIENT)
Dept: PHARMACY | Facility: CLINIC | Age: 65
End: 2020-10-27

## 2020-10-27 RX ORDER — GLIPIZIDE 5 MG/1
TABLET, FILM COATED, EXTENDED RELEASE ORAL
Qty: 90 TABLET | Refills: 3 | OUTPATIENT
Start: 2020-10-27

## 2020-10-27 NOTE — TELEPHONE ENCOUNTER
Spoke with pt, let him know if symptoms continue then should be seen sooner and we will DB him with SN.  Pt acknowledges understanding.    Sara Mclaughlin RN

## 2020-10-27 NOTE — TELEPHONE ENCOUNTER
"Called pt to f/u on blood sugars - states \"I'm going to stop taking that little one.  I keep having low blood sugars and I've fallen three times, I even hit my head\"  Asked about low blood sugars and he states that he is  in the AM and <120 later in the day. When feeling dizzy, shaky and sweaty he states his BG is 83.     Reviewed ED note - his BG after his fall was 138. ED physician felt this was related to a mechanical fall vs. Low blood sugars. BP at admission was 133/78, but this could be orthostatic (as the pt was bent over and stood up prior to the fall). Since he feels this was due to low blood sugars, will have him stop the glipizide for now and f/u with SN next week (pt not covered for MTM at this time and has significant cont concerns).  Could consider starting a lower dose of glipizide, Januvia (if able to afford). Pt also due for labs.     Scheduled pt on 11/5 with SN.   Mary Last, LloydD, NEIDA, BCACP  MTM Pharmacist, [facility]   "

## 2020-10-27 NOTE — TELEPHONE ENCOUNTER
Thanks Mary    Team -  if he continues to feel light headed or has low BGM we can get him in sooner a DB just ;let me know    SN

## 2020-10-27 NOTE — TELEPHONE ENCOUNTER
Spoke with pt today - see separate TE. Will have him stop this and f/u with SN.     Mary Last, PharmD, NEIDA, BCACP  MTM Pharmacist, [facility]

## 2020-11-03 ENCOUNTER — TELEPHONE (OUTPATIENT)
Dept: FAMILY MEDICINE | Facility: CLINIC | Age: 65
End: 2020-11-03

## 2020-11-03 DIAGNOSIS — E11.9 TYPE 2 DIABETES MELLITUS WITHOUT COMPLICATION, WITHOUT LONG-TERM CURRENT USE OF INSULIN (H): ICD-10-CM

## 2020-11-03 NOTE — TELEPHONE ENCOUNTER
FYI~ A refill has been made to the  assistance program for Lyrica.    A 90 day supply of Lyrica will be delivered to Layo's home on November 5th, 2020    Thank you,    Ana Luisa Cabrera  Prescription Assistance

## 2020-11-04 RX ORDER — METFORMIN HCL 500 MG
TABLET, EXTENDED RELEASE 24 HR ORAL
Qty: 360 TABLET | Refills: 3 | Status: SHIPPED | OUTPATIENT
Start: 2020-11-04 | End: 2020-11-05

## 2020-11-05 ENCOUNTER — OFFICE VISIT (OUTPATIENT)
Dept: FAMILY MEDICINE | Facility: CLINIC | Age: 65
End: 2020-11-05
Payer: COMMERCIAL

## 2020-11-05 VITALS
DIASTOLIC BLOOD PRESSURE: 78 MMHG | BODY MASS INDEX: 34.38 KG/M2 | SYSTOLIC BLOOD PRESSURE: 127 MMHG | RESPIRATION RATE: 20 BRPM | HEART RATE: 59 BPM | TEMPERATURE: 97.5 F | OXYGEN SATURATION: 97 % | WEIGHT: 253.8 LBS | HEIGHT: 72 IN

## 2020-11-05 DIAGNOSIS — L21.9 SEBORRHEIC DERMATITIS: ICD-10-CM

## 2020-11-05 DIAGNOSIS — I10 HYPERTENSION GOAL BP (BLOOD PRESSURE) < 140/90: ICD-10-CM

## 2020-11-05 DIAGNOSIS — J31.0 CHRONIC RHINITIS: ICD-10-CM

## 2020-11-05 DIAGNOSIS — K43.9 VENTRAL HERNIA WITHOUT OBSTRUCTION OR GANGRENE: ICD-10-CM

## 2020-11-05 DIAGNOSIS — K21.00 GASTROESOPHAGEAL REFLUX DISEASE WITH ESOPHAGITIS WITHOUT HEMORRHAGE: ICD-10-CM

## 2020-11-05 DIAGNOSIS — J44.9 CHRONIC OBSTRUCTIVE PULMONARY DISEASE, UNSPECIFIED COPD TYPE (H): ICD-10-CM

## 2020-11-05 DIAGNOSIS — I48.92 ATRIAL FLUTTER WITH RAPID VENTRICULAR RESPONSE (H): ICD-10-CM

## 2020-11-05 DIAGNOSIS — M10.9 ACUTE GOUTY ARTHRITIS: ICD-10-CM

## 2020-11-05 DIAGNOSIS — F33.0 MILD EPISODE OF RECURRENT MAJOR DEPRESSIVE DISORDER (H): ICD-10-CM

## 2020-11-05 DIAGNOSIS — R35.0 BENIGN PROSTATIC HYPERPLASIA WITH URINARY FREQUENCY: ICD-10-CM

## 2020-11-05 DIAGNOSIS — E11.42 DIABETIC POLYNEUROPATHY ASSOCIATED WITH TYPE 2 DIABETES MELLITUS (H): Primary | ICD-10-CM

## 2020-11-05 DIAGNOSIS — N40.1 BENIGN PROSTATIC HYPERPLASIA WITH URINARY FREQUENCY: ICD-10-CM

## 2020-11-05 DIAGNOSIS — E78.5 HYPERLIPIDEMIA LDL GOAL <100: ICD-10-CM

## 2020-11-05 DIAGNOSIS — G56.02 CARPAL TUNNEL SYNDROME OF LEFT WRIST: ICD-10-CM

## 2020-11-05 DIAGNOSIS — G89.4 CHRONIC PAIN SYNDROME: ICD-10-CM

## 2020-11-05 LAB — HBA1C MFR BLD: 6 % (ref 0–5.6)

## 2020-11-05 PROCEDURE — 80053 COMPREHEN METABOLIC PANEL: CPT | Performed by: FAMILY MEDICINE

## 2020-11-05 PROCEDURE — 36415 COLL VENOUS BLD VENIPUNCTURE: CPT | Performed by: FAMILY MEDICINE

## 2020-11-05 PROCEDURE — 99215 OFFICE O/P EST HI 40 MIN: CPT | Performed by: FAMILY MEDICINE

## 2020-11-05 PROCEDURE — 83036 HEMOGLOBIN GLYCOSYLATED A1C: CPT | Performed by: FAMILY MEDICINE

## 2020-11-05 PROCEDURE — 84550 ASSAY OF BLOOD/URIC ACID: CPT | Performed by: FAMILY MEDICINE

## 2020-11-05 RX ORDER — METFORMIN HCL 500 MG
TABLET, EXTENDED RELEASE 24 HR ORAL
Qty: 360 TABLET | Refills: 3 | Status: SHIPPED | OUTPATIENT
Start: 2020-11-05 | End: 2021-11-11

## 2020-11-05 RX ORDER — SIMVASTATIN 20 MG
20 TABLET ORAL AT BEDTIME
Qty: 90 TABLET | Refills: 3 | Status: SHIPPED | OUTPATIENT
Start: 2020-11-05 | End: 2021-12-06

## 2020-11-05 RX ORDER — BLOOD-GLUCOSE METER
EACH MISCELLANEOUS
Qty: 1 KIT | Refills: 1 | Status: SHIPPED | OUTPATIENT
Start: 2020-11-05 | End: 2022-05-17

## 2020-11-05 RX ORDER — FINASTERIDE 5 MG/1
TABLET, FILM COATED ORAL
Qty: 90 TABLET | Refills: 3 | Status: SHIPPED | OUTPATIENT
Start: 2020-11-05 | End: 2021-11-11

## 2020-11-05 RX ORDER — FLUTICASONE PROPIONATE 50 MCG
SPRAY, SUSPENSION (ML) NASAL
Qty: 48 G | Refills: 3 | Status: SHIPPED | OUTPATIENT
Start: 2020-11-05 | End: 2021-09-10

## 2020-11-05 RX ORDER — KETOCONAZOLE 20 MG/ML
SHAMPOO TOPICAL
Qty: 240 ML | Refills: 3 | Status: SHIPPED | OUTPATIENT
Start: 2020-11-05 | End: 2021-05-06

## 2020-11-05 RX ORDER — METOPROLOL TARTRATE 50 MG
50 TABLET ORAL 2 TIMES DAILY
Qty: 180 TABLET | Refills: 3 | Status: SHIPPED | OUTPATIENT
Start: 2020-11-05 | End: 2021-08-30

## 2020-11-05 RX ORDER — TAMSULOSIN HYDROCHLORIDE 0.4 MG/1
0.8 CAPSULE ORAL DAILY
Qty: 180 CAPSULE | Refills: 3 | Status: SHIPPED | OUTPATIENT
Start: 2020-11-05 | End: 2021-08-30

## 2020-11-05 ASSESSMENT — ANXIETY QUESTIONNAIRES
5. BEING SO RESTLESS THAT IT IS HARD TO SIT STILL: NOT AT ALL
1. FEELING NERVOUS, ANXIOUS, OR ON EDGE: MORE THAN HALF THE DAYS
2. NOT BEING ABLE TO STOP OR CONTROL WORRYING: SEVERAL DAYS
GAD7 TOTAL SCORE: 6
7. FEELING AFRAID AS IF SOMETHING AWFUL MIGHT HAPPEN: NOT AT ALL
3. WORRYING TOO MUCH ABOUT DIFFERENT THINGS: MORE THAN HALF THE DAYS
6. BECOMING EASILY ANNOYED OR IRRITABLE: NOT AT ALL

## 2020-11-05 ASSESSMENT — PATIENT HEALTH QUESTIONNAIRE - PHQ9
5. POOR APPETITE OR OVEREATING: SEVERAL DAYS
SUM OF ALL RESPONSES TO PHQ QUESTIONS 1-9: 5

## 2020-11-05 ASSESSMENT — MIFFLIN-ST. JEOR: SCORE: 1964.23

## 2020-11-05 NOTE — PATIENT INSTRUCTIONS
Check blood sugars in the morning and again after lunch and see if you have any low numbers.  If you can do this twice daily for a week and call me with your numbers that would be great.    Tapering the narcotic even down to a lower dose will definitely help with some of the confusion that you are having.  I would recommend that you simply drop 1 tablet/week and see how you feel.  For example try taking 1 tablet in the morning 2 in the afternoon 2 at night for a week and the following week try 1 tablet in the morning and 1 tablet at noon and 2 at night for another week and see how you feel if you can get down to 1 tablet 3 times a day you might feel less forgetful but lets see if you can tolerate it.  If there is a time of the day that is really bad may be continue to take 2 tablets at that time.    I also think getting you back involved in physical therapy will help if we do want to try a taper of your dose.  Would you be interested in a referral for this?

## 2020-11-05 NOTE — Clinical Note
Could you review his checkout note with him and see if he has any questions.  I apologize if long but we had a lot to talk about today.  I wanted to make sure he has a good plan if you would like to taper medications.  I would also like to arrange for a follow-up visit maybe in a week and this could be a telephone visit perhaps that would be a better way to address everything  Let me know and thanks  Connie Haskins MD

## 2020-11-05 NOTE — PROGRESS NOTES
Subjective     Toby Mcgrath is a 65 year old male who presents to clinic today for the following health issues:    HPI          Medication Followup of glipizide    Taking Medication as prescribed: no - pt called 10/27/20 and talked to pharm d he was taking the glipizide at night and then waking up with low blood sugars and falls.    He has since stopped taking the medication but did feel that it was helping although increasing his falls.  He admits that he often would not eat breakfast and sometimes skip other meals.    He is open to trying a lower dose like 2.5 mg daily with the idea of taking it in the morning.    Side Effects:  Low blood sugar in the morning     Medication Helping Symptoms:  NO - see above      Hand pain:  Pain in fingers and hand in particular the base of the thumb on his left hand.  He is left-hand dominant.  Does a lot of excessive use of his hand.  Sore all over  Fingers  And left hand left hand pain    Diabetes Follow-up    How often are you checking your blood sugar? Two times daily  Blood sugar testing frequency justification:  Risk of hypoglycemia with medication(s)  What time of day are you checking your blood sugars (select all that apply)?  After meals  Have you had any blood sugars above 200?  No  Have you had any blood sugars below 70?  Yes     What symptoms do you notice when your blood sugar is low?  Shaky, Dizzy, Weak, Blurred vision, Confusion and Other: sweating    What concerns do you have today about your diabetes? None and Low blood sugar     Do you have any of these symptoms? (Select all that apply)  Blurry vision    Have you had a diabetic eye exam in the last 12 months? No        BP Readings from Last 2 Encounters:   11/05/20 127/78   10/20/20 111/67     Hemoglobin A1C (%)   Date Value   05/28/2020 7.8 (H)   12/26/2019 6.8 (H)     LDL Cholesterol Calculated (mg/dL)   Date Value   07/17/2020 55   05/28/2020 48            How many servings of fruits and vegetables do you  eat daily?  0-1    On average, how many sweetened beverages do you drink each day (Examples: soda, juice, sweet tea, etc.  Do NOT count diet or artificially sweetened beverages)?   3 cans of soda     How many days per week do you exercise enough to make your heart beat faster? 3 or less    How many minutes a day do you exercise enough to make your heart beat faster? 9 or less  How many days per week do you miss taking your medication? 5  What makes it hard for you to take your medications?  remembering to take          (I48.92) Atrial flutter with rapid ventricular response (H)  Comment: History of atrial flutter this is stable with metoprolol he takes this twice daily.  Tolerating it well.  Discussed that this could also blunt his hypoglycemic response pulse well controlled    (F33.0) Mild episode of recurrent major depressive disorder (H)  Comment: Mood is fairly good.  He does feel the Prozac is helping.  He does feel at times that he has difficulty with concentration and focus.  Sometimes forgets why he is in a room.  He is not sure why that is.  Denies any anxiety.    (J44.9) Chronic obstructive pulmonary disease, unspecified COPD type (H)  Comment: Breathing is stable no concerns    (I10) Hypertension goal BP (blood pressure) < 140/90  Comment: Blood pressure is excellently controlled as is his pulse    (G89.4) Chronic pain syndrome  Comment: Discussed that he has been taking the oxycodone 6 tablets daily for several years now.  Reviewed that this certainly could be part of why he is feeling somewhat confused and having issues with remembering things.  He absolutely feels this could be the case.  Also wonders if he should start to taper off medication because it does not seem to really be working.  Describes that it does help ease the pain but is not really taking away all the way.    (M10.9) Acute gouty arthritis  Comment: This has been stable we did discuss checking a urinalysis or uric acid level today  however he has not needed colchicine and he stopped the allopurinol.  He is in hydrating had continues to monitor for any triggers        (E78.5) Hyperlipidemia LDL goal <100  Comment: Due for routine labs his last cholesterol was excellent and done well in the hospital tolerating medication well    (N40.1,  R35.0) Benign prostatic hyperplasia with urinary frequency  Comment: Refills of medications this definitely is helping  Plan: Comprehensive metabolic panel (BMP + Alb, Alk         Phos, ALT, AST, Total. Bili, TP), Hemoglobin         A1c, Uric acid, tamsulosin (FLOMAX) 0.4 MG         capsule, EYE ADULT REFERRAL, CT Abdomen Pelvis         w/o & w Contrast             (K21.00) Gastroesophageal reflux disease with esophagitis without hemorrhage  Comment: Medications are working well has not been able to come down on this medication does take a daily  Plan: omeprazole (PRILOSEC) 20 MG DR capsule            (K43.9) Ventral hernia without obstruction or gangrene  Comment: Has noted pain in his abdomen and oftentimes is sharp and sometimes last for 30 to 45 minutes or longer.  Takes his breath away does have a history of a hernia but is not had this evaluated a long time.  He does have his appendix although he is not noticed any fevers or stool changes  Plan: GENERAL SURG ADULT REFERRAL               Review of Systems   Constitutional, HEENT, cardiovascular, pulmonary, gi and gu systems are negative, except as otherwise noted.      Objective    /78 (Patient Position: Sitting, Cuff Size: Adult Regular)   Pulse 59   Temp 97.5  F (36.4  C) (Tympanic)   Resp 20   Ht 1.829 m (6')   Wt 115.1 kg (253 lb 12.8 oz)   SpO2 97%   BMI 34.42 kg/m    Body mass index is 34.42 kg/m .  Physical Exam   GENERAL: healthy, alert and no distress  HENT: ear canals and TM's normal, nose and mouth without ulcers or lesions  NECK: no adenopathy, no asymmetry, masses, or scars and thyroid normal to palpation  RESP: lungs clear to  auscultation - no rales, rhonchi or wheezes  CV: regular rate and rhythm, normal S1 S2, no S3 or S4, no murmur, click or rub, no peripheral edema and peripheral pulses strong  ABDOMEN: tenderness umbilical and hernia noted along umbilical area right sided tenderness also noted with some guarding  MS: Muscle wasting noted over the left thenar prominence of his hand.  Tenderness at the base of that thenar prominence as well  NEURO: Normal strength and tone, sensory exam grossly normal and mentation intact    Results for orders placed or performed in visit on 11/05/20 (from the past 24 hour(s))   Hemoglobin A1c   Result Value Ref Range    Hemoglobin A1C 6.0 (H) 0 - 5.6 %     *Note: Due to a large number of results and/or encounters for the requested time period, some results have not been displayed. A complete set of results can be found in Results Review.           Assessment & Plan    Diabetic polyneuropathy associated with type 2 diabetes mellitus (H)  Concerns for hypoglycemia with glipizide 5 mg extended release.  Patient reports that he was taking it at night before going to bed and also reports that he was not eating regularly while taking it.  We discussed the option of trying to cut tablets in half and go with a 2.5 mg dose and take it in the morning with food and then ensure that he does eat a few hours later or at least at noon.  I would like him to check his sugars in the morning and after lunch to ensure that they are not getting low and asked him to call me back with numbers.  If he is tolerating this well we can prescribe a slightly lower 2.5 mg dose.  Unfortunately cost does seem to be an issue appreciate guidance from pharmacology.  He may have to go with either Januvia or possibly a TZD  Referred for an eye exam labs as noted below  - Comprehensive metabolic panel (BMP + Alb, Alk Phos, ALT, AST, Total. Bili, TP)  - Hemoglobin A1c  - blood glucose monitoring (ACCU-CHEK DANIELE PLUS) meter device kit   Dispense: 1 kit; Refill: 1  - Uric acid  - metFORMIN (GLUCOPHAGE-XR) 500 MG 24 hr tablet  Dispense: 360 tablet; Refill: 3  - EYE ADULT REFERRAL  -  Atrial flutter with rapid ventricular response (H)  Stable and rate controlled with metoprolol tolerating medication well  -  Mild episode of recurrent major depressive disorder (H)  Stable no concerns  Chronic obstructive pulmonary disease, unspecified COPD type (H)  Breathing is okay no concerns stable      Hypertension goal BP (blood pressure) < 140/90  Excellent blood pressure tolerating medications well  -   Chronic pain syndrome  Reviewed trying to reduce the dose of his medications.  He is currently taking oxycodone 10 mg 3 times daily.  Discussed that certainly over time this can contribute to confusion and forgetfulness and some memory issues.  Discussed that it does not take away pain but often dulls it and therefore chronic use is really not a great idea.  I asked him to see if he could reduce the dose to maybe 1 tablet 3 times daily or even a slower taper of coming down 1 tablet/week until he is at 1 tablet 3 times a day and see how his symptoms are  -Acute gouty arthritis    - Comprehensive metabolic panel (BMP + Alb, Alk Phos, ALT, AST, Total. Bili, TP)  - Hemoglobin A1c  - Uric acid  - EYE ADULT REFERRAL  - CT Abdomen Pelvis w/o & w Contrast    Carpal tunnel syndrome of left wrist  Referred for evaluation  - Orthopedic & Spine  Referral  - Comprehensive metabolic panel (BMP + Alb, Alk Phos, ALT, AST, Total. Bili, TP)  - Hemoglobin A1c  - Uric acid  - EYE ADULT REFERRAL  - CT Abdomen Pelvis w/o & w Contrast    Chronic rhinitis     - Comprehensive metabolic panel (BMP + Alb, Alk Phos, ALT, AST, Total. Bili, TP)  - Hemoglobin A1c  - Uric acid  - fluticasone (FLONASE) 50 MCG/ACT nasal spray  Dispense: 48 g; Refill: 3  - EYE ADULT REFERRAL  - CT Abdomen Pelvis w/o & w Contrast    Seborrheic dermatitis     - Comprehensive metabolic panel (BMP + Alb, Alk  Phos, ALT, AST, Total. Bili, TP)  - Hemoglobin A1c  - Uric acid  - ketoconazole (NIZORAL) 2 % external shampoo  Dispense: 240 mL; Refill: 3  - EYE ADULT REFERRAL  - CT Abdomen Pelvis w/o & w Contrast    Hyperlipidemia LDL goal <100     - Comprehensive metabolic panel (BMP + Alb, Alk Phos, ALT, AST, Total. Bili, TP)  - Hemoglobin A1c  - Uric acid  - simvastatin (ZOCOR) 20 MG tablet  Dispense: 90 tablet; Refill: 3  - EYE ADULT REFERRAL  - CT Abdomen Pelvis w/o & w Contrast    Benign prostatic hyperplasia with urinary frequency     - Comprehensive metabolic panel (BMP + Alb, Alk Phos, ALT, AST, Total. Bili, TP)  - Hemoglobin A1c  - Uric acid  - tamsulosin (FLOMAX) 0.4 MG capsule  Dispense: 180 capsule; Refill: 3  - EYE ADULT REFERRAL  - CT Abdomen Pelvis w/o & w Contrast    Gastroesophageal reflux disease with esophagitis without hemorrhage     - omeprazole (PRILOSEC) 20 MG DR capsule  Dispense: 90 capsule; Refill: 3    Ventral hernia without obstruction or gangrene  Referred for general surgery evaluation and CT abdomen pelvis ordered  - GENERAL SURG ADULT REFERRAL        Total time was over 40 minutes in face to face consultation with >50% spent in counseling   See Patient Instructions    No follow-ups on file.    Connie Haskins MD  [facility]

## 2020-11-06 ENCOUNTER — TELEPHONE (OUTPATIENT)
Dept: FAMILY MEDICINE | Facility: CLINIC | Age: 65
End: 2020-11-06

## 2020-11-06 LAB
ALBUMIN SERPL-MCNC: 3.9 G/DL (ref 3.4–5)
ALP SERPL-CCNC: 63 U/L (ref 40–150)
ALT SERPL W P-5'-P-CCNC: 32 U/L (ref 0–70)
ANION GAP SERPL CALCULATED.3IONS-SCNC: 3 MMOL/L (ref 3–14)
AST SERPL W P-5'-P-CCNC: 23 U/L (ref 0–45)
BILIRUB SERPL-MCNC: 0.4 MG/DL (ref 0.2–1.3)
BUN SERPL-MCNC: 18 MG/DL (ref 7–30)
CALCIUM SERPL-MCNC: 9.4 MG/DL (ref 8.5–10.1)
CHLORIDE SERPL-SCNC: 109 MMOL/L (ref 94–109)
CO2 SERPL-SCNC: 31 MMOL/L (ref 20–32)
CREAT SERPL-MCNC: 1.26 MG/DL (ref 0.66–1.25)
GFR SERPL CREATININE-BSD FRML MDRD: 59 ML/MIN/{1.73_M2}
GLUCOSE SERPL-MCNC: 118 MG/DL (ref 70–99)
POTASSIUM SERPL-SCNC: 4.3 MMOL/L (ref 3.4–5.3)
PROT SERPL-MCNC: 7.5 G/DL (ref 6.8–8.8)
SODIUM SERPL-SCNC: 143 MMOL/L (ref 133–144)
URATE SERPL-MCNC: 6.9 MG/DL (ref 3.5–7.2)

## 2020-11-06 ASSESSMENT — ANXIETY QUESTIONNAIRES: GAD7 TOTAL SCORE: 6

## 2020-11-06 NOTE — TELEPHONE ENCOUNTER
Left message for patient to callback.  Ivis SMALLS RN      Per patient instructions from 11/5/2020 visit:

## 2020-11-06 NOTE — TELEPHONE ENCOUNTER
Connie Haskins MD  [facility] Triage             Could you review his checkout note with him and see if he has any questions.  I apologize if long but we had a lot to talk about today.  I wanted to make sure he has a good plan if you would like to taper medications.   I would also like to arrange for a follow-up visit maybe in a week and this could be a telephone visit perhaps that would be a better way to address everything   Let me know and thanks   Connie Haskins MD

## 2020-11-09 NOTE — TELEPHONE ENCOUNTER
,   HELLENI:  Spoke with patient   He hasn't tapered down yet  Wants to stay at 6/day  Discussed with him though - he will try tapering down by 1 pill for a week and then f/u with you on how that went    Told him about PT   States he cannot go to PT  Doesn't have money for copays he said     Ivis SMALLS RN

## 2020-11-10 DIAGNOSIS — E11.9 TYPE 2 DIABETES MELLITUS WITHOUT COMPLICATION, WITHOUT LONG-TERM CURRENT USE OF INSULIN (H): ICD-10-CM

## 2020-11-10 RX ORDER — GLIPIZIDE 5 MG/1
TABLET, FILM COATED, EXTENDED RELEASE ORAL
Refills: 0
Start: 2020-11-10

## 2020-11-11 NOTE — RESULT ENCOUNTER NOTE
Please call patient with normal results -his kidney function is slightly improved which is excellent.  His liver enzymes are great.  His uric acid is excellent and remains low.  Have him continue to hydrate and avoid exacerbating foods.  Lastly the A1c was excellent at 6.0.  Everything he is doing right now seems to be working for him especially the medications.  Lets recheck this in 6 months      Thank you!    Connie

## 2020-11-17 DIAGNOSIS — G89.4 CHRONIC PAIN SYNDROME: ICD-10-CM

## 2020-11-17 DIAGNOSIS — F11.90 CHRONIC, CONTINUOUS USE OF OPIOIDS: ICD-10-CM

## 2020-11-17 DIAGNOSIS — M96.1 FAILED BACK SYNDROME OF LUMBAR SPINE: ICD-10-CM

## 2020-11-17 NOTE — TELEPHONE ENCOUNTER
Reason for Call:  Medication or medication refill:    Do you use a [facility]?  Name of the pharmacy and phone number for the current request:     [facility] [address] [place] [address]    Name of the medication requested: oxyCODONE (ROXICODONE) 5 MG tablet     Other request:     Can we leave a detailed message on this number? YES    Phone number patient can be reached at: Cell number on file:    Telephone Information:   Mobile [phone]     Best Time: any    Call taken on 11/17/2020 at 7:27 AM by Meliza Alfonso

## 2020-11-18 NOTE — TELEPHONE ENCOUNTER
SN,    Controlled Substance Refill Request for Oxycodone    Last refill: 10/20/2020 - #180    Last clinic visit: 11/5/2020    Clinic visit frequency required: Q 3 months  Next appt: none    Controlled substance agreement on file: Yes:  Date 7/27/2016.    Documentation in problem list reviewed:  Yes    Processing:  Rx to be electronically transmitted to pharmacy by provider     [place] reviewed: Overdose risk score = 450 (range 000-999)  [place] profile:  [email or web address]    Thanks,  Gayathri Wade RN

## 2020-11-19 NOTE — TELEPHONE ENCOUNTER
Patient calling in about refill status of oxycodone. Also calling about glipazide 5 mg patient said last visit the doctor was going to change dose to 2.5 mg.    Please fill

## 2020-11-20 RX ORDER — OXYCODONE HYDROCHLORIDE 5 MG/1
5 TABLET ORAL EVERY 4 HOURS PRN
Qty: 180 TABLET | Refills: 0 | Status: SHIPPED | OUTPATIENT
Start: 2020-11-20 | End: 2020-12-18

## 2020-11-20 NOTE — TELEPHONE ENCOUNTER
SN,    JF addressed Oxycodone for patient. Patient informed Rx sent in.  Patient upset this wasn't addressed when message sent on 11/18.  States this has happened before and doesn't understand why can't be addressed in a timely manner.    Patient verbalizes appreciation of getting this addressed today      Patient states Glipizide 2.5 mg is working well.  Medication not on current medication list.    Note from 11/5/2020 OV:    Diabetic polyneuropathy associated with type 2 diabetes mellitus (H)  Concerns for hypoglycemia with glipizide 5 mg extended release.  Patient reports that he was taking it at night before going to bed and also reports that he was not eating regularly while taking it.  We discussed the option of trying to cut tablets in half and go with a 2.5 mg dose and take it in the morning with food and then ensure that he does eat a few hours later or at least at noon.  I would like him to check his sugars in the morning and after lunch to ensure that they are not getting low and asked him to call me back with numbers.  If he is tolerating this well we can prescribe a slightly lower 2.5 mg dose.  Unfortunately cost does seem to be an issue appreciate guidance from pharmacology.  He may have to go with either Januvia or possibly a GRACIELA    Thanks,  Gayathri Wade RN

## 2020-11-20 NOTE — TELEPHONE ENCOUNTER
JF,    Please address due to SN's absence.    Refill for Oxycodone sent to PCP 11/18  Has not been addressed.    Will have SN address Glipizide question.    Please route back to triage so we can call patient to let him know Rx was sent in.    Thank you!  Gayathri Wade RN

## 2020-12-15 ENCOUNTER — TELEPHONE (OUTPATIENT)
Dept: FAMILY MEDICINE | Facility: CLINIC | Age: 65
End: 2020-12-15

## 2020-12-15 DIAGNOSIS — M96.1 FAILED BACK SYNDROME OF LUMBAR SPINE: ICD-10-CM

## 2020-12-15 DIAGNOSIS — F11.90 CHRONIC, CONTINUOUS USE OF OPIOIDS: ICD-10-CM

## 2020-12-15 NOTE — TELEPHONE ENCOUNTER
Reason for Call:  Other prescription    Detailed comments: Pt wants to request his med for oxyCODONE (ROXICODONE) 5 MG tablet. He uses the [facility] in [place].    Phone Number Patient can be reached at: Cell number on file:    Telephone Information:   Mobile [phone]       Best Time: Anytime    Can we leave a detailed message on this number? YES    Call taken on 12/15/2020 at 8:03 AM by Jaylene Love

## 2020-12-18 RX ORDER — OXYCODONE HYDROCHLORIDE 5 MG/1
5 TABLET ORAL EVERY 4 HOURS PRN
Qty: 180 TABLET | Refills: 0 | Status: SHIPPED | OUTPATIENT
Start: 2020-12-18 | End: 2021-01-15

## 2020-12-18 NOTE — TELEPHONE ENCOUNTER
Patient calling to f/u on refill. Needs to be able to  on Sunday, 12/20. Would like a call back to discuss.

## 2020-12-18 NOTE — TELEPHONE ENCOUNTER
Reviewed  - due for Rx on 12/20/20  Wrote for today   Not sure what the issue was but the routing to entire pool and accidentally closing the chart did not help.    Perhaps some education with patient about using [employer] messaging for requests?  PN

## 2021-01-15 ENCOUNTER — HEALTH MAINTENANCE LETTER (OUTPATIENT)
Age: 66
End: 2021-01-15

## 2021-01-15 DIAGNOSIS — M96.1 FAILED BACK SYNDROME OF LUMBAR SPINE: ICD-10-CM

## 2021-01-15 DIAGNOSIS — F11.90 CHRONIC, CONTINUOUS USE OF OPIOIDS: ICD-10-CM

## 2021-01-15 NOTE — TELEPHONE ENCOUNTER
Reason for Call:  Medication or medication refill:    Do you use a [facility]?  Name of the pharmacy and phone number for the current request:  [id number]    Name of the medication requested: oxyCODONE (ROXICODONE) 5 MG tablet    Other request: Pt would like a call when Rx is sent.    Can we leave a detailed message on this number? YES    Phone number patient can be reached at: Home number on file [phone] (home)    Best Time: Anytime    Call taken on 1/15/2021 at 8:14 AM by Lizet Payne

## 2021-01-18 ENCOUNTER — NURSE TRIAGE (OUTPATIENT)
Dept: NURSING | Facility: CLINIC | Age: 66
End: 2021-01-18

## 2021-01-18 RX ORDER — OXYCODONE HYDROCHLORIDE 5 MG/1
5 TABLET ORAL EVERY 4 HOURS PRN
Qty: 180 TABLET | Refills: 0 | Status: SHIPPED | OUTPATIENT
Start: 2021-01-18 | End: 2021-01-19

## 2021-01-18 RX ORDER — OXYCODONE HYDROCHLORIDE 5 MG/1
5 TABLET ORAL EVERY 6 HOURS PRN
Qty: 180 TABLET | Refills: 0 | Status: SHIPPED | OUTPATIENT
Start: 2021-01-18 | End: 2021-01-20

## 2021-01-18 NOTE — TELEPHONE ENCOUNTER
RN Triage:    Spoke with PCP who is ordering oxycodone 5 mg tablets.  Rx order did not process electronically.  Print version.  Request to have staff at [facility] find and discard the printed Rx.      Phoned [facility] back line and spoke with Gayathri in triage.  Relayed PCP request.  Gayathri will find printed Rx and discard.    Mirna Torres RN  [facility]    Additional Information    Negative: Drug overdose and triager unable to answer question    Negative: Caller requesting information unrelated to medicine    Negative: Caller requesting a prescription for Strep throat and has a positive culture result    Negative: Rash while taking a medication or within 3 days of stopping it    Negative: Immunization reaction suspected    Negative: Asthma and having symptoms of asthma (cough, wheezing, etc.)    Negative: Breastfeeding questions about mother's medicines and diet    Negative: MORE THAN A DOUBLE DOSE of a prescription or over-the-counter (OTC) drug    Negative: DOUBLE DOSE (an extra dose or lesser amount) of over-the-counter (OTC) drug and any symptoms (e.g., dizziness, nausea, pain, sleepiness)    Negative: DOUBLE DOSE (an extra dose or lesser amount) of prescription drug and any symptoms (e.g., dizziness, nausea, pain, sleepiness)    Negative: Took another person's prescription drug    Negative: DOUBLE DOSE (an extra dose or lesser amount) of prescription drug and NO symptoms (Exception: a double dose of antibiotics)    Negative: Diabetes drug error or overdose (e.g., took wrong type of insulin or took extra dose)    Negative: Caller has medication question about med not prescribed by PCP and triager unable to answer question (e.g., compatibility with other med, storage)    Negative: Request for URGENT new prescription or refill of 'essential' medication (i.e., likelihood of harm to patient if not taken) and triager unable to fill per department policy    Negative: Prescription not at pharmacy and was prescribed  today by PCP    Negative: Pharmacy calling with prescription questions and triager unable to answer question    Negative: Caller has urgent medication question about med that PCP prescribed and triager unable to answer question    Negative: Caller has NON-URGENT medication question about med that PCP prescribed and triager unable to answer question    Negative: Caller requesting a NON-URGENT new prescription or refill and triager unable to refill per department policy    Negative: DOUBLE DOSE (an extra dose or lesser amount) of over-the-counter (OTC) drug and NO symptoms    Negative: DOUBLE DOSE (an extra dose or lesser amount) of antibiotic drug and NO symptoms    Caller has medication question only, adult not sick, and triager answers question     PCP requested clinic staff contacted to discard a printed Rx.    Protocols used: MEDICATION QUESTION CALL-A-OH

## 2021-01-18 NOTE — TELEPHONE ENCOUNTER
FS,  Please auth oxycodone if appropriate in SN's absence.  Thanks,  Lavinia Ortega RN          Controlled Substance Refill Request for oxycodone    Last refill: 12/18/20 per med list    Last clinic visit: 11/5/20     Clinic visit frequency required: Q 3 months  Next appt:     Controlled substance agreement on file: Yes:  Date 7/27/16.    Documentation in problem list reviewed:  Yes    Processing:  Rx to be electronically transmitted to pharmacy by provider     RX monitoring program ([place]) reviewed: Unable top get into  today- tried 2x  [place] profile:  [email or web address]

## 2021-01-18 NOTE — TELEPHONE ENCOUNTER
I just received a call from this patient I am a TC at [facility]. Patient very angry that he is not getting anyone from his clinic when he calls, and wants to know why that is. Patient said's that he keeps getting lied to about getting this medication filled it is due today to be filled. Patient said 's that he has been told that his provider is in clinic then he told she is not it clinic. He wants to get a call back from someone from his clinic today. He has no more medication left.

## 2021-01-18 NOTE — TELEPHONE ENCOUNTER
Routing refill request to provider for review/approval because:  Drug not on the [facility] refill protocol   [place]MP not working  Ivis SMALLS RN    Last Written Prescription Date:  12/18/2020  Last Fill Quantity: 180,  # refills: 0   Last office visit: 11/5/2020 with prescribing provider:     Future Office Visit:

## 2021-01-19 RX ORDER — OXYCODONE HYDROCHLORIDE 5 MG/1
5 TABLET ORAL EVERY 4 HOURS PRN
Qty: 180 TABLET | Refills: 0 | Status: SHIPPED | OUTPATIENT
Start: 2021-01-19 | End: 2021-02-17

## 2021-01-19 NOTE — TELEPHONE ENCOUNTER
Patient calling [facility] and would like us to Expedite the New Rx sent to [facility] ASAP  [facility] Unit Coordinator

## 2021-01-19 NOTE — TELEPHONE ENCOUNTER
JS,   Please see below in SN's absence    Pt called for 3rd time today   States he was told SN worked Tuesdays  Told him that's not true    Very upset this isn't being handled  Explained to him we couldn't forsee his pharmacy not having enough Oxycodone to fill his whole Rx for him yesterday    Pt wants Rx to [facility] [place] instead  Pended if you can advise  Thanks,  Ivis SMALLS RN

## 2021-01-19 NOTE — TELEPHONE ENCOUNTER
Pretty from [facility]  is calling advised that [facility] in [facility] has the     oxyCODONE (ROXICODONE) 5 MG tablet  Ph# is [phone]    Can you order it over there. Ok with patient as soon possible  Bea Rae Pat. Rep

## 2021-01-20 NOTE — TELEPHONE ENCOUNTER
SN,    Pharmacy Rx sent to [facility] didn't have medication/enough in stock so sent  Rx to another pharmacy.    Gayathri Wade RN

## 2021-01-20 NOTE — TELEPHONE ENCOUNTER
Hello - I sent this script over on the 18th.  Can you ensure that he only has one script?    THanks    Connie

## 2021-01-26 ENCOUNTER — TELEPHONE (OUTPATIENT)
Dept: FAMILY MEDICINE | Facility: CLINIC | Age: 66
End: 2021-01-26

## 2021-01-26 NOTE — TELEPHONE ENCOUNTER
FYI~ January 14, 2020 I spoke with Layo, He is in need of financial assistance for medication.     We reviewed the Pharmacy Assistance Fund $500, the Prescription Assistance Program for manfacturer brand name assistance programs, gross income, Rx insurance and Rx list.     He is applying for the Pharmacy Assistance Fund.  I will be completing  assistance re-enrollment applications for Lyrica & Xarelto.    Thank you,    Ana Luisa Cabrera  Prescription Assistance

## 2021-01-27 DIAGNOSIS — I48.92 ATRIAL FLUTTER WITH RAPID VENTRICULAR RESPONSE (H): ICD-10-CM

## 2021-01-27 NOTE — TELEPHONE ENCOUNTER
Pt was approved for the  Pharmacy Assistance Fund; he is request a new rx for Xarelto be sent in to use for that.    Thank you!  Treasure Almanzar, Pharmacy [facility]  [phone]

## 2021-01-29 ENCOUNTER — TELEPHONE (OUTPATIENT)
Dept: FAMILY MEDICINE | Facility: CLINIC | Age: 66
End: 2021-01-29

## 2021-01-29 NOTE — TELEPHONE ENCOUNTER
Reason for Call:  Form, our goal is to have forms completed with 72 hours, however, some forms may require a visit or additional information.    Type of letter, form or note:  rx ass't form     Who is the form from?: [facility] (if other please explain)    Where did the form come from: form was faxed in    What clinic location was the form placed at?: [facility]    Where the form was placed: given to SN    What number is listed as a contact on the form?:   [phone]       Additional comments:  Please interoffice back to pharmacy when signed . Interoffice envelope included     Call taken on 1/29/2021 at 2:09 PM by Erickson Barry

## 2021-02-10 DIAGNOSIS — G89.29 CHRONIC BILATERAL LOW BACK PAIN WITHOUT SCIATICA: ICD-10-CM

## 2021-02-10 DIAGNOSIS — M54.50 CHRONIC BILATERAL LOW BACK PAIN WITHOUT SCIATICA: ICD-10-CM

## 2021-02-10 RX ORDER — PREGABALIN 100 MG/1
200 CAPSULE ORAL 2 TIMES DAILY
Qty: 360 CAPSULE | Refills: 3 | Status: SHIPPED | OUTPATIENT
Start: 2021-02-10 | End: 2021-07-30

## 2021-02-10 NOTE — TELEPHONE ENCOUNTER
I am in process of applying to the Lyrica assistance program through [employer].  [employer] requires a hand signed, brand name, hard copy script be submitted with their application.    Please hand sign a BRAND name, hard copy script for:       LYRICA    Please send the HARD COPY script to me     via interoffice mail  FPS Zenaida Kim    or via US mail  at:  [facility]  Zenaida Plasencia  [address] [place]  [address]    Thanks so much for your help!    Zenaida Plasencia  Prescription   Pharmacy Assistance  [phone]

## 2021-02-15 DIAGNOSIS — F11.90 CHRONIC, CONTINUOUS USE OF OPIOIDS: ICD-10-CM

## 2021-02-15 DIAGNOSIS — M96.1 FAILED BACK SYNDROME OF LUMBAR SPINE: ICD-10-CM

## 2021-02-15 NOTE — TELEPHONE ENCOUNTER
Reason for Call:  Medication or medication refill:    Do you use a [facility]?  Name of the pharmacy and phone number for the current request:     [facility] [address] [place] [address]      Name of the medication requested: oxyCODONE (ROXICODONE) 5 MG tablet    Other request:     Can we leave a detailed message on this number? YES    Phone number patient can be reached at: Home number on file [phone] (home)    Best Time: any    Call taken on 2/15/2021 at 7:39 AM by Peggy Shabazz

## 2021-02-15 NOTE — TELEPHONE ENCOUNTER
SN,     Controlled Substance Refill Request for Oxycodone    Last refill: 1/19/2021 #180    Last clinic visit: 11/5/2020     Clinic visit frequency required: Q 3 months  Next appt: none    Controlled substance agreement on file: Yes:  Date 8/30/2017.    Documentation in problem list reviewed:  Yes    Processing:  Rx to be electronically transmitted to pharmacy by provider     RX monitoring program ([place]) reviewed:  reviewed- no concerns  [place] profile:  [email or web address]    Please authorize if appropriate.  Thanks,  Ivis SMALLS RN

## 2021-02-16 ENCOUNTER — TELEPHONE (OUTPATIENT)
Dept: FAMILY MEDICINE | Facility: CLINIC | Age: 66
End: 2021-02-16

## 2021-02-16 DIAGNOSIS — M54.50 CHRONIC BILATERAL LOW BACK PAIN WITHOUT SCIATICA: Primary | ICD-10-CM

## 2021-02-16 DIAGNOSIS — G89.29 CHRONIC BILATERAL LOW BACK PAIN WITHOUT SCIATICA: Primary | ICD-10-CM

## 2021-02-16 NOTE — TELEPHONE ENCOUNTER
FYI~ A refill has been made to the  assistance program for Lyrica.    A 90 day supply of Lyrica will be delivered to [facility] within 3-5 business days.    Thank you,    Ana Luisa Cabrera  Prescription Assistance

## 2021-02-16 NOTE — TELEPHONE ENCOUNTER
Pt was approved for the  Pt Assistance Fund and is looking for a 14 day supply of Lyrica why he waits for his to come in the mail    Thanks!  Treasure Almanzar, Pharmacy [facility]  [phone]

## 2021-02-17 RX ORDER — PREGABALIN 100 MG/1
200 CAPSULE ORAL 2 TIMES DAILY
Qty: 56 CAPSULE | Refills: 0 | Status: SHIPPED | OUTPATIENT
Start: 2021-02-17 | End: 2021-05-06

## 2021-02-17 RX ORDER — OXYCODONE HYDROCHLORIDE 5 MG/1
5 TABLET ORAL EVERY 4 HOURS PRN
Qty: 180 TABLET | Refills: 0 | Status: SHIPPED | OUTPATIENT
Start: 2021-02-17 | End: 2021-03-18

## 2021-02-17 NOTE — TELEPHONE ENCOUNTER
OK for refill    My new phone is not allowing electronic refills    Will send to PN to ask if she can fill    SN

## 2021-02-17 NOTE — TELEPHONE ENCOUNTER
Pt called and stated he wanted the prescription sent to the [facility] in [place].     Address:   [address], [place] [address]  Ph:  [phone]      Pt also stated that he is out of his Lyrica now and would like it refilled as soon as possible.     Thank you,  Linda Leslie    [facility]

## 2021-02-17 NOTE — TELEPHONE ENCOUNTER
SN,   Patient calling about Lyrica  Has been approved to get this through Rx Assistance Program   Will take 3-5 days to arrive though   Requesting 2 week supply in the meantime (pt was told to ask for a couple week supply by med assistance person Ana Luisa he said)   Pended  Please authorize if appropriate.  Thanks,  Ivis SMALLS RN

## 2021-03-15 ENCOUNTER — OFFICE VISIT (OUTPATIENT)
Dept: FAMILY MEDICINE | Facility: CLINIC | Age: 66
End: 2021-03-15
Payer: COMMERCIAL

## 2021-03-15 VITALS
RESPIRATION RATE: 20 BRPM | SYSTOLIC BLOOD PRESSURE: 128 MMHG | BODY MASS INDEX: 34.24 KG/M2 | DIASTOLIC BLOOD PRESSURE: 80 MMHG | HEART RATE: 54 BPM | WEIGHT: 252.8 LBS | OXYGEN SATURATION: 95 % | TEMPERATURE: 97.5 F | HEIGHT: 72 IN

## 2021-03-15 DIAGNOSIS — R06.09 DOE (DYSPNEA ON EXERTION): Primary | ICD-10-CM

## 2021-03-15 DIAGNOSIS — R74.9 ABNORMAL SERUM ENZYME LEVEL, UNSPECIFIED: ICD-10-CM

## 2021-03-15 DIAGNOSIS — J44.9 CHRONIC OBSTRUCTIVE PULMONARY DISEASE, UNSPECIFIED COPD TYPE (H): ICD-10-CM

## 2021-03-15 DIAGNOSIS — E11.42 DIABETIC POLYNEUROPATHY ASSOCIATED WITH TYPE 2 DIABETES MELLITUS (H): ICD-10-CM

## 2021-03-15 DIAGNOSIS — I48.92 ATRIAL FLUTTER WITH RAPID VENTRICULAR RESPONSE (H): ICD-10-CM

## 2021-03-15 DIAGNOSIS — F33.0 MILD EPISODE OF RECURRENT MAJOR DEPRESSIVE DISORDER (H): ICD-10-CM

## 2021-03-15 LAB
ANION GAP SERPL CALCULATED.3IONS-SCNC: 4 MMOL/L (ref 3–14)
BUN SERPL-MCNC: 16 MG/DL (ref 7–30)
CALCIUM SERPL-MCNC: 9.2 MG/DL (ref 8.5–10.1)
CHLORIDE SERPL-SCNC: 106 MMOL/L (ref 94–109)
CHOLEST SERPL-MCNC: 124 MG/DL
CO2 SERPL-SCNC: 30 MMOL/L (ref 20–32)
CREAT SERPL-MCNC: 1.16 MG/DL (ref 0.66–1.25)
GFR SERPL CREATININE-BSD FRML MDRD: 65 ML/MIN/{1.73_M2}
GLUCOSE SERPL-MCNC: 146 MG/DL (ref 70–99)
HBA1C MFR BLD: 7.2 % (ref 0–5.6)
HDLC SERPL-MCNC: 27 MG/DL
LDLC SERPL CALC-MCNC: 42 MG/DL
NONHDLC SERPL-MCNC: 97 MG/DL
POTASSIUM SERPL-SCNC: 4.4 MMOL/L (ref 3.4–5.3)
SODIUM SERPL-SCNC: 140 MMOL/L (ref 133–144)
TRIGL SERPL-MCNC: 273 MG/DL

## 2021-03-15 PROCEDURE — 80048 BASIC METABOLIC PNL TOTAL CA: CPT | Performed by: FAMILY MEDICINE

## 2021-03-15 PROCEDURE — 36415 COLL VENOUS BLD VENIPUNCTURE: CPT | Performed by: FAMILY MEDICINE

## 2021-03-15 PROCEDURE — 83036 HEMOGLOBIN GLYCOSYLATED A1C: CPT | Performed by: FAMILY MEDICINE

## 2021-03-15 PROCEDURE — 99214 OFFICE O/P EST MOD 30 MIN: CPT | Performed by: FAMILY MEDICINE

## 2021-03-15 PROCEDURE — 80061 LIPID PANEL: CPT | Performed by: FAMILY MEDICINE

## 2021-03-15 ASSESSMENT — MIFFLIN-ST. JEOR: SCORE: 1959.69

## 2021-03-15 NOTE — PATIENT INSTRUCTIONS
Vaccine appointments are being added as they become available. Please check your [employer] account frequently for availability. If you have technical difficulty using [employer], call [phone] for assistance.    You can learn more about the state's phased approach to administering the vaccine, with details on each phase, [email or web address].     As vaccine supply increases and we are able to open appointments to more groups, we will share that information on our website [email or web address]. Check this website to stay up to date on COVID-19 vaccination information.    Call [phone]  To set up the stress test

## 2021-03-15 NOTE — PROGRESS NOTES
Assessment & Plan   Dyspnea:  Chronic obstructive pulmonary disease, unspecified COPD type (H)  Discussed that his symptoms are moderate at times.  Sometimes not sure if he is getting good air.  Will try Spiriva and see if this is covered and tolerated.  He also feels that wearing the mask is also affected how he breathes wonders if this will improve once wearing a mask lifts some.  Still has not completed the stress test that I ordered in the past.  Given that his breathing is somewhat bothering him with exertion encouraging him to get this done soon as possible    Mild episode of recurrent major depressive disorder (H)  Mood does seem stable.  He has had a lot of stressors with low pandemic and loss of friends and limited ability to walk.    Diabetic polyneuropathy associated with type 2 diabetes mellitus (H)  Labs as noted below  - **A1C FUTURE anytime    Atrial flutter with rapid ventricular response (H)  Tolerating medications well labs as noted below  - Lipid panel reflex to direct LDL Non-fasting  - Basic metabolic panel  (Ca, Cl, CO2, Creat, Gluc, K, Na, BUN)    Abnormal serum enzyme level, unspecified     - Lipid panel reflex to direct LDL Non-fasting      25 minutes spent on the date of the encounter doing chart review, history and exam, documentation and further activities as noted above       BMI:   Estimated body mass index is 34.29 kg/m  as calculated from the following:    Height as of this encounter: 1.829 m (6').    Weight as of this encounter: 114.7 kg (252 lb 12.8 oz).   Weight management plan: Discussed healthy diet and exercise guidelines    Would like to see him in 3 months to see how he is doing        Connie Haskins MD  [facility]    Hussain Vasques is a 65 year old who presents for the following health issues     HPI     Medication Followup of oxycodone     Taking Medication as prescribed: yes    Side Effects:  None    Medication Helping Symptoms:  yes   Is  continue to take medication as prescribed.  Does have chronic low back pain weakness and does require a cane to walk.    Does feel that his breathing has worsened is not sure if this is due to wearing a mask or deconditioning or if it is because of his history of smoking.  Currently not on an inhaler but we did discuss possibly trying 1.    Diabetes Follow-up    How often are you checking your blood sugar? One time daily  What time of day are you checking your blood sugars (select all that apply)?  Before meals  Have you had any blood sugars above 200?  No  Have you had any blood sugars below 70?  No    What symptoms do you notice when your blood sugar is low?  None    What concerns do you have today about your diabetes? None     Do you have any of these symptoms? (Select all that apply)  Nothing new - pt does have polyneuropathy     Have you had a diabetic eye exam in the last 12 months? No    Due for an eye exam and labs as noted below.    BP Readings from Last 2 Encounters:   03/15/21 128/80   11/05/20 127/78     Hemoglobin A1C (%)   Date Value   11/05/2020 6.0 (H)   05/28/2020 7.8 (H)     LDL Cholesterol Calculated (mg/dL)   Date Value   07/17/2020 55   05/28/2020 48                 How many servings of fruits and vegetables do you eat daily?  0-1    On average, how many sweetened beverages do you drink each day (Examples: soda, juice, sweet tea, etc.  Do NOT count diet or artificially sweetened beverages)?   3    How many days per week do you exercise enough to make your heart beat faster? 3 or less    How many minutes a day do you exercise enough to make your heart beat faster? 9 or less    How many days per week do you miss taking your medication? 0        Review of Systems   Constitutional, HEENT, cardiovascular, pulmonary, gi and gu systems are negative, except as otherwise noted.      Objective    /80 (Patient Position: Sitting, Cuff Size: Adult Large)   Pulse 54   Temp 97.5  F (36.4  C) (Tympanic)    Resp 20   Ht 1.829 m (6')   Wt 114.7 kg (252 lb 12.8 oz)   SpO2 95%   BMI 34.29 kg/m    Body mass index is 34.29 kg/m .  Physical Exam   GENERAL: healthy, alert and no distress  RESP: lungs clear to auscultation - no rales, rhonchi or wheezes  CV: regular rate and rhythm, normal S1 S2, no S3 or S4, no murmur, click or rub, no peripheral edema and peripheral pulses strong  PSYCH: mentation appears normal, affect normal/bright  Extremities: Weakness noted of his lumbar spine and difficulty with getting up to the table.  Uses his cane for mobilization.        BMI:   Estimated body mass index is 34.29 kg/m  as calculated from the following:    Height as of this encounter: 1.829 m (6').    Weight as of this encounter: 114.7 kg (252 lb 12.8 oz).         See Patient Instructions    No follow-ups on file.    Connie Haskins MD  [facility]

## 2021-03-17 ENCOUNTER — IMMUNIZATION (OUTPATIENT)
Dept: NURSING | Facility: CLINIC | Age: 66
End: 2021-03-17
Payer: COMMERCIAL

## 2021-03-17 PROCEDURE — 0001A PR COVID VAC PFIZER DIL RECON 30 MCG/0.3 ML IM: CPT

## 2021-03-17 PROCEDURE — 91300 PR COVID VAC PFIZER DIL RECON 30 MCG/0.3 ML IM: CPT

## 2021-03-18 DIAGNOSIS — F11.90 CHRONIC, CONTINUOUS USE OF OPIOIDS: ICD-10-CM

## 2021-03-18 DIAGNOSIS — M96.1 FAILED BACK SYNDROME OF LUMBAR SPINE: ICD-10-CM

## 2021-03-18 RX ORDER — OXYCODONE HYDROCHLORIDE 5 MG/1
5 TABLET ORAL EVERY 4 HOURS PRN
Qty: 180 TABLET | Refills: 0 | Status: SHIPPED | OUTPATIENT
Start: 2021-03-18 | End: 2021-04-13

## 2021-03-18 NOTE — TELEPHONE ENCOUNTER
Reason for Call:  Medication or medication refill:    Do you use a [facility]?  Name of the pharmacy and phone number for the current request:  [facility] [place] [phone]    Name of the medication requested:   oxyCODONE (ROXICODONE) 5 MG tablet     Other request: States he will be out of the medication tomorrow.    Can we leave a detailed message on this number? NO    Phone number patient can be reached at: Cell number on file:    Telephone Information:   Mobile [phone]       Best Time: ANY    Call taken on 3/18/2021 at 10:56 AM by Linda Leslie

## 2021-03-18 NOTE — TELEPHONE ENCOUNTER
JS,   Please advise in SN's absence    Controlled Substance Refill Request for Oxycodone    Last refill: 2/17/2021 #180    Last clinic visit: 3/15/2021     Clinic visit frequency required: Q 3 months  Next appt: none    Controlled substance agreement on file: Yes:  Date 8/30/2017.    Documentation in problem list reviewed:  Yes    Processing:  Rx to be electronically transmitted to pharmacy by provider     RX [place] reviewed:  reviewed- no concerns  [place]:  [email or web address]    Please authorize if appropriate.  Thanks,  Ivis SMALLS RN

## 2021-03-19 ENCOUNTER — TELEPHONE (OUTPATIENT)
Dept: FAMILY MEDICINE | Facility: CLINIC | Age: 66
End: 2021-03-19

## 2021-03-19 RX ORDER — TIOTROPIUM BROMIDE 18 UG/1
18 CAPSULE ORAL; RESPIRATORY (INHALATION) DAILY
Qty: 90 CAPSULE | Refills: 1 | Status: SHIPPED | OUTPATIENT
Start: 2021-03-19 | End: 2021-05-06 | Stop reason: DRUGHIGH

## 2021-03-19 NOTE — TELEPHONE ENCOUNTER
----- Message from Connie Haskins MD sent at 3/19/2021  3:22 PM CDT -----  Regarding: breathing follow up  Could you call the patient and let him know that I did send in an inhaler for him to try given his difficulty with walking and breathing when trying to walk.  We did talk about doing a stress test which I agree but I also will send in an inhaler for him to try given his history of smoking.    I would like to see how he is doing in the next 4 to 6 weeks    Connie Haskins MD

## 2021-03-25 NOTE — RESULT ENCOUNTER NOTE
Please let him know his cholesterol is pretty good.  The triglycerides are a little bit elevated and this likely reflects his diabetes rising.  His LDL cholesterol remains low and we can continue cholesterol-lowering medication as is.    His comprehensive panel shows normal electrolytes his blood sugar was a bit high when we saw him.  His kidney function has improved which is excellent.    His A1c kimberly to 7.2 it had been quite low at 6 prior to that.  I really like to make sure it is not rising further.    Please have him check his blood sugars twice daily over the next 2 weeks and contact me for a video visit or telephone visit for follow-up.  Also encouraged him to see what he can do to reduce carbs and any foods that could be raising his sugars.    Thanks      Connei Haskins MD

## 2021-04-07 ENCOUNTER — IMMUNIZATION (OUTPATIENT)
Dept: NURSING | Facility: CLINIC | Age: 66
End: 2021-04-07
Attending: INTERNAL MEDICINE
Payer: COMMERCIAL

## 2021-04-07 PROCEDURE — 0002A PR COVID VAC PFIZER DIL RECON 30 MCG/0.3 ML IM: CPT

## 2021-04-07 PROCEDURE — 91300 PR COVID VAC PFIZER DIL RECON 30 MCG/0.3 ML IM: CPT

## 2021-04-13 DIAGNOSIS — M96.1 FAILED BACK SYNDROME OF LUMBAR SPINE: ICD-10-CM

## 2021-04-13 DIAGNOSIS — F11.90 CHRONIC, CONTINUOUS USE OF OPIOIDS: ICD-10-CM

## 2021-04-13 DIAGNOSIS — G89.4 CHRONIC PAIN SYNDROME: ICD-10-CM

## 2021-04-13 NOTE — TELEPHONE ENCOUNTER
Reason for Call:  Medication or medication refill:    Do you use a [facility]?  Name of the pharmacy and phone number for the current request:  [facility] [place] [phone]    Name of the medication requested:   oxyCODONE (ROXICODONE) 5 MG tablet    Other request:     Can we leave a detailed message on this number? YES    Phone number patient can be reached at: Home number on file [phone] (home)    Best Time: Any time    Call taken on 4/13/2021 at 7:52 AM by Dalia Kaiser

## 2021-04-13 NOTE — TELEPHONE ENCOUNTER
SN,    Controlled Substance Refill Request for Oxycodone    Last refill: 3/19/2021 - #180    Last clinic visit: 3/15/2021    Clinic visit frequency required: Q 3 months  Next appt: None    Controlled substance agreement on file: Yes:  Date 7/27/2016.    Documentation in problem list reviewed:  Yes    Processing:  Rx to be electronically transmitted to pharmacy by provider     Overdose risk per  = 600 (range 000-999)    Gayathri Wade RN

## 2021-04-14 RX ORDER — OXYCODONE HYDROCHLORIDE 5 MG/1
5 TABLET ORAL EVERY 4 HOURS PRN
Qty: 180 TABLET | Refills: 0 | Status: SHIPPED | OUTPATIENT
Start: 2021-04-18 | End: 2021-05-12

## 2021-04-19 DIAGNOSIS — G89.4 CHRONIC PAIN SYNDROME: Primary | ICD-10-CM

## 2021-04-20 NOTE — PROGRESS NOTES
Assessment & Plan     Hypertension goal BP (blood pressure) < 140/90 - stable and continue meds    Last Comprehensive Metabolic Panel:    - lisinopril (ZESTRIL) 10 MG tablet; Take 1 tablet (10 mg) by mouth daily (Patient not taking: Reported on 5/6/2021)    Chronic pain syndrome  Labs per routine  csa is on file  - Drug Confirmation Panel Urine with Creat (Care Map)    Type 2 diabetes mellitus without complication, without long-term current use of insulin (H)  Will have him work on reduced sweets, carbs.  fhe feels he can  Would also like to have him look at BGM with [facility]  consdier victoza or similar injectable depending on coverage  He might qualify for a CGM  Renal protection    - lisinopril (ZESTRIL) 10 MG tablet; Take 1 tablet (10 mg) by mouth daily (Patient not taking: Reported on 5/6/2021)                 No follow-ups on file.    Connie Haskins MD  [facility]    Hussain Vasques is a 65 year old who presents for the following health issues     HPI     Diabetes Follow-up    How often are you checking your blood sugar? Two times daily  Blood sugar testing frequency justification:  Uncontrolled diabetes  What time of day are you checking your blood sugars (select all that apply)?  Before and after meals  Have you had any blood sugars above 200?  Yes   Have you had any blood sugars below 70?  No    What symptoms do you notice when your blood sugar is low?  None    What concerns do you have today about your diabetes? None     Do you have any of these symptoms? (Select all that apply)  Numbness in feet, Burning in feet and Redness, sores, or blisters on feet    Have you had a diabetic eye exam in the last 12 months? No  Reports he's eating sweets, is compliant with meds  Having erratic blood sugars  Has had concerns for cost of medications  Not sure about injectables.      BP Readings from Last 2 Encounters:   05/06/21 138/70   04/21/21 139/75     Hemoglobin A1C (%)   Date Value    03/15/2021 7.2 (H)   11/05/2020 6.0 (H)     LDL Cholesterol Calculated (mg/dL)   Date Value   03/15/2021 42   07/17/2020 55               COPD Follow-Up    Overall, how are your COPD symptoms since your last clinic visit?  Better    How much fatigue or shortness of breath do you have when you are walking?  Same as usual or less    How much shortness of breath do you have when you are resting?  Same as usual    How often do you cough? Sometimes    Have you noticed any change in your sputum/phlegm?  No    Have you experienced a recent fever? No    Please describe how far you can walk without stopping to rest:  The length of 1-2 rooms    How many flights of stairs are you able to walk up without stopping?  None    Have you had any Emergency Room Visits, Urgent Care Visits, or Hospital Admissions because of your COPD since your last office visit?  No    History   Smoking Status     Former Smoker     Years: 40.00     Types: Cigarettes   Smokeless Tobacco     Former User     Quit date: 2/1/2013     No results found for: FEV1, MJY6TFT      How many servings of fruits and vegetables do you eat daily?  0-1    On average, how many sweetened beverages do you drink each day (Examples: soda, juice, sweet tea, etc.  Do NOT count diet or artificially sweetened beverages)?   3-4 cans of Pepsi each day    How many days per week do you exercise enough to make your heart beat faster? 3 or less    How many minutes a day do you exercise enough to make your heart beat faster? 9 or less    How many days per week do you miss taking your medication? 0      (G89.4) Chronic pain syndrome  Comment: needs refills of meds, chronic failed back syndrome   Plan:          Review of Systems   Constitutional, HEENT, cardiovascular, pulmonary, gi and gu systems are negative, except as otherwise noted.      Objective    /75   Pulse 64   Temp 98.5  F (36.9  C) (Tympanic)   Resp 17   Ht 1.829 m (6')   Wt 117.8 kg (259 lb 12.8 oz)   SpO2 96%    BMI 35.24 kg/m    Body mass index is 35.24 kg/m .  Physical Exam   GENERAL: healthy, alert and no distress  RESP: lungs clear to auscultation - no rales, rhonchi or wheezes  CV: regular rate and rhythm, normal S1 S2, no S3 or S4, no murmur, click or rub, no peripheral edema and peripheral pulses strong    Results for orders placed or performed in visit on 04/21/21   Drug Confirmation Panel Urine with Creat (Care Map)     Status: Abnormal   Result Value Ref Range    Creatinine Urine 103 mg/dL    Oxycodone ng/mL 2,200 (HH) <50 ng/mL    Oxycodone 2,136 (A) ABS^Absent ng/mg[creat]    Oxymorphone ng/mL 58 (HH) <50 ng/mL    Oxymorphone 56 (A) ABS^Absent ng/mg[creat]    Pregabalin Present (AA) ABS^Absent    Comp Urine Drug Confirmation Comments (Note)

## 2021-04-21 ENCOUNTER — OFFICE VISIT (OUTPATIENT)
Dept: FAMILY MEDICINE | Facility: CLINIC | Age: 66
End: 2021-04-21
Payer: COMMERCIAL

## 2021-04-21 VITALS
SYSTOLIC BLOOD PRESSURE: 139 MMHG | TEMPERATURE: 98.5 F | HEART RATE: 64 BPM | WEIGHT: 259.8 LBS | RESPIRATION RATE: 17 BRPM | BODY MASS INDEX: 35.19 KG/M2 | HEIGHT: 72 IN | OXYGEN SATURATION: 96 % | DIASTOLIC BLOOD PRESSURE: 75 MMHG

## 2021-04-21 DIAGNOSIS — I10 HYPERTENSION GOAL BP (BLOOD PRESSURE) < 140/90: Primary | ICD-10-CM

## 2021-04-21 DIAGNOSIS — E11.9 TYPE 2 DIABETES MELLITUS WITHOUT COMPLICATION, WITHOUT LONG-TERM CURRENT USE OF INSULIN (H): ICD-10-CM

## 2021-04-21 DIAGNOSIS — G89.4 CHRONIC PAIN SYNDROME: ICD-10-CM

## 2021-04-21 DIAGNOSIS — E66.01 MORBID OBESITY (H): ICD-10-CM

## 2021-04-21 PROCEDURE — 99214 OFFICE O/P EST MOD 30 MIN: CPT | Performed by: FAMILY MEDICINE

## 2021-04-21 PROCEDURE — 80307 DRUG TEST PRSMV CHEM ANLYZR: CPT | Performed by: FAMILY MEDICINE

## 2021-04-21 RX ORDER — LISINOPRIL 10 MG/1
10 TABLET ORAL DAILY
Qty: 90 TABLET | Refills: 1 | Status: SHIPPED | OUTPATIENT
Start: 2021-04-21 | End: 2021-04-21

## 2021-04-21 RX ORDER — LISINOPRIL 10 MG/1
10 TABLET ORAL DAILY
Qty: 90 TABLET | Refills: 1 | Status: SHIPPED | OUTPATIENT
Start: 2021-04-21 | End: 2021-09-09

## 2021-04-21 ASSESSMENT — ANXIETY QUESTIONNAIRES
3. WORRYING TOO MUCH ABOUT DIFFERENT THINGS: SEVERAL DAYS
7. FEELING AFRAID AS IF SOMETHING AWFUL MIGHT HAPPEN: NOT AT ALL
5. BEING SO RESTLESS THAT IT IS HARD TO SIT STILL: NOT AT ALL
1. FEELING NERVOUS, ANXIOUS, OR ON EDGE: SEVERAL DAYS
IF YOU CHECKED OFF ANY PROBLEMS ON THIS QUESTIONNAIRE, HOW DIFFICULT HAVE THESE PROBLEMS MADE IT FOR YOU TO DO YOUR WORK, TAKE CARE OF THINGS AT HOME, OR GET ALONG WITH OTHER PEOPLE: NOT DIFFICULT AT ALL
GAD7 TOTAL SCORE: 3
6. BECOMING EASILY ANNOYED OR IRRITABLE: NOT AT ALL
2. NOT BEING ABLE TO STOP OR CONTROL WORRYING: SEVERAL DAYS

## 2021-04-21 ASSESSMENT — PATIENT HEALTH QUESTIONNAIRE - PHQ9
SUM OF ALL RESPONSES TO PHQ QUESTIONS 1-9: 3
10. IF YOU CHECKED OFF ANY PROBLEMS, HOW DIFFICULT HAVE THESE PROBLEMS MADE IT FOR YOU TO DO YOUR WORK, TAKE CARE OF THINGS AT HOME, OR GET ALONG WITH OTHER PEOPLE: SOMEWHAT DIFFICULT
SUM OF ALL RESPONSES TO PHQ QUESTIONS 1-9: 3
5. POOR APPETITE OR OVEREATING: NOT AT ALL

## 2021-04-21 ASSESSMENT — MIFFLIN-ST. JEOR: SCORE: 1991.45

## 2021-04-22 ASSESSMENT — ANXIETY QUESTIONNAIRES: GAD7 TOTAL SCORE: 3

## 2021-04-23 LAB
CREAT UR-MCNC: 103 MG/DL
OXYCODONE UR CFM-MCNC: 2200 NG/ML
OXYCODONE/CREAT UR: 2136 NG/MG{CREAT}
OXYMORPHONE UR CFM-MCNC: 58 NG/ML
OXYMORPHONE/CREAT UR: 56 NG/MG{CREAT}
PREGABALIN UR QL CFM: PRESENT
RPT COMMENT: ABNORMAL

## 2021-04-23 NOTE — RESULT ENCOUNTER NOTE
Hello,    Great news, your results were normal.  The different medications you are on are present in the urine.    Connie Haskins MD

## 2021-05-04 ENCOUNTER — TELEPHONE (OUTPATIENT)
Dept: FAMILY MEDICINE | Facility: CLINIC | Age: 66
End: 2021-05-04

## 2021-05-04 NOTE — TELEPHONE ENCOUNTER
FYI~ A refill has been made to the  assistance program for Lyrica.     A 90 day supply of Lyrica will be delivered to [facility] on May 6th,2021.    Thank you,    Ana Luisa Cabrera  Prescription Assistance

## 2021-05-05 NOTE — PROGRESS NOTES
Medication Therapy Management (MTM) Encounter    ASSESSMENT:                            Medication Adherence/Access: Patient says he will call mail order to get his new lisinopril. Will try sending in a prescription for senna/docusate to see if it's covered by insurance.     Type 2 Diabetes: Seems as though his recent bump in A1c is possibly diet related. Given his A1c is not far off from goal of <7%, and the fact that the patient does have significant cost barriers to affording his medications, will work on diet prior to making medication changes.  Discussed trying to add more protein with breakfast and limiting serving sizes of carbohydrates such as toast, pasta, and rice.  Also discussed trying to get more vegetables throughout the day.  Recommended to limit sweet desserts such as freezies and ice cream.  Patient is willing to give this a try.    Immunizations: Due for Ocdimssty76 and Shingrix.     Hypertension/a-fib: His blood pressure is above goal of less than 130/80 mmHg today.  Would benefit from contacting his mail order pharmacy to get lisinopril that was prescribed by PCP.  Will be due for BMP 2 to 4 weeks after starting this.    Hyperlipidemia: Stable.      BPH: Stable.      Depression: Stable.      Allergies: He is taking more fluticasone nasal spray and is recommended.  This could be contributing to some of his nasal dryness and sinus symptoms.  Discussed this with PCP, and she agrees with recommendation to decrease fluticasone nasal spray to no more than 2 puffs in each nostril once daily and also to try warm compresses and saline rinse.  PCP recommends patient be seen if not seeing improvements after 1 week.     Pain: No concerns with spreading Lyrica out throughout the day as he is still taking the same total daily dose as is prescribed (100 mg QID vs 200 mg BID).  Discussed that he is taking his oxycodone differently than prescribed (although he is still staying within the maximum number of tablets  per day).  Recommended that he take his oxycodone as prescribed.  Discussed that would prefer he avoid NSAIDs and use Tylenol instead.  Discussed risks of NSAIDs today.  However if he feels like he needs to use ibuprofen a couple times a month would likely not be a major concern but would prefer he take only 1 tablet rather than 3 tablets at once.  Could also consider topical Voltaren gel on his hands in place of systemic NSAIDs.  He is not sure if he wants to try this, as it does not sound convenient.     GERD: Needs improvement.  His omeprazole is prescribed as once daily, so he could increase it rather than taking it every other day.    Constipation: He feels like his current regimen is working well for him.  Would prefer he not take more than 1 or 2 tablets of senna/docusate at one time.  We will try to send in prescription for senna docusate to see if insurance will cover it since it is expensive OTC.    COPD: His breathing has improved, but he is having throat soreness.  Could try switching from Spiriva HandiHaler to Respimat to see if that makes any difference.  Discussed this with Dr. Haskins, and she agrees with this recommendation.    PLAN:                            1. You are due for the [id number] vaccine to protect you from pneumonia. You are also due for the Shingrix vaccine to prevent shingles.     2. Consider trying over the counter Voltaren gel for your pain in your hands. Apply 2 grams to your hands topically 4 times daily.    3. Do not use more than 2 sprays of fluticasone in each nostril once daily.  You can also try using a warm compress or saline rinse for your nose.  If your symptoms do not improve after about a week, schedule an appointment to see Dr. Haskins.    4. Take one of your omeprazole capsules once every day.     5. Ally will try sending in a prescription for the senna/docusate to see if insurance will cover it. Take 1-2 of these daily as needed.     6. Ally sent in a  "prescription for the Spiriva Respimat.  Take 2 puffs once daily.  This is the same medication you have been using, but in a different inhaler device.  We will see if this helps with your throat irritation.     7. Try eating some more protein with breakfast and limiting the number of pieces of toast. Some good options are eggs or a yogurt that is high in protein and low in sugars. With dinner try limiting your portion sizes of pasta and rice. Try adding more vegetables. Some ideas are cauliflower, asparagus, or mushrooms.     8. Ally will order a lab to check your kidneys and electrolytes since you are starting the lisinopril.     Follow-up: 1 month     SUBJECTIVE/OBJECTIVE:                          Toby Mcgrath is a 65 year old male coming in for a follow-up visit. He was referred to me from Dr. Haskins.  Today's visit is a follow-up MTM visit from 20     Reason for visit: Comprehensive review, diabetes management.    Allergies/ADRs: Reviewed in chart  Tobacco: He reports that he has quit smoking. His smoking use included cigarettes. He quit after 40.00 years of use. He quit smokeless tobacco use about 8 years ago.  Alcohol: none    Medication Adherence/Access: Patient uses pharmacy assistance program for Lyrica and Xarelto. He has not gotten new lisinopril from his mail order pharmacy yet. He is having trouble affording OTC senna/docusate.     Type 2 Diabetes:  Currently taking metformin ER 2000 mg daily. Patient is not experiencing side effects. Patient had previously been on glipizide XL 5 mg daily, but had symptoms of dizziness and falls (see telephone encounter from 10/27/21). Today he says glipizide was \"no good\".   Blood sugar monitorin-2 time(s) daily.   AM Fasting (mg/dL): 120's. Highest is 140 mg/dL  Before dinner (mg/dL): 120-130's  Lowest he's seen is 100, highest is 200 mg/dL (after eating freezy pops)  Symptoms of low blood sugar? none  Symptoms of high blood sugar? none  Eye exam: " "due  Foot exam: due  Diet/Exercise: He has been eating some \"freezies\" that he has bought for his grandkids. He was having about 1-3 freezies at least 3 days per week, and then realized it was raising his blood sugars into the 200's. Since then he has cut back on that. Breakfast is two pieces of toast and peanut butter, then mid-morning has two more pieces of toast with jelly. Lunch is variable. Dinner is spaghetti or hamburgers. A lot of frozen dinners (lasagna, chicken with rice).    Aspirin: Not taking due to Xarelto therapy  Statin: Yes: simvastatin 20 mg   ACEi/ARB: Yes: lisinopril 10 mg daily was added by PCP on 4/21/21 but he has not gotten this from his mail order yet.   Urine Albumin:   Lab Results   Component Value Date    UMALCR Unable to calculate due to low value 05/28/2020      Lab Results   Component Value Date    A1C 7.2 03/15/2021    A1C 6.0 11/05/2020    A1C 7.8 05/28/2020    A1C 6.8 12/26/2019    A1C 7.1 06/26/2019     Immunizations: Patient has had two doses of COVID vaccine. His last Tdap was in 2019. He has not had [id number] since turning 63. He has not had Shingrix.     Hypertension/a-fib: Current medications include lisinopril 10 mg daily (but he has not received this from his mail order pharmacy yet), metoprolol tartrate 50 mg BID, and rivaroxaban 20 mg daily.  Patient reports no current medication side effects.    ECHO (7/16/20): EF 55-60%    BP Readings from Last 3 Encounters:   04/21/21 139/75   03/15/21 128/80   11/05/20 127/78     Hyperlipidemia: Current therapy includes simvastatin 20 mg daily.  Patient reports no significant myalgias or other side effects.    Recent Labs   Lab Test 03/15/21  0933 07/17/20  0615 10/14/13  1038 10/14/13  1038   CHOL 124 124   < > 143   HDL 27* 35*   < > 30*   LDL 42 55   < > 60   TRIG 273* 169*   < > 264*   CHOLHDLRATIO  --   --   --  4.7    < > = values in this interval not displayed.     BPH: Patient takes finasteride 5 mg daily and tamsulosin 0.4 " "mg daily. He says he is not having any urinary pain or discomfort and things are going well. He denies side effects.     Depression: He takes fluoxetine 60 mg daily and says \"its going good\". He feels like it's helping. He denies side effects.     Allergies: Patient takes fluticasone nasal spray daily - he is using 3 sprays in each nostril 1-2 times daily. He feels like this works well. However he says he is having some discomfort and swelling in his face (points to his sinuses and nose). He says his nose feels dry. He says he gets 'tickles\" in his nose and needs to sneeze in the morning. Since he has been using the fluticasone he says he has been getting fewer sinus infections which he is happy about.     Pain: Patient has symptoms of nerve pain and lower back pain. Patient has a prescription for oxycodone 5 mg every 4 hours as needed, but he is taking 3 tablets every morning, 1 tablet in the afternoon, 1 in the evening, and 1 at bedtime. He denies side effects from this, other than constipation. He also takes pregabalin 100 mg QID (prescribed as 200 mg BID but he spreads them out because taking two at once made him tired). He says the oxycodone helps with the pain, but not a whole lot. He says the Lyrica has completely taken away the nerve pain. He says it makes him a little bit tired, but denies other side effects. Taking 1 capsule QID is improving the tiredness. He says has a lot of withdrawal effects when he runs out of it but otherwise no complaints.     He also takes ibuprofen for pain and swelling in his fingers. He says it works \"really really\" well. He takes 600 mg about 3 times per month. Acetaminophen isnt helpful. He says he needs something to take the inflammation away in his fingers, and is wondering if he can use the ibuprofen more often.     GERD: Patient takes omeprazole 20 mg every other day (prescribed as daily, but he says he was told to limit it). He feels like his heart burn is not well " "controlled. If he eats anything spicy he has a lot of issues. He has tried Tums before and has not gotten any relief from those the past.      Constipation: Patient is taking OTC senna/docusate. He is taking 3 tablets about once a week and says it works really well. He says that helps him out for a few days after he takes it. But he says this is expensive to buy OTC. Miralax was not helpful at all.     COPD: Patient takes Spiriva 18 mcg once daily (started in mid-March). He says he feels \"100% better\" since starting this, but he is having some throat irritation from it. He says he voice is \"scraggly\" and its hard to swallow. This started when he started the Spiriva. He says he feels like he can taste the powder in his mouth and throat.     Today's Vitals: /70   Wt 256 lb 12.8 oz (116.5 kg)   BMI 34.83 kg/m    ----------------    I spent 60 minutes with this patient today. I offer these suggestions for consideration by Dr. Haskins. A copy of the visit note was provided to the patient's primary care provider.    The patient was given a summary of these recommendations.     Helene Browning PharmD  [facility] Resident  Pager: [phone]    Preceptor cosignature: Toby IVERSON Mcgrath was seen independently by Dr. Browning. I have reviewed the assessment and plan. Mary Last, Mark, NEIDA, BCACP      Medication Therapy Recommendations  Chronic pain syndrome    Rationale: Unsafe medication for the patient - Adverse medication event - Safety   Recommendation: Change Medication - Voltaren 1 % Gel - suggested voltaren gel rather than systemic NSAIDS   Status: Accepted per CPA         COPD (chronic obstructive pulmonary disease) (H)    Current Medication: tiotropium (SPIRIVA RESPIMAT) 2.5 MCG/ACT inhaler   Rationale: Undesirable effect - Adverse medication event - Safety   Recommendation: Change Medication - change from handihaler to respimat due to throat soreness   Status: Accepted per Provider         GERD " (gastroesophageal reflux disease)    Current Medication: omeprazole (PRILOSEC) 20 MG DR capsule   Rationale: Does not understand instructions - Adherence - Adherence   Recommendation: Provide Education - educated he can take this daily   Status: Patient Agreed - Adherence/Education         Hypertension goal BP (blood pressure) < 140/90    Current Medication: lisinopril (ZESTRIL) 10 MG tablet   Rationale: Medication requires monitoring - Needs additional monitoring - Safety   Recommendation: Order Lab - due for BMP   Status: Accepted per CPA         Seasonal allergic rhinitis, unspecified trigger    Current Medication: fluticasone (FLONASE) 50 MCG/ACT nasal spray   Rationale: Does not understand instructions - Adherence - Adherence   Recommendation: Provide Education - educated to not take more than prescribed (2 puffs in each nostril daily)   Status: Patient Agreed - Adherence/Education         Slow transit constipation    Current Medication: senna-docusate (SENOKOT-S/PERICOLACE) 8.6-50 MG tablet   Rationale: Cannot afford medication product - Cost - Adherence   Recommendation: Change Medication - sending in Rx since OTC is too expensive. Also patient was taking more than recommended at once   Status: Accepted per CPA         Vaccine counseling    Rationale: Preventive therapy - Needs additional medication therapy - Indication   Recommendation: Provide Education - You are due for the [id number] vaccine to protect you from pneumonia. You are also due for the Shingrix vaccine to prevent shingles.   Status: Patient Agreed - Adherence/Education

## 2021-05-06 ENCOUNTER — OFFICE VISIT (OUTPATIENT)
Dept: PHARMACY | Facility: CLINIC | Age: 66
End: 2021-05-06
Attending: FAMILY MEDICINE
Payer: COMMERCIAL

## 2021-05-06 VITALS — BODY MASS INDEX: 34.83 KG/M2 | SYSTOLIC BLOOD PRESSURE: 138 MMHG | DIASTOLIC BLOOD PRESSURE: 70 MMHG | WEIGHT: 256.8 LBS

## 2021-05-06 DIAGNOSIS — F33.9 RECURRENT MAJOR DEPRESSIVE DISORDER, REMISSION STATUS UNSPECIFIED (H): ICD-10-CM

## 2021-05-06 DIAGNOSIS — E11.9 TYPE 2 DIABETES MELLITUS WITHOUT COMPLICATION, WITHOUT LONG-TERM CURRENT USE OF INSULIN (H): Primary | ICD-10-CM

## 2021-05-06 DIAGNOSIS — K59.01 SLOW TRANSIT CONSTIPATION: ICD-10-CM

## 2021-05-06 DIAGNOSIS — N40.1 BENIGN PROSTATIC HYPERPLASIA WITH URINARY FREQUENCY: ICD-10-CM

## 2021-05-06 DIAGNOSIS — E78.5 HYPERLIPIDEMIA LDL GOAL <100: ICD-10-CM

## 2021-05-06 DIAGNOSIS — I48.91 ATRIAL FIBRILLATION WITH RVR (H): ICD-10-CM

## 2021-05-06 DIAGNOSIS — I10 HYPERTENSION GOAL BP (BLOOD PRESSURE) < 140/90: ICD-10-CM

## 2021-05-06 DIAGNOSIS — M54.50 CHRONIC BILATERAL LOW BACK PAIN WITHOUT SCIATICA: ICD-10-CM

## 2021-05-06 DIAGNOSIS — G89.29 CHRONIC BILATERAL LOW BACK PAIN WITHOUT SCIATICA: ICD-10-CM

## 2021-05-06 DIAGNOSIS — J30.2 SEASONAL ALLERGIC RHINITIS, UNSPECIFIED TRIGGER: ICD-10-CM

## 2021-05-06 DIAGNOSIS — Z71.85 VACCINE COUNSELING: ICD-10-CM

## 2021-05-06 DIAGNOSIS — K21.9 GASTROESOPHAGEAL REFLUX DISEASE WITHOUT ESOPHAGITIS: ICD-10-CM

## 2021-05-06 DIAGNOSIS — R35.0 BENIGN PROSTATIC HYPERPLASIA WITH URINARY FREQUENCY: ICD-10-CM

## 2021-05-06 DIAGNOSIS — J44.9 CHRONIC OBSTRUCTIVE PULMONARY DISEASE, UNSPECIFIED COPD TYPE (H): ICD-10-CM

## 2021-05-06 PROCEDURE — 99207 PR NO CHARGE LOS: CPT | Performed by: PHARMACIST

## 2021-05-06 RX ORDER — AMOXICILLIN 250 MG
1 CAPSULE ORAL DAILY
COMMUNITY
End: 2021-05-06

## 2021-05-06 RX ORDER — AMOXICILLIN 250 MG
1-2 CAPSULE ORAL DAILY PRN
Qty: 60 TABLET | Refills: 0 | Status: SHIPPED | OUTPATIENT
Start: 2021-05-06 | End: 2022-01-14

## 2021-05-06 NOTE — PATIENT INSTRUCTIONS
Recommendations from today's MT visit:                                                       1. You are due for the [id number] vaccine to protect you from pneumonia. You are also due for the Shingrix vaccine to prevent shingles.     2. Consider trying over the counter Voltaren gel for your pain in your hands. Apply 2 grams to your hands topically 4 times daily.    3. Do not use more than 2 sprays of fluticasone in each nostril once daily.      4. Take one of your omeprazole capsules once every day.     5. Ally will try sending in a prescription for the Colace to see if insurance will cover it.     6. Ally will look into some ideas of alternatives to minimize the throat irritation from your inhaler.     7. Try eating some more protein with breakfast. Some good options are eggs or a yogurt that is high in protein and low in sugars. With dinner try limiting your portion sizes of pasta and rice. Try adding more vegetables. Some ideas are cauliflower, asparagus, or mushrooms.     8. Ally will order a lab to check your kidneys and electrolytes since you are starting the lisinopril.     Follow-up: 1 month     It was great to speak with you today.  I value your experience and would be very thankful for your time with providing feedback on our clinic survey. You may receive a survey via email or text message in the next few days.     To schedule another [facility] appointment, please call the clinic directly or you may call the [facility] scheduling line at [phone] or toll-free at [phone].     My Clinical Pharmacist's contact information:                                                      Please feel free to contact me with any questions or concerns you have.      Helene Browning, Mark  [facility] Resident  Pager: [phone]

## 2021-05-17 NOTE — PROGRESS NOTES
Called [facility] and spoke to rep Nobles who informed me that pt was correct he was shorted 14 pills because he did not want to wait for the next shipment and once they are filled the remaining are written off they cannot refill the prescription.

## 2021-05-27 DIAGNOSIS — I10 HYPERTENSION GOAL BP (BLOOD PRESSURE) < 140/90: ICD-10-CM

## 2021-05-27 PROBLEM — E66.01 MORBID OBESITY (H): Status: ACTIVE | Noted: 2021-05-27

## 2021-05-27 LAB
ANION GAP SERPL CALCULATED.3IONS-SCNC: 7 MMOL/L (ref 3–14)
BUN SERPL-MCNC: 14 MG/DL (ref 7–30)
CALCIUM SERPL-MCNC: 9.2 MG/DL (ref 8.5–10.1)
CHLORIDE SERPL-SCNC: 106 MMOL/L (ref 94–109)
CO2 SERPL-SCNC: 29 MMOL/L (ref 20–32)
CREAT SERPL-MCNC: 1.06 MG/DL (ref 0.66–1.25)
GFR SERPL CREATININE-BSD FRML MDRD: 72 ML/MIN/{1.73_M2}
GLUCOSE SERPL-MCNC: 162 MG/DL (ref 70–99)
POTASSIUM SERPL-SCNC: 4.8 MMOL/L (ref 3.4–5.3)
SODIUM SERPL-SCNC: 142 MMOL/L (ref 133–144)

## 2021-05-27 PROCEDURE — 80048 BASIC METABOLIC PNL TOTAL CA: CPT | Performed by: FAMILY MEDICINE

## 2021-05-27 PROCEDURE — 36415 COLL VENOUS BLD VENIPUNCTURE: CPT | Performed by: FAMILY MEDICINE

## 2021-06-14 DIAGNOSIS — M96.1 FAILED BACK SYNDROME OF LUMBAR SPINE: ICD-10-CM

## 2021-06-14 DIAGNOSIS — F11.90 CHRONIC, CONTINUOUS USE OF OPIOIDS: ICD-10-CM

## 2021-06-14 RX ORDER — OXYCODONE HYDROCHLORIDE 5 MG/1
5 TABLET ORAL EVERY 4 HOURS PRN
Qty: 180 TABLET | Refills: 0 | Status: SHIPPED | OUTPATIENT
Start: 2021-06-17 | End: 2021-07-13

## 2021-06-14 NOTE — TELEPHONE ENCOUNTER
Routing refill request to provider for review/approval because:  Drug not on the [facility] refill protocol   Ivis SMALLS RN

## 2021-07-13 DIAGNOSIS — M96.1 FAILED BACK SYNDROME OF LUMBAR SPINE: ICD-10-CM

## 2021-07-13 DIAGNOSIS — F11.90 CHRONIC, CONTINUOUS USE OF OPIOIDS: ICD-10-CM

## 2021-07-13 NOTE — TELEPHONE ENCOUNTER
Reason for Call:  Medication or medication refill:    Do you use a [facility]?  Name of the pharmacy and phone number for the current request:  [facility] [address] [place], [place] [phone]    Name of the medication requested: oxyCODONE (ROXICODONE) 5 MG tablet    Other request: Patient is requesting a refill on above medication, please advise, thank you!    Can we leave a detailed message on this number? YES    Phone number patient can be reached at: Cell number on file:    Telephone Information:   Mobile [phone]       Best Time: any    Call taken on 7/13/2021 at 7:47 AM by Pina Leon

## 2021-07-14 RX ORDER — OXYCODONE HYDROCHLORIDE 5 MG/1
5 TABLET ORAL EVERY 4 HOURS PRN
Qty: 180 TABLET | Refills: 0 | Status: SHIPPED | OUTPATIENT
Start: 2021-07-14 | End: 2021-08-09

## 2021-07-30 DIAGNOSIS — G89.29 CHRONIC BILATERAL LOW BACK PAIN WITHOUT SCIATICA: ICD-10-CM

## 2021-07-30 DIAGNOSIS — M54.50 CHRONIC BILATERAL LOW BACK PAIN WITHOUT SCIATICA: ICD-10-CM

## 2021-07-30 RX ORDER — PREGABALIN 100 MG/1
200 CAPSULE ORAL 2 TIMES DAILY
Qty: 360 CAPSULE | Refills: 3 | Status: SHIPPED | OUTPATIENT
Start: 2021-07-30 | End: 2022-01-25

## 2021-07-30 NOTE — TELEPHONE ENCOUNTER
It is time to refill [facility] Lyrica through the [employer] assistance program. [employer] requirs a hand signed, hard copy, brand name script for this fill.    Please hand sign a BRAND name, hard copy script for:      LYRICA    Please send the HARD COPY script to me     via interoffice mail  [facility] Zenaida Kim     or via [place] mail  at:   [facility]  Zenaida Plasencia  [address] [place]  [address]    Thanks so much for your help!    Zenaida Plasencia  Prescription   Pharmacy Assistance  [phone]

## 2021-07-30 NOTE — TELEPHONE ENCOUNTER
Routing refill request to provider for review/approval because:  Drug not on the [facility] refill protocol   Please authorize if appropriate.  Thanks,  Lavinia Ortega RN

## 2021-08-06 NOTE — PROGRESS NOTES
Laoy is a 66 year old who is being evaluated via a billable telephone visit.      What phone number would you like to be contacted at? [phone]  How would you like to obtain your AVS? MyChart    Assessment & Plan     Chronic pain syndrome  Will try to have him reduce to 5 tablets per day of oxycodone  Advised spacing out the lyrica to avoid taking this t same time as his oxycodone  Will see me in 1 month for diabetes visit and pain CSA needs to be reviewed and signed  Discussed avoiding any other sedating medications with these   reviewed    Prescription drug management  15 minutes spent on the date of the encounter doing chart review, history and exam, documentation and further activities per the note        1 month    No follow-ups on file.    Connie Haskins MD  [facility]   Layo is a 66 year old who presents for the following health issues     HPI     Pain History:  When did you first notice your pain? - More than 6 weeks   Have you seen this provider for your pain in the past?   Yes   Where in your body do you have pain? Back pain   Are you seeing anyone else for your pain? No    PHQ-9 SCORE 7/30/2020 11/5/2020 4/21/2021   PHQ-9 Total Score - - -   PHQ-9 Total Score [facility] - - 3 (Minimal depression)   PHQ-9 Total Score 3 5 3       KAYLA-7 SCORE 5/20/2019 11/5/2020 4/21/2021   Total Score 2 6 3          PEG Score 4/21/2021 8/9/2021   PEG Total Score 9.33 8       Chronic Pain Follow Up:    Location of pain: lower back no radiation of pain along sciatic nerve  Analgesia/pain control:    - Recent changes:  none    - Overall control: Tolerable with discomfort    - Current treatments: medications   Take oxycodone 2 in the morning drops pain to a level 1 the 1 tablet every 4 hours  4pm takes the last dose  Feels really groggy with Lyrica takes 2 tablets twice daily  Adherence:     - Do you ever take more pain medicine than prescribed? No    - When did you take your last  dose of pain medicine?  This morning   Adverse effects: No   PDMP Review       Value Time User    State PDMP site checked  Yes 5/27/2021  9:22 PM Connie Haskins MD        Last CSA Agreement:   Patient-Level CSA:    There are no patient-level csa.     due for CSA  Last UDS: 4/23/2021            Review of Systems   Constitutional, HEENT, cardiovascular, pulmonary, gi and gu systems are negative, except as otherwise noted.      Objective           Vitals:  No vitals were obtained today due to virtual visit.    Physical Exam   healthy, alert and no distress  PSYCH: Alert and oriented times 3; coherent speech, normal   rate and volume, able to articulate logical thoughts, able   to abstract reason, no tangential thoughts, no hallucinations   or delusions  His affect is normal  RESP: No cough, no audible wheezing, able to talk in full sentences  Remainder of exam unable to be completed due to telephone visits    Orders Only on 05/27/2021   Component Date Value Ref Range Status     Sodium 05/27/2021 142  133 - 144 mmol/L Final     Potassium 05/27/2021 4.8  3.4 - 5.3 mmol/L Final     Chloride 05/27/2021 106  94 - 109 mmol/L Final     Carbon Dioxide 05/27/2021 29  20 - 32 mmol/L Final     Anion Gap 05/27/2021 7  3 - 14 mmol/L Final     Glucose 05/27/2021 162* 70 - 99 mg/dL Final    Non Fasting     Urea Nitrogen 05/27/2021 14  7 - 30 mg/dL Final     Creatinine 05/27/2021 1.06  0.66 - 1.25 mg/dL Final     GFR Estimate 05/27/2021 72  >60 mL/min/[1.73_m2] Final    Comment: Non  GFR Calc  Starting 12/18/2018, serum creatinine based estimated GFR (eGFR) will be   calculated using the Chronic Kidney Disease Epidemiology Collaboration   (CKD-EPI) equation.       GFR Estimate If Black 05/27/2021 83  >60 mL/min/[1.73_m2] Final    Comment:  GFR Calc  Starting 12/18/2018, serum creatinine based estimated GFR (eGFR) will be   calculated using the Chronic Kidney Disease Epidemiology Collaboration    (CKD-EPI) equation.       Calcium 05/27/2021 9.2  8.5 - 10.1 mg/dL Final               Phone call duration: 15 minutes

## 2021-08-09 ENCOUNTER — TELEPHONE (OUTPATIENT)
Dept: FAMILY MEDICINE | Facility: CLINIC | Age: 66
End: 2021-08-09

## 2021-08-09 ENCOUNTER — VIRTUAL VISIT (OUTPATIENT)
Dept: FAMILY MEDICINE | Facility: CLINIC | Age: 66
End: 2021-08-09
Payer: COMMERCIAL

## 2021-08-09 DIAGNOSIS — G89.4 CHRONIC PAIN SYNDROME: Primary | ICD-10-CM

## 2021-08-09 DIAGNOSIS — F11.90 CHRONIC, CONTINUOUS USE OF OPIOIDS: ICD-10-CM

## 2021-08-09 DIAGNOSIS — M96.1 FAILED BACK SYNDROME OF LUMBAR SPINE: ICD-10-CM

## 2021-08-09 PROCEDURE — 99442 PR PHYSICIAN TELEPHONE EVALUATION 11-20 MIN: CPT | Performed by: FAMILY MEDICINE

## 2021-08-09 RX ORDER — OXYCODONE HYDROCHLORIDE 5 MG/1
5 TABLET ORAL EVERY 4 HOURS PRN
Qty: 180 TABLET | Refills: 0 | Status: SHIPPED | OUTPATIENT
Start: 2021-08-14 | End: 2021-09-09

## 2021-08-09 NOTE — TELEPHONE ENCOUNTER
FYI~ A refill has been made to the  assistance program for Lyrica.      A 90 day supply of Lyrica will be delivered to [facility] on August 11th,2021.     Thank you,     Ana Luisa Cabrera  Prescription Assistance

## 2021-08-24 ENCOUNTER — TELEPHONE (OUTPATIENT)
Dept: FAMILY MEDICINE | Facility: CLINIC | Age: 66
End: 2021-08-24

## 2021-08-24 NOTE — TELEPHONE ENCOUNTER
Layo has been approved to the [employer] assistance program for Xarelto  Through December 31, 2021.  He will receive this medication at no cost through the enrollment period.     [employer] will send Layo a Pharmacy Card within 7-10 business days to his home. He will fill the 90 day Supply at his local Pharmacy. He has been informed of this approval.    He will contact my office for refills as we must work directly with the .  I will note EPIC as each refill is requested.    Thank you,    Ana Luisa Cabrera  Prescription Assistance

## 2021-08-25 ENCOUNTER — TRANSFERRED RECORDS (OUTPATIENT)
Dept: HEALTH INFORMATION MANAGEMENT | Facility: CLINIC | Age: 66
End: 2021-08-25

## 2021-08-25 LAB — HEMOCCULT STL QL IA: POSITIVE

## 2021-09-08 NOTE — PROGRESS NOTES
Assessment & Plan     Type 2 diabetes mellitus without complication, without long-term current use of insulin (H)  Discussed referral to an eye doctor gave the phone number for this.  Routine labs as noted below and refills.  He does admit that his diabetic control is been poor and he is working on a better diet.  We will contact him once his A1c is back  - Adult Eye Referral; Future  - Albumin Random Urine Quantitative with Creat Ratio; Future  - Hemoglobin A1c; Future  - Comprehensive metabolic panel (BMP + Alb, Alk Phos, ALT, AST, Total. Bili, TP); Future  - lisinopril (ZESTRIL) 10 MG tablet; Take 1 tablet (10 mg) by mouth daily  - Albumin Random Urine Quantitative with Creat Ratio  - Hemoglobin A1c  - Comprehensive metabolic panel (BMP + Alb, Alk Phos, ALT, AST, Total. Bili, TP)    Failed back syndrome of lumbar spine  Overall his back pain has improved and he is been actually able to reduce the amount of narcotic that he is using.  We talked about this at his last visit and he was successfully able to go down from 6 tablets a day to 5 tablets a day and would like to stay at this.  Will reorder medications at this dose and follow.  The new set of exquisite pain that is noted just in the superficial area her hardware could be due to his recent surgery and hardware.  On exam it is very superficial and quite tender.  We will send him for an MRI and for now we will have him try some Lidoderm patches  - MR Lumbar Spine w/o & w Contrast; Future  - lidocaine (LIDODERM) 5 % patch; Apply to area of pain.  Keep on for 12 hours then remove for 12 hours.To prevent lidocaine toxicity, patient should be patch free for 12 hrs daily.  - oxyCODONE (ROXICODONE) 5 MG tablet; Take 1 tablet (5 mg) by mouth every 4 hours as needed for severe pain Max #5 per day    Chronic, continuous use of opioids  We forgot to do a controlled substance agreement form today we will do this at his next visit he did recently have a urine test  which was negative  - oxyCODONE (ROXICODONE) 5 MG tablet; Take 1 tablet (5 mg) by mouth every 4 hours as needed for severe pain Max #5 per day    Hypertension goal BP (blood pressure) < 140/90  At goal and labs are noted below meds are refilled  - Comprehensive metabolic panel (BMP + Alb, Alk Phos, ALT, AST, Total. Bili, TP); Future  - lisinopril (ZESTRIL) 10 MG tablet; Take 1 tablet (10 mg) by mouth daily  - Comprehensive metabolic panel (BMP + Alb, Alk Phos, ALT, AST, Total. Bili, TP)    Special screening for malignant neoplasms, colon  Discussed importance of colon screening to further evaluate the positive fecal occult blood test  - Adult Gastro Ref - Procedure Only; Future    Prescription drug management  35 minutes spent on the date of the encounter doing chart review, history and exam, documentation and further activities per the note        BMI:   Estimated body mass index is 33.63 kg/m  as calculated from the following:    Height as of 4/21/21: 1.829 m (6').    Weight as of this encounter: 112.5 kg (248 lb).       See Patient Instructions    No follow-ups on file.    Connie Haskins MD  [facility] is a 66 year old who presents for the following health issues     HPI     Diabetes Follow-up    How often are you checking your blood sugar? A few times a month  What time of day are you checking your blood sugars (select all that apply)?  Before and after meals  Have you had any blood sugars above 200?  Yes sometimes over 200  Have you had any blood sugars below 70?  No    What symptoms do you notice when your blood sugar is low?  Blurred vision    What concerns do you have today about your diabetes? Other: cologuard result      Do you have any of these symptoms? (Select all that apply)  Numbness in feet, Burning in feet and Redness, sores, or blisters on feet    Have you had a diabetic eye exam in the last 12 months? No    Patient reports that he is compliant with  all medications.    BP Readings from Last 2 Encounters:   09/09/21 120/60   05/06/21 138/70     Hemoglobin A1C (%)   Date Value   09/09/2021 7.4 (H)   03/15/2021 7.2 (H)   11/05/2020 6.0 (H)     LDL Cholesterol Calculated (mg/dL)   Date Value   03/15/2021 42   07/17/2020 55            How many servings of fruits and vegetables do you eat daily?  0    On average, how many sweetened beverages do you drink each day (Examples: soda, juice, sweet tea, etc.  Do NOT count diet or artificially sweetened beverages)?   Pt states he drinks pop     How many days per week do you exercise enough to make your heart beat faster? 3 or less    How many minutes a day do you exercise enough to make your heart beat faster? 9 or less    How many days per week do you miss taking your medication? 0    Pain History:  When did you first notice your pain? - More than 6 weeks   Have you seen this provider for your pain in the past?   Yes   Where in your body do you have pain? Back   Are you seeing anyone else for your pain? No    PHQ-9 SCORE 7/30/2020 11/5/2020 4/21/2021   PHQ-9 Total Score - - -   PHQ-9 Total Score MyChart - - 3 (Minimal depression)   PHQ-9 Total Score 3 5 3       KAYLA-7 SCORE 5/20/2019 11/5/2020 4/21/2021   Total Score 2 6 3         PEG Score 4/21/2021 8/9/2021 9/9/2021   PEG Total Score 9.33 8 8.33       Chronic Pain Follow Up:  At his last visit we talked about possibly trial down the narcotic because of some confusion that he was experiencing and he reports that he was able to successfully do this.  See notes below.  Location of pain: Worsening pain at his left lower lumbar spine.  It is focal and very sensitive to light touch  Analgesia/pain control:    - Recent changes: Worsening left lower back pain just adjacent to the hardware   - Overall control: Tolerable pain although the new exquisite tenderness just to the left of the hardware is at times unbearable and sharp   - Current treatments: He is taking oxycodone 5 mg 5  times a day he was able to drop the dose from 6 times a day to 5 times a day and feels that this is stable.  He was feeling quite overly sedated and confused.  He is also taking Lyrica at night which has helped with pain and some with sleep oxycodone is used 1 tablet in the morning and then every 4 hours as the day goes on for a total of 5 doses  Adherence:     - Do you ever take more pain medicine than prescribed? No    - When did you take your last dose of pain medicine?  This morning  Adverse effects: No   PDMP Review       Value Time User    State PDMP site checked  Yes 9/9/2021 12:08 PM Connie Haskins MD        Last CSA Agreement:   Patient-Level CSA:    There are no patient-level csa.       Last UDS: 4/23/2021      (I10) Hypertension goal BP (blood pressure) < 140/90  Comment: Blood pressures under good control he is due for some routine labs as noted below.  Plan: Comprehensive metabolic panel (BMP + Alb, Alk         Phos, ALT, AST, Total. Bili, TP), lisinopril         (ZESTRIL) 10 MG tablet            (Z12.11) Special screening for malignant neoplasms, colon  Comment: Fecal occult blood test was positive discussed need for colonoscopy  Plan: Adult Gastro Ref - Procedure Only                   Review of Systems   Constitutional, HEENT, cardiovascular, pulmonary, gi and gu systems are negative, except as otherwise noted.      Objective    /60   Pulse 54   Temp 97.5  F (36.4  C) (Temporal)   Wt 112.5 kg (248 lb)   SpO2 96%   BMI 33.63 kg/m    Body mass index is 33.63 kg/m .  Physical Exam   GENERAL: healthy, alert and no distress  RESP: lungs clear to auscultation - no rales, rhonchi or wheezes  CV: regular rate and rhythm, normal S1 S2, no S3 or S4, no murmur, click or rub, no peripheral edema and peripheral pulses strong  NEURO: weakness of lower extremities due to pain, sensory exam grossly normal and mentation intact  Comprehensive back pain exam:  Tenderness of Lower back just adjacent  to the hardware on the left side tender to light touch  PSYCH: mentation appears normal, affect normal/bright    Orders Only on 05/27/2021   Component Date Value Ref Range Status     Sodium 05/27/2021 142  133 - 144 mmol/L Final     Potassium 05/27/2021 4.8  3.4 - 5.3 mmol/L Final     Chloride 05/27/2021 106  94 - 109 mmol/L Final     Carbon Dioxide 05/27/2021 29  20 - 32 mmol/L Final     Anion Gap 05/27/2021 7  3 - 14 mmol/L Final     Glucose 05/27/2021 162* 70 - 99 mg/dL Final    Non Fasting     Urea Nitrogen 05/27/2021 14  7 - 30 mg/dL Final     Creatinine 05/27/2021 1.06  0.66 - 1.25 mg/dL Final     GFR Estimate 05/27/2021 72  >60 mL/min/[1.73_m2] Final    Comment: Non  GFR Calc  Starting 12/18/2018, serum creatinine based estimated GFR (eGFR) will be   calculated using the Chronic Kidney Disease Epidemiology Collaboration   (CKD-EPI) equation.       GFR Estimate If Black 05/27/2021 83  >60 mL/min/[1.73_m2] Final    Comment:  GFR Calc  Starting 12/18/2018, serum creatinine based estimated GFR (eGFR) will be   calculated using the Chronic Kidney Disease Epidemiology Collaboration   (CKD-EPI) equation.       Calcium 05/27/2021 9.2  8.5 - 10.1 mg/dL Final

## 2021-09-09 ENCOUNTER — OFFICE VISIT (OUTPATIENT)
Dept: FAMILY MEDICINE | Facility: CLINIC | Age: 66
End: 2021-09-09
Payer: COMMERCIAL

## 2021-09-09 VITALS
TEMPERATURE: 97.5 F | OXYGEN SATURATION: 96 % | HEART RATE: 54 BPM | SYSTOLIC BLOOD PRESSURE: 120 MMHG | WEIGHT: 248 LBS | DIASTOLIC BLOOD PRESSURE: 60 MMHG | BODY MASS INDEX: 33.63 KG/M2

## 2021-09-09 DIAGNOSIS — I10 HYPERTENSION GOAL BP (BLOOD PRESSURE) < 140/90: ICD-10-CM

## 2021-09-09 DIAGNOSIS — E11.9 TYPE 2 DIABETES MELLITUS WITHOUT COMPLICATION, WITHOUT LONG-TERM CURRENT USE OF INSULIN (H): Primary | ICD-10-CM

## 2021-09-09 DIAGNOSIS — Z12.11 SPECIAL SCREENING FOR MALIGNANT NEOPLASMS, COLON: ICD-10-CM

## 2021-09-09 DIAGNOSIS — M96.1 FAILED BACK SYNDROME OF LUMBAR SPINE: ICD-10-CM

## 2021-09-09 DIAGNOSIS — F11.90 CHRONIC, CONTINUOUS USE OF OPIOIDS: ICD-10-CM

## 2021-09-09 LAB — HBA1C MFR BLD: 7.4 % (ref 0–5.6)

## 2021-09-09 PROCEDURE — 83036 HEMOGLOBIN GLYCOSYLATED A1C: CPT | Performed by: FAMILY MEDICINE

## 2021-09-09 PROCEDURE — G0008 ADMIN INFLUENZA VIRUS VAC: HCPCS | Performed by: FAMILY MEDICINE

## 2021-09-09 PROCEDURE — 36415 COLL VENOUS BLD VENIPUNCTURE: CPT | Performed by: FAMILY MEDICINE

## 2021-09-09 PROCEDURE — 80053 COMPREHEN METABOLIC PANEL: CPT | Performed by: FAMILY MEDICINE

## 2021-09-09 PROCEDURE — 99214 OFFICE O/P EST MOD 30 MIN: CPT | Mod: 25 | Performed by: FAMILY MEDICINE

## 2021-09-09 PROCEDURE — 82043 UR ALBUMIN QUANTITATIVE: CPT | Performed by: FAMILY MEDICINE

## 2021-09-09 PROCEDURE — 90662 IIV NO PRSV INCREASED AG IM: CPT | Performed by: FAMILY MEDICINE

## 2021-09-09 RX ORDER — LISINOPRIL 10 MG/1
10 TABLET ORAL DAILY
Qty: 90 TABLET | Refills: 3 | Status: SHIPPED | OUTPATIENT
Start: 2021-09-09 | End: 2022-01-14

## 2021-09-09 RX ORDER — OXYCODONE HYDROCHLORIDE 5 MG/1
5 TABLET ORAL EVERY 4 HOURS PRN
Qty: 150 TABLET | Refills: 0 | Status: SHIPPED | OUTPATIENT
Start: 2021-09-09 | End: 2021-10-11

## 2021-09-09 RX ORDER — LIDOCAINE 50 MG/G
PATCH TOPICAL
Qty: 30 PATCH | Refills: 1 | Status: SHIPPED | OUTPATIENT
Start: 2021-09-09 | End: 2022-03-01

## 2021-09-10 LAB
ALBUMIN SERPL-MCNC: 3.4 G/DL (ref 3.4–5)
ALP SERPL-CCNC: 58 U/L (ref 40–150)
ALT SERPL W P-5'-P-CCNC: 31 U/L (ref 0–70)
ANION GAP SERPL CALCULATED.3IONS-SCNC: 4 MMOL/L (ref 3–14)
AST SERPL W P-5'-P-CCNC: 24 U/L (ref 0–45)
BILIRUB SERPL-MCNC: 0.4 MG/DL (ref 0.2–1.3)
BUN SERPL-MCNC: 17 MG/DL (ref 7–30)
CALCIUM SERPL-MCNC: 8.5 MG/DL (ref 8.5–10.1)
CHLORIDE BLD-SCNC: 107 MMOL/L (ref 94–109)
CO2 SERPL-SCNC: 27 MMOL/L (ref 20–32)
CREAT SERPL-MCNC: 1.19 MG/DL (ref 0.66–1.25)
GFR SERPL CREATININE-BSD FRML MDRD: 62 ML/MIN/1.73M2
GLUCOSE BLD-MCNC: 126 MG/DL (ref 70–99)
POTASSIUM BLD-SCNC: 4.4 MMOL/L (ref 3.4–5.3)
PROT SERPL-MCNC: 7.2 G/DL (ref 6.8–8.8)
SODIUM SERPL-SCNC: 138 MMOL/L (ref 133–144)

## 2021-09-11 LAB
CREAT UR-MCNC: 75 MG/DL
MICROALBUMIN UR-MCNC: <5 MG/L
MICROALBUMIN/CREAT UR: NORMAL MG/G{CREAT}

## 2021-09-14 NOTE — RESULT ENCOUNTER NOTE
Hello,    Great news, your results were normal.  The labs show that your A1c is excellent and remains under 8 however it is creeping up so lets definitely be careful with diet and continue the medications.  The urine test did not show any excessive loss of protein this is very important.  Your kidney function remains excellent and your liver tests were great.    Connie Haskins MD

## 2021-09-15 ENCOUNTER — TELEPHONE (OUTPATIENT)
Dept: GASTROENTEROLOGY | Facility: CLINIC | Age: 66
End: 2021-09-15

## 2021-09-15 DIAGNOSIS — Z11.59 ENCOUNTER FOR SCREENING FOR OTHER VIRAL DISEASES: ICD-10-CM

## 2021-09-15 NOTE — TELEPHONE ENCOUNTER
Screening Questions  1. Are you active on mychart?    2. What insurance is in the chart? [employer] medicare advantage    2.  Ordering/Referring Provider: Connie Haskins MD    3. BMI 33.6    4. Are you on daily home oxygen? No     5. Do you have a history of difficult airway?  no    6. Have you had a heart, lung, or liver transplant?  No     7. Are you currently on dialysis? No     8. Have you had a stroke or Transient ischemic atttack (TIA) within 6 months?  no    9. In the past 6 months, have you had any heart related issues including cardiomyopathy or heart attack?         If yes, did it require cardiac stenting or other implantable device? no    10. Do you have any implantable devices in your body (pacemaker, defib, LVAD)?  No no     11. Do you take nitroglycerin? If yes, how often? No     12. Are you currently taking any blood thinners?  YES (Xarelto)    13. Are you a diabetic?  YES    14. (Females) Are you currently pregnant?   If yes, how many weeks?    15. Have you had a procedure in the past that was difficult to tolerate with conscious sedation? Any allergies to Fentanyl or Versed  No     16. Are you taking any scheduled prescription narcotics more than once daily?  YES (oxycodone 5mg x5 a day)    17. Do you have any chemical dependencies such as alcohol, street drugs, or methadone? No     18. Do you have any history of post-traumatic stress syndrome or mental health issues?  No     19. Do you transfer independently? yes    20.  Do you have any issues with constipation?  YES    21. Preferred Pharmacy for Pre Prescription [id number] - [place], [place] - [address] [place] [address]    Pt prefers [facility].

## 2021-09-21 ENCOUNTER — TELEPHONE (OUTPATIENT)
Dept: FAMILY MEDICINE | Facility: CLINIC | Age: 66
End: 2021-09-21

## 2021-09-21 NOTE — TELEPHONE ENCOUNTER
Based on his procedure I would recommend holding the xarelto for 2 days/doses before the procedure.  If a biopsy is done then would hold the med for another 2 doses after the procedure then resume.  If no biopsy is done then can resume the med the night of the procedure.    Connie Haskins MD

## 2021-09-21 NOTE — TELEPHONE ENCOUNTER
SN  Patient has colonoscopy scheduled for 9/29/21, was told to call PCP for instruction on when to hold Xarelto.    Thank you,  Sara Mclaughlin RN

## 2021-09-27 DIAGNOSIS — Z11.59 ENCOUNTER FOR SCREENING FOR OTHER VIRAL DISEASES: ICD-10-CM

## 2021-10-11 ENCOUNTER — LAB (OUTPATIENT)
Dept: LAB | Facility: CLINIC | Age: 66
End: 2021-10-11
Attending: INTERNAL MEDICINE
Payer: COMMERCIAL

## 2021-10-11 DIAGNOSIS — F11.90 CHRONIC, CONTINUOUS USE OF OPIOIDS: ICD-10-CM

## 2021-10-11 DIAGNOSIS — Z11.59 ENCOUNTER FOR SCREENING FOR OTHER VIRAL DISEASES: ICD-10-CM

## 2021-10-11 DIAGNOSIS — M96.1 FAILED BACK SYNDROME OF LUMBAR SPINE: ICD-10-CM

## 2021-10-11 PROCEDURE — U0005 INFEC AGEN DETEC AMPLI PROBE: HCPCS

## 2021-10-11 PROCEDURE — U0003 INFECTIOUS AGENT DETECTION BY NUCLEIC ACID (DNA OR RNA); SEVERE ACUTE RESPIRATORY SYNDROME CORONAVIRUS 2 (SARS-COV-2) (CORONAVIRUS DISEASE [COVID-19]), AMPLIFIED PROBE TECHNIQUE, MAKING USE OF HIGH THROUGHPUT TECHNOLOGIES AS DESCRIBED BY CMS-2020-01-R: HCPCS

## 2021-10-11 RX ORDER — OXYCODONE HYDROCHLORIDE 5 MG/1
5 TABLET ORAL EVERY 4 HOURS PRN
Qty: 150 TABLET | Refills: 0 | Status: SHIPPED | OUTPATIENT
Start: 2021-10-11 | End: 2021-11-08

## 2021-10-11 NOTE — TELEPHONE ENCOUNTER
Reason for Call:  Medication or medication refill:oxyCODONE (ROXICODONE) 5 MG tablet    Do you use a [facility]?  Name of the pharmacy and phone number for the current request:     [facility] [address] [place], [place] [id number] [address] [place] [address]        Name of the medication requested:    Other request:     Can we leave a detailed message on this number? YES    Phone number patient can be reached at: Home number on file [phone] (home)    Best Time: any    Call taken on 10/11/2021 at 8:51 AM by Peggy Shabazz

## 2021-10-12 LAB — SARS-COV-2 RNA RESP QL NAA+PROBE: NEGATIVE

## 2021-10-14 ENCOUNTER — HOSPITAL ENCOUNTER (OUTPATIENT)
Facility: CLINIC | Age: 66
Discharge: HOME | End: 2021-10-14
Attending: SURGERY | Admitting: SURGERY
Payer: COMMERCIAL

## 2021-10-14 ENCOUNTER — APPOINTMENT (OUTPATIENT)
Dept: SURGERY | Facility: PHYSICIAN GROUP | Age: 66
End: 2021-10-14
Payer: COMMERCIAL

## 2021-10-14 VITALS
DIASTOLIC BLOOD PRESSURE: 64 MMHG | SYSTOLIC BLOOD PRESSURE: 107 MMHG | WEIGHT: 248 LBS | OXYGEN SATURATION: 96 % | HEART RATE: 57 BPM | RESPIRATION RATE: 18 BRPM | BODY MASS INDEX: 33.59 KG/M2 | TEMPERATURE: 97.1 F | HEIGHT: 72 IN

## 2021-10-14 DIAGNOSIS — I48.92 ATRIAL FLUTTER WITH RAPID VENTRICULAR RESPONSE (H): ICD-10-CM

## 2021-10-14 LAB
COLONOSCOPY: NORMAL
GLUCOSE BLDC GLUCOMTR-MCNC: 143 MG/DL (ref 70–99)

## 2021-10-14 PROCEDURE — 82962 GLUCOSE BLOOD TEST: CPT

## 2021-10-14 PROCEDURE — 99152 MOD SED SAME PHYS/QHP 5/>YRS: CPT | Mod: PT | Performed by: SURGERY

## 2021-10-14 PROCEDURE — G0500 MOD SEDAT ENDO SERVICE >5YRS: HCPCS | Performed by: SURGERY

## 2021-10-14 PROCEDURE — 99153 MOD SED SAME PHYS/QHP EA: CPT | Performed by: SURGERY

## 2021-10-14 PROCEDURE — 88305 TISSUE EXAM BY PATHOLOGIST: CPT | Mod: TC | Performed by: SURGERY

## 2021-10-14 PROCEDURE — 250N000011 HC RX IP 250 OP 636: Performed by: SURGERY

## 2021-10-14 PROCEDURE — 45385 COLONOSCOPY W/LESION REMOVAL: CPT | Mod: PT | Performed by: SURGERY

## 2021-10-14 PROCEDURE — 45385 COLONOSCOPY W/LESION REMOVAL: CPT | Performed by: SURGERY

## 2021-10-14 RX ORDER — NALOXONE HYDROCHLORIDE 0.4 MG/ML
0.2 INJECTION, SOLUTION INTRAMUSCULAR; INTRAVENOUS; SUBCUTANEOUS
Status: DISCONTINUED | OUTPATIENT
Start: 2021-10-14 | End: 2021-10-14 | Stop reason: HOSPADM

## 2021-10-14 RX ORDER — FLUMAZENIL 0.1 MG/ML
0.2 INJECTION, SOLUTION INTRAVENOUS
Status: DISCONTINUED | OUTPATIENT
Start: 2021-10-14 | End: 2021-10-14 | Stop reason: HOSPADM

## 2021-10-14 RX ORDER — PROCHLORPERAZINE MALEATE 5 MG
5 TABLET ORAL EVERY 6 HOURS PRN
Status: DISCONTINUED | OUTPATIENT
Start: 2021-10-14 | End: 2021-10-14 | Stop reason: HOSPADM

## 2021-10-14 RX ORDER — LIDOCAINE 40 MG/G
CREAM TOPICAL
Status: DISCONTINUED | OUTPATIENT
Start: 2021-10-14 | End: 2021-10-14 | Stop reason: HOSPADM

## 2021-10-14 RX ORDER — ONDANSETRON 2 MG/ML
4 INJECTION INTRAMUSCULAR; INTRAVENOUS EVERY 6 HOURS PRN
Status: DISCONTINUED | OUTPATIENT
Start: 2021-10-14 | End: 2021-10-14 | Stop reason: HOSPADM

## 2021-10-14 RX ORDER — FENTANYL CITRATE 50 UG/ML
25 INJECTION, SOLUTION INTRAMUSCULAR; INTRAVENOUS
Status: DISCONTINUED | OUTPATIENT
Start: 2021-10-14 | End: 2021-10-14 | Stop reason: HOSPADM

## 2021-10-14 RX ORDER — ONDANSETRON 4 MG/1
4 TABLET, ORALLY DISINTEGRATING ORAL EVERY 6 HOURS PRN
Status: DISCONTINUED | OUTPATIENT
Start: 2021-10-14 | End: 2021-10-14 | Stop reason: HOSPADM

## 2021-10-14 RX ORDER — EPINEPHRINE 1 MG/ML
0.1 INJECTION, SOLUTION INTRAMUSCULAR; SUBCUTANEOUS
Status: DISCONTINUED | OUTPATIENT
Start: 2021-10-14 | End: 2021-10-14 | Stop reason: HOSPADM

## 2021-10-14 RX ORDER — NALOXONE HYDROCHLORIDE 0.4 MG/ML
0.4 INJECTION, SOLUTION INTRAMUSCULAR; INTRAVENOUS; SUBCUTANEOUS
Status: DISCONTINUED | OUTPATIENT
Start: 2021-10-14 | End: 2021-10-14 | Stop reason: HOSPADM

## 2021-10-14 RX ORDER — ATROPINE SULFATE 0.4 MG/ML
0.4 AMPUL (ML) INJECTION
Status: DISCONTINUED | OUTPATIENT
Start: 2021-10-14 | End: 2021-10-14 | Stop reason: HOSPADM

## 2021-10-14 RX ORDER — FENTANYL CITRATE 50 UG/ML
25-50 INJECTION, SOLUTION INTRAMUSCULAR; INTRAVENOUS
Status: COMPLETED | OUTPATIENT
Start: 2021-10-14 | End: 2021-10-14

## 2021-10-14 RX ORDER — SIMETHICONE 40MG/0.6ML
133 SUSPENSION, DROPS(FINAL DOSAGE FORM)(ML) ORAL
Status: DISCONTINUED | OUTPATIENT
Start: 2021-10-14 | End: 2021-10-14 | Stop reason: HOSPADM

## 2021-10-14 RX ORDER — ONDANSETRON 2 MG/ML
4 INJECTION INTRAMUSCULAR; INTRAVENOUS
Status: DISCONTINUED | OUTPATIENT
Start: 2021-10-14 | End: 2021-10-14 | Stop reason: HOSPADM

## 2021-10-14 RX ADMIN — FENTANYL CITRATE 100 MCG: 50 INJECTION, SOLUTION INTRAMUSCULAR; INTRAVENOUS at 09:19

## 2021-10-14 RX ADMIN — MIDAZOLAM 2 MG: 1 INJECTION INTRAMUSCULAR; INTRAVENOUS at 09:19

## 2021-10-14 ASSESSMENT — MIFFLIN-ST. JEOR: SCORE: 1932.92

## 2021-10-14 NOTE — LETTER
September 15, 2021      Toby Mcgrath  [address] [address]    [facility] [address]        Dear Toyb,         Thank you for choosing [facility]. You are scheduled for the following service(s).   Please be aware that coverage of these services is subject to the terms and limitations of your health insurance plan.  Call member services at your health plan with any benefit or coverage questions.    Date:   9/29/2021        Procedure:      Colonoscopy     Doctor:               Arrival Time:   10:50 am *Enter and check in at the Main Hospital Entrance    Procedure Time:   11:20 am      Location:   [facility]       Endoscopy Department, First Floor        [address] [place], [place] [address]       [phone] or [phone] ([facility]) to reschedule   MIRALAX/GATORADE DOUBLE PREP  Purchase the following supplies at your local pharmacy:    2 (two)- Bisacodyl tablets   (Dulcolax  laxative NOT Dulcolax  stool softener) each tablet contains 5 mg of bisacodyl  2 (two) - 8.3 ounce bottle of Polyethylene Glycol (PEG) 3350 Powder   (MiraLAX, SmoothLAX, ClearLAX or generic equivalent)  128 oz. Gatorade  (No red colored flavors)  Regular Gatorade , Gatorade G2 , Powerade , Powerade Zero  or Pedialyte  are acceptable. Red flavors are not allowed; all other colors (yellow, green, orange, purple, blue) are okay. It is also okay to buy two 2.12 oz packets of powdered Gatorade that can be mixed with water to a total volume of 64 oz of liquid.  1 - 10 oz. bottle Magnesium Citrate (No red colored flavors)  It is also okay for you to use a 0.5 ounce package of powdered magnesium citrate (17 grams) mixed with 10 ounces of water.    PREPARATION FOR COLONOSCOPY    Transportation:  You must arrange for a ride for the day of your procedure with a responsible adult. A taxi , Uber, etc, is not an option unless you are accompanied by a responsible adult. If you fail to  arrange transportation with a responsible adult, your procedure will be cancelled and rescheduled.    7 days before:    Discontinue fiber supplements and medications containing iron. This includes multivitamins with iron, Metamucil and Fibercon.   3 days before:     Begin a Low-Fiber Diet. A low fiber diet helps make the cleanout more effective.     Examples of a low fiber diet include (but are not limited to): White bread, white rice, pasta, crackers, fish, chicken, eggs, ground beef, creamy peanut butter, cooked/steamed/boiled vegetables, canned fruit, bananas, melons, milk, plain yogurt cheese, salad dressing and other condiments.     The following are not allowed on a low fiber diet: Seeds, nuts, popcorn, bran, whole wheat, corn, quinoa, raw fruits and vegetables, berries and dried fruit, beans and lentils.    For additional details on following a low fiber diet, please see attached information sheet  2 days before:    Stop eating solid foods in the morning.    Begin Clear Liquid Diet. (Clear liquids include things you can see through).     Examples of a clear liquid diet include: Water, tea , coffee ,no milk or non-dairy creamer), clear broth or bouillon, Gatorade, Pedialyte or Powerade, carbonated and non-carbonated soft drinks(Sprite , 7-Up , Gingerale), strained fruit juices without pulp (apple, white grape, white cranberry), Jello-O  and popsicles     The following are not allowed on a clear liquid diet: Red liquids, alcoholic beverages, coffee, dairy products, protein shakes, cream broths, juice with pulp and chewing tobacco.    Between 4-6pm: Drink Miralax - Gatorade preparation, mix 1 (one) bottle of Miralax with 64 oz. of Gatorade in a large pitcher.  Drink 1 (one) - 8 oz. glass every 15 minutes thereafter until the mixture is gone.    1 day before:    Continue clear liquid diet    At noon: Take 2 Bisacodyl (Dulcolax) tablets     Between 4-6pm: Drink Miralax - Gatorade preparation Between 4-6pm: Drink  Miralax - Gatorade preparation, mix 1 (one) bottle of Miralax with 64 oz. of Gatorade in a large pitcher.    Drink 1 (one) - 8 oz. glass every 15 minutes thereafter until the mixture is gone.    Colon Cleansing Tips: Drink adequate amounts of fluid before and after your colon cleansing to prevent dehydration. Stay near a toilet because you will have diarrhea. Even if you are sitting on the toilet, continue to drink the cleansing solution every 15 minutes. If you feel nauseous or vomit, rinse your mouth with water, take a 15 to 30-minute break and then continue drinking the solution. You will be uncomfortable until the stool has flushed from your colon (in about 2-4 hours). You may feel chilled.    Day of your procedure:  You may take all of your morning medications including blood pressure medications, blood thinners (if you have not been instructed to stop these by our office), methadone, anti-seizure medications with sips of water 3 hours prior to your procedure or earlier. Do not take insulin or vitamins prior to your procedure.  Continue the Clear Liquid Diet. Avoid red liquids, alcoholic beverages, coffee, dairy products, protein shakes, and chewing tobacco.      4 hours prior: Drink 10 oz magnesium citrate     3 hours prior:     STOP consuming all liquids     Do not take anything by mouth during this time.     Allow extra time to travel to your procedure as you may need to stop and use a restroom along the way.    You are ready for the procedure, if you followed all instructions and your stool is no longer formed, but clear or yellow liquid. If you are unsure whether your colon is clean, please call our office at [phone] before you leave for your appointment.      Canceling or Rescheduling your appointment, please call [phone] as soon as possible.    Bring the following to your procedure:    Insurance Card / Photo ID     List of Current Medications including over-the-counter medications and  supplements     Bring your rescue inhaler if you currently use one to control asthma     DIRECTIONS    From the north ([place], [place], [place])  Take [address] south, exit to [address] [address]. Get into the left hand bindu, turn left (east), go one-half mile to [address]. Turn left (north) on [address]. Go north to second stoplight, take a right on [address], right into the Main Entrance.  From the south ([facility])  Take 35 north to the east split, [address], and exit to [place] [address]. Turn left (west) on [address] [address] to [address]. Turn right (north) on [address]. Go north to second stoplight, take a right on [address], right into the Main Entrance.  From the east via 35E ([facility])  Take [address] south to [address] [place] [address] exit. Turn right on [address] [address]. Go west to [address]. Turn right (north) on [address], go to the second stoplight, take a right on [address], turn right at the Main Entrance.   From the east via [address] ([facility])  Take [address] west to [address]. Turn left (south) on [address]. Drive to [address] and turn left. Turn right at the Main Entrance.   From the west via [address] 13 ([place], [place])  Take [address] east to [address]. Turn right (south) on [address], turn left on [address], then right at the Main Entrance.    PRE-PROCEDURE CHECKLIST    If you have diabetes, ask your regular doctor for diet and medication restrictions.  If you take a medication to thin your blood (such as Coumadin or Lovenox) and have not already discussed this please call your primary doctor to advice on holding this medication  If you take aspirin or Plavix,  you may continue to do so.  If you are or may be pregnant, please discuss the risks and benefits of this procedure with your doctor.        You must arrange for a ride for the day of your exam. If you fail to arrange transportation  with a responsible adult, your procedure will need to be cancelled and rescheduled. Taxi ,Bus and Medical transport are not acceptable unless you have a responsible adult that you know & trust with you.  Please arrange for this  to be able to pick you up in our department, approximately one hour after your scheduled procedure, if they are not able to stay with you.  *If you must cancel or reschedule your appointment, please call Endoscopy  at [phone].*      What happens during a colonoscopy?    Plan to spend up to two hours, starting at registration time, at the endoscopy center the day of your procedure. The exam itself takes about 15 minutes to complete, and recovery 30 minutes.     Before the exam:    You will change into a gown.    Your medical history will be reviewed with you and you will be given a consent form to sign.     A nurse will insert an intravenous (IV) line into your hand or arm.    During the exam:     Medicine will be given through the IV line to help you relax and feel drowsy.     Your heart rate and oxygen levels will be monitored. If your blood pressure is low, you may be given fluids through the IV line.     The doctor will insert a flexible hollow tube, called a colonoscope, into your rectum.   o The scope will be advanced slowly through the large intestine (colon).    You may have a feeling of pressure or fullness.     If an abnormal tissue, or a polyp are found, the doctor may remove it through the endoscope for closer examination, or biopsy. Tissue removal is painless.    What happens after the exam?        Your doctor will talk with you about the initial results of your exam.    The doctor will prepare a full report for the physician who referred you for the colonoscopy.    You may feel bloated after the procedure. This is normal.     Medication given during the exam will prohibit you from driving for the rest of the day.     Following the exam, you may resume your  normal diet. Your first meal should be light, no greasy foods. Avoid alcohol until the next day.     You may resume your regular activities the day after the procedure.     A nurse will provide you with complete discharge instructions before you leave the endoscopy center. Be sure to ask the nurse for specific instructions if you take blood thinners such as aspirin, Coumadin or Plavix.     Any tissue samples removed during the exam will be sent to a lab for evaluation. It may take 5-7 working days for you to be notified of the results.       Low-Fiber Diet    A low-fiber diet limits the amount of food waste that has to move through the large intestine.    Foods Recommended Foods to Avoid   Breads, Cereal, Rice and Pasta:   White bread, rolls, biscuits, and croissant, janie toast   Waffles, Danish toast, and pancakes   White rice, noodles, pasta, macaroni and peeled cooked potatoes   Plain crackers, Saltines   Cooked cereals: farina, Cream of Rice   Cold cereals: Puffed Rice, Rice Krispies, Corn Flakes, and Special K Breads, Cereal, Rice and Pasta:   Breads or rolls with nuts, seeds or fruit   Whole wheat, pumpernickel, rye breads and cornbread   Potatoes with skin, brown or wild rice, and kasha (buckwheat)     Vegetables:    Tender cooked and canned vegetables without seeds: carrots, asparagus tips, green or wax beans, pumpkin, spinach, lima beans   Vegetables:   Raw or steamed vegetables   Vegetables with seeds   Sauerkraut   Winter squash, peas, broccoli, [place] sprouts, cabbage, onions, cauliflower, baked beans, peas and corn     Fruits:   Strained fruit juice   canned fruit, except pineapple   ripe bananas   melons Fruits:   prunes and prune juice   raw or dried fruit   all berries, figs, dates and raisins     Milk/Dairy:   milk, plain or flavored   yogurt, custard, and ice cream   cheese and cottage cheese Milk/Dairy:   Yogurt with nuts or seeds   Meat and other proteins:   ground, wellcooked tender beef,  lamb, ham, veal, pork, fish, poultry, and organ meats   eggs   peanut butter without nuts  Fats, Snack, Sweets, Condiments, and Beverages:   Margarine, butter, oils, mayonnaise, sour cream, and salad dressing   Plain graviesbouillon, broth, and soups made with allowed vegetables   Coffee, tea, and carbonated drinks   Plain cakes and cookies   Gelatin, plain puddings, custard, ice cream, sherbet, Popsicles   Hard candy or pretzels   Ketchup, mustard   Sugar, clear jelly, honey, and syrup   Spices, cooked herbs,    Meat and other proteins:   tough, fibrous meats with gristle   dry beans, peas, and lentils   peanut butter with nuts   Tofu  Fats, Snack, Sweets, Condiments, and Beverages:   Nuts, seeds, and coconut   Jam, marmalade, and preserves   Pickles, olives, relish, and horseradish   All desserts containing nuts, seeds, dried fruit, coconut, or made from whole grains or bran   Candy made with nuts or seeds   popcorn

## 2021-10-14 NOTE — PRE-PROCEDURE
[facility] Pre-op History and Physical    Toby Mcgrath MRN# [id number]   Age: 66 year old YOB: 1955      Date of Surgery: 10/14/21   [facility]      Date of Exam 10/14/2021 Facility (Same day)         Primary care provider: Connie Haskins         Chief Complaint and/or Reason for Procedure:   No chief complaint on file.    screening colonoscopy         Active problem list:     Patient Active Problem List    Diagnosis Date Noted     Type 2 diabetes mellitus without complication (H) 09/09/2011     Priority: High     Morbid obesity (H) 05/27/2021     Priority: Medium     COPD (chronic obstructive pulmonary disease) (H) 11/05/2020     Priority: Medium     Ventral hernia without obstruction or gangrene 11/05/2020     Priority: Medium     Carpal tunnel syndrome of left wrist 11/05/2020     Priority: Medium     Atrial fibrillation with RVR (H) 07/16/2020     Priority: Medium     Seborrheic keratoses 05/20/2019     Priority: Medium     Diabetic polyneuropathy associated with type 2 diabetes mellitus (H) 02/28/2019     Priority: Medium     Benign prostatic hyperplasia with urinary frequency 11/27/2017     Priority: Medium     Slow transit constipation 04/13/2017     Priority: Medium     Failed back syndrome of lumbar spine 01/30/2017     Priority: Medium     Idiopathic gout of left elbow, unspecified chronicity 01/03/2017     Priority: Medium     Gastroesophageal reflux disease without esophagitis 01/03/2017     Priority: Medium     Acute gout of right elbow, unspecified cause 07/14/2016     Priority: Medium     Acute gouty arthritis 03/03/2016     Priority: Medium     Right-sided low back pain with right-sided sciatica 12/15/2015     Priority: Medium     Bilateral low back pain without sciatica 11/17/2015     Priority: Medium     Chronic, continuous use of opioids 10/29/2015     Priority: Medium     See notes under chronic pain syndrome  Patient is  followed by Connie Haskins for primary care management (who will be long term prescriber of pain medications).     Opioid medications are currently prescribed through [facility]  Lab [id number]    All chronic pain medication refills should be approved by pain clinic provider only at face-to-face appointments - not by phone request unless otherwise specified in [facility] office notes    Medication(s): See last Pain Center office note  Maximum quantity per month: See last Pain Center office note  Clinic visit frequency required: Schedule per pain clinic.    Controlled substance agreement on file: Yes       Date(s): July 2017    Pain Clinic evaluation in the past: Yes       Date/Location:  [facility], ongoing care    DIRE Total Score(s): Per [facility] notes.    Last [facility] website verification: Performed at each pain clinic visit and prior to each prescription refill.   [email or web address]           S/P laminectomy 10/15/2015     Priority: Medium     L2-3 hemilaminectomy 9/2015       Acute post-operative pain 09/15/2015     Priority: Medium     History of hepatitis C 09/14/2015     Priority: Medium     S/P lumbar discectomy 09/14/2015     Priority: Medium     BMI 32.0-32.9,adult 09/04/2015     Priority: Medium     Left-sided low back pain with left-sided sciatica 08/13/2015     Priority: Medium     S/P lumbar fusion 10/14/2014     Priority: Medium     Lumbar radiculopathy 09/17/2014     Priority: Medium     GERD (gastroesophageal reflux disease) 08/12/2013     Priority: Medium     DDD (degenerative disc disease), cervical 02/08/2013     Priority: Medium     Normal.    C2-C3: Normal disc, facet joints, spinal canal and neural foramina.    C3-C4: Mild broad-based posterior disc bulge. Otherwise normal.    C4-C5: Normal disc, facet joints, spinal canal and neural foramina.    C5-C6: Moderate degenerative disc disease with loss of disc height,  circumferential disc bulge and  vertebral endplate osteophytes. Mild  spinal canal stenosis and moderate left foraminal stenosis. Normal  right foramen and facet joints.    C6-C7: Mild circumferential disc bulge. Slight impression on the  thecal sac. Mild left foraminal stenosis. Normal right foramen and  facet joints.    C7-T1: Normal disc, facet joints, spinal canal and neural foramina.    T1-T2: Mild central posterior disc protrusion.    T2-T3: Mild central posterior disc protrusion. Slight impression on  the spinal cord.             Hyperlipidemia LDL goal <100 10/25/2012     Priority: Medium     DJD (degenerative joint disease), lumbar 01/10/2012     Priority: Medium     Advanced directives, counseling/discussion 12/08/2011     Priority: Medium     Advance Directive Problem List Overview:   Name Relationship Phone    Primary Health Care Agent            Alternative Health Care Agent          Discussed advance care planning with patient; information given to patient to review. 12/8/2011 Maranda Espinosa MA           Hypertension goal BP (blood pressure) < 140/90 10/21/2011     Priority: Medium     Gout 04/08/2011     Priority: Medium     Anxiety 04/05/2010     Priority: Medium     Major depressive disorder, recurrent episode (H) 07/09/2008     Priority: Medium     Problem list name updated by automated process. Provider to review       Disorder of bursae and tendons in shoulder region 04/17/2008     Priority: Medium     Problem list name updated by automated process. Provider to review       Chronic rhinitis 03/29/2007     Priority: Medium     Chronic pain syndrome 03/29/2007     Priority: Medium     Patient is followed by Connie Haskins MD for ongoing prescription of pain medication.  All refills should be approved by this provider, or covering partner.    Medication(s): Oxycodone  5 mg, Lyrica  Maximum quantity per month: 180 (max 6 per day)  Clinic visit frequency required: Q 3 months    Controlled substance  agreement:  Encounter-Level CSA - 7/27/16:               Controlled Substance Agreement - Scan on 7/30/2016  7:23 AM : CONTROLLED SUBSTANCE AGREEMENT (below)          Encounter-Level CSA - 8/7/15:               Controlled Substance Agreement - Scan on 8/10/2015  1:58 PM : CONTROLLED SUBSTANCE AGREEMENT 08/07/15 (below)            Pain Clinic evaluation in the past: Yes       Date/Location:      DIRE Total Score(s):  No flowsheet data found.    Last [facility] website verification: 4/13/2021   [email or web address]               Osteoarthrosis, unspecified whether generalized or localized, pelvic region and thigh 08/03/2006     Priority: Medium     Sees Heller for DJD hips       Hypertrophy of prostate with urinary obstruction 08/03/2006     Priority: Medium     Problem list name updated by automated process. Provider to review       Thoracic or lumbosacral neuritis or radiculitis, unspecified 10/13/2004     Priority: Medium            Medications (include herbals and vitamins):     Current Outpatient Medications   Medication Instructions     blood glucose (ACCU-CHEK DANIELE PLUS) test strip USE TO TEST BLOOD SUGAR TWO TIMES DAILY OR AS DIRECTED     blood glucose monitoring (ACCU-CHEK DANIELE PLUS) meter device kit USE TO TEST BLOOD SUGARS 2  TIMES DAILY OR AS DIRECTED.     blood glucose monitoring (ACCU-CHEK DANIELE PLUS) meter device kit Use to test blood sugar 2 times daily or as directed.     blood glucose monitoring (ACCU-CHEK FASTCLIX) lancets USE TO TEST BLOOD SUGAR TWO TIMES DAILY OR AS DIRECTED     finasteride (PROSCAR) 5 MG tablet TAKE 1 TABLET BY MOUTH  DAILY FOR PROSTATE  ENLARGEMENT     FLUoxetine (PROZAC) 20 MG capsule TAKE 3 CAPSULES BY MOUTH  ONCE DAILY     fluticasone (FLONASE) 50 MCG/ACT nasal spray USE 1 TO 2 SPRAYS IN BOTH  NOSTRILS DAILY     lidocaine (LIDODERM) 5 % patch Apply to area of pain.  Keep on for 12 hours then remove for 12 hours.To prevent lidocaine toxicity, patient should be patch free  for 12 hrs daily.     lisinopril (ZESTRIL) 10 mg, Oral, DAILY     metFORMIN (GLUCOPHAGE-XR) 500 MG 24 hr tablet TAKE 4 TABLETS BY MOUTH  DAILY WITH DINNER     metoprolol tartrate (LOPRESSOR) 50 MG tablet TAKE 1 TABLET BY MOUTH  TWICE DAILY     naloxone (NARCAN) 4 mg, Alternating Nostrils, ONCE PRN, Every 2-3 minutes until patient responsive or EMS arrives     omeprazole (PRILOSEC) 20 mg, Oral, DAILY     oxyCODONE (ROXICODONE) 5 mg, Oral, EVERY 4 HOURS PRN, Max #5 per day     pregabalin (LYRICA) 200 mg, Oral, 2 TIMES DAILY     rivaroxaban ANTICOAGULANT (XARELTO) 20 mg, Oral, DAILY WITH SUPPER     senna-docusate (SENOKOT-S/PERICOLACE) 8.6-50 MG tablet 1-2 tablets, Oral, DAILY PRN     simvastatin (ZOCOR) 20 mg, Oral, AT BEDTIME     tamsulosin (FLOMAX) 0.4 MG capsule TAKE 2 CAPSULES BY MOUTH  DAILY     tiotropium (SPIRIVA RESPIMAT) 2.5 MCG/ACT inhaler 2 puffs, Inhalation, DAILY                Allergies:      Allergies   Allergen Reactions     Codeine Nausea and Vomiting     Tolerating other opiates                Physical Exam:   All vitals have been reviewed  Patient Vitals for the past 8 hrs:   BP Temp Temp src Pulse Resp SpO2 Height Weight   10/14/21 0859 134/82 97.1  F (36.2  C) Temporal 51 10 95 % 1.829 m (6') 112.5 kg (248 lb)     Airway assessment:   Mallampatti classification: Class II (visualization of the soft palate, fauces, and uvula)}     Lungs:   No increased work of breathing, good air exchange     Cardiovascular:   bradycardic with regular rhythm             Lab / Radiology Results:   none         Anesthetic risk and/or ASA classification:   ASA3    Lucie Zamudio MD

## 2021-10-14 NOTE — DISCHARGE INSTRUCTIONS
The patient has received a copy of the Provation  report the doctor has written and discharge instructions have been discussed with the patient and responsible adult.  All questions were addressed and answered prior to patient discharge.      Understanding Colon and Rectal Polyps     The colon has a smooth lining composed of millions of cells.     The colon (also called the large intestine) is a muscular tube that forms the last part of the digestive tract. It absorbs water and stores food waste. The colon is about 4 to 6 feet long. The rectum is the last 6 inches of the colon. The colon and rectum have a smooth lining composed of millions of cells. Changes in these cells can lead to growths in the colon that can become cancerous and should be removed.     When the Colon Lining Changes  Changes that occur in the cells that line the colon or rectum can lead to growths called polyps. Over a period of years, polyps can turn cancerous. Removing polyps early may prevent cancer from ever forming.      Polyps  Polyps are fleshy clumps of tissue that form on the lining of the colon or rectum. Small polyps are usually benign (not cancerous). However, over time, cells in a polyp can change and become cancerous. The larger a polyp grows, the more likely this is to happen. Also, certain types of polyps known as adenomatous polyps are considered premalignant. This means that they will almost always become cancerous if they re not removed.          Cancer  Almost all colorectal cancers start when polyp cells begin growing abnormally. As a cancerous tumor grows, it may involve more and more of the colon or rectum. In time, cancer can also grow beyond the colon or rectum and spread to nearby organs or to glands called lymph nodes. The cells can also travel to other parts of the body. This is known as metastasis. The earlier a cancerous tumor is removed, the better the chance of preventing its spread.        [id number] Teodoro  [employer], [address] [place] [address]. All rights reserved. This information is not intended as a substitute for professional medical care. Always follow your healthcare professional's instructions.      Understanding Diverticulosis and Diverticulitis     Pouches or diverticula usually occur in the lower part of the colon called the sigmoid.      Diverticulitis occurs when the pouches become inflamed.     The colon (large intestine) is the last part of the digestive tract. It absorbs water from stool and changes it from a liquid to a solid. In certain cases, small pouches called diverticula can form in the colon wall. This condition is called diverticulosis. The pouches can become infected. If this happens, it becomes a more serious problem called diverticulitis. These problems can be painful. But they can be managed.   Managing Your Condition  Diet changes or taking medications are often tried first. These may be enough to bring relief. If the case is bad, surgery may be done. You and your doctor can discuss the plan that is best for you.  If You Have Diverticulosis  Diet changes are often enough to control symptoms. The main changes are adding fiber (roughage) and drinking more water. Fiber absorbs water as it travels through your colon. This helps your stool stay soft and move smoothly. Water helps this process. If needed, you may be told to take over-the-counter stool softeners. To help relieve pain, antispasmodic medications may be prescribed.  If You Have Diverticulitis  Treatment depends on how bad your symptoms are.  For mild symptoms: You may be put on a liquid diet for a short time. You may also be prescribed antibiotics. If these two steps relieve your symptoms, you may then be prescribed a high-fiber diet. If you still have symptoms, your doctor will discuss further treatment options with you.  For severe symptoms: You may need to be admitted to the hospital. There, you can be given IV  antibiotics and fluids. Once symptoms are under control, the above treatments may be tried. If these don t control your condition, your doctor may discuss the option of having surgery with you.  [place] to Colon Health  Help keep your colon healthy with a diet that includes plenty of high-fiber fruits, vegetables, and whole grains. Drink plenty of liquids like water and juice. Your doctor may also recommend avoiding seeds and nuts.          [id number] Te[facility], [address] [place] [address], [place] [address]. All rights reserved. This information is not intended as a substitute for professional medical care. Always follow your healthcare professional's instructions.      HIGH FIBER DIET  Fiber is present in all fruits, vegetables, cereals and grains. Fiber passes through the body undigested. A high fiber diet helps food move through the intestinal tract. The added bulk is helpful in preventing constipation. In people with diverticulosis it serves to clean out the pouches along the colon wall while preventing new ones from forming. A high fiber diet also reduces the risk of colon cancer, decreases blood cholesterol and prevents high blood sugar in people with diabetes.    The foods listed below are high in fiber and should be included in your diet. If you are not used to high fiber foods, start with 1 or 2 foods from this list. Every 3-4 days add a new one to your diet until you are eating 4 high fiber foods per day. This should give you 20-35 Gm of fiber/day. It is also important to drink a lot of water when you are on this diet (6-8 glasses a day). Water causes the fiber to swell and increases the benefit.    FOODS HIGH IN DIETARY FIBER:  BREADS: Made with 100% whole wheat flour; jj, wheat or rye crackers; tortillas, bran muffins  CEREALS: Whole grain cereal with bran (Chex, Raisin Bran, Corn Bran), oatmeal, rolled oats, granola, wheat flakes, brown rice  NUTS: Any nuts  FRUITS: All fresh fruits along with edible  skins, (bananas, citrus fruit, mangoes, pears, prunes, raisins, apples, pineapple, apricot, melon, jams and marmalades), fruit juices (especially prune juice)  VEGETABLES: All types, preferably raw or lightly cooked: especially, celery, eggplant, potatoes, spinach, broccoli, brussel sprouts, winter squash, carrots, cauliflower, soybeans, lentils, fresh and dried beans of all kinds  OTHER: Popcorn, any spices      [id number] Te[facility], [address] [place] [address]. All rights reserved. This information is not intended as a substitute for professional medical care. Always follow your healthcare professional's instructions.      Eating a High-Fiber Diet  Fiber is what gives strength and structure to plants. Most grains, beans, vegetables, and fruits contain fiber. Foods rich in fiber are often low in calories and fat, and they fill you up more. They may also reduce your risks for certain health problems. To find out the amount of fiber in canned, packaged, or frozen foods, read the  Nutrition Facts  label. It tells you how much fiber is in a serving.      Types of Fiber and Their Benefits  There are two types of fiber: insoluble and soluble. They both aid digestion and help you maintain a healthy weight.  Insoluble fiber: This is found in whole grains, cereals, certain fruits and vegetables (such as apple skin, corn, and carrots). Insoluble fiber may prevent constipation and reduce the risk of certain types of cancer.   Soluble fiber: This type of fiber is in oats, beans, and certain fruits and vegetables (such as strawberries and peas). Soluble fiber can reduce cholesterol (which may help lower the risk of heart disease), and helps control blood sugar levels.  Look for High-Fiber Foods  Whole-grain breads and cereals: Try to eat 6-8 ounces a day. Include wheat and oat bran cereals, whole-wheat muffins or toast, and corn tortillas in your meals.  Fruits: Try to eat 2 cups a day. Apples, oranges,  strawberries, pears, and bananas are good sources. (Note: Fruit juice is low in fiber.)  Vegetables: Try to eat 3 cups a day. Add asparagus, carrots, broccoli, peas, and corn to your meals.  Legumes (beans): One cup of cooked lentils gives you over 15 grams of fiber. Try navy beans, lentils, and chickpeas.  Seeds:  A small handful of seeds gives you about 3 grams of fiber. Try sunflower seeds.    Keep Track of Your Fiber  A healthy diet includes 31 grams of fiber a day if you have a 2,000-calorie diet. Keep track of how much fiber you eat. Start by reading food labels. Then eat a variety of foods high in fiber. Ask your doctor about supplemental fiber products.            [id number] Teod[employer], [address] [place] [address], [place] [address]. All rights reserved. This information is not intended as a substitute for professional medical care. Always follow your healthcare professional's instructions.

## 2021-10-15 LAB
PATH REPORT.COMMENTS IMP SPEC: NORMAL
PATH REPORT.COMMENTS IMP SPEC: NORMAL
PATH REPORT.FINAL DX SPEC: NORMAL
PATH REPORT.GROSS SPEC: NORMAL
PATH REPORT.MICROSCOPIC SPEC OTHER STN: NORMAL
PATH REPORT.RELEVANT HX SPEC: NORMAL
PHOTO IMAGE: NORMAL

## 2021-10-15 PROCEDURE — 88305 TISSUE EXAM BY PATHOLOGIST: CPT | Mod: 26 | Performed by: PATHOLOGY

## 2021-10-19 ENCOUNTER — TELEPHONE (OUTPATIENT)
Dept: PHARMACY | Facility: CLINIC | Age: 66
End: 2021-10-19

## 2021-10-19 NOTE — TELEPHONE ENCOUNTER
Called pt to schedule follow-up, no answer, message left.   Mary Last, LloydD, NEIDA, BCACP  MTM Pharmacist, [facility]

## 2021-11-02 ENCOUNTER — TELEPHONE (OUTPATIENT)
Dept: FAMILY MEDICINE | Facility: CLINIC | Age: 66
End: 2021-11-02

## 2021-11-02 NOTE — TELEPHONE ENCOUNTER
FYI~ A refill has been made to the  assistance program for Lyrica.      A 90 day supply of Lyrica will be delivered to [facility] on Nov. 4th,2021.     Thank you,     Ana Luisa Cabrera  Prescription Assistance

## 2021-11-08 DIAGNOSIS — M96.1 FAILED BACK SYNDROME OF LUMBAR SPINE: ICD-10-CM

## 2021-11-08 DIAGNOSIS — F11.90 CHRONIC, CONTINUOUS USE OF OPIOIDS: ICD-10-CM

## 2021-11-08 RX ORDER — OXYCODONE HYDROCHLORIDE 5 MG/1
5 TABLET ORAL EVERY 4 HOURS PRN
Qty: 150 TABLET | Refills: 0 | Status: SHIPPED | OUTPATIENT
Start: 2021-11-11 | End: 2021-12-06

## 2021-11-08 NOTE — TELEPHONE ENCOUNTER
Reason for Call:  Medication or medication refill:oxyCODONE (ROXICODONE) 5 MG tablet    Do you use a [facility]?  Name of the pharmacy and phone number for the current request:  [facility] [address] [place], [place] [id number] [address] [place] [address]      Name of the medication requested:     Other request:     Can we leave a detailed message on this number? YES    Phone number patient can be reached at: Home number on file [phone] (home)    Best Time: any      Call taken on 11/8/2021 at 8:00 AM by Peggy Shabazz

## 2021-11-11 DIAGNOSIS — F33.0 MILD EPISODE OF RECURRENT MAJOR DEPRESSIVE DISORDER (H): ICD-10-CM

## 2021-11-11 DIAGNOSIS — N40.1 BENIGN PROSTATIC HYPERPLASIA WITH URINARY FREQUENCY: Primary | ICD-10-CM

## 2021-11-11 DIAGNOSIS — E11.42 DIABETIC POLYNEUROPATHY ASSOCIATED WITH TYPE 2 DIABETES MELLITUS (H): ICD-10-CM

## 2021-11-11 DIAGNOSIS — K21.00 GASTROESOPHAGEAL REFLUX DISEASE WITH ESOPHAGITIS WITHOUT HEMORRHAGE: ICD-10-CM

## 2021-11-11 DIAGNOSIS — R35.0 BENIGN PROSTATIC HYPERPLASIA WITH URINARY FREQUENCY: Primary | ICD-10-CM

## 2021-11-11 RX ORDER — METFORMIN HCL 500 MG
TABLET, EXTENDED RELEASE 24 HR ORAL
Qty: 120 TABLET | Refills: 0 | Status: SHIPPED | OUTPATIENT
Start: 2021-11-11 | End: 2022-01-12

## 2021-11-11 RX ORDER — FINASTERIDE 5 MG/1
TABLET, FILM COATED ORAL
Qty: 30 TABLET | Refills: 0 | Status: SHIPPED | OUTPATIENT
Start: 2021-11-11 | End: 2022-01-12

## 2021-11-11 NOTE — TELEPHONE ENCOUNTER
Medication is being filled for 1 time refill only due to:  Patient needs to be seen because overdue for physical and labs.

## 2021-12-06 DIAGNOSIS — M96.1 FAILED BACK SYNDROME OF LUMBAR SPINE: ICD-10-CM

## 2021-12-06 DIAGNOSIS — F11.90 CHRONIC, CONTINUOUS USE OF OPIOIDS: ICD-10-CM

## 2021-12-06 RX ORDER — OXYCODONE HYDROCHLORIDE 5 MG/1
5 TABLET ORAL EVERY 4 HOURS PRN
Qty: 150 TABLET | Refills: 0 | Status: SHIPPED | OUTPATIENT
Start: 2021-12-11 | End: 2022-01-06

## 2021-12-06 NOTE — TELEPHONE ENCOUNTER
Reason for Call:  Medication or medication refill:oxyCODONE (ROXICODONE) 5 MG tablet    Do you use a [facility]?  Name of the pharmacy and phone number for the current request:     [place] [id number] [place] [address]        Name of the medication requested:     Other request:     Can we leave a detailed message on this number? YES    Phone number patient can be reached at:       Best Time: [phone]      Call taken on 12/6/2021 at 8:19 AM by Peggy Shabazz

## 2021-12-27 DIAGNOSIS — I48.92 ATRIAL FLUTTER WITH RAPID VENTRICULAR RESPONSE (H): ICD-10-CM

## 2021-12-27 RX ORDER — METOPROLOL TARTRATE 50 MG
TABLET ORAL
Qty: 180 TABLET | Refills: 3 | Status: SHIPPED | OUTPATIENT
Start: 2021-12-27 | End: 2022-11-14

## 2022-01-04 DIAGNOSIS — M96.1 FAILED BACK SYNDROME OF LUMBAR SPINE: ICD-10-CM

## 2022-01-04 DIAGNOSIS — F11.90 CHRONIC, CONTINUOUS USE OF OPIOIDS: ICD-10-CM

## 2022-01-04 NOTE — TELEPHONE ENCOUNTER
Reason for Call:  Medication or medication refill:    Do you use a [facility]?  Name of the pharmacy and phone number for the current request:   Health system pharmacy [place] [id number] [facility]    Name of the medication requested: oxyCODONE (ROXICODONE) 5 MG tablet    Other request: NA    Can we leave a detailed message on this number? YES    Phone number patient can be reached at: Cell number on file:    Telephone Information:   Mobile [phone]       Best Time: any    Call taken on 1/4/2022 at 7:50 AM by Julia Camargo

## 2022-01-06 RX ORDER — OXYCODONE HYDROCHLORIDE 5 MG/1
5 TABLET ORAL EVERY 4 HOURS PRN
Qty: 150 TABLET | Refills: 0 | Status: SHIPPED | OUTPATIENT
Start: 2022-01-11 | End: 2022-02-07

## 2022-01-10 DIAGNOSIS — K21.00 GASTROESOPHAGEAL REFLUX DISEASE WITH ESOPHAGITIS WITHOUT HEMORRHAGE: ICD-10-CM

## 2022-01-10 DIAGNOSIS — R35.0 BENIGN PROSTATIC HYPERPLASIA WITH URINARY FREQUENCY: ICD-10-CM

## 2022-01-10 DIAGNOSIS — N40.1 BENIGN PROSTATIC HYPERPLASIA WITH URINARY FREQUENCY: ICD-10-CM

## 2022-01-10 DIAGNOSIS — E11.42 DIABETIC POLYNEUROPATHY ASSOCIATED WITH TYPE 2 DIABETES MELLITUS (H): ICD-10-CM

## 2022-01-12 RX ORDER — TAMSULOSIN HYDROCHLORIDE 0.4 MG/1
CAPSULE ORAL
Qty: 180 CAPSULE | Refills: 0 | Status: SHIPPED | OUTPATIENT
Start: 2022-01-12 | End: 2022-01-14

## 2022-01-12 RX ORDER — METFORMIN HCL 500 MG
TABLET, EXTENDED RELEASE 24 HR ORAL
Qty: 120 TABLET | Refills: 0 | Status: SHIPPED | OUTPATIENT
Start: 2022-01-12 | End: 2022-01-14

## 2022-01-12 RX ORDER — FINASTERIDE 5 MG/1
TABLET, FILM COATED ORAL
Qty: 30 TABLET | Refills: 0 | Status: SHIPPED | OUTPATIENT
Start: 2022-01-12 | End: 2022-01-14

## 2022-01-12 NOTE — PROGRESS NOTES
"Medication Therapy Management (MTM) Encounter    ASSESSMENT:                            Medication Adherence/Access: Will work with [facility] to find coverage for Spiriva and pregabalin.     Type 2 Diabetes: Stable. Patient is meeting A1c goal of < 7%. He is due for A1c today. Discussed with him about working on cutting some carbs out of his diet to bring his blood sugars down.     Immunizations: Patient is due for the second dose of the Shingrix vaccination per the 2021 ACIP Guidelines.     Hypertension/a-fib: Stable. Patient is meeting blood pressure goal of < 130/80mmHg.    Hyperlipidemia: Stable. Patient is on moderate intensity statin which is indicated based on 2019 ACC/AHA guidelines for lipid management.      BPH: Patient may benefit from following-up with urology given that he is on guideline recommended BPH therapy.     Depression: Given that [facility] has been having symptoms of depression and has a prolonged QTc interval, could consider cross-tapering him from fluoxetine to duloxetine since duloxetine does not have risk of QTc. The addition of duloxetine may also help with additional pain control.      Allergic rhinitis: Stable.     Pain: As mentioned in \"Depression\", duloxetine may help with pain. Discussed with [facility] the risks of oxycodone and why having Narcan at home is important.     GERD: Stable.     Constipation: Could consider using stool softener every other day to help with bowel movements.     COPD: Stable.    PLAN:                            1. Your next visit with Dr. Haskins is on 3/9/22.     2. We will get a1c lab done today - left voicemail for [facility] to call me back to discuss A1c results.     3. You are due for the second dose of the Shingrix vaccine.     4. We will work on getting Lyrica covered for you - Called [facility] to ask for tiering exception for Lyrica. Will hold on sending in new script until we hear back. Alternative if not covered and expensive is to use GoodRx coupon at local pharmacy " (looks like it would be about $15).     5. We will contact your pharmacy about the Spiriva cost - Called [employer] to ask for tiering exception for Spiriva. If this does not go through, will see if we can enroll him in [facility] patient assistance program.     6. Needs refills with 3 month supplies at a time.     7. You may want to consider following up with urology.     8. Try taking the stool softener every other day.     9. You will decrease fluoxetine to 40 mg (2 capsules once daily for 1 week), then 20 mg daily (1 capsule once daily for 1 week). When you get down to 1 capsule daily of fluoxetine, you can start duloxetine 30 mg daily.    Dr. Haskins verbally approved A1c and EKG. She also approved switching him from fluoxetine to duloxetine depending on EKG results. Dr. Haskins would like him back in clinic in 1 month for another EKG.   Dr. Haskins ordered TSH, troponin, stress test, CMP and CBC    Spoke with [facility] on 1/14 to update him on lab results, medication refills and status of medication tier exceptions.     Follow-up: call patient to check in on fluoxetine switch in 1 week, have him back in 1 month for another EKG (scheduled appt with Dr. Haskins on 2/16 for f/up EKG).  Future considerations: find a location near [facility] through the [facility] to get him free Narcan.    SUBJECTIVE/OBJECTIVE:                          Toby Mcgrath is a 66 year old male coming in for a follow-up visit.  Today's visit is a follow-up MT visit from 5/6/21     Reason for visit: comprehensive medication review.    Allergies/ADRs: Reviewed in chart  Past Medical History: Reviewed in chart  Tobacco: He reports that he has quit smoking. His smoking use included cigarettes. He quit after 40.00 years of use. He quit smokeless tobacco use about 8 years ago.    Medication Adherence/Access: Patient reports that the cost of Spiriva Respimat has gone up as of the beginning of this year. Additionally notes if his  "meds are 30-day supplies he pays a copay, if they are 90-day supplies he does not have to. He would like 90-day supplies if possible.     Type 2 Diabetes:  Currently taking metformin XR 2000 mg daily. Patient is not experiencing side effects.  Blood sugar monitoring: Ranges (patient reported): 169 - 185, sometimes goes up to 200 when he eats ice cream (tries to not eat every day)   Symptoms of low blood sugar? none  Diet/Exercise: Reports that he has a lot of bread with coffee, tuna sandwiches, and grilled cheese sandwiches.     Immunizations: Patient has not yet had the second dose of the Shingrix vaccination    Hypertension/a-fib: Current medications include lisinopril 10 mg daily, metoprolol tartrate 50 mg twice daily, and rivaroxaban 20 mg daily. Patient reports that if he cuts himself he will bleed excessively.   ECHO (7/16/20): EF 55-60%    Hyperlipidemia: Current therapy includes simvastatin 20 mg daily.  Patient reports no significant myalgias or other side effects.     BPH: Patient takes finasteride 5 mg daily and tamsulosin 0.8 mg daily. Has trouble with urinating and finds he has to urinate again right after going. It has been a while since he has seen urology. He reports he is not interested in surgery at this time.      Depression: He takes fluoxetine 60 mg daily. He denies side effects. Reports that his mood has been alright - has \"some sad days and some happy days\". He's in the apartment more lately - hard to get outside for him in winter time. Feels like his fluoxetine is not working that well.   Last QTc (9/17/20) was 452 ms and today, 1/13/22 was 475 ms     Allergies: Patient takes fluticasone nasal spray 2 sprays daily  Patient feels that current therapy is effective.      Pain: Patient has symptoms of nerve pain and lower back pain. Patient has a prescription for oxycodone 5 mg every 3 hours as needed and stops taking them by 4 pm every day (written for every 4 hours as needed). He denies side " effects from this, other than constipation. He also takes pregabalin 200 mg twice daily but he spreads them out because taking two at once made him tired. Mentions that the Lyrica [employer] assistance program is ending. He found gabapentin ineffective in the past and had some stomach upset with it. Takes acetaminophen extra stength - 3 capsules daily but finds it doesn't help with inflammation like ibuprofen dose. He questions if he can use ibuprofen for inflammation in his fingers. He uses lidocaine patches on his back as needed but doesn't find them very effective. Nalaxone was expensive for him so he doesn't have any on hand.     GERD: Patient takes omeprazole 20 mg every other day (prescribed as daily, but he says he was told to limit it). Patient feels that current regimen is effective.     Constipation: Patient is taking OTC senna/docusate daily and finds that it pushes him too much the other way.     COPD: Patient takes Spiriva 2.5 mcg - 2 puffs daily. Reports that his throat was sore from the Spiriva HandiHaler and got a little better when switching to the Respimat. Patient mentions that he does 1 puff twice daily. He has ran out of the Spiriva 2.5 mcg and has Spiriva 18 mcg on hand    Today's Vitals: /78   Wt 242 lb 12.8 oz (110.1 kg)   BMI 32.93 kg/m    ----------------  I spent 60 minutes with this patient today. All changes were made via verbal approval with Connie Haskins MD. A copy of the visit note was provided to the patient's provider(s).    The patient was given a summary of these recommendations.     Stacie Miranda, PharmD   Pharmaceutical Care Resident   Medication Therapy Management     Medication Therapy Recommendations  Bilateral low back pain without sciatica    Current Medication: pregabalin (LYRICA) 100 MG capsule   Rationale: Cannot afford medication product - Cost - Adherence   Recommendation: Referral to Service    Status: Resolved Med Access Issue         Major depressive  disorder, recurrent episode (H)    Current Medication: FLUoxetine (PROZAC) 20 MG capsule (Discontinued)   Rationale: Unsafe medication for the patient - Adverse medication event - Safety   Recommendation: Change Medication - DULoxetine 30 MG capsule   Status: Accepted per Provider          Current Medication: FLUoxetine (PROZAC) 20 MG capsule (Discontinued)   Rationale: Medication requires monitoring - Needs additional monitoring   Recommendation: Order Lab   Status: Accepted per Provider         Slow transit constipation    Current Medication: senna-docusate (SENOKOT-S/PERICOLACE) 8.6-50 MG tablet   Rationale: Undesirable effect - Adverse medication event - Safety   Recommendation: Decrease Frequency   Status: Accepted - no CPA Needed         Vaccine counseling    Rationale: Preventive therapy - Needs additional medication therapy - Indication   Recommendation: Order Vaccine - Shingrix 50 MCG/0.5ML Susr   Status: Accepted - no CPA Needed

## 2022-01-12 NOTE — TELEPHONE ENCOUNTER
"Requested Prescriptions   Signed Prescriptions Disp Refills    metFORMIN (GLUCOPHAGE-XR) 500 MG 24 hr tablet 120 tablet 0     Sig: TAKE 4 TABLETS BY MOUTH  DAILY WITH DINNER       Biguanide Agents Passed - 1/10/2022  1:42 PM        Passed - Patient is age 10 or older        Passed - Patient has documented A1c within the specified period of time.     If HgbA1C is 8 or greater, it needs to be on file within the past 3 months.  If less than 8, must be on file within the past 6 months.     Recent Labs   Lab Test 09/09/21  1245   A1C 7.4*             Passed - Patient's CR is NOT>1.4 OR Patient's EGFR is NOT<45 within past 12 mos.     Recent Labs   Lab Test 09/09/21  1245 05/27/21  0951   GFRESTIMATED 62 72   GFRESTBLACK  --  83       Recent Labs   Lab Test 09/09/21  1245   CR 1.19             Passed - Patient does NOT have a diagnosis of CHF.        Passed - Medication is active on med list        Passed - Recent (6 mo) or future (30 days) visit within the authorizing provider's specialty     Patient had office visit in the last 6 months or has a visit in the next 30 days with authorizing provider or within the authorizing provider's specialty.  See \"Patient Info\" tab in [employer], or \"Choose Columns\" in Meds & Orders section of the refill encounter.              omeprazole (PRILOSEC) 20 MG DR capsule 30 capsule 0     Sig: Take 1 capsule (20 mg) by mouth daily       PPI Protocol Passed - 1/10/2022  1:42 PM        Passed - Not on Clopidogrel (unless Pantoprazole ordered)        Passed - No diagnosis of osteoporosis on record        Passed - Recent (12 mo) or future (30 days) visit within the authorizing provider's specialty     Patient has had an office visit with the authorizing provider or a provider within the authorizing providers department within the previous 12 mos or has a future within next 30 days. See \"Patient Info\" tab in inbasket, or \"Choose Columns\" in Meds & Orders section of the refill encounter.          " "    Passed - Medication is active on med list        Passed - Patient is age 18 or older          finasteride (PROSCAR) 5 MG tablet 30 tablet 0     Sig: TAKE 1 TABLET BY MOUTH  DAILY FOR PROSTATE  ENLARGEMENT       BPH Agents Passed - 1/10/2022  1:42 PM        Passed - Recent (12 mo) or future (30 days) visit within the authorizing provider's department     Patient has had an office visit with the authorizing provider or a provider within the authorizing providers department within the previous 12 mos or has a future within next 30 days. See \"Patient Info\" tab in inbasket, or \"Choose Columns\" in Meds & Orders section of the refill encounter.              Passed - Medication is active on med list        Passed - Patient is 18 years of age or older          tamsulosin (FLOMAX) 0.4 MG capsule 180 capsule 0     Sig: TAKE 2 CAPSULES BY MOUTH  DAILY       Alpha Blockers Passed - 1/10/2022  1:42 PM        Passed - Blood pressure under 140/90 in past 12 months     BP Readings from Last 3 Encounters:   10/14/21 107/64   09/09/21 120/60   05/06/21 138/70                 Passed - Recent (12 mo) or future (30 days) visit within the authorizing provider's specialty     Patient has had an office visit with the authorizing provider or a provider within the authorizing providers department within the previous 12 mos or has a future within next 30 days. See \"Patient Info\" tab in inbasket, or \"Choose Columns\" in Meds & Orders section of the refill encounter.              Passed - Patient does not have Tadalafil, Vardenafil, or Sildenafil on their medication list        Passed - Medication is active on med list        Passed - Patient is 18 years of age or older           Natacha Paz RN  [facility]     "

## 2022-01-13 ENCOUNTER — OFFICE VISIT (OUTPATIENT)
Dept: PHARMACY | Facility: CLINIC | Age: 67
End: 2022-01-13
Payer: COMMERCIAL

## 2022-01-13 VITALS — WEIGHT: 242.8 LBS | SYSTOLIC BLOOD PRESSURE: 128 MMHG | BODY MASS INDEX: 32.93 KG/M2 | DIASTOLIC BLOOD PRESSURE: 78 MMHG

## 2022-01-13 DIAGNOSIS — M54.50 CHRONIC BILATERAL LOW BACK PAIN WITHOUT SCIATICA: ICD-10-CM

## 2022-01-13 DIAGNOSIS — R35.0 BENIGN PROSTATIC HYPERPLASIA WITH URINARY FREQUENCY: ICD-10-CM

## 2022-01-13 DIAGNOSIS — J44.9 CHRONIC OBSTRUCTIVE PULMONARY DISEASE, UNSPECIFIED COPD TYPE (H): ICD-10-CM

## 2022-01-13 DIAGNOSIS — K59.01 SLOW TRANSIT CONSTIPATION: ICD-10-CM

## 2022-01-13 DIAGNOSIS — I48.92 ATRIAL FLUTTER WITH RAPID VENTRICULAR RESPONSE (H): ICD-10-CM

## 2022-01-13 DIAGNOSIS — Z23 ENCOUNTER FOR IMMUNIZATION: ICD-10-CM

## 2022-01-13 DIAGNOSIS — R94.31 ABNORMAL ELECTROCARDIOGRAM: ICD-10-CM

## 2022-01-13 DIAGNOSIS — N40.1 BENIGN PROSTATIC HYPERPLASIA WITH URINARY FREQUENCY: ICD-10-CM

## 2022-01-13 DIAGNOSIS — E78.5 HYPERLIPIDEMIA LDL GOAL <100: ICD-10-CM

## 2022-01-13 DIAGNOSIS — J30.2 SEASONAL ALLERGIC RHINITIS, UNSPECIFIED TRIGGER: ICD-10-CM

## 2022-01-13 DIAGNOSIS — R94.31 PROLONGED Q-T INTERVAL ON ECG: ICD-10-CM

## 2022-01-13 DIAGNOSIS — E11.9 TYPE 2 DIABETES MELLITUS WITHOUT COMPLICATION (H): ICD-10-CM

## 2022-01-13 DIAGNOSIS — I48.91 ATRIAL FIBRILLATION WITH RVR (H): ICD-10-CM

## 2022-01-13 DIAGNOSIS — F33.0 MAJOR DEPRESSIVE DISORDER, RECURRENT EPISODE, MILD (H): ICD-10-CM

## 2022-01-13 DIAGNOSIS — G89.29 CHRONIC BILATERAL LOW BACK PAIN WITHOUT SCIATICA: ICD-10-CM

## 2022-01-13 DIAGNOSIS — K21.00 GASTROESOPHAGEAL REFLUX DISEASE WITH ESOPHAGITIS WITHOUT HEMORRHAGE: ICD-10-CM

## 2022-01-13 DIAGNOSIS — E11.9 TYPE 2 DIABETES MELLITUS WITHOUT COMPLICATION, WITHOUT LONG-TERM CURRENT USE OF INSULIN (H): Primary | ICD-10-CM

## 2022-01-13 DIAGNOSIS — I10 HYPERTENSION GOAL BP (BLOOD PRESSURE) < 140/90: ICD-10-CM

## 2022-01-13 DIAGNOSIS — E11.42 DIABETIC POLYNEUROPATHY ASSOCIATED WITH TYPE 2 DIABETES MELLITUS (H): ICD-10-CM

## 2022-01-13 LAB
ERYTHROCYTE [DISTWIDTH] IN BLOOD BY AUTOMATED COUNT: 13.9 % (ref 10–15)
HCT VFR BLD AUTO: 40.5 % (ref 40–53)
HGB BLD-MCNC: 14.1 G/DL (ref 13.3–17.7)
MCH RBC QN AUTO: 29.1 PG (ref 26.5–33)
MCHC RBC AUTO-ENTMCNC: 34.8 G/DL (ref 31.5–36.5)
MCV RBC AUTO: 84 FL (ref 78–100)
PLATELET # BLD AUTO: 218 10E3/UL (ref 150–450)
RBC # BLD AUTO: 4.85 10E6/UL (ref 4.4–5.9)
WBC # BLD AUTO: 6.3 10E3/UL (ref 4–11)

## 2022-01-13 PROCEDURE — 99207 PR NO CHARGE LOS: CPT | Performed by: PHARMACIST

## 2022-01-13 PROCEDURE — 36415 COLL VENOUS BLD VENIPUNCTURE: CPT | Performed by: PHARMACIST

## 2022-01-13 PROCEDURE — 84484 ASSAY OF TROPONIN QUANT: CPT | Performed by: PHARMACIST

## 2022-01-13 PROCEDURE — 80053 COMPREHEN METABOLIC PANEL: CPT | Performed by: PHARMACIST

## 2022-01-13 PROCEDURE — 85027 COMPLETE CBC AUTOMATED: CPT | Performed by: PHARMACIST

## 2022-01-13 PROCEDURE — 93005 ELECTROCARDIOGRAM TRACING: CPT | Performed by: PHARMACIST

## 2022-01-13 RX ORDER — DULOXETIN HYDROCHLORIDE 30 MG/1
30 CAPSULE, DELAYED RELEASE ORAL DAILY
Qty: 30 CAPSULE | Refills: 1 | Status: SHIPPED | OUTPATIENT
Start: 2022-01-13 | End: 2022-01-27

## 2022-01-13 NOTE — PATIENT INSTRUCTIONS
Recommendations from today's MTM visit:                                                       1. Your next visit with Dr. Haskins is on 3/9/22.  EKG in 1 month.  Call to set up Stress test [phone]     2. We will get a1c lab done today     3. You are due for the second dose of the Shingrix vaccine.     4. We will work on looking get Lyrica covered for you.     5. We will contact your pharmacy about the Spiriva cost. We will talk to Zenaida about this too.     6. Needs refills with 3 month supplies at a time.     7. You may want to consider following up with urology.     8. Try taking the stool softener every other day.     9. You will decrease fluoxetine to 40 mg (2 capsules once daily for 1 week), then 20 mg daily (1 capsule once daily for 1 week). When you get down to 1 capsule daily of fluoxetine, you can start duloxetine 30 mg daily.    Follow-up: after a1c and EKG comes back    It was great to speak with you today.  I value your experience and would be very thankful for your time with providing feedback on our clinic survey. You may receive a survey via email or text message in the next few days.     To schedule another MTM appointment, please call the clinic directly or you may call the MTM scheduling line at [phone] or toll-free at [phone].     My Clinical Pharmacist's contact information:                                                      Please feel free to contact me with any questions or concerns you have.      Stacie Miranda PharmD  Medication Therapy Management Resident, [facility]  Pager: [phone]  Email: [email or web address]    Mary Last, Pharm.D., M.B.A., BCACP  Medication Therapy Management Pharmacist, [facility]  Pager: [phone]  Email: [email or web address]

## 2022-01-14 LAB
ALBUMIN SERPL-MCNC: 3.7 G/DL (ref 3.4–5)
ALP SERPL-CCNC: 64 U/L (ref 40–150)
ALT SERPL W P-5'-P-CCNC: 40 U/L (ref 0–70)
ANION GAP SERPL CALCULATED.3IONS-SCNC: 6 MMOL/L (ref 3–14)
AST SERPL W P-5'-P-CCNC: 29 U/L (ref 0–45)
BILIRUB SERPL-MCNC: 0.4 MG/DL (ref 0.2–1.3)
BUN SERPL-MCNC: 11 MG/DL (ref 7–30)
CALCIUM SERPL-MCNC: 9.1 MG/DL (ref 8.5–10.1)
CHLORIDE BLD-SCNC: 107 MMOL/L (ref 94–109)
CO2 SERPL-SCNC: 28 MMOL/L (ref 20–32)
CREAT SERPL-MCNC: 1.1 MG/DL (ref 0.66–1.25)
GFR SERPL CREATININE-BSD FRML MDRD: 73 ML/MIN/1.73M2
GLUCOSE BLD-MCNC: 120 MG/DL (ref 70–99)
POTASSIUM BLD-SCNC: 4 MMOL/L (ref 3.4–5.3)
PROT SERPL-MCNC: 7.7 G/DL (ref 6.8–8.8)
SODIUM SERPL-SCNC: 141 MMOL/L (ref 133–144)
TROPONIN I SERPL HS-MCNC: 7 NG/L

## 2022-01-14 RX ORDER — METFORMIN HCL 500 MG
TABLET, EXTENDED RELEASE 24 HR ORAL
Qty: 360 TABLET | Refills: 1 | Status: SHIPPED | OUTPATIENT
Start: 2022-01-14 | End: 2022-06-10

## 2022-01-14 RX ORDER — FINASTERIDE 5 MG/1
TABLET, FILM COATED ORAL
Qty: 90 TABLET | Refills: 1 | Status: SHIPPED | OUTPATIENT
Start: 2022-01-14 | End: 2022-06-10

## 2022-01-14 RX ORDER — BLOOD SUGAR DIAGNOSTIC
STRIP MISCELLANEOUS
Qty: 100 STRIP | Refills: 3 | Status: SHIPPED | OUTPATIENT
Start: 2022-01-14 | End: 2023-08-04

## 2022-01-14 RX ORDER — LISINOPRIL 10 MG/1
10 TABLET ORAL DAILY
Qty: 90 TABLET | Refills: 3 | Status: SHIPPED | OUTPATIENT
Start: 2022-01-14 | End: 2023-02-13

## 2022-01-14 RX ORDER — TAMSULOSIN HYDROCHLORIDE 0.4 MG/1
CAPSULE ORAL
Qty: 180 CAPSULE | Refills: 1 | Status: SHIPPED | OUTPATIENT
Start: 2022-01-14 | End: 2022-06-20

## 2022-01-14 RX ORDER — LANCETS
EACH MISCELLANEOUS
Qty: 100 EACH | Refills: 3 | Status: SHIPPED | OUTPATIENT
Start: 2022-01-14 | End: 2023-08-04

## 2022-01-14 RX ORDER — AMOXICILLIN 250 MG
1-2 CAPSULE ORAL DAILY PRN
Qty: 60 TABLET | Refills: 3 | Status: SHIPPED | OUTPATIENT
Start: 2022-01-14 | End: 2023-08-04

## 2022-01-14 NOTE — RESULT ENCOUNTER NOTE
Please send letter out to him or you can call:    Hello,    The labs do not show any new onset of heart disease.  However given your diabetes, history of smoking and other heart conditions, I think repeating that stress test is a good idea since it appears the EKG did change since the last time we checked.  Call to set this up as we discussed.      Connie Haskins MD

## 2022-01-25 DIAGNOSIS — M54.50 CHRONIC BILATERAL LOW BACK PAIN WITHOUT SCIATICA: ICD-10-CM

## 2022-01-25 DIAGNOSIS — G89.29 CHRONIC BILATERAL LOW BACK PAIN WITHOUT SCIATICA: ICD-10-CM

## 2022-01-25 RX ORDER — PREGABALIN 100 MG/1
200 CAPSULE ORAL 2 TIMES DAILY
Qty: 360 CAPSULE | Refills: 1 | Status: SHIPPED | OUTPATIENT
Start: 2022-01-25 | End: 2022-07-13

## 2022-01-25 NOTE — TELEPHONE ENCOUNTER
Spoke with [facility] today who approved the tier exception for pregabalin and denied tier exception for Spiriva. Will have DOD (Dr. Randhawa) approve refill for pregabalin.     Stacie Miranda, PharmD   Medication Therapy Management   Pharmacist Resident  Pager: [phone]

## 2022-01-27 RX ORDER — DULOXETIN HYDROCHLORIDE 30 MG/1
30 CAPSULE, DELAYED RELEASE ORAL DAILY
Qty: 90 CAPSULE | Refills: 3 | Status: SHIPPED | OUTPATIENT
Start: 2022-01-27 | End: 2022-03-03 | Stop reason: ALTCHOICE

## 2022-01-27 NOTE — PROGRESS NOTES
Spoke with Layo who stated he had not yet received duloxetine. Called [facility] who informed me that a 90 day supply of duloxetine is covered whereas a 30 day supply is $12. Will resend Rx for 90 day supply for Layo. [facility] states they will send out ASAP. Layo is aware.    Stacie Miranda, PharmD   Medication Therapy Management   Pharmacist Resident  Pager: [phone]

## 2022-02-01 ENCOUNTER — TELEPHONE (OUTPATIENT)
Dept: PHARMACY | Facility: CLINIC | Age: 67
End: 2022-02-01
Payer: COMMERCIAL

## 2022-02-01 NOTE — PROGRESS NOTES
Spoke with [facility] - duloxetine was sent out yesterday and should be delivered by end of this week. Informed Layo to continue taking fluoxetine until he gets duloxetine.     Zenaida Plasencia is working on patient assistance program for Spiriva. Layo plans to call her this week to check on status.    Stacie Miranda, PharmD   Medication Therapy Management   Pharmacist Resident  Pager: [phone]

## 2022-02-04 DIAGNOSIS — F11.90 CHRONIC, CONTINUOUS USE OF OPIOIDS: ICD-10-CM

## 2022-02-04 DIAGNOSIS — M96.1 FAILED BACK SYNDROME OF LUMBAR SPINE: ICD-10-CM

## 2022-02-04 NOTE — TELEPHONE ENCOUNTER
Reason for Call:  Medication or medication refill:    Do you use a [facility]?  Name of the pharmacy and phone [facility] for the [facility] [address] [place] [address]    Name of the medication requested: oxyCODONE (ROXICODONE) 5 MG tablet    Other request: NA    Can we leave a detailed message on this number? YES    Phone number patient can be reached at: Cell number on file:    Telephone Information:   Mobile [phone]       Best Time: any    Call taken on 2/4/2022 at 10:44 AM by Julia Camargo

## 2022-02-07 RX ORDER — OXYCODONE HYDROCHLORIDE 5 MG/1
5 TABLET ORAL EVERY 4 HOURS PRN
Qty: 150 TABLET | Refills: 0 | Status: SHIPPED | OUTPATIENT
Start: 2022-02-07 | End: 2022-03-07

## 2022-02-08 NOTE — PROGRESS NOTES
Spoke with Layo on phone. He received duloxetine - has been taking 30 mg daily every morning for 1 week and stopped fluoxetine. Reports feeling a little more tired. Educated Layo to switch dose to evening to see if this helps with fatigue.     He reports he has not heard back from Zenaida about Spiriva. After call, I spoke with Zenaida who states that she is working on paperwork but due to staffing issues it will take longer than normal for her to get aLyo enrolled with patient assistance. Spoke with [facility] who states copay will be $443 due to deductible. Layo expects to run out of Spiriva in 11 days and plans to set aside money to try to cover medication cost.    Stacie Miranda, PharmD   Medication Therapy Management   Pharmacist Resident  Pager: [phone]

## 2022-02-15 NOTE — PROGRESS NOTES
Assessment & Plan     Abnormal electrocardiogram  Will place care coordination referral for transportation  encouraged stress test and visit with cardiology      Type 2 diabetes mellitus without complication, without long-term current use of insulin (H)  Improved a1C  Continue to work with MTm on this   Taking medications as prescribed  - Adult Eye Referral; Future  - Primary Care - Care Coordination Referral; Future  - Lipid panel reflex to direct LDL Non-fasting    Morbid obesity (H)  Difficulty with exercise due to chronic back pain    Chronica pain:  Started duloxetine - feels this is OK - too soon to tell if this works  Is working to reduce narcotics  - feels this has helped him to feel better  plans to see MTM again in a few weeks to adjust dose of duloxetine and pain medications      Pain in finger of both hands  MCM pain at both thumbs  - Orthopedic  Referral; Future    Prescription drug management  30 minutes spent on the date of the encounter doing chart review, history and exam, documentation and further activities per the note        BMI:   Estimated body mass index is 33.39 kg/m  as calculated from the following:    Height as of 10/14/21: 1.829 m (6').    Weight as of this encounter: 111.7 kg (246 lb 3.2 oz).   Weight management plan: Discussed healthy diet and exercise guidelines        No follow-ups on file.    Connie Haskins MD  [facility]   Layo is a 66 year old who presents for the following health issues     HPI     Abnormal EKG noted at his last visit  He has no chest pain no dyspnea   He is compliant with the Xarelto for A fib  Stopped ASA nce this was started  At his last visit  I advised NM marie but pt had to reschedule this due to not fasting.  He also has difficulty with transportation - does not have a car  Relies now on his son to drive him  We discussed metro=mobility  He has significant risk factors for CAD including diabetes,  tobacco use, obesity, sedentary lifestyle    Diabetes Follow-up    How often are you checking your blood sugar? One time daily  What time of day are you checking your blood sugars (select all that apply)?  Before meals  Have you had any blood sugars above 200?  Yes on occasion  Have you had any blood sugars below 70?  No    What symptoms do you notice when your blood sugar is low?  None and Not applicable    What concerns do you have today about your diabetes? None     Do you have any of these symptoms? (Select all that apply)  No numbness or tingling in feet.  No redness, sores or blisters on feet.  No complaints of excessive thirst.  No reports of blurry vision.  No significant changes to weight.    Have you had a diabetic eye exam in the last 12 months? No        BP Readings from Last 2 Encounters:   02/16/22 122/83   01/13/22 128/78     Hemoglobin A1C POCT (%)   Date Value   03/15/2021 7.2 (H)   11/05/2020 6.0 (H)     Hemoglobin A1C (%)   Date Value   01/13/2022 7.0 (H)   09/09/2021 7.4 (H)     LDL Cholesterol Calculated (mg/dL)   Date Value   03/15/2021 42   07/17/2020 55            How many servings of fruits and vegetables do you eat daily?  0-1    On average, how many sweetened beverages do you drink each day (Examples: soda, juice, sweet tea, etc.  Do NOT count diet or artificially sweetened beverages)?   1    How many days per week do you exercise enough to make your heart beat faster? 3 or less    How many minutes a day do you exercise enough to make your heart beat faster? 9 or less    How many days per week do you miss taking your medication? 0        Review of Systems   Constitutional, HEENT, cardiovascular, pulmonary, gi and gu systems are negative, except as otherwise noted.      Objective    /83   Pulse 61   Temp 97.1  F (36.2  C) (Temporal)   Wt 111.7 kg (246 lb 3.2 oz)   SpO2 97%   BMI 33.39 kg/m    Body mass index is 33.39 kg/m .  Physical Exam   GENERAL: healthy, alert and no  distress  RESP: lungs clear to auscultation - no rales, rhonchi or wheezes  CV: regular rate and rhythm, normal S1 S2, no S3 or S4, no murmur, click or rub, no peripheral edema and peripheral pulses strong    Lab on 01/13/2022   Component Date Value Ref Range Status     Hemoglobin A1C 01/13/2022 7.0 (A) 0.0 - 5.6 % Final    Normal <5.7%   Prediabetes 5.7-6.4%    Diabetes 6.5% or higher     Note: Adopted from ADA consensus guidelines.     TSH 01/13/2022 1.69  0.40 - 4.00 mU/L Final

## 2022-02-16 ENCOUNTER — OFFICE VISIT (OUTPATIENT)
Dept: FAMILY MEDICINE | Facility: CLINIC | Age: 67
End: 2022-02-16
Payer: COMMERCIAL

## 2022-02-16 VITALS
WEIGHT: 246.2 LBS | OXYGEN SATURATION: 97 % | BODY MASS INDEX: 33.39 KG/M2 | DIASTOLIC BLOOD PRESSURE: 83 MMHG | HEART RATE: 61 BPM | TEMPERATURE: 97.1 F | SYSTOLIC BLOOD PRESSURE: 122 MMHG

## 2022-02-16 DIAGNOSIS — E11.9 TYPE 2 DIABETES MELLITUS WITHOUT COMPLICATION, WITHOUT LONG-TERM CURRENT USE OF INSULIN (H): ICD-10-CM

## 2022-02-16 DIAGNOSIS — M79.644 PAIN IN FINGER OF BOTH HANDS: ICD-10-CM

## 2022-02-16 DIAGNOSIS — R94.31 ABNORMAL ELECTROCARDIOGRAM: Primary | ICD-10-CM

## 2022-02-16 DIAGNOSIS — E66.01 MORBID OBESITY (H): ICD-10-CM

## 2022-02-16 DIAGNOSIS — M79.645 PAIN IN FINGER OF BOTH HANDS: ICD-10-CM

## 2022-02-16 PROCEDURE — 36415 COLL VENOUS BLD VENIPUNCTURE: CPT | Performed by: FAMILY MEDICINE

## 2022-02-16 PROCEDURE — 99214 OFFICE O/P EST MOD 30 MIN: CPT | Mod: 25 | Performed by: FAMILY MEDICINE

## 2022-02-16 PROCEDURE — 90732 PPSV23 VACC 2 YRS+ SUBQ/IM: CPT | Performed by: FAMILY MEDICINE

## 2022-02-16 PROCEDURE — G0009 ADMIN PNEUMOCOCCAL VACCINE: HCPCS | Performed by: FAMILY MEDICINE

## 2022-02-16 PROCEDURE — 80061 LIPID PANEL: CPT | Performed by: FAMILY MEDICINE

## 2022-02-16 NOTE — LETTER
February 18, 2022      Layo A Alcides  [address]   [place] [address]        Dear ,    We are writing to inform you of your test results.    Great news, your results were normal.   The cholesterol levels are very well controlled on the current dose of your cholesterol medication.  Let's keep that dose the same.       Resulted Orders   Lipid panel reflex to direct LDL Non-fasting   Result Value Ref Range    Cholesterol 97 <200 mg/dL    Triglycerides 201 (H) <150 mg/dL    Direct Measure HDL 28 (L) >=40 mg/dL    LDL Cholesterol Calculated 29 <=100 mg/dL    Non HDL Cholesterol 69 <130 mg/dL    Patient Fasting > 8hrs? No     Narrative    Cholesterol  Desirable:  <200 mg/dL    Triglycerides  Normal:  Less than 150 mg/dL  Borderline High:  150-199 mg/dL  High:  200-499 mg/dL  Very High:  Greater than or equal to 500 mg/dL    Direct Measure HDL  Female:  Greater than or equal to 50 mg/dL   Male:  Greater than or equal to 40 mg/dL    LDL Cholesterol  Desirable:  <100mg/dL  Above Desirable:  100-129 mg/dL   Borderline High:  130-159 mg/dL   High:  160-189 mg/dL   Very High:  >= 190 mg/dL    Non HDL Cholesterol  Desirable:  130 mg/dL  Above Desirable:  130-159 mg/dL  Borderline High:  160-189 mg/dL  High:  190-219 mg/dL  Very High:  Greater than or equal to 220 mg/dL       If you have any questions or concerns, please call the clinic at the number listed above.       Sincerely,      Connie Haskins MD/ms

## 2022-02-17 ENCOUNTER — PATIENT OUTREACH (OUTPATIENT)
Dept: CARE COORDINATION | Facility: CLINIC | Age: 67
End: 2022-02-17
Payer: COMMERCIAL

## 2022-02-17 LAB
CHOLEST SERPL-MCNC: 97 MG/DL
FASTING STATUS PATIENT QL REPORTED: NO
HDLC SERPL-MCNC: 28 MG/DL
LDLC SERPL CALC-MCNC: 29 MG/DL
NONHDLC SERPL-MCNC: 69 MG/DL
TRIGL SERPL-MCNC: 201 MG/DL

## 2022-02-17 NOTE — LETTER
[facility] CARE COORDINATION  [facility]  [address]  [place] [address]    February 18, 2022    Toby Mcgrath  [address]   [place] [address]      Dear Layo,    I am a clinic community health worker who works with Connie Haskins MD at the [facility]. I wanted to introduce myself and provide you with my contact information for you to be able to call me with any questions or concerns. Below is a description of clinic care coordination and how I can further assist you.      The clinic care coordination team is made up of a registered nurse,  and community health worker who understand the health care system. The goal of clinic care coordination is to help you manage your health and improve access to the health care system in the most efficient manner. The team can assist you in meeting your health care goals by providing education, coordinating services, strengthening the communication among your providers and supporting you with any resource needs.    Please feel free to contact me at [phone] with any questions or concerns. We are focused on providing you with the highest-quality healthcare experience possible and that all starts with you.     Sincerely,     Niyah Biggs  Community Health Worker  [facility], [facility]  [phone]

## 2022-02-17 NOTE — RESULT ENCOUNTER NOTE
Hello,    Great news, your results were normal.  The cholesterol levels are very well controlled on the current dose of your cholesterol medication.  Let's keep that dose the same.    Connie Haskins MD

## 2022-02-17 NOTE — PROGRESS NOTES
Clinic Care Coordination Contact  [facility]/Voicemail    Chart review: PCP referral from Dr. Haskins on 2/16/22. [facility]  referrals placed. MTM visit scheduled with Mary STARKS 3/1/22; office visit with Dr. Rosales scheduled 3/9/22.     Reason for Referral: Resources for Transportation     Clinical Staff have discussed the Care Coordination Referral with the patient and/or caregiver: Yes     Additional Information: does not have a car        Clinical Data: Care Coordinator Outreach   Outreach attempted x 1. Spoke briefly to pt who was just headed out the door to run [employer]. Pt recommended CHW call him tomorrow morning to discuss CC. CHW will call pt 2/18/22 in the morning, per pt request.    Plan: Care Coordinator will try to reach patient again in 1-2 business days.    Niyah Biggs  Community Health Worker  [facility], [facility] [facility]  [phone]

## 2022-02-18 NOTE — PROGRESS NOTES
Clinic Care Coordination Contact  [facility]/Voicemail    Chart review: PCP referral from Dr. Haskins on 2/16/22. [facility]  referrals placed. MTM visit scheduled with Mary STARKS 3/1/22; office visit with Dr. Rosales scheduled 3/9/22.     Reason for Referral: Resources for Transportation     Clinical Staff have discussed the Care Coordination Referral with the patient and/or caregiver: Yes     Additional Information: does not have a car        Clinical Data: Care Coordinator Outreach  Outreach attempted x 2.  Left message on patient's voicemail with call back information and requested return call.  Plan: Care Coordinator will send care coordination introduction letter with care coordinator contact information and explanation of care coordination services via mail. Care Coordinator will do no further outreaches at this time.    Niyah Biggs  Community Health Worker  [facility], [place] [facility]  [phone]

## 2022-02-24 NOTE — TELEPHONE ENCOUNTER
Spoke with Layo - he talked with Zenaida Plasencia today who is helping him with patient assistance program for Spiriva and Xarelto.     Patient will be filling out paperwork for Spiriva assistance. Hopes to get cost covered by mid-March. He reports that he will need to go without the medication until then.     Stacie Miranda, PharmD   Medication Therapy Management   Pharmacist Resident  Pager: [phone]

## 2022-02-28 NOTE — PROGRESS NOTES
Medication Therapy Management (MTM) Encounter    ASSESSMENT:                            Medication Adherence/Access: Layo will continue working with     Depression: Patient may benefit from increasing dose of duloxetine to help with additional pain management.    Pain: Stable. Plan in place for oxycodone dose decrease.    COPD: Given that a tiering exception for Spiriva was denied last month, will have to wait for Layo to receive patient assistance before he can  Spiriva. Patient's breathing is currently stable.    Hypertension/a-fib: Stable. Patient is meeting blood pressure goal of < 140/90mmHg. Encouraged patient to let the patient assistance program know a few days before he runs out of Xarelto to avoid going without the medication.     PLAN:                            1. Stacie has contacted Dr. Haskins increasing duloxetine to 60 mg daily.    2. After visit, Stacie called pharmacy to see if price has of Spiriva has come down with deductible, however the cost was still $442. Stacie will follow-up in a few weeks to check in on Spiriva cost assistance.     Follow-up: after talking with Dr. Haskins    SUBJECTIVE/OBJECTIVE:                          Toby Mcgrath is a 66 year old male called for a follow-up visit.  Today's visit is a follow-up MTM visit from 1/13/22     Reason for visit: f/up pain, COPD, and depression.    Allergies/ADRs: Reviewed in chart  Past Medical History: Reviewed in chart  Tobacco: He reports that he has quit smoking. His smoking use included cigarettes. He quit after 40.00 years of use. He quit smokeless tobacco use about 9 years ago.    Medication Adherence/Access: see below regarding medication cost status    Depression: He takes duloxetine 30 mg daily (recently transitioned from fluoxetine). He denies side effects. Patient feels he is thinking a little more clearer since starting and mood has improved significantly. He mentions that he is no longer sleeping all day and  feeling depressed. He does mention that he hasn't noticed any improvement with pain.     Pain: Patient has symptoms of nerve pain and lower back pain. Patient has a prescription for oxycodone 5 mg every 3 - 4 hours as needed - 5 tablets/day (Layo mentions that the plan is to decrease oxycodone down to 4 tablets/day). He denies side effects from this, other than constipation. He also takes pregabalin 200 mg twice daily. Takes acetaminophen extra stength - 3 capsules daily as needed (3x/week). In the past, he used lidocaine patches on his back as needed, but doesn't find them very effective. Naloxone was expensive for him so he doesn't have any on hand.     COPD: Patient takes Spiriva 2.5 mcg - 2 puffs daily, but recently ran out of the medication and cannot afford the cost. Patient spoke with [employer]'s patient assistance program and is currently waiting on paperwork to be mailed to him for Spiriva coverage. Patient reports he is breathing well - no SOB at night, at rest, or upon exertion.     Hypertension/a-fib: Current medications include lisinopril 10 mg daily, metoprolol tartrate 50 mg twice daily, and Xarelto 20 mg daily. Patient reports patient assistance program will help cover cost of Xarelto when he runs out (which patient reports will be soon).   ----------------  I spent 21 minutes with this patient today. All changes were made via verbal approval with Connie Haskins MD. A copy of the visit note was provided to the patient's provider(s).    The patient was mailed a summary of these recommendations.     Stacie Miranda, Mark   Pharmaceutical Care Resident   Medication Therapy Management    Preceptor cosignature: Toby IVERSON Alcides was seen independently by Dr. Miranda. I have reviewed the assessment and plan. Mary Last, Mark, NEIDA, BCACP    Telemedicine Visit Details  Type of service:  Telephone visit  Start Time: 9:34 AM  End Time: 9:55 AM  Originating Location (patient  location): Home  Distant Location (provider location):  [facility]     Medication Therapy Recommendations  Bilateral low back pain without sciatica    Current Medication: DULoxetine (CYMBALTA) 30 MG capsule   Rationale: Dose too low - Dosage too low - Effectiveness   Recommendation: Increase Dose - DULoxetine 60 MG capsule   Status: Contact Provider - Awaiting Response

## 2022-03-01 ENCOUNTER — PATIENT OUTREACH (OUTPATIENT)
Dept: NURSING | Facility: CLINIC | Age: 67
End: 2022-03-01
Payer: COMMERCIAL

## 2022-03-01 ENCOUNTER — VIRTUAL VISIT (OUTPATIENT)
Dept: PHARMACY | Facility: CLINIC | Age: 67
End: 2022-03-01
Payer: COMMERCIAL

## 2022-03-01 DIAGNOSIS — G89.29 CHRONIC BILATERAL LOW BACK PAIN WITHOUT SCIATICA: ICD-10-CM

## 2022-03-01 DIAGNOSIS — F33.0 MAJOR DEPRESSIVE DISORDER, RECURRENT EPISODE, MILD (H): Primary | ICD-10-CM

## 2022-03-01 DIAGNOSIS — M54.50 CHRONIC BILATERAL LOW BACK PAIN WITHOUT SCIATICA: ICD-10-CM

## 2022-03-01 DIAGNOSIS — I10 HYPERTENSION GOAL BP (BLOOD PRESSURE) < 140/90: ICD-10-CM

## 2022-03-01 DIAGNOSIS — I48.91 ATRIAL FIBRILLATION WITH RVR (H): ICD-10-CM

## 2022-03-01 DIAGNOSIS — J44.9 CHRONIC OBSTRUCTIVE PULMONARY DISEASE, UNSPECIFIED COPD TYPE (H): ICD-10-CM

## 2022-03-01 PROCEDURE — 99207 PR NO CHARGE LOS: CPT | Performed by: PHARMACIST

## 2022-03-01 NOTE — PROGRESS NOTES
Clinic Care Coordination Contact  Community Health Worker Initial Outreach    Spoke with pt this afternoon. Initially, CHW had called out to pt to introduce CC on 2/17/22, 2/18/22 but was [facility] x2. Pt called CHW on 2/28/22 at 2:05 requesting a call back.     Chart review: PCP referral from Dr. Haskins on 2/16/22. [facility]  referrals placed. MTM visit scheduled with Mary STARKS 3/1/22; office visit with Dr. Rosales scheduled 3/9/22.      Reason for Referral: Resources for Transportation     Clinical Staff have discussed the Care Coordination Referral with the patient and/or caregiver: Yes     Additional Information: does not have a car      CHW introduced Care Coordination and assessment for additional support/resources.  Pt reports he did live in subsidized housing until about 8 years ago. At that time his son went through a divorce and pt went to live with his son to assist him financially. Pt is using his [employer] funds to pay the majority of pt/son's rent currently. Pt's son makes $60,000/year, however, after paying child support, that amount is greatly diminished. Pt reports he relies on his son for transportation on son's days off on W and TH. Pt reports he would like [facility] services but he doesn't think he will be eligible. Pt states all his teeth have fallen out or are broken and he needs dental assistance urgently. Pt is having more difficulty with mobility; difficulty taking showers. Pt did contact a [facility] unit in [place] late 2021, however, they did not return his phone call. Pt reports getting $1247/mo [employer] check, $395/mo on pension so he makes roughly $16,000/year. Pt reports he and his son do have trouble affording food.     CHW Initial Information Gathering:  Referral Source: PCP  Current living arrangement:: I live in a private home with family (lives with son)  Type of residence:: Apartment  Community Resources: None  Supplies Currently Used at Home: Diabetic  Supplies  Equipment Currently Used at Home: cane, straight  Informal Support system:: Family (Son)  No PCP office visit in Past Year: No  Transportation means:: Family (Son provides rides on W and Th when his son is off work)  CHW Additional Questions  If ED/Hospital discharge, follow-up appointment scheduled as recommended?: N/A  Medication changes made following ED/Hospital discharge?: N/A  MyChart active?: No  Patient agreeable to assistance with activating MyChart?: No    Financial Resource Worker Screening    [facility] Benefits  Is patient requesting help applying for [facility] benefits?: Yes  Have you recently applied for any [facility] benefits?: No  How many people in your household?: 2  Do you buy/eat food together?: Yes  What is the monthly gross income for the household (wages, social security, workers comp, and pension)? : 5305 (This doesn't include pt's son's financial information.)    Insurance:  [place] insurance?: No  Is this an insurance renewal?: No  Is this a new insurance application request?: No    Any other information for the FRW?: Pt lives with his son, but for the past 8 years, pt has been using his [employer] to pay for the majority of their rent. Pt's son went through a divorce and the son pays much of his income for child support.    Care Coordination team will tell patient:   Thank you for answering all the questions, based on screening questions, our Financial Resource Worker will reach out to you with additional questions and next steps.    Patient accepts CC: Yes. Patient scheduled for assessment with SHASHA Navarrete, on 3/2/22 at 9:00am. Patient noted desire to discuss housing, transportation, county services, dental assistance.     Niyah Biggs  Community Health Worker  [facility], [place] [facility]  [phone]

## 2022-03-01 NOTE — PATIENT INSTRUCTIONS
Recommendations from today's MTM visit:                                                       1. Stacie will talk to Connie Haskins about increasing duloxetine to 60 mg daily.    2. After visit, Stacie called pharmacy to see if price has of Spiriva has come down with deductible, however the cost was still $442. Stacie will follow-up in a few weeks to check in on Spiriva cost assistance.     Follow-up: after talking with Dr. Haskins    It was great to speak with you today.  I value your experience and would be very thankful for your time with providing feedback on our clinic survey. You may receive a survey via email or text message in the next few days.     To schedule another MTM appointment, please call the clinic directly or you may call the MTM scheduling line at [phone] or toll-free at [phone].     My Clinical Pharmacist's contact information:                                                      Please feel free to contact me with any questions or concerns you have.      Stacie Miranda, PharmD   Medication Therapy Management   Pharmacist Resident  Pager: [phone]

## 2022-03-02 ENCOUNTER — PATIENT OUTREACH (OUTPATIENT)
Dept: CARE COORDINATION | Facility: CLINIC | Age: 67
End: 2022-03-02

## 2022-03-02 NOTE — TELEPHONE ENCOUNTER
Clinic Care Coordination Contact  Program: [facility]   County:  [facility] Case #:  [facility] Worker:   Sincere #:   Subscriber #:   Renewal:  Date Applied:     FRW Outreach:   3/2/22: FRW called pt back. Patient does not want to apply for [facility] benefits. Pt wanted to know if he was eligible for MA for transportation or dental work. Pt is over income for MA. FRW routing note to CC team.   3/2/22: FRW called pt. Pt was driving and asked FRW to call back.     Health Insurance:      Referral/Screening:  [facility]  Is patient requesting help applying for county benefits?: Yes  Have you recently applied for any [facility] benefits?: No  How many people in your household?: 2  Do you buy/eat food together?: Yes  What is the monthly gross income for the household (wages, social security, workers comp, and pension)? : 1642 (This doesn't include pt's son's financial information.)     Insurance:  Was [place] for active insurance?: No  Is this an insurance renewal?: No  Is this a new insurance application request?: No     Any other information for the FRW?: Pt lives with his son, but for the past 8 years, pt has been using his halfway to pay for the majority of their rent. Pt's son went through a divorce and the son pays much of his income for child support.     Care Coordination team will tell patient:   Thank you for answering all the questions, based on screening questions, our Financial Resource Worker will reach out to you with additiona    Goals Addressed:

## 2022-03-03 RX ORDER — DULOXETIN HYDROCHLORIDE 60 MG/1
60 CAPSULE, DELAYED RELEASE ORAL DAILY
Qty: 90 CAPSULE | Refills: 3 | Status: SHIPPED | OUTPATIENT
Start: 2022-03-03 | End: 2022-04-18

## 2022-03-03 NOTE — PROGRESS NOTES
Connie Haskins approved increasing dose of duloxetine to 60 mg daily. Called Layo to inform him. He will take duloxetine 30 mg - 2 capsules for now until he runs out and needs a new prescription. Patient requires 90 day supply per insurance.     Layo has appt with Connie Haskins on 3/9 but he thinks he didn't need it. Spoke with Connie Haskins who says it is okay to cancel but he needs to f/up with cardiology - informed Layo.    Patient reports that he has called patient assistance program multiple times about needing Xarelto and has not received response yet. Will reach out to them for patient.      Stacie Miranda, PharmD   Medication Therapy Management   Pharmacist Resident  Pager: [phone]

## 2022-03-04 ENCOUNTER — TELEPHONE (OUTPATIENT)
Dept: PHARMACY | Facility: CLINIC | Age: 67
End: 2022-03-04
Payer: COMMERCIAL

## 2022-03-04 NOTE — TELEPHONE ENCOUNTER
Spoke with [facility]'s Patient Assistance Program representative, Zenaida - they are waiting on income paperwork from Layo to provide him with the funds to cover cost of Xarelto. Spoke with Layo who says he faxed this information from a [facility] pharmacy last week, but Zenaida has not received the information. Zenaida plans to use the information she already has to file for [facility] pharmacy assistance when she is back in office next week.       Stacie Miranda, PharmD   Medication Therapy Management   Pharmacist Resident  Pager: [phone]

## 2022-03-07 DIAGNOSIS — F11.90 CHRONIC, CONTINUOUS USE OF OPIOIDS: ICD-10-CM

## 2022-03-07 DIAGNOSIS — M96.1 FAILED BACK SYNDROME OF LUMBAR SPINE: ICD-10-CM

## 2022-03-07 RX ORDER — OXYCODONE HYDROCHLORIDE 5 MG/1
5 TABLET ORAL EVERY 4 HOURS PRN
Qty: 150 TABLET | Refills: 0 | Status: SHIPPED | OUTPATIENT
Start: 2022-03-07 | End: 2022-03-11

## 2022-03-08 NOTE — TELEPHONE ENCOUNTER
[place] patient assistance program requires documentation of Layo's income before they can submit for patient assistance to . Zenaida Plasencia reports that Pradaxa does not require the minimum expenditure of at least $1170 out of pocket like Xarelto does. Spoke with Layo on phone - he reports that he has 21 tablets left of Xarelto. He plans to go to the [facility] pharmacy tomorrow to fax Zenaida Plasencia his income information at [phone].    Patient is willing to switch to warfarin in future if we need to. He will go to [facility] for blood draws.     Plan is to switch Layo to Pradaxa going forward - Pradaxa 150 mg twice daily for atrial fibrillation. Will wait to add this to medication list until Zenaida Plasencia is ready to fill out paperwork.     Connie Haskins verbally approved these changes.    Stacie Miranda, LloydD   Pharmaceutical Care Resident   Medication Therapy Management    Preceptor cosignature: Toby Mcgrath was seen independently by Dr. Miranda. I have reviewed the assessment and plan. Mary Last, PharmD, NEIDA, BCACP

## 2022-03-09 ENCOUNTER — TELEPHONE (OUTPATIENT)
Dept: FAMILY MEDICINE | Facility: CLINIC | Age: 67
End: 2022-03-09

## 2022-03-09 ENCOUNTER — PATIENT OUTREACH (OUTPATIENT)
Dept: NURSING | Facility: CLINIC | Age: 67
End: 2022-03-09
Payer: COMMERCIAL

## 2022-03-09 DIAGNOSIS — I48.92 ATRIAL FLUTTER WITH RAPID VENTRICULAR RESPONSE (H): ICD-10-CM

## 2022-03-09 NOTE — TELEPHONE ENCOUNTER
FYI~ I have approved Layo for up to $500 of the Pharmacy Assistance Fund to be filled at the [facility].    Layo and the Pharmacy have been informed.    Zenaida Plasencia  Prescription Assistance Supervisor  Pharmacy Assistance  [phone]

## 2022-03-09 NOTE — PROGRESS NOTES
"Clinic Care Coordination Contact  Community Health Worker Initial Outreach    Spoke with pt this afternoon to reschedule initial CC SW assessment with Rosalba, after pt was a NS for 4/2/22 initial CC SW assessment.    Rescheduled initial CC SW assessment with Rosalba for 3/14/22 at 10:00am.    See 3/1/22 CHW initial outreach.  See 3/2/22 FRW note.    CHW introduced Care Coordination and assessment for additional support/resources.  Pt does not think he wants dental resources at this time. \"I have been living with this for 2 years and I'm getting used to it. It's not easy to eat but I am getting used to it.\" CHW stated there are low-income dental clinics that might be able to assist pt with his dental issues, however, pt states, \"I don't think I want to pursue that otherwise I might get all pissed off at you guys.\" Pt does state it is getting more difficult for him to bathe, so he would be interested in looking into Sr. Housing. Pt would like to focus on Sr. Housing options rather then dental resources currently.      Patient accepts CC: Yes. Patient scheduled for assessment with SHASHA Navarrete, on 3/14/22 at 10:00am. Patient noted desire to discuss sr. housing options.     Niyah Biggs  Community Health Worker  [facility]  [phone]      "

## 2022-03-09 NOTE — TELEPHONE ENCOUNTER
Patient has been awarded the prescription assistance fund through [place].  He cannot afford his Xarelto and a $500 cash fund has been set up for him for a one-time use for this medication.    Please send a new prescription for his Xarelto to us at the [facility] so we can fill it and use this fund.    Thanks,  Laura Herrera, Jimena  [facility]  [phone]

## 2022-03-09 NOTE — TELEPHONE ENCOUNTER
Great news - thank you!  Rx sent.     Mary Last, PharmD, NEIDA, BCACP  MTM Pharmacist, [facility]

## 2022-03-10 ENCOUNTER — TELEPHONE (OUTPATIENT)
Dept: PHARMACY | Facility: CLINIC | Age: 67
End: 2022-03-10
Payer: COMMERCIAL

## 2022-03-10 NOTE — TELEPHONE ENCOUNTER
Spoke with patient to let him know that Xarelto approved - he plans to call pharmacy to confirm it is ready before he goes in to .     Advised patient to discuss Spiriva with Zenaida Plasencia. Patient reports breathing is stable and unchanged at this time.      Stacie Miranda, PharmD   Medication Therapy Management   Pharmacist Resident  Pager: [phone]

## 2022-03-11 RX ORDER — OXYCODONE HYDROCHLORIDE 5 MG/1
5 TABLET ORAL EVERY 4 HOURS PRN
Qty: 150 TABLET | Refills: 0 | Status: SHIPPED | OUTPATIENT
Start: 2022-03-11 | End: 2022-04-08

## 2022-03-11 NOTE — TELEPHONE ENCOUNTER
JS, (DOD),    Please resend oxycodone to [facility] if appropriate in  SNs absenced    Pharmacist from [facility] calling says they don't have enough tabs to fill oxycodone but [facility] does. Since it is ciontroled they need a new script.    Please authorize if appropriate.  Thanks,  Lavinia Ortega RN

## 2022-03-15 NOTE — TELEPHONE ENCOUNTER
Spoke with Zenaida Plasencia about patient assistance for Joseline. Zenaida reports that she is working on getting him the paperwork, but due to short staffing, this may take some time.         Stacie Miranda, PharmD   Medication Therapy Management   Pharmacist Resident  Pager: [phone]

## 2022-04-04 DIAGNOSIS — F11.90 CHRONIC, CONTINUOUS USE OF OPIOIDS: ICD-10-CM

## 2022-04-04 DIAGNOSIS — M96.1 FAILED BACK SYNDROME OF LUMBAR SPINE: ICD-10-CM

## 2022-04-04 NOTE — TELEPHONE ENCOUNTER
Routing refill request to provider for review/approval because:  Drug not on the [facility] refill protocol   For DOD covering - pt usually calls in Rx a week ahead so he doesn't have to struggle to get approve/run out  Ivis SMALLS RN

## 2022-04-04 NOTE — TELEPHONE ENCOUNTER
Reason for Call:  Medication or medication refill:    Do you use a [facility]?  Name of the pharmacy and phone number for the current request:    [facility] [address] [place], [place]    Name of the medication requested: oxyCODONE (ROXICODONE) 5 MG tablet    Other request: NA    Can we leave a detailed message on this number? YES    Phone number patient can be reached at: Cell number on file:    Telephone Information:   Mobile [phone]       Best Time: any    Call taken on 4/4/2022 at 3:01 PM by Julia Camargo

## 2022-04-05 ENCOUNTER — TELEPHONE (OUTPATIENT)
Dept: FAMILY MEDICINE | Facility: CLINIC | Age: 67
End: 2022-04-05
Payer: COMMERCIAL

## 2022-04-05 NOTE — TELEPHONE ENCOUNTER
Reason for Call:  Form, our goal is to have forms completed with 72 hours, however, some forms may require a visit or additional information.    Type of letter, form or note:  medical-  3 prescription assistance forms -  for Cymbalta, Spiriva Respimat and Xarelto.    Pharmacy sent forms interoffice,  And included an addressed interoffice envelope return them.     Who is the form from?: [facility] pharmacy (if other please explain)    Where did the form come from: form was mailed in-interoffice     What clinic location was the form placed at?: [facility]    Where the form was placed: Dr Haskins's Box/Folder    What number is listed as a contact on the form?: [phone] direct for Zenaida Plasencia, prescription assistance.        Additional comments:     Call taken on 4/5/2022 at 1:32 PM by Vannesa Weeks

## 2022-04-08 RX ORDER — OXYCODONE HYDROCHLORIDE 5 MG/1
5 TABLET ORAL EVERY 4 HOURS PRN
Qty: 150 TABLET | Refills: 0 | Status: SHIPPED | OUTPATIENT
Start: 2022-04-11 | End: 2022-05-06

## 2022-04-18 ENCOUNTER — TELEPHONE (OUTPATIENT)
Dept: FAMILY MEDICINE | Facility: CLINIC | Age: 67
End: 2022-04-18
Payer: COMMERCIAL

## 2022-04-18 DIAGNOSIS — M54.50 CHRONIC BILATERAL LOW BACK PAIN WITHOUT SCIATICA: ICD-10-CM

## 2022-04-18 DIAGNOSIS — G89.29 CHRONIC BILATERAL LOW BACK PAIN WITHOUT SCIATICA: ICD-10-CM

## 2022-04-18 DIAGNOSIS — F33.0 MAJOR DEPRESSIVE DISORDER, RECURRENT EPISODE, MILD (H): ICD-10-CM

## 2022-04-18 RX ORDER — DULOXETIN HYDROCHLORIDE 60 MG/1
60 CAPSULE, DELAYED RELEASE ORAL DAILY
Qty: 90 CAPSULE | Refills: 3 | Status: SHIPPED | OUTPATIENT
Start: 2022-04-18 | End: 2022-08-02

## 2022-04-18 NOTE — TELEPHONE ENCOUNTER
I am in process of applying to the Cymbalta assistance program.  Rachel [employer] requires a hand signed, brand name, hard copy script be submitted with their application.    Please hand sign a BRAND name, hard copy script for:      CYMBALTA    Please send the HARD COPY script to me     via interoffice mail  FPS Zenaida Kim     or via [place] mail  at:   [facility]  Zenaida Plasencia  [address] [place]  [address]    Thanks so much for your help!    Zenaida Plasencia  Prescription Assistance Supervisor  Pharmacy Assistance  [phone]

## 2022-04-18 NOTE — TELEPHONE ENCOUNTER
I have received  Layo's completed applications for Xarelto and Pradaxa.  Layo has used the Pharmacy Assistance Fund to help with the out of pocket expense required for the Xarelto program.  He needs to spend an additional $340.  Layo does not believe he will meet that amount.    Pradaxa has been considered because of the Xarelto out of pocket expense.    If Pradaxa is still appropriate, please let me know what the strength and dose for the applications.    Thanks much!!    Zenaida Plasencia  Prescription Assistance Supervisor  Pharmacy Assistance  [phone]

## 2022-04-21 ENCOUNTER — TELEPHONE (OUTPATIENT)
Dept: FAMILY MEDICINE | Facility: CLINIC | Age: 67
End: 2022-04-21
Payer: COMMERCIAL

## 2022-04-21 DIAGNOSIS — J44.9 CHRONIC OBSTRUCTIVE PULMONARY DISEASE, UNSPECIFIED COPD TYPE (H): ICD-10-CM

## 2022-04-21 DIAGNOSIS — I48.91 ATRIAL FIBRILLATION WITH RVR (H): Primary | ICD-10-CM

## 2022-04-21 RX ORDER — DABIGATRAN ETEXILATE 150 MG/1
150 CAPSULE ORAL 2 TIMES DAILY
Qty: 180 CAPSULE | Refills: 3 | Status: SHIPPED | OUTPATIENT
Start: 2022-04-21 | End: 2023-02-15

## 2022-04-21 NOTE — TELEPHONE ENCOUNTER
VIVIANE (DOD),   Please see below - can you write/sign Rx?  Then give to TC to interoffice?  Ivis SMALLS RN

## 2022-04-21 NOTE — TELEPHONE ENCOUNTER
Keagan Estevez the Rx was signed and sent to you via interoffice mail.    Thank you,  Sara Mclaughlin RN  [place]

## 2022-04-21 NOTE — TELEPHONE ENCOUNTER
Triage - can you see if another provider can sign this script and then it needs to be mailed to Zenaida    I think it printed in provider office  If not I can print on Monday (god willing)  SN

## 2022-04-21 NOTE — TELEPHONE ENCOUNTER
I printed  this but it needs to be signed and mailed could you have another provider sign it?    SN

## 2022-04-21 NOTE — TELEPHONE ENCOUNTER
Rx for Spiriva Respimat is signed and printed.  Given to TC for Interoffice mail to Zenaida Plasencia.  Shandra Hendricks (Lori) CNP  [facility]  Certificate in Travel Health

## 2022-04-21 NOTE — TELEPHONE ENCOUNTER
I am in process of applying to the Pradaxa assistance program.  [employer] requires a hand signed, brand name, hard copy script be submitted with their application.    Please hand sign a BRAND name, hard copy script for:    PRADAXA, 150mg 1 po bid,, #180, 3 refills    Please send the HARD COPY script to me     via interoffice mail  FPS Zenaida Kim     or via US mail  at:   [facility]  Zenaida Plasencia  [address] [place]  [address]    Thanks so much for your help!    Zenaida Plasencia  Prescription Assistance Supervisor  Pharmacy Assistance  [phone]

## 2022-04-21 NOTE — TELEPHONE ENCOUNTER
I am in process of applying to the Spiriva Respimat  assistance program.  [employer] requires a hand signed, brand name, hard copy script be submitted with their application.    Please hand sign a BRAND name, hard copy script for:     SPIRIVA RESPIMAT    Please send the HARD COPY script to me     via interoffice mail  FPS Zenaida Kim     or via US mail  at:   [facility]  Zenaida Plasencia  [address] [place]  [address]    Thanks so much for your help!    Zenaida Plasencia  Prescription Assistance Supervisor  Pharmacy Assistance  [phone]

## 2022-04-29 ENCOUNTER — TELEPHONE (OUTPATIENT)
Dept: FAMILY MEDICINE | Facility: CLINIC | Age: 67
End: 2022-04-29
Payer: COMMERCIAL

## 2022-04-29 NOTE — TELEPHONE ENCOUNTER
Layo has been approved to the [employer] assistance program for Spiriva & Pradaxa through December 2022.  He will receive this medication at no cost through the enrollment period.    A 90 day supply of SPIRIVA RESPIMAT & PRADAXA will be delivered to Brigid's home within 7-10 business days. Layo has been informed of this approval.    Layo will contact my office for refills as we must work directly with the .  I will note EPIC as each refill is requested.    Thanks so much for your help!    Zenaida Plasencia  Prescription Assistance Supervisor  Pharmacy Assistance  [phone]

## 2022-05-06 DIAGNOSIS — M96.1 FAILED BACK SYNDROME OF LUMBAR SPINE: ICD-10-CM

## 2022-05-06 DIAGNOSIS — F11.90 CHRONIC, CONTINUOUS USE OF OPIOIDS: ICD-10-CM

## 2022-05-06 RX ORDER — OXYCODONE HYDROCHLORIDE 5 MG/1
5 TABLET ORAL EVERY 4 HOURS PRN
Qty: 150 TABLET | Refills: 0 | Status: SHIPPED | OUTPATIENT
Start: 2022-05-06 | End: 2022-06-09

## 2022-05-10 ENCOUNTER — TELEPHONE (OUTPATIENT)
Dept: PHARMACY | Facility: CLINIC | Age: 67
End: 2022-05-10
Payer: COMMERCIAL

## 2022-05-10 NOTE — TELEPHONE ENCOUNTER
Reason for Call:  Other call back    Detailed comments: Questions about his medication Cymbalta 60 mg. Please call    Phone Number Patient can be reached at: Home number on file [phone] (home)    Best Time: any    Can we leave a detailed message on this number? YES    Call taken on 5/10/2022 at 8:07 AM by Peggy Shabazz

## 2022-05-10 NOTE — TELEPHONE ENCOUNTER
Pt calling about Pradaxa he received in the mail   Wants to know what it's for  Told him it's a blood thinner to replace the Xarelto d/t cost  Pt will finish the Xarelto he has and switch to this  Ivis SMALLS RN

## 2022-05-11 NOTE — TELEPHONE ENCOUNTER
Called patient - his question was actually about Xarelto he reports he has about 35 pills left, maybe more. He also has pradaxa at home from a patient assistance program. He spoke with a nurse in our clinic yesterday and he understands not to take Xarelto and Pradaxa together. But it's clear he's confused about a few of his other medicines as well (he's had a number of changes with needing help with patient assistance). Appt scheduled with MTM on 5/17 - asked pt to bring all of his meds with him to that appointment.     Mary Last, LloydD, NEIDA, BCACP  MT Pharmacist, [facility]

## 2022-05-16 NOTE — PROGRESS NOTES
Medication Therapy Management (MTM) Encounter    ASSESSMENT:                            Medication Adherence/Access: Advised use of pill boxes to help med adherence and apps on his phone.     Type 2 Diabetes: Stable - last A1c and urine albumin were at goal. SMBG a little bit high, but will monitor since he's had multiple recent med changes.     Depression: improved - discussed that dose escalations above 60mg/day do not have added benefit for mood or pain management, but can have more side effects. After our discussion he agrees that dose escalation may not be worth it at this time.       COPD: Discussed appropriate dose and inhaler technique with respimat inhaler.      Hypertension/a-fib: Discussed administration of Pradaxa.     Allergic Rhinitis: Stable    Hyperlipidemia: Stable - LDL at goal when checked in Feb. Trigs a bit elevated, but not enough to add/change     Benign Prostate Hyperplasia: Doing all we can with meds at this point, which he's comfortable with. He has been told surgery is likely his next option but financially this is not an option for him at this time.     Pain: Unable to find pt assistance program for Narcan, will continue to pursue. Switching pregabalin Rx to [facility] would offer cost savings for him.     PLAN:                            1. I will send send a prescription to [facility] for pregabalin (90-day supply) so you can get your next refill there. Pregabalin is generic for Lyrica.     2. Start taking two puffs of Spiriva in the morning - each time you turn the end and press the button is one puff/inhalation. So take one puff, wait about 3-5 minutes, then take the second one.     3. Finish up the Xarelto (about 47 days) then change to the Pradaxa. When you start Pradaxa take one capsule twice a day with food.     4. When you need refills of Spiriva and Pradaxa call the same number you called Ana Luisa at [phone]. Even if you don't need it yet refill the Pradaxa yet still ask for a refill  "so you have a bit extra saved up to get you through the paperwork process next year.     Follow-up: Mary will check in with you in about 45 days to make sure you're ready to switch to the Pradaxa/answer any questions you have.     SUBJECTIVE/OBJECTIVE:                          Toby Mcgrath is a 66 year old male coming in for a follow-up visit.  Today's visit is a follow-up MT visit from 3/1/22     Reason for visit: f/up on recent medication changes. He brings all of his meds with him today.     Allergies/ADRs: Reviewed in chart  Past Medical History: Reviewed in chart  Tobacco: He reports that he has quit smoking. His smoking use included cigarettes. He quit after 40.00 years of use. He quit smokeless tobacco use about 9 years ago.  Alcohol: not currently using  Social: Now has access to a car for appointments without relying on his son.     Medication Adherence/Access: Has had difficulty affording meds. He has been able to get patient assistance for Spiriva and Pradaxa (which will replace Xarelto). Also able to get one-time $500 assistance to have enough Xarelto for another 47 days.     Type 2 Diabetes:  Currently taking Metformin ER 500mg - 4 tablets with dinner. Patient is not experiencing side effects.  Blood sugar monitoring: one time(s) daily. Ranges (patient reported): \"staying right around 137\". Denies s/sx of hypo/hyperglycemia.   Eye exam: due  Foot exam: due  Diet/Exercise: Unchanged from previous visits.   Aspirin: Not taking due to treatment with Xarelto/Pradaxa, primary prevention    Depression: He takes duloxetine 60 mg daily. He denies side effects. Patient feels his mood has been good, \"since I started this stuff I have been thinking better.\" Not helping pain, but helping mood. He is wondering about a dose increase to see if this would help with pain.        COPD: Patient takes Spiriva Respimat 2.5 mcg - 2 puffs daily - however he does not know if one twist on the inhaler counts as one puff or " two. Has been taking one or two puffs and has been taking twice daily, sometimes forgetting to take a second time later in the day.   Patient has been working with [employer]'s patient assistance program for Spiriva coverage and has been approved - he received 3 months worth of Spiriva and needs to know how to get refills (used to call Ana Luisa, she's no longer there, who does he call now?).  Patient reports he is breathing well - no SOB at night, at rest, or upon exertion. Feeling better after restarting Spiriva.      Hypertension/a-fib: Current medications include lisinopril 10 mg daily, metoprolol tartrate 50 mg twice daily, and Xarelto 20 mg daily. Once he completes Xarelto therapy, he will transition to Pradaxa 150 mg twice daily. Patient has 47 tablets left of Xarelto.     Allergic Rhinitis: Current medications include fluticasone nasal spray 1-2 spray(s) once daily. Patient feels that current therapy is effective.     Hyperlipidemia: Current therapy includes simvastatin 20 mg daily.  Patient reports no significant myalgias or other side effects.    Benign Prostate Hyperplasia: Patient is taking tamsulosin 0.8 mg once daily and finasteride 5 mg once daily. Patient reports no current medication side effects. Patient denies urinary pain or discomfort. He is struggling with emptying his bladder completely (feels like he has to get up a night and go, then goes back to bed and has to get up again), also struggling with dribbling. He reports he doesn't have the money to see Specialists right now (roughly a $50 copay).     Pain: Patient has symptoms of nerve pain and lower back pain. Patient has a prescription for oxycodone 5 mg every 3 - 4 hours as needed - 5 tablets/day. He denies side effects from this, other than constipation (for which he uses Senna S as needed). He also takes pregabalin 200 mg twice daily (has been affordable since switching to the generic, but would be less expensive if he got this from  [facility] and would like to change pharmacies - currently filling at [facility]). Takes acetaminophen extra stength - 3 capsules daily as needed (3x/week). In the past, he used lidocaine patches on his back as needed, but doesn't find them very effective. Naloxone was expensive for him so he doesn't have any on hand.     Today's Vitals: /64   Wt 247 lb (112 kg)   BMI 33.50 kg/m    ----------------  I spent 45 minutes with this patient today. All changes were made via collaborative practice agreement with Connie Haskins MD. A copy of the visit note was provided to the patient's provider(s).    The patient was given a summary of these recommendations.     Mary Last, LloydD, NEIDA, BCACP  MTM Pharmacist, [facility]      Medication Therapy Recommendations  Atrial fibrillation with RVR (H)    Current Medication: rivaroxaban ANTICOAGULANT (XARELTO) 20 MG TABS tablet   Rationale: Cannot afford medication product - Cost - Adherence   Recommendation: Change Medication - PRADAXA ANTICOAGULANT 150 MG Caps   Status: Accepted per Provider         COPD (chronic obstructive pulmonary disease) (H)    Current Medication: tiotropium (SPIRIVA RESPIMAT) 2.5 MCG/ACT inhaler   Rationale: Does not understand instructions - Adherence - Adherence   Recommendation: Provide Education   Status: Patient Agreed - Adherence/Education

## 2022-05-17 ENCOUNTER — OFFICE VISIT (OUTPATIENT)
Dept: PHARMACY | Facility: CLINIC | Age: 67
End: 2022-05-17
Payer: COMMERCIAL

## 2022-05-17 VITALS — SYSTOLIC BLOOD PRESSURE: 118 MMHG | DIASTOLIC BLOOD PRESSURE: 64 MMHG | WEIGHT: 247 LBS | BODY MASS INDEX: 33.5 KG/M2

## 2022-05-17 DIAGNOSIS — M54.50 CHRONIC BILATERAL LOW BACK PAIN WITHOUT SCIATICA: ICD-10-CM

## 2022-05-17 DIAGNOSIS — I10 HYPERTENSION GOAL BP (BLOOD PRESSURE) < 140/90: ICD-10-CM

## 2022-05-17 DIAGNOSIS — J30.2 SEASONAL ALLERGIC RHINITIS, UNSPECIFIED TRIGGER: ICD-10-CM

## 2022-05-17 DIAGNOSIS — I48.91 ATRIAL FIBRILLATION WITH RVR (H): ICD-10-CM

## 2022-05-17 DIAGNOSIS — J44.9 CHRONIC OBSTRUCTIVE PULMONARY DISEASE, UNSPECIFIED COPD TYPE (H): ICD-10-CM

## 2022-05-17 DIAGNOSIS — G89.29 CHRONIC BILATERAL LOW BACK PAIN WITHOUT SCIATICA: ICD-10-CM

## 2022-05-17 DIAGNOSIS — F33.0 MAJOR DEPRESSIVE DISORDER, RECURRENT EPISODE, MILD (H): ICD-10-CM

## 2022-05-17 DIAGNOSIS — E11.9 TYPE 2 DIABETES MELLITUS WITHOUT COMPLICATION, WITHOUT LONG-TERM CURRENT USE OF INSULIN (H): Primary | ICD-10-CM

## 2022-05-17 DIAGNOSIS — N40.1 BENIGN PROSTATIC HYPERPLASIA WITH URINARY FREQUENCY: ICD-10-CM

## 2022-05-17 DIAGNOSIS — R35.0 BENIGN PROSTATIC HYPERPLASIA WITH URINARY FREQUENCY: ICD-10-CM

## 2022-05-17 DIAGNOSIS — E78.5 HYPERLIPIDEMIA LDL GOAL <100: ICD-10-CM

## 2022-05-17 PROCEDURE — 99207 PR NO CHARGE LOS: CPT | Performed by: PHARMACIST

## 2022-05-17 NOTE — PATIENT INSTRUCTIONS
"Recommendations from today's MTM visit:                                                       1. I will send send a prescription to [facility] for pregabalin (90-day supply) so you can get your next refill there. Pregabalin is generic for Lyrica.     2. Start taking two puffs of Spiriva in the morning - each time you turn the end and press the button is one puff/inhalation. So take one puff, wait about 3-5 minutes, then take the second one.     3. Finish up the Xarelto (about 47 days) then change to the Pradaxa. When you start Pradaxa take one capsule twice a day with food.     4. When you need refills of Spiriva and Pradaxa call the same number you called Ana Luisa at [phone]. Even if you don't need it yet refill the Pradaxa yet still ask for a refill so you have a bit extra saved up to get you through the paperwork process next year.     Follow-up: Mary will check in with you in about 45 days to make sure you're ready to switch to the Pradaxa/answer any questions you have.     It was great speaking with you today.  I value your experience and would be very thankful for your time in providing feedback in our clinic survey. In the next few days, you may receive an email or text message from "[employer] with a link to a survey related to your  clinical pharmacist.\"     To schedule another MTM appointment, please call the clinic directly or you may call the MTM scheduling line at [phone] or toll-free at [phone].     My Clinical Pharmacist's contact information:                                                      Please feel free to contact me with any questions or concerns you have.      Stacie Miranda, Mark  Medication Therapy Management Resident, [facility]  Pager: [phone]  Email: [email or web address]    Mary Last, Pharm.D., M.B.A., [facility]  Medication Therapy Management Pharmacist, [facility]  Pager: [phone]  Email: " [email or web address]

## 2022-06-06 DIAGNOSIS — M96.1 FAILED BACK SYNDROME OF LUMBAR SPINE: ICD-10-CM

## 2022-06-06 DIAGNOSIS — F11.90 CHRONIC, CONTINUOUS USE OF OPIOIDS: ICD-10-CM

## 2022-06-06 NOTE — TELEPHONE ENCOUNTER
Reason for Call:  Medication or medication refill:    Do you use a [facility]?  Name of the pharmacy and phone number for the current request: [facility] pharmacy [place], [place]    Name of the medication requested: oxyCODONE (ROXICODONE) 5 MG tablet    Other request: NA    Can we leave a detailed message on this number? YES    Phone number patient can be reached at: Cell number on file:    Telephone Information:   Mobile [phone]       Best Time: any    Call taken on 6/6/2022 at 7:47 AM by Julia Camargo

## 2022-06-09 RX ORDER — OXYCODONE HYDROCHLORIDE 5 MG/1
5 TABLET ORAL EVERY 4 HOURS PRN
Qty: 150 TABLET | Refills: 0 | Status: SHIPPED | OUTPATIENT
Start: 2022-06-09 | End: 2022-06-10

## 2022-06-10 RX ORDER — OXYCODONE HYDROCHLORIDE 5 MG/1
5 TABLET ORAL EVERY 4 HOURS PRN
Qty: 150 TABLET | Refills: 0 | Status: SHIPPED | OUTPATIENT
Start: 2022-06-11 | End: 2022-06-29

## 2022-06-10 NOTE — TELEPHONE ENCOUNTER
CW,   Patient calling to check status of refill  Told pt you are seeing other patients   Ivis SMALLS RN

## 2022-06-10 NOTE — ADDENDUM NOTE
Addended by: SHAMA TAYLOR on: 6/10/2022 05:42 PM     Modules accepted: Orders     Dr. Siddiqui patient. Cath possible with Dr. Leal 06/07 at 0700, 0530 arrival time. Right radial. Abnormal stress test

## 2022-06-10 NOTE — TELEPHONE ENCOUNTER
Triage,   Patient called  Was told by [facility] they will not have enough tablets to fill Rx.   Did not  Rx at [facility] pharmacy [place].  Wants Rx sent to the [address]   Needs it filled by tomorrow.   Please advise.   Thanks!  Julia PRATT

## 2022-06-10 NOTE — TELEPHONE ENCOUNTER
CW,   Cancelled Rx at [place]     (Didn't earlier as I told pt if he didn't hear back from DOD then he could at least fill the 12 pills that they did have in stock and then rest of 138 pills would be voided and this could be dealt with Monday when PCP back - pt preferred not to do that) but was an option if he absolutely needed it for the weekend    Correct pharmacy is pended - is located on [facility] but that's not the address - did pend pharmacy earlier and confirm with pt    Ivis SMALLS RN

## 2022-06-10 NOTE — TELEPHONE ENCOUNTER
I'd be okay sending it if we can cancel the other one, but I'm not seeing a [facility] in [place] on [address]....

## 2022-06-24 ENCOUNTER — TRANSFERRED RECORDS (OUTPATIENT)
Dept: MULTI SPECIALTY CLINIC | Facility: CLINIC | Age: 67
End: 2022-06-24

## 2022-06-24 ENCOUNTER — TRANSFERRED RECORDS (OUTPATIENT)
Dept: HEALTH INFORMATION MANAGEMENT | Facility: CLINIC | Age: 67
End: 2022-06-24

## 2022-06-24 LAB — RETINOPATHY: NORMAL

## 2022-06-28 ENCOUNTER — TELEPHONE (OUTPATIENT)
Dept: PHARMACY | Facility: CLINIC | Age: 67
End: 2022-06-28

## 2022-06-28 NOTE — PROGRESS NOTES
Assessment & Plan     Encounter for long-term (current) use of medications  Pain control reviewed  taking 4 tablets of oxycodone daily  No concerns for brain fogginess or confusion    - [id number] - Urine Drug Confirmation Panel (Comprehensive); Future  - [id number] - Urine Drug Confirmation Panel (Comprehensive)    Type 2 diabetes mellitus without complication, without long-term current use of insulin (H)    - OPTOMETRY REFERRAL; Future  - HEMOGLOBIN A1C; Future  - HEMOGLOBIN A1C    Hip pain, left  Concern for advanced DGD hip  Sending for xray and then to ortho  - Orthopedic  Referral; Future  - XR Pelvis 1/2 Views; Future  - XR Hip Left 2-3 Views; Future      25 minutes spent on the date of the encounter doing chart review, history and exam, documentation and further activities per the note       See Patient Instructions    No follow-ups on file.    Connie Haskins MD  [facility]    Hussain Vasques is a 66 year old, presenting for the following health issues:  Diabetes      History of Present Illness       Diabetes:   He presents for follow up of diabetes.  He is checking home blood glucose one time daily. He checks blood glucose after meals.  Blood glucose is sometimes over 200 and never under 70. When his blood glucose is low, the patient is asymptomatic for confusion, blurred vision, lethargy and reports not feeling dizzy, shaky, or weak.  He is concerned about other.  He is having burning in feet. The patient has had a diabetic eye exam in the last 12 months.         He eats 0-1 servings of fruits and vegetables daily.He consumes 3 sweetened beverage(s) daily.He exercises with enough effort to increase his heart rate 9 or less minutes per day.  He exercises with enough effort to increase his heart rate 3 or less days per week.   He is taking medications regularly.    Today's PHQ-9         PHQ-9 Total Score: 3    PHQ-9 Q9 Thoughts of better off dead/self-harm past 2 weeks :    [facility]  Brief Operative Note     SUMMARY     Surgery Date: 2/7/2019     Surgeon(s) and Role:     * Hussein Mariscal III, MD - Primary    Assisting Surgeon: None    Pre-op Diagnosis:  Chronic diarrhea [K52.9]  Gastroesophageal reflux disease, esophagitis presence not specified [K21.9]    Post-op Diagnosis:  Post-Op Diagnosis Codes:     * Chronic diarrhea [K52.9]     * Gastroesophageal reflux disease, esophagitis presence not specified [K21.9]      - Gastritis      - Diverticulosis  Procedure(s) (LRB):  COLONOSCOPY (N/A)  EGD (ESOPHAGOGASTRODUODENOSCOPY) (N/A)    Anesthesia: Monitor Anesthesia Care    Description of the findings of the procedure: Procedures completed. See Procedure note for full details.    Findings/Key Components: Procedures completed. See Procedure note for full details.    Prosthetics/Devices: None    Estimated Blood Loss: * No values recorded between 2/7/2019 12:00 AM and 2/7/2019  2:52 PM *         Specimens:   Specimen (12h ago, onward)    Start     Ordered    02/07/19 1446  Specimen to Pathology - Surgery  Once     Comments:  1.  Small bowel biopsy -R/O Celiac2.  Antrum biopsy,Gastritis -R/O H Pylori3.  Right colon biopsy,Diarrhea -R/O microscopic colitis4.  Sigmoid colon polyp     Start Status   02/07/19 1446 Collected (02/07/19 1449)       02/07/19 1447    02/07/19 1350  Specimen to Pathology - Surgery  Once     Comments:  1.  Iliac biopsy2.  Antrum biopsy,Gastritis -R/O H Pylori3.  Right colon biopsy,Diarrhea -R/O microscopic colitis4.  Sigmoid colon polyp     Start Status   02/07/19 1350 Collected (02/07/19 1430)       02/07/19 1423          Discharge Note    SUMMARY     Admit Date: 2/7/2019    Discharge Date and Time: 2/7/2019    Hospital Course (synopsis of major diagnoses, care, treatment, and services provided during the course of the hospital stay):  Procedures completed. See Procedure note for full details. Discharge patient when discharge criteria met.    Final  Diagnosis: Post-Op Diagnosis Codes:     * Chronic diarrhea [K52.9]     * Gastroesophageal reflux disease, esophagitis presence not specified [K21.9]      - Gastritis      - Diverticulosis    Disposition: Discharge patient when discharge criteria met.    Follow Up/Patient Instructions:       Medications:  Reconciled Home Medications:   Current Discharge Medication List      CONTINUE these medications which have NOT CHANGED    Details   atorvastatin (LIPITOR) 40 MG tablet Take 40 mg by mouth once daily.      FLUoxetine (PROZAC) 40 MG capsule Take 40 mg by mouth once daily.      lisinopril 10 MG tablet Take 10 mg by mouth once daily.      omeprazole (PRILOSEC) 40 MG capsule Take 40 mg by mouth once daily.      BREO ELLIPTA 100-25 mcg/dose diskus inhaler       valACYclovir (VALTREX) 1000 MG tablet AT FIRST SIGN OF FEVER BLISTER TAKE 2 TABLETS BY MOUTH THEN REPEAT IN 12 HOURS  Refills: 1         STOP taking these medications       SUPREP BOWEL PREP KIT 17.5-3.13-1.6 gram SolR Comments:   Reason for Stopping:              Discharge Procedure Orders   Diet general     Activity as tolerated      Not at all    How difficult have these problems made it for you to do your work, take care of things at home, or get along with other people: Not difficult at all  Today's KAYLA-7 Score: 4     Answers for HPI/ROS submitted by the patient on 6/29/2022  If you checked off any problems, how difficult have these problems made it for you to do your work, take care of things at home, or get along with other people?: Not difficult at all  PHQ9 TOTAL SCORE: 3      Have you had a diabetic eye exam in the last 12 months? Yes- Date of last eye exam: June 24th ,  Location: [facility] House    Pain control for failed back syndrome:  Takes one in the morning  Takes another in afternoon  Takes another at 3 and the last at bedtime 10 pm  Not needing 5 per day    BP Readings from Last 2 Encounters:   06/29/22 136/81   05/17/22 118/64     Hemoglobin A1C POCT (%)   Date Value   03/15/2021 7.2 (H)   11/05/2020 6.0 (H)     Hemoglobin A1C (%)   Date Value   01/13/2022 7.0 (H)   09/09/2021 7.4 (H)     LDL Cholesterol Calculated (mg/dL)   Date Value   02/16/2022 29   03/15/2021 42   07/17/2020 55          Pain History:  When did you first notice your pain? - Chronic Pain   Have you seen this provider for your pain in the past?   Yes   Where in your body do you have pain? Back Pain   Are you seeing anyone else for your pain? No               PHQ-9 SCORE 11/5/2020 4/21/2021 6/29/2022   PHQ-9 Total Score - - -   PHQ-9 Total Score MyChart - 3 (Minimal depression) 3 (Minimal depression)   PHQ-9 Total Score 5 3 3     KAYLA-7 SCORE 11/5/2020 4/21/2021 6/29/2022   Total Score - - 4 (minimal anxiety)   Total Score 6 3 4     PEG Score 8/9/2021 9/9/2021 6/29/2022   PEG Total Score 8 8.33 8.67       Chronic Pain Follow Up:    Location of pain: lower back - stable  Left hip - worsening, difficulty walkng  Analgesia/pain control:    - Recent changes:  Worsening left hip pain    - Overall control: Tolerable with discomfort    - Current treatments: vicodin 4  tablets per day   Adherence:     - Do you ever take more pain medicine than prescribed? No    - When did you take your last dose of pain medicine?  This morning   Adverse effects: No   PDMP Review       Value Time User    State PDMP site checked  Yes 5/6/2022  1:40 PM Connie Haskins MD        Last CSA Agreement:   CSA -- Patient Level:    CSA: None found at the patient level.       Last UDS: 4/23/2021            Review of Systems   Constitutional, HEENT, cardiovascular, pulmonary, gi and gu systems are negative, except as otherwise noted.      Objective    /81   Pulse 74   Temp 97.5  F (36.4  C) (Temporal)   Resp 10   Wt 112.5 kg (248 lb 1.6 oz)   SpO2 96%   BMI 33.65 kg/m    Body mass index is 33.65 kg/m .  Physical Exam   GENERAL: healthy, alert and moderate distress  RESP: lungs clear to auscultation - no rales, rhonchi or wheezes  CV: regular rate and rhythm, normal S1 S2, no S3 or S4, no murmur, click or rub, no peripheral edema and peripheral pulses strong  ABDOMEN: soft, nontender, no hepatosplenomegaly, no masses and bowel sounds normal  NEURO: Normal strength and tone, mentation intact and speech normal  Comprehensive back pain exam:  Tenderness in lumbar spine with position changes  Left hip has pain with position changes limited ROM and ambulation is favoring this side                    .  ..

## 2022-06-28 NOTE — TELEPHONE ENCOUNTER
Patient confirms that he has completed Xarelto course and will start Pradaxa today. He also confirms that he will take 1 tablet twice daily with meals.     Patient plans to call clinic to schedule f/up with Connie Haskins. Also scheduled f/up MT visit with Mary for 7/28 to check in on everything.    Stacie Miranda, PharmD   Medication Therapy Management   Pharmacist Resident  Pager: [phone]

## 2022-06-29 ENCOUNTER — OFFICE VISIT (OUTPATIENT)
Dept: FAMILY MEDICINE | Facility: CLINIC | Age: 67
End: 2022-06-29
Payer: COMMERCIAL

## 2022-06-29 VITALS
DIASTOLIC BLOOD PRESSURE: 81 MMHG | TEMPERATURE: 97.5 F | HEART RATE: 74 BPM | BODY MASS INDEX: 33.65 KG/M2 | SYSTOLIC BLOOD PRESSURE: 136 MMHG | OXYGEN SATURATION: 96 % | WEIGHT: 248.1 LBS | RESPIRATION RATE: 10 BRPM

## 2022-06-29 DIAGNOSIS — F11.90 CHRONIC, CONTINUOUS USE OF OPIOIDS: ICD-10-CM

## 2022-06-29 DIAGNOSIS — Z79.899 ENCOUNTER FOR LONG-TERM (CURRENT) USE OF MEDICATIONS: Primary | ICD-10-CM

## 2022-06-29 DIAGNOSIS — M96.1 FAILED BACK SYNDROME OF LUMBAR SPINE: ICD-10-CM

## 2022-06-29 DIAGNOSIS — G89.4 CHRONIC PAIN SYNDROME: ICD-10-CM

## 2022-06-29 DIAGNOSIS — E11.9 TYPE 2 DIABETES MELLITUS WITHOUT COMPLICATION, WITHOUT LONG-TERM CURRENT USE OF INSULIN (H): ICD-10-CM

## 2022-06-29 DIAGNOSIS — M25.552 HIP PAIN, LEFT: ICD-10-CM

## 2022-06-29 LAB
CREAT UR-MCNC: 73 MG/DL
HBA1C MFR BLD: 7.4 % (ref 0–5.6)

## 2022-06-29 PROCEDURE — 80307 DRUG TEST PRSMV CHEM ANLYZR: CPT | Performed by: FAMILY MEDICINE

## 2022-06-29 PROCEDURE — 96127 BRIEF EMOTIONAL/BEHAV ASSMT: CPT | Performed by: FAMILY MEDICINE

## 2022-06-29 PROCEDURE — 91305 COVID-19,PF,PFIZER (12+ YRS): CPT | Performed by: FAMILY MEDICINE

## 2022-06-29 PROCEDURE — 83036 HEMOGLOBIN GLYCOSYLATED A1C: CPT | Performed by: FAMILY MEDICINE

## 2022-06-29 PROCEDURE — 99214 OFFICE O/P EST MOD 30 MIN: CPT | Mod: 25 | Performed by: FAMILY MEDICINE

## 2022-06-29 PROCEDURE — 36415 COLL VENOUS BLD VENIPUNCTURE: CPT | Performed by: FAMILY MEDICINE

## 2022-06-29 PROCEDURE — 0054A COVID-19,PF,PFIZER (12+ YRS): CPT | Performed by: FAMILY MEDICINE

## 2022-06-29 RX ORDER — OXYCODONE HYDROCHLORIDE 5 MG/1
5 TABLET ORAL EVERY 6 HOURS PRN
Qty: 120 TABLET | Refills: 0 | Status: SHIPPED | OUTPATIENT
Start: 2022-06-29 | End: 2022-07-11

## 2022-06-29 ASSESSMENT — PATIENT HEALTH QUESTIONNAIRE - PHQ9
SUM OF ALL RESPONSES TO PHQ QUESTIONS 1-9: 3
10. IF YOU CHECKED OFF ANY PROBLEMS, HOW DIFFICULT HAVE THESE PROBLEMS MADE IT FOR YOU TO DO YOUR WORK, TAKE CARE OF THINGS AT HOME, OR GET ALONG WITH OTHER PEOPLE: NOT DIFFICULT AT ALL
SUM OF ALL RESPONSES TO PHQ QUESTIONS 1-9: 3

## 2022-06-29 ASSESSMENT — ANXIETY QUESTIONNAIRES
GAD7 TOTAL SCORE: 4
6. BECOMING EASILY ANNOYED OR IRRITABLE: NOT AT ALL
4. TROUBLE RELAXING: SEVERAL DAYS
GAD7 TOTAL SCORE: 4
8. IF YOU CHECKED OFF ANY PROBLEMS, HOW DIFFICULT HAVE THESE MADE IT FOR YOU TO DO YOUR WORK, TAKE CARE OF THINGS AT HOME, OR GET ALONG WITH OTHER PEOPLE?: NOT DIFFICULT AT ALL
2. NOT BEING ABLE TO STOP OR CONTROL WORRYING: SEVERAL DAYS
7. FEELING AFRAID AS IF SOMETHING AWFUL MIGHT HAPPEN: NOT AT ALL
1. FEELING NERVOUS, ANXIOUS, OR ON EDGE: SEVERAL DAYS
GAD7 TOTAL SCORE: 4
7. FEELING AFRAID AS IF SOMETHING AWFUL MIGHT HAPPEN: NOT AT ALL
5. BEING SO RESTLESS THAT IT IS HARD TO SIT STILL: NOT AT ALL
3. WORRYING TOO MUCH ABOUT DIFFERENT THINGS: SEVERAL DAYS

## 2022-06-30 NOTE — RESULT ENCOUNTER NOTE
Please send message and can you schedule with Mary/MTM      Hello,    I think we will need to adjust your diabetic medications.  It looks like the A1C is starting to rise.  There are a few options for you to consider - another oral medication of possible an injectable medication.  Let's have you talk to MTM team about this and see which one you'd prefer.  I'll ask them to schedule with you.    Connie Haskins MD

## 2022-07-05 DIAGNOSIS — F11.90 CHRONIC, CONTINUOUS USE OF OPIOIDS: ICD-10-CM

## 2022-07-05 DIAGNOSIS — M96.1 FAILED BACK SYNDROME OF LUMBAR SPINE: ICD-10-CM

## 2022-07-05 LAB
GABAPENTIN UR QL CFM: ABNORMAL
OXYCODONE UR CFM-MCNC: 2180 NG/ML
OXYCODONE/CREAT UR: 2986 NG/MG {CREAT}
OXYMORPHONE UR CFM-MCNC: 576 NG/ML
OXYMORPHONE/CREAT UR: 789 NG/MG {CREAT}
PREGABALIN UR QL CFM: PRESENT

## 2022-07-05 RX ORDER — OXYCODONE HYDROCHLORIDE 5 MG/1
5 TABLET ORAL EVERY 6 HOURS PRN
Qty: 120 TABLET | Refills: 0 | OUTPATIENT
Start: 2022-07-05

## 2022-07-11 RX ORDER — OXYCODONE HYDROCHLORIDE 5 MG/1
5 TABLET ORAL EVERY 6 HOURS PRN
Qty: 120 TABLET | Refills: 0 | Status: SHIPPED | OUTPATIENT
Start: 2022-07-11 | End: 2022-08-07

## 2022-07-11 NOTE — TELEPHONE ENCOUNTER
I sent this in today  I thought I had sent a script in earlier with instructions to fill on 11th of the month - if so please have pharmacy fill one script    Thanks    SN

## 2022-07-11 NOTE — TELEPHONE ENCOUNTER
Triage,     Layo called.   Following up on Rx.  Called pharmacy yesterday and was told they did not receive refills from our office.   Hoping to have Rx refilled today at the [facility] - pharmacy opens at 9AM.   Please call patient when Rx is sent.     Thanks!  Julia PRATT

## 2022-07-13 DIAGNOSIS — M54.50 CHRONIC BILATERAL LOW BACK PAIN WITHOUT SCIATICA: ICD-10-CM

## 2022-07-13 DIAGNOSIS — G89.29 CHRONIC BILATERAL LOW BACK PAIN WITHOUT SCIATICA: ICD-10-CM

## 2022-07-13 RX ORDER — PREGABALIN 100 MG/1
200 CAPSULE ORAL 2 TIMES DAILY
Qty: 360 CAPSULE | Refills: 0 | Status: SHIPPED | OUTPATIENT
Start: 2022-07-13 | End: 2022-10-11

## 2022-07-13 NOTE — TELEPHONE ENCOUNTER
CW ([facility]),      Please address due to SN's absence.  Routing refill request to provider for review/approval because:  Drug not on the [facility] refill protocol   Last OV 6/29/22    Thank you,  Gayathri Wade RN

## 2022-07-19 ENCOUNTER — OFFICE VISIT (OUTPATIENT)
Dept: ORTHOPEDICS | Facility: CLINIC | Age: 67
End: 2022-07-19
Payer: COMMERCIAL

## 2022-07-19 VITALS — BODY MASS INDEX: 33.91 KG/M2 | WEIGHT: 250 LBS | SYSTOLIC BLOOD PRESSURE: 126 MMHG | DIASTOLIC BLOOD PRESSURE: 76 MMHG

## 2022-07-19 DIAGNOSIS — M16.12 PRIMARY OSTEOARTHRITIS OF LEFT HIP: Primary | ICD-10-CM

## 2022-07-19 DIAGNOSIS — M25.552 HIP PAIN, LEFT: ICD-10-CM

## 2022-07-19 DIAGNOSIS — M10.9 GOUT, UNSPECIFIED CAUSE, UNSPECIFIED CHRONICITY, UNSPECIFIED SITE: ICD-10-CM

## 2022-07-19 PROCEDURE — 99203 OFFICE O/P NEW LOW 30 MIN: CPT | Performed by: ORTHOPAEDIC SURGERY

## 2022-07-19 ASSESSMENT — HOOS JR
BENDING TO THE FLOOR TO PICK UP OBJECT: SEVERE
HOOS JR TOTAL INTERVAL SCORE: 46.65
RISING FROM SITTING: SEVERE
GOING UP OR DOWN STAIRS: MODERATE
SITTING: MODERATE
WALKING ON UNEVEN SURFACE: MODERATE
LYING IN BED (TURNING OVER, MAINTAINING HIP POSITION): MODERATE

## 2022-07-19 NOTE — PATIENT INSTRUCTIONS
Thank you for choosing [facility]    Dr. Johnson Locations:    [facility] [address], [address]  [place], [place] [address] [place] [id number]   Appointments: [phone] Appointments: [phone]   Fax: [phone] Fax: [phone]       Follow up: 6 weeks.   Please call [phone] to schedule your follow up appointment.     Lab orders have been placed evaluating for arthritic and inflammatory markers. You can go to your [facility] location that has a lab. Please call your [facility] to schedule a lab appointment. [facility] has a walk-in hospital lab for your convenience.       Prescription of Allopurinol will be sent to your pharmacy on file.   Take as instructed on medication bottle.   Contact my office if you do not tolerate this medication.

## 2022-07-19 NOTE — PROGRESS NOTES
S: Patient is a 66 year old, male seen today in consultation for left hip pain. They report onset of symptoms acute, couple weeks ago. He reports noticing pain in the left groin.  No injury or trauma.    Pain is located left groin. Patient reports instance where he had sharp stinging pain with weightbearing last week, pain resolved after a couple hours and has not returned.  Increased pain with ambulating stairs, getting up from sitting, getting in and out of the car.  Pain will occasionally  wake at nighttime. Pain is improved by activity avoidance.  They have tried the following therapies: Oxycodone 5 mg for back pain, Lyrica for radicular pain.     Current pain level: 7/10, Worst pain level: 9/10.     Patient is retired.            Patient Active Problem List   Diagnosis     Thoracic or lumbosacral neuritis or radiculitis, unspecified     Osteoarthrosis, unspecified whether generalized or localized, pelvic region and thigh     Hypertrophy of prostate with urinary obstruction     Chronic rhinitis     Chronic pain syndrome     Disorder of bursae and tendons in shoulder region     Major depressive disorder, recurrent episode (H)     Anxiety     Gout     Type 2 diabetes mellitus without complication (H)     Hypertension goal BP (blood pressure) < 140/90     Advanced directives, counseling/discussion     DJD (degenerative joint disease), lumbar     Hyperlipidemia LDL goal <100     DDD (degenerative disc disease), cervical     GERD (gastroesophageal reflux disease)     Lumbar radiculopathy     S/P lumbar fusion     Left-sided low back pain with left-sided sciatica     BMI 32.0-32.9,adult     History of hepatitis C     S/P lumbar discectomy     Acute post-operative pain     S/P laminectomy     Chronic, continuous use of opioids     Bilateral low back pain without sciatica     Right-sided low back pain with right-sided sciatica     Acute gouty arthritis     Acute gout of right elbow, unspecified cause     Idiopathic gout  of left elbow, unspecified chronicity     Gastroesophageal reflux disease without esophagitis     Failed back syndrome of lumbar spine     Slow transit constipation     Benign prostatic hyperplasia with urinary frequency     Diabetic polyneuropathy associated with type 2 diabetes mellitus (H)     Seborrheic keratoses     Atrial fibrillation with RVR (H)     COPD (chronic obstructive pulmonary disease) (H)     Ventral hernia without obstruction or gangrene     Carpal tunnel syndrome of left wrist     Morbid obesity (H)            Past Medical History:   Diagnosis Date     Anxiety state, unspecified      BMI 32.0-32.9,adult 9/4/2015     Chronic pain syndrome 3/29/2007    Narcotic refill protocol Will return every 2-3 months for clinic visits Controlled substance aggreement on file from this  6/26/12 Documentation in problem list: no, appears to be compliant based on last visit He is responding best to Oxycontin 10 mg twice daily # 60 per month.  This is costly and looking into [place] for coverage.  Nausea with MS Contin Has meds refilled 16 of every month Last [facility] website verification:  done on 7/8/2015  [email or web address]   2/10/2015:  PCP will take over narcotic prescription Tapering course: using 5 mg tablets 10 mg morning 15 mg noon, 15 mg night for 1 week then 10 mg morning, 10 mg noon, 15 mg night for 1 week then 10 mg TID for 1 week then see me for refills  Then ongoing taper: 5 mg morning, 10 mg noon and 10 mg night for 1 week then  5 mg morning and noon and 10 mg night for 1 week then 5 mg tid for 1 week Then 5 mg morning no noon dose and 5 mg night for 1 week then No morning or non dose and 5 mg nightly for 1 week then off  Goal is co     Chronic rhinitis 3/29/2007     DDD (degenerative disc disease), cervical 2/8/2013    Normal.  C2-C3: Normal disc, facet joints, spinal canal and neural foramina.  C3-C4: Mild broad-based posterior disc bulge. Otherwise normal.  C4-C5: Normal disc, facet joints,  spinal canal and neural foramina.  C5-C6: Moderate degenerative disc disease with loss of disc height, circumferential disc bulge and vertebral endplate osteophytes. Mild spinal canal stenosis and moderate left foraminal stenosis. Normal right foramen and facet joints.  C6-C7: Mild circumferential disc bulge. Slight impression on the thecal sac. Mild left foraminal stenosis. Normal right foramen and facet joints.  C7-T1: Normal disc, facet joints, spinal canal and neural foramina.  T1-T2: Mild central posterior disc protrusion.  T2-T3: Mild central posterior disc protrusion. Slight impression on the spinal cord.         DJD (degenerative joint disease), lumbar 1/10/2012     Esophageal reflux     ,refluxes on upper GI     Gout 4/8/2011     Gout, unspecified      Hyperlipidemia LDL goal <100 10/25/2012     Hypertension goal BP (blood pressure) < 140/90 10/21/2011     HYPERTROPHY PROSTATE WITH OBST 8/3/2006     Impotence of organic origin      Lumbar radiculopathy 9/17/2014     Major depressive disorder, recurrent episode, unspecified 7/9/2008     Osteoarthrosis, unspecified whether generalized or localized, pelvic region and thigh      Pure hyperglyceridemia      ROTATOR CUFF SYND NOS 4/17/2008     S/P lumbar fusion 10/14/2014     Thoracic or lumbosacral neuritis or radiculitis, unspecified      Tobacco use disorder      Type 2 diabetes, HbA1C goal < 8% (H) 9/9/2011            Past Surgical History:   Procedure Laterality Date     BACK SURGERY       both shoulder repair  2006, 2008     FUSION LUMBAR ANTERIOR, FUSION LUMBAR POSTERIOR TWO LEVELS, COMBINED N/A 9/17/2014    Procedure: COMBINED FUSION LUMBAR ANTERIOR, FUSION LUMBAR POSTERIOR TWO LEVELS;  Surgeon: Chris Lugo MD;  Location:  OR      [facility] ENDOSCOPY DIAG W OR W/O BRUSH/WASH  3/04    ok     HEAD & NECK SURGERY       HEMILAMINECTOMY, DISCECTOMY LUMBAR ONE LEVEL, COMBINED Left 9/8/2015    Procedure: COMBINED HEMILAMINECTOMY, DISCECTOMY LUMBAR ONE  LEVEL;  Surgeon: Jer Irby MD;  Location: [facility] OR     right hip replacement  10,2010     [facility] NONSPECIFIC PROCEDURE  1995    neck cyst     [facility] NONSPECIFIC PROCEDURE  1979    laminectomy L5-S1 x 2     [facility] SHOULDER SURG PROC UNLISTED  2005, '07    repairs,later manipulate,inject LT, RT     [facility] COLONOSCOPY THRU STOMA, DIAGNOSTIC  3/04    normal            Social History     Tobacco Use     Smoking status: Former Smoker     Years: 40.00     Types: Cigarettes     Smokeless tobacco: Former User     Quit date: 2/1/2013   Substance Use Topics     Alcohol use: Yes     Alcohol/week: 0.0 standard drinks     Comment: occ.            Family History   Problem Relation Age of Onset     Diabetes Mother      C.A.D. Father      Diabetes Father      Hypertension Father      Colon Cancer No family hx of                Allergies   Allergen Reactions     Codeine Nausea and Vomiting     Tolerating other opiates             Current Outpatient Medications   Medication Sig Dispense Refill     acetaminophen 500 MG CAPS Take 3 capsules by mouth daily (Patient not taking: Reported on 5/17/2022)       blood glucose (ACCU-CHEK DANIELE PLUS) test strip USE TO TEST BLOOD SUGAR DAILY 100 strip 3     blood glucose monitoring (ACCU-CHEK FASTCLIX) lancets USE TO TEST BLOOD SUGAR DAILY 100 each 3     dabigatran ANTICOAGULANT (PRADAXA) 150 MG capsule Take 1 capsule (150 mg) by mouth 2 times daily Store in original 's bottle or blister pack; use within 120 days of opening. 180 capsule 3     DULoxetine (CYMBALTA) 60 MG capsule Take 1 capsule (60 mg) by mouth daily 90 capsule 3     finasteride (PROSCAR) 5 MG tablet TAKE 1 TABLET BY MOUTH  DAILY FOR PROSTATE  ENLARGEMENT 90 tablet 1     fluticasone (FLONASE) 50 MCG/ACT nasal spray USE 1 TO 2 SPRAYS IN BOTH  NOSTRILS DAILY 48 g 3     lisinopril (ZESTRIL) 10 MG tablet Take 1 tablet (10 mg) by mouth daily 90 tablet 3     metFORMIN (GLUCOPHAGE XR) 500 MG 24 hr tablet TAKE 4 TABLETS BY MOUTH   DAILY WITH DINNER 360 tablet 0     metoprolol tartrate (LOPRESSOR) 50 MG tablet TAKE 1 TABLET BY MOUTH  TWICE DAILY 180 tablet 3     naloxone (NARCAN) 4 MG/0.1ML nasal spray Spray 1 spray (4 mg) into one nostril alternating nostrils once as needed for opioid reversal Every 2-3 minutes until patient responsive or EMS arrives (Patient not taking: No sig reported) 0.2 mL 0     omeprazole (PRILOSEC) 20 MG DR capsule TAKE 1 CAPSULE BY MOUTH  DAILY 90 capsule 1     oxyCODONE (ROXICODONE) 5 MG tablet Take 1 tablet (5 mg) by mouth every 6 hours as needed for severe pain Max #4 per day fill on 11th of each month 120 tablet 0     pregabalin (LYRICA) 100 MG capsule Take 2 capsules (200 mg) by mouth 2 times daily 360 capsule 0     senna-docusate (SENOKOT-S/PERICOLACE) 8.6-50 MG tablet Take 1-2 tablets by mouth daily as needed for constipation 60 tablet 3     simvastatin (ZOCOR) 20 MG tablet TAKE 1 TABLET BY MOUTH AT  BEDTIME 90 tablet 1     tamsulosin (FLOMAX) 0.4 MG capsule TAKE 2 CAPSULES BY MOUTH  DAILY 180 capsule 0     tiotropium (SPIRIVA RESPIMAT) 2.5 MCG/ACT inhaler Inhale 2 puffs into the lungs daily 12 g 3          Review Of Systems  Skin: negative  Eyes: negative  Ears/Nose/Throat: negative  Respiratory: No shortness of breath, dyspnea on exertion, cough, or hemoptysis    O: Physical Exam:  Supple hip rotation bilaterally. No crepitus. Some pain to palpation over hip bursas.  CMS intact to both lower extremities. No tension signs.    Lab:HgbA1C 7.4, Triglycerides 201, oxycodone/oxymorphone/pregabalin, need to update inflammatory markers and arthritic profile.  Uric Acid 6.9 one year ago, should be less than 5.0.    Images:  IMPRESSION: Mild to moderate left hip degenerative changes with  acetabular marginal osteophyte formation and only mild joint space  narrowing. Right total hip arthroplasty in place without evidence of  complication. Instrumented lumbar fusion with iliac screws at the  lower end of the fusion.  Postoperative changes appear stable. No acute  fracture.         A:  Stable R WALTER, L hip arthrosis, hx spinal surgery primarily wishing to establish care.  L hip pain.    P:  Obtain labs to include inflammatory markers and arthritic profile.  Discussed previous elevated uric acid and may wish to start allopurinol.  Obtain updated lab.  Discussed hip joint or hip bursal injection to identify pain generator.  He will consider and we will discuss at next visit when we review labs.  He will notify if exacerbation of symptoms.           In addition to the above assessment and plan each active problem on Toby's problem list was evaluated today. This included the questioning of Toby for any medication problems. We will continue the current treatment plan for these active problems except as noted.

## 2022-07-19 NOTE — LETTER
7/19/2022         RE: Toby Mcgrath  [address]  [place] [id number]        Dear Colleague,    Thank you for referring your patient, Toby Mcgrath, to the [facility] [place]. Please see a copy of my visit note below.    S: Patient is a 66 year old, male seen today in consultation for left hip pain. They report onset of symptoms acute, couple weeks ago. He reports noticing pain in the left groin.  No injury or trauma.    Pain is located left groin. Patient reports instance where he had sharp stinging pain with weightbearing last week, pain resolved after a couple hours and has not returned.  Increased pain with ambulating stairs, getting up from sitting, getting in and out of the car.  Pain will occasionally  wake at nighttime. Pain is improved by activity avoidance.  They have tried the following therapies: Oxycodone 5 mg for back pain, Lyrica for radicular pain.     Current pain level: 7/10, Worst pain level: 9/10.     Patient is retired.            Patient Active Problem List   Diagnosis     Thoracic or lumbosacral neuritis or radiculitis, unspecified     Osteoarthrosis, unspecified whether generalized or localized, pelvic region and thigh     Hypertrophy of prostate with urinary obstruction     Chronic rhinitis     Chronic pain syndrome     Disorder of bursae and tendons in shoulder region     Major depressive disorder, recurrent episode (H)     Anxiety     Gout     Type 2 diabetes mellitus without complication (H)     Hypertension goal BP (blood pressure) < 140/90     Advanced directives, counseling/discussion     DJD (degenerative joint disease), lumbar     Hyperlipidemia LDL goal <100     DDD (degenerative disc disease), cervical     GERD (gastroesophageal reflux disease)     Lumbar radiculopathy     S/P lumbar fusion     Left-sided low back pain with left-sided sciatica     BMI 32.0-32.9,adult     History of hepatitis C     S/P lumbar discectomy     Acute  post-operative pain     S/P laminectomy     Chronic, continuous use of opioids     Bilateral low back pain without sciatica     Right-sided low back pain with right-sided sciatica     Acute gouty arthritis     Acute gout of right elbow, unspecified cause     Idiopathic gout of left elbow, unspecified chronicity     Gastroesophageal reflux disease without esophagitis     Failed back syndrome of lumbar spine     Slow transit constipation     Benign prostatic hyperplasia with urinary frequency     Diabetic polyneuropathy associated with type 2 diabetes mellitus (H)     Seborrheic keratoses     Atrial fibrillation with RVR (H)     COPD (chronic obstructive pulmonary disease) (H)     Ventral hernia without obstruction or gangrene     Carpal tunnel syndrome of left wrist     Morbid obesity (H)            Past Medical History:   Diagnosis Date     Anxiety state, unspecified      BMI 32.0-32.9,adult 9/4/2015     Chronic pain syndrome 3/29/2007    Narcotic refill protocol Will return every 2-3 months for clinic visits Controlled substance aggreement on file from this  6/26/12 Documentation in problem list: no, appears to be compliant based on last visit He is responding best to Oxycontin 10 mg twice daily # 60 per month.  This is costly and looking into [place] for coverage.  Nausea with MS Contin Has meds refilled 16 of every month Last [facility] website verification:  done on 7/8/2015  [email or web address]   2/10/2015:  PCP will take over narcotic prescription Tapering course: using 5 mg tablets 10 mg morning 15 mg noon, 15 mg night for 1 week then 10 mg morning, 10 mg noon, 15 mg night for 1 week then 10 mg TID for 1 week then see me for refills  Then ongoing taper: 5 mg morning, 10 mg noon and 10 mg night for 1 week then  5 mg morning and noon and 10 mg night for 1 week then 5 mg tid for 1 week Then 5 mg morning no noon dose and 5 mg night for 1 week then No morning or non dose and 5 mg nightly for 1 week then off  Goal  is co     Chronic rhinitis 3/29/2007     DDD (degenerative disc disease), cervical 2/8/2013    Normal.  C2-C3: Normal disc, facet joints, spinal canal and neural foramina.  C3-C4: Mild broad-based posterior disc bulge. Otherwise normal.  C4-C5: Normal disc, facet joints, spinal canal and neural foramina.  C5-C6: Moderate degenerative disc disease with loss of disc height, circumferential disc bulge and vertebral endplate osteophytes. Mild spinal canal stenosis and moderate left foraminal stenosis. Normal right foramen and facet joints.  C6-C7: Mild circumferential disc bulge. Slight impression on the thecal sac. Mild left foraminal stenosis. Normal right foramen and facet joints.  C7-T1: Normal disc, facet joints, spinal canal and neural foramina.  T1-T2: Mild central posterior disc protrusion.  T2-T3: Mild central posterior disc protrusion. Slight impression on the spinal cord.         DJD (degenerative joint disease), lumbar 1/10/2012     Esophageal reflux     ,refluxes on upper GI     Gout 4/8/2011     Gout, unspecified      Hyperlipidemia LDL goal <100 10/25/2012     Hypertension goal BP (blood pressure) < 140/90 10/21/2011     HYPERTROPHY PROSTATE WITH OBST 8/3/2006     Impotence of organic origin      Lumbar radiculopathy 9/17/2014     Major depressive disorder, recurrent episode, unspecified 7/9/2008     Osteoarthrosis, unspecified whether generalized or localized, pelvic region and thigh      Pure hyperglyceridemia      ROTATOR CUFF SYND NOS 4/17/2008     S/P lumbar fusion 10/14/2014     Thoracic or lumbosacral neuritis or radiculitis, unspecified      Tobacco use disorder      Type 2 diabetes, HbA1C goal < 8% (H) 9/9/2011            Past Surgical History:   Procedure Laterality Date     BACK SURGERY       both shoulder repair  2006, 2008     FUSION LUMBAR ANTERIOR, FUSION LUMBAR POSTERIOR TWO LEVELS, COMBINED N/A 9/17/2014    Procedure: COMBINED FUSION LUMBAR ANTERIOR, FUSION LUMBAR POSTERIOR TWO LEVELS;   Surgeon: Chris Lugo MD;  Location: [facility] OR     [facility] ENDOSCOPY DIAG W OR W/O BRUSH/WASH  3/04    ok     HEAD & NECK SURGERY       HEMILAMINECTOMY, DISCECTOMY LUMBAR ONE LEVEL, COMBINED Left 9/8/2015    Procedure: COMBINED HEMILAMINECTOMY, DISCECTOMY LUMBAR ONE LEVEL;  Surgeon: Jer Irby MD;  Location: [facility] OR     right hip replacement  10,2010     [facility] NONSPECIFIC PROCEDURE  1995    neck cyst     [facility] NONSPECIFIC PROCEDURE  1979    laminectomy L5-S1 x 2     [facility] SHOULDER SURG PROC UNLISTED  2005, '07    repairs,later manipulate,inject LT, RT     [facility] COLONOSCOPY THRU STOMA, DIAGNOSTIC  3/04    normal            Social History     Tobacco Use     Smoking status: Former Smoker     Years: 40.00     Types: Cigarettes     Smokeless tobacco: Former User     Quit date: 2/1/2013   Substance Use Topics     Alcohol use: Yes     Alcohol/week: 0.0 standard drinks     Comment: occ.            Family History   Problem Relation Age of Onset     Diabetes Mother      C.A.D. Father      Diabetes Father      Hypertension Father      Colon Cancer No family hx of                Allergies   Allergen Reactions     Codeine Nausea and Vomiting     Tolerating other opiates             Current Outpatient Medications   Medication Sig Dispense Refill     acetaminophen 500 MG CAPS Take 3 capsules by mouth daily (Patient not taking: Reported on 5/17/2022)       blood glucose (ACCU-CHEK DANIELE PLUS) test strip USE TO TEST BLOOD SUGAR DAILY 100 strip 3     blood glucose monitoring (ACCU-CHEK FASTCLIX) lancets USE TO TEST BLOOD SUGAR DAILY 100 each 3     dabigatran ANTICOAGULANT (PRADAXA) 150 MG capsule Take 1 capsule (150 mg) by mouth 2 times daily Store in original 's bottle or blister pack; use within 120 days of opening. 180 capsule 3     DULoxetine (CYMBALTA) 60 MG capsule Take 1 capsule (60 mg) by mouth daily 90 capsule 3     finasteride (PROSCAR) 5 MG tablet TAKE 1 TABLET BY MOUTH  DAILY FOR PROSTATE  ENLARGEMENT  90 tablet 1     fluticasone (FLONASE) 50 MCG/ACT nasal spray USE 1 TO 2 SPRAYS IN BOTH  NOSTRILS DAILY 48 g 3     lisinopril (ZESTRIL) 10 MG tablet Take 1 tablet (10 mg) by mouth daily 90 tablet 3     metFORMIN (GLUCOPHAGE XR) 500 MG 24 hr tablet TAKE 4 TABLETS BY MOUTH  DAILY WITH DINNER 360 tablet 0     metoprolol tartrate (LOPRESSOR) 50 MG tablet TAKE 1 TABLET BY MOUTH  TWICE DAILY 180 tablet 3     naloxone (NARCAN) 4 MG/0.1ML nasal spray Spray 1 spray (4 mg) into one nostril alternating nostrils once as needed for opioid reversal Every 2-3 minutes until patient responsive or EMS arrives (Patient not taking: No sig reported) 0.2 mL 0     omeprazole (PRILOSEC) 20 MG DR capsule TAKE 1 CAPSULE BY MOUTH  DAILY 90 capsule 1     oxyCODONE (ROXICODONE) 5 MG tablet Take 1 tablet (5 mg) by mouth every 6 hours as needed for severe pain Max #4 per day fill on 11th of each month 120 tablet 0     pregabalin (LYRICA) 100 MG capsule Take 2 capsules (200 mg) by mouth 2 times daily 360 capsule 0     senna-docusate (SENOKOT-S/PERICOLACE) 8.6-50 MG tablet Take 1-2 tablets by mouth daily as needed for constipation 60 tablet 3     simvastatin (ZOCOR) 20 MG tablet TAKE 1 TABLET BY MOUTH AT  BEDTIME 90 tablet 1     tamsulosin (FLOMAX) 0.4 MG capsule TAKE 2 CAPSULES BY MOUTH  DAILY 180 capsule 0     tiotropium (SPIRIVA RESPIMAT) 2.5 MCG/ACT inhaler Inhale 2 puffs into the lungs daily 12 g 3          Review Of Systems  Skin: negative  Eyes: negative  Ears/Nose/Throat: negative  Respiratory: No shortness of breath, dyspnea on exertion, cough, or hemoptysis    O: Physical Exam:  Supple hip rotation bilaterally. No crepitus. Some pain to palpation over hip bursas.  CMS intact to both lower extremities. No tension signs.    Lab:HgbA1C 7.4, Triglycerides 201, oxycodone/oxymorphone/pregabalin, need to update inflammatory markers and arthritic profile.  Uric Acid 6.9 one year ago, should be less than 5.0.    Images:  IMPRESSION: Mild to  moderate left hip degenerative changes with  acetabular marginal osteophyte formation and only mild joint space  narrowing. Right total hip arthroplasty in place without evidence of  complication. Instrumented lumbar fusion with iliac screws at the  lower end of the fusion. Postoperative changes appear stable. No acute  fracture.         A:  Stable R WALTER, L hip arthrosis, hx spinal surgery primarily wishing to establish care.  L hip pain.    P:  Obtain labs to include inflammatory markers and arthritic profile.  Discussed previous elevated uric acid and may wish to start allopurinol.  Obtain updated lab.  Discussed hip joint or hip bursal injection to identify pain generator.  He will consider and we will discuss at next visit when we review labs.  He will notify if exacerbation of symptoms.           In addition to the above assessment and plan each active problem on Sana's problem list was evaluated today. This included the questioning of Sana for any medication problems. We will continue the current treatment plan for these active problems except as noted.        Again, thank you for allowing me to participate in the care of your patient.        Sincerely,        SANA TOTH MD

## 2022-07-26 ENCOUNTER — VIRTUAL VISIT (OUTPATIENT)
Dept: PHARMACY | Facility: CLINIC | Age: 67
End: 2022-07-26
Payer: COMMERCIAL

## 2022-07-26 DIAGNOSIS — I10 HYPERTENSION GOAL BP (BLOOD PRESSURE) < 140/90: ICD-10-CM

## 2022-07-26 DIAGNOSIS — J44.9 CHRONIC OBSTRUCTIVE PULMONARY DISEASE, UNSPECIFIED COPD TYPE (H): ICD-10-CM

## 2022-07-26 DIAGNOSIS — E11.9 TYPE 2 DIABETES MELLITUS WITHOUT COMPLICATION, WITHOUT LONG-TERM CURRENT USE OF INSULIN (H): Primary | ICD-10-CM

## 2022-07-26 DIAGNOSIS — F33.0 MAJOR DEPRESSIVE DISORDER, RECURRENT EPISODE, MILD (H): ICD-10-CM

## 2022-07-26 DIAGNOSIS — I48.91 ATRIAL FIBRILLATION WITH RVR (H): ICD-10-CM

## 2022-07-26 PROCEDURE — 99207 PR NO CHARGE LOS: CPT | Performed by: PHARMACIST

## 2022-07-26 RX ORDER — BLOOD-GLUCOSE METER
1 EACH MISCELLANEOUS ONCE
Qty: 1 KIT | Refills: 0 | Status: SHIPPED | OUTPATIENT
Start: 2022-07-26 | End: 2022-07-26

## 2022-07-26 RX ORDER — DULOXETIN HYDROCHLORIDE 30 MG/1
30 CAPSULE, DELAYED RELEASE ORAL DAILY
Qty: 90 CAPSULE | Refills: 1 | Status: SHIPPED | OUTPATIENT
Start: 2022-07-26 | End: 2022-08-02

## 2022-07-26 RX ORDER — LANCETS 33 GAUGE
1 EACH MISCELLANEOUS DAILY
Qty: 100 EACH | Refills: 3 | Status: SHIPPED | OUTPATIENT
Start: 2022-07-26 | End: 2023-08-04

## 2022-07-26 RX ORDER — BLOOD SUGAR DIAGNOSTIC
STRIP MISCELLANEOUS
Qty: 100 STRIP | Refills: 3 | Status: SHIPPED | OUTPATIENT
Start: 2022-07-26 | End: 2023-07-19

## 2022-07-26 NOTE — PATIENT INSTRUCTIONS
"Recommendations from today's MTM visit:                                                    MTM (medication therapy management) is a service provided by a clinical pharmacist designed to help you get the most of out of your medicines.   Today we reviewed what your medicines are for, how to know if they are working, that your medicines are safe and how to make your medicine regimen as easy as possible.      1. Start duloxetine 30mg capsules with your 60mg capsule for a total of 90mg once a day.     2. I sent in a new prescription for a OneTouch meter for you. Hopefully it works better!  Please let me know if your insurance doesn't cover this.     3. I will follow-up with the patient assistance program about your refills for Spiriva and Pradaxa.  Remember to take your Pradaxa with a meal or snack to prevent it from causing stomach upset.     Follow-up: 2 weeks for SE check and see if he got new glucometer.     It was great speaking with you today.  I value your experience and would be very thankful for your time in providing feedback in our clinic survey. In the next few days, you may receive an email or text message from [employer] with a link to a survey related to your  clinical pharmacist.\"     To schedule another MTM appointment, please call the clinic directly or you may call the MTM scheduling line at [phone] or toll-free at [phone].     My Clinical Pharmacist's contact information:                                                      Please feel free to contact me with any questions or concerns you have.      Mary Last, Pharm.D., M.B.A., [facility]  MTM Pharmacist, [facility]  Pager: [phone]  Email: [email or web address]    "

## 2022-07-26 NOTE — PROGRESS NOTES
Medication Therapy Management (MTM) Encounter    ASSESSMENT:                            Medication Adherence/Access: No issues identified - concerns about customer service with refills noted.     Type 2 Diabetes: Last A1c meeting goal of <8%, but not more aggressive goal of <7%. Unable to evaluate SMBG today - would benefit from getting a different meter. He is due for an in-person visit for foot exam and eye exam, which he plans on doing in August.     Depression:Will try increase in duloxetine to see if this helps with his mood symptoms.     COPD: Stable     Hypertension/a-fib: Should take Pradaxa with food - otherwise stable.     PLAN:                            1. Start duloxetine 30mg capsules with your 60mg capsule for a total of 90mg once a day.     2. I sent in a new prescription for a OneTouch meter for you. Hopefully it works better!  Please let me know if your insurance doesn't cover this.     3. I will follow-up with the patient assistance program about your refills for Spiriva and Pradaxa.  Remember to take your Pradaxa with a meal or snack to prevent it from causing stomach upset.     Follow-up: 2 weeks for SE check and see if he got new glucometer.     SUBJECTIVE/OBJECTIVE:                          Toby Mcgrath is a 66 year old male coming in for a follow-up visit.  Today's visit is a follow-up MTM visit from 5/17/2022.     Reason for visit: med review    Allergies/ADRs: Reviewed in chart  Past Medical History: Reviewed in chart  Tobacco: He reports that he has quit smoking. His smoking use included cigarettes. He quit after 40.00 years of use. He quit smokeless tobacco use about 9 years ago.  Alcohol: not currently using    Medication Adherence/Access: Has had difficulty affording meds. He has been able to get patient assistance for Spiriva and Pradaxa, but is having trouble getting refills through the patient assistance program.     Type 2 Diabetes:  Currently taking Metformin ER 500mg - 4 tablets  with dinner. Patient is not experiencing side effects.  Blood sugar monitoring: Hasn't tested for almost a week because the battery on his blood sugar meter stopped working. He has had several Accu-Chek meters and is not happy with the way that they have worked ( has sent him new ones) Denies s/sx of hypo/hyperglycemia.   Eye exam: due  Foot exam: due  Diet/Exercise: Unchanged from previous visits.   Aspirin: Not taking due to treatment with Pradaxa, primary prevention    Depression: He takes duloxetine 60 mg daily. He denies side effects. Patient feels his mood has been poor recently. Felt like duloxetine was really hepful in the beginning, now not as helpful. We discussed a dose increase at his last visit, and lack of data to support this for pain management.     COPD: Patient takes Spiriva Respimat 2.5 mcg - 2 puffs daily. Denies SE and reports breathing is doing good.      Hypertension/a-fib: Current medications include lisinopril 10 mg daily, metoprolol tartrate 50 mg twice daily, and Pradaxa 150mg twice daily. Has had some stomach upset with Pradaxa, but only a few times. He does not always take his morning Pradaxa with food.   ----------------  I spent 26 minutes with this patient today. All changes were made via collaborative practice agreement with Connie Haskins MD. A copy of the visit note was provided to the patient's provider(s).    The patient was mailed a summary of these recommendations.     Mary Last, LloydD, NEIDA, BCACP  MTM Pharmacist, [facility]     Telemedicine Visit Details  Type of service:  Telephone visit  Start Time: 10:37 AM  End Time: 11:03 AM  Originating Location (patient location): Home  Distant Location (provider location):  [facility]     Medication Therapy Recommendations  Atrial fibrillation with RVR (H)    Current Medication: dabigatran ANTICOAGULANT (PRADAXA) 150 MG capsule   Rationale: Undesirable effect - Adverse  medication event - Safety   Recommendation: Provide Education   Status: Patient Agreed - Adherence/Education         Major depressive disorder, recurrent episode (H)    Current Medication: DULoxetine (CYMBALTA) 60 MG capsule   Rationale: Dose too low - Dosage too low - Effectiveness   Recommendation: Increase Dose - DULoxetine 30 MG capsule   Status: Accepted per CPA         Type 2 diabetes mellitus without complication (H)    Current Medication: blood glucose (ACCU-CHEK DANIELE PLUS) test strip   Rationale: More effective medication available - Ineffective medication - Effectiveness   Recommendation: Change Medication - OneTouch Verio Flex System w/Device Kit   Status: Accepted per CPA

## 2022-07-27 RX ORDER — ALLOPURINOL 300 MG/1
300 TABLET ORAL DAILY
Qty: 30 TABLET | Refills: 11 | Status: SHIPPED | OUTPATIENT
Start: 2022-07-27 | End: 2023-02-07

## 2022-07-27 RX ORDER — ALLOPURINOL 100 MG/1
TABLET ORAL
Qty: 42 TABLET | Refills: 0 | Status: SHIPPED | OUTPATIENT
Start: 2022-07-27 | End: 2023-02-07

## 2022-08-02 ENCOUNTER — LAB (OUTPATIENT)
Dept: LAB | Facility: CLINIC | Age: 67
End: 2022-08-02
Payer: COMMERCIAL

## 2022-08-02 ENCOUNTER — OFFICE VISIT (OUTPATIENT)
Dept: PHARMACY | Facility: CLINIC | Age: 67
End: 2022-08-02
Payer: COMMERCIAL

## 2022-08-02 DIAGNOSIS — M25.552 HIP PAIN, LEFT: ICD-10-CM

## 2022-08-02 DIAGNOSIS — M16.12 PRIMARY OSTEOARTHRITIS OF LEFT HIP: ICD-10-CM

## 2022-08-02 DIAGNOSIS — M54.50 CHRONIC BILATERAL LOW BACK PAIN WITHOUT SCIATICA: ICD-10-CM

## 2022-08-02 DIAGNOSIS — F33.0 MAJOR DEPRESSIVE DISORDER, RECURRENT EPISODE, MILD (H): ICD-10-CM

## 2022-08-02 DIAGNOSIS — M96.1 FAILED BACK SYNDROME OF LUMBAR SPINE: ICD-10-CM

## 2022-08-02 DIAGNOSIS — F11.90 CHRONIC, CONTINUOUS USE OF OPIOIDS: ICD-10-CM

## 2022-08-02 DIAGNOSIS — G89.29 CHRONIC BILATERAL LOW BACK PAIN WITHOUT SCIATICA: ICD-10-CM

## 2022-08-02 LAB
CRP SERPL-MCNC: 5.73 MG/L
D DIMER PPP FEU-MCNC: <0.27 UG/ML FEU (ref 0–0.5)
ERYTHROCYTE [SEDIMENTATION RATE] IN BLOOD BY WESTERGREN METHOD: 13 MM/HR (ref 0–20)
RHEUMATOID FACT SER NEPH-ACNC: <6 IU/ML
URATE SERPL-MCNC: 5.3 MG/DL (ref 3.5–7.2)

## 2022-08-02 PROCEDURE — 36415 COLL VENOUS BLD VENIPUNCTURE: CPT

## 2022-08-02 PROCEDURE — 84550 ASSAY OF BLOOD/URIC ACID: CPT

## 2022-08-02 PROCEDURE — 86038 ANTINUCLEAR ANTIBODIES: CPT

## 2022-08-02 PROCEDURE — 86225 DNA ANTIBODY NATIVE: CPT

## 2022-08-02 PROCEDURE — 86431 RHEUMATOID FACTOR QUANT: CPT

## 2022-08-02 PROCEDURE — 86200 CCP ANTIBODY: CPT

## 2022-08-02 PROCEDURE — 99207 PR NO CHARGE LOS: CPT | Performed by: PHARMACIST

## 2022-08-02 PROCEDURE — 85652 RBC SED RATE AUTOMATED: CPT

## 2022-08-02 PROCEDURE — 85379 FIBRIN DEGRADATION QUANT: CPT

## 2022-08-02 PROCEDURE — 86140 C-REACTIVE PROTEIN: CPT

## 2022-08-02 RX ORDER — DULOXETIN HYDROCHLORIDE 60 MG/1
120 CAPSULE, DELAYED RELEASE ORAL DAILY
Qty: 90 CAPSULE | Refills: 3 | Status: SHIPPED | OUTPATIENT
Start: 2022-08-02 | End: 2023-01-16

## 2022-08-02 NOTE — PROGRESS NOTES
"Clinical Pharmacy Consult:                                                    Toby Mcgrath is a 66 year old male coming in for a clinical pharmacist consult.  He was here to get a lab draw and asked to talk to MTM.     Reason for Consult: [employer] did not send duloxetine 30mg, only sent 60mg so as a result he started taking 2 60mg capsules daily and feels \"Wonderful. Like my old self again.\"  Denies SE and apologizes for not talking to myself/Dr. Haskins first before increasing his medications.      Discussion: Discussed that 120mg is still safe, but it is the max dose and he should not increase beyond this. Discussed that we will try for a few months and then re-evaluate and see if we can decrease to 90mg daily    Plan:  1. New Rx written for 120mg daily.   2. Pt to follow-up with MTM as previously planned.     Mary Last, LloydD, NEIDA, BCACP  MTM Pharmacist, [facility]     "

## 2022-08-03 LAB
ANA SER QL IF: NEGATIVE
CCP AB SER IA-ACNC: 1.1 U/ML
DSDNA AB SER-ACNC: 24 IU/ML

## 2022-08-07 RX ORDER — OXYCODONE HYDROCHLORIDE 5 MG/1
5 TABLET ORAL EVERY 6 HOURS PRN
Qty: 120 TABLET | Refills: 0 | Status: SHIPPED | OUTPATIENT
Start: 2022-08-07 | End: 2022-09-08

## 2022-08-24 ENCOUNTER — TELEPHONE (OUTPATIENT)
Dept: PHARMACY | Facility: CLINIC | Age: 67
End: 2022-08-24

## 2022-08-24 NOTE — TELEPHONE ENCOUNTER
Received voicemail from patient that he is having trouble with his One Touch meter.  Called him back and he had already figured out the issue with where to put the blood on the test strip and has been able to start testing.  The last 2 days in the morning he has been 223 and 225.  He is tested periodically in the afternoon as well and his blood sugar goes down to 160--170.  Discussed why he thinks that his blood sugar is so high and he reports that he is drinking 6 or 7 cans of regular soda every day.  He does not like diet and has not been able to stick to just water.  We had a long discussion that there are other diabetes medications available to treat his high blood sugars but ultimately (for many reasons) we would prefer that he give up the soda.  At a minimum he would benefit from reducing to 1 or 2 cans a day.  He is in agreement with this and would also prefer to make diet changes rather than add medications at this time.    He has questions about the lab work that he had drawn earlier this month as he has not seen the results yet.   Stressed the importance of follow-up with his provider to discuss the results.    Discussed duloxetine 120 mg once daily.  He reports he is feeling far better on this than previous medications.    Plan: No changes in medications today.  Asked him to continue to monitor his blood sugars at least once daily alternating between fasting and postprandial readings.  Strongly encouraged him to give up pop completely.  MTM to follow-up in October.    Mary Last, LloydD, NEIDA, BCACP  MTM Pharmacist, [facility]

## 2022-08-31 DIAGNOSIS — K21.00 GASTROESOPHAGEAL REFLUX DISEASE WITH ESOPHAGITIS WITHOUT HEMORRHAGE: ICD-10-CM

## 2022-08-31 DIAGNOSIS — E78.5 HYPERLIPIDEMIA LDL GOAL <100: ICD-10-CM

## 2022-08-31 RX ORDER — SIMVASTATIN 20 MG
TABLET ORAL
Qty: 1 TABLET | Refills: 0 | OUTPATIENT
Start: 2022-08-31

## 2022-09-06 DIAGNOSIS — M96.1 FAILED BACK SYNDROME OF LUMBAR SPINE: ICD-10-CM

## 2022-09-06 DIAGNOSIS — F11.90 CHRONIC, CONTINUOUS USE OF OPIOIDS: ICD-10-CM

## 2022-09-06 NOTE — TELEPHONE ENCOUNTER
Routing refill request to provider for review/approval because:  Drug not on the [facility] refill protocol   Jeanne STARKS RN

## 2022-09-08 RX ORDER — OXYCODONE HYDROCHLORIDE 5 MG/1
5 TABLET ORAL EVERY 6 HOURS PRN
Qty: 120 TABLET | Refills: 0 | Status: SHIPPED | OUTPATIENT
Start: 2022-09-08 | End: 2022-10-06

## 2022-10-05 DIAGNOSIS — M96.1 FAILED BACK SYNDROME OF LUMBAR SPINE: ICD-10-CM

## 2022-10-05 DIAGNOSIS — F11.90 CHRONIC, CONTINUOUS USE OF OPIOIDS: ICD-10-CM

## 2022-10-06 RX ORDER — OXYCODONE HYDROCHLORIDE 5 MG/1
5 TABLET ORAL EVERY 6 HOURS PRN
Qty: 120 TABLET | Refills: 0 | Status: SHIPPED | OUTPATIENT
Start: 2022-10-06 | End: 2022-11-04

## 2022-10-06 NOTE — TELEPHONE ENCOUNTER
Routing refill request to provider for review/approval because:  Drug not on the [facility] refill protocol   Refill request for oxycodone 5mg    Jessica Kwong RN  [facility]

## 2022-10-10 DIAGNOSIS — G89.29 CHRONIC BILATERAL LOW BACK PAIN WITHOUT SCIATICA: ICD-10-CM

## 2022-10-10 DIAGNOSIS — M54.50 CHRONIC BILATERAL LOW BACK PAIN WITHOUT SCIATICA: ICD-10-CM

## 2022-10-11 RX ORDER — PREGABALIN 100 MG/1
CAPSULE ORAL
Qty: 360 CAPSULE | Refills: 0 | Status: SHIPPED | OUTPATIENT
Start: 2022-10-11 | End: 2023-01-06

## 2022-11-11 DIAGNOSIS — I48.92 ATRIAL FLUTTER WITH RAPID VENTRICULAR RESPONSE (H): ICD-10-CM

## 2022-11-14 RX ORDER — METOPROLOL TARTRATE 50 MG
TABLET ORAL
Qty: 160 TABLET | Refills: 0 | Status: SHIPPED | OUTPATIENT
Start: 2022-11-14 | End: 2023-01-27

## 2022-11-14 NOTE — TELEPHONE ENCOUNTER
Prescription approved per [facility] Refill Protocol.    Tatyana Curiel RN   [facility]

## 2022-11-22 ENCOUNTER — TELEPHONE (OUTPATIENT)
Dept: PHARMACY | Facility: CLINIC | Age: 67
End: 2022-11-22

## 2022-11-22 NOTE — TELEPHONE ENCOUNTER
Received VM from Layo - he has been notified that  program for Pradaxa will discontinue the free medication program in 2023. Pradaxa just recently (in November) became available as a generic medication, which is likely the reason the program is stopping. Hopefully with this available as a generic it will be affordable. Researched additional DOAC programs - Xarelto is no longer available to ANYONE with insurance, Eliquis requires a 3% -total gross income-out of pocket medication expense to be approved.    Called pt back with information above, message left. Asked him to contact me with how much medication he has left so we can see what the cost will be for generic Pradaxa.     Mary Last, LloydD, NEIDA, BCACP  MTM Pharmacist, [facility]

## 2022-12-05 DIAGNOSIS — F11.90 CHRONIC, CONTINUOUS USE OF OPIOIDS: ICD-10-CM

## 2022-12-05 DIAGNOSIS — M96.1 FAILED BACK SYNDROME OF LUMBAR SPINE: ICD-10-CM

## 2022-12-05 RX ORDER — OXYCODONE HYDROCHLORIDE 5 MG/1
5 TABLET ORAL EVERY 6 HOURS PRN
Qty: 120 TABLET | Refills: 0 | Status: SHIPPED | OUTPATIENT
Start: 2022-12-05 | End: 2023-01-05

## 2022-12-12 DIAGNOSIS — R35.0 BENIGN PROSTATIC HYPERPLASIA WITH URINARY FREQUENCY: ICD-10-CM

## 2022-12-12 DIAGNOSIS — N40.1 BENIGN PROSTATIC HYPERPLASIA WITH URINARY FREQUENCY: ICD-10-CM

## 2022-12-13 DIAGNOSIS — J44.9 CHRONIC OBSTRUCTIVE PULMONARY DISEASE, UNSPECIFIED COPD TYPE (H): ICD-10-CM

## 2022-12-13 RX ORDER — FINASTERIDE 5 MG/1
TABLET, FILM COATED ORAL
Qty: 30 TABLET | Refills: 2 | Status: SHIPPED | OUTPATIENT
Start: 2022-12-13 | End: 2023-02-20

## 2022-12-13 NOTE — TELEPHONE ENCOUNTER
[facility].  Medication was last ordered 4/21/22  Patient utilized Rx assistance program  Unsure if this request needs to go through your office for re-approval?  Please advise  Viviana CANTU RN

## 2022-12-13 NOTE — TELEPHONE ENCOUNTER
Prescription approved per [facility] Refill Protocol.  Future Appointments 12/13/2022 - 6/11/2023      Date Visit Type Length Department Provider     2/15/2023 11:00 AM ANNUAL WELLNESS 30 min [facility] Connie Haskins MD    Location Instructions:     [facility] is in [address] of the [facility] at [address] in [place]. The building is at the intersection with [facility] and along [address]. This is the large, blue, glass building with a sculpture on the roof; please note there is no [place] signage on the exterior. Free lot parking is available.                 Viviana CANTU RN

## 2022-12-27 NOTE — TELEPHONE ENCOUNTER
Patient confirmed he has received medication through Rx assistance program  Closing encounter  Viviana CANTU RN

## 2022-12-30 ENCOUNTER — TELEPHONE (OUTPATIENT)
Dept: PHARMACY | Facility: CLINIC | Age: 67
End: 2022-12-30

## 2022-12-30 NOTE — TELEPHONE ENCOUNTER
Called pt to schedule MTM appointment, no answer, message left.   Mary Last, LloydD, NEIDA, BCACP  MTM Pharmacist, [facility]

## 2023-01-01 ENCOUNTER — ANTICOAGULATION THERAPY VISIT (OUTPATIENT)
Dept: ANTICOAGULATION | Facility: CLINIC | Age: 68
End: 2023-01-01

## 2023-01-01 ENCOUNTER — OFFICE VISIT (OUTPATIENT)
Dept: PHARMACY | Facility: CLINIC | Age: 68
End: 2023-01-01
Payer: COMMERCIAL

## 2023-01-01 ENCOUNTER — LAB (OUTPATIENT)
Dept: LAB | Facility: CLINIC | Age: 68
End: 2023-01-01
Payer: COMMERCIAL

## 2023-01-01 ENCOUNTER — TELEPHONE (OUTPATIENT)
Dept: FAMILY MEDICINE | Facility: CLINIC | Age: 68
End: 2023-01-01
Payer: COMMERCIAL

## 2023-01-01 ENCOUNTER — TELEPHONE (OUTPATIENT)
Dept: FAMILY MEDICINE | Facility: CLINIC | Age: 68
End: 2023-01-01

## 2023-01-01 VITALS
DIASTOLIC BLOOD PRESSURE: 62 MMHG | SYSTOLIC BLOOD PRESSURE: 102 MMHG | WEIGHT: 222.2 LBS | OXYGEN SATURATION: 99 % | HEART RATE: 65 BPM | BODY MASS INDEX: 30.99 KG/M2

## 2023-01-01 DIAGNOSIS — M54.41 CHRONIC BILATERAL LOW BACK PAIN WITH BILATERAL SCIATICA: ICD-10-CM

## 2023-01-01 DIAGNOSIS — I48.91 ATRIAL FIBRILLATION WITH RVR (H): ICD-10-CM

## 2023-01-01 DIAGNOSIS — I48.91 ATRIAL FIBRILLATION WITH RVR (H): Primary | ICD-10-CM

## 2023-01-01 DIAGNOSIS — M54.42 CHRONIC BILATERAL LOW BACK PAIN WITH BILATERAL SCIATICA: ICD-10-CM

## 2023-01-01 DIAGNOSIS — E11.9 TYPE 2 DIABETES MELLITUS WITHOUT COMPLICATION, WITHOUT LONG-TERM CURRENT USE OF INSULIN (H): Primary | ICD-10-CM

## 2023-01-01 DIAGNOSIS — G89.29 CHRONIC BILATERAL LOW BACK PAIN WITH BILATERAL SCIATICA: ICD-10-CM

## 2023-01-01 DIAGNOSIS — F11.90 CHRONIC, CONTINUOUS USE OF OPIOIDS: ICD-10-CM

## 2023-01-01 DIAGNOSIS — M96.1 FAILED BACK SYNDROME OF LUMBAR SPINE: ICD-10-CM

## 2023-01-01 DIAGNOSIS — R35.0 BENIGN PROSTATIC HYPERPLASIA WITH URINARY FREQUENCY: ICD-10-CM

## 2023-01-01 DIAGNOSIS — N40.1 BENIGN PROSTATIC HYPERPLASIA WITH URINARY FREQUENCY: ICD-10-CM

## 2023-01-01 DIAGNOSIS — G89.29 CHRONIC BILATERAL LOW BACK PAIN WITHOUT SCIATICA: ICD-10-CM

## 2023-01-01 DIAGNOSIS — K59.00 CONSTIPATION, UNSPECIFIED CONSTIPATION TYPE: ICD-10-CM

## 2023-01-01 DIAGNOSIS — J31.0 CHRONIC RHINITIS: ICD-10-CM

## 2023-01-01 DIAGNOSIS — M54.50 CHRONIC BILATERAL LOW BACK PAIN WITHOUT SCIATICA: ICD-10-CM

## 2023-01-01 DIAGNOSIS — E78.5 HYPERLIPIDEMIA LDL GOAL <100: ICD-10-CM

## 2023-01-01 DIAGNOSIS — J44.9 CHRONIC OBSTRUCTIVE PULMONARY DISEASE, UNSPECIFIED COPD TYPE (H): ICD-10-CM

## 2023-01-01 DIAGNOSIS — I10 HYPERTENSION GOAL BP (BLOOD PRESSURE) < 140/90: ICD-10-CM

## 2023-01-01 DIAGNOSIS — K21.00 GASTROESOPHAGEAL REFLUX DISEASE WITH ESOPHAGITIS WITHOUT HEMORRHAGE: ICD-10-CM

## 2023-01-01 DIAGNOSIS — I48.92 ATRIAL FLUTTER WITH RAPID VENTRICULAR RESPONSE (H): ICD-10-CM

## 2023-01-01 LAB
INR BLD: 1.4 (ref 0.9–1.1)
INR BLD: 2.1 (ref 0.9–1.1)
INR BLD: 3.2 (ref 0.9–1.1)

## 2023-01-01 PROCEDURE — 36415 COLL VENOUS BLD VENIPUNCTURE: CPT

## 2023-01-01 PROCEDURE — 85610 PROTHROMBIN TIME: CPT

## 2023-01-01 PROCEDURE — 99207 PR NO CHARGE LOS: CPT | Performed by: PHARMACIST

## 2023-01-01 PROCEDURE — 36416 COLLJ CAPILLARY BLOOD SPEC: CPT

## 2023-01-01 RX ORDER — AMOXICILLIN 250 MG
1-2 CAPSULE ORAL DAILY PRN
COMMUNITY
End: 2024-01-01

## 2023-01-01 RX ORDER — METOPROLOL TARTRATE 50 MG
TABLET ORAL
Qty: 180 TABLET | Refills: 3 | OUTPATIENT
Start: 2023-01-01

## 2023-01-01 RX ORDER — FINASTERIDE 5 MG/1
TABLET, FILM COATED ORAL
Qty: 90 TABLET | Refills: 1 | Status: SHIPPED | OUTPATIENT
Start: 2023-01-01 | End: 2024-01-01

## 2023-01-01 RX ORDER — OMEPRAZOLE 40 MG/1
40 CAPSULE, DELAYED RELEASE ORAL DAILY
Qty: 90 CAPSULE | Refills: 3 | Status: SHIPPED | OUTPATIENT
Start: 2023-01-01 | End: 2024-01-01

## 2023-01-01 RX ORDER — OXYCODONE HYDROCHLORIDE 5 MG/1
5 TABLET ORAL EVERY 6 HOURS PRN
Qty: 120 TABLET | Refills: 0 | Status: SHIPPED | OUTPATIENT
Start: 2023-01-01 | End: 2024-01-01

## 2023-01-01 RX ORDER — OXYCODONE HYDROCHLORIDE 5 MG/1
5 TABLET ORAL EVERY 6 HOURS PRN
Qty: 120 TABLET | Refills: 0 | Status: SHIPPED | OUTPATIENT
Start: 2023-01-01 | End: 2023-01-01

## 2023-01-01 RX ORDER — WARFARIN SODIUM 5 MG/1
TABLET ORAL
Qty: 125 TABLET | Refills: 1 | Status: SHIPPED | OUTPATIENT
Start: 2023-01-01 | End: 2024-01-01

## 2023-01-01 RX ORDER — METOPROLOL SUCCINATE 100 MG/1
100 TABLET, EXTENDED RELEASE ORAL DAILY
Qty: 90 TABLET | Refills: 3 | Status: ON HOLD | OUTPATIENT
Start: 2023-01-01

## 2023-01-01 RX ORDER — TAMSULOSIN HYDROCHLORIDE 0.4 MG/1
0.8 CAPSULE ORAL DAILY
Qty: 180 CAPSULE | Refills: 1 | Status: SHIPPED | OUTPATIENT
Start: 2023-01-01 | End: 2024-01-01

## 2023-01-04 DIAGNOSIS — F11.90 CHRONIC, CONTINUOUS USE OF OPIOIDS: ICD-10-CM

## 2023-01-04 DIAGNOSIS — M96.1 FAILED BACK SYNDROME OF LUMBAR SPINE: ICD-10-CM

## 2023-01-05 RX ORDER — OXYCODONE HYDROCHLORIDE 5 MG/1
5 TABLET ORAL EVERY 6 HOURS PRN
Qty: 120 TABLET | Refills: 0 | Status: SHIPPED | OUTPATIENT
Start: 2023-01-11 | End: 2023-02-06

## 2023-01-05 NOTE — TELEPHONE ENCOUNTER
Routing refill request to provider for review/approval because:  Drug not on the [facility] refill protocol   Future OV 2/15/23  Viviana CANTU RN

## 2023-01-06 DIAGNOSIS — G89.29 CHRONIC BILATERAL LOW BACK PAIN WITHOUT SCIATICA: ICD-10-CM

## 2023-01-06 DIAGNOSIS — N40.1 BENIGN PROSTATIC HYPERPLASIA WITH URINARY FREQUENCY: ICD-10-CM

## 2023-01-06 DIAGNOSIS — R35.0 BENIGN PROSTATIC HYPERPLASIA WITH URINARY FREQUENCY: ICD-10-CM

## 2023-01-06 DIAGNOSIS — M54.50 CHRONIC BILATERAL LOW BACK PAIN WITHOUT SCIATICA: ICD-10-CM

## 2023-01-06 RX ORDER — PREGABALIN 100 MG/1
CAPSULE ORAL
Qty: 360 CAPSULE | Refills: 0 | Status: SHIPPED | OUTPATIENT
Start: 2023-01-06 | End: 2023-04-03

## 2023-01-06 NOTE — TELEPHONE ENCOUNTER
Dr. Rosales out of office.     Overdue for multiple care items including CSA, yearly drug screen, quarterly pain management visit however I see he has annual/med check scheduled for February and clearly this is chronic med for him so I sent refill.     Joel Wegener,MD

## 2023-01-09 RX ORDER — TAMSULOSIN HYDROCHLORIDE 0.4 MG/1
0.8 CAPSULE ORAL DAILY
Qty: 180 CAPSULE | Refills: 0 | Status: SHIPPED | OUTPATIENT
Start: 2023-01-09 | End: 2023-03-07

## 2023-01-09 NOTE — TELEPHONE ENCOUNTER
Prescription approved per [facility] Refill Protocol.    Tatyana Curiel RN   [facility]

## 2023-01-12 DIAGNOSIS — E78.5 HYPERLIPIDEMIA LDL GOAL <100: ICD-10-CM

## 2023-01-13 DIAGNOSIS — G89.29 CHRONIC BILATERAL LOW BACK PAIN WITHOUT SCIATICA: ICD-10-CM

## 2023-01-13 DIAGNOSIS — M54.50 CHRONIC BILATERAL LOW BACK PAIN WITHOUT SCIATICA: ICD-10-CM

## 2023-01-13 DIAGNOSIS — F33.0 MAJOR DEPRESSIVE DISORDER, RECURRENT EPISODE, MILD (H): ICD-10-CM

## 2023-01-13 RX ORDER — SIMVASTATIN 20 MG
TABLET ORAL
Qty: 30 TABLET | Refills: 0 | Status: SHIPPED | OUTPATIENT
Start: 2023-01-13 | End: 2023-03-03

## 2023-01-13 NOTE — TELEPHONE ENCOUNTER
Medication is being filled for 1 time refill only due to:  Patient needs to be seen because due for physical and fasting labs.     Gayathri Wade RN

## 2023-01-16 RX ORDER — DULOXETIN HYDROCHLORIDE 60 MG/1
CAPSULE, DELAYED RELEASE ORAL
Qty: 60 CAPSULE | Refills: 0 | Status: SHIPPED | OUTPATIENT
Start: 2023-01-16 | End: 2023-02-27

## 2023-01-16 NOTE — TELEPHONE ENCOUNTER
Medication is being filled for 1 time refill only due to:  Patient needs to be seen because due for physical .     Gayathri Wade RN

## 2023-01-17 ENCOUNTER — TELEPHONE (OUTPATIENT)
Dept: FAMILY MEDICINE | Facility: CLINIC | Age: 68
End: 2023-01-17
Payer: COMMERCIAL

## 2023-01-17 NOTE — TELEPHONE ENCOUNTER
Jan 17, 2023 10am Layo had a scheduled phone appointment for his 2023 re-enrollment with  assistance programs.    I called Layo 2x, went directly to voice mail. LM asking him to call us back at [phone] to reschedule if assistance needed.    Zenaida Plasencia  Prescription Assistance Supervisor  Pharmacy Assistance  [phone]

## 2023-01-17 NOTE — TELEPHONE ENCOUNTER
Called Layo and let him know that he missed Zenaida's call and to call her and her team back at [phone].     Also due for MTM appointment- scheduled for 2/7 at 9AM    Thanks,   Rosette Paz, PharmD  Medication Therapy Management Resident  [phone]

## 2023-01-24 DIAGNOSIS — E11.42 DIABETIC POLYNEUROPATHY ASSOCIATED WITH TYPE 2 DIABETES MELLITUS (H): ICD-10-CM

## 2023-01-24 NOTE — TELEPHONE ENCOUNTER
"Requested Prescriptions   Pending Prescriptions Disp Refills     metFORMIN (GLUCOPHAGE XR) 500 MG 24 hr tablet [Pharmacy Med Name: metFORMIN HCl  MG Oral Tablet Extended Release 24 Hour] 360 tablet 3     Sig: TAKE 4 TABLETS BY MOUTH  DAILY WITH DINNER       Biguanide Agents Failed - 1/24/2023  4:53 AM        Failed - Patient has documented A1c within the specified period of time.     If HgbA1C is 8 or greater, it needs to be on file within the past 3 months.  If less than 8, must be on file within the past 6 months.     Recent Labs   Lab Test 06/29/22  1210   A1C 7.4*             Failed - Patient's CR is NOT>1.4 OR Patient's EGFR is NOT<45 within past 12 mos.     Recent Labs   Lab Test 01/13/22  1551 09/09/21  1245 05/27/21  0951   GFRESTIMATED 73   < > 72   GFRESTBLACK  --   --  83    < > = values in this interval not displayed.       Recent Labs   Lab Test 01/13/22  1551   CR 1.10             Passed - Patient is age 10 or older        Passed - Patient does NOT have a diagnosis of CHF.        Passed - Medication is active on med list        Passed - Recent (6 mo) or future (30 days) visit within the authorizing provider's specialty     Patient had office visit in the last 6 months or has a visit in the next 30 days with authorizing provider or within the authorizing provider's specialty.  See \"Patient Info\" tab in inbasket, or \"Choose Columns\" in Meds & Orders section of the refill encounter.               Routing to provider as medication does not pass protocol.     Glenis Bell RN on 1/24/2023 at 1:16 PM    "

## 2023-01-26 RX ORDER — METFORMIN HCL 500 MG
TABLET, EXTENDED RELEASE 24 HR ORAL
Qty: 360 TABLET | Refills: 3 | Status: SHIPPED | OUTPATIENT
Start: 2023-01-26 | End: 2023-07-12

## 2023-02-02 ENCOUNTER — TELEPHONE (OUTPATIENT)
Dept: FAMILY MEDICINE | Facility: CLINIC | Age: 68
End: 2023-02-02
Payer: COMMERCIAL

## 2023-02-02 DIAGNOSIS — I48.92 ATRIAL FLUTTER WITH RAPID VENTRICULAR RESPONSE (H): Primary | ICD-10-CM

## 2023-02-02 DIAGNOSIS — I48.91 ATRIAL FIBRILLATION WITH RVR (H): ICD-10-CM

## 2023-02-02 NOTE — TELEPHONE ENCOUNTER
We had talked about this with Layo in November - the plan was below. Is this still the correct out of pocket spend for Eliquis (i.e. how far will the $500 get him)?  He is fine to use Xarelto or Eliquis - whatever we can get for him.     He has 3 months of Pradaxa at home.     I looked into his insurance (via epic) and it's showing dabigatran (generic) still as a tier 4 and Eliquis/Xarelto as a tier 3, so I don't think that he price on dabigatran has dropped enough yet to be affordable for him.     Mary Last, LloydD, NEIDA, BCACP  MTM Pharmacist, [facility]     Note from November:   Received VM from Layo - he has been notified that  program for Pradaxa will discontinue the free medication program in 2023. Pradaxa just recently (in November) became available as a generic medication, which is likely the reason the program is stopping. Hopefully with this available as a generic it will be affordable. Researched additional DOAC programs - Xarelto is no longer available to ANYONE with insurance, Eliquis requires a 3% -total gross income-out of pocket medication expense to be approved.

## 2023-02-02 NOTE — TELEPHONE ENCOUNTER
Feb. 1, 2023 I spoke with Layo, he is in need of financial assistance for medication.    We reviewed the Pharmacy Assistance Fund $500, the Prescription Assistance Program for manfacturer assistance programs, gross income, insurance and Rx list.    QUESTION-  The assistance program for Pradaxa discontinued last summer. Programs are still active for Xarelto & Eliquis.  The difference between those 2 programs is the amount Layo must spend out of pocket on medications. Eliquis is the easiest, quickest of the 2 programs.  I will also use the $500 Fund to help get him there.    Thoughts??      assistance applications will be completed for- Cymbalta, Spiriva Respimat, Xarelto/Eliquis.    When approved, Layo will receive this medication at no cost through December 2023.    Zenaida Plasencia  Prescription Assistance Supervisor  Pharmacy Assistance  [phone]

## 2023-02-06 ENCOUNTER — TELEPHONE (OUTPATIENT)
Dept: FAMILY MEDICINE | Facility: CLINIC | Age: 68
End: 2023-02-06
Payer: COMMERCIAL

## 2023-02-06 DIAGNOSIS — F11.90 CHRONIC, CONTINUOUS USE OF OPIOIDS: ICD-10-CM

## 2023-02-06 DIAGNOSIS — M96.1 FAILED BACK SYNDROME OF LUMBAR SPINE: ICD-10-CM

## 2023-02-06 RX ORDER — OXYCODONE HYDROCHLORIDE 5 MG/1
5 TABLET ORAL EVERY 6 HOURS PRN
Qty: 120 TABLET | Refills: 0 | Status: SHIPPED | OUTPATIENT
Start: 2023-02-11 | End: 2023-03-09

## 2023-02-06 NOTE — TELEPHONE ENCOUNTER
SN,    Routing refill request to provider for review/approval because:  Drug not on the [facility] refill protocol   Last OV 6/29/22 - future OV 2/15 for physical.    Patient aware refill not due until 2/11.    Thank you,  Gayathri Wade, RN

## 2023-02-06 NOTE — TELEPHONE ENCOUNTER
Please update is Rx list to reflect Eliquis and the dose information.    We can get started on his applications :)    Thanks much!    Zenaida Plasencia  Prescription Assistance Supervisor  Pharmacy Assistance  [phone]

## 2023-02-06 NOTE — TELEPHONE ENCOUNTER
oxyCODONE (ROXICODONE) 5 MG tablet      Last Written Prescription Date:  1/11/23  Last Fill Quantity: 120 tab,   # refills: 0  Last Office Visit: 6/29/2022 w/ PCP   Future Office visit:    Next 5 appointments (look out 90 days)    Feb 07, 2023  9:00 AM  MTHEYDI New with Martha Paz RPH  [facility] [address]  [address]  [facility] [address]  [phone]   Feb 15, 2023 11:00 AM  (Arrive by 10:40 AM)  Annual Wellness Visit with Connie Haskins MD  [facility] [address], [address]  [facility] [address]  [phone]           Routing refill request to provider for review/approval because:  Drug not on the [facility], [facility] or [facility] refill protocol or controlled substance    Can be refilled on 11th of each month, pt aware.     Lula Delgado, RN

## 2023-02-07 ENCOUNTER — VIRTUAL VISIT (OUTPATIENT)
Dept: PHARMACY | Facility: CLINIC | Age: 68
End: 2023-02-07
Payer: COMMERCIAL

## 2023-02-07 DIAGNOSIS — R35.0 BENIGN PROSTATIC HYPERPLASIA WITH URINARY FREQUENCY: ICD-10-CM

## 2023-02-07 DIAGNOSIS — J44.9 CHRONIC OBSTRUCTIVE PULMONARY DISEASE, UNSPECIFIED COPD TYPE (H): ICD-10-CM

## 2023-02-07 DIAGNOSIS — K59.01 SLOW TRANSIT CONSTIPATION: ICD-10-CM

## 2023-02-07 DIAGNOSIS — I48.91 ATRIAL FIBRILLATION WITH RVR (H): ICD-10-CM

## 2023-02-07 DIAGNOSIS — G89.29 CHRONIC BILATERAL LOW BACK PAIN WITHOUT SCIATICA: ICD-10-CM

## 2023-02-07 DIAGNOSIS — I10 HYPERTENSION GOAL BP (BLOOD PRESSURE) < 140/90: ICD-10-CM

## 2023-02-07 DIAGNOSIS — M54.50 CHRONIC BILATERAL LOW BACK PAIN WITHOUT SCIATICA: ICD-10-CM

## 2023-02-07 DIAGNOSIS — K21.00 GASTROESOPHAGEAL REFLUX DISEASE WITH ESOPHAGITIS WITHOUT HEMORRHAGE: ICD-10-CM

## 2023-02-07 DIAGNOSIS — E78.5 HYPERLIPIDEMIA LDL GOAL <100: ICD-10-CM

## 2023-02-07 DIAGNOSIS — F33.0 MAJOR DEPRESSIVE DISORDER, RECURRENT EPISODE, MILD (H): ICD-10-CM

## 2023-02-07 DIAGNOSIS — J30.2 SEASONAL ALLERGIC RHINITIS, UNSPECIFIED TRIGGER: ICD-10-CM

## 2023-02-07 DIAGNOSIS — N40.1 BENIGN PROSTATIC HYPERPLASIA WITH URINARY FREQUENCY: ICD-10-CM

## 2023-02-07 DIAGNOSIS — E11.9 TYPE 2 DIABETES MELLITUS WITHOUT COMPLICATION, WITHOUT LONG-TERM CURRENT USE OF INSULIN (H): Primary | ICD-10-CM

## 2023-02-07 DIAGNOSIS — M10.9 ACUTE GOUTY ARTHRITIS: ICD-10-CM

## 2023-02-07 PROCEDURE — 99207 PR NO CHARGE LOS: CPT

## 2023-02-07 RX ORDER — ACETAMINOPHEN 500 MG
500-1000 TABLET ORAL EVERY 8 HOURS PRN
Status: ON HOLD | COMMUNITY

## 2023-02-07 NOTE — PROGRESS NOTES
Medication Therapy Management (MTM) Encounter    ASSESSMENT:                            Medication Adherence/Access: Plan in place for Dr. Connie Haskins MD to approve Eliquis dose (see telephone encounter from 2/2) so that Zenaida can start working on application for Eliquis.     Type 2 Diabetes: Fasting blood sugars above goal of . Recommended cutting out snacking before bedtime to see if this can help with reducing morning sugars. Patient is due for updated A1c with plans to obtain at upcoming appointment with Dr. Connie Haskins MD. Patient would like to try lifestyle modifications before considering adding additional medications. Plan to obtain updated A1c to determine next steps for blood sugar management. Could consider GLP1 therapy for additional glycemic control in addition to weight loss if A1c remains above goal of <7%. Educated on additional diet and lifestyle modifications today.     Afib/Hypertension: Plan in place for patient to change to Eliquis based patient assistance program, see Medication Adherence/Access section above. Has about 4 months supply of Pradaxa remaining.     Gout: Uric acid levels stable without allopurinol use and no recent flares, okay to continue off of allopurinol.     GERD:  Stable.     Hyperlipidemia: Stable, LDL at goal of <70 per ADA 2023 guidelines. Due for updated lipid levels at upcoming appointment with Dr. Connie Haskins MD.     Pain: Recommended that patient could consider increasing Acetaminophen use or take scheduled to get ahead of the pain for better pain control.     BPH: Plan in place for patient to make an appointment with urology.     Allergies: Stable.    COPD: Stable.     Depression: Stable.     Constipation: Stable.    PLAN:                            1. Recommend taking Acetaminophen more regularly to get ahead of the pain, can take 500mg every 4-6 hours but no more than 4000mg or 8 tablets in 24 hours.   2. A1c when you get other  "routine lab work done with Dr. Connie Haskins MD next week.   3. Options to work on diet and lifestyle: try to reduce soda intake or eliminate it completely (you could even try sparkling water), look to cut out your snack before bed,      Follow-up: Check in after A1c results with Dr. Connie Haskins MD and to work with Zenaida on a new blood thinner medication order.     SUBJECTIVE/OBJECTIVE:                          Toby Mcgrath is a 67 year old male called for a follow-up visit.  Today's visit is a follow-up [facility] visit from 8/2/2022     Reason for visit: Comprehensive Review.    Allergies/ADRs: Reviewed in chart  Past Medical History: Reviewed in chart  Tobacco: He reports that he has quit smoking. His smoking use included cigarettes. He quit smokeless tobacco use about 10 years ago.  Caffeine: about 2 sodas a day    Medication Adherence/Access: Patient takes medications directly from bottles and uses pill box(es).  Patient takes medications 2 time(s) per day.   Per patient, misses medication 0 times per week.   Medication barriers: Working with Zenaida to help with the patient assistance program for Spiriva and Whisk.    Type 2 Diabetes:  Currently taking metformin XR 2000mg daily. Patient is not experiencing side effects.  Blood sugar monitoring: 3 time(s) daily. Ranges (patient reported):  Testing in the morning, before dinner, after bedtime- generally 125-200s: AM: 170-180. Before dinner: 100s- typically lower than in the mornings. Before bedtime: about the same as before dinner  Symptoms of low blood sugar? none  Eye exam: up to date  Foot exam: due  Diet/Exercise: Will often eat a snack before bedtime often because he wants to eat something. He has cut back on his soda consumption- has about 2 sodas daily. He mentions that diet soda is \"gross.\"   Aspirin: Not taking due to age  Statin: Yes: simvastatin 20mg daily   ACEi/ARB: Yes: lisinopril 10mg daily.   Lab Results   Component Value Date    " A1C 7.4 06/29/2022    A1C 7.0 01/13/2022    A1C 7.4 09/09/2021    A1C 7.2 03/15/2021    A1C 6.0 11/05/2020    A1C 7.8 05/28/2020    A1C 6.8 12/26/2019    A1C 7.1 06/26/2019     Afib/Hypertension: Patient is currently taking Pradaxa 150mg twice daily for anticoagulation- has about 4 months left. Patient reports no current concerns of bruising or bleeding.   Patient is also taking lisinopril 10mg daily and metoprolol tartrate 50mg twice daily. Patient reports no current medication side effects.   Patient does not self-monitor blood pressure.  BP Readings from Last 3 Encounters:   07/19/22 126/76   06/29/22 136/81   05/17/22 118/64     Gout: Stopped taking allopurinol because when he took it- it caused gout. Denies having any recent gout flares.   Uric Acid   Date Value Ref Range Status   08/02/2022 5.3 3.5 - 7.2 mg/dL Final   11/05/2020 6.9 3.5 - 7.2 mg/dL Final     GERD: Current medications include: Prilosec (omeprazole) 20mg daily. Patient feels that current regimen is effective.    Hyperlipidemia: Current therapy includes simvastatin 20mg daily.  Patient reports no significant myalgias or other side effects.    Pain: Current therapy includes Acetaminophen takes 2 tablets sometimes 3 tablets at one time for a headache or neck pain- has had to use three times in the past month, also uses oxycodone 5mg every 4-6 hours daily, pregabalin 200mg twice daily- mentions that nerve pain has been good with help of pregabalin. Pain will vary- rates his pain 7-10. Has been having chronic since his spine surgery/infusion. Feels that current medications relieves the pain/calms it down but doesn't make it go away. He does not have naloxone at home because it was too expensive, mentions that he is very careful with his oxycodone use.     BPH: Current therapy includes finasteride 5mg daily and tamsulosin 0.8mg daily. Has plans to see a urologist- mentions that he has a hard time emptying his bladder. He is considering surgery.      Allergies: Current therapy includes fluticasone nasal spray 2 sprays into each nostril as needed for colds/sinsus and finds it helpful.    COPD: Current therapy includes Spiriva 2 puffs daily- reports that breathing has been good.     Depression: Current therapy includes duloxetine 120mg daily. Feels that medication has really helped with mood. Denies suicidal thoughts.     Constipation: Current therapy includes senna-docusate 8.6-50mg tablet as needed for constipation- will take for a couple of days if he gets constipated happening about 1-2 times a month.     Today's Vitals: There were no vitals taken for this visit.  ----------------  I spent 57 minutes with this patient today. All changes were made via collaborative practice agreement with Connie Haskins MD. A copy of the visit note was provided to the patient's provider(s).    A summary of these recommendations was mailed to the patient.    Lloyd PearsonD  Medication Therapy Management Resident  [phone]    Preceptor cosignature: Toby Mcgrath was seen independently by Dr. Paz. I have reviewed the assessment and plan. Mary Last, LloydD, NEIDA, BCACP      Telemedicine Visit Details  Type of service:  Telephone visit  Start Time: 9:04 AM  End Time: 10:01 AM     Medication Therapy Recommendations  Chronic pain syndrome    Current Medication: acetaminophen (TYLENOL) 500 MG tablet   Rationale: Dose too low - Dosage too low - Effectiveness   Recommendation: Increase Frequency   Status: Patient Agreed - Adherence/Education

## 2023-02-07 NOTE — TELEPHONE ENCOUNTER
Hi Dr. Connie Haskins MD, would you be willing to approve the Eliquis order if you agree on the dosing and instructions so that Zenaida and her team can start the application for Layo?     Thanks,   Rosette Paz, PharmD  Medication Therapy Management Resident  [phone]

## 2023-02-07 NOTE — PATIENT INSTRUCTIONS
"Recommendations from today's MTM visit:                                                      1. Recommend taking Acetaminophen more regularly to get ahead of the pain, can take 500mg every 4-6 hours but no more than 4000mg or 8 tablets in 24 hours.   2. A1c when you get other routine lab work done with Dr. Connie Haskins MD next week.   3. Options to work on diet and lifestyle: try to reduce soda intake or eliminate it completely (you could even try sparkling water), look to cut out your snack before bed,      Follow-up: Check in after A1c results with Dr. Connie Haskins MD and to work with Zenaida on a new blood thinner medication order.     It was great speaking with you today.  I value your experience and would be very thankful for your time in providing feedback in our clinic survey. In the next few days, you may receive an email or text message from [employer] with a link to a survey related to your  clinical pharmacist.\"     To schedule another MTM appointment, please call the clinic directly or you may call the MTM scheduling line at [phone] or toll-free at [phone].     My Clinical Pharmacist's contact information:                                                      Please feel free to contact me with any questions or concerns you have.      Rosette Paz, PharmD  Medication Therapy Management Resident  [phone]  "

## 2023-02-15 ENCOUNTER — OFFICE VISIT (OUTPATIENT)
Dept: FAMILY MEDICINE | Facility: CLINIC | Age: 68
End: 2023-02-15
Payer: COMMERCIAL

## 2023-02-15 ENCOUNTER — TELEPHONE (OUTPATIENT)
Dept: UROLOGY | Facility: CLINIC | Age: 68
End: 2023-02-15

## 2023-02-15 ENCOUNTER — TELEPHONE (OUTPATIENT)
Dept: FAMILY MEDICINE | Facility: CLINIC | Age: 68
End: 2023-02-15

## 2023-02-15 VITALS
BODY MASS INDEX: 32.2 KG/M2 | HEIGHT: 71 IN | HEART RATE: 72 BPM | DIASTOLIC BLOOD PRESSURE: 68 MMHG | SYSTOLIC BLOOD PRESSURE: 118 MMHG | TEMPERATURE: 97.6 F | WEIGHT: 230 LBS | OXYGEN SATURATION: 98 %

## 2023-02-15 DIAGNOSIS — I10 HYPERTENSION GOAL BP (BLOOD PRESSURE) < 140/90: ICD-10-CM

## 2023-02-15 DIAGNOSIS — N41.1 PROSTATITIS, CHRONIC: ICD-10-CM

## 2023-02-15 DIAGNOSIS — E66.01 MORBID OBESITY (H): ICD-10-CM

## 2023-02-15 DIAGNOSIS — Z13.220 SCREENING FOR HYPERLIPIDEMIA: ICD-10-CM

## 2023-02-15 DIAGNOSIS — E11.42 DIABETIC POLYNEUROPATHY ASSOCIATED WITH TYPE 2 DIABETES MELLITUS (H): Primary | ICD-10-CM

## 2023-02-15 DIAGNOSIS — M54.2 NECK PAIN: ICD-10-CM

## 2023-02-15 DIAGNOSIS — J44.9 CHRONIC OBSTRUCTIVE PULMONARY DISEASE, UNSPECIFIED COPD TYPE (H): ICD-10-CM

## 2023-02-15 DIAGNOSIS — Z00.00 WELLNESS EXAMINATION: ICD-10-CM

## 2023-02-15 DIAGNOSIS — Z12.5 ENCOUNTER FOR SCREENING FOR MALIGNANT NEOPLASM OF PROSTATE: ICD-10-CM

## 2023-02-15 LAB
ALBUMIN SERPL BCG-MCNC: 4.3 G/DL (ref 3.5–5.2)
ALBUMIN UR-MCNC: NEGATIVE MG/DL
ALP SERPL-CCNC: 64 U/L (ref 40–129)
ALT SERPL W P-5'-P-CCNC: 29 U/L (ref 10–50)
ANION GAP SERPL CALCULATED.3IONS-SCNC: 14 MMOL/L (ref 7–15)
APPEARANCE UR: CLEAR
AST SERPL W P-5'-P-CCNC: 39 U/L (ref 10–50)
BACTERIA #/AREA URNS HPF: NORMAL /HPF
BASOPHILS # BLD AUTO: 0 10E3/UL (ref 0–0.2)
BASOPHILS NFR BLD AUTO: 1 %
BILIRUB SERPL-MCNC: 0.3 MG/DL
BILIRUB UR QL STRIP: NEGATIVE
BUN SERPL-MCNC: 14.1 MG/DL (ref 8–23)
CALCIUM SERPL-MCNC: 9.9 MG/DL (ref 8.8–10.2)
CHLORIDE SERPL-SCNC: 104 MMOL/L (ref 98–107)
CHOLEST SERPL-MCNC: 99 MG/DL
COLOR UR AUTO: YELLOW
CREAT SERPL-MCNC: 0.99 MG/DL (ref 0.67–1.17)
CREAT UR-MCNC: 82.4 MG/DL
CRP SERPL-MCNC: 5.04 MG/L
DEPRECATED HCO3 PLAS-SCNC: 25 MMOL/L (ref 22–29)
EOSINOPHIL # BLD AUTO: 0.1 10E3/UL (ref 0–0.7)
EOSINOPHIL NFR BLD AUTO: 2 %
ERYTHROCYTE [DISTWIDTH] IN BLOOD BY AUTOMATED COUNT: 14.3 % (ref 10–15)
ERYTHROCYTE [SEDIMENTATION RATE] IN BLOOD BY WESTERGREN METHOD: 12 MM/HR (ref 0–20)
GFR SERPL CREATININE-BSD FRML MDRD: 82 ML/MIN/1.73M2
GLUCOSE SERPL-MCNC: 180 MG/DL (ref 70–99)
GLUCOSE UR STRIP-MCNC: NEGATIVE MG/DL
HBA1C MFR BLD: 7.9 % (ref 0–5.6)
HCT VFR BLD AUTO: 41.9 % (ref 40–53)
HDLC SERPL-MCNC: 28 MG/DL
HGB BLD-MCNC: 14.2 G/DL (ref 13.3–17.7)
HGB UR QL STRIP: NEGATIVE
KETONES UR STRIP-MCNC: NEGATIVE MG/DL
LDLC SERPL CALC-MCNC: 38 MG/DL
LEUKOCYTE ESTERASE UR QL STRIP: NEGATIVE
LYMPHOCYTES # BLD AUTO: 1.7 10E3/UL (ref 0.8–5.3)
LYMPHOCYTES NFR BLD AUTO: 25 %
MCH RBC QN AUTO: 29.8 PG (ref 26.5–33)
MCHC RBC AUTO-ENTMCNC: 33.9 G/DL (ref 31.5–36.5)
MCV RBC AUTO: 88 FL (ref 78–100)
MICROALBUMIN UR-MCNC: <12 MG/L
MICROALBUMIN/CREAT UR: NORMAL MG/G{CREAT}
MONOCYTES # BLD AUTO: 0.5 10E3/UL (ref 0–1.3)
MONOCYTES NFR BLD AUTO: 7 %
NEUTROPHILS # BLD AUTO: 4.4 10E3/UL (ref 1.6–8.3)
NEUTROPHILS NFR BLD AUTO: 65 %
NITRATE UR QL: NEGATIVE
NONHDLC SERPL-MCNC: 71 MG/DL
PH UR STRIP: 6 [PH] (ref 5–7)
PLATELET # BLD AUTO: 216 10E3/UL (ref 150–450)
POTASSIUM SERPL-SCNC: 5.3 MMOL/L (ref 3.4–5.3)
PROT SERPL-MCNC: 7.2 G/DL (ref 6.4–8.3)
PSA SERPL-MCNC: 0.18 NG/ML (ref 0–4.5)
RBC # BLD AUTO: 4.76 10E6/UL (ref 4.4–5.9)
RBC #/AREA URNS AUTO: NORMAL /HPF
SODIUM SERPL-SCNC: 143 MMOL/L (ref 136–145)
SP GR UR STRIP: 1.02 (ref 1–1.03)
TRIGL SERPL-MCNC: 166 MG/DL
UROBILINOGEN UR STRIP-ACNC: 1 E.U./DL
WBC # BLD AUTO: 6.7 10E3/UL (ref 4–11)
WBC #/AREA URNS AUTO: NORMAL /HPF

## 2023-02-15 PROCEDURE — 85025 COMPLETE CBC W/AUTO DIFF WBC: CPT | Performed by: FAMILY MEDICINE

## 2023-02-15 PROCEDURE — 83036 HEMOGLOBIN GLYCOSYLATED A1C: CPT | Performed by: FAMILY MEDICINE

## 2023-02-15 PROCEDURE — 85652 RBC SED RATE AUTOMATED: CPT | Performed by: FAMILY MEDICINE

## 2023-02-15 PROCEDURE — 81001 URINALYSIS AUTO W/SCOPE: CPT | Performed by: FAMILY MEDICINE

## 2023-02-15 PROCEDURE — 36415 COLL VENOUS BLD VENIPUNCTURE: CPT | Performed by: FAMILY MEDICINE

## 2023-02-15 PROCEDURE — G0438 PPPS, INITIAL VISIT: HCPCS | Performed by: FAMILY MEDICINE

## 2023-02-15 PROCEDURE — 80061 LIPID PANEL: CPT | Performed by: FAMILY MEDICINE

## 2023-02-15 PROCEDURE — 82570 ASSAY OF URINE CREATININE: CPT | Performed by: FAMILY MEDICINE

## 2023-02-15 PROCEDURE — 86140 C-REACTIVE PROTEIN: CPT | Performed by: FAMILY MEDICINE

## 2023-02-15 PROCEDURE — 82043 UR ALBUMIN QUANTITATIVE: CPT | Performed by: FAMILY MEDICINE

## 2023-02-15 PROCEDURE — G0103 PSA SCREENING: HCPCS | Performed by: FAMILY MEDICINE

## 2023-02-15 PROCEDURE — 80053 COMPREHEN METABOLIC PANEL: CPT | Performed by: FAMILY MEDICINE

## 2023-02-15 PROCEDURE — 99214 OFFICE O/P EST MOD 30 MIN: CPT | Mod: 25 | Performed by: FAMILY MEDICINE

## 2023-02-15 RX ORDER — CIPROFLOXACIN 500 MG/1
500 TABLET, FILM COATED ORAL 2 TIMES DAILY
Qty: 90 TABLET | Refills: 0 | Status: SHIPPED | OUTPATIENT
Start: 2023-02-15 | End: 2023-04-01

## 2023-02-15 RX ORDER — CIPROFLOXACIN 500 MG/1
500 TABLET, FILM COATED ORAL 2 TIMES DAILY
Qty: 90 TABLET | Refills: 0 | Status: SHIPPED | OUTPATIENT
Start: 2023-02-15 | End: 2023-02-15

## 2023-02-15 ASSESSMENT — ANXIETY QUESTIONNAIRES
6. BECOMING EASILY ANNOYED OR IRRITABLE: NOT AT ALL
GAD7 TOTAL SCORE: 5
5. BEING SO RESTLESS THAT IT IS HARD TO SIT STILL: SEVERAL DAYS
4. TROUBLE RELAXING: NOT AT ALL
GAD7 TOTAL SCORE: 5
7. FEELING AFRAID AS IF SOMETHING AWFUL MIGHT HAPPEN: NOT AT ALL
8. IF YOU CHECKED OFF ANY PROBLEMS, HOW DIFFICULT HAVE THESE MADE IT FOR YOU TO DO YOUR WORK, TAKE CARE OF THINGS AT HOME, OR GET ALONG WITH OTHER PEOPLE?: NOT DIFFICULT AT ALL
IF YOU CHECKED OFF ANY PROBLEMS ON THIS QUESTIONNAIRE, HOW DIFFICULT HAVE THESE PROBLEMS MADE IT FOR YOU TO DO YOUR WORK, TAKE CARE OF THINGS AT HOME, OR GET ALONG WITH OTHER PEOPLE: NOT DIFFICULT AT ALL
3. WORRYING TOO MUCH ABOUT DIFFERENT THINGS: SEVERAL DAYS
7. FEELING AFRAID AS IF SOMETHING AWFUL MIGHT HAPPEN: NOT AT ALL
1. FEELING NERVOUS, ANXIOUS, OR ON EDGE: MORE THAN HALF THE DAYS
2. NOT BEING ABLE TO STOP OR CONTROL WORRYING: SEVERAL DAYS
GAD7 TOTAL SCORE: 5

## 2023-02-15 ASSESSMENT — ENCOUNTER SYMPTOMS
FREQUENCY: 1
PALPITATIONS: 0
EYE PAIN: 0
MYALGIAS: 0
CONSTIPATION: 0
FEVER: 0
JOINT SWELLING: 0
DYSURIA: 0
HEMATURIA: 0
COUGH: 0
HEARTBURN: 0
HEADACHES: 0
PARESTHESIAS: 0
DIARRHEA: 0
NERVOUS/ANXIOUS: 0
DIZZINESS: 0
SHORTNESS OF BREATH: 0
ABDOMINAL PAIN: 0
WEAKNESS: 0
ARTHRALGIAS: 0
SORE THROAT: 0
HEMATOCHEZIA: 0
CHILLS: 0
NAUSEA: 0

## 2023-02-15 ASSESSMENT — PATIENT HEALTH QUESTIONNAIRE - PHQ9
SUM OF ALL RESPONSES TO PHQ QUESTIONS 1-9: 2
10. IF YOU CHECKED OFF ANY PROBLEMS, HOW DIFFICULT HAVE THESE PROBLEMS MADE IT FOR YOU TO DO YOUR WORK, TAKE CARE OF THINGS AT HOME, OR GET ALONG WITH OTHER PEOPLE: NOT DIFFICULT AT ALL
SUM OF ALL RESPONSES TO PHQ QUESTIONS 1-9: 2

## 2023-02-15 ASSESSMENT — PAIN SCALES - GENERAL: PAINLEVEL: WORST PAIN (10)

## 2023-02-15 ASSESSMENT — ACTIVITIES OF DAILY LIVING (ADL): CURRENT_FUNCTION: NO ASSISTANCE NEEDED

## 2023-02-15 NOTE — TELEPHONE ENCOUNTER
Called [facility].  Cipro Rx was sent to both [facility] and   State they left a message for patient to call them and they haven't heard back.  Where does he want filled.    Informed them [facility].    State they will need to call [facility] to cancel order before they can refill.  They will call patient when ready at  at [facility]    Detailed VM left informing patient      Gayathri Wade RN

## 2023-02-15 NOTE — TELEPHONE ENCOUNTER
Triage,   Layo called.   States he is unable to  ciprofloxacin 500 mg today at the [facility] pharmacy in [place] because the Rx was originally sent to [facility]?  Looks like SN sent it today to the [facility] pharmacy and it was confirmed by the pharmacy at 11:49AM.  Please advise.   Thanks!  Julia PRATT

## 2023-02-15 NOTE — TELEPHONE ENCOUNTER
[facility] Call Center    Phone Message    May a detailed message be left on voicemail: yes     Reason for Call: Appointment Intake    Referring Provider Name: Connie Haskins MD  Diagnosis and/or Symptoms: Prostatitis       Patient has a referral with priority of 1-2 weeks. Please contact patient in regards to this message. Thank you    Action Taken: Other: Urology    Travel Screening: Not Applicable

## 2023-02-15 NOTE — LETTER
February 20, 2023      Layo IVERSON Alcides  [address]   [place] [address]          Dear ,      We are writing to inform you of your test results. The labs show that your prostate level is normal.  Given your urinary symptoms I think it is still a good idea to take the antibiotics and see Urology to talk about ways to help the urination.     The diabetes is stable.   The cholesterol is great     The CRP was elevated slightly - this is nonspecific for inflammation or infection.  Again I think you have a prostate infection so this could be the cause.  See what Urology thinks     If you continue to lose weight then please see me in the next 3 weeks           Resulted Orders   Albumin Random Urine Quantitative with Creat Ratio   Result Value Ref Range    Creatinine Urine mg/dL 82.4 mg/dL      Comment:      The reference ranges have not been established in urine creatinine. The results should be integrated into the clinical context for interpretation.    Albumin Urine mg/L <12.0 mg/L      Comment:      The reference ranges have not been established in urine albumin. The results should be integrated into the clinical context for interpretation.    Albumin Urine mg/g Cr        Comment:      Unable to calculate, urine albumin and/or urine creatinine is outside detectable limits.  Microalbuminuria is defined as an albumin:creatinine ratio of 17 to 299 for males and 25 to 299 for females. A ratio of albumin:creatinine of 300 or higher is indicative of overt proteinuria.  Due to biologic variability, positive results should be confirmed by a second, first-morning random or 24-hour timed urine specimen. If there is discrepancy, a third specimen is recommended. When 2 out of 3 results are in the microalbuminuria range, this is evidence for incipient nephropathy and warrants increased efforts at glucose control, blood pressure control, and institution of therapy with an angiotensin-converting-enzyme (ACE)  inhibitor (if the patient can tolerate it).     HEMOGLOBIN A1C   Result Value Ref Range    Hemoglobin A1C 7.9 (H) 0.0 - 5.6 %      Comment:      Normal <5.7%   Prediabetes 5.7-6.4%    Diabetes 6.5% or higher     Note: Adopted from ADA consensus guidelines.   Lipid panel reflex to direct LDL Non-fasting   Result Value Ref Range    Cholesterol 99 <200 mg/dL    Triglycerides 166 (H) <150 mg/dL    Direct Measure HDL 28 (L) >=40 mg/dL    LDL Cholesterol Calculated 38 <=100 mg/dL    Non HDL Cholesterol 71 <130 mg/dL    Narrative    Cholesterol  Desirable:  <200 mg/dL    Triglycerides  Normal:  Less than 150 mg/dL  Borderline High:  150-199 mg/dL  High:  200-499 mg/dL  Very High:  Greater than or equal to 500 mg/dL    Direct Measure HDL  Female:  Greater than or equal to 50 mg/dL   Male:  Greater than or equal to 40 mg/dL    LDL Cholesterol  Desirable:  <100mg/dL  Above Desirable:  100-129 mg/dL   Borderline High:  130-159 mg/dL   High:  160-189 mg/dL   Very High:  >= 190 mg/dL    Non HDL Cholesterol  Desirable:  130 mg/dL  Above Desirable:  130-159 mg/dL  Borderline High:  160-189 mg/dL  High:  190-219 mg/dL  Very High:  Greater than or equal to 220 mg/dL   Comprehensive metabolic panel (BMP + Alb, Alk Phos, ALT, AST, Total. Bili, TP)   Result Value Ref Range    Sodium 143 136 - 145 mmol/L    Potassium 5.3 3.4 - 5.3 mmol/L    Chloride 104 98 - 107 mmol/L    Carbon Dioxide (CO2) 25 22 - 29 mmol/L    Anion Gap 14 7 - 15 mmol/L    Urea Nitrogen 14.1 8.0 - 23.0 mg/dL    Creatinine 0.99 0.67 - 1.17 mg/dL    Calcium 9.9 8.8 - 10.2 mg/dL    Glucose 180 (H) 70 - 99 mg/dL    Alkaline Phosphatase 64 40 - 129 U/L    AST 39 10 - 50 U/L    ALT 29 10 - 50 U/L    Protein Total 7.2 6.4 - 8.3 g/dL    Albumin 4.3 3.5 - 5.2 g/dL    Bilirubin Total 0.3 <=1.2 mg/dL    GFR Estimate 82 >60 mL/min/1.73m2      Comment:      eGFR calculated using 2021 CKD-EPI equation.   ESR: Erythrocyte sedimentation rate   Result Value Ref Range    Erythrocyte  Sedimentation Rate 12 0 - 20 mm/hr   CRP, inflammation   Result Value Ref Range    CRP Inflammation 5.04 (H) <5.00 mg/L   PSA, screen   Result Value Ref Range    Prostate Specific Antigen Screen 0.18 0.00 - 4.50 ng/mL    Narrative    This result is obtained using the Roche Elecsys total PSA method on the carlos alberto e801 immunoassay analyzer. Results obtained with different assay methods or kits cannot be used interchangeably.   UA with Microscopic reflex to Culture - lab collect   Result Value Ref Range    Color Urine Yellow Colorless, Straw, Light Yellow, Yellow    Appearance Urine Clear Clear    Glucose Urine Negative Negative mg/dL    Bilirubin Urine Negative Negative    Ketones Urine Negative Negative mg/dL    Specific Gravity Urine 1.020 1.003 - 1.035    Blood Urine Negative Negative    pH Urine 6.0 5.0 - 7.0    Protein Albumin Urine Negative Negative mg/dL    Urobilinogen Urine 1.0 0.2, 1.0 E.U./dL    Nitrite Urine Negative Negative    Leukocyte Esterase Urine Negative Negative   CBC with platelets and differential   Result Value Ref Range    WBC Count 6.7 4.0 - 11.0 10e3/uL    RBC Count 4.76 4.40 - 5.90 10e6/uL    Hemoglobin 14.2 13.3 - 17.7 g/dL    Hematocrit 41.9 40.0 - 53.0 %    MCV 88 78 - 100 fL    MCH 29.8 26.5 - 33.0 pg    MCHC 33.9 31.5 - 36.5 g/dL    RDW 14.3 10.0 - 15.0 %    Platelet Count 216 150 - 450 10e3/uL    % Neutrophils 65 %    % Lymphocytes 25 %    % Monocytes 7 %    % Eosinophils 2 %    % Basophils 1 %    Absolute Neutrophils 4.4 1.6 - 8.3 10e3/uL    Absolute Lymphocytes 1.7 0.8 - 5.3 10e3/uL    Absolute Monocytes 0.5 0.0 - 1.3 10e3/uL    Absolute Eosinophils 0.1 0.0 - 0.7 10e3/uL    Absolute Basophils 0.0 0.0 - 0.2 10e3/uL   Urine Microscopic   Result Value Ref Range    Bacteria Urine None Seen None Seen /HPF    RBC Urine 0-2 0-2 /HPF /HPF    WBC Urine 0-5 0-5 /HPF /HPF    Narrative    Urine Culture not indicated       If you have any questions or concerns, please call the clinic at the number  listed above.       Sincerely,      Connie Haskins MD/ap

## 2023-02-15 NOTE — PROGRESS NOTES
SUBJECTIVE:   Layo is a 67 year old who presents for Preventive Visit.  Patient has been advised of split billing requirements and indicates understanding: Yes  Are you in the first 12 months of your Medicare coverage?  No    HPI    Have you ever done Advance Care Planning? (For example, a Health Directive, POLST, or a discussion with a medical provider or your loved ones about your wishes): Yes, advance care planning is on file.    Pain in left side of neck goes down his back and up into his head  Has soreness  Causes headache  Causes numbness     Drinking hot tea stopped coffee  Then started to get sweats - comes at random  Affects arms and armpits and neck  Has night sweats as well  Started about 3 months ago  Wt Readings from Last 4 Encounters:   02/17/23 104.3 kg (230 lb)   02/15/23 104.3 kg (230 lb)   07/19/22 113.4 kg (250 lb)   06/29/22 112.5 kg (248 lb 1.6 oz)   has not had much appetite  Sometimes has nausea  Stools are normal no loose stools    Voiding is worsening  At times it is hard to void  This has been prsent for a few year but has not seen Urology, has referral  Last PSA was checked 2019  - had been normal    Will see Urology  No sore throat no cough          Fall risk       Cognitive Screening   1) Repeat 3 items (Leader, Season, Table)    2) Clock draw: NORMAL  3) 3 item recall: Recalls 1 object   Results: NORMAL clock, 1-2 items recalled: COGNITIVE IMPAIRMENT LESS LIKELY    Mini-CogTM Copyright RALEIGH Manzo. Licensed by the author for use in [facility]; reprinted with permission (gucci@.[email or web address]). All rights reserved.      Do you have sleep apnea, excessive snoring or daytime drowsiness?: no    Reviewed and updated as needed this visit by clinical staff   Tobacco  Allergies  Meds              Reviewed and updated as needed this visit by Provider                 Social History     Tobacco Use     Smoking status: Former     Years: 40.00     Types: Cigarettes     Smokeless tobacco: Former      Quit date: 2/1/2013   Substance Use Topics     Alcohol use: Yes     Alcohol/week: 0.0 standard drinks     Comment: occ.         Alcohol Use 2/15/2023   Prescreen: >3 drinks/day or >7 drinks/week? No   No flowsheet data found.        (E11.42) Diabetic polyneuropathy associated with type 2 diabetes mellitus (H)  (primary encounter diagnosis)  Comment: Here today for diabetes check due for labs as noted below is tolerating medication well but does feel like his blood sugars have been higher.  It is really hard to exercise because of failed back syndrome and pain he does walk with a cane and often has to intermittently stand to relieve symptoms.  Plan: Albumin Random Urine Quantitative with Creat         Ratio, HEMOGLOBIN A1C, Lipid panel reflex to         direct LDL Non-fasting            (J44.9) Chronic obstructive pulmonary disease, unspecified COPD type (H)  Comment: Breathing is stable does have a history of smoking and has inhalers that he has been using.  Plan:     (I10) Hypertension goal BP (blood pressure) < 140/90  Comment: Due for labs today  Plan: BASIC METABOLIC PANEL        Tolerating medication well    (Z13.220) Screening for hyperlipidemia  Comment: Due for labs today no side effects with statin medications.  Plan: Lipid panel reflex to direct LDL Non-fasting          Current providers sharing in care for this patient include:   Patient Care Team:  Connie Haskins MD as PCP - General (Family Practice)  Connie Haskins MD as Assigned PCP  Jer Irby MD as MD (Neurological Surgery)  Britany Ruiz, RN as   [facility] as Pharmacist (Pharmacist)  Toby Johnson MD as Assigned Musculoskeletal Provider  [facility] as Assigned MTM Pharmacist  Connie Haskins MD as Assigned Pain Medication Provider  Martha Paz RPH as Pharmacist (Pharmacist)  Carl Donahue MD as Assigned Surgical Provider    The following health  "maintenance items are reviewed in Epic and correct as of today:  Health Maintenance   Topic Date Due     SPIROMETRY  Never done     COPD ACTION PLAN  Never done     TREATMENT AGREEMENT FOR CHRONIC PAIN MANAGEMENT  Never done     LUNG CANCER SCREENING  Never done     AORTIC ANEURYSM SCREENING (SYSTEM ASSIGNED)  08/03/2018     MEDICARE ANNUAL WELLNESS VISIT  08/07/2018     DIABETIC FOOT EXAM  12/26/2020     ZOSTER IMMUNIZATION (2 of 2) 10/20/2021     FALL RISK ASSESSMENT  03/15/2022     COVID-19 Vaccine (5 - Booster for Pfizer series) 08/24/2022     COLORECTAL CANCER SCREENING  10/14/2022     EYE EXAM  06/24/2023     URINE DRUG SCREEN  06/29/2023     A1C  08/15/2023     PHQ-9  08/15/2023     BMP  02/15/2024     LIPID  02/15/2024     MICROALBUMIN  02/15/2024     KAYLA ASSESSMENT  02/15/2024     ANNUAL REVIEW OF HM ORDERS  02/15/2024     ADVANCE CARE PLANNING  02/15/2028     DTAP/TDAP/TD IMMUNIZATION (4 - Td or Tdap) 08/11/2029     HEPATITIS C SCREENING  Completed     DEPRESSION ACTION PLAN  Completed     INFLUENZA VACCINE  Completed     Pneumococcal Vaccine: 65+ Years  Completed     IPV IMMUNIZATION  Aged Out     MENINGITIS IMMUNIZATION  Aged Out     Lab work is in process          Review of Systems  Constitutional, HEENT, cardiovascular, pulmonary, gi and gu systems are negative, except as otherwise noted.    OBJECTIVE:   /68 (BP Location: Right arm, Patient Position: Sitting, Cuff Size: Adult Regular)   Pulse 72   Temp 97.6  F (36.4  C) (Temporal)   Ht 1.81 m (5' 11.26\")   Wt 104.3 kg (230 lb)   SpO2 98%   BMI 31.84 kg/m   Estimated body mass index is 31.84 kg/m  as calculated from the following:    Height as of this encounter: 1.81 m (5' 11.26\").    Weight as of this encounter: 104.3 kg (230 lb).  Physical Exam  GENERAL: healthy, alert and no distress  RESP: lungs clear to auscultation - no rales, rhonchi or wheezes  CV: regular rate and rhythm, normal S1 S2, no S3 or S4, no murmur, click or rub, no " peripheral edema and peripheral pulses strong  ABDOMEN: soft, nontender, no hepatosplenomegaly, no masses and bowel sounds normal    Diagnostic Test Results:  Labs reviewed in Epic    ASSESSMENT / PLAN:       ICD-10-CM    1. Diabetic polyneuropathy associated with type 2 diabetes mellitus (H)  E11.42 Albumin Random Urine Quantitative with Creat Ratio     HEMOGLOBIN A1C     Lipid panel reflex to direct LDL Non-fasting     Albumin Random Urine Quantitative with Creat Ratio     HEMOGLOBIN A1C     Lipid panel reflex to direct LDL Non-fasting      2. Chronic obstructive pulmonary disease, unspecified COPD type (H)  J44.9       3. Morbid obesity (H)  E66.01       4. Hypertension goal BP (blood pressure) < 140/90  I10 BASIC METABOLIC PANEL     CANCELED: BASIC METABOLIC PANEL      5. Screening for hyperlipidemia  Z13.220 Lipid panel reflex to direct LDL Non-fasting     Lipid panel reflex to direct LDL Non-fasting      6. Neck pain  M54.2 Physical Therapy Referral      7. Prostatitis, chronic  N41.1 CBC with platelets and differential     Comprehensive metabolic panel (BMP + Alb, Alk Phos, ALT, AST, Total. Bili, TP)     ESR: Erythrocyte sedimentation rate     CRP, inflammation     Adult Urology  Referral     PSA, screen     UA with Microscopic reflex to Culture - lab collect     ciprofloxacin (CIPRO) 500 MG tablet     CBC with platelets and differential     Comprehensive metabolic panel (BMP + Alb, Alk Phos, ALT, AST, Total. Bili, TP)     ESR: Erythrocyte sedimentation rate     CRP, inflammation     PSA, screen     UA with Microscopic reflex to Culture - lab collect     Urine Microscopic     DISCONTINUED: ciprofloxacin (CIPRO) 500 MG tablet      8. Encounter for screening for malignant neoplasm of prostate  Z12.5 PSA, screen     PSA, screen                COUNSELING:  Reviewed preventive health counseling, as reflected in patient instructions        He reports that he has quit smoking. His smoking use included  cigarettes. He quit smokeless tobacco use about 10 years ago.      Appropriate preventive services were discussed with this patient, including applicable screening as appropriate for cardiovascular disease, diabetes, osteopenia/osteoporosis, and glaucoma.  As appropriate for age/gender, discussed screening for colorectal cancer, prostate cancer, breast cancer, and cervical cancer. Checklist reviewing preventive services available has been given to the patient.    Reviewed patients plan of care and provided an AVS. The Basic Care Plan (routine screening as documented in Health Maintenance) for Toby meets the Care Plan requirement. This Care Plan has been established and reviewed with the Patient.      Connie Haskins MD  [facility]    Identified Health Risks:  Answers for HPI/ROS submitted by the patient on 2/15/2023  If you checked off any problems, how difficult have these problems made it for you to do your work, take care of things at home, or get along with other people?: Not difficult at all  PHQ9 TOTAL SCORE: 2  KAYLA 7 TOTAL SCORE: 5

## 2023-02-16 ENCOUNTER — TELEPHONE (OUTPATIENT)
Dept: FAMILY MEDICINE | Facility: CLINIC | Age: 68
End: 2023-02-16
Payer: COMMERCIAL

## 2023-02-16 NOTE — TELEPHONE ENCOUNTER
Forms/Letter Request    Type of form/letter:   [facility] Prescription assistance program forms    Have you been seen for this request: N/A    Do we have the form/letter: Yes:   Inter office mailed to the [facility]    When is form/letter needed by: asap    How would you like the form/letter returned:   Return to Zenaida Plasencia by interoffice mail envelope that she provided.    Patient Notified form requests are processed in 3-5 business days:No    Okay to leave a detailed message?: n/a    Placed in Dr. Haskins's box

## 2023-02-17 ENCOUNTER — OFFICE VISIT (OUTPATIENT)
Dept: UROLOGY | Facility: CLINIC | Age: 68
End: 2023-02-17
Payer: COMMERCIAL

## 2023-02-17 VITALS
WEIGHT: 230 LBS | HEART RATE: 81 BPM | DIASTOLIC BLOOD PRESSURE: 70 MMHG | SYSTOLIC BLOOD PRESSURE: 122 MMHG | OXYGEN SATURATION: 99 % | HEIGHT: 71 IN | BODY MASS INDEX: 32.2 KG/M2

## 2023-02-17 DIAGNOSIS — N41.9 PROSTATITIS, UNSPECIFIED PROSTATITIS TYPE: Primary | ICD-10-CM

## 2023-02-17 DIAGNOSIS — N40.1 BENIGN PROSTATIC HYPERPLASIA WITH URINARY HESITANCY: ICD-10-CM

## 2023-02-17 DIAGNOSIS — R39.11 BENIGN PROSTATIC HYPERPLASIA WITH URINARY HESITANCY: ICD-10-CM

## 2023-02-17 PROCEDURE — 99203 OFFICE O/P NEW LOW 30 MIN: CPT | Performed by: STUDENT IN AN ORGANIZED HEALTH CARE EDUCATION/TRAINING PROGRAM

## 2023-02-17 ASSESSMENT — PAIN SCALES - GENERAL: PAINLEVEL: EXTREME PAIN (8)

## 2023-02-17 NOTE — PROGRESS NOTES
Chief Complaint:    LUTS           Consult or Referral:     Mr. Toby Mcgrath is a 67 year old male seen at the request of Dr. Rosales ref. provider found.         History of Present Illness:     Toby Mcgrath is a 67 year old male being seen for lower urinary tract symptoms.  Duration of problem: Many years  Previous treatments: Tamsulosin and finasteride    Reviewed previous notes from Dr. Jozef Vasques presents today for evaluation of his ongoing lower urinary tract symptoms  He had been having trouble some lower urine tract symptoms for many years since his early 20s and he had seen the urologist last in 1996  Has been following up with his primary care currently being treated with tamsulosin and finasteride  Symptoms have been worsening for the last 2 months not getting better despite adequate treatment.  He tells me he was having some cold sweats and there was some concern about prostatitis and he was started on antibiotic treatment about 4 weeks ago and is continuing on ciprofloxacin at the moment  Feels slightly better since the start of the treatment however still has some persistent LUTS  He is most troubled by nocturia and hesitancy and frequency           Past Medical History:     Past Medical History:   Diagnosis Date     Anxiety state, unspecified      BMI 32.0-32.9,adult 9/4/2015     Chronic pain syndrome 3/29/2007    Narcotic refill protocol Will return every 2-3 months for clinic visits Controlled substance aggreement on file from this  6/26/12 Documentation in problem list: no, appears to be compliant based on last visit He is responding best to Oxycontin 10 mg twice daily # 60 per month.  This is costly and looking into [place] for coverage.  Nausea with MS Contin Has meds refilled 16 of every month Last [facility] website verification:  done on 7/8/2015  [email or web address]   2/10/2015:  PCP will take over narcotic prescription Tapering course: using 5 mg tablets 10 mg morning 15 mg  noon, 15 mg night for 1 week then 10 mg morning, 10 mg noon, 15 mg night for 1 week then 10 mg TID for 1 week then see me for refills  Then ongoing taper: 5 mg morning, 10 mg noon and 10 mg night for 1 week then  5 mg morning and noon and 10 mg night for 1 week then 5 mg tid for 1 week Then 5 mg morning no noon dose and 5 mg night for 1 week then No morning or non dose and 5 mg nightly for 1 week then off  Goal is co     Chronic rhinitis 3/29/2007     DDD (degenerative disc disease), cervical 2/8/2013    Normal.  C2-C3: Normal disc, facet joints, spinal canal and neural foramina.  C3-C4: Mild broad-based posterior disc bulge. Otherwise normal.  C4-C5: Normal disc, facet joints, spinal canal and neural foramina.  C5-C6: Moderate degenerative disc disease with loss of disc height, circumferential disc bulge and vertebral endplate osteophytes. Mild spinal canal stenosis and moderate left foraminal stenosis. Normal right foramen and facet joints.  C6-C7: Mild circumferential disc bulge. Slight impression on the thecal sac. Mild left foraminal stenosis. Normal right foramen and facet joints.  C7-T1: Normal disc, facet joints, spinal canal and neural foramina.  T1-T2: Mild central posterior disc protrusion.  T2-T3: Mild central posterior disc protrusion. Slight impression on the spinal cord.         DJD (degenerative joint disease), lumbar 1/10/2012     Esophageal reflux     ,refluxes on upper GI     Gout 4/8/2011     Gout, unspecified      Hyperlipidemia LDL goal <100 10/25/2012     Hypertension goal BP (blood pressure) < 140/90 10/21/2011     HYPERTROPHY PROSTATE WITH OBST 8/3/2006     Impotence of organic origin      Lumbar radiculopathy 9/17/2014     Major depressive disorder, recurrent episode, unspecified 7/9/2008     Osteoarthrosis, unspecified whether generalized or localized, pelvic region and thigh      Pure hyperglyceridemia      ROTATOR CUFF SYND NOS 4/17/2008     S/P lumbar fusion 10/14/2014     Thoracic or  lumbosacral neuritis or radiculitis, unspecified      Tobacco use disorder      Type 2 diabetes, HbA1C goal < 8% (H) 9/9/2011            Past Surgical History:     Past Surgical History:   Procedure Laterality Date     BACK SURGERY       both shoulder repair  2006, 2008     FUSION LUMBAR ANTERIOR, FUSION LUMBAR POSTERIOR TWO LEVELS, COMBINED N/A 9/17/2014    Procedure: COMBINED FUSION LUMBAR ANTERIOR, FUSION LUMBAR POSTERIOR TWO LEVELS;  Surgeon: Chris Lugo MD;  Location: [facility] OR     [facility] UGI ENDOSCOPY DIAG W OR W/O BRUSH/WASH  3/04    ok     HEAD & NECK SURGERY       HEMILAMINECTOMY, DISCECTOMY LUMBAR ONE LEVEL, COMBINED Left 9/8/2015    Procedure: COMBINED HEMILAMINECTOMY, DISCECTOMY LUMBAR ONE LEVEL;  Surgeon: Jer Irby MD;  Location: [facility] OR     right hip replacement  10,2010     [facility] NONSPECIFIC PROCEDURE  1995    neck cyst     [facility] NONSPECIFIC PROCEDURE  1979    laminectomy L5-S1 x 2     [facility] SHOULDER SURG PROC UNLISTED  2005, '07    repairs,later manipulate,inject LT, RT     ZZ COLONOSCOPY THRU STOMA, DIAGNOSTIC  3/04    normal            Medications     Current Outpatient Medications   Medication     acetaminophen (TYLENOL) 500 MG tablet     apixaban ANTICOAGULANT (ELIQUIS ANTICOAGULANT) 5 MG tablet     blood glucose (ACCU-CHEK DANIELE PLUS) test strip     blood glucose (ONETOUCH VERIO IQ) test strip     blood glucose monitoring (ACCU-CHEK FASTCLIX) lancets     ciprofloxacin (CIPRO) 500 MG tablet     DULoxetine (CYMBALTA) 60 MG capsule     finasteride (PROSCAR) 5 MG tablet     fluticasone (FLONASE) 50 MCG/ACT nasal spray     lisinopril (ZESTRIL) 10 MG tablet     metFORMIN (GLUCOPHAGE XR) 500 MG 24 hr tablet     metoprolol tartrate (LOPRESSOR) 50 MG tablet     omeprazole (PRILOSEC) 20 MG DR capsule     OneTouch Delica Lancets 33G MISC     oxyCODONE (ROXICODONE) 5 MG tablet     pregabalin (LYRICA) 100 MG capsule     senna-docusate (SENOKOT-S/PERICOLACE) 8.6-50 MG tablet     simvastatin (ZOCOR)  20 MG tablet     tamsulosin (FLOMAX) 0.4 MG capsule     tiotropium (SPIRIVA RESPIMAT) 2.5 MCG/ACT inhaler     No current facility-administered medications for this visit.            Family History:     Family History   Problem Relation Age of Onset     Diabetes Mother      C.A.D. Father      Diabetes Father      Hypertension Father      Colon Cancer No family hx of             Social History:     Social History     Socioeconomic History     Marital status:      Spouse name: Not on file     Number of children: 2     Years of education: 12     Highest education level: Not on file   Occupational History     Employer: DISABLED     Employer: UNEMPLOYED   Tobacco Use     Smoking status: Former     Years: 40.00     Types: Cigarettes     Smokeless tobacco: Former     Quit date: 2/1/2013   Substance and Sexual Activity     Alcohol use: Yes     Alcohol/week: 0.0 standard drinks     Comment: occ.     Drug use: No     Sexual activity: Not Currently   Other Topics Concern      Service Yes     Blood Transfusions No     Caffeine Concern No     Occupational Exposure No     Hobby Hazards No     Sleep Concern No     Stress Concern Yes     Weight Concern No     Special Diet No     Back Care No     Exercise No     Bike Helmet Not Asked     Seat Belt Yes     Self-Exams No     Parent/sibling w/ CABG, MI or angioplasty before 65F 55M? No   Social History Narrative    On disability since 2009 for hip and shoulder. Worked for "[employer]" at [employer] for 20 yrs. Kids 2.  since 2009.             Social Determinants of Health     Financial Resource Strain: Not on file   Food Insecurity: Not on file   Transportation Needs: Not on file   Physical Activity: Not on file   Stress: Not on file   Social Connections: Not on file   Intimate Partner Violence: Not on file   Housing Stability: Not on file            Allergies:   Codeine         Review of Systems:  From intake questionnaire     Skin: negative  Eyes:  "negative  Ears/Nose/Throat: negative  Respiratory: No shortness of breath, dyspnea on exertion, cough, or hemoptysis  Cardiovascular: No chest pain or palpitations  Gastrointestinal: negative; no nausea/vomiting, constipation or diarrhea  Genitourinary: as per HPI  Musculoskeletal: negative  Neurologic: negative  Psychiatric: negative  Hematologic/Lymphatic/Immunologic: negative  Endocrine: negative         Physical Exam:     Patient is a 67 year old  male   Vitals: Blood pressure 122/70, pulse 81, height 1.803 m (5' 11\"), weight 104.3 kg (230 lb), SpO2 99 %.  Constitutional: Body mass index is 32.08 kg/m .  Alert, no acute distress, oriented, conversant  Eyes: no scleral icterus; extraocular muscles intact, moist conjunctivae  Neck: trachea midline, no thyromegaly  Ears/nose/mouth: throat/mouth:normal, good dentition  Respiratory: no respiratory distress, or pursed lip breathing  Cardiovascular: pulses strong and intact; no obvious jugular venous distension present  Gastrointestinal: soft, nontender, no organomegaly or masses,   Lymphatics: No inguinal adenopathy  Musculoskeletal: extremities normal, no peripheral edema  Skin: no suspicious lesions or rashes  Neuro: Alert, oriented, speech and mentation normal  Psych: affect and mood normal, alert and oriented to person, place and time  Gait: Normal  : LOC anodular, symmetric      Labs and Pathology:    The following labs were reviewed by me and discussed with the patient:  UA: Normal  Significant for   Lab Results   Component Value Date    CR 0.99 02/15/2023    CR 1.10 01/13/2022    CR 1.19 09/09/2021    CR 1.06 05/27/2021    CR 1.16 03/15/2021    CR 1.26 11/05/2020    CR 1.32 09/17/2020    CR 1.18 07/17/2020    CR 1.10 07/16/2020    CR 1.09 05/28/2020    CR 1.03 07/16/2019    CR 1.13 02/28/2019    CR 1.24 08/23/2018     PSA   Date Value Ref Range Status   06/26/2019 0.36 0 - 4 ug/L Final     Comment:     Assay Method:  Chemiluminescence using Siemens Vista " analyzer   05/09/2013 1.45 0 - 4 ug/L Final   08/15/2012 1.43 0 - 4 ug/L Final   08/04/2011 1.55 0 - 4 ug/L Final   02/26/2009 1.50 0 - 4 ug/L Final   08/03/2007 1.77 0 - 4 ug/L Final   08/03/2006 1.74 0 - 4 ug/L Final   11/03/2004 1.52 0 - 4 ug/L Final   02/20/2004 1.94 0 - 4 ug/L Final   08/28/2003 1.8 0 - 4 ug/L Final     Prostate Specific Antigen Screen   Date Value Ref Range Status   02/15/2023 0.18 0.00 - 4.50 ng/mL Final             Imaging:    The following imaging exams were independently viewed and interpreted by me and discussed with patient:  PVR: Normal             Assessment and Plan:     Prostatitis  Currently under antibiotic treatment for prostatitis  Rectal examination was not tender  May not need further antibiotics    Benign prostatic hyperplasia with urinary hesitancy  Discussed about the fact that he is been on medical management for a long time and now has been having some worsening symptoms  Discussed about cystoscopy and possible implications of that in terms of deciding on options of surgical management  We discussed about Rezum, HoLEP, TURP as potential procedures for treatment of enlarged prostate  We would be able to decide more on these based on the cystoscopy      Plan:  Schedule for cystoscopy    Orders  No orders of the defined types were placed in this encounter.      Carl Donahue MD  [facility]      ==========================    Additional Billing and Coding Information:  Review of external notes as documented above   Review of the result(s) of each unique test - UA, PSA, creatinine    Independent interpretation of a test performed by another physician/other qualified health care professional (not separately reported) -       Discussion of management or test interpretation with external physician/other qualified healthcare professional/appropriate source -           15 minutes spent on the date of the encounter doing chart review, review of test  results, interpretation of tests, patient visit and documentation     ==========================

## 2023-02-17 NOTE — LETTER
2/17/2023       RE: Toby Mcgraht  [address]  [facility] [address]     Dear Colleague,    Thank you for referring your patient, Toby Mcgrath, to the [facility] at [facility]. Please see a copy of my visit note below.          Chief Complaint:    LUTS           Consult or Referral:     Mr. Toby Mcgrath is a 67 year old male seen at the request of Dr. Rosales ref. provider found.         History of Present Illness:     Toby Mcgrath is a 67 year old male being seen for lower urinary tract symptoms.  Duration of problem: Many years  Previous treatments: Tamsulosin and finasteride    Reviewed previous notes from Dr. Jozef Vasques presents today for evaluation of his ongoing lower urinary tract symptoms  He had been having trouble some lower urine tract symptoms for many years since his early 20s and he had seen the urologist last in 1996  Has been following up with his primary care currently being treated with tamsulosin and finasteride  Symptoms have been worsening for the last 2 months not getting better despite adequate treatment.  He tells me he was having some cold sweats and there was some concern about prostatitis and he was started on antibiotic treatment about 4 weeks ago and is continuing on ciprofloxacin at the moment  Feels slightly better since the start of the treatment however still has some persistent LUTS  He is most troubled by nocturia and hesitancy and frequency           Past Medical History:     Past Medical History:   Diagnosis Date     Anxiety state, unspecified      BMI 32.0-32.9,adult 9/4/2015     Chronic pain syndrome 3/29/2007    Narcotic refill protocol Will return every 2-3 months for clinic visits Controlled substance aggreement on file from this  6/26/12 Documentation in problem list: no, appears to be compliant based on last visit He is responding best to Oxycontin 10 mg twice daily  # 60 per month.  This is costly and looking into [place] for coverage.  Nausea with MS Contin Has meds refilled 16 of every month Last [facility] website verification:  done on 7/8/2015  [email or web address]   2/10/2015:  PCP will take over narcotic prescription Tapering course: using 5 mg tablets 10 mg morning 15 mg noon, 15 mg night for 1 week then 10 mg morning, 10 mg noon, 15 mg night for 1 week then 10 mg TID for 1 week then see me for refills  Then ongoing taper: 5 mg morning, 10 mg noon and 10 mg night for 1 week then  5 mg morning and noon and 10 mg night for 1 week then 5 mg tid for 1 week Then 5 mg morning no noon dose and 5 mg night for 1 week then No morning or non dose and 5 mg nightly for 1 week then off  Goal is co     Chronic rhinitis 3/29/2007     DDD (degenerative disc disease), cervical 2/8/2013    Normal.  C2-C3: Normal disc, facet joints, spinal canal and neural foramina.  C3-C4: Mild broad-based posterior disc bulge. Otherwise normal.  C4-C5: Normal disc, facet joints, spinal canal and neural foramina.  C5-C6: Moderate degenerative disc disease with loss of disc height, circumferential disc bulge and vertebral endplate osteophytes. Mild spinal canal stenosis and moderate left foraminal stenosis. Normal right foramen and facet joints.  C6-C7: Mild circumferential disc bulge. Slight impression on the thecal sac. Mild left foraminal stenosis. Normal right foramen and facet joints.  C7-T1: Normal disc, facet joints, spinal canal and neural foramina.  T1-T2: Mild central posterior disc protrusion.  T2-T3: Mild central posterior disc protrusion. Slight impression on the spinal cord.         DJD (degenerative joint disease), lumbar 1/10/2012     Esophageal reflux     ,refluxes on upper GI     Gout 4/8/2011     Gout, unspecified      Hyperlipidemia LDL goal <100 10/25/2012     Hypertension goal BP (blood pressure) < 140/90 10/21/2011     HYPERTROPHY PROSTATE WITH OBST 8/3/2006     Impotence of organic  origin      Lumbar radiculopathy 9/17/2014     Major depressive disorder, recurrent episode, unspecified 7/9/2008     Osteoarthrosis, unspecified whether generalized or localized, pelvic region and thigh      Pure hyperglyceridemia      ROTATOR CUFF SYND NOS 4/17/2008     S/P lumbar fusion 10/14/2014     Thoracic or lumbosacral neuritis or radiculitis, unspecified      Tobacco use disorder      Type 2 diabetes, HbA1C goal < 8% (H) 9/9/2011            Past Surgical History:     Past Surgical History:   Procedure Laterality Date     BACK SURGERY       both shoulder repair  2006, 2008     FUSION LUMBAR ANTERIOR, FUSION LUMBAR POSTERIOR TWO LEVELS, COMBINED N/A 9/17/2014    Procedure: COMBINED FUSION LUMBAR ANTERIOR, FUSION LUMBAR POSTERIOR TWO LEVELS;  Surgeon: Chris Lugo MD;  Location: [facility] OR     [facility] UGI ENDOSCOPY DIAG W OR W/O BRUSH/WASH  3/04    ok     HEAD & NECK SURGERY       HEMILAMINECTOMY, DISCECTOMY LUMBAR ONE LEVEL, COMBINED Left 9/8/2015    Procedure: COMBINED HEMILAMINECTOMY, DISCECTOMY LUMBAR ONE LEVEL;  Surgeon: Jer Irby MD;  Location: [facility] OR     right hip replacement  10,2010     [facility] NONSPECIFIC PROCEDURE  1995    neck cyst     [facility] NONSPECIFIC PROCEDURE  1979    laminectomy L5-S1 x 2     [facility] SHOULDER SURG PROC UNLISTED  2005, '07    repairs,later manipulate,inject LT, RT     [facility] COLONOSCOPY THRU STOMA, DIAGNOSTIC  3/04    normal            Medications     Current Outpatient Medications   Medication     acetaminophen (TYLENOL) 500 MG tablet     apixaban ANTICOAGULANT (ELIQUIS ANTICOAGULANT) 5 MG tablet     blood glucose (ACCU-CHEK DANIELE PLUS) test strip     blood glucose (ONETOUCH VERIO IQ) test strip     blood glucose monitoring (ACCU-CHEK FASTCLIX) lancets     ciprofloxacin (CIPRO) 500 MG tablet     DULoxetine (CYMBALTA) 60 MG capsule     finasteride (PROSCAR) 5 MG tablet     fluticasone (FLONASE) 50 MCG/ACT nasal spray     lisinopril (ZESTRIL) 10 MG tablet     metFORMIN  (GLUCOPHAGE XR) 500 MG 24 hr tablet     metoprolol tartrate (LOPRESSOR) 50 MG tablet     omeprazole (PRILOSEC) 20 MG DR capsule     OneTouch Delica Lancets 33G MISC     oxyCODONE (ROXICODONE) 5 MG tablet     pregabalin (LYRICA) 100 MG capsule     senna-docusate (SENOKOT-S/PERICOLACE) 8.6-50 MG tablet     simvastatin (ZOCOR) 20 MG tablet     tamsulosin (FLOMAX) 0.4 MG capsule     tiotropium (SPIRIVA RESPIMAT) 2.5 MCG/ACT inhaler     No current facility-administered medications for this visit.            Family History:     Family History   Problem Relation Age of Onset     Diabetes Mother      C.A.D. Father      Diabetes Father      Hypertension Father      Colon Cancer No family hx of             Social History:     Social History     Socioeconomic History     Marital status:      Spouse name: Not on file     Number of children: 2     Years of education: 12     Highest education level: Not on file   Occupational History     Employer: DISABLED     Employer: UNEMPLOYED   Tobacco Use     Smoking status: Former     Years: 40.00     Types: Cigarettes     Smokeless tobacco: Former     Quit date: 2/1/2013   Substance and Sexual Activity     Alcohol use: Yes     Alcohol/week: 0.0 standard drinks     Comment: occ.     Drug use: No     Sexual activity: Not Currently   Other Topics Concern      Service Yes     Blood Transfusions No     Caffeine Concern No     Occupational Exposure No     Hobby Hazards No     Sleep Concern No     Stress Concern Yes     Weight Concern No     Special Diet No     Back Care No     Exercise No     Bike Helmet Not Asked     Seat Belt Yes     Self-Exams No     Parent/sibling w/ CABG, MI or angioplasty before 65F 55M? No   Social History Narrative    On disability since 2009 for hip and shoulder. Worked for [employer] at [employer] for 20 yrs. [employer].  since 2009.             Social Determinants of Health     Financial Resource Strain: Not on file   Food Insecurity:  "Not on file   Transportation Needs: Not on file   Physical Activity: Not on file   Stress: Not on file   Social Connections: Not on file   Intimate Partner Violence: Not on file   Housing Stability: Not on file            Allergies:   Codeine         Review of Systems:  From intake questionnaire     Skin: negative  Eyes: negative  Ears/Nose/Throat: negative  Respiratory: No shortness of breath, dyspnea on exertion, cough, or hemoptysis  Cardiovascular: No chest pain or palpitations  Gastrointestinal: negative; no nausea/vomiting, constipation or diarrhea  Genitourinary: as per HPI  Musculoskeletal: negative  Neurologic: negative  Psychiatric: negative  Hematologic/Lymphatic/Immunologic: negative  Endocrine: negative         Physical Exam:     Patient is a 67 year old  male   Vitals: Blood pressure 122/70, pulse 81, height 1.803 m (5' 11\"), weight 104.3 kg (230 lb), SpO2 99 %.  Constitutional: Body mass index is 32.08 kg/m .  Alert, no acute distress, oriented, conversant  Eyes: no scleral icterus; extraocular muscles intact, moist conjunctivae  Neck: trachea midline, no thyromegaly  Ears/nose/mouth: throat/mouth:normal, good dentition  Respiratory: no respiratory distress, or pursed lip breathing  Cardiovascular: pulses strong and intact; no obvious jugular venous distension present  Gastrointestinal: soft, nontender, no organomegaly or masses,   Lymphatics: No inguinal adenopathy  Musculoskeletal: extremities normal, no peripheral edema  Skin: no suspicious lesions or rashes  Neuro: Alert, oriented, speech and mentation normal  Psych: affect and mood normal, alert and oriented to person, place and time  Gait: Normal  : LOC anodular, symmetric      Labs and Pathology:    The following labs were reviewed by me and discussed with the patient:  UA: Normal  Significant for   Lab Results   Component Value Date    CR 0.99 02/15/2023    CR 1.10 01/13/2022    CR 1.19 09/09/2021    CR 1.06 05/27/2021    CR 1.16 03/15/2021 "    CR 1.26 11/05/2020    CR 1.32 09/17/2020    CR 1.18 07/17/2020    CR 1.10 07/16/2020    CR 1.09 05/28/2020    CR 1.03 07/16/2019    CR 1.13 02/28/2019    CR 1.24 08/23/2018     PSA   Date Value Ref Range Status   06/26/2019 0.36 0 - 4 ug/L Final     Comment:     Assay Method:  Chemiluminescence using Siemens Vista analyzer   05/09/2013 1.45 0 - 4 ug/L Final   08/15/2012 1.43 0 - 4 ug/L Final   08/04/2011 1.55 0 - 4 ug/L Final   02/26/2009 1.50 0 - 4 ug/L Final   08/03/2007 1.77 0 - 4 ug/L Final   08/03/2006 1.74 0 - 4 ug/L Final   11/03/2004 1.52 0 - 4 ug/L Final   02/20/2004 1.94 0 - 4 ug/L Final   08/28/2003 1.8 0 - 4 ug/L Final     Prostate Specific Antigen Screen   Date Value Ref Range Status   02/15/2023 0.18 0.00 - 4.50 ng/mL Final             Imaging:    The following imaging exams were independently viewed and interpreted by me and discussed with patient:  PVR: Normal             Assessment and Plan:     Prostatitis  Currently under antibiotic treatment for prostatitis  Rectal examination was not tender  May not need further antibiotics    Benign prostatic hyperplasia with urinary hesitancy  Discussed about the fact that he is been on medical management for a long time and now has been having some worsening symptoms  Discussed about cystoscopy and possible implications of that in terms of deciding on options of surgical management  We discussed about Rezum, HoLEP, TURP as potential procedures for treatment of enlarged prostate  We would be able to decide more on these based on the cystoscopy      Plan:  Schedule for cystoscopy    Orders  No orders of the defined types were placed in this encounter.      Carl Donahue MD  [facility]OLOGY [facility]      ==========================    Additional Billing and Coding Information:  Review of external notes as documented above   Review of the result(s) of each unique test - UA, PSA, creatinine    Independent interpretation of a test performed by  another physician/other qualified health care professional (not separately reported) -       Discussion of management or test interpretation with external physician/other qualified healthcare professional/appropriate source -           15 minutes spent on the date of the encounter doing chart review, review of test results, interpretation of tests, patient visit and documentation

## 2023-02-17 NOTE — NURSING NOTE
Chief Complaint   Patient presents with     prostatitis      Patient here today for Frequency             TERRENCE Thomas     Labs on 12/15, MWV on 12/20 with Dr. Abel. Please place orders for fasting lab and addend lab note.

## 2023-02-17 NOTE — RESULT ENCOUNTER NOTE
Hello,    The labs show that your prostate level is normal.  Given your urinary symptoms I think it is still a good idea to take the antibiotics and see Urology to talk about ways to help the urination.    The diabetes is stable.  The cholesterol is great    The CRP was elevated slightly - this is nonspecific for inflammation or infection.  Again I think you have a prostate infection so this could be the cause.  See what Urology thinks    If you continue to lose weight then please see me in the next 3 weeks    Connie Haskins MD

## 2023-03-02 DIAGNOSIS — E78.5 HYPERLIPIDEMIA LDL GOAL <100: ICD-10-CM

## 2023-03-03 RX ORDER — SIMVASTATIN 20 MG
TABLET ORAL
Qty: 30 TABLET | Refills: 6 | Status: SHIPPED | OUTPATIENT
Start: 2023-03-03 | End: 2023-08-04

## 2023-03-06 DIAGNOSIS — M54.50 CHRONIC BILATERAL LOW BACK PAIN WITHOUT SCIATICA: ICD-10-CM

## 2023-03-06 DIAGNOSIS — G89.29 CHRONIC BILATERAL LOW BACK PAIN WITHOUT SCIATICA: ICD-10-CM

## 2023-03-06 DIAGNOSIS — J44.9 CHRONIC OBSTRUCTIVE PULMONARY DISEASE, UNSPECIFIED COPD TYPE (H): ICD-10-CM

## 2023-03-06 DIAGNOSIS — M96.1 FAILED BACK SYNDROME OF LUMBAR SPINE: ICD-10-CM

## 2023-03-06 DIAGNOSIS — F33.0 MAJOR DEPRESSIVE DISORDER, RECURRENT EPISODE, MILD (H): ICD-10-CM

## 2023-03-06 DIAGNOSIS — F11.90 CHRONIC, CONTINUOUS USE OF OPIOIDS: ICD-10-CM

## 2023-03-06 RX ORDER — DULOXETIN HYDROCHLORIDE 60 MG/1
120 CAPSULE, DELAYED RELEASE ORAL DAILY
Qty: 180 CAPSULE | Refills: 3 | Status: SHIPPED | OUTPATIENT
Start: 2023-03-06 | End: 2024-01-01

## 2023-03-06 NOTE — TELEPHONE ENCOUNTER
I am in process of applying to   assistance programs.  These programs require a hand signed, brand name, hard copy script be submitted with their application.    Please hand sign a BRAND name, hard copy scripts for:      CYMBALTA      SPIRIVA RESPIMAT    Please send the HARD COPY scripts to me at--     via interoffice mail  FPS Zenaida Kim     or via US mail  at:   [facility]  Zenaida Plasencia  [address] [place]  [address]    Thanks so much for your help!    Zenaida Plasencia  Prescription Assistance Supervisor  Pharmacy Assistance  [phone]

## 2023-03-07 NOTE — TELEPHONE ENCOUNTER
Dr Delgado had them on her Desk  She signed them and will send Via Inter Office mail to  Zenaida Orosco [facility] Unit Coordinator

## 2023-03-07 NOTE — TELEPHONE ENCOUNTER
TCs,   Appears both Rxs were originally printed  Could you assist in sending via inter department mail once signed?  Thanks,  Viviana CANTU RN

## 2023-03-07 NOTE — TELEPHONE ENCOUNTER
I MUST have the HARD COPY scripts.    Please send the HARD COPY scripts to me at--     via interoffice mail  FPS Zenaida Kim     or via US mail  at:   [facility]  Zenaida Plasencia  [address] [place]  [address]    Thanks so much for your help!    Zenaida Plasencia  Prescription Assistance Supervisor  Pharmacy Assistance  [phone]

## 2023-03-07 NOTE — TELEPHONE ENCOUNTER
Please see the direction and send or fax the signed paper prescription as requested by patient.  Thanks

## 2023-03-09 RX ORDER — OXYCODONE HYDROCHLORIDE 5 MG/1
5 TABLET ORAL EVERY 6 HOURS PRN
Qty: 120 TABLET | Refills: 0 | Status: SHIPPED | OUTPATIENT
Start: 2023-03-09 | End: 2023-04-03

## 2023-03-13 ENCOUNTER — PATIENT OUTREACH (OUTPATIENT)
Dept: GASTROENTEROLOGY | Facility: CLINIC | Age: 68
End: 2023-03-13
Payer: COMMERCIAL

## 2023-03-13 DIAGNOSIS — Z12.11 SPECIAL SCREENING FOR MALIGNANT NEOPLASMS, COLON: Primary | ICD-10-CM

## 2023-03-13 NOTE — LETTER
March 13, 2023      Toby Mcgrath  [address]   [place] [address]            Lexmonse Luis,    We hope this letter finds you doing well.   Your health is important to us at [facility]. Our records indicate that you could be due, or possibly overdue, for a screening or surveillance colonoscopy.     An order has been placed for you to have this completed. Our scheduling team will be reaching out to you to assist in getting this scheduled. If you do not hear from them within 7 days or you would like to schedule sooner, please call [phone], option 2 for endoscopy scheduling.     If you have questions about this order or have further concerns, please reach out to your primary care provider.     Manish,     Colorectal Cancer RN Screening Team  [facility]     .

## 2023-03-13 NOTE — TELEPHONE ENCOUNTER
"Ordering/Referring Provider: Connie Haskins   BMI: Estimated body mass index is 32.08 kg/m  as calculated from the following:    Height as of 2/17/23: 1.803 m (5' 11\").    Weight as of 2/17/23: 104.3 kg (230 lb).     Sedation:  Based on patients medical history patient is appropriate for   MAC/deep sedation.   BMI<= 45 45 < BMI <= 48 48 < BMI < = 50  BMI > 50   No Restrictions No [facility]  No [facility]  [facility] with exceptions Hospital Only OR Only       Location:  Does patient have an LVAD?  No    Does patient have a history of asthma, COPD or any other lung disease?  Yes     Patient has a documented history of COPD.     Review of chart, indicate respiratory disease has no recent hospitalizations and or exacerbations documented (no scheduling restrictions).    Does patient use home oxygen?  No    Does patient have a history of cardiac disease?  No    Is pataient awaiting a heart or lung transplant?   No    Has patient had a stroke or transient ischemic attack in the last 6 months?   No    Is the patient currently on dialysis?   No    Prep:  Previous prep (last colonoscopy):   Standard MiraLAX    Quality of previous prep:   Poor    Is patient currently on dialysis, is ESRD, or CKD stage 4/5?   No (standard prep)    Does patient have a diagnosis of diabetes?  Yes (Golytely Prep)    Does patient have a diagnosis of cystic fibrosis (CF)?  No    BMI > 40?  No    Final Referral Status:  meets the criteria for placement of colonoscopy screening  referral order.      Referral order placed with the following recommendations:  Sedation: MAC/Deep Sedation  Location Type: Hospital  Prep: Golytely Extended Prep  PAC: No     Ivis Clayton RN on 3/13/2023 at 8:20 AM        "

## 2023-03-14 ENCOUNTER — TELEPHONE (OUTPATIENT)
Dept: PHARMACY | Facility: CLINIC | Age: 68
End: 2023-03-14
Payer: COMMERCIAL

## 2023-03-14 NOTE — TELEPHONE ENCOUNTER
Called patient to check in on blood sugars as his A1c results came back slightly elevated. He mentions that his blood sugars have been looking better since he has reduced his soda intake (down to 1 soda a day). He also has eliminated adding sugar to his coffee. Additionally he has cut back on snacking and is seeing improvements with his sugars. He is testing twice a day and is reports numbers in the range of 169-174, during last MTM visit his sugars were more so around 200s. He is encouraged to continue to work on diet and lifestyle modifications.     He also mentions that he is running low on his Spiriva inhaler and has not heard any updates about application for Eliquis. Zenaida Plasencia has been working with the patient to get these medications covered under patient assistance programs. Advised patient to reach out to Zenaida on next steps and to let MTM know of any issues.     Plan:   1. Continue on current medication regimen and cutting back sugars/snacks to help with blood sugars   2. Reach out to Zenaida Plasencia to check in on status of Spiriva inhaler and Eliquis.     Follow up: 1 month to check in on sugars.     Lloyd PearsonD  Medication Therapy Management Resident  [phone]    I have read the note as written by Dr. Paz and agree with the plan.  Sapphire Garnett PharmD, [facility]  Medication Therapy Management Provider  Pager: [phone]

## 2023-03-21 ENCOUNTER — TELEPHONE (OUTPATIENT)
Dept: FAMILY MEDICINE | Facility: CLINIC | Age: 68
End: 2023-03-21
Payer: COMMERCIAL

## 2023-03-21 NOTE — TELEPHONE ENCOUNTER
FYI~ I have approved Layo for up to $500 of the Pharmacy Assistance Fund to be filled at the [facility].    Layo and the Pharmacy have been informed.    Zenaida Plasencia  Prescription Assistance Supervisor  Pharmacy Assistance  [phone]

## 2023-03-23 DIAGNOSIS — K21.00 GASTROESOPHAGEAL REFLUX DISEASE WITH ESOPHAGITIS WITHOUT HEMORRHAGE: ICD-10-CM

## 2023-03-24 DIAGNOSIS — J44.9 CHRONIC OBSTRUCTIVE PULMONARY DISEASE, UNSPECIFIED COPD TYPE (H): ICD-10-CM

## 2023-03-24 NOTE — TELEPHONE ENCOUNTER
Layo Oh was was approved for the [place] Patient assistance fund and wants to use it towards his Spiriva RX. It looks the the order was never sent anywhere it was just locally printed. He claims he never got a paper copy of this RX. Can you please electronically send it to the [facility] in [place] so we can fill the RX and utilize the $500 he was approved for.    Thank you,  Carley Jane & [facility] Staff Technician   [facility]  [email or web address]   Ph#: [phone]  Fax#: [phone]

## 2023-03-24 NOTE — TELEPHONE ENCOUNTER
"Requested Prescriptions   Pending Prescriptions Disp Refills     omeprazole (PRILOSEC) 20 MG DR capsule [Pharmacy Med Name: Omeprazole 20 MG Oral Capsule Delayed Release] 60 capsule 5     Sig: TAKE 1 CAPSULE BY MOUTH DAILY       PPI Protocol Passed - 3/23/2023  5:30 AM        Passed - Not on Clopidogrel (unless Pantoprazole ordered)        Passed - No diagnosis of osteoporosis on record        Passed - Recent (12 mo) or future (30 days) visit within the authorizing provider's specialty     Patient has had an office visit with the authorizing provider or a provider within the authorizing providers department within the previous 12 mos or has a future within next 30 days. See \"Patient Info\" tab in inbasket, or \"Choose Columns\" in Meds & Orders section of the refill encounter.              Passed - Medication is active on med list        Passed - Patient is age 18 or older           Prescription approved per [facility] Refill Protocol.    Jer Davenport RN   [facility]    "

## 2023-03-24 NOTE — TELEPHONE ENCOUNTER
Prescription approved per [facility] Refill Protocol.    Jenaro MURGUIA RN   [facility]

## 2023-04-03 ENCOUNTER — OFFICE VISIT (OUTPATIENT)
Dept: FAMILY MEDICINE | Facility: CLINIC | Age: 68
End: 2023-04-03
Payer: COMMERCIAL

## 2023-04-03 VITALS
HEART RATE: 82 BPM | HEIGHT: 71 IN | BODY MASS INDEX: 33.46 KG/M2 | WEIGHT: 239 LBS | TEMPERATURE: 98.1 F | RESPIRATION RATE: 14 BRPM | OXYGEN SATURATION: 95 % | DIASTOLIC BLOOD PRESSURE: 80 MMHG | SYSTOLIC BLOOD PRESSURE: 124 MMHG

## 2023-04-03 DIAGNOSIS — M54.41 CHRONIC BILATERAL LOW BACK PAIN WITH BILATERAL SCIATICA: ICD-10-CM

## 2023-04-03 DIAGNOSIS — Z76.89 ENCOUNTER TO ESTABLISH CARE WITH NEW DOCTOR: Primary | ICD-10-CM

## 2023-04-03 DIAGNOSIS — G89.29 CHRONIC BILATERAL LOW BACK PAIN WITH BILATERAL SCIATICA: ICD-10-CM

## 2023-04-03 DIAGNOSIS — N40.1 BENIGN PROSTATIC HYPERPLASIA WITH URINARY FREQUENCY: ICD-10-CM

## 2023-04-03 DIAGNOSIS — M54.42 CHRONIC BILATERAL LOW BACK PAIN WITH BILATERAL SCIATICA: ICD-10-CM

## 2023-04-03 DIAGNOSIS — I48.92 ATRIAL FLUTTER WITH RAPID VENTRICULAR RESPONSE (H): ICD-10-CM

## 2023-04-03 DIAGNOSIS — M96.1 FAILED BACK SYNDROME OF LUMBAR SPINE: ICD-10-CM

## 2023-04-03 DIAGNOSIS — R35.0 BENIGN PROSTATIC HYPERPLASIA WITH URINARY FREQUENCY: ICD-10-CM

## 2023-04-03 DIAGNOSIS — F11.90 CHRONIC, CONTINUOUS USE OF OPIOIDS: ICD-10-CM

## 2023-04-03 PROBLEM — M10.022: Status: RESOLVED | Noted: 2017-01-03 | Resolved: 2023-04-03

## 2023-04-03 PROCEDURE — 99214 OFFICE O/P EST MOD 30 MIN: CPT | Performed by: FAMILY MEDICINE

## 2023-04-03 RX ORDER — OXYCODONE HYDROCHLORIDE 5 MG/1
5 TABLET ORAL EVERY 6 HOURS PRN
Qty: 120 TABLET | Refills: 0 | Status: SHIPPED | OUTPATIENT
Start: 2023-04-11 | End: 2023-05-04

## 2023-04-03 RX ORDER — FINASTERIDE 5 MG/1
TABLET, FILM COATED ORAL
Qty: 90 TABLET | Refills: 3 | Status: SHIPPED | OUTPATIENT
Start: 2023-04-03 | End: 2023-08-04

## 2023-04-03 RX ORDER — PREGABALIN 100 MG/1
200 CAPSULE ORAL 2 TIMES DAILY
Qty: 360 CAPSULE | Refills: 3 | Status: SHIPPED | OUTPATIENT
Start: 2023-04-03 | End: 2023-10-02

## 2023-04-03 ASSESSMENT — ANXIETY QUESTIONNAIRES
1. FEELING NERVOUS, ANXIOUS, OR ON EDGE: NOT AT ALL
3. WORRYING TOO MUCH ABOUT DIFFERENT THINGS: SEVERAL DAYS
8. IF YOU CHECKED OFF ANY PROBLEMS, HOW DIFFICULT HAVE THESE MADE IT FOR YOU TO DO YOUR WORK, TAKE CARE OF THINGS AT HOME, OR GET ALONG WITH OTHER PEOPLE?: SOMEWHAT DIFFICULT
GAD7 TOTAL SCORE: 3
5. BEING SO RESTLESS THAT IT IS HARD TO SIT STILL: NOT AT ALL
IF YOU CHECKED OFF ANY PROBLEMS ON THIS QUESTIONNAIRE, HOW DIFFICULT HAVE THESE PROBLEMS MADE IT FOR YOU TO DO YOUR WORK, TAKE CARE OF THINGS AT HOME, OR GET ALONG WITH OTHER PEOPLE: SOMEWHAT DIFFICULT
GAD7 TOTAL SCORE: 3
2. NOT BEING ABLE TO STOP OR CONTROL WORRYING: NOT AT ALL
6. BECOMING EASILY ANNOYED OR IRRITABLE: NOT AT ALL
7. FEELING AFRAID AS IF SOMETHING AWFUL MIGHT HAPPEN: NOT AT ALL
4. TROUBLE RELAXING: MORE THAN HALF THE DAYS
7. FEELING AFRAID AS IF SOMETHING AWFUL MIGHT HAPPEN: NOT AT ALL

## 2023-04-03 ASSESSMENT — PAIN SCALES - GENERAL: PAINLEVEL: EXTREME PAIN (8)

## 2023-04-03 NOTE — PROGRESS NOTES
"  Assessment & Plan     Encounter to establish care with new doctor  Reviewed problem list, medications, allergies, past medical history, surgical history, social history, and family history.    Chronic bilateral low back pain with bilateral sciatica  Refill oxycodone and Lyrica, MP reviewed, no red flags.  Follow-up in 3 months or sooner as needed.  - pregabalin (LYRICA) 100 MG capsule; Take 2 capsules (200 mg) by mouth 2 times daily  - oxyCODONE (ROXICODONE) 5 MG tablet; Take 1 tablet (5 mg) by mouth every 6 hours as needed for severe pain Max #4 per day fill on 11th of each month    Failed back syndrome of lumbar spine  As above  - oxyCODONE (ROXICODONE) 5 MG tablet; Take 1 tablet (5 mg) by mouth every 6 hours as needed for severe pain Max #4 per day fill on 11th of each month    Chronic, continuous use of opioids  As above  - oxyCODONE (ROXICODONE) 5 MG tablet; Take 1 tablet (5 mg) by mouth every 6 hours as needed for severe pain Max #4 per day fill on 11th of each month    Atrial flutter with rapid ventricular response (H)  Resolved        33 minutes spent by me on the date of the encounter doing chart review, history and exam, documentation and further activities per the note       BMI:   Estimated body mass index is 33.33 kg/m  as calculated from the following:    Height as of this encounter: 1.803 m (5' 11\").    Weight as of this encounter: 108.4 kg (239 lb).       See Patient Instructions    Dayana Hopson MD  [facility]    Hussain Vasques is a 67 year old, presenting for the following health issues:  Recheck Medication        4/3/2023     1:28 PM   Additional Questions   Roomed by Nilsa Willis     History of Present Illness       Reason for visit:  Changing clinic    He eats 0-1 servings of fruits and vegetables daily.He consumes 3 sweetened beverage(s) daily.He exercises with enough effort to increase his heart rate 9 or less minutes per day.  He exercises with enough " effort to increase his heart rate 4 days per week.   He is taking medications regularly.  Today's KAYLA-7 Score: 3     Establish care and med refills.  Lives in , so he wants a clinic closer to home.    Medication Followup of oxyCODONE (ROXICODONE) 5 MG tablet DULoxetine (CYMBALTA) 60 MG capsule  pharmacy [facility] in [place]     Taking Medication as prescribed: yes    Side Effects:  None    Medication Helping Symptoms:  yes    Takes Gabapentin for neuropathy.  Failed back syndrome, chronic bilateral low back pain    Last A1c was in February 2023, was elevated at 7.9.  Had been drinking a lot of pop, so trying to cut back.    Patient Active Problem List   Diagnosis     Thoracic or lumbosacral neuritis or radiculitis, unspecified     Osteoarthrosis, unspecified whether generalized or localized, pelvic region and thigh     Chronic rhinitis     Chronic pain syndrome     Disorder of bursae and tendons in shoulder region     Major depressive disorder, recurrent episode (H)     Anxiety     Gout     Type 2 diabetes mellitus without complication (H)     Hypertension goal BP (blood pressure) < 140/90     Advanced directives, counseling/discussion     DJD (degenerative joint disease), lumbar     Hyperlipidemia LDL goal <100     DDD (degenerative disc disease), cervical     Lumbar radiculopathy     S/P lumbar fusion     BMI 32.0-32.9,adult     History of hepatitis C     S/P lumbar discectomy     S/P laminectomy     Chronic, continuous use of opioids     Bilateral low back pain with bilateral sciatica     Gastroesophageal reflux disease without esophagitis     Failed back syndrome of lumbar spine     Slow transit constipation     Benign prostatic hyperplasia with urinary frequency     Diabetic polyneuropathy associated with type 2 diabetes mellitus (H)     Seborrheic keratoses     Atrial fibrillation with RVR (H)     COPD (chronic obstructive pulmonary disease) (H)     Ventral hernia without obstruction or gangrene     Carpal  tunnel syndrome of left wrist     Morbid obesity (H)     Current Outpatient Medications   Medication     acetaminophen (TYLENOL) 500 MG tablet     apixaban ANTICOAGULANT (ELIQUIS ANTICOAGULANT) 5 MG tablet     DULoxetine (CYMBALTA) 60 MG capsule     fluticasone (FLONASE) 50 MCG/ACT nasal spray     lisinopril (ZESTRIL) 10 MG tablet     metFORMIN (GLUCOPHAGE XR) 500 MG 24 hr tablet     metoprolol tartrate (LOPRESSOR) 50 MG tablet     omeprazole (PRILOSEC) 20 MG DR capsule     OneTouch Delica Lancets 33G MISC     [START ON 4/11/2023] oxyCODONE (ROXICODONE) 5 MG tablet     pregabalin (LYRICA) 100 MG capsule     senna-docusate (SENOKOT-S/PERICOLACE) 8.6-50 MG tablet     simvastatin (ZOCOR) 20 MG tablet     tamsulosin (FLOMAX) 0.4 MG capsule     tiotropium (SPIRIVA RESPIMAT) 2.5 MCG/ACT inhaler     blood glucose (ACCU-CHEK DANIELE PLUS) test strip     blood glucose (ONETOUCH VERIO IQ) test strip     blood glucose monitoring (ACCU-CHEK FASTCLIX) lancets     finasteride (PROSCAR) 5 MG tablet     No current facility-administered medications for this visit.     Past Medical History:   Diagnosis Date     Acute gout of right elbow, unspecified cause 7/14/2016     Acute gouty arthritis 3/3/2016     Anxiety state, unspecified      BMI 32.0-32.9,adult 9/4/2015     Chronic pain syndrome 3/29/2007    Narcotic refill protocol Will return every 2-3 months for clinic visits Controlled substance aggreement on file from this  6/26/12 Documentation in problem list: no, appears to be compliant based on last visit He is responding best to Oxycontin 10 mg twice daily # 60 per month.  This is costly and looking into [place] for coverage.  Nausea with MS Contin Has meds refilled 16 of every month Last [facility] website verification:  done on 7/8/2015  [email or web address]   2/10/2015:  PCP will take over narcotic prescription Tapering course: using 5 mg tablets 10 mg morning 15 mg noon, 15 mg night for 1 week then 10 mg morning, 10 mg noon, 15 mg  night for 1 week then 10 mg TID for 1 week then see me for refills  Then ongoing taper: 5 mg morning, 10 mg noon and 10 mg night for 1 week then  5 mg morning and noon and 10 mg night for 1 week then 5 mg tid for 1 week Then 5 mg morning no noon dose and 5 mg night for 1 week then No morning or non dose and 5 mg nightly for 1 week then off  Goal is co     Chronic rhinitis 3/29/2007     DDD (degenerative disc disease), cervical 2/8/2013    Normal.  C2-C3: Normal disc, facet joints, spinal canal and neural foramina.  C3-C4: Mild broad-based posterior disc bulge. Otherwise normal.  C4-C5: Normal disc, facet joints, spinal canal and neural foramina.  C5-C6: Moderate degenerative disc disease with loss of disc height, circumferential disc bulge and vertebral endplate osteophytes. Mild spinal canal stenosis and moderate left foraminal stenosis. Normal right foramen and facet joints.  C6-C7: Mild circumferential disc bulge. Slight impression on the thecal sac. Mild left foraminal stenosis. Normal right foramen and facet joints.  C7-T1: Normal disc, facet joints, spinal canal and neural foramina.  T1-T2: Mild central posterior disc protrusion.  T2-T3: Mild central posterior disc protrusion. Slight impression on the spinal cord.         DJD (degenerative joint disease), lumbar 1/10/2012     Esophageal reflux     ,refluxes on upper GI     Gout 4/8/2011     Gout, unspecified      Hyperlipidemia LDL goal <100 10/25/2012     Hypertension goal BP (blood pressure) < 140/90 10/21/2011     Hypertrophy of prostate with urinary obstruction 8/3/2006    Problem list name updated by automated process. Provider to review     HYPERTROPHY PROSTATE WITH OBST 8/3/2006     Idiopathic gout of left elbow, unspecified chronicity 1/3/2017     Impotence of organic origin      Left-sided low back pain with left-sided sciatica 8/13/2015     Lumbar radiculopathy 9/17/2014     Major depressive disorder, recurrent episode, unspecified 7/9/2008      Osteoarthrosis, unspecified whether generalized or localized, pelvic region and thigh      Pure hyperglyceridemia      ROTATOR CUFF SYND NOS 2008     S/P lumbar fusion 10/14/2014     Thoracic or lumbosacral neuritis or radiculitis, unspecified      Tobacco use disorder      Type 2 diabetes, HbA1C goal < 8% (H) 2011       Past Surgical History:   Procedure Laterality Date     BACK SURGERY       both shoulder repair  ,      FUSION LUMBAR ANTERIOR, FUSION LUMBAR POSTERIOR TWO LEVELS, COMBINED N/A 2014    Procedure: COMBINED FUSION LUMBAR ANTERIOR, FUSION LUMBAR POSTERIOR TWO LEVELS;  Surgeon: Chris Lugo MD;  Location: [facility] OR     [facility] UGI ENDOSCOPY DIAG W OR W/O BRUSH/WASH  3/04    ok     HEAD & NECK SURGERY       HEMILAMINECTOMY, DISCECTOMY LUMBAR ONE LEVEL, COMBINED Left 2015    Procedure: COMBINED HEMILAMINECTOMY, DISCECTOMY LUMBAR ONE LEVEL;  Surgeon: Jer Irby MD;  Location: [facility] OR     right hip replacement  10,2010     [facility] NONSPECIFIC PROCEDURE      neck cyst     [facility] NONSPECIFIC PROCEDURE      laminectomy L5-S1 x 2     Z SHOULDER SURG PROC UNLISTED  ,     repairs,later manipulate,inject LT, RT     [facility] COLONOSCOPY THRU STOMA, DIAGNOSTIC  3/04    normal       Family History   Problem Relation Age of Onset     Diabetes Mother      C.A.D. Father      Diabetes Father      Hypertension Father      Colon Cancer No family hx of        Social History     Tobacco Use     Smoking status: Former     Packs/day: 1.00     Years: 40.00     Pack years: 40.00     Types: Cigarettes     Quit date:      Years since quittin.2     Smokeless tobacco: Former     Quit date: 2013   Vaping Use     Vaping status: Never Used   Substance Use Topics     Alcohol use: Yes     Alcohol/week: 0.0 standard drinks of alcohol     Comment: occ.        Allergies   Allergen Reactions     Codeine Nausea and Vomiting     Tolerating other opiates          Review of Systems  "  Constitutional, HEENT, cardiovascular, pulmonary, gi and gu systems are negative, except as otherwise noted.      Objective    /80 (BP Location: Right arm, Patient Position: Sitting, Cuff Size: Adult Large)   Pulse 82   Temp 98.1  F (36.7  C) (Oral)   Resp 14   Ht 1.803 m (5' 11\")   Wt 108.4 kg (239 lb)   SpO2 95%   BMI 33.33 kg/m    Body mass index is 33.33 kg/m .  Physical Exam   GENERAL: healthy, alert and no distress  RESP: lungs clear to auscultation - no rales, rhonchi or wheezes  CV: regular rate and rhythm, normal S1 S2, no S3 or S4, no murmur, click or rub, no peripheral edema and peripheral pulses strong  PSYCH: mentation appears normal, affect normal/bright                "

## 2023-04-13 NOTE — TELEPHONE ENCOUNTER
Faxed to [phone]& copy sent to scan.    Gayathri PRATT  
Received form(s) from Pharmacy for Diabetic form.  Placed form(s) in/on SN's box.  Forms need to be signed and faxed to [phone]..    Call pt to verify form was sent: No  Copy needs to be sent for scanning after completion: Yes        
Thanks I signed forms and placed in [place]    SN   
No

## 2023-04-16 DIAGNOSIS — N40.1 BENIGN PROSTATIC HYPERPLASIA WITH URINARY FREQUENCY: ICD-10-CM

## 2023-04-16 DIAGNOSIS — R35.0 BENIGN PROSTATIC HYPERPLASIA WITH URINARY FREQUENCY: ICD-10-CM

## 2023-04-17 ENCOUNTER — TELEPHONE (OUTPATIENT)
Dept: FAMILY MEDICINE | Facility: CLINIC | Age: 68
End: 2023-04-17
Payer: COMMERCIAL

## 2023-04-17 NOTE — TELEPHONE ENCOUNTER
Toby has been approved for Cymbalta and Spiriva Respimat through December 31, 2023. Layo will receive this medication at no cost through the enrollment period.     A 90 day supply of Cymbalta 60mg and Spiriva Respimat 2.5mg  will be delivered to the patient's home within 10-14 business days. Layo has been informed of this approval and delivery.     Layo will contact our office for refills, as we must work directly with the . We will note in Epic as each refill is requested.    Thank you for your assistance,  Melina Dumont  Prescription   Please send responses to [email or web address]

## 2023-04-18 RX ORDER — TAMSULOSIN HYDROCHLORIDE 0.4 MG/1
CAPSULE ORAL
Qty: 90 CAPSULE | Refills: 7 | Status: SHIPPED | OUTPATIENT
Start: 2023-04-18 | End: 2023-08-04

## 2023-04-18 NOTE — TELEPHONE ENCOUNTER
Prescription approved per [facility] Refill Protocol.  Betty MARTINEZ RN, BSN

## 2023-04-26 ENCOUNTER — TELEPHONE (OUTPATIENT)
Dept: PHARMACY | Facility: CLINIC | Age: 68
End: 2023-04-26
Payer: COMMERCIAL

## 2023-04-26 NOTE — TELEPHONE ENCOUNTER
Called to check in on patient's blood sugars and if he has received his medications. Was not able to get a hold of patient so left a voicemail with call back number.     Will reach out to patient again if not heard from sooner.     Rosette Paz, PharmD  Medication Therapy Management Resident  [phone]

## 2023-05-03 DIAGNOSIS — G89.29 CHRONIC BILATERAL LOW BACK PAIN WITH BILATERAL SCIATICA: ICD-10-CM

## 2023-05-03 DIAGNOSIS — F11.90 CHRONIC, CONTINUOUS USE OF OPIOIDS: ICD-10-CM

## 2023-05-03 DIAGNOSIS — M54.41 CHRONIC BILATERAL LOW BACK PAIN WITH BILATERAL SCIATICA: ICD-10-CM

## 2023-05-03 DIAGNOSIS — M54.42 CHRONIC BILATERAL LOW BACK PAIN WITH BILATERAL SCIATICA: ICD-10-CM

## 2023-05-03 DIAGNOSIS — M96.1 FAILED BACK SYNDROME OF LUMBAR SPINE: ICD-10-CM

## 2023-05-03 NOTE — TELEPHONE ENCOUNTER
Medication Question or Refill    Contacts       Type Contact Phone/Fax    05/03/2023 02:38 PM CDT Phone (Incoming) Layo Mcgrath (Self) [phone] (M)          What medication are you calling about (include dose and sig)?: Oxycodone     Preferred Pharmacy:     [facility] - [place], [place] - [address]  [place] [address]  Phone: [phone] Fax: [phone]    [facility] Substance Agreement on file:   CSA -- Patient Level:    CSA: None found at the patient level.       Who prescribed the medication?: Coming Hay    Do you need a refill? Yes    When did you use the medication last?     Patient offered an appointment? No    Do you have any questions or concerns?  No      Okay to leave a detailed message?: Yes at Cell number on file:    Telephone Information:   Mobile [phone]

## 2023-05-03 NOTE — TELEPHONE ENCOUNTER
Routing refill request to provider for review/approval because:  Drug not on the [facility] refill protocol     Enoch NGUYEN RN 5/3/2023 at 2:46 PM

## 2023-05-04 ENCOUNTER — NURSE TRIAGE (OUTPATIENT)
Dept: NURSING | Facility: CLINIC | Age: 68
End: 2023-05-04
Payer: COMMERCIAL

## 2023-05-04 RX ORDER — OXYCODONE HYDROCHLORIDE 5 MG/1
5 TABLET ORAL EVERY 6 HOURS PRN
Qty: 120 TABLET | Refills: 0 | Status: SHIPPED | OUTPATIENT
Start: 2023-05-11 | End: 2023-05-08

## 2023-05-04 NOTE — TELEPHONE ENCOUNTER
"Lump on back of neck for a year. Started small and has been growing. Now  notices a thick  red line going from bump towards  R ear and also down to R jaw. Red line is tender to touch. Also noting reddness on Left side which is going towards back. Has large painful area in R side of back. Afebrile.  Onset reddness today. Moderate pain level. Lump is now grape size. Is harder than it has been.     Protocol reviewed, Disposition \" Go to office now\". Transferred to scheduling. If no opening to go to [facility]. Reviewed locations with patient.     RUPERTO JOHNSON RN  [facility] nurse advisors  5/4/2023  1:55 PM      Reason for Disposition    Looks like a boil, infected sore, deep ulcer, or other infected rash (spreading redness, pus)    Additional Information    Negative: Sounds like a life-threatening emergency to the triager    Negative: Fever and localized purple or blood-colored spots or dots that are not from injury or friction    Negative: Fever and localized rash is very painful    Negative: Patient sounds very sick or weak to the triager    Protocols used: RASH OR REDNESS - [id number]      "

## 2023-05-08 RX ORDER — OXYCODONE HYDROCHLORIDE 5 MG/1
5 TABLET ORAL EVERY 6 HOURS PRN
Qty: 120 TABLET | Refills: 0 | Status: SHIPPED | OUTPATIENT
Start: 2023-05-11 | End: 2023-06-07

## 2023-05-08 NOTE — TELEPHONE ENCOUNTER
Pt calls to check status,    ~oxycodone sent to [facility] in [facility], not [place] location  ~appears error    ROUTED TO [facility], PLEASE RESEND TO CORRECT [facility], pt will follow up later with pharmacy, aware start date is 5/11/23, pharmacy updated  Camille Hays RN, BSN  [facility]

## 2023-05-15 ENCOUNTER — TELEPHONE (OUTPATIENT)
Dept: PHARMACY | Facility: CLINIC | Age: 68
End: 2023-05-15
Payer: COMMERCIAL

## 2023-05-15 NOTE — TELEPHONE ENCOUNTER
Called to check in on patient's medications and blood sugars. Would benefit from an MTM follow up visit. I left a message with the clinic phone number for the patient to call to schedule.    Rosette Paz, PharmD  Medication Therapy Management Resident  [phone]

## 2023-05-17 ENCOUNTER — OFFICE VISIT (OUTPATIENT)
Dept: FAMILY MEDICINE | Facility: CLINIC | Age: 68
End: 2023-05-17
Payer: COMMERCIAL

## 2023-05-17 VITALS
OXYGEN SATURATION: 97 % | BODY MASS INDEX: 33.23 KG/M2 | SYSTOLIC BLOOD PRESSURE: 99 MMHG | HEIGHT: 71 IN | RESPIRATION RATE: 16 BRPM | DIASTOLIC BLOOD PRESSURE: 63 MMHG | HEART RATE: 78 BPM | WEIGHT: 237.4 LBS | TEMPERATURE: 97.8 F

## 2023-05-17 DIAGNOSIS — M54.2 NECK PAIN: ICD-10-CM

## 2023-05-17 DIAGNOSIS — E11.42 DIABETIC POLYNEUROPATHY ASSOCIATED WITH TYPE 2 DIABETES MELLITUS (H): Primary | ICD-10-CM

## 2023-05-17 DIAGNOSIS — Z13.6 ENCOUNTER FOR ABDOMINAL AORTIC ANEURYSM (AAA) SCREENING: ICD-10-CM

## 2023-05-17 DIAGNOSIS — I10 HYPERTENSION GOAL BP (BLOOD PRESSURE) < 140/90: ICD-10-CM

## 2023-05-17 DIAGNOSIS — Z87.891 PERSONAL HISTORY OF TOBACCO USE: ICD-10-CM

## 2023-05-17 DIAGNOSIS — E66.01 MORBID OBESITY (H): ICD-10-CM

## 2023-05-17 DIAGNOSIS — F11.90 CHRONIC, CONTINUOUS USE OF OPIOIDS: ICD-10-CM

## 2023-05-17 LAB
HBA1C MFR BLD: 8.8 % (ref 0–5.6)
TSH SERPL DL<=0.005 MIU/L-ACNC: 1.69 UIU/ML (ref 0.3–4.2)

## 2023-05-17 PROCEDURE — 84443 ASSAY THYROID STIM HORMONE: CPT | Performed by: FAMILY MEDICINE

## 2023-05-17 PROCEDURE — 36415 COLL VENOUS BLD VENIPUNCTURE: CPT | Performed by: FAMILY MEDICINE

## 2023-05-17 PROCEDURE — 83036 HEMOGLOBIN GLYCOSYLATED A1C: CPT | Performed by: FAMILY MEDICINE

## 2023-05-17 PROCEDURE — G0296 VISIT TO DETERM LDCT ELIG: HCPCS | Performed by: FAMILY MEDICINE

## 2023-05-17 PROCEDURE — 99214 OFFICE O/P EST MOD 30 MIN: CPT | Performed by: FAMILY MEDICINE

## 2023-05-17 RX ORDER — LISINOPRIL 5 MG/1
5 TABLET ORAL DAILY
Qty: 30 TABLET | Refills: 1 | Status: SHIPPED | OUTPATIENT
Start: 2023-05-17 | End: 2023-06-15

## 2023-05-17 ASSESSMENT — ANXIETY QUESTIONNAIRES
2. NOT BEING ABLE TO STOP OR CONTROL WORRYING: NOT AT ALL
3. WORRYING TOO MUCH ABOUT DIFFERENT THINGS: SEVERAL DAYS
1. FEELING NERVOUS, ANXIOUS, OR ON EDGE: SEVERAL DAYS
7. FEELING AFRAID AS IF SOMETHING AWFUL MIGHT HAPPEN: NOT AT ALL
7. FEELING AFRAID AS IF SOMETHING AWFUL MIGHT HAPPEN: NOT AT ALL
GAD7 TOTAL SCORE: 2
2. NOT BEING ABLE TO STOP OR CONTROL WORRYING: NOT AT ALL
4. TROUBLE RELAXING: NOT AT ALL
6. BECOMING EASILY ANNOYED OR IRRITABLE: SEVERAL DAYS
8. IF YOU CHECKED OFF ANY PROBLEMS, HOW DIFFICULT HAVE THESE MADE IT FOR YOU TO DO YOUR WORK, TAKE CARE OF THINGS AT HOME, OR GET ALONG WITH OTHER PEOPLE?: SOMEWHAT DIFFICULT
1. FEELING NERVOUS, ANXIOUS, OR ON EDGE: NOT AT ALL
7. FEELING AFRAID AS IF SOMETHING AWFUL MIGHT HAPPEN: NOT AT ALL
4. TROUBLE RELAXING: SEVERAL DAYS
6. BECOMING EASILY ANNOYED OR IRRITABLE: MORE THAN HALF THE DAYS
3. WORRYING TOO MUCH ABOUT DIFFERENT THINGS: NOT AT ALL
IF YOU CHECKED OFF ANY PROBLEMS ON THIS QUESTIONNAIRE, HOW DIFFICULT HAVE THESE PROBLEMS MADE IT FOR YOU TO DO YOUR WORK, TAKE CARE OF THINGS AT HOME, OR GET ALONG WITH OTHER PEOPLE: SOMEWHAT DIFFICULT
IF YOU CHECKED OFF ANY PROBLEMS ON THIS QUESTIONNAIRE, HOW DIFFICULT HAVE THESE PROBLEMS MADE IT FOR YOU TO DO YOUR WORK, TAKE CARE OF THINGS AT HOME, OR GET ALONG WITH OTHER PEOPLE: SOMEWHAT DIFFICULT
5. BEING SO RESTLESS THAT IT IS HARD TO SIT STILL: SEVERAL DAYS
GAD7 TOTAL SCORE: 2
GAD7 TOTAL SCORE: 5
5. BEING SO RESTLESS THAT IT IS HARD TO SIT STILL: NOT AT ALL

## 2023-05-17 ASSESSMENT — PAIN SCALES - GENERAL: PAINLEVEL: EXTREME PAIN (9)

## 2023-05-17 NOTE — PATIENT INSTRUCTIONS
Take the 5 mg Lisinopril instead of 10 mg Lisinopril.  Monitor blood pressures.  Lung Cancer Screening   Frequently Asked Questions  If you are at high-risk for lung cancer, getting screened with low-dose computed tomography (LDCT) every year can help save your life. This handout offers answers to some of the most common questions about lung cancer screening. If you have other questions, please call [phone] ([phone]).     What is it?  Lung cancer screening uses special X-ray technology to create an image of your lung tissue. The exam is quick and easy and takes less than 10 seconds. We don t give you any medicine or use any needles. You can eat before and after the exam. You don t need to change your clothes as long as the clothing on your chest doesn t contain metal. But, you do need to be able to hold your breath for at least 6 seconds during the exam.    What is the goal of lung cancer screening?  The goal of lung cancer screening is to save lives. Many times, lung cancer is not found until a person starts having physical symptoms. Lung cancer screening can help detect lung cancer in the earliest stages when it may be easier to treat.    Who should be screened for lung cancer?  We suggest lung cancer screening for anyone who is at high-risk for lung cancer. You are in the high-risk group if you:      are between the ages of 55 and 79, and    have smoked at least 1 pack of cigarettes a day for 20 or more years, and    still smoke or have quit within the past 15 years.    However, if you have a new cough or shortness of breath, you should talk to your doctor before being screened.    Why does it matter if I have symptoms?  Certain symptoms can be a sign that you have a condition in your lungs that should be checked and treated by your doctor. These symptoms include fever, chest pain, a new or changing cough, shortness of breath that you have never felt before, coughing up blood or unexplained  weight loss. Having any of these symptoms can greatly affect the results of lung cancer screening.       Should all smokers get an LDCT lung cancer screening exam?  It depends. Lung cancer screening is for a very specific group of men and women who have a history of heavy smoking over a long period of time (see  Who should be screened for lung cancer  above).  I am in the high-risk group, but have been diagnosed with cancer in the past. Is LDCT lung cancer screening right for me?  In some cases, you should not have LDCT lung screening, such as when your doctor is already following your cancer with CT scan studies. Your doctor will help you decide if LDCT lung screening is right for you.  Do I need to have a screening exam every year?  Yes. If you are in the high-risk group described earlier, you should get an LDCT lung cancer screening exam every year until you are 79, or are no longer willing or able to undergo screening and possible procedures to diagnose and treat lung cancer.  How effective is LDCT at preventing death from lung cancer?  Studies have shown that LDCT lung cancer screening can lower the risk of death from lung cancer by 20 percent in people who are at high-risk.  What are the risks?  There are some risks and limitations of LDCT lung cancer screening. We want to make sure you understand the risks and benefits, so please let us know if you have any questions. Your doctor may want to talk with you more about these risks.    Radiation exposure: As with any exam that uses radiation, there is a very small increased risk of cancer. The amount of radiation in LDCT is small--about the same amount a person would get from a mammogram. Your doctor orders the exam when he or she feels the potential benefits outweigh the risks.    False negatives: No test is perfect, including LDCT. It is possible that you may have a medical condition, including lung cancer, that is not found during your exam. This is called a  false negative result.    False positives and more testing: LDCT very often finds something in the lung that could be cancer, but in fact is not. This is called a false positive result. False positive tests often cause anxiety. To make sure these findings are not cancer, you may need to have more tests. These tests will be done only if you give us permission. Sometimes patients need a treatment that can have side effects, such as a biopsy. For more information on false positives, see  What can I expect from the results?     Findings not related to lung cancer: Your LDCT exam also takes pictures of areas of your body next to your lungs. In a very small number of cases, the CT scan will show an abnormal finding in one of these areas, such as your kidneys, adrenal glands, liver or thyroid. This finding may not be serious, but you may need more tests. Your doctor can help you decide what other tests you may need, if any.  What can I expect from the results?  About 1 out of 4 LDCT exams will find something that may need more tests. Most of the time, these findings are lung nodules. Lung nodules are very small collections of tissue in the lung. These nodules are very common, and the vast majority--more than 97 percent--are not cancer (benign). Most are normal lymph nodes or small areas of scarring from past infections.  But, if a small lung nodule is found to be cancer, the cancer can be cured more than 90 percent of the time. To know if the nodule is cancer, we may need to get more images before your next yearly screening exam. If the nodule has suspicious features (for example, it is large, has an odd shape or grows over time), we will refer you to a specialist for further testing.  Will my doctor also get the results?  Yes. Your doctor will get a copy of your results.  Is it okay to keep smoking now that there s a cancer screening exam?  No. Tobacco is one of the strongest cancer-causing agents. It causes not only lung  cancer, but other cancers and cardiovascular (heart) diseases as well. The damage caused by smoking builds over time. This means that the longer you smoke, the higher your risk of disease. While it is never too late to quit, the sooner you quit, the better.  Where can I find help to quit smoking?  The best way to prevent lung cancer is to stop smoking. If you have already quit smoking, congratulations and keep it up! For help on quitting smoking, please call [employer] at 3-901-QUITNOW (1-579.687.1981) or the American Cancer Society at 1-727.326.1781 to find local resources near you.  One-on-one health coaching:  If you d prefer to work individually with a health care provider on tobacco cessation, we offer:      Medication Therapy Management:  Our specially trained pharmacists work closely with you and your doctor to help you quit smoking.  Call [phone] or [phone] (toll free).

## 2023-05-17 NOTE — LETTER
Opioid / Opioid Plus Controlled Substance Agreement    This is an agreement between you and your provider about the safe and appropriate use of controlled substance/opioids prescribed by your care team. Controlled substances are medicines that can cause physical and mental dependence (abuse).    There are strict laws about having and using these medicines. We here at [facility] are committing to working with you in your efforts to get better. To support you in this work, we ll help you schedule regular office appointments for medicine refills. If we must cancel or change your appointment for any reason, we ll make sure you have enough medicine to last until your next appointment.     As a Provider, I will:  Listen carefully to your concerns and treat you with respect.   Recommend a treatment plan that I believe is in your best interest. This plan may involve therapies other than opioid pain medication.   Talk with you often about the possible benefits, and the risk of harm of any medicine that we prescribe for you.   Provide a plan on how to taper (discontinue or go off) using this medicine if the decision is made to stop its use.    As a Patient, I understand that opioid(s):   Are a controlled substance prescribed by my care team to help me function or work and manage my condition(s).   Are strong medicines and can cause serious side effects such as:  Drowsiness, which can seriously affect my driving ability  A lower breathing rate, enough to cause death  Harm to my thinking ability   Depression   Abuse of and addiction to this medicine  Need to be taken exactly as prescribed. Combining opioids with certain medicines or chemicals (such as illegal drugs, sedatives, sleeping pills, and benzodiazepines) can be dangerous or even fatal. If I stop opioids suddenly, I may have severe withdrawal symptoms.  Do not work for all types of pain nor for all patients. If they re not helpful, I may be asked to stop  them.    The risks, benefits and side effects of these medicine(s) were explained to me. I agree that:  I will take part in other treatments as advised by my care team. This may be psychiatry or counseling, physical therapy, behavioral therapy, group treatment or a referral to a specialist.     I will keep all my appointments. I understand that this is part of the monitoring of opioids. My care team may require an office visit for EVERY opioid/controlled substance refill. If I miss appointments or don t follow instructions, my care team may stop my medicine.    I will take my medicines as prescribed. I will not change the dose or schedule unless my care team tells me to. There will be no refills if I run out early.     I may be asked to come to the clinic and complete a urine drug test or complete a pill count at any time. If I don t give a urine sample or participate in a pill count, the care team may stop my medicine.    I will only receive prescriptions from this clinic for chronic pain. If I am treated by another provider for acute pain issues, I will tell them that I am taking opioid pain medication for chronic pain and that I have a treatment agreement with this provider. I will inform my [facility] care team within one business day if I am given a prescription for any pain medication by another healthcare provider. My [facility] care team can contact other providers and pharmacists about my use of any medicines.    It is up to me to make sure that I don t run out of my medicines on weekends or holidays. If my care team is willing to refill my opioid prescription without a visit, I must request refills only during office hours. Refills may take up to 3 business days to process. I will use one pharmacy to fill all my opioid and other controlled substance prescriptions. I will notify the clinic about any changes to my insurance or medication availability.    I am responsible for my prescriptions.  If the medicine/prescription is lost, stolen or destroyed, it will not be replaced. I also agree not to share controlled substance medicines with anyone.    I am aware I should not use any illegal or recreational drugs. I agree not to drink alcohol unless my care team says I can.       If I enroll in the [place] [facility] program, I will tell my care team prior to my next refill.     I will tell my care team right away if I become pregnant, have a new medical problem treated outside of my regular clinic, or have a change in my medications.    I understand that this medicine can affect my thinking, judgment and reaction time. Alcohol and drugs affect the brain and body, which can affect the safety of my driving. Being under the influence of alcohol or drugs can affect my decision-making, behaviors, personal safety, and the safety of others. Driving while impaired (DWI) can occur if a person is driving, operating, or in physical control of a car, motorcycle, boat, snowmobile, ATV, motorbike, off-road vehicle, or any other motor vehicle ([place] Statute 169A.20). I understand the risk if I choose to drive or operate any vehicle or machinery.    I understand that if I do not follow any of the conditions above, my prescriptions or treatment may be stopped or changed.          Opioids  What You Need to Know    What are opioids?   Opioids are pain medicines that must be prescribed by a doctor. They are also known as narcotics.     Examples are:   morphine (MS Contin, Daintza)  oxycodone (Oxycontin)  oxycodone and acetaminophen (Percocet)  hydrocodone and acetaminophen (Vicodin, Norco)   fentanyl patch (Duragesic)   hydromorphone (Dilaudid)   methadone  codeine (Tylenol #3)     What do opioids do well?   Opioids are best for severe short-term pain such as after a surgery or injury. They may work well for cancer pain. They may help some people with long-lasting (chronic) pain.     What do opioids NOT do well?   Opioids  never get rid of pain entirely, and they don t work well for most patients with chronic pain. Opioids don t reduce swelling, one of the causes of pain.                                    Other ways to manage chronic pain and improve function include:     Treat the health problem that may be causing pain  Anti-inflammation medicines, which reduce swelling and tenderness, such as ibuprofen (Advil, Motrin) or naproxen (Aleve)  Acetaminophen (Tylenol)  Antidepressants and anti-seizure medicines, especially for nerve pain  Topical treatments such as patches or creams  Injections or nerve blocks  Chiropractic or osteopathic treatment  Acupuncture, massage, deep breathing, meditation, visual imagery, aromatherapy  Use heat or ice at the pain site  Physical therapy   Exercise  Stop smoking  Take part in therapy       Risks and side effects     Talk to your doctor before you start or decide to keep taking opioids. Possible side effects include:    Lowering your breathing rate enough to cause death  Overdose, including death, especially if taking higher than prescribed doses  Worse depression symptoms; less pleasure in things you usually enjoy  Feeling tired or sluggish  Slower thoughts or cloudy thinking  Being more sensitive to pain over time; pain is harder to control  Trouble sleeping or restless sleep  Changes in hormone levels (for example, less testosterone)  Changes in sex drive or ability to have sex  Constipation  Unsafe driving  Itching and sweating  Dizziness  Nausea, throwing up and dry mouth    What else should I know about opioids?    Opioids may lead to dependence, tolerance, or addiction.    Dependence means that if you stop or reduce the medicine too quickly, you will have withdrawal symptoms. These include loose poop (diarrhea), jitters, flu-like symptoms, nervousness and tremors. Dependence is not the same as addiction.                     Tolerance means needing higher doses over time to get the same  effect. This may increase the chance of serious side effects.    Addiction is when people improperly use a substance that harms their body, their mind or their relations with others. Use of opiates can cause a relapse of addiction if you have a history of drug or alcohol abuse.    People who have used opioids for a long time may have a lower quality of life, worse depression, higher levels of pain and more visits to doctors.    You can overdose on opioids. Take these steps to lower your risk of overdose:    Recognize the signs:  Signs of overdose include decrease or loss of consciousness (blackout), slowed breathing, trouble waking up and blue lips. If someone is worried about overdose, they should call 911.    Talk to your doctor about Narcan (naloxone).   If you are at risk for overdose, you may be given a prescription for Narcan. This medicine very quickly reverses the effects of opioids.   If you overdose, a friend or family member can give you Narcan while waiting for the ambulance. They need to know the signs of overdose and how to give Narcan.     Don't use alcohol or street drugs.   Taking them with opioids can cause death.    Do not take any of these medicines unless your doctor says it s OK. Taking these with opioids can cause death:  Benzodiazepines, such as lorazepam (Ativan), alprazolam (Xanax) or diazepam (Valium)  Muscle relaxers, such as cyclobenzaprine (Flexeril)  Sleeping pills like zolpidem (Ambien)   Other opioids      How to keep you and other people safe while taking opioids:    Never share your opioids with others.  Opioid medicines are regulated by the Drug Enforcement Agency ([employer]). Selling or sharing medications is a criminal act.    2. Be sure to store opioids in a secure place, locked up if possible. Young children can easily swallow them and overdose.    3. When you are traveling with your medicines, keep them in the original bottles. If you use a pill box, be sure you also carry a copy  of your medicine list from your clinic or pharmacy.    4. Safe disposal of opioids    Most pharmacies have places to get rid of medicine, called disposal kiosks. Medicine disposal options are also available in every [facility]. Search your county and  medication disposal  to find more options. You can find more details at:  [email or web address]://[email or web address]-green/managing-unwanted-medications     I agree that my provider, clinic care team, and pharmacy may work with any city, state or federal law enforcement agency that investigates the misuse, sale, or other diversion of my controlled medicine. I will allow my provider to discuss my care with, or share a copy of, this agreement with any other treating provider, pharmacy or emergency room where I receive care.    I have read this agreement and have asked questions about anything I did not understand.    _______________________________________________________  Patient Signature - Toby Mcgrath _____________________                   Date     _______________________________________________________  Provider Signature - April URIEL Hopson MD   _____________________                   Date     _______________________________________________________  Witness Signature (required if provider not present while patient signing)   _____________________                   Date

## 2023-05-17 NOTE — PROGRESS NOTES
"  Assessment & Plan     Diabetic polyneuropathy associated with type 2 diabetes mellitus (H)  Due for labs, Recommendations pending results.  Will also check thyroid function given symptoms.  - Hemoglobin A1c; Future  - TSH with free T4 reflex; Future  - lisinopril (ZESTRIL) 5 MG tablet; Take 1 tablet (5 mg) by mouth daily  - Hemoglobin A1c  - TSH with free T4 reflex    Hypertension goal BP (blood pressure) < 140/90  Recommend decreasing Lisinopril to 5 mg and monitoring BP to see if dizziness or sweating episodes change.  - lisinopril (ZESTRIL) 5 MG tablet; Take 1 tablet (5 mg) by mouth daily    Morbid obesity (H)  Discussed dietary changes as being most important give his limited activity abilities from back pain and cane usage.    Neck pain  Monitor, feels muscular.    Encounter for abdominal aortic aneurysm (AAA) screening  - [place] Aorta Medicare AAA Screening; Future    Personal history of tobacco use  - Prof fee: Shared Decision Making for Lung Cancer Screening  - CT Chest Lung Cancer Scrn Low Dose wo; Future    Chronic, continuous use of opioids  CSA signed today      20 minutes spent by me on the date of the encounter doing chart review, history and exam, documentation and further activities per the note       BMI:   Estimated body mass index is 33.11 kg/m  as calculated from the following:    Height as of this encounter: 1.803 m (5' 11\").    Weight as of this encounter: 107.7 kg (237 lb 6.4 oz).   Weight management plan: Discussed healthy diet and exercise guidelines    See Patient Instructions    Dayana Hopson MD  [facility]    Hussain Vasques is a 67 year old, presenting for the following health issues:  Diabetes (Has not been fasting), Hypertension (Pt states for the last week he has felt more dizzy/ light headed, difficulty walking at times- blood pressure today was 99/63.), and Musculoskeletal Problem (Back of neck pain behind left ear. Pt reports severe back pain still " since last visit)        5/17/2023    11:01 AM   Additional Questions   Roomed by Radha Reynoso     History of Present Illness       Reason for visit:  [place]    He eats 2-3 servings of fruits and vegetables daily.He consumes 1 sweetened beverage(s) daily.He exercises with enough effort to increase his heart rate 9 or less minutes per day.  He exercises with enough effort to increase his heart rate 3 or less days per week.   He is taking medications regularly.  Today's KAYLA-7 Score: 2    Patient wants his A1c done.    BP a little low today, has been feeling a little dizzy.    Sweating a lot, in antecubital fossae and axillae.  Happens when he drinks coffee or tea, but also comes on when he is not expecting.  Does not check his blood pressure or blood sugars when this is happening, no thyroid problems known.    BP Readings from Last 2 Encounters:   05/17/23 99/63   04/03/23 124/80     Hemoglobin A1C (%)   Date Value   02/15/2023 7.9 (H)   06/29/2022 7.4 (H)   03/15/2021 7.2 (H)   11/05/2020 6.0 (H)     LDL Cholesterol Calculated (mg/dL)   Date Value   02/15/2023 38   02/16/2022 29   03/15/2021 42   07/17/2020 55       Diabetes Follow-up    How often are you checking your blood sugar? A few times a week  What time of day are you checking your blood sugars (select all that apply)?  Before meals and At bedtime  Have you had any blood sugars above 200?  Yes -sometimes but typically around 160  Have you had any blood sugars below 70?  No    What symptoms do you notice when your blood sugar is low?  None and Not applicable    What concerns do you have today about your diabetes?  Other: hit 300 one time     Do you have any of these symptoms? (Select all that apply)  Numbness in feet, Burning in feet and Excessive thirst      Hyperlipidemia Follow-Up      Are you regularly taking any medication or supplement to lower your cholesterol?   yes    Are you having muscle aches or other side effects that you think could be caused  by your cholesterol lowering medication?  No    Hypertension Follow-up       Do you check your blood pressure regularly outside of the clinic? No    Are you following a low salt diet? Yes    Are your blood pressures ever more than 140 on the top number (systolic) OR more   than 90 on the bottom number (diastolic), for example 140/90? Yes    BP Readings from Last 2 Encounters:   05/17/23 99/63   04/03/23 124/80     Hemoglobin A1C (%)   Date Value   02/15/2023 7.9 (H)   06/29/2022 7.4 (H)   03/15/2021 7.2 (H)   11/05/2020 6.0 (H)     LDL Cholesterol Calculated (mg/dL)   Date Value   02/15/2023 38   02/16/2022 29   03/15/2021 42   07/17/2020 55     Current Outpatient Medications   Medication Sig Dispense Refill     acetaminophen (TYLENOL) 500 MG tablet Take 1,000 mg by mouth daily as needed for mild pain       apixaban ANTICOAGULANT (ELIQUIS ANTICOAGULANT) 5 MG tablet Take 1 tablet (5 mg) by mouth 2 times daily 180 tablet 3     blood glucose (ACCU-CHEK DANIELE PLUS) test strip USE TO TEST BLOOD SUGAR DAILY 100 strip 3     blood glucose (ONETOUCH VERIO IQ) test strip Use to test blood sugar once daily or as directed. 100 strip 3     blood glucose monitoring (ACCU-CHEK FASTCLIX) lancets USE TO TEST BLOOD SUGAR DAILY 100 each 3     DULoxetine (CYMBALTA) 60 MG capsule Take 2 capsules (120 mg) by mouth daily 180 capsule 3     finasteride (PROSCAR) 5 MG tablet TAKE 1 TABLET BY MOUTH DAILY FOR PROSTATE ENLARGEMENT 90 tablet 3     fluticasone (FLONASE) 50 MCG/ACT nasal spray USE 1 TO 2 SPRAYS IN BOTH  NOSTRILS DAILY 16 g 1     lisinopril (ZESTRIL) 5 MG tablet Take 1 tablet (5 mg) by mouth daily 30 tablet 1     metFORMIN (GLUCOPHAGE XR) 500 MG 24 hr tablet TAKE 4 TABLETS BY MOUTH  DAILY WITH DINNER 360 tablet 3     metoprolol tartrate (LOPRESSOR) 50 MG tablet TAKE 1 TABLET BY MOUTH TWICE  DAILY 160 tablet 0     omeprazole (PRILOSEC) 20 MG DR capsule TAKE 1 CAPSULE BY MOUTH DAILY 60 capsule 5     OneTouch Delica Lancets 33G MISC 1  "lancet daily Use to test blood sugars once daily as directed. 100 each 3     oxyCODONE (ROXICODONE) 5 MG tablet Take 1 tablet (5 mg) by mouth every 6 hours as needed for severe pain Max #4 per day fill on 11th of each month 120 tablet 0     pregabalin (LYRICA) 100 MG capsule Take 2 capsules (200 mg) by mouth 2 times daily 360 capsule 3     senna-docusate (SENOKOT-S/PERICOLACE) 8.6-50 MG tablet Take 1-2 tablets by mouth daily as needed for constipation 60 tablet 3     simvastatin (ZOCOR) 20 MG tablet TAKE 1 TABLET BY MOUTH AT  BEDTIME 30 tablet 6     tamsulosin (FLOMAX) 0.4 MG capsule TAKE 2 CAPSULES BY MOUTH DAILY 90 capsule 7     tiotropium (SPIRIVA RESPIMAT) 2.5 MCG/ACT inhaler Inhale 2 puffs into the lungs daily 12 g 3         Review of Systems   Constitutional, HEENT, cardiovascular, pulmonary, gi and gu systems are negative, except as otherwise noted.      Objective    BP 99/63 (BP Location: Right arm, Patient Position: Sitting, Cuff Size: Adult Large)   Pulse 78   Temp 97.8  F (36.6  C) (Oral)   Resp 16   Ht 1.803 m (5' 11\")   Wt 107.7 kg (237 lb 6.4 oz)   SpO2 97%   BMI 33.11 kg/m    Body mass index is 33.11 kg/m .  Physical Exam   GENERAL: healthy, alert and no distress  RESP: lungs clear to auscultation - no rales, rhonchi or wheezes  CV: regular rate and rhythm, normal S1 S2, no S3 or S4, no murmur, click or rub, no peripheral edema and peripheral pulses strong  PSYCH: mentation appears normal, affect normal/bright    Office Visit on 02/15/2023   Component Date Value Ref Range Status     Creatinine Urine mg/dL 02/15/2023 82.4  mg/dL Final    The reference ranges have not been established in urine creatinine. The results should be integrated into the clinical context for interpretation.     Albumin Urine mg/L 02/15/2023 <12.0  mg/L Final    The reference ranges have not been established in urine albumin. The results should be integrated into the clinical context for interpretation.     Albumin Urine " mg/g Cr 02/15/2023    Final    Unable to calculate, urine albumin and/or urine creatinine is outside detectable limits.  Microalbuminuria is defined as an albumin:creatinine ratio of 17 to 299 for males and 25 to 299 for females. A ratio of albumin:creatinine of 300 or higher is indicative of overt proteinuria.  Due to biologic variability, positive results should be confirmed by a second, first-morning random or 24-hour timed urine specimen. If there is discrepancy, a third specimen is recommended. When 2 out of 3 results are in the microalbuminuria range, this is evidence for incipient nephropathy and warrants increased efforts at glucose control, blood pressure control, and institution of therapy with an angiotensin-converting-enzyme (ACE) inhibitor (if the patient can tolerate it).       Hemoglobin A1C 02/15/2023 7.9 (H)  0.0 - 5.6 % Final    Normal <5.7%   Prediabetes 5.7-6.4%    Diabetes 6.5% or higher     Note: Adopted from ADA consensus guidelines.     Cholesterol 02/15/2023 99  <200 mg/dL Final     Triglycerides 02/15/2023 166 (H)  <150 mg/dL Final     Direct Measure HDL 02/15/2023 28 (L)  >=40 mg/dL Final     LDL Cholesterol Calculated 02/15/2023 38  <=100 mg/dL Final     Non HDL Cholesterol 02/15/2023 71  <130 mg/dL Final     Sodium 02/15/2023 143  136 - 145 mmol/L Final     Potassium 02/15/2023 5.3  3.4 - 5.3 mmol/L Final     Chloride 02/15/2023 104  98 - 107 mmol/L Final     Carbon Dioxide (CO2) 02/15/2023 25  22 - 29 mmol/L Final     Anion Gap 02/15/2023 14  7 - 15 mmol/L Final     Urea Nitrogen 02/15/2023 14.1  8.0 - 23.0 mg/dL Final     Creatinine 02/15/2023 0.99  0.67 - 1.17 mg/dL Final     Calcium 02/15/2023 9.9  8.8 - 10.2 mg/dL Final     Glucose 02/15/2023 180 (H)  70 - 99 mg/dL Final     Alkaline Phosphatase 02/15/2023 64  40 - 129 U/L Final     AST 02/15/2023 39  10 - 50 U/L Final     ALT 02/15/2023 29  10 - 50 U/L Final     Protein Total 02/15/2023 7.2  6.4 - 8.3 g/dL Final     Albumin  02/15/2023 4.3  3.5 - 5.2 g/dL Final     Bilirubin Total 02/15/2023 0.3  <=1.2 mg/dL Final     GFR Estimate 02/15/2023 82  >60 mL/min/1.73m2 Final    eGFR calculated using 2021 CKD-EPI equation.     Erythrocyte Sedimentation Rate 02/15/2023 12  0 - 20 mm/hr Final     CRP Inflammation 02/15/2023 5.04 (H)  <5.00 mg/L Final     Prostate Specific Antigen Screen 02/15/2023 0.18  0.00 - 4.50 ng/mL Final     Color Urine 02/15/2023 Yellow  Colorless, Straw, Light Yellow, Yellow Final     Appearance Urine 02/15/2023 Clear  Clear Final     Glucose Urine 02/15/2023 Negative  Negative mg/dL Final     Bilirubin Urine 02/15/2023 Negative  Negative Final     Ketones Urine 02/15/2023 Negative  Negative mg/dL Final     Specific Gravity Urine 02/15/2023 1.020  1.003 - 1.035 Final     Blood Urine 02/15/2023 Negative  Negative Final     pH Urine 02/15/2023 6.0  5.0 - 7.0 Final     Protein Albumin Urine 02/15/2023 Negative  Negative mg/dL Final     Urobilinogen Urine 02/15/2023 1.0  0.2, 1.0 E.U./dL Final     Nitrite Urine 02/15/2023 Negative  Negative Final     Leukocyte Esterase Urine 02/15/2023 Negative  Negative Final     WBC Count 02/15/2023 6.7  4.0 - 11.0 10e3/uL Final     RBC Count 02/15/2023 4.76  4.40 - 5.90 10e6/uL Final     Hemoglobin 02/15/2023 14.2  13.3 - 17.7 g/dL Final     Hematocrit 02/15/2023 41.9  40.0 - 53.0 % Final     MCV 02/15/2023 88  78 - 100 fL Final     MCH 02/15/2023 29.8  26.5 - 33.0 pg Final     MCHC 02/15/2023 33.9  31.5 - 36.5 g/dL Final     RDW 02/15/2023 14.3  10.0 - 15.0 % Final     Platelet Count 02/15/2023 216  150 - 450 10e3/uL Final     % Neutrophils 02/15/2023 65  % Final     % Lymphocytes 02/15/2023 25  % Final     % Monocytes 02/15/2023 7  % Final     % Eosinophils 02/15/2023 2  % Final     % Basophils 02/15/2023 1  % Final     Absolute Neutrophils 02/15/2023 4.4  1.6 - 8.3 10e3/uL Final     Absolute Lymphocytes 02/15/2023 1.7  0.8 - 5.3 10e3/uL Final     Absolute Monocytes 02/15/2023 0.5  0.0  - 1.3 10e3/uL Final     Absolute Eosinophils 02/15/2023 0.1  0.0 - 0.7 10e3/uL Final     Absolute Basophils 02/15/2023 0.0  0.0 - 0.2 10e3/uL Final     Bacteria Urine 02/15/2023 None Seen  None Seen /HPF Final     RBC Urine 02/15/2023 0-2  0-2 /HPF /HPF Final     WBC Urine 02/15/2023 0-5  0-5 /HPF /HPF Final                 Lung Cancer Screening Shared Decision Making Visit     Toby Mcgrath, a 67 year old male, is eligible for lung cancer screening    History   Smoking Status     Former     Packs/day: 1.00     Years: 40.00     Types: Cigarettes     Quit date: 2011   Smokeless Tobacco     Former     Quit date: 2/1/2013       I have discussed with patient the risks and benefits of screening for lung cancer with low-dose CT.     The risks include:    radiation exposure: one low dose chest CT has as much ionizing radiation as about 15 chest x-rays, or 6 months of background radiation living in [place]      false positives: most findings/nodules are NOT cancer, but some might still require additional diagnostic evaluation, including biopsy    over-diagnosis: some slow growing cancers that might never have been clinically significant will be detected and treated unnecessarily     The benefit of early detection of lung cancer is contingent upon adherence to annual screening or more frequent follow up if indicated.     Furthermore, to benefit from screening, Toby must be willing and able to undergo diagnostic procedures, if indicated. Although no specific guide is available for determining severity of comorbidities, it is reasonable to withhold screening in patients who have greater mortality risk from other diseases.     We did discuss that the best way to prevent lung cancer is to not smoke.    Some patients may value a numeric estimation of lung cancer risk when evaluating if lung cancer screening is right for them, here is one calculator:    ShouldIScreen

## 2023-05-26 ENCOUNTER — VIRTUAL VISIT (OUTPATIENT)
Dept: FAMILY MEDICINE | Facility: CLINIC | Age: 68
End: 2023-05-26
Payer: COMMERCIAL

## 2023-05-26 DIAGNOSIS — E11.42 DIABETIC POLYNEUROPATHY ASSOCIATED WITH TYPE 2 DIABETES MELLITUS (H): ICD-10-CM

## 2023-05-26 PROCEDURE — 99213 OFFICE O/P EST LOW 20 MIN: CPT | Mod: 95 | Performed by: FAMILY MEDICINE

## 2023-05-26 ASSESSMENT — ANXIETY QUESTIONNAIRES: GAD7 TOTAL SCORE: 2

## 2023-05-26 NOTE — PROGRESS NOTES
"Layo is a 67 year old who is being evaluated via a billable telephone visit.      What phone number would you like to be contacted at? [phone]  How would you like to obtain your AVS? Mail a copy  Distant Location (provider location):  Off-site    Assessment & Plan     Diabetic polyneuropathy associated with type 2 diabetes mellitus (H)  Will send in RX for Jardiance, will see what cost is. MTM referral placed today.  - empagliflozin (JARDIANCE) 10 MG TABS tablet; Take 1 tablet (10 mg) by mouth daily  - Med Therapy Management Referral      10 minutes spent by me on the date of the encounter doing chart review, history and exam, documentation and further activities per the note       See Patient Instructions    Dayana Hopson MD  [facility]    Subjective   Layo is a 67 year old, presenting for the following health issues:  Results (Labs on 5-17)        5/26/2023     9:29 AM   Additional Questions   Roomed by Siomara Pierson     History of Present Illness       Reason for visit:  [place]    He eats 2-3 servings of fruits and vegetables daily.He consumes 1 sweetened beverage(s) daily.He exercises with enough effort to increase his heart rate 9 or less minutes per day.  He exercises with enough effort to increase his heart rate 3 or less days per week.   He is taking medications regularly.  Today's KAYLA-7 Score: 2       Discuss lab results from 5/17/23.    Takes Metformin at dinner, no other medications. Took Glipizide in the past, but had low sugars, so was taken off that. Worked with MTM in the past,would like to see someone again.        Review of Systems   Constitutional, HEENT, cardiovascular, pulmonary, gi and gu systems are negative, except as otherwise noted.      Objective    Vitals - Patient Reported  Weight (Patient Reported): 106.6 kg (235 lb)  Height (Patient Reported): 180.3 cm (5' 11\")  BMI (Based on Pt Reported Ht/Wt): 32.78      Vitals:  No vitals were obtained today " due to virtual visit.    Physical Exam   healthy, alert and no distress  PSYCH: Alert and oriented times 3; coherent speech, normal   rate and volume, able to articulate logical thoughts, able   to abstract reason, no tangential thoughts, no hallucinations   or delusions  His affect is normal  RESP: No cough, no audible wheezing, able to talk in full sentences  Remainder of exam unable to be completed due to telephone visits    Office Visit on 05/17/2023   Component Date Value Ref Range Status     Hemoglobin A1C 05/17/2023 8.8 (H)  0.0 - 5.6 % Final     TSH 05/17/2023 1.69  0.30 - 4.20 uIU/mL Final               Phone call duration: 7 minutes

## 2023-05-30 ENCOUNTER — TELEPHONE (OUTPATIENT)
Dept: FAMILY MEDICINE | Facility: CLINIC | Age: 68
End: 2023-05-30
Payer: COMMERCIAL

## 2023-05-30 NOTE — TELEPHONE ENCOUNTER
All other medications I see are on Tier 3 also.  Reaching out to his previous MTM pharmacist for recommendations, thanks for getting him the scheduling number.    Will reach out to him in the next few days with an update.

## 2023-05-30 NOTE — TELEPHONE ENCOUNTER
Jardiance is too expensive. Is there a different alternative?     [facility] [address] [facility] [address] [place] [address]    Gave phone number to follow up with scheduling medication therapy management (MTM) appointment.    Shandra Hui RN

## 2023-05-31 ENCOUNTER — TELEPHONE (OUTPATIENT)
Facility: CLINIC | Age: 68
End: 2023-05-31
Payer: COMMERCIAL

## 2023-05-31 DIAGNOSIS — E11.42 TYPE 2 DIABETES MELLITUS WITH DIABETIC POLYNEUROPATHY, WITHOUT LONG-TERM CURRENT USE OF INSULIN (H): ICD-10-CM

## 2023-05-31 DIAGNOSIS — E11.42 DIABETIC POLYNEUROPATHY ASSOCIATED WITH TYPE 2 DIABETES MELLITUS (H): Primary | ICD-10-CM

## 2023-05-31 RX ORDER — DAPAGLIFLOZIN 5 MG/1
5 TABLET, FILM COATED ORAL DAILY
Qty: 90 TABLET | Refills: 1 | Status: SHIPPED | OUTPATIENT
Start: 2023-05-31 | End: 2023-08-23

## 2023-05-31 NOTE — TELEPHONE ENCOUNTER
Called pt, informed, agrees, pt will f/u with MTM and sort out if would qualify for pt assistance as has some meds from them now    Camille Hays RN, BSN  [facility]

## 2023-06-05 DIAGNOSIS — G89.29 CHRONIC BILATERAL LOW BACK PAIN WITH BILATERAL SCIATICA: ICD-10-CM

## 2023-06-05 DIAGNOSIS — M54.42 CHRONIC BILATERAL LOW BACK PAIN WITH BILATERAL SCIATICA: ICD-10-CM

## 2023-06-05 DIAGNOSIS — F11.90 CHRONIC, CONTINUOUS USE OF OPIOIDS: ICD-10-CM

## 2023-06-05 DIAGNOSIS — M54.41 CHRONIC BILATERAL LOW BACK PAIN WITH BILATERAL SCIATICA: ICD-10-CM

## 2023-06-05 DIAGNOSIS — M96.1 FAILED BACK SYNDROME OF LUMBAR SPINE: ICD-10-CM

## 2023-06-05 NOTE — TELEPHONE ENCOUNTER
Routing refill request to provider for review/approval because:  Drug not on the [facility] refill protocol     Shandra Hui RN

## 2023-06-06 ENCOUNTER — TELEPHONE (OUTPATIENT)
Dept: FAMILY MEDICINE | Facility: CLINIC | Age: 68
End: 2023-06-06
Payer: COMMERCIAL

## 2023-06-06 NOTE — TELEPHONE ENCOUNTER
FREE DRUG APPLICATION INITIATED    Medication: FARXIGA 5 MG PO TABS  Free Drug Program Name: [place]  Start Date: 6-6-2023  Phone [phone]  Fax #: [phone]  Additional Information:  I have left message for patient to call me. Once he has called me I will get application started

## 2023-06-07 RX ORDER — OXYCODONE HYDROCHLORIDE 5 MG/1
5 TABLET ORAL EVERY 6 HOURS PRN
Qty: 120 TABLET | Refills: 0 | Status: SHIPPED | OUTPATIENT
Start: 2023-06-11 | End: 2023-07-06

## 2023-06-13 DIAGNOSIS — I10 HYPERTENSION GOAL BP (BLOOD PRESSURE) < 140/90: ICD-10-CM

## 2023-06-13 DIAGNOSIS — E11.42 DIABETIC POLYNEUROPATHY ASSOCIATED WITH TYPE 2 DIABETES MELLITUS (H): ICD-10-CM

## 2023-06-15 RX ORDER — LISINOPRIL 5 MG/1
5 TABLET ORAL DAILY
Qty: 90 TABLET | Refills: 0 | Status: SHIPPED | OUTPATIENT
Start: 2023-06-15 | End: 2023-07-12

## 2023-06-15 NOTE — TELEPHONE ENCOUNTER
Changed to a mail order  Prescription approved per [facility] Refill Protocol.  Seble Mcdermott RN

## 2023-06-21 NOTE — TELEPHONE ENCOUNTER
Date: 6/21    Phone Number: [phone]    Comments: Spoke to pt.  Pt was upset he may need to give his financial information again before I could read the [place] form for his free drug for Farxiga.  Zenaida henderson may have his financial's from previous PAP routed this encounter to them to see if they have it

## 2023-06-22 NOTE — TELEPHONE ENCOUNTER
I will handle Layo's Nolanxiga. We are managing a few others for him already.    He should have called us about adding this medication for  programs.    Thanks for letting me know!    Zenaida Plasencia  Prescription Assistance Supervisor  Pharmacy Assistance  [phone]

## 2023-07-04 ENCOUNTER — TELEPHONE (OUTPATIENT)
Dept: FAMILY MEDICINE | Facility: CLINIC | Age: 68
End: 2023-07-04
Payer: COMMERCIAL

## 2023-07-04 DIAGNOSIS — F11.90 CHRONIC, CONTINUOUS USE OF OPIOIDS: ICD-10-CM

## 2023-07-04 DIAGNOSIS — M54.41 CHRONIC BILATERAL LOW BACK PAIN WITH BILATERAL SCIATICA: ICD-10-CM

## 2023-07-04 DIAGNOSIS — M54.42 CHRONIC BILATERAL LOW BACK PAIN WITH BILATERAL SCIATICA: ICD-10-CM

## 2023-07-04 DIAGNOSIS — M96.1 FAILED BACK SYNDROME OF LUMBAR SPINE: ICD-10-CM

## 2023-07-04 DIAGNOSIS — G89.29 CHRONIC BILATERAL LOW BACK PAIN WITH BILATERAL SCIATICA: ICD-10-CM

## 2023-07-06 ENCOUNTER — OFFICE VISIT (OUTPATIENT)
Dept: PHARMACY | Facility: CLINIC | Age: 68
End: 2023-07-06
Attending: FAMILY MEDICINE
Payer: COMMERCIAL

## 2023-07-06 VITALS — WEIGHT: 237.8 LBS | SYSTOLIC BLOOD PRESSURE: 108 MMHG | BODY MASS INDEX: 33.17 KG/M2 | DIASTOLIC BLOOD PRESSURE: 64 MMHG

## 2023-07-06 DIAGNOSIS — E78.5 HYPERLIPIDEMIA LDL GOAL <100: ICD-10-CM

## 2023-07-06 DIAGNOSIS — K59.00 CONSTIPATION, UNSPECIFIED CONSTIPATION TYPE: ICD-10-CM

## 2023-07-06 DIAGNOSIS — E11.9 TYPE 2 DIABETES MELLITUS WITHOUT COMPLICATION, WITHOUT LONG-TERM CURRENT USE OF INSULIN (H): Primary | ICD-10-CM

## 2023-07-06 DIAGNOSIS — J44.9 CHRONIC OBSTRUCTIVE PULMONARY DISEASE, UNSPECIFIED COPD TYPE (H): ICD-10-CM

## 2023-07-06 DIAGNOSIS — M54.50 CHRONIC BILATERAL LOW BACK PAIN WITHOUT SCIATICA: ICD-10-CM

## 2023-07-06 DIAGNOSIS — J30.2 SEASONAL ALLERGIC RHINITIS, UNSPECIFIED TRIGGER: ICD-10-CM

## 2023-07-06 DIAGNOSIS — F33.0 MAJOR DEPRESSIVE DISORDER, RECURRENT EPISODE, MILD (H): ICD-10-CM

## 2023-07-06 DIAGNOSIS — G89.29 CHRONIC BILATERAL LOW BACK PAIN WITHOUT SCIATICA: ICD-10-CM

## 2023-07-06 DIAGNOSIS — R35.0 BENIGN PROSTATIC HYPERPLASIA WITH URINARY FREQUENCY: ICD-10-CM

## 2023-07-06 DIAGNOSIS — I10 HYPERTENSION GOAL BP (BLOOD PRESSURE) < 140/90: ICD-10-CM

## 2023-07-06 DIAGNOSIS — I48.91 ATRIAL FIBRILLATION WITH RVR (H): ICD-10-CM

## 2023-07-06 DIAGNOSIS — K21.00 GASTROESOPHAGEAL REFLUX DISEASE WITH ESOPHAGITIS WITHOUT HEMORRHAGE: ICD-10-CM

## 2023-07-06 DIAGNOSIS — N40.1 BENIGN PROSTATIC HYPERPLASIA WITH URINARY FREQUENCY: ICD-10-CM

## 2023-07-06 PROCEDURE — 99207 PR NO CHARGE LOS: CPT | Performed by: PHARMACIST

## 2023-07-06 RX ORDER — OXYCODONE HYDROCHLORIDE 5 MG/1
5 TABLET ORAL EVERY 6 HOURS PRN
Qty: 120 TABLET | Refills: 0 | Status: SHIPPED | OUTPATIENT
Start: 2023-07-11 | End: 2023-08-07

## 2023-07-06 NOTE — PROGRESS NOTES
Medication Therapy Management (MTM) Encounter    ASSESSMENT:                            Medication Adherence/Access: See below for considerations    Type 2 Diabetes:    Not meeting A1c of < 7%. Patient would benefit from starting SGLT2i. However, he needs to complete his application for medication coverage.   Microalbumin is < 30 mg/g and is on ACEI.  Aspirin not recommend while on Eliquis.   Patient is on a moderate intensity statin.    Afib/hypertension:   Blood pressure at goal < 130/80 mm Hg.  DOAC recommend based on [id number] score > 2.     GERD: stable    Hyperlipidemia: Stable.    Pain: no changes advised  Patient to continue on lowest effective dose of oxycodone.    BPH: no changes today. Unable to review in detail.    Allergic rhinitis: Stable.    COPD: no changes were made due to time constraint, however next visit to discuss starting a rescue inhaler as needed.     Depression: Stable.    Constipation: stable    PLAN:                            Discussed with patient during visit, I will need to reach out to assistance team clarify the Farxiga and Eliquis. After our visit I was informed patient need to fill out patient application for Farixga which was already mailed out and Elqiuis has been started yet due to pending patient to meet out of pocket spending first.     Patient to  30 day supply Eliquis 1 time use coupon card applied. Stop aspirin.     Follow-up: 1 month (before he runs out of Eliquis), he mentions he may want to pay for Eliquis if he has financial means at that time. Otherwise, patient would benefit from referral INR services to start warfarin instead.     SUBJECTIVE/OBJECTIVE:                          Layo Mcgrath is a 67 year old male coming in for an initial visit with me. He was referred to me from Dr. Danika Hopson.      Reason for visit: patient shares with me has transferred his primary care provider to this clinic now. He reports has been out of Eliquis and start aspirin  instead.    Allergies/ADRs: Reviewed in chart  Past Medical History: Reviewed in chart  Tobacco: He reports that he quit smoking about 12 years ago. His smoking use included cigarettes. He has a 40.00 pack-year smoking history. He quit smokeless tobacco use about 10 years ago.  Alcohol: none    Medication Adherence/Access:   Medication barriers: Working with [employer] program to help with the patient assistance program for duloxetine, dapagliflozin, Spiriva and Elliquis.   Approved for duloxetine and Spriva already.  Pending Farxiga application - needs to send in patient and provider application  Eliquis - patient has not met out of pocket expense yet to qualify for application (needs to spend $414)  Patient has already used and approved for $500 assistance Fund.  Using [facility].    Type 2 Diabetes:    metformin XR 2000mg daily  Farxiga 5 mg daily - not taking, pending application with .     Patient is not experiencing side effects.  Blood sugar monitoring: morning and night on occassion. Ranges (patient reported): use to be 160-180, high 200's to 300's.   Eye exam: due   Foot exam: due  Diet/Exercise:     Afib/Hypertension:   Eliquis 5 mg twice daily - NOT on for about 1 week without so he started aspirin 81 mg daily   lisinopril 5 mg daily  metoprolol tartrate 50mg twice daily    Patient reports no current concerns of bruising or bleeding.   Patient reports no current medication side effects.   Patient does not self-monitor blood pressure.  [id number] = 3    GERD: Prilosec (omeprazole) 20mg daily    Patient feels that current regimen is effective.    Hyperlipidemia: simvastatin 20mg daily    Patient reports no significant myalgias or other side effects.    Pain:   Acetaminophen 500-1000 mg every 8 hour as needed   oxycodone 5mg every 6 hours as needed, max 4 tablets per day  Lyrica 200 mg twice daily     Has been having chronic pain since his spine surgery/infusion. Feels that  current medications relieves the pain/calms it down but doesn't make it go away. He reports takes the edge off. No side effects reported.    BPH:    finasteride 5mg daily   tamsulosin 0.8mg daily    He reports uncertain if the medication even helps given has been on for so long. He has worked with urology in the past with considerations of surgery.    Allergies: fluticasone nasal spray 2 sprays     No symptoms concerns.    COPD: Spiriva 2 puffs daily    Symptoms with exertion. No side effects with use. Unable to review technique in detail.     Depression: duloxetine 120mg daily    Feels that medication has really helped with mood. Denies suicidal thoughts.     Constipation: senna-docusate 8.6-50mg tablet as needed for constipation    Normal bowel movements.       Today's Vitals: /64   Wt 237 lb 12.8 oz (107.9 kg)   BMI 33.17 kg/m    ----------------      I spent 45 minutes with this patient today. All changes were made via collaborative practice agreement with Dayana Hopson MD. A copy of the visit note was provided to the patient's provider(s).    A summary of these recommendations was given to the patient.    Laurence Kessler, PharmD  Medication Therapy Management Pharmacist  [phone]     Medication Therapy Recommendations  Atrial fibrillation with RVR (H)    Current Medication: apixaban ANTICOAGULANT (ELIQUIS ANTICOAGULANT) 5 MG tablet   Rationale: Cannot afford medication product - Cost - Adherence   Recommendation: Provide Adherence Intervention   Status: Patient Agreed - Adherence/Education

## 2023-07-06 NOTE — PATIENT INSTRUCTIONS
"Recommendations from today's MTM visit:                                                      Laurence will call the assistance program to clarify.     Follow-up:     It was great speaking with you today.  I value your experience and would be very thankful for your time in providing feedback in our clinic survey. In the next few days, you may receive an email or text message from [facility] [employer] with a link to a survey related to your  clinical pharmacist.\"     To schedule another MTM appointment, please call the clinic directly or you may call the MTM scheduling line at [phone] or toll-free at [phone].     My Clinical Pharmacist's contact information:                                                      Please feel free to contact me with any questions or concerns you have.      Laurence Kessler, PharmD  Medication Therapy Management Pharmacist  [phone]    "

## 2023-07-12 RX ORDER — METOPROLOL TARTRATE 50 MG
50 TABLET ORAL 2 TIMES DAILY
Qty: 180 TABLET | Refills: 1 | Status: SHIPPED | OUTPATIENT
Start: 2023-07-12 | End: 2023-01-01 | Stop reason: DRUGHIGH

## 2023-07-12 RX ORDER — METFORMIN HCL 500 MG
2000 TABLET, EXTENDED RELEASE 24 HR ORAL
Qty: 360 TABLET | Refills: 1 | Status: SHIPPED | OUTPATIENT
Start: 2023-07-12 | End: 2024-01-01

## 2023-07-12 RX ORDER — LISINOPRIL 5 MG/1
5 TABLET ORAL DAILY
Qty: 90 TABLET | Refills: 1 | Status: SHIPPED | OUTPATIENT
Start: 2023-07-12 | End: 2023-08-17

## 2023-07-24 ENCOUNTER — TELEPHONE (OUTPATIENT)
Dept: FAMILY MEDICINE | Facility: CLINIC | Age: 68
End: 2023-07-24
Payer: COMMERCIAL

## 2023-07-24 DIAGNOSIS — E11.42 TYPE 2 DIABETES MELLITUS WITH DIABETIC POLYNEUROPATHY, WITHOUT LONG-TERM CURRENT USE OF INSULIN (H): ICD-10-CM

## 2023-07-24 NOTE — TELEPHONE ENCOUNTER
Toby has been approved to the [employer]  assistance program for Farziga through December 31, 2023. He will receive this medication at no cost through the enrollment period.     A 90 day supply of Farziga 5mg will be delivered to the patient's home within 14-21 business days.He has been informed of this approval and delivery.     Toby will work with us to set up refills for the medication through the enrollment period.     Thank you for your assistance,  Melina Dumont  Prescription   Please send responses to [employer]

## 2023-08-03 NOTE — PROGRESS NOTES
Medication Therapy Management (MTM) Encounter    ASSESSMENT:                            Medication Adherence/Access: will send refills to  pharmacy per patient preference.    Type 2 Diabetes:    A1c not at goal <7%. Blood sugar not at goal  (fasting) or <180 after a meal based on readings from memory. Farxiga should help to reduce blood sugar. Decreasing soda intake would help too. Due for A1c next visit.      Afib/hypertension:   Blood pressure at goal < 130/80 mm Hg.  Recommend warfarin since more affordable. Can look into Eliquis again in a few months to see if he's met out of pocket expense for patient assistance. Okay to take aspirin if he runs out of Eliquis while he waits to start warfarin.     Pain: will forward refill request to Dr. Danika Hopson - would like next Lyrica sent to  pharmacy as well     Hyperlipidemia: stable, needs refill     BPH: needs refills, discuss more at future visit    Allergies: stable, needs refill     Constipation: likely opioid-induced. Can try adding Miralax.     PLAN:                            Stop Eliquis, urgent referral to warfarin clinic  Start taking Farxiga 5 mg once daily   Start Miralax 17 g (1 scoop) once daily for constipation   Continue senna/docusate 1-2 tablets daily   Reduce Pepsi to 2 cans per day - try flavoring water with [place] instead     Follow-up: 2 weeks    SUBJECTIVE/OBJECTIVE:                          Layo Mcgrath is a 67 year old male coming in for a follow-up visit with me. He was referred to me from Dr. Danika Hopson.      Reason for visit: eliquis and diabetes follow-up    Allergies/ADRs: Reviewed in chart  Past Medical History: Reviewed in chart  Tobacco: He reports that he quit smoking about 12 years ago. His smoking use included cigarettes. He has a 40.00 pack-year smoking history. He quit smokeless tobacco use about 10 years ago.  Alcohol: none    Medication Adherence/Access:   Medication barriers: Working with [employer] assistance program to help  with the patient assistance program for duloxetine, dapagliflozin, and Spiriva. Hasn't met $414 out of pocket expense to qualify for Eliquis patient assistance but can look into again in the future. Patient can't afford to pay out of pocket.    Patient has already used and approved for $500 assistance Fund.  Would like to get all medications at  pharmacy     Type 2 Diabetes:    metformin XR 2000mg daily  Farxiga 5 mg daily - just received, hasn't started yet.    Patient is not experiencing side effects.  Blood sugar monitoring: fasting and before/after dinner. Ranges (patient reported):  216-260s. None around 130.   Eye exam: due   Foot exam: due  Diet/Exercise: large icee once in a while. 4-5 cans of Pepsi per day.     Hemoglobin A1C   Date Value Ref Range Status   05/17/2023 8.8 (H) 0.0 - 5.6 % Final   03/15/2021 7.2 (H) 0 - 5.6 % Final     Comment:     Normal <5.7% Prediabetes 5.7-6.4%  Diabetes 6.5% or higher - adopted from ADA   consensus guidelines.         Afib/Hypertension:   Eliquis 5 mg twice daily -  2 pills left. Would like to switch to warfarin since it's cheaper.   lisinopril 5 mg daily  metoprolol tartrate 50mg twice daily    Patient reports no current concerns of bruising or bleeding.   Patient reports no current medication side effects.   Patient does not self-monitor blood pressure.  [id number] = 3    Pain:   Acetaminophen 500-1000 mg every 8 hour as needed   oxycodone 5mg every 6 hours as needed, max 4 tablets per day. Needs refill.   Lyrica 200 mg twice daily     Has been having chronic pain since his spine surgery/infusion. Feels that current medications relieves the pain/calms it down but doesn't make it go away. He reports takes the edge off. No side effects reported.     Hyperlipidemia: simvastatin 20mg daily     Patient reports no significant myalgias or other side effects.     BPH:    finasteride 5mg daily   tamsulosin 0.8mg daily     He reports uncertain if the medication even helps  given has been on for so long. He has worked with urology in the past with considerations of surgery.     Allergies: fluticasone nasal spray 2 sprays      No symptoms concerns.    Constipation: senna-docusate 8.6-50mg tablet as needed for constipation. Hasn't has a BM in about 5 days.       Today's Vitals: /70   Pulse 75   Wt 230 lb 14.4 oz (104.7 kg)   BMI 32.20 kg/m    ----------------    I spent 45 minutes with this patient today. All changes were made via collaborative practice agreement with Dayana Hopson MD. A copy of the visit note was provided to the patient's provider(s).    A summary of these recommendations was given to the patient.    Oriana Bueno, PharmD, AAHIVP  Medication Therapy Management Pharmacist      Medication Therapy Recommendations  Atrial fibrillation with RVR (H)    Current Medication: apixaban ANTICOAGULANT (ELIQUIS ANTICOAGULANT) 5 MG tablet (Discontinued)   Rationale: Cannot afford medication product - Cost - Adherence   Recommendation: Change Medication - WARFARIN SODIUM   Status: Accepted per CPA         Constipation, unspecified constipation type    Current Medication: senna-docusate (SENOKOT-S/PERICOLACE) 8.6-50 MG tablet   Rationale: Synergistic therapy - Needs additional medication therapy - Indication   Recommendation: Start Medication - polyethylene glycol 17 g packet   Status: Accepted per CPA

## 2023-08-04 ENCOUNTER — TELEPHONE (OUTPATIENT)
Dept: ANTICOAGULATION | Facility: CLINIC | Age: 68
End: 2023-08-04
Payer: COMMERCIAL

## 2023-08-04 ENCOUNTER — TELEPHONE (OUTPATIENT)
Dept: FAMILY MEDICINE | Facility: CLINIC | Age: 68
End: 2023-08-04
Payer: COMMERCIAL

## 2023-08-04 ENCOUNTER — OFFICE VISIT (OUTPATIENT)
Dept: PHARMACY | Facility: CLINIC | Age: 68
End: 2023-08-04
Payer: COMMERCIAL

## 2023-08-04 VITALS
BODY MASS INDEX: 32.2 KG/M2 | SYSTOLIC BLOOD PRESSURE: 111 MMHG | DIASTOLIC BLOOD PRESSURE: 70 MMHG | WEIGHT: 230.9 LBS | HEART RATE: 75 BPM

## 2023-08-04 DIAGNOSIS — M54.50 CHRONIC BILATERAL LOW BACK PAIN WITHOUT SCIATICA: ICD-10-CM

## 2023-08-04 DIAGNOSIS — G89.29 CHRONIC BILATERAL LOW BACK PAIN WITH BILATERAL SCIATICA: ICD-10-CM

## 2023-08-04 DIAGNOSIS — N40.1 BENIGN PROSTATIC HYPERPLASIA WITH URINARY FREQUENCY: ICD-10-CM

## 2023-08-04 DIAGNOSIS — K59.00 CONSTIPATION, UNSPECIFIED CONSTIPATION TYPE: ICD-10-CM

## 2023-08-04 DIAGNOSIS — M54.42 CHRONIC BILATERAL LOW BACK PAIN WITH BILATERAL SCIATICA: ICD-10-CM

## 2023-08-04 DIAGNOSIS — R35.0 BENIGN PROSTATIC HYPERPLASIA WITH URINARY FREQUENCY: ICD-10-CM

## 2023-08-04 DIAGNOSIS — E78.5 HYPERLIPIDEMIA LDL GOAL <100: ICD-10-CM

## 2023-08-04 DIAGNOSIS — I48.91 ATRIAL FIBRILLATION WITH RVR (H): Primary | ICD-10-CM

## 2023-08-04 DIAGNOSIS — I48.91 ATRIAL FIBRILLATION WITH RVR (H): ICD-10-CM

## 2023-08-04 DIAGNOSIS — J31.0 CHRONIC RHINITIS: ICD-10-CM

## 2023-08-04 DIAGNOSIS — F11.90 CHRONIC, CONTINUOUS USE OF OPIOIDS: ICD-10-CM

## 2023-08-04 DIAGNOSIS — E11.9 TYPE 2 DIABETES MELLITUS WITHOUT COMPLICATION (H): ICD-10-CM

## 2023-08-04 DIAGNOSIS — G89.29 CHRONIC BILATERAL LOW BACK PAIN WITHOUT SCIATICA: ICD-10-CM

## 2023-08-04 DIAGNOSIS — K59.01 SLOW TRANSIT CONSTIPATION: ICD-10-CM

## 2023-08-04 DIAGNOSIS — M54.41 CHRONIC BILATERAL LOW BACK PAIN WITH BILATERAL SCIATICA: ICD-10-CM

## 2023-08-04 DIAGNOSIS — M96.1 FAILED BACK SYNDROME OF LUMBAR SPINE: ICD-10-CM

## 2023-08-04 DIAGNOSIS — E11.9 TYPE 2 DIABETES MELLITUS WITHOUT COMPLICATION, WITHOUT LONG-TERM CURRENT USE OF INSULIN (H): Primary | ICD-10-CM

## 2023-08-04 PROCEDURE — 99207 PR NO CHARGE LOS: CPT | Performed by: PHARMACIST

## 2023-08-04 RX ORDER — AMOXICILLIN 250 MG
1-2 CAPSULE ORAL DAILY PRN
Qty: 60 TABLET | Refills: 3 | Status: SHIPPED | OUTPATIENT
Start: 2023-08-04 | End: 2023-08-17

## 2023-08-04 RX ORDER — POLYETHYLENE GLYCOL 3350 17 G/17G
17 POWDER, FOR SOLUTION ORAL DAILY
Qty: 510 G | Refills: 3 | Status: SHIPPED | OUTPATIENT
Start: 2023-08-04 | End: 2023-08-17

## 2023-08-04 RX ORDER — SIMVASTATIN 20 MG
20 TABLET ORAL AT BEDTIME
Qty: 90 TABLET | Refills: 3 | Status: SHIPPED | OUTPATIENT
Start: 2023-08-04 | End: 2024-01-01

## 2023-08-04 RX ORDER — LANCETS 33 GAUGE
1 EACH MISCELLANEOUS DAILY
Qty: 100 EACH | Refills: 3 | Status: SHIPPED | OUTPATIENT
Start: 2023-08-04 | End: 2024-01-01

## 2023-08-04 RX ORDER — BLOOD SUGAR DIAGNOSTIC
STRIP MISCELLANEOUS
Qty: 100 STRIP | Refills: 2 | Status: SHIPPED | OUTPATIENT
Start: 2023-08-04 | End: 2024-01-01

## 2023-08-04 RX ORDER — TAMSULOSIN HYDROCHLORIDE 0.4 MG/1
0.8 CAPSULE ORAL DAILY
Qty: 180 CAPSULE | Refills: 0 | Status: SHIPPED | OUTPATIENT
Start: 2023-08-04 | End: 2023-01-01

## 2023-08-04 RX ORDER — WARFARIN SODIUM 5 MG/1
TABLET ORAL
Qty: 90 TABLET | Refills: 1 | Status: SHIPPED | OUTPATIENT
Start: 2023-08-04 | End: 2023-01-01

## 2023-08-04 RX ORDER — FINASTERIDE 5 MG/1
TABLET, FILM COATED ORAL
Qty: 90 TABLET | Refills: 0 | Status: SHIPPED | OUTPATIENT
Start: 2023-08-04 | End: 2023-01-01

## 2023-08-04 RX ORDER — FLUTICASONE PROPIONATE 50 MCG
SPRAY, SUSPENSION (ML) NASAL
Qty: 16 G | Refills: 0 | Status: SHIPPED | OUTPATIENT
Start: 2023-08-04 | End: 2023-09-01

## 2023-08-04 NOTE — Clinical Note
Patient requested refill of oxycodone (due on the 11th I believe) and Lyrica sent to  pharmacy (he wants to get all meds from our pharmacy)

## 2023-08-04 NOTE — PATIENT INSTRUCTIONS
"Recommendations from today's MTM visit:                                                      Stop Eliquis, refer to warfarin clinic. Okay to take aspirin 81 mg once daily once you run out of Eliquis.   Start taking Farxiga 5 mg once daily   Start Miralax 17 g (1 scoop) once daily for constipation   Continue senna/docusate 1-2 tablets daily   Reduce Pepsi to 2 cans per day - try flavoring water with [place] instead           Follow-up: 2 weeks with Laurence     It was great speaking with you today.  I value your experience and would be very thankful for your time in providing feedback in our clinic survey. In the next few days, you may receive an email or text message from [employer] with a link to a survey related to your  clinical pharmacist.\"     To schedule another MTM appointment, please call the clinic directly or you may call the MTM scheduling line at [phone] or toll-free at [phone].     My Clinical Pharmacist's contact information:                                                      Please feel free to contact me with any questions or concerns you have.      Oriana Bueno, PharmD, AAHIVP  Medication Therapy Management Pharmacist   August 4, 2023    "

## 2023-08-04 NOTE — TELEPHONE ENCOUNTER
Huddled with Dr. Danika Hopson:    -Call INR clinic to determine if anything further is needed to make change from eliquis to warfarin and INR referral.     Called central INR and left a message to call back to JARROD RN at [phone] if anything further is needed.       HUMBERTO Mccarthy (Patient Advocate Liaison)  [facility]  [phone]

## 2023-08-07 RX ORDER — OXYCODONE HYDROCHLORIDE 5 MG/1
5 TABLET ORAL EVERY 6 HOURS PRN
Qty: 120 TABLET | Refills: 0 | Status: SHIPPED | OUTPATIENT
Start: 2023-08-11 | End: 2023-09-06

## 2023-08-07 NOTE — TELEPHONE ENCOUNTER
Medication Question or Refill    Contacts         Type Contact Phone/Fax    08/04/2023 03:57 PM CDT Phone (Incoming) [facility] [phone]     [place]    08/04/2023 05:21 PM CDT Phone (Outgoing) Layo Mcgrath (Self) [phone]    Left Message -  Transfer to [place] [phone] or route to [facility]    08/07/2023 07:12 AM CDT Phone (Incoming) Layo Mcgrath (Self) [phone] (M)            What medication are you calling about (include dose and sig)?: Oxycodone    Preferred Pharmacy:   [facility] [address] [place], [place] - [address]  [address]  [facility] [address]  Phone: [phone] Fax: [phone]    [facility] - [place], [place] - [address]  [address]  [address]  [facility] [address]  Phone: [phone] Fax: [phone]      Controlled Substance Agreement on file:   CSA -- Patient Level:     [Media Unavailable] Controlled Substance Agreement - Opioid - Scan on 5/17/2023  1:37 PM: Opioid       Who prescribed the medication?: April Coming-Hay    Do you need a refill? Yes    When did you use the medication last? NA    Patient offered an appointment? No    Do you have any questions or concerns?  No      Okay to leave a detailed message?: Yes at Cell number on file:    Telephone Information:   Mobile [phone]       
"ANTICOAGULATION  MANAGEMENT: NEW REFERRAL      SUBJECTIVE/OBJECTIVE     Toby Mcgrath, a 68 year old male  is newly referred to [facility].    Anticoagulation:    Previously on warfarin: No, has been on Apixaban (Eliquis); transitioning to warfarin.  Warfarin initiation date (approximate): tomorrow - 8/5/23   Indication(s): Atrial Fibrillation   Goal Range:  2.0-3.0   Anticoagulation Bridge/Overlap:  Per 8/4/23 MTM note, has 2 tablets of eliquis left that he will take tomorrow.    Referring provider: from PCP  Results:        No results for input(s): INR, [id number], F2, ALMWH in the last 168 hours.    Wt Readings from Last 2 Encounters:   08/04/23 104.7 kg (230 lb 14.4 oz)   07/06/23 107.9 kg (237 lb 12.8 oz)      Estimated body mass index is 32.2 kg/m  as calculated from the following:    Height as of 5/17/23: 1.803 m (5' 11\").    Weight as of an earlier encounter on 8/4/23: 104.7 kg (230 lb 14.4 oz).  Lab Results   Component Value Date    AST 39 02/15/2023     Lab Results   Component Value Date    CR 0.99 02/15/2023     Estimated Creatinine Clearance: 85.5 mL/min (based on SCr of 0.99 mg/dL).    ASSESSMENT     Goal INR 2-3, standard for indication(s) above  Starting dose per Sara Lan, PharmD. 7.5 mg x 2, then 5 mg daily    PLAN     Dosing Instructions:  Finish eliquis tomorrow. Take 7.5 mg warfarin x 2, then begin 5 mg daily  with INR in 3 days           I left a detailed message for Layo that I have sent in an rx for warfarin 5 mg to [facility] pharmacy.  He should follow dosing above. Call [facility] to review new start information and schedule INR for Monday/ Tuesday.  calendar updated.    Contact [phone]  to schedule and with any changes, questions or concerns.     Standing orders placed in Epic: Point of Care INR (Lab 5000)    Plan made with [facility] Pharmacist Sara Benson RN  Anticoagulation Clinic  8/4/2023                  "
Called pt, too early, pt due Friday, discussed at length, pt informs no one every communicated that should request 2-3 days before refill due, will post phone for processing in 2 days, wants to use our pharmacy    Camille Hays RN, BSN  [facility]    
Connie from [facility] requesting a call back regarding when the patient is supposed to check INR.    Will route to [facility] for follow up.    Call back number   
negative...

## 2023-08-09 ENCOUNTER — TELEPHONE (OUTPATIENT)
Dept: ANTICOAGULATION | Facility: CLINIC | Age: 68
End: 2023-08-09

## 2023-08-09 ENCOUNTER — ANTICOAGULATION THERAPY VISIT (OUTPATIENT)
Dept: ANTICOAGULATION | Facility: CLINIC | Age: 68
End: 2023-08-09

## 2023-08-09 ENCOUNTER — LAB (OUTPATIENT)
Dept: LAB | Facility: CLINIC | Age: 68
End: 2023-08-09
Payer: COMMERCIAL

## 2023-08-09 DIAGNOSIS — I48.91 ATRIAL FIBRILLATION WITH RVR (H): ICD-10-CM

## 2023-08-09 DIAGNOSIS — E11.9 TYPE 2 DIABETES MELLITUS WITHOUT COMPLICATION, WITHOUT LONG-TERM CURRENT USE OF INSULIN (H): ICD-10-CM

## 2023-08-09 DIAGNOSIS — I48.91 ATRIAL FIBRILLATION WITH RVR (H): Primary | ICD-10-CM

## 2023-08-09 LAB
HBA1C MFR BLD: 9.2 % (ref 0–5.6)
INR BLD: 1 (ref 0.9–1.1)

## 2023-08-09 PROCEDURE — 83036 HEMOGLOBIN GLYCOSYLATED A1C: CPT

## 2023-08-09 PROCEDURE — 36415 COLL VENOUS BLD VENIPUNCTURE: CPT

## 2023-08-09 PROCEDURE — 85610 PROTHROMBIN TIME: CPT

## 2023-08-09 NOTE — PROGRESS NOTES
"ANTICOAGULATION  MANAGEMENT: NEW REFERRAL      SUBJECTIVE/OBJECTIVE     Toby Mcgrath, a 68 year old male  is newly referred to [facility].    Anticoagulation:    Previously on warfarin: No, has been on Apixaban (Eliquis); transitioning to warfarin.  Warfarin initiation date (approximate): today, 23. Patient states he just picked up the Jantoven today.   Indication(s): Atrial Fibrillation   Goal Range: 2.0-3.0   Anticoagulation Bridge/Overlap: No   Referring provider: from PCP    General Dietary/Social Hx:    Typical vitamin K intake: low; variable --patient reports that he eats broccoli or eats 1-2 times per month.    Other dietary considerations: None     Social History:   Social History     Tobacco Use    Smoking status: Former     Packs/day: 1.00     Years: 40.00     Pack years: 40.00     Types: Cigarettes     Quit date:      Years since quittin.6    Smokeless tobacco: Former     Quit date: 2013   Vaping Use    Vaping Use: Never used   Substance Use Topics    Alcohol use: Yes     Alcohol/week: 0.0 standard drinks of alcohol     Comment: occ.    Drug use: No       In the past 2 weeks, patient estimates taking medications as instructed % of time: patient states he has a good routine taking his medications but forgets his evening pills \"once in a while\". Patient expressed frustration taking so many medications with generic and brand names, he states he gets confused. I encouraged him to write on his pill bottles the indication for taking and offered to help him or to discuss with MTM PharmD that he has established with.    Results:        Recent labs: (last 7 days)     23  1345   INR 1.0       Wt Readings from Last 2 Encounters:   23 104.7 kg (230 lb 14.4 oz)   23 107.9 kg (237 lb 12.8 oz)      Estimated body mass index is 32.2 kg/m  as calculated from the following:    Height as of 23: 1.803 m (5' 11\").    Weight as of 23: 104.7 kg (230 " lb 14.4 oz).  Lab Results   Component Value Date    AST 39 02/15/2023     Lab Results   Component Value Date    CR 0.99 02/15/2023     Estimated Creatinine Clearance: 85.5 mL/min (based on SCr of 0.99 mg/dL).    ASSESSMENT     Goal INR 2-3, standard for indication(s) above  Starting warfarin dose is appropriate for patient's anticipated sensitivity to warfarin    PLAN     Dosing Instructions: Take 2 booster doses then 1 tablet (5mg) everyday with INR in 5 days       Summary  As of 8/9/2023      Full warfarin instructions:  8/9: 7.5 mg; 8/10: 7.5 mg; Otherwise 5 mg every day   Next INR check:  8/14/2023               Education provided:   Taking warfarin: purpose of warfarin and how it works, take warfarin at same time each day; preferably in the evening, prescribed tablet strength and color, importance of following [facility] instructions vs instructions on the prescription bottle, and Importance of taking warfarin as instructed  Goal range and lab monitoring: goal range and significance of current result, Importance of therapeutic range, Importance of following up at instructed interval, and frequency of lab work when starting warfarin and importance of following up when instructed (extends after stability established)  Dietary considerations: importance of consistent vitamin K intake, impact of vitamin K foods on INR, and vitamin K content of foods  Symptom monitoring: monitoring for bleeding signs and symptoms, monitoring for clotting signs and symptoms, monitoring for stroke signs and symptoms, and when to seek medical attention/emergency care  Contact [phone]  with any changes, questions or concerns.     Education still needed:   Symptom monitoring: if you hit your head or have a bad fall seek emergency care and travel related clotting risk and prevention      Telephone call with Layo who agrees to plan and repeated back plan correctly    Lab visit scheduled    Standing orders placed in Epic: Point of Care INR  (Lab 5000)    Plan made per [facility] anticoagulation protocol    Arianne Cotton, RN  Anticoagulation Clinic  8/9/2023

## 2023-08-09 NOTE — TELEPHONE ENCOUNTER
Called and spoke with patient.  He transitioned from Eliquis to warfarin within the last week and has not had an INR check since starting warfarin.  Assisted him to schedule a lab appointment today.

## 2023-08-14 ENCOUNTER — LAB (OUTPATIENT)
Dept: LAB | Facility: CLINIC | Age: 68
End: 2023-08-14
Payer: COMMERCIAL

## 2023-08-14 ENCOUNTER — TELEPHONE (OUTPATIENT)
Dept: FAMILY MEDICINE | Facility: CLINIC | Age: 68
End: 2023-08-14

## 2023-08-14 ENCOUNTER — ANTICOAGULATION THERAPY VISIT (OUTPATIENT)
Dept: ANTICOAGULATION | Facility: CLINIC | Age: 68
End: 2023-08-14

## 2023-08-14 DIAGNOSIS — I48.91 ATRIAL FIBRILLATION WITH RVR (H): ICD-10-CM

## 2023-08-14 LAB — INR BLD: 1.5 (ref 0.9–1.1)

## 2023-08-14 PROCEDURE — 85610 PROTHROMBIN TIME: CPT

## 2023-08-14 PROCEDURE — 36416 COLLJ CAPILLARY BLOOD SPEC: CPT

## 2023-08-14 NOTE — TELEPHONE ENCOUNTER
----- Message from April Gabrielle Hopson MD sent at 8/13/2023  9:27 PM CDT -----  Please call patient and let him know his A1c went up. We will see how the Farxiga goes for him over the next few months.

## 2023-08-14 NOTE — PROGRESS NOTES
ANTICOAGULATION MANAGEMENT     Toby Mcgrath 68 year old male is on warfarin with subtherapeutic INR result. (Goal INR 2.0-3.0)    Recent labs: (last 7 days)     08/14/23  1336   INR 1.5*       ASSESSMENT     Source(s): Chart Review  Previous INR was Subtherapeutic  New start on warfarin day 6  Pt transitioning from eliquis to warfarin  Pt scheduled to see pharmacist on 8/17/23. Left a detailed voicemail message advising pt to recheck INR after that 9:30 am appointment lab scheduled at 10 am.  Also left Jantoven dosing for the next 3 days on voicemail. Pt to call back if questions, updates or concerns.       PLAN     Recommended plan for ongoing change(s) affecting INR     Dosing Instructions: Increase your warfarin dose (14.3% change) with next INR in 3 days       Summary  As of 8/14/2023      Full warfarin instructions:  7.5 mg every Mon, Thu; 5 mg all other days   Next INR check:  8/17/2023               Detailed voice message left for Layo with dosing instructions and follow up date.     Lab visit scheduled    Education provided:   Please call back if any changes to your diet, medications or how you've been taking warfarin  Contact [phone]  with any changes, questions or concerns.     Plan made per [facility] anticoagulation protocol    Dolly Page RN  Anticoagulation Clinic  8/14/2023    _______________________________________________________________________     Anticoagulation Episode Summary       Current INR goal:  2.0-3.0   TTR:  --   Target end date:  Indefinite   Send INR reminders to:  [facility]    Indications    Atrial fibrillation with RVR (H) [I48.91]             Comments:               Anticoagulation Care Providers       Provider Role Specialty Phone number    Dayana Le MD Referring Family Medicine [phone]

## 2023-08-17 ENCOUNTER — ANTICOAGULATION THERAPY VISIT (OUTPATIENT)
Dept: ANTICOAGULATION | Facility: CLINIC | Age: 68
End: 2023-08-17

## 2023-08-17 ENCOUNTER — OFFICE VISIT (OUTPATIENT)
Dept: PHARMACY | Facility: CLINIC | Age: 68
End: 2023-08-17
Payer: COMMERCIAL

## 2023-08-17 ENCOUNTER — LAB (OUTPATIENT)
Dept: LAB | Facility: CLINIC | Age: 68
End: 2023-08-17
Payer: COMMERCIAL

## 2023-08-17 VITALS
WEIGHT: 224.8 LBS | HEART RATE: 86 BPM | SYSTOLIC BLOOD PRESSURE: 111 MMHG | OXYGEN SATURATION: 95 % | DIASTOLIC BLOOD PRESSURE: 71 MMHG | BODY MASS INDEX: 31.35 KG/M2

## 2023-08-17 DIAGNOSIS — I48.91 ATRIAL FIBRILLATION WITH RVR (H): Primary | ICD-10-CM

## 2023-08-17 DIAGNOSIS — M54.50 CHRONIC BILATERAL LOW BACK PAIN WITHOUT SCIATICA: ICD-10-CM

## 2023-08-17 DIAGNOSIS — I10 HYPERTENSION GOAL BP (BLOOD PRESSURE) < 140/90: ICD-10-CM

## 2023-08-17 DIAGNOSIS — K59.01 SLOW TRANSIT CONSTIPATION: ICD-10-CM

## 2023-08-17 DIAGNOSIS — I48.91 ATRIAL FIBRILLATION WITH RVR (H): ICD-10-CM

## 2023-08-17 DIAGNOSIS — G89.29 CHRONIC BILATERAL LOW BACK PAIN WITHOUT SCIATICA: ICD-10-CM

## 2023-08-17 DIAGNOSIS — E11.9 TYPE 2 DIABETES MELLITUS WITHOUT COMPLICATION, WITHOUT LONG-TERM CURRENT USE OF INSULIN (H): Primary | ICD-10-CM

## 2023-08-17 DIAGNOSIS — E11.9 TYPE 2 DIABETES MELLITUS WITHOUT COMPLICATION, WITHOUT LONG-TERM CURRENT USE OF INSULIN (H): ICD-10-CM

## 2023-08-17 DIAGNOSIS — K21.00 GASTROESOPHAGEAL REFLUX DISEASE WITH ESOPHAGITIS WITHOUT HEMORRHAGE: ICD-10-CM

## 2023-08-17 LAB
ANION GAP SERPL CALCULATED.3IONS-SCNC: 12 MMOL/L (ref 7–15)
BUN SERPL-MCNC: 8.9 MG/DL (ref 8–23)
CALCIUM SERPL-MCNC: 9.5 MG/DL (ref 8.8–10.2)
CHLORIDE SERPL-SCNC: 104 MMOL/L (ref 98–107)
CREAT SERPL-MCNC: 1.08 MG/DL (ref 0.67–1.17)
DEPRECATED HCO3 PLAS-SCNC: 25 MMOL/L (ref 22–29)
GFR SERPL CREATININE-BSD FRML MDRD: 74 ML/MIN/1.73M2
GLUCOSE SERPL-MCNC: 310 MG/DL (ref 70–99)
INR BLD: 1.6 (ref 0.9–1.1)
POTASSIUM SERPL-SCNC: 4.8 MMOL/L (ref 3.4–5.3)
SODIUM SERPL-SCNC: 141 MMOL/L (ref 136–145)

## 2023-08-17 PROCEDURE — 80048 BASIC METABOLIC PNL TOTAL CA: CPT

## 2023-08-17 PROCEDURE — 85610 PROTHROMBIN TIME: CPT

## 2023-08-17 PROCEDURE — 99207 PR NO CHARGE LOS: CPT | Performed by: PHARMACIST

## 2023-08-17 PROCEDURE — 36415 COLL VENOUS BLD VENIPUNCTURE: CPT

## 2023-08-17 RX ORDER — GLIPIZIDE 5 MG/1
5 TABLET, FILM COATED, EXTENDED RELEASE ORAL DAILY
Status: CANCELLED | OUTPATIENT
Start: 2023-08-17

## 2023-08-17 RX ORDER — AMOXICILLIN 250 MG
2 CAPSULE ORAL DAILY
Qty: 180 TABLET | Refills: 3 | Status: SHIPPED | OUTPATIENT
Start: 2023-08-17 | End: 2023-01-01

## 2023-08-17 RX ORDER — POLYETHYLENE GLYCOL 3350 17 G/17G
POWDER, FOR SOLUTION ORAL
Qty: 510 G | Refills: 0 | Status: ON HOLD | OUTPATIENT
Start: 2023-08-17

## 2023-08-17 NOTE — PATIENT INSTRUCTIONS
"Recommendations from today's MTM visit:                                                      Try diet sweet tea.     Take your medications every check out this list and let Laurence know if anything is missing.    If you labs look good, Laurence will call you about increase the Farxiga/dapagliflozin (medicine that makes you pee the blood sugars out) from 5 mg to 10mg.     Ok to take additional omeprazole if the heart burn is really bad. You can up to 2 capsules in a day.     Stop lisinopril blood pressure is too low.    Follow-up:     It was great speaking with you today.  I value your experience and would be very thankful for your time in providing feedback in our clinic survey. In the next few days, you may receive an email or text message from [employer] with a link to a survey related to your  clinical pharmacist.\"     To schedule another MTM appointment, please call the clinic directly or you may call the MTM scheduling line at [phone] or toll-free at [phone].     My Clinical Pharmacist's contact information:                                                      Please feel free to contact me with any questions or concerns you have.      Laurence Kessler, PharmD  Medication Therapy Management Pharmacist  [phone]         Medication List            Accurate as of August 17, 2023 10:04 AM. If you have any questions, ask your nurse or doctor.                START taking these medications          Morning Afternoon Evening Bedtime    polyethylene glycol 17 GM/Dose powder  Also known as: MIRALAX  Mix 1 tablespoon with water by mouth daily as needed constipation  Started by: Laurence Kessler [facility]  Notes to patient: constipation                   CHANGE how you take these medications          Morning Afternoon Evening Bedtime    omeprazole 20 MG DR capsule  Also known as: PriLOSEC  Take 1 capsule (20 mg) by mouth daily If you have symptoms may take additional 1 capsule (20mg). Total up to 40 mg in day.  What " changed: additional instructions  Changed by: Laurence Kessler [facility]  Notes to patient: For heartburn/reflux               senna-docusate 8.6-50 MG tablet  Also known as: SENOKOT-S/PERICOLACE  Take 2 tablets by mouth daily  What changed:   how much to take  when to take this  reasons to take this  Changed by: Laurence Kessler [facility]  Notes to patient: For constipation                     CONTINUE taking these medications          Morning Afternoon Evening Bedtime    acetaminophen 500 MG tablet  Also known as: TYLENOL  Take 500-1,000 mg by mouth every 8 hours as needed for mild pain             dapagliflozin 5 MG Tabs tablet  Also known as: FARXIGA  Take 1 tablet (5 mg) by mouth daily  Notes to patient: For blood sugars/diabetes               DULoxetine 60 MG capsule  Also known as: CYMBALTA  Take 2 capsules (120 mg) by mouth daily  Notes to patient: For mood and anxiety. Can also help with nerve pain.               finasteride 5 MG tablet  Also known as: PROSCAR  TAKE 1 TABLET BY MOUTH DAILY FOR PROSTATE ENLARGEMENT  Notes to patient: For the enlarged prostate.                fluticasone 50 MCG/ACT nasal spray  Also known as: FLONASE  USE 1 TO 2 SPRAYS IN BOTH  NOSTRILS DAILY as needed  Notes to patient: Allergy nose spray             metFORMIN 500 MG 24 hr tablet  Also known as: GLUCOPHAGE XR  Take 4 tablets (2,000 mg) by mouth daily (with dinner)  Notes to patient: For blood sugar/diabetes                metoprolol tartrate 50 MG tablet  Also known as: LOPRESSOR  Take 1 tablet (50 mg) by mouth 2 times daily  Notes to patient: For blood pressure and heart rate control                 OneTouch Delica Lancets 33G Misc  1 Application. daily Use to test blood sugars once daily as directed.  Doctor's comments: Ok to change brand             ONETOUCH VERIO IQ test strip  TEST BLOOD SUGAR ONCE DAILY OR AS DIRECTED  Doctor's comments: Ok to change brand  Generic drug: blood glucose             oxyCODONE 5 MG tablet  Also known as:  ROXICODONE  Take 1 tablet (5 mg) by mouth every 6 hours as needed for severe pain Max #4 per day fill on 11th of each month  Notes to patient: For pain             pregabalin 100 MG capsule  Also known as: LYRICA  Take 2 capsules (200 mg) by mouth 2 times daily  Notes to patient: For nerve pain and can help anxiety                 simvastatin 20 MG tablet  Also known as: ZOCOR  Take 1 tablet (20 mg) by mouth At Bedtime  Notes to patient: For cholesterol               tamsulosin 0.4 MG capsule  Also known as: FLOMAX  Take 2 capsules (0.8 mg) by mouth daily  Notes to patient: For enlarged prostate               tiotropium 2.5 MCG/ACT inhaler  Also known as: SPIRIVA RESPIMAT  Inhale 2 puffs into the lungs daily  Notes to patient: For the lungs, to help improve breathing               warfarin ANTICOAGULANT 5 MG tablet  Also known as: COUMADIN  Take as directed by the anticoagulation clinic. If you are unsure how to take this medication, talk to your nurse or doctor.  Original instructions: 7.5 mg day 1 and day 2, then 5 mg daily or as directed by INR clinic  Notes to patient: For clot prevention. We want the INR blood test to be between 2-3 that is your goal to prevent a clot!                        Where to Get Your Medications        These medications were sent to [facility] [id number] [place] [address]  [address] [facility] [address]      Phone: [phone]   omeprazole 20 MG DR capsule  polyethylene glycol 17 GM/Dose powder  senna-docusate 8.6-50 MG tablet

## 2023-08-17 NOTE — Clinical Note
Adriano Vasques was asking me about medical cannabis, I am not sure if there is a provider in [facility] that does certification? I went over some of the basics with him of what to expect with cannabis.

## 2023-08-17 NOTE — PROGRESS NOTES
ANTICOAGULATION MANAGEMENT     Toby Mcgrath 68 year old male is on warfarin with subtherapeutic INR result. (Goal INR 2.0-3.0)    Recent labs: (last 7 days)     08/17/23  0905   INR 1.6*       ASSESSMENT     Source(s): Chart Review and Patient/Caregiver Call     Warfarin doses taken: Less warfarin taken than planned which may be affecting INR - patient did not receive vm left by [facility] on 8/14/23  Diet: No new diet changes identified  Medication/supplement changes: None noted  New illness, injury, or hospitalization: No  Signs or symptoms of bleeding or clotting: No  Previous result: Subtherapeutic  Additional findings: New start on day 9 of warfarin ~ previously on Eliquis        PLAN     Recommended plan for temporary change(s) affecting INR     Dosing Instructions: Increase your warfarin dose (12.5% change) with next INR in 5 days       Summary  As of 8/17/2023      Full warfarin instructions:  5 mg every Mon, Wed, Sat; 7.5 mg all other days   Next INR check:  8/22/2023               Telephone call with Layo who verbalizes understanding and agrees to plan    Lab visit scheduled    Education provided:   Please call back if any changes to your diet, medications or how you've been taking warfarin  Symptom monitoring: monitoring for bleeding signs and symptoms and monitoring for clotting signs and symptoms    Plan made with [facility] Pharmacist Jessi Valero d/t higher dose increase outside of protocol    Kate Mcdowell, RN  Anticoagulation Clinic  8/17/2023    _______________________________________________________________________     Anticoagulation Episode Summary       Current INR goal:  2.0-3.0   TTR:  0.0 % (3 d)   Target end date:  Indefinite   Send INR reminders to:  [facility]    Indications    Atrial fibrillation with RVR (H) [I48.91]             Comments:               Anticoagulation Care Providers       Provider Role Specialty Phone number    Danika Dayana Hopson MD Referring Family  Medicine [phone]

## 2023-08-17 NOTE — PROGRESS NOTES
Medication Therapy Management (MTM) Encounter    ASSESSMENT:                            Medication Adherence/Access:   He benefits from reinforcement with directions of medication use and indication. Suggest he follow today's medication list as a reference.     Type 2 Diabetes:    Will await basic metabolic panel and if stable, then recommend increase in SGLT2i further.      Afib/Hypertension:   INR recheck today, continue with INR clinic. Subtherapeutic.     GERD:   Stable for now, future may want to consider taper or change to H2RA.      Pain:   Discussed the process of medical cannabis, he would need to be approved for certification.     Constipation:   improved    PLAN:                            Medication Fridge List provided    Encouraged patient to change to unsweetened tea and increase water intake. Monitor his imbalance - he denied it to be dizziness.     Ordered basic metabolic panel for recheck in 1 month after SGLT2i start. --> stable, I sent message to assistant program to help with dose change of Farxiga to 10mg daily.     Patient to ask Dr. Danika Hopson about medical cannabis certification.     Provided patient with Matter box before leaving and placing a CC referral for food resource.    Follow-up: 1 month after start higher Farxiga dose.    SUBJECTIVE/OBJECTIVE:                          Layo Mcgrath is a 68 year old male coming in for a follow-up visit from 8/4/2023 with Lloyd AlcazarD.      Reason for visit: follow-up on diabetes and general med check. He reports was confused with his medications but called in and someone talked to him which helped.    Allergies/ADRs: Reviewed in chart  Past Medical History: Reviewed in chart  Tobacco: He reports that he quit smoking about 12 years ago. His smoking use included cigarettes. He has a 40.00 pack-year smoking history. He quit smokeless tobacco use about 10 years ago.  Alcohol: not currently using  Social:   He has trouble with finances and food  resource. Not always able to afford food so may skip meals.    Medication Adherence/Access:   Medication barriers:   Working with [email or web address] assistance program to help with the patient assistance program for duloxetine, dapagliflozin, Spiriva and Elliquis.   Approved for Farxiga, duloxetine and Spriva already.  Eliquis - patient has not met out of pocket expense yet to qualify for application therefore changed to warfarin at last medication therapy management appointment.  Patient has already used and approved for $500 assistance Fund in 2023.  Using [facility].     Type 2 Diabetes:    metformin XR 2000mg daily  Farxiga 5 mg daily      Patient is not experiencing side effects but further discussion has felt more imbalanced intermittently.  Blood sugar monitoring: night time, usually before dinner. Ranges (patient reported): 260's   Diet: has been challenging to avoid pop as other family members like to drink it but has been switching over to sweet tea that he buys from the store. The sweet tea is regular not unsweetened. He has financial barrier for adequate food resources.  Eye exam: due (cost barrier)  Foot exam: due     Afib/Hypertension:   Warfarin as directed by INR clinic 5 mg earlier this morning  lisinopril 5 mg daily  metoprolol tartrate 50mg twice daily     Patient reports no current concerns of bruising or bleeding.   Patient reports side effects: somewhat more off balance in the afternoon. He had one episode when he bent down felt more off.   Patient does not self-monitor blood pressure.  [id number] = 3    GERD:   Prilosec (omeprazole) 20mg daily     Patient feels that current regimen is effective but sometimes gets very severe breakthough and has to drink milk to relieve the pain.     Pain:   Acetaminophen 500-1000 mg every 8 hour as needed   oxycodone 5mg every 6 hours as needed, max 4 tablets per day  Lyrica 200 mg twice daily   Duloxetine 120 mg in the morning      Has been having chronic  pain since his spine surgery/infusion. Feels that current medications relieves the pain/calms it down but doesn't make it go away. He reports takes the edge off. No side effects reported but wants to come off Lyrica because he notices if he misses a dose gets very sick. He reports seldom missing but does not like that happens. Has been talking to a friend who use to take Lyrica and gabapentin but was changed in medical cannabis. He is interested in this.     Constipation:   senna-docusate 8.6-50mg tablet, take 2 tablets daily   Miralax 1 tablespoon as needed only      Normal bowel movements right now. The Miralax works really well if constipated. Does not want to take daily as that may cause loose stools.        Today's Vitals: /71   Pulse 86   Wt 224 lb 12.8 oz (102 kg)   SpO2 95%   BMI 31.35 kg/m    ----------------      I spent 30 minutes with this patient today. All changes were made via collaborative practice agreement with Dayana Hopson MD. A copy of the visit note was provided to the patient's provider(s).    A summary of these recommendations was given to the patient.    Laurence Kessler, PharmD  Medication Therapy Management Pharmacist  [phone]     Medication Therapy Recommendations  Type 2 diabetes mellitus without complication (H)    Current Medication: dapagliflozin (FARXIGA) 10 MG TABS tablet   Rationale: Dose too low - Dosage too low - Effectiveness   Recommendation: Increase Dose   Status: Accepted per CPA

## 2023-08-19 DIAGNOSIS — E11.42 DIABETIC POLYNEUROPATHY ASSOCIATED WITH TYPE 2 DIABETES MELLITUS (H): ICD-10-CM

## 2023-08-19 DIAGNOSIS — I10 HYPERTENSION GOAL BP (BLOOD PRESSURE) < 140/90: ICD-10-CM

## 2023-08-21 RX ORDER — LISINOPRIL 5 MG/1
5 TABLET ORAL DAILY
Qty: 100 TABLET | Refills: 2 | Status: SHIPPED | OUTPATIENT
Start: 2023-08-21 | End: 2023-01-01

## 2023-08-22 ENCOUNTER — LAB (OUTPATIENT)
Dept: LAB | Facility: CLINIC | Age: 68
End: 2023-08-22
Payer: COMMERCIAL

## 2023-08-22 ENCOUNTER — ANTICOAGULATION THERAPY VISIT (OUTPATIENT)
Dept: ANTICOAGULATION | Facility: CLINIC | Age: 68
End: 2023-08-22

## 2023-08-22 DIAGNOSIS — I48.91 ATRIAL FIBRILLATION WITH RVR (H): ICD-10-CM

## 2023-08-22 DIAGNOSIS — I48.91 ATRIAL FIBRILLATION WITH RVR (H): Primary | ICD-10-CM

## 2023-08-22 LAB — INR BLD: 2.7 (ref 0.9–1.1)

## 2023-08-22 PROCEDURE — 85610 PROTHROMBIN TIME: CPT

## 2023-08-22 PROCEDURE — 36416 COLLJ CAPILLARY BLOOD SPEC: CPT

## 2023-08-22 NOTE — PROGRESS NOTES
ANTICOAGULATION MANAGEMENT     Toby Mcgrath 68 year old male is on warfarin with therapeutic INR result. (Goal INR 2.0-3.0)    Recent labs: (last 7 days)     08/22/23  1257   INR 2.7*       ASSESSMENT     Source(s): Chart Review and Patient/Caregiver Call     Warfarin doses taken: Warfarin taken differently, but did not change total weekly dose  Diet: No new diet changes identified  Medication/supplement changes: None noted  New illness, injury, or hospitalization: No  Signs or symptoms of bleeding or clotting: No  Previous result: Subtherapeutic.  INR increased significantly since last INR check, so warfarin maintenance dose was lowered today.  Additional findings: New start on day 14 of warfarin.  Transitioning from Eliquis.       PLAN     Recommended plan for no diet, medication or health factor changes affecting INR     Dosing Instructions: decrease your warfarin dose (5.6% change) with next INR in 3 days       Summary  As of 8/22/2023      Full warfarin instructions:  7.5 mg every Sun, Wed, Fri; 5 mg all other days   Next INR check:  8/25/2023               Telephone call with Layo who agrees to plan and repeated back plan correctly    Lab visit scheduled    Education provided:   Please call back if any changes to your diet, medications or how you've been taking warfarin  Goal range and lab monitoring: goal range and significance of current result and frequency of lab work when starting warfarin and importance of following up when instructed (extends after stability established)    Plan made with [facility] Pharmacist Jessi Mota, RN  Anticoagulation Clinic  8/22/2023    _______________________________________________________________________     Anticoagulation Episode Summary       Current INR goal:  2.0-3.0   TTR:  38.5 % (1.1 wk)   Target end date:  Indefinite   Send INR reminders to:  [facility]    Indications    Atrial fibrillation with RVR (H) [I48.91]             Comments:                Anticoagulation Care Providers       Provider Role Specialty Phone number    Coming Dayana Hopson MD Referring Family Medicine [phone]

## 2023-08-23 RX ORDER — DAPAGLIFLOZIN 10 MG/1
10 TABLET, FILM COATED ORAL DAILY
Start: 2023-08-23 | End: 2024-01-01

## 2023-08-23 NOTE — TELEPHONE ENCOUNTER
Patient had 1 month follow-up I am recommending we increase the Farxiga from 5 mg to 10 mg. Routing to assistance program for help with dose adjustment.    Thank you

## 2023-08-24 ENCOUNTER — PATIENT OUTREACH (OUTPATIENT)
Dept: CARE COORDINATION | Facility: CLINIC | Age: 68
End: 2023-08-24
Payer: COMMERCIAL

## 2023-08-24 DIAGNOSIS — E11.42 TYPE 2 DIABETES MELLITUS WITH DIABETIC POLYNEUROPATHY, WITHOUT LONG-TERM CURRENT USE OF INSULIN (H): ICD-10-CM

## 2023-08-24 NOTE — TELEPHONE ENCOUNTER
Layo is currently enrolled with the [employer] assistance program for his Farxiga.    Laurence [facility]co [facility] has informed me his Farxiga has been increased to 10mg daily.  [employer] requires a hard copy, hand signed brand name script for this dose change fill.    Please hand sign a BRAND name, hard copy script for:       FARXIGA 10mg    Please send the HARD COPY script to me at--   I must submit this dose change script to [employer].    via interoffice mail  F[employer] Zenaida Kim     or via [place] mail  at:   [facility]  Zenaida Plasencia  [address] [place]  [address]    Thanks so much for your help!    Zenaida Plasencia  Prescription Assistance Supervisor  Pharmacy Assistance

## 2023-08-24 NOTE — TELEPHONE ENCOUNTER
I will get the Farxiga dose changed to 10mg. [facility] will require a new script.    I will enter a refill encounter for this script.    Thanks so much for your help!    Zenaida Plasencia  Prescription Assistance Supervisor  Pharmacy Assistance

## 2023-08-24 NOTE — PROGRESS NOTES
Clinic Care Coordination Contact  [facility]/Voicemail       Clinical Data: Care Coordinator Outreach  Outreach attempted x 1.  Left message on patient's voicemail with call back information and requested return call.    Plan: Care Coordinator will try to reach patient again in 1-2 business days.    YRN Quiñonez, B.S. [facility] Care Coordination  [facility]:  [place], [place] and [place]  [phone]  [email or web address]

## 2023-08-25 ENCOUNTER — LAB (OUTPATIENT)
Dept: LAB | Facility: CLINIC | Age: 68
End: 2023-08-25
Payer: COMMERCIAL

## 2023-08-25 ENCOUNTER — ANTICOAGULATION THERAPY VISIT (OUTPATIENT)
Dept: ANTICOAGULATION | Facility: CLINIC | Age: 68
End: 2023-08-25

## 2023-08-25 ENCOUNTER — PATIENT OUTREACH (OUTPATIENT)
Dept: CARE COORDINATION | Facility: CLINIC | Age: 68
End: 2023-08-25

## 2023-08-25 DIAGNOSIS — I48.91 ATRIAL FIBRILLATION WITH RVR (H): ICD-10-CM

## 2023-08-25 DIAGNOSIS — I48.91 ATRIAL FIBRILLATION WITH RVR (H): Primary | ICD-10-CM

## 2023-08-25 LAB — INR BLD: 2.6 (ref 0.9–1.1)

## 2023-08-25 PROCEDURE — 85610 PROTHROMBIN TIME: CPT

## 2023-08-25 PROCEDURE — 36416 COLLJ CAPILLARY BLOOD SPEC: CPT

## 2023-08-25 RX ORDER — DAPAGLIFLOZIN 10 MG/1
10 TABLET, FILM COATED ORAL DAILY
Qty: 90 TABLET | Refills: 1 | OUTPATIENT
Start: 2023-08-25

## 2023-08-25 NOTE — TELEPHONE ENCOUNTER
Routing refill request to provider for review/approval because:  Hard copy prescription to be signed to send.    Rigoberto Taylor RN on 8/25/2023 at 11:53 AM

## 2023-08-25 NOTE — PROGRESS NOTES
ANTICOAGULATION MANAGEMENT     Toby Mcgrath 68 year old male is on warfarin with therapeutic INR result. (Goal INR 2.0-3.0)    Recent labs: (last 7 days)     08/25/23  0955   INR 2.6*       ASSESSMENT     Source(s): Chart Review and Patient/Caregiver Call     Warfarin doses taken: Warfarin taken as instructed  Diet: No new diet changes identified  Medication/supplement changes: None noted  New illness, injury, or hospitalization: No  Signs or symptoms of bleeding or clotting: No  Previous result: Therapeutic last visit; previously outside of goal range  Additional findings: None       PLAN     Recommended plan for no diet, medication or health factor changes affecting INR     Dosing Instructions: Continue your current warfarin dose with next INR in 4 days       Summary  As of 8/25/2023      Full warfarin instructions:  7.5 mg every Sun, Wed, Fri; 5 mg all other days   Next INR check:  8/29/2023               Telephone call with Layo who agrees to plan and repeated back plan correctly    Lab visit scheduled    Education provided:   Contact [phone]  with any changes, questions or concerns.     Plan made per [facility] anticoagulation protocol    Mallorie Celeste RN  Anticoagulation Clinic  8/25/2023    _______________________________________________________________________     Anticoagulation Episode Summary       Current INR goal:  2.0-3.0   TTR:  54.0 % (1.6 wk)   Target end date:  Indefinite   Send INR reminders to:  [facility]    Indications    Atrial fibrillation with RVR (H) [I48.91]             Comments:               Anticoagulation Care Providers       Provider Role Specialty Phone number    Dayana Le MD Referring Family Medicine [phone]

## 2023-08-25 NOTE — PROGRESS NOTES
Clinic Care Coordination Contact  [facility]/Voicemail      Reason for Referral:  Financial Support  Food     Clinical Data: Care Coordinator Outreach  Outreach attempted x 2.  Left message on patient's voicemail with call back information and requested return call.    Plan: Care Coordinator will send care coordination introduction letter with care coordinator contact information and explanation of care coordination services via mail.     Care Coordinator will do no further outreaches at this time.    YRN Davis  Clinic Care Coordination  [facility] Clinics: [place], [place], [place], and [place] [facility]  Phone: [phone]

## 2023-08-25 NOTE — LETTER
[facility] CARE COORDINATION  [address]  [place] [address]    August 25, 2023    Toby Mcgrath  [address]   [place] [address]      Dear Toby,        I am a  clinic community health worker who works with Dayana Hopson MD with the [facility]. I have been trying to reach you recently to introduce Clinic Care Coordination. Below is a description of clinic care coordination and how I can further assist you.       The clinic care coordination team is made up of a registered nurse, , financial resource worker and community health worker who understand the health care system. The goal of clinic care coordination is to help you manage your health and improve access to the health care system. Our team works alongside your provider to assist you in determining your health and social needs. We can help you obtain health care and community resources, providing you with necessary information and education. We can work with you through any barriers and develop a care plan that helps coordinate and strengthen the communication between you and your care team.  Our services are voluntary and are offered without charge to you personally.    Please feel free to contact me with any questions or concerns regarding care coordination and what we can offer.      We are focused on providing you with the highest-quality healthcare experience possible.    Sincerely,     YRN Davis  Clinic Care Coordination  [facility]  Phone: [phone]

## 2023-08-28 RX ORDER — DAPAGLIFLOZIN 10 MG/1
10 TABLET, FILM COATED ORAL DAILY
Qty: 90 TABLET | Refills: 1 | Status: SHIPPED | OUTPATIENT
Start: 2023-08-28 | End: 2023-01-01

## 2023-08-29 ENCOUNTER — LAB (OUTPATIENT)
Dept: LAB | Facility: CLINIC | Age: 68
End: 2023-08-29
Payer: COMMERCIAL

## 2023-08-29 ENCOUNTER — ANTICOAGULATION THERAPY VISIT (OUTPATIENT)
Dept: ANTICOAGULATION | Facility: CLINIC | Age: 68
End: 2023-08-29

## 2023-08-29 DIAGNOSIS — J31.0 CHRONIC RHINITIS: ICD-10-CM

## 2023-08-29 DIAGNOSIS — I48.91 ATRIAL FIBRILLATION WITH RVR (H): ICD-10-CM

## 2023-08-29 DIAGNOSIS — I48.91 ATRIAL FIBRILLATION WITH RVR (H): Primary | ICD-10-CM

## 2023-08-29 LAB — INR BLD: 2.9 (ref 0.9–1.1)

## 2023-08-29 PROCEDURE — 85610 PROTHROMBIN TIME: CPT

## 2023-08-29 PROCEDURE — 36416 COLLJ CAPILLARY BLOOD SPEC: CPT

## 2023-08-29 NOTE — PROGRESS NOTES
ANTICOAGULATION MANAGEMENT     Toby Mcgrath 68 year old male is on warfarin with therapeutic INR result. (Goal INR 2.0-3.0)    Recent labs: (last 7 days)     08/29/23  0953   INR 2.9*       ASSESSMENT     Source(s): Chart Review and Patient/Caregiver Call     Warfarin doses taken: Warfarin taken as instructed  Diet: No new diet changes identified  Medication/supplement changes: None noted  New illness, injury, or hospitalization: No  Signs or symptoms of bleeding or clotting: No  Previous result: Therapeutic last 2(+) visits  Additional findings: None       PLAN     Recommended plan for no diet, medication or health factor changes affecting INR     Dosing Instructions: Continue your current warfarin dose with next INR in 1 week       Summary  As of 8/29/2023      Full warfarin instructions:  7.5 mg every Sun, Wed, Fri; 5 mg all other days   Next INR check:  9/5/2023               Telephone call with Layo who agrees to plan and repeated back plan correctly    Lab visit scheduled    Education provided:   Please call back if any changes to your diet, medications or how you've been taking warfarin    Plan made per [facility] anticoagulation protocol    Elmira Mota RN  Anticoagulation Clinic  8/29/2023    _______________________________________________________________________     Anticoagulation Episode Summary       Current INR goal:  2.0-3.0   TTR:  65.9 % (2.1 wk)   Target end date:  Indefinite   Send INR reminders to:  [facility]    Indications    Atrial fibrillation with RVR (H) [I48.91]             Comments:               Anticoagulation Care Providers       Provider Role Specialty Phone number    Dayana Le MD Referring Family Medicine [phone]

## 2023-09-01 RX ORDER — FLUTICASONE PROPIONATE 50 MCG
SPRAY, SUSPENSION (ML) NASAL
Qty: 16 G | Refills: 1 | Status: SHIPPED | OUTPATIENT
Start: 2023-09-01 | End: 2024-01-01

## 2023-09-01 NOTE — TELEPHONE ENCOUNTER
Prescription approved per [facility] Refill Protocol.    Adriana Silva, Registered Nurse  [facility]

## 2023-09-05 ENCOUNTER — LAB (OUTPATIENT)
Dept: LAB | Facility: CLINIC | Age: 68
End: 2023-09-05
Payer: COMMERCIAL

## 2023-09-05 ENCOUNTER — ANTICOAGULATION THERAPY VISIT (OUTPATIENT)
Dept: ANTICOAGULATION | Facility: CLINIC | Age: 68
End: 2023-09-05

## 2023-09-05 DIAGNOSIS — I48.91 ATRIAL FIBRILLATION WITH RVR (H): Primary | ICD-10-CM

## 2023-09-05 DIAGNOSIS — I48.91 ATRIAL FIBRILLATION WITH RVR (H): ICD-10-CM

## 2023-09-05 LAB — INR BLD: 2.2 (ref 0.9–1.1)

## 2023-09-05 PROCEDURE — 85610 PROTHROMBIN TIME: CPT

## 2023-09-05 PROCEDURE — 36416 COLLJ CAPILLARY BLOOD SPEC: CPT

## 2023-09-05 NOTE — PROGRESS NOTES
ANTICOAGULATION MANAGEMENT     Toby Mcgrath 68 year old male is on warfarin with therapeutic INR result. (Goal INR 2.0-3.0)    Recent labs: (last 7 days)     09/05/23  1058   INR 2.2*       ASSESSMENT     Source(s): Chart Review  Previous INR was Therapeutic last 2(+) visits  Medication, diet, health changes since last INR chart reviewed; none identified    I left a detailed voicemail with the orders reflected in flowsheet. I have also requested a call back if there have been any missed doses, concerns, illness, fever, or if there have been any changes in medications, activity level, or diet         PLAN     Recommended plan for no diet, medication or health factor changes affecting INR     Dosing Instructions: Continue your current warfarin dose with next INR in 2 weeks       Summary  As of 9/5/2023      Full warfarin instructions:  7.5 mg every Sun, Wed, Fri; 5 mg all other days   Next INR check:  9/19/2023               Detailed voice message left for Layo with dosing instructions and follow up date.     Contact [phone]  to schedule and with any changes, questions or concerns.     Education provided:   Please call back if any changes to your diet, medications or how you've been taking warfarin    Plan made per [facility] anticoagulation protocol    Shandra Reese, RN  Anticoagulation Clinic  9/5/2023    _______________________________________________________________________     Anticoagulation Episode Summary       Current INR goal:  2.0-3.0   TTR:  76.6 % (3.1 wk)   Target end date:  Indefinite   Send INR reminders to:  [facility]    Indications    Atrial fibrillation with RVR (H) [I48.91]             Comments:               Anticoagulation Care Providers       Provider Role Specialty Phone number    Dayana Le MD Referring Family Medicine [phone]

## 2023-09-06 DIAGNOSIS — F11.90 CHRONIC, CONTINUOUS USE OF OPIOIDS: ICD-10-CM

## 2023-09-06 DIAGNOSIS — M96.1 FAILED BACK SYNDROME OF LUMBAR SPINE: ICD-10-CM

## 2023-09-06 DIAGNOSIS — G89.29 CHRONIC BILATERAL LOW BACK PAIN WITH BILATERAL SCIATICA: ICD-10-CM

## 2023-09-06 DIAGNOSIS — M54.41 CHRONIC BILATERAL LOW BACK PAIN WITH BILATERAL SCIATICA: ICD-10-CM

## 2023-09-06 DIAGNOSIS — M54.42 CHRONIC BILATERAL LOW BACK PAIN WITH BILATERAL SCIATICA: ICD-10-CM

## 2023-09-06 NOTE — TELEPHONE ENCOUNTER
..  General Call      Reason for Call:  med refill    What are your questions or concerns:  n\a    Date of last appointment with provider: [id number]    Okay to leave a detailed message?: Yes at Cell number on file:    Telephone Information:   Mobile [phone]

## 2023-09-07 ENCOUNTER — TELEPHONE (OUTPATIENT)
Dept: FAMILY MEDICINE | Facility: CLINIC | Age: 68
End: 2023-09-07
Payer: COMMERCIAL

## 2023-09-07 RX ORDER — OXYCODONE HYDROCHLORIDE 5 MG/1
5 TABLET ORAL EVERY 6 HOURS PRN
Qty: 120 TABLET | Refills: 0 | Status: SHIPPED | OUTPATIENT
Start: 2023-09-11 | End: 2023-10-04

## 2023-09-07 NOTE — TELEPHONE ENCOUNTER
Layo is currently enrolled with the [employer] assistance program for Farxiga.     I have submitted the dose increase as requested, to the [employer] program for Layo.    Zenaida Plasencia  Prescription Assistance Supervisor  Pharmacy Assistance

## 2023-09-12 ENCOUNTER — TRANSFERRED RECORDS (OUTPATIENT)
Dept: HEALTH INFORMATION MANAGEMENT | Facility: CLINIC | Age: 68
End: 2023-09-12
Payer: COMMERCIAL

## 2023-09-12 LAB — RETINOPATHY: NORMAL

## 2023-09-16 ENCOUNTER — OFFICE VISIT (OUTPATIENT)
Dept: URGENT CARE | Facility: URGENT CARE | Age: 68
End: 2023-09-16
Payer: COMMERCIAL

## 2023-09-16 VITALS
HEART RATE: 78 BPM | DIASTOLIC BLOOD PRESSURE: 72 MMHG | WEIGHT: 224 LBS | BODY MASS INDEX: 31.24 KG/M2 | RESPIRATION RATE: 18 BRPM | TEMPERATURE: 97.8 F | SYSTOLIC BLOOD PRESSURE: 124 MMHG | OXYGEN SATURATION: 98 %

## 2023-09-16 DIAGNOSIS — N48.1 BALANITIS: Primary | ICD-10-CM

## 2023-09-16 PROCEDURE — 99213 OFFICE O/P EST LOW 20 MIN: CPT | Performed by: PHYSICIAN ASSISTANT

## 2023-09-16 RX ORDER — BACITRACIN ZINC 500 [USP'U]/G
OINTMENT TOPICAL 2 TIMES DAILY
Qty: 15 G | Refills: 0 | Status: SHIPPED | OUTPATIENT
Start: 2023-09-16 | End: 2023-09-23

## 2023-09-16 RX ORDER — BENZOCAINE/MENTHOL 6 MG-10 MG
LOZENGE MUCOUS MEMBRANE 2 TIMES DAILY
Qty: 15 G | Refills: 0 | Status: SHIPPED | OUTPATIENT
Start: 2023-09-16 | End: 2023-09-23

## 2023-09-16 RX ORDER — CLOTRIMAZOLE 1 %
CREAM (GRAM) TOPICAL 2 TIMES DAILY
Qty: 15 G | Refills: 0 | Status: SHIPPED | OUTPATIENT
Start: 2023-09-16 | End: 2023-09-23

## 2023-09-16 NOTE — PROGRESS NOTES
URGENT CARE VISIT:    SUBJECTIVE:   HPI:   Toby Mcgrath is a 68 year old who presents with rash located over penis since 1 week(s) ago. Rash is gradual onset and rash seems to be worsening. He describes rash as itching and painful. Patient denies difficulty breathing or throat/tongue swelling. Patient has tried nothing with no relief of symptoms. Patient has not had contact exposures to new laundry detergents, soaps, lotions, or other potential irritants. Denies new foods or medications.  Patient denies previous history of a similar rash. No one around them has had a similar rash.     PMH:   Past Medical History:   Diagnosis Date    Acute gout of right elbow, unspecified cause 7/14/2016    Acute gouty arthritis 3/3/2016    Anxiety state, unspecified     BMI 32.0-32.9,adult 9/4/2015    Chronic pain syndrome 3/29/2007    Narcotic refill protocol Will return every 2-3 months for clinic visits Controlled substance aggreement on file from this  6/26/12 Documentation in problem list: no, appears to be compliant based on last visit He is responding best to Oxycontin 10 mg twice daily # 60 per month.  This is costly and looking into [place] for coverage.  Nausea with MS Contin Has meds refilled 16 of every month Last [facility] website verification:  done on 7/8/2015  [email or web address]   2/10/2015:  PCP will take over narcotic prescription Tapering course: using 5 mg tablets 10 mg morning 15 mg noon, 15 mg night for 1 week then 10 mg morning, 10 mg noon, 15 mg night for 1 week then 10 mg TID for 1 week then see me for refills  Then ongoing taper: 5 mg morning, 10 mg noon and 10 mg night for 1 week then  5 mg morning and noon and 10 mg night for 1 week then 5 mg tid for 1 week Then 5 mg morning no noon dose and 5 mg night for 1 week then No morning or non dose and 5 mg nightly for 1 week then off  Goal is co    Chronic rhinitis 3/29/2007    DDD (degenerative disc disease), cervical 2/8/2013    Normal.  C2-C3: Normal  disc, facet joints, spinal canal and neural foramina.  C3-C4: Mild broad-based posterior disc bulge. Otherwise normal.  C4-C5: Normal disc, facet joints, spinal canal and neural foramina.  C5-C6: Moderate degenerative disc disease with loss of disc height, circumferential disc bulge and vertebral endplate osteophytes. Mild spinal canal stenosis and moderate left foraminal stenosis. Normal right foramen and facet joints.  C6-C7: Mild circumferential disc bulge. Slight impression on the thecal sac. Mild left foraminal stenosis. Normal right foramen and facet joints.  C7-T1: Normal disc, facet joints, spinal canal and neural foramina.  T1-T2: Mild central posterior disc protrusion.  T2-T3: Mild central posterior disc protrusion. Slight impression on the spinal cord.        DJD (degenerative joint disease), lumbar 1/10/2012    Esophageal reflux     ,refluxes on upper GI    Gout 4/8/2011    Gout, unspecified     Hyperlipidemia LDL goal <100 10/25/2012    Hypertension goal BP (blood pressure) < 140/90 10/21/2011    Hypertrophy of prostate with urinary obstruction 8/3/2006    Problem list name updated by automated process. Provider to review    HYPERTROPHY PROSTATE WITH OBST 8/3/2006    Idiopathic gout of left elbow, unspecified chronicity 1/3/2017    Impotence of organic origin     Left-sided low back pain with left-sided sciatica 8/13/2015    Lumbar radiculopathy 9/17/2014    Major depressive disorder, recurrent episode, unspecified 7/9/2008    Osteoarthrosis, unspecified whether generalized or localized, pelvic region and thigh     Pure hyperglyceridemia     ROTATOR CUFF SYND NOS 4/17/2008    S/P lumbar fusion 10/14/2014    Thoracic or lumbosacral neuritis or radiculitis, unspecified     Tobacco use disorder     Type 2 diabetes, HbA1C goal < 8% (H) 9/9/2011     Allergies: Morphine and related   Medications:   Current Outpatient Medications   Medication Sig Dispense Refill    acetaminophen (TYLENOL) 500 MG tablet Take  500-1,000 mg by mouth every 8 hours as needed for mild pain      bacitracin 500 UNIT/GM external ointment Apply topically 2 times daily for 7 days 15 g 0    blood glucose (ONETOUCH VERIO IQ) test strip TEST BLOOD SUGAR ONCE DAILY OR AS DIRECTED 100 strip 2    clotrimazole (LOTRIMIN) 1 % external cream Apply topically 2 times daily for 7 days 15 g 0    dapagliflozin (FARXIGA) 10 MG TABS tablet Take 1 tablet (10 mg) by mouth daily      DULoxetine (CYMBALTA) 60 MG capsule Take 2 capsules (120 mg) by mouth daily 180 capsule 3    FARXIGA 10 MG TABS tablet Take 1 tablet (10 mg) by mouth daily 90 tablet 1    finasteride (PROSCAR) 5 MG tablet TAKE 1 TABLET BY MOUTH DAILY FOR PROSTATE ENLARGEMENT 90 tablet 0    fluticasone (FLONASE) 50 MCG/ACT nasal spray USE 1 TO 2 SPRAYS IN BOTH  NOSTRILS DAILY AS NEEDED 16 g 1    hydrocortisone (CORTAID) 1 % external cream Apply topically 2 times daily for 7 days 15 g 0    lisinopril (ZESTRIL) 5 MG tablet TAKE 1 TABLET BY MOUTH DAILY 100 tablet 2    metFORMIN (GLUCOPHAGE XR) 500 MG 24 hr tablet Take 4 tablets (2,000 mg) by mouth daily (with dinner) 360 tablet 1    metoprolol tartrate (LOPRESSOR) 50 MG tablet Take 1 tablet (50 mg) by mouth 2 times daily 180 tablet 1    omeprazole (PRILOSEC) 20 MG DR capsule Take 1 capsule (20 mg) by mouth daily If you have symptoms may take additional 1 capsule (20mg). Total up to 40 mg in day. 135 capsule 3    OneTouch Delica Lancets 33G MISC 1 Application. daily Use to test blood sugars once daily as directed. 100 each 3    oxyCODONE (ROXICODONE) 5 MG tablet Take 1 tablet (5 mg) by mouth every 6 hours as needed for severe pain Max #4 per day fill on 11th of each month 120 tablet 0    polyethylene glycol (MIRALAX) 17 GM/Dose powder Mix 1 tablespoon with water by mouth daily as needed constipation 510 g 0    pregabalin (LYRICA) 100 MG capsule Take 2 capsules (200 mg) by mouth 2 times daily 360 capsule 3    senna-docusate (SENOKOT-S/PERICOLACE) 8.6-50 MG  tablet Take 2 tablets by mouth daily 180 tablet 3    simvastatin (ZOCOR) 20 MG tablet Take 1 tablet (20 mg) by mouth At Bedtime 90 tablet 3    tamsulosin (FLOMAX) 0.4 MG capsule Take 2 capsules (0.8 mg) by mouth daily 180 capsule 0    tiotropium (SPIRIVA RESPIMAT) 2.5 MCG/ACT inhaler Inhale 2 puffs into the lungs daily 12 g 3    warfarin ANTICOAGULANT (COUMADIN) 5 MG tablet 7.5 mg day 1 and day 2, then 5 mg daily or as directed by INR clinic 90 tablet 1     Social History:   Social History     Socioeconomic History    Marital status:      Spouse name: Not on file    Number of children: 2    Years of education: 12    Highest education level: Not on file   Occupational History     Employer: DISABLED     Employer: UNEMPLOYED   Tobacco Use    Smoking status: Former     Packs/day: 1.00     Years: 40.00     Pack years: 40.00     Types: Cigarettes     Quit date:      Years since quittin.7    Smokeless tobacco: Former     Quit date: 2013   Vaping Use    Vaping Use: Never used   Substance and Sexual Activity    Alcohol use: Yes     Alcohol/week: 0.0 standard drinks of alcohol     Comment: occ.    Drug use: No    Sexual activity: Not Currently   Other Topics Concern     Service Yes    Blood Transfusions No    Caffeine Concern No    Occupational Exposure No    Hobby Hazards No    Sleep Concern No    Stress Concern Yes    Weight Concern No    Special Diet No    Back Care No    Exercise No    Bike Helmet Not Asked    Seat Belt Yes    Self-Exams No    Parent/sibling w/ CABG, MI or angioplasty before 65F 55M? No   Social History Narrative    On disability since  for hip and shoulder. 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 Kids 2.  since .             Social Determinants of Health     Financial Resource Strain: Not on file   Food Insecurity: Not on file   Transportation Needs: Not on file   Physical Activity: Not on file   Stress: Not on file   Social Connections: Not  on file   Intimate Partner Violence: Not on file   Housing Stability: Not on file       ROS: ROS otherwise found to be negative except as noted above.    OBJECTIVE:  /72   Pulse 78   Temp 97.8  F (36.6  C) (Tympanic)   Resp 18   Wt 101.6 kg (224 lb)   SpO2 98%   BMI 31.24 kg/m    General: WDWN in NAD.   Eyes: Non-injected conjunctivas without drainage bilaterally.  Ears: Bilateral TMs are easily visualized without erythema, injection, or effusion. No erythema or edema of external canals.    Oropharynx: No erythema of oropharynx. No edema of tongue.   Cardiac: RRR without murmurs, rubs, or gallops.  Respiratory: LCTAB without adventitious sounds. Non-labored breathing.  Integumentary:   Distribution: generalized  Location: glans penis, uncircumcised     Color: red,  Lesion type: macular, confluent with white discharge  Neuro: Alert and oriented.       ASSESSMENT:     ICD-10-CM    1. Balanitis  N48.1 bacitracin 500 UNIT/GM external ointment     hydrocortisone (CORTAID) 1 % external cream     clotrimazole (LOTRIMIN) 1 % external cream           PLAN:  Patient Instructions   Patient was educated on the natural course of balanitis.  Take medications as prescribed. Side effects discussed. Conservative measures discussed including keep area clean and dry. See your primary care provider if symptoms worsen or do not improve in 7 days. Seek emergency care if you develop severe pain/redness, shortness of breath, or difficulty swallowing.  Patient verbalized understanding and is agreeable to plan. The patient was discharged ambulatory and in stable condition.    Haydee Gaspar PA-C on 9/16/2023 at 1:37 PM

## 2023-09-16 NOTE — PATIENT INSTRUCTIONS
Patient was educated on the natural course of balanitis.  Take medications as prescribed. Side effects discussed. Conservative measures discussed including keep area clean and dry. See your primary care provider if symptoms worsen or do not improve in 7 days. Seek emergency care if you develop severe pain/redness, shortness of breath, or difficulty swallowing.

## 2023-09-20 ENCOUNTER — TELEPHONE (OUTPATIENT)
Dept: ANTICOAGULATION | Facility: CLINIC | Age: 68
End: 2023-09-20
Payer: COMMERCIAL

## 2023-09-20 NOTE — TELEPHONE ENCOUNTER
ANTICOAGULATION     Toby Mcgrath is overdue for INR check.      Left message for patient to call and schedule lab appointment as soon as possible. If returning call, please schedule.     Elmira Mota RN

## 2023-09-21 ENCOUNTER — TELEPHONE (OUTPATIENT)
Dept: FAMILY MEDICINE | Facility: CLINIC | Age: 68
End: 2023-09-21
Payer: COMMERCIAL

## 2023-09-21 DIAGNOSIS — I48.91 ATRIAL FIBRILLATION WITH RVR (H): Primary | ICD-10-CM

## 2023-09-21 NOTE — TELEPHONE ENCOUNTER
Medication Question or Refill    Contacts         Type Contact Phone/Fax    09/21/2023 01:17 PM CDT Phone (Incoming) Layo Mcgrath (Self) [phone] (M)            What medication are you calling about (include dose and sig)?: jantoven 5 mg tabs, 1 a day, then 1.5 a day the next day     Preferred Pharmacy:   [facility] [address] [facility]  [address] [facility] [address]  Phone: [phone] Fax: [phone]        Controlled Substance Agreement on file:   CSA -- Patient Level:     [Media Unavailable] Controlled Substance Agreement - Opioid - Scan on 5/17/2023  1:37 PM: Opioid       Who prescribed the medication?: anticoagulation team     Do you need a refill? Yes    When did you use the medication last? Last evening     Patient offered an appointment? Yes: lab appointment set up for 9/22/2023     Do you have any questions or concerns?  Yes: Patient is concerned about what he should be taking for anticoagulation medications, if he needs to have a lab test done and for a refill        Okay to leave a detailed message?: Yes at Cell number on file:    Telephone Information:   Mobile [phone]

## 2023-09-21 NOTE — TELEPHONE ENCOUNTER
I spoke to patient, he was confused by the voicemail that [facility] left on 9/5/23 but after further discussion, patient has been taking the correct warfarin maintenance dose. Patient prefers to talk in tablets versus mg and has Jantoven, not warfarin.    Patient will come in for INR on 9/22/23 and will watch for [facility] phone call. Layo states he put the [facility] phone number in his phone contacts so he will recognize the call.    Arianne Cotton RN  Anticoagulation Clinic

## 2023-09-22 ENCOUNTER — LAB (OUTPATIENT)
Dept: LAB | Facility: CLINIC | Age: 68
End: 2023-09-22
Payer: COMMERCIAL

## 2023-09-22 ENCOUNTER — ANTICOAGULATION THERAPY VISIT (OUTPATIENT)
Dept: ANTICOAGULATION | Facility: CLINIC | Age: 68
End: 2023-09-22

## 2023-09-22 DIAGNOSIS — E11.42 TYPE 2 DIABETES MELLITUS WITH DIABETIC POLYNEUROPATHY, WITHOUT LONG-TERM CURRENT USE OF INSULIN (H): ICD-10-CM

## 2023-09-22 DIAGNOSIS — I48.91 ATRIAL FIBRILLATION WITH RVR (H): Primary | ICD-10-CM

## 2023-09-22 DIAGNOSIS — I48.91 ATRIAL FIBRILLATION WITH RVR (H): ICD-10-CM

## 2023-09-22 LAB
ANION GAP SERPL CALCULATED.3IONS-SCNC: 11 MMOL/L (ref 7–15)
BUN SERPL-MCNC: 10.1 MG/DL (ref 8–23)
CALCIUM SERPL-MCNC: 9.7 MG/DL (ref 8.8–10.2)
CHLORIDE SERPL-SCNC: 105 MMOL/L (ref 98–107)
CREAT SERPL-MCNC: 0.99 MG/DL (ref 0.67–1.17)
DEPRECATED HCO3 PLAS-SCNC: 27 MMOL/L (ref 22–29)
EGFRCR SERPLBLD CKD-EPI 2021: 82 ML/MIN/1.73M2
GLUCOSE SERPL-MCNC: 179 MG/DL (ref 70–99)
HBA1C MFR BLD: 8.7 % (ref 0–5.6)
INR BLD: 2 (ref 0.9–1.1)
POTASSIUM SERPL-SCNC: 4.8 MMOL/L (ref 3.4–5.3)
SODIUM SERPL-SCNC: 143 MMOL/L (ref 136–145)

## 2023-09-22 PROCEDURE — 85610 PROTHROMBIN TIME: CPT

## 2023-09-22 PROCEDURE — 80048 BASIC METABOLIC PNL TOTAL CA: CPT

## 2023-09-22 PROCEDURE — 83036 HEMOGLOBIN GLYCOSYLATED A1C: CPT

## 2023-09-22 PROCEDURE — 36415 COLL VENOUS BLD VENIPUNCTURE: CPT

## 2023-09-22 NOTE — PROGRESS NOTES
"ANTICOAGULATION MANAGEMENT     Andrey Alcides 68 year old male is on warfarin with therapeutic INR result. (Goal INR 2.0-3.0)    Recent labs: (last 7 days)     09/22/23  1301   INR 2.0*         ASSESSMENT     Source(s): Chart Review and Patient/Caregiver Call     Warfarin doses taken: Warfarin taken as instructed  Diet: No new diet changes identified--after further discussion, patient reports he ate \"a whole can of green beans\" recently, otherwise he reports that he does not eat greens.  Medication/supplement changes: None noted  New illness, injury, or hospitalization: Yes: Urgent care on 9/16/23 for balanitis-topicals prescribed  Signs or symptoms of bleeding or clotting: No  Previous result: Therapeutic last 2(+) visits  Additional findings: None       PLAN     Recommended plan for temporary change(s) affecting INR     Dosing Instructions: Continue your current warfarin dose with next INR in 3 weeks       Summary  As of 9/22/2023      Full warfarin instructions:  7.5 mg every Sun, Wed, Fri; 5 mg all other days   Next INR check:  10/13/2023               Telephone call with Layo who verbalizes understanding and agrees to plan and who agrees to plan and repeated back plan correctly    Lab visit scheduled    Education provided:   Dietary considerations: importance of consistent vitamin K intake and impact of vitamin K foods on INR    Plan made per [facility] anticoagulation protocol    Arianne Cotton, RN  Anticoagulation Clinic  9/22/2023    _______________________________________________________________________     Anticoagulation Episode Summary       Current INR goal:  2.0-3.0   TTR:  86.7 % (1.3 mo)   Target end date:  Indefinite   Send INR reminders to:  [facility]    Indications    Atrial fibrillation with RVR (H) [I48.91]             Comments:               Anticoagulation Care Providers       Provider Role Specialty Phone number    Dayana Le MD Referring Family Medicine [phone]      "

## 2023-09-27 ENCOUNTER — TELEPHONE (OUTPATIENT)
Dept: FAMILY MEDICINE | Facility: CLINIC | Age: 68
End: 2023-09-27
Payer: COMMERCIAL

## 2023-09-27 NOTE — TELEPHONE ENCOUNTER
FYI, 90 day Refills for Spiriva and Farxiga will be arriving at the PT's home within the next 7-10 business days. PT has been made aware.    Thank you,  Melina Dumont  Prescription   Please send responses to [email or web address]

## 2023-10-02 DIAGNOSIS — M54.42 CHRONIC BILATERAL LOW BACK PAIN WITH BILATERAL SCIATICA: ICD-10-CM

## 2023-10-02 DIAGNOSIS — G89.29 CHRONIC BILATERAL LOW BACK PAIN WITH BILATERAL SCIATICA: ICD-10-CM

## 2023-10-02 DIAGNOSIS — M54.41 CHRONIC BILATERAL LOW BACK PAIN WITH BILATERAL SCIATICA: ICD-10-CM

## 2023-10-03 RX ORDER — PREGABALIN 100 MG/1
200 CAPSULE ORAL 2 TIMES DAILY
Qty: 360 CAPSULE | Refills: 1 | Status: SHIPPED | OUTPATIENT
Start: 2023-10-03 | End: 2024-01-01

## 2023-10-03 NOTE — TELEPHONE ENCOUNTER
Routing refill request to provider for review/approval because:  Drug not on the [facility] refill protocol   Arianne Briones RN

## 2023-10-04 DIAGNOSIS — F11.90 CHRONIC, CONTINUOUS USE OF OPIOIDS: ICD-10-CM

## 2023-10-04 DIAGNOSIS — M54.41 CHRONIC BILATERAL LOW BACK PAIN WITH BILATERAL SCIATICA: ICD-10-CM

## 2023-10-04 DIAGNOSIS — M54.42 CHRONIC BILATERAL LOW BACK PAIN WITH BILATERAL SCIATICA: ICD-10-CM

## 2023-10-04 DIAGNOSIS — M96.1 FAILED BACK SYNDROME OF LUMBAR SPINE: ICD-10-CM

## 2023-10-04 DIAGNOSIS — G89.29 CHRONIC BILATERAL LOW BACK PAIN WITH BILATERAL SCIATICA: ICD-10-CM

## 2023-10-04 RX ORDER — OXYCODONE HYDROCHLORIDE 5 MG/1
5 TABLET ORAL EVERY 6 HOURS PRN
Qty: 120 TABLET | Refills: 0 | Status: SHIPPED | OUTPATIENT
Start: 2023-10-11 | End: 2023-01-01

## 2023-10-13 ENCOUNTER — LAB (OUTPATIENT)
Dept: LAB | Facility: CLINIC | Age: 68
End: 2023-10-13
Payer: COMMERCIAL

## 2023-10-13 ENCOUNTER — ANTICOAGULATION THERAPY VISIT (OUTPATIENT)
Dept: ANTICOAGULATION | Facility: CLINIC | Age: 68
End: 2023-10-13

## 2023-10-13 DIAGNOSIS — I48.91 ATRIAL FIBRILLATION WITH RVR (H): ICD-10-CM

## 2023-10-13 DIAGNOSIS — I48.91 ATRIAL FIBRILLATION WITH RVR (H): Primary | ICD-10-CM

## 2023-10-13 LAB — INR BLD: 2 (ref 0.9–1.1)

## 2023-10-13 PROCEDURE — 85610 PROTHROMBIN TIME: CPT

## 2023-10-13 PROCEDURE — 36416 COLLJ CAPILLARY BLOOD SPEC: CPT

## 2023-10-13 NOTE — PROGRESS NOTES
ANTICOAGULATION MANAGEMENT     Toby Mcgrath 68 year old male is on warfarin with therapeutic INR result. (Goal INR 2.0-3.0)    Recent labs: (last 7 days)     10/13/23  1053   INR 2.0*       ASSESSMENT     Source(s): Chart Review  Previous INR was Therapeutic last 2(+) visits  Medication, diet, health changes since last INR chart reviewed; none identified         PLAN     Recommended plan for no diet, medication or health factor changes affecting INR     Dosing Instructions: Continue your current warfarin dose with next INR in 4 weeks       Summary  As of 10/13/2023      Full warfarin instructions:  7.5 mg every Sun, Wed, Fri; 5 mg all other days   Next INR check:  11/10/2023               Detailed voice message left for Layo with dosing instructions and follow up date.     Contact [phone]  to schedule and with any changes, questions or concerns.     Education provided:   Please call back if any changes to your diet, medications or how you've been taking warfarin  Contact [phone]  with any changes, questions or concerns.     Plan made per [facility] anticoagulation protocol    Estefani Ríos RN  Anticoagulation Clinic  10/13/2023    _______________________________________________________________________     Anticoagulation Episode Summary       Current INR goal:  2.0-3.0   TTR:  91.3% (2 mo)   Target end date:  Indefinite   Send INR reminders to:  [facility]    Indications    Atrial fibrillation with RVR (H) [I48.91]             Comments:               Anticoagulation Care Providers       Provider Role Specialty Phone number    Dayana Le MD Referring Family Medicine [phone]

## 2023-10-31 NOTE — TELEPHONE ENCOUNTER
Forms/Letter Request    Type of form/letter:   [facility] Re-enrollment   Cymbalta  10/26/2023    Have you been seen for this request: N/A    Do we have the form/letter:   Faxed to the [facility]    Who is the form from?   [facility]    Where did/will the form come from? form was faxed in    When is form/letter needed by:   by 12/31/2023    How would you like the form/letter returned:   Fax: [phone]    Patient Notified form requests are processed in 3-5 business days:No    Okay to leave a detailed message?:  n/a    Placed in Dr. Angeles's box.

## 2023-11-02 NOTE — PATIENT INSTRUCTIONS
"Recommendations from today's MTM visit:                                                      Change metoprolol tartrate 50 mg twice daily to metoprolol succinate 100mg once a day in the morning    Let's plan to apply for a weekly medicine call Ozempic for your diabetes next year to replace Farxiga.    We will continue to see if you can get Eliquis (or Xarelto) next year to replace warfarin.     Change the omeprazole 20 mg capsules, increase to 40 mg capsule daily.     Follow-up:  bring your medication bottles next visit.     It was great speaking with you today.  I value your experience and would be very thankful for your time in providing feedback in our clinic survey. In the next few days, you may receive an email or text message from [employer] with a link to a survey related to your  clinical pharmacist.\"     To schedule another MTM appointment, please call the clinic directly or you may call the MTM scheduling line at [phone] or toll-free at [phone].     My Clinical Pharmacist's contact information:                                                      Please feel free to contact me with any questions or concerns you have.      Laurence Kessler, PharmD  Medication Therapy Management Pharmacist  [phone]       Medication List            Accurate as of November 2, 2023 11:47 AM. If you have any questions, ask your nurse or doctor.                START taking these medications          Morning Afternoon Evening Bedtime    metoprolol succinate  MG 24 hr tablet  Also known as: TOPROL XL  Take 1 tablet (100 mg) by mouth daily  Started by: Laurence Kessler RPH  Notes to patient: For your heart                   CHANGE how you take these medications          Morning Afternoon Evening Bedtime    omeprazole 40 MG DR capsule  Also known as: PriLOSEC  Take 1 capsule (40 mg) by mouth daily  What changed:   medication strength  how much to take  additional instructions  Changed by: Laurence Kessler RPH  Notes " to patient: For the stomach, heartburn                   CONTINUE taking these medications          Morning Afternoon Evening Bedtime    acetaminophen 500 MG tablet  Also known as: TYLENOL  Take 500-1,000 mg by mouth every 8 hours as needed for mild pain             dapagliflozin 10 MG Tabs tablet  Also known as: FARXIGA  Take 1 tablet (10 mg) by mouth daily  Notes to patient: For diabetes             DULoxetine 60 MG capsule  Also known as: CYMBALTA  Take 2 capsules (120 mg) by mouth daily  Notes to patient: neuropathy             finasteride 5 MG tablet  Also known as: PROSCAR  TAKE 1 TABLET BY MOUTH DAILY FOR PROSTATE ENLARGEMENT  Notes to patient: Urinary frequency, prostate             fluticasone 50 MCG/ACT nasal spray  Also known as: FLONASE  USE 1 TO 2 SPRAYS IN BOTH  NOSTRILS DAILY AS NEEDED  Notes to patient: allergies             lisinopril 5 MG tablet  Also known as: ZESTRIL  TAKE 1 TABLET BY MOUTH DAILY  Doctor's comments: Please send a replace/new response with 100-Day Supply if appropriate to maximize member benefit. Requesting 1 year supply.  Notes to patient: Blood pressure             metFORMIN 500 MG 24 hr tablet  Also known as: GLUCOPHAGE XR  Take 4 tablets (2,000 mg) by mouth daily (with dinner)  Notes to patient: diabetes             OneTouch Delica Lancets 33G Misc  1 Application. daily Use to test blood sugars once daily as directed.  Doctor's comments: Ok to change brand             ONETOUCH VERIO IQ test strip  TEST BLOOD SUGAR ONCE DAILY OR AS DIRECTED  Doctor's comments: Ok to change brand  Generic drug: blood glucose             oxyCODONE 5 MG tablet  Also known as: ROXICODONE  Take 1 tablet (5 mg) by mouth every 6 hours as needed for severe pain Max #4 per day fill on 11th of each month  Notes to patient: pain             polyethylene glycol 17 GM/Dose powder  Also known as: MIRALAX  Mix 1 tablespoon with water by mouth daily as needed constipation             pregabalin 100 MG  capsule  Also known as: LYRICA  Take 2 capsules (200 mg) by mouth 2 times daily  Notes to patient: neuropathy             senna-docusate 8.6-50 MG tablet  Also known as: SENOKOT-S/PERICOLACE  Take 2 tablets by mouth daily  Notes to patient: constipation             simvastatin 20 MG tablet  Also known as: ZOCOR  Take 1 tablet (20 mg) by mouth At Bedtime  Notes to patient: cholesterol             tamsulosin 0.4 MG capsule  Also known as: FLOMAX  Take 2 capsules (0.8 mg) by mouth daily  Notes to patient: Urinary frequency             tiotropium 2.5 MCG/ACT inhaler  Also known as: SPIRIVA RESPIMAT  Inhale 2 puffs into the lungs daily  Notes to patient: breathing             warfarin ANTICOAGULANT 5 MG tablet  Also known as: COUMADIN  Take as directed by the anticoagulation clinic. If you are unsure how to take this medication, talk to your nurse or doctor.  Original instructions: 7.5 mg day 1 and day 2, then 5 mg daily or as directed by INR clinic  Notes to patient: For clot prevention                   STOP taking these medications       metoprolol tartrate 50 MG tablet  Also known as: LOPRESSOR  Stopped by: Laurence Kessler [facility]               Where to Get Your Medications        These medications were sent to [facility] [address]  [address] [facility] [address]      Phone: [phone]   metoprolol succinate  MG 24 hr tablet  omeprazole 40 MG DR capsule  tamsulosin 0.4 MG capsule

## 2023-11-02 NOTE — TELEPHONE ENCOUNTER
Patient is requesting refill he is also requesting  for Friday 11/10 due to concern pharmacy is not open sat/Sunday.    Routing to primary care provider.

## 2023-11-02 NOTE — PROGRESS NOTES
Medication Therapy Management (MTM) Encounter    ASSESSMENT:                            Medication Adherence/Access: See below for considerations    Diabetes:   Patient is not meeting A1c goal of < 8%. Patient would benefit from additional pharmacotherapy. Patient does not want to add any more oral agent.  We will consider changing SGLT2i if he is agreeable to consider injectable option.    Hypertension/Afib:   Blood pressure at goal. INR is 2-3. Recommend changing metoprolol tartrate to succinate to reduce pill burden.    Hyperlipidemia:   Stable.    GERD:   Patient may benefit from remaining on higher omeprazole dose. Recommend changing formulation to reduce pill burden.     Pain:   Stable, will send refill request to primary care provider.     Constipation:   stable    BPH:   stable    Allergic Rhinitis:   stable    COPD:   Stable    PLAN:                            Patient declined to start another oral medical such as glipizide for diabetes.  Patient to stay on regimen but plan for application for Ozempic to replace Farxiga.     Patient to bring pill bottles to next visit to medication set up assistance.     Increase omeprazole to 40 mg daily     Change metoprolol tartrate 50 mg twice daily to succinate 100 mg daily.     Follow-up: Return in about 2 weeks (around 11/16/2023) for Medication Therapy Management Visit.    SUBJECTIVE/OBJECTIVE:                          Layo Mcgrath is a 68 year old male coming in for a follow-up visit from 8/17/2023.       Reason for visit: diabetes follow-up.    Allergies/ADRs: Reviewed in chart  Past Medical History: Reviewed in chart  Tobacco: He reports that he quit smoking about 12 years ago. His smoking use included cigarettes. He has a 40.00 pack-year smoking history. He quit smokeless tobacco use about 10 years ago.  Alcohol: 2-3 beverages / month    Medication Adherence/Access: he is overwhelmed by managing his medication since starting warfarin. He continues to receive  assistance through  for Farxiga, Cymbalta, and Spiriva inhaler. Otherwise, fills at [facility].    Diabetes   metformin XR 2000 mg daily  Farxiga 10 mg daily     Side effects: none reported, but patient had balanitis infection 23 he reports has resolved.   Blood sugar monitorin time(s) daily; Ranges: (patient reported) 160-180, he did have one 200 or 300. Better but still up and down.  Diet/Exercise: Appetite is a little down, not as hungry. He has been eating frozen dinner or will have cereal.      Eye exam is up to date  Foot exam: due  Urine Albumin:   Lab Results   Component Value Date    UMALCR  02/15/2023      Comment:      Unable to calculate, urine albumin and/or urine creatinine is outside detectable limits.  Microalbuminuria is defined as an albumin:creatinine ratio of 17 to 299 for males and 25 to 299 for females. A ratio of albumin:creatinine of 300 or higher is indicative of overt proteinuria.  Due to biologic variability, positive results should be confirmed by a second, first-morning random or 24-hour timed urine specimen. If there is discrepancy, a third specimen is recommended. When 2 out of 3 results are in the microalbuminuria range, this is evidence for incipient nephropathy and warrants increased efforts at glucose control, blood pressure control, and institution of therapy with an angiotensin-converting-enzyme (ACE) inhibitor (if the patient can tolerate it).        Lab Results   Component Value Date    A1C 8.7 (H) 2023     Hypertension /Afib:   Warfarin as directed by INR clinic   lisinopril 5 mg daily  metoprolol tartrate 50mg twice daily     Patient does not self-monitor blood pressure. Patient is very frustrated with warfarin. He wants to go back on a DOAC. He feels overwhelmed by managing. No symptoms reports today.  [id number] = 3  Patient reports no current medication side effects     BP Readings from Last 3 Encounters:   23 102/62    09/16/23 124/72   08/17/23 111/71     Pulse Readings from Last 3 Encounters:   11/02/23 65   09/16/23 78   08/17/23 86     Hyperlipidemia   simvastatin 20 mg daily    Patient reports no significant myalgias or other side effects.     Recent Labs   Lab Test 02/15/23  1208 02/16/22  1353   CHOL 99 97   HDL 28* 28*   LDL 38 29   TRIG 166* 201*       GERD:   Prilosec (omeprazole) 20mg daily + 20 mg additional dose as needed = usually takes 40 mg      Patient feels the second dose is necessary, works well.      Pain:   Acetaminophen 500-1000 mg every 8 hour as needed   oxycodone 5mg every 6 hours as needed, max 4 tablets per day  Lyrica 200 mg twice daily   Duloxetine 120 mg in the morning      Has been having chronic pain since his spine surgery/infusion. Continues to report medication does work. He needs a refill of oxycodone.   Side effects: he feels he is forgetting things more easily, progressive issue     Constipation:   senna-docusate 8.6-50mg tablet, take 2 tablets daily   Miralax 1 tablespoon as needed only      Normal bowel movements, no concerns    BPH:   Finasteride 5 mg every day  Tamsulosin 0.8 mg daily     No changes reported. No side effects.    Allergic Rhinitis:   Flonase (fluticasone) nasal spray - 1-2 spray(s) each nostril once daily as needed     Patient reports no current medication side effects.     Patient feels that current therapy is effective.     COPD:   LAMA - Spiriva Respimat 2.5 mcg - two puffs once daily    Patient reports no current medication side effects.    Patient reports the following symptoms: none.    Today's Vitals: /62   Pulse 65   Wt 222 lb 3.2 oz (100.8 kg)   SpO2 99%   BMI 30.99 kg/m    ----------------      I spent 30 minutes with this patient today. All changes were made via collaborative practice agreement with Dayana Hopson MD. A copy of the visit note was provided to the patient's provider(s).    A summary of these recommendations was given to the  patient.    Laurence Kessler, PharmD  Medication Therapy Management Pharmacist  [phone]     Medication Therapy Recommendations  Atrial fibrillation with RVR (H)    Current Medication: metoprolol tartrate (LOPRESSOR) 50 MG tablet (Discontinued)   Rationale: Patient prefers not to take - Adherence - Adherence   Recommendation: Change Medication - Metoprolol Succinate 100 MG Cs24   Status: Accepted per CPA         Gastroesophageal reflux disease with esophagitis without hemorrhage    Current Medication: omeprazole (PRILOSEC) 20 MG DR capsule (Discontinued)   Rationale: Dose too low - Dosage too low - Effectiveness   Recommendation: Increase Dose - omeprazole 40 MG DR capsule   Status: Accepted per CPA         Type 2 diabetes mellitus without complication (H)    Current Medication: FARXIGA 10 MG TABS tablet (Discontinued)   Rationale: More effective medication available - Ineffective medication - Effectiveness   Recommendation: Change Medication - Ozempic (0.25 or 0.5 MG/DOSE) 2 MG/1.5ML Sopn   Status: Accepted per CPA

## 2023-11-03 NOTE — TELEPHONE ENCOUNTER
assistance applications can be shredded.    I will have the new and updated forms here that I will need for 2024.    Zenaida Plasencia  Prescription Assistance Supervisor  Pharmacy Assistance

## 2023-11-16 NOTE — PROGRESS NOTES
Clinical Pharmacy Consult:                                                    Toby Mcgrath is a 68 year old male coming in for a clinical pharmacist consult.  He was referred to me from self.     Reason for Consult: medication set up    Discussion: here with Rx bottles     Plan:  1. Set up medications into pill box. Morning and evening pill box.    2. Recommend patient get an additional 3rd pill box for the warfarin. Overdue for INR, added to lab schedule      Laurence Kessler, PharmD  Medication Therapy Management Pharmacist  [phone]

## 2023-11-16 NOTE — PATIENT INSTRUCTIONS
Medication List            Accurate as of November 16, 2023 11:22 AM. If you have any questions, ask your nurse or doctor.                CONTINUE taking these medications        Morning Afternoon Evening Bedtime   acetaminophen 500 MG tablet  Also known as: TYLENOL  Take 500-1,000 mg by mouth every 8 hours as needed for mild pain            dapagliflozin 10 MG Tabs tablet  Also known as: FARXIGA  Take 1 tablet (10 mg) by mouth daily              DULoxetine 60 MG capsule  Also known as: CYMBALTA  Take 2 capsules (120 mg) by mouth daily              finasteride 5 MG tablet  Also known as: PROSCAR  TAKE 1 TABLET BY MOUTH DAILY FOR PROSTATE ENLARGEMENT              fluticasone 50 MCG/ACT nasal spray  Also known as: FLONASE  USE 1 TO 2 SPRAYS IN BOTH  NOSTRILS DAILY AS NEEDED            metFORMIN 500 MG 24 hr tablet  Also known as: GLUCOPHAGE XR  Take 4 tablets (2,000 mg) by mouth daily (with dinner)              metoprolol succinate  MG 24 hr tablet  Also known as: TOPROL XL  Take 1 tablet (100 mg) by mouth daily              omeprazole 40 MG DR capsule  Also known as: PriLOSEC  Take 1 capsule (40 mg) by mouth daily              OneToProginet Delica Lancets 33G Misc  1 Application. daily Use to test blood sugars once daily as directed.  Doctor's comments: Ok to change brand            ONETOUCH VERIO IQ test strip  TEST BLOOD SUGAR ONCE DAILY OR AS DIRECTED  Doctor's comments: Ok to change brand  Generic drug: blood glucose            oxyCODONE 5 MG tablet  Also known as: ROXICODONE  Take 1 tablet (5 mg) by mouth every 6 hours as needed for severe pain Max #4 per day fill on 11th of each month            polyethylene glycol 17 GM/Dose powder  Also known as: MIRALAX  Mix 1 tablespoon with water by mouth daily as needed constipation            pregabalin 100 MG capsule  Also known as: LYRICA  Take 2 capsules (200 mg) by mouth 2 times daily                semaglutide 2 MG/3ML pen  Also known as: OZEMPIC  Inject 0.25 mg  Subcutaneous every 7 days for 28 days, THEN 0.5 mg every 7 days for 28 days.  Start taking on: November 2, 2023            senna-docusate 8.6-50 MG tablet  Also known as: SENOKOT-S/PERICOLACE  Take 1-2 tablets by mouth daily as needed for constipation            simvastatin 20 MG tablet  Also known as: ZOCOR  Take 1 tablet (20 mg) by mouth At Bedtime              tamsulosin 0.4 MG capsule  Also known as: FLOMAX  Take 2 capsules (0.8 mg) by mouth daily              tiotropium 2.5 MCG/ACT inhaler  Also known as: SPIRIVA RESPIMAT  Inhale 2 puffs into the lungs daily            warfarin ANTICOAGULANT 5 MG tablet  Also known as: COUMADIN  Take as directed by the anticoagulation clinic. If you are unsure how to take this medication, talk to your nurse or doctor.  Original instructions: 7.5 mg day 1 and day 2, then 5 mg daily or as directed by INR clinic

## 2023-11-16 NOTE — PROGRESS NOTES
ANTICOAGULATION MANAGEMENT     Toby Mcgrath 68 year old male is on warfarin with subtherapeutic INR result. (Goal INR 2.0-3.0)    Recent labs: (last 7 days)     11/16/23  1144   INR 1.4*       ASSESSMENT     Source(s): Chart Review and Patient/Caregiver Call     Warfarin doses taken: Warfarin taken as instructed  Diet: Had broccoli soup and veggie soup this is more than patient usually eats and he plans to resume his usual diet.  Medication/supplement changes: None noted  New illness, injury, or hospitalization: No  Signs or symptoms of bleeding or clotting: No  Previous result: Therapeutic last 2(+) visits.  These two therapeutic results were borderline therapeutic at 2.0, so warfarin maintenance dose was increased today to try to bring patient closer to 2.5.  Additional findings: patient requested refill of warfarin.  Last provider visit 5/26/23.  Approved per protocol       PLAN     Recommended plan for temporary change(s) affecting INR     Dosing Instructions: booster dose then Increase your warfarin dose (11.8% change) with next INR in 10 days       Summary  As of 11/16/2023      Full warfarin instructions:  11/16: 10 mg; Otherwise 5 mg every Mon, Thu; 7.5 mg all other days   Next INR check:  11/27/2023               Telephone call with Layo who agrees to plan and repeated back plan correctly    Lab visit scheduled    Education provided:   Please call back if any changes to your diet, medications or how you've been taking warfarin  Goal range and lab monitoring: goal range and significance of current result  Dietary considerations: importance of consistent vitamin K intake, impact of vitamin K foods on INR, and vitamin K content of foods  Symptom monitoring: monitoring for clotting signs and symptoms    Plan made per [facility] anticoagulation protocol    Elmira Mota, RN  Anticoagulation Clinic  11/16/2023    _______________________________________________________________________     Anticoagulation Episode  Summary       Current INR goal:  2.0-3.0   TTR:  58.4% (3.1 mo)   Target end date:  Indefinite   Send INR reminders to:  [facility]    Indications    Atrial fibrillation with RVR (H) [I48.91]             Comments:               Anticoagulation Care Providers       Provider Role Specialty Phone number    Danika Dayana Hopson MD Referring Family Medicine [phone]

## 2023-11-16 NOTE — PROGRESS NOTES
ANTICOAGULATION MANAGEMENT     Andrey Alcides 68 year old male is on warfarin with subtherapeutic INR result. (Goal INR 2.0-3.0)    Recent labs: (last 7 days)     11/16/23  1144   INR 1.4*       ASSESSMENT     Source(s): Chart Review  Previous INR was Therapeutic last 2(+) visits  Medication, diet, health changes since last INR chart reviewed; none identified         PLAN     Unable to reach Layo today.    Left message for patient to call INR clinic to discuss result.    Follow up required to confirm warfarin dose taken and assess for changes    Elmira Mota RN  Anticoagulation Clinic  11/16/2023

## 2023-11-27 NOTE — PROGRESS NOTES
ANTICOAGULATION MANAGEMENT     Toby Mcgrath 68 year old male is on warfarin with supratherapeutic INR result. (Goal INR 2.0-3.0)    Recent labs: (last 7 days)     11/27/23  1050   INR 3.2*       ASSESSMENT     Source(s): Chart Review and Patient/Caregiver Call     Warfarin doses taken: More warfarin taken than planned which may be affecting INR  Diet: No new diet changes identified  Medication/supplement changes: None noted  New illness, injury, or hospitalization: No  Signs or symptoms of bleeding or clotting: No  Previous result: Subtherapeutic  Additional findings: None       PLAN     Recommended plan for temporary change(s) affecting INR     Dosing Instructions: Continue your current warfarin dose with next INR in 2 weeks       Summary  As of 11/27/2023      Full warfarin instructions:  5 mg every Mon, Thu; 7.5 mg all other days   Next INR check:  12/11/2023               Telephone call with Layo who agrees to plan and repeated back plan correctly    Lab visit scheduled    Education provided:   Please call back if any changes to your diet, medications or how you've been taking warfarin  Contact [phone]  with any changes, questions or concerns.     Plan made per [facility] anticoagulation protocol    Brianda Hui RN  Anticoagulation Clinic  11/27/2023    _______________________________________________________________________     Anticoagulation Episode Summary       Current INR goal:  2.0-3.0   TTR:  58.1% (3.5 mo)   Target end date:  Indefinite   Send INR reminders to:  [facility]    Indications    Atrial fibrillation with RVR (H) [I48.91]             Comments:               Anticoagulation Care Providers       Provider Role Specialty Phone number    Danika Dayana Hopson MD Referring Family Medicine [phone]

## 2023-12-05 NOTE — TELEPHONE ENCOUNTER
Medication Question or Refill    Contacts         Type Contact Phone/Fax    12/05/2023 09:49 AM CST Phone (Incoming) Layo Mcgrath (Self) [phone] (M)            What medication are you calling about (include dose and sig)?: Oxycodone 5MG    Preferred Pharmacy:   [facility] [address] [facility]  [address] [facility] [address]  Phone: [phone] Fax: [phone]        Controlled Substance Agreement on file:   CSA -- Patient Level:     [Media Unavailable] Controlled Substance Agreement - Opioid - Scan on 5/17/2023  1:37 PM: Opioid       Who prescribed the medication?: Coming Hay    Do you need a refill? yes    When did you use the medication last?     Patient offered an appointment? no    Do you have any questions or concerns?  No      Okay to leave a detailed message?: Yes at Cell number on file:    Telephone Information:   Mobile [phone]

## 2023-12-07 NOTE — TELEPHONE ENCOUNTER
I spoke with Layo, he is in need of financial assistance for medication.    We reviewed the Pharmacy Assistance Fund $500, the Prescription Assistance Program for manfacturer assistance programs, gross income, insurance and Rx list.     assistance applications will be completed for: Farxiga, Cymbalta, Ozempic & Spiriva Respimat.. These will be sent to Dr. Danika Hopson and Layo with return envelopes. Please review, sign and return to me. Layo will need to provide additional documents.    I suggested he file a federal 1040 for 2023. He wants to get back on Eliquis. He didn't qualify due to the out of pocket expense needed. [employer] determines the out of pocket amount based on Adjusted Gross Income on line 11. Layo's, I suspect would be quite low. Thus, it would be much easier to get him approved to that program.    When approved, Layo will receive this medication at no cost through December 2024.    Zenaida Plasencia  Prescription Assistance Supervisor  Pharmacy Assistance

## 2023-12-11 NOTE — TELEPHONE ENCOUNTER
Reason for Call:  Other call back    Detailed comments: pt wanting to speak with INR nurse    Phone Number Patient can be reached at: Home number on file [phone] (home)    Best Time: any    Can we leave a detailed message on this number? Not Applicable    Call taken on 12/11/2023 at 11:32 AM by Leatha Bernabe

## 2023-12-11 NOTE — PROGRESS NOTES
ANTICOAGULATION MANAGEMENT     Toby Mcgrath 68 year old male is on warfarin with therapeutic INR result. (Goal INR 2.0-3.0)    Recent labs: (last 7 days)     12/11/23  1010   INR 2.1*       ASSESSMENT     Source(s): Chart Review and Patient/Caregiver Call     Warfarin doses taken: Warfarin taken as instructed  Diet: No new diet changes identified  Medication/supplement changes: None noted  New illness, injury, or hospitalization: No  Signs or symptoms of bleeding or clotting: No  Previous result: Supratherapeutic  Additional findings: None       PLAN     Recommended plan for no diet, medication or health factor changes affecting INR     Dosing Instructions: Continue your current warfarin dose with next INR in 3 weeks       Summary  As of 12/11/2023      Full warfarin instructions:  5 mg every Mon, Thu; 7.5 mg all other days   Next INR check:  1/3/2024               Telephone call with Layo who verbalizes understanding and agrees to plan and who agrees to plan and repeated back plan correctly    Lab visit scheduled    Education provided:   Taking warfarin: Importance of taking warfarin as instructed  Dietary considerations: importance of consistent vitamin K intake    Plan made per [facility] anticoagulation protocol    Arianne Cotton, RN  Anticoagulation Clinic  12/11/2023    _______________________________________________________________________     Anticoagulation Episode Summary       Current INR goal:  2.0-3.0   TTR:  60.9% (4 mo)   Target end date:  Indefinite   Send INR reminders to:  [facility]    Indications    Atrial fibrillation with RVR (H) [I48.91]             Comments:               Anticoagulation Care Providers       Provider Role Specialty Phone number    Dayana Le MD Referring Family Medicine [phone]

## 2024-01-01 ENCOUNTER — DOCUMENTATION ONLY (OUTPATIENT)
Dept: ANTICOAGULATION | Facility: CLINIC | Age: 69
End: 2024-01-01

## 2024-01-01 ENCOUNTER — TELEPHONE (OUTPATIENT)
Dept: ANTICOAGULATION | Facility: CLINIC | Age: 69
End: 2024-01-01
Payer: COMMERCIAL

## 2024-01-01 ENCOUNTER — PATIENT OUTREACH (OUTPATIENT)
Dept: ONCOLOGY | Facility: CLINIC | Age: 69
End: 2024-01-01
Payer: COMMERCIAL

## 2024-01-01 ENCOUNTER — HOSPITAL ENCOUNTER (OUTPATIENT)
Dept: CARDIOLOGY | Facility: CLINIC | Age: 69
Discharge: HOME | End: 2024-06-18
Attending: PHYSICIAN ASSISTANT
Payer: COMMERCIAL

## 2024-01-01 ENCOUNTER — ANTICOAGULATION THERAPY VISIT (OUTPATIENT)
Dept: ANTICOAGULATION | Facility: CLINIC | Age: 69
End: 2024-01-01

## 2024-01-01 ENCOUNTER — OFFICE VISIT (OUTPATIENT)
Dept: FAMILY MEDICINE | Facility: CLINIC | Age: 69
End: 2024-01-01
Payer: COMMERCIAL

## 2024-01-01 ENCOUNTER — LAB REQUISITION (OUTPATIENT)
Dept: LAB | Facility: CLINIC | Age: 69
End: 2024-01-01
Payer: COMMERCIAL

## 2024-01-01 ENCOUNTER — LAB (OUTPATIENT)
Dept: LAB | Facility: CLINIC | Age: 69
End: 2024-01-01
Payer: COMMERCIAL

## 2024-01-01 ENCOUNTER — HOSPITAL ENCOUNTER (EMERGENCY)
Facility: CLINIC | Age: 69
Discharge: HOME | End: 2024-09-11
Attending: EMERGENCY MEDICINE | Admitting: EMERGENCY MEDICINE
Payer: COMMERCIAL

## 2024-01-01 ENCOUNTER — TELEPHONE (OUTPATIENT)
Dept: GERIATRICS | Facility: CLINIC | Age: 69
End: 2024-01-01
Payer: COMMERCIAL

## 2024-01-01 ENCOUNTER — ANESTHESIA EVENT (OUTPATIENT)
Dept: SURGERY | Facility: CLINIC | Age: 69
End: 2024-01-01
Payer: COMMERCIAL

## 2024-01-01 ENCOUNTER — TELEPHONE (OUTPATIENT)
Dept: ANTICOAGULATION | Facility: CLINIC | Age: 69
End: 2024-01-01

## 2024-01-01 ENCOUNTER — HOSPITAL ENCOUNTER (OUTPATIENT)
Dept: ULTRASOUND IMAGING | Facility: CLINIC | Age: 69
Discharge: HOME | End: 2024-09-30
Attending: INTERNAL MEDICINE | Admitting: RADIOLOGY
Payer: COMMERCIAL

## 2024-01-01 ENCOUNTER — TELEPHONE (OUTPATIENT)
Dept: FAMILY MEDICINE | Facility: CLINIC | Age: 69
End: 2024-01-01
Payer: COMMERCIAL

## 2024-01-01 ENCOUNTER — APPOINTMENT (OUTPATIENT)
Dept: OCCUPATIONAL THERAPY | Facility: CLINIC | Age: 69
DRG: 640 | End: 2024-01-01
Payer: COMMERCIAL

## 2024-01-01 ENCOUNTER — OFFICE VISIT (OUTPATIENT)
Dept: GASTROENTEROLOGY | Facility: CLINIC | Age: 69
End: 2024-01-01
Payer: COMMERCIAL

## 2024-01-01 ENCOUNTER — DOCUMENTATION ONLY (OUTPATIENT)
Dept: FAMILY MEDICINE | Facility: CLINIC | Age: 69
End: 2024-01-01

## 2024-01-01 ENCOUNTER — PATIENT OUTREACH (OUTPATIENT)
Dept: CARE COORDINATION | Facility: CLINIC | Age: 69
End: 2024-01-01
Payer: COMMERCIAL

## 2024-01-01 ENCOUNTER — APPOINTMENT (OUTPATIENT)
Dept: PHYSICAL THERAPY | Facility: CLINIC | Age: 69
DRG: 640 | End: 2024-01-01
Payer: COMMERCIAL

## 2024-01-01 ENCOUNTER — DOCUMENTATION ONLY (OUTPATIENT)
Dept: ANTICOAGULATION | Facility: CLINIC | Age: 69
End: 2024-01-01
Payer: COMMERCIAL

## 2024-01-01 ENCOUNTER — TRANSITIONAL CARE UNIT VISIT (OUTPATIENT)
Dept: GERIATRICS | Facility: CLINIC | Age: 69
End: 2024-01-01
Payer: COMMERCIAL

## 2024-01-01 ENCOUNTER — HOSPITAL ENCOUNTER (OUTPATIENT)
Facility: CLINIC | Age: 69
Discharge: HOME | End: 2024-09-30
Attending: RADIOLOGY | Admitting: RADIOLOGY
Payer: COMMERCIAL

## 2024-01-01 ENCOUNTER — HOSPITAL ENCOUNTER (OUTPATIENT)
Facility: CLINIC | Age: 69
Discharge: HOME | End: 2024-10-01
Attending: STUDENT IN AN ORGANIZED HEALTH CARE EDUCATION/TRAINING PROGRAM | Admitting: STUDENT IN AN ORGANIZED HEALTH CARE EDUCATION/TRAINING PROGRAM
Payer: COMMERCIAL

## 2024-01-01 ENCOUNTER — HOSPITAL ENCOUNTER (EMERGENCY)
Facility: CLINIC | Age: 69
Discharge: HOME | DRG: 640 | End: 2024-10-03
Attending: EMERGENCY MEDICINE | Admitting: EMERGENCY MEDICINE
Payer: COMMERCIAL

## 2024-01-01 ENCOUNTER — TELEPHONE (OUTPATIENT)
Dept: GASTROENTEROLOGY | Facility: CLINIC | Age: 69
End: 2024-01-01

## 2024-01-01 ENCOUNTER — TELEPHONE (OUTPATIENT)
Dept: FAMILY MEDICINE | Facility: CLINIC | Age: 69
End: 2024-01-01

## 2024-01-01 ENCOUNTER — APPOINTMENT (OUTPATIENT)
Dept: SURGERY | Facility: PHYSICIAN GROUP | Age: 69
End: 2024-01-01
Payer: COMMERCIAL

## 2024-01-01 ENCOUNTER — VIRTUAL VISIT (OUTPATIENT)
Dept: FAMILY MEDICINE | Facility: CLINIC | Age: 69
End: 2024-01-01
Payer: COMMERCIAL

## 2024-01-01 ENCOUNTER — VIRTUAL VISIT (OUTPATIENT)
Dept: PHARMACY | Facility: CLINIC | Age: 69
End: 2024-01-01
Payer: COMMERCIAL

## 2024-01-01 ENCOUNTER — PATIENT OUTREACH (OUTPATIENT)
Dept: CARE COORDINATION | Facility: CLINIC | Age: 69
End: 2024-01-01

## 2024-01-01 ENCOUNTER — TELEPHONE (OUTPATIENT)
Dept: SURGERY | Facility: CLINIC | Age: 69
End: 2024-01-01
Payer: COMMERCIAL

## 2024-01-01 ENCOUNTER — MYC MEDICAL ADVICE (OUTPATIENT)
Dept: FAMILY MEDICINE | Facility: CLINIC | Age: 69
End: 2024-01-01

## 2024-01-01 ENCOUNTER — PATIENT OUTREACH (OUTPATIENT)
Dept: FAMILY MEDICINE | Facility: CLINIC | Age: 69
End: 2024-01-01
Payer: COMMERCIAL

## 2024-01-01 ENCOUNTER — PATIENT OUTREACH (OUTPATIENT)
Dept: ONCOLOGY | Facility: CLINIC | Age: 69
End: 2024-01-01

## 2024-01-01 ENCOUNTER — APPOINTMENT (OUTPATIENT)
Dept: OCCUPATIONAL THERAPY | Facility: CLINIC | Age: 69
DRG: 640 | End: 2024-01-01
Attending: STUDENT IN AN ORGANIZED HEALTH CARE EDUCATION/TRAINING PROGRAM
Payer: COMMERCIAL

## 2024-01-01 ENCOUNTER — ANCILLARY PROCEDURE (OUTPATIENT)
Dept: GENERAL RADIOLOGY | Facility: CLINIC | Age: 69
End: 2024-01-01
Attending: FAMILY MEDICINE
Payer: COMMERCIAL

## 2024-01-01 ENCOUNTER — OFFICE VISIT (OUTPATIENT)
Dept: PHARMACY | Facility: CLINIC | Age: 69
End: 2024-01-01
Payer: COMMERCIAL

## 2024-01-01 ENCOUNTER — APPOINTMENT (OUTPATIENT)
Dept: CT IMAGING | Facility: CLINIC | Age: 69
DRG: 640 | End: 2024-01-01
Attending: EMERGENCY MEDICINE
Payer: COMMERCIAL

## 2024-01-01 ENCOUNTER — HOSPITAL ENCOUNTER (OUTPATIENT)
Dept: RESPIRATORY THERAPY | Facility: CLINIC | Age: 69
Discharge: HOME | End: 2024-04-18
Attending: FAMILY MEDICINE | Admitting: FAMILY MEDICINE
Payer: COMMERCIAL

## 2024-01-01 ENCOUNTER — TELEPHONE (OUTPATIENT)
Dept: PHARMACY | Facility: CLINIC | Age: 69
End: 2024-01-01

## 2024-01-01 ENCOUNTER — APPOINTMENT (OUTPATIENT)
Dept: ULTRASOUND IMAGING | Facility: CLINIC | Age: 69
End: 2024-01-01
Attending: EMERGENCY MEDICINE
Payer: COMMERCIAL

## 2024-01-01 ENCOUNTER — DOCUMENTATION ONLY (OUTPATIENT)
Dept: GERIATRICS | Facility: CLINIC | Age: 69
End: 2024-01-01

## 2024-01-01 ENCOUNTER — HOSPITAL ENCOUNTER (OUTPATIENT)
Dept: CT IMAGING | Facility: CLINIC | Age: 69
Discharge: HOME | End: 2024-09-26
Attending: STUDENT IN AN ORGANIZED HEALTH CARE EDUCATION/TRAINING PROGRAM | Admitting: STUDENT IN AN ORGANIZED HEALTH CARE EDUCATION/TRAINING PROGRAM
Payer: COMMERCIAL

## 2024-01-01 ENCOUNTER — TELEPHONE (OUTPATIENT)
Dept: GASTROENTEROLOGY | Facility: CLINIC | Age: 69
End: 2024-01-01
Payer: COMMERCIAL

## 2024-01-01 ENCOUNTER — HOSPITAL ENCOUNTER (OUTPATIENT)
Facility: CLINIC | Age: 69
End: 2024-01-01
Attending: INTERNAL MEDICINE | Admitting: INTERNAL MEDICINE
Payer: COMMERCIAL

## 2024-01-01 ENCOUNTER — DOCUMENTATION ONLY (OUTPATIENT)
Dept: OTHER | Facility: CLINIC | Age: 69
End: 2024-01-01

## 2024-01-01 ENCOUNTER — PATIENT OUTREACH (OUTPATIENT)
Dept: FAMILY MEDICINE | Facility: CLINIC | Age: 69
End: 2024-01-01

## 2024-01-01 ENCOUNTER — PRE VISIT (OUTPATIENT)
Dept: ONCOLOGY | Facility: CLINIC | Age: 69
End: 2024-01-01
Payer: COMMERCIAL

## 2024-01-01 ENCOUNTER — ANESTHESIA EVENT (OUTPATIENT)
Dept: GASTROENTEROLOGY | Facility: CLINIC | Age: 69
End: 2024-01-01
Payer: COMMERCIAL

## 2024-01-01 ENCOUNTER — TELEPHONE (OUTPATIENT)
Dept: SURGERY | Facility: CLINIC | Age: 69
End: 2024-01-01

## 2024-01-01 ENCOUNTER — TELEPHONE (OUTPATIENT)
Dept: PHARMACY | Facility: CLINIC | Age: 69
End: 2024-01-01
Payer: COMMERCIAL

## 2024-01-01 ENCOUNTER — ANESTHESIA (OUTPATIENT)
Dept: SURGERY | Facility: CLINIC | Age: 69
End: 2024-01-01
Payer: COMMERCIAL

## 2024-01-01 ENCOUNTER — HOSPITAL ENCOUNTER (OUTPATIENT)
Dept: CT IMAGING | Facility: CLINIC | Age: 69
Discharge: HOME | End: 2024-09-14
Payer: COMMERCIAL

## 2024-01-01 ENCOUNTER — HOSPITAL ENCOUNTER (OUTPATIENT)
Facility: CLINIC | Age: 69
Discharge: HOME | End: 2024-06-24
Attending: SURGERY | Admitting: SURGERY
Payer: COMMERCIAL

## 2024-01-01 ENCOUNTER — PRE VISIT (OUTPATIENT)
Dept: GASTROENTEROLOGY | Facility: CLINIC | Age: 69
End: 2024-01-01
Payer: COMMERCIAL

## 2024-01-01 ENCOUNTER — OFFICE VISIT (OUTPATIENT)
Dept: SURGERY | Facility: CLINIC | Age: 69
End: 2024-01-01
Payer: COMMERCIAL

## 2024-01-01 ENCOUNTER — APPOINTMENT (OUTPATIENT)
Dept: PHYSICAL THERAPY | Facility: CLINIC | Age: 69
DRG: 640 | End: 2024-01-01
Attending: STUDENT IN AN ORGANIZED HEALTH CARE EDUCATION/TRAINING PROGRAM
Payer: COMMERCIAL

## 2024-01-01 ENCOUNTER — ANESTHESIA (OUTPATIENT)
Dept: GASTROENTEROLOGY | Facility: CLINIC | Age: 69
End: 2024-01-01
Payer: COMMERCIAL

## 2024-01-01 ENCOUNTER — ALLIED HEALTH/NURSE VISIT (OUTPATIENT)
Dept: FAMILY MEDICINE | Facility: CLINIC | Age: 69
End: 2024-01-01
Payer: COMMERCIAL

## 2024-01-01 ENCOUNTER — HOSPITAL ENCOUNTER (INPATIENT)
Facility: CLINIC | Age: 69
LOS: 5 days | Discharge: NURSING, REHAB OR LONG-TERM CARE | DRG: 640 | End: 2024-10-09
Attending: STUDENT IN AN ORGANIZED HEALTH CARE EDUCATION/TRAINING PROGRAM | Admitting: INTERNAL MEDICINE
Payer: COMMERCIAL

## 2024-01-01 VITALS
OXYGEN SATURATION: 96 % | RESPIRATION RATE: 18 BRPM | SYSTOLIC BLOOD PRESSURE: 146 MMHG | HEART RATE: 118 BPM | BODY MASS INDEX: 28.48 KG/M2 | DIASTOLIC BLOOD PRESSURE: 90 MMHG | WEIGHT: 203.4 LBS | TEMPERATURE: 97.5 F | HEIGHT: 71 IN

## 2024-01-01 VITALS
HEART RATE: 87 BPM | RESPIRATION RATE: 18 BRPM | BODY MASS INDEX: 29.4 KG/M2 | TEMPERATURE: 97.9 F | SYSTOLIC BLOOD PRESSURE: 114 MMHG | HEIGHT: 71 IN | OXYGEN SATURATION: 91 % | DIASTOLIC BLOOD PRESSURE: 75 MMHG | WEIGHT: 210 LBS

## 2024-01-01 VITALS
HEART RATE: 77 BPM | HEIGHT: 71 IN | TEMPERATURE: 97.2 F | DIASTOLIC BLOOD PRESSURE: 70 MMHG | WEIGHT: 209.88 LBS | BODY MASS INDEX: 29.38 KG/M2 | RESPIRATION RATE: 16 BRPM | OXYGEN SATURATION: 98 % | SYSTOLIC BLOOD PRESSURE: 123 MMHG

## 2024-01-01 VITALS
HEART RATE: 87 BPM | RESPIRATION RATE: 18 BRPM | DIASTOLIC BLOOD PRESSURE: 81 MMHG | SYSTOLIC BLOOD PRESSURE: 121 MMHG | OXYGEN SATURATION: 100 %

## 2024-01-01 VITALS
DIASTOLIC BLOOD PRESSURE: 81 MMHG | HEART RATE: 62 BPM | SYSTOLIC BLOOD PRESSURE: 131 MMHG | HEIGHT: 71 IN | OXYGEN SATURATION: 95 % | WEIGHT: 201.5 LBS | TEMPERATURE: 96.9 F | RESPIRATION RATE: 15 BRPM | BODY MASS INDEX: 28.21 KG/M2

## 2024-01-01 VITALS
OXYGEN SATURATION: 96 % | HEART RATE: 102 BPM | TEMPERATURE: 98.2 F | WEIGHT: 211 LBS | SYSTOLIC BLOOD PRESSURE: 133 MMHG | BODY MASS INDEX: 29.54 KG/M2 | DIASTOLIC BLOOD PRESSURE: 86 MMHG | HEIGHT: 71 IN | RESPIRATION RATE: 15 BRPM

## 2024-01-01 VITALS
BODY MASS INDEX: 28.28 KG/M2 | WEIGHT: 202 LBS | HEIGHT: 71 IN | DIASTOLIC BLOOD PRESSURE: 87 MMHG | RESPIRATION RATE: 19 BRPM | HEART RATE: 120 BPM | TEMPERATURE: 98.1 F | OXYGEN SATURATION: 98 % | SYSTOLIC BLOOD PRESSURE: 126 MMHG

## 2024-01-01 VITALS
SYSTOLIC BLOOD PRESSURE: 118 MMHG | WEIGHT: 230 LBS | RESPIRATION RATE: 18 BRPM | DIASTOLIC BLOOD PRESSURE: 70 MMHG | HEIGHT: 71 IN | TEMPERATURE: 97.8 F | HEART RATE: 92 BPM | OXYGEN SATURATION: 92 % | BODY MASS INDEX: 32.2 KG/M2

## 2024-01-01 VITALS
OXYGEN SATURATION: 95 % | RESPIRATION RATE: 18 BRPM | SYSTOLIC BLOOD PRESSURE: 121 MMHG | BODY MASS INDEX: 29.22 KG/M2 | DIASTOLIC BLOOD PRESSURE: 66 MMHG | WEIGHT: 208.7 LBS | HEIGHT: 71 IN | HEART RATE: 89 BPM | TEMPERATURE: 98.1 F

## 2024-01-01 VITALS
DIASTOLIC BLOOD PRESSURE: 66 MMHG | SYSTOLIC BLOOD PRESSURE: 108 MMHG | HEIGHT: 71 IN | WEIGHT: 210 LBS | BODY MASS INDEX: 29.4 KG/M2 | OXYGEN SATURATION: 96 %

## 2024-01-01 VITALS
TEMPERATURE: 97.2 F | BODY MASS INDEX: 29.54 KG/M2 | OXYGEN SATURATION: 98 % | WEIGHT: 211 LBS | HEART RATE: 63 BPM | SYSTOLIC BLOOD PRESSURE: 133 MMHG | DIASTOLIC BLOOD PRESSURE: 70 MMHG | RESPIRATION RATE: 19 BRPM | HEIGHT: 71 IN

## 2024-01-01 VITALS
WEIGHT: 212 LBS | HEART RATE: 108 BPM | OXYGEN SATURATION: 91 % | RESPIRATION RATE: 18 BRPM | BODY MASS INDEX: 29.68 KG/M2 | TEMPERATURE: 97.7 F | HEIGHT: 71 IN | DIASTOLIC BLOOD PRESSURE: 82 MMHG | SYSTOLIC BLOOD PRESSURE: 133 MMHG

## 2024-01-01 VITALS — SYSTOLIC BLOOD PRESSURE: 118 MMHG | HEART RATE: 71 BPM | DIASTOLIC BLOOD PRESSURE: 64 MMHG

## 2024-01-01 VITALS — WEIGHT: 210.2 LBS | BODY MASS INDEX: 29.32 KG/M2 | SYSTOLIC BLOOD PRESSURE: 104 MMHG | DIASTOLIC BLOOD PRESSURE: 64 MMHG

## 2024-01-01 VITALS
HEIGHT: 71 IN | BODY MASS INDEX: 29.96 KG/M2 | DIASTOLIC BLOOD PRESSURE: 76 MMHG | WEIGHT: 214 LBS | OXYGEN SATURATION: 95 % | SYSTOLIC BLOOD PRESSURE: 134 MMHG | HEART RATE: 65 BPM

## 2024-01-01 VITALS
RESPIRATION RATE: 14 BRPM | WEIGHT: 208.4 LBS | SYSTOLIC BLOOD PRESSURE: 101 MMHG | TEMPERATURE: 97.7 F | HEIGHT: 71 IN | OXYGEN SATURATION: 97 % | HEART RATE: 77 BPM | BODY MASS INDEX: 29.18 KG/M2 | DIASTOLIC BLOOD PRESSURE: 65 MMHG

## 2024-01-01 VITALS
OXYGEN SATURATION: 96 % | HEIGHT: 71 IN | RESPIRATION RATE: 16 BRPM | WEIGHT: 211 LBS | DIASTOLIC BLOOD PRESSURE: 83 MMHG | HEART RATE: 111 BPM | TEMPERATURE: 97.8 F | SYSTOLIC BLOOD PRESSURE: 120 MMHG | BODY MASS INDEX: 29.54 KG/M2

## 2024-01-01 VITALS
RESPIRATION RATE: 16 BRPM | BODY MASS INDEX: 28.42 KG/M2 | HEIGHT: 71 IN | OXYGEN SATURATION: 93 % | WEIGHT: 203 LBS | SYSTOLIC BLOOD PRESSURE: 123 MMHG | HEART RATE: 73 BPM | DIASTOLIC BLOOD PRESSURE: 82 MMHG | TEMPERATURE: 97.9 F

## 2024-01-01 VITALS
WEIGHT: 214.9 LBS | HEART RATE: 73 BPM | DIASTOLIC BLOOD PRESSURE: 78 MMHG | TEMPERATURE: 98.2 F | RESPIRATION RATE: 14 BRPM | HEIGHT: 71 IN | OXYGEN SATURATION: 96 % | SYSTOLIC BLOOD PRESSURE: 122 MMHG | BODY MASS INDEX: 30.09 KG/M2

## 2024-01-01 VITALS
DIASTOLIC BLOOD PRESSURE: 69 MMHG | RESPIRATION RATE: 16 BRPM | HEART RATE: 91 BPM | SYSTOLIC BLOOD PRESSURE: 110 MMHG | HEIGHT: 71 IN | OXYGEN SATURATION: 98 % | WEIGHT: 211 LBS | BODY MASS INDEX: 29.54 KG/M2 | TEMPERATURE: 97.5 F

## 2024-01-01 DIAGNOSIS — R35.0 BENIGN PROSTATIC HYPERPLASIA WITH URINARY FREQUENCY: ICD-10-CM

## 2024-01-01 DIAGNOSIS — M54.42 CHRONIC BILATERAL LOW BACK PAIN WITH BILATERAL SCIATICA: ICD-10-CM

## 2024-01-01 DIAGNOSIS — G89.29 CHRONIC BILATERAL LOW BACK PAIN WITH BILATERAL SCIATICA: ICD-10-CM

## 2024-01-01 DIAGNOSIS — I48.91 ATRIAL FIBRILLATION WITH RVR (H): Primary | ICD-10-CM

## 2024-01-01 DIAGNOSIS — R16.0 MASS OF MULTIPLE SITES OF LIVER: ICD-10-CM

## 2024-01-01 DIAGNOSIS — M54.6 CHRONIC BILATERAL THORACIC BACK PAIN: ICD-10-CM

## 2024-01-01 DIAGNOSIS — R16.0 LIVER MASSES: Primary | ICD-10-CM

## 2024-01-01 DIAGNOSIS — I48.91 ATRIAL FIBRILLATION WITH RVR (H): ICD-10-CM

## 2024-01-01 DIAGNOSIS — R10.84 ABDOMINAL PAIN, GENERALIZED: Primary | ICD-10-CM

## 2024-01-01 DIAGNOSIS — R79.89 ELEVATED PROCALCITONIN: ICD-10-CM

## 2024-01-01 DIAGNOSIS — M54.41 CHRONIC BILATERAL LOW BACK PAIN WITH BILATERAL SCIATICA: ICD-10-CM

## 2024-01-01 DIAGNOSIS — I10 HYPERTENSION GOAL BP (BLOOD PRESSURE) < 140/90: ICD-10-CM

## 2024-01-01 DIAGNOSIS — E11.9 TYPE 2 DIABETES MELLITUS WITHOUT COMPLICATION, WITHOUT LONG-TERM CURRENT USE OF INSULIN (H): ICD-10-CM

## 2024-01-01 DIAGNOSIS — Z00.00 ENCOUNTER FOR MEDICARE ANNUAL WELLNESS EXAM: Primary | ICD-10-CM

## 2024-01-01 DIAGNOSIS — C22.1 CHOLANGIOCARCINOMA (H): ICD-10-CM

## 2024-01-01 DIAGNOSIS — Z98.890 S/P LUMBAR DISCECTOMY: ICD-10-CM

## 2024-01-01 DIAGNOSIS — L72.0 EPIDERMAL CYST OF NECK: ICD-10-CM

## 2024-01-01 DIAGNOSIS — R62.7 FAILURE TO THRIVE IN ADULT: ICD-10-CM

## 2024-01-01 DIAGNOSIS — R59.0 ABDOMINAL LYMPHADENOPATHY: ICD-10-CM

## 2024-01-01 DIAGNOSIS — F11.90 CHRONIC, CONTINUOUS USE OF OPIOIDS: ICD-10-CM

## 2024-01-01 DIAGNOSIS — E11.42 TYPE 2 DIABETES MELLITUS WITH DIABETIC POLYNEUROPATHY, WITHOUT LONG-TERM CURRENT USE OF INSULIN (H): ICD-10-CM

## 2024-01-01 DIAGNOSIS — E11.9 TYPE 2 DIABETES MELLITUS WITHOUT COMPLICATION, WITHOUT LONG-TERM CURRENT USE OF INSULIN (H): Primary | ICD-10-CM

## 2024-01-01 DIAGNOSIS — K57.30 DIVERTICULOSIS OF LARGE INTESTINE WITHOUT HEMORRHAGE: ICD-10-CM

## 2024-01-01 DIAGNOSIS — K80.20 SYMPTOMATIC CHOLELITHIASIS: ICD-10-CM

## 2024-01-01 DIAGNOSIS — N40.1 BENIGN PROSTATIC HYPERPLASIA WITH URINARY FREQUENCY: ICD-10-CM

## 2024-01-01 DIAGNOSIS — E11.42 DIABETIC POLYNEUROPATHY ASSOCIATED WITH TYPE 2 DIABETES MELLITUS (H): ICD-10-CM
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
## 2024-01-01 DIAGNOSIS — R16.0 LIVER MASSES: ICD-10-CM

## 2024-01-01 DIAGNOSIS — G89.4 CHRONIC PAIN SYNDROME: ICD-10-CM

## 2024-01-01 DIAGNOSIS — Z01.818 PREOP GENERAL PHYSICAL EXAM: Primary | ICD-10-CM

## 2024-01-01 DIAGNOSIS — E66.01 MORBID OBESITY (H): ICD-10-CM

## 2024-01-01 DIAGNOSIS — D64.9 ANEMIA, UNSPECIFIED: ICD-10-CM

## 2024-01-01 DIAGNOSIS — Z51.5 HOSPICE CARE PATIENT: ICD-10-CM

## 2024-01-01 DIAGNOSIS — M96.1 FAILED BACK SYNDROME OF LUMBAR SPINE: ICD-10-CM

## 2024-01-01 DIAGNOSIS — R10.9 LEFT FLANK PAIN: ICD-10-CM

## 2024-01-01 DIAGNOSIS — G89.29 CHRONIC PAIN OF BOTH SHOULDERS: ICD-10-CM

## 2024-01-01 DIAGNOSIS — G89.29 CHRONIC BILATERAL LOW BACK PAIN WITHOUT SCIATICA: Primary | ICD-10-CM

## 2024-01-01 DIAGNOSIS — E87.1 HYPO-OSMOLALITY AND HYPONATREMIA: ICD-10-CM

## 2024-01-01 DIAGNOSIS — Z12.11 COLON CANCER SCREENING: ICD-10-CM
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
## 2024-01-01 DIAGNOSIS — R79.1 SUPRATHERAPEUTIC INR: ICD-10-CM

## 2024-01-01 DIAGNOSIS — E43 SEVERE PROTEIN-CALORIE MALNUTRITION (H): ICD-10-CM

## 2024-01-01 DIAGNOSIS — M25.511 CHRONIC PAIN OF BOTH SHOULDERS: ICD-10-CM

## 2024-01-01 DIAGNOSIS — G89.29 CHRONIC BILATERAL LOW BACK PAIN WITHOUT SCIATICA: ICD-10-CM

## 2024-01-01 DIAGNOSIS — F41.9 ANXIETY: ICD-10-CM

## 2024-01-01 DIAGNOSIS — J44.9 CHRONIC OBSTRUCTIVE PULMONARY DISEASE, UNSPECIFIED COPD TYPE (H): ICD-10-CM

## 2024-01-01 DIAGNOSIS — E78.5 HYPERLIPIDEMIA LDL GOAL <100: ICD-10-CM

## 2024-01-01 DIAGNOSIS — F33.0 MAJOR DEPRESSIVE DISORDER, RECURRENT EPISODE, MILD (H): ICD-10-CM

## 2024-01-01 DIAGNOSIS — F11.20 OPIOID DEPENDENCE, UNCOMPLICATED (H): ICD-10-CM

## 2024-01-01 DIAGNOSIS — K21.00 GASTROESOPHAGEAL REFLUX DISEASE WITH ESOPHAGITIS WITHOUT HEMORRHAGE: ICD-10-CM

## 2024-01-01 DIAGNOSIS — R41.82 ALTERED MENTAL STATUS, UNSPECIFIED ALTERED MENTAL STATUS TYPE: ICD-10-CM

## 2024-01-01 DIAGNOSIS — K59.01 SLOW TRANSIT CONSTIPATION: ICD-10-CM

## 2024-01-01 DIAGNOSIS — M25.512 CHRONIC PAIN OF BOTH SHOULDERS: ICD-10-CM

## 2024-01-01 DIAGNOSIS — Z15.89 ENCOUNTER FOR SCREENING FOR ABDOMINAL AORTIC ANEURYSM (AAA) IN PATIENT WITH GENETIC PREDISPOSITION TO AAA: ICD-10-CM

## 2024-01-01 DIAGNOSIS — I45.10 RIGHT BUNDLE BRANCH BLOCK: ICD-10-CM

## 2024-01-01 DIAGNOSIS — K59.00 CONSTIPATION, UNSPECIFIED CONSTIPATION TYPE: ICD-10-CM

## 2024-01-01 DIAGNOSIS — K74.02 HEPATIC FIBROSIS, ADVANCED FIBROSIS: ICD-10-CM

## 2024-01-01 DIAGNOSIS — E11.42 DIABETIC POLYNEUROPATHY ASSOCIATED WITH TYPE 2 DIABETES MELLITUS (H): Primary | ICD-10-CM

## 2024-01-01 DIAGNOSIS — E87.1 HYPONATREMIA: ICD-10-CM

## 2024-01-01 DIAGNOSIS — E87.5 HYPERKALEMIA: ICD-10-CM

## 2024-01-01 DIAGNOSIS — Z11.1 ENCOUNTER FOR SCREENING FOR RESPIRATORY TUBERCULOSIS: ICD-10-CM

## 2024-01-01 DIAGNOSIS — R63.4 WEIGHT LOSS: ICD-10-CM

## 2024-01-01 DIAGNOSIS — G47.33 OSA (OBSTRUCTIVE SLEEP APNEA): ICD-10-CM

## 2024-01-01 DIAGNOSIS — D64.9 LOW HEMOGLOBIN: ICD-10-CM

## 2024-01-01 DIAGNOSIS — E83.52 HYPERCALCEMIA: ICD-10-CM

## 2024-01-01 DIAGNOSIS — R68.83 CHILLS (WITHOUT FEVER): ICD-10-CM

## 2024-01-01 DIAGNOSIS — Z79.899 POLYPHARMACY: ICD-10-CM

## 2024-01-01 DIAGNOSIS — K21.9 GASTROESOPHAGEAL REFLUX DISEASE WITHOUT ESOPHAGITIS: ICD-10-CM

## 2024-01-01 DIAGNOSIS — Z13.6 ENCOUNTER FOR SCREENING FOR ABDOMINAL AORTIC ANEURYSM (AAA) IN PATIENT WITH GENETIC PREDISPOSITION TO AAA: ICD-10-CM

## 2024-01-01 DIAGNOSIS — F33.0 MILD EPISODE OF RECURRENT MAJOR DEPRESSIVE DISORDER (H): ICD-10-CM

## 2024-01-01 DIAGNOSIS — M25.551 HIP PAIN, RIGHT: ICD-10-CM

## 2024-01-01 DIAGNOSIS — R53.81 PHYSICAL DECONDITIONING: ICD-10-CM

## 2024-01-01 DIAGNOSIS — F33.9 EPISODE OF RECURRENT MAJOR DEPRESSIVE DISORDER, UNSPECIFIED DEPRESSION EPISODE SEVERITY (H): ICD-10-CM

## 2024-01-01 DIAGNOSIS — R26.9 ABNORMAL TANDEM GAIT TEST: Primary | ICD-10-CM

## 2024-01-01 DIAGNOSIS — R53.1 GENERAL WEAKNESS: ICD-10-CM

## 2024-01-01 DIAGNOSIS — Z87.891 PERSONAL HISTORY OF TOBACCO USE: ICD-10-CM

## 2024-01-01 DIAGNOSIS — C78.7 SECONDARY MALIGNANT NEOPLASM OF LIVER AND INTRAHEPATIC BILE DUCT (H): ICD-10-CM

## 2024-01-01 DIAGNOSIS — H91.93 HEARING PROBLEM OF BOTH EARS: ICD-10-CM

## 2024-01-01 DIAGNOSIS — J31.0 CHRONIC RHINITIS: ICD-10-CM

## 2024-01-01 DIAGNOSIS — K21.00 GASTROESOPHAGEAL REFLUX DISEASE WITH ESOPHAGITIS WITHOUT HEMORRHAGE: Primary | ICD-10-CM

## 2024-01-01 DIAGNOSIS — M54.50 CHRONIC BILATERAL LOW BACK PAIN WITHOUT SCIATICA: ICD-10-CM

## 2024-01-01 DIAGNOSIS — R79.1 ABNORMAL COAGULATION PROFILE: ICD-10-CM

## 2024-01-01 DIAGNOSIS — K70.30 ALCOHOLIC CIRRHOSIS, UNSPECIFIED WHETHER ASCITES PRESENT (H): ICD-10-CM

## 2024-01-01 DIAGNOSIS — R41.89 COGNITIVE IMPAIRMENT: ICD-10-CM

## 2024-01-01 DIAGNOSIS — D72.829 ELEVATED WHITE BLOOD CELL COUNT, UNSPECIFIED: ICD-10-CM

## 2024-01-01 DIAGNOSIS — I48.91 UNSPECIFIED ATRIAL FIBRILLATION (H): ICD-10-CM

## 2024-01-01 DIAGNOSIS — I48.0 PAROXYSMAL ATRIAL FIBRILLATION (H): ICD-10-CM

## 2024-01-01 DIAGNOSIS — L72.0 EPIDERMAL CYST OF NECK: Primary | ICD-10-CM

## 2024-01-01 DIAGNOSIS — R53.82 CHRONIC FATIGUE, UNSPECIFIED: ICD-10-CM

## 2024-01-01 DIAGNOSIS — D73.89 LESION OF SPLEEN: ICD-10-CM

## 2024-01-01 DIAGNOSIS — G89.29 CHRONIC BILATERAL THORACIC BACK PAIN: ICD-10-CM

## 2024-01-01 DIAGNOSIS — Z71.89 ACP (ADVANCE CARE PLANNING): ICD-10-CM

## 2024-01-01 DIAGNOSIS — R22.1 MASS OF NECK: Primary | ICD-10-CM

## 2024-01-01 DIAGNOSIS — R10.84 ABDOMINAL PAIN, GENERALIZED: ICD-10-CM

## 2024-01-01 DIAGNOSIS — R29.6 FALLS FREQUENTLY: ICD-10-CM

## 2024-01-01 DIAGNOSIS — R16.0 LIVER MASS: ICD-10-CM

## 2024-01-01 DIAGNOSIS — G93.41 METABOLIC ENCEPHALOPATHY: Primary | ICD-10-CM

## 2024-01-01 DIAGNOSIS — R10.11 RIGHT UPPER QUADRANT ABDOMINAL PAIN: ICD-10-CM

## 2024-01-01 DIAGNOSIS — C22.1 INTRAHEPATIC BILE DUCT CARCINOMA (H): ICD-10-CM

## 2024-01-01 DIAGNOSIS — K21.9 CHRONIC GASTROESOPHAGEAL REFLUX DISEASE: ICD-10-CM

## 2024-01-01 DIAGNOSIS — N17.9 AKI (ACUTE KIDNEY INJURY) (H): ICD-10-CM

## 2024-01-01 DIAGNOSIS — M54.50 CHRONIC BILATERAL LOW BACK PAIN WITHOUT SCIATICA: Primary | ICD-10-CM

## 2024-01-01 DIAGNOSIS — Z12.11 SPECIAL SCREENING FOR MALIGNANT NEOPLASMS, COLON: Primary | ICD-10-CM

## 2024-01-01 LAB
ABO/RH(D): NORMAL
AFP SERPL-MCNC: 462 NG/ML
ALBUMIN SERPL BCG-MCNC: 2.4 G/DL (ref 3.5–5.2)
ALBUMIN SERPL BCG-MCNC: 2.8 G/DL (ref 3.5–5.2)
ALBUMIN SERPL BCG-MCNC: 2.8 G/DL (ref 3.5–5.2)
ALBUMIN SERPL BCG-MCNC: 2.9 G/DL (ref 3.5–5.2)
ALBUMIN SERPL BCG-MCNC: 3.4 G/DL (ref 3.5–5.2)
ALBUMIN SERPL BCG-MCNC: 3.5 G/DL (ref 3.5–5.2)
ALBUMIN SERPL BCG-MCNC: 3.8 G/DL (ref 3.5–5.2)
ALBUMIN SERPL BCG-MCNC: 3.9 G/DL (ref 3.5–5.2)
ALBUMIN SERPL BCG-MCNC: 4 G/DL (ref 3.5–5.2)
ALBUMIN SERPL BCG-MCNC: 4.2 G/DL (ref 3.5–5.2)
ALBUMIN UR-MCNC: 20 MG/DL
ALBUMIN UR-MCNC: NEGATIVE MG/DL
ALP SERPL-CCNC: 234 U/L (ref 40–150)
ALP SERPL-CCNC: 268 U/L (ref 40–150)
ALP SERPL-CCNC: 285 U/L (ref 40–150)
ALP SERPL-CCNC: 291 U/L (ref 40–150)
ALP SERPL-CCNC: 302 U/L (ref 40–150)
ALP SERPL-CCNC: 326 U/L (ref 40–150)
ALP SERPL-CCNC: 403 U/L (ref 40–150)
ALP SERPL-CCNC: 664 U/L (ref 40–150)
ALP SERPL-CCNC: 677 U/L (ref 40–150)
ALP SERPL-CCNC: 78 U/L (ref 40–150)
ALT SERPL W P-5'-P-CCNC: 13 U/L (ref 0–70)
ALT SERPL W P-5'-P-CCNC: 13 U/L (ref 0–70)
ALT SERPL W P-5'-P-CCNC: 14 U/L (ref 0–70)
ALT SERPL W P-5'-P-CCNC: 17 U/L (ref 0–70)
ALT SERPL W P-5'-P-CCNC: 20 U/L (ref 0–70)
ALT SERPL W P-5'-P-CCNC: 21 U/L (ref 0–70)
ALT SERPL W P-5'-P-CCNC: 25 U/L (ref 0–70)
ALT SERPL W P-5'-P-CCNC: 28 U/L (ref 0–70)
ALT SERPL W P-5'-P-CCNC: 30 U/L (ref 0–70)
ALT SERPL W P-5'-P-CCNC: 9 U/L (ref 0–70)
AMMONIA PLAS-SCNC: 48 UMOL/L (ref 16–60)
ANION GAP SERPL CALCULATED.3IONS-SCNC: 10 MMOL/L (ref 7–15)
ANION GAP SERPL CALCULATED.3IONS-SCNC: 11 MMOL/L (ref 7–15)
ANION GAP SERPL CALCULATED.3IONS-SCNC: 11 MMOL/L (ref 7–15)
ANION GAP SERPL CALCULATED.3IONS-SCNC: 12 MMOL/L (ref 7–15)
ANION GAP SERPL CALCULATED.3IONS-SCNC: 12 MMOL/L (ref 7–15)
ANION GAP SERPL CALCULATED.3IONS-SCNC: 13 MMOL/L (ref 7–15)
ANION GAP SERPL CALCULATED.3IONS-SCNC: 14 MMOL/L (ref 7–15)
ANION GAP SERPL CALCULATED.3IONS-SCNC: 14 MMOL/L (ref 7–15)
ANION GAP SERPL CALCULATED.3IONS-SCNC: 17 MMOL/L (ref 7–15)
ANION GAP SERPL CALCULATED.3IONS-SCNC: 20 MMOL/L (ref 7–15)
ANION GAP SERPL CALCULATED.3IONS-SCNC: 9 MMOL/L (ref 7–15)
ANTIBODY SCREEN: NEGATIVE
APPEARANCE UR: CLEAR
APPEARANCE UR: CLEAR
AST SERPL W P-5'-P-CCNC: 100 U/L (ref 0–45)
AST SERPL W P-5'-P-CCNC: 129 U/L (ref 0–45)
AST SERPL W P-5'-P-CCNC: 168 U/L (ref 0–45)
AST SERPL W P-5'-P-CCNC: 197 U/L (ref 0–45)
AST SERPL W P-5'-P-CCNC: 228 U/L (ref 0–45)
AST SERPL W P-5'-P-CCNC: 231 U/L (ref 0–45)
AST SERPL W P-5'-P-CCNC: 25 U/L (ref 0–45)
AST SERPL W P-5'-P-CCNC: 259 U/L (ref 0–45)
AST SERPL W P-5'-P-CCNC: 79 U/L (ref 0–45)
AST SERPL W P-5'-P-CCNC: 83 U/L (ref 0–45)
ATRIAL RATE - MUSE: 59 BPM
BASOPHILS # BLD AUTO: 0 10E3/UL (ref 0–0.2)
BASOPHILS # BLD AUTO: 0.1 10E3/UL (ref 0–0.2)
BASOPHILS # BLD AUTO: 0.1 10E3/UL (ref 0–0.2)
BASOPHILS NFR BLD AUTO: 0 %
BASOPHILS NFR BLD AUTO: 1 %
BASOPHILS NFR BLD AUTO: 1 %
BILIRUB DIRECT SERPL-MCNC: 0.92 MG/DL (ref 0–0.3)
BILIRUB SERPL-MCNC: 0.3 MG/DL
BILIRUB SERPL-MCNC: 0.6 MG/DL
BILIRUB SERPL-MCNC: 0.9 MG/DL
BILIRUB SERPL-MCNC: 1.2 MG/DL
BILIRUB SERPL-MCNC: 1.3 MG/DL
BILIRUB SERPL-MCNC: 1.4 MG/DL
BILIRUB SERPL-MCNC: 1.4 MG/DL
BILIRUB SERPL-MCNC: 1.7 MG/DL
BILIRUB UR QL STRIP: NEGATIVE
BILIRUB UR QL STRIP: NEGATIVE
BUN SERPL-MCNC: 10.2 MG/DL (ref 8–23)
BUN SERPL-MCNC: 10.3 MG/DL (ref 8–23)
BUN SERPL-MCNC: 12 MG/DL (ref 8–23)
BUN SERPL-MCNC: 17.1 MG/DL (ref 8–23)
BUN SERPL-MCNC: 19.4 MG/DL (ref 8–23)
BUN SERPL-MCNC: 6 MG/DL (ref 8–23)
BUN SERPL-MCNC: 7.4 MG/DL (ref 8–23)
BUN SERPL-MCNC: 7.8 MG/DL (ref 8–23)
BUN SERPL-MCNC: 8.1 MG/DL (ref 8–23)
BUN SERPL-MCNC: 8.5 MG/DL (ref 8–23)
BUN SERPL-MCNC: 9.2 MG/DL (ref 8–23)
BUN SERPL-MCNC: 9.3 MG/DL (ref 8–23)
BUN SERPL-MCNC: 9.4 MG/DL (ref 8–23)
CA-I BLD-MCNC: 5.1 MG/DL (ref 4.4–5.2)
CA-I BLD-MCNC: 5.3 MG/DL (ref 4.4–5.2)
CA-I BLD-MCNC: 5.6 MG/DL (ref 4.4–5.2)
CA-I BLD-MCNC: 6.3 MG/DL (ref 4.4–5.2)
CALCIUM SERPL-MCNC: 10.6 MG/DL (ref 8.8–10.4)
CALCIUM SERPL-MCNC: 11 MG/DL (ref 8.8–10.4)
CALCIUM SERPL-MCNC: 11.4 MG/DL (ref 8.8–10.4)
CALCIUM SERPL-MCNC: 11.7 MG/DL (ref 8.8–10.4)
CALCIUM SERPL-MCNC: 11.7 MG/DL (ref 8.8–10.4)
CALCIUM SERPL-MCNC: 12 MG/DL (ref 8.8–10.4)
CALCIUM SERPL-MCNC: 12.5 MG/DL (ref 8.8–10.4)
CALCIUM SERPL-MCNC: 7.8 MG/DL (ref 8.8–10.4)
CALCIUM SERPL-MCNC: 7.8 MG/DL (ref 8.8–10.4)
CALCIUM SERPL-MCNC: 8.2 MG/DL (ref 8.8–10.4)
CALCIUM SERPL-MCNC: 9.7 MG/DL (ref 8.8–10.2)
CALCIUM SERPL-MCNC: 9.8 MG/DL (ref 8.8–10.4)
CALCIUM SERPL-MCNC: 9.9 MG/DL (ref 8.8–10.2)
CANCER AG19-9 SERPL IA-ACNC: 287 U/ML
CHLORIDE SERPL-SCNC: 100 MMOL/L (ref 98–107)
CHLORIDE SERPL-SCNC: 102 MMOL/L (ref 98–107)
CHLORIDE SERPL-SCNC: 104 MMOL/L (ref 98–107)
CHLORIDE SERPL-SCNC: 105 MMOL/L (ref 98–107)
CHLORIDE SERPL-SCNC: 105 MMOL/L (ref 98–107)
CHLORIDE SERPL-SCNC: 106 MMOL/L (ref 98–107)
CHLORIDE SERPL-SCNC: 106 MMOL/L (ref 98–107)
CHLORIDE SERPL-SCNC: 107 MMOL/L (ref 98–107)
CHLORIDE SERPL-SCNC: 107 MMOL/L (ref 98–107)
CHLORIDE SERPL-SCNC: 109 MMOL/L (ref 98–107)
CHLORIDE SERPL-SCNC: 95 MMOL/L (ref 98–107)
CHOLEST SERPL-MCNC: 122 MG/DL
COLOR UR AUTO: YELLOW
COLOR UR AUTO: YELLOW
CREAT SERPL-MCNC: 0.73 MG/DL (ref 0.67–1.17)
CREAT SERPL-MCNC: 0.85 MG/DL (ref 0.67–1.17)
CREAT SERPL-MCNC: 0.89 MG/DL (ref 0.67–1.17)
CREAT SERPL-MCNC: 0.92 MG/DL (ref 0.67–1.17)
CREAT SERPL-MCNC: 0.94 MG/DL (ref 0.67–1.17)
CREAT SERPL-MCNC: 0.94 MG/DL (ref 0.67–1.17)
CREAT SERPL-MCNC: 0.98 MG/DL (ref 0.67–1.17)
CREAT SERPL-MCNC: 1 MG/DL (ref 0.67–1.17)
CREAT SERPL-MCNC: 1.04 MG/DL (ref 0.67–1.17)
CREAT SERPL-MCNC: 1.58 MG/DL (ref 0.67–1.17)
CREAT SERPL-MCNC: 1.75 MG/DL (ref 0.67–1.17)
CREAT UR-MCNC: 161 MG/DL
CREAT UR-MCNC: 39 MG/DL
CREAT UR-MCNC: 39.2 MG/DL
CV STRESS MAX HR HE: 77
DEPRECATED HCO3 PLAS-SCNC: 27 MMOL/L (ref 22–29)
DEPRECATED HCO3 PLAS-SCNC: 28 MMOL/L (ref 22–29)
DIASTOLIC BLOOD PRESSURE - MUSE: NORMAL MMHG
DLCOCOR-%PRED-PRE: 83 %
DLCOCOR-PRE: 21.75 ML/MIN/MMHG
DLCOUNC-%PRED-PRE: 82 %
DLCOUNC-PRE: 21.62 ML/MIN/MMHG
DLCOUNC-PRED: 26.15 ML/MIN/MMHG
EGFRCR SERPLBLD CKD-EPI 2021: 41 ML/MIN/1.73M2
EGFRCR SERPLBLD CKD-EPI 2021: 46 ML/MIN/1.73M2
EGFRCR SERPLBLD CKD-EPI 2021: 77 ML/MIN/1.73M2
EGFRCR SERPLBLD CKD-EPI 2021: 80 ML/MIN/1.73M2
EGFRCR SERPLBLD CKD-EPI 2021: 82 ML/MIN/1.73M2
EGFRCR SERPLBLD CKD-EPI 2021: 87 ML/MIN/1.73M2
EGFRCR SERPLBLD CKD-EPI 2021: 87 ML/MIN/1.73M2
EGFRCR SERPLBLD CKD-EPI 2021: 89 ML/MIN/1.73M2
EGFRCR SERPLBLD CKD-EPI 2021: >90 ML/MIN/1.73M2
EOSINOPHIL # BLD AUTO: 0 10E3/UL (ref 0–0.7)
EOSINOPHIL # BLD AUTO: 0 10E3/UL (ref 0–0.7)
EOSINOPHIL # BLD AUTO: 0.1 10E3/UL (ref 0–0.7)
EOSINOPHIL NFR BLD AUTO: 0 %
EOSINOPHIL NFR BLD AUTO: 0 %
EOSINOPHIL NFR BLD AUTO: 1 %
ERV-%PRED-PRE: 54 %
ERV-PRE: 0.8 L
ERV-PRED: 1.48 L
ERYTHROCYTE [DISTWIDTH] IN BLOOD BY AUTOMATED COUNT: 13.8 % (ref 10–15)
ERYTHROCYTE [DISTWIDTH] IN BLOOD BY AUTOMATED COUNT: 14.2 % (ref 10–15)
ERYTHROCYTE [DISTWIDTH] IN BLOOD BY AUTOMATED COUNT: 14.5 % (ref 10–15)
ERYTHROCYTE [DISTWIDTH] IN BLOOD BY AUTOMATED COUNT: 14.7 % (ref 10–15)
ERYTHROCYTE [DISTWIDTH] IN BLOOD BY AUTOMATED COUNT: 15.2 % (ref 10–15)
ERYTHROCYTE [DISTWIDTH] IN BLOOD BY AUTOMATED COUNT: 15.4 % (ref 10–15)
ERYTHROCYTE [DISTWIDTH] IN BLOOD BY AUTOMATED COUNT: 15.7 % (ref 10–15)
ERYTHROCYTE [DISTWIDTH] IN BLOOD BY AUTOMATED COUNT: 15.8 % (ref 10–15)
ERYTHROCYTE [DISTWIDTH] IN BLOOD BY AUTOMATED COUNT: 15.9 % (ref 10–15)
ERYTHROCYTE [DISTWIDTH] IN BLOOD BY AUTOMATED COUNT: 17 % (ref 10–15)
ERYTHROCYTE [DISTWIDTH] IN BLOOD BY AUTOMATED COUNT: 18.2 % (ref 10–15)
ERYTHROCYTE [DISTWIDTH] IN BLOOD BY AUTOMATED COUNT: 18.7 % (ref 10–15)
EST. AVERAGE GLUCOSE BLD GHB EST-MCNC: 157 MG/DL
EXPTIME-PRE: 7.81 SEC
FASTING STATUS PATIENT QL REPORTED: YES
FEF2575-%PRED-PRE: 126 %
FEF2575-PRE: 2.94 L/SEC
FEF2575-PRED: 2.32 L/SEC
FEFMAX-%PRED-PRE: 122 %
FEFMAX-PRE: 10.29 L/SEC
FEFMAX-PRED: 8.4 L/SEC
FEV1-%PRED-PRE: 119 %
FEV1-PRE: 3.6 L
FEV1FEV6-PRE: 78 %
FEV1FEV6-PRED: 78 %
FEV1FVC-PRE: 77 %
FEV1FVC-PRED: 77 %
FEV1SVC-PRE: 78 %
FEV1SVC-PRED: 71 %
FIFMAX-PRE: 6.41 L/SEC
FLUAV RNA SPEC QL NAA+PROBE: NEGATIVE
FLUBV RNA RESP QL NAA+PROBE: NEGATIVE
FRCPLETH-%PRED-PRE: 97 %
FRCPLETH-PRE: 3.65 L
FRCPLETH-PRED: 3.74 L
FVC-%PRED-PRE: 118 %
FVC-PRE: 4.68 L
FVC-PRED: 3.96 L
GAMMA INTERFERON BACKGROUND BLD IA-ACNC: 0.01 IU/ML
GAW-%PRED-PRE: 81 %
GAW-PRE: 0.84 L/S/CMH2O
GAW-PRED: 1.03 L/S/CMH2O
GLUCOSE BLDC GLUCOMTR-MCNC: 145 MG/DL (ref 70–99)
GLUCOSE BLDC GLUCOMTR-MCNC: 146 MG/DL (ref 70–99)
GLUCOSE BLDC GLUCOMTR-MCNC: 149 MG/DL (ref 70–99)
GLUCOSE BLDC GLUCOMTR-MCNC: 153 MG/DL (ref 70–99)
GLUCOSE BLDC GLUCOMTR-MCNC: 154 MG/DL (ref 70–99)
GLUCOSE BLDC GLUCOMTR-MCNC: 154 MG/DL (ref 70–99)
GLUCOSE BLDC GLUCOMTR-MCNC: 157 MG/DL (ref 70–99)
GLUCOSE BLDC GLUCOMTR-MCNC: 162 MG/DL (ref 70–99)
GLUCOSE BLDC GLUCOMTR-MCNC: 164 MG/DL (ref 70–99)
GLUCOSE BLDC GLUCOMTR-MCNC: 167 MG/DL (ref 70–99)
GLUCOSE BLDC GLUCOMTR-MCNC: 169 MG/DL (ref 70–99)
GLUCOSE BLDC GLUCOMTR-MCNC: 175 MG/DL (ref 70–99)
GLUCOSE BLDC GLUCOMTR-MCNC: 180 MG/DL (ref 70–99)
GLUCOSE BLDC GLUCOMTR-MCNC: 182 MG/DL (ref 70–99)
GLUCOSE BLDC GLUCOMTR-MCNC: 183 MG/DL (ref 70–99)
GLUCOSE BLDC GLUCOMTR-MCNC: 184 MG/DL (ref 70–99)
GLUCOSE BLDC GLUCOMTR-MCNC: 185 MG/DL (ref 70–99)
GLUCOSE BLDC GLUCOMTR-MCNC: 186 MG/DL (ref 70–99)
GLUCOSE BLDC GLUCOMTR-MCNC: 188 MG/DL (ref 70–99)
GLUCOSE BLDC GLUCOMTR-MCNC: 195 MG/DL (ref 70–99)
GLUCOSE BLDC GLUCOMTR-MCNC: 198 MG/DL (ref 70–99)
GLUCOSE BLDC GLUCOMTR-MCNC: 205 MG/DL (ref 70–99)
GLUCOSE BLDC GLUCOMTR-MCNC: 205 MG/DL (ref 70–99)
GLUCOSE BLDC GLUCOMTR-MCNC: 206 MG/DL (ref 70–99)
GLUCOSE BLDC GLUCOMTR-MCNC: 206 MG/DL (ref 70–99)
GLUCOSE BLDC GLUCOMTR-MCNC: 207 MG/DL (ref 70–99)
GLUCOSE BLDC GLUCOMTR-MCNC: 214 MG/DL (ref 70–99)
GLUCOSE BLDC GLUCOMTR-MCNC: 247 MG/DL (ref 70–99)
GLUCOSE BLDC GLUCOMTR-MCNC: 252 MG/DL (ref 70–99)
GLUCOSE BLDC GLUCOMTR-MCNC: 256 MG/DL (ref 70–99)
GLUCOSE SERPL-MCNC: 102 MG/DL (ref 70–99)
GLUCOSE SERPL-MCNC: 130 MG/DL (ref 70–99)
GLUCOSE SERPL-MCNC: 136 MG/DL (ref 70–99)
GLUCOSE SERPL-MCNC: 156 MG/DL (ref 70–99)
GLUCOSE SERPL-MCNC: 160 MG/DL (ref 70–99)
GLUCOSE SERPL-MCNC: 179 MG/DL (ref 70–99)
GLUCOSE SERPL-MCNC: 179 MG/DL (ref 70–99)
GLUCOSE SERPL-MCNC: 185 MG/DL (ref 70–99)
GLUCOSE SERPL-MCNC: 185 MG/DL (ref 70–99)
GLUCOSE SERPL-MCNC: 188 MG/DL (ref 70–99)
GLUCOSE SERPL-MCNC: 201 MG/DL (ref 70–99)
GLUCOSE SERPL-MCNC: 204 MG/DL (ref 70–99)
GLUCOSE SERPL-MCNC: 235 MG/DL (ref 70–99)
GLUCOSE SERPL-MCNC: 243 MG/DL (ref 70–99)
GLUCOSE SERPL-MCNC: 250 MG/DL (ref 70–99)
GLUCOSE SERPL-MCNC: 253 MG/DL (ref 70–99)
GLUCOSE UR STRIP-MCNC: NEGATIVE MG/DL
GLUCOSE UR STRIP-MCNC: NEGATIVE MG/DL
HBA1C MFR BLD: 6.7 % (ref 0–5.6)
HBA1C MFR BLD: 7.1 %
HBA1C MFR BLD: 8.4 % (ref 0–5.6)
HCO3 BLDV-SCNC: 27 MMOL/L (ref 21–28)
HCO3 SERPL-SCNC: 17 MMOL/L (ref 22–29)
HCO3 SERPL-SCNC: 17 MMOL/L (ref 22–29)
HCO3 SERPL-SCNC: 18 MMOL/L (ref 22–29)
HCO3 SERPL-SCNC: 19 MMOL/L (ref 22–29)
HCO3 SERPL-SCNC: 20 MMOL/L (ref 22–29)
HCO3 SERPL-SCNC: 22 MMOL/L (ref 22–29)
HCO3 SERPL-SCNC: 23 MMOL/L (ref 22–29)
HCO3 SERPL-SCNC: 24 MMOL/L (ref 22–29)
HCO3 SERPL-SCNC: 24 MMOL/L (ref 22–29)
HCO3 SERPL-SCNC: 25 MMOL/L (ref 22–29)
HCO3 SERPL-SCNC: 26 MMOL/L (ref 22–29)
HCT VFR BLD AUTO: 36.7 % (ref 40–53)
HCT VFR BLD AUTO: 38.7 % (ref 40–53)
HCT VFR BLD AUTO: 38.8 % (ref 40–53)
HCT VFR BLD AUTO: 38.8 % (ref 40–53)
HCT VFR BLD AUTO: 40.5 % (ref 40–53)
HCT VFR BLD AUTO: 40.7 % (ref 40–53)
HCT VFR BLD AUTO: 41.1 % (ref 40–53)
HCT VFR BLD AUTO: 42 % (ref 40–53)
HCT VFR BLD AUTO: 42.1 % (ref 40–53)
HCT VFR BLD AUTO: 42.8 % (ref 40–53)
HCT VFR BLD AUTO: 43.3 % (ref 40–53)
HCT VFR BLD AUTO: 45.1 % (ref 40–53)
HDLC SERPL-MCNC: 30 MG/DL
HGB BLD-MCNC: 11.5 G/DL (ref 13.3–17.7)
HGB BLD-MCNC: 11.8 G/DL (ref 13.3–17.7)
HGB BLD-MCNC: 11.9 G/DL (ref 13.3–17.7)
HGB BLD-MCNC: 12.2 G/DL (ref 13.3–17.7)
HGB BLD-MCNC: 12.7 G/DL (ref 13.3–17.7)
HGB BLD-MCNC: 12.8 G/DL (ref 13.3–17.7)
HGB BLD-MCNC: 13.1 G/DL (ref 13.3–17.7)
HGB BLD-MCNC: 13.3 G/DL (ref 13.3–17.7)
HGB BLD-MCNC: 13.7 G/DL (ref 13.3–17.7)
HGB BLD-MCNC: 13.8 G/DL (ref 13.3–17.7)
HGB BLD-MCNC: 14.1 G/DL (ref 13.3–17.7)
HGB BLD-MCNC: 14.4 G/DL (ref 13.3–17.7)
HGB UR QL STRIP: NEGATIVE
HGB UR QL STRIP: NEGATIVE
HOLD SPECIMEN: NORMAL
HYDROMORPHONE UR CFM-MCNC: 86 NG/ML
HYDROMORPHONE/CREAT UR: 53 NG/MG {CREAT}
IC-%PRED-PRE: 129 %
IC-PRE: 3.82 L
IC-PRED: 2.96 L
IMM GRANULOCYTES # BLD: 0 10E3/UL
IMM GRANULOCYTES # BLD: 0.1 10E3/UL
IMM GRANULOCYTES NFR BLD: 0 %
IMM GRANULOCYTES NFR BLD: 1 %
INR BLD: 1.7 (ref 0.9–1.1)
INR BLD: 1.8 (ref 0.9–1.1)
INR BLD: 2.1 (ref 0.9–1.1)
INR BLD: 2.1 (ref 0.9–1.1)
INR BLD: 2.2 (ref 0.9–1.1)
INR BLD: 2.4 (ref 0.9–1.1)
INR BLD: 2.6 (ref 0.9–1.1)
INR BLD: 2.8 (ref 0.9–1.1)
INR PPP: 1.04 (ref 0.85–1.15)
INR PPP: 1.46 (ref 0.85–1.15)
INR PPP: 1.72 (ref 0.85–1.15)
INR PPP: 1.77 (ref 0.85–1.15)
INR PPP: 2 (ref 0.85–1.15)
INR PPP: 2.6 (ref 0.85–1.15)
INR PPP: 2.67 (ref 0.85–1.15)
INR PPP: 2.92 (ref 0.85–1.15)
INR PPP: 2.96 (ref 0.85–1.15)
INR PPP: 3.08 (ref 0.85–1.15)
INR PPP: 3.13 (ref 0.85–1.15)
INR PPP: 3.29 (ref 0.85–1.15)
INR PPP: 3.77 (ref 0.85–1.15)
INR PPP: 3.86 (ref 0.85–1.15)
INR PPP: 3.94 (ref 0.85–1.15)
INTERPRETATION ECG - MUSE: NORMAL
KETONES UR STRIP-MCNC: NEGATIVE MG/DL
KETONES UR STRIP-MCNC: NEGATIVE MG/DL
LACTATE BLD-SCNC: 2 MMOL/L
LDLC SERPL CALC-MCNC: 43 MG/DL
LEUKOCYTE ESTERASE UR QL STRIP: NEGATIVE
LEUKOCYTE ESTERASE UR QL STRIP: NEGATIVE
LIPASE SERPL-CCNC: 17 U/L (ref 13–60)
LYMPHOCYTES # BLD AUTO: 0.9 10E3/UL (ref 0.8–5.3)
LYMPHOCYTES # BLD AUTO: 1 10E3/UL (ref 0.8–5.3)
LYMPHOCYTES # BLD AUTO: 1 10E3/UL (ref 0.8–5.3)
LYMPHOCYTES # BLD AUTO: 1.4 10E3/UL (ref 0.8–5.3)
LYMPHOCYTES # BLD AUTO: 1.6 10E3/UL (ref 0.8–5.3)
LYMPHOCYTES NFR BLD AUTO: 10 %
LYMPHOCYTES NFR BLD AUTO: 14 %
LYMPHOCYTES NFR BLD AUTO: 19 %
LYMPHOCYTES NFR BLD AUTO: 9 %
LYMPHOCYTES NFR BLD AUTO: 9 %
M TB IFN-G BLD-IMP: ABNORMAL
M TB IFN-G CD4+ BCKGRND COR BLD-ACNC: 0.21 IU/ML
MAGNESIUM SERPL-MCNC: 1.7 MG/DL (ref 1.7–2.3)
MAGNESIUM SERPL-MCNC: 1.8 MG/DL (ref 1.7–2.3)
MAGNESIUM SERPL-MCNC: 1.8 MG/DL (ref 1.7–2.3)
MAGNESIUM SERPL-MCNC: 1.9 MG/DL (ref 1.7–2.3)
MAGNESIUM SERPL-MCNC: 1.9 MG/DL (ref 1.7–2.3)
MAGNESIUM SERPL-MCNC: 2 MG/DL (ref 1.7–2.3)
MCH RBC QN AUTO: 25.8 PG (ref 26.5–33)
MCH RBC QN AUTO: 26 PG (ref 26.5–33)
MCH RBC QN AUTO: 26.2 PG (ref 26.5–33)
MCH RBC QN AUTO: 26.4 PG (ref 26.5–33)
MCH RBC QN AUTO: 26.6 PG (ref 26.5–33)
MCH RBC QN AUTO: 26.6 PG (ref 26.5–33)
MCH RBC QN AUTO: 26.8 PG (ref 26.5–33)
MCH RBC QN AUTO: 26.8 PG (ref 26.5–33)
MCH RBC QN AUTO: 27.9 PG (ref 26.5–33)
MCH RBC QN AUTO: 28.1 PG (ref 26.5–33)
MCHC RBC AUTO-ENTMCNC: 30.1 G/DL (ref 31.5–36.5)
MCHC RBC AUTO-ENTMCNC: 30.5 G/DL (ref 31.5–36.5)
MCHC RBC AUTO-ENTMCNC: 30.6 G/DL (ref 31.5–36.5)
MCHC RBC AUTO-ENTMCNC: 30.7 G/DL (ref 31.5–36.5)
MCHC RBC AUTO-ENTMCNC: 31.1 G/DL (ref 31.5–36.5)
MCHC RBC AUTO-ENTMCNC: 31.1 G/DL (ref 31.5–36.5)
MCHC RBC AUTO-ENTMCNC: 31.3 G/DL (ref 31.5–36.5)
MCHC RBC AUTO-ENTMCNC: 32.2 G/DL (ref 31.5–36.5)
MCHC RBC AUTO-ENTMCNC: 32.5 G/DL (ref 31.5–36.5)
MCHC RBC AUTO-ENTMCNC: 32.7 G/DL (ref 31.5–36.5)
MCHC RBC AUTO-ENTMCNC: 33.3 G/DL (ref 31.5–36.5)
MCHC RBC AUTO-ENTMCNC: 33.6 G/DL (ref 31.5–36.5)
MCV RBC AUTO: 82 FL (ref 78–100)
MCV RBC AUTO: 82 FL (ref 78–100)
MCV RBC AUTO: 83 FL (ref 78–100)
MCV RBC AUTO: 84 FL (ref 78–100)
MCV RBC AUTO: 85 FL (ref 78–100)
MCV RBC AUTO: 86 FL (ref 78–100)
MCV RBC AUTO: 87 FL (ref 78–100)
MICROALBUMIN UR-MCNC: <12 MG/L
MICROALBUMIN/CREAT UR: NORMAL MG/G{CREAT}
MITOGEN IGNF BCKGRD COR BLD-ACNC: 0.01 IU/ML
MITOGEN IGNF BCKGRD COR BLD-ACNC: 0.01 IU/ML
MONOCYTES # BLD AUTO: 0.7 10E3/UL (ref 0–1.3)
MONOCYTES # BLD AUTO: 0.8 10E3/UL (ref 0–1.3)
MONOCYTES # BLD AUTO: 0.8 10E3/UL (ref 0–1.3)
MONOCYTES # BLD AUTO: 0.9 10E3/UL (ref 0–1.3)
MONOCYTES # BLD AUTO: 1 10E3/UL (ref 0–1.3)
MONOCYTES NFR BLD AUTO: 7 %
MONOCYTES NFR BLD AUTO: 8 %
MONOCYTES NFR BLD AUTO: 9 %
MUCOUS THREADS #/AREA URNS LPF: PRESENT /LPF
NEUTROPHILS # BLD AUTO: 6.2 10E3/UL (ref 1.6–8.3)
NEUTROPHILS # BLD AUTO: 7.3 10E3/UL (ref 1.6–8.3)
NEUTROPHILS # BLD AUTO: 7.5 10E3/UL (ref 1.6–8.3)
NEUTROPHILS # BLD AUTO: 9.3 10E3/UL (ref 1.6–8.3)
NEUTROPHILS # BLD AUTO: 9.8 10E3/UL (ref 1.6–8.3)
NEUTROPHILS NFR BLD AUTO: 72 %
NEUTROPHILS NFR BLD AUTO: 76 %
NEUTROPHILS NFR BLD AUTO: 81 %
NEUTROPHILS NFR BLD AUTO: 82 %
NEUTROPHILS NFR BLD AUTO: 83 %
NITRATE UR QL: NEGATIVE
NITRATE UR QL: NEGATIVE
NONHDLC SERPL-MCNC: 92 MG/DL
NRBC # BLD AUTO: 0 10E3/UL
NRBC BLD AUTO-RTO: 0 /100
OXYCODONE UR CFM-MCNC: 1120 NG/ML
OXYCODONE UR CFM-MCNC: 4640 NG/ML
OXYCODONE/CREAT UR: 2872 NG/MG {CREAT}
OXYCODONE/CREAT UR: 2882 NG/MG {CREAT}
OXYMORPHONE UR CFM-MCNC: 154 NG/ML
OXYMORPHONE UR CFM-MCNC: 1700 NG/ML
OXYMORPHONE/CREAT UR: 1056 NG/MG {CREAT}
OXYMORPHONE/CREAT UR: 395 NG/MG {CREAT}
P AXIS - MUSE: 11 DEGREES
PATH REPORT.COMMENTS IMP SPEC: ABNORMAL
PATH REPORT.COMMENTS IMP SPEC: NORMAL
PATH REPORT.COMMENTS IMP SPEC: NORMAL
PATH REPORT.COMMENTS IMP SPEC: YES
PATH REPORT.FINAL DX SPEC: ABNORMAL
PATH REPORT.FINAL DX SPEC: NORMAL
PATH REPORT.GROSS SPEC: ABNORMAL
PATH REPORT.GROSS SPEC: NORMAL
PATH REPORT.MICROSCOPIC SPEC OTHER STN: ABNORMAL
PATH REPORT.MICROSCOPIC SPEC OTHER STN: ABNORMAL
PATH REPORT.MICROSCOPIC SPEC OTHER STN: NORMAL
PATH REPORT.RELEVANT HX SPEC: ABNORMAL
PATH REPORT.RELEVANT HX SPEC: NORMAL
PCO2 BLDV: 45 MM HG (ref 40–50)
PH BLDV: 7.38 [PH] (ref 7.32–7.43)
PH UR STRIP: 5.5 [PH] (ref 5–7)
PH UR STRIP: 5.5 [PH] (ref 5–7)
PHOTO IMAGE: ABNORMAL
PHOTO IMAGE: NORMAL
PLATELET # BLD AUTO: 216 10E3/UL (ref 150–450)
PLATELET # BLD AUTO: 237 10E3/UL (ref 150–450)
PLATELET # BLD AUTO: 250 10E3/UL (ref 150–450)
PLATELET # BLD AUTO: 252 10E3/UL (ref 150–450)
PLATELET # BLD AUTO: 269 10E3/UL (ref 150–450)
PLATELET # BLD AUTO: 272 10E3/UL (ref 150–450)
PLATELET # BLD AUTO: 274 10E3/UL (ref 150–450)
PLATELET # BLD AUTO: 276 10E3/UL (ref 150–450)
PLATELET # BLD AUTO: 279 10E3/UL (ref 150–450)
PLATELET # BLD AUTO: 314 10E3/UL (ref 150–450)
PLATELET # BLD AUTO: 315 10E3/UL (ref 150–450)
PLATELET # BLD AUTO: 378 10E3/UL (ref 150–450)
PO2 BLDV: 25 MM HG (ref 25–47)
POTASSIUM SERPL-SCNC: 3.6 MMOL/L (ref 3.4–5.3)
POTASSIUM SERPL-SCNC: 3.6 MMOL/L (ref 3.4–5.3)
POTASSIUM SERPL-SCNC: 3.7 MMOL/L (ref 3.4–5.3)
POTASSIUM SERPL-SCNC: 3.7 MMOL/L (ref 3.4–5.3)
POTASSIUM SERPL-SCNC: 4 MMOL/L (ref 3.4–5.3)
POTASSIUM SERPL-SCNC: 4.1 MMOL/L (ref 3.4–5.3)
POTASSIUM SERPL-SCNC: 4.2 MMOL/L (ref 3.4–5.3)
POTASSIUM SERPL-SCNC: 4.3 MMOL/L (ref 3.4–5.3)
POTASSIUM SERPL-SCNC: 4.3 MMOL/L (ref 3.4–5.3)
POTASSIUM SERPL-SCNC: 4.4 MMOL/L (ref 3.4–5.3)
POTASSIUM SERPL-SCNC: 4.4 MMOL/L (ref 3.4–5.3)
POTASSIUM SERPL-SCNC: 4.7 MMOL/L (ref 3.4–5.3)
POTASSIUM SERPL-SCNC: 4.9 MMOL/L (ref 3.4–5.3)
POTASSIUM SERPL-SCNC: 4.9 MMOL/L (ref 3.4–5.3)
POTASSIUM SERPL-SCNC: 5 MMOL/L (ref 3.4–5.3)
POTASSIUM SERPL-SCNC: 5.8 MMOL/L (ref 3.4–5.3)
PR INTERVAL - MUSE: 140 MS
PREGABALIN UR QL CFM: PRESENT
PREGABALIN UR QL CFM: PRESENT
PROCALCITONIN SERPL IA-MCNC: 2.51 NG/ML
PROT SERPL-MCNC: 5.8 G/DL (ref 6.4–8.3)
PROT SERPL-MCNC: 6.4 G/DL (ref 6.4–8.3)
PROT SERPL-MCNC: 6.4 G/DL (ref 6.4–8.3)
PROT SERPL-MCNC: 6.6 G/DL (ref 6.4–8.3)
PROT SERPL-MCNC: 6.9 G/DL (ref 6.4–8.3)
PROT SERPL-MCNC: 7.1 G/DL (ref 6.4–8.3)
PROT SERPL-MCNC: 7.2 G/DL (ref 6.4–8.3)
PROT SERPL-MCNC: 7.5 G/DL (ref 6.4–8.3)
PROT SERPL-MCNC: 7.8 G/DL (ref 6.4–8.3)
PROT SERPL-MCNC: 7.9 G/DL (ref 6.4–8.3)
PTH RELATED PROT SERPL-SCNC: 6.3 PMOL/L
PTH-INTACT SERPL-MCNC: 9 PG/ML (ref 15–65)
QRS DURATION - MUSE: 136 MS
QT - MUSE: 458 MS
QTC - MUSE: 453 MS
QUANTIFERON MITOGEN: 0.22 IU/ML
QUANTIFERON NIL TUBE: 0.01 IU/ML
QUANTIFERON TB1 TUBE: 0.02 IU/ML
QUANTIFERON TB2 TUBE: 0.02
R AXIS - MUSE: -46 DEGREES
RATE PRESSURE PRODUCT: 7700
RBC # BLD AUTO: 4.33 10E6/UL (ref 4.4–5.9)
RBC # BLD AUTO: 4.54 10E6/UL (ref 4.4–5.9)
RBC # BLD AUTO: 4.57 10E6/UL (ref 4.4–5.9)
RBC # BLD AUTO: 4.66 10E6/UL (ref 4.4–5.9)
RBC # BLD AUTO: 4.73 10E6/UL (ref 4.4–5.9)
RBC # BLD AUTO: 4.88 10E6/UL (ref 4.4–5.9)
RBC # BLD AUTO: 4.89 10E6/UL (ref 4.4–5.9)
RBC # BLD AUTO: 5.02 10E6/UL (ref 4.4–5.9)
RBC # BLD AUTO: 5.04 10E6/UL (ref 4.4–5.9)
RBC # BLD AUTO: 5.15 10E6/UL (ref 4.4–5.9)
RBC # BLD AUTO: 5.16 10E6/UL (ref 4.4–5.9)
RBC # BLD AUTO: 5.31 10E6/UL (ref 4.4–5.9)
RBC URINE: <1 /HPF
RSV RNA SPEC NAA+PROBE: NEGATIVE
RVPLETH-%PRED-PRE: 106 %
RVPLETH-PRE: 2.85 L
RVPLETH-PRED: 2.67 L
SAO2 % BLDV: 44 % (ref 70–75)
SARS-COV-2 RNA RESP QL NAA+PROBE: NEGATIVE
SGAW-%PRED-PRE: 246 %
SGAW-PRE: 0.21 1/CMH2O*S
SGAW-PRED: 0.08 1/CMH2O*S
SODIUM SERPL-SCNC: 130 MMOL/L (ref 135–145)
SODIUM SERPL-SCNC: 137 MMOL/L (ref 135–145)
SODIUM SERPL-SCNC: 138 MMOL/L (ref 135–145)
SODIUM SERPL-SCNC: 138 MMOL/L (ref 135–145)
SODIUM SERPL-SCNC: 139 MMOL/L (ref 135–145)
SODIUM SERPL-SCNC: 139 MMOL/L (ref 135–145)
SODIUM SERPL-SCNC: 141 MMOL/L (ref 135–145)
SODIUM SERPL-SCNC: 142 MMOL/L (ref 135–145)
SODIUM SERPL-SCNC: 142 MMOL/L (ref 135–145)
SODIUM SERPL-SCNC: 144 MMOL/L (ref 135–145)
SP GR UR STRIP: 1.02 (ref 1–1.03)
SP GR UR STRIP: 1.03 (ref 1–1.03)
SPECIMEN EXPIRATION DATE: NORMAL
SQUAMOUS EPITHELIAL: <1 /HPF
SRAW-%PRED-PRE: 102 %
SRAW-PRE: 4.89 CMH2O*S
SRAW-PRED: 4.76 CMH2O*S
STRESS ECHO BASELINE DIASTOLIC HE: 64
STRESS ECHO BASELINE HR: 68 BPM
STRESS ECHO BASELINE SYSTOLIC BP: 118
STRESS ECHO CALCULATED PERCENT HR: 51 %
STRESS ECHO LAST STRESS DIASTOLIC BP: 68
STRESS ECHO LAST STRESS SYSTOLIC BP: 100
STRESS ECHO TARGET HR: 150
SYSTOLIC BLOOD PRESSURE - MUSE: NORMAL MMHG
T AXIS - MUSE: 0 DEGREES
TLCPLETH-%PRED-PRE: 103 %
TLCPLETH-PRE: 7.47 L
TLCPLETH-PRED: 7.23 L
TRIGL SERPL-MCNC: 245 MG/DL
UPPER GI ENDOSCOPY: NORMAL
UROBILINOGEN UR STRIP-ACNC: 2 E.U./DL
UROBILINOGEN UR STRIP-MCNC: 6 MG/DL
VA-%PRED-PRE: 99 %
VA-PRE: 6.49 L
VC-%PRED-PRE: 109 %
VC-PRE: 4.63 L
VC-PRED: 4.24 L
VENTRICULAR RATE- MUSE: 59 BPM
WBC # BLD AUTO: 10.6 10E3/UL (ref 4–11)
WBC # BLD AUTO: 11.4 10E3/UL (ref 4–11)
WBC # BLD AUTO: 11.8 10E3/UL (ref 4–11)
WBC # BLD AUTO: 12.7 10E3/UL (ref 4–11)
WBC # BLD AUTO: 6.6 10E3/UL (ref 4–11)
WBC # BLD AUTO: 6.9 10E3/UL (ref 4–11)
WBC # BLD AUTO: 7.1 10E3/UL (ref 4–11)
WBC # BLD AUTO: 7.9 10E3/UL (ref 4–11)
WBC # BLD AUTO: 8.7 10E3/UL (ref 4–11)
WBC # BLD AUTO: 8.7 10E3/UL (ref 4–11)
WBC # BLD AUTO: 9.4 10E3/UL (ref 4–11)
WBC # BLD AUTO: 9.7 10E3/UL (ref 4–11)
WBC URINE: 1 /HPF

## 2024-01-01 PROCEDURE — 99285 EMERGENCY DEPT VISIT HI MDM: CPT | Mod: 25

## 2024-01-01 PROCEDURE — 36415 COLL VENOUS BLD VENIPUNCTURE: CPT | Performed by: EMERGENCY MEDICINE

## 2024-01-01 PROCEDURE — 82043 UR ALBUMIN QUANTITATIVE: CPT

## 2024-01-01 PROCEDURE — 99215 OFFICE O/P EST HI 40 MIN: CPT | Performed by: PHYSICIAN ASSISTANT

## 2024-01-01 PROCEDURE — 83735 ASSAY OF MAGNESIUM: CPT | Performed by: STUDENT IN AN ORGANIZED HEALTH CARE EDUCATION/TRAINING PROGRAM

## 2024-01-01 PROCEDURE — 99233 SBSQ HOSP IP/OBS HIGH 50: CPT | Performed by: INTERNAL MEDICINE

## 2024-01-01 PROCEDURE — P9604 ONE-WAY ALLOW PRORATED TRIP: HCPCS | Mod: ORL

## 2024-01-01 PROCEDURE — P9604 ONE-WAY ALLOW PRORATED TRIP: HCPCS

## 2024-01-01 PROCEDURE — 83036 HEMOGLOBIN GLYCOSYLATED A1C: CPT | Performed by: PHYSICIAN ASSISTANT

## 2024-01-01 PROCEDURE — 85610 PROTHROMBIN TIME: CPT

## 2024-01-01 PROCEDURE — 120N000001 HC R&B MED SURG/OB

## 2024-01-01 PROCEDURE — 36415 COLL VENOUS BLD VENIPUNCTURE: CPT | Performed by: PATHOLOGY

## 2024-01-01 PROCEDURE — 85027 COMPLETE CBC AUTOMATED: CPT | Performed by: PATHOLOGY

## 2024-01-01 PROCEDURE — 99152 MOD SED SAME PHYS/QHP 5/>YRS: CPT

## 2024-01-01 PROCEDURE — G0463 HOSPITAL OUTPT CLINIC VISIT: HCPCS | Performed by: STUDENT IN AN ORGANIZED HEALTH CARE EDUCATION/TRAINING PROGRAM

## 2024-01-01 PROCEDURE — 83036 HEMOGLOBIN GLYCOSYLATED A1C: CPT | Performed by: INTERNAL MEDICINE

## 2024-01-01 PROCEDURE — 82947 ASSAY GLUCOSE BLOOD QUANT: CPT | Performed by: STUDENT IN AN ORGANIZED HEALTH CARE EDUCATION/TRAINING PROGRAM

## 2024-01-01 PROCEDURE — 74177 CT ABD & PELVIS W/CONTRAST: CPT

## 2024-01-01 PROCEDURE — 99100 ANES PT EXTEME AGE<1 YR&>70: CPT | Performed by: NURSE ANESTHETIST, CERTIFIED REGISTERED

## 2024-01-01 PROCEDURE — 94729 DIFFUSING CAPACITY: CPT

## 2024-01-01 PROCEDURE — 85610 PROTHROMBIN TIME: CPT | Performed by: STUDENT IN AN ORGANIZED HEALTH CARE EDUCATION/TRAINING PROGRAM

## 2024-01-01 PROCEDURE — 99309 SBSQ NF CARE MODERATE MDM 30: CPT

## 2024-01-01 PROCEDURE — 36415 COLL VENOUS BLD VENIPUNCTURE: CPT

## 2024-01-01 PROCEDURE — 82330 ASSAY OF CALCIUM: CPT | Performed by: INTERNAL MEDICINE

## 2024-01-01 PROCEDURE — 71250 CT THORAX DX C-: CPT

## 2024-01-01 PROCEDURE — 83735 ASSAY OF MAGNESIUM: CPT | Performed by: INTERNAL MEDICINE

## 2024-01-01 PROCEDURE — 85610 PROTHROMBIN TIME: CPT | Performed by: INTERNAL MEDICINE

## 2024-01-01 PROCEDURE — 93016 CV STRESS TEST SUPVJ ONLY: CPT | Performed by: INTERNAL MEDICINE

## 2024-01-01 PROCEDURE — 99214 OFFICE O/P EST MOD 30 MIN: CPT

## 2024-01-01 PROCEDURE — G0296 VISIT TO DETERM LDCT ELIG: HCPCS | Mod: 93 | Performed by: FAMILY MEDICINE

## 2024-01-01 PROCEDURE — 88307 TISSUE EXAM BY PATHOLOGIST: CPT | Mod: 26 | Performed by: PATHOLOGY

## 2024-01-01 PROCEDURE — 85014 HEMATOCRIT: CPT

## 2024-01-01 PROCEDURE — 78452 HT MUSCLE IMAGE SPECT MULT: CPT

## 2024-01-01 PROCEDURE — 80048 BASIC METABOLIC PNL TOTAL CA: CPT | Performed by: INTERNAL MEDICINE

## 2024-01-01 PROCEDURE — 86901 BLOOD TYPING SEROLOGIC RH(D): CPT | Performed by: EMERGENCY MEDICINE

## 2024-01-01 PROCEDURE — 97116 GAIT TRAINING THERAPY: CPT | Mod: GP

## 2024-01-01 PROCEDURE — 73502 X-RAY EXAM HIP UNI 2-3 VIEWS: CPT | Mod: TC | Performed by: RADIOLOGY

## 2024-01-01 PROCEDURE — 84155 ASSAY OF PROTEIN SERUM: CPT

## 2024-01-01 PROCEDURE — 360N000075 HC SURGERY LEVEL 2, PER MIN: Performed by: SURGERY

## 2024-01-01 PROCEDURE — 85610 PROTHROMBIN TIME: CPT | Performed by: SURGERY

## 2024-01-01 PROCEDURE — 87637 SARSCOV2&INF A&B&RSV AMP PRB: CPT

## 2024-01-01 PROCEDURE — 99223 1ST HOSP IP/OBS HIGH 75: CPT | Performed by: INTERNAL MEDICINE

## 2024-01-01 PROCEDURE — 85025 COMPLETE CBC W/AUTO DIFF WBC: CPT | Performed by: EMERGENCY MEDICINE

## 2024-01-01 PROCEDURE — 82570 ASSAY OF URINE CREATININE: CPT

## 2024-01-01 PROCEDURE — 85025 COMPLETE CBC W/AUTO DIFF WBC: CPT | Performed by: STUDENT IN AN ORGANIZED HEALTH CARE EDUCATION/TRAINING PROGRAM

## 2024-01-01 PROCEDURE — 82310 ASSAY OF CALCIUM: CPT

## 2024-01-01 PROCEDURE — 94726 PLETHYSMOGRAPHY LUNG VOLUMES: CPT

## 2024-01-01 PROCEDURE — 250N000009 HC RX 250: Performed by: PHYSICIAN ASSISTANT

## 2024-01-01 PROCEDURE — 250N000009 HC RX 250

## 2024-01-01 PROCEDURE — 72100 X-RAY EXAM L-S SPINE 2/3 VWS: CPT | Mod: TC | Performed by: RADIOLOGY

## 2024-01-01 PROCEDURE — 85610 PROTHROMBIN TIME: CPT | Performed by: EMERGENCY MEDICINE

## 2024-01-01 PROCEDURE — 250N000011 HC RX IP 250 OP 636: Performed by: EMERGENCY MEDICINE

## 2024-01-01 PROCEDURE — 99213 OFFICE O/P EST LOW 20 MIN: CPT | Performed by: SURGERY

## 2024-01-01 PROCEDURE — 83970 ASSAY OF PARATHORMONE: CPT | Performed by: INTERNAL MEDICINE

## 2024-01-01 PROCEDURE — 85610 PROTHROMBIN TIME: CPT | Mod: ORL

## 2024-01-01 PROCEDURE — 85025 COMPLETE CBC W/AUTO DIFF WBC: CPT

## 2024-01-01 PROCEDURE — 250N000013 HC RX MED GY IP 250 OP 250 PS 637: Performed by: ANESTHESIOLOGY

## 2024-01-01 PROCEDURE — 43235 EGD DIAGNOSTIC BRUSH WASH: CPT | Performed by: NURSE ANESTHETIST, CERTIFIED REGISTERED

## 2024-01-01 PROCEDURE — 72131 CT LUMBAR SPINE W/O DYE: CPT

## 2024-01-01 PROCEDURE — G0481 DRUG TEST DEF 8-14 CLASSES: HCPCS

## 2024-01-01 PROCEDURE — 36415 COLL VENOUS BLD VENIPUNCTURE: CPT | Performed by: INTERNAL MEDICINE

## 2024-01-01 PROCEDURE — 99232 SBSQ HOSP IP/OBS MODERATE 35: CPT | Performed by: INTERNAL MEDICINE

## 2024-01-01 PROCEDURE — 99204 OFFICE O/P NEW MOD 45 MIN: CPT | Performed by: STUDENT IN AN ORGANIZED HEALTH CARE EDUCATION/TRAINING PROGRAM

## 2024-01-01 PROCEDURE — 82330 ASSAY OF CALCIUM: CPT | Performed by: PHYSICIAN ASSISTANT

## 2024-01-01 PROCEDURE — 88342 IMHCHEM/IMCYTCHM 1ST ANTB: CPT | Mod: 26 | Performed by: PATHOLOGY

## 2024-01-01 PROCEDURE — 710N000012 HC RECOVERY PHASE 2, PER MINUTE: Performed by: SURGERY

## 2024-01-01 PROCEDURE — 710N000009 HC RECOVERY PHASE 1, LEVEL 1, PER MIN: Performed by: SURGERY

## 2024-01-01 PROCEDURE — 88341 IMHCHEM/IMCYTCHM EA ADD ANTB: CPT | Mod: TC | Performed by: INTERNAL MEDICINE

## 2024-01-01 PROCEDURE — 36415 COLL VENOUS BLD VENIPUNCTURE: CPT | Performed by: PHYSICIAN ASSISTANT

## 2024-01-01 PROCEDURE — 250N000009 HC RX 250: Performed by: EMERGENCY MEDICINE

## 2024-01-01 PROCEDURE — 85610 PROTHROMBIN TIME: CPT | Performed by: PHYSICIAN ASSISTANT

## 2024-01-01 PROCEDURE — 86301 IMMUNOASSAY TUMOR CA 19-9: CPT | Mod: 90 | Performed by: PATHOLOGY

## 2024-01-01 PROCEDURE — 250N000011 HC RX IP 250 OP 636: Performed by: RADIOLOGY

## 2024-01-01 PROCEDURE — 82803 BLOOD GASES ANY COMBINATION: CPT

## 2024-01-01 PROCEDURE — 36416 COLLJ CAPILLARY BLOOD SPEC: CPT

## 2024-01-01 PROCEDURE — 85004 AUTOMATED DIFF WBC COUNT: CPT | Performed by: EMERGENCY MEDICINE

## 2024-01-01 PROCEDURE — 99443 PR PHYSICIAN TELEPHONE EVALUATION 21-30 MIN: CPT | Mod: 93 | Performed by: FAMILY MEDICINE

## 2024-01-01 PROCEDURE — 250N000009 HC RX 250: Performed by: NURSE ANESTHETIST, CERTIFIED REGISTERED

## 2024-01-01 PROCEDURE — 250N000013 HC RX MED GY IP 250 OP 250 PS 637: Performed by: INTERNAL MEDICINE

## 2024-01-01 PROCEDURE — 250N000011 HC RX IP 250 OP 636: Mod: JZ | Performed by: PHYSICIAN ASSISTANT

## 2024-01-01 PROCEDURE — 97530 THERAPEUTIC ACTIVITIES: CPT | Mod: GP

## 2024-01-01 PROCEDURE — 99207 PR ANNUAL WELLNESS VISIT, PPS, SUBSEQUENT STAT: CPT | Mod: 93 | Performed by: FAMILY MEDICINE

## 2024-01-01 PROCEDURE — 250N000011 HC RX IP 250 OP 636: Performed by: NURSE ANESTHETIST, CERTIFIED REGISTERED

## 2024-01-01 PROCEDURE — 99310 SBSQ NF CARE HIGH MDM 45: CPT

## 2024-01-01 PROCEDURE — 99100 ANES PT EXTEME AGE<1 YR&>70: CPT | Performed by: INTERNAL MEDICINE

## 2024-01-01 PROCEDURE — 85027 COMPLETE CBC AUTOMATED: CPT | Performed by: PHYSICIAN ASSISTANT

## 2024-01-01 PROCEDURE — 82962 GLUCOSE BLOOD TEST: CPT

## 2024-01-01 PROCEDURE — 76705 ECHO EXAM OF ABDOMEN: CPT

## 2024-01-01 PROCEDURE — 99213 OFFICE O/P EST LOW 20 MIN: CPT

## 2024-01-01 PROCEDURE — 80053 COMPREHEN METABOLIC PANEL: CPT | Performed by: INTERNAL MEDICINE

## 2024-01-01 PROCEDURE — 96375 TX/PRO/DX INJ NEW DRUG ADDON: CPT

## 2024-01-01 PROCEDURE — 11426 EXC H-F-NK-SP B9+MARG >4 CM: CPT | Performed by: SURGERY

## 2024-01-01 PROCEDURE — 80048 BASIC METABOLIC PNL TOTAL CA: CPT | Performed by: PHYSICIAN ASSISTANT

## 2024-01-01 PROCEDURE — 83036 HEMOGLOBIN GLYCOSYLATED A1C: CPT

## 2024-01-01 PROCEDURE — 250N000011 HC RX IP 250 OP 636: Performed by: SURGERY

## 2024-01-01 PROCEDURE — 97165 OT EVAL LOW COMPLEX 30 MIN: CPT | Mod: GO | Performed by: OCCUPATIONAL THERAPIST

## 2024-01-01 PROCEDURE — 99285 EMERGENCY DEPT VISIT HI MDM: CPT

## 2024-01-01 PROCEDURE — 99000 SPECIMEN HANDLING OFFICE-LAB: CPT | Performed by: PATHOLOGY

## 2024-01-01 PROCEDURE — 82040 ASSAY OF SERUM ALBUMIN: CPT | Performed by: EMERGENCY MEDICINE

## 2024-01-01 PROCEDURE — 99232 SBSQ HOSP IP/OBS MODERATE 35: CPT | Performed by: STUDENT IN AN ORGANIZED HEALTH CARE EDUCATION/TRAINING PROGRAM

## 2024-01-01 PROCEDURE — 96361 HYDRATE IV INFUSION ADD-ON: CPT

## 2024-01-01 PROCEDURE — 82140 ASSAY OF AMMONIA: CPT | Performed by: STUDENT IN AN ORGANIZED HEALTH CARE EDUCATION/TRAINING PROGRAM

## 2024-01-01 PROCEDURE — 85004 AUTOMATED DIFF WBC COUNT: CPT

## 2024-01-01 PROCEDURE — 86481 TB AG RESPONSE T-CELL SUSP: CPT | Mod: ORL

## 2024-01-01 PROCEDURE — 82248 BILIRUBIN DIRECT: CPT

## 2024-01-01 PROCEDURE — 70450 CT HEAD/BRAIN W/O DYE: CPT

## 2024-01-01 PROCEDURE — 250N000011 HC RX IP 250 OP 636: Performed by: INTERNAL MEDICINE

## 2024-01-01 PROCEDURE — 80053 COMPREHEN METABOLIC PANEL: CPT | Performed by: STUDENT IN AN ORGANIZED HEALTH CARE EDUCATION/TRAINING PROGRAM

## 2024-01-01 PROCEDURE — 84145 PROCALCITONIN (PCT): CPT

## 2024-01-01 PROCEDURE — 258N000003 HC RX IP 258 OP 636: Performed by: INTERNAL MEDICINE

## 2024-01-01 PROCEDURE — 258N000003 HC RX IP 258 OP 636: Mod: JZ | Performed by: ANESTHESIOLOGY

## 2024-01-01 PROCEDURE — 43235 EGD DIAGNOSTIC BRUSH WASH: CPT | Performed by: STUDENT IN AN ORGANIZED HEALTH CARE EDUCATION/TRAINING PROGRAM

## 2024-01-01 PROCEDURE — 85041 AUTOMATED RBC COUNT: CPT | Performed by: EMERGENCY MEDICINE

## 2024-01-01 PROCEDURE — 97535 SELF CARE MNGMENT TRAINING: CPT | Mod: GO | Performed by: OCCUPATIONAL THERAPIST

## 2024-01-01 PROCEDURE — 86900 BLOOD TYPING SEROLOGIC ABO: CPT | Performed by: EMERGENCY MEDICINE

## 2024-01-01 PROCEDURE — 88304 TISSUE EXAM BY PATHOLOGIST: CPT | Mod: 26 | Performed by: PATHOLOGY

## 2024-01-01 PROCEDURE — 80053 COMPREHEN METABOLIC PANEL: CPT

## 2024-01-01 PROCEDURE — 88341 IMHCHEM/IMCYTCHM EA ADD ANTB: CPT | Mod: 26 | Performed by: PATHOLOGY

## 2024-01-01 PROCEDURE — 94010 BREATHING CAPACITY TEST: CPT

## 2024-01-01 PROCEDURE — 80061 LIPID PANEL: CPT

## 2024-01-01 PROCEDURE — 84132 ASSAY OF SERUM POTASSIUM: CPT | Performed by: STUDENT IN AN ORGANIZED HEALTH CARE EDUCATION/TRAINING PROGRAM

## 2024-01-01 PROCEDURE — 93005 ELECTROCARDIOGRAM TRACING: CPT

## 2024-01-01 PROCEDURE — 258N000003 HC RX IP 258 OP 636: Performed by: NURSE ANESTHETIST, CERTIFIED REGISTERED

## 2024-01-01 PROCEDURE — 99207 PR NO CHARGE NURSE ONLY: CPT

## 2024-01-01 PROCEDURE — 82105 ALPHA-FETOPROTEIN SERUM: CPT | Performed by: STUDENT IN AN ORGANIZED HEALTH CARE EDUCATION/TRAINING PROGRAM

## 2024-01-01 PROCEDURE — 272N000717 US BIOPSY LIVER

## 2024-01-01 PROCEDURE — 258N000003 HC RX IP 258 OP 636: Performed by: STUDENT IN AN ORGANIZED HEALTH CARE EDUCATION/TRAINING PROGRAM

## 2024-01-01 PROCEDURE — 96374 THER/PROPH/DIAG INJ IV PUSH: CPT

## 2024-01-01 PROCEDURE — A9502 TC99M TETROFOSMIN: HCPCS | Performed by: PHYSICIAN ASSISTANT

## 2024-01-01 PROCEDURE — 73030 X-RAY EXAM OF SHOULDER: CPT | Mod: TC | Performed by: RADIOLOGY

## 2024-01-01 PROCEDURE — 99233 SBSQ HOSP IP/OBS HIGH 50: CPT | Performed by: STUDENT IN AN ORGANIZED HEALTH CARE EDUCATION/TRAINING PROGRAM

## 2024-01-01 PROCEDURE — 81003 URINALYSIS AUTO W/O SCOPE: CPT

## 2024-01-01 PROCEDURE — 99207 PR NO CHARGE LOS: CPT | Performed by: PHARMACIST

## 2024-01-01 PROCEDURE — 272N000001 HC OR GENERAL SUPPLY STERILE: Performed by: SURGERY

## 2024-01-01 PROCEDURE — 84132 ASSAY OF SERUM POTASSIUM: CPT | Performed by: INTERNAL MEDICINE

## 2024-01-01 PROCEDURE — 99207 PR NO CHARGE LOS: CPT | Mod: 93 | Performed by: PHARMACIST

## 2024-01-01 PROCEDURE — 99239 HOSP IP/OBS DSCHRG MGMT >30: CPT | Performed by: STUDENT IN AN ORGANIZED HEALTH CARE EDUCATION/TRAINING PROGRAM

## 2024-01-01 PROCEDURE — 258N000003 HC RX IP 258 OP 636: Performed by: EMERGENCY MEDICINE

## 2024-01-01 PROCEDURE — 43235 EGD DIAGNOSTIC BRUSH WASH: CPT | Performed by: INTERNAL MEDICINE

## 2024-01-01 PROCEDURE — 82140 ASSAY OF AMMONIA: CPT | Performed by: EMERGENCY MEDICINE

## 2024-01-01 PROCEDURE — 88344 IMHCHEM/IMCYTCHM EA MLT ANTB: CPT | Mod: 26 | Performed by: PATHOLOGY

## 2024-01-01 PROCEDURE — 82374 ASSAY BLOOD CARBON DIOXIDE: CPT | Performed by: EMERGENCY MEDICINE

## 2024-01-01 PROCEDURE — 88304 TISSUE EXAM BY PATHOLOGIST: CPT | Mod: TC | Performed by: SURGERY

## 2024-01-01 PROCEDURE — 78452 HT MUSCLE IMAGE SPECT MULT: CPT | Mod: 26 | Performed by: INTERNAL MEDICINE

## 2024-01-01 PROCEDURE — 97535 SELF CARE MNGMENT TRAINING: CPT | Mod: GO

## 2024-01-01 PROCEDURE — 93000 ELECTROCARDIOGRAM COMPLETE: CPT | Performed by: PHYSICIAN ASSISTANT

## 2024-01-01 PROCEDURE — 80053 COMPREHEN METABOLIC PANEL: CPT | Performed by: PATHOLOGY

## 2024-01-01 PROCEDURE — 97161 PT EVAL LOW COMPLEX 20 MIN: CPT | Mod: GP

## 2024-01-01 PROCEDURE — 370N000017 HC ANESTHESIA TECHNICAL FEE, PER MIN: Performed by: STUDENT IN AN ORGANIZED HEALTH CARE EDUCATION/TRAINING PROGRAM

## 2024-01-01 PROCEDURE — 85027 COMPLETE CBC AUTOMATED: CPT | Performed by: INTERNAL MEDICINE

## 2024-01-01 PROCEDURE — 85610 PROTHROMBIN TIME: CPT | Performed by: PATHOLOGY

## 2024-01-01 PROCEDURE — 250N000011 HC RX IP 250 OP 636

## 2024-01-01 PROCEDURE — 99232 SBSQ HOSP IP/OBS MODERATE 35: CPT | Performed by: PHYSICIAN ASSISTANT

## 2024-01-01 PROCEDURE — 250N000012 HC RX MED GY IP 250 OP 636 PS 637: Performed by: INTERNAL MEDICINE

## 2024-01-01 PROCEDURE — 999N000141 HC STATISTIC PRE-PROCEDURE NURSING ASSESSMENT: Performed by: SURGERY

## 2024-01-01 PROCEDURE — 83690 ASSAY OF LIPASE: CPT | Performed by: EMERGENCY MEDICINE

## 2024-01-01 PROCEDURE — 85027 COMPLETE CBC AUTOMATED: CPT

## 2024-01-01 PROCEDURE — 82947 ASSAY GLUCOSE BLOOD QUANT: CPT

## 2024-01-01 PROCEDURE — 85018 HEMOGLOBIN: CPT | Performed by: EMERGENCY MEDICINE

## 2024-01-01 PROCEDURE — 12032 INTMD RPR S/A/T/EXT 2.6-7.5: CPT | Mod: 51 | Performed by: SURGERY

## 2024-01-01 PROCEDURE — 82397 CHEMILUMINESCENT ASSAY: CPT | Performed by: INTERNAL MEDICINE

## 2024-01-01 PROCEDURE — 99442 PR PHYSICIAN TELEPHONE EVALUATION 11-20 MIN: CPT | Mod: 93 | Performed by: FAMILY MEDICINE

## 2024-01-01 PROCEDURE — 93018 CV STRESS TEST I&R ONLY: CPT | Performed by: INTERNAL MEDICINE

## 2024-01-01 PROCEDURE — G2211 COMPLEX E/M VISIT ADD ON: HCPCS

## 2024-01-01 PROCEDURE — 370N000017 HC ANESTHESIA TECHNICAL FEE, PER MIN: Performed by: SURGERY

## 2024-01-01 PROCEDURE — 343N000001 HC RX 343: Performed by: PHYSICIAN ASSISTANT

## 2024-01-01 PROCEDURE — 250N000011 HC RX IP 250 OP 636: Mod: JZ | Performed by: ANESTHESIOLOGY

## 2024-01-01 PROCEDURE — 250N000025 HC SEVOFLURANE, PER MIN: Performed by: SURGERY

## 2024-01-01 PROCEDURE — 80048 BASIC METABOLIC PNL TOTAL CA: CPT | Performed by: STUDENT IN AN ORGANIZED HEALTH CARE EDUCATION/TRAINING PROGRAM

## 2024-01-01 PROCEDURE — 81001 URINALYSIS AUTO W/SCOPE: CPT | Performed by: STUDENT IN AN ORGANIZED HEALTH CARE EDUCATION/TRAINING PROGRAM

## 2024-01-01 PROCEDURE — 80053 COMPREHEN METABOLIC PANEL: CPT | Performed by: EMERGENCY MEDICINE

## 2024-01-01 RX ORDER — SODIUM CHLORIDE, SODIUM LACTATE, POTASSIUM CHLORIDE, CALCIUM CHLORIDE 600; 310; 30; 20 MG/100ML; MG/100ML; MG/100ML; MG/100ML
INJECTION, SOLUTION INTRAVENOUS CONTINUOUS
Status: DISCONTINUED | OUTPATIENT
Start: 2024-01-01 | End: 2024-01-01 | Stop reason: HOSPADM

## 2024-01-01 RX ORDER — TAMSULOSIN HYDROCHLORIDE 0.4 MG/1
0.8 CAPSULE ORAL DAILY
Qty: 180 CAPSULE | Refills: 3 | Status: ON HOLD | OUTPATIENT
Start: 2024-01-01

## 2024-01-01 RX ORDER — BUSPIRONE HYDROCHLORIDE 5 MG/1
5 TABLET ORAL 2 TIMES DAILY
Qty: 180 TABLET | Refills: 0 | Status: ON HOLD | OUTPATIENT
Start: 2024-01-01

## 2024-01-01 RX ORDER — NALOXONE HYDROCHLORIDE 0.4 MG/ML
0.4 INJECTION, SOLUTION INTRAMUSCULAR; INTRAVENOUS; SUBCUTANEOUS
Status: CANCELLED | OUTPATIENT
Start: 2024-01-01

## 2024-01-01 RX ORDER — PROPOFOL 10 MG/ML
INJECTION, EMULSION INTRAVENOUS PRN
Status: DISCONTINUED | OUTPATIENT
Start: 2024-01-01 | End: 2024-01-01

## 2024-01-01 RX ORDER — FINASTERIDE 5 MG/1
TABLET, FILM COATED ORAL
Qty: 90 TABLET | Refills: 1 | OUTPATIENT
Start: 2024-01-01

## 2024-01-01 RX ORDER — LIDOCAINE HYDROCHLORIDE 20 MG/ML
INJECTION, SOLUTION INFILTRATION; PERINEURAL PRN
Status: DISCONTINUED | OUTPATIENT
Start: 2024-01-01 | End: 2024-01-01

## 2024-01-01 RX ORDER — LORAZEPAM 0.5 MG/1
0.5 TABLET ORAL EVERY 4 HOURS PRN
Status: DISCONTINUED | OUTPATIENT
Start: 2024-01-01 | End: 2024-01-01

## 2024-01-01 RX ORDER — FENTANYL CITRATE 50 UG/ML
INJECTION, SOLUTION INTRAMUSCULAR; INTRAVENOUS PRN
Status: DISCONTINUED | OUTPATIENT
Start: 2024-01-01 | End: 2024-01-01 | Stop reason: HOSPADM

## 2024-01-01 RX ORDER — NALOXONE HYDROCHLORIDE 0.4 MG/ML
0.2 INJECTION, SOLUTION INTRAMUSCULAR; INTRAVENOUS; SUBCUTANEOUS
Status: DISCONTINUED | OUTPATIENT
Start: 2024-01-01 | End: 2024-01-01 | Stop reason: HOSPADM

## 2024-01-01 RX ORDER — ALBUTEROL SULFATE 90 UG/1
2 AEROSOL, METERED RESPIRATORY (INHALATION) EVERY 6 HOURS PRN
Qty: 18 G | Refills: 2 | Status: ON HOLD | OUTPATIENT
Start: 2024-01-01

## 2024-01-01 RX ORDER — ONDANSETRON 2 MG/ML
4 INJECTION INTRAMUSCULAR; INTRAVENOUS EVERY 6 HOURS PRN
Status: DISCONTINUED | OUTPATIENT
Start: 2024-01-01 | End: 2024-01-01 | Stop reason: HOSPADM

## 2024-01-01 RX ORDER — OMEPRAZOLE 40 MG/1
40 CAPSULE, DELAYED RELEASE ORAL DAILY
Qty: 90 CAPSULE | Refills: 3 | Status: SHIPPED | OUTPATIENT
Start: 2024-01-01 | End: 2024-01-01

## 2024-01-01 RX ORDER — OXYCODONE HYDROCHLORIDE 5 MG/1
5 TABLET ORAL EVERY 4 HOURS PRN
Qty: 8 TABLET | Refills: 0 | Status: SHIPPED | OUTPATIENT
Start: 2024-01-01

## 2024-01-01 RX ORDER — METFORMIN HCL 500 MG
2000 TABLET, EXTENDED RELEASE 24 HR ORAL
Qty: 360 TABLET | Refills: 3 | Status: ON HOLD | OUTPATIENT
Start: 2024-01-01

## 2024-01-01 RX ORDER — FINASTERIDE 5 MG/1
5 TABLET, FILM COATED ORAL DAILY
Status: DISCONTINUED | OUTPATIENT
Start: 2024-01-01 | End: 2024-01-01 | Stop reason: HOSPADM

## 2024-01-01 RX ORDER — TAMSULOSIN HYDROCHLORIDE 0.4 MG/1
0.8 CAPSULE ORAL DAILY
Status: DISCONTINUED | OUTPATIENT
Start: 2024-01-01 | End: 2024-01-01 | Stop reason: HOSPADM

## 2024-01-01 RX ORDER — ACETAMINOPHEN 325 MG/1
650 TABLET ORAL
Status: DISCONTINUED | OUTPATIENT
Start: 2024-01-01 | End: 2024-01-01 | Stop reason: HOSPADM

## 2024-01-01 RX ORDER — LIDOCAINE 40 MG/G
CREAM TOPICAL
Status: DISCONTINUED | OUTPATIENT
Start: 2024-01-01 | End: 2024-01-01 | Stop reason: HOSPADM

## 2024-01-01 RX ORDER — ACETAMINOPHEN 325 MG/1
650 TABLET ORAL 3 TIMES DAILY
COMMUNITY
Start: 2024-01-01

## 2024-01-01 RX ORDER — NALOXONE HYDROCHLORIDE 0.4 MG/ML
0.2 INJECTION, SOLUTION INTRAMUSCULAR; INTRAVENOUS; SUBCUTANEOUS
Status: CANCELLED | OUTPATIENT
Start: 2024-01-01

## 2024-01-01 RX ORDER — CAFFEINE CITRATE 20 MG/ML
60 SOLUTION INTRAVENOUS
Status: DISCONTINUED | OUTPATIENT
Start: 2024-01-01 | End: 2024-01-01 | Stop reason: HOSPADM

## 2024-01-01 RX ORDER — GLIPIZIDE 5 MG/1
5 TABLET, FILM COATED, EXTENDED RELEASE ORAL DAILY
Qty: 90 TABLET | Refills: 0 | Status: SHIPPED | OUTPATIENT
Start: 2024-01-01 | End: 2024-01-01

## 2024-01-01 RX ORDER — NALOXONE HYDROCHLORIDE 0.4 MG/ML
0.4 INJECTION, SOLUTION INTRAMUSCULAR; INTRAVENOUS; SUBCUTANEOUS
Status: DISCONTINUED | OUTPATIENT
Start: 2024-01-01 | End: 2024-01-01 | Stop reason: HOSPADM

## 2024-01-01 RX ORDER — PROCHLORPERAZINE MALEATE 5 MG
5 TABLET ORAL EVERY 6 HOURS PRN
Status: CANCELLED | OUTPATIENT
Start: 2024-01-01

## 2024-01-01 RX ORDER — NICOTINE POLACRILEX 4 MG
15-30 LOZENGE BUCCAL
Status: DISCONTINUED | OUTPATIENT
Start: 2024-01-01 | End: 2024-01-01 | Stop reason: HOSPADM

## 2024-01-01 RX ORDER — FLUTICASONE PROPIONATE 50 MCG
SPRAY, SUSPENSION (ML) NASAL
Qty: 48 ML | Refills: 1 | Status: CANCELLED | OUTPATIENT
Start: 2024-01-01

## 2024-01-01 RX ORDER — DULOXETINE HCL 60 MG
120 CAPSULE,DELAYED RELEASE (ENTERIC COATED) ORAL DAILY
Qty: 180 CAPSULE | Refills: 3 | Status: ON HOLD | OUTPATIENT
Start: 2024-01-01

## 2024-01-01 RX ORDER — FENTANYL CITRATE 50 UG/ML
50 INJECTION, SOLUTION INTRAMUSCULAR; INTRAVENOUS ONCE
Status: COMPLETED | OUTPATIENT
Start: 2024-01-01 | End: 2024-01-01

## 2024-01-01 RX ORDER — OXYCODONE HYDROCHLORIDE 5 MG/1
5 TABLET ORAL EVERY 6 HOURS PRN
Qty: 120 TABLET | Refills: 0 | Status: SHIPPED | OUTPATIENT
Start: 2024-01-01 | End: 2024-01-01

## 2024-01-01 RX ORDER — SALIVA STIMULANT COMB. NO.3
1 SPRAY, NON-AEROSOL (ML) MUCOUS MEMBRANE 4 TIMES DAILY
Status: DISCONTINUED | OUTPATIENT
Start: 2024-01-01 | End: 2024-01-01 | Stop reason: HOSPADM

## 2024-01-01 RX ORDER — DEXAMETHASONE SODIUM PHOSPHATE 4 MG/ML
4 INJECTION, SOLUTION INTRA-ARTICULAR; INTRALESIONAL; INTRAMUSCULAR; INTRAVENOUS; SOFT TISSUE
Status: DISCONTINUED | OUTPATIENT
Start: 2024-01-01 | End: 2024-01-01 | Stop reason: HOSPADM

## 2024-01-01 RX ORDER — ONDANSETRON 4 MG/1
4 TABLET, ORALLY DISINTEGRATING ORAL EVERY 6 HOURS PRN
Status: DISCONTINUED | OUTPATIENT
Start: 2024-01-01 | End: 2024-01-01 | Stop reason: HOSPADM

## 2024-01-01 RX ORDER — METOPROLOL SUCCINATE 100 MG/1
100 TABLET, EXTENDED RELEASE ORAL DAILY
Qty: 90 TABLET | Refills: 3 | Status: SHIPPED | OUTPATIENT
Start: 2024-01-01

## 2024-01-01 RX ORDER — OXYCODONE HYDROCHLORIDE 10 MG/1
10 TABLET ORAL EVERY 4 HOURS PRN
Status: DISCONTINUED | OUTPATIENT
Start: 2024-01-01 | End: 2024-01-01 | Stop reason: HOSPADM

## 2024-01-01 RX ORDER — BUSPIRONE HYDROCHLORIDE 5 MG/1
5 TABLET ORAL 2 TIMES DAILY
Qty: 180 TABLET | Refills: 0 | Status: SHIPPED | OUTPATIENT
Start: 2024-01-01 | End: 2024-01-01

## 2024-01-01 RX ORDER — HYDROMORPHONE HCL IN WATER/PF 6 MG/30 ML
0.2 PATIENT CONTROLLED ANALGESIA SYRINGE INTRAVENOUS EVERY 5 MIN PRN
Status: DISCONTINUED | OUTPATIENT
Start: 2024-01-01 | End: 2024-01-01 | Stop reason: HOSPADM

## 2024-01-01 RX ORDER — AMOXICILLIN 250 MG
1-2 CAPSULE ORAL DAILY PRN
Status: DISCONTINUED | OUTPATIENT
Start: 2024-01-01 | End: 2024-01-01

## 2024-01-01 RX ORDER — OXYCODONE HYDROCHLORIDE 10 MG/1
10 TABLET ORAL
Status: DISCONTINUED | OUTPATIENT
Start: 2024-01-01 | End: 2024-01-01 | Stop reason: HOSPADM

## 2024-01-01 RX ORDER — ONDANSETRON 2 MG/ML
4 INJECTION INTRAMUSCULAR; INTRAVENOUS
Status: CANCELLED | OUTPATIENT
Start: 2024-01-01

## 2024-01-01 RX ORDER — SIMVASTATIN 20 MG
20 TABLET ORAL AT BEDTIME
Qty: 90 TABLET | Refills: 0 | Status: ON HOLD | OUTPATIENT
Start: 2024-01-01

## 2024-01-01 RX ORDER — CEFAZOLIN SODIUM/WATER 2 G/20 ML
2 SYRINGE (ML) INTRAVENOUS SEE ADMIN INSTRUCTIONS
Status: DISCONTINUED | OUTPATIENT
Start: 2024-01-01 | End: 2024-01-01 | Stop reason: HOSPADM

## 2024-01-01 RX ORDER — OXYCODONE HYDROCHLORIDE 5 MG/1
5 TABLET ORAL EVERY 4 HOURS PRN
Status: DISCONTINUED | OUTPATIENT
Start: 2024-01-01 | End: 2024-01-01 | Stop reason: HOSPADM

## 2024-01-01 RX ORDER — LIDOCAINE 40 MG/G
CREAM TOPICAL
Status: CANCELLED | OUTPATIENT
Start: 2024-01-01

## 2024-01-01 RX ORDER — NALOXONE HYDROCHLORIDE 0.4 MG/ML
0.1 INJECTION, SOLUTION INTRAMUSCULAR; INTRAVENOUS; SUBCUTANEOUS
Status: DISCONTINUED | OUTPATIENT
Start: 2024-01-01 | End: 2024-01-01 | Stop reason: HOSPADM

## 2024-01-01 RX ORDER — FENTANYL CITRATE 50 UG/ML
50 INJECTION, SOLUTION INTRAMUSCULAR; INTRAVENOUS EVERY 5 MIN PRN
Status: DISCONTINUED | OUTPATIENT
Start: 2024-01-01 | End: 2024-01-01 | Stop reason: HOSPADM

## 2024-01-01 RX ORDER — POLYETHYLENE GLYCOL 3350 17 G/17G
1 POWDER, FOR SOLUTION ORAL DAILY
COMMUNITY

## 2024-01-01 RX ORDER — ONDANSETRON 4 MG/1
4 TABLET, ORALLY DISINTEGRATING ORAL EVERY 30 MIN PRN
Status: DISCONTINUED | OUTPATIENT
Start: 2024-01-01 | End: 2024-01-01 | Stop reason: HOSPADM

## 2024-01-01 RX ORDER — CEFAZOLIN SODIUM/WATER 2 G/20 ML
2 SYRINGE (ML) INTRAVENOUS
Status: COMPLETED | OUTPATIENT
Start: 2024-01-01 | End: 2024-01-01

## 2024-01-01 RX ORDER — BUSPIRONE HYDROCHLORIDE 5 MG/1
5 TABLET ORAL 2 TIMES DAILY
Status: DISCONTINUED | OUTPATIENT
Start: 2024-01-01 | End: 2024-01-01 | Stop reason: HOSPADM

## 2024-01-01 RX ORDER — AMOXICILLIN 250 MG
1 CAPSULE ORAL 2 TIMES DAILY PRN
Status: DISCONTINUED | OUTPATIENT
Start: 2024-01-01 | End: 2024-01-01 | Stop reason: HOSPADM

## 2024-01-01 RX ORDER — ONDANSETRON 2 MG/ML
4 INJECTION INTRAMUSCULAR; INTRAVENOUS EVERY 30 MIN PRN
Status: DISCONTINUED | OUTPATIENT
Start: 2024-01-01 | End: 2024-01-01 | Stop reason: HOSPADM

## 2024-01-01 RX ORDER — FAMOTIDINE 20 MG/1
20 TABLET, FILM COATED ORAL 2 TIMES DAILY PRN
Status: DISCONTINUED | OUTPATIENT
Start: 2024-01-01 | End: 2024-01-01 | Stop reason: HOSPADM

## 2024-01-01 RX ORDER — FAMOTIDINE 20 MG/1
20 TABLET, FILM COATED ORAL 2 TIMES DAILY PRN
Qty: 60 TABLET | Refills: 3 | Status: ON HOLD | OUTPATIENT
Start: 2024-01-01

## 2024-01-01 RX ORDER — WARFARIN SODIUM 5 MG/1
5 TABLET ORAL DAILY
DISCHARGE
Start: 2024-01-01 | End: 2024-01-01 | Stop reason: DRUGHIGH

## 2024-01-01 RX ORDER — SIMVASTATIN 20 MG
20 TABLET ORAL AT BEDTIME
Qty: 90 TABLET | Refills: 0 | Status: CANCELLED | OUTPATIENT
Start: 2024-01-01

## 2024-01-01 RX ORDER — FENTANYL CITRATE 50 UG/ML
INJECTION, SOLUTION INTRAMUSCULAR; INTRAVENOUS PRN
Status: DISCONTINUED | OUTPATIENT
Start: 2024-01-01 | End: 2024-01-01

## 2024-01-01 RX ORDER — PREGABALIN 75 MG/1
150 CAPSULE ORAL 2 TIMES DAILY
Status: DISCONTINUED | OUTPATIENT
Start: 2024-01-01 | End: 2024-01-01 | Stop reason: HOSPADM

## 2024-01-01 RX ORDER — WARFARIN SODIUM 5 MG/1
TABLET ORAL
Qty: 125 TABLET | Refills: 1 | Status: CANCELLED | OUTPATIENT
Start: 2024-01-01

## 2024-01-01 RX ORDER — FLUTICASONE PROPIONATE 50 MCG
SPRAY, SUSPENSION (ML) NASAL
Qty: 16 G | Refills: 1 | Status: SHIPPED | OUTPATIENT
Start: 2024-01-01 | End: 2024-01-01

## 2024-01-01 RX ORDER — OXYCODONE HYDROCHLORIDE 5 MG/1
5 TABLET ORAL
Status: DISCONTINUED | OUTPATIENT
Start: 2024-01-01 | End: 2024-01-01 | Stop reason: HOSPADM

## 2024-01-01 RX ORDER — PREGABALIN 100 MG/1
100 CAPSULE ORAL 2 TIMES DAILY
Status: DISCONTINUED | OUTPATIENT
Start: 2024-01-01 | End: 2024-01-01

## 2024-01-01 RX ORDER — PREGABALIN 150 MG/1
CAPSULE ORAL
Qty: 60 CAPSULE | Refills: 1 | Status: SHIPPED | OUTPATIENT
Start: 2024-01-01

## 2024-01-01 RX ORDER — OXYCODONE HYDROCHLORIDE 5 MG/1
5 TABLET ORAL EVERY 6 HOURS PRN
Qty: 30 TABLET | Refills: 0 | Status: SHIPPED | OUTPATIENT
Start: 2024-01-01

## 2024-01-01 RX ORDER — PANTOPRAZOLE SODIUM 40 MG/1
40 TABLET, DELAYED RELEASE ORAL DAILY
Qty: 90 TABLET | Refills: 1 | Status: ON HOLD | OUTPATIENT
Start: 2024-01-01 | End: 2024-01-01

## 2024-01-01 RX ORDER — REGADENOSON 0.08 MG/ML
0.4 INJECTION, SOLUTION INTRAVENOUS ONCE
Status: COMPLETED | OUTPATIENT
Start: 2024-01-01 | End: 2024-01-01

## 2024-01-01 RX ORDER — HYDROMORPHONE HYDROCHLORIDE 1 MG/ML
0.5 INJECTION, SOLUTION INTRAMUSCULAR; INTRAVENOUS; SUBCUTANEOUS EVERY 30 MIN PRN
Status: DISCONTINUED | OUTPATIENT
Start: 2024-01-01 | End: 2024-01-01 | Stop reason: HOSPADM

## 2024-01-01 RX ORDER — WARFARIN SODIUM 5 MG/1
TABLET ORAL
Qty: 140 TABLET | Refills: 1 | Status: ON HOLD | OUTPATIENT
Start: 2024-01-01 | End: 2024-01-01

## 2024-01-01 RX ORDER — ALBUTEROL SULFATE 90 UG/1
2 AEROSOL, METERED RESPIRATORY (INHALATION) EVERY 5 MIN PRN
Status: DISCONTINUED | OUTPATIENT
Start: 2024-01-01 | End: 2024-01-01 | Stop reason: HOSPADM

## 2024-01-01 RX ORDER — FLUTICASONE PROPIONATE 50 MCG
SPRAY, SUSPENSION (ML) NASAL
Qty: 48 ML | Refills: 1 | Status: SHIPPED | OUTPATIENT
Start: 2024-01-01 | End: 2024-01-01

## 2024-01-01 RX ORDER — AMOXICILLIN 250 MG
2 CAPSULE ORAL 2 TIMES DAILY PRN
Status: DISCONTINUED | OUTPATIENT
Start: 2024-01-01 | End: 2024-01-01 | Stop reason: HOSPADM

## 2024-01-01 RX ORDER — OXYCODONE HYDROCHLORIDE 5 MG/1
10 TABLET ORAL
Status: COMPLETED | OUTPATIENT
Start: 2024-01-01 | End: 2024-01-01

## 2024-01-01 RX ORDER — FLUMAZENIL 0.1 MG/ML
0.2 INJECTION, SOLUTION INTRAVENOUS
Status: CANCELLED | OUTPATIENT
Start: 2024-01-01 | End: 2024-01-01

## 2024-01-01 RX ORDER — ONDANSETRON 2 MG/ML
4 INJECTION INTRAMUSCULAR; INTRAVENOUS EVERY 6 HOURS PRN
Status: CANCELLED | OUTPATIENT
Start: 2024-01-01

## 2024-01-01 RX ORDER — PROPOFOL 10 MG/ML
INJECTION, EMULSION INTRAVENOUS CONTINUOUS PRN
Status: DISCONTINUED | OUTPATIENT
Start: 2024-01-01 | End: 2024-01-01

## 2024-01-01 RX ORDER — DEXTROSE MONOHYDRATE 25 G/50ML
25-50 INJECTION, SOLUTION INTRAVENOUS
Status: DISCONTINUED | OUTPATIENT
Start: 2024-01-01 | End: 2024-01-01 | Stop reason: HOSPADM

## 2024-01-01 RX ORDER — BUSPIRONE HYDROCHLORIDE 5 MG/1
5 TABLET ORAL 2 TIMES DAILY
Qty: 180 TABLET | Refills: 0 | Status: CANCELLED | OUTPATIENT
Start: 2024-01-01

## 2024-01-01 RX ORDER — FENTANYL CITRATE 50 UG/ML
25-50 INJECTION, SOLUTION INTRAMUSCULAR; INTRAVENOUS EVERY 5 MIN PRN
Status: DISCONTINUED | OUTPATIENT
Start: 2024-01-01 | End: 2024-01-01 | Stop reason: HOSPADM

## 2024-01-01 RX ORDER — FLUTICASONE PROPIONATE 50 MCG
SPRAY, SUSPENSION (ML) NASAL
Qty: 16 ML | Refills: 1 | Status: SHIPPED | OUTPATIENT
Start: 2024-01-01 | End: 2024-01-01

## 2024-01-01 RX ORDER — TIOTROPIUM BROMIDE INHALATION SPRAY 3.12 UG/1
2 SPRAY, METERED RESPIRATORY (INHALATION) DAILY
Qty: 12 G | Refills: 3 | Status: ON HOLD | OUTPATIENT
Start: 2024-01-01 | End: 2024-01-01

## 2024-01-01 RX ORDER — DULOXETIN HYDROCHLORIDE 60 MG/1
120 CAPSULE, DELAYED RELEASE ORAL DAILY
Qty: 180 CAPSULE | Refills: 3 | Status: SHIPPED | OUTPATIENT
Start: 2024-01-01 | End: 2024-01-01

## 2024-01-01 RX ORDER — SODIUM CHLORIDE 9 MG/ML
INJECTION, SOLUTION INTRAVENOUS CONTINUOUS
Status: DISCONTINUED | OUTPATIENT
Start: 2024-01-01 | End: 2024-01-01

## 2024-01-01 RX ORDER — OXYCODONE HYDROCHLORIDE 5 MG/1
5 TABLET ORAL EVERY 6 HOURS PRN
Qty: 8 TABLET | Refills: 0 | Status: SHIPPED | OUTPATIENT
Start: 2024-01-01 | End: 2024-01-01

## 2024-01-01 RX ORDER — FINASTERIDE 5 MG/1
TABLET, FILM COATED ORAL
Qty: 90 TABLET | Refills: 2 | Status: ON HOLD | OUTPATIENT
Start: 2024-01-01

## 2024-01-01 RX ORDER — PANTOPRAZOLE SODIUM 40 MG/1
40 TABLET, DELAYED RELEASE ORAL DAILY
Status: DISCONTINUED | OUTPATIENT
Start: 2024-01-01 | End: 2024-01-01 | Stop reason: HOSPADM

## 2024-01-01 RX ORDER — AMINOPHYLLINE 25 MG/ML
50-100 INJECTION, SOLUTION INTRAVENOUS
Status: DISCONTINUED | OUTPATIENT
Start: 2024-01-01 | End: 2024-01-01 | Stop reason: HOSPADM

## 2024-01-01 RX ORDER — FLUMAZENIL 0.1 MG/ML
0.2 INJECTION, SOLUTION INTRAVENOUS
Status: CANCELLED | OUTPATIENT
Start: 2024-01-01

## 2024-01-01 RX ORDER — ONDANSETRON 4 MG/1
4 TABLET, ORALLY DISINTEGRATING ORAL EVERY 6 HOURS PRN
Status: CANCELLED | OUTPATIENT
Start: 2024-01-01

## 2024-01-01 RX ORDER — PANTOPRAZOLE SODIUM 40 MG/1
40 TABLET, DELAYED RELEASE ORAL DAILY
Qty: 90 TABLET | Refills: 1 | Status: SHIPPED | OUTPATIENT
Start: 2024-01-01

## 2024-01-01 RX ORDER — WARFARIN SODIUM 5 MG/1
5 TABLET ORAL
Status: COMPLETED | OUTPATIENT
Start: 2024-01-01 | End: 2024-01-01

## 2024-01-01 RX ORDER — POLYETHYLENE GLYCOL 3350 17 G/17G
17 POWDER, FOR SOLUTION ORAL DAILY
Status: DISCONTINUED | OUTPATIENT
Start: 2024-01-01 | End: 2024-01-01 | Stop reason: HOSPADM

## 2024-01-01 RX ORDER — PREGABALIN 150 MG/1
150 CAPSULE ORAL 2 TIMES DAILY
Qty: 10 CAPSULE | Refills: 0 | Status: SHIPPED | DISCHARGE
Start: 2024-01-01 | End: 2024-01-01

## 2024-01-01 RX ORDER — NICOTINE POLACRILEX 4 MG
15-30 LOZENGE BUCCAL
Status: DISCONTINUED | OUTPATIENT
Start: 2024-01-01 | End: 2024-01-01

## 2024-01-01 RX ORDER — IOPAMIDOL 755 MG/ML
500 INJECTION, SOLUTION INTRAVASCULAR ONCE
Status: COMPLETED | OUTPATIENT
Start: 2024-01-01 | End: 2024-01-01

## 2024-01-01 RX ORDER — BISACODYL 5 MG/1
TABLET, DELAYED RELEASE ORAL
Qty: 4 TABLET | Refills: 0 | Status: SHIPPED | OUTPATIENT
Start: 2024-01-01 | End: 2024-01-01

## 2024-01-01 RX ORDER — DEXAMETHASONE SODIUM PHOSPHATE 4 MG/ML
INJECTION, SOLUTION INTRA-ARTICULAR; INTRALESIONAL; INTRAMUSCULAR; INTRAVENOUS; SOFT TISSUE PRN
Status: DISCONTINUED | OUTPATIENT
Start: 2024-01-01 | End: 2024-01-01

## 2024-01-01 RX ORDER — OXYCODONE HYDROCHLORIDE 5 MG/1
5 TABLET ORAL EVERY 6 HOURS PRN
Qty: 7 TABLET | Refills: 0 | Status: SHIPPED | OUTPATIENT
Start: 2024-01-01 | End: 2024-01-01

## 2024-01-01 RX ORDER — SIMVASTATIN 20 MG
20 TABLET ORAL AT BEDTIME
Status: DISCONTINUED | OUTPATIENT
Start: 2024-01-01 | End: 2024-01-01 | Stop reason: HOSPADM

## 2024-01-01 RX ORDER — FAMOTIDINE 20 MG/1
20 TABLET, FILM COATED ORAL 2 TIMES DAILY PRN
Qty: 60 TABLET | Refills: 1 | Status: SHIPPED | OUTPATIENT
Start: 2024-01-01 | End: 2024-01-01

## 2024-01-01 RX ORDER — ACETAMINOPHEN 325 MG/1
650 TABLET ORAL EVERY 4 HOURS PRN
OUTPATIENT
Start: 2024-01-01

## 2024-01-01 RX ORDER — DULOXETIN HYDROCHLORIDE 60 MG/1
120 CAPSULE, DELAYED RELEASE ORAL DAILY
Status: DISCONTINUED | OUTPATIENT
Start: 2024-01-01 | End: 2024-01-01 | Stop reason: HOSPADM

## 2024-01-01 RX ORDER — ONDANSETRON 2 MG/ML
INJECTION INTRAMUSCULAR; INTRAVENOUS PRN
Status: DISCONTINUED | OUTPATIENT
Start: 2024-01-01 | End: 2024-01-01

## 2024-01-01 RX ORDER — OXYCODONE HYDROCHLORIDE 5 MG/1
5 TABLET ORAL EVERY 6 HOURS PRN
Qty: 120 TABLET | Refills: 0 | Status: ON HOLD | OUTPATIENT
Start: 2024-01-01

## 2024-01-01 RX ORDER — TROLAMINE SALICYLATE 10 G/100G
CREAM TOPICAL 3 TIMES DAILY
COMMUNITY
Start: 2024-01-01

## 2024-01-01 RX ORDER — FENTANYL CITRATE 50 UG/ML
25-50 INJECTION, SOLUTION INTRAMUSCULAR; INTRAVENOUS EVERY 5 MIN PRN
Status: CANCELLED | OUTPATIENT
Start: 2024-01-01

## 2024-01-01 RX ORDER — ACYCLOVIR 200 MG/1
0-1 CAPSULE ORAL
Status: DISCONTINUED | OUTPATIENT
Start: 2024-01-01 | End: 2024-01-01 | Stop reason: HOSPADM

## 2024-01-01 RX ORDER — AMOXICILLIN 250 MG
1-2 CAPSULE ORAL DAILY PRN
Qty: 60 TABLET | Refills: 0 | Status: ON HOLD | OUTPATIENT
Start: 2024-01-01

## 2024-01-01 RX ORDER — PANTOPRAZOLE SODIUM 40 MG/1
40 TABLET, DELAYED RELEASE ORAL DAILY
Qty: 90 TABLET | Refills: 1 | Status: CANCELLED | OUTPATIENT
Start: 2024-01-01

## 2024-01-01 RX ORDER — PREGABALIN 100 MG/1
200 CAPSULE ORAL 2 TIMES DAILY
Qty: 360 CAPSULE | Refills: 1 | Status: ON HOLD | OUTPATIENT
Start: 2024-01-01

## 2024-01-01 RX ORDER — ACETAMINOPHEN 325 MG/1
975 TABLET ORAL EVERY 12 HOURS
Status: DISCONTINUED | OUTPATIENT
Start: 2024-01-01 | End: 2024-01-01 | Stop reason: HOSPADM

## 2024-01-01 RX ORDER — PREGABALIN 100 MG/1
200 CAPSULE ORAL 2 TIMES DAILY
Qty: 360 CAPSULE | Refills: 1 | Status: CANCELLED | OUTPATIENT
Start: 2024-01-01

## 2024-01-01 RX ORDER — SODIUM CHLORIDE, SODIUM LACTATE, POTASSIUM CHLORIDE, CALCIUM CHLORIDE 600; 310; 30; 20 MG/100ML; MG/100ML; MG/100ML; MG/100ML
INJECTION, SOLUTION INTRAVENOUS CONTINUOUS PRN
Status: DISCONTINUED | OUTPATIENT
Start: 2024-01-01 | End: 2024-01-01

## 2024-01-01 RX ORDER — BUSPIRONE HYDROCHLORIDE 5 MG/1
5 TABLET ORAL 2 TIMES DAILY
Qty: 180 TABLET | Refills: 0 | Status: SHIPPED | OUTPATIENT
Start: 2024-01-01

## 2024-01-01 RX ORDER — DEXTROSE MONOHYDRATE 25 G/50ML
25-50 INJECTION, SOLUTION INTRAVENOUS
Status: DISCONTINUED | OUTPATIENT
Start: 2024-01-01 | End: 2024-01-01

## 2024-01-01 RX ORDER — METOPROLOL SUCCINATE 100 MG/1
100 TABLET, EXTENDED RELEASE ORAL DAILY
Status: DISCONTINUED | OUTPATIENT
Start: 2024-01-01 | End: 2024-01-01 | Stop reason: HOSPADM

## 2024-01-01 RX ORDER — FENTANYL CITRATE 50 UG/ML
25 INJECTION, SOLUTION INTRAMUSCULAR; INTRAVENOUS EVERY 5 MIN PRN
Status: DISCONTINUED | OUTPATIENT
Start: 2024-01-01 | End: 2024-01-01 | Stop reason: HOSPADM

## 2024-01-01 RX ORDER — HALOPERIDOL 5 MG/ML
2 INJECTION INTRAMUSCULAR EVERY 6 HOURS PRN
Status: DISCONTINUED | OUTPATIENT
Start: 2024-01-01 | End: 2024-01-01 | Stop reason: HOSPADM

## 2024-01-01 RX ORDER — WARFARIN SODIUM 5 MG/1
TABLET ORAL
Qty: 125 TABLET | Refills: 1 | Status: SHIPPED | OUTPATIENT
Start: 2024-01-01 | End: 2024-01-01

## 2024-01-01 RX ORDER — HYDROMORPHONE HCL IN WATER/PF 6 MG/30 ML
0.4 PATIENT CONTROLLED ANALGESIA SYRINGE INTRAVENOUS EVERY 5 MIN PRN
Status: DISCONTINUED | OUTPATIENT
Start: 2024-01-01 | End: 2024-01-01 | Stop reason: HOSPADM

## 2024-01-01 RX ORDER — FLUMAZENIL 0.1 MG/ML
0.2 INJECTION, SOLUTION INTRAVENOUS
Status: DISCONTINUED | OUTPATIENT
Start: 2024-01-01 | End: 2024-01-01 | Stop reason: HOSPADM

## 2024-01-01 RX ADMIN — PREGABALIN 150 MG: 75 CAPSULE ORAL at 10:35

## 2024-01-01 RX ADMIN — DEXAMETHASONE SODIUM PHOSPHATE 4 MG: 4 INJECTION, SOLUTION INTRA-ARTICULAR; INTRALESIONAL; INTRAMUSCULAR; INTRAVENOUS; SOFT TISSUE at 11:31

## 2024-01-01 RX ADMIN — SODIUM CHLORIDE 1000 ML: 9 INJECTION, SOLUTION INTRAVENOUS at 14:53

## 2024-01-01 RX ADMIN — METOPROLOL SUCCINATE 100 MG: 100 TABLET, EXTENDED RELEASE ORAL at 08:56

## 2024-01-01 RX ADMIN — ROCURONIUM BROMIDE 40 MG: 50 INJECTION, SOLUTION INTRAVENOUS at 11:33

## 2024-01-01 RX ADMIN — BUSPIRONE HYDROCHLORIDE 5 MG: 5 TABLET ORAL at 08:25

## 2024-01-01 RX ADMIN — Medication 1 SPRAY: at 08:23

## 2024-01-01 RX ADMIN — TAMSULOSIN HYDROCHLORIDE 0.8 MG: 0.4 CAPSULE ORAL at 09:48

## 2024-01-01 RX ADMIN — BUSPIRONE HYDROCHLORIDE 5 MG: 5 TABLET ORAL at 10:17

## 2024-01-01 RX ADMIN — TAMSULOSIN HYDROCHLORIDE 0.8 MG: 0.4 CAPSULE ORAL at 08:50

## 2024-01-01 RX ADMIN — METOPROLOL SUCCINATE 100 MG: 100 TABLET, EXTENDED RELEASE ORAL at 10:15

## 2024-01-01 RX ADMIN — SODIUM CHLORIDE: 9 INJECTION, SOLUTION INTRAVENOUS at 07:34

## 2024-01-01 RX ADMIN — DULOXETINE HYDROCHLORIDE 120 MG: 60 CAPSULE, DELAYED RELEASE ORAL at 08:24

## 2024-01-01 RX ADMIN — POLYETHYLENE GLYCOL 3350 17 G: 17 POWDER, FOR SOLUTION ORAL at 08:23

## 2024-01-01 RX ADMIN — SODIUM CHLORIDE: 9 INJECTION, SOLUTION INTRAVENOUS at 11:15

## 2024-01-01 RX ADMIN — FINASTERIDE 5 MG: 5 TABLET, FILM COATED ORAL at 09:00

## 2024-01-01 RX ADMIN — Medication 1 SPRAY: at 20:46

## 2024-01-01 RX ADMIN — SODIUM CHLORIDE: 9 INJECTION, SOLUTION INTRAVENOUS at 19:21

## 2024-01-01 RX ADMIN — PHENYLEPHRINE HYDROCHLORIDE 150 MCG: 10 INJECTION INTRAVENOUS at 12:22

## 2024-01-01 RX ADMIN — SODIUM CHLORIDE: 9 INJECTION, SOLUTION INTRAVENOUS at 17:12

## 2024-01-01 RX ADMIN — ACETAMINOPHEN 325MG 975 MG: 325 TABLET ORAL at 09:04

## 2024-01-01 RX ADMIN — PREGABALIN 150 MG: 75 CAPSULE ORAL at 19:54

## 2024-01-01 RX ADMIN — PROPOFOL 150 MCG/KG/MIN: 10 INJECTION, EMULSION INTRAVENOUS at 12:25

## 2024-01-01 RX ADMIN — PREGABALIN 150 MG: 75 CAPSULE ORAL at 09:23

## 2024-01-01 RX ADMIN — BUSPIRONE HYDROCHLORIDE 5 MG: 5 TABLET ORAL at 10:52

## 2024-01-01 RX ADMIN — REGADENOSON 0.4 MG: 0.08 INJECTION, SOLUTION INTRAVENOUS at 10:44

## 2024-01-01 RX ADMIN — PANTOPRAZOLE SODIUM 40 MG: 40 TABLET, DELAYED RELEASE ORAL at 09:03

## 2024-01-01 RX ADMIN — OXYCODONE HYDROCHLORIDE 10 MG: 10 TABLET ORAL at 12:59

## 2024-01-01 RX ADMIN — ACETAMINOPHEN 325MG 975 MG: 325 TABLET ORAL at 08:50

## 2024-01-01 RX ADMIN — TETROFOSMIN 11.81 MILLICURIE: 1.38 INJECTION, POWDER, LYOPHILIZED, FOR SOLUTION INTRAVENOUS at 10:46

## 2024-01-01 RX ADMIN — Medication 1 SPRAY: at 09:15

## 2024-01-01 RX ADMIN — ACETAMINOPHEN 325MG 975 MG: 325 TABLET ORAL at 21:31

## 2024-01-01 RX ADMIN — BUSPIRONE HYDROCHLORIDE 5 MG: 5 TABLET ORAL at 19:39

## 2024-01-01 RX ADMIN — OXYCODONE HYDROCHLORIDE 5 MG: 5 TABLET ORAL at 08:50

## 2024-01-01 RX ADMIN — Medication 1 SPRAY: at 19:58

## 2024-01-01 RX ADMIN — SIMVASTATIN 20 MG: 20 TABLET, FILM COATED ORAL at 22:37

## 2024-01-01 RX ADMIN — LIDOCAINE HYDROCHLORIDE 100 MG: 20 INJECTION, SOLUTION INFILTRATION; PERINEURAL at 12:25

## 2024-01-01 RX ADMIN — FENTANYL CITRATE 100 MCG: 50 INJECTION INTRAMUSCULAR; INTRAVENOUS at 11:31

## 2024-01-01 RX ADMIN — ACETAMINOPHEN 325MG 975 MG: 325 TABLET ORAL at 19:10

## 2024-01-01 RX ADMIN — Medication 1 SPRAY: at 09:49

## 2024-01-01 RX ADMIN — BUSPIRONE HYDROCHLORIDE 5 MG: 5 TABLET ORAL at 09:49

## 2024-01-01 RX ADMIN — DULOXETINE HYDROCHLORIDE 120 MG: 60 CAPSULE, DELAYED RELEASE ORAL at 09:55

## 2024-01-01 RX ADMIN — BUSPIRONE HYDROCHLORIDE 5 MG: 5 TABLET ORAL at 08:58

## 2024-01-01 RX ADMIN — ONDANSETRON 4 MG: 2 INJECTION INTRAMUSCULAR; INTRAVENOUS at 16:20

## 2024-01-01 RX ADMIN — SODIUM CHLORIDE 55 ML: 9 INJECTION, SOLUTION INTRAVENOUS at 10:10

## 2024-01-01 RX ADMIN — FENTANYL CITRATE 50 MCG: 50 INJECTION INTRAMUSCULAR; INTRAVENOUS at 10:17

## 2024-01-01 RX ADMIN — ACETAMINOPHEN 325MG 975 MG: 325 TABLET ORAL at 20:39

## 2024-01-01 RX ADMIN — METOPROLOL SUCCINATE 100 MG: 100 TABLET, EXTENDED RELEASE ORAL at 09:48

## 2024-01-01 RX ADMIN — PREGABALIN 150 MG: 75 CAPSULE ORAL at 20:33

## 2024-01-01 RX ADMIN — Medication 1 SPRAY: at 11:56

## 2024-01-01 RX ADMIN — Medication 1 SPRAY: at 12:18

## 2024-01-01 RX ADMIN — BUSPIRONE HYDROCHLORIDE 5 MG: 5 TABLET ORAL at 20:39

## 2024-01-01 RX ADMIN — SODIUM CHLORIDE 1000 ML: 9 INJECTION, SOLUTION INTRAVENOUS at 16:20

## 2024-01-01 RX ADMIN — SUGAMMADEX 200 MG: 100 INJECTION, SOLUTION INTRAVENOUS at 12:02

## 2024-01-01 RX ADMIN — ONDANSETRON 4 MG: 2 INJECTION INTRAMUSCULAR; INTRAVENOUS at 12:25

## 2024-01-01 RX ADMIN — SODIUM CHLORIDE: 9 INJECTION, SOLUTION INTRAVENOUS at 02:04

## 2024-01-01 RX ADMIN — FENTANYL CITRATE 50 MCG: 50 INJECTION, SOLUTION INTRAMUSCULAR; INTRAVENOUS at 09:43

## 2024-01-01 RX ADMIN — METOPROLOL SUCCINATE 100 MG: 100 TABLET, EXTENDED RELEASE ORAL at 08:49

## 2024-01-01 RX ADMIN — FINASTERIDE 5 MG: 5 TABLET, FILM COATED ORAL at 08:50

## 2024-01-01 RX ADMIN — DULOXETINE HYDROCHLORIDE 120 MG: 60 CAPSULE, DELAYED RELEASE ORAL at 08:58

## 2024-01-01 RX ADMIN — Medication 1 SPRAY: at 12:52

## 2024-01-01 RX ADMIN — HYDROMORPHONE HYDROCHLORIDE 0.5 MG: 1 INJECTION, SOLUTION INTRAMUSCULAR; INTRAVENOUS; SUBCUTANEOUS at 16:20

## 2024-01-01 RX ADMIN — FAMOTIDINE 20 MG: 20 TABLET, FILM COATED ORAL at 12:51

## 2024-01-01 RX ADMIN — BUSPIRONE HYDROCHLORIDE 5 MG: 5 TABLET ORAL at 20:41

## 2024-01-01 RX ADMIN — POLYETHYLENE GLYCOL 3350 17 G: 17 POWDER, FOR SOLUTION ORAL at 08:52

## 2024-01-01 RX ADMIN — METOPROLOL SUCCINATE 100 MG: 100 TABLET, EXTENDED RELEASE ORAL at 08:25

## 2024-01-01 RX ADMIN — Medication 1 SPRAY: at 10:17

## 2024-01-01 RX ADMIN — ACETAMINOPHEN 325MG 975 MG: 325 TABLET ORAL at 09:47

## 2024-01-01 RX ADMIN — TAMSULOSIN HYDROCHLORIDE 0.8 MG: 0.4 CAPSULE ORAL at 10:15

## 2024-01-01 RX ADMIN — Medication 1 SPRAY: at 17:34

## 2024-01-01 RX ADMIN — FINASTERIDE 5 MG: 5 TABLET, FILM COATED ORAL at 10:35

## 2024-01-01 RX ADMIN — PREGABALIN 150 MG: 75 CAPSULE ORAL at 21:36

## 2024-01-01 RX ADMIN — ACETAMINOPHEN 325MG 975 MG: 325 TABLET ORAL at 10:16

## 2024-01-01 RX ADMIN — SIMVASTATIN 20 MG: 20 TABLET, FILM COATED ORAL at 21:56

## 2024-01-01 RX ADMIN — WARFARIN SODIUM 5 MG: 5 TABLET ORAL at 17:26

## 2024-01-01 RX ADMIN — PANTOPRAZOLE SODIUM 40 MG: 40 TABLET, DELAYED RELEASE ORAL at 08:50

## 2024-01-01 RX ADMIN — LIDOCAINE HYDROCHLORIDE 10 ML: 10 INJECTION, SOLUTION EPIDURAL; INFILTRATION; INTRACAUDAL; PERINEURAL at 10:19

## 2024-01-01 RX ADMIN — SODIUM CHLORIDE, POTASSIUM CHLORIDE, SODIUM LACTATE AND CALCIUM CHLORIDE: 600; 310; 30; 20 INJECTION, SOLUTION INTRAVENOUS at 09:30

## 2024-01-01 RX ADMIN — WARFARIN SODIUM 5 MG: 5 TABLET ORAL at 18:44

## 2024-01-01 RX ADMIN — SIMVASTATIN 20 MG: 20 TABLET, FILM COATED ORAL at 21:31

## 2024-01-01 RX ADMIN — SODIUM CHLORIDE, POTASSIUM CHLORIDE, SODIUM LACTATE AND CALCIUM CHLORIDE: 600; 310; 30; 20 INJECTION, SOLUTION INTRAVENOUS at 12:13

## 2024-01-01 RX ADMIN — FINASTERIDE 5 MG: 5 TABLET, FILM COATED ORAL at 08:24

## 2024-01-01 RX ADMIN — IOPAMIDOL 100 ML: 755 INJECTION, SOLUTION INTRAVENOUS at 10:10

## 2024-01-01 RX ADMIN — Medication 1 SPRAY: at 17:15

## 2024-01-01 RX ADMIN — PROPOFOL 150 MG: 10 INJECTION, EMULSION INTRAVENOUS at 11:31

## 2024-01-01 RX ADMIN — PANTOPRAZOLE SODIUM 40 MG: 40 TABLET, DELAYED RELEASE ORAL at 09:48

## 2024-01-01 RX ADMIN — LIDOCAINE HYDROCHLORIDE 60 MG: 20 INJECTION, SOLUTION INFILTRATION; PERINEURAL at 11:31

## 2024-01-01 RX ADMIN — TAMSULOSIN HYDROCHLORIDE 0.8 MG: 0.4 CAPSULE ORAL at 08:23

## 2024-01-01 RX ADMIN — OXYCODONE HYDROCHLORIDE 10 MG: 10 TABLET ORAL at 11:11

## 2024-01-01 RX ADMIN — BUSPIRONE HYDROCHLORIDE 5 MG: 5 TABLET ORAL at 19:53

## 2024-01-01 RX ADMIN — OXYCODONE HYDROCHLORIDE 10 MG: 10 TABLET ORAL at 16:39

## 2024-01-01 RX ADMIN — Medication 1 SPRAY: at 16:41

## 2024-01-01 RX ADMIN — TAMSULOSIN HYDROCHLORIDE 0.8 MG: 0.4 CAPSULE ORAL at 09:01

## 2024-01-01 RX ADMIN — SODIUM CHLORIDE: 9 INJECTION, SOLUTION INTRAVENOUS at 02:26

## 2024-01-01 RX ADMIN — OXYCODONE HYDROCHLORIDE 10 MG: 5 TABLET ORAL at 12:54

## 2024-01-01 RX ADMIN — PREGABALIN 150 MG: 75 CAPSULE ORAL at 09:10

## 2024-01-01 RX ADMIN — MIDAZOLAM 1 MG: 1 INJECTION INTRAMUSCULAR; INTRAVENOUS at 10:17

## 2024-01-01 RX ADMIN — DULOXETINE HYDROCHLORIDE 120 MG: 60 CAPSULE, DELAYED RELEASE ORAL at 08:50

## 2024-01-01 RX ADMIN — ACETAMINOPHEN 325MG 975 MG: 325 TABLET ORAL at 19:54

## 2024-01-01 RX ADMIN — PREGABALIN 150 MG: 75 CAPSULE ORAL at 20:41

## 2024-01-01 RX ADMIN — DULOXETINE HYDROCHLORIDE 120 MG: 60 CAPSULE, DELAYED RELEASE ORAL at 10:15

## 2024-01-01 RX ADMIN — OXYCODONE HYDROCHLORIDE 5 MG: 5 TABLET ORAL at 19:38

## 2024-01-01 RX ADMIN — Medication 2 G: at 11:23

## 2024-01-01 RX ADMIN — LORAZEPAM 0.5 MG: 0.5 TABLET ORAL at 10:52

## 2024-01-01 RX ADMIN — PREGABALIN 150 MG: 75 CAPSULE ORAL at 08:24

## 2024-01-01 RX ADMIN — Medication 1 SPRAY: at 08:51

## 2024-01-01 RX ADMIN — ACETAMINOPHEN 325MG 975 MG: 325 TABLET ORAL at 08:24

## 2024-01-01 RX ADMIN — INSULIN ASPART 1 UNITS: 100 INJECTION, SOLUTION INTRAVENOUS; SUBCUTANEOUS at 09:08

## 2024-01-01 RX ADMIN — LORAZEPAM 0.5 MG: 0.5 TABLET ORAL at 20:32

## 2024-01-01 RX ADMIN — PREGABALIN 150 MG: 75 CAPSULE ORAL at 10:00

## 2024-01-01 RX ADMIN — IOPAMIDOL 100 ML: 755 INJECTION, SOLUTION INTRAVENOUS at 09:03

## 2024-01-01 RX ADMIN — ZOLEDRONIC ACID 4 MG: 4 INJECTION, SOLUTION, CONCENTRATE INTRAVENOUS at 10:53

## 2024-01-01 RX ADMIN — BUSPIRONE HYDROCHLORIDE 5 MG: 5 TABLET ORAL at 21:31

## 2024-01-01 RX ADMIN — PANTOPRAZOLE SODIUM 40 MG: 40 TABLET, DELAYED RELEASE ORAL at 08:24

## 2024-01-01 RX ADMIN — SODIUM CHLORIDE: 9 INJECTION, SOLUTION INTRAVENOUS at 10:07

## 2024-01-01 RX ADMIN — ACETAMINOPHEN 325MG 975 MG: 325 TABLET ORAL at 20:41

## 2024-01-01 RX ADMIN — SODIUM CHLORIDE 65 ML: 9 INJECTION, SOLUTION INTRAVENOUS at 09:03

## 2024-01-01 RX ADMIN — TETROFOSMIN 3.98 MILLICURIE: 1.38 INJECTION, POWDER, LYOPHILIZED, FOR SOLUTION INTRAVENOUS at 08:51

## 2024-01-01 RX ADMIN — PANTOPRAZOLE SODIUM 40 MG: 40 TABLET, DELAYED RELEASE ORAL at 10:15

## 2024-01-01 RX ADMIN — Medication 1 SPRAY: at 21:44

## 2024-01-01 RX ADMIN — PROPOFOL 50 MG: 10 INJECTION, EMULSION INTRAVENOUS at 12:22

## 2024-01-01 RX ADMIN — FINASTERIDE 5 MG: 5 TABLET, FILM COATED ORAL at 09:48

## 2024-01-01 ASSESSMENT — ACTIVITIES OF DAILY LIVING (ADL)
ADLS_ACUITY_SCORE: 58
ADLS_ACUITY_SCORE: 56
ADLS_ACUITY_SCORE: 35
ADLS_ACUITY_SCORE: 60
ADLS_ACUITY_SCORE: 42
ADLS_ACUITY_SCORE: 56
ADLS_ACUITY_SCORE: 37
ADLS_ACUITY_SCORE: 60
ADLS_ACUITY_SCORE: 56
ADLS_ACUITY_SCORE: 40
ADLS_ACUITY_SCORE: 57
ADLS_ACUITY_SCORE: 56
ADLS_ACUITY_SCORE: 60
ADLS_ACUITY_SCORE: 56
ADLS_ACUITY_SCORE: 56
ADLS_ACUITY_SCORE: 60
ADLS_ACUITY_SCORE: 62
ADLS_ACUITY_SCORE: 60
ADLS_ACUITY_SCORE: 37
ADLS_ACUITY_SCORE: 60
ADLS_ACUITY_SCORE: 60
ADLS_ACUITY_SCORE: 58
ADLS_ACUITY_SCORE: 57
ADLS_ACUITY_SCORE: 33
ADLS_ACUITY_SCORE: 33
ADLS_ACUITY_SCORE: 54
ADLS_ACUITY_SCORE: 60
ADLS_ACUITY_SCORE: 58
PREVIOUS_RESPONSIBILITIES: LAUNDRY;MEAL PREP
ADLS_ACUITY_SCORE: 37
ADLS_ACUITY_SCORE: 58
ADLS_ACUITY_SCORE: 60
ADLS_ACUITY_SCORE: 56
ADLS_ACUITY_SCORE: 60
ADLS_ACUITY_SCORE: 37
ADLS_ACUITY_SCORE: 56
ADLS_ACUITY_SCORE: 62
ADLS_ACUITY_SCORE: 33
ADLS_ACUITY_SCORE: 58
ADLS_ACUITY_SCORE: 62
ADLS_ACUITY_SCORE: 56
ADLS_ACUITY_SCORE: 56
ADLS_ACUITY_SCORE: 54
ADLS_ACUITY_SCORE: 37
ADLS_ACUITY_SCORE: 42
ADLS_ACUITY_SCORE: 35
ADLS_ACUITY_SCORE: 54
ADLS_ACUITY_SCORE: 59
ADLS_ACUITY_SCORE: 57
ADLS_ACUITY_SCORE: 60
ADLS_ACUITY_SCORE: 62
ADLS_ACUITY_SCORE: 58
ADLS_ACUITY_SCORE: 37
ADLS_ACUITY_SCORE: 37
ADLS_ACUITY_SCORE: 62
ADLS_ACUITY_SCORE: 58
ADLS_ACUITY_SCORE: 62
ADLS_ACUITY_SCORE: 58
DEPENDENT_IADLS:: TRANSPORTATION;SHOPPING;LAUNDRY;COOKING;CLEANING
ADLS_ACUITY_SCORE: 62
ADLS_ACUITY_SCORE: 62
ADLS_ACUITY_SCORE: 37
ADLS_ACUITY_SCORE: 57
ADLS_ACUITY_SCORE: 33
ADLS_ACUITY_SCORE: 56
ADLS_ACUITY_SCORE: 40
ADLS_ACUITY_SCORE: 56
ADLS_ACUITY_SCORE: 56
ADLS_ACUITY_SCORE: 59
ADLS_ACUITY_SCORE: 37
ADLS_ACUITY_SCORE: 60
ADLS_ACUITY_SCORE: 62
ADLS_ACUITY_SCORE: 60
ADLS_ACUITY_SCORE: 58
ADLS_ACUITY_SCORE: 56
ADLS_ACUITY_SCORE: 60
ADLS_ACUITY_SCORE: 62
ADLS_ACUITY_SCORE: 62
ADLS_ACUITY_SCORE: 56
ADLS_ACUITY_SCORE: 56
ADLS_ACUITY_SCORE: 58
ADLS_ACUITY_SCORE: 56
ADLS_ACUITY_SCORE: 54
ADLS_ACUITY_SCORE: 62
ADLS_ACUITY_SCORE: 37
ADLS_ACUITY_SCORE: 62
ADLS_ACUITY_SCORE: 60
ADLS_ACUITY_SCORE: 62
ADLS_ACUITY_SCORE: 60
ADLS_ACUITY_SCORE: 37
ADLS_ACUITY_SCORE: 62
ADLS_ACUITY_SCORE: 59
ADLS_ACUITY_SCORE: 54
ADLS_ACUITY_SCORE: 59
ADLS_ACUITY_SCORE: 56
ADLS_ACUITY_SCORE: 40
ADLS_ACUITY_SCORE: 37
ADLS_ACUITY_SCORE: 60
ADLS_ACUITY_SCORE: 37
ADLS_ACUITY_SCORE: 37
ADLS_ACUITY_SCORE: 60
ADLS_ACUITY_SCORE: 54
ADLS_ACUITY_SCORE: 56
ADLS_ACUITY_SCORE: 60
ADLS_ACUITY_SCORE: 62
ADLS_ACUITY_SCORE: 60
ADLS_ACUITY_SCORE: 58
ADLS_ACUITY_SCORE: 56
ADLS_ACUITY_SCORE: 56
ADLS_ACUITY_SCORE: 58
ADLS_ACUITY_SCORE: 40
ADLS_ACUITY_SCORE: 58
ADLS_ACUITY_SCORE: 37
ADLS_ACUITY_SCORE: 58
ADLS_ACUITY_SCORE: 56
ADLS_ACUITY_SCORE: 56
ADLS_ACUITY_SCORE: 58
ADLS_ACUITY_SCORE: 56
ADLS_ACUITY_SCORE: 40
ADLS_ACUITY_SCORE: 56
ADLS_ACUITY_SCORE: 33
ADLS_ACUITY_SCORE: 58
ADLS_ACUITY_SCORE: 60
ADLS_ACUITY_SCORE: 62
CURRENT_FUNCTION: NEEDS ASSISTANCE
ADLS_ACUITY_SCORE: 56
ADLS_ACUITY_SCORE: 60
ADLS_ACUITY_SCORE: 54
ADLS_ACUITY_SCORE: 62
ADLS_ACUITY_SCORE: 33
ADLS_ACUITY_SCORE: 57
ADLS_ACUITY_SCORE: 62
ADLS_ACUITY_SCORE: 37
ADLS_ACUITY_SCORE: 57

## 2024-01-01 ASSESSMENT — COLUMBIA-SUICIDE SEVERITY RATING SCALE - C-SSRS
2. HAVE YOU ACTUALLY HAD ANY THOUGHTS OF KILLING YOURSELF IN THE PAST MONTH?: NO
1. IN THE PAST MONTH, HAVE YOU WISHED YOU WERE DEAD OR WISHED YOU COULD GO TO SLEEP AND NOT WAKE UP?: NO
6. HAVE YOU EVER DONE ANYTHING, STARTED TO DO ANYTHING, OR PREPARED TO DO ANYTHING TO END YOUR LIFE?: NO
2. HAVE YOU ACTUALLY HAD ANY THOUGHTS OF KILLING YOURSELF IN THE PAST MONTH?: NO
1. IN THE PAST MONTH, HAVE YOU WISHED YOU WERE DEAD OR WISHED YOU COULD GO TO SLEEP AND NOT WAKE UP?: NO
6. HAVE YOU EVER DONE ANYTHING, STARTED TO DO ANYTHING, OR PREPARED TO DO ANYTHING TO END YOUR LIFE?: NO
2. HAVE YOU ACTUALLY HAD ANY THOUGHTS OF KILLING YOURSELF IN THE PAST MONTH?: NO
6. HAVE YOU EVER DONE ANYTHING, STARTED TO DO ANYTHING, OR PREPARED TO DO ANYTHING TO END YOUR LIFE?: NO
1. IN THE PAST MONTH, HAVE YOU WISHED YOU WERE DEAD OR WISHED YOU COULD GO TO SLEEP AND NOT WAKE UP?: NO

## 2024-01-01 ASSESSMENT — PAIN SCALES - GENERAL
PAINLEVEL: EXTREME PAIN (8)
PAINLEVEL: EXTREME PAIN (8)
PAINLEVEL: WORST PAIN (10)

## 2024-01-01 ASSESSMENT — ANXIETY QUESTIONNAIRES
1. FEELING NERVOUS, ANXIOUS, OR ON EDGE: SEVERAL DAYS
2. NOT BEING ABLE TO STOP OR CONTROL WORRYING: SEVERAL DAYS
GAD7 TOTAL SCORE: 4
GAD7 TOTAL SCORE: 4
5. BEING SO RESTLESS THAT IT IS HARD TO SIT STILL: SEVERAL DAYS
3. WORRYING TOO MUCH ABOUT DIFFERENT THINGS: NOT AT ALL
7. FEELING AFRAID AS IF SOMETHING AWFUL MIGHT HAPPEN: NOT AT ALL
6. BECOMING EASILY ANNOYED OR IRRITABLE: NOT AT ALL
IF YOU CHECKED OFF ANY PROBLEMS ON THIS QUESTIONNAIRE, HOW DIFFICULT HAVE THESE PROBLEMS MADE IT FOR YOU TO DO YOUR WORK, TAKE CARE OF THINGS AT HOME, OR GET ALONG WITH OTHER PEOPLE: NOT DIFFICULT AT ALL

## 2024-01-01 ASSESSMENT — PATIENT HEALTH QUESTIONNAIRE - PHQ9
10. IF YOU CHECKED OFF ANY PROBLEMS, HOW DIFFICULT HAVE THESE PROBLEMS MADE IT FOR YOU TO DO YOUR WORK, TAKE CARE OF THINGS AT HOME, OR GET ALONG WITH OTHER PEOPLE: NOT DIFFICULT AT ALL
SUM OF ALL RESPONSES TO PHQ QUESTIONS 1-9: 9
SUM OF ALL RESPONSES TO PHQ QUESTIONS 1-9: 9
SUM OF ALL RESPONSES TO PHQ QUESTIONS 1-9: 2
10. IF YOU CHECKED OFF ANY PROBLEMS, HOW DIFFICULT HAVE THESE PROBLEMS MADE IT FOR YOU TO DO YOUR WORK, TAKE CARE OF THINGS AT HOME, OR GET ALONG WITH OTHER PEOPLE: SOMEWHAT DIFFICULT
SUM OF ALL RESPONSES TO PHQ QUESTIONS 1-9: 2
5. POOR APPETITE OR OVEREATING: SEVERAL DAYS

## 2024-01-01 ASSESSMENT — COPD QUESTIONNAIRES
CAT_SEVERITY: MODERATE
COPD: 1
CAT_SEVERITY: MODERATE
COPD: 1

## 2024-01-01 ASSESSMENT — ENCOUNTER SYMPTOMS: DYSRHYTHMIAS: 1

## 2024-01-02 NOTE — TELEPHONE ENCOUNTER
Medication Question or Refill        What medication are you calling about (include dose and sig)?:   oxyCODONE (ROXICODONE) 5 MG tablet 120 tablet 0 12/11/2023 -- No   Sig - Route: Take 1 tablet (5 mg) by mouth every 6 hours as needed for severe pain Max #4 per day fill on 11th of each month - Oral   Sent to pharmacy as: oxyCODONE HCl 5 MG Oral Tablet (ROXICODONE)   Class: E-Prescribe   Earliest Fill Date: 12/8/2023   Order: [id number]   E-Prescribing Status: Receipt confirmed by pharmacy (12/5/2023 11:05 AM CST)       Preferred Pharmacy:     [facility] [place] does not know address    Controlled Substance Agreement on file:   CSA -- Patient Level:     [Media Unavailable] Controlled Substance Agreement - Opioid - Scan on 5/17/2023  1:37 PM: Opioid       Who prescribed the medication?: Dr. Dayana Hopson    Do you need a refill? Yes    When did you use the medication last? 1/2/24    Patient offered an appointment? No    Do you have any questions or concerns?  No      Okay to leave a detailed message?: Yes at Cell number on file:    Telephone Information:   Mobile [phone]

## 2024-01-02 NOTE — TELEPHONE ENCOUNTER
"Received call from patient he reports requested refill for Lyrica 100 mg capsules, SIG take 200mg/2 capsules twice daily, through the pharmacy ([facility] location). However, he was told next fill should be 1/9/24. I further spoke with him as to why this is the case, he reports sometimes forgets if he has taken so will take an additional dose and this is the reason. Then he expresses desire to come off the Lyrica. I encouraged a pill box to help with adherence and avoid from occurring again which he then states he already does this. He is not able to explain clearly why then the accidental doses are occurring. He reports there is \"a lot going on\" right now. He also expresses this has never been a issue before and seems to think it is because of the [facility] pharmacy policy, which I explained this a controlled Rx. From PDMP, sold 10/6/23 for Qty of 90 days, therefore Rx should be able to fill in 2 days. I will outreach to [facility] to follow-up with patient.       Laurence Kessler, PharmD  Medication Therapy Management Pharmacist      "

## 2024-01-02 NOTE — TELEPHONE ENCOUNTER
Pharmacy confirmed Rx can be filled today.    Laurence Kessler, PharmD  Medication Therapy Management Pharmacist

## 2024-01-03 NOTE — PROGRESS NOTES
ANTICOAGULATION MANAGEMENT     Toby Mcgrath 68 year old male is on warfarin with subtherapeutic INR result. (Goal INR 2.0-3.0)    Recent labs: (last 7 days)     01/03/24  1043   INR 1.8*       ASSESSMENT     Source(s): Chart Review and Patient/Caregiver Call     Warfarin doses taken: Warfarin taken as instructed  Diet: Increased greens/vitamin K in diet; plans to resume previous intake  Medication/supplement changes: None noted  New illness, injury, or hospitalization: No  Signs or symptoms of bleeding or clotting: No  Previous result: Therapeutic last visit; previously outside of goal range  Additional findings: None       PLAN     Recommended plan for temporary change(s) affecting INR     Dosing Instructions: booster dose then continue your current warfarin dose with next INR in 2 weeks       Summary  As of 1/3/2024      Full warfarin instructions:  1/3: 10 mg; Otherwise 5 mg every Mon, Thu; 7.5 mg all other days   Next INR check:  1/17/2024               Telephone call with Layo who verbalizes understanding and agrees to plan and who agrees to plan and repeated back plan correctly    Lab visit scheduled    Education provided:   Dietary considerations: importance of consistent vitamin K intake and impact of vitamin K foods on INR    Plan made per [facility] anticoagulation protocol    Arianne Cotton, RN  Anticoagulation Clinic  1/3/2024    _______________________________________________________________________     Anticoagulation Episode Summary       Current INR goal:  2.0-3.0   TTR:  56.4% (4.7 mo)   Target end date:  Indefinite   Send INR reminders to:  [facility]    Indications    Atrial fibrillation with RVR (H) [I48.91]             Comments:               Anticoagulation Care Providers       Provider Role Specialty Phone number    Dayana Le MD Referring Family Medicine [phone]

## 2024-01-05 NOTE — TELEPHONE ENCOUNTER
Laura, pharmacist at [facility] pharmacy, calling.     She informs she needs more documentation on pt's chronic pain to be able to fill pt's rx for oxycodone per [facility] pharmacy policy.    Reviewed pt's chart and provided information as requested on pt's previous spine surgeries (other treatments tried) and back pain (the cause of pt's chronic pain) for pharmacy documentation.     Florence STARKS RN

## 2024-01-18 NOTE — PROGRESS NOTES
ANTICOAGULATION MANAGEMENT     Toby Mcgrath 68 year old male is on warfarin with therapeutic INR result. (Goal INR 2.0-3.0)    Recent labs: (last 7 days)     01/18/24  1025   INR 2.4*       ASSESSMENT     Source(s): Chart Review and Patient/Caregiver Call     Warfarin doses taken: Warfarin taken as instructed  Diet: No new diet changes identified, patient reports he resumed his usual diet since last INR check  Medication/supplement changes: None noted  New illness, injury, or hospitalization: No  Signs or symptoms of bleeding or clotting: No  Previous result: Subtherapeutic  Additional findings: None       PLAN     Recommended plan for no diet, medication or health factor changes affecting INR     Dosing Instructions: Continue your current warfarin dose with next INR in 3 weeks       Summary  As of 1/18/2024      Full warfarin instructions:  5 mg every Mon, Thu; 7.5 mg all other days   Next INR check:  2/8/2024               Telephone call with Layo who verbalizes understanding and agrees to plan    Lab visit scheduled    Education provided:   Please call back if any changes to your diet, medications or how you've been taking warfarin  Contact [phone]  with any changes, questions or concerns.     Plan made per [facility] anticoagulation protocol    Brianda Hui RN  Anticoagulation Clinic  1/18/2024    _______________________________________________________________________     Anticoagulation Episode Summary       Current INR goal:  2.0-3.0   TTR:  57.4% (5.2 mo)   Target end date:  Indefinite   Send INR reminders to:  [facility]    Indications    Atrial fibrillation with RVR (H) [I48.91]             Comments:               Anticoagulation Care Providers       Provider Role Specialty Phone number    Coming Dayana Hopson MD Referring Family Medicine [phone]

## 2024-01-19 NOTE — PROGRESS NOTES
Layo is a 68 year old who is being evaluated via a billable telephone visit.      What phone number would you like to be contacted at? [phone]  How would you like to obtain your AVS? Mail a copy    Distant Location (provider location):  Off-site    Assessment & Plan     Gastroesophageal reflux disease with esophagitis without hemorrhage  Will add Famotidine BID PRN.  - famotidine (PEPCID) 20 MG tablet; Take 1 tablet (20 mg) by mouth 2 times daily as needed    Hyperlipidemia LDL goal <100  Due for labs, recommendations pending results.  Continue Simvastatin.  - Lipid panel reflex to direct LDL Fasting; Future  - Comprehensive metabolic panel (BMP + Alb, Alk Phos, ALT, AST, Total. Bili, TP); Future    Hypertension goal BP (blood pressure) < 140/90  Controlled, continue current medications.  - Comprehensive metabolic panel (BMP + Alb, Alk Phos, ALT, AST, Total. Bili, TP); Future    Chronic, continuous use of opioids  - Drug Confirmation Panel Urine with Creat - lab collect; Future    Type 2 diabetes mellitus with diabetic polyneuropathy, without long-term current use of insulin (H)  Due for labs, recommendations pending results.  - Hemoglobin A1c; Future  - CBC with platelets; Future  - Albumin Random Urine Quantitative with Creat Ratio; Future  - Comprehensive metabolic panel (BMP + Alb, Alk Phos, ALT, AST, Total. Bili, TP); Future    Opioid dependence, uncomplicated (H)  - Drug Confirmation Panel Urine with Creat - lab collect; Future    Chronic obstructive pulmonary disease, unspecified COPD type (H)  Stable, continue current medications, refill when due.    Episode of recurrent major depressive disorder, unspecified depression episode severity (H24)  Stable, continue current medications, refill when due.    Atrial fibrillation with RVR (H)  Controlled, continue Metoprolol    Morbid obesity (H)  Working on diet to manage diabetes, continue to monitor.    15 minutes spent by me on the date of the encounter doing  "chart review, history and exam, documentation and further activities per the note      BMI  Estimated body mass index is 30.99 kg/m  as calculated from the following:    Height as of 5/17/23: 1.803 m (5' 11\").    Weight as of 11/2/23: 100.8 kg (222 lb 3.2 oz).       See Patient Instructions    Subjective   Layo is a 68 year old, presenting for the following health issues:  Follow Up        1/19/2024    11:23 AM   Additional Questions   Roomed by Roxann Morley     History of Present Illness       Reason for visit:  Medication check    He eats 0-1 servings of fruits and vegetables daily.He consumes 5 sweetened beverage(s) daily.He exercises with enough effort to increase his heart rate 9 or less minutes per day.  He exercises with enough effort to increase his heart rate 3 or less days per week.   He is taking medications regularly.         Diabetes Follow-up    How often are you checking your blood sugar? A few times a week  What time of day are you checking your blood sugars (select all that apply)?  Before meals  Have you had any blood sugars above 200?  Yes, consistently above 200.   Have you had any blood sugars below 70?  No  What symptoms do you notice when your blood sugar is low?  Dizzy, Weak, Blurred vision, and Confusion  What concerns do you have today about your diabetes? None and Blood sugar is often over 200   Do you have any of these symptoms? (Select all that apply)  Numbness in feet, Redness, sores, or blisters on feet, Blurry vision, and Weight loss    Pt states he is having memory concerns with taking medication and has possibly been double dosing.     Acid reflux is getting worse. Takes Omeprazole every day, had to take an extra one.    BP Readings from Last 2 Encounters:   11/02/23 102/62   09/16/23 124/72     Hemoglobin A1C (%)   Date Value   09/22/2023 8.7 (H)   08/09/2023 9.2 (H)   03/15/2021 7.2 (H)   11/05/2020 6.0 (H)     LDL Cholesterol Calculated (mg/dL)   Date Value   02/15/2023 38 " "  02/16/2022 29   03/15/2021 42   07/17/2020 55             Review of Systems  Constitutional, HEENT, cardiovascular, pulmonary, gi and gu systems are negative, except as otherwise noted.      Objective    Vitals - Patient Reported  Weight (Patient Reported): 100.7 kg (222 lb)  Height (Patient Reported): 180.3 cm (5' 11\")  BMI (Based on Pt Reported Ht/Wt): 30.96    Vitals:  No vitals were obtained today due to virtual visit.    Physical Exam   General: Alert and no distress //Respiratory: No audible wheeze, cough, or shortness of breath // Psychiatric:  Appropriate affect, tone, and pace of words          Phone call duration: 13 minutes  Signed Electronically by: Dayana Hopson MD    "

## 2024-01-19 NOTE — COMMUNITY RESOURCES LIST (ENGLISH)
01/19/2024   [facility]  N/A  For questions about this resource list or additional care needs, please contact your primary care clinic or care manager.  Phone: [phone]   Email: N/A   Address: [address] [place] [address]   Hours: N/A        Food and Nutrition       Food pantry  1  [place], [employer] Distance: 1.52 miles      In-Person   [address] [place] [address]  Language: English  Hours: Tue 10:00 AM - 12:00 PM  Fees: Free   Phone: (414) 363-5309 Ext.234 Website: http://www.[employer]eer.org     2  [place] [facility] the [facility] Distance: 1.66 miles      In-Person   [address] [place] [address]  Language: English  Hours: Tue 10:00 AM - 4:00 PM , Thu 10:00 AM - 4:00 PM  Fees: Free   Phone: (114) 908-4197 Email: communications@[employer] Website: http://www.[employer]     SNAP application assistance  3  [address] [place] [address] Distance: 4.35 miles      In-Person   [address] [place] [address]  Language: English  Hours: Mon 8:00 AM - 4:00 PM , Tue 8:00 AM - 7:00 PM , Wed - Thu 8:00 AM - 4:00 PM  Fees: Free   Phone: (376) 755-8527 Email: info@[facility].org Website: https://[facility] Institute[facility].org/resources/resource-centers/     4  [employer] (CAP) [facility] Distance: 5.53 miles      In-Person   [address] [place] [address]  Language: English, Tunisian  Hours: Mon - Fri 8:00 AM - 8:00 PM  Fees: Free   Phone: (297) 663-2808 Email: info@[facility].org Website: http://www.[facility] Stanford[facility]! Stuff.org     Soup kitchen or free meals  5  [place] by the [facility] [place] Distance: 2.95 miles      Pickup   [address] [place] [address]  Language: English, Tunisian  Hours: Mon - Thu 5:30 PM - 6:30 PM  Fees: Free   Phone: (232) 573-9248 Email: richardson@[employer] Website: http://[employer].org/wordpress/?page_id=5168     6  Lj thompson  [facility] Distance: 6.64 miles      Pickup   [address] [place] [address]  Language: English  Hours: Mon - Fri 5:00 PM - 6:00 PM  Fees: Free   Phone: (770) 983-9885 Email: deanne@[employer] Website: https://www.[employer]/          Transportation       Free or low-cost transportation  7  [employer]. Distance: 2.06 miles      In-Person   [address] [facility] [address] [place] [address]  Language: English  Hours: Mon - Fri 7:00 AM - 5:00 PM  Fees: Free   Phone: (140) 699-6633 Email: lbyijvpcnhah21@[employer] Website: https://[employer]/     8  [employer]  [facility] [employer] Distance: 11.27 miles      In-Person   [address] [place] [address]  Language: English  Hours: Tue 9:00 AM - 2:00 PM  Fees: Self Pay   Phone: (370) 258-2388 Email: info@[employer] Website: http://www.Attachments.menePostHelpers.org/     Transportation to medical appointments  9  [place] [employer] Distance: 2.16 miles      In-Person   [address] [place] [address]  Language: Khmer, English, Oromo, Gambian  Hours: Mon - Sat 5:00 AM - 9:00 PM  Fees: Insurance, Self Pay   Phone: (393) 272-5888 Email: info@[employer] Website: http://www.[employer]/     10  Assisted [employer] Distance: 6.99 miles      In-Person   [address] [facility] [place] [address]  Language: English, Ecuadorean  Hours: Mon - Sun Appt. Only  Fees: Self Pay   Phone: (526) 377-7752 Email: kelly@[employer] Website: http://www.[employer]/          Important Numbers & Websites       Emergency Services   911  City Services   311  Poison Control   (413) 956-8435  Suicide Prevention Lifeline   (339) 332-3095 (TALK)  Child Abuse Hotline   (560) 559-8471 (4-A-Child)  Sexual Assault Hotline   (351) 600-5023 (HOPE)  National Runaway Safeline   (299) 993-5907 (RUNAWAY)  All-Options Talkline   (355) 189-1162  Substance Abuse Referral   (581)  095-4754 (HELP)

## 2024-01-26 NOTE — TELEPHONE ENCOUNTER
[facility]      Medication Question or Refill    Contacts         Type Contact Phone/Fax    01/26/2024 09:13 AM CST Phone (Incoming) Layo Mcgrath (Self) [phone] (M)            What medication are you calling about (include dose and sig)?:    Disp Refills Start End HELEN   oxyCODONE (ROXICODONE) 5 MG tablet 120 tablet 0 1/4/2024 -- No   Sig - Route: Take 1 tablet (5 mg) by mouth every 6 hours as needed for severe pain Max #4 per day - Oral       Preferred Pharmacy:   [facility] [address] [place] [address]      Controlled Substance Agreement on file:   CSA -- Patient Level:     [Media Unavailable] Controlled Substance Agreement - Opioid - Scan on 5/17/2023  1:37 PM: Opioid       Who prescribed the medication?: Dr. Danika Hopson    Do you need a refill? Yes    When did you use the medication last? today    Patient offered an appointment? No    Do you have any questions or concerns?  States they will need it by 02/06/24      Okay to leave a detailed message?: Yes at Home number on file [phone] (home)

## 2024-02-01 NOTE — TELEPHONE ENCOUNTER
Spoke to patient.  Advised oxycodone RX was sent 1/29/24 with fill date of 2/4/24.  He will follow-up with [facility].  Michelle Ho RN

## 2024-02-01 NOTE — TELEPHONE ENCOUNTER
FYI- I have interoffice mailed to Dr Danika Hopson assistance applications for Layo's Farxiga, Cymbalta & Spiriva Respimat. Please review, sign and return to me. I have included an addressed return interoffice envelope for your convenience.    I have mailed to Layo his section of those applications and included return envelopes for him also. He will need to provide income documents.    I have informed Layo these are on the way to him.    Thanks so much for your help!    Zenaida Plasencia  Prescription Assistance Supervisor  Pharmacy Assistance

## 2024-02-01 NOTE — TELEPHONE ENCOUNTER
"Reason for Call:  Other prescription    Detailed comments: Chuck pt stated\" I don't trust you guys to put in the request for refill\" I'm doing it anyway. He would like a refill of medication. He called last week it wasn't refilled.     Phone Number Patient can be reached at: Cell number on file:    Telephone Information:   Mobile [phone]       Best Time: any      Can we leave a detailed message on this number?  ?    Call taken on 2/1/2024 at 10:17 AM by Amanda Morfin   "

## 2024-02-08 NOTE — PROGRESS NOTES
ANTICOAGULATION MANAGEMENT     Toby Mcgrath 68 year old male is on warfarin with therapeutic INR result. (Goal INR 2.0-3.0)    Recent labs: (last 7 days)     02/08/24  1117   INR 2.6*       ASSESSMENT     Source(s): Chart Review  Previous INR was Therapeutic last visit; previously outside of goal range  Medication, diet, health changes since last INR   Famotidine added 1/19/24, no interaction with warfarin anticipated per UpToDate         PLAN     Recommended plan for no diet, medication or health factor changes affecting INR     Dosing Instructions: Continue your current warfarin dose with next INR in 4 weeks       Summary  As of 2/8/2024      Full warfarin instructions:  5 mg every Mon, Thu; 7.5 mg all other days   Next INR check:  3/7/2024               Detailed voice message left for Layo with dosing instructions and follow up date.     Contact [phone]  to schedule and with any changes, questions or concerns.     Education provided:   Please call back if any changes to your diet, medications or how you've been taking warfarin  Contact [phone]  with any changes, questions or concerns.     Plan made per [facility] anticoagulation protocol    Brianda Hui RN  Anticoagulation Clinic  2/8/2024    _______________________________________________________________________     Anticoagulation Episode Summary       Current INR goal:  2.0-3.0   TTR:  62.4% (5.9 mo)   Target end date:  Indefinite   Send INR reminders to:  [facility]    Indications    Atrial fibrillation with RVR (H) [I48.91]             Comments:               Anticoagulation Care Providers       Provider Role Specialty Phone number    Danika Dayana Hopson MD Referring Family Medicine [phone]

## 2024-02-08 NOTE — PATIENT INSTRUCTIONS
Adriano Vasques,  Please call [phone] to schedule your next lab INR appointment for 4 weeks on 3/7/24. Please call [phone] with any changes, questions or concerns.  Thank you  HUMBERTO Lamar

## 2024-03-01 NOTE — PROGRESS NOTES
Lung Cancer Screening Shared Decision Making Visit     Toby Mcgrath, a 68 year old male, is eligible for lung cancer screening    History   Smoking Status     Former     Packs/day: 1.00     Years: 40.00     Types: Cigarettes     Quit date: 2011   Smokeless Tobacco     Former     Quit date: 2/1/2013       I have discussed with patient the risks and benefits of screening for lung cancer with low-dose CT.     The risks include:    radiation exposure: one low dose chest CT has as much ionizing radiation as about 15 chest x-rays, or 6 months of background radiation living in [place]      false positives: most findings/nodules are NOT cancer, but some might still require additional diagnostic evaluation, including biopsy    over-diagnosis: some slow growing cancers that might never have been clinically significant will be detected and treated unnecessarily     The benefit of early detection of lung cancer is contingent upon adherence to annual screening or more frequent follow up if indicated.     Furthermore, to benefit from screening, Toby must be willing and able to undergo diagnostic procedures, if indicated. Although no specific guide is available for determining severity of comorbidities, it is reasonable to withhold screening in patients who have greater mortality risk from other diseases.     We did discuss that the best way to prevent lung cancer is to not smoke.    Some patients may value a numeric estimation of lung cancer risk when evaluating if lung cancer screening is right for them, here is one calculator:    ShouldIScreen

## 2024-03-01 NOTE — PROGRESS NOTES
Layo is a 68 year old who is being evaluated via a billable telephone visit.      What phone number would you like to be contacted at? [phone]  How would you like to obtain your AVS? Mail a copy  Originating Location (pt. Location): Home    Distant Location (provider location):  Off-site    Assessment & Plan     Encounter for Medicare annual wellness exam  Exam completed today, routine health maintenance items updated as able.  Labs ordered.  Follow up one year or sooner as needed.  - PRIMARY CARE FOLLOW-UP SCHEDULING; Future    Failed back syndrome of lumbar spine  Xrays of lumbar spine ordered in separate encounter.  Recommendations pending results.    Chronic bilateral low back pain with bilateral sciatica  As above    S/P lumbar discectomy  As above    Chronic pain of both shoulders  Xrays of bilateral shoulders ordered in separate encounter.  Recommendations pending results.    Hip pain, right  Xrays of right hip ordered in separate encounter.  Recommendations pending results.    Chronic obstructive pulmonary disease, unspecified COPD type (H)  Controlled, will update spirometry.  Continue Spiriva  - General PFT Lab (Please always keep checked); Future  - Pulmonary Function Test; Future  - albuterol (PROAIR HFA/PROVENTIL HFA/VENTOLIN HFA) 108 (90 Base) MCG/ACT inhaler; Inhale 2 puffs into the lungs every 6 hours as needed for shortness of breath, wheezing or cough  - COPD ACTION PLAN    Personal history of tobacco use  Patient agreeable to lung cancer screening.  - Prof fee: Shared Decision Making for Lung Cancer Screening  - CT Chest Lung Cancer Scrn Low Dose wo; Future  - General PFT Lab (Please always keep checked); Future  - Pulmonary Function Test; Future    Encounter for screening for abdominal aortic aneurysm (AAA) in patient with genetic predisposition to AAA  Orders for screening placed in separate encounter.  Recommendations pending results.    Colon cancer screening  Orders for screening placed  "in separate encounter.  Recommendations pending results.    28 minutes spent by me on the date of the encounter doing chart review, history and exam, documentation and further activities per the note      BMI  Estimated body mass index is 30.99 kg/m  as calculated from the following:    Height as of 5/17/23: 1.803 m (5' 11\").    Weight as of 11/2/23: 100.8 kg (222 lb 3.2 oz).       See Patient Instructions    Hussain Vasques is a 68 year old, presenting for the following health issues:  Annual Visit        3/1/2024    10:56 AM   Additional Questions   Roomed by Roxann Morley     Via the Health Maintenance questionnaire, the patient has reported the following services have been completed -Colonscopy, this information has been sent to the abstraction team.    HPI     Wellness visit today.     Concerns about his pains.  His right hip is getting a stinging pain when he walks, it is bringing him severe pains, but sometimes it goes away and he is fine.  He is wondering if there is a problem after the surgery, which was done in 2010.  He also has pain in both shoulder, which had surgery in 2006 and 2007. Starting to get numbness in his arms also.  His back surgery was in 2014.  L5-S1 fusion, has hardware in the back.  Back pain is also not controlled.  He is wanting to get his pain meds increased because his pains are worse.  He is tired of being in the pain he is in.  He has not done PT in a long time, and he would love to do PT, but he says he cannot afford it.     Annual Wellness Visit     Patient has been advised of split billing requirements and indicates understanding: Yes     Health Care Directive  Patient has a Health Care Directive on file  Advance care planning document is on file and is current.  In general, how would you rate your overall physical health? good  Do you have a special diet?  Regular (no restrictions)        3/1/2024   Exercise, Social Connection, Stress   Days per week of moderate/strenous " exercise 0 days    0 days   Average minutes spent exercising at this level 0 min    0 min   Frequency of gathering with friends or relatives Pt Declined    Pt Declined   Feel stress (tense, anxious, or unable to sleep) Very much    Very much     Do you see a dentist two times every year?  (!) NO  The patient was instructed to see the dentist every 6 months.   Have you been more tired than usual lately?  (!) YES   Discussed possible causes of fatigue.   If you drink alcohol do you typically have >3 drinks per day or >7 drinks per week? No  Do you have a current opioid prescription? (!) YES   How severe is your pain on a scale from 1-10? 10/10   Patient declined to complete Opioid Risk Tool today    Patient declined to complete RIOSORD today    Do you use any other controlled substances or medications that are not prescribed by a provider? (!) ALCOHOL  Social History     Tobacco Use    Smoking status: Former     Packs/day: 1.00     Years: 40.00     Additional pack years: 0.00     Total pack years: 40.00     Types: Cigarettes     Quit date:      Years since quittin.1    Smokeless tobacco: Former     Quit date: 2013   Vaping Use    Vaping Use: Never used   Substance Use Topics    Alcohol use: Yes     Alcohol/week: 0.0 standard drinks of alcohol     Comment: occ.    Drug use: No       Needs assistance for the following daily activities: (!) TRANSPORTATION  Which of the following safety concerns are present in your home?  none identified   Do you (or your family members) have any concerns about your safety while driving?  No  Do you have any of the following hearing concerns?: (!) I FEEL THAT PEOPLE ARE MUMBLING OR NOT SPEAKING CLEARLY, (!) I NEED TO ASK PEOPLE TO SPEAK UP OR REPEAT THEMSELVES, (!) IT'S HARD TO FOLLOW A CONVERSATION IN A NOISY RESTAURANT OR CROWDED ROOM, (!) TROUBLE UNDERSTANDING A SPEAKER AT A PUBLIC MEETING OR Scientologist SERVICE, (!) TROUBLE FOLLOWING DIALOGUE IN THE THEATER, (!) TROUBLE  UNDERSTANDING SOFT OR WHISPERED SPEECH, and (!) TROUBLE UNDERSTANDING SPEECH ON THE TELEPHONE  In the past 6 months, have you been bothered by leaking of urine? (!) YES   Information on urinary incontinence and treatment options given to patient.        3/1/2024   Social Factors   Frequency of gathering with friends or relatives Patient declined    Patient declined   Worry food won't last until get money to buy more Yes    Yes   Food not last or not have enough money for food? Yes    Yes   Do you have housing?  Yes    Yes   Are you worried about losing your housing? No    No   Lack of transportation? No    No   Unable to get utilities (heat,electricity)? No    No   (!) FOOD SECURITY CONCERN PRESENT        3/1/2024   Fall Risk   Fallen 2 or more times in the past year? No   Trouble with walking or balance? Yes     Not able to complete Gait test due to virtual visit, will have patient complete with his lab visit.    Today's PHQ-9 Score:       3/1/2024    10:18 AM   PHQ-9 SCORE   PHQ-9 Total Score MyChart 9 (Mild depression)   PHQ-9 Total Score 9       ASCVD Risk   The ASCVD Risk score (Aliya KHAN, et al., 2019) failed to calculate for the following reasons:    The valid total cholesterol range is 130 to 320 mg/dL    Reviewed and updated as needed this visit by Provider                    Lab work is in process  Labs reviewed in EPIC  BP Readings from Last 3 Encounters:   11/02/23 102/62   09/16/23 124/72   08/17/23 111/71    Wt Readings from Last 3 Encounters:   11/02/23 100.8 kg (222 lb 3.2 oz)   09/16/23 101.6 kg (224 lb)   08/17/23 102 kg (224 lb 12.8 oz)                  Patient Active Problem List   Diagnosis    Thoracic or lumbosacral neuritis or radiculitis, unspecified    Osteoarthrosis, unspecified whether generalized or localized, pelvic region and thigh    Chronic rhinitis    Chronic pain syndrome    Disorder of bursae and tendons in shoulder region    Major depressive disorder, recurrent episode  (H24)    Anxiety    Gout    Type 2 diabetes mellitus without complication, without long-term current use of insulin (H)    Hypertension goal BP (blood pressure) < 140/90    Advanced directives, counseling/discussion    DJD (degenerative joint disease), lumbar    Hyperlipidemia LDL goal <100    DDD (degenerative disc disease), cervical    Lumbar radiculopathy    S/P lumbar fusion    BMI 32.0-32.9,adult    History of hepatitis C    S/P lumbar discectomy    S/P laminectomy    Chronic, continuous use of opioids    Bilateral low back pain with bilateral sciatica    Gastroesophageal reflux disease without esophagitis    Failed back syndrome of lumbar spine    Slow transit constipation    Benign prostatic hyperplasia with urinary frequency    Diabetic polyneuropathy associated with type 2 diabetes mellitus (H)    Seborrheic keratoses    Atrial fibrillation with RVR (H)    COPD (chronic obstructive pulmonary disease) (H)    Ventral hernia without obstruction or gangrene    Carpal tunnel syndrome of left wrist    Morbid obesity (H)     Past Surgical History:   Procedure Laterality Date    BACK SURGERY      both shoulder repair  2006, 2008    FUSION LUMBAR ANTERIOR, FUSION LUMBAR POSTERIOR TWO LEVELS, COMBINED N/A 9/17/2014    Procedure: COMBINED FUSION LUMBAR ANTERIOR, FUSION LUMBAR POSTERIOR TWO LEVELS;  Surgeon: Chris Lugo MD;  Location:  OR     [facility] ENDOSCOPY DIAG W OR W/O BRUSH/WASH  3/04    ok    HEAD & NECK SURGERY      HEMILAMINECTOMY, DISCECTOMY LUMBAR ONE LEVEL, COMBINED Left 9/8/2015    Procedure: COMBINED HEMILAMINECTOMY, DISCECTOMY LUMBAR ONE LEVEL;  Surgeon: Jer Irby MD;  Location: [facility] OR    right hip replacement  10,2010    [facility] NONSPECIFIC PROCEDURE  1995    neck cyst    [facility] NONSPECIFIC PROCEDURE  1979    laminectomy L5-S1 x 2    Z SHOULDER SURG PROC UNLISTED  2005, '07    repairs,later manipulate,inject LT, RT    ZZ COLONOSCOPY THRU STOMA, DIAGNOSTIC  3/04    normal       Social  History     Tobacco Use    Smoking status: Former     Packs/day: 1.00     Years: 40.00     Additional pack years: 0.00     Total pack years: 40.00     Types: Cigarettes     Quit date:      Years since quittin.1    Smokeless tobacco: Former     Quit date: 2013   Substance Use Topics    Alcohol use: Yes     Alcohol/week: 0.0 standard drinks of alcohol     Comment: occ.     Family History   Problem Relation Age of Onset    Diabetes Mother     C.A.D. Father     Diabetes Father     Hypertension Father     Colon Cancer No family hx of          Current Outpatient Medications   Medication Sig Dispense Refill    acetaminophen (TYLENOL) 500 MG tablet Take 500-1,000 mg by mouth every 8 hours as needed for mild pain      blood glucose (ONETOUCH VERIO IQ) test strip TEST BLOOD SUGAR ONCE DAILY OR AS DIRECTED 100 strip 2    dapagliflozin (FARXIGA) 10 MG TABS tablet Take 1 tablet (10 mg) by mouth daily      DULoxetine (CYMBALTA) 60 MG capsule Take 2 capsules (120 mg) by mouth daily 180 capsule 3    famotidine (PEPCID) 20 MG tablet Take 1 tablet (20 mg) by mouth 2 times daily as needed 60 tablet 1    finasteride (PROSCAR) 5 MG tablet TAKE 1 TABLET BY MOUTH DAILY FOR PROSTATE ENLARGEMENT 90 tablet 1    fluticasone (FLONASE) 50 MCG/ACT nasal spray USE 1 TO 2 SPRAYS IN BOTH  NOSTRILS DAILY AS NEEDED 16 g 1    metFORMIN (GLUCOPHAGE XR) 500 MG 24 hr tablet Take 4 tablets (2,000 mg) by mouth daily (with dinner) 360 tablet 1    metoprolol succinate ER (TOPROL XL) 100 MG 24 hr tablet Take 1 tablet (100 mg) by mouth daily 90 tablet 3    omeprazole (PRILOSEC) 40 MG DR capsule Take 1 capsule (40 mg) by mouth daily 90 capsule 3    OneTouch Delica Lancets 33G MISC 1 Application. daily Use to test blood sugars once daily as directed. 100 each 3    [START ON 3/3/2024] oxyCODONE (ROXICODONE) 5 MG tablet Take 1 tablet (5 mg) by mouth every 6 hours as needed for severe pain Max #4 per day 120 tablet 0    polyethylene glycol (MIRALAX) 17  GM/Dose powder Mix 1 tablespoon with water by mouth daily as needed constipation 510 g 0    pregabalin (LYRICA) 100 MG capsule Take 2 capsules (200 mg) by mouth 2 times daily 360 capsule 1    senna-docusate (SENOKOT-S/PERICOLACE) 8.6-50 MG tablet Take 1-2 tablets by mouth daily as needed for constipation      simvastatin (ZOCOR) 20 MG tablet Take 1 tablet (20 mg) by mouth At Bedtime 90 tablet 3    tamsulosin (FLOMAX) 0.4 MG capsule Take 2 capsules (0.8 mg) by mouth daily 180 capsule 1    tiotropium (SPIRIVA RESPIMAT) 2.5 MCG/ACT inhaler Inhale 2 puffs into the lungs daily 12 g 3    warfarin ANTICOAGULANT (COUMADIN) 5 MG tablet Take 1.5 tablets (7.5 mg) by mouth daily except take 1 tablet (5 mg) Mon, Th or as directed by INR clinic 125 tablet 1       Current providers sharing in care for this patient include:  Patient Care Team:  Dayana Le MD as PCP - General (Family Medicine)  Jer Irby MD as MD (Neurological Surgery)  Britany Ruiz, RN as   RowanCarl MD as Assigned Surgical Provider  Laurence Kessler RPH as Pharmacist (Pharmacist)  Dayana Le MD as Assigned PCP  Dayana Le MD as Assigned Pain Medication Provider  Laurence Kessler RPH as Assigned [facility] Pharmacist  Laurence Kessler as Medical Student    The following health maintenance items are reviewed in Epic and correct as of today:  Health Maintenance   Topic Date Due    SPIROMETRY  Never done    COPD ACTION PLAN  Never done    HEPATITIS A IMMUNIZATION (1 of 2 - Risk 2-dose series) Never done    LUNG CANCER SCREENING  Never done    HEPATITIS B IMMUNIZATION (1 of 3 - Risk 3-dose series) Never done    AORTIC ANEURYSM SCREENING (SYSTEM ASSIGNED)  08/03/2018    DIABETIC FOOT EXAM  12/26/2020    COLORECTAL CANCER SCREENING  10/14/2022    LIPID  02/15/2024    CONTROLLED SUBSTANCE AGREEMENT FOR CHRONIC PAIN MANAGEMENT  05/17/2024    A1C  08/08/2024    EYE EXAM  09/12/2024     BMP  02/08/2025    MICROALBUMIN  02/08/2025    URINE DRUG SCREEN  02/08/2025    MEDICARE ANNUAL WELLNESS VISIT  03/01/2025    KAYLA ASSESSMENT  03/01/2025    ANNUAL REVIEW OF HM ORDERS  03/01/2025    FALL RISK ASSESSMENT  03/01/2025    PHQ-9  03/01/2025    ADVANCE CARE PLANNING  03/01/2029    DTAP/TDAP/TD IMMUNIZATION (4 - Td or Tdap) 08/11/2029    HEPATITIS C SCREENING  Completed    DEPRESSION ACTION PLAN  Completed    INFLUENZA VACCINE  Completed    Pneumococcal Vaccine: 65+ Years  Completed    ZOSTER IMMUNIZATION  Completed    RSV VACCINE (Pregnancy & 60+)  Completed    COVID-19 Vaccine  Completed    IPV IMMUNIZATION  Aged Out    HPV IMMUNIZATION  Aged Out    MENINGITIS IMMUNIZATION  Aged Out    RSV MONOCLONAL ANTIBODY  Aged Out       Appropriate preventive services were discussed with this patient, including applicable screening as appropriate for fall prevention, nutrition, physical activity, Tobacco-use cessation, weight loss and cognition.  Checklist reviewing preventive services available has been given to the patient.          3/1/2024   Mini Cog   3 Item Recall 3 objects recalled                Objective             Physical Exam   General: Alert and no distress //Respiratory: No audible wheeze, cough, or shortness of breath // Psychiatric:  Appropriate affect, tone, and pace of words      Labs reviewed in chart      Phone call duration: 24 minutes  Signed Electronically by: Dayana Hopson MD

## 2024-03-01 NOTE — PATIENT INSTRUCTIONS
Preventive Care Advice   This is general advice given by our system to help you stay healthy. However, your care team may have specific advice just for you. Please talk to your care team about your preventive care needs.  Nutrition  Eat 5 or more servings of fruits and vegetables each day.  Try wheat bread, brown rice and whole grain pasta (instead of white bread, rice, and pasta).  Get enough calcium and vitamin D. Check the label on foods and aim for 100% of the RDA (recommended daily allowance).  Lifestyle  Exercise at least 150 minutes each week   (30 minutes a day, 5 days a week).  Do muscle strengthening activities 2 days a week. These help control your weight and prevent disease.  No smoking.  Wear sunscreen to prevent skin cancer.  Have a dental exam and cleaning every 6 months.  Yearly exams  See your health care team every year to talk about:  Any changes in your health.  Any medicines your care team has prescribed.  Preventive care, family planning, and ways to prevent chronic diseases.  Shots (vaccines)   HPV shots (up to age 26), if you've never had them before.  Hepatitis B shots (up to age 59), if you've never had them before.  COVID-19 shot: Get this shot when it's due.  Flu shot: Get a flu shot every year.  Tetanus shot: Get a tetanus shot every 10 years.  Pneumococcal, hepatitis A, and RSV shots: Ask your care team if you need these based on your risk.  Shingles shot (for age 50 and up).  General health tests  Diabetes screening:  Starting at age 35, Get screened for diabetes at least every 3 years.  If you are younger than age 35, ask your care team if you should be screened for diabetes.  Cholesterol test: At age 39, start having a cholesterol test every 5 years, or more often if advised.  Bone density scan (DEXA): At age 50, ask your care team if you should have this scan for osteoporosis (brittle bones).  Hepatitis C: Get tested at least once in your life.  STIs (sexually transmitted  infections)  Before age 24: Ask your care team if you should be screened for STIs.  After age 24: Get screened for STIs if you're at risk. You are at risk for STIs (including HIV) if:  You are sexually active with more than one person.  You don't use condoms every time.  You or a partner was diagnosed with a sexually transmitted infection.  If you are at risk for HIV, ask about PrEP medicine to prevent HIV.  Get tested for HIV at least once in your life, whether you are at risk for HIV or not.  Cancer screening tests  Cervical cancer screening: If you have a cervix, begin getting regular cervical cancer screening tests at age 21. Most people who have regular screenings with normal results can stop after age 65. Talk about this with your provider.  Breast cancer scan (mammogram): If you've ever had breasts, begin having regular mammograms starting at age 40. This is a scan to check for breast cancer.  Colon cancer screening: It is important to start screening for colon cancer at age 45.  Have a colonoscopy test every 10 years (or more often if you're at risk) Or, ask your provider about stool tests like a FIT test every year or Cologuard test every 3 years.  To learn more about your testing options, visit: [email or web address].  For help making a decision, visit: https://bit.ly/il02800.  Prostate cancer screening test: If you have a prostate and are age 55 to 69, ask your provider if you would benefit from a yearly prostate cancer screening test.  Lung cancer screening: If you are a current or former smoker age 50 to 80, ask your care team if ongoing lung cancer screenings are right for you.  For informational purposes only. Not to replace the advice of your health care provider. Copyright   2023 [employer]. All rights reserved. Clinically reviewed by the [facility]itions [facility]. [employer] 318077 - REV 01/24.    Learning About Activities of Daily Living  What are activities  of daily living?     Activities of daily living (ADLs) are the basic self-care tasks you do every day. As you age, and if you have health problems, you may find that it's harder to do these things for yourself. That's when you may need some help.  Your doctor uses ADLs to measure how much help you need. Knowing what you can and can't do for yourself is an important first step to getting help. And when you have the help you need, you can stay as independent as possible.  Your doctor will want to know if you are able to do tasks such as:  Take a bath or shower without help.  Go to the bathroom by yourself.  Dress and undress without help.  Shave, comb your hair, and brush teeth on your own.  Get in and out of bed or a chair without help.  Feed yourself without help.  If you are having trouble doing basic self-care tasks, talk with your doctor. You may want to bring a caregiver or family member who can help the doctor understand your needs and abilities.  How will a doctor assess your ADLs?  Asking about ADLs is part of a routine health checkup your doctor will likely do as you age. Your health check might be done in a doctor's office, in your home, or at a hospital. The goal is to find out if you are having any problems that could make your health problems worse or that make it unsafe for you to be on your own.  To measure your ADLs, your doctor will ask how hard it is for you to do routine tasks. He or she may also want to know if you have changed the way you do a task because of a health problem. He or she may watch how you:  Walk back and forth.  Keep your balance while you stand or walk.  Move from sitting to standing or from a bed to a chair.  Button or unbutton a shirt or sweater.  Remove and put on your shoes.  It's normal to feel a little worried or anxious if you find you can't do all the things you used to be able to do. Talking with your doctor about ADLs isn't a test that you either pass or fail. It's just  a way to get more information about your health and safety.  Follow-up care is a key part of your treatment and safety. Be sure to make and go to all appointments, and call your doctor if you are having problems. It's also a good idea to know your test results and keep a list of the medicines you take.  Current as of: February 26, 2023               Content Version: 13.8    8413-3247 [employer].   Care instructions adapted under license by your healthcare professional. If you have questions about a medical condition or this instruction, always ask your healthcare professional. [employer] disclaims any warranty or liability for your use of this information.      Hearing Loss: Care Instructions  Overview     Hearing loss is a sudden or slow decrease in how well you hear. It can range from slight to profound. Permanent hearing loss can occur with aging. It also can happen when you are exposed long-term to loud noise. Examples include listening to loud music, riding motorcycles, or being around other loud machines.  Hearing loss can affect your work and home life. It can make you feel lonely or depressed. You may feel that you have lost your independence. But hearing aids and other devices can help you hear better and feel connected to others.  Follow-up care is a key part of your treatment and safety. Be sure to make and go to all appointments, and call your doctor if you are having problems. It's also a good idea to know your test results and keep a list of the medicines you take.  How can you care for yourself at home?  Avoid loud noises whenever possible. This helps keep your hearing from getting worse.  Always wear hearing protection around loud noises.  Wear a hearing aid as directed.  A professional can help you pick a hearing aid that will work best for you.  You can also get hearing aids over the counter for mild to moderate hearing loss.  Have hearing tests as your doctor suggests.  "They can show whether your hearing has changed. Your hearing aid may need to be adjusted.  Use other devices as needed. These may include:  Telephone amplifiers and hearing aids that can connect to a television, stereo, radio, or microphone.  Devices that use lights or vibrations. These alert you to the doorbell, a ringing telephone, or a baby monitor.  Television closed-captioning. This shows the words at the bottom of the screen. Most new TVs can do this.  TTY (text telephone). This lets you type messages back and forth on the telephone instead of talking or listening. These devices are also called TDD. When messages are typed on the keyboard, they are sent over the phone line to a receiving TTY. The message is shown on a monitor.  Use text messaging, social media, and email if it is hard for you to communicate by telephone.  Try to learn a listening technique called speechreading. It is not lipreading. You pay attention to people's gestures, expressions, posture, and tone of voice. These clues can help you understand what a person is saying. Face the person you are talking to, and have them face you. Make sure the lighting is good. You need to see the other person's face clearly.  Think about counseling if you need help to adjust to your hearing loss.  When should you call for help?  Watch closely for changes in your health, and be sure to contact your doctor if:    You think your hearing is getting worse.     You have new symptoms, such as dizziness or nausea.   Where can you learn more?  Go to https://www.Mobilepolice.net/patiented  Enter R798 in the search box to learn more about \"Hearing Loss: Care Instructions.\"  Current as of: February 28, 2023               Content Version: 13.8    2319-0011 [employer].   Care instructions adapted under license by your healthcare professional. If you have questions about a medical condition or this instruction, always ask your healthcare professional. " [employer], [facility] disclaims any warranty or liability for your use of this information.      Learning About Stress  What is stress?     Stress is your body's response to a hard situation. Your body can have a physical, emotional, or mental response. Stress is a fact of life for most people, and it affects everyone differently. What causes stress for you may not be stressful for someone else.  A lot of things can cause stress. You may feel stress when you go on a job interview, take a test, or run a race. This kind of short-term stress is normal and even useful. It can help you if you need to work hard or react quickly. For example, stress can help you finish an important job on time.  Long-term stress is caused by ongoing stressful situations or events. Examples of long-term stress include long-term health problems, ongoing problems at work, or conflicts in your family. Long-term stress can harm your health.  How does stress affect your health?  When you are stressed, your body responds as though you are in danger. It makes hormones that speed up your heart, make you breathe faster, and give you a burst of energy. This is called the fight-or-flight stress response. If the stress is over quickly, your body goes back to normal and no harm is done.  But if stress happens too often or lasts too long, it can have bad effects. Long-term stress can make you more likely to get sick, and it can make symptoms of some diseases worse. If you tense up when you are stressed, you may develop neck, shoulder, or low back pain. Stress is linked to high blood pressure and heart disease.  Stress also harms your emotional health. It can make you [place], tense, or depressed. Your relationships may suffer, and you may not do well at work or school.  What can you do to manage stress?  You can try these things to help manage stress:   Do something active. Exercise or activity can help reduce stress. Walking is a great way to get  started. Even everyday activities such as housecleaning or yard work can help.  Try yoga or richardson chi. These techniques combine exercise and meditation. You may need some training at first to learn them.  Do something you enjoy. For example, listen to music or go to a movie. Practice your hobby or do volunteer work.  Meditate. This can help you relax, because you are not worrying about what happened before or what may happen in the future.  Do guided imagery. Imagine yourself in any setting that helps you feel calm. You can use online videos, books, or a teacher to guide you.  Do breathing exercises. For example:  From a standing position, bend forward from the waist with your knees slightly bent. Let your arms dangle close to the floor.  Breathe in slowly and deeply as you return to a standing position. Roll up slowly and lift your head last.  Hold your breath for just a few seconds in the standing position.  Breathe out slowly and bend forward from the waist.  Let your feelings out. Talk, laugh, cry, and express anger when you need to. Talking with supportive friends or family, a counselor, or a jaiden leader about your feelings is a healthy way to relieve stress. Avoid discussing your feelings with people who make you feel worse.  Write. It may help to write about things that are bothering you. This helps you find out how much stress you feel and what is causing it. When you know this, you can find better ways to cope.  What can you do to prevent stress?  You might try some of these things to help prevent stress:  Manage your time. This helps you find time to do the things you want and need to do.  Get enough sleep. Your body recovers from the stresses of the day while you are sleeping.  Get support. Your family, friends, and community can make a difference in how you experience stress.  Limit your news feed. Avoid or limit time on social media or news that may make you feel stressed.  Do something active. Exercise  "or activity can help reduce stress. Walking is a great way to get started.  Where can you learn more?  Go to https://www.MakeLeaps.net/patiented  Enter N032 in the search box to learn more about \"Learning About Stress.\"  Current as of: February 26, 2023               Content Version: 13.8    1768-9886 [employer].   Care instructions adapted under license by your healthcare professional. If you have questions about a medical condition or this instruction, always ask your healthcare professional. [employer] disclaims any warranty or liability for your use of this information.      Learning About Sleeping Well  What does sleeping well mean?     Sleeping well means getting enough sleep to feel good and stay healthy. How much sleep is enough varies among people.  The number of hours you sleep and how you feel when you wake up are both important. If you do not feel refreshed, you probably need more sleep. Another sign of not getting enough sleep is feeling tired during the day.  Experts recommend that adults get at least 7 or more hours of sleep per day. Children and older adults need more sleep.  Why is getting enough sleep important?  Getting enough quality sleep is a basic part of good health. When your sleep suffers, your physical health, mood, and your thoughts can suffer too. You may find yourself feeling more grumpy or stressed. Not getting enough sleep also can lead to serious problems, including injury, accidents, anxiety, and depression.  What might cause poor sleeping?  Many things can cause sleep problems, including:  Changes to your sleep schedule.  Stress. Stress can be caused by fear about a single event, such as giving a speech. Or you may have ongoing stress, such as worry about work or school.  Depression, anxiety, and other mental or emotional conditions.  Changes in your sleep habits or surroundings. This includes changes that happen where you sleep, such as noise, light, " "or sleeping in a different bed. It also includes changes in your sleep pattern, such as having jet lag or working a late shift.  Health problems, such as pain, breathing problems, and restless legs syndrome.  Lack of regular exercise.  Using alcohol, nicotine, or caffeine before bed.  How can you help yourself?  Here are some tips that may help you sleep more soundly and wake up feeling more refreshed.  Your sleeping area   Use your bedroom only for sleeping and sex. A bit of light reading may help you fall asleep. But if it doesn't, do your reading elsewhere in the house. Try not to use your TV, computer, smartphone, or tablet while you are in bed.  Be sure your bed is big enough to stretch out comfortably, especially if you have a sleep partner.  Keep your bedroom quiet, dark, and cool. Use curtains, blinds, or a sleep mask to block out light. To block out noise, use earplugs, soothing music, or a \"white noise\" machine.  Your evening and bedtime routine   Create a relaxing bedtime routine. You might want to take a warm shower or bath, or listen to soothing music.  Go to bed at the same time every night. And get up at the same time every morning, even if you feel tired.  What to avoid   Limit caffeine (coffee, tea, caffeinated sodas) during the day, and don't have any for at least 6 hours before bedtime.  Avoid drinking alcohol before bedtime. Alcohol can cause you to wake up more often during the night.  Try not to smoke or use tobacco, especially in the evening. Nicotine can keep you awake.  Limit naps during the day, especially close to bedtime.  Avoid lying in bed awake for too long. If you can't fall asleep or if you wake up in the middle of the night and can't get back to sleep within about 20 minutes, get out of bed and go to another room until you feel sleepy.  Avoid taking medicine right before bed that may keep you awake or make you feel hyper or energized. Your doctor can tell you if your medicine may do " "this and if you can take it earlier in the day.  If you can't sleep   Imagine yourself in a peaceful, pleasant scene. Focus on the details and feelings of being in a place that is relaxing.  Get up and do a quiet or boring activity until you feel sleepy.  Avoid drinking any liquids before going to bed to help prevent waking up often to use the bathroom.  Where can you learn more?  Go to https://www.WaveRx.net/patiented  Enter J942 in the search box to learn more about \"Learning About Sleeping Well.\"  Current as of: July 11, 2023               Content Version: 13.8    [id number] "[employer].   Care instructions adapted under license by your healthcare professional. If you have questions about a medical condition or this instruction, always ask your healthcare professional. "[employer] disclaims any warranty or liability for your use of this information.      Bladder Training: Care Instructions  Your Care Instructions     Bladder training is used to treat urge incontinence and stress incontinence. Urge incontinence means that the need to urinate comes on so fast that you can't get to a toilet in time. Stress incontinence means that you leak urine because of pressure on your bladder. For example, it may happen when you laugh, cough, or lift something heavy.  Bladder training can increase how long you can wait before you have to urinate. It can also help your bladder hold more urine. And it can give you better control over the urge to urinate.  It is important to remember that bladder training takes a few weeks to a few months to make a difference. You may not see results right away, but don't give up.  Follow-up care is a key part of your treatment and safety. Be sure to make and go to all appointments, and call your doctor if you are having problems. It's also a good idea to know your test results and keep a list of the medicines you take.  How can you care for yourself at home?  Work with " your doctor to come up with a bladder training program that is right for you. You may use one or more of the following methods.  Delayed urination  In the beginning, try to keep from urinating for 5 minutes after you first feel the need to go.  While you wait, take deep, slow breaths to relax. Kegel exercises can also help you delay the need to go to the bathroom.  After some practice, when you can easily wait 5 minutes to urinate, try to wait 10 minutes before you urinate.  Slowly increase the waiting period until you are able to control when you have to urinate.  Scheduled urination  Empty your bladder when you first wake up in the morning.  Schedule times throughout the day when you will urinate.  Start by going to the bathroom every hour, even if you don't need to go.  Slowly increase the time between trips to the bathroom.  When you have found a schedule that works well for you, keep doing it.  If you wake up during the night and have to urinate, do it. Apply your schedule to waking hours only.  Kegel exercises  These tighten and strengthen pelvic muscles, which can help you control the flow of urine. (If doing these exercises causes pain, stop doing them and talk with your doctor.) To do Kegel exercises:  Squeeze your muscles as if you were trying not to pass gas. Or squeeze your muscles as if you were stopping the flow of urine. Your belly, legs, and buttocks shouldn't move.  Hold the squeeze for 3 seconds, then relax for 5 to 10 seconds.  Start with 3 seconds, then add 1 second each week until you are able to squeeze for 10 seconds.  Repeat the exercise 10 times a session. Do 3 to 8 sessions a day.  When should you call for help?  Watch closely for changes in your health, and be sure to contact your doctor if:    Your incontinence is getting worse.     You do not get better as expected.   Where can you learn more?  Go to https://www.healthwise.net/patiented  Enter V684 in the search box to learn more about  "\"Bladder Training: Care Instructions.\"  Current as of: February 28, 2023               Content Version: 13.8    6961-6432 [employer].   Care instructions adapted under license by your healthcare professional. If you have questions about a medical condition or this instruction, always ask your healthcare professional. [employer] disclaims any warranty or liability for your use of this information.      Learning About Alcohol Use Disorder  What is alcohol use disorder?  Alcohol use disorder means that a person drinks alcohol even though it causes harm to themselves or others. It can range from mild to severe. The more symptoms of this disorder you have, the more severe it may be. People who have it may find it hard to control their use of alcohol.  People who have this disorder may argue with others about how much they're drinking. Their job may be affected because of drinking. They may drink when it's dangerous or illegal, such as when they drive. They also may have a strong need, or craving, to drink. They may feel like they must drink just to get by. Their drinking may increase their risk of getting hurt or being in a car crash.  Over time, drinking too much alcohol may cause health problems. These may include high blood pressure, liver problems, or problems with digestion.  What are the symptoms?  Maybe you've wondered about your alcohol habits or how to tell if your drinking is becoming a problem.  Here are some of the symptoms of alcohol use disorder. You may have it if you have two or more of the following symptoms:  You drink larger amounts of alcohol than you ever meant to. Or you've been drinking for a longer time than you ever meant to.  You can't cut down or control your use. Or you constantly wish you could cut down.  You spend a lot of time getting or drinking alcohol or recovering from its effects.  You have strong cravings for alcohol.  You can no longer do your main jobs at " work, at school, or at home.  You keep drinking alcohol, even though your use hurts your relationships.  You have stopped doing important activities because of your alcohol use.  You drink alcohol in situations where doing so is dangerous.  You keep drinking alcohol even though you know it's causing health problems.  You need more and more alcohol to get the same effect, or you get less effect from the same amount over time. This is called tolerance.  You have uncomfortable symptoms when you stop drinking alcohol or use less. This is called withdrawal.  Alcohol use disorder can range from mild to severe. The more symptoms you have, the more severe the disorder may be.  You might not realize that your drinking is a problem. You might not drink large amounts when you drink. Or you might go for days or weeks between drinking episodes. But even if you don't drink very often, your drinking could still be harmful and put you at risk.  How is alcohol use disorder treated?  Getting help is up to you. But you don't have to do it alone. There are many people and kinds of treatments that can help.  Treatment for alcohol use disorder can include:  Group therapy, one or more types of counseling, and alcohol education.  Medicines that help to:  Reduce withdrawal symptoms and help you safely stop drinking.  Reduce cravings for alcohol.  Support groups. These groups include Alcoholics Anonymous and [employer] (Self-Management and Recovery Training).  Some people are able to stop or cut back on drinking with help from a counselor. People who have moderate to severe alcohol use disorder may need medical treatment. They may need to stay in a hospital or treatment center.  You may have a treatment team to help you. This team may include a psychologist or psychiatrist, counselors, doctors, social workers, nurses, and a . A  helps plan and manage your treatment.  Follow-up care is a key part of your treatment  "and safety. Be sure to make and go to all appointments, and call your doctor if you are having problems. It's also a good idea to know your test results and keep a list of the medicines you take.  Where can you learn more?  Go to https://www.Planet Sushi.net/patiented  Enter H758 in the search box to learn more about \"Learning About Alcohol Use Disorder.\"  Current as of: March 21, 2023               Content Version: 13.8    2028-8680 [employer].   Care instructions adapted under license by your healthcare professional. If you have questions about a medical condition or this instruction, always ask your healthcare professional. [employer] disclaims any warranty or liability for your use of this information.      Substance Use Disorder: Care Instructions  Overview     You can improve your life and health by stopping your use of alcohol or drugs. When you don't drink or use drugs, you may feel and sleep better. You may get along better with your family, friends, and coworkers. There are medicines and programs that can help with substance use disorder.  How can you care for yourself at home?  Here are some ways to help you stay sober and prevent relapse.  If you have been given medicine to help keep you sober or reduce your cravings, be sure to take it exactly as prescribed.  Talk to your doctor about programs that can help you stop using drugs or drinking alcohol.  Do not keep alcohol or drugs in your home.  Plan ahead. Think about what you'll say if other people ask you to drink or use drugs. Try not to spend time with people who drink or use drugs.  Use the time and money spent on drinking or drugs to do something that's important to you.  Preventing a relapse  Have a plan to deal with relapse. Learn to recognize changes in your thinking that lead you to drink or use drugs. Get help before you start to drink or use drugs again.  Try to stay away from situations, friends, or places that may " lead you to drink or use drugs.  If you feel the need to drink alcohol or use drugs again, seek help right away. Call a trusted friend or family member. Some people get support from organizations such as Narcotics Anonymous or [employer] or from treatment facilities.  If you relapse, get help as soon as you can. Some people make a plan with another person that outlines what they want that person to do for them if they relapse. The plan usually includes how to handle the relapse and who to notify in case of relapse.  Don't give up. Remember that a relapse doesn't mean that you have failed. Use the experience to learn the triggers that lead you to drink or use drugs. Then quit again. Recovery is a lifelong process. Many people have several relapses before they are able to quit for good.  Follow-up care is a key part of your treatment and safety. Be sure to make and go to all appointments, and call your doctor if you are having problems. It's also a good idea to know your test results and keep a list of the medicines you take.  When should you call for help?   Call 911  anytime you think you may need emergency care. For example, call if you or someone else:    Has overdosed or has withdrawal signs. Be sure to tell the emergency workers that you are or someone else is using or trying to quit using drugs. Overdose or withdrawal signs may include:  Losing consciousness.  Seizure.  Seeing or hearing things that aren't there (hallucinations).     Is thinking or talking about suicide or harming others.   Where to get help 24 hours a day, 7 days a week   If you or someone you know talks about suicide, self-harm, a mental health crisis, a substance use crisis, or any other kind of emotional distress, get help right away. You can:    Call the Suicide and Crisis Lifeline at 032.     Call 4-516-764-TALK (1-250.117.1016).     Text HOME to 040688 to access the Crisis Text Line.   Consider saving these numbers in your phone.  Go  "to [employer] for more information or to chat online.  Call your doctor now or seek immediate medical care if:    You are having withdrawal symptoms. These may include nausea or vomiting, sweating, shakiness, and anxiety.   Watch closely for changes in your health, and be sure to contact your doctor if:    You have a relapse.     You need more help or support to stop.   Where can you learn more?  Go to https://www.[employer].net/patiented  Enter H573 in the search box to learn more about \"Substance Use Disorder: Care Instructions.\"  Current as of: March 21, 2023               Content Version: 13.8    4674-6985 [employer].   Care instructions adapted under license by your healthcare professional. If you have questions about a medical condition or this instruction, always ask your healthcare professional. [employer] disclaims any warranty or liability for your use of this information.      Chronic Pain: Care Instructions  Your Care Instructions     Chronic pain is pain that lasts a long time (months or even years) and may or may not have a clear cause. It is different from acute pain, which usually does have a clear cause--like an injury or illness--and gets better over time. Chronic pain:  Lasts over time but may vary from day to day.  Does not go away despite efforts to end it.  May disrupt your sleep and lead to fatigue.  May cause depression or anxiety.  May make your muscles tense, causing more pain.  Can disrupt your work, hobbies, home life, and relationships with friends and family.  Chronic pain is a very real condition. It is not just in your head. Treatment can help and usually includes several methods used together, such as medicines, physical therapy, exercise, and other treatments. Learning how to relax and changing negative thought patterns can also help you cope.  Chronic pain is complex. Taking an active role in your treatment will help you better manage your pain. " Tell your doctor if you have trouble dealing with your pain. You may have to try several things before you find what works best for you.  Follow-up care is a key part of your treatment and safety. Be sure to make and go to all appointments, and call your doctor if you are having problems. It's also a good idea to know your test results and keep a list of the medicines you take.  How can you care for yourself at home?  Pace yourself. Break up large jobs into smaller tasks. Save harder tasks for days when you have less pain, or go back and forth between hard tasks and easier ones. Take rest breaks.  Relax, and reduce stress. Relaxation techniques such as deep breathing or meditation can help.  Keep moving. Gentle, daily exercise can help reduce pain over the long run. Try low- or no-impact exercises such as walking, swimming, and stationary biking. Do stretches to stay flexible.  Try heat, cold packs, and massage.  Get enough sleep. Chronic pain can make you tired and drain your energy. Talk with your doctor if you have trouble sleeping because of pain.  Think positive. Your thoughts can affect your pain level. Do things that you enjoy to distract yourself when you have pain instead of focusing on the pain. See a movie, read a book, listen to music, or spend time with a friend.  If you think you are depressed, talk to your doctor about treatment.  Keep a daily pain diary. Record how your moods, thoughts, sleep patterns, activities, and medicine affect your pain. You may find that your pain is worse during or after certain activities or when you are feeling a certain emotion. Having a record of your pain can help you and your doctor find the best ways to treat your pain.  Take pain medicines exactly as directed.  If the doctor gave you a prescription medicine for pain, take it as prescribed.  If you are not taking a prescription pain medicine, ask your doctor if you can take an over-the-counter medicine.  Reducing  "constipation caused by pain medicine  Talk to your doctor about a laxative. If a laxative doesn't work, your doctor may suggest a prescription medicine.  Include fruits, vegetables, beans, and whole grains in your diet each day. These foods are high in fiber.  If your doctor recommends it, get more exercise. Walking is a good choice. Bit by bit, increase the amount you walk every day. Try for at least 30 minutes on most days of the week.  Schedule time each day for a bowel movement. A daily routine may help. Take your time and do not strain when having a bowel movement.  When should you call for help?   Call your doctor now or seek immediate medical care if:    Your pain gets worse or is out of control.     You feel down or blue, or you do not enjoy things like you once did. You may be depressed, which is common in people with chronic pain. Depression can be treated.     You have vomiting or cramps for more than 2 hours.   Watch closely for changes in your health, and be sure to contact your doctor if:    You cannot sleep because of pain.     You are very worried or anxious about your pain.     You have trouble taking your pain medicine.     You have any concerns about your pain medicine.     You have trouble with bowel movements, such as:  No bowel movement in 3 days.  Blood in the anal area, in your stool, or on the toilet paper.  Diarrhea for more than 24 hours.   Where can you learn more?  Go to https://www.[employer].net/patiented  Enter N004 in the search box to learn more about \"Chronic Pain: Care Instructions.\"  Current as of: July 11, 2023               Content Version: 13.8    7681-7179 [employer].   Care instructions adapted under license by your healthcare professional. If you have questions about a medical condition or this instruction, always ask your healthcare professional. [employer] disclaims any warranty or liability for your use of this information.      Lung Cancer " Screening   Frequently Asked Questions  If you are at high-risk for lung cancer, getting screened with low-dose computed tomography (LDCT) every year can help save your life. This handout offers answers to some of the most common questions about lung cancer screening. If you have other questions, please call [phone] ([phone]).     What is it?  Lung cancer screening uses special X-ray technology to create an image of your lung tissue. The exam is quick and easy and takes less than 10 seconds. We don t give you any medicine or use any needles. You can eat before and after the exam. You don t need to change your clothes as long as the clothing on your chest doesn t contain metal. But, you do need to be able to hold your breath for at least 6 seconds during the exam.    What is the goal of lung cancer screening?  The goal of lung cancer screening is to save lives. Many times, lung cancer is not found until a person starts having physical symptoms. Lung cancer screening can help detect lung cancer in the earliest stages when it may be easier to treat.    Who should be screened for lung cancer?  We suggest lung cancer screening for anyone who is at high-risk for lung cancer. You are in the high-risk group if you:      are between the ages of 55 and 79, and    have smoked at least 1 pack of cigarettes a day for 20 or more years, and    still smoke or have quit within the past 15 years.    However, if you have a new cough or shortness of breath, you should talk to your doctor before being screened.    Why does it matter if I have symptoms?  Certain symptoms can be a sign that you have a condition in your lungs that should be checked and treated by your doctor. These symptoms include fever, chest pain, a new or changing cough, shortness of breath that you have never felt before, coughing up blood or unexplained weight loss. Having any of these symptoms can greatly affect the results of lung cancer screening.        Should all smokers get an LDCT lung cancer screening exam?  It depends. Lung cancer screening is for a very specific group of men and women who have a history of heavy smoking over a long period of time (see  Who should be screened for lung cancer  above).  I am in the high-risk group, but have been diagnosed with cancer in the past. Is LDCT lung cancer screening right for me?  In some cases, you should not have LDCT lung screening, such as when your doctor is already following your cancer with CT scan studies. Your doctor will help you decide if LDCT lung screening is right for you.  Do I need to have a screening exam every year?  Yes. If you are in the high-risk group described earlier, you should get an LDCT lung cancer screening exam every year until you are 79, or are no longer willing or able to undergo screening and possible procedures to diagnose and treat lung cancer.  How effective is LDCT at preventing death from lung cancer?  Studies have shown that LDCT lung cancer screening can lower the risk of death from lung cancer by 20 percent in people who are at high-risk.  What are the risks?  There are some risks and limitations of LDCT lung cancer screening. We want to make sure you understand the risks and benefits, so please let us know if you have any questions. Your doctor may want to talk with you more about these risks.    Radiation exposure: As with any exam that uses radiation, there is a very small increased risk of cancer. The amount of radiation in LDCT is small--about the same amount a person would get from a mammogram. Your doctor orders the exam when he or she feels the potential benefits outweigh the risks.    False negatives: No test is perfect, including LDCT. It is possible that you may have a medical condition, including lung cancer, that is not found during your exam. This is called a false negative result.    False positives and more testing: LDCT very often finds something in the  lung that could be cancer, but in fact is not. This is called a false positive result. False positive tests often cause anxiety. To make sure these findings are not cancer, you may need to have more tests. These tests will be done only if you give us permission. Sometimes patients need a treatment that can have side effects, such as a biopsy. For more information on false positives, see  What can I expect from the results?     Findings not related to lung cancer: Your LDCT exam also takes pictures of areas of your body next to your lungs. In a very small number of cases, the CT scan will show an abnormal finding in one of these areas, such as your kidneys, adrenal glands, liver or thyroid. This finding may not be serious, but you may need more tests. Your doctor can help you decide what other tests you may need, if any.  What can I expect from the results?  About 1 out of 4 LDCT exams will find something that may need more tests. Most of the time, these findings are lung nodules. Lung nodules are very small collections of tissue in the lung. These nodules are very common, and the vast majority--more than 97 percent--are not cancer (benign). Most are normal lymph nodes or small areas of scarring from past infections.  But, if a small lung nodule is found to be cancer, the cancer can be cured more than 90 percent of the time. To know if the nodule is cancer, we may need to get more images before your next yearly screening exam. If the nodule has suspicious features (for example, it is large, has an odd shape or grows over time), we will refer you to a specialist for further testing.  Will my doctor also get the results?  Yes. Your doctor will get a copy of your results.  Is it okay to keep smoking now that there s a cancer screening exam?  No. Tobacco is one of the strongest cancer-causing agents. It causes not only lung cancer, but other cancers and cardiovascular (heart) diseases as well. The damage caused by  smoking builds over time. This means that the longer you smoke, the higher your risk of disease. While it is never too late to quit, the sooner you quit, the better.  Where can I find help to quit smoking?  The best way to prevent lung cancer is to stop smoking. If you have already quit smoking, congratulations and keep it up! For help on quitting smoking, please call [employer] at 3-942-QUITNOW (1-266.240.7091) or the American Cancer Society at 1-213.952.5163 to find local resources near you.  One-on-one health coaching:  If you d prefer to work individually with a health care provider on tobacco cessation, we offer:      Medication Therapy Management:  Our specially trained pharmacists work closely with you and your doctor to help you quit smoking.  Call [phone] or [phone] (toll free).

## 2024-03-01 NOTE — LETTER
My COPD Action Plan     Name: Toby Mcgrath    YOB: 1955   Date: 3/1/2024    My doctor: Dayana Hopson MD   My clinic: [address] [address] [phone]  My Controller Medicine: Tiotropium (Spiriva)   Dose: 2 puffs daily    My Rescue Medication: Albuterol Inhaler  Dose: 1-2 puffs every 4 hours PRN     My Rescue Medicine:  None   Dose: None          My COPD Severity: Unknown, PFT pending      Use of Oxygen: Oxygen Not Prescribed      Make sure you've had your pneumonia   vaccines.          GREEN ZONE       Doing well today    Usual level of activity and exercise  Usual amount of cough and mucus  No shortness of breath  Usual level of health (thinking clearly, sleeping well, feel like eating) Actions:    Take daily medicines  Use oxygen as prescribed  Follow regular exercise and diet plan  Avoid cigarette smoke and other irritants that harm the lungs           YELLOW ZONE          Having a bad day or flare up    Short of breath more than usual  A lot more sputum (mucus) than usual  Sputum looks yellow, green, tan, brown or bloody  More coughing or wheezing  Fever or chills  Less energy; trouble completing activities  Trouble thinking or focusing  Using quick relief inhaler or nebulizer more often  Poor sleep; symptoms wake me up  Do not feel like eating Actions:    Get plenty of rest  Take daily medicines  Use quick relief inhaler every 4 hours  If you use oxygen, call you doctor to see if you should adjust your oxygen  Do breathing exercises or other things to help you relax  Let a loved one, friend or neighbor know you are feeling worse  Call your care team if you have 2 or more symptoms.  Start taking steroids or antibiotics if directed by your care team           RED ZONE       Need medical care now    Severe shortness of breath (feel you can't breathe)  Fever, chills  Not enough breath to do any activity  Trouble coughing  up mucus, walking or talking  Blood in mucus  Frequent coughing Rescue medicines are not working  Not able to sleep because of breathing  Feel confused or drowsy  Chest pain    Actions:    Call your health care team.  If you cannot reach your care team, call 911 or go to the emergency room.        Annual Reminders:  Meet with Care Team, Flu Shot every Fall  Pharmacy:    [place], [place] - [address] [facility] HOME DELIVERY [address] PHARM[facility] [address] [place] [address] [place] [address]

## 2024-03-05 NOTE — TELEPHONE ENCOUNTER
I am in process of applying to the Cymbalta & Spiriva Respimat assistance programs.  These programs require a hand signed, brand name, hard copy script be submitted with their application.    Please hand sign a BRAND name, hard copy script for:     CYMBALTA 60mg     SPIRIVA RESPIMAT 2.5mcg    Please send the HARD COPY script to me at--     via interoffice mail  [employer] Zenaida Kim     or via [place] mail  at:   [facility]  Zenaida Plasencia  [address] [place]  [address]    Thanks so much for your help!    Zenaida Plasencia  Prescription Assistance Supervisor  Pharmacy Assistance

## 2024-03-06 NOTE — PROGRESS NOTES
Patient came in for their Gait test and Mini cog. Patient passed the Mini Cog, but failed their Gait test.     Sending  results to Provider.     Awa Nowak [facility]

## 2024-03-12 NOTE — TELEPHONE ENCOUNTER
"Patient does not want to go back to pain clinic. \"I'll just deal with my pain the way it is\".  May reconsider when pain clinic calls him to schedule appointment.    Shandra Hui RN   " No

## 2024-03-12 NOTE — TELEPHONE ENCOUNTER
"Notified patient of April Coming MD Mark Anthony's results message:  \"Please call patient and let him know that his hip x-ray shows that his hip replacement is stable, there is no fractures or dislocations.  No obvious cause for his pain, would recommend physical therapy to get home exercises.  I know cost is a concern for him, but at least 1 visit would be beneficial, better than nothing    ...Lumbar spine x-ray shows that his surgical hardware is intact and there does not appear to be any new abnormalities.    ...left shoulder x-ray is normal, no acute findings.  Right shoulder x-ray also shows no acute findings.\"    Patient stated \"I wish I had an answer for the pain I'm still having.\" He shared he's not interested in making a physical therapy appointment as he has tried that in the past with little results. He said he would \"try to remember some of the exercises\" they taught him and do them at home.    Patient is wondering if provider could increase his dose of oxycodone.    Cely Bhatt RN on 3/12/2024 at 10:12 AM  " Monthly or less

## 2024-03-12 NOTE — TELEPHONE ENCOUNTER
Patient has not seen anyone in pain clinic for more than 5 years.  I would like him to see them for a consultation to see if there are any other alternative treatments to opioids that might be beneficial to him before I consider increasing his oxycodone.  He previously had questions about medical cannabis, which he could speak to them about as I do not do that.    Referral to pain clinic placed.

## 2024-03-14 NOTE — LETTER
2024      Toby IVERSON Alcides  [address] [address]   [facility] [address]              Golytely Extended Bowel Prep   Prep instructions for your colonoscopy   For prep questions, please call: [facility] - [address] [place] [address] - [phone] option 4  Prep sent to [id number] - [place], [place] - [address] [facility] [address] on 24.    Please read these instructions carefully at least 7 days prior to your colonoscopy procedure. Be sure to follow all directions completely. The inside of your colon must be clean to allow for a complete examination for the presence of any growths, polyps, and/or abnormalities, as well as their biopsy or removal. A number of tips are included in order to make this part of the procedure as comfortable as possible.    Getting ready      prescription at the pharmacy. Endoscopy team will order this about 2 weeks before to your scheduled procedure. Included in the kit will be the followin  Dulcolax (Bisacodyl) 5mg tablets  Two containers of Polyethylene glycol (Golytely, Colyte, Nulytely, Gavilyte-G)    A nurse will call you to go over your appointment details and prep instructions. Not completing the nurse call could result with your appointment being cancelled.    You must arrange for an adult to drive you home after your exam. Your colonoscopy cannot be done unless you have a ride. If you need to use public transportation, someone must ride with you and stay with you for up to 24 hours.       7 days before procedure     Consult with your prescribing provider about stopping any:     Diabetic/Weight Loss Injectable Medication GLP-1 agonist (such as Exenatide (Byetta, Bydureon),  Mounjaro (Tirzepatide).  Ozempic (Semaglutide). Semaglutide. Symlin (Pamlintide), Tanzeum (Albiglutide). Tirzepatide-Weight Management (Zepbound), Trulicity (Dulaglutide), Victoza (Saxenda, Liraglutide), Wegovy (Semaglutide) please follow  below guidelines for holding:    For weekly injection HOLD 7 days before procedure.  For once or twice a day injection HOLD the day before procedure and day of procedure.  For oral, daily dosing (Rybelsus) HOLD 7 days before procedure.    Blood thinning and/or anti-platelet medications: such as Coumadin, Plavix, Xarelto, Eliquis, Lovenox or others, these medications may need to be stopped temporarily before your procedure.     If you take insulin for diabetes, ask your prescribing provider for instructions on how to manage this medication while preparing for a colonoscopy.     NSAIDs medications such as Sulindac, Celebrex, Mobic, Relafen or others may need to be stopped before the procedure. This will be discussed during nurse review call, or you can reach out to your prescribing provider.      Stop taking iron (ferrous sulfate), multivitamins that contain iron, and/or fiber supplements (Metamucil, Benefiber, Psyllium husk powder, Fibercon, Bran, etc.).      Stop eating whole kernel corn, popcorn, nuts, and foods that contain seeds. These can stay in the colon for many days, and they can clog up the colonoscope.    Begin a low-fiber diet. (See examples below). No Olestra (a fat substitute).   Consume no more than 10-15 grams of fiber each day.         LOW FIBER DIET   You may have: Do not have:    Starches: White bread, rolls, biscuits, croissants, [place] toast, white flour tortillas, waffles, pancakes, Malay toast; white rice, noodles, pasta, macaroni; cooked and peeled potatoes; plain crackers, saltines; cooked farina or cream of rice; puffed rice, corn flakes, Rice Krispies, Special K      Vegetables: tender cooked and canned, vegetable broths     Fruits and fruit juices: Strained fruit juice, canned fruit without seeds or skin (not pineapple), applesauce, pear sauce, ripe bananas, melons (not watermelon)     Milk products: Milk (plain or flavored), cheese, cottage cheese, yogurt (no berries), custard, ice cream        Proteins: Tender, well-cooked ground beef, lamb, veal, ham, pork, chicken, turkey, fish or organ meat, Tofu, eggs, creamy peanut butter      Fats and condiments: Margarine, butter, oils, mayonnaise, sour cream, salad dressing, plain gravy; spices, cooked herbs; sugar, clear jelly, honey, syrup      Snacks, sweets and drinks: Pretzels, hard candy; plain cakes and cookies (no nuts or seeds); gelatin, plain pudding, sherbet, Popsicles; coffee, tea, carbonated ( fizzy ) drinks     Starches: Breads or rolls that contain nuts, seeds or fruit; whole wheat or whole grain breads that contain more than 2 grams of fiber per serving; cornbread; corn or whole wheat tortillas; potatoes with skin; brown rice, wild rice, quinoa, kasha (buckwheat), and oatmeal      Vegetables: Any raw or steamed vegetables; vegetables with seeds; corn in any form      Fruits and fruit juices: Prunes, prune juice, raisins and other dried fruits, berries and other fruits with seeds, canned pineapple juices with pulp such as orange, grapefruit, pineapple or tomato juice     Milk products: Any yogurt with nuts, seeds or berries      Proteins: Tough, fibrous meats with gristle; cooked dried beans, peas or lentils; crunchy peanut butter     Fats and condiments: Pickles, olives, relish, horseradish; jam, marmalade, preserves      Snacks, sweets and drinks: Popcorn, nuts, seeds, granola, coconut, candies made with nuts or seeds; all desserts that contain nuts, seeds, raisins and other dried fruits, coconut, whole grains or bran.       2 days before procedure       You may have a light, low fiber breakfast and lunch. Begin a clear liquid diet AFTER 1 PM. Do not eat solid foods. (See examples below).    Drink at least eight to ten 8-ounce glasses of water throughout the day  ? ? ? ? ? ? ? ?    Fill one container of Golytely with a full gallon of warm water. Cover and shake until well mixed. Chill in refrigerator until it is time to drink solution.      CLEAR LIQUID DIET:  You can have: Do not have:    Water, tea, coffee (no milk or cream)   Soda pop, Gatorade (not red or purple)   Coconut water   Jell-O, Popsicles (no milk or fruit pieces - not red or purple)   Fat-free soup broth or bouillon   Plain hard candy, such as clear life savers (not red or purple)   Clear juices and fruit-flavored drinks, such as apple juice, white grape juice, Hi-C, and Julian-Aid (not red or purple)    Milk or milk products such as ice cream, malts or shakes, or coffee creamer   Red or purple drinks of any kind such as cranberry juice, grape juice, or Julian-Aid. Avoid red or purple Jell-O, Popsicles, sorbet, sherbet and candy.   Juices with pulp such as orange, grapefruit, pineapple, or tomato juice   Cream soups of any kind   Alcohol and beer   Protein drinks or protein powder     Step 1     At 4 PM, take 2 Dulcolax (Bisacodyl) tablets.  At 5 PM, start drinking one 8-ounce glass of Golytely mixture every 15 minutes until the container is HALF empty. Drink each glass quickly. Store the rest in the refrigerator.   Continue to drink clear liquids.      Reminders While Drinking Laxatives:     After you start drinking the solution, stay near a toilet. You may have watery stools (diarrhea), mild cramping, bloating, and nausea. You may want to use Vaseline on the skin around your anus after each bowel movement or use wet wipes to prevent irritation. Bowel movements will be liquid and dark in color at first and then should turn clear yellow in color. Drinking the prepared solution may make you cold, wearing warm clothing can be helpful.    Some find it helpful to:  For added flavor, include a crystal light lemonade packet in each glass of Golytely, rather than mixing it into the entire prepared mixture.  In between each glass, try sucking on a lemon or a piece of hard candy to help diminish the flavor of the preparation.  Drinking from a straw can help minimize the taste of the prepared  mixture.    If you have nausea or vomiting during drinking the solution, rinse your mouth with water and take a 15-30 minute break and then continue drinking solution.         1 day before procedure       Continue on a clear liquid diet. Do not eat solid foods.    Drink at least eight to ten 8-ounce glasses of water throughout the day  ? ? ? ? ? ? ? ?    Stop taking any NSAIDs pain relievers such as Advil, Ibuprofen, Motrin, etc. You may take Tylenol.      Step 2     At 4 PM, take 2 Dulcolax (Bisacodyl) tablets.  At 5 PM, start drinking the 2nd half of Golytely mixture. Drink one 8-ounce glass of Golytely mixture every 15 minutes until the container is empty.  Drink each glass quickly.   Continue to drink clear liquids.    Before you go to bed mix the second container of Golytely and place in the refrigerator for the morning.     Day of procedure     Step 3     6 hours before your arrival time, start drinking one 8-ounce glass of Golytely mixture every 15 minutes until the container is HALF empty. You will not drink the entire container. The remaining solution can be discarded.     2 hours before your arrival time stop drinking all liquids, including water.   Do not smoke or swallow anything, including water or gum for at least 2 hours before your arrival time. This is a safety issue. Your procedure could be cancelled if you do not follow directions.  No chewing tobacco 6 hours prior to procedure arrival time.     You may take your necessary morning medications with sips of water (4 ounces).   Do not take diabetes medicine by mouth until after your exam.  If you have asthma, bring your inhalers.  Please perform your nebulizer treatments and airway clearance therapy in the morning prior to the procedure (if applicable).    Arrive with a responsible adult who can drive you home and stay with you for a minimum of 24 hours. The medications used during the procedure will make you sleepy, so you won't be able to drive  yourself home.   You cannot use public transportation, ride-share services, or non-medical taxi services without a responsible caregiver. Medical transport services are okay, but a caregiver must be there to receive you at your destination.  Please check in with your  when you arrive. Drivers should stay on campus.    Expect to be at the procedure center for about 1.5-2.5 hours.    Do not wear jewelry (i.e. earrings, rings, necklaces, watches, etc.). Leave your purse, billfold, credit cards, and other valuables at home.      Bring insurance card and ID.         Answers to Commonly Asked Questions     How soon can I eat after the procedure?  You may resume your normal diet when you feel ready, unless advised otherwise by the doctor performing your procedure. We recommend starting with a light meal.   Do not drink alcohol for 24 hours after your procedure.  You may resume normal activities (work, exercise, etc.) after 24 hours.    How will I feel after the procedure?  It is normal to feel bloated and gassy after your procedure. Walking will help move the air through your colon. You can take non-aspirin pain relievers that contain acetaminophen (Tylenol).  If you are having sedation, we require a responsible adult to take you home for your safety. The sedation medicines used to relax you during the procedure can impair your judgement and reaction time and make you forgetful and possibly a little unsteady.  Do not drive, make any important decisions, or sign any legal documents for 24 hours after your procedure.    When will I get my test results?  You should have your procedure results and any lab results (if applicable) by letter, VibeWritehart message, or phone call within 2 weeks. If you have any questions, please call the doctor that referred you for the procedure.    How do I know if my colon is cleaned out?   After completing the bowel prep, your bowel movements should be all liquid and yellow. Your bowel  movements will look similar to urine in the toilet. If there are pieces of stool (poop) in the toilet, or if you can't see to the bottom of the toilet, please call our office for advice. Call [phone] and ask to speak with a nurse.    Why is the Golytely bowel prep taken in several steps?   The stool is flushed out by a large wave of fluid going through the colon. Just sipping a large volume of the solution will not achieve the desired result. Studies have shown that two smaller waves (or more in some cases) are better than one large one.      What if I need to cancel or reschedule my procedure?  Contact our endoscopy scheduling team at [phone], option 2. Monday through Friday, 7:00am-5:00pm.

## 2024-03-14 NOTE — TELEPHONE ENCOUNTER
Pre Assessment RN Review    Focused Assessments    Pain Medication Review    Per scheduling questionnaire, patient reports taking prescription pain medication three or more times per week.    Per chart review, patient does have an active prescription for an opioid medication. Prescribed Medication(s): Oxycodone      Scheduling Status & Recommendations    Sedation: MAC/Deep Sedation - Per order.    Paty Souza RN Colorectal Cancer    Division of Gastroenterology at [facility]

## 2024-03-14 NOTE — TELEPHONE ENCOUNTER
ANTICOAGULATION     Toby Mcgrath is overdue for an INR check. Patient was due for INR check 3/7/24    Left message for patient to call and schedule lab appointment as soon as possible. If returning call, please schedule.     Brianda Hui RN

## 2024-03-14 NOTE — TELEPHONE ENCOUNTER
"Endoscopy Scheduling Screen    Have you had a positive Covid test in the last 14 days?  No    What is your communication preference for Instructions and/or Bowel Prep?   Mail/USPS    What insurance is in the chart?  Other:  [facility]    Ordering/Referring Provider:     ERIK COSTA      (If ordering provider performs procedure, schedule with ordering provider unless otherwise instructed. )    BMI: Estimated body mass index is 30.99 kg/m  as calculated from the following:    Height as of 5/17/23: 1.803 m (5' 11\").    Weight as of 11/2/23: 100.8 kg (222 lb 3.2 oz).     Sedation Ordered  moderate sedation.   If patient BMI > 50 do not schedule in [facility].    If patient BMI > 45 do not schedule at [facility].    Are you taking methadone or Suboxone?  No    Have you had difficulties, pain, or discomfort during past endoscopy procedures?  No    Are you taking any prescription medications for pain 3 or more times per week?   YES, RN review needed to determine if MAC is required.  (RN Review required.)     Do you have a history of malignant hyperthermia?  No    (Females) Are you currently pregnant?        Have you been diagnosed or told you have pulmonary hypertension?   No    Do you have an LVAD?  No    Have you been told you have moderate to severe sleep apnea?  No    Have you been told you have COPD, asthma, or any other lung disease?  No    Do you have any heart conditions?  Yes     In the past year, have you had any hospitalizations for heart related issues including cardiomyopathy, heart attack, or stent placement?  No    Do you have any implantable devices in your body (pacemaker, ICD)?  No    Do you take nitroglycerine?  No    Have you ever had or are you waiting for an organ transplant?  No. Continue scheduling, no site restrictions.    Have you had a stroke or transient ischemic attack (TIA aka \"mini stroke\" in the last 6 months?   No    Have you been diagnosed with or been told you have cirrhosis of the " "liver?   No    Are you currently on dialysis?   No    Do you need assistance transferring?   No    BMI: Estimated body mass index is 30.99 kg/m  as calculated from the following:    Height as of 5/17/23: 1.803 m (5' 11\").    Weight as of 11/2/23: 100.8 kg (222 lb 3.2 oz).     Is patients BMI > 40 and scheduling location UP?  No    Do you take an injectable medication for weight loss or diabetes (excluding insulin)?  No    Do you take the medication Naltrexone?  No    Do you take blood thinners?  Yes     Are you taking Effient/Prasugrel?  No, you must contact your prescribing provider for direction on holding or bridging with a different medication.       Prep   Are you currently on dialysis or do you have chronic kidney disease?  No    Do you have a diagnosis of diabetes?  No    Do you have a diagnosis of cystic fibrosis (CF)?  No    On a regular basis do you go 3 -5 days between bowel movements?  Yes (Extended Prep)    BMI > 40?  No    Preferred Pharmacy:    [facility] [address] [place] [id number]  Phone: [phone] Fax: [phone]      Final Scheduling Details     Procedure scheduled  Colonoscopy    Surgeon:  COLLIN     Date of procedure:  9/5     Pre-OP / PAC:   Yes - Patient informed of pre-op requirement.    Location  [facility] - Per RN assessment.    Sedation   MAC/Deep Sedation - Per RN assessment.      Patient Reminders:   You will receive a call from a Nurse to review instructions and health history.  This assessment must be completed prior to your procedure.  Failure to complete the Nurse assessment may result in the procedure being cancelled.      On the day of your procedure, please designate an adult(s) who can drive you home stay with you for the next 24 hours. The medicines used in the exam will make you sleepy. You will not be able to drive.      You cannot take public transportation, ride share services, or non-medical taxi service " without a responsible caregiver.  Medical transport services are allowed with the requirement that a responsible caregiver will receive you at your destination.  We require that drivers and caregivers are confirmed prior to your procedure.

## 2024-03-14 NOTE — TELEPHONE ENCOUNTER
Toby's Spiriva application and Cymbalta application has been submitted to the [employer] and [employer] prescription assistance programs.     We will note EPIC when [facility] and Rachel has made their decision.     Thank you!    Sammi Brumfield   Prescription Assistance Assistant

## 2024-03-22 NOTE — TELEPHONE ENCOUNTER
ANTICOAGULATION     Toby Mcgrath is overdue for an INR check.     Left message for patient to call and schedule lab appointment as soon as possible. If returning call, please schedule.     Arianne Cotton RN

## 2024-03-26 NOTE — TELEPHONE ENCOUNTER
Medication Question or Refill    Contacts         Type Contact Phone/Fax    03/26/2024 07:38 AM CDT Phone (Incoming) Layo Mcgrath (Self) [phone] (M)            What medication are you calling about (include dose and sig)?: Oxycodone 5mg    Preferred Pharmacy:     [facility] [address] [place] [id number]  Phone: [phone] Fax: [phone]      Controlled Substance Agreement on file:   CSA -- Patient Level:     [Media Unavailable] Controlled Substance Agreement - Opioid - Scan on 5/17/2023  1:37 PM: Opioid       Who prescribed the medication?: Dr. Danika Hopson    Do you need a refill? Yes,     When did you use the medication last?     Patient offered an appointment? No    Do you have any questions or concerns?  No      Okay to leave a detailed message?: Yes at Cell number on file:    Telephone Information:   Mobile [phone]

## 2024-04-01 NOTE — PROGRESS NOTES
ANTICOAGULATION MANAGEMENT     Toby Mcgrath 68 year old male is on warfarin with subtherapeutic INR result. (Goal INR 2.0-3.0)    Recent labs: (last 7 days)     04/01/24  0936   INR 1.8*       ASSESSMENT     Source(s): Chart Review and Patient/Caregiver Call     Warfarin doses taken: Warfarin taken as instructed, however states he may have missed 2.5 mg  Diet: No new diet changes identified  Medication/supplement changes: None noted  New illness, injury, or hospitalization: Yes: Patient thinks he may have had a sinus infection, symptoms are clearing  Signs or symptoms of bleeding or clotting: No  Previous result: Therapeutic last 2(+) visits, last INR was 2/8/24  Additional findings: Patient reports he is going to call his insurance to inquire about cost of DOAC. If DOAC is not an option, patient would like to change to 2.5 mg warfarin tablets so he does not need to cut them for correct dosing and only have 1 tablet so no confusion with different tablet strength.       PLAN     Recommended plan for temporary change(s) affecting INR     Dosing Instructions: Continue your current warfarin dose with next INR in 2 weeks       Summary  As of 4/1/2024      Full warfarin instructions:  5 mg every Mon, Thu; 7.5 mg all other days   Next INR check:  4/15/2024               Telephone call with Layo who verbalizes understanding and agrees to plan    Lab visit scheduled    Education provided:   Please call back if any changes to your diet, medications or how you've been taking warfarin  Contact [phone]  with any changes, questions or concerns.     Plan made per [facility] anticoagulation protocol    Brianda Hui RN  Anticoagulation Clinic  4/1/2024    _______________________________________________________________________     Anticoagulation Episode Summary       Current INR goal:  2.0-3.0   TTR:  65.3% (7.7 mo)   Target end date:  Indefinite   Send INR reminders to:  [facility]    Atrial  fibrillation with RVR (H) [I48.91]             Comments:               Anticoagulation Care Providers       Provider Role Specialty Phone number    Dayana Le MD Referring Family Medicine [phone]

## 2024-04-03 NOTE — TELEPHONE ENCOUNTER
Toby has been approved to the  assistance program for Cymbalta and Spiriva Respimat through December 31, 2024.     He will receive this medication at no cost through this enrollment period, delivered in 90 day increments. Toby has been informed of these approvals and deliveries.      Thank you for your assistance,  Melina Dumont  Prescription   Please send responses to [email or web address]

## 2024-04-15 NOTE — PROGRESS NOTES
ANTICOAGULATION MANAGEMENT     Toby Mcgrath 68 year old male is on warfarin with therapeutic INR result. (Goal INR 2.0-3.0)    Recent labs: (last 7 days)     04/15/24  0916   INR 2.1*       ASSESSMENT     Source(s): Chart Review  Previous INR was Subtherapeutic  Medication, diet, health changes since last INR chart reviewed; none identified - previous visit noted DOAC consideration - [facility] team to f/u on patient's findings/plan         PLAN     Unable to reach Layo today.    Left message to continue current dose of warfarin 5 mg tonight. Request call back for assessment.    Follow up required to confirm warfarin dose taken and assess for changes    Kate Mcdowell, RN  Anticoagulation Clinic  4/15/2024

## 2024-04-15 NOTE — PROGRESS NOTES
ANTICOAGULATION MANAGEMENT     Toby Mcgrath 68 year old male is on warfarin with therapeutic INR result. (Goal INR 2.0-3.0)    Recent labs: (last 7 days)     04/15/24  0916   INR 2.1*       ASSESSMENT     Source(s): Chart Review and Patient/Caregiver Call     Warfarin doses taken: Warfarin taken as instructed  Diet: No new diet changes identified  Medication/supplement changes: None noted  New illness, injury, or hospitalization: No  Signs or symptoms of bleeding or clotting: No  Previous result: Subtherapeutic  Additional findings: Patient plans to discuss DOACs and coverage with Mark Dang at 4/18/24 appt       PLAN     Recommended plan for no diet, medication or health factor changes affecting INR     Dosing Instructions: Continue your current warfarin dose with next INR in 3 weeks       Summary  As of 4/15/2024      Full warfarin instructions:  5 mg every Mon, Thu; 7.5 mg all other days   Next INR check:  5/9/2024               Telephone call with Layo who verbalizes understanding and agrees to plan    Lab visit scheduled    Education provided:   Please call back if any changes to your diet, medications or how you've been taking warfarin  Symptom monitoring: monitoring for bleeding signs and symptoms and monitoring for clotting signs and symptoms    Plan made per [facility] anticoagulation protocol    Kate Mcdowell RN  Anticoagulation Clinic  4/15/2024    _______________________________________________________________________     Anticoagulation Episode Summary       Current INR goal:  2.0-3.0   TTR:  63.5% (8.2 mo)   Target end date:  Indefinite   Send INR reminders to:  [facility]    Indications    Atrial fibrillation with RVR (H) [I48.91]             Comments:               Anticoagulation Care Providers       Provider Role Specialty Phone number    Dayana Le MD Referring Family Medicine [phone]

## 2024-04-18 NOTE — PROGRESS NOTES
"Patient completed pulmonary function testing with spirometry, lung volumes and diffusion.  Good patient effort and cooperation. The results of this test met the ATS standards for acceptability and repeatability, except for DLCO. DLCO did not meet ATS due to IV<90%VC. The average of two consistent results were recorded. DLCO graded \"B\". Hgb result of 14.4 from 2/8/24 was used for DLCO.   "

## 2024-04-18 NOTE — PATIENT INSTRUCTIONS
"Recommendation(s) from today's visit:                                                      Ok to try to reduce Lyrica to 100m daily. But if the neuropathy is worse then need to increase back to twice daily.    Cut down tylenol to 2 tabs (1000mg) at one time. Do not take 3 tabs at one time, that is too much    Add on Start buspirone 5 mg twice daily for anxiety and mood.  Work with behavioral health counselor referral placed    Start glipizide 5 mg once daily in the morning for diabetes     You can try over-the-counter diclofenac gel 1% for your hand pain, your insurance does not cover you will need to buy over-the-counter     Follow-up:    My Clinical Pharmacist's contact information:                                                      Laurence Kessler, PharmD  Medication Therapy Management Clinical Pharmacist    It was great speaking with you today.  I value your experience and would be very thankful for your time in providing feedback in our clinic survey. In the next few days, you may receive an email or text message from [employer] with a link to a survey related to your  clinical pharmacist.\"     To schedule another MTM appointment, please call the clinic directly [phone] or you may call the MTM scheduling line at [phone].    "

## 2024-04-18 NOTE — Clinical Note
Lyrica is too costly for patient he wants to taper off, are you ok with him trying to go down to 100mg? I did suggest he return back to twice daily if neuropathy is worst though. Thanks!

## 2024-04-18 NOTE — PROGRESS NOTES
Medication Therapy Management (MTM) Encounter    ASSESSMENT:                            Medication Adherence/Access: See below for considerations    Depression and anxiety:   We discussed consider to change current SNRI to alternate SNRI or we can trial add on therapy to regimen given he is on maximum recommended duloxetine dose. He prefers to add on medication. He is interest in [facility] referral.     Pain and Neuropathy:   Discussed with patient he may trial down Lyrica as he feels is ineffective and a cost burden for him. However, I am concern his neuropathy will worsen if we reduce dose. Patient should reduce acetaminophen dose. Suggest trial of topical NSAID for additional relief with hand pain.    Constipation:   stable     BPH:   Stable    Allergic Rhinitis:   Stable    COPD:   Await PFT result. Based on patient report stable symptoms.    Diabetes   A1c not at goal. Patient is not taking SGLT2i due to cost recommend he start glipizide and plan for a1c check next visit. Encouraged he monitor for low blood sugars at home with new medications.    GERD    Change PPI due to omeprazole is tier 2 to reduce cost burden.    Hypertension and Afib:   Blood pressure at goal. Last INR at goal as well. Patient unable to change to DOAC due to cost.     Hyperlipidemia:  stable    PLAN:                            try Lyrica to 100m daily. But if the neuropathy is worse then need to increase back to twice daily. Follow-up next visit.    Cut down tylenol to 2 tabs (1000mg) at one time/per dose.     Patient to try over-the-counter diclofenac gel (rx not covered)    Add on Start buspirone 5 mg twice daily   [facility] referral.     Change omeprazole to pantoprazole 40 mg daily (completed after visit, patient called me after visit with cost concern).    Start glipizide xl 5mg in the morning. Patient advised to monitor for low blood sugars, check with glucometer.    Follow-up: Return in about 6 weeks (around  "5/30/2024).    SUBJECTIVE/OBJECTIVE:                          Layo Mcgrath is a 68 year old male coming in for a follow-up visit.       Reason for visit: medication review, he thinks that he is missing medications. Here with rx bottles.    Allergies/ADRs: Reviewed in chart  Past Medical History: Reviewed in chart  Tobacco: He reports that he quit smoking about 13 years ago. His smoking use included cigarettes. He started smoking about 53 years ago. He has a 40 pack-year smoking history. He quit smokeless tobacco use about 11 years ago.  Alcohol: not currently using    Medication Adherence/Access:   The patient fills medications at [facility]: YES.  Patient does have financial strain, so any rx other than tier 1 will have a copay and can be costly for him. Receives medication from : [facility]    Depression and anxiety:  Duloxetine 120 mg once daily  Patient reports no current medication side effects. But feels more depressed and anxious of late. He is open to working with therapist. He didn't know what was an option. He reports stressors: finances and family concerns. Right now he doesn't have a car to drive so has been difficult to deal with. He reports has a lot on his mind and when he gets confused that can irritate him. He has had some issues with the pharmacy and refills medications recently that has been frustrating.     Pain and Neuropathy:   Acetaminophen 500-1000 mg every 8 hour as needed, takes 1500mg at one time once daily  oxycodone 5mg every 6 hours as needed, max 4 tablets per day  Lyrica 200 mg twice daily   Duloxetine 120 mg in the morning   Has been having chronic pain since his spine surgery/infusion. Continues to report oxycodone is helpful. However, he feels Lyrica does \"nothing\" not even sure why he takes it. He denies any side effects.  He also reports pain at the joints in his hands. Use to take oral NSAID but has been told not to take.    Constipation:   senna-docusate " 8.6-50mg tablet, take 1-2 tablets daily as needed   Miralax as needed   Normal bowel movements. Feels regimen is effective.      BPH:   Finasteride 5 mg every day  Tamsulosin 0.8 mg daily   No changes reported. No side effects.     Allergic Rhinitis:   Flonase (fluticasone) nasal spray - 1-2 spray(s) each nostril once daily as needed   Patient reports no current medication side effects.     Patient feels that current therapy is effective.      COPD:   [facility] - Spiriva Respimat 2.5 mcg - two puffs once daily  Albuterol HFA as needed, not using  Patient reports no current medication side effects.    Patient reports the following symptoms: none.  Patient had PFT done prior to our appointment today.    Diabetes   metformin XR 2000 mg daily  Farxiga 10 mg daily - NOT taking  Side effects: none. However not on Farxiga, he is not sure when he ran out of the rx. He had received from assistance program, drug company but not approved for 2024 from chart review.   Blood sugar monitoring: none lately. He stopped checking because he reports blood sugars are always in the 200's which discourage him.   Diet/Exercise: appetite has been stable. He denies any problem with getting food. He had a doughnut today before coming in to see me.      Eye exam is up to date  Foot exam: due  Urine Albumin:   Lab Results   Component Value Date    ALCR  02/08/2024      Comment:      Unable to calculate, urine albumin and/or urine creatinine is outside detectable limits.  Microalbuminuria is defined as an albumin:creatinine ratio of 17 to 299 for males and 25 to 299 for females. A ratio of albumin:creatinine of 300 or higher is indicative of overt proteinuria.  Due to biologic variability, positive results should be confirmed by a second, first-morning random or 24-hour timed urine specimen. If there is discrepancy, a third specimen is recommended. When 2 out of 3 results are in the microalbuminuria range, this is evidence for incipient  nephropathy and warrants increased efforts at glucose control, blood pressure control, and institution of therapy with an angiotensin-converting-enzyme (ACE) inhibitor (if the patient can tolerate it).        Lab Results   Component Value Date    A1C 8.4 (H) 02/08/2024       GERD    Prilosec (omeprazole) 40 mg daily   Famotidine 20 mg twice daily as needed    Patient feels these two medications do help and works well.       Hypertension  and Afib:   Warfarin as directed by INR clinic   lisinopril 5 mg daily  metoprolol succinate 100 mg daily   Patient does not self-monitor blood pressure. Patient is would prefer to change back to DOAC due to fluctuates in INR given his diet varies. However, does not meet criteria for DOAC financial help.   [id number] = 3  Patient reports no current medication side effects. No symptoms reported either.      BP Readings from Last 3 Encounters:   04/18/24 104/64   11/02/23 102/62   09/16/23 124/72     Pulse Readings from Last 3 Encounters:   11/02/23 65   09/16/23 78   08/17/23 86     Hyperlipidemia   simvastatin 20 mg daily  Patient reports no significant myalgias or other side effects.     Recent Labs   Lab Test 03/06/24  0946 02/15/23  1208   CHOL 122 99   HDL 30* 28*   LDL 43 38   TRIG 245* 166*     Today's Vitals: /64   Wt 210 lb 3.2 oz (95.3 kg)   BMI 29.32 kg/m    ----------------      I spent 40 minutes with this patient today. All changes were made via collaborative practice agreement with Dayana Hopson MD. A copy of the visit note was provided to the patient's provider(s).    A summary of these recommendations was given to the patient.    Laurence Kessler, PharmD  Medication Therapy Management Pharmacist       Medication Therapy Recommendations  Chronic pain syndrome    Current Medication: acetaminophen (TYLENOL) 500 MG tablet   Rationale: Dose too high - Dosage too high - Safety   Recommendation: Decrease Dose   Status: Accepted - no CPA Needed          Current  Medication: diclofenac (VOLTAREN) 1 % topical gel (Discontinued)   Rationale: Synergistic therapy - Needs additional medication therapy - Indication   Recommendation: Start Medication   Status: Accepted - no CPA Needed         Diabetic polyneuropathy associated with type 2 diabetes mellitus (H)    Current Medication: pregabalin (LYRICA) 100 MG capsule   Rationale: Patient prefers not to take - Adherence - Adherence   Recommendation: Decrease Frequency   Status: Contact Provider - Awaiting Response         Gastroesophageal reflux disease with esophagitis without hemorrhage    Current Medication: omeprazole (PRILOSEC) 40 MG DR capsule (Discontinued)   Rationale: More cost-effective medication available - Cost - Adherence   Recommendation: Change Medication - pantoprazole 40 MG EC tablet   Status: Accepted per CPA         Major depressive disorder, recurrent episode (H24)    Current Medication: busPIRone (BUSPAR) 5 MG tablet   Rationale: Synergistic therapy - Needs additional medication therapy - Indication   Recommendation: Start Medication   Status: Accepted per CPA         Type 2 diabetes mellitus without complication, without long-term current use of insulin (H)    Current Medication: dapagliflozin (FARXIGA) 10 MG TABS tablet (Discontinued)   Rationale: More cost-effective medication available - Cost - Adherence   Recommendation: Change Medication - glipiZIDE XL 5 MG Tb24   Status: Accepted per CPA

## 2024-04-18 NOTE — LETTER
April 24, 2024      Layo ZAYRA Mcgrath  [address]   [place] [address]        Dear ,    We are writing to inform you of your test results.    Resulted Orders   General PFT Lab (Please always keep checked)   Result Value Ref Range    FVC-Pred 3.96 L    FVC-Pre 4.68 L    FVC-%Pred-Pre 118 %    FEV1-Pre 3.60 L    FEV1-%Pred-Pre 119 %    FEV1FVC-Pred 77 %    FEV1FVC-Pre 77 %    FEFMax-Pred 8.40 L/sec    FEFMax-Pre 10.29 L/sec    FEFMax-%Pred-Pre 122 %    FEF2575-Pred 2.32 L/sec    FEF2575-Pre 2.94 L/sec    VKE0797-%Pred-Pre 126 %    ExpTime-Pre 7.81 sec    FIFMax-Pre 6.41 L/sec    VC-Pred 4.24 L    VC-Pre 4.63 L    VC-%Pred-Pre 109 %    IC-Pred 2.96 L    IC-Pre 3.82 L    IC-%Pred-Pre 129 %    ERV-Pred 1.48 L    ERV-Pre 0.80 L    ERV-%Pred-Pre 54 %    FEV1FEV6-Pred 78 %    FEV1FEV6-Pre 78 %    FRCPleth-Pred 3.74 L    FRCPleth-Pre 3.65 L    FRCPleth-%Pred-Pre 97 %    RVPleth-Pred 2.67 L    RVPleth-Pre 2.85 L    RVPleth-%Pred-Pre 106 %    TLCPleth-Pred 7.23 L    TLCPleth-Pre 7.47 L    TLCPleth-%Pred-Pre 103 %    Gaw-Pred 1.03 L/s/cmH2O    Gaw-Pre 0.84 L/s/cmH2O    Gaw-%Pred-Pre 81 %    sGaw-Pred 0.08 1/cmH2O*s    sGaw-Pre 0.21 1/cmH2O*s    sGaw-%Pred-Pre 246 %    sRaw-Pred 4.76 cmH2O*s    sRaw-Pre 4.89 cmH2O*s    sRaw-%Pred-Pre 102 %    DLCOunc-Pred 26.15 ml/min/mmHg    DLCOunc-Pre 21.62 ml/min/mmHg    DLCOunc-%Pred-Pre 82 %    DLCOcor-Pre 21.75 ml/min/mmHg    DLCOcor-%Pred-Pre 83 %    VA-Pre 6.49 L    VA-%Pred-Pre 99 %    FEV1SVC-Pred 71 %    FEV1SVC-Pre 78 %    Narrative    The FVC, FEV1, FEV1/FVC ratio and DXO81-32% are within normal limits.  The inspiratory flow rates are within normal limits. Lung volumes are within normal limits.  The diffusing capacity corrected for hemoglobin is normal.        IMPRESSION:    Normal Pulmonary Function    Samara Almazan MD      This interpretation has been electronically signed:  SAMARA ALMAZAN 04/20/2024  05:04:36 PM         If you have any questions or  concerns, please call the clinic at the number listed above.       Sincerely,      Dayana Hopson MD

## 2024-04-23 NOTE — TELEPHONE ENCOUNTER
Medication Question or Refill    Contacts         Type Contact Phone/Fax    04/23/2024 09:00 AM CDT Phone (Incoming) Layo Mcgrath (Self) [phone] (M)            What medication are you calling about (include dose and sig)?: Oxycodone refill request    Preferred Pharmacy:   [facility] - [place], [place]      Controlled Substance Agreement on file:   CSA -- Patient Level:     [Media Unavailable] Controlled Substance Agreement - Opioid - Scan on 5/17/2023  1:37 PM: Opioid       Who prescribed the medication?: Coming Hay    Do you need a refill? Yes    When did you use the medication last?     Patient offered an appointment? No    Do you have any questions or concerns?  No      Okay to leave a detailed message?: Yes at Cell number on file:    Telephone Information:   Mobile [phone]

## 2024-04-25 NOTE — TELEPHONE ENCOUNTER
Per RN chart review rx was sent and receipt confirmed by pharmacy   Earliest fill date 4/26/24 (tomorrow)     RN placed call to [facility] pharmacy to verify they received the rx on 4/23/24   Spoke to pharmacy staff who verified they do have the rx of oxycodone however unable to fill until 4/26/24     RN spoke to patient   Advised [facility] does have refill and able to fill tomorrow   Patient states that he originally called the incorrect pharmacy   Will follow up with [facility] to  rx tomorrow     Adriana Silva Registered Nurse  [facility]

## 2024-04-25 NOTE — TELEPHONE ENCOUNTER
General Call    Contacts         Type Contact Phone/Fax    04/25/2024 09:11 AM CDT Phone (Incoming) Layo Mcgrath (Self) [phone] (M)          Reason for Call:  Pharmacy said they didn't get prescription for oxycodone refill 5 mg.     What are your questions or concerns:  There is a pharmacy e-receipt in chart, however pharmacy said they don't have the script    Date of last appointment with provider: 3/1    Okay to leave a detailed message?: Yes at Cell number on file:    Telephone Information:   Mobile [phone]

## 2024-05-09 NOTE — PROGRESS NOTES
ANTICOAGULATION MANAGEMENT     Toby Mcgrath 68 year old male is on warfarin with subtherapeutic INR result. (Goal INR 2.0-3.0)    Recent labs: (last 7 days)     05/09/24  0852   INR 1.8*       ASSESSMENT     Source(s): Chart Review and Patient/Caregiver Call     Warfarin doses taken: Missed dose(s) may be affecting INR.  Missed dose last evening.  Diet: No new diet changes identified  Medication/supplement changes: Appointment with MTM on 4/18/24.  Recommeded the following medications.  None of these changes should effect warfarin per Up to Date:  -Try Lyrica to 100m daily. But if the neuropathy is worse then need to increase back to twice daily.  -Cut down tylenol to 2 tabs (1000mg) at one time/per dose.    -Patient to try over-the-counter diclofenac gel  -Started buspirone 5 mg twice daily    -Change omeprazole to pantoprazole 40 mg daily  -Started glipizide xl 5mg  New illness, injury, or hospitalization: No  Signs or symptoms of bleeding or clotting: No  Previous result: Therapeutic last visit; previously outside of goal range  Additional findings: None       PLAN     Recommended plan for temporary change(s) affecting INR     Dosing Instructions: booster dose then continue your current warfarin dose with next INR in 2 weeks       Summary  As of 5/9/2024      Full warfarin instructions:  5/9: 10 mg; Otherwise 5 mg every Mon, Thu; 7.5 mg all other days   Next INR check:  5/23/2024               Telephone call with Layo who agrees to plan and repeated back plan correctly    Lab visit scheduled    Education provided:   Please call back if any changes to your diet, medications or how you've been taking warfarin    Plan made per [facility] anticoagulation protocol    Elmira Mota, RN  Anticoagulation Clinic  5/9/2024    _______________________________________________________________________     Anticoagulation Episode Summary       Current INR goal:  2.0-3.0   TTR:  60.8% (9 mo)   Target end date:  Indefinite   Send INR  reminders to:  [facility]    Indications    Atrial fibrillation with RVR (H) [I48.91]             Comments:               Anticoagulation Care Providers       Provider Role Specialty Phone number    Coming Dayana Hopson MD Referring Family Medicine [phone]

## 2024-05-22 NOTE — TELEPHONE ENCOUNTER
Medication Question or Refill        What medication are you calling about (include dose and sig)?: oxyCODONE (ROXICODONE) 5 MG tablet     Preferred Pharmacy:   [facility] [id number] [place] [id number]  Phone: [phone] Fax: [phone]      Controlled Substance Agreement on file:   CSA -- Patient Level:     [Media Unavailable] Controlled Substance Agreement - Opioid - Scan on 5/17/2023  1:37 PM: Opioid       Who prescribed the medication?: primary    Do you need a refill? Yes    When did you use the medication last? 04/28    Patient offered an appointment? No    Do you have any questions or concerns?  No      Okay to leave a detailed message?: Yes at Home number on file [phone] (home)

## 2024-05-28 NOTE — PROGRESS NOTES
ANTICOAGULATION MANAGEMENT     Toby Mcgrath 68 year old male is on warfarin with subtherapeutic INR result. (Goal INR 2.0-3.0)    Recent labs: (last 7 days)     05/28/24  0939   INR 1.7*       ASSESSMENT     Source(s): Chart Review and Patient/Caregiver Call     Warfarin doses taken:  Patient is unsure if or how many warfarin doses he has missed , Patient reports he is going to start putting his warfarin into a pill box so he knows if he has taken his warfarin.  Diet: No new diet changes identified  Medication/supplement changes: None noted  New illness, injury, or hospitalization: No  Signs or symptoms of bleeding or clotting: No  Previous result: Subtherapeutic  Additional findings: Upcoming surgery/procedure Colonoscopy 9/5/24       PLAN     Recommended plan for temporary change(s) affecting INR     Dosing Instructions: booster dose then continue your current warfarin dose with next INR in 1-2 weeks       Summary  As of 5/28/2024      Full warfarin instructions:  5/28: 10 mg; Otherwise 5 mg every Mon, Thu; 7.5 mg all other days   Next INR check:  6/6/2024               Telephone call with Layo who verbalizes understanding and agrees to plan    Lab visit scheduled    Education provided:   Please call back if any changes to your diet, medications or how you've been taking warfarin  Contact [phone]  with any changes, questions or concerns.     Plan made per [facility] anticoagulation protocol    Brianda Hui RN  Anticoagulation Clinic  5/28/2024    _______________________________________________________________________     Anticoagulation Episode Summary       Current INR goal:  2.0-3.0   TTR:  56.8% (9.6 mo)   Target end date:  Indefinite   Send INR reminders to:  [facility]    Indications    Atrial fibrillation with RVR (H) [I48.91]             Comments:               Anticoagulation Care Providers       Provider Role Specialty Phone number    Dayana Le MD Referring Family Medicine  [phone]

## 2024-05-30 NOTE — PROGRESS NOTES
Medication Therapy Management (MTM) Encounter    ASSESSMENT:                            Medication Adherence/Access: improved    GERD    Recommend he monitor dysphagia concern given new today I do not think it was from the PPI rotation. He confirms taking medications regularly.     Diabetes   Self monitor blood glucose improved.  Recommend repeat a1c.     Depression and anxiety:   Improved, no change    Pain and Neuropathy:   No change. Future if desires to come off Lyrica, recommend a slower taper by 50 mg.     PLAN:                            No medication change.    Patient was seen today for neck mass (recommend to see surgeon next week), recommend he monitor the swallowing concern. If ongoing issue then recommend he call.     Recommend a1c recheck    Follow-up: Return in about 1 month (around 7/2/2024) for Medication Therapy Management Visit.    SUBJECTIVE/OBJECTIVE:                          Layo Mcgrath is a 68 year old male called for a follow-up visit.       Reason for visit: medication follow-up on dose changes last visit.    Allergies/ADRs: Reviewed in chart  Past Medical History: Reviewed in chart  Tobacco: He reports that he quit smoking about 13 years ago. His smoking use included cigarettes. He started smoking about 53 years ago. He has a 40 pack-year smoking history. He quit smokeless tobacco use about 11 years ago.  Alcohol: not currently using  Social: transportation issues    Medication Adherence/Access:   The patient fills medications at [facility]: YES.  Patient does have financial strain, so any rx other than tier 1 will have a copay and can be costly for him. Receives medication from : [facility] & [facility]    GERD    Prilosec (omeprazole) 40 mg daily   Famotidine 20 mg twice daily as needed   He reports burping, just started today. He felt like something was stuck in his throat. Not occurred before. Last office visit changed from omeprazole to pantoprazole due to insurance.    "    Diabetes   metformin XR 2000 mg daily  Glipizide XL 5 mg in the morning   Blood sugar monitorin, infrequent  Diet/Exercise: no changes from last visit eats what is available. May have doughnut from gas station.      Eye exam is up to date  Foot exam: due    Depression and anxiety:  Duloxetine 120 mg once daily  Buspirone 5 mg twice daily   He feels it has helped to add on buspirone. Not as nervous now. He reports mood has been better as well. Feels more happy. Doesn't feel as \"lazy\". No side effects he is aware of.    Pain and Neuropathy:   Acetaminophen 500-1000 mg every 8 hour as needed  oxycodone 5mg every 6 hours as needed, max 4 tablets per day  Lyrica 200 mg twice daily   Duloxetine 120 mg in the morning   Has been having chronic pain since his spine surgery/infusion. Continues to report oxycodone is helpful. He denies any side effects.  Last visit he wanted to come off Lyrica, I suggest he could trial down dose. He reports it did not go well had withdrawal symptoms. He is agreeable to stay on medication and prefers to stay on for now.    Today's Vitals: There were no vitals taken for this visit.  ----------------      I spent 15 minutes with this patient today. All changes were made via collaborative practice agreement with Dayana Hopson MD. A copy of the visit note was provided to the patient's provider(s).    A summary of these recommendations was given to the patient.    Laurence Kessler, PharmD  Medication Therapy Management Pharmacist    Telemedicine Visit Details  Type of service:  Telephone visit  Start Time:  3:27 pm   End Time:  3: 42 pm     Medication Therapy Recommendations  No medication therapy recommendations to display   "

## 2024-05-30 NOTE — PROGRESS NOTES
"  Assessment & Plan     (R22.1) Mass of neck  (primary encounter diagnosis)  Comment: Given the size and location along with patient's warfarin use and history of diabetes feel it better suited to have patient go to general surgery to have mass removed.  Suspect that it is a cyst.  With general surgery consultation procedure would be involved in the much better controlled environment.  Referral placed. Patient fully understands and is agreeable with plan of care, at this point patient will follow up as needed unless acute concerns arise in the meantime.  Plan: Adult General Surg Referral     BMI  Estimated body mass index is 29.97 kg/m  as calculated from the following:    Height as of this encounter: 1.803 m (5' 11\").    Weight as of this encounter: 97.5 kg (214 lb 14.4 oz).     Hussain Vasques is a 68 year old, presenting for the following health issues:  Mass (Back of neck)        5/30/2024    10:35 AM   Additional Questions   Roomed by Shira Rai     History of Present Illness       Back Pain:  He presents for follow up of back pain. Patient's back pain is a chronic problem.  Location of back pain:  Right lower back and left lower back  Description of back pain: dull ache  Back pain spreads: left thigh, right knee and right foot    Since patient first noticed back pain, pain is: unchanged  Does back pain interfere with his job:  Not applicable   He exercises with enough effort to increase his heart rate 9 or less minutes per day.  He exercises with enough effort to increase his heart rate 3 or less days per week.   He is taking medications regularly.     Skin Lesion  Onset/Duration: 1+ years  Description  Location: neck on right side, hard  Color: skin colored  Border description: evenly pigmented, raised  Character: round  Itching: sometimes  Bleeding:  No  Intensity:  mild  Progression of Symptoms:  worsening and constant  Accompanying signs and symptoms:   Bleeding: No  Scaling: No  Excessive sun " "exposure/tanning: No  Sunscreen used: No  History:           Any previous history of skin cancer: No  Any family history of melanoma: Brother had skin cancer  Previous episodes of similar lesion: No  Precipitating or alleviating factors: none  Therapies tried and outcome: none    Review of Systems  Constitutional, HEENT, cardiovascular, pulmonary, GI, , musculoskeletal, neuro, skin, endocrine and psych systems are negative, except as otherwise noted.      Objective    /78 (BP Location: Right arm, Patient Position: Sitting, Cuff Size: Adult Large)   Pulse 73   Temp 98.2  F (36.8  C) (Oral)   Resp 14   Ht 1.803 m (5' 11\")   Wt 97.5 kg (214 lb 14.4 oz)   SpO2 96%   BMI 29.97 kg/m    Body mass index is 29.97 kg/m .  Physical Exam  Vitals and nursing note reviewed.   Constitutional:       General: He is not in acute distress.     Appearance: Normal appearance. He is not ill-appearing.   Cardiovascular:      Rate and Rhythm: Normal rate.   Pulmonary:      Effort: Pulmonary effort is normal.   Skin:     General: Skin is warm and dry.      Comments: Round, soft, mobile, nontender egg sized mass noted on back of left-side of neck.    Neurological:      Mental Status: He is alert.   Psychiatric:         Mood and Affect: Mood normal.         Behavior: Behavior normal.         Thought Content: Thought content normal.         Judgment: Judgment normal.          Signed Electronically by: JIMBO Valero CNP    "

## 2024-05-31 NOTE — TELEPHONE ENCOUNTER
Called  [facility].  Spoke with Manish, they have prescription ready for pt.  Refusing this request.    Geneva Lutz RN, BSN  [facility]

## 2024-06-03 NOTE — PROGRESS NOTES
Assessment:    Toby Mcgrath is a 68 year old male with a 5cm right posterior neck dermal cyst consistent with epidermal cyst    Plan:  I have offered Toby excision under anesthesia.  He elects to proceed with excision and will schedule at his convenience.    We have discussed surgery in detail, including benefits, alternatives, complications, incision, scar, infection, anesthesia. All questions have been answered to the best of my ability.  He will stop his coumadin 5 days prior to surgery and can restart the evening of surgery                        HPI:  Toby Mcgrath is a 68 year old male who presents today in consultation for lump on his right, posterior neck     Duration:  years   H/o trauma:  No  Drainage:  Yes: has drained in the past  Previous infections:  No  Other such masses now or in the past:  No  Pain:  No  Size:  slowly increasing        PMH:  Past Medical History:   Diagnosis Date    Acute gout of right elbow, unspecified cause 7/14/2016    Acute gouty arthritis 3/3/2016    Anxiety state, unspecified     BMI 32.0-32.9,adult 9/4/2015    Chronic pain syndrome 3/29/2007    Narcotic refill protocol Will return every 2-3 months for clinic visits Controlled substance aggreement on file from this  6/26/12 Documentation in problem list: no, appears to be compliant based on last visit He is responding best to Oxycontin 10 mg twice daily # 60 per month.  This is costly and looking into [place] for coverage.  Nausea with MS Contin Has meds refilled 16 of every month Last [facility] website verification:  done on 7/8/2015  [email or web address]   2/10/2015:  PCP will take over narcotic prescription Tapering course: using 5 mg tablets 10 mg morning 15 mg noon, 15 mg night for 1 week then 10 mg morning, 10 mg noon, 15 mg night for 1 week then 10 mg TID for 1 week then see me for refills  Then ongoing taper: 5 mg morning, 10 mg noon and 10 mg night for 1 week then  5 mg morning and noon and 10 mg night for 1  week then 5 mg tid for 1 week Then 5 mg morning no noon dose and 5 mg night for 1 week then No morning or non dose and 5 mg nightly for 1 week then off  Goal is co    Chronic rhinitis 3/29/2007    DDD (degenerative disc disease), cervical 2/8/2013    Normal.  C2-C3: Normal disc, facet joints, spinal canal and neural foramina.  C3-C4: Mild broad-based posterior disc bulge. Otherwise normal.  C4-C5: Normal disc, facet joints, spinal canal and neural foramina.  C5-C6: Moderate degenerative disc disease with loss of disc height, circumferential disc bulge and vertebral endplate osteophytes. Mild spinal canal stenosis and moderate left foraminal stenosis. Normal right foramen and facet joints.  C6-C7: Mild circumferential disc bulge. Slight impression on the thecal sac. Mild left foraminal stenosis. Normal right foramen and facet joints.  C7-T1: Normal disc, facet joints, spinal canal and neural foramina.  T1-T2: Mild central posterior disc protrusion.  T2-T3: Mild central posterior disc protrusion. Slight impression on the spinal cord.        DJD (degenerative joint disease), lumbar 1/10/2012    Esophageal reflux     ,refluxes on upper GI    Gout 4/8/2011    Gout, unspecified     Hyperlipidemia LDL goal <100 10/25/2012    Hypertension goal BP (blood pressure) < 140/90 10/21/2011    Hypertrophy of prostate with urinary obstruction 8/3/2006    Problem list name updated by automated process. Provider to review    HYPERTROPHY PROSTATE WITH OBST 8/3/2006    Idiopathic gout of left elbow, unspecified chronicity 1/3/2017    Impotence of organic origin     Left-sided low back pain with left-sided sciatica 8/13/2015    Lumbar radiculopathy 9/17/2014    Major depressive disorder, recurrent episode, unspecified 7/9/2008    Osteoarthrosis, unspecified whether generalized or localized, pelvic region and thigh     Pure hyperglyceridemia     ROTATOR CUFF SYND NOS 4/17/2008    S/P lumbar fusion 10/14/2014    Thoracic or lumbosacral  neuritis or radiculitis, unspecified     Tobacco use disorder     Type 2 diabetes, HbA1C goal < 8% (H) 9/9/2011       PSH:  Past Surgical History:   Procedure Laterality Date    BACK SURGERY      both shoulder repair  2006, 2008    FUSION LUMBAR ANTERIOR, FUSION LUMBAR POSTERIOR TWO LEVELS, COMBINED N/A 9/17/2014    Procedure: COMBINED FUSION LUMBAR ANTERIOR, FUSION LUMBAR POSTERIOR TWO LEVELS;  Surgeon: Chris Lugo MD;  Location: [facility] OR    [facility]GI ENDOSCOPY DIAG W OR W/O BRUSH/WASH  3/04    ok    HEAD & NECK SURGERY      HEMILAMINECTOMY, DISCECTOMY LUMBAR ONE LEVEL, COMBINED Left 9/8/2015    Procedure: COMBINED HEMILAMINECTOMY, DISCECTOMY LUMBAR ONE LEVEL;  Surgeon: Jer Irby MD;  Location: [facility] OR    right hip replacement  10,2010    [facility] NONSPECIFIC PROCEDURE  1995    neck cyst    [facility] NONSPECIFIC PROCEDURE  1979    laminectomy L5-S1 x 2    [facility] SHOULDER SURG PROC UNLISTED  2005, '07    repairs,later manipulate,inject LT, RT    [facility] COLONOSCOPY THRU STOMA, DIAGNOSTIC  3/04    normal       Allergies:  Allergies   Allergen Reactions    Morphine And Codeine Nausea and Vomiting     Tolerating other opiates        Home Medications:  Current Outpatient Medications   Medication Sig Dispense Refill    acetaminophen (TYLENOL) 500 MG tablet Take 500-1,000 mg by mouth every 8 hours as needed for mild pain      albuterol (PROAIR HFA/PROVENTIL HFA/VENTOLIN HFA) 108 (90 Base) MCG/ACT inhaler Inhale 2 puffs into the lungs every 6 hours as needed for shortness of breath, wheezing or cough 18 g 2    blood glucose (ONETOUCH VERIO IQ) test strip TEST BLOOD SUGAR ONCE DAILY OR AS DIRECTED 100 strip 2    busPIRone (BUSPAR) 5 MG tablet Take 1 tablet (5 mg) by mouth 2 times daily 180 tablet 0    CYMBALTA 60 MG capsule Take 2 capsules (120 mg) by mouth daily 180 capsule 3    famotidine (PEPCID) 20 MG tablet Take 1 tablet (20 mg) by mouth 2 times daily as needed (heart burn) 60 tablet 3    finasteride (PROSCAR) 5 MG  tablet TAKE 1 TABLET BY MOUTH DAILY FOR PROSTATE ENLARGEMENT 90 tablet 2    fluticasone (FLONASE) 50 MCG/ACT nasal spray USE 1 TO 2 SPRAYS IN BOTH  NOSTRILS DAILY AS NEEDED 48 mL 1    glipiZIDE (GLUCOTROL XL) 5 MG 24 hr tablet Take 1 tablet (5 mg) by mouth daily 90 tablet 0    metFORMIN (GLUCOPHAGE XR) 500 MG 24 hr tablet Take 4 tablets (2,000 mg) by mouth daily (with dinner) 360 tablet 3    metoprolol succinate ER (TOPROL XL) 100 MG 24 hr tablet Take 1 tablet (100 mg) by mouth daily 90 tablet 3    OneTouch Delica Lancets 33G MISC 1 Application. daily Use to test blood sugars once daily as directed. 100 each 3    oxyCODONE (ROXICODONE) 5 MG tablet Take 1 tablet (5 mg) by mouth every 6 hours as needed for severe pain Max #4 per day 120 tablet 0    pantoprazole (PROTONIX) 40 MG EC tablet Take 1 tablet (40 mg) by mouth daily 90 tablet 1    polyethylene glycol (MIRALAX) 17 GM/Dose powder Mix 1 tablespoon with water by mouth daily as needed constipation 510 g 0    pregabalin (LYRICA) 100 MG capsule TAKE TWO CAPSULES BY MOUTH TWICE A  capsule 1    senna-docusate (SENOKOT-S/PERICOLACE) 8.6-50 MG tablet Take 1-2 tablets by mouth daily as needed for constipation      simvastatin (ZOCOR) 20 MG tablet Take 1 tablet (20 mg) by mouth At Bedtime 90 tablet 3    SPIRIVA RESPIMAT 2.5 MCG/ACT inhaler Inhale 2 puffs into the lungs daily 12 g 3    tamsulosin (FLOMAX) 0.4 MG capsule TAKE TWO CAPSULES BY MOUTH ONCE DAILY 180 capsule 3    warfarin ANTICOAGULANT (COUMADIN) 5 MG tablet Take 1.5 tablets (7.5 mg) by mouth daily except take 1 tablet (5 mg)  or as directed by INR clinic 125 tablet 1       Social History:  Social History     Tobacco Use    Smoking status: Former     Current packs/day: 0.00     Average packs/day: 1 pack/day for 40.0 years (40.0 ttl pk-yrs)     Types: Cigarettes     Start date:      Quit date:      Years since quittin.4     Passive exposure: Never    Smokeless tobacco: Former     Quit  "date: 2/1/2013   Vaping Use    Vaping status: Never Used   Substance Use Topics    Alcohol use: Yes     Alcohol/week: 0.0 standard drinks of alcohol     Comment: occ.    Drug use: No         Family History:  Family History   Problem Relation Age of Onset    Diabetes Mother     C.A.D. Father     Diabetes Father     Hypertension Father     Colon Cancer No family hx of          ROS:  The 10 point review of systems is negative other than noted in the HPI and above.    PE:  /76   Pulse 65   Ht 1.803 m (5' 11\")   Wt 97.1 kg (214 lb)   SpO2 95%   BMI 29.85 kg/m    General appearance: well-nourished, no apparent distress  Skin: there is a 5 cm mobile dermal raised mass located on the right, posterior neck         Lucie Zamudio MD          Please route or send letter to:  Referring Provider      "

## 2024-06-03 NOTE — LETTER
Patty 3, 2024       Toby Mcgrath  [address]   [place] [address]     RE: [id number]  : 1955    Toby Mcgrath has been scheduled for surgery on 2024 at 10:15 AM  at [facility] with Dr Lucie Zamudio.  The hospital is located at [address] in [place].    Please check in at the Surgery reception desk at 8:15 AM . This is located in the back of the hospital on the East side, just past the Emergency Room entrance.     DO NOT EAT OR DRINK ANYTHING 8 HOURS BEFORE YOUR ARRIVAL TIME.   You may have sips of clear liquids up until 2 hours before your arrival time. If you have been advised to take your medication, please do this early in the morning with just sips of clear liquid.        If you are on a blood thinner such as COUMADIN, please stop taking this 5 days before your surgery.         Hospital regulations require an updated pre-operative examination to be completed within 30 days of the procedure. This can be done by your primary care provider. Please ask them to fax documentation to [phone]. We also recommend you bring a copy with you.     You should shower before your surgery with Hibiclens or Exidine soap.  This can be found at your local pharmacy or you can pick it up from our office for free.  Please call our office if you have any questions.     You will be required to have an Adult (friend or family member) drive you home after your surgery and arrange for an adult to stay with you until the next morning.     You will receive several calls from our staff 3-7 days prior to your scheduled procedure with further details and to answer any questions you may have.    It is sometimes necessary to adjust the surgery schedule due to emergencies and additions to the schedule.  If your surgery is affected by this, we greatly appreciate your flexibility and understanding in this matter    It is best if you call regarding post-operative questions between  the hours of 8:00 am & 3:00 pm Monday-Friday, so you have access to the daytime care team that know you best.  Prescription refills are accepted during regular office hours only.    Please do not bring any Disability or FMLA papers to the hospital.  They need to be either faxed ([phone]), mailed or hand delivered to our office by you or a family member for completion.  Please allow 14 business days to complete paperwork.        If you have questions or concerns, please contact our office at [phone].

## 2024-06-03 NOTE — LETTER
"Patty 3, 2024        RE:   Toby Mcgrath 1955      Dear Colleague,    Thank you for referring your patient, Toby Mcgrath, to [facility]. Please see a copy of my visit note below.    Assessment:    Toby Mcgrath is a 68 year old male with a 5cm right posterior neck dermal cyst consistent with epidermal cyst    Plan:  I have offered Toby excision under anesthesia.  He elects to proceed with excision and will schedule at his convenience.    We have discussed surgery in detail, including benefits, alternatives, complications, incision, scar, infection, anesthesia. All questions have been answered to the best of my ability.  He will stop his coumadin 5 days prior to surgery and can restart the evening of surgery    HPI:  Toby Mcgrath is a 68 year old male who presents today in consultation for lump on his right, posterior neck     Duration:  years   H/o trauma:  No  Drainage:  Yes: has drained in the past  Previous infections:  No  Other such masses now or in the past:  No  Pain:  No  Size:  slowly increasing    ROS:  The 10 point review of systems is negative other than noted in the HPI and above.    PE:  /76   Pulse 65   Ht 1.803 m (5' 11\")   Wt 97.1 kg (214 lb)   SpO2 95%   BMI 29.85 kg/m    General appearance: well-nourished, no apparent distress  Skin: there is a 5 cm mobile dermal raised mass located on the right, posterior neck           Again, thank you for allowing me to participate in the care of your patient.      Sincerely,      Lucie Zamudio MD      "

## 2024-06-03 NOTE — TELEPHONE ENCOUNTER
Confirmed with pharmacy. They have the finasteride on file and will let patient know when it is ready for .

## 2024-06-03 NOTE — TELEPHONE ENCOUNTER
Type of surgery: EXCISION MASS NECK RIGHT   Location of surgery: [facility]  Date and time of surgery: 6/24/2024 @ 10:15 AM   Surgeon: Lucie Zamudio MD    Pre-Op Appt Date: PATIENT TO SCHEDULE    Post-Op Appt Date: PATIENT TO SCHEDULE     Packet sent out: Yes  Pre-cert/Authorization completed:  Not Applicable  Date: 6/3/2024         EXCISION MASS NECK RIGHT CHOICE PT INST TO HAVE H&P WITH PCP 45 MIN REQ PA ASSSIST JLS NMS

## 2024-06-04 NOTE — TELEPHONE ENCOUNTER
Reason for Call:  Appointment Request    Patient requesting this type of appt: Pre-op    Requested provider: Dayana Le    Reason patient unable to be scheduled: Not within requested timeframe    When does patient want to be seen/preferred time: 1-2 weeks    Comments: Pt would like to be scheduled the week of Patty 10, 2024 and following week before their surgery date on 06/24/2024.      tried all other providers at pt's preferred location but soonest showed Jun 28 2024 with pcp booked out further. Please call pt back to schedule - declined other location     Okay to leave a detailed message?: Yes at Cell number on file:    Telephone Information:   Mobile [phone]       Call taken on 6/4/2024 at 11:35 AM by Apryl Love

## 2024-06-06 NOTE — TELEPHONE ENCOUNTER
Patient Quality Outreach    Patient is due for the following:   Diabetes -  Foot Exam  Colon Cancer Screening      Topic Date Due    Hepatitis A Vaccine (1 of 2 - Risk 2-dose series) Never done    Hepatitis B Vaccine (1 of 3 - Risk 3-dose series) Never done    COVID-19 Vaccine (6 - 2023-24 season) 02/14/2024       Next Steps:   Patient has upcoming appointment, these items will be addressed at that time.    Type of outreach:    Chart review performed, no outreach needed.      Questions for provider review:    None           Radha Reynoso MA

## 2024-06-13 NOTE — PROGRESS NOTES
Preoperative Evaluation  [facility]  [address] [address] [address]  Phone: [phone]  Primary Provider: Dayana Hopson MD  Pre-op Performing Provider: Ana Charlton PA-C  Jun 13, 2024 6/13/2024   Surgical Information   What procedure is being done? EXCISION, MASS, NECK   Facility or Hospital where procedure/surgery will be performed: [facility]   Who is doing the procedure / surgery? Lucie Zamudio MD   Date of surgery / procedure: 6/24/2024   Time of surgery / procedure: 10:15 am   Where do you plan to recover after surgery? at home with family     Fax number for surgical facility: Note does not need to be faxed, will be available electronically in Epic.    Assessment & Plan     The proposed surgical procedure is considered LOW risk.    Preop general physical exam  Patient optimized except awaiting stress testing with abnormal and new changes on EKG noted.   6/18/24  Stress testing results below, patient optimized for planned procedure.  6/18/24 Nuclear stress test    The nuclear stress test is negative for inducible myocardial ischemia or infarction.    Left ventricular function is normal, EF 61%.    A prior study was conducted on 9/10/2014.  This study has no change when compared with the prior study.  - INR point of care (finger stick)- Recommended for same day result  - Basic metabolic panel  (Ca, Cl, CO2, Creat, Gluc, K, Na, BUN); Future  - Hemoglobin A1c; Future  - CBC with platelets; Future  - EKG 12-lead complete w/read - Clinics  - Basic metabolic panel  (Ca, Cl, CO2, Creat, Gluc, K, Na, BUN)  - Hemoglobin A1c  - CBC with platelets    Epidermal cyst of neck  Reason for planned procedure    Diabetic polyneuropathy associated with type 2 diabetes mellitus (H)  A1c shows good control.  - Hemoglobin A1c; Future  - Hemoglobin A1c  - NM Lexiscan stress test; Future    Chronic obstructive pulmonary disease, unspecified  COPD type (H)  Stable    Paroxysmal atrial fibrillation (H)  History of atrial fibrillation but in normal sinus rhythm today. Change in his EKG showing right bundle branch block prompted stress testing to be ordered (this was ordered when a partial right bundle branch was noted 2 years ago but he did not get the stress testing). This should be completed prior to planned procedure.  On anticoagulation [id number]  - INR point of care (finger stick)- Recommended for same day result  - EKG 12-lead complete w/read - Clinics  - NM Lexiscan stress test; Future    Hypertension goal BP (blood pressure) < 140/90  Well controlled.    Hearing problem of both ears  Notes some trouble with hearing. Referral to audiology placed today.  - Adult Audiology  Referral; Future    Right bundle branch block  New (previously noted partial now full right bundle). Should be completed prior to planned procedure.  - NM Lexiscan stress test; Future       - No identified additional risk factors other than previously addressed    Preoperative Medication Instructions  Antiplatelet or Anticoagulation Medication Instructions   - warfarin: Thromboembolic risk is moderate (4-10%/year). DO NOT TAKE warfarin 5 days. Thromboembolic risk is moderate (4-10%/year). DO NOT TAKE warfarin 5 days. Per INR team     Additional Medication Instructions   - Beta Blockers: Continue taking on the day of surgery.   - metformin: DO NOT TAKE day of surgery.   - sulfonylurea (e.g. glyburide, glipizide): DO NOT TAKE day of surgery   - Opioids: Continue without modification.   956}   - pregabalin, gabapentin: Continue without modification.   - SSRIs, SNRIs, TCAs, Antipsychotics: Continue without modification.    - LABA, inhaled corticosteroid, long-acting anticholinergics: Continue without modification.   - rescue Inhaler: Continue PRN. Bring to hospital on the day of surgery.   - anticholinergics: Continue without modification.     Recommendation  Approval  given to proceed with proposed procedure pending review of diagnostic evaluation. Stress testing as noted above      Review of external notes as documented elsewhere in note  Review of the result(s) of each unique test - As noted above  Ordering of each unique test  Prescription drug management  50 minutes spent by me on the date of the encounter doing chart review, history and exam, documentation and further activities per the note    Subjective   Layo is a 68 year old, presenting for the following:  Pre-Op Exam (6/24/2024 - pt unsure of getting a blood transfusion / )          6/13/2024     9:20 AM   Additional Questions   Roomed by AT     Hospitals in [place] related to upcoming procedure: Epidermal cyst        6/13/2024   Pre-Op Questionnaire   Have you ever had a heart attack or stroke? No   Have you ever had surgery on your heart or blood vessels, such as a stent placement, a coronary artery bypass, or surgery on an artery in your head, neck, heart, or legs? No   Do you have chest pain with activity? No   Do you have a history of heart failure? (!) UNKNOWN - ECHO in 2020 showed no signs of heart failure   Do you currently have a cold, bronchitis or symptoms of other infection? No   Do you have a cough, shortness of breath, or wheezing? No   Do you or anyone in your family have previous history of blood clots? (!) UNKNOWN - None for patient, reports his brother had history of blood clot after surgery   Do you or does anyone in your family have a serious bleeding problem such as prolonged bleeding following surgeries or cuts? No   Have you ever had problems with anemia or been told to take iron pills? No   Have you had any abnormal blood loss such as black, tarry or bloody stools? No   Have you ever had a blood transfusion? No   Are you willing to have a blood transfusion if it is medically needed before, during, or after your surgery? (!) NO - He is unsure   Have you or any of your relatives ever had problems with anesthesia?  (!) UNKNOWN - None for patient   Do you have sleep apnea, excessive snoring or daytime drowsiness? (!) YES- has been told by his wife that he stopped breathing in his sleep, was told he should have a sleep study but did not complete   Do you have a CPAP machine? (!) NO - see above   Do you have any artifical heart valves or other implanted medical devices like a pacemaker, defibrillator, or continuous glucose monitor? No   Do you have artificial joints? (!) YES- bilateral shoulders, right hip, fusion lumbar   Are you allergic to latex? No     Health Care Directive  Patient has a Health Care Directive on file      Preoperative Review of    reviewed - controlled substances reflected in medication list.      Status of Chronic Conditions:  See problem list for active medical problems.  Problems all longstanding and stable, except as noted/documented.  See ROS for pertinent symptoms related to these conditions.    Patient Active Problem List    Diagnosis Date Noted    Type 2 diabetes mellitus without complication, without long-term current use of insulin (H) 09/09/2011     Priority: High    Morbid obesity (H) 05/27/2021     Priority: Medium    COPD (chronic obstructive pulmonary disease) (H) 11/05/2020     Priority: Medium    Ventral hernia without obstruction or gangrene 11/05/2020     Priority: Medium    Carpal tunnel syndrome of left wrist 11/05/2020     Priority: Medium    Atrial fibrillation with RVR (H) 07/16/2020     Priority: Medium    Seborrheic keratoses 05/20/2019     Priority: Medium    Diabetic polyneuropathy associated with type 2 diabetes mellitus (H) 02/28/2019     Priority: Medium    Benign prostatic hyperplasia with urinary frequency 11/27/2017     Priority: Medium    Slow transit constipation 04/13/2017     Priority: Medium    Failed back syndrome of lumbar spine 01/30/2017     Priority: Medium    Gastroesophageal reflux disease without esophagitis 01/03/2017     Priority: Medium    Bilateral low  back pain with bilateral sciatica 11/17/2015     Priority: Medium    Chronic, continuous use of opioids 10/29/2015     Priority: Medium     See notes under chronic pain syndrome  Patient is followed by Connie Haskins for primary care management (who will be long term prescriber of pain medications).     Opioid medications are currently prescribed through [facility]  Lab [id number]    All chronic pain medication refills should be approved by pain clinic provider only at face-to-face appointments - not by phone request unless otherwise specified in [facility] office notes    Medication(s): See last Pain Center office note  Maximum quantity per month: See last Pain Center office note  Clinic visit frequency required: Schedule per pain clinic.    Controlled substance agreement on file: Yes       Date(s): July 2017    Pain Clinic evaluation in the past: Yes       Date/Location:  [facility], ongoing care    DIRE Total Score(s): Per [facility] notes.    Last [facility] website verification: Performed at each pain clinic visit and prior to each prescription refill.   [email or web address]          S/P laminectomy 10/15/2015     Priority: Medium     L2-3 hemilaminectomy 9/2015      History of hepatitis C 09/14/2015     Priority: Medium    S/P lumbar discectomy 09/14/2015     Priority: Medium    S/P lumbar fusion 10/14/2014     Priority: Medium    Lumbar radiculopathy 09/17/2014     Priority: Medium    DDD (degenerative disc disease), cervical 02/08/2013     Priority: Medium     Normal.    C2-C3: Normal disc, facet joints, spinal canal and neural foramina.    C3-C4: Mild broad-based posterior disc bulge. Otherwise normal.    C4-C5: Normal disc, facet joints, spinal canal and neural foramina.    C5-C6: Moderate degenerative disc disease with loss of disc height,  circumferential disc bulge and vertebral endplate osteophytes. Mild  spinal canal stenosis and moderate left foraminal  stenosis. Normal  right foramen and facet joints.    C6-C7: Mild circumferential disc bulge. Slight impression on the  thecal sac. Mild left foraminal stenosis. Normal right foramen and  facet joints.    C7-T1: Normal disc, facet joints, spinal canal and neural foramina.    T1-T2: Mild central posterior disc protrusion.    T2-T3: Mild central posterior disc protrusion. Slight impression on  the spinal cord.            Hyperlipidemia LDL goal <100 10/25/2012     Priority: Medium    DJD (degenerative joint disease), lumbar 01/10/2012     Priority: Medium    Advanced directives, counseling/discussion 12/08/2011     Priority: Medium     Advance Directive Problem List Overview:   Name Relationship Phone    Primary Health Care Agent            Alternative Health Care Agent          Discussed advance care planning with patient; information given to patient to review. 12/8/2011 Maranda Espinosa MA          Hypertension goal BP (blood pressure) < 140/90 10/21/2011     Priority: Medium    Gout 04/08/2011     Priority: Medium    Anxiety 04/05/2010     Priority: Medium    Major depressive disorder, recurrent episode (H24) 07/09/2008     Priority: Medium     Problem list name updated by automated process. Provider to review      Disorder of bursae and tendons in shoulder region 04/17/2008     Priority: Medium     Problem list name updated by automated process. Provider to review      Chronic rhinitis 03/29/2007     Priority: Medium    Chronic pain syndrome 03/29/2007     Priority: Medium     Patient is followed by Connie Haskins MD for ongoing prescription of pain medication.  All refills should be approved by this provider, or covering partner.    Medication(s): Oxycodone  5 mg, Lyrica  Maximum quantity per month: 120 (max 4 per day)  Clinic visit frequency required: Q 3 months    Controlled substance agreement:  CSA -- Encounter Level on 08/30/2017:    Controlled Substance Agreement - Scan on 8/31/2017  8:35 AM:  CONTROLLED SUBSTANCE AGREEMENT 08/30/17       CSA -- Encounter Level on 02/21/2017:    Controlled Substance Agreement - Scan on 2/24/2017 11:25 AM: CONTROLLED SUBSTANCE AGREEMENT       CSA -- Encounter Level on 07/27/2016:    Controlled Substance Agreement - Scan on 7/30/2016  7:23 AM: CONTROLLED SUBSTANCE AGREEMENT       CSA -- Encounter Level on 08/07/2015:    Controlled Substance Agreement - Scan on 8/10/2015  1:58 PM: CONTROLLED SUBSTANCE AGREEMENT 08/07/15       CSA -- Patient Level:    CSA: None found at the patient level.       Pain Clinic evaluation in the past: Yes       Date/Location:      DIRE Total Score(s):  No flowsheet data found.    Last [facility] website verification: 6/29/2022   [email or web address]              Osteoarthrosis, unspecified whether generalized or localized, pelvic region and thigh 08/03/2006     Priority: Medium     Sees [facility] for DJD hips      Thoracic or lumbosacral neuritis or radiculitis, unspecified 10/13/2004     Priority: Medium      Past Medical History:   Diagnosis Date    Acute gout of right elbow, unspecified cause 7/14/2016    Acute gouty arthritis 3/3/2016    Anxiety state, unspecified     BMI 32.0-32.9,adult 9/4/2015    Chronic pain syndrome 3/29/2007    Narcotic refill protocol Will return every 2-3 months for clinic visits Controlled substance aggreement on file from this  6/26/12 Documentation in problem list: no, appears to be compliant based on last visit He is responding best to Oxycontin 10 mg twice daily # 60 per month.  This is costly and looking into [place] for coverage.  Nausea with MS Contin Has meds refilled 16 of every month Last [facility] website verification:  done on 7/8/2015  [email or web address]   2/10/2015:  PCP will take over narcotic prescription Tapering course: using 5 mg tablets 10 mg morning 15 mg noon, 15 mg night for 1 week then 10 mg morning, 10 mg noon, 15 mg night for 1 week then 10 mg TID for 1 week then see me for refills  Then  ongoing taper: 5 mg morning, 10 mg noon and 10 mg night for 1 week then  5 mg morning and noon and 10 mg night for 1 week then 5 mg tid for 1 week Then 5 mg morning no noon dose and 5 mg night for 1 week then No morning or non dose and 5 mg nightly for 1 week then off  Goal is co    Chronic rhinitis 3/29/2007    DDD (degenerative disc disease), cervical 2/8/2013    Normal.  C2-C3: Normal disc, facet joints, spinal canal and neural foramina.  C3-C4: Mild broad-based posterior disc bulge. Otherwise normal.  C4-C5: Normal disc, facet joints, spinal canal and neural foramina.  C5-C6: Moderate degenerative disc disease with loss of disc height, circumferential disc bulge and vertebral endplate osteophytes. Mild spinal canal stenosis and moderate left foraminal stenosis. Normal right foramen and facet joints.  C6-C7: Mild circumferential disc bulge. Slight impression on the thecal sac. Mild left foraminal stenosis. Normal right foramen and facet joints.  C7-T1: Normal disc, facet joints, spinal canal and neural foramina.  T1-T2: Mild central posterior disc protrusion.  T2-T3: Mild central posterior disc protrusion. Slight impression on the spinal cord.        DJD (degenerative joint disease), lumbar 1/10/2012    Esophageal reflux     ,refluxes on upper GI    Gout 4/8/2011    Gout, unspecified     Hyperlipidemia LDL goal <100 10/25/2012    Hypertension goal BP (blood pressure) < 140/90 10/21/2011    Hypertrophy of prostate with urinary obstruction 8/3/2006    Problem list name updated by automated process. Provider to review    HYPERTROPHY PROSTATE WITH OBST 8/3/2006    Idiopathic gout of left elbow, unspecified chronicity 1/3/2017    Impotence of organic origin     Left-sided low back pain with left-sided sciatica 8/13/2015    Lumbar radiculopathy 9/17/2014    Major depressive disorder, recurrent episode, unspecified 7/9/2008    Osteoarthrosis, unspecified whether generalized or localized, pelvic region and thigh     Pure  hyperglyceridemia     ROTATOR CUFF SYND NOS 4/17/2008    S/P lumbar fusion 10/14/2014    Thoracic or lumbosacral neuritis or radiculitis, unspecified     Tobacco use disorder     Type 2 diabetes, HbA1C goal < 8% (H) 9/9/2011     Past Surgical History:   Procedure Laterality Date    BACK SURGERY      both shoulder repair  2006, 2008    FUSION LUMBAR ANTERIOR, FUSION LUMBAR POSTERIOR TWO LEVELS, COMBINED N/A 9/17/2014    Procedure: COMBINED FUSION LUMBAR ANTERIOR, FUSION LUMBAR POSTERIOR TWO LEVELS;  Surgeon: Chris Lugo MD;  Location: [facility] OR    [facility] UGI ENDOSCOPY DIAG W OR W/O BRUSH/WASH  3/04    ok    HEAD & NECK SURGERY      HEMILAMINECTOMY, DISCECTOMY LUMBAR ONE LEVEL, COMBINED Left 9/8/2015    Procedure: COMBINED HEMILAMINECTOMY, DISCECTOMY LUMBAR ONE LEVEL;  Surgeon: Jer Irby MD;  Location: [facility] OR    right hip replacement  10,2010    [facility] NONSPECIFIC PROCEDURE  1995    neck cyst    [facility] NONSPECIFIC PROCEDURE  1979    laminectomy L5-S1 x 2    [facility] SHOULDER SURG PROC UNLISTED  2005, '07    repairs,later manipulate,inject LT, RT    [facility]  3/04    normal     Current Outpatient Medications   Medication Sig Dispense Refill    acetaminophen (TYLENOL) 500 MG tablet Take 500-1,000 mg by mouth every 8 hours as needed for mild pain      albuterol (PROAIR HFA/PROVENTIL HFA/VENTOLIN HFA) 108 (90 Base) MCG/ACT inhaler Inhale 2 puffs into the lungs every 6 hours as needed for shortness of breath, wheezing or cough 18 g 2    blood glucose (ONETOUCH VERIO IQ) test strip TEST BLOOD SUGAR ONCE DAILY OR AS DIRECTED 100 strip 2    busPIRone (BUSPAR) 5 MG tablet Take 1 tablet (5 mg) by mouth 2 times daily 180 tablet 0    CYMBALTA 60 MG capsule Take 2 capsules (120 mg) by mouth daily 180 capsule 3    famotidine (PEPCID) 20 MG tablet Take 1 tablet (20 mg) by mouth 2 times daily as needed (heart burn) 60 tablet 3    finasteride (PROSCAR) 5 MG tablet TAKE 1 TABLET BY MOUTH DAILY FOR  PROSTATE ENLARGEMENT 90 tablet 2    fluticasone (FLONASE) 50 MCG/ACT nasal spray USE 1 TO 2 SPRAYS IN BOTH  NOSTRILS DAILY AS NEEDED 48 mL 1    glipiZIDE (GLUCOTROL XL) 5 MG 24 hr tablet Take 1 tablet (5 mg) by mouth daily 90 tablet 0    metFORMIN (GLUCOPHAGE XR) 500 MG 24 hr tablet Take 4 tablets (2,000 mg) by mouth daily (with dinner) 360 tablet 3    metoprolol succinate ER (TOPROL XL) 100 MG 24 hr tablet Take 1 tablet (100 mg) by mouth daily 90 tablet 3    OneTouch Delica Lancets 33G MISC 1 Application. daily Use to test blood sugars once daily as directed. 100 each 3    oxyCODONE (ROXICODONE) 5 MG tablet Take 1 tablet (5 mg) by mouth every 6 hours as needed for severe pain Max #4 per day 120 tablet 0    pantoprazole (PROTONIX) 40 MG EC tablet Take 1 tablet (40 mg) by mouth daily 90 tablet 1    polyethylene glycol (MIRALAX) 17 GM/Dose powder Mix 1 tablespoon with water by mouth daily as needed constipation 510 g 0    pregabalin (LYRICA) 100 MG capsule TAKE TWO CAPSULES BY MOUTH TWICE A  capsule 1    senna-docusate (SENOKOT-S/PERICOLACE) 8.6-50 MG tablet Take 1-2 tablets by mouth daily as needed for constipation      simvastatin (ZOCOR) 20 MG tablet Take 1 tablet (20 mg) by mouth At Bedtime 90 tablet 3    SPIRIVA RESPIMAT 2.5 MCG/ACT inhaler Inhale 2 puffs into the lungs daily 12 g 3    tamsulosin (FLOMAX) 0.4 MG capsule TAKE TWO CAPSULES BY MOUTH ONCE DAILY 180 capsule 3    warfarin ANTICOAGULANT (COUMADIN) 5 MG tablet Take 1.5 tablets (7.5 mg) by mouth daily except take 1 tablet (5 mg) Mon,  or as directed by INR clinic 125 tablet 1       Allergies   Allergen Reactions    Morphine And Codeine Nausea and Vomiting     Tolerating other opiates         Social History     Tobacco Use    Smoking status: Former     Current packs/day: 0.00     Average packs/day: 1 pack/day for 40.0 years (40.0 ttl pk-yrs)     Types: Cigarettes     Start date:      Quit date:      Years since quittin.4     Passive  "exposure: Never    Smokeless tobacco: Former     Quit date: 2/1/2013   Substance Use Topics    Alcohol use: Yes     Comment: nightly- 1 shot     Family History   Problem Relation Age of Onset    Diabetes Mother     Lung Cancer Mother     C.A.D. Father     Diabetes Father     Hypertension Father     Cancer Brother     Coronary Artery Disease Brother         Bypass surgery    Deep Vein Thrombosis (DVT) Brother         Following surgery    Colon Cancer No family hx of      History   Drug Use No             Review of Systems  CONSTITUTIONAL: NEGATIVE for fever, chills, change in weight  INTEGUMENTARY/SKIN: As per HPI  EYES: NEGATIVE for vision changes or irritation  ENT/MOUTH: NEGATIVE for ear, mouth and throat problems  RESP: NEGATIVE for significant cough or SOB  CV: NEGATIVE for chest pain, palpitations or peripheral edema  GI: NEGATIVE for nausea, abdominal pain, heartburn, or change in bowel habits  : NEGATIVE for frequency, dysuria, or hematuria  MUSCULOSKELETAL: As per problem list, chronic pain  NEURO: NEGATIVE for weakness, dizziness or paresthesias  ENDOCRINE: NEGATIVE for temperature intolerance, skin/hair changes  HEME: NEGATIVE for bleeding problems  PSYCHIATRIC: NEGATIVE for changes in mood or affect    Objective    /65 (BP Location: Left arm, Patient Position: Chair, Cuff Size: Adult Regular)   Pulse 77   Temp 97.7  F (36.5  C) (Oral)   Resp 14   Ht 1.803 m (5' 11\")   Wt 94.5 kg (208 lb 6.4 oz)   SpO2 97%   BMI 29.07 kg/m     Estimated body mass index is 29.07 kg/m  as calculated from the following:    Height as of this encounter: 1.803 m (5' 11\").    Weight as of this encounter: 94.5 kg (208 lb 6.4 oz).  Physical Exam  GENERAL: alert and no distress  EYES: Eyes grossly normal to inspection, PERRL and conjunctivae and sclerae normal  HENT: ear canals and TM's normal, nose and mouth without ulcers or lesions  NECK: no adenopathy, no asymmetry, masses, or scars, and right posterior neck with " large subcutaneous lump  RESP: lungs clear to auscultation - no rales, rhonchi or wheezes  CV: regular rate and rhythm, normal S1 S2, no S3 or S4, no murmur, click or rub, no peripheral edema  ABDOMEN: soft, nontender, no hepatosplenomegaly, no masses and bowel sounds normal  MS: no gross musculoskeletal defects noted, no edema  SKIN: no suspicious lesions or rashes other than lump as noted in neck exam  NEURO: Normal strength and tone, mentation intact and speech normal  PSYCH: mentation appears normal, affect normal/bright    Recent Labs   Lab Test 05/28/24  0939 05/09/24  0852 04/01/24  0936 02/08/24  1117 10/13/23  1053 09/22/23  1301   HGB  --   --   --  14.4  --   --    PLT  --   --   --  252  --   --    INR 1.7* 1.8*   < > 2.6*   < > 2.0*   NA  --   --   --  141  --  143   POTASSIUM  --   --   --  4.9  --  4.8   CR  --   --   --  1.04  --  0.99   A1C  --   --   --  8.4*  --  8.7*    < > = values in this interval not displayed.        Diagnostics  Recent Results (from the past 48 hour(s))   INR point of care (finger stick)- Recommended for same day result    Collection Time: 06/13/24 10:57 AM   Result Value Ref Range    INR 2.2 (H) 0.9 - 1.1   Basic metabolic panel  (Ca, Cl, CO2, Creat, Gluc, K, Na, BUN)    Collection Time: 06/13/24 10:57 AM   Result Value Ref Range    Sodium 144 135 - 145 mmol/L    Potassium 4.9 3.4 - 5.3 mmol/L    Chloride 106 98 - 107 mmol/L    Carbon Dioxide (CO2) 27 22 - 29 mmol/L    Anion Gap 11 7 - 15 mmol/L    Urea Nitrogen 9.2 8.0 - 23.0 mg/dL    Creatinine 0.94 0.67 - 1.17 mg/dL    GFR Estimate 87 >60 mL/min/1.73m2    Calcium 9.9 8.8 - 10.2 mg/dL    Glucose 102 (H) 70 - 99 mg/dL   Hemoglobin A1c    Collection Time: 06/13/24 10:57 AM   Result Value Ref Range    Hemoglobin A1C 6.7 (H) 0.0 - 5.6 %   CBC with platelets    Collection Time: 06/13/24 10:57 AM   Result Value Ref Range    WBC Count 7.9 4.0 - 11.0 10e3/uL    RBC Count 5.02 4.40 - 5.90 10e6/uL    Hemoglobin 14.1 13.3 - 17.7  g/dL    Hematocrit 42.0 40.0 - 53.0 %    MCV 84 78 - 100 fL    MCH 28.1 26.5 - 33.0 pg    MCHC 33.6 31.5 - 36.5 g/dL    RDW 13.8 10.0 - 15.0 %    Platelet Count 272 150 - 450 10e3/uL      EKG required for significant arrhythmia and not completed in the last 90 days.   EKG: appears normal, NSR, normal axis, normal intervals, no acute ST/T changes c/w ischemia, Right Bundle Branch Block, Previously shows partial right bundle    Revised Cardiac Risk Index (RCRI)  The patient has the following serious cardiovascular risks for perioperative complications:   - No serious cardiac risks = 0 points     RCRI Interpretation: 0 points: Class I (very low risk - 0.4% complication rate)         Signed Electronically by: Ana Charlton PA-C  Copy of this evaluation report is provided to requesting physician.

## 2024-06-13 NOTE — PROGRESS NOTES
ANTICOAGULATION MANAGEMENT     Toby Mcgrath 68 year old male is on warfarin with therapeutic INR result. (Goal INR 2.0-3.0)    Recent labs: (last 7 days)     06/13/24  1057   INR 2.2*       ASSESSMENT     Source(s): Chart Review and Patient/Caregiver Call     Warfarin doses taken: Warfarin taken as instructed  Diet: No new diet changes identified  Medication/supplement changes: None noted  New illness, injury, or hospitalization: No  Signs or symptoms of bleeding or clotting: No  Previous result: Subtherapeutic  Additional findings: Upcoming surgery/procedure per chart review below.       PLAN     Recommended plan for no diet, medication or health factor changes affecting INR     Dosing Instructions: Continue your current warfarin until 6/18/24.  Hold warfarin for 5 days in preparation for your procedure.  When ok with surgeon, restart warfarin and take a boost dose of 10 mg for two days.  Then resume your current warfarin maintenance dose with next INR in 3 weeks (1 week post op)       Summary  As of 6/13/2024      Full warfarin instructions:  6/19: Hold; 6/20: Hold; 6/21: Hold; 6/22: Hold; 6/23: Hold; 6/24: 10 mg; 6/25: 10 mg; Otherwise 5 mg every Mon, Thu; 7.5 mg all other days   Next INR check:  7/2/2024               Telephone call with Layo who agrees to plan and repeated back plan correctly    Lab visit scheduled    Education provided:   Please call back if any changes to your diet, medications or how you've been taking warfarin    Plan made with [facility] Pharmacist Sara Mota RN  Anticoagulation Clinic  6/13/2024    _______________________________________________________________________     Anticoagulation Episode Summary       Current INR goal:  2.0-3.0   TTR:  55.9% (10.1 mo)   Target end date:  Indefinite   Send INR reminders to:  [facility]    Indications    Atrial fibrillation with RVR (H) [I48.91]             Comments:               Anticoagulation Care Providers        Provider Role Specialty Phone number    Coming Dayana Hopson MD Referring Family Medicine [phone]

## 2024-06-13 NOTE — PROGRESS NOTES
ANTICOAGULATION MANAGEMENT     Toby Mcgrath 68 year old male is on warfarin with therapeutic INR result. (Goal INR 2.0-3.0)    Recent labs: (last 7 days)     06/13/24  1057   INR 2.2*       ASSESSMENT     Source(s): Chart Review  Previous INR was Subtherapeutic  Medication, diet, health changes since last INR chart reviewed; none identified  Mass excision on neck scheduled on 6/24.  Will hold warfarin for 5 days without bridging.         PLAN     Unable to reach Layo today.    Left message for patient to call anticoagulation clinic to discuss result.    Follow up required to confirm warfarin dose taken and assess for changes    Consulted Sara Lan [facility] for warfarin hold plan and restart.  She approved warfarin hold of 5 days without bridging.  Boost dose of 10 mg x2 upon restart with recheck of INR ~7 days post op.    Elmira Mota RN  Anticoagulation Clinic  6/13/2024

## 2024-06-13 NOTE — PATIENT INSTRUCTIONS
How to Take Your Medication Before Surgery  Preoperative Medication Instructions   Antiplatelet or Anticoagulation Medication Instructions   - warfarin: Thromboembolic risk is moderate (4-10%/year). DO NOT TAKE warfarin 5 days. Thromboembolic risk is moderate (4-10%/year). DO NOT TAKE warfarin 5 days. Per INR team     Additional Medication Instructions   - Beta Blockers: Continue taking on the day of surgery.   - metformin: DO NOT TAKE day of surgery.   - sulfonylurea (e.g. glyburide, glipizide): DO NOT TAKE day of surgery   - Opioids: Continue without modification.   956}   - pregabalin, gabapentin: Continue without modification.   - SSRIs, SNRIs, TCAs, Antipsychotics: Continue without modification.    - LABA, inhaled corticosteroid, long-acting anticholinergics: Continue without modification.   - rescue Inhaler: Continue PRN. Bring to hospital on the day of surgery.   - anticholinergics: Continue without modification.        Patient Education   Preparing for Your Surgery  Getting started  A nurse will call you to review your health history and instructions. They will give you an arrival time based on your scheduled surgery time. Please be ready to share:  Your doctor's clinic name and phone number  Your medical, surgical, and anesthesia history  A list of allergies and sensitivities  A list of medicines, including herbal treatments and over-the-counter drugs  Whether the patient has a legal guardian (ask how to send us the papers in advance)  Please tell us if you're pregnant--or if there's any chance you might be pregnant. Some surgeries may injure a fetus (unborn baby), so they require a pregnancy test. Surgeries that are safe for a fetus don't always need a test, and you can choose whether to have one.   If you have a child who's having surgery, please ask for a copy of Preparing for Your Child's Surgery.    Preparing for surgery  Within 10 to 30 days of surgery: Have a pre-op exam (sometimes called an H&P, or  History and Physical). This can be done at a clinic or pre-operative center.  If you're having a , you may not need this exam. Talk to your care team.  At your pre-op exam, talk to your care team about all medicines you take. If you need to stop any medicines before surgery, ask when to start taking them again.  We do this for your safety. Many medicines can make you bleed too much during surgery. Some change how well surgery (anesthesia) drugs work.  Call your insurance company to let them know you're having surgery. (If you don't have insurance, call [phone].)  Call your clinic if there's any change in your health. This includes signs of a cold or flu (sore throat, runny nose, cough, rash, fever). It also includes a scrape or scratch near the surgery site.  If you have questions on the day of surgery, call your hospital or surgery center.  Eating and drinking guidelines  For your safety: Unless your surgeon tells you otherwise, follow the guidelines below.  Eat and drink as usual until 8 hours before you arrive for surgery. After that, no food or milk.  Drink clear liquids until 2 hours before you arrive. These are liquids you can see through, like water, Gatorade, and Propel Water. They also include plain black coffee and tea (no cream or milk), candy, and breath mints. You can spit out gum when you arrive.  If you drink alcohol: Stop drinking it the night before surgery.  If your care team tells you to take medicine on the morning of surgery, it's okay to take it with a sip of water.  Preventing infection  Shower or bathe the night before and morning of your surgery. Follow the instructions your clinic gave you. (If no instructions, use regular soap.)  Don't shave or clip hair near your surgery site. We'll remove the hair if needed.  Don't smoke or vape the morning of surgery. You may chew nicotine gum up to 2 hours before surgery. A nicotine patch is okay.  Note: Some surgeries require you to  completely quit smoking and nicotine. Check with your surgeon.  Your care team will make every effort to keep you safe from infection. We will:  Clean our hands often with soap and water (or an alcohol-based hand rub).  Clean the skin at your surgery site with a special soap that kills germs.  Give you a special gown to keep you warm. (Cold raises the risk of infection.)  Wear special hair covers, masks, gowns and gloves during surgery.  Give antibiotic medicine, if prescribed. Not all surgeries need antibiotics.  What to bring on the day of surgery  Photo ID and insurance card  Copy of your health care directive, if you have one  Glasses and hearing aids (bring cases)  You can't wear contacts during surgery  Inhaler and eye drops, if you use them (tell us about these when you arrive)  CPAP machine or breathing device, if you use them  A few personal items, if spending the night  If you have . . .  A pacemaker, ICD (cardiac defibrillator) or other implant: Bring the ID card.  An implanted stimulator: Bring the remote control.  A legal guardian: Bring a copy of the certified (court-stamped) guardianship papers.  Please remove any jewelry, including body piercings. Leave jewelry and other valuables at home.  If you're going home the day of surgery  You must have a responsible adult drive you home. They should stay with you overnight as well.  If you don't have someone to stay with you, and you aren't safe to go home alone, we may keep you overnight. Insurance often won't pay for this.  After surgery  If it's hard to control your pain or you need more pain medicine, please call your surgeon's office.  Questions?   If you have any questions for your care team, list them here: _________________________________________________________________________________________________________________________________________________________________________ ____________________________________ ____________________________________  ____________________________________  For informational purposes only. Not to replace the advice of your health care provider. Copyright   2003, 2019 [facility]. All rights reserved. Clinically reviewed by Brigitte Muñoz MD. SMARTworks [id number] - REV 12/22.

## 2024-06-14 NOTE — TELEPHONE ENCOUNTER
Ana Charlton PA-C  P [facility]  Please call patient to let him know that labs look good.  Electrolytes and kidney function is normal.  A1c shows good control of sugars.  Complete blood count is normal.  It looks like stress testing is scheduled on the 18th. We will know more after that.

## 2024-06-14 NOTE — TELEPHONE ENCOUNTER
RN spoke to patient   Reviewed results note below - no questions at this time     Adriana Silva, Registered Nurse  [facility]

## 2024-06-18 NOTE — TELEPHONE ENCOUNTER
Called and spoke with pt,     Relayed result note below.     He verbalizes understanding and is agreeable with plan. He denies any further questions or concerns at this time.     Connie ORR RN   PAL (Patient Advocate Liaison)  [facility]

## 2024-06-18 NOTE — TELEPHONE ENCOUNTER
----- Message from Ana Charlton PA-C sent at 6/18/2024  1:00 PM CDT -----  Please call patient. Patient's stress testing is normal! Great news. He can continue with planned surgery. Pre-op noted addended to show this

## 2024-06-19 NOTE — TELEPHONE ENCOUNTER
ANTICOAGULATION MANAGEMENT:  Medication Refill    Anticoagulation Summary  As of 6/13/2024      Warfarin maintenance plan:  5 mg (5 mg x 1) every Mon, Thu; 7.5 mg (5 mg x 1.5) all other days   Next INR check:  7/2/2024   Target end date:  Indefinite    Indications    Atrial fibrillation with RVR (H) [I48.91]                 Anticoagulation Care Providers       Provider Role Specialty Phone number    Danika Hay, April MD [facility] Referring Family Medicine [phone]            Refill Criteria    Visit with referring provider/group: Meets criteria: office visit within referring provider group in the last 1 year on 6/13/24    [facility] referral last signed: 08/04/2023; within last year: Yes    Lab monitoring not exceeding 2 weeks overdue: Yes    Toby meets all criteria for refill. Rx instructions and quantity match patient's current dosing plan. Warfarin 90 day supply with 1 refill granted per [facility] protocol     Elmira Mota RN  Anticoagulation Clinic

## 2024-06-24 NOTE — DISCHARGE INSTRUCTIONS
HOME CARE FOLLOWING MINOR SURGERY  PATRICK Cramer, RADU Escobar C. Pratt, J. Shaheen    INCISIONAL CARE:  If you have a Dermabond dressing (a type of skin glue), you may shower immediately.  Sutures which are beneath the skin will absorb and do not need to be removed.  If present, leave Dermabond glue in place until it wears/flakes off.  You may expect a small amount of drainage from your incision.  A lump/ridge under the incision is normal and will gradually resolve.  If it becomes red or very uncomfortable, contact the nurse at your surgeon's office to discuss whether this needs to be evaluated.    ACTIVITY:  Cautiously resume exercise and strenuous activities     DIET:  Start with liquids and gradually resume your regular diet as tolerated.     DISCOMFORT:  Local anesthetic placed at surgery should provide relief for 4-8 hours.  Recommend the following over the counter medications:  - Ibuprofen (motrin) 600mg every 6 hours (max 2,400mg per day)   - Tylenol (acetaminophen) 500-1000mg every 6 hours (max 4,000mg per day)  Over-the-counter anti-inflammatory medications (i.e. Ibuprofen/Advil/Motrin or Naprosyn/Aleve) may be used per package instructions in addition to or while tapering off the narcotic pain medications to decrease swelling and sensitivity.  DO NOT TAKE these Anti-inflammatory medications if your primary physician has advised against doing so, or if you have acid reflux, ulcer, or bleeding disorder, or take blood-thinner medications.  Call your primary physician or the surgery office if you have medication questions.      FOLLOW-UP AFTER SURGERY:  -Our office will contact you approximately 2-3 weeks after surgery to check on your progress and answer any questions you may have.  If you are doing well, you will not need to return for an office appointment.  If any concerns are identified over the phone, we will help you make an appointment to see a provider.    -If you have not  received a phone call, have any questions or concerns, or would like to be seen, please call us at [phone].  We are located at: [address] [facility], [address]; [place] [address]    -CONTACT US IF THE FOLLOWING DEVELOPS:   1. A fever that is above 101     2. Increased redness, warmth, drainage, bleeding, or swelling.   3. Pain that is not relieved by rest/ice and your prescription.   4.  Increasing pain after 48 hours.   5. Drainage that is thick, cloudy, yellow, green or white.   6. Any other questions or concerns.      FREQUENTLY ASKED QUESTIONS:    Q:  How should my incision look?    A:  Normally your incision will appear slightly swollen with light redness directly along the incision itself as it heals.  It may feel like a bump or ridge as the healing/scarring happens, and over time (3-4 months) this bump or ridge feeling should slowly go away.  In general, clear or pink watery drainage can be normal at first as your incision heals, but should decrease over time.    Q:  How do I know if my incision is infected?  A:  Look at your incision for signs of infection, like redness around the incision spreading to surrounding skin, or drainage of cloudy or foul-smelling drainage.  If you feel warm, check your temperature to see if you are running a fever.    **If any of these things occur, please notify the nurse at our office.  We may need you to come into the office for an incision check.      Q:  How do I take care of my incision?  A:  If you have a dressing in place - Starting the day after surgery, replace the dressing 1-2 times a day until there is no further drainage from the incision.  At that time, a dressing is no longer needed.  Try to minimize tape on the skin if irritation is occurring at the tape sites.  If you have significant irritation from tape on the skin, please call the office to discuss other method of dressing your incision.    Small pieces of tape called  steri-strips  may be present  directly overlying your incision; these may be removed 10 days after surgery unless otherwise specified by your surgeon.  If these tapes start to loosen at the ends, you may trim them back until they fall off or are removed.    A:  If you had  Dermabond  tissue glue used as a dressing (this causes your incision to look shiny with a clear covering over it) - This type of dressing wears off with time and does not require more dressings over the top unless it is draining around the glue as it wears off.  Do not apply ointments or lotions over the incisions until the glue has completely worn off.    Q:  There is a piece of tape or a sticky  lead  still on my skin.  Can I remove this?  A:  Sometimes the sticky  leads  used for monitoring during surgery or for evaluation in the emergency department are not all removed while you are in the hospital.  These sometimes have a tab or metal dot on them.  You can easily remove these on your own, like taking off a band-aid.  If there is a gel substance under the  lead , simply wipe/clean it off with a washcloth or paper towel.      Q:  What can I do to minimize constipation (very hard stools, or lack of stools)?  A:  Stay well hydrated.  Increase your dietary fiber intake or take a fiber supplement -with plenty of water.  Walk around frequently.  You may consider an over-the-counter stool-softener.  Your Pharmacist can assist you with choosing one that is stocked at your pharmacy.  Constipation is also one of the most common side effects of pain medication.  If you are using pain medication, be pro-active and try to PREVENT problems with constipation by taking the steps above BEFORE constipation becomes a problem.    Q:  What do I do if I need more pain medications?  A:  Call the office to receive refills.  Be aware that certain pain meds cannot be called into a pharmacy and actually require a paper prescription.  A change may be made in your pain med as you progress thru your  recovery period or if you have side effects to certain meds.    --Pain meds are NOT refilled after 5pm on weekdays, and NOT AT ALL on the weekends, so please look ahead to prevent problems.      Q:  Why am I having a hard time sleeping now that I am at home?  A:  Many medications you receive while you are in the hospital can impact your sleep for a number of days after your surgery/hospitalization.  Decreased level of activity and naps during the day may also make sleeping at night difficult.  Try to minimize day-time naps, and get up frequently during the day to walk around your home during your recovery time.  Sleep aides may be of some help, but are not recommended for long-term use.      Q:  I am having some back discomfort.  What should I do?  A:  This may be related to certain positioning that was required for your surgery, extended periods of time in bed, or other changes in your overall activity level.  You may try ice, heat, acetaminophen, or ibuprofen to treat this temporarily.  Note that many pain medications have acetaminophen in them and would state this on the prescription bottle.  Be sure not to exceed the maximum of 4000mg per day of acetaminophen.     **If the pain you are having does not resolve, is severe, or is a flare of back pain you have had on other occasions prior to surgery, please contact your primary physician for further recommendations or for an appointment to be examined at their office.    Q:  Why am I having headaches?  A:  Headaches can be caused by many things:  caffeine withdrawal, use of pain meds, dehydration, high blood pressure, lack of sleep, over-activity/exhaustion, flare-up of usual migraine headaches.  If you feel this is related to muscle tension (a band-like feeling around the head, or a pressure at the low-back of the head) you may try ice or heat to this area.  You may need to drink more fluids (try electrolyte drink like Gatorade), rest, or take your usual migraine  medications.   **If your headaches do not resolve, worsen, are accompanied by other symptoms, or if your blood pressure is high, please call your primary physician for recommendation and/or examination.    Q:  I am unable to urinate.  What do I do?  A:  A small percentage of people can have difficulty urinating initially after surgery.  This includes being able to urinate only a very small amount at a time and feeling discomfort or pressure in the very low abdomen.  This is called  urinary retention , and is actually an urgent situation.  Proceed to your nearest Emergency department for evaluation (not an Urgent Care Center).  Sometimes the bladder does not work correctly after certain medications you receive during surgery, or related to certain procedures.  You may need to have a catheter placed until your bladder recovers.  When planning to go to an Emergency department, it may help to call the ER to let them know you are coming in for this problem after a surgery.  This may help you get in quicker to be evaluated.  **If you have symptoms of a urinary tract infection, please contact your primary physician for the proper evaluation and treatment.        If you have other questions, please call the office Monday thru Friday between 8am and 4:30pm to discuss with the nurse or physician assistant.  #[phone]    There is a surgeon ON CALL on weekday evenings and over the weekend in case of urgent need only, and may be contacted at the same number.    If you are having an emergency, call 911 or proceed to your nearest emergency department.

## 2024-06-24 NOTE — TELEPHONE ENCOUNTER
ANTICOAGULATION  MANAGEMENT: Discharge Review    Toby Mcgrath chart reviewed for anticoagulation continuity of care    Outpatient surgery/procedure on 6/24/24 for excision of right, posterior neck subcutaneous mass .    Discharge disposition: Home    Results:    Recent labs: (last 7 days)     06/24/24  0909   INR 1.04     Anticoagulation inpatient management:     not applicable     Anticoagulation discharge instructions:     Warfarin dosing:  Per 6/13/24 Anticoagulation Encounter, restart warfarin and take a boost dose of 10 mg for two days. Otherwise 5 mg every Mon, Thu; 7.5 mg all other days   Bridging: No   INR goal change: No      Medication changes affecting anticoagulation: Yes: IV cefazolin while in the hospital    Additional factors affecting anticoagulation: Yes: post-op pain and inflammation     PLAN     No adjustment to anticoagulation plan needed    Left a detailed message for Layo to see if he was able to resume his warfarin. After calling patient was able to review AVS and it says patient can resume warfarin tonight  Recommended follow up is scheduled    No adjustment to Anticoagulation Calendar was required    Brianda Hui RN

## 2024-06-24 NOTE — ANESTHESIA PROCEDURE NOTES
Airway       Patient location during procedure: OR       Procedure Start/Stop Times: 6/24/2024 11:34 AM  Staff -        Anesthesiologist:  Rigoberto Aldnaa MD       Performed By: anesthesiologist  Consent for Airway        Urgency: elective  Indications and Patient Condition       Indications for airway management: anita-procedural       Induction type:intravenous       Mask difficulty assessment: 1 - vent by mask    Final Airway Details       Final airway type: endotracheal airway       Successful airway: ETT - single and Oral  Endotracheal Airway Details        ETT size (mm): 8.0       Cuffed: yes       Cuff volume (mL): 7       Successful intubation technique: video laryngoscopy       VL Blade Size: Glidescope 4       Grade View of Cords: 1       Adjucts: stylet       Position: Right       Measured from: gums/teeth       Secured at (cm): 22       Bite block used: Soft    Post intubation assessment        Placement verified by: capnometry, equal breath sounds and chest rise        Number of attempts at approach: 1       Number of other approaches attempted: 0       Secured with: tape       Ease of procedure: easy       Dentition: Intact and Unchanged    Medication(s) Administered   Medication Administration Time: 6/24/2024 11:34 AM

## 2024-06-24 NOTE — ANESTHESIA PREPROCEDURE EVALUATION
Anesthesia Pre-Procedure Evaluation    Patient: Toby Mcgrath   MRN: [id number] : 1955        Procedure : Procedure(s):  EXCISION, MASS, NECK          Past Medical History:   Diagnosis Date    Acute gout of right elbow, unspecified cause 2016    Acute gouty arthritis 2016    Anxiety state, unspecified     BMI 32.0-32.9,adult 2015    Chronic pain syndrome 2007    Narcotic refill protocol Will return every 2-3 months for clinic visits Controlled substance aggreement on file from this  12 Documentation in problem list: no, appears to be compliant based on last visit He is responding best to Oxycontin 10 mg twice daily # 60 per month.  This is costly and looking into [place] for coverage.  Nausea with MS Contin Has meds refilled 16 of every month Last [facility] website verification:  done on 2015  [email or web address]   2/10/2015:  PCP will take over narcotic prescription Tapering course: using 5 mg tablets 10 mg morning 15 mg noon, 15 mg night for 1 week then 10 mg morning, 10 mg noon, 15 mg night for 1 week then 10 mg TID for 1 week then see me for refills  Then ongoing taper: 5 mg morning, 10 mg noon and 10 mg night for 1 week then  5 mg morning and noon and 10 mg night for 1 week then 5 mg tid for 1 week Then 5 mg morning no noon dose and 5 mg night for 1 week then No morning or non dose and 5 mg nightly for 1 week then off  Goal is co    Chronic rhinitis 2007    DDD (degenerative disc disease), cervical 2013    Normal.  C2-C3: Normal disc, facet joints, spinal canal and neural foramina.  C3-C4: Mild broad-based posterior disc bulge. Otherwise normal.  C4-C5: Normal disc, facet joints, spinal canal and neural foramina.  C5-C6: Moderate degenerative disc disease with loss of disc height, circumferential disc bulge and vertebral endplate osteophytes. Mild spinal canal stenosis and moderate left foraminal stenosis. Normal right foramen and facet joints.  C6-C7: Mild  circumferential disc bulge. Slight impression on the thecal sac. Mild left foraminal stenosis. Normal right foramen and facet joints.  C7-T1: Normal disc, facet joints, spinal canal and neural foramina.  T1-T2: Mild central posterior disc protrusion.  T2-T3: Mild central posterior disc protrusion. Slight impression on the spinal cord.        DJD (degenerative joint disease), lumbar 01/10/2012    Esophageal reflux     ,refluxes on upper GI    Gout 04/08/2011    Gout, unspecified     Hyperlipidemia LDL goal <100 10/25/2012    Hypertension goal BP (blood pressure) < 140/90 10/21/2011    Hypertrophy of prostate with urinary obstruction 08/03/2006    Problem list name updated by automated process. Provider to review    HYPERTROPHY PROSTATE WITH OBST 08/03/2006    Idiopathic gout of left elbow, unspecified chronicity 01/03/2017    Impotence of organic origin     Left-sided low back pain with left-sided sciatica 08/13/2015    Lumbar radiculopathy 09/17/2014    Major depressive disorder, recurrent episode, unspecified 07/09/2008    Osteoarthrosis, unspecified whether generalized or localized, pelvic region and thigh     Pure hyperglyceridemia     ROTATOR CUFF SYND NOS 04/17/2008    S/P lumbar fusion 10/14/2014    Thoracic or lumbosacral neuritis or radiculitis, unspecified     Tobacco use disorder     Type 2 diabetes, HbA1C goal < 8% (H) 09/09/2011      Past Surgical History:   Procedure Laterality Date    BACK SURGERY      both shoulder repair  2006, 2008    FUSION LUMBAR ANTERIOR, FUSION LUMBAR POSTERIOR TWO LEVELS, COMBINED N/A 9/17/2014    Procedure: COMBINED FUSION LUMBAR ANTERIOR, FUSION LUMBAR POSTERIOR TWO LEVELS;  Surgeon: Chris Lugo MD;  Location:  OR     [facility] ENDOSCOPY DIAG W OR W/O BRUSH/WASH  3/04    ok    HEAD & NECK SURGERY      HEMILAMINECTOMY, DISCECTOMY LUMBAR ONE LEVEL, COMBINED Left 9/8/2015    Procedure: COMBINED HEMILAMINECTOMY, DISCECTOMY LUMBAR ONE LEVEL;  Surgeon: Jer Irby  MD Low;  Location: [facility] OR    right hip replacement  10,2010    [facility] NONSPECIFIC PROCEDURE      neck cyst    [facility] NONSPECIFIC PROCEDURE      laminectomy L5-S1 x 2    [facility] SHOULDER SURG PROC UNLISTED      repairs,later manipulate,inject LT, RT    [facility] COLONOSCOPY THRU STOMA, DIAGNOSTIC  3/04    normal      Allergies   Allergen Reactions    Morphine And Codeine Nausea and Vomiting     Tolerating other opiates       Social History     Tobacco Use    Smoking status: Former     Current packs/day: 0.00     Average packs/day: 1 pack/day for 40.0 years (40.0 ttl pk-yrs)     Types: Cigarettes     Start date:      Quit date:      Years since quittin.4     Passive exposure: Never    Smokeless tobacco: Former     Quit date: 2013   Substance Use Topics    Alcohol use: Yes     Comment: beer occasionally      Wt Readings from Last 1 Encounters:   24 91.4 kg (201 lb 8 oz)        Anesthesia Evaluation            ROS/MED HX  ENT/Pulmonary:     (+)                         moderate,  COPD,              Neurologic:  - neg neurologic ROS     Cardiovascular:     (+)  hypertension- -   -  - -                        dysrhythmias, a-fib,             METS/Exercise Tolerance:     Hematologic:  - neg hematologic  ROS     Musculoskeletal:  - neg musculoskeletal ROS     GI/Hepatic:     (+) GERD, Asymptomatic on medication,         hepatitis  liver disease,       Renal/Genitourinary:       Endo:     (+)  type II DM,       Diabetic complications: neuropathy.      Obesity,       Psychiatric/Substance Use:  - neg psychiatric ROS     Infectious Disease:  - neg infectious disease ROS     Malignancy:       Other:            Physical Exam    Airway        Mallampati: III   TM distance: > 3 FB   Neck ROM: full   Mouth opening: > 3 cm    Respiratory Devices and Support         Dental       (+) Removable bridges or other hardware      Cardiovascular   cardiovascular exam normal          Pulmonary   pulmonary exam  normal                OUTSIDE LABS:  CBC:   Lab Results   Component Value Date    WBC 7.9 06/13/2024    WBC 6.9 02/08/2024    HGB 14.1 06/13/2024    HGB 14.4 02/08/2024    HCT 42.0 06/13/2024    HCT 43.3 02/08/2024     06/13/2024     02/08/2024     BMP:   Lab Results   Component Value Date     06/13/2024     02/08/2024    POTASSIUM 4.9 06/13/2024    POTASSIUM 4.9 02/08/2024    CHLORIDE 106 06/13/2024    CHLORIDE 104 02/08/2024    CO2 27 06/13/2024    CO2 28 02/08/2024    BUN 9.2 06/13/2024    BUN 8.1 02/08/2024    CR 0.94 06/13/2024    CR 1.04 02/08/2024     (H) 06/24/2024     (H) 06/13/2024     COAGS:   Lab Results   Component Value Date    PTT 30 09/17/2014    INR 2.2 (H) 06/13/2024     POC:   Lab Results   Component Value Date     (H) 09/17/2020     HEPATIC:   Lab Results   Component Value Date    ALBUMIN 4.2 02/08/2024    PROTTOTAL 7.1 02/08/2024    ALT 13 02/08/2024    AST 25 02/08/2024    ALKPHOS 78 02/08/2024    BILITOTAL 0.3 02/08/2024     OTHER:   Lab Results   Component Value Date    A1C 6.7 (H) 06/13/2024    JOSE 9.9 06/13/2024    MAG 2.2 07/16/2020    TSH 1.69 05/17/2023    CRP 12.0 (H) 02/20/2015    SED 12 02/15/2023       Anesthesia Plan    ASA Status:  3       Anesthesia Type: General.     - Airway: ETT   Induction: Intravenous.   Maintenance: Balanced.        Consents    Anesthesia Plan(s) and associated risks, benefits, and realistic alternatives discussed. Questions answered and patient/representative(s) expressed understanding.     - Discussed:     - Discussed with:  Patient      - Extended Intubation/Ventilatory Support Discussed: No.      - Patient is DNR/DNI Status: No     Use of blood products discussed: No .     Postoperative Care    Pain management: IV analgesics, Oral pain medications, Multi-modal analgesia.   PONV prophylaxis: Dexamethasone or Solumedrol, Ondansetron (or other 5HT-3)     Comments:               Rigoberto Aldana MD    I have  "reviewed the pertinent notes and labs in the chart from the past 30 days and (re)examined the patient.  Any updates or changes from those notes are reflected in this note.            # Drug Induced Coagulation Defect: home medication list includes an anticoagulant medication   # DMII: A1C = 6.7 % (Ref range: 0.0 - 5.6 %) within past 6 months  # Overweight: Estimated body mass index is 28.1 kg/m  as calculated from the following:    Height as of this encounter: 1.803 m (5' 11\").    Weight as of this encounter: 91.4 kg (201 lb 8 oz).      "

## 2024-06-24 NOTE — ANESTHESIA POSTPROCEDURE EVALUATION
Patient: Toby Mcgrath    Procedure: Procedure(s):  EXCISION, MASS, NECK       Anesthesia Type:  General    Note:  Disposition: Outpatient   Postop Pain Control: Uneventful            Sign Out: Well controlled pain   PONV: No   Neuro/Psych: Uneventful            Sign Out: Acceptable/Baseline neuro status   Airway/Respiratory: Uneventful            Sign Out: Acceptable/Baseline resp. status   CV/Hemodynamics: Uneventful            Sign Out: Acceptable CV status; No obvious hypovolemia; No obvious fluid overload   Other NRE: NONE   DID A NON-ROUTINE EVENT OCCUR? No           Last vitals:  Vitals Value Taken Time   /97 06/24/24 1245   Temp 97.3  F (36.3  C) 06/24/24 1213   Pulse 59 06/24/24 1248   Resp 10 06/24/24 1243   SpO2 99 % 06/24/24 1300   Vitals shown include unfiled device data.    Electronically Signed By: Rigoberto Aldana MD  June 24, 2024  2:02 PM

## 2024-06-24 NOTE — ANESTHESIA CARE TRANSFER NOTE
Patient: Toby Mcgrath    Procedure: Procedure(s):  EXCISION, MASS, NECK       Diagnosis: Epidermal cyst of neck [L72.0]  Diagnosis Additional Information: No value filed.    Anesthesia Type:   General     Note:    Oropharynx: oropharynx clear of all foreign objects and spontaneously breathing  Level of Consciousness: drowsy  Oxygen Supplementation: face mask  Level of Supplemental Oxygen (L/min / FiO2): 6  Independent Airway: airway patency satisfactory and stable  Dentition: dentition unchanged  Vital Signs Stable: post-procedure vital signs reviewed and stable  Report to RN Given: handoff report given  Patient transferred to: PACU    Handoff Report: Identifed the Patient, Identified the Reponsible Provider, Reviewed the pertinent medical history, Discussed the surgical course, Reviewed Intra-OP anesthesia mangement and issues during anesthesia, Set expectations for post-procedure period and Allowed opportunity for questions and acknowledgement of understanding  Vitals:  Vitals Value Taken Time   /76 06/24/24 1215   Temp     Pulse 63 06/24/24 1216   Resp 19 06/24/24 1217   SpO2 100 % 06/24/24 1217   Vitals shown include unfiled device data.    Electronically Signed By: JIMBO Monroe CRNA  June 24, 2024  12:18 PM

## 2024-06-24 NOTE — OP NOTE
General Surgery Operative Note      Pre-operative diagnosis: Right posterior neck subcutaneous mass   Post-operative diagnosis: same    Procedure: excision of right, posterior neck subcutaneous mass (5.0 cm)   Surgeon: Lucie Zamudio MD   Assistant(s): Mirna Lawson PA-C   Anesthesia: General    Estimated blood loss: 2 cc     Specimens:  Findings:  ID Type Source Tests Collected by Time Destination   1 : Right posterior neck mass Tissue Neck, Right SURGICAL PATHOLOGY EXAM Lucie Zamudio MD 6/24/2024 11:53 AM      5cm sebaceous cyst removed in entirety     With the patient under excellent general anesthesia in the prone position with bumps and padding, the posterior neck was prepped and draped in standard sterile fashion.  An incision was made with a length of 5.0 cm.  The incision was carried into the dermis and the shiny white cyst wall was immediately encountered.  There was sebaceous material within the cyst that was extruded.  The cyst wall was completely excised from surrounding tissues using a combination of sharp dissection, Bovie electrocautery and blunt dissection.  The mass, which was 5.0 cm in diameter was then passed off the field as specimen.  Hemostasis was maintained throughout with electrocautery.  The wound was then irrigated with sterile saline and closed in two layers.  The subcutaneous tissue was closed with interrupted 3-0 Vicryl sutures.  The skin was closed with 4-0 Vicryl subcuticular suture and Dermabond as a dressing.  The patient tolerated the procedure well.  Sponge and needle counts were correct at the end of the case.     Lucie Zamudio MD

## 2024-07-09 NOTE — TELEPHONE ENCOUNTER
ANTICOAGULATION     Toby Mcgrath is overdue for an INR check. Patient was due for INR 7/2/24    Left message for patient to call and schedule lab appointment as soon as possible. If returning call, please schedule.     Brianda Hui RN

## 2024-07-11 NOTE — CONFIDENTIAL NOTE
[facility]  Post op call note     Toby IVERSON Mcgrath was called for an update regarding his recovery.  He underwent excision of right posterior neck cyst by Dr. Zamudio on 6/24/2024. Today he tells me he is doing well and denies any complaints. He states his incision is healing well.    The pathology revealed benign epidermal cyst.  This was discussed with the patient.     He was instructed to slowly and gradually resume all normal activities. He states all of his questions were answered.  He understands our discussion.  He agrees to follow up as needed or to call our office with any concerns.    Mirna Lawson PA-C

## 2024-07-16 NOTE — TELEPHONE ENCOUNTER
ANTICOAGULATION     Toby Mcgrath is overdue for an INR check.     Spoke with Layo and scheduled lab appointment on 7/19/24    Arianne Cotton RN

## 2024-07-19 NOTE — TELEPHONE ENCOUNTER
Medication Question or Refill        What medication are you calling about (include dose and sig)?:     oxyCODONE (ROXICODONE) 5 MG tablet   Sig - Route: Take 1 tablet (5 mg) by mouth every 6 hours as needed for severe pain Max #4 per day - Oral     Preferred Pharmacy:   [facility]   [address] [place] [id number]  [phone]      Controlled Substance Agreement on file:   CSA -- Patient Level:     [Media Unavailable] Controlled Substance Agreement - Opioid - Scan on 5/17/2023  1:37 PM: Opioid       Who prescribed the medication?: Dayana Le MD    Do you need a refill? Yes, 7/28/2024 refill    When did you use the medication last? 7/19/2024    Patient offered an appointment? Yes: pt declined    Do you have any questions or concerns?  No      Okay to leave a detailed message?: Yes at Cell number on file:    Telephone Information:   Mobile [phone]

## 2024-07-19 NOTE — TELEPHONE ENCOUNTER
Leaving for primary care provider as too early for refill and due for visit.    Jesús Piper PA-C on 7/19/2024 at 10:39 AM  (covering for Dr. Waddell)

## 2024-07-22 NOTE — TELEPHONE ENCOUNTER
Patient calling to see if we have the request still as [facility] did not get a script sent over yet. Discussed with patient regarding the date the last script was sent in, it is technically too early for us to refill this.        Patient states that he is 68 years old, and not a kid. He just wants to make sure that this will be addressed before he is due for a refill.     I advised that yes his provider will address it when she has time this week.    HUMBERTO Tolentino on 7/22/2024 at 3:29 PM

## 2024-07-23 NOTE — TELEPHONE ENCOUNTER
Call was returned to patient, see 7/23/2024 Anticoagulation encounter for warfarin dosing instruction.  Brianda Hui RN, BSN  Anticoagulation Clinic

## 2024-07-23 NOTE — PROGRESS NOTES
ANTICOAGULATION MANAGEMENT     Toby Mcgrath 68 year old male is on warfarin with therapeutic INR result. (Goal INR 2.0-3.0)    Recent labs: (last 7 days)     07/23/24  1115   INR 2.8*       ASSESSMENT     Source(s): Chart Review and Patient/Caregiver Call     Warfarin doses taken: Warfarin taken as instructed  Diet: No new diet changes identified  Medication/supplement changes: None noted  New illness, injury, or hospitalization: Yes: Patient had an epidermal cyst of the neck removed 6/24/24  Signs or symptoms of bleeding or clotting: No  Previous result: subtherapeutic for procedure otherwise, Therapeutic last visit; previously outside of goal range, last INR was 6 weeks ago, patient did not follow up in 3 weeks as scheduled  Additional findings: Patient has a colonoscopy scheduled on 9/5/24, patient reports he is not sure if he will keep this appointment. TE sent to [facility] today since patient is not due to come back for INR for 6 weeks       PLAN     Recommended plan for no diet, medication or health factor changes affecting INR     Dosing Instructions: Continue your current warfarin dose with next INR in 6 weeks       Summary  As of 7/23/2024      Full warfarin instructions:  5 mg every Mon, Thu; 7.5 mg all other days   Next INR check:  9/3/2024               Telephone call with Layo who verbalizes understanding and agrees to plan    Lab visit scheduled    Education provided: Please call back if any changes to your diet, medications or how you've been taking warfarin  Contact [phone] with any changes, questions or concerns.     Plan made per [facility] anticoagulation protocol    Brianda Hui RN  Anticoagulation Clinic  7/23/2024    _______________________________________________________________________     Anticoagulation Episode Summary       Current INR goal:  2.0-3.0   TTR:  53.8% (11.5 mo)   Target end date:  Indefinite   Send INR reminders to:  [facility]    Indications    Atrial fibrillation  with RVR (H) [I48.91]             Comments:               Anticoagulation Care Providers       Provider Role Specialty Phone number    Coming Dayana Hopson MD Referring Family Medicine [phone]

## 2024-07-23 NOTE — PROGRESS NOTES
ANTICOAGULATION CLINIC REFERRAL RENEWAL REQUEST       An annual renewal order is required for all patients referred to [facility].?  Please review and sign the pended referral order for Toby Mcgrath.       ANTICOAGULATION SUMMARY      Warfarin indication(s)   Atrial Fibrillation    Mechanical heart valve present?  NO       Current goal range   INR: 2.0-3.0     Goal appropriate for indication? Goal INR 2-3, standard for indication(s) above     Time in Therapeutic Range (TTR)  (Goal > 60%) 53.8%       Office visit with referring provider's group within last year yes on 3/1/24 (Virtual)       Brianda Hui RN  [facility]

## 2024-07-23 NOTE — TELEPHONE ENCOUNTER
[facility] ANTICOAGULATION  MANAGEMENT    Toby requesting pre-procedure hold orders for warfarin and review for bridging      Procedure date: 9/5/24, however, patient mentioned during Anticoagulation encounter today that he is not sure if he will keep this appointment because he does not want to do the prep.       Procedure: Colonoscopy      Procedure location and phone number (if external):  Wai Macdonald MD     Number of warfarin hold days requested and/or target INR: 5 days    Pre-op date: Not applicable      Routing to Anticoagulation Pharmacist for review. Routing today since patient is not due for next INR for 6 weeks.     Brianda Hui RN

## 2024-07-23 NOTE — TELEPHONE ENCOUNTER
Patient Returning Call    Reason for call:  Pt is calling about dosing    Information relayed to patient:  Will leave TE for call back    Patient has additional questions:  No      Okay to leave a detailed message?: No at Cell number on file:    Telephone Information:   Mobile [phone]

## 2024-08-14 NOTE — ADDENDUM NOTE
Addended by: JAROD FARMER on: 8/14/2024 09:21 AM     Modules accepted: Orders    
How Severe Is It?: moderate
Is This A New Presentation, Or A Follow-Up?: Rash

## 2024-08-14 NOTE — TELEPHONE ENCOUNTER
Extended Golytely Bowel Prep  recommended due to constipation noted or reported.  Instructions were sent via letter. Bowel prep was sent 8/14/2024 to [facility] [address], [place] - [address] [address] [id number]  .

## 2024-08-16 NOTE — TELEPHONE ENCOUNTER
Reason for Call:  Medication question    Do you use a [facility]?  Name of the pharmacy and phone number for the current request:         [facility] - [address]    Name of the medication requested: ketoconazole (NIZORAL) 2 % external shampoo    Other request: pharmacy called asking for directions on frequency of use for this medication.    [phone]  Reference Number: [id number]    Can we leave a detailed message on this number? YES      Call taken on 5/30/2019 at 4:43 PM by Tawnya Mclaughlin  .   The patient is calling because he would like to discuss getting his license back.      Please call him back to discuss.

## 2024-08-20 NOTE — TELEPHONE ENCOUNTER
Incoming call from patient to check status of this request. I let patient know that today is earliest fill day anyway. Will rodriguez as high priority so it gets done by 3 business days since original request.    Cely Bhatt RN     Alert-The patient is alert, awake and responds to voice. The patient is oriented to time, place, and person. The triage nurse is able to obtain subjective information.

## 2024-08-21 NOTE — TELEPHONE ENCOUNTER
Pt calls back.  He is upset and wants his oxycodone right away.  Advised Dr. Danika Hopson was out of the office yesterday afternoon.  Advised that she has full load of patients, but she will look at it as soon as she can.  He says he is out of his oxycodone.      Has already been routed to Dr. Danika Hopson.

## 2024-08-26 NOTE — TELEPHONE ENCOUNTER
"[facility]-PROCEDURAL ANTICOAGULATION  MANAGEMENT    ASSESSMENT     Warfarin interruption plan for colonoscopy on 9/5/24.    Indication for Anticoagulation: Atrial Fibrillation    [id number] = 3 (Hypertension, Age 65-74, and Diabetes)    [facility]-Procedure Risk stratification for thromboembolism: low (2022 Chest guidelines)    AFIB: 2022 CHEST Perioperative Management guidelines recommends against bridging for patients with atrial fibrillation except in high risk stratification patients.    RECOMMENDATION     Pre-Procedure:  Hold warfarin for 5 days, until after procedure starting: Saturday, August 31   No Bridge    Post-Procedure:  Resume warfarin dose if okay with provider doing procedure on night of procedure, Thursday, September 5 PM: take 10 mg x 2, then resume home dose  Recheck INR ~ 7 days after resuming warfarin     Plan routed to referring provider for approval  ?   Sara Lan [facility]    SUBJECTIVE/OBJECTIVE     Andrey McCann, a 69 year old male    Goal INR Range: 2.0-3.0     Patient bridged in past: No      Wt Readings from Last 3 Encounters:   06/24/24 91.4 kg (201 lb 8 oz)   06/18/24 95.3 kg (210 lb)   06/13/24 94.5 kg (208 lb 6.4 oz)      Ideal body weight: 75.3 kg (166 lb 0.1 oz)  Adjusted ideal body weight: 81.7 kg (180 lb 3.3 oz)     Estimated body mass index is 28.1 kg/m  as calculated from the following:    Height as of 6/24/24: 1.803 m (5' 11\").    Weight as of 6/24/24: 91.4 kg (201 lb 8 oz).    Lab Results   Component Value Date    INR 2.8 (H) 07/23/2024    INR 1.04 06/24/2024    INR 2.2 (H) 06/13/2024     Lab Results   Component Value Date    HGB 14.1 06/13/2024    HCT 42.0 06/13/2024     06/13/2024     Lab Results   Component Value Date    CR 0.94 06/13/2024    CR 1.04 02/08/2024    CR 0.99 09/22/2023     Estimated Creatinine Clearance: 83.3 mL/min (based on SCr of 0.94 mg/dL).    "

## 2024-08-28 NOTE — TELEPHONE ENCOUNTER
Pre visit planning completed.      Procedure details:    Patient scheduled for Colonoscopy on 9/5/24.     Arrival time: 0845. Procedure time 0945    Facility location: [facility]; [facility] [place] [address]. Check in location: 1st [facility].     Sedation type: MAC    Pre op exam needed? Yes. Patient needs to complete an in person pre-operative exam within 30 days of procedure. Informed patient pre op is needed via letter.    Indication for procedure: Colon cancer screening [Z12.11]       Chart review:     Electronic implanted devices? No    Recent diagnosis of diverticulitis within the last 6 weeks? No      Medication review:    Diabetic? Yes. Diabetic medication HOLDING recommendations: Oral diabetic medications: Metformin (glucophage): HOLD day of procedure.     Anticoagulants? Yes Coumadin (Warfarin): Recommended HOLD 5 days before procedure.  Consult with your managing provider.    Weight loss medication/injectable? No GLP-1 medication per patient's medication list.  RN will verify with pre-assessment call.    NSAIDS? No    Other medication HOLDING recommendations:  N/A      Prep for procedure:     Bowel prep recommendation: Extended Golytely. Bowel prep prescription sent to [facility] [id number]   Due to: constipation noted or reported. , diabetes. , poor prep/inadequate bowel prep in the past. , and provider request/ordered.    Prep instructions sent via letter [facility] 8/14/24         Heather Chanel RN  Endoscopy Procedure Pre Assessment RN  [phone] option 4

## 2024-08-28 NOTE — TELEPHONE ENCOUNTER
"Writer spoke to patient, he reports that he is constipated so likely will cancel the colonoscopy that is scheduled on 9/5/24. Writer informed patient that the colonoscopy prep will \"clean him out\", patient reports \"it did not work last time\".   Writer reminded patient to call GI to cancel his colonoscopy, patient verbalized understanding.    Next INR left as is on 9/3/24.    Arianne Cotton RN  Anticoagulation Clinic    "

## 2024-08-29 NOTE — TELEPHONE ENCOUNTER
Attempted to contact patient in order to complete pre assessment questions.     No answer. Left message to return call to [phone] option 4.    Callback required communication left via voicemail due to [facility] inactive.    Pre op exam needed? Yes. Patient needs to complete an in person pre-operative exam within 30 days of procedure. Informed patient pre op is needed via voicemail.      Eunice Davis RN  Endoscopy Procedure Pre Assessment

## 2024-08-30 NOTE — TELEPHONE ENCOUNTER
Second call attempt to complete pre assessment.     No answer.  Left message to return call to [phone] #4 by next business day prior to 4PM or procedure will be sent to cancel.     Callback required communication sent via voicemail due to [facility] inactive.    Pre-op needed? Yes. Patient needs to complete an in person pre-operative exam within 30 days of procedure. Informed patient pre op is needed via letter.    Heather Chanel RN  Endoscopy Procedure Pre Assessment

## 2024-09-04 NOTE — TELEPHONE ENCOUNTER
Third call attempt to complete pre assessment.      No answer.  Left message to return call to [phone] #2 if needing to reschedule or #4 for pre-assessment.     Pre-op needed? Yes. Patient needs to complete an in person pre-operative exam within 30 days of procedure. Informed patient pre op is needed via letter.     Per Emilee Argueta RN Endoscopy Manager at  it is okay to leave the patient on the schedule for tomorrow at this time.    Jacqueline Couch RN

## 2024-09-06 NOTE — PROGRESS NOTES
Patient had lab only for INRP. Patient informed  they would not be home till 2:30pm and asked if the INR care team could wait till then to call him. Informed patient I would send them a message.    Thank You,    Lauren Jc  [facility]  P: [phone]

## 2024-09-06 NOTE — PROGRESS NOTES
ANTICOAGULATION MANAGEMENT     Toby Mcgrath 69 year old male is on warfarin with therapeutic INR result. (Goal INR 2.0-3.0)    Recent labs: (last 7 days)     09/06/24  1237   INR 2.1*       ASSESSMENT     Source(s): Chart Review and Patient/Caregiver Call     Warfarin doses taken: Warfarin taken as instructed  Diet: No new diet changes identified  Medication/supplement changes: None noted  New illness, injury, or hospitalization: No - ongoing abdominal pain. Layo states he plans to communicate with PCP.  Signs or symptoms of bleeding or clotting: No  Previous result: Therapeutic last visit; previously outside of goal range  Additional findings: Recent colonoscopy was cancelled. Has not yet been rescheduled.        PLAN     Recommended plan for no diet, medication or health factor changes affecting INR     Dosing Instructions: Continue your current warfarin dose with next INR in 6 weeks       Summary  As of 9/6/2024      Full warfarin instructions:  5 mg every Mon, Thu; 7.5 mg all other days   Next INR check:  10/18/2024               Telephone call with Layo who verbalizes understanding and agrees to plan    Lab visit scheduled    Education provided: Please call back if any changes to your diet, medications or how you've been taking warfarin  Importance of notifying anticoagulation clinic for: upcoming surgeries and procedures 2 weeks in advance    Plan made per [facility] anticoagulation protocol    Analia Hui RN  Anticoagulation Clinic  9/6/2024    _______________________________________________________________________     Anticoagulation Episode Summary       Current INR goal:  2.0-3.0   TTR:  57.9% (1 y)   Target end date:  Indefinite   Send INR reminders to:  [facility]    Indications    Atrial fibrillation with RVR (H) [I48.91]             Comments:               Anticoagulation Care Providers       Provider Role Specialty Phone number    Dayana Le MD Referring Family  Medicine [phone]

## 2024-09-06 NOTE — PROGRESS NOTES
Spoke with patient/returned call. See [facility] note for detailing.    Analia Hui RN  [facility] Anticoagulation [facility]  [phone]

## 2024-09-09 NOTE — PATIENT INSTRUCTIONS
Labs today    Constipation  -please take senna and miralax  -drink plenty of water    CT scan given hx of diverticulosis    If symptoms worsen at least call clinic or present to ED for urgent evaluation.

## 2024-09-09 NOTE — PROGRESS NOTES
Assessment & Plan     (R10.84) Abdominal pain, generalized  (primary encounter diagnosis)  Comment: patient abdominal pain generalized. Noted tenderness to RUQ, LUQ, and LLQ. Cannot rule out gallbladder, pancrease, hepatic, or diverticulosis  involvement. Patient is also noted to have frequent constipation which could very well be contributing, especially w/ concerns of diverticulitis. Given presentation would like to have broad work up w/ imaging. Would like patient to increase water intake, start taking senna and miralax. Patient should be on low fiber or clear liquid diet in the meantime  until labs and imaging result. Discussed medication risks and benefits of Senna and Miralax with patient in detail with patient verbal understanding. Discussed w/ patient to follow up w/ clinic to be triaged or present to ED if noting worsening in symptoms.    Plan: UA Macroscopic with reflex to Microscopic and         Culture - Lab Collect, Comprehensive metabolic         panel (BMP + Alb, Alk Phos, ALT, AST, Total.         Bili, TP), CBC with platelets and differential,        senna-docusate (SENOKOT-S/PERICOLACE) 8.6-50 MG        tablet, CT Abdomen w/o & w Contrast     (R10.9) Left flank pain  Comment: patient takes Proscar and Flomax. Patient noting urinary stream worsening over time even though on medication. UA today to evaluate. Will follow up on results and whether further direction is necessary. Patient fully understands and is agreeable with plan of care, at this point patient will follow up as needed unless acute concerns arise in the meantime.  Plan: UA Macroscopic with reflex to Microscopic and         Culture - Lab Collect    (K57.30) Diverticulosis of large intestine without hemorrhage  Comment: as above.   Plan: CT Abdomen w/o & w Contrast    (M54.42,  M54.41,  G89.29) Chronic bilateral low back pain with bilateral sciatica  Comment: could be contributing to patient back/flank pain, labs to rule out. Plan of  "care as above to address before.     (K59.01) Slow transit constipation  Comment: could very well be contributory to patient current symptoms.     (G47.33) SARBJIT (obstructive sleep apnea)  Comment: needs evaluation. Suggest at home study if possible. Sleep referral placed. Patient fully understands and is agreeable with plan of care, at this point patient will follow up as needed unless acute concerns arise in the meantime.  Plan: Adult Sleep Evaluation & Management          Referral     The longitudinal plan of care for the diagnosis(es)/condition(s) as documented were addressed during this visit. Due to the added complexity in care, I will continue to support Layo in the subsequent management and with ongoing continuity of care.    BMI  Estimated body mass index is 29.43 kg/m  as calculated from the following:    Height as of this encounter: 1.803 m (5' 11\").    Weight as of this encounter: 95.7 kg (211 lb).     Subjective   Layo is a 69 year old, presenting for the following health issues:  Abdominal Pain (Lower right)        9/9/2024     1:00 PM   Additional Questions   Roomed by Shira Rai, EMT-B     History of Present Illness       Reason for visit:  Abdomen Pain &  chronic back pain  Symptom onset:  1-2 weeks ago  Symptoms include:  Lower right abdomen pain that radiates to groin and low back  Symptom intensity:  Mild  Symptom progression:  Staying the same  Had these symptoms before:  No  What makes it worse:  Sitting, standing, walking, climbing stairs, lying supine  What makes it better:  None      Pain History:  When did you first notice your pain? 2 weeks   Have you seen anyone else for your pain? No  How has your pain affected your ability to work? Not currently working - unrelated to pain  Where in your body do you have pain? Abdominal/Flank Pain  Onset/Duration: 2 weeks  Description:   Character: Burning and Cramping  Location: right lower quadrant  Radiation: Back and Pelvic " "region  Intensity: mild to severe  Progression of Symptoms:  same and constant  Accompanying Signs & Symptoms:  Fever/Chills: No  Gas/Bloating: YES- a little  Nausea: YES  Vomitting: No  Diarrhea: No  Constipation: YES,   Dysuria or Hematuria: No  History:   Trauma: No  Previous similar pain: No  Previous tests done: none  Precipitating factors:   Does the pain change with:     Food: No    Bowel Movement: No    Urination: No   Other factors:  Pain increases in low back when Pt takes a deep breath  Therapies tried and outcome: Oxycodone, took ExLax x1 w/o help from     Hx of diverticulosis from colonoscopy in  2021  Review of Systems  Constitutional, cardiovascular, pulmonary, gi and gu systems are negative, except as otherwise noted.      Objective    /86 (BP Location: Right arm, Patient Position: Sitting, Cuff Size: Adult Regular)   Pulse 102   Temp 98.2  F (36.8  C) (Oral)   Resp 15   Ht 1.803 m (5' 11\")   Wt 95.7 kg (211 lb)   SpO2 96%   BMI 29.43 kg/m    Body mass index is 29.43 kg/m .  Physical Exam  Vitals and nursing note reviewed.   Constitutional:       General: He is not in acute distress.     Appearance: Normal appearance. He is well-developed. He is not ill-appearing or toxic-appearing.   Cardiovascular:      Rate and Rhythm: Normal rate and regular rhythm.      Heart sounds: No murmur heard.     No friction rub. No gallop.   Pulmonary:      Effort: Pulmonary effort is normal. No respiratory distress.      Breath sounds: No wheezing, rhonchi or rales.   Abdominal:      General: Bowel sounds are normal. There is no distension.      Palpations: Abdomen is soft. There is no mass.      Tenderness: There is abdominal tenderness in the right upper quadrant, left upper quadrant and left lower quadrant. There is left CVA tenderness. There is no right CVA tenderness, guarding or rebound. Negative signs include McBurney's sign.      Hernia: No hernia is present.   Skin:     General: Skin is warm and " dry.   Neurological:      Mental Status: He is alert.   Psychiatric:         Attention and Perception: Attention and perception normal.         Mood and Affect: Mood normal.         Speech: Speech normal.         Behavior: Behavior normal. Behavior is cooperative.         Thought Content: Thought content normal.         Judgment: Judgment normal.            Signed Electronically by: JIMBO Valero CNP

## 2024-09-10 NOTE — TELEPHONE ENCOUNTER
Outgoing call to patient. Attempt # 1. Left message to call back any triage nurse.    Cely Bhatt RN on 9/10/2024 at 11:46 AM

## 2024-09-10 NOTE — TELEPHONE ENCOUNTER
----- Message from Jony Mccarty sent at 9/10/2024  9:36 AM CDT -----  Can we check in on patient to see how he is doing today, did he get scheduled for his CT? If pain is worsening may need ADS. Will want to recheck lab work in 1-2 weeks, potassium and calcium elevated and hgb decreased. I will place these orders.    JIMBO Valero CNP

## 2024-09-11 NOTE — TELEPHONE ENCOUNTER
Called and spoke with pt,     Relayed message below from Jony Mccarty CNP.     -He states his pain is unchanged. He continues to have constipation.   Pt verbalizes understanding, however is argumentative initially regarding ADS, stating that he will go to ED today.   Writer informed pt of the benefit of ADS and he states that he is unable to get to ADS due to transportation as his son is out of town for 4 days. He states he has not yet scheduled the CT either due to transportation issues. He declines the phone number to schedule CT. Pt was in a hurry to get off the phone and disconnected call.     Huddled with Jony Mccarty CNP:    -Pt will need CT as soon as possible, if unwilling to schedule, he should be seen in ED.     Returned call to pt and advised pt that Jony Mccarty CNP advised he be seen in ED today to be assessed due to ongoing pain.     He verbalizes understanding and is agreeable with plan and now states he will call for ED transport from staff at [facility].  Pt denies any further questions or concerns at this time.     Connie ORR, RN   Clinic RN  [facility]

## 2024-09-11 NOTE — ED PROVIDER NOTES
"  Emergency Department Note      History of Present Illness     Chief Complaint   Constipation and Abdominal Pain      HPI   Toby Mcgrath is a 69 year old male on warfarin for a-fib with a history of COPD, HTN, HLD, and type II diabetes who presents to the ED for the evaluation of abdominal pain and constipation. The patient reports that 3 days ago he began to experience right lower quadrant abdominal pain that radiates into the right upper quadrant and right back/flank. The pain is worse when breathing or after eating, which has caused him to eat less. He also reports nausea, dark urine, and diaphoresis. He still takes warfarin for a-fib. He mentions a spinal fusion in 2014 but denies history of abdominal surgery. Denies diarrhea or chest pain.    Independent Historian   None    Review of External Notes   I reviewed his office visit from September 9.    Past Medical History     Medical History and Problem List   Gout  Anxiety  Chronic pain syndrome  Cervical DDD  Lumbar DJD  GERD  HTN  HLD  Prostate hypertrophy  Lumbar radiculopathy  MDD  Osteoarthritis  Tobacco use disorder  Type II diabetes mellitus  A-fib  COPD  Carpal tunnel syndrome  Obesity    Medications   bisacodyl  buspirone  duloxetine  famotidine  finasteride  glipizide  metformin  metoprolol succinate  oxycodone  pantoprazole  pregabalin  senna-docusate  simvastatin  tamsulosin  warfarin    Surgical History   B/L shoulder repair  R neck mass excision  Lumbar anterior & posterior fusion  L hemilaminectomy & discectomy  R hip arthroplasty    Physical Exam     Patient Vitals for the past 24 hrs:   BP Temp Temp src Pulse Resp SpO2 Height Weight   09/11/24 1927 120/83 -- -- 111 16 96 % -- --   09/11/24 1301 103/77 97.8  F (36.6  C) Temporal 94 16 99 % 1.803 m (5' 11\") 95.7 kg (211 lb)     Physical Exam  Constitutional: Vital signs reviewed as above  General: Alert, pleasant  HEENT: Moist mucous membranes  Eyes: Pupils are equal, round, and reactive to " light.   Neck: Normal range of motion  Cardiovascular: normal rate, Regular rhythm and normal heart sounds.  Mo MRG  Pulmonary/Chest: Effort normal and breath sounds normal. No respiratory distress. Patient has no wheezes. Patient has no rales.   Gastrointestinal: Tender in the right upper quadrant with mild guarding. Tenderness in the right flank. No other areas of focal tenderness.  Musculoskeletal/Extremities: Full ROM.  Endo: No pitting edema  Neurological: A/O x 3. CN-II-XII intact bilaterally. No pronator drift. Normal strength and sensation throughout all 4 extremities.   Skin: Skin is warm and dry.   Psychiatric: Pleasant    Diagnostics     Lab Results   Labs Ordered and Resulted from Time of ED Arrival to Time of ED Departure   COMPREHENSIVE METABOLIC PANEL - Abnormal       Result Value    Sodium 141      Potassium 4.7      Carbon Dioxide (CO2) 25      Anion Gap 12      Urea Nitrogen 7.4 (*)     Creatinine 0.92      GFR Estimate 89      Calcium 11.7 (*)     Chloride 104      Glucose 179 (*)     Alkaline Phosphatase 326 (*)     AST 79 (*)     ALT 13      Protein Total 7.8      Albumin 4.0      Bilirubin Total 0.9     CBC WITH PLATELETS AND DIFFERENTIAL - Abnormal    WBC Count 9.7      RBC Count 4.88      Hemoglobin 13.1 (*)     Hematocrit 40.7      MCV 83      MCH 26.8      MCHC 32.2      RDW 14.7      Platelet Count 276      % Neutrophils 76      % Lymphocytes 14      % Monocytes 9      % Eosinophils 1      % Basophils 1      % Immature Granulocytes 1      NRBCs per 100 WBC 0      Absolute Neutrophils 7.3      Absolute Lymphocytes 1.4      Absolute Monocytes 0.9      Absolute Eosinophils 0.1      Absolute Basophils 0.1      Absolute Immature Granulocytes 0.1      Absolute NRBCs 0.0     INR - Abnormal    INR 2.00 (*)    LIPASE - Normal    Lipase 17         Imaging   US Abdomen Limited   Final Result   IMPRESSION:   1.  Abnormal appearance of the liver which could represent changes of cirrhosis with some  possible indeterminate lesions/masses. CT or MRI examination would be of benefit for further evaluation if there are no previous cross-sectional images available    from outside institutions.      2.  Mild cholelithiasis with gallbladder wall thickening measuring up to 7 mm in greatest radial dimension. No pericholecystic fluid or positive Saleem's sign.      3.  Circumferential parenchymal thinning of the right kidney with no hydronephrosis.      4.  The pancreas is not well seen given overlying bowel gas.                  Independent Interpretation   None    ED Course      Medications Administered   Medications   ondansetron (ZOFRAN) injection 4 mg (4 mg Intravenous $Given 9/11/24 1620)   HYDROmorphone (PF) (DILAUDID) injection 0.5 mg (0.5 mg Intravenous $Given 9/11/24 1620)   sodium chloride 0.9% BOLUS 1,000 mL (0 mLs Intravenous Stopped 9/11/24 1928)       Procedures   Procedures     Discussion of Management   None    ED Course   ED Course as of 09/11/24 1930   Wed Sep 11, 2024   1529 I evaluated the patient and obtained history.    1923 I rechecked and updated the patient.       Additional Documentation  None    Medical Decision Making / Diagnosis     CMS Diagnoses: None    MIPS       None    [facility]   Toby Mcgrath is a 69 year old male who presents with abdominal pain as detailed above.  This been going off and on for quite some time.  Pain is mostly in the right upper quadrant with radiation to the back.  Gallbladder disease is high in the differential.  He was given fluids, Dilaudid and Zofran and is feeling much better.  Labs showed unremarkable CBC and lipase.  He does have chronically elevated LFTs and these are within his range.  INR is therapeutic at 2.  Upper quadrant ultrasound shows cholelithiasis without choledocholithiasis or cholecystitis.  I certainly could be the etiology of his pain.  There is also some concerns about his liver which appears enlarged with some possible masses in portal  hypertension.  Recommendation was for CT scan of the abdomen pelvis.  I did talk to Shelbi today.  He prefers to do this as an outpatient.  Turns out his doctor ordered one 2 days ago.  I think this is okay as it likely will not .  As such we discharged home.  Follow-up tomorrow about sign of that CT if is not already scheduled.    Disposition   The patient was discharged.     Diagnosis     ICD-10-CM    1. Right upper quadrant abdominal pain  R10.11       2. Alcoholic cirrhosis, unspecified whether ascites present (H)  K70.30       3. Symptomatic cholelithiasis  K80.20            Discharge Medications   New Prescriptions    No medications on file         Scribe Disclosure:  I, Britta Pattersonsandrine, am serving as a scribe at 3:36 PM on 9/11/2024 to document services personally performed by Jensen Small MD based on my observations and the provider's statements to me.        Jensen Small MD  09/11/24 1930

## 2024-09-11 NOTE — ED TRIAGE NOTES
"Pt arrived in the ER by ambulance. States he took the ambulance because he has no other way to get here. Has had right sided flank pain for the past three days. Pt states has had abdominal pain in the right lower quadrant for 2 days. States has chronic back pain. Notes he has not had a stool for a week. Pt states he takes \"everything\" there is to have a stool without relief.    Pt states he has had this same issue about one year ago and states he is unsure how he was able to get through it.         "

## 2024-09-12 NOTE — TELEPHONE ENCOUNTER
Referral to Acute and Diagnostic Services    [phone] ([place]) [place] [facility] [address] [facility], [address], [place] [address]    Transition to Acute & Diagnostic Services Clinic has been discussed with patient, and he agrees with next level of care.   Patient understands that evaluation/treatment at [facility] typically takes significantly longer than in clinic/urgent care (>2 hours).  [facility] Acute and Diagnostics Services Clinic has been contacted by provider/staff to confirm patient acceptance. Awaiting Teams message from [facility] provider to see if he is accepted.          Transitions of Care Outreach  Chief Complaint   Patient presents with    Hospital F/U     Right upper quadrant abdominal pain Alcoholic cirrhosis, unspecified whether ascites present (H) Symptomatic cholelithiasis         Most Recent Admission Date: 9/11/2024   Most Recent Admission Diagnosis:      Most Recent Discharge Date: 9/11/2024   Most Recent Discharge Diagnosis: Right upper quadrant abdominal pain - R10.11  Alcoholic cirrhosis, unspecified whether ascites present (H) - K70.30  Symptomatic cholelithiasis - K80.20     Transitions of Care Assessment    Discharge Assessment  How are you doing now that you are home?: Not hurting as badly as yesterday  How are your symptoms? (Red Flag symptoms escalate to triage hotline per guidelines): Improved  Do you know how to contact your clinic care team if you have future questions or changes to your health status? : Yes  Does the patient have their discharge instructions? : Yes  Does the patient have questions regarding their discharge instructions? : Yes (see comment) (When to do CT? Patient states he left ED prior to having it done yesterday because he was getting too tired. Will see if [facility] can take him today.)  Were you started on any new medications or were there changes to any of your previous medications? : No  Does the patient have all of their medications?: Yes  Do you have  questions regarding any of your medications? : No  Do you have all of your needed medical supplies or equipment (DME)?  (i.e. oxygen tank, CPAP, cane, etc.): Yes    Follow up Plan     Discharge Follow-Up  Discharge follow up appointment scheduled in alignment with recommended follow up timeframe or Transitions of Risk Category? (Low = within 30 days; Moderate= within 14 days; High= within 7 days):  (Outgoing call to [facility] to see if they could fit him in for CT today.)  Discharge Follow Up Appointment Scheduled with?: Primary Care Provider  Patient's follow up appointment not scheduled: Patient accepted scheduling support. Appt scheduled/requested per protocol.    Future Appointments   Date Time Provider Department Center   9/23/2024 11:00 AM CR MA/LPN CRFP CR   10/3/2024  9:45 AM Sally Hendricks AuD [facility]   10/18/2024 11:15 AM CR LAB CRLABR CR       Outpatient Plan as outlined on AVS reviewed with patient.    For any urgent concerns, please contact our 24 hour nurse triage line: [phone] ([phone])       Cely Bhatt RN

## 2024-09-12 NOTE — TELEPHONE ENCOUNTER
Spoke with Dr. Steve at [facility]. They state that patient can schedule CT through radiology department because his pain is now manageable level.     Outgoing call to patient. I let them know message above. I connected patient with the radiology scheduling department. Patient was also agreeable to scheduling a ED follow up appointment with JIMBO Valero CNP today at 1:10pm.     Future Appointments 9/12/2024 - 3/11/2025        Date Visit Type Length Department Provider     9/12/2024  1:30 PM ED/HOSP FOLLOW UP 30 min CR FAMILY PRACTICE Jony Mccarty APRN CNP    Location Instructions:     [facility] is located at [address] next to [employer]. Free parking is available; access the lot by turning east from [facility] onto [address] [place] [address].              9/23/2024 11:00 AM MA VISIT 10 min CR FAMILY PRACTICE CR MA/LPN    Location Instructions:     [facility] is located at [address] next to [employer]. Free parking is available; access the lot by turning east from [facility] onto [address] [place] [address].              10/3/2024  9:45 AM ADULT HEARING EVALUATION 45 min [facility] Sally Hendricks AuD    Location Instructions:     [facility] [place] area with multiple transportation and parking options. You may wish to review options for  service and self-parking in more detail on the [facility] s website at [email or web address].               10/18/2024 11:15 AM LAB INR 15 min CR LABORATORY CR LAB    Location Instructions:     The clinic is located at [address] in [place].&nbsp; We offer free parking in our on-site lot.                       Cely Bhatt RN

## 2024-09-12 NOTE — TELEPHONE ENCOUNTER
General Call    Contacts       Contact Date/Time Type Contact Phone/Fax    09/12/2024 08:57 AM CDT Phone (Incoming) Layo Mcgrath (Self) [phone] ()          Reason for Call:  Patient is requesting to speak to the care team of Jony Mccarty.    What are your questions or concerns:  He has some questions about labs that need to be done for his kidney or liver.     Date of last appointment with provider: 9/9     Okay to leave a detailed message?: Yes at Home number on file [phone] ([place])

## 2024-09-12 NOTE — TELEPHONE ENCOUNTER
Patient's symptoms have not worsened if anything have improved since his ER visit.  At this present time I do not see the necessity to do a stat CT , plan to continue with workup as ordered by PCP as outpatient.  With his underlying cholelithiasis advise low-fat diet with smaller portion meals to prevent abdominal pain.  We are available for consult should any further questions arise.

## 2024-09-12 NOTE — TELEPHONE ENCOUNTER
ANTICOAGULATION  MANAGEMENT: Discharge Review    Toby Mcgrath chart reviewed for anticoagulation continuity of care    Emergency room visit on 9/11/24 for constipation and abdominal pain.    Discharge disposition: Home    Results:    Recent labs: (last 7 days)     09/06/24  1237 09/11/24  1449   INR 2.1* 2.00*     Anticoagulation inpatient management:     not applicable     Anticoagulation discharge instructions:     Warfarin dosing: home regimen continued   Bridging: No   INR goal change: No      Medication changes affecting anticoagulation: No    Additional factors affecting anticoagulation: No     PLAN     No adjustment to anticoagulation plan needed    Spoke with Layo  - Misbah ok to continue with current dosing plan of warfarin and INR recheck date but if pt pain is ongoing or begins to have diarrhea recommend checking INR sooner because those factors have potential to impact INR     No adjustment to Anticoagulation Calendar was required    Jeffery Nam RN  9/12/2024  Anticoagulation Clinic  [employer] for routing messages: p ANTICOAG APPLE [facility] patient phone line: [phone]

## 2024-09-12 NOTE — PROGRESS NOTES
"  Assessment & Plan     (R10.03) Abdominal pain, generalized  (primary encounter diagnosis)  Comment: patient abdominal pain improved w/ Dilaudid use in ED. Will provide small amount of extra oxycodone for patient to use to increase oxycodone to 7.5-10mg at a time. Recommend patient move forward w/ CT scan to evaluate US results of liver further, will have RN team help schedule this. Discussed medication risks and benefits of oxycodone with patient in detail with patient verbal understanding. Will follow up on results and whether further direction is necessary. Patient is tachycardic today, suspect could be d/t pain however can't rule out afib contributing. Patient states this occurs occasionally, recommend patient be seen by ED if noting any cardiac red flags (which are absent right now). Patient fully understands and is agreeable with plan of care, at this point patient will follow up as needed unless acute concerns arise in the meantime.  Plan: oxyCODONE (ROXICODONE) 5 MG tablet, Drug         Confirmation Panel Urine with Creat - lab         collect    (G89.4) Chronic pain syndrome  Comment: CSA updated, co signed w/ PCP following visit. Urine drug panel updated today, Will follow up on results and whether further direction is necessary.   Plan: oxyCODONE (ROXICODONE) 5 MG tablet, Drug         Confirmation Panel Urine with Creat - lab         collect      The longitudinal plan of care for the diagnosis(es)/condition(s) as documented were addressed during this visit. Due to the added complexity in care, I will continue to support Layo in the subsequent management and with ongoing continuity of care.    MED REC REQUIRED  Post Medication Reconciliation Status: discharge medications reconciled and changed, per note/orders  BMI  Estimated body mass index is 28.17 kg/m  as calculated from the following:    Height as of this encounter: 1.803 m (5' 11\").    Weight as of this encounter: 91.6 kg (202 lb).     Subjective " "  Layo is a 69 year old, presenting for the following health issues:  ER F/U (Liver concern)        9/12/2024     1:03 PM   Additional Questions   Roomed by Nilsa WATSON       ED/UC Followup:    Facility:  [facility] Emergency Dept  Date of visit: 09/11/2024  Reason for visit: Constipation  Abdominal Pain   Current Status:  feeling a little better, follow-up on the ultrasound and would like to get the CT scan.    Abdominal pain improved but still present a bit.       Review of Systems  Constitutional, cardiovascular, pulmonary, gi and gu systems are negative, except as otherwise noted.      Objective    /87 (BP Location: Right arm, Patient Position: Sitting, Cuff Size: Adult Large)   Pulse (!) 135   Temp 98.1  F (36.7  C) (Oral)   Resp 19   Ht 1.803 m (5' 11\")   Wt 91.6 kg (202 lb)   SpO2 98%   BMI 28.17 kg/m    Body mass index is 28.17 kg/m .  Physical Exam  Vitals and nursing note reviewed.   Constitutional:       General: He is not in acute distress.     Appearance: Normal appearance.   Cardiovascular:      Rate and Rhythm: Normal rate and regular rhythm.      Heart sounds: No murmur heard.     No friction rub. No gallop.   Pulmonary:      Effort: Pulmonary effort is normal. No respiratory distress.      Breath sounds: No wheezing, rhonchi or rales.   Abdominal:      General: Bowel sounds are normal. There is no distension.      Palpations: Abdomen is soft.      Tenderness: There is abdominal tenderness in the right upper quadrant. There is no guarding. Negative signs include Saleem's sign and McBurney's sign.      Comments: Mild tenderness   Skin:     General: Skin is warm and dry.   Neurological:      Mental Status: He is alert.   Psychiatric:         Mood and Affect: Mood normal.         Behavior: Behavior normal. Behavior is cooperative.         Thought Content: Thought content normal.         Judgment: Judgment normal.            Signed Electronically by: JIMBO Valero " CNP

## 2024-09-12 NOTE — TELEPHONE ENCOUNTER
Hospital Follow Up   9/11/2024 (4 hours) [facility] Emergency Dept    Diagnosis   Right upper quadrant abdominal pain  Alcoholic cirrhosis, unspecified whether ascites present (H)  Symptomatic cholelithiasis    Medications   No medication changes     Follow Up instructions   Call Coming Dayana Hopson MD (Family Medicine) in 1 day (9/12/2024); As needed.  Please keep your appointment for your CT scan of your abdomen.     Routing to DEBORAH Silva, Registered Nurse  [facility]

## 2024-09-13 NOTE — TELEPHONE ENCOUNTER
Huddled with Jony Mccarty CNP    -Assist pt in scheduling CT scan.     Called and spoke with pt,     Informed pt that Jony Mccarty CNP would like writer to assist with scheduling CT scan.     -Assisted pt in scheduling a CT scan at the [facility] on 9/14 at 10:00 AM check in time.     -The appointment should take 20 minutes.   -Wear metal free clothing.       [facility]  [address] [place] [address]     Called and spoke with pt,     Pt verbalizes understanding of above and is agreeable with plan, appointment time and date. Pt denies any further questions or concerns at this time.     Connie ORR RN   Clinic RN  [facility]

## 2024-09-16 NOTE — TELEPHONE ENCOUNTER
Patient has been called this morning, Codi Luz RN helping facilitate referrals.    JIMBO Valero CNP

## 2024-09-16 NOTE — PROGRESS NOTES
New Patient Oncology Nurse Navigator Note     Referring provider: JIMBO Valero, PCP    Referring Clinic/Organization: [facility]     Referred to: Medical Oncology    Requested provider (if applicable): First available - did not specify     Referral Received: 09/16/24       Evaluation for : CT concerning for liver malignancy     Clinical History (per Nurse review of records provided):      9/11/2024 [facility] US Abdomen    IMPRESSION:  1.  Abnormal appearance of the liver which could represent changes of cirrhosis with some possible indeterminate lesions/masses. CT or MRI examination would be of benefit for further evaluation if there are no previous cross-sectional images available   from outside institutions.     2.  Mild cholelithiasis with gallbladder wall thickening measuring up to 7 mm in greatest radial dimension. No pericholecystic fluid or positive Saleem's sign.     3.  Circumferential parenchymal thinning of the right kidney with no hydronephrosis.     4.  The pancreas is not well seen given overlying bowel gas.       9/14/2024 CT abd/pelvis ([facility])  IMPRESSION:   1.  Innumerable masses throughout the liver, concerning for malignancy. This may either be due to diffuse hepatic metastases, multifocal hepatocellular carcinoma, or cholangiocarcinoma with intrahepatic metastases. Percutaneous biopsy is recommended for   further evaluation.  2.  Upper abdominal metastatic-appearing lymphadenopathy.  3.  A 2 cm indeterminate enhancing lesion in the spleen. Continued attention on follow-up.  4.  The stomach is decompressed which slightly limits its evaluation. Asymmetric wall thickening in the gastric fundus. Consider upper endoscopy to exclude gastric malignancy.  5.  Mildly nodular hepatic contour suggestive of cirrhosis. Evidence of portal hypertension with recanalized umbilical vein, mild splenomegaly and small ascites.        Clinical Assessment / Barriers to Care (Per Nurse):    PCP referring  "to Med Onc for expedited cancer work up. Pt lives in [place]; IB msg to Dr Squires if he will accept this pt. His schedule is open at [facility], Thurs 9/19/2024. Dr Squires is on protocol for cholangiocarcinoma but not for hepatocellular carcinoma. Unclear what primary cancer this may be without tissue biopsy. Awaiting MD reply.    Per Dr Squires, he has entered IR referral for liver biopsy. MD prefers to see pt after the liver bx is completed.    I spoke to patient directly; he understands that his US & CT show abnormal masses on his liver that are concerning for cancer. Pt is accepting of Dr Squires's plan above to secure liver biopsy date, then plan to see Dr Squires 5-7 business days after the biopsy. Results are usually final by that time frame.     Pt states that he does not \"really have transportation.\" His son will be returning to work by Wed, so pt will ask pt's brother to assist with rides if possible. Pt would welcome any resources by our  (referral sent) for transportation, etc. At the end of our call, he understands that we will work on these appts and call to cofirm final time lines, etc.     Of note, pt seems easily confused. I asked him why he's on warfarin and directly asked if he has afib or other heart issues. He could not recall. Chart states warfarin is for hx of afib. Dr Squires notified.        Records Location: [facility]     Records Needed:     N/A    Additional testing needed prior to consult:     TBD    Referral updates and Plan:     Liver biopsy set for 9/30, pt will be scheduled to meet w/ Onc Dr Squires for 10/7/2024 at [facility] to discuss results and next steps.     Of note, pt met w/ GI Dr Torres, who plans CT chest 9/26.       Petros Love, RN, BSN, OCN  Oncology New Patient Nurse Navigator   [facility]  [phone]         "

## 2024-09-16 NOTE — TELEPHONE ENCOUNTER
Huddled with Jony Mccarty, ANDREA-    Requests writer assist with scheduling pt with gastro and oncology.     Called and spoke with pt,     He states he has spoken with an RN at clinic earlier and was given the phone numbers to schedule oncology and GI. He states he spoke with oncology and was told that they are reviewing the notes and will reach out to him to schedule. He also has spoken with GI and they are reviewing the referral and state that they will call him to schedule.     Writer offered him assist with scheduling and instructed him to call back if they are scheduling out and/or he has any follow-up questions.     Pt verbalizes understanding and is agreeable with plan. Pt denies any further questions or concerns at this time.     Connie ORR RN   Clinic RN  [facility]

## 2024-09-16 NOTE — TELEPHONE ENCOUNTER
Outgoing call to patient. Provided him the numbers for scheduling the GI referral ([phone]) and oncology referral ([phone]). He will call them to schedule now.   He states he has not received a voicemail from JIMBO Valero CNP that he knows of. He says his memory isn't great so he could have, but forgot. I told him to keep his phone ringer on today and provider will call him to discuss the CT results. Patient stated understanding.    Cely Bhatt RN

## 2024-09-16 NOTE — TELEPHONE ENCOUNTER
Patient calling, requesting refill for oxyCODONE (ROXICODONE) 5 MG tablet for 9/21/24. Patient will have 6 left after today. Routing to provider.     Enoch NGUYEN RN 9/16/2024 at 8:29 AM

## 2024-09-16 NOTE — TELEPHONE ENCOUNTER
----- Message from Jony Mccarty sent at 9/15/2024  1:45 PM CDT -----  Called patient to discuss CT results, unfortunately did not answer. Left VM.  Emergent referral to GI and oncology to further evaluate, please help facilitate. Please follow up w/ patient to ensure he has received VM. If he has not and would like to discus further I am happy to have a discussion w/ him to go over plans w/ referrals.     JIMBO Valero CNP

## 2024-09-17 NOTE — CONFIDENTIAL NOTE
DIAGNOSIS:   : Abdominal pain, generalized   Mass of multiple sites of liver   Abdominal lymphadenopathy   Lesion of spleen      Appt Date: 09.18.2024      NOTES STATUS DETAILS   OFFICE NOTE from referring provider Internal 09.09.2024  Jony Mccarty APRN CNP      MEDICATION LIST Internal    IMAGING     COLONOSCOPY (IF AVAILABLE) Internal 10.14.2021   ULTRASOUND LIVER Internal 09.11.2024 US Abd Limited   CT OF ABDOMEN Internal 09.14.2024 CT Abd Pelvis    LABS     BASIC METABOLIC PANEL Internal 06.13.2024    COMPLETE METABOLIC PANEL Internal 09.11.2024   COMPLETE BLOOD COUNT (CBC) Internal 09.11.2024   INTERNATIONAL NORMALIZED RATIO (INR) Internal 09.11.2024

## 2024-09-17 NOTE — PROGRESS NOTES
Social Work - Intervention  [facility]  Data/Intervention:  Patient Name: Toby Mcgrath Goes By: Layo    /Age: 1955 (69 year old)     Visit Type: telephone  Referral Source: Petros Love RN  Reason for Referral: Transportation Support     Psychosocial Information/Concerns:  Oriented Layo to  services and support. Layo reports that he resides with his son (Adonis) who rotates working 4 days on, 4 days off. Layo reports that Adonis has a great deal of vacation time, and plans to bring Layo to his upcoming medical appointments. Layo endorsed that he would be interested in learning about additional transportation options available. SW discussed [employer], [employer], and [employer]. Layo receptive to  mailing application for [employer] for his review/consideration. Layo reports that he has not been driving for about 1 year.     Appreciative of social work outreach, and reported that he would outreach to this clinician as needed for further psychosocial support.      Assessment/Plan:  1) Onc SW to mail [employer] application for ongoing transportation assistance  2) Layo knows to outreach as needed with additional concern or need.      Provided patient/family with contact information and availability.    Lula Fulton, MSW, LICSW, OSW-C  Clinical - Adult Oncology  Phone: [phone]  She/Her/Hers  [facility]: Katerin SOLIZ  8am-4:30pm  [facility]: AJ Ramon F 8am-4:30pm   Support Groups at [facility]: Social Work Services for Cancer Patients ([email or web address])

## 2024-09-18 NOTE — LETTER
[facility]  [address] [place] [address]  Phone: [phone]  Fax: [phone]   Andrey Alcides  [address]   [place] [address]    Adriano Vasques,    Your recent lab result for your Jantoven was:    Recent labs: (last 7 days)     09/18/24  1300   INR 1.72*       Your goal range: 2.0-3.0    I attempted to reach you several times by phone over the last two days and left several voice mail messages but never heard back from you. Here is a copy the Jantoven dosing instructions we are advising. We are increasing your weekly dose to 7.5 mg daily (one and one half tablets).     I am also including instructions for holding your Jantoven prior to your upcoming liver biopsy. Please call [phone] and ask to speak with an INR Nurse to review these instructions if you have any questions.      Procedure Instructions for Anticoagulation     For your upcoming liver biopsy on Monday, September 30, 2024, your healthcare provider wants you to stop Jantoven for 5 days, as directed below.    Tue, 9/24/24,  Take last dose of Jantoven    Wed, 9/25/24 to Sun, 9/29/24, NO Jantoven    Thu, 9/30/24, DAY OF PROCEDURE, Restart Jantoven in the evening, if okay with provider doing the procedure. Make sure to ask the provider doing your procedure if it is okay to begin your Jantoven as planned     On Mon, 9/30/24 take Jantoven booster dose of 10 mg (two tablets).    On Tue, 10/1/24, take Jantoven another booster dose of 10 mg (two tablets).    On Wed, 10/2/24, resume current Jantoven dose, 7.5 mg (one and a half tablets) daily.    Recheck INR on Tuesday, 10/7/24, to ensure INR returning to goal range.    Please watch for symptoms of both bleeding and clotting while holding and restarting warfarin:    Call 911 or go to the emergency room if you are experiencing any of the following:   Coughing or throwing up blood, can't control bleeding from a cut or injury after putting  pressure on it, have a sudden severe headache, have red or black stools, or have red or orange urine.  Chest pain or shortness of breath  Any symptoms of a stroke including: sudden numbness or weakness in your face, arm or leg; sudden confusion or trouble speaking, reading or understanding; sudden blurred or decreased vision; sudden trouble walking or moving a part of the body; sudden severe headache for no reason.    Call your care team if you have:   Bleeding gums, large bruises, pale skin.  Sudden pain, tenderness or swelling in your leg or arm    Please contact the Anticoagulation Clinic at [phone]  if you have any questions or concerns.       Estefani Ríos RN

## 2024-09-18 NOTE — LETTER
9/18/2024      Toby Mcgrath  [id number]  [place] [address]      Dear Colleague,    Thank you for referring your patient, Toby Mcgrath, to the [facility] [place]. Please see a copy of my visit note below.    [facility] New Patient Visit    Date of Visit: September 18, 2024    Reason for referral: liver lesions, cirrhosis    Subjective: Mr. Mcgrath is a 69 year old man with a history of treated hepatitis C, alcohol use who presents for evaluation of multiple liver lesions.    He presented with right back pain the end of the summer.     He was treated for hepatitis C in the past - not sure where or when. He had an all oral regimen.  He had a hepatitis C PCR.  That was negative in 2016.    He was a drinker back in the day, right now has a beer for dinner.     Because of the back pain, he had an ultrasound that showed concern for liver lesions.  This was followed by a CT scan 9/14/2024 that showed     IMPRESSION:   1.  Innumerable masses throughout the liver, concerning for malignancy. This may either be due to diffuse hepatic metastases, multifocal hepatocellular carcinoma, or cholangiocarcinoma with intrahepatic metastases. Percutaneous biopsy is recommended for   further evaluation.  2.  Upper abdominal metastatic-appearing lymphadenopathy.  3.  A 2 cm indeterminate enhancing lesion in the spleen. Continued attention on follow-up.  4.  The stomach is decompressed which slightly limits its evaluation. Asymmetric wall thickening in the gastric fundus. Consider upper endoscopy to exclude gastric malignancy.  5.  Mildly nodular hepatic contour suggestive of cirrhosis. Evidence of portal hypertension with recanalized umbilical vein, mild splenomegaly and small ascites    He is not sure if his abdomen is more distended since his scan.  He set up for a biopsy on September 30.  He has not had an upper endoscopy in quite some time.    HR is up -  he has not taken his metoprolol today. Denies CP or SOB or dizziness    ROS: 14 point ROS negative except for positives noted in HPI.    PMHx:  Past Medical History:   Diagnosis Date     Acute gout of right elbow, unspecified cause 07/14/2016     Acute gouty arthritis 03/03/2016     Anxiety state, unspecified      BMI 32.0-32.9,adult 09/04/2015     Chronic pain syndrome 03/29/2007    Narcotic refill protocol Will return every 2-3 months for clinic visits Controlled substance aggreement on file from this  6/26/12 Documentation in problem list: no, appears to be compliant based on last visit He is responding best to Oxycontin 10 mg twice daily # 60 per month.  This is costly and looking into [place] for coverage.  Nausea with MS Contin Has meds refilled 16 of every month Last [facility] website verification:  done on 7/8/2015  [email or web address]   2/10/2015:  PCP will take over narcotic prescription Tapering course: using 5 mg tablets 10 mg morning 15 mg noon, 15 mg night for 1 week then 10 mg morning, 10 mg noon, 15 mg night for 1 week then 10 mg TID for 1 week then see me for refills  Then ongoing taper: 5 mg morning, 10 mg noon and 10 mg night for 1 week then  5 mg morning and noon and 10 mg night for 1 week then 5 mg tid for 1 week Then 5 mg morning no noon dose and 5 mg night for 1 week then No morning or non dose and 5 mg nightly for 1 week then off  Goal is co     Chronic rhinitis 03/29/2007     DDD (degenerative disc disease), cervical 02/08/2013    Normal.  C2-C3: Normal disc, facet joints, spinal canal and neural foramina.  C3-C4: Mild broad-based posterior disc bulge. Otherwise normal.  C4-C5: Normal disc, facet joints, spinal canal and neural foramina.  C5-C6: Moderate degenerative disc disease with loss of disc height, circumferential disc bulge and vertebral endplate osteophytes. Mild spinal canal stenosis and moderate left foraminal stenosis. Normal right foramen and facet joints.  C6-C7: Mild  circumferential disc bulge. Slight impression on the thecal sac. Mild left foraminal stenosis. Normal right foramen and facet joints.  C7-T1: Normal disc, facet joints, spinal canal and neural foramina.  T1-T2: Mild central posterior disc protrusion.  T2-T3: Mild central posterior disc protrusion. Slight impression on the spinal cord.         DJD (degenerative joint disease), lumbar 01/10/2012     Esophageal reflux     ,refluxes on upper GI     Gout 04/08/2011     Gout, unspecified      Hyperlipidemia LDL goal <100 10/25/2012     Hypertension goal BP (blood pressure) < 140/90 10/21/2011     Hypertrophy of prostate with urinary obstruction 08/03/2006    Problem list name updated by automated process. Provider to review     HYPERTROPHY PROSTATE WITH OBST 08/03/2006     Idiopathic gout of left elbow, unspecified chronicity 01/03/2017     Impotence of organic origin      Left-sided low back pain with left-sided sciatica 08/13/2015     Lumbar radiculopathy 09/17/2014     Major depressive disorder, recurrent episode, unspecified 07/09/2008     Osteoarthrosis, unspecified whether generalized or localized, pelvic region and thigh      Pure hyperglyceridemia      ROTATOR CUFF SYND NOS 04/17/2008     S/P lumbar fusion 10/14/2014     Thoracic or lumbosacral neuritis or radiculitis, unspecified      Tobacco use disorder      Type 2 diabetes, HbA1C goal < 8% (H) 09/09/2011       PSHx:  Past Surgical History:   Procedure Laterality Date     BACK SURGERY       both shoulder repair  2006, 2008     EXCISE MASS NECK Right 6/24/2024    Procedure: EXCISION, MASS, NECK;  Surgeon: Lucie Zamudio MD;  Location: [facility] OR     FUSION LUMBAR ANTERIOR, FUSION LUMBAR POSTERIOR TWO LEVELS, COMBINED N/A 9/17/2014    Procedure: COMBINED FUSION LUMBAR ANTERIOR, FUSION LUMBAR POSTERIOR TWO LEVELS;  Surgeon: Chris Lugo MD;  Location:  OR      [facility] ENDOSCOPY DIAG W OR W/O BRUSH/WASH  3/04    ok     HEAD & NECK SURGERY        HEMILAMINECTOMY, DISCECTOMY LUMBAR ONE LEVEL, COMBINED Left 2015    Procedure: COMBINED HEMILAMINECTOMY, DISCECTOMY LUMBAR ONE LEVEL;  Surgeon: Jer Irby MD;  Location: [facility] OR     right hip replacement  10,2010     [facility] NONSPECIFIC PROCEDURE      neck cyst     [facility] NONSPECIFIC PROCEDURE      laminectomy L5-S1 x 2     [facility] SHOULDER SURG PROC UNLISTED  ,     repairs,later manipulate,inject LT, RT     ZZ COLONOSCOPY THRU STOMA, DIAGNOSTIC  3/04    normal       FamHx:  Family History   Problem Relation Age of Onset     Diabetes Mother      Lung Cancer Mother      C.A.D. Father      Diabetes Father      Hypertension Father      Cancer Brother      Coronary Artery Disease Brother         Bypass surgery     Deep Vein Thrombosis (DVT) Brother         Following surgery     Colon Cancer No family hx of    No family history of liver disease, liver cancer    SocHx:  Social History     Socioeconomic History     Marital status:      Spouse name: Not on file     Number of children: 2     Years of education: 12     Highest education level: Not on file   Occupational History     Employer: DISABLED     Employer: UNEMPLOYED   Tobacco Use     Smoking status: Former     Current packs/day: 0.00     Average packs/day: 1 pack/day for 40.0 years (40.0 ttl pk-yrs)     Types: Cigarettes     Start date:      Quit date:      Years since quittin.7     Passive exposure: Never     Smokeless tobacco: Former     Quit date: 2013   Vaping Use     Vaping status: Never Used   Substance and Sexual Activity     Alcohol use: Yes     Comment: beer occasionally     Drug use: No     Sexual activity: Not Currently   Other Topics Concern      Service Yes     Blood Transfusions No     Caffeine Concern No     Occupational Exposure No     Hobby Hazards No     Sleep Concern No     Stress Concern Yes     Weight Concern No     Special Diet No     Back Care No     Exercise No     Bike Helmet Not Asked      Seat Belt Yes     Self-Exams No     Parent/sibling w/ CABG, MI or angioplasty before 65F 55M? No   Social History Narrative    On disability since 2009 for hip and shoulder. Worked for [employer] at [employer] for 20 yrs. Kids 2.  since 2009.             Social Determinants of Health     Financial Resource Strain: Low Risk  (3/1/2024)    Financial Resource Strain      Within the past 12 months, have you or your family members you live with been unable to get utilities (heat, electricity) when it was really needed?: No   Food Insecurity: High Risk (3/1/2024)    Food Insecurity      Within the past 12 months, did you worry that your food would run out before you got money to buy more?: Yes      Within the past 12 months, did the food you bought just not last and you didn t have money to get more?: Yes   Transportation Needs: Low Risk  (3/1/2024)    Transportation Needs      Within the past 12 months, has lack of transportation kept you from medical appointments, getting your medicines, non-medical meetings or appointments, work, or from getting things that you need?: No   Recent Concern: Transportation Needs - High Risk (1/19/2024)    Transportation Needs      Within the past 12 months, has lack of transportation kept you from medical appointments, getting your medicines, non-medical meetings or appointments, work, or from getting things that you need?: Yes   Physical Activity: Inactive (3/1/2024)    Exercise Vital Sign      Days of Exercise per Week: 0 days      Minutes of Exercise per Session: 0 min   Stress: Stress Concern Present (3/1/2024)    [place] [employer] - Occupational Stress Questionnaire      Feeling of Stress : Very much   Social Connections: Patient Declined (3/1/2024)    Social Connection and Isolation Panel [NHANES]      Frequency of Communication with Friends and Family: Patient declined      Frequency of Social Gatherings with Friends and Family: Patient  declined      Attends Church Services: Patient declined      Active Member of Clubs or Organizations: Patient declined      Attends Club or Organization Meetings: Patient declined      Marital Status: Patient declined   Interpersonal Safety: Low Risk  (5/30/2024)    Interpersonal Safety      Do you feel physically and emotionally safe where you currently live?: Yes      Within the past 12 months, have you been hit, slapped, kicked or otherwise physically hurt by someone?: No      Within the past 12 months, have you been humiliated or emotionally abused in other ways by your partner or ex-partner?: No   Housing Stability: Low Risk  (3/1/2024)    Housing Stability      Do you have housing? : Yes      Are you worried about losing your housing?: No   Lives at home with his sonAdonis     Medications:  Current Outpatient Medications   Medication Sig Dispense Refill     acetaminophen (TYLENOL) 500 MG tablet Take 500-1,000 mg by mouth every 8 hours as needed for mild pain       blood glucose (ONETOUCH VERIO IQ) test strip TEST BLOOD SUGAR ONCE DAILY OR AS DIRECTED 100 strip 2     busPIRone (BUSPAR) 5 MG tablet TAKE 1 TABLET (5 MG) BY MOUTH 2 TIMES DAILY 180 tablet 0     CYMBALTA 60 MG capsule Take 2 capsules (120 mg) by mouth daily 180 capsule 3     famotidine (PEPCID) 20 MG tablet Take 1 tablet (20 mg) by mouth 2 times daily as needed (heart burn) 60 tablet 3     finasteride (PROSCAR) 5 MG tablet TAKE 1 TABLET BY MOUTH DAILY FOR PROSTATE ENLARGEMENT 90 tablet 2     fluticasone (FLONASE) 50 MCG/ACT nasal spray USE 1 TO 2 SPRAYS IN BOTH  NOSTRILS DAILY AS NEEDED 48 mL 1     glipiZIDE (GLUCOTROL XL) 5 MG 24 hr tablet TAKE ONE TABLET BY MOUTH ONCE DAILY 90 tablet 0     metFORMIN (GLUCOPHAGE XR) 500 MG 24 hr tablet Take 4 tablets (2,000 mg) by mouth daily (with dinner) 360 tablet 3     metoprolol succinate ER (TOPROL XL) 100 MG 24 hr tablet Take 1 tablet (100 mg) by mouth daily 90 tablet 3     OneTouch Delica Lancets 33G  MISC 1 Application. daily Use to test blood sugars once daily as directed. 100 each 3     oxyCODONE (ROXICODONE) 5 MG tablet Take 1 tablet (5 mg) by mouth every 6 hours as needed for severe pain. Max #4 per day 120 tablet 0     pantoprazole (PROTONIX) 40 MG EC tablet Take 1 tablet (40 mg) by mouth daily 90 tablet 1     polyethylene glycol (MIRALAX) 17 GM/Dose powder Mix 1 tablespoon with water by mouth daily as needed constipation 510 g 0     pregabalin (LYRICA) 100 MG capsule TAKE TWO CAPSULES BY MOUTH TWICE A  capsule 1     senna-docusate (SENOKOT-S/PERICOLACE) 8.6-50 MG tablet Take 1-2 tablets by mouth daily as needed for constipation. 60 tablet 0     simvastatin (ZOCOR) 20 MG tablet TAKE 1 TABLET (20 MG) BY MOUTH AT BEDTIME 90 tablet 0     SPIRIVA RESPIMAT 2.5 MCG/ACT inhaler Inhale 2 puffs into the lungs daily 12 g 3     tamsulosin (FLOMAX) 0.4 MG capsule TAKE TWO CAPSULES BY MOUTH ONCE DAILY 180 capsule 3     warfarin ANTICOAGULANT (JANTOVEN ANTICOAGULANT) 5 MG tablet TAKE ONE TABLET BY MOUTH ON MONDAY AND THURSDAY AND TAKE ONE AND ONE-HALF TABLETS BY MOUTH ON ALL OTHER DAYS OR AS DIRECTED BY INR CLINIC 125 tablet 1     albuterol (PROAIR HFA/PROVENTIL HFA/VENTOLIN HFA) 108 (90 Base) MCG/ACT inhaler Inhale 2 puffs into the lungs every 6 hours as needed for shortness of breath, wheezing or cough (Patient not taking: Reported on 9/9/2024) 18 g 2     bisacodyl (DULCOLAX) 5 MG EC tablet Two days prior to exam take two (2) tablets at 4pm. One day prior to exam take two (2) tablets at 4pm (Patient not taking: Reported on 9/18/2024) 4 tablet 0     oxyCODONE (ROXICODONE) 5 MG tablet Take 1 tablet (5 mg) by mouth every 6 hours as needed for pain. In addition to long term oxycodone prescription, take extra tablet to equal 10 mg for 1 week as needed. (Patient not taking: Reported on 9/18/2024) 7 tablet 0     polyethylene glycol (GOLYTELY) 236 g suspension Two days before procedure at 5PM fill first container with  "water. Mix and drink an 8 oz glass every 10-15 minutes until HALF of the container is gone. Place the remainder in the refrigerator. One day before procedure at 5PM drink second half of bowel prep. Drink an 8 oz glass every 10-15 minutes until it is gone. Day of procedure 6 hours before arrival time fill the 2nd container with water. Mix and drink an 8 oz glass every 10-15 minutes until HALF of the container is gone. Discard the remaining solution. (Patient not taking: Reported on 9/18/2024) 8000 mL 0     No current facility-administered medications for this visit.     No OTCs, herbals    Allergies:  Allergies   Allergen Reactions     Morphine And Codeine Nausea and Vomiting     Tolerating other opiates        Objective:  BP (!) 146/90 (BP Location: Right arm, Patient Position: Sitting, Cuff Size: Adult Regular)   Pulse 118   Temp 97.5  F (36.4  C) (Oral)   Resp 18   Ht 1.803 m (5' 10.98\")   Wt 92.3 kg (203 lb 6.4 oz)   SpO2 96%   BMI 28.38 kg/m    Constitutional: pleasant man in NAD  Eyes: non icteric  CV: irregular rhythm, tachycardic  Respiratory: Normal respiratory excursion   MSK: normal range of motion of visualized extremities  Abd: Non distended  Skin: No jaundice  Psychiatric: normal mood and orientation    Labs:  Last Comprehensive Metabolic Panel:  Sodium   Date Value Ref Range Status   09/11/2024 141 135 - 145 mmol/L Final   05/27/2021 142 133 - 144 mmol/L Final     Potassium   Date Value Ref Range Status   09/11/2024 4.7 3.4 - 5.3 mmol/L Final   01/13/2022 4.0 3.4 - 5.3 mmol/L Final   05/27/2021 4.8 3.4 - 5.3 mmol/L Final     Chloride   Date Value Ref Range Status   09/11/2024 104 98 - 107 mmol/L Final   01/13/2022 107 94 - 109 mmol/L Final   05/27/2021 106 94 - 109 mmol/L Final     Carbon Dioxide   Date Value Ref Range Status   05/27/2021 29 20 - 32 mmol/L Final     Carbon Dioxide (CO2)   Date Value Ref Range Status   09/11/2024 25 22 - 29 mmol/L Final   01/13/2022 28 20 - 32 mmol/L Final "     Anion Gap   Date Value Ref Range Status   09/11/2024 12 7 - 15 mmol/L Final   01/13/2022 6 3 - 14 mmol/L Final   05/27/2021 7 3 - 14 mmol/L Final     Glucose   Date Value Ref Range Status   09/11/2024 179 (H) 70 - 99 mg/dL Final   01/13/2022 120 (H) 70 - 99 mg/dL Final   05/27/2021 162 (H) 70 - 99 mg/dL Final     Comment:     Non Fasting     GLUCOSE BY METER POCT   Date Value Ref Range Status   06/24/2024 145 (H) 70 - 99 mg/dL Final     Urea Nitrogen   Date Value Ref Range Status   09/11/2024 7.4 (L) 8.0 - 23.0 mg/dL Final   01/13/2022 11 7 - 30 mg/dL Final   05/27/2021 14 7 - 30 mg/dL Final     Creatinine   Date Value Ref Range Status   09/11/2024 0.92 0.67 - 1.17 mg/dL Final   05/27/2021 1.06 0.66 - 1.25 mg/dL Final     GFR Estimate   Date Value Ref Range Status   09/11/2024 89 >60 mL/min/1.73m2 Final     Comment:     eGFR calculated using 2021 CKD-EPI equation.   05/27/2021 72 >60 mL/min/[1.73_m2] Final     Comment:     Non  GFR Calc  Starting 12/18/2018, serum creatinine based estimated GFR (eGFR) will be   calculated using the Chronic Kidney Disease Epidemiology Collaboration   (CKD-EPI) equation.       Calcium   Date Value Ref Range Status   09/11/2024 11.7 (H) 8.8 - 10.4 mg/dL Final     Comment:     Reference intervals for this test were updated on 7/16/2024 to reflect our healthy population more accurately. There may be differences in the flagging of prior results with similar values performed with this method. Those prior results can be interpreted in the context of the updated reference intervals.   05/27/2021 9.2 8.5 - 10.1 mg/dL Final     Bilirubin Total   Date Value Ref Range Status   09/11/2024 0.9 <=1.2 mg/dL Final   11/05/2020 0.4 0.2 - 1.3 mg/dL Final     Alkaline Phosphatase   Date Value Ref Range Status   09/11/2024 326 (H) 40 - 150 U/L Final   11/05/2020 63 40 - 150 U/L Final     ALT   Date Value Ref Range Status   09/11/2024 13 0 - 70 U/L Final   11/05/2020 32 0 - 70 U/L  Final     AST   Date Value Ref Range Status   09/11/2024 79 (H) 0 - 45 U/L Final   11/05/2020 23 0 - 45 U/L Final       Lab Results   Component Value Date    WBC 9.7 09/11/2024    WBC 6.3 09/17/2020     Lab Results   Component Value Date    RBC 4.88 09/11/2024    RBC 4.52 09/17/2020     Lab Results   Component Value Date    HGB 13.1 09/11/2024    HGB 13.3 09/17/2020     Lab Results   Component Value Date    HCT 40.7 09/11/2024    HCT 40.0 09/17/2020     Lab Results   Component Value Date    MCV 83 09/11/2024    MCV 89 09/17/2020     Lab Results   Component Value Date    MCH 26.8 09/11/2024    MCH 29.4 09/17/2020     Lab Results   Component Value Date    MCHC 32.2 09/11/2024    MCHC 33.3 09/17/2020     Lab Results   Component Value Date    RDW 14.7 09/11/2024    RDW 13.8 09/17/2020     Lab Results   Component Value Date     09/11/2024     09/17/2020       INR   Date Value Ref Range Status   09/11/2024 2.00 (H) 0.85 - 1.15 Final   09/04/2015 1.00 0.86 - 1.14 Final        MELD 3.0: 12 at 9/11/2024  2:50 PM  MELD-Na: 14 at 9/11/2024  2:50 PM  Calculated from:  Serum Creatinine: 0.92 mg/dL (Using min of 1 mg/dL) at 9/11/2024  2:50 PM  Serum Sodium: 141 mmol/L (Using max of 137 mmol/L) at 9/11/2024  2:50 PM  Total Bilirubin: 0.9 mg/dL (Using min of 1 mg/dL) at 9/11/2024  2:50 PM  Serum Albumin: 4.0 g/dL (Using max of 3.5 g/dL) at 9/11/2024  2:50 PM  INR(ratio): 2.00 at 9/11/2024  2:49 PM  Age at listing (hypothetical): 69 years  Sex: Male at 9/11/2024  2:50 PM      Previous work-up:   Lab Results   Component Value Date    HEPBANG Nonreactive 09/14/2015    AUSAB 50.84 (H) 09/14/2015    HCVAB (A) 09/14/2015     Reactive   A reactive result indicates one of the following 1) current HCV infection 2)   past HCV infection that has resolved or 3) false positivity. The CDC recommends   that a reactive result should be followed by Nucleic acid testing for HCV RNA.  If HCV RNA is detected, that indicates current HCV  "infection. If HCV RNA is not   detected, that indicates either past, resolved HCV infection, or false HCV   antibody positivity.   Assay performance characteristics have not been established for newborns,   infants, and children      HCVRNA  07/14/2016     HCV RNA Not Detected   The FIFI AmpliPrep/FIFI TaqMan HCV Test is an FDA-approved in vitro nucleic   acid amplification test for the quantitation of HCV RNA in human plasma (ETDA   plasma) or serum using the FIFI AmpliPrep Instrument for automated viral   nucleic acid extraction and the FIFI TaqMan Analyzer or FIFI TaqMan for   automated Real Time PCR amplification and detection of the viral nucleic acid   target.   Titer results are reported in International Units/mL (IU/mL) using the 1st WHO   International standard for HCV for Nucleic Acid Amplification based assays.      HCVRNA  09/14/2015     HCV RNA Not Detected   The FIFI AmpliPrep/FIFI TaqMan HCV Test is an FDA-approved in vitro nucleic   acid amplification test for the quantitation of HCV RNA in human plasma (ETDA   plasma) or serum using the FIFI AmpliPrep Instrument for automated viral   nucleic acid extraction and the FIFI TaqMan Analyzer or FIFI TaqMan for   automated Real Time PCR amplification and detection of the viral nucleic acid   target.   Titer results are reported in International Units/mL (IU/mL) using the 1st WHO   International standard for HCV for Nucleic Acid Amplification based assays.      CLOTILDE Negative 08/02/2022    TSH 1.69 05/17/2023    CHOL 122 03/06/2024    HDL 30 (L) 03/06/2024    LDL 43 03/06/2024    TRIG 245 (H) 03/06/2024    A1C 6.7 (H) 06/13/2024      No results found for: \"SPECDES\", \"LDRESULTS\"    Imaging: Reviewed in EHR    Endoscopy: No recent upper endoscopy    Independently reviewed labs and imaging.     Assessment/Plan: Mr. Mcgrath is a 69 year old man with a history of treated hepatitis C, alcohol use who presents for evaluation of multiple liver lesions.    I " reviewed the CT scan with the patient and his son.  It appears he has underlying cirrhosis based on his CT scan, also has a history of hepatitis C and alcohol use.  His cirrhosis is mostly compensated but he has a small amount of ascites on the CT scan.  Reviewed that the multiple liver lesions are concerning for multifocal hepatocellular carcinoma or cholangiocarcinoma.  He has a history of colon polyps and is overdue for screening but typically metastatic disease has not spread to his cirrhotic liver.    Reviewed that if this is hepatocellular carcinoma or cholangiocarcinoma, he would not be a surgical candidate.  Could consider staged Y90 for his liver lesions but ultimately he would likely need systemic immunotherapy for hepatocellular carcinoma or chemotherapy for cholangiocarcinoma.  Discussed the goal of his treatment would not be curative but would be palliative.  I will get tumor markers today.  I am ordering a CT chest that will ideally be done prior to his liver biopsy.  If that shows additional lesions would consider biopsying the chest lesions rather than his liver biopsy that is scheduled for September 30.    Recommend abstaining from alcohol.  He should notify us if his ascites worsens and we would consider diuretics or paracentesis.  I will order upper endoscopy to screen for varices    Follow-up tumor markers from today.  Check CT chest.  Will present him at tumor board once we have all of her information.    Recommend he take his metoprolol, he did not take it today.  His heart rate is elevated and his rhythm is irregular consistent with A-fib.  If he is symptomatic he should present for further evaluation.  Orders Placed This Encounter   Procedures     CT Chest w/o Contrast     CBC with platelets     Comprehensive metabolic panel     INR     AFP tumor marker     Cancer antigen 19-9     Adult GI  Referral - Procedure Only     RTC 3 months.    Camille Torres MD MS  Hepatology/Liver  Transplant  [facility]          Again, thank you for allowing me to participate in the care of your patient.        Sincerely,        Camille Torres MD

## 2024-09-18 NOTE — NURSING NOTE
"Chief Complaint   Patient presents with    Consult     Mass found on liver imaging      Vital signs:  Temp: 97.5  F (36.4  C) Temp src: Oral BP: (!) 146/90 Pulse: 118   Resp: 18 SpO2: 96 %     Height: 180.3 cm (5' 10.98\") Weight: 92.3 kg (203 lb 6.4 oz)  Estimated body mass index is 28.38 kg/m  as calculated from the following:    Height as of this encounter: 1.803 m (5' 10.98\").    Weight as of this encounter: 92.3 kg (203 lb 6.4 oz).      Betty Rooney, [facility]  9/18/2024 11:48 AM    "

## 2024-09-18 NOTE — PROGRESS NOTES
ANTICOAGULATION MANAGEMENT     Toby Mcgrath 69 year old male is on warfarin with subtherapeutic INR result. (Goal INR 2.0-3.0)    Recent labs: (last 7 days)     09/18/24  1300   INR 1.72*       ASSESSMENT     Source(s): Chart Review     Warfarin doses taken: Reviewed in chart  Diet: No new diet changes identified. Advised to stop drinking alcohol. If he has already done so, this could be contributing to low INR today.  Medication/supplement changes: None noted  New illness, injury, or hospitalization: Yes: Imaging concerning for innumerable masses in the liver, metastatic-appearing lymphadenopathy, enhancing lesions of spleen. Liver biopsy recommended and scheduled 9/30/24. Encounter sent to [facility] for procedure plan. Advised upper endoscopy be scheduled to assess for gastric malignancy and CT for chest lesions. If ascites worsens, diuretics will be ordered.and/or paracentecis  Signs or symptoms of bleeding or clotting: No  Previous result: Therapeutic last 2(+) visits  Additional findings:  lives with son, Adonis, who can help with transportation to appointments, but also working with  to apply for [employer].       PLAN   Consult with Sara Lan [facility]: he can hold for 5 days, boost with 10 mg x 2 post procedure. no bridge. agree with increasing MD to 7.5 mg daily. I wouldn't recheck on 9/23 since he would have only had 1 of 2 days of the new maintenance. He can check on 9/25 or just post procedure    Unable to reach Layo today.    Left message to continue current dose of warfarin 7.5 mg tonight. Request call back for assessment.    Follow up required to discuss out of range result     Estefani Ríos RN  9/18/2024  Anticoagulation Clinic  [employer] for routing messages: viet VASQUEZ [facility] [place] [facility]  [facility] patient phone line: [phone]

## 2024-09-18 NOTE — TELEPHONE ENCOUNTER
"EDER-PROCEDURAL ANTICOAGULATION  MANAGEMENT    ASSESSMENT     Warfarin interruption plan for liver biopsy on 9/30/24.    Indication for Anticoagulation: Atrial Fibrillation     [id number] = 3 (Hypertension, Age 65-74, and Diabetes)     Eder-Procedure Risk stratification for thromboembolism: low (2022 Chest guidelines)     AFIB: 2022 CHEST Perioperative Management guidelines recommends against bridging for patients with atrial fibrillation except in high risk stratification patients.    Solid organ biopsies are deemed a High Risk procedure in [facility] IR Guidelines,a thus a 5-day hold is advised. Noted 3-day hold in appointment notes.    RECOMMENDATION     Pre-Procedure:  Hold warfarin for 5 days, until after procedure starting: Wednesday, September 25   No Bridge    Post-Procedure:  Resume warfarin dose if okay with provider doing procedure on night of procedure, Monday, September 30 PM: take 10 mg x 2, then resume home dose  Recheck INR ~ 7 days after resuming warfarin     Plan routed to referring provider for approval  ?   Sara Lan [facility]    SUBJECTIVE/OBJECTIVE     Toby Mcgrath, a 69 year old male    Goal INR Range: 2.0-3.0     Patient bridged in past: No      Wt Readings from Last 3 Encounters:   09/18/24 92.3 kg (203 lb 6.4 oz)   09/12/24 91.6 kg (202 lb)   09/11/24 95.7 kg (211 lb)      Ideal body weight: 75.3 kg (165 lb 14.8 oz)  Adjusted ideal body weight: 82.1 kg (180 lb 14.6 oz)     Estimated body mass index is 28.38 kg/m  as calculated from the following:    Height as of an earlier encounter on 9/18/24: 1.803 m (5' 10.98\").    Weight as of an earlier encounter on 9/18/24: 92.3 kg (203 lb 6.4 oz).    Lab Results   Component Value Date    INR 1.72 (H) 09/18/2024    INR 2.00 (H) 09/11/2024    INR 2.1 (H) 09/06/2024     Lab Results   Component Value Date    HGB 13.7 09/18/2024    HCT 42.1 09/18/2024     09/18/2024     Lab Results   Component Value Date    CR 0.89 09/18/2024    CR 0.92 09/11/2024 "    CR 1.00 09/09/2024     Estimated Creatinine Clearance: 88.4 mL/min (based on SCr of 0.89 mg/dL).

## 2024-09-18 NOTE — TELEPHONE ENCOUNTER
EDER-PROCEDURAL ANTICOAGULATION  MANAGEMENT    Toby requesting pre-procedure hold orders for warfarin and review for bridging      Procedure date: 9/30/24       Procedure:  Liver biopsy . Will also be scheduling an upper endoscopy soon.      Procedure location and phone number (if external): [place]     Number of warfarin hold days requested and/or target INR: unknown    Pre-op date:  Gastro visit was today, not sure if that counts      Routing to Anticoagulation Pharmacist for review.     ACC pool: viet VASQUEZ [facility] GOKUL Ríos RN

## 2024-09-19 NOTE — PROGRESS NOTES
Called Layo again today. Once again, no answer, call went straight to . Left another message regarding importance of talking with [facility] RN today. Left call back number.    Warfarin doses taken: Reviewed in chart  Diet: No new diet changes identified. Advised to stop drinking alcohol. If he has already done so, this could be contributing to low INR today.  Medication/supplement changes: None noted  New illness, injury, or hospitalization: Yes: Imaging concerning for innumerable masses in the liver, metastatic-appearing lymphadenopathy, enhancing lesions of spleen. Liver biopsy recommended and scheduled 9/30/24. Encounter sent to [facility] for procedure plan. Advised upper endoscopy be scheduled to assess for gastric malignancy and CT for chest lesions. If ascites worsens, diuretics will be ordered.and/or paracentecis  Signs or symptoms of bleeding or clotting: No  Previous result: Therapeutic last 2(+) visits  Additional findings:  lives with son, Adonis, who can help with transportation to appointments, but also working with  to apply for [employer].  Per Consult with [facility] yesterday: agree with increasing MD to 7.5 mg daily. I wouldn't recheck on 9/23 since he would have only had 1 of 2 days of the new maintenance. He can check on 9/25 or just post procedure     Procedure plan reviewed by Referring provider and approved:  Pre-Procedure:  Hold warfarin for 5 days, until after procedure starting: Wednesday, September 25   No Bridge     Post-Procedure:  Resume warfarin dose if okay with provider doing procedure on night of procedure, Monday, September 30 PM: take 10 mg x 2, then resume home dose  Recheck INR ~ 7 days after resuming warfarin

## 2024-09-19 NOTE — TELEPHONE ENCOUNTER
Attempted to contact patient with warfarin dosing instructions and procedure plan multiple times by phone. No call back from patient over two days. Eventually sent patient a letter with detailed instructions. May want to try following up again early next week.    Estefani FRANCO RN  Anticoagulation Team

## 2024-09-19 NOTE — PROGRESS NOTES
ANTICOAGULATION MANAGEMENT     Toby Mcgrath 69 year old male is on warfarin with subtherapeutic INR result. (Goal INR 2.0-3.0)    Recent labs: (last 7 days)     09/18/24  1300   INR 1.72*       ASSESSMENT     Source(s): Chart Review     Warfarin doses taken: Reviewed in chart  Diet: No new diet changes identified. Advised to stop drinking alcohol. If he has already done so, this could be contributing to low INR today.  Medication/supplement changes: None noted  New illness, injury, or hospitalization: Yes: Imaging concerning for innumerable masses in the liver, metastatic-appearing lymphadenopathy, enhancing lesions of spleen. Liver biopsy recommended and scheduled 9/30/24. Encounter sent to [facility] for procedure plan. Advised upper endoscopy be scheduled to assess for gastric malignancy and CT for chest lesions. If ascites worsens, diuretics will be ordered.and/or paracentecis  Signs or symptoms of bleeding or clotting: No  Previous result: Therapeutic last 2(+) visits  Additional findings:  lives with son, Adonis, who can help with transportation to appointments, but also working with  to apply for [employer].  Per Consult with [facility] yesterday: agree with increasing MD to 7.5 mg daily. I wouldn't recheck on 9/23 since he would have only had 1 of 2 days of the new maintenance. He can check on 9/25 or just post procedure  Procedure plan reviewed by Referring provider and approved:  Pre-Procedure:  Hold warfarin for 5 days, until after procedure starting: Wednesday, September 25   No Bridge    Post-Procedure:  Resume warfarin dose if okay with provider doing procedure on night of procedure, Monday, September 30 PM: take 10 mg x 2, then resume home dose  Recheck INR ~ 7 days after resuming warfarin           PLAN     Recommended plan for temporary change(s) and ongoing change(s) affecting INR     Dosing Instructions: Increase your warfarin dose (10.5% change) with next INR in 2.5 weeks (1 week after  liver biopsy)       Summary  As of 9/18/2024      Full warfarin instructions:  9/25: Hold; 9/26: Hold; 9/27: Hold; 9/28: Hold; 9/29: Hold; 9/30: 10 mg; 10/1: 10 mg; Otherwise 7.5 mg every day   Next INR check:  10/7/2024               Detailed voice message left for Layo with dosing instructions and follow up date.   Mailed letter with detailed dosing insructions    Contact [phone] to schedule and with any changes, questions or concerns.     Education provided: Please call back if any changes to your diet, medications or how you've been taking warfarin  Written instructions provided  Contact [phone] with any changes, questions or concerns.     Plan made with [facility] Pharmacist Sara Ríos RN  9/19/2024  Anticoagulation Clinic  [facility] for routing messages: viet [facility] [employer] [facility] patient phone line: [phone]        _______________________________________________________________________     Anticoagulation Episode Summary       Current INR goal:  2.0-3.0   TTR:  55.9% (1 y)   Target end date:  Indefinite   Send INR reminders to:  [facility] [place]    Indications    Atrial fibrillation with RVR (H) [I48.91]             Comments:               Anticoagulation Care Providers       Provider Role Specialty Phone number    Dayana Le MD Referring Family Medicine [phone]

## 2024-09-23 NOTE — TELEPHONE ENCOUNTER
Per Dr. Torres patient should follow-up with his PCP regarding falls. Antoni updated and is agreeable with plan.    Tasha REYES LPN  Hepatology Clinic

## 2024-09-23 NOTE — TELEPHONE ENCOUNTER
EDER-PROCEDURAL ANTICOAGULATION  MANAGEMENT    Toby requesting pre-procedure hold orders for warfarin and review for bridging      Procedure date: within 2 weeks       Procedure: EGD      Procedure location and phone number (if external): [place]     Number of warfarin hold days requested and/or target INR: unknown    Pre-op date: Not applicable      Routing to Anticoagulation Pharmacist for review.     [facility]     Arianne Cotton, RN         I spoke with the pt to get them scheduled for their priority EGD. I noticed the pt has not gotten back to you about his warfine dosing. For his EGD, we would be looking to schedule him within the next 2 weeks. He will need to stop taking his warfine prior to the procedure. I am looking to find out for the pt whether that would be possible for him at this time and if so how long he needs to be off his warfine prior to the procedure. Please advise.     Thank you,     Ilya STARKS   Endoscopy Scheduling Team

## 2024-09-23 NOTE — TELEPHONE ENCOUNTER
RECORDS STATUS - ALL OTHER DIAGNOSIS      RECORDS RECEIVED FROM: [facility]   NOTES STATUS DETAILS   OFFICE NOTE from referring provider Epic 9/12/2024 - JIMBO Valero CNP   DISCHARGE REPORT from the ER [facility] 9/11/2024 - [facility] ED   MEDICATION LIST [facility]    LABS     PATHOLOGY REPORTS     ANYTHING RELATED TO DIAGNOSIS Epic 9/18/2024   IMAGING (NEED IMAGES & REPORT)     CT SCANS PACS CT Chest: 9/26/2024  CT Abdomen Pelvis: 9/14/2024     ULTRASOUND PACS US Biopsy Liver: 9/30/2024  US Abdomen: 9/11/2024

## 2024-09-23 NOTE — TELEPHONE ENCOUNTER
General Call      Reason for Call:  call back    What are your questions or concerns:  Patient now has a EEG set up for 10/01/24 and has questions on warfarin    Date of last appointment with provider: n/a    Okay to leave a detailed message?: Yes at Other phone number:  [phone]

## 2024-09-23 NOTE — CONFIDENTIAL NOTE
[facility] for anticoagulation holding. Pt takes Warfarin. Managed by anticoag at [facility]. Recommended patient review a 5 day hold for Warfarin pre-EGD scheduled. Also noticed pt has a liver biopsy coming up on 9/30. Advised him to also reach out to anticoag team for this procedure, as hold time may be different.    Patient reportedly doesn't know who to reach out to. Scheduling to notify Estefani with anticoag team about recommended hold time for EGD only.

## 2024-09-23 NOTE — TELEPHONE ENCOUNTER
[facility] Call Center    Phone Message    May a detailed message be left on voicemail: yes     Reason for Call: Other: Antoni calling to discuss patient. Patient got really tired and fell a few times. Please call Antoni to discuss.      Action Taken: Message routed to:  [facility] HEP    Travel Screening: Not Applicable     Date of Service:

## 2024-09-23 NOTE — TELEPHONE ENCOUNTER
Writer spoke to patient, he reports he is not doing well today, requested writer call back to talk to his son later today.    Arianne Cotton RN  Anticoagulation Clinic

## 2024-09-23 NOTE — TELEPHONE ENCOUNTER
Warfarin hold and resumption reviewed with patient's son Adonis, next INR scheduled on 10/8/24, patient not available on 10/7/24.  Writer encouraged Adonis to call back should the date of the biopsy change and also, to let ACRN know the date the EGD gets scheduled, he verbalized understanding.    Arianne Cotton RN  Anticoagulation Clinic

## 2024-09-23 NOTE — TELEPHONE ENCOUNTER
"Endoscopy Scheduling Screen    Have you had any respiratory illness or flu-like symptoms in the last 10 days?  No    What is your communication preference for Instructions and/or Bowel Prep?   MyChart    What insurance is in the chart?  Other:  /MEDICARE    Ordering/Referring Provider:     ANICETO MORA      (If ordering provider performs procedure, schedule with ordering provider unless otherwise instructed. )    BMI: Estimated body mass index is 28.38 kg/m  as calculated from the following:    Height as of 9/18/24: 1.803 m (5' 10.98\").    Weight as of 9/18/24: 92.3 kg (203 lb 6.4 oz).     Sedation Ordered  MAC/deep sedation.   BMI<= 45 45 < BMI <= 48 48 < BMI < = 50  BMI > 50   No Restrictions No [facility]  No [facility]  [facility] with exceptions Hospital Only OR Only       Do you have a history of malignant hyperthermia?  No    (Females) Are you currently pregnant?   No     Have you been diagnosed or told you have pulmonary hypertension?   No    Do you have an LVAD?  No    Have you been told you have moderate to severe sleep apnea?  No.    Have you been told you have COPD, asthma, or any other lung disease?  Yes     What breathing problems do you have?  COPD     Do you use home oxygen?  No    Have your breathing problems required an ED visit or hospitalization in the last year?  No.    Do you have any heart conditions?  Yes     In the past year, have you had any hospitalizations for heart related issues including cardiomyopathy, heart attack, or stent placement?  No    Do you have any implantable devices in your body (pacemaker, ICD)?  No    Do you take nitroglycerine?  No    Have you ever had or are you waiting for an organ transplant?  No. Continue scheduling, no site restrictions.    Have you had a stroke or transient ischemic attack (TIA aka \"mini stroke\" in the last 6 months?   No    Have you been diagnosed with or been told you have cirrhosis of the liver?   Yes. (RN Review required for scheduling unless " "scheduling in Hospital.)    Are you currently on dialysis?   No    Do you need assistance transferring?   Yes (Hospital Only)    BMI: Estimated body mass index is 28.38 kg/m  as calculated from the following:    Height as of 9/18/24: 1.803 m (5' 10.98\").    Weight as of 9/18/24: 92.3 kg (203 lb 6.4 oz).     Is patients BMI > 40 and scheduling location [facility]?  No    Do you take an injectable or oral medication for weight loss or diabetes (excluding insulin)?  No  Y  Do you take the medication Naltrexone?  No    Do you take blood thinners?  Yes, you must contact your prescribing provider for directions on holding or bridging with a different medication.       Prep   Are you currently on dialysis or do you have chronic kidney disease?  No    Do you have a diagnosis of diabetes?  No    Do you have a diagnosis of cystic fibrosis (CF)?  No    On a regular basis do you go 3 -5 days between bowel movements?  Yes (Extended Prep)    BMI > 40?  No    Preferred Pharmacy:    [place] [address]  Phone: [phone] Fax: [phone]    [facility] [address] [place] [address]  Phone: [phone] Fax: [phone]    Final Scheduling Details   Pt is currently taking warfine. Pt will have to stop his blood thinner prior to his EGD. Pt has been inconsistent with communication with care team.Message sent to prescribing provider to determine whether pt can stop taking anticoagulant.     Message sent to Dr. Torres about possible 10/17 [facility] overbook.  "

## 2024-09-23 NOTE — TELEPHONE ENCOUNTER
Endoscopy Scheduling Screen      Preferred Pharmacy:    [facility], [place] - [address]  [address] [address] [address]  Phone: [phone] Fax: [phone]    [facility], [place] - [address] [facility]  [address] [facility] [address]  Phone: [phone] Fax: [phone]      Final Scheduling Details     Procedure scheduled  Upper endoscopy (EGD)    Surgeon:  Melissa     Date of procedure:  10/01/2024    Pre-OP / PAC:   No - Not required for this site.    Location  [facility]  FALL RISK    Sedation   MAC/Deep Sedation - Per order.      Patient Reminders:   You will receive a call from a Nurse to review instructions and health history.  This assessment must be completed prior to your procedure.  Failure to complete the Nurse assessment may result in the procedure being cancelled.      On the day of your procedure, please designate an adult(s) who can drive you home stay with you for the next 24 hours. The medicines used in the exam will make you sleepy. You will not be able to drive.      You cannot take public transportation, ride share services, or non-medical taxi service without a responsible caregiver.  Medical transport services are allowed with the requirement that a responsible caregiver will receive you at your destination.  We require that drivers and caregivers are confirmed prior to your procedure.

## 2024-09-24 NOTE — TELEPHONE ENCOUNTER
Pre assessment completed with patient's son, Antoni (on C2C) for upcoming procedure.      Procedure details:    Patient scheduled for Upper endoscopy (EGD) on 10.1.24.     Arrival time: 1130. Procedure time 1300    Facility location: [facility]; [address], [place] [address]. Check in location: Main entrance at registration desk.    Sedation type: MAC    Pre op exam needed? No.    Indication for procedure: varices    Designated  policy reviewed. Instructed to have someone stay 24 hours post procedure.       Chart review:     Electronic implanted devices? No    Recent diagnosis of diverticulitis within the last 6 weeks?  N/A      Medication review:    Diabetic? Yes. Diabetic medication HOLDING recommendations: Oral diabetic medications: Glipizide (glucotrol): HOLD day of procedure.  Metformin (glucophage): HOLD day of procedure.    Anticoagulants? Yes Coumadin (Warfarin): Recommended HOLD 5 days before procedure.  Consult with your managing provider.  Pre-Procedure:  Hold warfarin for 5 days, until after procedure starting: Wednesday, September 25   No Bridge     Post-Procedure (following EGD):  Resume warfarin dose if okay with provider doing procedure on night of procedure, Tuesday, October 1 PM: take 10 mg x 2, then resume home dose  Recheck INR ~ 7 days after resuming warfarin      Referring provider has approved  ?   Sara Lan RPH       Weight loss medication/injectable? No.    Other medication HOLDING recommendations:  N/A      Prep for procedure:     Prep instructions not sent due to timing    Reviewed procedure prep instructions.     Antoni verbalized understanding and had no questions or concerns at this time.        Keisha Dash RN  Endoscopy Procedure Pre Assessment   [phone] option 2

## 2024-09-24 NOTE — TELEPHONE ENCOUNTER
Patient is scheduled for EGD on 10/1.  See phone encounter dated 9/24 for warfarin hold plan.  Called to let patient know that we are working on the hold plan and will notify him within the next 1-2 days.

## 2024-09-24 NOTE — TELEPHONE ENCOUNTER
"See 9/18/24 TE. Plan updated to extend hold for second procedure.     EDER-PROCEDURAL ANTICOAGULATION  MANAGEMENT     ASSESSMENT      Warfarin interruption plan for liver biopsy on 9/30/24 followed by EGD 10/1/24     Indication for Anticoagulation: Atrial Fibrillation     [id number] = 3 (Hypertension, Age 65-74, and Diabetes)     Eder-Procedure Risk stratification for thromboembolism: low (2022 Chest guidelines)     AFIB: 2022 CHEST Perioperative Management guidelines recommends against bridging for patients with atrial fibrillation except in high risk stratification patients.     Solid organ biopsies are deemed a High Risk procedure in [facility] IR Guidelines,a thus a 5-day hold is advised. Noted 3-day hold in appointment notes.     RECOMMENDATION      Pre-Procedure:  Hold warfarin for 5 days, until after procedure starting: Wednesday, September 25   No Bridge     Post-Procedure (following EGD):  Resume warfarin dose if okay with provider doing procedure on night of procedure, Tuesday, October 1 PM: take 10 mg x 2, then resume home dose  Recheck INR ~ 7 days after resuming warfarin      Referring provider has approved  ?   Sara Lan [facility]     SUBJECTIVE/OBJECTIVE      Toby Mcgrath, a 69 year old male     Goal INR Range: 2.0-3.0      Patient bridged in past: No    Wt Readings from Last 3 Encounters:   09/18/24 92.3 kg (203 lb 6.4 oz)   09/12/24 91.6 kg (202 lb)   09/11/24 95.7 kg (211 lb)      Ideal body weight: 75.3 kg (165 lb 14.8 oz)  Adjusted ideal body weight: 82.1 kg (180 lb 14.6 oz)     Estimated body mass index is 28.38 kg/m  as calculated from the following:    Height as of 9/18/24: 1.803 m (5' 10.98\").    Weight as of 9/18/24: 92.3 kg (203 lb 6.4 oz).    Lab Results   Component Value Date    INR 1.72 (H) 09/18/2024    INR 2.00 (H) 09/11/2024    INR 2.1 (H) 09/06/2024     Lab Results   Component Value Date    HGB 13.7 09/18/2024    HCT 42.1 09/18/2024     09/18/2024     Lab Results   Component " Value Date    CR 0.89 09/18/2024    CR 0.92 09/11/2024    CR 1.00 09/09/2024     Estimated Creatinine Clearance: 88.4 mL/min (based on SCr of 0.89 mg/dL).

## 2024-09-30 NOTE — PROGRESS NOTES
Pt here for liver biopsy.  Consent and procedure completed by Dr. Mccarty.  Sedation given per MAR. VSS. Band-Aid applied. Recovered 2 hours following biopsy. Tolerating PO intake. Discharge instructions given.  Discharged with brother and son, no complications noted.

## 2024-09-30 NOTE — PROGRESS NOTES
ANTICOAGULATION  MANAGEMENT: Discharge Review    Andrey McCann chart reviewed for anticoagulation continuity of care    Outpatient surgery/procedure on 9/30/24 for liver biopsy.    Discharge disposition: Home    Results:    Recent labs: (last 7 days)     09/30/24  0937   INR 1.77*     Anticoagulation inpatient management:     not applicable     Anticoagulation discharge instructions:     Warfarin dosing:  continuing to hold warfarin per procedure plan. EGD is scheduled for tomorrow, 10/1, after which warfarin will be resumed.   Bridging: No   INR goal change: No      Medication changes affecting anticoagulation: Yes: 5 day warfarin hold for procedure    Additional factors affecting anticoagulation: No     PLAN     No adjustment to anticoagulation plan needed    Recommended follow up is scheduled  Patient not contacted    No adjustment to Anticoagulation Calendar was required    Analia Hui RN  9/30/2024  Anticoagulation Clinic  [employer] for routing messages: viet VASQUEZ infibond  [facility] patient phone line: [phone]

## 2024-09-30 NOTE — PROCEDURES
Interventional Radiology Post-Procedure Note   ?   Brief Procedure Note:   Patient name: Toby Mcgrath  Pt MRN:[id number]   Date of procedure: 9/30/2024     Procedure(s): Image Guided Biopsy of right hepatic lobe mass  Sedation method: Moderate sedation was employed. The patient was monitored by an interventional radiology nurse at all times throughout the procedure under my direct guidance.  Pre Procedure Diagnosis: Multiple hepatic masses  Post Procedure Diagnosis: Same  Indications: Need for pathologic evaluation   ?   Attending: Jer Mccarty M.D.  Specimen(s) removed: Three 18g core needle biopsy specimens   Additional studies ordered: None  Drains: None   Estimated Blood Loss: Minimal  Complications: None  Vascular closure method: Gelfoam slurry    Findings/Notes/Comments: Uncomplicated biopsy of right hepatic lobe mass.   ?   Please see dictation in PACS or under the Imaging tab in [employer] for detailed procedure note.     Jer Mccarty M.D.   Vascular and Interventional Radiology   Pager: [phone]   After Hours / Scheduling: [phone]     9/30/2024  11:21 AM

## 2024-10-01 NOTE — H&P
Toby Mcgrath  [id number]  male  69 year old      Reason for procedure/surgery: EGD to screen for varices for cirrhosis and prior to starting cancer treatment    Patient Active Problem List   Diagnosis    Thoracic or lumbosacral neuritis or radiculitis, unspecified    Osteoarthrosis, unspecified whether generalized or localized, pelvic region and thigh    Chronic rhinitis    Chronic pain syndrome    Disorder of bursae and tendons in shoulder region    Major depressive disorder, recurrent episode (H)    Anxiety    Gout    Type 2 diabetes mellitus without complication, without long-term current use of insulin (H)    Hypertension goal BP (blood pressure) < 140/90    DJD (degenerative joint disease), lumbar    Hyperlipidemia LDL goal <100    DDD (degenerative disc disease), cervical    Lumbar radiculopathy    S/P lumbar fusion    History of hepatitis C    S/P lumbar discectomy    S/P laminectomy    Chronic, continuous use of opioids    Bilateral low back pain with bilateral sciatica    Gastroesophageal reflux disease without esophagitis    Failed back syndrome of lumbar spine    Slow transit constipation    Benign prostatic hyperplasia with urinary frequency    Diabetic polyneuropathy associated with type 2 diabetes mellitus (H)    Seborrheic keratoses    Atrial fibrillation with RVR (H)    COPD (chronic obstructive pulmonary disease) (H)    Ventral hernia without obstruction or gangrene    Carpal tunnel syndrome of left wrist    Morbid obesity (H)       Past Surgical History:    Past Surgical History:   Procedure Laterality Date    BACK SURGERY      both shoulder repair  2006, 2008    EXCISE MASS NECK Right 6/24/2024    Procedure: EXCISION, MASS, NECK;  Surgeon: Lucie Zamudio MD;  Location: [facility] OR    FUSION LUMBAR ANTERIOR, FUSION LUMBAR POSTERIOR TWO LEVELS, COMBINED N/A 9/17/2014    Procedure: COMBINED FUSION LUMBAR ANTERIOR, FUSION LUMBAR POSTERIOR TWO LEVELS;  Surgeon: Chris Lugo MD;  Location:   OR    [facility] UGI ENDOSCOPY DIAG W OR W/O BRUSH/WASH  3/04    ok    HEAD & NECK SURGERY      HEMILAMINECTOMY, DISCECTOMY LUMBAR ONE LEVEL, COMBINED Left 9/8/2015    Procedure: COMBINED HEMILAMINECTOMY, DISCECTOMY LUMBAR ONE LEVEL;  Surgeon: Jer Irby MD;  Location: [facility] OR    right hip replacement  10,2010    [facility] NONSPECIFIC PROCEDURE  1995    neck cyst    [facility] NONSPECIFIC PROCEDURE  1979    laminectomy L5-S1 x 2    [facility] SHOULDER SURG PROC UNLISTED  2005, '07    repairs,later manipulate,inject LT, RT    [facility]  3/[facility]    normal       Past Medical History:   Past Medical History:   Diagnosis Date    Acute gout of right elbow, unspecified cause 07/14/2016    Acute gouty arthritis 03/03/2016    Anxiety state, unspecified     BMI 32.0-32.9,adult 09/04/2015    Chronic pain syndrome 03/29/2007    Narcotic refill protocol Will return every 2-3 months for clinic visits Controlled substance aggreement on file from this  6/26/12 Documentation in problem list: no, appears to be compliant based on last visit He is responding best to Oxycontin 10 mg twice daily # 60 per month.  This is costly and looking into [place] for coverage.  Nausea with MS Contin Has meds refilled 16 of every month Last [facility] website verification:  done on 7/8/2015  [email or web address]   2/10/2015:  PCP will take over narcotic prescription Tapering course: using 5 mg tablets 10 mg morning 15 mg noon, 15 mg night for 1 week then 10 mg morning, 10 mg noon, 15 mg night for 1 week then 10 mg TID for 1 week then see me for refills  Then ongoing taper: 5 mg morning, 10 mg noon and 10 mg night for 1 week then  5 mg morning and noon and 10 mg night for 1 week then 5 mg tid for 1 week Then 5 mg morning no noon dose and 5 mg night for 1 week then No morning or non dose and 5 mg nightly for 1 week then off  Goal is co    Chronic rhinitis 03/29/2007    DDD (degenerative disc disease), cervical 02/08/2013    Normal.  C2-C3:  Normal disc, facet joints, spinal canal and neural foramina.  C3-C4: Mild broad-based posterior disc bulge. Otherwise normal.  C4-C5: Normal disc, facet joints, spinal canal and neural foramina.  C5-C6: Moderate degenerative disc disease with loss of disc height, circumferential disc bulge and vertebral endplate osteophytes. Mild spinal canal stenosis and moderate left foraminal stenosis. Normal right foramen and facet joints.  C6-C7: Mild circumferential disc bulge. Slight impression on the thecal sac. Mild left foraminal stenosis. Normal right foramen and facet joints.  C7-T1: Normal disc, facet joints, spinal canal and neural foramina.  T1-T2: Mild central posterior disc protrusion.  T2-T3: Mild central posterior disc protrusion. Slight impression on the spinal cord.        DJD (degenerative joint disease), lumbar 01/10/2012    Esophageal reflux     ,refluxes on upper GI    Gout 04/08/2011    Gout, unspecified     Hyperlipidemia LDL goal <100 10/25/2012    Hypertension goal BP (blood pressure) < 140/90 10/21/2011    Hypertrophy of prostate with urinary obstruction 08/03/2006    Problem list name updated by automated process. Provider to review    HYPERTROPHY PROSTATE WITH OBST 08/03/2006    Idiopathic gout of left elbow, unspecified chronicity 01/03/2017    Impotence of organic origin     Left-sided low back pain with left-sided sciatica 08/13/2015    Lumbar radiculopathy 09/17/2014    Major depressive disorder, recurrent episode, unspecified 07/09/2008    Osteoarthrosis, unspecified whether generalized or localized, pelvic region and thigh     Pure hyperglyceridemia     ROTATOR CUFF SYND NOS 04/17/2008    S/P lumbar fusion 10/14/2014    Thoracic or lumbosacral neuritis or radiculitis, unspecified     Tobacco use disorder     Type 2 diabetes, HbA1C goal < 8% (H) 09/09/2011       Social History:   Social History     Tobacco Use    Smoking status: Former     Current packs/day: 0.00     Average packs/day: 1 pack/day  "for 40.0 years (40.0 ttl pk-yrs)     Types: Cigarettes     Start date:      Quit date:      Years since quittin.7     Passive exposure: Never    Smokeless tobacco: Former     Quit date: 2013   Substance Use Topics    Alcohol use: Yes     Comment: beer occasionally       Family History:   Family History   Problem Relation Age of Onset    Diabetes Mother     Lung Cancer Mother     C.A.D. Father     Diabetes Father     Hypertension Father     Cancer Brother     Coronary Artery Disease Brother         Bypass surgery    Deep Vein Thrombosis (DVT) Brother         Following surgery    Colon Cancer No family hx of        Allergies:   Allergies   Allergen Reactions    Morphine And Codeine Nausea and Vomiting     Tolerating other opiates        Active Medications:   No current outpatient medications on file.       Systemic Review:   CONSTITUTIONAL: NEGATIVE for fever, chills, change in weight  ENT/MOUTH: NEGATIVE for ear, mouth and throat problems  RESP: NEGATIVE for significant cough or SOB  CV: NEGATIVE for chest pain, palpitations or peripheral edema    Physical Examination:   Vital Signs: BP (!) 142/84   Temp 97.9  F (36.6  C) (Oral)   Resp 16   Ht 1.803 m (5' 10.98\")   Wt 92.1 kg (203 lb)   SpO2 97%   BMI 28.33 kg/m    GENERAL: healthy, alert and no distress  NECK: no adenopathy, no asymmetry, masses, or scars  RESP: lungs clear to auscultation - no rales, rhonchi or wheezes  CV: regular rate and rhythm, normal S1 S2, no S3 or S4, no murmur, click or rub, no peripheral edema and peripheral pulses strong  ABDOMEN: soft, nontender, no hepatosplenomegaly, no masses and bowel sounds normal  MS: no gross musculoskeletal defects noted, no edema      Plan: Appropriate to proceed as scheduled.      Camille Torres MD  10/1/2024    PCP:  Dayana Le    "

## 2024-10-01 NOTE — ANESTHESIA CARE TRANSFER NOTE
Patient: Toby Mcgrath    Procedure: Procedure(s):  Esophagoscopy, gastroscopy, duodenoscopy (EGD), combined       Diagnosis: Mass of multiple sites of liver [R16.0]  Diagnosis Additional Information: No value filed.    Anesthesia Type:   MAC     Note:    Oropharynx: oropharynx clear of all foreign objects      Level of Supplemental Oxygen (L/min / FiO2): 6    Dentition: dentition unchanged  Vital Signs Stable: post-procedure vital signs reviewed and stable  Report to RN Given: handoff report given  Patient transferred to: Phase II    Handoff Report: Identifed the Patient, Identified the Reponsible Provider, Reviewed the pertinent medical history, Discussed the surgical course, Reviewed Intra-OP anesthesia mangement and issues during anesthesia, Set expectations for post-procedure period and Allowed opportunity for questions and acknowledgement of understanding      Vitals:  Vitals Value Taken Time   /73 10/01/24 1235   Temp     Pulse 73 10/01/24 1233   Resp     SpO2 100 % 10/01/24 1235   Vitals shown include unfiled device data.    Electronically Signed By: JIMBO Lomeli CRNA  October 1, 2024  12:35 PM

## 2024-10-01 NOTE — PROGRESS NOTES
ANTICOAGULATION  MANAGEMENT: Discharge Review    Toby Zhaoann chart reviewed for anticoagulation continuity of care    Outpatient surgery/procedure on 10/1/24 for EGD (also had liver biopsy yesterday).    Discharge disposition: Home    Results:    Recent labs: (last 7 days)     09/30/24  0937   INR 1.77*     Anticoagulation inpatient management:     not applicable     Anticoagulation discharge instructions:     Warfarin dosing: home regimen continued   Bridging: No   INR goal change: No      Medication changes affecting anticoagulation: No    Additional factors affecting anticoagulation: No     PLAN     No adjustment to anticoagulation plan needed    Recommended follow up is scheduled  Patient not contacted    No adjustment to Anticoagulation Calendar was required    Elmira Mota RN  10/1/2024  Anticoagulation Clinic  [employer] for routing messages: viet VASQUEZ [employer]  [facility] patient phone line: [phone]

## 2024-10-01 NOTE — ANESTHESIA POSTPROCEDURE EVALUATION
Patient: Toby Mcgrath    Procedure: Procedure(s):  Esophagoscopy, gastroscopy, duodenoscopy (EGD), combined       Anesthesia Type:  MAC    Note:  Disposition: Outpatient   Postop Pain Control: Uneventful            Sign Out: Well controlled pain   PONV: No   Neuro/Psych: Uneventful            Sign Out: Acceptable/Baseline neuro status   Airway/Respiratory: Uneventful            Sign Out: Acceptable/Baseline resp. status   CV/Hemodynamics: Uneventful            Sign Out: Acceptable CV status; No obvious hypovolemia; No obvious fluid overload   Other NRE: NONE   DID A NON-ROUTINE EVENT OCCUR? No           Last vitals:  Vitals Value Taken Time   /82 10/01/24 1300   Temp     Pulse 73 10/01/24 1235   Resp     SpO2 94 % 10/01/24 1303   Vitals shown include unfiled device data.    Electronically Signed By: Ana Martinez MD  October 1, 2024  1:09 PM

## 2024-10-01 NOTE — ANESTHESIA PREPROCEDURE EVALUATION
Anesthesia Pre-Procedure Evaluation    Patient: Toby Mcgrath   MRN: [id number] : 1955        Procedure : Procedure(s):  Esophagoscopy, gastroscopy, duodenoscopy (EGD), combined          Past Medical History:   Diagnosis Date    Acute gout of right elbow, unspecified cause 2016    Acute gouty arthritis 2016    Anxiety state, unspecified     BMI 32.0-32.9,adult 2015    Chronic pain syndrome 2007    Narcotic refill protocol Will return every 2-3 months for clinic visits Controlled substance aggreement on file from this  12 Documentation in problem list: no, appears to be compliant based on last visit He is responding best to Oxycontin 10 mg twice daily # 60 per month.  This is costly and looking into [place] for coverage.  Nausea with MS Contin Has meds refilled 16 of every month Last [facility] website verification:  done on 2015  [email or web address]   2/10/2015:  PCP will take over narcotic prescription Tapering course: using 5 mg tablets 10 mg morning 15 mg noon, 15 mg night for 1 week then 10 mg morning, 10 mg noon, 15 mg night for 1 week then 10 mg TID for 1 week then see me for refills  Then ongoing taper: 5 mg morning, 10 mg noon and 10 mg night for 1 week then  5 mg morning and noon and 10 mg night for 1 week then 5 mg tid for 1 week Then 5 mg morning no noon dose and 5 mg night for 1 week then No morning or non dose and 5 mg nightly for 1 week then off  Goal is co    Chronic rhinitis 2007    DDD (degenerative disc disease), cervical 2013    Normal.  C2-C3: Normal disc, facet joints, spinal canal and neural foramina.  C3-C4: Mild broad-based posterior disc bulge. Otherwise normal.  C4-C5: Normal disc, facet joints, spinal canal and neural foramina.  C5-C6: Moderate degenerative disc disease with loss of disc height, circumferential disc bulge and vertebral endplate osteophytes. Mild spinal canal stenosis and moderate left foraminal stenosis. Normal right  foramen and facet joints.  C6-C7: Mild circumferential disc bulge. Slight impression on the thecal sac. Mild left foraminal stenosis. Normal right foramen and facet joints.  C7-T1: Normal disc, facet joints, spinal canal and neural foramina.  T1-T2: Mild central posterior disc protrusion.  T2-T3: Mild central posterior disc protrusion. Slight impression on the spinal cord.        DJD (degenerative joint disease), lumbar 01/10/2012    Esophageal reflux     ,refluxes on upper GI    Gout 04/08/2011    Gout, unspecified     Hyperlipidemia LDL goal <100 10/25/2012    Hypertension goal BP (blood pressure) < 140/90 10/21/2011    Hypertrophy of prostate with urinary obstruction 08/03/2006    Problem list name updated by automated process. Provider to review    HYPERTROPHY PROSTATE WITH OBST 08/03/2006    Idiopathic gout of left elbow, unspecified chronicity 01/03/2017    Impotence of organic origin     Left-sided low back pain with left-sided sciatica 08/13/2015    Lumbar radiculopathy 09/17/2014    Major depressive disorder, recurrent episode, unspecified 07/09/2008    Osteoarthrosis, unspecified whether generalized or localized, pelvic region and thigh     Pure hyperglyceridemia     ROTATOR CUFF SYND NOS 04/17/2008    S/P lumbar fusion 10/14/2014    Thoracic or lumbosacral neuritis or radiculitis, unspecified     Tobacco use disorder     Type 2 diabetes, HbA1C goal < 8% (H) 09/09/2011      Past Surgical History:   Procedure Laterality Date    BACK SURGERY      both shoulder repair  2006, 2008    EXCISE MASS NECK Right 6/24/2024    Procedure: EXCISION, MASS, NECK;  Surgeon: Lucie Zamudio MD;  Location: [facility] OR    FUSION LUMBAR ANTERIOR, FUSION LUMBAR POSTERIOR TWO LEVELS, COMBINED N/A 9/17/2014    Procedure: COMBINED FUSION LUMBAR ANTERIOR, FUSION LUMBAR POSTERIOR TWO LEVELS;  Surgeon: Chris Lugo MD;  Location:  OR     [facility] ENDOSCOPY DIAG W OR W/O BRUSH/WASH  3/04    ok    HEAD & NECK SURGERY       HEMILAMINECTOMY, DISCECTOMY LUMBAR ONE LEVEL, COMBINED Left 2015    Procedure: COMBINED HEMILAMINECTOMY, DISCECTOMY LUMBAR ONE LEVEL;  Surgeon: Jer Irby MD;  Location: [facility] OR    right hip replacement  10,2010    [facility] NONSPECIFIC PROCEDURE      neck cyst    [facility] NONSPECIFIC PROCEDURE      laminectomy L5-S1 x 2    [facility] SHOULDER SURG PROC UNLISTED  ,     repairs,later manipulate,inject LT, RT    [facility] COLONOSCOPY THRU STOMA, DIAGNOSTIC  3/04    normal      Allergies   Allergen Reactions    Morphine And Codeine Nausea and Vomiting     Tolerating other opiates       Social History     Tobacco Use    Smoking status: Former     Current packs/day: 0.00     Average packs/day: 1 pack/day for 40.0 years (40.0 ttl pk-yrs)     Types: Cigarettes     Start date:      Quit date:      Years since quittin.7     Passive exposure: Never    Smokeless tobacco: Former     Quit date: 2013   Substance Use Topics    Alcohol use: Yes     Comment: beer occasionally      Wt Readings from Last 1 Encounters:   24 92.3 kg (203 lb 6.4 oz)        Anesthesia Evaluation   Pt has had prior anesthetic. Type: General and MAC.        ROS/MED HX  ENT/Pulmonary: Comment: DDD cervical     (+)                         moderate,  COPD,              Neurologic:       Cardiovascular:     (+)  hypertension- -   -  - -                                 Previous cardiac testing     METS/Exercise Tolerance:     Hematologic:       Musculoskeletal: Comment: Lumbar radiculopathy   (+)  arthritis,             GI/Hepatic:     (+) GERD,            liver disease,       Renal/Genitourinary:       Endo:     (+)  type II DM,             Obesity,       Psychiatric/Substance Use:     (+) psychiatric history depression       Infectious Disease:       Malignancy:       Other:      (+)  , H/O Chronic Pain,         Physical Exam    Airway        Mallampati: II   TM distance: > 3 FB   Neck ROM: full   Mouth opening: > 3  cm    Respiratory Devices and Support         Dental       (+) Multiple crowns, permanant bridges      Cardiovascular   cardiovascular exam normal          Pulmonary   pulmonary exam normal                OUTSIDE LABS:  CBC:   Lab Results   Component Value Date    WBC 12.7 (H) 09/30/2024    WBC 10.6 09/18/2024    HGB 13.8 09/30/2024    HGB 13.7 09/18/2024    HCT 45.1 09/30/2024    HCT 42.1 09/18/2024     09/30/2024     09/18/2024     BMP:   Lab Results   Component Value Date     09/18/2024     09/11/2024    POTASSIUM 4.2 09/18/2024    POTASSIUM 4.7 09/11/2024    CHLORIDE 105 09/18/2024    CHLORIDE 104 09/11/2024    CO2 24 09/18/2024    CO2 25 09/11/2024    BUN 6.0 (L) 09/18/2024    BUN 7.4 (L) 09/11/2024    CR 0.89 09/18/2024    CR 0.92 09/11/2024     (H) 09/18/2024     (H) 09/11/2024     COAGS:   Lab Results   Component Value Date    PTT 30 09/17/2014    INR 1.77 (H) 09/30/2024     POC:   Lab Results   Component Value Date     (H) 09/17/2020     HEPATIC:   Lab Results   Component Value Date    ALBUMIN 3.8 09/18/2024    PROTTOTAL 7.9 09/18/2024    ALT 9 09/18/2024     (H) 09/18/2024    ALKPHOS 285 (H) 09/18/2024    BILITOTAL 1.3 (H) 09/18/2024     OTHER:   Lab Results   Component Value Date    A1C 6.7 (H) 06/13/2024    JOSE 11.4 (H) 09/18/2024    MAG 2.2 07/16/2020    LIPASE 17 09/11/2024    TSH 1.69 05/17/2023    CRP 12.0 (H) 02/20/2015    SED 12 02/15/2023       Anesthesia Plan    ASA Status:  3    NPO Status:  NPO Appropriate    Anesthesia Type: MAC.     - Reason for MAC: straight local not clinically adequate   Induction: Propofol, Intravenous.   Maintenance: TIVA.        Consents    Anesthesia Plan(s) and associated risks, benefits, and realistic alternatives discussed. Questions answered and patient/representative(s) expressed understanding.     - Discussed: Risks, Benefits and Alternatives for BOTH SEDATION and the PROCEDURE were discussed     - Discussed  "with:  Patient            Postoperative Care    Pain management: Multi-modal analgesia.   PONV prophylaxis: Ondansetron (or other 5HT-3), Background Propofol Infusion     Comments:               Ana Martinez MD    I have reviewed the pertinent notes and labs in the chart from the past 30 days and (re)examined the patient.  Any updates or changes from those notes are reflected in this note.    # Hyperkalemia: Highest K = 5.8 mmol/L in last 30 days, will monitor as appropriate   # Hypercalcemia: Highest Ca = 11.7 mg/dL in last 30 days, will monitor as appropriate       # Coagulation Defect: INR = 1.77 (Ref range: 0.85 - 1.15) and/or PTT = N/A, will monitor for bleeding   # DMII: A1C = N/A within past 6 months  # Overweight: Estimated body mass index is 28.38 kg/m  as calculated from the following:    Height as of 9/18/24: 1.803 m (5' 10.98\").    Weight as of 9/18/24: 92.3 kg (203 lb 6.4 oz).      "

## 2024-10-03 NOTE — ED TRIAGE NOTES
Pt here with son. Pt unsure how he fell, but went to reach for the door when fell backwards hitting his head on tile. No LOC. No neck pain. C/o lower back pain. CMS intact. No n/v or visual changes. On warfarin. ABC intact.

## 2024-10-03 NOTE — CONSULTS
Care Management Initial Consult    General Information  Assessment completed with: Patient, Children, Patient, son-Antoni, & step son  Type of CM/SW Visit: Offer D/C Planning    Primary Care Provider verified and updated as needed:     Readmission within the last 30 days:        Reason for Consult: discharge planning  Advance Care Planning:            Communication Assessment  Patient's communication style: spoken language (English or Bilingual)             Cognitive  Cognitive/Neuro/Behavioral:                        Living Environment:   People in home: child(gilda), adult  Antoni  Current living Arrangements: apartment      Able to return to prior arrangements: yes       Family/Social Support:  Care provided by: self, child(gilda)  Provides care for: no one, unable/limited ability to care for self     Support system: Children          Description of Support System: Supportive, Involved         Current Resources:   Patient receiving home care services: No        Community Resources: None  Equipment currently used at home: cane, straight, walker, rolling  Supplies currently used at home:      Employment/Financial:  Employment Status:          Financial Concerns:             Does the patient's insurance plan have a 3 day qualifying hospital stay waiver?  Yes     Which insurance plan 3 day waiver is available? Alternative insurance waiver    Will the waiver be used for post-acute placement? No    Lifestyle & Psychosocial Needs:  Social Determinants of Health     Food Insecurity: High Risk (3/1/2024)    Food Insecurity     Within the past 12 months, did you worry that your food would run out before you got money to buy more?: Yes     Within the past 12 months, did the food you bought just not last and you didn t have money to get more?: Yes   Depression: Not at risk (5/30/2024)    PHQ-2     PHQ-2 Score: 0   Housing Stability: Low Risk  (3/1/2024)    Housing Stability     Do you have housing? : Yes     Are you worried  about losing your housing?: No   Tobacco Use: Medium Risk (9/18/2024)    Patient History     Smoking Tobacco Use: Former     Smokeless Tobacco Use: Former     Passive Exposure: Never   Financial Resource Strain: Low Risk  (3/1/2024)    Financial Resource Strain     Within the past 12 months, have you or your family members you live with been unable to get utilities (heat, electricity) when it was really needed?: No   Alcohol Use: Unknown (3/1/2024)    AUDIT-C     Frequency of Alcohol Consumption: 4 or more times a week     Average Number of Drinks: Patient declined     Frequency of Binge Drinking: Patient declined   Transportation Needs: Low Risk  (3/1/2024)    Transportation Needs     Within the past 12 months, has lack of transportation kept you from medical appointments, getting your medicines, non-medical meetings or appointments, work, or from getting things that you need?: No   Recent Concern: Transportation Needs - High Risk (1/19/2024)    Transportation Needs     Within the past 12 months, has lack of transportation kept you from medical appointments, getting your medicines, non-medical meetings or appointments, work, or from getting things that you need?: Yes   Physical Activity: Inactive (3/1/2024)    Exercise Vital Sign     Days of Exercise per Week: 0 days     Minutes of Exercise per Session: 0 min   Interpersonal Safety: Unknown (10/1/2024)    Interpersonal Safety     Do you feel physically and emotionally safe where you currently live?: Patient unable to answer     Within the past 12 months, have you been hit, slapped, kicked or otherwise physically hurt by someone?: Patient unable to answer     Within the past 12 months, have you been humiliated or emotionally abused in other ways by your partner or ex-partner?: Patient unable to answer   Stress: Stress Concern Present (3/1/2024)    [employer] [place] [employer] - Occupational Stress Questionnaire     Feeling of Stress : Very much   Social  Connections: Patient Declined (3/1/2024)    Social Connection and Isolation Panel [NHANES]     Frequency of Communication with Friends and Family: Patient declined     Frequency of Social Gatherings with Friends and Family: Patient declined     Attends Episcopal Services: Patient declined     Active Member of Clubs or Organizations: Patient declined     Attends Club or Organization Meetings: Patient declined     Marital Status: Patient declined   Health Literacy: Not on file       Functional Status:  Prior to admission patient needed assistance:   Dependent ADLs:: Ambulation-walker  Dependent IADLs:: Transportation, Shopping, Laundry, Cooking, Cleaning       Mental Health Status:          Chemical Dependency Status:                Values/Beliefs:  Spiritual, Cultural Beliefs, Episcopal Practices, Values that affect care:                 Discussed  Partnership in Safe Discharge Planning  document with patient/family: No    Additional Information:  SW met with patient, his son, Antoni, & his step son to discuss discharge planning. Patient shared he resides in an apartment with his son, Antoni. He voiced Antoni helps him with everything that he needs but that he recently has been more week & having more frequent falls. He stated he is ready to move to an assisted living facility. SW provided them information on the Senior Linkage Line, [facility] [place], & [facility] Senior guidebook. Patient's son shared they plan to start looking into RACHEL options today. SW discussed private pay home care services vs applying for waiver services through the [facility] if assistance is needed before an RACHEL is found. Patient was agreeable to a referral being made to [facility] for home PT. SW discussed that [facility] will update him after discharge if an agency was found or not & what agency would be reaching out to them. Patient's son shared they are with him except from 3 to 7 when they work.  Patient does have an emergency alert button that he wears. Patient & family agreeable to discharging home today with son & denied any additional questions or needs related to discharge planning.     Next Steps: SW is signing off but can be re-consulted if additional discharge needs arise.     GAMAL Patrick

## 2024-10-03 NOTE — TELEPHONE ENCOUNTER
ANTICOAGULATION  MANAGEMENT: Discharge Review    Toby Mcgrath chart reviewed for anticoagulation continuity of care    Emergency room visit on 10/3/24 for mechanical fall.    Discharge disposition: Home    Results:    Recent labs: (last 7 days)     09/30/24  0937 10/03/24  0815   INR 1.77* 1.46*     Anticoagulation inpatient management:     not applicable     Anticoagulation discharge instructions:     Warfarin dosing: home regimen continued   Bridging: No   INR goal change: No      Medication changes affecting anticoagulation: No    Additional factors affecting anticoagulation: Yes: frequent falls noted; however, no bleeding concerns per ER visit notes.   Also, ER chart notes state that patient's son Antoni is not certain Layo has any warfarin. Writer left voicemail to inquire 1) did patient resume warfarin after procedure? and 2) does patient need warfarin refilled?     PLAN     No adjustment to anticoagulation plan needed    Left a detailed message for héctor Corrales  Recommended follow up is scheduled    No adjustment to Anticoagulation Calendar was required    Arianne Cotton RN  10/3/2024  Anticoagulation Clinic  [facility] for routing messages: viet VASQUEZ OneBuckResume  [facility] patient phone line: [phone]

## 2024-10-03 NOTE — TELEPHONE ENCOUNTER
General Call      Reason for Call:  pt son calling inr nurse back    What are your questions or concerns:  see above    Date of last appointment with provider: na/    Could we send this information to you in [place] or would you prefer to receive a phone call?:   Patient would prefer a phone call   Okay to leave a detailed message?: No at Cell number on file:    Telephone Information:   [phone]

## 2024-10-03 NOTE — ED PROVIDER NOTES
"  Emergency Department Note      History of Present Illness     Chief Complaint   Fall    HPI   Toby Mcgrath is a 69 year old male with a history as noted below who presents to the emergency department for a fall. The patient states that this morning, he fell in the bathroom, striking his head on tile ghassan and sustaining low back pain. Denies loss of consciousness. Denies neck pain. He notes that he is on warfarin but his son reports that he \"can't find any of the Warfarin pills\" and is concerned that the patient ran out of his Warfarin. He notes that he has also been experiencing ongoing bilateral hand weakness. The patient's son states that last night, the patient fell again while getting up from a nap. He reports that the patient is also experiencing loss of appetite and is losing weight. Patient has been drinking fluids. Denies lightheadedness or dizziness. He recently underwent a liver biopsy and endoscopy to investigate multiple masses on his liver. Ambulates with a walker at baseline. Hx chronic low back pain and underwent lumbar fusion.    Independent Historian   Son as detailed above.    Review of External Notes   I reviewed his recent biopsy procedure note as well as recent EGD procedure note.  I also reviewed the last office visit with gastroenterology 9/18/2024.    Past Medical History     Medical History and Problem List   Acute gouty arthritis  Anxiety  Chronic pain syndrome  Chronic rhinitis  DDD   DJD  GERD  Hyperlipidemia  Hypertension  Hypertrophy of prostate with urinary obstruction  Idiopathic gout of left elbow, unspecified chronicity  Left-sided low back pain with left-sided sciatica  Lumbar radiculopathy  MDD  Osteoarthrosis  Pure hyperglyceridemia  S/P lumbar fusion  Thoracic or lumbosacral neuritis or radiculitis, unspecified  Tobacco use disorder  Type 2 diabetes    Medications   albuterol (PROAIR HFA/PROVENTIL HFA/VENTOLIN HFA) 108 (90 Base) MCG/ACT inhaler  bisacodyl (DULCOLAX) 5 " "MG EC tablet  busPIRone (BUSPAR) 5 MG tablet  CYMBALTA 60 MG capsule  famotidine (PEPCID) 20 MG tablet  finasteride (PROSCAR) 5 MG tablet  glipiZIDE (GLUCOTROL XL) 5 MG 24 hr tablet  metFORMIN (GLUCOPHAGE XR) 500 MG 24 hr tablet  metoprolol succinate ER (TOPROL XL) 100 MG 24 hr tablet  oxyCODONE (ROXICODONE) 5 MG tablet  pantoprazole (PROTONIX) 40 MG EC tablet  pregabalin (LYRICA) 100 MG capsule  senna-docusate (SENOKOT-S/PERICOLACE) 8.6-50 MG tablet  simvastatin (ZOCOR) 20 MG tablet  SPIRIVA RESPIMAT 2.5 MCG/ACT inhaler  tamsulosin (FLOMAX) 0.4 MG capsule  warfarin ANTICOAGULANT (JANTOVEN ANTICOAGULANT) 5 MG tablet    Surgical History   Back surgery  Right-sided neck mass excised  Hemilaminectomy, discectomy lumbar    Physical Exam     Patient Vitals for the past 24 hrs:   BP Temp Temp src Pulse Resp SpO2 Height Weight   10/03/24 1204 133/82 97.7  F (36.5  C) Oral 108 18 91 % -- --   10/03/24 1006 (!) 109/92 -- -- 100 18 100 % -- --   10/03/24 0758 103/79 97.3  F (36.3  C) Temporal 95 18 100 % 1.803 m (5' 11\") 96.2 kg (212 lb)     Physical Exam    HENT:  mmm, no rhinorrhea, atraumatic, pupils equal, no nystagmus, no posterior midline tenderness, painless range of motion  Eyes: periorbital tissues and sclera normal   Neck: supple, no abnormal swelling  Lungs:  CTAB,  no resp distress  CV: rrr, no m/r/g, ppi  Abd: soft, nontender, nondistended, no rebound/masses/guarding/hsm  Ext: No significant T or L-spine tenderness or palpable deformities.  Extremities without significant soft tissue swelling major joint effusion or reproducible bony tenderness palpation.  Skin: warm, dry, well perfused, no rashes/bruising/lesions on exposed skin  Neuro: alert, GCS 15, face symmetric, speech clear, 5 out of 5 motor bilateral upper and lower extremities, sensation intact to light touch all extremities.  Psych: Normal mood, normal affect      Diagnostics     Lab Results   Labs Ordered and Resulted from Time of ED Arrival to Time of " ED Departure   INR - Abnormal       Result Value    INR 1.46 (*)    COMPREHENSIVE METABOLIC PANEL - Abnormal    Sodium 137      Potassium 4.4      Carbon Dioxide (CO2) 24      Anion Gap 13      Urea Nitrogen 12.0      Creatinine 0.94      GFR Estimate 87      Calcium 12.5 (*)     Chloride 100      Glucose 243 (*)     Alkaline Phosphatase 291 (*)      (*)     ALT 21      Protein Total 7.2      Albumin 3.5      Bilirubin Total 1.2     CBC WITH PLATELETS AND DIFFERENTIAL - Abnormal    WBC Count 9.4      RBC Count 5.04      Hemoglobin 13.3      Hematocrit 42.8      MCV 85      MCH 26.4 (*)     MCHC 31.1 (*)     RDW 15.7 (*)     Platelet Count 279      % Neutrophils 81      % Lymphocytes 10      % Monocytes 8      % Eosinophils 0      % Basophils 0      % Immature Granulocytes 1      NRBCs per 100 WBC 0      Absolute Neutrophils 7.5      Absolute Lymphocytes 0.9      Absolute Monocytes 0.8      Absolute Eosinophils 0.0      Absolute Basophils 0.0      Absolute Immature Granulocytes 0.1      Absolute NRBCs 0.0     TYPE AND SCREEN, ADULT    ABO/RH(D) O POS      Antibody Screen Negative      SPECIMEN EXPIRATION DATE [id number]     ABO/RH TYPE AND SCREEN     Imaging   Abd/pelvis CT,  IV  contrast only TRAUMA / AAA   Final Result   IMPRESSION:    1.  Similar findings of malignancy since comparison.   2.  No acute findings.      WARNER CAMPOS MD            SYSTEM ID:  [id number]      CT Lumbar Spine w/o Contrast   Final Result   IMPRESSION:     1. No acute fracture appreciated in the lumbar spine.   2. Postoperative changes with fusion hardware from L3 down to L5 and   bilateral pelvic screws. Hardware appears intact without evidence of   loosening.   3. Progressive marked degenerative changes at L2-3 and L1-2 above the   fusion hardware since 8/7/2015. Prominent neural foraminal narrowings   on the left at L1-2 and bilaterally at L2-3.      HERSON MC MD            SYSTEM ID:  [id number]      CT Head w/o Contrast    Final Result   IMPRESSION:  No evidence of acute intracranial hemorrhage, mass, or   herniation.       HERSON MC MD            SYSTEM ID:  [id number]        EKG     Independent Interpretation   CT Head: No intracranial hemorrhage.    ED Course      Medications Administered   Medications   iopamidol (ISOVUE-370) solution 500 mL (100 mLs Intravenous $Given 10/3/24 0903)   CT scan flush (65 mLs Intravenous $Given 10/3/24 0903)     Discussion of Management   Annita Munoz    ED Course   ED Course as of 10/03/24 1534   Thu Oct 03, 2024   0805 I obtained history and examined the patient as noted above.      1009 I rechecked the patient. I discussed findings and discharge with the patient. All questions answered.      1150 D/W Annita munoz.       Additional Documentation  None    Medical Decision Making / Diagnosis     CMS Diagnoses: None    MIPS   None    [facility]   Toby Mcgrath is a 69 year old male with hepatic malignancy who recently had EGD and liver biopsy here after generalized weakness and falls.  No significant traumatic injuries noted on the trauma workup as above.  Abdomen is benign on examination.  Hemodynamics stable, basic blood test without new acute abnormality.  Transaminases elevated similar to previous.  At this point risk ratified low enough for discharge home.  He did request a social work evaluation as he feels he may need different living environment or more assistance than his son can provide caregiving resources of how to pursue this in the outpatient setting.  They will return with new or worsening symptoms.    Disposition   The patient was discharged.     Diagnosis     ICD-10-CM    1. General weakness  R53.1 Home Care Referral      2. Falls frequently  R29.6 Home Care Referral      3. Liver mass  R16.0 Home Care Referral           Discharge Medications   Discharge Medication List as of 10/3/2024 12:04 PM        Scribe Disclosure:  Alhaji AMAYA, am serving as a  scribe at 8:03 AM on 10/3/2024 to document services personally performed by Brown Kelsey MD based on my observations and the provider's statements to me.        Brown Kelsey MD  10/03/24 1537

## 2024-10-03 NOTE — TELEPHONE ENCOUNTER
Writer spoke to son Antoni, he reports that Layo did resume warfarin on the evening of 9/30/24 after the liver biopsy, he reports Layo did take booster dose of 10 mg x 2 days; however, INR did drop. Layo still has warfarin, Antoni was able to locate the bottle; however, he would like a refill faxed in. Writer will send updated prescription.    ANTICOAGULATION MANAGEMENT:  Medication Refill    Anticoagulation Summary  As of 10/1/2024      Warfarin maintenance plan:  7.5 mg (5 mg x 1.5) every day   Next INR check:  10/8/2024   Target end date:  Indefinite    Indications    Atrial fibrillation with RVR (H) [I48.91]                 Anticoagulation Care Providers       Provider Role Specialty Phone number    Danika Hopson, Dayana Anthony MD Referring Family Medicine [phone]            Refill Criteria    Visit with referring provider/group: Meets criteria: visit within referring provider group in the last 15 months on 09/12/2024    [facility] referral last signed: 07/23/2024; within last year: Yes    Lab monitoring not exceeding 2 weeks overdue: Yes    Toby meets all criteria for refill. Rx instructions and quantity supplied updated to match patient's current dosing plan. Warfarin 90 day supply with 1 refill granted per [facility] protocol     Arianne Cotton RN  Anticoagulation Clinic

## 2024-10-04 PROBLEM — R41.82 ALTERED MENTAL STATUS, UNSPECIFIED ALTERED MENTAL STATUS TYPE: Status: ACTIVE | Noted: 2024-01-01

## 2024-10-04 PROBLEM — E83.52 HYPERCALCEMIA: Status: ACTIVE | Noted: 2024-01-01

## 2024-10-04 PROBLEM — E87.1 HYPONATREMIA: Status: ACTIVE | Noted: 2024-01-01

## 2024-10-04 NOTE — PHARMACY-ANTICOAGULATION SERVICE
Clinical Pharmacy - Warfarin Dosing Consult     Pharmacy has been consulted to manage this patient s warfarin therapy.  Indication: Atrial Fibrillation  Therapy Goal: INR 2-3  Warfarin Prior to Admission: Yes  Warfarin PTA Regimen: 1 1/2 tablets (7.5 mg) everyday    INR   Date Value Ref Range Status   10/04/2024 2.60 (H) 0.85 - 1.15 Final   10/03/2024 1.46 (H) 0.85 - 1.15 Final       Recommend warfarin no dose today as patient already took 7.5 mg at home earlier today.  Pharmacy will monitor Toby Mcgrath daily and order warfarin doses to achieve specified goal.      Please contact pharmacy as soon as possible if the warfarin needs to be held for a procedure or if the warfarin goals change.

## 2024-10-04 NOTE — ED NOTES
Bed: ED11  Expected date:   Expected time:   Means of arrival:   Comments:  Is this room clean? HOLD M.MOliver in triage

## 2024-10-04 NOTE — TELEPHONE ENCOUNTER
"  Transitions of Care Outreach  No chief complaint on file.    Most Recent Admission Date: 10/3/2024   Most Recent Admission Diagnosis:      Most Recent Discharge Date: 10/3/2024   Most Recent Discharge Diagnosis:   General weakness - R53.1  Falls frequently - R29.6  Liver mass - R16.0     Transitions of Care Assessment    Discharge Assessment  How are you doing now that you are home?: \"He's the same as he was when he left the ER.\"  How are your symptoms? (Red Flag symptoms escalate to triage hotline per guidelines): Unchanged;Worsening  Do you know how to contact your clinic care team if you have future questions or changes to your health status? : Yes  Does the patient have their discharge instructions? : Yes  Does the patient have questions regarding their discharge instructions? : Yes (see comment) (Safety concerns as patient's son is having difficult time caring for him d/t rapid health decline. Patient's care needs exceed what family can do at home.)  Does the patient have all of their medications?: Yes  Do you have questions regarding any of your medications? : No  Do you have all of your needed medical supplies or equipment (DME)?  (i.e. oxygen tank, CPAP, cane, etc.): Yes    Follow up Plan     Discharge Follow-Up  Discharge follow up appointment scheduled in alignment with recommended follow up timeframe or Transitions of Risk Category? (Low = within 30 days; Moderate= within 14 days; High= within 7 days): No  Patient's follow up appointment not scheduled: Patient declined scheduling support. Education on the importance of transitions of care follow up. Provided scheduling phone number. (Son bringing patient back to ED today.)    Future Appointments   Date Time Provider Department Center   10/7/2024 11:00 AM Tila Squires MD [facility]   10/8/2024 11:15 AM CR LAB CRLABR CR   10/18/2024 11:15 AM CR LAB CRLABR CR   12/31/2024 10:00 AM UC LAB UCLABR [facility]   12/31/2024 11:00 AM Camille Torres MD " [facility]     Outpatient Plan as outlined on AVS reviewed with patient.    For any urgent concerns, please contact our 24 hour nurse triage line: [phone] ([phone])       Lili GARCIA RN  [facility]

## 2024-10-04 NOTE — ED PROVIDER NOTES
Emergency Department Note      History of Present Illness     Chief Complaint   Altered Mental Status and Generalized Weakness      HPI   Toby Mcgrath is a 69 year old male brought in by his brothers further concerns of altered mental status.  Today, patient has been very fatigued and confused.  He will not get up out of bed.  One of the brothers gives the patient his medications and the patient is not taking any excess medications but is on oxycodone, duloxetine, and pregabalin among others.  However, family states these medications are chronic.  They state he has not been doing any drugs nor using alcohol.  Patient is limited in his history.  He states he is not in any pain but when he tries to sit up his back hurts.  No fever.  No vomiting.  Has not fallen since he was seen yesterday.  Patient recently had a liver biopsy for concerns of a liver mass.    Independent Historian   Brothers provide the majority of the history.    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  Past Medical History     Medical History and Problem List   Past Medical History:   Diagnosis Date    Acute gout of right elbow, unspecified cause 07/14/2016    Acute gouty arthritis 03/03/2016    Anxiety state, unspecified     BMI 32.0-32.9,adult 09/04/2015    Chronic pain syndrome 03/29/2007    Chronic rhinitis 03/29/2007    DDD (degenerative disc disease), cervical 02/08/2013    DJD (degenerative joint disease), lumbar 01/10/2012    Esophageal reflux     Gout 04/08/2011    Gout, unspecified     Hyperlipidemia LDL goal <100 10/25/2012    Hypertension goal BP (blood pressure) < 140/90 10/21/2011    Hypertrophy of prostate with urinary obstruction 08/03/2006    HYPERTROPHY PROSTATE WITH OBST 08/03/2006    Idiopathic gout of left elbow, unspecified chronicity 01/03/2017    Impotence of organic origin     Left-sided low back pain with left-sided sciatica  08/13/2015    Lumbar radiculopathy 09/17/2014    Major depressive disorder, recurrent episode, unspecified 07/09/2008    Osteoarthrosis, unspecified whether generalized or localized, pelvic region and thigh     Pure hyperglyceridemia     ROTATOR CUFF SYND NOS 04/17/2008    S/P lumbar fusion 10/14/2014    Thoracic or lumbosacral neuritis or radiculitis, unspecified     Tobacco use disorder     Type 2 diabetes, HbA1C goal < 8% (H) 09/09/2011       Medications   busPIRone (BUSPAR) 5 MG tablet  CYMBALTA 60 MG capsule  finasteride (PROSCAR) 5 MG tablet  glipiZIDE (GLUCOTROL XL) 5 MG 24 hr tablet  metFORMIN (GLUCOPHAGE XR) 500 MG 24 hr tablet  metoprolol succinate ER (TOPROL XL) 100 MG 24 hr tablet  oxyCODONE (ROXICODONE) 5 MG tablet  pantoprazole (PROTONIX) 40 MG EC tablet  pregabalin (LYRICA) 100 MG capsule  simvastatin (ZOCOR) 20 MG tablet  SPIRIVA RESPIMAT 2.5 MCG/ACT inhaler  tamsulosin (FLOMAX) 0.4 MG capsule  warfarin ANTICOAGULANT (JANTOVEN ANTICOAGULANT) 5 MG tablet  acetaminophen (TYLENOL) 500 MG tablet  albuterol (PROAIR HFA/PROVENTIL HFA/VENTOLIN HFA) 108 (90 Base) MCG/ACT inhaler  famotidine (PEPCID) 20 MG tablet  polyethylene glycol (MIRALAX) 17 GM/Dose powder  senna-docusate (SENOKOT-S/PERICOLACE) 8.6-50 MG tablet        Surgical History   Past Surgical History:   Procedure Laterality Date    BACK SURGERY      both shoulder repair  2006, 2008    ESOPHAGOSCOPY, GASTROSCOPY, DUODENOSCOPY (EGD), COMBINED N/A 10/1/2024    Procedure: Esophagoscopy, gastroscopy, duodenoscopy (EGD), combined;  Surgeon: Camille Torres MD;  Location: [facility]    EXCISE MASS NECK Right 6/24/2024    Procedure: EXCISION, MASS, NECK;  Surgeon: Lucie Zamudio MD;  Location: [facility]    FUSION LUMBAR ANTERIOR, FUSION LUMBAR POSTERIOR TWO LEVELS, COMBINED N/A 9/17/2014    Procedure: COMBINED FUSION LUMBAR ANTERIOR, FUSION LUMBAR POSTERIOR TWO LEVELS;  Surgeon: Chris Lugo MD;  Location: [facility]     [facility] ENDOSCOPY DIAG W OR W/O  "BRUSH/WASH  3/04    ok    HEAD & NECK SURGERY      HEMILAMINECTOMY, DISCECTOMY LUMBAR ONE LEVEL, COMBINED Left 9/8/2015    Procedure: COMBINED HEMILAMINECTOMY, DISCECTOMY LUMBAR ONE LEVEL;  Surgeon: Jer Irby MD;  Location: [facility]    right hip replacement  10,2010    [facility] NONSPECIFIC PROCEDURE  1995    neck cyst    [facility] NONSPECIFIC PROCEDURE  1979    laminectomy L5-S1 x 2    [facility] SHOULDER SURG PROC UNLISTED  2005, '07    repairs,later manipulate,inject LT, RT    [facility] COLONOSCOPY THRU STOMA, DIAGNOSTIC  3/04    normal       Physical Exam     Patient Vitals for the past 24 hrs:   BP Temp Temp src Pulse Resp SpO2 Height Weight   10/04/24 1430 (!) 142/73 -- -- 60 -- 92 % -- --   10/04/24 1408 -- -- -- -- -- 93 % -- --   10/04/24 1400 (!) 121/92 -- -- 60 16 93 % -- --   10/04/24 1330 137/78 -- -- 60 16 94 % -- --   10/04/24 1300 (!) 176/76 -- -- 60 16 94 % -- --   10/04/24 1214 106/80 97  F (36.1  C) Temporal 65 18 97 % 1.803 m (5' 11\") 95.7 kg (211 lb)     Physical Exam  GENERAL: Patient sleeping with eyes closed but opens them and talks when I chat with them.  HEAD: Atraumatic.  EYES: Anicteric.  Pupils small but equally reactive.  NOSE: No active bleeding  MOUTH: Moist mucosa  THROAT: Patent airway.   NECK: No rigidity  CV: RRR, no murmurs, rubs or gallops  PULM: CTAB with good aeration; no retractions, rales, rhonchi, or wheezing  ABD: Soft, nondistended, generalized tenderness in upper abdomen.  DERM: No rash. Skin warm and dry  EXTREMITY: Moving all extremities without difficulty. No calf tenderness or peripheral edema  VASCULAR: Symmetric pulses bilaterally  NEURO: Alert.  Patient knows his name that he is in the hospital, and his brothers.  Does not know date or president.  CN 2-12 fully intact. Strength 5/5 bilateral LE/UE. Sensation fully intact to light touch symmetrically bilateral LE/UE.  Patient falling asleep so will not complete the finger-to-nose and heel-to-shin.      Diagnostics     Lab " Results   Labs Ordered and Resulted from Time of ED Arrival to Time of ED Departure   COMPREHENSIVE METABOLIC PANEL - Abnormal       Result Value    Sodium 130 (*)     Potassium 4.1      Carbon Dioxide (CO2) 22      Anion Gap 13      Urea Nitrogen 10.3      Creatinine 0.85      GFR Estimate >90      Calcium 12.0 (*)     Chloride 95 (*)     Glucose 204 (*)     Alkaline Phosphatase 268 (*)      (*)     ALT 25      Protein Total 6.9      Albumin 3.4 (*)     Bilirubin Total 1.4 (*)    CBC WITH PLATELETS AND DIFFERENTIAL - Abnormal    WBC Count 11.4 (*)     RBC Count 4.89      Hemoglobin 12.8 (*)     Hematocrit 41.1      MCV 84      MCH 26.2 (*)     MCHC 31.1 (*)     RDW 15.8 (*)     Platelet Count 274      % Neutrophils 82      % Lymphocytes 9      % Monocytes 8      % Eosinophils 0      % Basophils 1      % Immature Granulocytes 1      NRBCs per 100 WBC 0      Absolute Neutrophils 9.3 (*)     Absolute Lymphocytes 1.0      Absolute Monocytes 1.0      Absolute Eosinophils 0.0      Absolute Basophils 0.1      Absolute Immature Granulocytes 0.1      Absolute NRBCs 0.0     ROUTINE UA WITH MICROSCOPIC REFLEX TO CULTURE - Abnormal    Color Urine Yellow      Appearance Urine Clear      Glucose Urine Negative      Bilirubin Urine Negative      Ketones Urine Negative      Specific Gravity Urine 1.027      Blood Urine Negative      pH Urine 5.5      Protein Albumin Urine 20 (*)     Urobilinogen Urine 6.0 (*)     Nitrite Urine Negative      Leukocyte Esterase Urine Negative      Mucus Urine Present (*)     RBC Urine <1      WBC Urine 1      Squamous Epithelials Urine <1     ISTAT GASES LACTATE VENOUS POCT - Abnormal    Lactic Acid POCT 2.0      Bicarbonate Venous POCT 27      O2 Sat, Venous POCT 44 (*)     pCO2 Venous POCT 45      pH Venous POCT 7.38      pO2 Venous POCT 25     GLUCOSE BY METER - Abnormal    GLUCOSE BY METER POCT 184 (*)    INR - Abnormal    INR 2.60 (*)    AMMONIA - Normal    Ammonia 48     IONIZED CALCIUM        Imaging   No orders to display       EKG   ECG interpreted by me.  Time 1237  Sinus bradycardia rate 59.  Motion artifact.  Right bundle branch block.  Left anterior fascicular block.  No STEMI.  .  .  QTc 453.      Independent Interpretation   None    ED Course      Medications Administered   Medications   sodium chloride 0.9% BOLUS 1,000 mL (1,000 mLs Intravenous $New Bag 10/4/24 2417)       Procedures   Procedures     Discussion of Management   I discussed admission and the plan of care with the Hospitalist Dr. Dove      ED Course        Additional Documentation  None    Medical Decision Making / Diagnosis        [facility]   Toby Mcgrath is a 69 year old male   Patient presents with altered mental status.      Chronic conditions complicating -new diagnosis of cholangiocarcinoma.  Patient on numerous medications.    Vital signs reassuring.    On exam, patient is sleepy but arousable to voice.  No focal neurologic deficit.  Patient cannot sit up due to pain in his back.    ECG -not acutely suggest an etiology for symptoms.    Labs significant for hypercalcemia which is not significantly changed from baseline.  Given IV fluids here.  Also with hyponatremia of 130, was 137 yesterday.    I reviewed his imaging from yesterday and he had a CT scan of the head.  Per the family, he has not hit his head since the ED visit. So, do not think we need to repeat imaging. No fever or HA, do not think we need LP. He also had imaging of his torso yesterday which I reviewed.    Ultimately, presentation could be a combination of polypharmacy plus slight electrolyte abnormalities.  Today, he has been taking most of his medications 1 time in the morning opposed to breaking them up throughout the day.    Discussed with hospitalist who will admit patient.          Disposition   The patient was admitted to the hospital.     Diagnosis     ICD-10-CM    1. Altered mental status, unspecified altered mental status type   R41.82       2. Hypercalcemia  E83.52       3. Hyponatremia  E87.1            Discharge Medications   New Prescriptions    No medications on file         MD Chris Vaughan Kevin, MD  10/04/24 [id number]

## 2024-10-04 NOTE — H&P
[facility] History and Physical    Toby Mcgrath MRN# [id number]   Age: 69 year old YOB: 1955     Date of Admission:  10/4/2024    Home clinic: [facility]   Primary care provider: Dayana Le          Assessment and Plan:   Assessment:   Toby Mcgrath is a 69-year-old man who recently was diagnosed with cholangiocarcinoma who came to attention today due to change in his mental status and significantly increased weakness which is occurred over the course of the last several weeks.  He is seen in the emergency department with his brother Xavier and his son Antoni who are committed to taking care of him.    Medical history includes hepatitis C, NIDDM, tobacco abuse with COPD, degenerative disc disease with chronic back pain managed with narcotics and atrial fibrillation managed with metoprolol and warfarin.  He has had multiple back surgeries.    The recent relevant history began in September 2024 when the patient presented to his primary clinic with abdominal pain.  He has a history of chronic constipation and initially the thought was that he had some complication of that.  Ultrasound the abdomen on 9/11 showed an abnormal appearance of the liver with mild cholelithiasis.  That was followed up with a CT scan of the abdomen and pelvis on 9/14/2024 that showed innumerable masses throughout the liver concerning for malignancy.  Incidentally, upper abdominal metastatic appearing lymphadenopathy was also noted.  Patient ultimately was referred to [facility] oncology and they organized a biopsy that showed cholangiocarcinoma.  Patient is scheduled to follow-up with Dr. Tila Squires with [facility] oncology in [place].      Also relevant includes a visit to the emergency department yesterday, 10/3, when the patient was seen for following a fall.  At that time, the patient's son indicated that the patient appeared to be weak and had had a couple of falls including the 1 in  the bathroom that brought him to attention over the course of the preceding several days.     On presentation to the emergency department, VS: HR 65, widely variable blood pressure ranging from 110-170/ 80's and respiratory rate 18.  Temperature 97  F.  Weight is 95.7  Labs: Creatinine 0.85, sodium 130, chloride 95, albumin 3.4, calcium 12.0.  Alkaline phosphatase 268, /ALT 25.  Bilirubin 1.4.  Glucose 204.  WBC 11.4, Hgb 12.8, .  INR 2.6.  Urinalysis negative.    Diagnoses:  1.  Metabolic encephalopathy.  The possibility of polypharmacy is considered but patient's obvious hypercalcemia also is likely contributing.  2.  Hypercalcemia associated with new diagnosis cholangiocarcinoma that is at least locally spread.  The patient has not yet met with oncology but has had initial workup completed and pathology available.  3.  Generalized weakness and falls.  Acute component is probably due to the hypercalcemia but more chronically, the patient has been losing weight over the last several months.  4.  Therapeutic anticoagulation for atrial fibrillation.  5.  Chronic back pain for which the patient is maintained on pregabalin as well as narcotic medications.  6.  Weight loss presumably associated with malignancy but possibly not eating well.  7.  Mild hyponatremia probably due to diuresis due to hypercalcemia.      Plan:   Admit to inpatient.  NS at 150 cc an hour.  He does not have a known cardiomyopathy but may need diuretics and may need the rate to be slowed down.  We will consider zoledronic acid if the calcium does not quickly come down or oncology thinks it is appropriate to just go ahead and get that now.  Although pregabalin is not metabolized in the liver, I think it is appropriate to back down to 150 mg twice daily.  Oncology consultation.  For now the patient is full code.  We will need to readdress this with the patient when he is more coherent.  Patient's son indicates that the patient has been  considering entering an assisted living.             Chief Complaint:     Somnolence.  Weakness.     History is obtained from the electronic health record, emergency department physician, patient's brother, and patient's son.  The patient is unable to give any significant history because he is so somnolent.    As noted above, Mr. Mcgrath had become weak over the course of the last couple of months and that has progressed.  He ultimately was seen in mid September for abdominal pain and was diagnosed with cholangiocarcinoma just in this past week.  The plan was for him to be evaluated by oncology to get started on chemotherapy this coming week but he developed significant somnolence and incapacitating weakness in the last several days.    Mr. Mcgrath is unable to give us meaningful information but his son and brother are present at the bedside, giving a full history.  The patient has been living with his son and his brother is also very involved in his life.    There are no reported fevers, sweats or chills.  Patient has apparently lost a significant amount of weight though the son is unable to tell me how much.  He typically is able to make his own decisions.  He is edentulous and they have not yet been able to get dentures for him.  He has not had significant shortness of breath though he does sometimes use an inhaler.  He does not have a history of heart disease although I note he had paroxysmal atrial fibrillation with RVR for which she is now on anticoagulation.  Patient evidently has fairly longstanding constipation associated with narcotic use.  He has BPH and takes finasteride as well as tamsulosin.  In the last couple of days, the patient apparently has been unable to bear his own weight at home.        Past Medical History:     Past Medical History:   Diagnosis Date    Acute gout of right elbow, unspecified cause 07/14/2016    Acute gouty arthritis 03/03/2016    Anxiety state, unspecified     BMI  32.0-32.9,adult 09/04/2015    Chronic pain syndrome 03/29/2007    Narcotic refill protocol Will return every 2-3 months for clinic visits Controlled substance aggreement on file from this  6/26/12 Documentation in problem list: no, appears to be compliant based on last visit He is responding best to Oxycontin 10 mg twice daily # 60 per month.  This is costly and looking into [place] for coverage.  Nausea with MS Contin Has meds refilled 16 of every month Last [facility] website verification:  done on 7/8/2015  [email or web address]   2/10/2015:  PCP will take over narcotic prescription Tapering course: using 5 mg tablets 10 mg morning 15 mg noon, 15 mg night for 1 week then 10 mg morning, 10 mg noon, 15 mg night for 1 week then 10 mg TID for 1 week then see me for refills  Then ongoing taper: 5 mg morning, 10 mg noon and 10 mg night for 1 week then  5 mg morning and noon and 10 mg night for 1 week then 5 mg tid for 1 week Then 5 mg morning no noon dose and 5 mg night for 1 week then No morning or non dose and 5 mg nightly for 1 week then off  Goal is co    Chronic rhinitis 03/29/2007    DDD (degenerative disc disease), cervical 02/08/2013    Normal.  C2-C3: Normal disc, facet joints, spinal canal and neural foramina.  C3-C4: Mild broad-based posterior disc bulge. Otherwise normal.  C4-C5: Normal disc, facet joints, spinal canal and neural foramina.  C5-C6: Moderate degenerative disc disease with loss of disc height, circumferential disc bulge and vertebral endplate osteophytes. Mild spinal canal stenosis and moderate left foraminal stenosis. Normal right foramen and facet joints.  C6-C7: Mild circumferential disc bulge. Slight impression on the thecal sac. Mild left foraminal stenosis. Normal right foramen and facet joints.  C7-T1: Normal disc, facet joints, spinal canal and neural foramina.  T1-T2: Mild central posterior disc protrusion.  T2-T3: Mild central posterior disc protrusion. Slight impression on the spinal  cord.        DJD (degenerative joint disease), lumbar 01/10/2012    Esophageal reflux     ,refluxes on upper GI    Gout 04/08/2011    Gout, unspecified     Hyperlipidemia LDL goal <100 10/25/2012    Hypertension goal BP (blood pressure) < 140/90 10/21/2011    Hypertrophy of prostate with urinary obstruction 08/03/2006    Problem list name updated by automated process. Provider to review    HYPERTROPHY PROSTATE WITH OBST 08/03/2006    Idiopathic gout of left elbow, unspecified chronicity 01/03/2017    Impotence of organic origin     Left-sided low back pain with left-sided sciatica 08/13/2015    Lumbar radiculopathy 09/17/2014    Major depressive disorder, recurrent episode, unspecified 07/09/2008    Osteoarthrosis, unspecified whether generalized or localized, pelvic region and thigh     Pure hyperglyceridemia     ROTATOR CUFF SYND NOS 04/17/2008    S/P lumbar fusion 10/14/2014    Thoracic or lumbosacral neuritis or radiculitis, unspecified     Tobacco use disorder     Type 2 diabetes, HbA1C goal < 8% (H) 09/09/2011             Past Surgical History:      Past Surgical History:   Procedure Laterality Date    BACK SURGERY      both shoulder repair  2006, 2008    ESOPHAGOSCOPY, GASTROSCOPY, DUODENOSCOPY (EGD), COMBINED N/A 10/1/2024    Procedure: Esophagoscopy, gastroscopy, duodenoscopy (EGD), combined;  Surgeon: Camille Torres MD;  Location: [facility]    EXCISE MASS NECK Right 6/24/2024    Procedure: EXCISION, MASS, NECK;  Surgeon: Lucie Zamudio MD;  Location: [facility] OR    FUSION LUMBAR ANTERIOR, FUSION LUMBAR POSTERIOR TWO LEVELS, COMBINED N/A 9/17/2014    Procedure: COMBINED FUSION LUMBAR ANTERIOR, FUSION LUMBAR POSTERIOR TWO LEVELS;  Surgeon: Chris Lugo MD;  Location:  OR     [facility] ENDOSCOPY DIAG W OR W/O BRUSH/WASH  3/04    ok    HEAD & NECK SURGERY      HEMILAMINECTOMY, DISCECTOMY LUMBAR ONE LEVEL, COMBINED Left 9/8/2015    Procedure: COMBINED HEMILAMINECTOMY, DISCECTOMY LUMBAR ONE LEVEL;  Surgeon:  Jer Irby MD;  Location: [facility] OR    right hip replacement  10,2010    [facility] NONSPECIFIC PROCEDURE      neck cyst    [facility] NONSPECIFIC PROCEDURE      laminectomy L5-S1 x 2    [facility] SHOULDER SURG PROC UNLISTED  ,     repairs,later manipulate,inject LT, RT    [facility]ST[facility]  3/04    normal             Social History:     Social History     Tobacco Use    Smoking status: Former     Current packs/day: 0.00     Average packs/day: 1 pack/day for 40.0 years (40.0 ttl pk-yrs)     Types: Cigarettes     Start date:      Quit date:      Years since quittin.7     Passive exposure: Never    Smokeless tobacco: Former     Quit date: 2013   Substance Use Topics    Alcohol use: Yes     Comment: beer occasionally             Family History:     Family History   Problem Relation Age of Onset    Diabetes Mother     Lung Cancer Mother     C.A.D. Father     Diabetes Father     Hypertension Father     Cancer Brother     Coronary Artery Disease Brother         Bypass surgery    Deep Vein Thrombosis (DVT) Brother         Following surgery    Colon Cancer No family hx of      Family history reviewed          Allergies:     Allergies   Allergen Reactions    Morphine And Codeine Nausea and Vomiting     Tolerating other opiates              Medications:   Not yet formally reconciled by pharmacy.  Albuterol inhaler 2 puffs every 4 hours as needed  Oxycodone 10 mg every 6 hours as needed  BuSpar 5 mg twice daily  Cymbalta 120 mg daily  Finasteride 5 mg daily  Glipizide XL 5 mg daily  Glucophage XR 2000 mg daily  Metoprolol succinate 100 mg daily  Pantoprazole 40 mg daily  Pregabalin 200 mg twice daily  Simvastatin 20 mg daily  Spiriva inhaler 2 puffs daily  Tamsulosin 0.8 mg daily  Warfarin to keep the INR between 2 and 3  Famotidine 20 mg daily         Review of Systems:     Review of systems is limited due to patient factors -somnolence          Physical Exam:   Vitals were  reviewed  Temp: 97  F (36.1  C) Temp src: Temporal BP: (!) 140/73 Pulse: 67   Resp: 16 SpO2: 96 % O2 Device: None (Room air)    Constitutional: Awake, alert, cooperative, no apparent distress, and appears stated age.  Eyes: Lids and lashes normal, pupils equal, and round, extra ocular muscles intact, sclera clear, conjunctiva normal.  ENT: Normocephalic, without obvious abnormality, atraumatic.  Edentulous.  Neck: Supple, symmetrical, trachea midline, no adenopathy, thyroid symmetric, not enlarged and no tenderness, skin normal.  Hematologic / Lymphatic: No cervical lymphadenopathy and no supraclavicular lymphadenopathy.  Back: I leaned the patient forward to examine his back and he cried out in pain.  He clearly is very tender in his low back where he had surgery.  Lungs: No increased work of breathing, good air exchange, clear to auscultation bilaterally, no crackles or wheezing.  Cardiovascular: Regular rate and rhythm, normal S1 and S2, no S3 or S4, and no murmur noted.  Abdomen: Normal bowel sounds, soft, non-distended, non-tender, no masses palpated, no hepatosplenomegaly.  Musculoskeletal: No redness, warmth, or swelling of the joints.  Tone is normal.  Neurologic: Awake, alert, oriented to name, place and time.  Cranial nerves II-XII are grossly intact.  Mr. Mcgrath has difficulty following directions due to his somnolence.  Neuropsychiatric: Profoundly somnolent.  Barely arousable.  He will follow directions but needs a lot of encouragement.  Skin: No rashes, erythema, pallor, petechia or purpura.          Data:     Results for orders placed or performed during the hospital encounter of 10/04/24 (from the past 24 hour(s))   Glucose by meter   Result Value Ref Range    GLUCOSE BY METER POCT 184 (H) 70 - 99 mg/dL   Comprehensive metabolic panel   Result Value Ref Range    Sodium 130 (L) 135 - 145 mmol/L    Potassium 4.1 3.4 - 5.3 mmol/L    Carbon Dioxide (CO2) 22 22 - 29 mmol/L    Anion Gap 13 7 - 15 mmol/L     Urea Nitrogen 10.3 8.0 - 23.0 mg/dL    Creatinine 0.85 0.67 - 1.17 mg/dL    GFR Estimate >90 >60 mL/min/1.73m2    Calcium 12.0 (H) 8.8 - 10.4 mg/dL    Chloride 95 (L) 98 - 107 mmol/L    Glucose 204 (H) 70 - 99 mg/dL    Alkaline Phosphatase 268 (H) 40 - 150 U/L     (H) 0 - 45 U/L    ALT 25 0 - 70 U/L    Protein Total 6.9 6.4 - 8.3 g/dL    Albumin 3.4 (L) 3.5 - 5.2 g/dL    Bilirubin Total 1.4 (H) <=1.2 mg/dL   Ammonia (on ice)   Result Value Ref Range    Ammonia 48 16 - 60 umol/L   Extra Tube ([place] Draw)    Narrative    The following orders were created for panel order Extra Tube ([place] Draw).  Procedure                               Abnormality         Status                     ---------                               -----------         ------                     Extra Blue Top Tube[[id number]]                              Final result               Extra Red Top Tube[[id number]]                               Final result                 Please view results for these tests on the individual orders.   CBC + differential    Narrative    The following orders were created for panel order CBC + differential.  Procedure                               Abnormality         Status                     ---------                               -----------         ------                     CBC with platelets and d...[[id number]]  Abnormal            Final result                 Please view results for these tests on the individual orders.   Extra Blue Top Tube   Result Value Ref Range    Hold Specimen JIC    Extra Red Top Tube   Result Value Ref Range    Hold Specimen JIC    CBC with platelets and differential   Result Value Ref Range    WBC Count 11.4 (H) 4.0 - 11.0 10e3/uL    RBC Count 4.89 4.40 - 5.90 10e6/uL    Hemoglobin 12.8 (L) 13.3 - 17.7 g/dL    Hematocrit 41.1 40.0 - 53.0 %    MCV 84 78 - 100 fL    MCH 26.2 (L) 26.5 - 33.0 pg    MCHC 31.1 (L) 31.5 - 36.5 g/dL    RDW 15.8 (H) 10.0 - 15.0 %    Platelet Count 274  150 - 450 10e3/uL    % Neutrophils 82 %    % Lymphocytes 9 %    % Monocytes 8 %    % Eosinophils 0 %    % Basophils 1 %    % Immature Granulocytes 1 %    NRBCs per 100 WBC 0 <1 /100    Absolute Neutrophils 9.3 (H) 1.6 - 8.3 10e3/uL    Absolute Lymphocytes 1.0 0.8 - 5.3 10e3/uL    Absolute Monocytes 1.0 0.0 - 1.3 10e3/uL    Absolute Eosinophils 0.0 0.0 - 0.7 10e3/uL    Absolute Basophils 0.1 0.0 - 0.2 10e3/uL    Absolute Immature Granulocytes 0.1 <=0.4 10e3/uL    Absolute NRBCs 0.0 10e3/uL   Extra Tube ([place] Draw)    Narrative    The following orders were created for panel order Extra Tube ([place] Draw).  Procedure                               Abnormality         Status                     ---------                               -----------         ------                     Extra Blood Bank Purple ...[[id number]]                      Final result               Extra Blood Bank Purple ...[[id number]]                      Final result                 Please view results for these tests on the individual orders.   Extra Blood Bank Purple Top Tube   Result Value Ref Range    Hold Specimen JIC    Extra Blood Bank Purple Top Tube   Result Value Ref Range    Hold Specimen JIC    INR   Result Value Ref Range    INR 2.60 (H) 0.85 - 1.15   iStat Gases (lactate) venous, POCT   Result Value Ref Range    Lactic Acid POCT 2.0 <=2.0 mmol/L    Bicarbonate Venous POCT 27 21 - 28 mmol/L    O2 Sat, Venous POCT 44 (L) 70 - 75 %    pCO2 Venous POCT 45 40 - 50 mm Hg    pH Venous POCT 7.38 7.32 - 7.43    pO2 Venous POCT 25 25 - 47 mm Hg   EKG 12 lead   Result Value Ref Range    Systolic Blood Pressure  mmHg    Diastolic Blood Pressure  mmHg    Ventricular Rate 59 BPM    Atrial Rate 59 BPM    NH Interval 140 ms    QRS Duration 136 ms     ms    QTc 453 ms    P Axis 11 degrees    R AXIS -46 degrees    T Axis 0 degrees    Interpretation ECG       Sinus bradycardia  Right bundle branch block  Left anterior fascicular block  **  Bifascicular block **  Possible Lateral infarct , age undetermined  Abnormal ECG  When compared with ECG of 17-Sep-2020 15:14,  Right bundle branch block has replaced Incomplete right bundle branch block  Borderline criteria for Lateral infarct are now Present  Unconfirmed report - interpretation of this ECG is computer generated - see medical record for final interpretation  Confirmed by - EMERGENCY ROOM, PHYSICIAN ([id number]),  Evan Whitten ([id number]) on 10/4/2024 12:41:40 PM     UA with Microscopic reflex to Culture    Specimen: Urine, Catheter   Result Value Ref Range    Color Urine Yellow Colorless, Straw, Light Yellow, Yellow    Appearance Urine Clear Clear    Glucose Urine Negative Negative mg/dL    Bilirubin Urine Negative Negative    Ketones Urine Negative Negative mg/dL    Specific Gravity Urine 1.027 1.003 - 1.035    Blood Urine Negative Negative    pH Urine 5.5 5.0 - 7.0    Protein Albumin Urine 20 (A) Negative mg/dL    Urobilinogen Urine 6.0 (A) Normal, 2.0 mg/dL    Nitrite Urine Negative Negative    Leukocyte Esterase Urine Negative Negative    Mucus Urine Present (A) None Seen /LPF    RBC Urine <1 <=2 /HPF    WBC Urine 1 <=5 /HPF    Squamous Epithelials Urine <1 <=1 /HPF    Narrative    Urine Culture not indicated     *Note: Due to a large number of results and/or encounters for the requested time period, some results have not been displayed. A complete set of results can be found in Results Review.      All cardiac studies reviewed by me.   All imaging studies reviewed by me.      Attestation:  I have reviewed today's vital signs, notes, medications, labs and imaging.     David Dove MD

## 2024-10-04 NOTE — ED TRIAGE NOTES
Arrives with Son and brother with c/o AMS, generalized weakness and back pain. Was seen in ED 10/03 for fall. Discharged home. Pt has not been improving, having difficulty standing. Appears confused, states he's at his brother's house. Recently diagnosed with liver failure/cancer. HX: DM.      Triage Assessment (Adult)       Row Name 10/04/24 1216          Triage Assessment    Airway WDL WDL        Respiratory WDL    Respiratory WDL WDL        Skin Circulation/Temperature WDL    Skin Circulation/Temperature WDL WDL        Cardiac WDL    Cardiac WDL WDL        Peripheral/Neurovascular WDL    Peripheral Neurovascular WDL WDL        Cognitive/Neuro/Behavioral WDL    Cognitive/Neuro/Behavioral WDL X     Level of Consciousness confused

## 2024-10-04 NOTE — ED NOTES
At this time brief check, changed wet brief, skin intact. Repositioned with  2 assist, pt tolerated well.

## 2024-10-04 NOTE — ED NOTES
[facility]  ED Nurse Handoff Report    ED Chief complaint: Altered Mental Status and Generalized Weakness  . ED Diagnosis:   Final diagnoses:   Altered mental status, unspecified altered mental status type   Hypercalcemia   Hyponatremia   Toby Mcgrath is a 69 year old male brought in by his brothers further concerns of altered mental status.  Today, patient has been very fatigued and confused.  He will not get up out of bed.  One of the brothers gives the patient his medications and the patient is not taking any excess medications but is on oxycodone, duloxetine, and pregabalin among others.  However, family states these medications are chronic.  They state he has not been doing any drugs nor using alcohol.  Patient is limited in his history.  He states he is not in any pain but when he tries to sit up his back hurts.  No fever.  No vomiting.  Has not fallen since he was seen yesterday.  Patient recently had a liver biopsy for concerns of a liver mass.     Allergies:   Allergies   Allergen Reactions    Morphine And Codeine Nausea and Vomiting     Tolerating other opiates        Code Status: Full Code    Activity level - Baseline/Home:  assist of 2.  Activity Level - Current:   assist of 2.   Lift room needed: No.   Bariatric: No   Needed: No   Isolation: No.   Infection: Not Applicable.     Respiratory status: Room air    Vital Signs (within 30 minutes):   Vitals:    10/04/24 1408 10/04/24 1430 10/04/24 1500 10/04/24 1524   BP:  (!) 142/73 130/74    Pulse:  60 63    Resp:       Temp:       TempSrc:       SpO2: 93% 92% 93% 95%   Weight:       Height:           Cardiac Rhythm:  ,      Pain level:    Patient confused: Yes.   Patient Falls Risk: arm band in place and patient and family education.   Elimination Status: Has voided     Patient Report - Initial Complaint: altered.   Focused Assessment: neuro/GI/gU     Abnormal Results:   Labs Ordered and Resulted from Time of ED Arrival  to Time of ED Departure   COMPREHENSIVE METABOLIC PANEL - Abnormal       Result Value    Sodium 130 (*)     Potassium 4.1      Carbon Dioxide (CO2) 22      Anion Gap 13      Urea Nitrogen 10.3      Creatinine 0.85      GFR Estimate >90      Calcium 12.0 (*)     Chloride 95 (*)     Glucose 204 (*)     Alkaline Phosphatase 268 (*)      (*)     ALT 25      Protein Total 6.9      Albumin 3.4 (*)     Bilirubin Total 1.4 (*)    CBC WITH PLATELETS AND DIFFERENTIAL - Abnormal    WBC Count 11.4 (*)     RBC Count 4.89      Hemoglobin 12.8 (*)     Hematocrit 41.1      MCV 84      MCH 26.2 (*)     MCHC 31.1 (*)     RDW 15.8 (*)     Platelet Count 274      % Neutrophils 82      % Lymphocytes 9      % Monocytes 8      % Eosinophils 0      % Basophils 1      % Immature Granulocytes 1      NRBCs per 100 WBC 0      Absolute Neutrophils 9.3 (*)     Absolute Lymphocytes 1.0      Absolute Monocytes 1.0      Absolute Eosinophils 0.0      Absolute Basophils 0.1      Absolute Immature Granulocytes 0.1      Absolute NRBCs 0.0     ROUTINE UA WITH MICROSCOPIC REFLEX TO CULTURE - Abnormal    Color Urine Yellow      Appearance Urine Clear      Glucose Urine Negative      Bilirubin Urine Negative      Ketones Urine Negative      Specific Gravity Urine 1.027      Blood Urine Negative      pH Urine 5.5      Protein Albumin Urine 20 (*)     Urobilinogen Urine 6.0 (*)     Nitrite Urine Negative      Leukocyte Esterase Urine Negative      Mucus Urine Present (*)     RBC Urine <1      WBC Urine 1      Squamous Epithelials Urine <1     ISTAT GASES LACTATE VENOUS POCT - Abnormal    Lactic Acid POCT 2.0      Bicarbonate Venous POCT 27      O2 Sat, Venous POCT 44 (*)     pCO2 Venous POCT 45      pH Venous POCT 7.38      pO2 Venous POCT 25     GLUCOSE BY METER - Abnormal    GLUCOSE BY METER POCT 184 (*)    INR - Abnormal    INR 2.60 (*)    AMMONIA - Normal    Ammonia 48     IONIZED CALCIUM        No orders to display       Treatments provided:  fluids/ admit  Family Comments: brothers at side   OBS brochure/video discussed/provided to patient:  No  ED Medications:   Medications   sodium chloride 0.9% BOLUS 1,000 mL (1,000 mLs Intravenous $New Bag 10/4/24 0453)       Drips infusing:  No  For the majority of the shift this patient was Green.   Interventions performed were na  .    Sepsis treatment initiated: No    Cares/treatment/interventions/medications to be completed following ED care: see orders    ED Nurse Name: Cony IVERSON Son, RN  3:24 PM    RECEIVING UNIT ED HANDOFF REVIEW    Above ED Nurse Handoff Report was reviewed: Yes  Reviewed by: Dede Claudio, HUMBERTO on October 4, 2024 at 5:13 PM   MONIQUE Chappell called the ED to inform them the note was read: Yes

## 2024-10-04 NOTE — TELEPHONE ENCOUNTER
See MyChart message    RN called and spoke with patient's son, Antoni ([facility])  See hospital follow-up screening questions in previous note   Antoni states that pt's health has rapidly declined over the past 1.5 weeks; pt's care needs exceed what family can support at home; expressed concern for safety/wellbeing as well as caregiver strain   Pt no longer able to support himself on feet d/t weakness - currently unable to ambulate independently; son completely assisting with transfers and cares  Frequent falls and declining cognitive and motor skills   Using walker and wheelchair at home     Pt lives in an apartment with son; Antoni currently trying to create a schedule with family members so pt is always within someone's care/supervision   Antoni experiencing severe caregiver strain, family requesting emergent assistance with placement into a care facility   Home care referral placed at ED discharge yesterday, but son states that this will not be sufficient     RN discussed options for next steps at length - 2 options depending on patient's needs:  If needing immediate assistance and/or placement into care facility (within the next 1-3 days) - return pt to ED today. Stress to ED staff that pt is not safe at home and family is no longer able to care for him. Request social work assistance.    If able to continue caring for pt at home in the short-term, but needing assistance with transition of care - RN can place emergent care coordination referral and schedule appt with primary care provider.      Son states he plans to bring patient back to ED today. RN instructed to call back if further questions/concerns or if wanting CC referral and appt with -[phone]. RN will follow-up next week.     Lili GARCIA RN  [facility]

## 2024-10-04 NOTE — PHARMACY-ADMISSION MEDICATION HISTORY
Pharmacist Admission Medication History    Admission medication history is complete. The information provided in this note is only as accurate as the sources available at the time of the update.    Information Source(s): Family member and CareEverywhere/SureScripts via in-person  Family in room. Son had medication list on his phone, [facility]     Pertinent Information:     Son states that for the past 1-2 weeks pt has been taking all his medications at the same time. So medications that are prescribed twice daily he has been taking just once daily (but the whole daily dose at one time). Son states that in the past the pt has been good about taking his medications twice daily but more recently has been forgetting the evening doses, so has been just taking everything at once.     Warfarin: was held x 5 days for recent surgery. Son states pt restarted ~2 days ago.     Changes made to PTA medication list:  Added: None  Deleted: Flonase, spiriva respimat (no longer use it per family)  Changed: none    Medication History Completed By:   Estefani Sawyer, PharmD, [facility]   Emergency Medicine Pharmacist  [phone] or Misti  October 4, 2024    PTA Med List   Medication Sig Last Dose    busPIRone (BUSPAR) 5 MG tablet TAKE 1 TABLET (5 MG) BY MOUTH 2 TIMES DAILY 10/4/2024 at am    CYMBALTA 60 MG capsule Take 2 capsules (120 mg) by mouth daily 10/4/2024 at am    finasteride (PROSCAR) 5 MG tablet TAKE 1 TABLET BY MOUTH DAILY FOR PROSTATE ENLARGEMENT 10/4/2024 at am    glipiZIDE (GLUCOTROL XL) 5 MG 24 hr tablet TAKE ONE TABLET BY MOUTH ONCE DAILY 10/4/2024 at am    metFORMIN (GLUCOPHAGE XR) 500 MG 24 hr tablet Take 4 tablets (2,000 mg) by mouth daily (with dinner) 10/4/2024 at am    metoprolol succinate ER (TOPROL XL) 100 MG 24 hr tablet Take 1 tablet (100 mg) by mouth daily 10/4/2024 at am    oxyCODONE (ROXICODONE) 5 MG tablet Take 1 tablet (5 mg) by mouth every 6 hours as needed for severe pain. Max #4 per day  (Patient taking differently: Take 10 mg by mouth every 6 hours as needed for severe pain. Max #4 per day) Past Week at -    pantoprazole (PROTONIX) 40 MG EC tablet Take 1 tablet (40 mg) by mouth daily 10/4/2024 at am    pregabalin (LYRICA) 100 MG capsule TAKE TWO CAPSULES BY MOUTH TWICE A DAY 10/4/2024 at am    simvastatin (ZOCOR) 20 MG tablet TAKE 1 TABLET (20 MG) BY MOUTH AT BEDTIME 10/4/2024 at am    SPIRIVA RESPIMAT 2.5 MCG/ACT inhaler Inhale 2 puffs into the lungs daily 10/4/2024 at am    tamsulosin (FLOMAX) 0.4 MG capsule TAKE TWO CAPSULES BY MOUTH ONCE DAILY 10/4/2024 at am    warfarin ANTICOAGULANT (JANTOVEN ANTICOAGULANT) 5 MG tablet Take 1 1/2 tablets (7.5 mg) everyday OR per INR clinic 10/4/2024 at am

## 2024-10-05 NOTE — CONSULTS
Care Management Initial Consult    General Information  Assessment completed with: Patient,    Type of CM/SW Visit: Initial Assessment    Primary Care Provider verified and updated as needed:     Readmission within the last 30 days:        Reason for Consult: discharge planning  Advance Care Planning:            Communication Assessment  Patient's communication style: spoken language (English or Bilingual)    Hearing Difficulty or Deaf: yes   Wear Glasses or Blind: yes    Cognitive  Cognitive/Neuro/Behavioral: .WDL except, orientation  Level of Consciousness: confused  Arousal Level: opens eyes spontaneously  Orientation: disoriented to, place, time, situation  Mood/Behavior: behavior appropriate to situation  Best Language: 0 - No aphasia  Speech: spontaneous    Living Environment:   People in home: child(gilda), adult     Current living Arrangements: apartment      Able to return to prior arrangements:         Family/Social Support:  Care provided by: child(gilda)  Provides care for: no one, unable/limited ability to care for self     Support system: Children, Sibling(s)          Description of Support System: Supportive, Involved         Current Resources:   Patient receiving home care services: No        Community Resources: None  Equipment currently used at home: cane, straight, walker, rolling  Supplies currently used at home:      Employment/Financial:  Employment Status: retired        Financial Concerns:             Does the patient's insurance plan have a 3 day qualifying hospital stay waiver?  Yes     Which insurance plan 3 day waiver is available? [employer]    Will the waiver be used for post-acute placement? No    Lifestyle & Psychosocial Needs:  Social Determinants of Health     Food Insecurity: Low Risk  (10/4/2024)    Food Insecurity     Within the past 12 months, did you worry that your food would run out before you got money to buy more?: No     Within the past 12 months, did the food you bought just  not last and you didn t have money to get more?: No   Depression: Not at risk (5/30/2024)    PHQ-2     PHQ-2 Score: 0   Housing Stability: High Risk (10/4/2024)    Housing Stability     Do you have housing? : No     Are you worried about losing your housing?: No   Tobacco Use: Medium Risk (9/18/2024)    Patient History     Smoking Tobacco Use: Former     Smokeless Tobacco Use: Former     Passive Exposure: Never   Financial Resource Strain: Low Risk  (10/4/2024)    Financial Resource Strain     Within the past 12 months, have you or your family members you live with been unable to get utilities (heat, electricity) when it was really needed?: No   Alcohol Use: Unknown (3/1/2024)    AUDIT-C     Frequency of Alcohol Consumption: 4 or more times a week     Average Number of Drinks: Patient declined     Frequency of Binge Drinking: Patient declined   Transportation Needs: Low Risk  (3/1/2024)    Transportation Needs     Within the past 12 months, has lack of transportation kept you from medical appointments, getting your medicines, non-medical meetings or appointments, work, or from getting things that you need?: No   Recent Concern: Transportation Needs - High Risk (1/19/2024)    Transportation Needs     Within the past 12 months, has lack of transportation kept you from medical appointments, getting your medicines, non-medical meetings or appointments, work, or from getting things that you need?: Yes   Physical Activity: Inactive (3/1/2024)    Exercise Vital Sign     Days of Exercise per Week: 0 days     Minutes of Exercise per Session: 0 min   Interpersonal Safety: Unknown (10/4/2024)    Interpersonal Safety     Do you feel physically and emotionally safe where you currently live?: Patient unable to answer     Within the past 12 months, have you been hit, slapped, kicked or otherwise physically hurt by someone?: Patient unable to answer     Within the past 12 months, have you been humiliated or emotionally abused in  other ways by your partner or ex-partner?: Patient unable to answer   Stress: Stress Concern Present (3/1/2024)    [employer] - Occupational Stress Questionnaire     Feeling of Stress : Very much   Social Connections: Patient Declined (3/1/2024)    Social Connection and Isolation Panel [NHANES]     Frequency of Communication with Friends and Family: Patient declined     Frequency of Social Gatherings with Friends and Family: Patient declined     Attends [employer] Services: Patient declined     Active Member of Clubs or Organizations: Patient declined     Attends Club or Organization Meetings: Patient declined     Marital Status: Patient declined   Health Literacy: Not on file       Functional Status:  Prior to admission patient needed assistance:   Dependent ADLs:: Ambulation-cane, Ambulation-walker  Dependent IADLs:: Cleaning, Cooking, Medication Management, Transportation, Meal Preparation, Shopping, Laundry       Mental Health Status:  Mental Health Status: No Current Concerns       Chemical Dependency Status:  Chemical Dependency Status: No Current Concerns             Values/Beliefs:  Spiritual, Cultural Beliefs, [employer] Practices, Values that affect care:                 Discussed  Partnership in Safe Discharge Planning  document with patient/family: No    Additional Information:  JAK called and spoke with pt's brother Xavier in order to complete assessment d/t pt's confusion. Xavier reports pt lives with his son Adonis in an apartment.  He reports pt is retired and Adonis works 12 hour days and is gone a lot.  Xavier reports pt had been independent with ADL's and IADL's up until a a week or two ago and he has noticed pt is not managing on his own.  He reports pt uses a walker and cane and reports he has had several falls in the last couple of weeks. He reports pt does not receive any services and thinks pt will need to find more suitable housing such as [facility].      Next Steps: THEE BENEDICT  will monitor for discharge needs.     Maritza QUEVEDO, [facility]  Inpatient Care Coordination   [facility]   [phone]

## 2024-10-05 NOTE — PROVIDER NOTIFICATION
Misti to Dr. Dove requesting swallow study. Pt is coughing/aspirating thin liquids (has had a couple of episodes) and is pocketing solid food. For now we are sticking with yogurt/pudding and IV fluids. He was able to take PO meds one at a time without issue. Still very confused.

## 2024-10-05 NOTE — PLAN OF CARE
"Goal Outcome Evaluation:      Plan of Care Reviewed With: patient    Overall Patient Progress: no changeOverall Patient Progress: no change    Outcome Evaluation: pt is confused, agressive, does not follow commands.cross cover MD was paged , PRN Ativan was given.      Problem: Adult Inpatient Plan of Care  Goal: Plan of Care Review  Description: The Plan of Care Review/Shift note should be completed every shift.  The Outcome Evaluation is a brief statement about your assessment that the patient is improving, declining, or no change.  This information will be displayed automatically on your shift  note.  Outcome: Progressing  Flowsheets (Taken 10/4/2024 2041)  Outcome Evaluation: pt is confused, agressive, does not follow commands.cross cover MD was paged , PRN Ativan was given.  Plan of Care Reviewed With: patient  Overall Patient Progress: no change  Goal: Patient-Specific Goal (Individualized)  Description: You can add care plan individualizations to a care plan. Examples of Individualization might be:  \"Parent requests to be called daily at 9am for status\", \"I have a hard time hearing out of my right ear\", or \"Do not touch me to wake me up as it startles  me\".  Outcome: Progressing  Goal: Absence of Hospital-Acquired Illness or Injury  Outcome: Progressing  Intervention: Identify and Manage Fall Risk  Recent Flowsheet Documentation  Taken 10/4/2024 1800 by Dede Claudio RN  Safety Promotion/Fall Prevention:   activity supervised   clutter free environment maintained   lighting adjusted   mobility aid in reach   nonskid shoes/slippers when out of bed   room door open   room near nurse's station  Intervention: Prevent and Manage VTE (Venous Thromboembolism) Risk  Recent Flowsheet Documentation  Taken 10/4/2024 1800 by Dede Claudio RN  VTE Prevention/Management: SCDs off (sequential compression devices)  Goal: Optimal Comfort and Wellbeing  Outcome: Progressing  Goal: Readiness for Transition of Care  Outcome: " Progressing  Flowsheets (Taken 10/4/2024 1800)  Transportation Anticipated: family or friend will provide  Intervention: Mutually Develop Transition Plan  Recent Flowsheet Documentation  Taken 10/4/2024 1800 by Dede Claudio RN  Transportation Anticipated: family or friend will provide  Patient/Family Anticipated Services at Transition: home health care  Patient/Family Anticipates Transition to: home with family  Equipment Currently Used at Home:   walker, rolling   wheelchair, manual     Problem: Comorbidity Management  Goal: Maintenance of Asthma Control  Outcome: Progressing  Goal: Maintenance of Behavioral Health Symptom Control  Outcome: Progressing  Goal: Maintenance of COPD Symptom Control  Outcome: Progressing  Goal: Blood Glucose Levels Within Targeted Range  Outcome: Progressing  Goal: Maintenance of Heart Failure Symptom Control  Outcome: Progressing  Goal: Blood Pressure in Desired Range  Outcome: Progressing  Goal: Maintenance of Osteoarthritis Symptom Control  Outcome: Progressing  Intervention: Maintain Osteoarthritis Symptom Control  Recent Flowsheet Documentation  Taken 10/4/2024 1800 by Dede Claudio RN  Activity Management: activity adjusted per tolerance  Goal: Bariatric Home Regimen Maintained  Outcome: Progressing  Goal: Maintenance of Seizure Control  Outcome: Progressing     Problem: Pain Chronic (Persistent)  Goal: Optimal Pain Control and Function  Outcome: Progressing

## 2024-10-05 NOTE — PLAN OF CARE
"Goal Outcome Evaluation:      Plan of Care Reviewed With: patient, family    Overall Patient Progress: no changeOverall Patient Progress: no change    A&Ox1, confused and difficult to redirect. Does not follow instructions. Assist x2 with sliding scale. Legs very unsteady but pt is strong. IV ativan given for agitation and attempts to get out of chair despite multiple attempts to remind pt that legs are too weak to ambulate. Seatbelt alarm used while up in chair. Awaiting Hem/Onc consult. VSS on RA. K and Mg protocols. Hypercalcemic. Voiding via urinal. Oxy, lyrica and tylenol for pain. NPO due to episodes of aspiration and choking this shift. Family at the bedside.     Problem: Adult Inpatient Plan of Care  Goal: Plan of Care Review  Description: The Plan of Care Review/Shift note should be completed every shift.  The Outcome Evaluation is a brief statement about your assessment that the patient is improving, declining, or no change.  This information will be displayed automatically on your shift  note.  Outcome: Progressing  Flowsheets (Taken 10/5/2024 1503)  Plan of Care Reviewed With:   patient   family  Overall Patient Progress: no change  Goal: Patient-Specific Goal (Individualized)  Description: You can add care plan individualizations to a care plan. Examples of Individualization might be:  \"Parent requests to be called daily at 9am for status\", \"I have a hard time hearing out of my right ear\", or \"Do not touch me to wake me up as it startles  me\".  Outcome: Progressing  Goal: Absence of Hospital-Acquired Illness or Injury  Outcome: Progressing  Intervention: Identify and Manage Fall Risk  Recent Flowsheet Documentation  Taken 10/5/2024 0900 by Kim Schwartz RN  Safety Promotion/Fall Prevention:   safety round/check completed   treat underlying cause  Intervention: Prevent Skin Injury  Recent Flowsheet Documentation  Taken 10/5/2024 0900 by Kim Schwartz, RN  Skin Protection:   adhesive use limited   " incontinence pads utilized  Device Skin Pressure Protection: absorbent pad utilized/changed  Intervention: Prevent and Manage VTE (Venous Thromboembolism) Risk  Recent Flowsheet Documentation  Taken 10/5/2024 0900 by Kim Schwartz RN  VTE Prevention/Management: SCDs off (sequential compression devices)  Intervention: Prevent Infection  Recent Flowsheet Documentation  Taken 10/5/2024 0900 by Kim Schwartz RN  Infection Prevention:   single patient room provided   hand hygiene promoted  Goal: Optimal Comfort and Wellbeing  Outcome: Progressing  Intervention: Monitor Pain and Promote Comfort  Recent Flowsheet Documentation  Taken 10/5/2024 0850 by Kim Schwartz RN  Pain Management Interventions:   medication (see MAR)   pillow support provided   relaxation techniques promoted   repositioned   therapeutic touch   therapeutic presence  Goal: Readiness for Transition of Care  Outcome: Progressing     Problem: Comorbidity Management  Goal: Maintenance of Asthma Control  Outcome: Progressing  Intervention: Maintain Asthma Symptom Control  Recent Flowsheet Documentation  Taken 10/5/2024 0900 by Kim Schwartz RN  Medication Review/Management:   medications reviewed   high-risk medications identified  Goal: Maintenance of Behavioral Health Symptom Control  Outcome: Progressing  Intervention: Maintain Behavioral Health Symptom Control  Recent Flowsheet Documentation  Taken 10/5/2024 0900 by Kim Schwartz RN  Medication Review/Management:   medications reviewed   high-risk medications identified  Goal: Maintenance of COPD Symptom Control  Outcome: Progressing  Intervention: Maintain COPD Symptom Control  Recent Flowsheet Documentation  Taken 10/5/2024 0900 by Kim Schwartz RN  Supportive Measures:   active listening utilized   decision-making supported   positive reinforcement provided   relaxation techniques promoted   verbalization of feelings encouraged  Medication Review/Management:   medications reviewed    high-risk medications identified  Goal: Blood Glucose Levels Within Targeted Range  Outcome: Progressing  Intervention: Monitor and Manage Glycemia  Recent Flowsheet Documentation  Taken 10/5/2024 0900 by Kim Schwartz RN  Glycemic Management: blood glucose monitored  Medication Review/Management:   medications reviewed   high-risk medications identified  Goal: Maintenance of Heart Failure Symptom Control  Outcome: Progressing  Intervention: Maintain Heart Failure Management  Recent Flowsheet Documentation  Taken 10/5/2024 0900 by Kim Schwartz RN  Medication Review/Management:   medications reviewed   high-risk medications identified  Goal: Blood Pressure in Desired Range  Outcome: Progressing  Intervention: Maintain Blood Pressure Management  Recent Flowsheet Documentation  Taken 10/5/2024 0900 by Kim Schwartz RN  Medication Review/Management:   medications reviewed   high-risk medications identified  Goal: Maintenance of Osteoarthritis Symptom Control  Outcome: Progressing  Intervention: Maintain Osteoarthritis Symptom Control  Recent Flowsheet Documentation  Taken 10/5/2024 0900 by Kim Schwartz RN  Medication Review/Management:   medications reviewed   high-risk medications identified  Taken 10/5/2024 0850 by Kim Schwartz RN  Assistive Device Utilized:   gait belt   walker  Activity Management: up in chair  Goal: Bariatric Home Regimen Maintained  Outcome: Progressing  Intervention: Maintain and Manage Postbariatric Surgery Care  Recent Flowsheet Documentation  Taken 10/5/2024 0900 by Kim Schwartz RN  Medication Review/Management:   medications reviewed   high-risk medications identified  Goal: Maintenance of Seizure Control  Outcome: Progressing  Intervention: Maintain Seizure Symptom Control  Recent Flowsheet Documentation  Taken 10/5/2024 0900 by Kim Schwartz RN  Medication Review/Management:   medications reviewed   high-risk medications identified     Problem: Pain Chronic  (Persistent)  Goal: Optimal Pain Control and Function  Outcome: Progressing  Intervention: Develop Pain Management Plan  Recent Flowsheet Documentation  Taken 10/5/2024 0850 by Kim Schwartz RN  Pain Management Interventions:   medication (see MAR)   pillow support provided   relaxation techniques promoted   repositioned   therapeutic touch   therapeutic presence  Intervention: Manage Persistent Pain  Recent Flowsheet Documentation  Taken 10/5/2024 0900 by Kim Schwartz RN  Bowel Elimination Promotion: adequate fluid intake promoted  Complementary Therapy: aromatherapy utilized  Medication Review/Management:   medications reviewed   high-risk medications identified  Intervention: Optimize Psychosocial Wellbeing  Recent Flowsheet Documentation  Taken 10/5/2024 0900 by Kim Schwartz RN  Supportive Measures:   active listening utilized   decision-making supported   positive reinforcement provided   relaxation techniques promoted   verbalization of feelings encouraged

## 2024-10-05 NOTE — PLAN OF CARE
"Goal Outcome Evaluation:       Plan of Care Reviewed With: patient    Overall Patient Progress: no changeOverall Patient Progress: no change    Outcome Evaluation: No acute events overnight    Diet: Reg  Mental Status:  Alert & confused. Compliant with cares. No agitation   O2: RA  Pain: Back pain- Scheduled Tylenol, Lyricia, PRN Oxy   Mobility:  not oob   LDA's: PIV  Consults: oncology   Other Cares: BG monitoring, K+, mag protocol, IV fluids   GI/: Voiding- occasionally incontinent   Discharge Disposition:  home with family support   Discharge Time: TBD      Problem: Adult Inpatient Plan of Care  Goal: Plan of Care Review  Description: The Plan of Care Review/Shift note should be completed every shift.  The Outcome Evaluation is a brief statement about your assessment that the patient is improving, declining, or no change.  This information will be displayed automatically on your shift  note.  Outcome: Progressing  Flowsheets (Taken 10/5/2024 0323)  Outcome Evaluation: No acute events overnight  Plan of Care Reviewed With: patient  Overall Patient Progress: no change  Goal: Patient-Specific Goal (Individualized)  Description: You can add care plan individualizations to a care plan. Examples of Individualization might be:  \"Parent requests to be called daily at 9am for status\", \"I have a hard time hearing out of my right ear\", or \"Do not touch me to wake me up as it startles  me\".  Outcome: Progressing  Goal: Absence of Hospital-Acquired Illness or Injury  Outcome: Progressing  Intervention: Identify and Manage Fall Risk  Recent Flowsheet Documentation  Taken 10/5/2024 0200 by Lakisha Roberto RN  Safety Promotion/Fall Prevention: safety round/check completed  Intervention: Prevent Skin Injury  Recent Flowsheet Documentation  Taken 10/5/2024 0200 by Lakisha Roberto, RN  Skin Protection:   adhesive use limited   incontinence pads utilized  Device Skin Pressure Protection: absorbent pad " utilized/changed  Intervention: Prevent Infection  Recent Flowsheet Documentation  Taken 10/5/2024 0200 by Lakisha Roberto RN  Infection Prevention: rest/sleep promoted  Goal: Optimal Comfort and Wellbeing  Outcome: Progressing  Goal: Readiness for Transition of Care  Outcome: Progressing     Problem: Comorbidity Management  Goal: Maintenance of Asthma Control  Outcome: Progressing  Intervention: Maintain Asthma Symptom Control  Recent Flowsheet Documentation  Taken 10/5/2024 0200 by Lakisha Roberto RN  Medication Review/Management:   medications reviewed   high-risk medications identified  Goal: Maintenance of Behavioral Health Symptom Control  Outcome: Progressing  Intervention: Maintain Behavioral Health Symptom Control  Recent Flowsheet Documentation  Taken 10/5/2024 0200 by Lakisha Roberto RN  Medication Review/Management:   medications reviewed   high-risk medications identified  Goal: Maintenance of COPD Symptom Control  Outcome: Progressing  Intervention: Maintain COPD Symptom Control  Recent Flowsheet Documentation  Taken 10/5/2024 0200 by Lakisha Roberto RN  Medication Review/Management:   medications reviewed   high-risk medications identified  Goal: Blood Glucose Levels Within Targeted Range  Outcome: Progressing  Intervention: Monitor and Manage Glycemia  Recent Flowsheet Documentation  Taken 10/5/2024 0200 by Lakisha Roberto RN  Glycemic Management: blood glucose monitored  Medication Review/Management:   medications reviewed   high-risk medications identified  Goal: Maintenance of Heart Failure Symptom Control  Outcome: Progressing  Intervention: Maintain Heart Failure Management  Recent Flowsheet Documentation  Taken 10/5/2024 0200 by Lakisha Roberto RN  Medication Review/Management:   medications reviewed   high-risk medications identified  Goal: Blood Pressure in Desired Range  Outcome: Progressing  Intervention: Maintain Blood Pressure  Management  Recent Flowsheet Documentation  Taken 10/5/2024 0200 by Lakisha Roberto RN  Medication Review/Management:   medications reviewed   high-risk medications identified  Goal: Maintenance of Osteoarthritis Symptom Control  Outcome: Progressing  Intervention: Maintain Osteoarthritis Symptom Control  Recent Flowsheet Documentation  Taken 10/5/2024 0200 by Lakisha Roberto RN  Activity Management: activity adjusted per tolerance  Medication Review/Management:   medications reviewed   high-risk medications identified  Goal: Bariatric Home Regimen Maintained  Outcome: Progressing  Intervention: Maintain and Manage Postbariatric Surgery Care  Recent Flowsheet Documentation  Taken 10/5/2024 0200 by Lakisha Roberto RN  Medication Review/Management:   medications reviewed   high-risk medications identified  Goal: Maintenance of Seizure Control  Outcome: Progressing  Intervention: Maintain Seizure Symptom Control  Recent Flowsheet Documentation  Taken 10/5/2024 0200 by Lakisha Roberto RN  Medication Review/Management:   medications reviewed   high-risk medications identified     Problem: Pain Chronic (Persistent)  Goal: Optimal Pain Control and Function  Outcome: Progressing  Intervention: Manage Persistent Pain  Recent Flowsheet Documentation  Taken 10/5/2024 0200 by Lakisha Roberto RN  Medication Review/Management:   medications reviewed   high-risk medications identified

## 2024-10-05 NOTE — CONSULTS
[facility] Cancer [facility] Consultation      Toby Mcgrath MRN# [id number]   YOB: 1955 Age: 69 year old   Date of Admission: 10/4/2024  Requesting physician: David Dove MD  Reason for consult: Cholangiocarcinoma.           Assessment and Plan:   69 year old male with recently diagnosed cholangiocarcinoma, hepatitis C, diabetes mellitus type 2, tobacco abuse, COPD, chronic back pain related to degenerative disc disease, atrial fibrillation on warfarin, admitted on 10/4/2024 for confusion and progressive weakness over the past several weeks.    1. Metastatic cholangiocarcinoma  2. Hypercalcemia  3. Generalized progressive weakness and multiple falls  4. Weight loss  -- Patient has incurable metastatic cholangiocarcinoma with extensive metastatic disease throughout the liver.  I discussed prognosis with patient and multiple family members who were present (son was not present).   -Hypercalcemia is likely related to malignancy.  -Agree with aggressive IV fluid hydration.  Will give Zometa 4 mg IV x 1. Discussed with Dr. Dove who has ordered this 10/5.  - Follow ionized calcium level.  - Advised outpatient follow-up with Dr. Tila Squires to discuss therapeutic options.  It is not clear yet if this patient will be a candidate for palliative systemic therapy with chemo and immunotherapy.  I suggested to Layo's family members that we reassess his mental status (and performance status) after his hypercalcemia has been corrected and when he is off Ativan. Family voiced concerns that he will not be able to return home and will need at least TCU rehab.  I discussed that we would not plan to start any chemotherapy and immunotherapy while inpatient.  Also discussed that PET scans can only be done outpatient.  Discussed with Dr. Dove today.  I did not recommend placing a port as his performance status needs reassessment after correction of hypercalcemia and rehab.  I did discuss possible recommendation  for hospice should Layo's performance status decline further.    Thank you for the consult. Will follow.    Mandi Jeffries MD  [facility] Hematology/Oncology    Total time spent today: 85 minutes in chart review, patient evaluation, counseling, documentation, test and/or medication/prescription orders, and coordination of care.                Chief Complaint:   Altered Mental Status and Generalized Weakness           History of Present Illness:   This patient is a 69 year old male with recently diagnosed cholangiocarcinoma, hepatitis C, diabetes mellitus type 2, tobacco abuse, COPD, chronic back pain related to degenerative disc disease, atrial fibrillation on warfarin, admitted on 10/4/2024 for confusion and progressive weakness over the past several weeks.    With regard to oncologic history, he initially presented with abdominal pain in 9/2024.  9/11/2024 abdominal ultrasound showed abnormal liver with cholelithiasis.  9/14/2024 CT abdomen showed innumerable masses throughout the liver and upper abdominal lymphadenopathy.  9/18/2024 CA 19-9 elevated at 287.  9/30/2024 liver biopsy confirmed adenocarcinoma most consistent with primary cholangiocarcinoma.  He was due to see Dr. Tila Squires on 10/7/2024 for oncologic care.    10/3/2024 CT abdomen/pelvis with contrast showed similar extensive masses throughout the liver and splenomegaly.  CT lumbar spine showed no acute fracture.  Has fusion hardware from L3-L5 with intact hardware.  Degenerative changes in the lumbar spine.  CT head without contrast showed no evidence of hemorrhage or masses.    10/5/2024 hemoglobin 12.2, WBC 8.7, platelets 216,000.  Calcium elevated at 11.7, albumin low at 2.9, alkaline phosphatase elevated at 234, ALT 17, AST elevated at 197.  Total bilirubin elevated at 1.3.  Ionized calcium elevated at 6.3.  INR elevated at 3.94.    On this admission, he was started on IV fluids.    Per family members, Layo was walking around up until  "about 3 days ago and has since declined considerably -- has had weight loss, episodes of confusion, sleeping for 4-5 hours at a time, not eating, and has had multiple falls.           Physical Exam:   Vitals were reviewed  Blood pressure (!) 142/70, pulse 72, temperature 98.2  F (36.8  C), temperature source Temporal, resp. rate 14, height 1.803 m (5' 11\"), weight 95.7 kg (211 lb), SpO2 97%.  Temperatures:  Current - Temp: 98.2  F (36.8  C); Max - Temp  Av.6  F (36.4  C)  Min: 97  F (36.1  C)  Max: 98.2  F (36.8  C)  Respiration range: Resp  Av.2  Min: 14  Max: 18  Pulse range: Pulse  Av.9  Min: 60  Max: 84  Blood pressure range: Systolic (24hrs), Av , Min:106 , Max:176   ; Diastolic (24hrs), Av, Min:61, Max:99    Pulse oximetry range: SpO2  Av.2 %  Min: 92 %  Max: 97 %    Intake/Output Summary (Last 24 hours) at 10/5/2024 1003  Last data filed at 10/5/2024 0900  Gross per 24 hour   Intake 120 ml   Output 200 ml   Net -80 ml       GENERAL: No acute distress, eyes closed.  SKIN: No rashes or jaundice.  HEENT: Normocephalic, atraumatic.  LYMPH: No palpable lymphadenopathy in the cervical, supraclavicular regions.  LUNGS: No audible cough or wheezing.  ABDOMEN: Soft, nontender, protuberant with no palpable hepatosplenomegaly.  EXTREMITIES: No clubbing, cyanosis, or edema.  MENTAL: Not alert, very drowsy.  NEURO: Arousable but not answering questions.              Past Medical History:   I have reviewed this patient's past medical history  Past Medical History:   Diagnosis Date    Acute gout of right elbow, unspecified cause 2016    Acute gouty arthritis 2016    Anxiety state, unspecified     BMI 32.0-32.9,adult 2015    Chronic pain syndrome 2007    Narcotic refill protocol Will return every 2-3 months for clinic visits Controlled substance aggreement on file from this  12 Documentation in problem list: no, appears to be compliant based on last visit He is " responding best to Oxycontin 10 mg twice daily # 60 per month.  This is costly and looking into [place] for coverage.  Nausea with MS Contin Has meds refilled 16 of every month Last [facility] website verification:  done on 7/8/2015  [email or web address]   2/10/2015:  PCP will take over narcotic prescription Tapering course: using 5 mg tablets 10 mg morning 15 mg noon, 15 mg night for 1 week then 10 mg morning, 10 mg noon, 15 mg night for 1 week then 10 mg TID for 1 week then see me for refills  Then ongoing taper: 5 mg morning, 10 mg noon and 10 mg night for 1 week then  5 mg morning and noon and 10 mg night for 1 week then 5 mg tid for 1 week Then 5 mg morning no noon dose and 5 mg night for 1 week then No morning or non dose and 5 mg nightly for 1 week then off  Goal is co    Chronic rhinitis 03/29/2007    DDD (degenerative disc disease), cervical 02/08/2013    Normal.  C2-C3: Normal disc, facet joints, spinal canal and neural foramina.  C3-C4: Mild broad-based posterior disc bulge. Otherwise normal.  C4-C5: Normal disc, facet joints, spinal canal and neural foramina.  C5-C6: Moderate degenerative disc disease with loss of disc height, circumferential disc bulge and vertebral endplate osteophytes. Mild spinal canal stenosis and moderate left foraminal stenosis. Normal right foramen and facet joints.  C6-C7: Mild circumferential disc bulge. Slight impression on the thecal sac. Mild left foraminal stenosis. Normal right foramen and facet joints.  C7-T1: Normal disc, facet joints, spinal canal and neural foramina.  T1-T2: Mild central posterior disc protrusion.  T2-T3: Mild central posterior disc protrusion. Slight impression on the spinal cord.        DJD (degenerative joint disease), lumbar 01/10/2012    Esophageal reflux     ,refluxes on upper GI    Gout 04/08/2011    Gout, unspecified     Hyperlipidemia LDL goal <100 10/25/2012    Hypertension goal BP (blood pressure) < 140/90 10/21/2011    Hypertrophy of prostate  with urinary obstruction 08/03/2006    Problem list name updated by automated process. Provider to review    HYPERTROPHY PROSTATE WITH OBST 08/03/2006    Idiopathic gout of left elbow, unspecified chronicity 01/03/2017    Impotence of organic origin     Left-sided low back pain with left-sided sciatica 08/13/2015    Lumbar radiculopathy 09/17/2014    Major depressive disorder, recurrent episode, unspecified 07/09/2008    Osteoarthrosis, unspecified whether generalized or localized, pelvic region and thigh     Pure hyperglyceridemia     ROTATOR CUFF SYND NOS 04/17/2008    S/P lumbar fusion 10/14/2014    Thoracic or lumbosacral neuritis or radiculitis, unspecified     Tobacco use disorder     Type 2 diabetes, HbA1C goal < 8% (H) 09/09/2011             Past Surgical History:   I have reviewed this patient's past surgical history  Past Surgical History:   Procedure Laterality Date    BACK SURGERY      both shoulder repair  2006, 2008    ESOPHAGOSCOPY, GASTROSCOPY, DUODENOSCOPY (EGD), COMBINED N/A 10/1/2024    Procedure: Esophagoscopy, gastroscopy, duodenoscopy (EGD), combined;  Surgeon: Camille Torres MD;  Location: [facility] GI    EXCISE MASS NECK Right 6/24/2024    Procedure: EXCISION, MASS, NECK;  Surgeon: Lucie Zamudio MD;  Location: [facility] OR    FUSION LUMBAR ANTERIOR, FUSION LUMBAR POSTERIOR TWO LEVELS, COMBINED N/A 9/17/2014    Procedure: COMBINED FUSION LUMBAR ANTERIOR, FUSION LUMBAR POSTERIOR TWO LEVELS;  Surgeon: Chris Lugo MD;  Location:  OR     [facility] ENDOSCOPY DIAG W OR W/O BRUSH/WASH  3/04    ok    HEAD & NECK SURGERY      HEMILAMINECTOMY, DISCECTOMY LUMBAR ONE LEVEL, COMBINED Left 9/8/2015    Procedure: COMBINED HEMILAMINECTOMY, DISCECTOMY LUMBAR ONE LEVEL;  Surgeon: Jer Irby MD;  Location: [facility] OR    right hip replacement  10,2010    [email or web address] NONSPECIFIC PROCEDURE  1995    neck cyst    [email or web address] NONSPECIFIC PROCEDURE  1979    laminectomy L5-S1 x 2    [email or web address] SHOULDER SURG PROC UNLISTED  2005, '07     repairs,later manipulate,inject LT, RT    [facility] COLONOSCOPY THRU STOMA, DIAGNOSTIC  3/04    normal               Social History:   I have reviewed this patient's social history  Social History     Tobacco Use    Smoking status: Former     Current packs/day: 0.00     Average packs/day: 1 pack/day for 40.0 years (40.0 ttl pk-yrs)     Types: Cigarettes     Start date:      Quit date:      Years since quittin.7     Passive exposure: Never    Smokeless tobacco: Former     Quit date: 2013   Substance Use Topics    Alcohol use: Yes     Comment: beer occasionally             Family History:   I have reviewed this patient's family history  Family History   Problem Relation Age of Onset    Diabetes Mother     Lung Cancer Mother     C.A.D. Father     Diabetes Father     Hypertension Father     Cancer Brother     Coronary Artery Disease Brother         Bypass surgery    Deep Vein Thrombosis (DVT) Brother         Following surgery    Colon Cancer No family hx of              Allergies:     Allergies   Allergen Reactions    Morphine And Codeine Nausea and Vomiting     Tolerating other opiates              Medications:   I have reviewed this patient's current medications  Medications Prior to Admission   Medication Sig Dispense Refill Last Dose    busPIRone (BUSPAR) 5 MG tablet TAKE 1 TABLET (5 MG) BY MOUTH 2 TIMES DAILY 180 tablet 0 10/4/2024 at am    CYMBALTA 60 MG capsule Take 2 capsules (120 mg) by mouth daily 180 capsule 3 10/4/2024 at am    finasteride (PROSCAR) 5 MG tablet TAKE 1 TABLET BY MOUTH DAILY FOR PROSTATE ENLARGEMENT 90 tablet 2 10/4/2024 at am    glipiZIDE (GLUCOTROL XL) 5 MG 24 hr tablet TAKE ONE TABLET BY MOUTH ONCE DAILY 90 tablet 0 10/4/2024 at am    metFORMIN (GLUCOPHAGE XR) 500 MG 24 hr tablet Take 4 tablets (2,000 mg) by mouth daily (with dinner) 360 tablet 3 10/4/2024 at am    metoprolol succinate ER (TOPROL XL) 100 MG 24 hr tablet Take 1 tablet (100 mg) by mouth daily 90 tablet 3 10/4/2024  at am    oxyCODONE (ROXICODONE) 5 MG tablet Take 1 tablet (5 mg) by mouth every 6 hours as needed for severe pain. Max #4 per day (Patient taking differently: Take 10 mg by mouth every 6 hours as needed for severe pain. Max #4 per day) 120 tablet 0 Past Week at -    pantoprazole (PROTONIX) 40 MG EC tablet Take 1 tablet (40 mg) by mouth daily 90 tablet 1 10/4/2024 at am    pregabalin (LYRICA) 100 MG capsule TAKE TWO CAPSULES BY MOUTH TWICE A  capsule 1 10/4/2024 at am    simvastatin (ZOCOR) 20 MG tablet TAKE 1 TABLET (20 MG) BY MOUTH AT BEDTIME 90 tablet 0 10/4/2024 at am    tamsulosin (FLOMAX) 0.4 MG capsule TAKE TWO CAPSULES BY MOUTH ONCE DAILY 180 capsule 3 10/4/2024 at am    warfarin ANTICOAGULANT (JANTOVEN ANTICOAGULANT) 5 MG tablet Take 1 1/2 tablets (7.5 mg) everyday OR per INR clinic 140 tablet 1 10/4/2024 at am    acetaminophen (TYLENOL) 500 MG tablet Take 500-1,000 mg by mouth every 8 hours as needed for mild pain   prn at prn    albuterol (PROAIR HFA/PROVENTIL HFA/VENTOLIN HFA) 108 (90 Base) MCG/ACT inhaler Inhale 2 puffs into the lungs every 6 hours as needed for shortness of breath, wheezing or cough 18 g 2 prn at prn    famotidine (PEPCID) 20 MG tablet Take 1 tablet (20 mg) by mouth 2 times daily as needed (heart burn) 60 tablet 3 prn at prn    polyethylene glycol (MIRALAX) 17 GM/Dose powder Mix 1 tablespoon with water by mouth daily as needed constipation 510 g 0 prn at prn    senna-docusate (SENOKOT-S/PERICOLACE) 8.6-50 MG tablet Take 1-2 tablets by mouth daily as needed for constipation. 60 tablet 0 prn at prn     Current Facility-Administered Medications   Medication Dose Route Frequency Provider Last Rate Last Admin    acetaminophen (TYLENOL) tablet 975 mg  975 mg Oral Q12H David Dove MD   975 mg at 10/05/24 0850    artificial saliva (BIOTENE MT) solution 1 spray  1 spray Mouth/Throat 4x Daily David Dove MD   1 spray at 10/05/24 0851    busPIRone (BUSPAR) tablet 5 mg  5 mg Oral BID  David Dove MD   5 mg at 10/04/24 1939    glucose gel 15-30 g  15-30 g Oral Q15 Min PRN David Dove MD        Or    dextrose 50 % injection 25-50 mL  25-50 mL Intravenous Q15 Min PRN David Dove MD        Or    glucagon injection 1 mg  1 mg Subcutaneous Q15 Min PRN David Dove MD        DULoxetine (CYMBALTA) DR capsule 120 mg  120 mg Oral Daily David Dove MD   120 mg at 10/05/24 0850    famotidine (PEPCID) tablet 20 mg  20 mg Oral BID PRN David Dove MD        finasteride (PROSCAR) tablet 5 mg  5 mg Oral Daily David Dove MD   5 mg at 10/05/24 0850    insulin aspart (NovoLOG) injection (RAPID ACTING)  1-7 Units Subcutaneous TID AC David Dove MD   1 Units at 10/05/24 0908    insulin aspart (NovoLOG) injection (RAPID ACTING)  1-5 Units Subcutaneous At Bedtime David Dove MD   1 Units at 10/04/24 2236    lidocaine (LMX4) cream   Topical Q1H PRN David Dove MD        lidocaine 1 % 0.1-1 mL  0.1-1 mL Other Q1H PRN David Dove MD        LORazepam (ATIVAN) tablet 0.5 mg  0.5 mg Oral Q4H PRN Thom Hardin MD   0.5 mg at 10/04/24 2032    metoprolol succinate ER (TOPROL XL) 24 hr tablet 100 mg  100 mg Oral Daily David Dove MD   100 mg at 10/05/24 0849    ondansetron (ZOFRAN ODT) ODT tab 4 mg  4 mg Oral Q6H PRN David Dove MD        Or    ondansetron (ZOFRAN) injection 4 mg  4 mg Intravenous Q6H PRN David Dove MD        oxyCODONE (ROXICODONE) tablet 5 mg  5 mg Oral Q4H PRN David Dove MD   5 mg at 10/05/24 0850    oxyCODONE IR (ROXICODONE) tablet 10 mg  10 mg Oral Q4H PRN David Dove MD        pantoprazole (PROTONIX) EC tablet 40 mg  40 mg Oral Daily David Dove MD   40 mg at 10/05/24 0850    Patient is already receiving anticoagulation with heparin, enoxaparin (LOVENOX), warfarin (COUMADIN)  or other anticoagulant medication   Does not apply Continuous PRN David Dove MD        polyethylene glycol (MIRALAX) Packet 17 g  17 g Oral Daily Petty  David ORR MD   17 g at 10/05/24 0852    pregabalin (LYRICA) capsule 150 mg  150 mg Oral BID David Dove MD   150 mg at 10/05/24 0923    senna-docusate (SENOKOT-S/PERICOLACE) 8.6-50 MG per tablet 1 tablet  1 tablet Oral BID PRN David Dove MD        Or    senna-docusate (SENOKOT-S/PERICOLACE) 8.6-50 MG per tablet 2 tablet  2 tablet Oral BID PRN David Dove MD        simvastatin (ZOCOR) tablet 20 mg  20 mg Oral At Bedtime David Dove MD   20 mg at 10/04/24 2237    sodium chloride (PF) 0.9% PF flush 3 mL  3 mL Intracatheter Q8H David Dove MD   3 mL at 10/05/24 0859    sodium chloride (PF) 0.9% PF flush 3 mL  3 mL Intracatheter q1 min prn David Dove MD        sodium chloride 0.9 % infusion   Intravenous Continuous David Dove  mL/hr at 10/05/24 0923 Rate Change at 10/05/24 0923    tamsulosin (FLOMAX) capsule 0.8 mg  0.8 mg Oral Daily David Dove MD   0.8 mg at 10/05/24 0850    Warfarin Dose Required Daily - Pharmacist Managed  1 each Oral See Admin Instructions David Dove MD        warfarin-No DOSE today  1 each Does not apply no dose today (warfarin) David Dove MD        zoledronic acid (ZOMETA) 4 mg in sodium chloride 0.9 % 110 mL intermittent infusion  4 mg Intravenous Once David Dove MD                 Review of Systems:     The 14 point Review of Systems is negative other than noted in the HPI.            Data:   All laboratory data reviewed  Results for orders placed or performed during the hospital encounter of 10/04/24 (from the past 24 hour(s))   Glucose by meter   Result Value Ref Range    GLUCOSE BY METER POCT 184 (H) 70 - 99 mg/dL   Comprehensive metabolic panel   Result Value Ref Range    Sodium 130 (L) 135 - 145 mmol/L    Potassium 4.1 3.4 - 5.3 mmol/L    Carbon Dioxide (CO2) 22 22 - 29 mmol/L    Anion Gap 13 7 - 15 mmol/L    Urea Nitrogen 10.3 8.0 - 23.0 mg/dL    Creatinine 0.85 0.67 - 1.17 mg/dL    GFR Estimate >90 >60 mL/min/1.73m2    Calcium 12.0 (H)  8.8 - 10.4 mg/dL    Chloride 95 (L) 98 - 107 mmol/L    Glucose 204 (H) 70 - 99 mg/dL    Alkaline Phosphatase 268 (H) 40 - 150 U/L     (H) 0 - 45 U/L    ALT 25 0 - 70 U/L    Protein Total 6.9 6.4 - 8.3 g/dL    Albumin 3.4 (L) 3.5 - 5.2 g/dL    Bilirubin Total 1.4 (H) <=1.2 mg/dL   Ammonia (on ice)   Result Value Ref Range    Ammonia 48 16 - 60 umol/L   Extra Tube ([place] Draw)    Narrative    The following orders were created for panel order Extra Tube ([place] Draw).  Procedure                               Abnormality         Status                     ---------                               -----------         ------                     Extra Blue Top Tube[[id number]]                              Final result               Extra Red Top Tube[[id number]]                               Final result                 Please view results for these tests on the individual orders.   CBC + differential    Narrative    The following orders were created for panel order CBC + differential.  Procedure                               Abnormality         Status                     ---------                               -----------         ------                     CBC with platelets and d...[[id number]]  Abnormal            Final result                 Please view results for these tests on the individual orders.   Extra Blue Top Tube   Result Value Ref Range    Hold Specimen JIC    Extra Red Top Tube   Result Value Ref Range    Hold Specimen JIC    CBC with platelets and differential   Result Value Ref Range    WBC Count 11.4 (H) 4.0 - 11.0 10e3/uL    RBC Count 4.89 4.40 - 5.90 10e6/uL    Hemoglobin 12.8 (L) 13.3 - 17.7 g/dL    Hematocrit 41.1 40.0 - 53.0 %    MCV 84 78 - 100 fL    MCH 26.2 (L) 26.5 - 33.0 pg    MCHC 31.1 (L) 31.5 - 36.5 g/dL    RDW 15.8 (H) 10.0 - 15.0 %    Platelet Count 274 150 - 450 10e3/uL    % Neutrophils 82 %    % Lymphocytes 9 %    % Monocytes 8 %    % Eosinophils 0 %    % Basophils 1 %    %  Immature Granulocytes 1 %    NRBCs per 100 WBC 0 <1 /100    Absolute Neutrophils 9.3 (H) 1.6 - 8.3 10e3/uL    Absolute Lymphocytes 1.0 0.8 - 5.3 10e3/uL    Absolute Monocytes 1.0 0.0 - 1.3 10e3/uL    Absolute Eosinophils 0.0 0.0 - 0.7 10e3/uL    Absolute Basophils 0.1 0.0 - 0.2 10e3/uL    Absolute Immature Granulocytes 0.1 <=0.4 10e3/uL    Absolute NRBCs 0.0 10e3/uL   Extra Tube ([place] Draw)    Narrative    The following orders were created for panel order Extra Tube ([place] Draw).  Procedure                               Abnormality         Status                     ---------                               -----------         ------                     Extra Blood Bank Purple ...[[id number]]                      Final result               Extra Blood Bank Purple ...[[id number]]                      Final result                 Please view results for these tests on the individual orders.   Extra Blood Bank Purple Top Tube   Result Value Ref Range    Hold Specimen JIC    Extra Blood Bank Purple Top Tube   Result Value Ref Range    Hold Specimen JIC    INR   Result Value Ref Range    INR 2.60 (H) 0.85 - 1.15   Hemoglobin A1c   Result Value Ref Range    Estimated Average Glucose 157 (H) <117 mg/dL    Hemoglobin A1C 7.1 (H) <5.7 %   Magnesium   Result Value Ref Range    Magnesium 1.9 1.7 - 2.3 mg/dL   iStat Gases (lactate) venous, POCT   Result Value Ref Range    Lactic Acid POCT 2.0 <=2.0 mmol/L    Bicarbonate Venous POCT 27 21 - 28 mmol/L    O2 Sat, Venous POCT 44 (L) 70 - 75 %    pCO2 Venous POCT 45 40 - 50 mm Hg    pH Venous POCT 7.38 7.32 - 7.43    pO2 Venous POCT 25 25 - 47 mm Hg   EKG 12 lead   Result Value Ref Range    Systolic Blood Pressure  mmHg    Diastolic Blood Pressure  mmHg    Ventricular Rate 59 BPM    Atrial Rate 59 BPM    WV Interval 140 ms    QRS Duration 136 ms     ms    QTc 453 ms    P Axis 11 degrees    R AXIS -46 degrees    T Axis 0 degrees    Interpretation ECG       Sinus  bradycardia  Right bundle branch block  Left anterior fascicular block  ** Bifascicular block **  Possible Lateral infarct , age undetermined  Abnormal ECG  When compared with ECG of 17-Sep-2020 15:14,  Right bundle branch block has replaced Incomplete right bundle branch block  Borderline criteria for Lateral infarct are now Present  Unconfirmed report - interpretation of this ECG is computer generated - see medical record for final interpretation  Confirmed by - EMERGENCY ROOM, PHYSICIAN ([id number]),  Evan Whitten ([id number]) on 10/4/2024 12:41:40 PM     UA with Microscopic reflex to Culture    Specimen: Urine, Catheter   Result Value Ref Range    Color Urine Yellow Colorless, Straw, Light Yellow, Yellow    Appearance Urine Clear Clear    Glucose Urine Negative Negative mg/dL    Bilirubin Urine Negative Negative    Ketones Urine Negative Negative mg/dL    Specific Gravity Urine 1.027 1.003 - 1.035    Blood Urine Negative Negative    pH Urine 5.5 5.0 - 7.0    Protein Albumin Urine 20 (A) Negative mg/dL    Urobilinogen Urine 6.0 (A) Normal, 2.0 mg/dL    Nitrite Urine Negative Negative    Leukocyte Esterase Urine Negative Negative    Mucus Urine Present (A) None Seen /LPF    RBC Urine <1 <=2 /HPF    WBC Urine 1 <=5 /HPF    Squamous Epithelials Urine <1 <=1 /HPF    Narrative    Urine Culture not indicated   Glucose by meter   Result Value Ref Range    GLUCOSE BY METER POCT 164 (H) 70 - 99 mg/dL   INR   Result Value Ref Range    INR 3.08 (H) 0.85 - 1.15   Glucose by meter   Result Value Ref Range    GLUCOSE BY METER POCT 206 (H) 70 - 99 mg/dL   Glucose by meter   Result Value Ref Range    GLUCOSE BY METER POCT 149 (H) 70 - 99 mg/dL   Magnesium   Result Value Ref Range    Magnesium 2.0 1.7 - 2.3 mg/dL   INR   Result Value Ref Range    INR 3.94 (H) 0.85 - 1.15   CBC with platelets   Result Value Ref Range    WBC Count 8.7 4.0 - 11.0 10e3/uL    RBC Count 4.66 4.40 - 5.90 10e6/uL    Hemoglobin 12.2 (L) 13.3 - 17.7 g/dL     Hematocrit 40.5 40.0 - 53.0 %    MCV 87 78 - 100 fL    MCH 26.2 (L) 26.5 - 33.0 pg    MCHC 30.1 (L) 31.5 - 36.5 g/dL    RDW 15.9 (H) 10.0 - 15.0 %    Platelet Count 216 150 - 450 10e3/uL   Ionized Calcium   Result Value Ref Range    Calcium Ionized Whole Blood 6.3 (H) 4.4 - 5.2 mg/dL   Comprehensive metabolic panel   Result Value Ref Range    Sodium 138 135 - 145 mmol/L    Potassium 5.0 3.4 - 5.3 mmol/L    Carbon Dioxide (CO2) 23 22 - 29 mmol/L    Anion Gap 11 7 - 15 mmol/L    Urea Nitrogen 9.3 8.0 - 23.0 mg/dL    Creatinine 0.92 0.67 - 1.17 mg/dL    GFR Estimate 89 >60 mL/min/1.73m2    Calcium 11.7 (H) 8.8 - 10.4 mg/dL    Chloride 104 98 - 107 mmol/L    Glucose 188 (H) 70 - 99 mg/dL    Alkaline Phosphatase 234 (H) 40 - 150 U/L     (H) 0 - 45 U/L    ALT 17 0 - 70 U/L    Protein Total 6.4 6.4 - 8.3 g/dL    Albumin 2.9 (L) 3.5 - 5.2 g/dL    Bilirubin Total 1.3 (H) <=1.2 mg/dL   Glucose by meter   Result Value Ref Range    GLUCOSE BY METER POCT 180 (H) 70 - 99 mg/dL     *Note: Due to a large number of results and/or encounters for the requested time period, some results have not been displayed. A complete set of results can be found in Results Review.

## 2024-10-05 NOTE — PROGRESS NOTES
[facility]    Medicine Progress Note - Hospitalist Service    Date of Admission:  10/4/2024    Assessment & Plan   Toby Mcgrath is a 69-year-old man who recently was diagnosed with cholangiocarcinoma who came to attention on 10/4/2024 due to change in his mental status and significantly increased weakness.  His symptoms had started a couple of weeks ago, but have rapidly increased in the past 24-48 hours PTA.    Medical history includes hepatitis C, NIDDM, tobacco abuse with COPD, degenerative disc disease with chronic back pain managed with narcotics and atrial fibrillation managed with metoprolol and warfarin. He has had multiple back surgeries.     On presentation to the emergency department, VS: HR 65, widely variable blood pressure ranging from 110-170/ 80's and respiratory rate 18.  Temperature 97  F.  Weight is 95.7  Labs: Creatinine 0.85, sodium 130, chloride 95, albumin 3.4, calcium 12.0.  Alkaline phosphatase 268, /ALT 25.  Bilirubin 1.4.  Glucose 204.  WBC 11.4, Hgb 12.8, .  INR 2.6.  Urinalysis negative.    DX  Metabolic v toxic encephalopathy related to polypharmacy v hypercalcemia v combination of the two.  Hypercalcemia related to cholangiocarcinoma.   Generalized weakness and falls.  Acute component is probably due to the hypercalcemia but more chronically, the patient has been losing weight over the last several months.  Therapeutic anticoagulation for atrial fibrillation.  Chronic back pain for which the patient is maintained on pregabalin as well as narcotic medications.  Weight loss presumably associated with malignancy but possibly not eating well.  Mild hyponatremia probably due to diuresis due to hypercalcemia. Resolved.  PLAN:  Zometa 4 mg IV once now.   Continues on Oxycodone 5-10 mg q 4 prn, acetaminophen 1 gram po BID (limited dose due to liver disease).   Usual home meds resumed.   Will need a bedside attendant tonight.  We need to try to avoid  "sedating medications.  Would prefer to use Haldol rather than benzodiazepines.          Diet: Combination Diet Regular Diet Adult  Snacks/Supplements Adult: Ensure Max Protein (bariatric); Between Meals    DVT Prophylaxis: DOAC  Mckenzie Catheter: Not present  Lines: None     Cardiac Monitoring: None  Code Status: Full Code      Clinically Significant Risk Factors Present on Admission         # Hyponatremia: Lowest Na = 130 mmol/L in last 2 days, will monitor as appropriate   # Hypercalcemia: Highest Ca = 12 mg/dL in last 2 days, will monitor as appropriate    # Hypoalbuminemia: Lowest albumin = 2.9 g/dL at 10/5/2024  6:22 AM, will monitor as appropriate  # Drug Induced Coagulation Defect: home medication list includes an anticoagulant medication    # Hypertension: Noted on problem list        # DMII: A1C = 7.1 % (Ref range: <5.7 %) within past 6 months    # Overweight: Estimated body mass index is 29.43 kg/m  as calculated from the following:    Height as of this encounter: 1.803 m (5' 11\").    Weight as of this encounter: 95.7 kg (211 lb).              Disposition Plan     Medically Ready for Discharge: Anticipated in 2-4 Days             David Dove MD  Hospitalist Service  [facility]  Securely message with [employer] (more info)  Text page via [employer] Paging/Directory   ______________________________________________________________________    Interval History   Stable overnight, but still confused.     Family is present at the bedside with many questions.    Physical Exam   Vital Signs: Temp: 98.2  F (36.8  C) Temp src: Temporal BP: (!) 142/70 Pulse: 72   Resp: 14 SpO2: 97 % O2 Device: None (Room air)    Weight: 211 lbs 0 oz    General Appearance: Somnolent.  He intermittently is mildly interactive but not appropriate.  Respiratory: No increased work of breathing.  Clear to auscultation anteriorly.  Cardiovascular: Regular rate and rhythm without murmur.  GI: Soft, nontender, nondistended.  " Right upper quadrant fullness is noted.  Skin: No skin lesions appreciated.  Other: Decreased muscular mass noted throughout.    Medical Decision Making       45 MINUTES SPENT BY ME on the date of service doing chart review, history, exam, documentation & further activities per the note.      Data   Results for orders placed or performed during the hospital encounter of 10/04/24 (from the past 24 hour(s))   Glucose by meter   Result Value Ref Range    GLUCOSE BY METER POCT 164 (H) 70 - 99 mg/dL   INR   Result Value Ref Range    INR 3.08 (H) 0.85 - 1.15   Glucose by meter   Result Value Ref Range    GLUCOSE BY METER POCT 206 (H) 70 - 99 mg/dL   Glucose by meter   Result Value Ref Range    GLUCOSE BY METER POCT 149 (H) 70 - 99 mg/dL   Magnesium   Result Value Ref Range    Magnesium 2.0 1.7 - 2.3 mg/dL   INR   Result Value Ref Range    INR 3.94 (H) 0.85 - 1.15   CBC with platelets   Result Value Ref Range    WBC Count 8.7 4.0 - 11.0 10e3/uL    RBC Count 4.66 4.40 - 5.90 10e6/uL    Hemoglobin 12.2 (L) 13.3 - 17.7 g/dL    Hematocrit 40.5 40.0 - 53.0 %    MCV 87 78 - 100 fL    MCH 26.2 (L) 26.5 - 33.0 pg    MCHC 30.1 (L) 31.5 - 36.5 g/dL    RDW 15.9 (H) 10.0 - 15.0 %    Platelet Count 216 150 - 450 10e3/uL   Ionized Calcium   Result Value Ref Range    Calcium Ionized Whole Blood 6.3 (H) 4.4 - 5.2 mg/dL   Comprehensive metabolic panel   Result Value Ref Range    Sodium 138 135 - 145 mmol/L    Potassium 5.0 3.4 - 5.3 mmol/L    Carbon Dioxide (CO2) 23 22 - 29 mmol/L    Anion Gap 11 7 - 15 mmol/L    Urea Nitrogen 9.3 8.0 - 23.0 mg/dL    Creatinine 0.92 0.67 - 1.17 mg/dL    GFR Estimate 89 >60 mL/min/1.73m2    Calcium 11.7 (H) 8.8 - 10.4 mg/dL    Chloride 104 98 - 107 mmol/L    Glucose 188 (H) 70 - 99 mg/dL    Alkaline Phosphatase 234 (H) 40 - 150 U/L     (H) 0 - 45 U/L    ALT 17 0 - 70 U/L    Protein Total 6.4 6.4 - 8.3 g/dL    Albumin 2.9 (L) 3.5 - 5.2 g/dL    Bilirubin Total 1.3 (H) <=1.2 mg/dL   Glucose by meter    Result Value Ref Range    GLUCOSE BY METER POCT 180 (H) 70 - 99 mg/dL   Glucose by meter   Result Value Ref Range    GLUCOSE BY METER POCT 206 (H) 70 - 99 mg/dL   Glucose by meter   Result Value Ref Range    GLUCOSE BY METER POCT 153 (H) 70 - 99 mg/dL     *Note: Due to a large number of results and/or encounters for the requested time period, some results have not been displayed. A complete set of results can be found in Results Review.

## 2024-10-05 NOTE — CONSULTS
CLINICAL NUTRITION SERVICES  -  ASSESSMENT NOTE      Recommendations Ordered by Registered Dietitian (RD):   Advance to PO diet within 48 hours if appropriate    After adv to PO diet:  Ensure Enlive TID to support adequate intakes while PO remains low (Pt's family stated that preference is vanilla Ensure and that this has been accepted and tolerated by pt in the past)    Encourage small, frequent intakes 4-6 times a day to aid in adequate intake as well as lower the risk of GI upset r/t prolonged period of low PO    Recommend keeping food items available at all times in an effort to stimulate appetite and promote intake    Please chart I/Os consistently as able    Ordering daily weights to monitor for potential weight loss   MALNUTRITION:  % Weight Loss:  None noted. Family believes that weight loss of 50 lbs has occurred over the last 6 months. However, there is no documentation of this so cannot be verified.   % Intake:  </= 50% for >/= 5 days   Subcutaneous Fat Loss:  Orbital region moderate depletion, Upper arm region mild depletion, and Thoracic region mild depletion  Muscle Loss:  Temporal region mild depletion, Clavicle bone region mild depletion, Acromion bone region moderate depletion, and Dorsal hand region moderate depletion  Fluid Retention:  None noted    Malnutrition Diagnosis: Severe malnutrition  In Context of:  Acute illness or injury  Chronic illness or disease        REASON FOR ASSESSMENT  Toby Mcgrath is a 69 year old male seen by Registered Dietitian for Admission Nutrition Risk Screen for positive MST of 2 - weight loss and poor appetite    PMH: hepatitis C, NIDDM, tobacco abuse with COPD, degenerative disc disease with chronic back pain managed with narcotics and atrial fibrillation managed with metoprolol and warfarin. He has had multiple back surgeries. Pt also recently diagnosed w/ cholangiocarcinoma (has not yet started any treatment)    Per EMR, pt presented to ED on 10/4/24 with  "change in mental status and significantly increased weakness which has occurred over the course of the last several weeks. W/ brother Xavier and son, Antoni.    Per H&P, MD stated that pt has been losing weight over the past couple of months and has not been eating well.      NUTRITION HISTORY  Pt is confused and unable to relay information. Multitude of family was in room, including his brother Xavier and son Antoni who are his primary caretakers.     - Information obtained from family at bedside  - Diet at home: Follows a regular diet at home - no specific restrictions  - Usual intakes: Over the last few weeks-month, intake has been \"nonexistent\" according to family. He attempts to eat 2-3 meals per day, however typically only is able to consume about 10% of each meal. Per son, pt does like Vanilla Ensure and will drinks those occasionally.  - Barriers to PO intakes: Family reports early satiety by pt as well as lack of appetite.  - Use of oral supplements: Vanilla Ensure occasionally.  - Chewing/swallowing issues: Per RN note, pt is coughing/aspirating thin liquids and is pocketing solid foods. Swallow study requested.    CURRENT NUTRITION ORDERS  Diet Order:     NPO     Current Intake/Tolerance:  No current documentation of appetite or intake - most likely d/t to short LOS   Per [facility], well portioned breakfast ordered this morning (10/5) and small dinner ordered last night (10/4)    RD spoke with pt's family about recommendations for PO when intake is possible. Spoke about small, frequent meals and having snacks/drinks around at all times in an effort to stimulate appetite or interest in food.  RD also spoke about plan to add Ensure Vanilla TID once PO diet is introduced. These will be at 10 AM, 2 PM, and 8 PM to align with goal of keeping food around.  Pt's son did inquire about the possibility of nutrition support - specifically inquiring about parenteral nutrition. RD spoke with family about benefits of " "nutrition support, but also spoke about working towards encouragement of oral intake.  RD also stated that nutrition support would need to be delivered via enteral nutrition route, if this decision was made, because of the need to keep gut muscle active and avoid atrophy.    Pt's family verbalized understanding of all information presented and denied further questions at this time.       NUTRITION FOCUSED PHYSICAL ASSESSMENT FOR DIAGNOSING MALNUTRITION)  Yes         Observed:    See above    Obtained from Chart/Interdisciplinary Team:  No current documentation of edema, ascites, pressure injuries, or nonhealing wounds.      ANTHROPOMETRICS  Height: 5' 11\"  Weight: 211 lbs 0 oz  Body mass index is 29.43 kg/m .  Weight Status:  Overweight BMI 25-29.9  Weight History: No weight loss documented.  Wt Readings from Last 10 Encounters:   10/04/24 95.7 kg (211 lb)   10/03/24 96.2 kg (212 lb)   10/01/24 92.1 kg (203 lb)   09/18/24 92.3 kg (203 lb 6.4 oz)   09/12/24 91.6 kg (202 lb)   09/11/24 95.7 kg (211 lb)   09/09/24 95.7 kg (211 lb)   06/24/24 91.4 kg (201 lb 8 oz)   06/18/24 95.3 kg (210 lb)   06/13/24 94.5 kg (208 lb 6.4 oz)    Family believes that weight loss of 50 lbs has occurred over the last 6 months. However, there is no documentation of this so cannot be verified. This would be a -15.6% BW change which would classify as severe malnutrition. However, there is no documentation of this so cannot be verified.    LABS  Labs reviewed  Noted: high martin(11.7), high alk phos(234), high gluc (188)    MEDICATIONS  Medications reviewed  Noted: insulin, miralax, warfarin,  mL/hr      ASSESSED NUTRITION NEEDS PER APPROVED PRACTICE GUIDELINES:    Dosing Weight 95.7 kg (Actual BW)  Estimated Energy Needs: 1991-3186 (MSJ w/ AF 1.2)  Justification: maintenance  Estimated Protein Needs: 1-1.2 g pro/Kg  Justification: preservation of lean body mass w/ advancing age  Estimated Fluid Needs: per provider pending fluid " status      NUTRITION DIAGNOSIS:  Inadequate protein-energy intake related to early satiety and lack of appetite as evidenced by intake that meets < 50% of estimated needs for at least 2 weeks PTA, muscle depletion, fat depletion, malnutrition diagnosis, and reports of pt only eating 10% of meals before reporting fullness.      NUTRITION INTERVENTIONS  Recommendations / Nutrition Prescription  Advance to PO diet within 48 hours if appropriate    After adv to PO diet:  Ensure Enlive TID to support adequate intakes while PO remains low (Pt's family stated that preference is vanilla Ensure and that this has been accepted and tolerated by pt in the past)    Encourage small, frequent intakes 4-6 times a day to aid in adequate intake as well as lower the risk of GI upset r/t prolonged period of low PO    Recommend keeping food items available at all times in an effort to stimulate appetite and promote intake    Please chart I/Os consistently as able    Ordering daily weights to monitor for potential weight loss    Implementation  Nutrition education: Provided education on See above  Composition of Meals and Snacks: education  General/healthful diet: education  Medical Food Supplement: Ensure Enlive TID when diet adv to PO order      Nutrition Goals  Adv to PO diet order within 48 hours      MONITORING AND EVALUATION:  Progress towards goals will be monitored and evaluated per protocol and Practice Guidelines          Jordana Joy RD, LD  Clinical Dietitian  3rd Floor/ICU: [phone]  All Other Floors: [phone]  Weekends/Holiday: [phone]  Office: [phone]

## 2024-10-06 NOTE — PLAN OF CARE
"Goal Outcome Evaluation:      Plan of Care Reviewed With: child    Overall Patient Progress: no changeOverall Patient Progress: no change    Patient only alert to self, very lethargic and sleeping most of the shift. Denied pain, VSS. Incontinent of bladder, impulsive when awake trying to get out of bed and agitated sometimes. Bedside attendant in room, is NPO due to episodes of aspiration and choking this AM. Hem/Onco following. Will continue to follow plan of care.  Problem: Adult Inpatient Plan of Care  Goal: Plan of Care Review  Description: The Plan of Care Review/Shift note should be completed every shift.  The Outcome Evaluation is a brief statement about your assessment that the patient is improving, declining, or no change.  This information will be displayed automatically on your shift  note.  Outcome: Not Progressing  Flowsheets (Taken 10/5/2024 2328)  Plan of Care Reviewed With: child  Overall Patient Progress: no change  Goal: Patient-Specific Goal (Individualized)  Description: You can add care plan individualizations to a care plan. Examples of Individualization might be:  \"Parent requests to be called daily at 9am for status\", \"I have a hard time hearing out of my right ear\", or \"Do not touch me to wake me up as it startles  me\".  Outcome: Not Progressing  Goal: Absence of Hospital-Acquired Illness or Injury  Outcome: Not Progressing  Intervention: Identify and Manage Fall Risk  Recent Flowsheet Documentation  Taken 10/5/2024 1600 by Antoinette Stark, RN  Safety Promotion/Fall Prevention:   activity supervised   assistive device/personal items within reach   clutter free environment maintained   nonskid shoes/slippers when out of bed   supervised activity   room near nurse's station   safety round/check completed   bedside attendant  Intervention: Prevent Skin Injury  Recent Flowsheet Documentation  Taken 10/5/2024 1600 by Antoinette Stark, RN  Body Position: weight shifting  Goal: Optimal Comfort " and Wellbeing  Outcome: Not Progressing  Goal: Readiness for Transition of Care  Outcome: Not Progressing     Problem: Comorbidity Management  Goal: Maintenance of Asthma Control  Outcome: Not Progressing  Intervention: Maintain Asthma Symptom Control  Recent Flowsheet Documentation  Taken 10/5/2024 1600 by Antoinette Stark RN  Medication Review/Management: medications reviewed  Goal: Maintenance of Behavioral Health Symptom Control  Outcome: Not Progressing  Intervention: Maintain Behavioral Health Symptom Control  Recent Flowsheet Documentation  Taken 10/5/2024 1600 by Antoinette Stark RN  Medication Review/Management: medications reviewed  Goal: Maintenance of COPD Symptom Control  Outcome: Not Progressing  Intervention: Maintain COPD Symptom Control  Recent Flowsheet Documentation  Taken 10/5/2024 1600 by Antoinette Stark RN  Medication Review/Management: medications reviewed  Goal: Blood Glucose Levels Within Targeted Range  Outcome: Not Progressing  Intervention: Monitor and Manage Glycemia  Recent Flowsheet Documentation  Taken 10/5/2024 1600 by Antoinette Stark RN  Medication Review/Management: medications reviewed  Goal: Maintenance of Heart Failure Symptom Control  Outcome: Not Progressing  Intervention: Maintain Heart Failure Management  Recent Flowsheet Documentation  Taken 10/5/2024 1600 by Antoinette Stark RN  Medication Review/Management: medications reviewed  Goal: Blood Pressure in Desired Range  Outcome: Not Progressing  Intervention: Maintain Blood Pressure Management  Recent Flowsheet Documentation  Taken 10/5/2024 1600 by Antoinette Stark RN  Medication Review/Management: medications reviewed  Goal: Maintenance of Osteoarthritis Symptom Control  Outcome: Not Progressing  Intervention: Maintain Osteoarthritis Symptom Control  Recent Flowsheet Documentation  Taken 10/5/2024 1600 by Antoinette Stark RN  Medication Review/Management: medications reviewed  Goal: Bariatric Home  Regimen Maintained  Outcome: Not Progressing  Intervention: Maintain and Manage Postbariatric Surgery Care  Recent Flowsheet Documentation  Taken 10/5/2024 1600 by Antoinette Stark RN  Medication Review/Management: medications reviewed  Goal: Maintenance of Seizure Control  Outcome: Not Progressing  Intervention: Maintain Seizure Symptom Control  Recent Flowsheet Documentation  Taken 10/5/2024 1600 by Antoinette Stark, RN  Medication Review/Management: medications reviewed     Problem: Pain Chronic (Persistent)  Goal: Optimal Pain Control and Function  Outcome: Not Progressing  Intervention: Manage Persistent Pain  Recent Flowsheet Documentation  Taken 10/5/2024 1600 by Antoinette Stark, RN  Medication Review/Management: medications reviewed

## 2024-10-06 NOTE — PLAN OF CARE
"Goal Outcome Evaluation:      Plan of Care Reviewed With: patient    Overall Patient Progress: improvingOverall Patient Progress: improving    Outcome Evaluation: Bedside attendant continued. Per provider, pt mentation improving.    /69 (BP Location: Left arm)   Pulse 78   Temp 97.7  F (36.5  C) (Temporal)   Resp 21   Ht 1.803 m (5' 11\")   Wt 95.7 kg (211 lb)   SpO2 95%   BMI 29.43 kg/m      On RA.    Pertinent Assessments: Denies pain. Confused. A/O to self, but refuses to state birthdate. Refuses to participate in some cares/assessment. LS dim. Incontinent. Up with 2A sonny steady.   Major Shift Events: Trial full liquid diet.   Treatment Plan: Follow labs. Advance diet as tolerated. Monitor mentation. CM following for discharge planning. Hem/Onc following.                                           Problem: Adult Inpatient Plan of Care  Goal: Plan of Care Review  Description: The Plan of Care Review/Shift note should be completed every shift.  The Outcome Evaluation is a brief statement about your assessment that the patient is improving, declining, or no change.  This information will be displayed automatically on your shift  note.  Outcome: Progressing  Flowsheets (Taken 10/6/2024 1204)  Outcome Evaluation: Bedside attendant continued. Per provider, pt mentation improving.  Plan of Care Reviewed With: patient  Overall Patient Progress: improving  Goal: Patient-Specific Goal (Individualized)  Description: You can add care plan individualizations to a care plan. Examples of Individualization might be:  \"Parent requests to be called daily at 9am for status\", \"I have a hard time hearing out of my right ear\", or \"Do not touch me to wake me up as it startles  me\".  Outcome: Progressing  Goal: Absence of Hospital-Acquired Illness or Injury  Outcome: Progressing  Intervention: Identify and Manage Fall Risk  Recent Flowsheet Documentation  Taken 10/6/2024 0919 by Lakisha Ferrer RN  Safety " Promotion/Fall Prevention:   activity supervised   clutter free environment maintained   nonskid shoes/slippers when out of bed   patient and family education   safety round/check completed   supervised activity   bedside attendant  Intervention: Prevent Skin Injury  Recent Flowsheet Documentation  Taken 10/6/2024 0919 by Lakisha Ferrer RN  Body Position: sitting up in bed  Intervention: Prevent Infection  Recent Flowsheet Documentation  Taken 10/6/2024 0919 by Lakisha Ferrer RN  Infection Prevention:   single patient room provided   rest/sleep promoted  Goal: Optimal Comfort and Wellbeing  Outcome: Progressing  Goal: Readiness for Transition of Care  Outcome: Progressing     Problem: Comorbidity Management  Goal: Maintenance of Asthma Control  Outcome: Progressing  Intervention: Maintain Asthma Symptom Control  Recent Flowsheet Documentation  Taken 10/6/2024 0919 by Lakisha Ferrer RN  Medication Review/Management:   medications reviewed   high-risk medications identified  Goal: Maintenance of Behavioral Health Symptom Control  Outcome: Progressing  Intervention: Maintain Behavioral Health Symptom Control  Recent Flowsheet Documentation  Taken 10/6/2024 0919 by Lakisha Ferrer RN  Medication Review/Management:   medications reviewed   high-risk medications identified  Goal: Maintenance of COPD Symptom Control  Outcome: Progressing  Intervention: Maintain COPD Symptom Control  Recent Flowsheet Documentation  Taken 10/6/2024 0919 by Lakisha Ferrer RN  Medication Review/Management:   medications reviewed   high-risk medications identified  Goal: Blood Glucose Levels Within Targeted Range  Outcome: Progressing  Intervention: Monitor and Manage Glycemia  Recent Flowsheet Documentation  Taken 10/6/2024 0919 by Lakisha Ferrer RN  Medication Review/Management:   medications reviewed   high-risk medications identified  Goal: Maintenance of Heart Failure Symptom Control  Outcome:  Progressing  Intervention: Maintain Heart Failure Management  Recent Flowsheet Documentation  Taken 10/6/2024 0919 by Lakisha Ferrer RN  Medication Review/Management:   medications reviewed   high-risk medications identified  Goal: Blood Pressure in Desired Range  Outcome: Progressing  Intervention: Maintain Blood Pressure Management  Recent Flowsheet Documentation  Taken 10/6/2024 0919 by Lakisha Ferrer RN  Medication Review/Management:   medications reviewed   high-risk medications identified  Goal: Maintenance of Osteoarthritis Symptom Control  Outcome: Progressing  Intervention: Maintain Osteoarthritis Symptom Control  Recent Flowsheet Documentation  Taken 10/6/2024 0919 by Lakisha Ferrer RN  Activity Management: activity adjusted per tolerance  Medication Review/Management:   medications reviewed   high-risk medications identified  Goal: Bariatric Home Regimen Maintained  Outcome: Progressing  Intervention: Maintain and Manage Postbariatric Surgery Care  Recent Flowsheet Documentation  Taken 10/6/2024 0919 by Lakisha Ferrer RN  Medication Review/Management:   medications reviewed   high-risk medications identified  Goal: Maintenance of Seizure Control  Outcome: Progressing  Intervention: Maintain Seizure Symptom Control  Recent Flowsheet Documentation  Taken 10/6/2024 0919 by Lakisha Ferrer RN  Medication Review/Management:   medications reviewed   high-risk medications identified     Problem: Pain Chronic (Persistent)  Goal: Optimal Pain Control and Function  Outcome: Progressing  Intervention: Manage Persistent Pain  Recent Flowsheet Documentation  Taken 10/6/2024 0919 by Lakisha Ferrer RN  Medication Review/Management:   medications reviewed   high-risk medications identified

## 2024-10-06 NOTE — PROGRESS NOTES
[facility]    Medicine Progress Note - Hospitalist Service    Date of Admission:  10/4/2024    Assessment & Plan   Toby Mcgrath is a 69-year-old man who recently was diagnosed with cholangiocarcinoma who came to attention on 10/4/2024 due to change in his mental status and significantly increased weakness.  His symptoms had started a couple of weeks ago, but have rapidly increased in the past 24-48 hours PTA.    Medical history includes hepatitis C, NIDDM, tobacco abuse with COPD, degenerative disc disease with chronic back pain managed with narcotics and atrial fibrillation managed with metoprolol and warfarin. He has had multiple back surgeries.     On presentation to the emergency department, VS: HR 65, widely variable blood pressure ranging from 110-170/ 80's and respiratory rate 18.  Temperature 97  F.  Weight is 95.7  Labs: Creatinine 0.85, sodium 130, chloride 95, albumin 3.4, calcium 12.0.  Alkaline phosphatase 268, /ALT 25.  Bilirubin 1.4.  Glucose 204.  WBC 11.4, Hgb 12.8, .  INR 2.6.  Urinalysis negative.    DX  Metabolic v toxic encephalopathy related to hypercalcemia.  Possibility of polypharmacy is considered as well.  Today, the patient is better, however.  Hypercalcemia related to cholangiocarcinoma.   Generalized weakness and falls.  Acute component is probably due to the hypercalcemia but more chronically, the patient has been losing weight over the last several months.  Therapeutic anticoagulation for atrial fibrillation.  Chronic back pain for which the patient is maintained on pregabalin as well as narcotic medications.  Weight loss presumably associated with malignancy but possibly not eating well.  Mild hyponatremia probably due to diuresis due to hypercalcemia. Resolved.  PLAN:  Zometa 4 mg IV completed on Saturday.  Continues on Oxycodone 5-10 mg q 4 prn, acetaminophen 1 gram po BID (limited dose due to liver disease).   Usual home meds resumed.  "  Continue with bedside attendant tonight.  We we will continue to avoid sedating medications.  Would prefer to use Haldol rather than benzodiazepines.          Diet: NPO per Anesthesia Guidelines for Procedure/Surgery Except for: Meds, Ice Chips  Snacks/Supplements Adult: Other; Ensure Enlive Vanilla TID (10 AM, 2 PM, 8 PM); With Meals    DVT Prophylaxis: DOAC  Mckenzie Catheter: Not present  Lines: None     Cardiac Monitoring: None  Code Status: Full Code      Clinically Significant Risk Factors         # Hyponatremia: Lowest Na = 130 mmol/L in last 2 days, will monitor as appropriate   # Hypercalcemia: Highest Ca = 12 mg/dL in last 2 days, will monitor as appropriate    # Hypoalbuminemia: Lowest albumin = 2.9 g/dL at 10/5/2024  6:22 AM, will monitor as appropriate    # Coagulation Defect: INR = 3.94 (Ref range: 0.85 - 1.15) and/or PTT = N/A, will monitor for bleeding    # Hypertension: Noted on problem list          # DMII: A1C = 7.1 % (Ref range: <5.7 %) within past 6 months, PRESENT ON ADMISSION  # Overweight: Estimated body mass index is 29.43 kg/m  as calculated from the following:    Height as of this encounter: 1.803 m (5' 11\").    Weight as of this encounter: 95.7 kg (211 lb)., PRESENT ON ADMISSION  # Severe Malnutrition: based on nutrition assessment, PRESENT ON ADMISSION          Disposition Plan     Medically Ready for Discharge: Anticipated in 2-4 Days             David Dove MD  Hospitalist Service  [facility]  Securely message with [employer] (more info)  Text page via [facility] Paging/Directory   ______________________________________________________________________    Interval History   Patient is much more alert and interactive though not to his baseline yet.  Nurses indicate that he is intermittently pretty somnolent.  But we agreed that it is reasonable to start on a diet now.    I contacted patient's brother, Xavier, by telephone.    Physical Exam   Vital Signs: Temp: 97.7  F " (36.5  C) Temp src: Temporal BP: 139/69 Pulse: 78   Resp: 21 SpO2: 95 % O2 Device: None (Room air)    Weight: 211 lbs 0 oz    General Appearance: Somnolent.  He intermittently is mildly interactive but not appropriate.  Respiratory: No increased work of breathing.  Clear to auscultation anteriorly.  Cardiovascular: Regular rate and rhythm without murmur.  GI: Soft, nontender, nondistended.  Right upper quadrant discomfort is noted on palpation.  Skin: No skin lesions appreciated.  Other: Decreased muscular mass noted throughout.    Medical Decision Making       45 MINUTES SPENT BY ME on the date of service doing chart review, history, exam, documentation & further activities per the note.      Data   Results for orders placed or performed during the hospital encounter of 10/04/24 (from the past 24 hour(s))   Glucose by meter   Result Value Ref Range    GLUCOSE BY METER POCT 169 (H) 70 - 99 mg/dL   Glucose by meter   Result Value Ref Range    GLUCOSE BY METER POCT 157 (H) 70 - 99 mg/dL   Glucose by meter   Result Value Ref Range    GLUCOSE BY METER POCT 154 (H) 70 - 99 mg/dL   INR   Result Value Ref Range    INR 3.77 (H) 0.85 - 1.15   Magnesium   Result Value Ref Range    Magnesium 1.7 1.7 - 2.3 mg/dL   Basic metabolic panel   Result Value Ref Range    Sodium 139 135 - 145 mmol/L    Potassium 3.7 3.4 - 5.3 mmol/L    Chloride 107 98 - 107 mmol/L    Carbon Dioxide (CO2) 18 (L) 22 - 29 mmol/L    Anion Gap 14 7 - 15 mmol/L    Urea Nitrogen 8.5 8.0 - 23.0 mg/dL    Creatinine 0.73 0.67 - 1.17 mg/dL    GFR Estimate >90 >60 mL/min/1.73m2    Calcium 10.6 (H) 8.8 - 10.4 mg/dL    Glucose 160 (H) 70 - 99 mg/dL   Parathyroid Hormone Intact   Result Value Ref Range    Parathyroid Hormone Intact 9 (L) 15 - 65 pg/mL    Narrative    This result was obtained with the Roche Elecsys PTH STAT assay.   This reference range differs from PTH assays used in other [facility] laboratories.   Ionized Calcium   Result Value Ref Range     Calcium Ionized Whole Blood 5.6 (H) 4.4 - 5.2 mg/dL   Extra Purple Top EDTA (LAB USE ONLY)   Result Value Ref Range    Hold Specimen JIC    Glucose by meter   Result Value Ref Range    GLUCOSE BY METER POCT 146 (H) 70 - 99 mg/dL   Glucose by meter   Result Value Ref Range    GLUCOSE BY METER POCT 154 (H) 70 - 99 mg/dL   Glucose by meter   Result Value Ref Range    GLUCOSE BY METER POCT 186 (H) 70 - 99 mg/dL     *Note: Due to a large number of results and/or encounters for the requested time period, some results have not been displayed. A complete set of results can be found in Results Review.

## 2024-10-06 NOTE — PROGRESS NOTES
Care Management Follow Up    Length of Stay (days): 2    Expected Discharge Date: 10/08/2024     Concerns to be Addressed:     discharge planning  Patient plan of care discussed at interdisciplinary rounds: Yes    Anticipated Discharge Disposition:  TBD     Discussed  Partnership in Safe Discharge Planning  document with patient/family: No     Handoff Completed: No, handoff not indicated or clinically appropriate    Additional Information:  JAK followed up with pt's son to discuss discharge planning. Adonis reports they would like pt to go to a 24/7 short term.  JAK asked what kind of facility they are referring to TCU/LTC?  Adonis said he didn't know, but said they are not able to care for pt.  SW tried explaining the difference between TCU and LTC.  Per Adonis pt does not have the financial means to pay for LTC. JAK advised him to contact the [facility] tomorrow and start the process for MA, he then told SW they are working with someone from the [facility] on elderly voucher?  JAK requested the  they are working with so SW can follow up with them Monday. Adonis reports he didn't know and told SW his brother Alhaji would know and provided SW with Alhaji's phone number [phone].     JAK called Alhaji and left a voice message requesting a call back.    Next Steps: JAK can follow up with [facility] worker on Monday to discuss MA eligibility for LTC.     Maritza QUEVEDO, [facility]  Inpatient Care Coordination   [facility]   [phone]    ADDENDUM:  JAK met with Alhaji pt's son to discuss discharge planning.  Alhaji reports family is not going to be able to care for pt, however, they don't have money to pay for LTC.      Family is waiting to talk to oncology tomorrow. JAK explained what hospice services would look like.     JKA also discussed if pt is not appropriate for hospice PT could assess and if appropriate for TCU discharge to TCU for rehab then TCU could work with family on LTC.       JAK encouraged Alhaji  to follow up with the county tomorrow and ask about [facility] reports they were given paperwork for Elderly waiver services but wasn't explained what that meant.      PLAN: Hospice vs TCU vs LTC

## 2024-10-06 NOTE — PLAN OF CARE
"Goal Outcome Evaluation:           Overall Patient Progress: no changeOverall Patient Progress: no change    Outcome Evaluation: No acute events overnight. PSC at beside.    Pt somnolent overnight. No s/sx of pain. Incontinent of bladder. NPO. Discharge plan pending.       Problem: Adult Inpatient Plan of Care  Goal: Plan of Care Review  Description: The Plan of Care Review/Shift note should be completed every shift.  The Outcome Evaluation is a brief statement about your assessment that the patient is improving, declining, or no change.  This information will be displayed automatically on your shift  note.  Outcome: Not Progressing  Flowsheets (Taken 10/6/2024 0553)  Outcome Evaluation: No acute events overnight. PSC at beside.  Overall Patient Progress: no change  Goal: Patient-Specific Goal (Individualized)  Description: You can add care plan individualizations to a care plan. Examples of Individualization might be:  \"Parent requests to be called daily at 9am for status\", \"I have a hard time hearing out of my right ear\", or \"Do not touch me to wake me up as it startles  me\".  Outcome: Not Progressing  Goal: Absence of Hospital-Acquired Illness or Injury  Outcome: Not Progressing  Intervention: Identify and Manage Fall Risk  Recent Flowsheet Documentation  Taken 10/6/2024 0130 by Lakisha Roberto RN  Safety Promotion/Fall Prevention:   bedside attendant   safety round/check completed  Intervention: Prevent Skin Injury  Recent Flowsheet Documentation  Taken 10/6/2024 0130 by Lakisha Roberto, RN  Device Skin Pressure Protection: absorbent pad utilized/changed  Intervention: Prevent Infection  Recent Flowsheet Documentation  Taken 10/6/2024 0130 by Lakisha Roberto, RN  Infection Prevention: rest/sleep promoted  Goal: Optimal Comfort and Wellbeing  Outcome: Not Progressing  Goal: Readiness for Transition of Care  Outcome: Not Progressing     Problem: Comorbidity Management  Goal: Maintenance of " Asthma Control  Outcome: Not Progressing  Intervention: Maintain Asthma Symptom Control  Recent Flowsheet Documentation  Taken 10/6/2024 0130 by Lakisha Roberto RN  Medication Review/Management:   medications reviewed   high-risk medications identified  Goal: Maintenance of Behavioral Health Symptom Control  Outcome: Not Progressing  Intervention: Maintain Behavioral Health Symptom Control  Recent Flowsheet Documentation  Taken 10/6/2024 0130 by Lakisha Roberto RN  Medication Review/Management:   medications reviewed   high-risk medications identified  Goal: Maintenance of COPD Symptom Control  Outcome: Not Progressing  Intervention: Maintain COPD Symptom Control  Recent Flowsheet Documentation  Taken 10/6/2024 0130 by Lakisha Roberto RN  Medication Review/Management:   medications reviewed   high-risk medications identified  Goal: Blood Glucose Levels Within Targeted Range  Outcome: Not Progressing  Intervention: Monitor and Manage Glycemia  Recent Flowsheet Documentation  Taken 10/6/2024 0130 by Lakisha Roberto RN  Medication Review/Management:   medications reviewed   high-risk medications identified  Goal: Maintenance of Heart Failure Symptom Control  Outcome: Not Progressing  Intervention: Maintain Heart Failure Management  Recent Flowsheet Documentation  Taken 10/6/2024 0130 by Lakisha Roberto RN  Medication Review/Management:   medications reviewed   high-risk medications identified  Goal: Blood Pressure in Desired Range  Outcome: Not Progressing  Intervention: Maintain Blood Pressure Management  Recent Flowsheet Documentation  Taken 10/6/2024 0130 by Lakisha Roberto RN  Medication Review/Management:   medications reviewed   high-risk medications identified  Goal: Maintenance of Osteoarthritis Symptom Control  Outcome: Not Progressing  Intervention: Maintain Osteoarthritis Symptom Control  Recent Flowsheet Documentation  Taken 10/6/2024 0130 by  Lakisha Roberto RN  Medication Review/Management:   medications reviewed   high-risk medications identified  Goal: Bariatric Home Regimen Maintained  Outcome: Not Progressing  Intervention: Maintain and Manage Postbariatric Surgery Care  Recent Flowsheet Documentation  Taken 10/6/2024 0130 by Lakisha Roberto RN  Medication Review/Management:   medications reviewed   high-risk medications identified  Goal: Maintenance of Seizure Control  Outcome: Not Progressing  Intervention: Maintain Seizure Symptom Control  Recent Flowsheet Documentation  Taken 10/6/2024 0130 by Lakisha Roberto RN  Medication Review/Management:   medications reviewed   high-risk medications identified     Problem: Pain Chronic (Persistent)  Goal: Optimal Pain Control and Function  Outcome: Not Progressing  Intervention: Manage Persistent Pain  Recent Flowsheet Documentation  Taken 10/6/2024 0130 by Lakisha Roberto RN  Medication Review/Management:   medications reviewed   high-risk medications identified

## 2024-10-07 NOTE — PROGRESS NOTES
[facility] Cancer Care Consultation      Toby Mcgrath MRN# [id number]   YOB: 1955 Age: 69 year old   Date of Admission: 10/4/2024  Requesting physician: David Dove MD  Reason for consult: Cholangiocarcinoma.           Assessment and Plan:     Toby Mcgrath is a 69 year old male with recently diagnosed cholangiocarcinoma, hepatitis C, diabetes mellitus type 2, tobacco abuse, COPD, chronic back pain related to degenerative disc disease, atrial fibrillation on warfarin, admitted on 10/4/2024 for confusion and progressive weakness over the past several weeks.    # Metastatic cholangiocarcinoma  # Hypercalcemia  # Generalized progressive weakness and multiple falls  # Weight loss    -- I had a family meeting with the patient and his family. Patient has incurable metastatic cholangiocarcinoma with extensive metastatic disease throughout the liver (+few indeterminate 3-4 mm pulmonary nodules). I discussed prognosis with patient and multiple family members who were present (son was not present).   -Hypercalcemia is likely related to malignancy  S/p aggressive IV fluid hydration. Zometa 4 mg IV x 1, with improvement in Ionaized CA to near normal  - Follow ionized calcium level.    - Patient's current performance status would dictate a very poor prognosis and tolerance to chemotherapy. The plan is to follow the patient for clinical improvement in the next couple of days or at the TCU- if he clinically improves then we would consider palliative chemotherapy only in the outpatient setting. If his performance worsens or does not improve then we would shift gears and recommend hospice- prognosis is poor and at less than 3 months without therapy.         Tila Squires MD  [facility] Hematology/Oncology    Total time spent today: 60 minutes in chart review, patient evaluation, counseling, documentation, test and/or medication/prescription orders, and coordination of care.                Chief  Complaint:   Altered Mental Status and Generalized Weakness           History of Present Illness:   This patient is a 69 year old male with recently diagnosed cholangiocarcinoma, hepatitis C, diabetes mellitus type 2, tobacco abuse, COPD, chronic back pain related to degenerative disc disease, atrial fibrillation on warfarin, admitted on 10/4/2024 for confusion and progressive weakness over the past several weeks.    With regard to oncologic history, he initially presented with abdominal pain in 9/2024.  9/11/2024 abdominal ultrasound showed abnormal liver with cholelithiasis.  9/14/2024 CT abdomen showed innumerable masses throughout the liver and upper abdominal lymphadenopathy.  9/18/2024 CA 19-9 elevated at 287.  9/30/2024 liver biopsy confirmed adenocarcinoma most consistent with primary cholangiocarcinoma.  He was due to see Dr. Tila Squires on 10/7/2024 for oncologic care.    10/3/2024 CT abdomen/pelvis with contrast showed similar extensive masses throughout the liver and splenomegaly.  CT lumbar spine showed no acute fracture.  Has fusion hardware from L3-L5 with intact hardware.  Degenerative changes in the lumbar spine.  CT head without contrast showed no evidence of hemorrhage or masses.    10/5/2024 hemoglobin 12.2, WBC 8.7, platelets 216,000.  Calcium elevated at 11.7, albumin low at 2.9, alkaline phosphatase elevated at 234, ALT 17, AST elevated at 197.  Total bilirubin elevated at 1.3.  Ionized calcium elevated at 6.3.  INR elevated at 3.94.    On this admission, he was started on IV fluids  and Zometa.    10/07/24 more awake and responsive, lucid but not completely coherent. Oriented to person and place. Improvement in mental status over the Lat few days.  -- has had weight loss, episodes of confusion, sleeping for 4-5 hours at a time, not eating, and has had multiple falls.           Physical Exam:   Vitals were reviewed  Blood pressure (!) 147/69, pulse 90, temperature 97.2  F (36.2  C),  "temperature source Temporal, resp. rate 16, height 1.803 m (5' 11\"), weight 95.7 kg (211 lb), SpO2 95%.  Temperatures:  Current - Temp: 98.2  F (36.8  C); Max - Temp  Av.6  F (36.4  C)  Min: 97  F (36.1  C)  Max: 98.2  F (36.8  C)  Respiration range: Resp  Av.2  Min: 14  Max: 18  Pulse range: Pulse  Av.9  Min: 60  Max: 84  Blood pressure range: Systolic (24hrs), Av , Min:106 , Max:176   ; Diastolic (24hrs), Av, Min:61, Max:99    Pulse oximetry range: SpO2  Av.2 %  Min: 92 %  Max: 97 %    Intake/Output Summary (Last 24 hours) at 10/5/2024 1003  Last data filed at 10/5/2024 0900  Gross per 24 hour   Intake 120 ml   Output 200 ml   Net -80 ml       GENERAL: No acute distress, eyes closed.  SKIN: No rashes or jaundice.  HEENT: Normocephalic, atraumatic.  LYMPH: No palpable lymphadenopathy in the cervical, supraclavicular regions.  LUNGS: No audible cough or wheezing.  ABDOMEN: Soft, nontender, protuberant with no palpable hepatosplenomegaly.  EXTREMITIES: No clubbing, cyanosis, or edema.  MENTAL: Not alert, very drowsy.  NEURO: Arousable but not answering questions.              Past Medical History:   I have reviewed this patient's past medical history  Past Medical History:   Diagnosis Date    Acute gout of right elbow, unspecified cause 2016    Acute gouty arthritis 2016    Anxiety state, unspecified     BMI 32.0-32.9,adult 2015    Chronic pain syndrome 2007    Narcotic refill protocol Will return every 2-3 months for clinic visits Controlled substance aggreement on file from this  12 Documentation in problem list: no, appears to be compliant based on last visit He is responding best to Oxycontin 10 mg twice daily # 60 per month.  This is costly and looking into [place] for coverage.  Nausea with MS Contin Has meds refilled 16 of every month Last [facility] website verification:  done on 2015  [email or web address]   2/10/2015:  PCP will take over narcotic " prescription Tapering course: using 5 mg tablets 10 mg morning 15 mg noon, 15 mg night for 1 week then 10 mg morning, 10 mg noon, 15 mg night for 1 week then 10 mg TID for 1 week then see me for refills  Then ongoing taper: 5 mg morning, 10 mg noon and 10 mg night for 1 week then  5 mg morning and noon and 10 mg night for 1 week then 5 mg tid for 1 week Then 5 mg morning no noon dose and 5 mg night for 1 week then No morning or non dose and 5 mg nightly for 1 week then off  Goal is co    Chronic rhinitis 03/29/2007    DDD (degenerative disc disease), cervical 02/08/2013    Normal.  C2-C3: Normal disc, facet joints, spinal canal and neural foramina.  C3-C4: Mild broad-based posterior disc bulge. Otherwise normal.  C4-C5: Normal disc, facet joints, spinal canal and neural foramina.  C5-C6: Moderate degenerative disc disease with loss of disc height, circumferential disc bulge and vertebral endplate osteophytes. Mild spinal canal stenosis and moderate left foraminal stenosis. Normal right foramen and facet joints.  C6-C7: Mild circumferential disc bulge. Slight impression on the thecal sac. Mild left foraminal stenosis. Normal right foramen and facet joints.  C7-T1: Normal disc, facet joints, spinal canal and neural foramina.  T1-T2: Mild central posterior disc protrusion.  T2-T3: Mild central posterior disc protrusion. Slight impression on the spinal cord.        DJD (degenerative joint disease), lumbar 01/10/2012    Esophageal reflux     ,refluxes on upper GI    Gout 04/08/2011    Gout, unspecified     Hyperlipidemia LDL goal <100 10/25/2012    Hypertension goal BP (blood pressure) < 140/90 10/21/2011    Hypertrophy of prostate with urinary obstruction 08/03/2006    Problem list name updated by automated process. Provider to review    HYPERTROPHY PROSTATE WITH OBST 08/03/2006    Idiopathic gout of left elbow, unspecified chronicity 01/03/2017    Impotence of organic origin     Left-sided low back pain with left-sided  sciatica 08/13/2015    Lumbar radiculopathy 09/17/2014    Major depressive disorder, recurrent episode, unspecified 07/09/2008    Osteoarthrosis, unspecified whether generalized or localized, pelvic region and thigh     Pure hyperglyceridemia     ROTATOR CUFF SYND NOS 04/17/2008    S/P lumbar fusion 10/14/2014    Thoracic or lumbosacral neuritis or radiculitis, unspecified     Tobacco use disorder     Type 2 diabetes, HbA1C goal < 8% (H) 09/09/2011             Past Surgical History:   I have reviewed this patient's past surgical history  Past Surgical History:   Procedure Laterality Date    BACK SURGERY      both shoulder repair  2006, 2008    ESOPHAGOSCOPY, GASTROSCOPY, DUODENOSCOPY (EGD), COMBINED N/A 10/1/2024    Procedure: Esophagoscopy, gastroscopy, duodenoscopy (EGD), combined;  Surgeon: Camille Torres MD;  Location: [facility]    EXCISE MASS NECK Right 6/24/2024    Procedure: EXCISION, MASS, NECK;  Surgeon: Lucie Zamudio MD;  Location: [facility] OR    FUSION LUMBAR ANTERIOR, FUSION LUMBAR POSTERIOR TWO LEVELS, COMBINED N/A 9/17/2014    Procedure: COMBINED FUSION LUMBAR ANTERIOR, FUSION LUMBAR POSTERIOR TWO LEVELS;  Surgeon: Chris Lugo MD;  Location: [facility] OR    [facility]GI ENDOSCOPY DIAG W OR W/O BRUSH/WASH  3/04    ok    HEAD & NECK SURGERY      HEMILAMINECTOMY, DISCECTOMY LUMBAR ONE LEVEL, COMBINED Left 9/8/2015    Procedure: COMBINED HEMILAMINECTOMY, DISCECTOMY LUMBAR ONE LEVEL;  Surgeon: Jer Irby MD;  Location: [facility] OR    right hip replacement  10,2010    [facility] NONSPECIFIC PROCEDURE  1995    neck cyst    Z NONSPECIFIC PROCEDURE  1979    laminectomy L5-S1 x 2    [facility] SHOULDER SURG PROC UNLISTED  2005, '07    repairs,later manipulate,inject LT, RT    ZZ COLONOSCOPY THRU STOMA, DIAGNOSTIC  3/04    normal               Social History:   I have reviewed this patient's social history  Social History     Tobacco Use    Smoking status: Former     Current packs/day: 0.00     Average packs/day: 1 pack/day  for 40.0 years (40.0 ttl pk-yrs)     Types: Cigarettes     Start date:      Quit date:      Years since quittin.7     Passive exposure: Never    Smokeless tobacco: Former     Quit date: 2013   Substance Use Topics    Alcohol use: Yes     Comment: beer occasionally             Family History:   I have reviewed this patient's family history  Family History   Problem Relation Age of Onset    Diabetes Mother     Lung Cancer Mother     C.A.D. Father     Diabetes Father     Hypertension Father     Cancer Brother     Coronary Artery Disease Brother         Bypass surgery    Deep Vein Thrombosis (DVT) Brother         Following surgery    Colon Cancer No family hx of              Allergies:     Allergies   Allergen Reactions    Morphine And Codeine Nausea and Vomiting     Tolerating other opiates              Medications:   I have reviewed this patient's current medications  Medications Prior to Admission   Medication Sig Dispense Refill Last Dose    busPIRone (BUSPAR) 5 MG tablet TAKE 1 TABLET (5 MG) BY MOUTH 2 TIMES DAILY 180 tablet 0 10/4/2024 at am    CYMBALTA 60 MG capsule Take 2 capsules (120 mg) by mouth daily 180 capsule 3 10/4/2024 at am    finasteride (PROSCAR) 5 MG tablet TAKE 1 TABLET BY MOUTH DAILY FOR PROSTATE ENLARGEMENT 90 tablet 2 10/4/2024 at am    glipiZIDE (GLUCOTROL XL) 5 MG 24 hr tablet TAKE ONE TABLET BY MOUTH ONCE DAILY 90 tablet 0 10/4/2024 at am    metFORMIN (GLUCOPHAGE XR) 500 MG 24 hr tablet Take 4 tablets (2,000 mg) by mouth daily (with dinner) 360 tablet 3 10/4/2024 at am    oxyCODONE (ROXICODONE) 5 MG tablet Take 1 tablet (5 mg) by mouth every 6 hours as needed for severe pain. Max #4 per day (Patient taking differently: Take 10 mg by mouth every 6 hours as needed for severe pain. Max #4 per day) 120 tablet 0 Past Week at -    pregabalin (LYRICA) 100 MG capsule TAKE TWO CAPSULES BY MOUTH TWICE A  capsule 1 10/4/2024 at am    simvastatin (ZOCOR) 20 MG tablet TAKE 1 TABLET  (20 MG) BY MOUTH AT BEDTIME 90 tablet 0 10/4/2024 at am    tamsulosin (FLOMAX) 0.4 MG capsule TAKE TWO CAPSULES BY MOUTH ONCE DAILY 180 capsule 3 10/4/2024 at am    warfarin ANTICOAGULANT (JANTOVEN ANTICOAGULANT) 5 MG tablet Take 1 1/2 tablets (7.5 mg) everyday OR per INR clinic 140 tablet 1 10/4/2024 at am    acetaminophen (TYLENOL) 500 MG tablet Take 500-1,000 mg by mouth every 8 hours as needed for mild pain   prn at prn    albuterol (PROAIR HFA/PROVENTIL HFA/VENTOLIN HFA) 108 (90 Base) MCG/ACT inhaler Inhale 2 puffs into the lungs every 6 hours as needed for shortness of breath, wheezing or cough 18 g 2 prn at prn    famotidine (PEPCID) 20 MG tablet Take 1 tablet (20 mg) by mouth 2 times daily as needed (heart burn) 60 tablet 3 prn at prn    polyethylene glycol (MIRALAX) 17 GM/Dose powder Mix 1 tablespoon with water by mouth daily as needed constipation 510 g 0 prn at prn    senna-docusate (SENOKOT-S/PERICOLACE) 8.6-50 MG tablet Take 1-2 tablets by mouth daily as needed for constipation. 60 tablet 0 prn at prn     Current Facility-Administered Medications   Medication Dose Route Frequency Provider Last Rate Last Admin    acetaminophen (TYLENOL) tablet 975 mg  975 mg Oral Q12H David Dove MD   975 mg at 10/07/24 1016    artificial saliva (BIOTENE MT) solution 1 spray  1 spray Mouth/Throat 4x Daily David Dove MD   1 spray at 10/07/24 1017    busPIRone (BUSPAR) tablet 5 mg  5 mg Oral BID David Dove MD   5 mg at 10/07/24 1017    glucose gel 15-30 g  15-30 g Oral Q15 Min PRN David Dove MD        Or    dextrose 50 % injection 25-50 mL  25-50 mL Intravenous Q15 Min PRN David Dove MD        Or    glucagon injection 1 mg  1 mg Subcutaneous Q15 Min PRN David Dove MD        DULoxetine (CYMBALTA) DR capsule 120 mg  120 mg Oral Daily David Dove MD   120 mg at 10/07/24 1015    famotidine (PEPCID) tablet 20 mg  20 mg Oral BID PRN David Dove MD        finasteride (PROSCAR) tablet 5 mg  5  mg Oral Daily David Dove MD   5 mg at 10/07/24 1035    haloperidol lactate (HALDOL) injection 2 mg  2 mg Intravenous Q6H PRN David Dove MD        insulin aspart (NovoLOG) injection (RAPID ACTING)  1-7 Units Subcutaneous Q4H David Dove MD   1 Units at 10/07/24 0849    lidocaine (LMX4) cream   Topical Q1H PRN David Dove MD        lidocaine 1 % 0.1-1 mL  0.1-1 mL Other Q1H PRN David Dove MD        metoprolol succinate ER (TOPROL XL) 24 hr tablet 100 mg  100 mg Oral Daily David Dove MD   100 mg at 10/07/24 1015    ondansetron (ZOFRAN ODT) ODT tab 4 mg  4 mg Oral Q6H PRN David Dove MD        Or    ondansetron (ZOFRAN) injection 4 mg  4 mg Intravenous Q6H PRN David Dove MD        oxyCODONE (ROXICODONE) tablet 5 mg  5 mg Oral Q4H PRN David Dove MD   5 mg at 10/05/24 0850    oxyCODONE IR (ROXICODONE) tablet 10 mg  10 mg Oral Q4H PRN David Dove MD   10 mg at 10/07/24 1111    pantoprazole (PROTONIX) EC tablet 40 mg  40 mg Oral Daily David Dove MD   40 mg at 10/07/24 1015    Patient is already receiving anticoagulation with heparin, enoxaparin (LOVENOX), warfarin (COUMADIN)  or other anticoagulant medication   Does not apply Continuous PRN David Dove MD        polyethylene glycol (MIRALAX) Packet 17 g  17 g Oral Daily David Dove MD   17 g at 10/05/24 0852    pregabalin (LYRICA) capsule 150 mg  150 mg Oral BID David Dove MD   150 mg at 10/07/24 1035    senna-docusate (SENOKOT-S/PERICOLACE) 8.6-50 MG per tablet 1 tablet  1 tablet Oral BID PRN David Dove MD        Or    senna-docusate (SENOKOT-S/PERICOLACE) 8.6-50 MG per tablet 2 tablet  2 tablet Oral BID PRN David Dove MD        simvastatin (ZOCOR) tablet 20 mg  20 mg Oral At Bedtime David Dove MD   20 mg at 10/04/24 2237    sodium chloride (PF) 0.9% PF flush 3 mL  3 mL Intracatheter Q8H David Dove MD   3 mL at 10/07/24 0026    sodium chloride (PF) 0.9% PF flush 3 mL  3 mL Intracatheter q1  min prn David Dove MD        sodium chloride 0.9 % infusion   Intravenous Continuous David Dove  mL/hr at 10/07/24 0226 New Bag at 10/07/24 0226    tamsulosin (FLOMAX) capsule 0.8 mg  0.8 mg Oral Daily David Dove MD   0.8 mg at 10/07/24 1015    Warfarin Dose Required Daily - Pharmacist Managed  1 each Oral See Admin Instructions David Dove MD                 Review of Systems:     The 14 point Review of Systems is negative other than noted in the HPI.            Data:   All laboratory data reviewed  Results for orders placed or performed during the hospital encounter of 10/04/24 (from the past 24 hour(s))   Glucose by meter   Result Value Ref Range    GLUCOSE BY METER POCT 154 (H) 70 - 99 mg/dL   Glucose by meter   Result Value Ref Range    GLUCOSE BY METER POCT 186 (H) 70 - 99 mg/dL   Glucose by meter   Result Value Ref Range    GLUCOSE BY METER POCT 188 (H) 70 - 99 mg/dL   Glucose by meter   Result Value Ref Range    GLUCOSE BY METER POCT 183 (H) 70 - 99 mg/dL   INR   Result Value Ref Range    INR 3.13 (H) 0.85 - 1.15   Potassium   Result Value Ref Range    Potassium 3.6 3.4 - 5.3 mmol/L   Magnesium   Result Value Ref Range    Magnesium 1.8 1.7 - 2.3 mg/dL   Glucose   Result Value Ref Range    Glucose 185 (H) 70 - 99 mg/dL   Glucose by meter   Result Value Ref Range    GLUCOSE BY METER POCT 162 (H) 70 - 99 mg/dL   Ionized Calcium   Result Value Ref Range    Calcium Ionized Whole Blood 5.3 (H) 4.4 - 5.2 mg/dL     *Note: Due to a large number of results and/or encounters for the requested time period, some results have not been displayed. A complete set of results can be found in Results Review.

## 2024-10-07 NOTE — PLAN OF CARE
"Goal Outcome Evaluation:      Plan of Care Reviewed With: patient    Overall Patient Progress: no changeOverall Patient Progress: no change    Patient alert to self, is confused.  Lethargic sleeping between cares, VSS. Codi steady with assist of two, incontinent sometimes. Tolerated diet denied pain, Hem/Onc following. Will continue to follow plan of care  Problem: Adult Inpatient Plan of Care  Goal: Plan of Care Review  Description: The Plan of Care Review/Shift note should be completed every shift.  The Outcome Evaluation is a brief statement about your assessment that the patient is improving, declining, or no change.  This information will be displayed automatically on your shift  note.  Outcome: Not Progressing  Flowsheets (Taken 10/6/2024 4939)  Plan of Care Reviewed With: patient  Overall Patient Progress: no change  Goal: Patient-Specific Goal (Individualized)  Description: You can add care plan individualizations to a care plan. Examples of Individualization might be:  \"Parent requests to be called daily at 9am for status\", \"I have a hard time hearing out of my right ear\", or \"Do not touch me to wake me up as it startles  me\".  Outcome: Not Progressing  Goal: Absence of Hospital-Acquired Illness or Injury  Outcome: Not Progressing  Intervention: Identify and Manage Fall Risk  Recent Flowsheet Documentation  Taken 10/6/2024 1715 by Antoinette Stark RN  Safety Promotion/Fall Prevention:   activity supervised   assistive device/personal items within reach   clutter free environment maintained   nonskid shoes/slippers when out of bed  Intervention: Prevent Skin Injury  Recent Flowsheet Documentation  Taken 10/6/2024 1715 by Antoinette Stark, RN  Body Position: position changed independently  Intervention: Prevent and Manage VTE (Venous Thromboembolism) Risk  Recent Flowsheet Documentation  Taken 10/6/2024 1715 by Antoinette Stark, RN  VTE Prevention/Management: SCDs off (sequential compression " devices)  Goal: Optimal Comfort and Wellbeing  Outcome: Not Progressing  Goal: Readiness for Transition of Care  Outcome: Not Progressing     Problem: Comorbidity Management  Goal: Maintenance of Asthma Control  Outcome: Not Progressing  Intervention: Maintain Asthma Symptom Control  Recent Flowsheet Documentation  Taken 10/6/2024 1715 by Antoinette Stark RN  Medication Review/Management: medications reviewed  Goal: Maintenance of Behavioral Health Symptom Control  Outcome: Not Progressing  Intervention: Maintain Behavioral Health Symptom Control  Recent Flowsheet Documentation  Taken 10/6/2024 1715 by Antoinette Stark RN  Medication Review/Management: medications reviewed  Goal: Maintenance of COPD Symptom Control  Outcome: Not Progressing  Intervention: Maintain COPD Symptom Control  Recent Flowsheet Documentation  Taken 10/6/2024 1715 by Antoinette Stark RN  Medication Review/Management: medications reviewed  Goal: Blood Glucose Levels Within Targeted Range  Outcome: Not Progressing  Intervention: Monitor and Manage Glycemia  Recent Flowsheet Documentation  Taken 10/6/2024 1715 by Antoinette Stark RN  Medication Review/Management: medications reviewed  Goal: Maintenance of Heart Failure Symptom Control  Outcome: Not Progressing  Intervention: Maintain Heart Failure Management  Recent Flowsheet Documentation  Taken 10/6/2024 1715 by Antoinette Stark RN  Medication Review/Management: medications reviewed  Goal: Blood Pressure in Desired Range  Outcome: Not Progressing  Intervention: Maintain Blood Pressure Management  Recent Flowsheet Documentation  Taken 10/6/2024 1715 by Antoinette Stark RN  Medication Review/Management: medications reviewed  Goal: Maintenance of Osteoarthritis Symptom Control  Outcome: Not Progressing  Intervention: Maintain Osteoarthritis Symptom Control  Recent Flowsheet Documentation  Taken 10/6/2024 1715 by Antoinette Stark RN  Activity Management: activity adjusted per  tolerance  Medication Review/Management: medications reviewed  Goal: Bariatric Home Regimen Maintained  Outcome: Not Progressing  Intervention: Maintain and Manage Postbariatric Surgery Care  Recent Flowsheet Documentation  Taken 10/6/2024 1715 by Antoinette Stark RN  Medication Review/Management: medications reviewed  Goal: Maintenance of Seizure Control  Outcome: Not Progressing  Intervention: Maintain Seizure Symptom Control  Recent Flowsheet Documentation  Taken 10/6/2024 1715 by Antoinette Stark, RN  Medication Review/Management: medications reviewed     Problem: Pain Chronic (Persistent)  Goal: Optimal Pain Control and Function  Outcome: Not Progressing  Intervention: Manage Persistent Pain  Recent Flowsheet Documentation  Taken 10/6/2024 1715 by Antoinette Stark, RN  Medication Review/Management: medications reviewed

## 2024-10-07 NOTE — PROGRESS NOTES
[facility]    Medicine Progress Note - Hospitalist Service    Date of Admission:  10/4/2024    Assessment & Plan   Toby Mcgrath is a 69-year-old man with PMH significant for recent diagnosis cholangiocarcinoma, hepatitis C, NIDDM, tobacco abuse with COPD, degenerative disc disease with chronic back pain managed with narcotics and atrial fibrillation managed with metoprolol and warfarin who was admitted on 10/4/2024 due to change in his mental status and significantly increased weakness.       Metabolic & toxic encephalopathy related to hypercalcemia vs polypharmacy:  This has improved with treatment of hypercalcemia & reduced dose of pregabalin.  Son reports that he is nearing his baseline but is not quite there.  Up and eating more today 10/7.   -Continue current cares for today, consider further reduction in pregabalin, duloxetine, or buspar dosing tomorrow  -Trial off sitter if ok with beside nursing    Hypercalcemia related to cholangiocarcinoma:  Likely related to underlying malignancy.  This was treated with zometa with gradual downtrend in calcium.  Now nearing normal.  There have been various discussions with patient, family, and his oncology team regarding cares moving forward.  Patient has not even started chemo yet and is already encephalopathic, malnourished and with progressive weakness and fall.  At present he is not a chemo candidate but this may be offered in the future if he becomes stronger following TCU rehabilitation.  Chemotherapy would only be palliative in nature and not curative.  -Oncology following, appreciate recommendations  -Recheck calcium in AM  -Given near normal calcium, improved oral intake will stop IV fluids at this time    Generalized weakness & falls:  This is likely related to underlying cancer, hypercalcemia, suspected malnutrition.  Seems to slowly be regaining strength with improved oral intake, treatment of hypercalcemia.  -PT/OT recommend  "TCU  -SW consulted for placement    Afib on anticoagulation: Warfarin pharmacy to dose    Chronic back pain: He continues on a PTA pregabalin (reduced dose), duloxetine, oxycodone, scheduled tylenol.  Denied pain to me AM 10/7    Weight loss: Likely related to his underlying malignancy that he has not yet begun treatment for.  This contributes to his frailty.  Unclear if he will improve enough to tolerate chemotherapy in the future.    Mild hyponatremia: Resolved.  Likely related dehydration.  Resolved with IV fluid resuscitation.          Diet: Snacks/Supplements Adult: Other; Ensure Enlive Vanilla TID (10 AM, 2 PM, 8 PM); With Meals  Full Liquid Diet    DVT Prophylaxis: Warfarin  Mckenzie Catheter: Not present  Lines: None     Cardiac Monitoring: None  Code Status: Full Code      Clinically Significant Risk Factors           # Hypercalcemia: Highest Ca = 10.6 mg/dL in last 2 days, will monitor as appropriate    # Hypoalbuminemia: Lowest albumin = 2.9 g/dL at 10/5/2024  6:22 AM, will monitor as appropriate  # Coagulation Defect: INR = 3.13 (Ref range: 0.85 - 1.15) and/or PTT = N/A, will monitor for bleeding    # Hypertension: Noted on problem list          # DMII: A1C = 7.1 % (Ref range: <5.7 %) within past 6 months, PRESENT ON ADMISSION  # Overweight: Estimated body mass index is 29.43 kg/m  as calculated from the following:    Height as of this encounter: 1.803 m (5' 11\").    Weight as of this encounter: 95.7 kg (211 lb)., PRESENT ON ADMISSION  # Severe Malnutrition: based on nutrition assessment, PRESENT ON ADMISSION          Disposition Plan     Medically Ready for Discharge: Anticipated Tomorrow    Dung Guevara DO  Hospitalist Service  [facility] [place]  Securely message with [employer] (more info)  Text page via [employer] Paging/Directory   ______________________________________________________________________    Interval History   No acute events overnight.  Patient is mildly lethargic but did awaken " and interact appropriately.  He is oriented to self, time, place.  Disoriented to situation.  Discussed case with his son over the phone who is understanding of current cares and cares moving forward.    Physical Exam   Vital Signs: Temp: 97.2  F (36.2  C) Temp src: Temporal BP: (!) 147/69 Pulse: 90   Resp: 16 SpO2: 95 % O2 Device: None (Room air)    Weight: 211 lbs 0 oz    General Appearance: Patient awake & alert.  No apparent distress. Chronically ill appearing.   Respiratory: Lungs are CTAB.  Work of breathing appears normal on room air.  Cardiovascular: Regular rate and rhythm.  No murmurs rubs or gallops.  There is no edema present.  GI: Benign.  Soft.  Non-tender.  Bowel sounds active.   Skin: No rashes or lesions exposed skin.  Neuro:  The patient is moving all extremities.  No obvious focal asymmetries.   Other: Patient is appropriate and oriented x3 with some prompting.       Medical Decision Making       50 MINUTES SPENT BY ME on the date of service doing chart review, history, exam, documentation & further activities per the note.      Data   ------------------------- PAST 24 HR DATA REVIEWED -----------------------------------------------    I have personally reviewed the following data over the past 24 hrs:    N/A  \   N/A   / N/A     N/A N/A N/A /  198 (H)   3.6 N/A N/A \     INR:  3.13 (H) PTT:  N/A   D-dimer:  N/A Fibrinogen:  N/A       Imaging results reviewed over the past 24 hrs:   No results found for this or any previous visit (from the past 24 hour(s)).

## 2024-10-07 NOTE — PLAN OF CARE
"Goal Outcome Evaluation:      Plan of Care Reviewed With: patient    Overall Patient Progress: no changeOverall Patient Progress: no change    Outcome Evaluation: PSC at bedside, pt confused but redirectable and cooperative    Alert to self only, VSS, on RA, PIV infusing /hr, Ax2 sliding scale when Oob, Q4 glucose monitoring with sliding scale, incontinent of b/b at times, PSC at bedside overnight d/t impulsiveness, tolerating a liquid diet, plan for TCU or LTC at discharge after condition improves    Problem: Adult Inpatient Plan of Care  Goal: Plan of Care Review  Description: The Plan of Care Review/Shift note should be completed every shift.  The Outcome Evaluation is a brief statement about your assessment that the patient is improving, declining, or no change.  This information will be displayed automatically on your shift  note.  Outcome: Not Progressing  Flowsheets (Taken 10/7/2024 0804)  Outcome Evaluation: PSC at bedside, pt confused but redirectable  Plan of Care Reviewed With: patient  Overall Patient Progress: no change  Goal: Patient-Specific Goal (Individualized)  Description: You can add care plan individualizations to a care plan. Examples of Individualization might be:  \"Parent requests to be called daily at 9am for status\", \"I have a hard time hearing out of my right ear\", or \"Do not touch me to wake me up as it startles  me\".  Outcome: Not Progressing  Goal: Absence of Hospital-Acquired Illness or Injury  Outcome: Not Progressing  Intervention: Identify and Manage Fall Risk  Recent Flowsheet Documentation  Taken 10/7/2024 0027 by Melina Kang, RN  Safety Promotion/Fall Prevention:   activity supervised   assistive device/personal items within reach   bedside attendant   clutter free environment maintained   increase visualization of patient   nonskid shoes/slippers when out of bed   room near nurse's station   room organization consistent   safety round/check completed   supervised " activity  Intervention: Prevent Skin Injury  Recent Flowsheet Documentation  Taken 10/7/2024 0027 by Melina Kang RN  Skin Protection: adhesive use limited  Device Skin Pressure Protection:   absorbent pad utilized/changed   adhesive use limited  Intervention: Prevent and Manage VTE (Venous Thromboembolism) Risk  Recent Flowsheet Documentation  Taken 10/7/2024 0027 by Melina Kang RN  VTE Prevention/Management: (pt confused) SCDs off (sequential compression devices)  Intervention: Prevent Infection  Recent Flowsheet Documentation  Taken 10/7/2024 0027 by Melina Kang RN  Infection Prevention:   environmental surveillance performed   rest/sleep promoted   single patient room provided  Goal: Optimal Comfort and Wellbeing  Outcome: Not Progressing  Intervention: Monitor Pain and Promote Comfort  Recent Flowsheet Documentation  Taken 10/7/2024 0030 by Melina Kang RN  Pain Management Interventions:   care clustered   pillow support provided   quiet environment facilitated   repositioned   therapeutic presence   therapeutic touch  Goal: Readiness for Transition of Care  Outcome: Not Progressing     Problem: Comorbidity Management  Goal: Maintenance of Asthma Control  Outcome: Not Progressing  Intervention: Maintain Asthma Symptom Control  Recent Flowsheet Documentation  Taken 10/7/2024 0027 by Melina Kang RN  Medication Review/Management: medications reviewed  Goal: Maintenance of Behavioral Health Symptom Control  Outcome: Not Progressing  Intervention: Maintain Behavioral Health Symptom Control  Recent Flowsheet Documentation  Taken 10/7/2024 0027 by Melina Kang RN  Medication Review/Management: medications reviewed  Goal: Maintenance of COPD Symptom Control  Outcome: Not Progressing  Intervention: Maintain COPD Symptom Control  Recent Flowsheet Documentation  Taken 10/7/2024 0027 by Melina Kang RN  Supportive Measures:   active listening utilized   decision-making supported   positive  reinforcement provided   relaxation techniques promoted   verbalization of feelings encouraged  Medication Review/Management: medications reviewed  Goal: Blood Glucose Levels Within Targeted Range  Outcome: Not Progressing  Intervention: Monitor and Manage Glycemia  Recent Flowsheet Documentation  Taken 10/7/2024 0027 by Melina Kang RN  Glycemic Management: blood glucose monitored  Medication Review/Management: medications reviewed  Goal: Maintenance of Heart Failure Symptom Control  Outcome: Not Progressing  Intervention: Maintain Heart Failure Management  Recent Flowsheet Documentation  Taken 10/7/2024 0027 by Melina Kang RN  Medication Review/Management: medications reviewed  Goal: Blood Pressure in Desired Range  Outcome: Not Progressing  Intervention: Maintain Blood Pressure Management  Recent Flowsheet Documentation  Taken 10/7/2024 0027 by Melina Kang RN  Medication Review/Management: medications reviewed  Goal: Maintenance of Osteoarthritis Symptom Control  Outcome: Not Progressing  Intervention: Maintain Osteoarthritis Symptom Control  Recent Flowsheet Documentation  Taken 10/7/2024 0030 by Melina Kang RN  Assistive Device Utilized: (sonny steady)   other (see comments)   gait belt  Activity Management: activity adjusted per tolerance  Taken 10/7/2024 0027 by Melina Kang RN  Medication Review/Management: medications reviewed  Goal: Bariatric Home Regimen Maintained  Outcome: Not Progressing  Intervention: Maintain and Manage Postbariatric Surgery Care  Recent Flowsheet Documentation  Taken 10/7/2024 0027 by Melina Kang RN  Medication Review/Management: medications reviewed  Goal: Maintenance of Seizure Control  Outcome: Not Progressing  Intervention: Maintain Seizure Symptom Control  Recent Flowsheet Documentation  Taken 10/7/2024 0027 by Melina Kang RN  Sensory Stimulation Regulation:   quiet environment promoted   visual stimulation minimized   auditory stimulation  minimized  Medication Review/Management: medications reviewed     Problem: Pain Chronic (Persistent)  Goal: Optimal Pain Control and Function  Outcome: Not Progressing  Intervention: Develop Pain Management Plan  Recent Flowsheet Documentation  Taken 10/7/2024 0030 by Melina Kang, RN  Pain Management Interventions:   care clustered   pillow support provided   quiet environment facilitated   repositioned   therapeutic presence   therapeutic touch  Intervention: Manage Persistent Pain  Recent Flowsheet Documentation  Taken 10/7/2024 0027 by Melina Kang, RN  Bowel Elimination Promotion: adequate fluid intake promoted  Complementary Therapy: aromatherapy utilized  Medication Review/Management: medications reviewed  Intervention: Optimize Psychosocial Wellbeing  Recent Flowsheet Documentation  Taken 10/7/2024 0027 by Melina Kagn, RN  Supportive Measures:   active listening utilized   decision-making supported   positive reinforcement provided   relaxation techniques promoted   verbalization of feelings encouraged

## 2024-10-08 NOTE — PROGRESS NOTES
"Hematology-Oncology Hospital Follow-up Note  [facility]     Today's Date: 10/08/24  Date of Admission:  10/4/2024  Reason for Consult: cholangiocarcinoma       ASSESSMENT/ PLAN :  Toby Mcgrath is a 69 year old male with recently diagnosed cholangiocarcinoma, hepatitis C, diabetes mellitus type 2, tobacco abuse, COPD, chronic back pain related to degenerative disc disease, atrial fibrillation on warfarin, admitted on 10/4/2024 for confusion and progressive weakness over the past several weeks.     - Calcium now normalized after Zometa and IVF  - Dr. Squires met with family 10/7/24 to review poor prognosis and plan to monitor if he has improvement in performance status enough to receive chemotherapy, otherwise we would recommend hospice   - Patient dramatically improved today with normalization of calcium levels and reduction of pregabalin  - Reviewed with patient and family plan to see Dr. Squires in clinic next week. Patient is still unsure if he would want chemotherapy but we discussed that is still would be worth seeing him in clinic to recheck calcium and continue goals of care conversation.     Discussed with Dr. Russell. No new inpatient oncology recommendations. We will plan to see patient next week in clinic. We will sign off. Please call if any questions.      INTERIM HISTORY:  Layo is feeling well, family notes he is back to his normal self. He is conversing and alert and oriented. He is not sure he wants chemotherapy. We reviewed all treatment is palliative.      MEDICATIONS:  Reviewed       PHYSICAL EXAM:  Vital signs:  Temp: 97.3  F (36.3  C) Temp src: Temporal BP: 128/74 Pulse: 79   Resp: 18 SpO2: 97 % O2 Device: None (Room air)   Height: 180.3 cm (5' 11\") Weight: 95.5 kg (210 lb 8.6 oz)  Estimated body mass index is 29.36 kg/m  as calculated from the following:    Height as of this encounter: 1.803 m (5' 11\").    Weight as of this encounter: 95.5 kg (210 lb 8.6 " oz).      GENERAL/CONSTITUTIONAL: No acute distress. Alert and oriented.   RESPIRATORY: Non-labored breathing.    LABS:  Recent Labs   Lab Test 10/08/24  0748 10/08/24  0608   NA  --  142   POTASSIUM  --  4.4  4.3   CHLORIDE  --  109*   CO2  --  19*   ANIONGAP  --  14   BUN  --  10.2   CR  --  0.92   * 253*  250*   JOSE  --  9.8     Recent Labs   Lab Test 10/05/24  0622 10/04/24  1225 10/03/24  0815   WBC 8.7 11.4* 9.4   HGB 12.2* 12.8* 13.3    274 279   MCV 87 84 85   NEUTROPHIL  --  82 81     Recent Labs   Lab Test 10/05/24  0622 10/04/24  1225   BILITOTAL 1.3* 1.4*   ALKPHOS 234* 268*   ALT 17 25   * 259*   ALBUMIN 2.9* 3.4*       Chris Palmer PA-C  Department of Hematology and Oncology  [facility]   Pager [phone]

## 2024-10-08 NOTE — PROGRESS NOTES
[facility]    Medicine Progress Note - Hospitalist Service    Date of Admission:  10/4/2024    Assessment & Plan   Toby Mcgrath is a 69-year-old man with PMH significant for recent diagnosis cholangiocarcinoma, hepatitis C, NIDDM, tobacco abuse with COPD, degenerative disc disease with chronic back pain managed with narcotics and atrial fibrillation managed with metoprolol and warfarin who was admitted on 10/4/2024 due to change in his mental status and significantly increased weakness.     The patient's mentation has improved significantly with normalization of calcium levels and reduction of pregabalin dosing.     PT/OT consulted.  Anticipate need for TCU.       Metabolic & toxic encephalopathy related to hypercalcemia vs polypharmacy, improved:  This has improved with treatment of hypercalcemia & reduced dose of pregabalin.  Significantly improved even from 10/7.  Fully oriented.  Appropriate.  No longer needing sitter.   -Monitor for delirium    Hypercalcemia related to cholangiocarcinoma:  Likely related to underlying malignancy.  This was treated with zometa with gradual downtrend in calcium.  Now nearing normal.  There have been various discussions with patient, family, and his oncology team regarding cares moving forward.  Patient has not even started chemo yet and is already encephalopathic, malnourished and with progressive weakness and fall.  At present he is not a chemo candidate but this may be offered in the future if he becomes stronger following TCU rehabilitation.  Chemotherapy would only be palliative in nature and not curative.  -Oncology following, appreciate recommendations    Reported LUTS:  -TOV today to ensure he is able to empty bladder    Generalized weakness & falls:  This is likely related to underlying cancer, hypercalcemia, suspected malnutrition.  Seems to slowly be regaining strength with improved oral intake, treatment of hypercalcemia.  -PT/OT  -JAK  none "consulted for placement    Afib on anticoagulation: Warfarin pharmacy to dose    Chronic back pain: He continues on a PTA pregabalin (reduced dose), duloxetine, oxycodone, scheduled tylenol.  Denied pain to me AM 10/7    Weight loss: Likely related to his underlying malignancy that he has not yet begun treatment for.  This contributes to his frailty.  Unclear if he will improve enough to tolerate chemotherapy in the future.    Mild hyponatremia: Resolved.  Likely related dehydration.  Resolved with IV fluid resuscitation.          Diet: Full Liquid Diet  Snacks/Supplements Adult: Other; Glucerna Vanilla TID (10 AM, 2 PM, 8 PM); With Meals    DVT Prophylaxis: Warfarin  Mckenzie Catheter: Not present  Lines: None     Cardiac Monitoring: None  Code Status: Full Code      Clinically Significant Risk Factors           # Hypercalcemia: Highest iCa = 5.3 mg/dL in last 2 days, will monitor as appropriate    # Hypoalbuminemia: Lowest albumin = 2.9 g/dL at 10/5/2024  6:22 AM, will monitor as appropriate       # Hypertension: Noted on problem list          # DMII: A1C = 7.1 % (Ref range: <5.7 %) within past 6 months, PRESENT ON ADMISSION  # Overweight: Estimated body mass index is 29.36 kg/m  as calculated from the following:    Height as of this encounter: 1.803 m (5' 11\").    Weight as of this encounter: 95.5 kg (210 lb 8.6 oz)., PRESENT ON ADMISSION  # Severe Malnutrition: based on nutrition assessment, PRESENT ON ADMISSION          Disposition Plan     Medically Ready for Discharge: Ready Now    Dung Guevara DO  Hospitalist Service  [facility]  Securely message with [employer] (more info)  Text page via [employer] Paging/Directory   ______________________________________________________________________    Interval History   Awake, alert.  No complaints apart from some difficulty with urination.  Fully oriented.      Physical Exam   Vital Signs: Temp: 97.3  F (36.3  C) Temp src: Temporal BP: 120/67 Pulse: 79   " Resp: 18 SpO2: 97 % O2 Device: None (Room air)    Weight: 210 lbs 8.63 oz    General Appearance: Patient awake & alert.  No apparent distress. Chronically ill appearing.   Respiratory: Lungs are CTAB.  Work of breathing appears normal on room air.  Cardiovascular: Regular rate and rhythm.  No murmurs rubs or gallops.  There is no edema present.  GI: Benign.  Soft.  Non-tender.  Bowel sounds active.   Skin: No rashes or lesions exposed skin.  Neuro:  The patient is moving all extremities.  No obvious focal asymmetries.   Other: Patient is appropriate and oriented x4      Medical Decision Making       45 MINUTES SPENT BY ME on the date of service doing chart review, history, exam, documentation & further activities per the note.      Data   ------------------------- PAST 24 HR DATA REVIEWED -----------------------------------------------    I have personally reviewed the following data over the past 24 hrs:    N/A  \   N/A   / N/A     142 109 (H) 10.2 /  252 (H)   4.3; 4.4 19 (L) 0.92 \     INR:  2.92 (H) PTT:  N/A   D-dimer:  N/A Fibrinogen:  N/A       Imaging results reviewed over the past 24 hrs:   No results found for this or any previous visit (from the past 24 hour(s)).

## 2024-10-08 NOTE — PLAN OF CARE
"Goal Outcome Evaluation:      Plan of Care Reviewed With: patient    Overall Patient Progress: improvingOverall Patient Progress: improving    Outcome Evaluation: A&Ox4, int confusion/forgetful, A1  w/ww and gb, Lyrica/Tylenol/Oxycodone for back pain, /252/207, passed dysphagia rescreen, diet advanced to reg, poor appetite, SW following for TCU placement.    Problem: Adult Inpatient Plan of Care  Goal: Plan of Care Review  Description: The Plan of Care Review/Shift note should be completed every shift.  The Outcome Evaluation is a brief statement about your assessment that the patient is improving, declining, or no change.  This information will be displayed automatically on your shift  note.  Outcome: Progressing  Flowsheets (Taken 10/8/2024 1701)  Outcome Evaluation: A&Ox4, int confusion/forgetful, A1  w/ww and gb, Lyrica/Tylenol/Oxycodone for back pain, /252/207, passed dysphagia rescreen, diet advanced to reg, poor appetite, SW following for TCU placement.  Plan of Care Reviewed With: patient  Overall Patient Progress: improving  Goal: Patient-Specific Goal (Individualized)  Description: You can add care plan individualizations to a care plan. Examples of Individualization might be:  \"Parent requests to be called daily at 9am for status\", \"I have a hard time hearing out of my right ear\", or \"Do not touch me to wake me up as it startles  me\".  Outcome: Progressing  Goal: Absence of Hospital-Acquired Illness or Injury  Outcome: Progressing  Intervention: Identify and Manage Fall Risk  Recent Flowsheet Documentation  Taken 10/8/2024 0969 by Gladys Thomas RN  Safety Promotion/Fall Prevention:   activity supervised   assistive device/personal items within reach   clutter free environment maintained   lighting adjusted   mobility aid in reach   nonskid shoes/slippers when out of bed   patient and family education   room organization consistent   safety round/check completed   supervised activity   treat " reversible contributory factors   treat underlying cause  Intervention: Prevent Skin Injury  Recent Flowsheet Documentation  Taken 10/8/2024 0947 by Gladys Thomas RN  Body Position:   supine, head elevated   supine, legs elevated  Skin Protection: adhesive use limited  Device Skin Pressure Protection:   absorbent pad utilized/changed   adhesive use limited   tubing/devices free from skin contact  Intervention: Prevent and Manage VTE (Venous Thromboembolism) Risk  Recent Flowsheet Documentation  Taken 10/8/2024 0947 by Gladys Thomas RN  VTE Prevention/Management: SCDs off (sequential compression devices)  Intervention: Prevent Infection  Recent Flowsheet Documentation  Taken 10/8/2024 0947 by Gladys Thomas RN  Infection Prevention:   hand hygiene promoted   rest/sleep promoted   single patient room provided  Goal: Optimal Comfort and Wellbeing  Outcome: Progressing  Intervention: Monitor Pain and Promote Comfort  Recent Flowsheet Documentation  Taken 10/8/2024 1639 by Gladys Thomas RN  Pain Management Interventions: medication (see MAR)  Taken 10/8/2024 0947 by Gladys Thomas RN  Pain Management Interventions: medication (see MAR)  Goal: Readiness for Transition of Care  Outcome: Progressing     Problem: Comorbidity Management  Goal: Maintenance of Asthma Control  Outcome: Progressing  Intervention: Maintain Asthma Symptom Control  Recent Flowsheet Documentation  Taken 10/8/2024 0947 by Gladys Thomas RN  Medication Review/Management: medications reviewed  Goal: Maintenance of Behavioral Health Symptom Control  Outcome: Progressing  Intervention: Maintain Behavioral Health Symptom Control  Recent Flowsheet Documentation  Taken 10/8/2024 0947 by Gladys Thomas RN  Medication Review/Management: medications reviewed  Goal: Maintenance of COPD Symptom Control  Outcome: Progressing  Intervention: Maintain COPD Symptom Control  Recent Flowsheet Documentation  Taken 10/8/2024 0947 by Gladys Thomas  RN  Supportive Measures:   active listening utilized   verbalization of feelings encouraged  Medication Review/Management: medications reviewed  Goal: Blood Glucose Levels Within Targeted Range  Outcome: Progressing  Intervention: Monitor and Manage Glycemia  Recent Flowsheet Documentation  Taken 10/8/2024 0947 by Gladys Thomas RN  Glycemic Management: blood glucose monitored  Medication Review/Management: medications reviewed  Goal: Maintenance of Heart Failure Symptom Control  Outcome: Progressing  Intervention: Maintain Heart Failure Management  Recent Flowsheet Documentation  Taken 10/8/2024 0947 by Gladys Thomas RN  Medication Review/Management: medications reviewed  Goal: Blood Pressure in Desired Range  Outcome: Progressing  Intervention: Maintain Blood Pressure Management  Recent Flowsheet Documentation  Taken 10/8/2024 0947 by Gladys Thomas RN  Medication Review/Management: medications reviewed  Goal: Maintenance of Osteoarthritis Symptom Control  Outcome: Progressing  Intervention: Maintain Osteoarthritis Symptom Control  Recent Flowsheet Documentation  Taken 10/8/2024 0947 by Gladys Thomas RN  Activity Management: dorsiflexion/plantar flexion performed  Medication Review/Management: medications reviewed  Goal: Bariatric Home Regimen Maintained  Outcome: Progressing  Intervention: Maintain and Manage Postbariatric Surgery Care  Recent Flowsheet Documentation  Taken 10/8/2024 0947 by Gladys Thomas RN  Oral Nutrition Promotion:   adaptive equipment use encouraged   physical activity promoted   rest periods promoted   social interaction promoted  Medication Review/Management: medications reviewed  Goal: Maintenance of Seizure Control  Outcome: Progressing  Intervention: Maintain Seizure Symptom Control  Recent Flowsheet Documentation  Taken 10/8/2024 0947 by Gladys Thomas RN  Sensory Stimulation Regulation:   care clustered   lighting decreased   quiet environment promoted  Medication  Review/Management: medications reviewed     Problem: Pain Chronic (Persistent)  Goal: Optimal Pain Control and Function  Outcome: Progressing  Intervention: Develop Pain Management Plan  Recent Flowsheet Documentation  Taken 10/8/2024 1639 by Gladys Thomas RN  Pain Management Interventions: medication (see MAR)  Taken 10/8/2024 0947 by Gladys Thomas RN  Pain Management Interventions: medication (see MAR)  Intervention: Manage Persistent Pain  Recent Flowsheet Documentation  Taken 10/8/2024 0947 by Gladys Thomas RN  Sleep/Rest Enhancement:   awakenings minimized   noise level reduced   regular sleep/rest pattern promoted  Bowel Elimination Promotion:   adequate fluid intake promoted   ambulation promoted  Medication Review/Management: medications reviewed  Intervention: Optimize Psychosocial Wellbeing  Recent Flowsheet Documentation  Taken 10/8/2024 0947 by Gladys Thomas RN  Supportive Measures:   active listening utilized   verbalization of feelings encouraged

## 2024-10-08 NOTE — PLAN OF CARE
"Goal Outcome Evaluation:      Plan of Care Reviewed With: patient    Overall Patient Progress: no changeOverall Patient Progress: no change       RA. Ax2 with sonny steady. Pt slept between cares. Arouses to voice. Pain managed with paulina tylenol. On K/mag protocol. Pills taken one at a time with water.       Problem: Adult Inpatient Plan of Care  Goal: Plan of Care Review  Description: The Plan of Care Review/Shift note should be completed every shift.  The Outcome Evaluation is a brief statement about your assessment that the patient is improving, declining, or no change.  This information will be displayed automatically on your shift  note.  Outcome: Progressing  Flowsheets (Taken 10/7/2024 2356)  Plan of Care Reviewed With: patient  Overall Patient Progress: no change  Goal: Patient-Specific Goal (Individualized)  Description: You can add care plan individualizations to a care plan. Examples of Individualization might be:  \"Parent requests to be called daily at 9am for status\", \"I have a hard time hearing out of my right ear\", or \"Do not touch me to wake me up as it startles  me\".  Outcome: Progressing  Goal: Absence of Hospital-Acquired Illness or Injury  Outcome: Progressing  Intervention: Identify and Manage Fall Risk  Recent Flowsheet Documentation  Taken 10/7/2024 2147 by Yady Oh, RN  Safety Promotion/Fall Prevention:   activity supervised   assistive device/personal items within reach   clutter free environment maintained   increased rounding and observation   lighting adjusted   safety round/check completed  Intervention: Prevent Skin Injury  Recent Flowsheet Documentation  Taken 10/7/2024 2147 by Yady Oh, RN  Device Skin Pressure Protection:   absorbent pad utilized/changed   adhesive use limited   tubing/devices free from skin contact   positioning supports utilized   pressure points protected  Intervention: Prevent Infection  Recent Flowsheet Documentation  Taken " 10/7/2024 2147 by Yady Oh RN  Infection Prevention:   single patient room provided   rest/sleep promoted  Goal: Optimal Comfort and Wellbeing  Outcome: Progressing  Goal: Readiness for Transition of Care  Outcome: Progressing     Problem: Comorbidity Management  Goal: Maintenance of Asthma Control  Outcome: Progressing  Intervention: Maintain Asthma Symptom Control  Recent Flowsheet Documentation  Taken 10/7/2024 2147 by Yady Oh RN  Medication Review/Management: medications reviewed  Goal: Maintenance of Behavioral Health Symptom Control  Outcome: Progressing  Intervention: Maintain Behavioral Health Symptom Control  Recent Flowsheet Documentation  Taken 10/7/2024 2147 by Yady Oh RN  Medication Review/Management: medications reviewed  Goal: Maintenance of COPD Symptom Control  Outcome: Progressing  Intervention: Maintain COPD Symptom Control  Recent Flowsheet Documentation  Taken 10/7/2024 2147 by Yady Oh RN  Medication Review/Management: medications reviewed  Goal: Blood Glucose Levels Within Targeted Range  Outcome: Progressing  Intervention: Monitor and Manage Glycemia  Recent Flowsheet Documentation  Taken 10/7/2024 2147 by Yady Oh RN  Glycemic Management:   blood glucose monitored   oral hydration promoted  Medication Review/Management: medications reviewed  Goal: Maintenance of Heart Failure Symptom Control  Outcome: Progressing  Intervention: Maintain Heart Failure Management  Recent Flowsheet Documentation  Taken 10/7/2024 2147 by Yady Oh RN  Medication Review/Management: medications reviewed  Goal: Blood Pressure in Desired Range  Outcome: Progressing  Intervention: Maintain Blood Pressure Management  Recent Flowsheet Documentation  Taken 10/7/2024 2147 by Yady Oh RN  Medication Review/Management: medications reviewed  Goal: Maintenance of Osteoarthritis Symptom  Control  Outcome: Progressing  Intervention: Maintain Osteoarthritis Symptom Control  Recent Flowsheet Documentation  Taken 10/7/2024 2147 by Yady Oh RN  Medication Review/Management: medications reviewed  Goal: Bariatric Home Regimen Maintained  Outcome: Progressing  Intervention: Maintain and Manage Postbariatric Surgery Care  Recent Flowsheet Documentation  Taken 10/7/2024 2147 by Yady Oh RN  Medication Review/Management: medications reviewed  Goal: Maintenance of Seizure Control  Outcome: Progressing  Intervention: Maintain Seizure Symptom Control  Recent Flowsheet Documentation  Taken 10/7/2024 2147 by Yady Oh RN  Medication Review/Management: medications reviewed     Problem: Pain Chronic (Persistent)  Goal: Optimal Pain Control and Function  Outcome: Progressing  Intervention: Manage Persistent Pain  Recent Flowsheet Documentation  Taken 10/7/2024 2147 by Yady Oh RN  Medication Review/Management: medications reviewed

## 2024-10-08 NOTE — PROGRESS NOTES
"   10/08/24 1559   Appointment Info   Signing Clinician's Name / Credentials (OT) Don Hernandez, Doug, OTR/L   Rehab Comments (OT) Initial evaluation   Living Environment   People in Home child(gilda), adult   Current Living Arrangements apartment   Transportation Anticipated family or friend will provide   Living Environment Comments Pt not consistent about reporting entrance to the apartment.  At one point stated there were 8 stairs to his floor but then said there was elevator.  States he has underground garage parking.   Self-Care   Usual Activity Tolerance moderate   Current Activity Tolerance moderate   Equipment Currently Used at Home cane, straight;walker, rolling;shower chair  (4WW walker)   Fall history within last six months yes   Number of times patient has fallen within last six months 4   Activity/Exercise/Self-Care Comment Pt states he is independent with basic ADLS.  Lives with adult son who works 12 hour shifts, 4 days on, 4 days off   Instrumental Activities of Daily Living (IADL)   Previous Responsibilities laundry;meal prep   IADL Comments pt vague about this, says son brings home food but that he does some cooking.  Talked about making himself a cold breakfast   General Information   Onset of Illness/Injury or Date of Surgery 10/07/24   Referring Physician Dung Guevara   Patient/Family Therapy Goal Statement (OT) return home   Additional Occupational Profile Info/Pertinent History of Current Problem Pt is 68 yo male who, per chart, \"PMH significant for recent diagnosis cholangiocarcinoma, hepatitis C, NIDDM, tobacco abuse with COPD, degenerative disc disease with chronic back pain managed with narcotics and atrial fibrillation managed with metoprolol and warfarin who was admitted on 10/4/2024 due to change in his mental status and significantly increased weakness.      The patient's mentation has improved significantly with normalization of calcium levels and reduction of pregabalin dosing.\" "   Existing Precautions/Restrictions fall   General Observations and Info Pt sitting in a recliner watching TV.  Willing to participate   Cognitive Status Examination   Orientation Status person  (partial place)   Cognitive Status Comments Pt demonstrating inconsistent recall and reporting variations about his home setting.  Hard to know how accurate he was, limited details in the chart.  May need to talk to family to get most accurate picture.   Visual Perception   Visual Impairment/Limitations WFL   Pain Assessment   Patient Currently in Pain No   Range of Motion Comprehensive   General Range of Motion no range of motion deficits identified   Strength Comprehensive (MMT)   General Manual Muscle Testing (MMT) Assessment upper extremity strength deficits identified   Comment, General Manual Muscle Testing (MMT) Assessment Fair to good upper arm strength but weaker than average  strength.  Would benefit from functional strengthening tasks.   Coordination   Upper Extremity Coordination No deficits were identified   Transfers   Transfers sit-stand transfer   Sit-Stand Transfer   Sit-Stand [place] (Transfers) verbal cues;contact guard;supervision   Clinical Impression   Criteria for Skilled Therapeutic Interventions Met (OT) Yes, treatment indicated   OT Diagnosis decreased independence and safety for ADLS   OT Problem List-Impairments impacting ADL problems related to;activity tolerance impaired;balance;cognition;strength   Assessment of Occupational Performance 5 or more Performance Deficits   Identified Performance Deficits decreased safety and independence for dressing ,bathing, functional mobility, meal preparation, medication management   Clinical Decision Making Complexity (OT) problem focused assessment/low complexity   OT Total Evaluation Time   OT Eval, Low Complexity Minutes (71926) 15   OT Goals   Therapy Frequency (OT) 6 times/week   OT Predicted Duration/Target Date for Goal Attainment 10/15/24    OT Goals Hygiene/Grooming;Upper Body Dressing;Lower Body Dressing;Cognition;OT Goal 1;OT Goal 2   OT: Hygiene/Grooming modified independent;using adaptive equipment;within precautions;while standing   OT: Upper Body Dressing Modified independent;using adaptive equipment;within precautions;including set-up/clothing retrieval   OT: Lower Body Dressing Modified independent;using adaptive equipment;within precautions;including set-up/clothing retrieval   OT: Cognitive Patient/caregiver will verbalize understanding of cognitive assessment results/recommendations as needed for safe discharge planning   OT: Goal 1 Pt will answer 8 of 10 home safety questions correctly including knowing 911 as an emergency number   OT: Goal 2 Pt will demonstrate safe management of his 4WW including safe brake usage while doing ADLS in room   Interventions   Interventions Quick Adds Self-Care/Home Management   Self-Care/Home Management   Self-Care/Home Mgmt/ADL, Compensatory, Meal Prep Minutes (03500) 25   Symptoms Noted During/After Treatment (Meal Preparation/Planning Training) fatigue   Treatment Detail/Skilled Intervention OT: Pt open to participating in therapy.  Treatment started to explore cognitive inconsistencies more and functional safety for movement around room.  Attempted to give pt both the SLUMS and Short Blessed Test.  Pt not able to answer enough questions correctly on either screen to generate a score.  Was an inconsistent , stating that his son set up his medications and watched him take them.  However, he stated he took all his medications once a day, which did not seem appropriate or accurate.  Was inconsistent about reporting when he slept and what he did for cooking meals.  Did say that his brothers came over to help as well, but was not specific about what they did or how often they were there.  Attempted and was able to use a figure four position to doff/don his socks.  Had slightly more difficulty getting  socks on than off.  Stood with SBA to ambulate around the room.  Some inconsistent use of the brakes, putting them on and taking them off twice before starting to walk.  Ambulated into bathroom and to door of the room and back.  Slight loss of balance and partially fell into the chair at end of ambulation.  Left in chair with alarm on at end of session.   OT Discharge Planning   OT Plan Monitor cognitive level and consistency in reporting situation.  Try home safety questions.  If pt doing better could try SLUMS again.  Endurance ADLS for grooming and bathroom transfers with monitoring of safe walker use.   OT Discharge Recommendation (DC Rec) Transitional Care Facility;home with home care occupational therapy   OT Rationale for DC Rec Pt below stated baseline with inconsistent reporting of his home situation and activity level.  Does state his son works 4 12 hour days, appears to be by himself on a regular basis.  Cognitive deficits make taking care of himself by himself concerning at this time with possible safety issues/fall risks.  Would recommend skilled therapy at TCU at this time to increase endurance and safety with ADLS and look at home adaptations/level of supervision which might make home safer for patient.  If patient improves and true level of family support at home is determined pt may be able to go home.  If going home at this point would recommend regular supervision and home OT services   OT Brief overview of current status Goals of therapy will be to address safe mobility and ADLS and make recommendations for discharge to the next level of care.  Pt and RN will continue to follow all fall risk precautions as documented by RN staff while hospitalized.   Total Session Time   Timed Code Treatment Minutes 25   Total Session Time (sum of timed and untimed services) 40

## 2024-10-08 NOTE — PLAN OF CARE
"Goal Outcome Evaluation:    Plan of Care Reviewed With: patient    Overall Patient Progress: no change    Outcome Evaluation: Sleeps with between cares. Can be impulsive per report but mostly slept overnight. Up with A2 sonny steady. Full liquid diet. K+ and Mg protocol. B.    Problem: Adult Inpatient Plan of Care  Goal: Plan of Care Review  Description: The Plan of Care Review/Shift note should be completed every shift.  The Outcome Evaluation is a brief statement about your assessment that the patient is improving, declining, or no change.  This information will be displayed automatically on your shift  note.  Outcome: Progressing  Flowsheets (Taken 10/8/2024 005)  Outcome Evaluation: Sleeps with between cares. Can be impulsive per report but mostly slept overnight. Up with A2 sonny steady. Full liquid diet. K+ and Mg protocol. B.  Plan of Care Reviewed With: patient  Overall Patient Progress: no change  Goal: Patient-Specific Goal (Individualized)  Description: You can add care plan individualizations to a care plan. Examples of Individualization might be:  \"Parent requests to be called daily at 9am for status\", \"I have a hard time hearing out of my right ear\", or \"Do not touch me to wake me up as it startles  me\".  Outcome: Progressing  Goal: Absence of Hospital-Acquired Illness or Injury  Outcome: Progressing  Intervention: Identify and Manage Fall Risk  Recent Flowsheet Documentation  Taken 10/7/2024 2358 by Jr Jaeger RN  Safety Promotion/Fall Prevention:   activity supervised   assistive device/personal items within reach   clutter free environment maintained   increased rounding and observation   lighting adjusted   safety round/check completed  Intervention: Prevent Skin Injury  Recent Flowsheet Documentation  Taken 10/7/2024 2358 by Jr Jaeger RN  Body Position: supine, head elevated  Skin Protection:   adhesive use limited   incontinence pads utilized   pulse oximeter probe " site changed  Device Skin Pressure Protection:   absorbent pad utilized/changed   adhesive use limited   tubing/devices free from skin contact   positioning supports utilized   pressure points protected  Intervention: Prevent and Manage VTE (Venous Thromboembolism) Risk  Recent Flowsheet Documentation  Taken 10/7/2024 2358 by Jr Jaeger RN  VTE Prevention/Management: (confused)   patient refused intervention   SCDs off (sequential compression devices)  Intervention: Prevent Infection  Recent Flowsheet Documentation  Taken 10/7/2024 2358 by Jr Jaeger RN  Infection Prevention:   single patient room provided   rest/sleep promoted  Goal: Optimal Comfort and Wellbeing  Outcome: Progressing  Goal: Readiness for Transition of Care  Outcome: Progressing     Problem: Comorbidity Management  Goal: Maintenance of Asthma Control  Outcome: Progressing  Intervention: Maintain Asthma Symptom Control  Recent Flowsheet Documentation  Taken 10/7/2024 2358 by Jr Jaeger RN  Medication Review/Management: medications reviewed  Goal: Maintenance of Behavioral Health Symptom Control  Outcome: Progressing  Intervention: Maintain Behavioral Health Symptom Control  Recent Flowsheet Documentation  Taken 10/7/2024 2358 by Jr Jaeger RN  Medication Review/Management: medications reviewed  Goal: Maintenance of COPD Symptom Control  Outcome: Progressing  Intervention: Maintain COPD Symptom Control  Recent Flowsheet Documentation  Taken 10/7/2024 2358 by Jr Jaeger RN  Supportive Measures:   active listening utilized   self-care encouraged  Medication Review/Management: medications reviewed  Goal: Blood Glucose Levels Within Targeted Range  Outcome: Progressing  Intervention: Monitor and Manage Glycemia  Recent Flowsheet Documentation  Taken 10/7/2024 2358 by Jr Jaeger RN  Glycemic Management:   blood glucose monitored   oral hydration promoted  Medication Review/Management: medications  reviewed  Goal: Maintenance of Heart Failure Symptom Control  Outcome: Progressing  Intervention: Maintain Heart Failure Management  Recent Flowsheet Documentation  Taken 10/7/2024 2358 by Jr Jaeger RN  Medication Review/Management: medications reviewed  Goal: Blood Pressure in Desired Range  Outcome: Progressing  Intervention: Maintain Blood Pressure Management  Recent Flowsheet Documentation  Taken 10/7/2024 2358 by Jr Jaeger RN  Medication Review/Management: medications reviewed  Goal: Maintenance of Osteoarthritis Symptom Control  Outcome: Progressing  Intervention: Maintain Osteoarthritis Symptom Control  Recent Flowsheet Documentation  Taken 10/7/2024 2358 by Jr Jaeger RN  Assistive Device Utilized: (sonny steady)   gait belt   other (see comments)  Activity Management: activity adjusted per tolerance  Medication Review/Management: medications reviewed  Goal: Bariatric Home Regimen Maintained  Outcome: Progressing  Intervention: Maintain and Manage Postbariatric Surgery Care  Recent Flowsheet Documentation  Taken 10/7/2024 2358 by Jr Jaeger RN  Oral Nutrition Promotion:   physical activity promoted   rest periods promoted   social interaction promoted  Medication Review/Management: medications reviewed  Goal: Maintenance of Seizure Control  Outcome: Progressing  Intervention: Maintain Seizure Symptom Control  Recent Flowsheet Documentation  Taken 10/7/2024 2358 by Jr Jaeger RN  Sensory Stimulation Regulation:   care clustered   television on  Medication Review/Management: medications reviewed     Problem: Pain Chronic (Persistent)  Goal: Optimal Pain Control and Function  Outcome: Progressing  Intervention: Manage Persistent Pain  Recent Flowsheet Documentation  Taken 10/7/2024 2358 by Jr Jaeger RN  Bowel Elimination Promotion:   adequate fluid intake promoted   ambulation promoted   commode/bedpan at bedside  Complementary Therapy: aromatherapy  utilized  Medication Review/Management: medications reviewed  Intervention: Optimize Psychosocial Wellbeing  Recent Flowsheet Documentation  Taken 10/7/2024 1738 by Jr Jaeger RN  Supportive Measures:   active listening utilized   self-care encouraged

## 2024-10-08 NOTE — PROGRESS NOTES
Care Management Follow Up    Length of Stay (days): 4    Expected Discharge Date: 10/09/2024     Concerns to be Addressed:       Patient plan of care discussed at interdisciplinary rounds: Yes    Anticipated Discharge Disposition:  TCU     Patient/family educated on Medicare website which has current facility and service quality ratings:  yes  Education Provided on the Discharge Plan:  yes    Referrals Placed by CM/SW:  TCU  Private pay costs discussed: transportation costs    Discussed  Partnership in Safe Discharge Planning  document with patient/family: No     Handoff Completed: Yes, [facility] PCP: Internal handoff referral completed    Additional Information:  Spoke with patient's son Antoni. He and patient would like to pursue TCU placement so patient can be stronger to return home. He does not have the amount of support at home to provide level of assist needed.   Would like initial TCU referrals sent to [facility] and [facility].   TCU referrals sent.   Reviewed transportation options. If they are not able to safely transport patient we can arrange wheelchair. Reviewed out of pocket cost for [facility] transport, $89.98 base and $5.79 per mile to the destination. Spoke with son, they expressed understanding and are agreeable to this.      Next Steps: Follow up on TCU referrals.     Elba Wooten RN  Care Coordinator  [facility]

## 2024-10-08 NOTE — PLAN OF CARE
PSC discontinued at 1450.  Confused, impulsive if need to use toilet, easily redirected & cooperative majority times.  Chr. pain managed with PRN oxycodone, repo, rest.  No nausea.  LS diminished bilat., RA.  Up A2 belt + SS, BLEs very weak & buckling.  Voiding, LBM today stool soft.  PT/OT Consulted.      Goal Outcome Evaluation:    Plan of Care Reviewed With: patient, family    Overall Patient Progress: no changeOverall Patient Progress: no change    Outcome Evaluation: Sitter DC at 1450.    Problem: Adult Inpatient Plan of Care  Goal: Plan of Care Review  Description: The Plan of Care Review/Shift note should be completed every shift.  The Outcome Evaluation is a brief statement about your assessment that the patient is improving, declining, or no change.  This information will be displayed automatically on your shift  note.  Flowsheets (Taken 10/7/2024 1930)  Outcome Evaluation: Sitter DC at 1450.  Plan of Care Reviewed With:   patient   family  Overall Patient Progress: no change  Goal: Absence of Hospital-Acquired Illness or Injury  Intervention: Identify and Manage Fall Risk  Recent Flowsheet Documentation  Taken 10/7/2024 1015 by Irma Mccall RN  Safety Promotion/Fall Prevention:   bedside attendant   assistive device/personal items within reach   activity supervised   supervised activity   safety round/check completed   room organization consistent   room near nurse's station   patient and family education   nonskid shoes/slippers when out of bed   mobility aid in reach   lighting adjusted   increase visualization of patient   clutter free environment maintained  Intervention: Prevent Skin Injury  Recent Flowsheet Documentation  Taken 10/7/2024 1015 by Irma Mccall RN  Skin Protection:   adhesive use limited   incontinence pads utilized   pulse oximeter probe site changed  Device Skin Pressure Protection:   absorbent pad utilized/changed   adhesive use limited   tubing/devices free from skin contact    positioning supports utilized   pressure points protected  Intervention: Prevent and Manage VTE (Venous Thromboembolism) Risk  Recent Flowsheet Documentation  Taken 10/7/2024 1015 by Irma Mccall RN  VTE Prevention/Management: (confused)   patient refused intervention   SCDs off (sequential compression devices)  Intervention: Prevent Infection  Recent Flowsheet Documentation  Taken 10/7/2024 1015 by Irma Mccall RN  Infection Prevention:   hand hygiene promoted   equipment surfaces disinfected   rest/sleep promoted   single patient room provided   environmental surveillance performed  Goal: Optimal Comfort and Wellbeing  Intervention: Monitor Pain and Promote Comfort  Recent Flowsheet Documentation  Taken 10/7/2024 1438 by Irma Mccall RN  Pain Management Interventions:   rest   quiet environment facilitated  Taken 10/7/2024 1355 by Irma Mccall RN  Pain Management Interventions: rest  Taken 10/7/2024 1220 by Irma Mccall RN  Pain Management Interventions: rest  Taken 10/7/2024 1111 by Irma Mccall RN  Pain Management Interventions: medication (see MAR)  Taken 10/7/2024 1016 by Irma Mccall RN  Pain Management Interventions:   pain management plan reviewed with patient/caregiver   medication (see MAR)   care clustered   emotional support   rest     Problem: Comorbidity Management  Goal: Maintenance of Asthma Control  Intervention: Maintain Asthma Symptom Control  Recent Flowsheet Documentation  Taken 10/7/2024 1015 by Irma Mccall RN  Medication Review/Management: medications reviewed  Goal: Maintenance of Behavioral Health Symptom Control  Intervention: Maintain Behavioral Health Symptom Control  Recent Flowsheet Documentation  Taken 10/7/2024 1015 by Irma Mccall RN  Medication Review/Management: medications reviewed  Goal: Maintenance of COPD Symptom Control  Intervention: Maintain COPD Symptom Control  Recent Flowsheet Documentation  Taken 10/7/2024 1015 by Irma Mccall RN  Supportive  Measures:   active listening utilized   self-care encouraged  Medication Review/Management: medications reviewed  Taken 10/7/2024 0845 by Irma Mccall RN  Supportive Measures:   active listening utilized   relaxation techniques promoted  Goal: Blood Glucose Levels Within Targeted Range  Intervention: Monitor and Manage Glycemia  Recent Flowsheet Documentation  Taken 10/7/2024 1015 by Irma Mccall RN  Glycemic Management:   blood glucose monitored   oral hydration promoted  Medication Review/Management: medications reviewed  Goal: Maintenance of Heart Failure Symptom Control  Intervention: Maintain Heart Failure Management  Recent Flowsheet Documentation  Taken 10/7/2024 1015 by Irma Mccall RN  Medication Review/Management: medications reviewed  Goal: Blood Pressure in Desired Range  Intervention: Maintain Blood Pressure Management  Recent Flowsheet Documentation  Taken 10/7/2024 1015 by Irma Mccall RN  Medication Review/Management: medications reviewed  Goal: Maintenance of Osteoarthritis Symptom Control  Intervention: Maintain Osteoarthritis Symptom Control  Recent Flowsheet Documentation  Taken 10/7/2024 1111 by Irma Mccall RN  Activity Management: up in chair  Taken 10/7/2024 1016 by Irma Mccall RN  Activity Management: up in chair  Taken 10/7/2024 1015 by Irma Mccall RN  Activity Management: activity encouraged  Medication Review/Management: medications reviewed  Goal: Bariatric Home Regimen Maintained  Intervention: Maintain and Manage Postbariatric Surgery Care  Recent Flowsheet Documentation  Taken 10/7/2024 1015 by Irma Mccall RN  Oral Nutrition Promotion:   physical activity promoted   rest periods promoted   social interaction promoted  Medication Review/Management: medications reviewed  Goal: Maintenance of Seizure Control  Intervention: Maintain Seizure Symptom Control  Recent Flowsheet Documentation  Taken 10/7/2024 1015 by Irma Mccall RN  Sensory Stimulation Regulation:   care  clustered   television on  Medication Review/Management: medications reviewed  Taken 10/7/2024 0845 by Irma Mccall RN  Sensory Stimulation Regulation: quiet environment promoted     Problem: Pain Chronic (Persistent)  Goal: Optimal Pain Control and Function  Intervention: Develop Pain Management Plan  Recent Flowsheet Documentation  Taken 10/7/2024 1438 by Irma Mccall RN  Pain Management Interventions:   rest   quiet environment facilitated  Taken 10/7/2024 1355 by Irma Mccall RN  Pain Management Interventions: rest  Taken 10/7/2024 1220 by Irma Mccall RN  Pain Management Interventions: rest  Taken 10/7/2024 1111 by Irma Mccall RN  Pain Management Interventions: medication (see MAR)  Taken 10/7/2024 1016 by Irma Mccall RN  Pain Management Interventions:   pain management plan reviewed with patient/caregiver   medication (see MAR)   care clustered   emotional support   rest  Intervention: Manage Persistent Pain  Recent Flowsheet Documentation  Taken 10/7/2024 1015 by Irma Mccall RN  Bowel Elimination Promotion:   adequate fluid intake promoted   ambulation promoted   commode/bedpan at bedside  Medication Review/Management: medications reviewed  Intervention: Optimize Psychosocial Wellbeing  Recent Flowsheet Documentation  Taken 10/7/2024 1015 by Irma Mccall RN  Supportive Measures:   active listening utilized   self-care encouraged  Taken 10/7/2024 0845 by Irma Mccall RN  Supportive Measures:   active listening utilized   relaxation techniques promoted

## 2024-10-08 NOTE — PROGRESS NOTES
"   10/08/24 9370   Appointment Info   Signing Clinician's Name / Credentials (PT) Jimbo Mehta DPT   Living Environment   People in Home child(gilda), adult   Current Living Arrangements apartment   Home Accessibility stairs to enter home   Number of Stairs, Main Entrance 1   Stair Railings, Main Entrance none   Transportation Anticipated family or friend will provide   Living Environment Comments Pt lives with son in apartment, 1 large step to get inside, no other stairs. Pt is IND at home, retired and spends most of his day at apartment.   Self-Care   Usual Activity Tolerance moderate   Current Activity Tolerance moderate   Equipment Currently Used at Home cane, straight;walker, rolling;shower chair   Fall history within last six months yes   Number of times patient has fallen within last six months 4   Activity/Exercise/Self-Care Comment IND with functional mob, uses SEC and 4WW. Mostly SEC inside apartment due to space issure per pt.   General Information   Onset of Illness/Injury or Date of Surgery 10/04/24   Referring Physician Dung Guevara,    Pertinent History of Current Problem (include personal factors and/or comorbidities that impact the POC) Pt is 68 yo male who, per chart, \"PMH significant for recent diagnosis cholangiocarcinoma, hepatitis C, NIDDM, tobacco abuse with COPD, degenerative disc disease with chronic back pain managed with narcotics and atrial fibrillation managed with metoprolol and warfarin who was admitted on 10/4/2024 due to change in his mental status and significantly increased weakness.      The patient's mentation has improved significantly with normalization of calcium levels and reduction of pregabalin dosing.\"   Existing Precautions/Restrictions fall   Cognition   Affect/Mental Status (Cognition) WFL   Orientation Status (Cognition) oriented x 4   Follows Commands (Cognition) WFL   Pain Assessment   Patient Currently in Pain   (no)   Integumentary/Edema   Integumentary/Edema " Comments 1+ pitting in B feet, no pitting in LEs.   Posture    Posture Forward head position   Range of Motion (ROM)   Range of Motion ROM is WFL   Strength (Manual Muscle Testing)   Strength (Manual Muscle Testing) Able to perform R SLR;Able to perform L SLR;Deficits observed during functional mobility   Bed Mobility   Comment, (Bed Mobility) supine > sit with SBA, from elevated HOB, using R bedrail.   Transfers   Comment, (Transfers) STS with 4WW and Taylor, pushing up from walker.   Gait/Stairs (Locomotion)   Comment, (Gait/Stairs) 10' with 4WW and CGA, step through pattern, slow to fair gait speed, forward flexed posture with forward head and downward gaze posture. No overt LOB.   Balance   Balance Comments no overt LOB with use of 4WW, mild unsteadiness with initial standing.   Sensory Examination   Sensory Perception Comments pt reports hx of neuropathy in B LEs, but still able to identify light touch with screen.   Clinical Impression   Criteria for Skilled Therapeutic Intervention Yes, treatment indicated   PT Diagnosis (PT) impaired functional mobility   Influenced by the following impairments decreased strength and endurance, impaired balance, impaired cog.   Functional limitations due to impairments difficulty with bed mobility, transfers, and ambulation   Clinical Presentation (PT Evaluation Complexity) stable   Clinical Presentation Rationale clinical judgment   Clinical Decision Making (Complexity) low complexity   Planned Therapy Interventions (PT) balance training;bed mobility training;gait training;home exercise program;stair training;strengthening;stretching;transfer training;progressive activity/exercise   Risk & Benefits of therapy have been explained evaluation/treatment results reviewed;care plan/treatment goals reviewed;risks/benefits reviewed;current/potential barriers reviewed;participants voiced agreement with care plan;participants included;patient   PT Total Evaluation Time   PT Eval, Low  Complexity Minutes (70855) 10   Physical Therapy Goals   PT Frequency Daily   PT Predicted Duration/Target Date for Goal Attainment 10/15/24   PT Goals Bed Mobility;Transfers;Gait;Stairs   PT: Bed Mobility Independent;Supine to/from sit   PT: Transfers Modified independent;Sit to/from stand;Assistive device   PT: Gait Modified independent;Rolling walker;Greater than 200 feet   PT: Stairs 1 stair;Assistive device;Minimal assist   Interventions   Interventions Quick Adds Gait Training;Therapeutic Activity   Therapeutic Activity   Therapeutic Activities: dynamic activities to improve functional performance Minutes (91649) 14   Symptoms Noted During/After Treatment Fatigue   Treatment Detail/Skilled Intervention Pt supine in bed upon PT arrival. Pt sleeping but arousable and agreeable for session. Pt performed STS x2, Taylor and 4WW, progressed to CGA, cues on UE placement. Pt performed additional STS x3 from recliner, 4WW and CGA, progressed to close SBA. Pt educated on importance of increased mobility frequency, 3 walks/day to increase overall IND with functional mob and activity tolerance. Pt sitting in recliner at end of session, alarm on, all needs within reach.   Gait Training   Gait Training Minutes (79245) 9   Symptoms Noted During/After Treatment (Gait Training) fatigue   Treatment Detail/Skilled Intervention Pt ambulated 15' + 80' with 4WW and CGA, progressed to close SBA. Pt cued for walker proximity and upright posture, mild improvement noted. Pt fatigued after short bouts, but recovers quickly.   PT Discharge Planning   PT Plan progress IND with transfers, increase gait distance, trial 1 platform step (unsure if railing available?)   PT Discharge Recommendation (DC Rec) home with assist;home with home care physical therapy   PT Rationale for DC Rec Pt at baseline is IND with functional mob, uses SEC or 4WW for ambulation. Lives in apartment with son. Currently, pt is below baseline but progressed to perform  transfers and ambulation with 4WW and SBA, limited by decreased strength and endurance. Pending pt's ability to increase gait distance and navigate 1 stair, pt may be safe to return home with HH PT. If pt doesn't have help with IADLs at home, would recommend TCF.   PT Brief overview of current status Ax1 with FWW   Total Session Time   Timed Code Treatment Minutes 23   Total Session Time (sum of timed and untimed services) 33

## 2024-10-09 NOTE — PHARMACY-ANTICOAGULATION SERVICE
Clinical Pharmacy- Warfarin Discharge Note    On discharge, warfarin to be held tonight and tomorrow, with INR check on Friday, 10/11.    Anticoagulation Dose History  More data exists         Latest Ref Rng & Units 10/3/2024 10/4/2024 10/5/2024 10/6/2024 10/7/2024 10/8/2024 10/9/2024   Recent Dosing and Labs   warfarin ANTICOAGULANT (COUMADIN) 5 MG tablet - - - - - 5 mg, $Given 5 mg, $Given -   INR 0.85 - 1.15 1.46  3.08  2.60  3.94  3.77  3.13  2.92  3.86       Details          Multiple values from one day are sorted in reverse-chronological order

## 2024-10-09 NOTE — PLAN OF CARE
"Goal Outcome Evaluation:      Plan of Care Reviewed With: patient    Overall Patient Progress: no changeOverall Patient Progress: no change    Outcome Evaluation: Intermittent confusion, ACHS, pain management, TCU at discharge      Problem: Adult Inpatient Plan of Care  Goal: Plan of Care Review  Description: The Plan of Care Review/Shift note should be completed every shift.  The Outcome Evaluation is a brief statement about your assessment that the patient is improving, declining, or no change.  This information will be displayed automatically on your shift  note.  Outcome: Progressing  Flowsheets (Taken 10/9/2024 0942)  Outcome Evaluation: Intermittent confusion, ACHS, pain management, TCU at discharge  Plan of Care Reviewed With: patient  Overall Patient Progress: no change  Goal: Patient-Specific Goal (Individualized)  Description: You can add care plan individualizations to a care plan. Examples of Individualization might be:  \"Parent requests to be called daily at 9am for status\", \"I have a hard time hearing out of my right ear\", or \"Do not touch me to wake me up as it startles  me\".  Outcome: Progressing  Goal: Absence of Hospital-Acquired Illness or Injury  Outcome: Progressing  Intervention: Identify and Manage Fall Risk  Recent Flowsheet Documentation  Taken 10/9/2024 0824 by Pina Guido RN  Safety Promotion/Fall Prevention:   safety round/check completed   activity supervised  Intervention: Prevent Skin Injury  Recent Flowsheet Documentation  Taken 10/9/2024 0824 by Pina Guido RN  Body Position: position changed independently  Intervention: Prevent and Manage VTE (Venous Thromboembolism) Risk  Recent Flowsheet Documentation  Taken 10/9/2024 0824 by Pina Guido RN  VTE Prevention/Management:   patient refused intervention   SCDs off (sequential compression devices)  Goal: Optimal Comfort and Wellbeing  Outcome: Progressing  Intervention: Monitor Pain and Promote Comfort  Recent " Flowsheet Documentation  Taken 10/9/2024 0824 by Pina Guido RN  Pain Management Interventions: medication (see MAR)  Goal: Readiness for Transition of Care  Outcome: Progressing     Problem: Comorbidity Management  Goal: Maintenance of Asthma Control  Outcome: Progressing  Intervention: Maintain Asthma Symptom Control  Recent Flowsheet Documentation  Taken 10/9/2024 0824 by Pina Guido RN  Medication Review/Management: medications reviewed  Goal: Maintenance of Behavioral Health Symptom Control  Outcome: Progressing  Intervention: Maintain Behavioral Health Symptom Control  Recent Flowsheet Documentation  Taken 10/9/2024 0824 by Pina Guido RN  Medication Review/Management: medications reviewed  Goal: Maintenance of COPD Symptom Control  Outcome: Progressing  Intervention: Maintain COPD Symptom Control  Recent Flowsheet Documentation  Taken 10/9/2024 0824 by Pina Guido RN  Medication Review/Management: medications reviewed  Goal: Blood Glucose Levels Within Targeted Range  Outcome: Progressing  Intervention: Monitor and Manage Glycemia  Recent Flowsheet Documentation  Taken 10/9/2024 0824 by Pina Guido RN  Medication Review/Management: medications reviewed  Goal: Maintenance of Heart Failure Symptom Control  Outcome: Progressing  Intervention: Maintain Heart Failure Management  Recent Flowsheet Documentation  Taken 10/9/2024 0824 by Pina Guido RN  Medication Review/Management: medications reviewed  Goal: Blood Pressure in Desired Range  Outcome: Progressing  Intervention: Maintain Blood Pressure Management  Recent Flowsheet Documentation  Taken 10/9/2024 0824 by Pina Guido RN  Medication Review/Management: medications reviewed  Goal: Maintenance of Osteoarthritis Symptom Control  Outcome: Progressing  Intervention: Maintain Osteoarthritis Symptom Control  Recent Flowsheet Documentation  Taken 10/9/2024 0824 by Pina Guido RN  Assistive Device Utilized:   gait  belt   walker  Activity Management: activity adjusted per tolerance  Medication Review/Management: medications reviewed  Goal: Bariatric Home Regimen Maintained  Outcome: Progressing  Intervention: Maintain and Manage Postbariatric Surgery Care  Recent Flowsheet Documentation  Taken 10/9/2024 0824 by Pina Guido RN  Medication Review/Management: medications reviewed  Goal: Maintenance of Seizure Control  Outcome: Progressing  Intervention: Maintain Seizure Symptom Control  Recent Flowsheet Documentation  Taken 10/9/2024 0824 by Pina Guido RN  Medication Review/Management: medications reviewed     Problem: Pain Chronic (Persistent)  Goal: Optimal Pain Control and Function  Outcome: Progressing  Intervention: Develop Pain Management Plan  Recent Flowsheet Documentation  Taken 10/9/2024 0824 by Pina Guido RN  Pain Management Interventions: medication (see MAR)  Intervention: Manage Persistent Pain  Recent Flowsheet Documentation  Taken 10/9/2024 0824 by Pina Guido RN  Medication Review/Management: medications reviewed

## 2024-10-09 NOTE — PROGRESS NOTES
ANTICOAGULATION  MANAGEMENT: Discharge Review    Toby Mcgrath chart reviewed for anticoagulation continuity of care    Hospital Admission on 10/4/24 for confusion, hypercalcemia.    Discharge disposition: TCU    Results:    Recent labs: (last 7 days)     10/03/24  0815 10/04/24  1225 10/04/24  1821 10/05/24  0622 10/06/24  0633 10/07/24  0549 10/08/24  0608 10/09/24  0554   INR 1.46* 2.60* 3.08* 3.94* 3.77* 3.13* 2.92* 3.86*     Anticoagulation inpatient management:     held warfarin due to supratherapeutic INR  and warfarin resumed on 10/7/24 and discharge instruction was to hold warfarin 10/9 and 10/10/24 due to supratherapeutic INR    Anticoagulation discharge instructions:     Warfarin dosing: decrease dose to 5 mg daily however warfarin is being held 10/9/24 and 10/10/24 due to supratherapeutic INR, unknown if patient will resume 7.5 mg warfarin daily or 5 mg warfarin daily at TCU   Bridging: No   INR goal change: No      Medication changes affecting anticoagulation: No    Additional factors affecting anticoagulation: Yes: recent diagnosis cholangiocarcinoma has not started any treatment yet     PLAN     Agree with dosing adjustment on discharge    [facility] will resume monitoring upon discharge from TCU    Anticoagulation Calendar updated    Brianda Hui RN  10/9/2024  Anticoagulation Clinic  [facility] for routing messages: viet VASQUEZ [employer] [place]  [facility] patient phone line: [phone]

## 2024-10-09 NOTE — PLAN OF CARE
"Physical Therapy Discharge Summary    Reason for therapy discharge:    Discharged to transitional care facility.    Progress towards therapy goal(s). See goals on Care Plan in [facility] electronic health record for goal details.  Goals partially met.  Barriers to achieving goals:   discharge from facility.    Therapy recommendation(s):    Continued therapy is recommended.  Rationale/Recommendations:  per most recent PT note: \"Pt at baseline is IND with functional mob, uses SEC or 4WW for ambulation. Lives in apartment with son. Currently, pt is below baseline but progressed to perform transfers and ambulation with 4WW and SBA, limited by decreased strength and endurance. No stairs in apartment confirmed on this date. If pt has assistance from family with IADLs and ADLs as needed, recommend returning there with HH PT.\" .      "

## 2024-10-09 NOTE — PLAN OF CARE
"Goal Outcome Evaluation:    Plan of Care Reviewed With: patient    Overall Patient Progress: no change    Outcome Evaluation: Up with A1 GB/walker. PIV saline locked. No replacement given. B/182. Plan for TCU at discharge.    Problem: Adult Inpatient Plan of Care  Goal: Plan of Care Review  Description: The Plan of Care Review/Shift note should be completed every shift.  The Outcome Evaluation is a brief statement about your assessment that the patient is improving, declining, or no change.  This information will be displayed automatically on your shift  note.  Outcome: Progressing  Flowsheets (Taken 10/9/2024 0122)  Outcome Evaluation: Up with A1 GB/walker. PIV saline locked. No replacement given. B/182. Plan for TCU at discharge.  Plan of Care Reviewed With: patient  Overall Patient Progress: no change  Goal: Patient-Specific Goal (Individualized)  Description: You can add care plan individualizations to a care plan. Examples of Individualization might be:  \"Parent requests to be called daily at 9am for status\", \"I have a hard time hearing out of my right ear\", or \"Do not touch me to wake me up as it startles  me\".  Outcome: Progressing  Goal: Absence of Hospital-Acquired Illness or Injury  Outcome: Progressing  Intervention: Identify and Manage Fall Risk  Recent Flowsheet Documentation  Taken 10/8/2024 2027 by Jr Jaeger RN  Safety Promotion/Fall Prevention:   activity supervised   assistive device/personal items within reach   clutter free environment maintained   increased rounding and observation   lighting adjusted   safety round/check completed  Intervention: Prevent Skin Injury  Recent Flowsheet Documentation  Taken 10/8/2024 2027 by Jr Jaeger RN  Body Position: supine, head elevated  Skin Protection:   adhesive use limited   incontinence pads utilized   pulse oximeter probe site changed  Device Skin Pressure Protection:   absorbent pad utilized/changed   adhesive use limited   " tubing/devices free from skin contact   positioning supports utilized   pressure points protected  Intervention: Prevent and Manage VTE (Venous Thromboembolism) Risk  Recent Flowsheet Documentation  Taken 10/8/2024 2027 by Jr Jaeger RN  VTE Prevention/Management:   patient refused intervention   SCDs off (sequential compression devices)  Intervention: Prevent Infection  Recent Flowsheet Documentation  Taken 10/8/2024 2027 by Jr Jaeger RN  Infection Prevention:   single patient room provided   rest/sleep promoted  Goal: Optimal Comfort and Wellbeing  Outcome: Progressing  Goal: Readiness for Transition of Care  Outcome: Progressing     Problem: Comorbidity Management  Goal: Maintenance of Asthma Control  Outcome: Progressing  Intervention: Maintain Asthma Symptom Control  Recent Flowsheet Documentation  Taken 10/8/2024 2027 by Jr Jaeger RN  Medication Review/Management: medications reviewed  Goal: Maintenance of Behavioral Health Symptom Control  Outcome: Progressing  Intervention: Maintain Behavioral Health Symptom Control  Recent Flowsheet Documentation  Taken 10/8/2024 2027 by Jr Jaeger RN  Medication Review/Management: medications reviewed  Goal: Maintenance of COPD Symptom Control  Outcome: Progressing  Intervention: Maintain COPD Symptom Control  Recent Flowsheet Documentation  Taken 10/8/2024 2027 by Jr Jaeger RN  Supportive Measures:   active listening utilized   self-care encouraged  Medication Review/Management: medications reviewed  Goal: Blood Glucose Levels Within Targeted Range  Outcome: Progressing  Intervention: Monitor and Manage Glycemia  Recent Flowsheet Documentation  Taken 10/8/2024 2027 by Jr Jaeger RN  Glycemic Management:   blood glucose monitored   oral hydration promoted  Medication Review/Management: medications reviewed  Goal: Maintenance of Heart Failure Symptom Control  Outcome: Progressing  Intervention: Maintain Heart Failure  Management  Recent Flowsheet Documentation  Taken 10/8/2024 2027 by Jr Jaeger RN  Medication Review/Management: medications reviewed  Goal: Blood Pressure in Desired Range  Outcome: Progressing  Intervention: Maintain Blood Pressure Management  Recent Flowsheet Documentation  Taken 10/8/2024 2027 by Jr Jaeger RN  Medication Review/Management: medications reviewed  Goal: Maintenance of Osteoarthritis Symptom Control  Outcome: Progressing  Intervention: Maintain Osteoarthritis Symptom Control  Recent Flowsheet Documentation  Taken 10/8/2024 2027 by Jr Jaeger RN  Activity Management: activity adjusted per tolerance  Medication Review/Management: medications reviewed  Goal: Bariatric Home Regimen Maintained  Outcome: Progressing  Intervention: Maintain and Manage Postbariatric Surgery Care  Recent Flowsheet Documentation  Taken 10/8/2024 2027 by Jr Jaeger RN  Oral Nutrition Promotion:   physical activity promoted   rest periods promoted   social interaction promoted  Medication Review/Management: medications reviewed  Goal: Maintenance of Seizure Control  Outcome: Progressing  Intervention: Maintain Seizure Symptom Control  Recent Flowsheet Documentation  Taken 10/8/2024 2027 by Jr Jaeger RN  Sensory Stimulation Regulation:   care clustered   television on  Medication Review/Management: medications reviewed     Problem: Pain Chronic (Persistent)  Goal: Optimal Pain Control and Function  Outcome: Progressing  Intervention: Manage Persistent Pain  Recent Flowsheet Documentation  Taken 10/8/2024 2027 by Jr Jaeger RN  Bowel Elimination Promotion:   adequate fluid intake promoted   ambulation promoted   commode/bedpan at bedside  Complementary Therapy: aromatherapy utilized  Medication Review/Management: medications reviewed  Intervention: Optimize Psychosocial Wellbeing  Recent Flowsheet Documentation  Taken 10/8/2024 2027 by Jr aJeger RN  Supportive Measures:    active listening utilized   self-care encouraged

## 2024-10-09 NOTE — PROGRESS NOTES
Care Management Discharge Note    Discharge Date: 10/09/2024       Discharge Disposition:  TCU    Discharge Services:  rehab    Discharge DME:  none    Discharge Transportation: family or friend will provide    Private pay costs discussed: Not applicable    Does the patient's insurance plan have a 3 day qualifying hospital stay waiver?  Yes     Which insurance plan 3 day waiver is available? Alternative insurance waiver    Will the waiver be used for post-acute placement? No    PAS Confirmation Code: [id number]  Patient/family educated on Medicare website which has current facility and service quality ratings:  yes    Education Provided on the Discharge Plan:  yes  Persons Notified of Discharge Plans: patient, son  Patient/Family in Agreement with the Plan:  yes    Handoff Referral Completed: Yes, [facility] PCP: Internal handoff referral completed    Additional Information:  Patient accepted to [facility] for TCU placement. They can accept today. Updated patient, son and care team.    [facility] can accept between [id number]. Patient's son to transport.     Discharge orders faxed. PAS completed.     Elba Wooten RN  Care Coordinator  [facility]

## 2024-10-09 NOTE — DISCHARGE SUMMARY
[facility]  Hospitalist Discharge Summary      Date of Admission:  10/4/2024  Date of Discharge:  10/9/2024  1:17 PM  Discharging Provider: Dung Guevara DO  Discharge Service: Hospitalist Service    Discharge Diagnoses   Toby Mcgrath is a 69-year-old man with PMH significant for recent diagnosis cholangiocarcinoma, hepatitis C, NIDDM, tobacco abuse with COPD, degenerative disc disease with chronic back pain managed with narcotics and atrial fibrillation managed with metoprolol and warfarin who was admitted on 10/4/2024 due to change in his mental status and significantly increased weakness.      He was given IVF and zometa for hypercalcemia. The patient's mentation has improved significantly with normalization of calcium levels and reduction of pregabalin dosing.      PT/OT consulted.  Will discharge to TCU.         Metabolic & toxic encephalopathy related to hypercalcemia vs polypharmacy, improved:  This has improved with treatment of hypercalcemia & reduced dose of pregabalin.  Significantly improved even from 10/7.  Fully oriented.  Appropriate.  No longer needing sitter.      Hypercalcemia related to cholangiocarcinoma:  Likely related to underlying malignancy.  This was treated with zometa with gradual downtrend in calcium.  Now nearing normal.  There have been various discussions with patient, family, and his oncology team regarding cares moving forward.  Patient has not even started chemo yet and is already encephalopathic, malnourished and with progressive weakness and fall.  At present he is not a chemo candidate but this may be offered in the future if he becomes stronger following TCU rehabilitation.  Chemotherapy would only be palliative in nature and not curative.     Reported LUTS:  No evidence of retention while inpatient.     Generalized weakness & falls:  This is likely related to underlying cancer, hypercalcemia, suspected malnutrition.  Seems to slowly be regaining  "strength with improved oral intake, treatment of hypercalcemia. Will discharge to TCU.     Afib on anticoagulation: Supratherapeutic on admission and occasionally while inpatient.  Suspect this is related to reduced oral intake.  Discussed with pharmacy on the day of discharge.  Recommend no dose today or tomorrow.  Recheck INR on Friday with repeat dosing for now.  I have left 5 mg daily dosing of coumadin as a place bennett for now.      Chronic back pain: He continues on a PTA pregabalin (reduced dose), duloxetine, oxycodone, scheduled tylenol.  Denied pain to me AM 10/7     Weight loss: Likely related to his underlying malignancy that he has not yet begun treatment for.  This contributes to his frailty.  Unclear if he will improve enough to tolerate chemotherapy in the future.     Mild hyponatremia: Resolved.  Likely related dehydration.  Resolved with IV fluid resuscitation.    Clinically Significant Risk Factors     # DMII: A1C = 7.1 % (Ref range: <5.7 %) within past 6 months    # Overweight: Estimated body mass index is 29.27 kg/m  as calculated from the following:    Height as of this encounter: 1.803 m (5' 11\").    Weight as of this encounter: 95.2 kg (209 lb 14.1 oz).    # Severe Malnutrition: based on nutrition assessment      Follow-ups Needed After Discharge   Follow-up Appointments     Follow Up and recommended labs and tests      Follow up with Nursing home physician.  Needs INR check on Friday with   warfarin dosing as appropriate.            Unresulted Labs Ordered in the Past 30 Days of this Admission       Date and Time Order Name Status Description    10/6/2024 12:00 AM PTH Related Peptide Test In process         These results will be followed up by PCP    Discharge Disposition   Discharged to short-term care facility  Condition at discharge: Good    Consultations This Hospital Stay   PHARMACY TO DOSE WARFARIN  HEMATOLOGY & ONCOLOGY IP CONSULT  CARE MANAGEMENT / SOCIAL WORK IP CONSULT  PHYSICAL " THERAPY ADULT IP CONSULT  OCCUPATIONAL THERAPY ADULT IP CONSULT  PHYSICAL THERAPY ADULT IP CONSULT  OCCUPATIONAL THERAPY ADULT IP CONSULT    Code Status   Full Code    Time Spent on this Encounter   I, Dung Guevara DO, personally saw the patient today and spent greater than 30 minutes discharging this patient.       Dung Guevara DO  [facility]  [address]  [facility] [address]  Phone: [phone]  Fax: [phone]  ______________________________________________________________________    Physical Exam   Vital Signs: Temp: 97.2  F (36.2  C) Temp src: Temporal BP: 123/70 Pulse: 77   Resp: 16 SpO2: 98 % O2 Device: None (Room air)    Weight: 209 lbs 14.05 oz  General Appearance: Patient awake & alert.  No apparent distress.   Respiratory: Lungs are CTAB.  Work of breathing appears normal on room air.  Cardiovascular: Regular rate and rhythm.  No murmurs rubs or gallops.  There is no edema present.  GI: Benign.  Soft.  Non-tender.  Bowel sounds active.   Skin: No rashes or lesions exposed skin.  Neuro:  The patient is moving all extremities.  No obvious focal asymmetries.   Other: Patient is appropriate and oriented x3.        Primary Care Physician   Dayana Hopson    Discharge Orders      Medication Therapy Management Referral      Primary Care - Care Coordination Referral      General info for SNF    Length of Stay Estimate: Short Term Care: Estimated # of Days <30  Condition at Discharge: Improving  Level of care:skilled   Rehabilitation Potential: Fair  Admission H&P remains valid and up-to-date: Yes  Recent Chemotherapy: N/A  Use Nursing Home Standing Orders: Yes     Mantoux instructions    Give two-step Mantoux (PPD) Per Facility Policy Yes     Reason for your hospital stay    You were hospitalized for confusion.  You were found to have hypercalcemia.  This was treated with fluids and a medication called zometa.  This is related to your malignancy.  Your dose of  pregabalin was also reduced.     Glucose monitor nursing POCT    Before meals and at bedtime     Bladder scan    X 2 for post void residual     Activity - Up with nursing assistance     Follow Up and recommended labs and tests    Follow up with Nursing home physician.  Needs INR check on Friday with warfarin dosing as appropriate.     Physical Therapy Adult Consult    Evaluate and treat as clinically indicated.    Reason:  deconditioning     Occupational Therapy Adult Consult    Evaluate and treat as clinically indicated.    Reason:  deconditioning, home safety     Fall precautions     Diet    Follow this diet upon discharge: Current Diet:Orders Placed This Encounter      Snacks/Supplements Adult: Other; Glucerna Vanilla QD (daily at 8 AM); With Meals      Regular Diet Adult       Significant Results and Procedures   Most Recent 3 CBC's:  Recent Labs   Lab Test 10/05/24  0622 10/04/24  1225 10/03/24  0815   WBC 8.7 11.4* 9.4   HGB 12.2* 12.8* 13.3   MCV 87 84 85    274 279     Most Recent 3 BMP's:  Recent Labs   Lab Test 10/09/24  1154 10/09/24  0747 10/09/24  0554 10/08/24  0748 10/08/24  0608 10/07/24  0708 10/07/24  0549 10/06/24  0910 10/06/24  0633 10/05/24  0850 10/05/24  0622   NA  --   --   --   --  142  --   --   --  139  --  138   POTASSIUM  --   --  3.7  --  4.4  4.3  --  3.6  --  3.7  --  5.0   CHLORIDE  --   --   --   --  109*  --   --   --  107  --  104   CO2  --   --   --   --  19*  --   --   --  18*  --  23   BUN  --   --   --   --  10.2  --   --   --  8.5  --  9.3   CR  --   --   --   --  0.92  --   --   --  0.73  --  0.92   ANIONGAP  --   --   --   --  14  --   --   --  14  --  11   JOSE  --   --   --   --  9.8  --   --   --  10.6*  --  11.7*   * 175* 179*   < > 253*  250*   < > 185*   < > 160*   < > 188*    < > = values in this interval not displayed.       Discharge Medications   Current Discharge Medication List        CONTINUE these medications which have CHANGED    Details    oxyCODONE (ROXICODONE) 5 MG tablet Take 1 tablet (5 mg) by mouth every 6 hours as needed for severe pain. Max #4 per day  Qty: 8 tablet, Refills: 0    Associated Diagnoses: Failed back syndrome of lumbar spine; Chronic, continuous use of opioids; Chronic bilateral low back pain with bilateral sciatica      pregabalin (LYRICA) 150 MG capsule Take 1 capsule (150 mg) by mouth 2 times daily.  Qty: 10 capsule, Refills: 0    Associated Diagnoses: Diabetic polyneuropathy associated with type 2 diabetes mellitus (H)      warfarin ANTICOAGULANT (COUMADIN) 5 MG tablet Take 1 tablet (5 mg) by mouth daily. The patient should have an INR check on Friday prior to coumadin dosing.  Please dose as appropriate.    Associated Diagnoses: Atrial fibrillation with RVR (H)           CONTINUE these medications which have NOT CHANGED    Details   busPIRone (BUSPAR) 5 MG tablet TAKE 1 TABLET (5 MG) BY MOUTH 2 TIMES DAILY  Qty: 180 tablet, Refills: 0    Associated Diagnoses: Anxiety; Mild episode of recurrent major depressive disorder (H)      CYMBALTA 60 MG capsule Take 2 capsules (120 mg) by mouth daily  Qty: 180 capsule, Refills: 3    Associated Diagnoses: Major depressive disorder, recurrent episode, mild (H); Chronic bilateral low back pain without sciatica      finasteride (PROSCAR) 5 MG tablet TAKE 1 TABLET BY MOUTH DAILY FOR PROSTATE ENLARGEMENT  Qty: 90 tablet, Refills: 2    Associated Diagnoses: Benign prostatic hyperplasia with urinary frequency      glipiZIDE (GLUCOTROL XL) 5 MG 24 hr tablet TAKE ONE TABLET BY MOUTH ONCE DAILY  Qty: 90 tablet, Refills: 0    Associated Diagnoses: Type 2 diabetes mellitus without complication, without long-term current use of insulin (H)      metFORMIN (GLUCOPHAGE XR) 500 MG 24 hr tablet Take 4 tablets (2,000 mg) by mouth daily (with dinner)  Qty: 360 tablet, Refills: 3    Associated Diagnoses: Type 2 diabetes mellitus without complication, without long-term current use of insulin (H)       simvastatin (ZOCOR) 20 MG tablet TAKE 1 TABLET (20 MG) BY MOUTH AT BEDTIME  Qty: 90 tablet, Refills: 0    Associated Diagnoses: Hyperlipidemia LDL goal <100      tamsulosin (FLOMAX) 0.4 MG capsule TAKE TWO CAPSULES BY MOUTH ONCE DAILY  Qty: 180 capsule, Refills: 3    Associated Diagnoses: Benign prostatic hyperplasia with urinary frequency      acetaminophen (TYLENOL) 500 MG tablet Take 500-1,000 mg by mouth every 8 hours as needed for mild pain      albuterol (PROAIR HFA/PROVENTIL HFA/VENTOLIN HFA) 108 (90 Base) MCG/ACT inhaler Inhale 2 puffs into the lungs every 6 hours as needed for shortness of breath, wheezing or cough  Qty: 18 g, Refills: 2    Comments: Pharmacy may dispense brand covered by insurance (Proair, or proventil or ventolin or generic albuterol inhaler). Hold on file  Associated Diagnoses: Chronic obstructive pulmonary disease, unspecified COPD type (H)      famotidine (PEPCID) 20 MG tablet Take 1 tablet (20 mg) by mouth 2 times daily as needed (heart burn)  Qty: 60 tablet, Refills: 3    Associated Diagnoses: Gastroesophageal reflux disease with esophagitis without hemorrhage      metoprolol succinate ER (TOPROL XL) 100 MG 24 hr tablet TAKE ONE TABLET BY MOUTH ONCE DAILY  Qty: 90 tablet, Refills: 3    Associated Diagnoses: Atrial fibrillation with RVR (H)      pantoprazole (PROTONIX) 40 MG EC tablet TAKE 1 TABLET (40 MG) BY MOUTH DAILY  Qty: 90 tablet, Refills: 1    Associated Diagnoses: Gastroesophageal reflux disease without esophagitis      polyethylene glycol (MIRALAX) 17 GM/Dose powder Mix 1 tablespoon with water by mouth daily as needed constipation  Qty: 510 g, Refills: 0    Associated Diagnoses: Slow transit constipation      senna-docusate (SENOKOT-S/PERICOLACE) 8.6-50 MG tablet Take 1-2 tablets by mouth daily as needed for constipation.  Qty: 60 tablet, Refills: 0    Associated Diagnoses: Abdominal pain, generalized           Allergies   Allergies   Allergen Reactions    Morphine And  Codeine Nausea and Vomiting     Tolerating other opiates

## 2024-10-10 NOTE — LETTER
" 10/10/2024      Toby Mcgrath  [address]  [facility] [address]        [facility] GERIATRICS    PRIMARY CARE PROVIDER AND CLINIC:  Dayana Hopson MD, [address] [facility] [address] / [facility] [address]  Chief Complaint   Patient presents with     Hospital F/U      [facility] Record Number:  [id number]  Place of Service where encounter took place:  [facility]  (U) [[id number]]    Toby Mcgrath  is a 69 year old  (1955), admitted to the above facility from  [facility]. Hospital stay 10/4 through 10/9/24..     By chart review, patient was admitted to [facility] 10/4-10/9/24 with metabolic and toxic encephalopathy. In ED, labs remarkable for sodium 130, , , ALT 25, WBC 11.4, hgb 12.8, lactic acid 2.0, Ca 12.0. He received IVF and zometa for hypercalcemia. Lyrica was reduced. Mentation slowly improved. He was seen by oncology and not a candidate for palliative chemotherapy due to deconditioning. He was recommended TCU at discharge, admitted to current facility for ongoing medical management, rehab, nursing care.     HPI:    Seen today for admission to LTC up on the unit, son is present and helps augment some history. He reports he is having increased back pain, mid back bilaterally. He takes \"oxycontin\" 4 times a day at home, son clarifies this is oxycodone. He reports poor appetite. He denies bleeding or bruising. He is denying CP, SOB, changes in b/b habits, fever/chills.    INR today is 7.3. By chart review, patient received PTA coumadin 7.5 mg last night 10/9.     CODE STATUS/ADVANCE DIRECTIVES DISCUSSION:  Prior  CPR/Full code   ALLERGIES:   Allergies   Allergen Reactions     Morphine And Codeine Nausea and Vomiting     Tolerating other opiates       PAST MEDICAL HISTORY:   Past Medical History:   Diagnosis Date     Acute gout of right elbow, unspecified cause 07/14/2016     Acute gouty arthritis 03/03/2016     Anxiety state, " unspecified      BMI 32.0-32.9,adult 09/04/2015     Chronic pain syndrome 03/29/2007    Narcotic refill protocol Will return every 2-3 months for clinic visits Controlled substance aggreement on file from this  6/26/12 Documentation in problem list: no, appears to be compliant based on last visit He is responding best to Oxycontin 10 mg twice daily # 60 per month.  This is costly and looking into [place] for coverage.  Nausea with MS Contin Has meds refilled 16 of every month Last [facility] website verification:  done on 7/8/2015  [email or web address]   2/10/2015:  PCP will take over narcotic prescription Tapering course: using 5 mg tablets 10 mg morning 15 mg noon, 15 mg night for 1 week then 10 mg morning, 10 mg noon, 15 mg night for 1 week then 10 mg TID for 1 week then see me for refills  Then ongoing taper: 5 mg morning, 10 mg noon and 10 mg night for 1 week then  5 mg morning and noon and 10 mg night for 1 week then 5 mg tid for 1 week Then 5 mg morning no noon dose and 5 mg night for 1 week then No morning or non dose and 5 mg nightly for 1 week then off  Goal is co     Chronic rhinitis 03/29/2007     DDD (degenerative disc disease), cervical 02/08/2013    Normal.  C2-C3: Normal disc, facet joints, spinal canal and neural foramina.  C3-C4: Mild broad-based posterior disc bulge. Otherwise normal.  C4-C5: Normal disc, facet joints, spinal canal and neural foramina.  C5-C6: Moderate degenerative disc disease with loss of disc height, circumferential disc bulge and vertebral endplate osteophytes. Mild spinal canal stenosis and moderate left foraminal stenosis. Normal right foramen and facet joints.  C6-C7: Mild circumferential disc bulge. Slight impression on the thecal sac. Mild left foraminal stenosis. Normal right foramen and facet joints.  C7-T1: Normal disc, facet joints, spinal canal and neural foramina.  T1-T2: Mild central posterior disc protrusion.  T2-T3: Mild central posterior disc protrusion. Slight  impression on the spinal cord.         DJD (degenerative joint disease), lumbar 01/10/2012     Esophageal reflux     ,refluxes on upper GI     Gout 04/08/2011     Gout, unspecified      Hyperlipidemia LDL goal <100 10/25/2012     Hypertension goal BP (blood pressure) < 140/90 10/21/2011     Hypertrophy of prostate with urinary obstruction 08/03/2006    Problem list name updated by automated process. Provider to review     HYPERTROPHY PROSTATE WITH OBST 08/03/2006     Idiopathic gout of left elbow, unspecified chronicity 01/03/2017     Impotence of organic origin      Left-sided low back pain with left-sided sciatica 08/13/2015     Lumbar radiculopathy 09/17/2014     Major depressive disorder, recurrent episode, unspecified 07/09/2008     Osteoarthrosis, unspecified whether generalized or localized, pelvic region and thigh      Pure hyperglyceridemia      ROTATOR CUFF SYND NOS 04/17/2008     S/P lumbar fusion 10/14/2014     Thoracic or lumbosacral neuritis or radiculitis, unspecified      Tobacco use disorder      Type 2 diabetes, HbA1C goal < 8% (H) 09/09/2011      PAST SURGICAL HISTORY:   has a past surgical history that includes NONSPECIFIC PROCEDURE (1995); NONSPECIFIC PROCEDURE (1979); UGI ENDOSCOPY DIAG W OR W/O BRUSH/WASH (3/04); COLONOSCOPY THRU STOMA, DIAGNOSTIC (3/04); SHOULDER SURG PROC UNLISTED (2005, '07); right hip replacement (10,2010); both shoulder repair (2006, 2008); Head and neck surgery; back surgery; Fusion lumbar anterior, fusion lumbar posterior two levels, combined (N/A, 9/17/2014); Hemilaminectomy, discectomy lumbar one level, combined (Left, 9/8/2015); Excise mass neck (Right, 6/24/2024); and Esophagoscopy, gastroscopy, duodenoscopy (EGD), combined (N/A, 10/1/2024).  FAMILY HISTORY: family history includes C.A.D. in his father; Cancer in his brother; Coronary Artery Disease in his brother; Deep Vein Thrombosis (DVT) in his brother; Diabetes in his father and mother; Hypertension in his  father; Lung Cancer in his mother.  SOCIAL HISTORY:   reports that he quit smoking about 13 years ago. His smoking use included cigarettes. He started smoking about 53 years ago. He has a 40 pack-year smoking history. He has never been exposed to tobacco smoke. He quit smokeless tobacco use about 11 years ago. He reports current alcohol use. He reports that he does not use drugs.  Patient's living condition: lives alone    Post Discharge Medication Reconciliation Status:   MED REC REQUIRED  Post Medication Reconciliation Status: discharge medications reconciled and changed, per note/orders       Current Outpatient Medications   Medication Sig Dispense Refill     acetaminophen (TYLENOL) 325 MG tablet Take 2 tablets (650 mg) by mouth 3 times daily.       albuterol (PROAIR HFA/PROVENTIL HFA/VENTOLIN HFA) 108 (90 Base) MCG/ACT inhaler Inhale 2 puffs into the lungs every 6 hours as needed for shortness of breath, wheezing or cough 18 g 2     busPIRone (BUSPAR) 5 MG tablet TAKE ONE TABLET BY MOUTH TWICE A  tablet 0     CYMBALTA 60 MG capsule Take 2 capsules (120 mg) by mouth daily 180 capsule 3     famotidine (PEPCID) 20 MG tablet Take 1 tablet (20 mg) by mouth 2 times daily as needed (heart burn) 60 tablet 3     finasteride (PROSCAR) 5 MG tablet TAKE 1 TABLET BY MOUTH DAILY FOR PROSTATE ENLARGEMENT 90 tablet 2     glipiZIDE (GLUCOTROL XL) 5 MG 24 hr tablet TAKE ONE TABLET BY MOUTH ONCE DAILY 90 tablet 0     metFORMIN (GLUCOPHAGE XR) 500 MG 24 hr tablet Take 4 tablets (2,000 mg) by mouth daily (with dinner) 360 tablet 3     metoprolol succinate ER (TOPROL XL) 100 MG 24 hr tablet TAKE ONE TABLET BY MOUTH ONCE DAILY 90 tablet 3     naloxone (NARCAN) 4 MG/0.1ML nasal spray Spray 1 spray (4 mg) into one nostril alternating nostrils as needed for opioid reversal. every 2-3 minutes until assistance arrives 2 each 2     oxyCODONE (ROXICODONE) 5 MG tablet Take 1 tablet (5 mg) by mouth every 6 hours as needed for severe  "pain (max 4 tabs qd). 30 tablet 0     pantoprazole (PROTONIX) 40 MG EC tablet TAKE 1 TABLET (40 MG) BY MOUTH DAILY 90 tablet 1     polyethylene glycol (MIRALAX) 17 GM/Dose powder Mix 1 tablespoon with water by mouth daily as needed constipation 510 g 0     pregabalin (LYRICA) 150 MG capsule TAKE 1 CAPSULE BY MOUTH TWICE DAILY **CONTROLLED SUBSTANCE-DOUBLE LOCK STORAGE** 60 capsule 1     senna-docusate (SENOKOT-S/PERICOLACE) 8.6-50 MG tablet Take 1-2 tablets by mouth daily as needed for constipation. 60 tablet 0     simvastatin (ZOCOR) 20 MG tablet TAKE 1 TABLET (20 MG) BY MOUTH AT BEDTIME 90 tablet 0     tamsulosin (FLOMAX) 0.4 MG capsule TAKE TWO CAPSULES BY MOUTH ONCE DAILY 180 capsule 3     trolamine salicylate (ASPERCREME) 10 % external cream Apply topically 3 times daily.       WARFARIN SODIUM PO Take by mouth. Per INR.       oxyCODONE (ROXICODONE) 5 MG tablet Take 1 tablet (5 mg) by mouth every 4 hours as needed for severe pain. Max #4 per day 8 tablet 0     No current facility-administered medications for this visit.       ROS:  Limited secondary to cognitive impairment but today pt reports the above and 4 point ROS including Respiratory, CV, GI and , other than that noted in the HPI,  is negative    Vitals:  /70   Pulse 92   Temp 97.8  F (36.6  C)   Resp 18   Ht 1.803 m (5' 11\")   Wt 104.3 kg (230 lb)   SpO2 92%   BMI 32.08 kg/m    Exam:  GENERAL APPEARANCE:  Alert, in no distress  RESP:  respiratory effort and palpation of chest normal, lungs clear to auscultation , no respiratory distress  CV:  regular rate and rhythm, no murmur, rub, or gallop, no edema  ABDOMEN:  normal bowel sounds, soft, nontender, no hepatosplenomegaly or other masses  M/S:   Gait and station abnormal transfers with assist, using wheelchair for mobility  SKIN:  Inspection of skin and subcutaneous tissue baseline, Palpation of skin and subcutaneous tissue baseline  NEURO:   quintana freely  PSYCH:  oriented x 2-3, memory " "impaired , affect and mood normal    Lab/Diagnostic data:  Labs done in SNF are in [facility]. Please refer to them using EPIC/Care Everywhere. and Recent labs in [facility] reviewed by me today.     ASSESSMENT/PLAN:    (G93.41) Metabolic encephalopathy  (primary encounter diagnosis)  (Z79.899) Polypharmacy  Comment: acute encephalopathy with hypercalcemia, opiate and lyrica use. Required a sitter inpatient. Calm up on the unit today.  Plan: Monitor mentation. OT following for forma cognitive testing and discharge planning. SNF for assist with ADLs, medication management, meals, activities    (E83.52) Hypercalcemia  Comment: secondary to malginancy. Received Zometa and IVF.   Plan: Monitor Ca, repeat BMP 10/11. Follow-up with oncology as directed    (C22.1) Cholangiocarcinoma (H)  (R62.7) Failure to thrive in adult  (W19.XXXD) Fall, subsequent encounter  (R53.81) Physical deconditioning  Comment: Known malignancy, not a candidate for chemo at present, which would only be offered as a palliative measure.  Plan: PT/OT.     (G89.4) Chronic pain syndrome  (M54.6,  G89.29) Chronic bilateral thoracic back pain  Comment: with chronic back pain and cancer-related pain. He reports this is worse presently.   Plan: acetaminophen (TYLENOL) 325 MG tablet Q8H. Trolamine 10% TID. Continue lyrica 150 mg TID, oxycodone 5 mg Q6H PRN, continue non pharmacological measures    (I48.91) Atrial fibrillation with RVR (H)  (R79.1) Supratherapeutic INR  Comment: chronic afib, with supratherapeutic INR due to medication error. Previous warfarin orders were left active as a \"place bennett.\" On chart review, he has had supra therapeutic INR most of the last week 2/2 malnutrition with coumadin on hold, will give a small dose of vit k today  Plan: Vitamin K 2 mg today now. Hold coumadin. Repeat INR 10/11    (R63.4) Weight loss  (E43) Severe protein-calorie malnutrition (H)  Comment: secondary to underlying malignancy. RD to follow  Plan: RD " following, continue regular diet, offer food preferences    (E87.1) Hyponatremia  Comment: mild, 2/2 dehydration  Plan: monitor Na periodically, repeat BMP 10/11    (E11.9) Type 2 diabetes mellitus without complication, without long-term current use of insulin (H)  Comment: chronic, fairly controlled. Avoid hypoglycemia due to patient age, frailty, and risk for complications.    Hemoglobin A1C   Date Value Ref Range Status   10/04/2024 7.1 (H) <5.7 % Final     Comment:     Normal <5.7%   Prediabetes 5.7-6.4%    Diabetes 6.5% or higher     Note: Adopted from ADA consensus guidelines.   03/15/2021 7.2 (H) 0 - 5.6 % Final     Comment:     Normal <5.7% Prediabetes 5.7-6.4%  Diabetes 6.5% or higher - adopted from ADA   consensus guidelines.     Plan: monitor BG, continue metformin 2000 mg daily, glipizide 5 mg daily.     (K59.01) Slow transit constipation  Comment: opiates and immobility contributing. Son utilized as an independent historian today  Plan: monitor bowel habits per nursing, continue bowel regimen    (R41.89) Cognitive impairment  Comment: with STML  Plan: OT following for formal cognitive testing, safe discharge planning    Total time spent with patient visit at the skilled nursing facility was 59 minutes including patient visit, review of past records, and review of MAR, review of INR and discharge orders, review of management with MD and discussion with patient contact/son..         Orders:  Give Vit K 2 mg today now  INR 10/11 and 10/14  Hold coumadin  CBC, CMP, INR per lab 10/11  Tylenol 650 mg Q8H.   Trolamine 10% to mid back TID    Electronically signed by:  JIMBO Soni CNP                     Sincerely,        JIMBO Soni CNP

## 2024-10-10 NOTE — PLAN OF CARE
"  Occupational Therapy Discharge Summary    Reason for therapy discharge:    Discharged to transitional care facility.    Progress towards therapy goal(s). See goals on Care Plan in [facility] electronic health record for goal details.  Goals not met.  Barriers to achieving goals:   discharge from facility.    Therapy recommendation(s):    Continued therapy is recommended.  Rationale/Recommendations:  per treating OT \"Pt below stated baseline with inconsistent reporting of his home situation and activity level.  Does state his son works 4 12 hour days, appears to be by himself on a regular basis.  Cognitive deficits make taking care of himself by himself concerning at this time with possible safety issues/fall risks.  Would recommend skilled therapy at TCU at this time to increase endurance and safety with ADLS and look at home adaptations/level of supervision which might make home safer for patient.  If patient improves and true level of family support at home is determined pt may be able to go home.  If going home at this point would recommend regular supervision and home OT services\".    **This patient was not seen by writing OT, information for discharge summary taken from treating OT's notes.**    "

## 2024-10-10 NOTE — PROGRESS NOTES
"[facility] GERIATRICS    PRIMARY CARE PROVIDER AND CLINIC:  Dayana Hopson MD, [address] [facility] / [facility] [address]  Chief Complaint   Patient presents with    Hospital F/U      [facility] Medical Record Number:  [id number]  Place of Service where encounter took place:  [facility]  ([facility]) [[id number]]    Toby Mcgrath  is a 69 year old  (1955), admitted to the above facility from  [facility]. Hospital stay 10/4 through 10/9/24..     By chart review, patient was admitted to [facility] 10/4-10/9/24 with metabolic and toxic encephalopathy. In ED, labs remarkable for sodium 130, , , ALT 25, WBC 11.4, hgb 12.8, lactic acid 2.0, Ca 12.0. He received IVF and zometa for hypercalcemia. Lyrica was reduced. Mentation slowly improved. He was seen by oncology and not a candidate for palliative chemotherapy due to deconditioning. He was recommended TCU at discharge, admitted to current facility for ongoing medical management, rehab, nursing care.     HPI:    Seen today for admission to LTC up on the unit, son is present and helps augment some history. He reports he is having increased back pain, mid back bilaterally. He takes \"oxycontin\" 4 times a day at home, son clarifies this is oxycodone. He reports poor appetite. He denies bleeding or bruising. He is denying CP, SOB, changes in b/b habits, fever/chills.    INR today is 7.3. By chart review, patient received PTA coumadin 7.5 mg last night 10/9.     CODE STATUS/ADVANCE DIRECTIVES DISCUSSION:  Prior  CPR/Full code   ALLERGIES:   Allergies   Allergen Reactions    Morphine And Codeine Nausea and Vomiting     Tolerating other opiates       PAST MEDICAL HISTORY:   Past Medical History:   Diagnosis Date    Acute gout of right elbow, unspecified cause 07/14/2016    Acute gouty arthritis 03/03/2016    Anxiety state, unspecified     BMI 32.0-32.9,adult 09/04/2015    Chronic pain syndrome 03/29/2007    Narcotic refill " protocol Will return every 2-3 months for clinic visits Controlled substance aggreement on file from this  6/26/12 Documentation in problem list: no, appears to be compliant based on last visit He is responding best to Oxycontin 10 mg twice daily # 60 per month.  This is costly and looking into [place] for coverage.  Nausea with MS Contin Has meds refilled 16 of every month Last [facility] website verification:  done on 7/8/2015  [email or web address]   2/10/2015:  PCP will take over narcotic prescription Tapering course: using 5 mg tablets 10 mg morning 15 mg noon, 15 mg night for 1 week then 10 mg morning, 10 mg noon, 15 mg night for 1 week then 10 mg TID for 1 week then see me for refills  Then ongoing taper: 5 mg morning, 10 mg noon and 10 mg night for 1 week then  5 mg morning and noon and 10 mg night for 1 week then 5 mg tid for 1 week Then 5 mg morning no noon dose and 5 mg night for 1 week then No morning or non dose and 5 mg nightly for 1 week then off  Goal is co    Chronic rhinitis 03/29/2007    DDD (degenerative disc disease), cervical 02/08/2013    Normal.  C2-C3: Normal disc, facet joints, spinal canal and neural foramina.  C3-C4: Mild broad-based posterior disc bulge. Otherwise normal.  C4-C5: Normal disc, facet joints, spinal canal and neural foramina.  C5-C6: Moderate degenerative disc disease with loss of disc height, circumferential disc bulge and vertebral endplate osteophytes. Mild spinal canal stenosis and moderate left foraminal stenosis. Normal right foramen and facet joints.  C6-C7: Mild circumferential disc bulge. Slight impression on the thecal sac. Mild left foraminal stenosis. Normal right foramen and facet joints.  C7-T1: Normal disc, facet joints, spinal canal and neural foramina.  T1-T2: Mild central posterior disc protrusion.  T2-T3: Mild central posterior disc protrusion. Slight impression on the spinal cord.        DJD (degenerative joint disease), lumbar 01/10/2012    Esophageal  reflux     ,refluxes on upper GI    Gout 04/08/2011    Gout, unspecified     Hyperlipidemia LDL goal <100 10/25/2012    Hypertension goal BP (blood pressure) < 140/90 10/21/2011    Hypertrophy of prostate with urinary obstruction 08/03/2006    Problem list name updated by automated process. Provider to review    HYPERTROPHY PROSTATE WITH OBST 08/03/2006    Idiopathic gout of left elbow, unspecified chronicity 01/03/2017    Impotence of organic origin     Left-sided low back pain with left-sided sciatica 08/13/2015    Lumbar radiculopathy 09/17/2014    Major depressive disorder, recurrent episode, unspecified 07/09/2008    Osteoarthrosis, unspecified whether generalized or localized, pelvic region and thigh     Pure hyperglyceridemia     ROTATOR CUFF SYND NOS 04/17/2008    S/P lumbar fusion 10/14/2014    Thoracic or lumbosacral neuritis or radiculitis, unspecified     Tobacco use disorder     Type 2 diabetes, HbA1C goal < 8% (H) 09/09/2011      PAST SURGICAL HISTORY:   has a past surgical history that includes NONSPECIFIC PROCEDURE (1995); NONSPECIFIC PROCEDURE (1979); UGI ENDOSCOPY DIAG W OR W/O BRUSH/WASH (3/04); COLONOSCOPY THRU STOMA, DIAGNOSTIC (3/04); SHOULDER SURG PROC UNLISTED (2005, '07); right hip replacement (10,2010); both shoulder repair (2006, 2008); Head and neck surgery; back surgery; Fusion lumbar anterior, fusion lumbar posterior two levels, combined (N/A, 9/17/2014); Hemilaminectomy, discectomy lumbar one level, combined (Left, 9/8/2015); Excise mass neck (Right, 6/24/2024); and Esophagoscopy, gastroscopy, duodenoscopy (EGD), combined (N/A, 10/1/2024).  FAMILY HISTORY: family history includes C.A.D. in his father; Cancer in his brother; Coronary Artery Disease in his brother; Deep Vein Thrombosis (DVT) in his brother; Diabetes in his father and mother; Hypertension in his father; Lung Cancer in his mother.  SOCIAL HISTORY:   reports that he quit smoking about 13 years ago. His smoking use included  cigarettes. He started smoking about 53 years ago. He has a 40 pack-year smoking history. He has never been exposed to tobacco smoke. He quit smokeless tobacco use about 11 years ago. He reports current alcohol use. He reports that he does not use drugs.  Patient's living condition: lives alone    Post Discharge Medication Reconciliation Status:   MED REC REQUIRED  Post Medication Reconciliation Status: discharge medications reconciled and changed, per note/orders       Current Outpatient Medications   Medication Sig Dispense Refill    acetaminophen (TYLENOL) 325 MG tablet Take 2 tablets (650 mg) by mouth 3 times daily.      albuterol (PROAIR HFA/PROVENTIL HFA/VENTOLIN HFA) 108 (90 Base) MCG/ACT inhaler Inhale 2 puffs into the lungs every 6 hours as needed for shortness of breath, wheezing or cough 18 g 2    busPIRone (BUSPAR) 5 MG tablet TAKE ONE TABLET BY MOUTH TWICE A  tablet 0    CYMBALTA 60 MG capsule Take 2 capsules (120 mg) by mouth daily 180 capsule 3    famotidine (PEPCID) 20 MG tablet Take 1 tablet (20 mg) by mouth 2 times daily as needed (heart burn) 60 tablet 3    finasteride (PROSCAR) 5 MG tablet TAKE 1 TABLET BY MOUTH DAILY FOR PROSTATE ENLARGEMENT 90 tablet 2    glipiZIDE (GLUCOTROL XL) 5 MG 24 hr tablet TAKE ONE TABLET BY MOUTH ONCE DAILY 90 tablet 0    metFORMIN (GLUCOPHAGE XR) 500 MG 24 hr tablet Take 4 tablets (2,000 mg) by mouth daily (with dinner) 360 tablet 3    metoprolol succinate ER (TOPROL XL) 100 MG 24 hr tablet TAKE ONE TABLET BY MOUTH ONCE DAILY 90 tablet 3    naloxone (NARCAN) 4 MG/0.1ML nasal spray Spray 1 spray (4 mg) into one nostril alternating nostrils as needed for opioid reversal. every 2-3 minutes until assistance arrives 2 each 2    oxyCODONE (ROXICODONE) 5 MG tablet Take 1 tablet (5 mg) by mouth every 6 hours as needed for severe pain (max 4 tabs qd). 30 tablet 0    pantoprazole (PROTONIX) 40 MG EC tablet TAKE 1 TABLET (40 MG) BY MOUTH DAILY 90 tablet 1    polyethylene  "glycol (MIRALAX) 17 GM/Dose powder Mix 1 tablespoon with water by mouth daily as needed constipation 510 g 0    pregabalin (LYRICA) 150 MG capsule TAKE 1 CAPSULE BY MOUTH TWICE DAILY **CONTROLLED SUBSTANCE-DOUBLE LOCK STORAGE** 60 capsule 1    senna-docusate (SENOKOT-S/PERICOLACE) 8.6-50 MG tablet Take 1-2 tablets by mouth daily as needed for constipation. 60 tablet 0    simvastatin (ZOCOR) 20 MG tablet TAKE 1 TABLET (20 MG) BY MOUTH AT BEDTIME 90 tablet 0    tamsulosin (FLOMAX) 0.4 MG capsule TAKE TWO CAPSULES BY MOUTH ONCE DAILY 180 capsule 3    trolamine salicylate (ASPERCREME) 10 % external cream Apply topically 3 times daily.      WARFARIN SODIUM PO Take by mouth. Per INR.      oxyCODONE (ROXICODONE) 5 MG tablet Take 1 tablet (5 mg) by mouth every 4 hours as needed for severe pain. Max #4 per day 8 tablet 0     No current facility-administered medications for this visit.       ROS:  Limited secondary to cognitive impairment but today pt reports the above and 4 point ROS including Respiratory, CV, GI and , other than that noted in the HPI,  is negative    Vitals:  /70   Pulse 92   Temp 97.8  F (36.6  C)   Resp 18   Ht 1.803 m (5' 11\")   Wt 104.3 kg (230 lb)   SpO2 92%   BMI 32.08 kg/m    Exam:  GENERAL APPEARANCE:  Alert, in no distress  RESP:  respiratory effort and palpation of chest normal, lungs clear to auscultation , no respiratory distress  CV:  regular rate and rhythm, no murmur, rub, or gallop, no edema  ABDOMEN:  normal bowel sounds, soft, nontender, no hepatosplenomegaly or other masses  M/S:   Gait and station abnormal transfers with assist, using wheelchair for mobility  SKIN:  Inspection of skin and subcutaneous tissue baseline, Palpation of skin and subcutaneous tissue baseline  NEURO:   quintana freely  PSYCH:  oriented x 2-3, memory impaired , affect and mood normal    Lab/Diagnostic data:  Labs done in SNF are in [facility] EPIC. Please refer to them using [facility]/Care Everywhere. and " "Recent labs in [facility] reviewed by me today.     ASSESSMENT/PLAN:    (G93.41) Metabolic encephalopathy  (primary encounter diagnosis)  (Z79.899) Polypharmacy  Comment: acute encephalopathy with hypercalcemia, opiate and lyrica use. Required a sitter inpatient. Calm up on the unit today.  Plan: Monitor mentation. OT following for forma cognitive testing and discharge planning. SNF for assist with ADLs, medication management, meals, activities    (E83.52) Hypercalcemia  Comment: secondary to malginancy. Received Zometa and IVF.   Plan: Monitor Ca, repeat BMP 10/11. Follow-up with oncology as directed    (C22.1) Cholangiocarcinoma (H)  (R62.7) Failure to thrive in adult  (W19.XXXD) Fall, subsequent encounter  (R53.81) Physical deconditioning  Comment: Known malignancy, not a candidate for chemo at present, which would only be offered as a palliative measure.  Plan: PT/OT.     (G89.4) Chronic pain syndrome  (M54.6,  G89.29) Chronic bilateral thoracic back pain  Comment: with chronic back pain and cancer-related pain. He reports this is worse presently.   Plan: acetaminophen (TYLENOL) 325 MG tablet Q8H. Trolamine 10% TID. Continue lyrica 150 mg TID, oxycodone 5 mg Q6H PRN, continue non pharmacological measures    (I48.91) Atrial fibrillation with RVR (H)  (R79.1) Supratherapeutic INR  Comment: chronic afib, with supratherapeutic INR due to medication error. Previous warfarin orders were left active as a \"place bennett.\" On chart review, he has had supra therapeutic INR most of the last week 2/2 malnutrition with coumadin on hold, will give a small dose of vit k today  Plan: Vitamin K 2 mg today now. Hold coumadin. Repeat INR 10/11    (R63.4) Weight loss  (E43) Severe protein-calorie malnutrition (H)  Comment: secondary to underlying malignancy. RD to follow  Plan: RD following, continue regular diet, offer food preferences    (E87.1) Hyponatremia  Comment: mild, 2/2 dehydration  Plan: monitor Na periodically, repeat BMP " 10/11    (E11.9) Type 2 diabetes mellitus without complication, without long-term current use of insulin (H)  Comment: chronic, fairly controlled. Avoid hypoglycemia due to patient age, frailty, and risk for complications.    Hemoglobin A1C   Date Value Ref Range Status   10/04/2024 7.1 (H) <5.7 % Final     Comment:     Normal <5.7%   Prediabetes 5.7-6.4%    Diabetes 6.5% or higher     Note: Adopted from ADA consensus guidelines.   03/15/2021 7.2 (H) 0 - 5.6 % Final     Comment:     Normal <5.7% Prediabetes 5.7-6.4%  Diabetes 6.5% or higher - adopted from ADA   consensus guidelines.     Plan: monitor BG, continue metformin 2000 mg daily, glipizide 5 mg daily.     (K59.01) Slow transit constipation  Comment: opiates and immobility contributing. Son utilized as an independent historian today  Plan: monitor bowel habits per nursing, continue bowel regimen    (R41.89) Cognitive impairment  Comment: with STML  Plan: OT following for formal cognitive testing, safe discharge planning    Total time spent with patient visit at the skilled nursing facility was 59 minutes including patient visit, review of past records, and review of MAR, review of INR and discharge orders, review of management with MD and discussion with patient contact/son..         Orders:  Give Vit K 2 mg today now  INR 10/11 and 10/14  Hold coumadin  CBC, CMP, INR per lab 10/11  Tylenol 650 mg Q8H.   Trolamine 10% to mid back TID    Electronically signed by:  JIMBO Soni CNP

## 2024-10-10 NOTE — PROGRESS NOTES
"Clinic Care Coordination Contact  Care Coordination Transition Communication    Clinical Data: Patient was hospitalized at [facility] from 10/04/2024 to 10/09/2024 with diagnosis of: \"Metabolic & toxic encephalopathy related to hypercalcemia vs polypharmacy, improved  Hypercalcemia related to cholangiocarcinoma  Reported LUTS  Generalized weakness & falls  Afib on anticoagulation  Chronic back pain  Weight loss  Mild hyponatremia\".     Assessment: Patient has transitioned to TCU/ARU for short term rehabilitation:    Facility Name: [facility]  Transition Communication:  Notified facility of Primary Care- Care Coordination support via TCU hand-in e-mail.    Ambulatory Care Coordination to TCU Hand In Communication:     Name: Toby Mcgrath is a patient of Dayana Hopson MD  at [facility]  and I am the care coordinator.   CC Contact Information: Email: [email or web address]   Phone: [phone]   Follow up recommendations:   - Scheduled:   Appointments in Next Year            Oct 17, 2024 9:30 AM  LAB with  MA LAB  [facility] ([facility] ) [phone]     Oct 17, 2024 10:00 AM  Return Patient with Tila Squires MD  [facility] ([facility] ) [phone]     Oct 18, 2024 11:15 AM  INR LAB with CR LAB  [facility] ([facility] ) [phone]     Dec 31, 2024 10:00 AM  LAB with UC LAB  [facility] ([facility] ) [phone]           - To be scheduled: Primary Care Provider follow up within 14 days of TCU discharge.     I would like to collaborate with the TCU Care team during this patient's stay; please invite me to any care conferences.     Please feel free to contact me with questions or further collaboration " in discharge planning.       Plan: Care Coordinator will await notification from facility staff informing of patient's discharge plans/needs. Care Coordinator will review chart and outreach to facility staff every 4 weeks and as needed.     Eunice Padilla RN Care Coordinator  [facility]  Email: [email or web address]  Phone: [phone]

## 2024-10-11 NOTE — TELEPHONE ENCOUNTER
[facility] Geriatrics Triage Nurse INR     Provider: Mio Fajardo MD  Facility: [facility]   Facility Type:  TCU    Caller: Harjinder  Call Back Number: [phone]  Reason for call: INR  Diagnosis/Goal: A. Fib    Date INR New Dose/Orders        10/9/24 3.86 7.5mg   10/10/24 7.3 Hold Warfarin and Vitamin K 2mg.  Next INR 10/11/24.   10/11/24 2.96 Hold Warfarin on 10/11 then take 3mg on 10/12 and 10/13.  Next INR 10/14/24.      Prior to hospitalization dose:  Warfarin 7.5mg daily.    Heparin/Lovenox:  No  Currently on ABX?: No  Other interacting medication:  None  Missed or refused doses: No      Nurse is also reporting Heme 2, BMP, and hepatic profile results.        Verbal Order/Direction given by Provider: Hold Warfarin on 10/11 and give 3mg on 10/12 and 10/13.  Next INR 10/14/24.  No new orders regarding the other labs.      Provider Giving Order:  Mio Fajardo MD    Verbal Order given to: Harjinder Jasso RN

## 2024-10-14 NOTE — RESULT ENCOUNTER NOTE
Orders  Toby Mcgrath  1955     1. Coumadin 3 mg daily  2. INR 10/17/24      JIMBO Soni CNP on 10/14/2024 at 9:43 AM

## 2024-10-14 NOTE — PROGRESS NOTES
"[facility] GERIATRICS    Chief Complaint   Patient presents with    RECHECK     HPI:  Toby Mcgrath is a 69 year old  (1955), who is being seen today for an episodic care visit at: [facility]  ([facility]) [[id number]].     By chart review, patient was admitted to [facility] 10/4-10/9/24 with metabolic and toxic encephalopathy. In ED, labs remarkable for sodium 130, , , ALT 25, WBC 11.4, hgb 12.8, lactic acid 2.0, Ca 12.0. He received IVF and zometa for hypercalcemia. Lyrica was reduced. Mentation slowly improved. He was seen by oncology and not a candidate for palliative chemotherapy due to deconditioning. He was recommended U at discharge, admitted to current facility for ongoing medical management, rehab, nursing care.     Today's concern is: Seen today for follow-up in his room in TCU, patient's brother is present and helps augment some history. They report he is doing unwell and that he is not getting his pain medications. At home, he was getting oxycodone 4 times a day but now he is only getting it once daily. His pain is actually very well controlled. His brother wonders if perhaps he is just not aware of when he is getting what medications, and Toby agrees, and is confused about whether he is receiving all of his regular medications. He endorses a poor appetite. He has been constipated. He is denying CP, SOB, changes in bladder habits, fever/chills.     Allergies, and PMH/PSH reviewed in EPIC today.  REVIEW OF SYSTEMS:  Limited secondary to cognitive impairment but today pt reports the above    Objective:   /75   Pulse 87   Temp 97.9  F (36.6  C)   Resp 18   Ht 1.803 m (5' 11\")   Wt 95.3 kg (210 lb)   SpO2 91%   BMI 29.29 kg/m    GENERAL APPEARANCE:  Alert, in no distress  RESP:  respiratory effort and palpation of chest normal, lungs clear to auscultation , no respiratory distress  CV:  regular rate and rhythm, no murmur, rub, or gallop, no edema  ABDOMEN:  " normal bowel sounds, soft, nontender, no hepatosplenomegaly or other masses  M/S:   Gait and station abnormal transfers with assist, wheelchair for mobility  SKIN:  Inspection of skin and subcutaneous tissue baseline, Palpation of skin and subcutaneous tissue baseline  NEURO:   quintana freely  PSYCH:  memory impaired , affect and mood normal    Labs done in SNF are in [facility]. Please refer to them using EPIC/Care Everywhere. and Recent labs in [facility] reviewed by me today.     Assessment/Plan:  (G93.41) Metabolic encephalopathy  (primary encounter diagnosis)  (Z79.899) Polypharmacy  Comment: acute encephalopathy with hypercalcemia, opiate and lyrica use. Required a sitter inpatient. Calm up on the unit today.  Plan: Monitor mentation. OT following for forma cognitive testing and discharge planning. SNF for assist with ADLs, medication management, meals, activities     (E83.52) Hypercalcemia  Comment: secondary to malginancy. Received Zometa and IVF.   Plan: Monitor Ca, repeat BMP 10/11. Follow-up with oncology as directed     (C22.1) Cholangiocarcinoma (H)  (R62.7) Failure to thrive in adult  (W19.XXXD) Fall, subsequent encounter  (R53.81) Physical deconditioning  Comment: Known malignancy, not a candidate for chemo at present, which would only be offered as a palliative measure.  Plan: PT/OT. Fall precautions. Follow-up with oncology as directed.      (G89.4) Chronic pain syndrome  (M54.6,  G89.29) Chronic bilateral thoracic back pain  Comment: with chronic back pain and cancer-related pain. He is worried about getting pain medications, but pain has been well controlled. Has PRNs available.   Plan: acetaminophen (TYLENOL) 325 MG tablet Q8H. Trolamine 10% TID. Continue lyrica 150 mg TID, oxycodone 5 mg Q6H PRN, continue non pharmacological measures     (I48.91) Atrial fibrillation with RVR (H)  (R79.1) Supratherapeutic INR  Comment: chronic afib, received vit k 10/10 for supratherapeutic INR. INR today is  therapeutic.   Plan: Vitamin K 2 mg today now. Coumadin 3 mg daily. INR 10/17.      (R63.4) Weight loss  (E43) Severe protein-calorie malnutrition (H)  Comment: secondary to underlying malignancy. RD to follow. Poor appetite.   Plan: RD following, continue regular diet, offer food preferences     (E87.1) Hyponatremia  Comment: mild, 2/2 dehydration. Normalized currently  Sodium   Date Value Ref Range Status   10/11/2024 138 135 - 145 mmol/L Final   05/27/2021 142 133 - 144 mmol/L Final   Plan: monitor Na periodically     (E11.9) Type 2 diabetes mellitus without complication, without long-term current use of insulin (H)  Comment: chronic, fairly controlled. Avoid hypoglycemia due to patient age, frailty, and risk for complications.             Hemoglobin A1C   Date Value Ref Range Status   10/04/2024 7.1 (H) <5.7 % Final       Comment:       Normal <5.7%   Prediabetes 5.7-6.4%    Diabetes 6.5% or higher     Note: Adopted from ADA consensus guidelines.   03/15/2021 7.2 (H) 0 - 5.6 % Final       Comment:       Normal <5.7% Prediabetes 5.7-6.4%  Diabetes 6.5% or higher - adopted from ADA   consensus guidelines.      Plan: monitor BG, continue metformin 2000 mg daily, glipizide 5 mg daily.      (K59.01) Slow transit constipation  Comment: opiates and immobility contributing.   Plan: monitor bowel habits per nursing, continue bowel regimen     (R41.89) Cognitive impairment  Comment: with STML. Patient's brother utilized as an independent historian today. Management reviewed with nursing, they will work with patient to get him a med list/grid to clarify med timing and availability.   Plan: OT following for formal cognitive testing, safe discharge planning    MED REC REQUIRED  Post Medication Reconciliation Status: discharge medications reconciled and changed, per note/orders      Orders:  Coumadin 3 mg daily  INR 10/17    Electronically signed by: JIMBO Soni CNP

## 2024-10-14 NOTE — LETTER
" 10/14/2024      Toby Mcgrath  [address]  [place] [address]        [facility] GERIATRICS    Chief Complaint   Patient presents with     RECHECK     HPI:  Toby Mcgrath is a 69 year old  (1955), who is being seen today for an episodic care visit at: [facility]  ([facility]) [[id number]].     By chart review, patient was admitted to [facility] 10/4-10/9/24 with metabolic and toxic encephalopathy. In ED, labs remarkable for sodium 130, , , ALT 25, WBC 11.4, hgb 12.8, lactic acid 2.0, Ca 12.0. He received IVF and zometa for hypercalcemia. Lyrica was reduced. Mentation slowly improved. He was seen by oncology and not a candidate for palliative chemotherapy due to deconditioning. He was recommended U at discharge, admitted to current facility for ongoing medical management, rehab, nursing care.     Today's concern is: Seen today for follow-up in his room in U, patient's brother is present and helps augment some history. They report he is doing unwell and that he is not getting his pain medications. At home, he was getting oxycodone 4 times a day but now he is only getting it once daily. His pain is actually very well controlled. His brother wonders if perhaps he is just not aware of when he is getting what medications, and Toby agrees, and is confused about whether he is receiving all of his regular medications. He endorses a poor appetite. He has been constipated. He is denying CP, SOB, changes in bladder habits, fever/chills.     Allergies, and PMH/PSH reviewed in [facility] today.  REVIEW OF SYSTEMS:  Limited secondary to cognitive impairment but today pt reports the above    Objective:   /75   Pulse 87   Temp 97.9  F (36.6  C)   Resp 18   Ht 1.803 m (5' 11\")   Wt 95.3 kg (210 lb)   SpO2 91%   BMI 29.29 kg/m    GENERAL APPEARANCE:  Alert, in no distress  RESP:  respiratory effort and palpation of chest normal, lungs clear to auscultation , no respiratory " distress  CV:  regular rate and rhythm, no murmur, rub, or gallop, no edema  ABDOMEN:  normal bowel sounds, soft, nontender, no hepatosplenomegaly or other masses  M/S:   Gait and station abnormal transfers with assist, wheelchair for mobility  SKIN:  Inspection of skin and subcutaneous tissue baseline, Palpation of skin and subcutaneous tissue baseline  NEURO:   quintana freely  PSYCH:  memory impaired , affect and mood normal    Labs done in SNF are in [place] [facility]. Please refer to them using [facility]/Care Everywhere. and Recent labs in [facility] reviewed by me today.     Assessment/Plan:  (G93.41) Metabolic encephalopathy  (primary encounter diagnosis)  (Z79.899) Polypharmacy  Comment: acute encephalopathy with hypercalcemia, opiate and lyrica use. Required a sitter inpatient. Calm up on the unit today.  Plan: Monitor mentation. OT following for forma cognitive testing and discharge planning. SNF for assist with ADLs, medication management, meals, activities     (E83.52) Hypercalcemia  Comment: secondary to malginancy. Received Zometa and IVF.   Plan: Monitor Ca, repeat BMP 10/11. Follow-up with oncology as directed     (C22.1) Cholangiocarcinoma (H)  (R62.7) Failure to thrive in adult  (W19.XXXD) Fall, subsequent encounter  (R53.81) Physical deconditioning  Comment: Known malignancy, not a candidate for chemo at present, which would only be offered as a palliative measure.  Plan: PT/OT. Fall precautions. Follow-up with oncology as directed.      (G89.4) Chronic pain syndrome  (M54.6,  G89.29) Chronic bilateral thoracic back pain  Comment: with chronic back pain and cancer-related pain. He is worried about getting pain medications, but pain has been well controlled. Has PRNs available.   Plan: acetaminophen (TYLENOL) 325 MG tablet Q8H. Trolamine 10% TID. Continue lyrica 150 mg TID, oxycodone 5 mg Q6H PRN, continue non pharmacological measures     (I48.91) Atrial fibrillation with RVR (H)  (R79.1) Supratherapeutic  INR  Comment: chronic afib, received vit k 10/10 for supratherapeutic INR. INR today is therapeutic.   Plan: Vitamin K 2 mg today now. Coumadin 3 mg daily. INR 10/17.      (R63.4) Weight loss  (E43) Severe protein-calorie malnutrition (H)  Comment: secondary to underlying malignancy. RD to follow. Poor appetite.   Plan: RD following, continue regular diet, offer food preferences     (E87.1) Hyponatremia  Comment: mild, 2/2 dehydration. Normalized currently  Sodium   Date Value Ref Range Status   10/11/2024 138 135 - 145 mmol/L Final   05/27/2021 142 133 - 144 mmol/L Final   Plan: monitor Na periodically     (E11.9) Type 2 diabetes mellitus without complication, without long-term current use of insulin (H)  Comment: chronic, fairly controlled. Avoid hypoglycemia due to patient age, frailty, and risk for complications.             Hemoglobin A1C   Date Value Ref Range Status   10/04/2024 7.1 (H) <5.7 % Final       Comment:       Normal <5.7%   Prediabetes 5.7-6.4%    Diabetes 6.5% or higher     Note: Adopted from ADA consensus guidelines.   03/15/2021 7.2 (H) 0 - 5.6 % Final       Comment:       Normal <5.7% Prediabetes 5.7-6.4%  Diabetes 6.5% or higher - adopted from ADA   consensus guidelines.      Plan: monitor BG, continue metformin 2000 mg daily, glipizide 5 mg daily.      (K59.01) Slow transit constipation  Comment: opiates and immobility contributing.   Plan: monitor bowel habits per nursing, continue bowel regimen     (R41.89) Cognitive impairment  Comment: with STML. Patient's brother utilized as an independent historian today. Management reviewed with nursing, they will work with patient to get him a med list/grid to clarify med timing and availability.   Plan: OT following for formal cognitive testing, safe discharge planning    MED REC REQUIRED  Post Medication Reconciliation Status: discharge medications reconciled and changed, per note/orders      Orders:  Coumadin 3 mg daily  INR 10/17    Electronically  signed by: JIMBO Soni CNP         Sincerely,        JIMBO Soni CNP

## 2024-10-15 NOTE — PROGRESS NOTES
Attempted to reach patient and left VM. Current preventative visit is overdue. left scheduling number for patient to call. [phone]

## 2024-10-17 NOTE — LETTER
" 10/17/2024      Toby Mcgrath  [address]  [place] [facility] [address]        [facility] GERIATRICS    Chief Complaint   Patient presents with     RECHECK     HPI:  Toby Mcgrath is a 69 year old  (1955), who is being seen today for an episodic care visit at: [facility]  ([facility]) [[id number]].     By chart review, patient was admitted to [facility] 10/4-10/9/24 with metabolic and toxic encephalopathy. In ED, labs remarkable for sodium 130, , , ALT 25, WBC 11.4, hgb 12.8, lactic acid 2.0, Ca 12.0. He received IVF and zometa for hypercalcemia. Lyrica was reduced. Mentation slowly improved. He was seen by oncology and not a candidate for palliative chemotherapy due to deconditioning. He was recommended U at discharge, admitted to current facility for ongoing medical management, rehab, nursing care.     Today's concern is: Seen today for follow-up up on the unit. Today he is feeling generally unwell, fatigued and warm. He canceled his outpatient appointment this morning. He reports he has been irritable with staff. He is not able to further elaborate on symptoms, denies CP, SOB, changes in b/b habits, fever/chills. Visited with son at the , he notes dad is irritable and not wanting additional interventions, though goal is still to return home.     INR today is 5.3    Allergies, and PMH/PSH reviewed in EPIC today.  REVIEW OF SYSTEMS:  Limited secondary to cognitive impairment but today pt reports the above and 4 point ROS including Respiratory, CV, GI and , other than that noted in the HPI,  is negative    Objective:   /70   Pulse 63   Temp 97.2  F (36.2  C)   Resp 19   Ht 1.803 m (5' 11\")   Wt 95.7 kg (211 lb)   SpO2 98%   BMI 29.43 kg/m    GENERAL APPEARANCE:  Alert, in no distress  RESP:  respiratory effort and palpation of chest normal, lungs clear to auscultation , no respiratory distress  CV:  regular rate and rhythm, no murmur, rub, or gallop, " no edema  ABDOMEN:  normal bowel sounds, soft, nontender, no hepatosplenomegaly or other masses, moderate distension  M/S:   Gait and station abnormal transfers with assist, wheelchair and walker for mobility  SKIN:  Inspection of skin and subcutaneous tissue baseline, Palpation of skin and subcutaneous tissue baseline  NEURO:   quintana freely  PSYCH:  memory impaired , affect and mood normal    Labs done in SNF are in [place] [facility]. Please refer to them using [facility]/Care Everywhere. and Recent labs in [facility] reviewed by me today.     Assessment/Plan:  (G93.41) Metabolic encephalopathy  (primary encounter diagnosis)  (Z79.899) Polypharmacy  Comment: acute encephalopathy with hypercalcemia, opiate and lyrica use. Required a sitter inpatient. Calm up on the unit today, reports irritability.  Plan: Monitor mentation. OT following for forma cognitive testing and discharge planning. SNF for assist with ADLs, medication management, meals, activities     (E83.52) Hypercalcemia  Comment: secondary to malginancy. Received Zometa and IVF.   Currently normalized  Plan: Monitor Ca, repeat BMP 10/11. Follow-up with oncology as directed     (C22.1) Cholangiocarcinoma (H)  (R62.7) Failure to thrive in adult  (W19.XXXD) Fall, subsequent encounter  (R53.81) Physical deconditioning  Comment: Known malignancy, not a candidate for chemo at present, which would only be offered as a palliative measure. Feeling vaguely unwell today will repeat labs tomorrow, viral testing today. Management reviewed with PT on site, able to walk 2x200 feet.  Plan: PT/OT. CBC, CMP 10/18. COVID RAT + multiplex PCR now     (G89.4) Chronic pain syndrome  (M54.6,  G89.29) Chronic bilateral thoracic back pain  Comment: with chronic back pain and cancer-related pain. Pain is fairly controlled. Increased frequency to Q4H yesterday but he is only using this typically once or twice per day.  Plan: acetaminophen (TYLENOL) 325 MG tablet Q8H. Trolamine 10% TID. Continue lyrica  150 mg TID, oxycodone 5 mg Q4H PRN limit 4x/day, continue non pharmacological measures     (I48.91) Atrial fibrillation with RVR (H)  (R79.1) Supratherapeutic INR  Comment: chronic afib, with supratherapeutic INR, likely poor intakes contributing. Received Vit K 2 mg 10/10.    Plan: Hold coumadin. Repeat INR 10/18     (R63.4) Weight loss  (E43) Severe protein-calorie malnutrition (H)  Comment: secondary to underlying malignancy. RD to follow  Plan: RD following, continue regular diet, offer food preferences     (E87.1) Hyponatremia - currently normalized  Comment: mild, 2/2 dehydration   Plan: monitor Na periodically, repeat BMP 10/18     (E11.9) Type 2 diabetes mellitus without complication, without long-term current use of insulin (H)  Comment: chronic, fairly controlled. Avoid hypoglycemia due to patient age, frailty, and risk for complications.             Hemoglobin A1C   Date Value Ref Range Status   10/04/2024 7.1 (H) <5.7 % Final       Comment:       Normal <5.7%   Prediabetes 5.7-6.4%    Diabetes 6.5% or higher     Note: Adopted from ADA consensus guidelines.   03/15/2021 7.2 (H) 0 - 5.6 % Final       Comment:       Normal <5.7% Prediabetes 5.7-6.4%  Diabetes 6.5% or higher - adopted from ADA   consensus guidelines.      Plan: monitor BG BID, continue metformin 2000 mg daily, glipizide 5 mg daily.      (K59.01) Slow transit constipation  Comment: opiates and immobility contributing. No BM in a couple of days per chart review.  Plan: start miralax 17 g daily, continue to monitor bowel habits per nursing     (R41.89) Cognitive impairment  Comment: with STML. CPT 4.4/5.6 recommending 24/7 supervision. Son utilized as an independent historian today  Plan: OT following for formal cognitive testing, safe discharge planning    (J44.9) Chronic obstructive pulmonary disease, unspecified COPD type (H)  Comment: by history, not in acute exacerbation  Plan: continue albuterol MDI PRN    (K21.9) Chronic gastroesophageal  reflux disease  Comment: chronic  Plan: continue PPI    (N40.1,  R35.0) Benign prostatic hyperplasia with urinary frequency  Comment: chronic  Plan: continue finasteride 5 mg daily, flomax 0.8 mg daily, monitor symptoms    (F41.9) Anxiety  (F33.0) Mild episode of recurrent major depressive disorder (H)  Comment: chronic  Plan: continue PTA Cymbalta, buspar. Monitor mood, symptoms. ACP PRN      MED REC REQUIRED  Post Medication Reconciliation Status: discharge medications reconciled and changed, per note/orders      Orders:  COVID RAT + Multiplex PCR  CBC, CMP 10/18  Hold coumadin  INR per lab and POC 10/18  Discontinue miralax  Start miralax 17 G daily, give 1st dose today now    Electronically signed by: JIMBO Soni CNP         Sincerely,        JIMBO Soni CNP

## 2024-10-17 NOTE — PROGRESS NOTES
"[facility] GERIATRICS    Chief Complaint   Patient presents with    RECHECK     HPI:  Toby Mcgrath is a 69 year old  (1955), who is being seen today for an episodic care visit at: [facility]  ([facility]) [[id number]].     By chart review, patient was admitted to [facility] 10/4-10/9/24 with metabolic and toxic encephalopathy. In ED, labs remarkable for sodium 130, , , ALT 25, WBC 11.4, hgb 12.8, lactic acid 2.0, Ca 12.0. He received IVF and zometa for hypercalcemia. Lyrica was reduced. Mentation slowly improved. He was seen by oncology and not a candidate for palliative chemotherapy due to deconditioning. He was recommended [facility] at discharge, admitted to current facility for ongoing medical management, rehab, nursing care.     Today's concern is: Seen today for follow-up up on the unit. Today he is feeling generally unwell, fatigued and warm. He canceled his outpatient appointment this morning. He reports he has been irritable with staff. He is not able to further elaborate on symptoms, denies CP, SOB, changes in b/b habits, fever/chills. Visited with son at the , he notes dad is irritable and not wanting additional interventions, though goal is still to return home.     INR today is 5.3    Allergies, and PMH/PSH reviewed in EPIC today.  REVIEW OF SYSTEMS:  Limited secondary to cognitive impairment but today pt reports the above and 4 point ROS including Respiratory, CV, GI and , other than that noted in the HPI,  is negative    Objective:   /70   Pulse 63   Temp 97.2  F (36.2  C)   Resp 19   Ht 1.803 m (5' 11\")   Wt 95.7 kg (211 lb)   SpO2 98%   BMI 29.43 kg/m    GENERAL APPEARANCE:  Alert, in no distress  RESP:  respiratory effort and palpation of chest normal, lungs clear to auscultation , no respiratory distress  CV:  regular rate and rhythm, no murmur, rub, or gallop, no edema  ABDOMEN:  normal bowel sounds, soft, nontender, no hepatosplenomegaly or other " masses, moderate distension  M/S:   Gait and station abnormal transfers with assist, wheelchair and walker for mobility  SKIN:  Inspection of skin and subcutaneous tissue baseline, Palpation of skin and subcutaneous tissue baseline  NEURO:   quintana freely  PSYCH:  memory impaired , affect and mood normal    Labs done in SNF are in [place] [facility]. Please refer to them using EPIC/Care Everywhere. and Recent labs in [facility] reviewed by me today.     Assessment/Plan:  (G93.41) Metabolic encephalopathy  (primary encounter diagnosis)  (Z79.899) Polypharmacy  Comment: acute encephalopathy with hypercalcemia, opiate and lyrica use. Required a sitter inpatient. Calm up on the unit today, reports irritability.  Plan: Monitor mentation. OT following for forma cognitive testing and discharge planning. SNF for assist with ADLs, medication management, meals, activities     (E83.52) Hypercalcemia  Comment: secondary to malginancy. Received Zometa and IVF.   Currently normalized  Plan: Monitor Ca, repeat BMP 10/11. Follow-up with oncology as directed     (C22.1) Cholangiocarcinoma (H)  (R62.7) Failure to thrive in adult  (W19.XXXD) Fall, subsequent encounter  (R53.81) Physical deconditioning  Comment: Known malignancy, not a candidate for chemo at present, which would only be offered as a palliative measure. Feeling vaguely unwell today will repeat labs tomorrow, viral testing today. Management reviewed with PT on site, able to walk 2x200 feet.  Plan: PT/OT. CBC, CMP 10/18. COVID RAT + multiplex PCR now     (G89.4) Chronic pain syndrome  (M54.6,  G89.29) Chronic bilateral thoracic back pain  Comment: with chronic back pain and cancer-related pain. Pain is fairly controlled. Increased frequency to Q4H yesterday but he is only using this typically once or twice per day.  Plan: acetaminophen (TYLENOL) 325 MG tablet Q8H. Trolamine 10% TID. Continue lyrica 150 mg TID, oxycodone 5 mg Q4H PRN limit 4x/day, continue non pharmacological measures      (I48.91) Atrial fibrillation with RVR (H)  (R79.1) Supratherapeutic INR  Comment: chronic afib, with supratherapeutic INR, likely poor intakes contributing. Received Vit K 2 mg 10/10.    Plan: Hold coumadin. Repeat INR 10/18     (R63.4) Weight loss  (E43) Severe protein-calorie malnutrition (H)  Comment: secondary to underlying malignancy. RD to follow  Plan: RD following, continue regular diet, offer food preferences     (E87.1) Hyponatremia - currently normalized  Comment: mild, 2/2 dehydration   Plan: monitor Na periodically, repeat BMP 10/18     (E11.9) Type 2 diabetes mellitus without complication, without long-term current use of insulin (H)  Comment: chronic, fairly controlled. Avoid hypoglycemia due to patient age, frailty, and risk for complications.             Hemoglobin A1C   Date Value Ref Range Status   10/04/2024 7.1 (H) <5.7 % Final       Comment:       Normal <5.7%   Prediabetes 5.7-6.4%    Diabetes 6.5% or higher     Note: Adopted from ADA consensus guidelines.   03/15/2021 7.2 (H) 0 - 5.6 % Final       Comment:       Normal <5.7% Prediabetes 5.7-6.4%  Diabetes 6.5% or higher - adopted from ADA   consensus guidelines.      Plan: monitor BG BID, continue metformin 2000 mg daily, glipizide 5 mg daily.      (K59.01) Slow transit constipation  Comment: opiates and immobility contributing. No BM in a couple of days per chart review.  Plan: start miralax 17 g daily, continue to monitor bowel habits per nursing     (R41.89) Cognitive impairment  Comment: with STML. CPT 4.4/5.6 recommending 24/7 supervision. Son utilized as an independent historian today  Plan: OT following for formal cognitive testing, safe discharge planning    (J44.9) Chronic obstructive pulmonary disease, unspecified COPD type (H)  Comment: by history, not in acute exacerbation  Plan: continue albuterol MDI PRN    (K21.9) Chronic gastroesophageal reflux disease  Comment: chronic  Plan: continue PPI    (N40.1,  R35.0) Benign prostatic  hyperplasia with urinary frequency  Comment: chronic  Plan: continue finasteride 5 mg daily, flomax 0.8 mg daily, monitor symptoms    (F41.9) Anxiety  (F33.0) Mild episode of recurrent major depressive disorder (H)  Comment: chronic  Plan: continue PTA Cymbalta, buspar. Monitor mood, symptoms. ACP PRN      MED REC REQUIRED  Post Medication Reconciliation Status: discharge medications reconciled and changed, per note/orders      Orders:  COVID RAT + Multiplex PCR  CBC, CMP 10/18  Hold coumadin  INR per lab and POC 10/18  Discontinue miralax  Start miralax 17 G daily, give 1st dose today now    Electronically signed by: JIMBO Soni CNP

## 2024-10-18 NOTE — RESULT ENCOUNTER NOTE
Orders  Toby Mcgrath  1955     1. Hold coumadin  2. INR 10/21/24      JIMBO Soni CNP on 10/18/2024 at 1:24 PM

## 2024-10-18 NOTE — RESULT ENCOUNTER NOTE
Orders  Toby Mcgrath  1955     1. CBC, BMP, procalcitonin 10/21/24  2. NS 0/9% give 2 L @ 75 mls/hr now  3. PV Q shift x 72 hours straight cath PRN for PVR >300  4. Hold metformin, glipizide  5. Increase BG monitoring to AC/HS  6. Start novolog sliding scale insulin AC. For -189 give 1 U. For -239 give 2 unit(s). For -289 give 3 U. For -339 give 4 U. For -389 give 5U. For -439 give 6 U. For BG >440 give 7 U.  7. Novolog sliding scale insulin at HS if BG >250 give 2 U  Dx: CANDICE, DM II    JIMBO Soni CNP on 10/18/2024 at 1:11 PM

## 2024-10-21 NOTE — PATIENT INSTRUCTIONS
Chelsea Mcgrath  1955     Discontinue metformin, glipizide, novolog, BG monitoring.       JIMBO Soni CNP on 10/21/2024 at 2:55 PM

## 2024-10-21 NOTE — LETTER
" 10/21/2024      Toby Mcgrath  [address]  [facility] [address]        [facility] GERIATRICS    Chief Complaint   Patient presents with     Nursing Home Acute     HPI:  Toby Mcgrath is a 69 year old  (1955), who is being seen today for an episodic care visit at: [facility]  ([facility]) [[id number]].     By chart review, patient was admitted to [facility] 10/4-10/9/24 with metabolic and toxic encephalopathy. In ED, labs remarkable for sodium 130, , , ALT 25, WBC 11.4, hgb 12.8, lactic acid 2.0, Ca 12.0. He received IVF and zometa for hypercalcemia. Lyrica was reduced. Mentation slowly improved. He was seen by oncology and not a candidate for palliative chemotherapy due to deconditioning. He was recommended TCU at discharge, admitted to current facility for ongoing medical management, rehab, nursing care.     Today's concern is: Seen today for follow-up up on the unit in LTC. Received 2 L NS over the weekend for CANDICE. PVRs have been 9-257 ml and has not required straight cath. Patient reports he is feeling unwell today, feels confused and like he is doing \"dumb stuff.\" Had a fall this morning where his chair got away from him. I share concern that he is not doing better in a more supportive setting and after receiving IVF this weekend. He is also worried, disappointed and tired of continued blood draws, hospitalizations, going \"back and forth.\" He feels like he gave recovery and rehab a good effort, and now he feels it might be time to stop interventions. He was planning to see oncology to discuss this afternoon but not sure he needs to go anymore. He is worried about what his son will think. Wants to tell with his brother and uncle. Pain has been fairly controlled. He had changed to a pureed diet due to dentition issues, but would like a regular diet again. He is denying CP, SOB, fever/chills.    Revistited patient his his room after discussion with son Antoni at his " "request, he would like to proceed with hospice and does not want to attend oncology follow-up this afternoon.     INR today is 2.6 after holding coumadin F/S/S.     Allergies, and PMH/PSH reviewed in EPIC today.  REVIEW OF SYSTEMS:  4 point ROS including Respiratory, CV, GI and , other than that noted in the HPI,  is negative    Objective:   /69   Pulse 91   Temp 97.5  F (36.4  C)   Resp 16   Ht 1.803 m (5' 11\")   Wt 95.7 kg (211 lb)   SpO2 98%   BMI 29.43 kg/m    GENERAL APPEARANCE:  Alert  RESP:  respiratory effort and palpation of chest normal, lungs clear to auscultation , no respiratory distress  CV:  regular rate and rhythm, no murmur, rub, or gallop, peripheral edema 2+ in BLE  ABDOMEN:  normal bowel sounds, soft, nontender, no hepatosplenomegaly or other masses  M/S:   Gait and station abnormal transfers with assist, wheelchair for mobility  SKIN:  Inspection of skin and subcutaneous tissue baseline, Palpation of skin and subcutaneous tissue baseline  NEURO:   quintana freely, follows commands  PSYCH:  oriented X 3, forgetful, mood appropriate for situation    Labs done in SNF are in [place] [facility]. Please refer to them using [employer] Everywhere. and Recent labs in [facility] reviewed by me today.     Assessment/Plan:  (G93.41) Metabolic encephalopathy  (primary encounter diagnosis)  (Z79.899) Polypharmacy  (W19.XXXA) Fall, initial encounter  Comment: acute encephalopathy with hypercalcemia, opiate and lyrica use. Required a sitter inpatient. Feeling more confused and had a fall today.   Plan: Monitor mentation. SNF for assist with ADLs, medication management, meals, activities    (N17.9) CANDICE (acute kidney injury) (H)  (R79.89) Elevated procalcitonin  Comment: acute, suspect related to poor PO intakes, cannot rule out obstructive cause such as metastasis. PVRs negative. Cr continues to be elevated despite receiving 2 L IVF over the weekend. Procalcitonin is elevated but WBC trending down, no fever/chills " or localizing s/sx infection. Will defer additional workup per goals of care discussions as below. Liberalizing diet as below.  Plan: treat to comfort     (E83.52) Hypercalcemia  Comment: secondary to malginancy. Received Zometa and IVF. Currently normalized  Plan: No additional monitoring per goals of care.     (C22.1) Cholangiocarcinoma (H)  (R62.7) Failure to thrive in adult  (W19.XXXD) Fall, subsequent encounter  (R53.81) Physical deconditioning  (Z71.89) ACP (advance care planning)  Comment: Known malignancy, not a candidate for chemo at present, which would only be offered as a palliative measure. Clinically he is declining despite the support of SNF and rehab, medical interventions. CANDICE as above with further elevation of LFTs. Long discussion with patient today regarding goals of care; we will consult hospice at this time by his preference. Management reviewed with patient's son via telephone, and  PT, nursing, social work on site today.  Plan: Hospice eval and treat. Treat to comfort.      (G89.4) Chronic pain syndrome  (M54.6,  G89.29) Chronic bilateral thoracic back pain  Comment: with chronic back pain and cancer-related pain. Pain is fairly controlled.   Plan: acetaminophen (TYLENOL) 325 MG tablet Q8H. Trolamine 10% TID. Continue lyrica 150 mg TID, oxycodone 5 mg Q4H PRN limit 4x/day, continue non pharmacological measures.      (I48.91) Atrial fibrillation with RVR (H)  Comment: chronic afib, with intermittently supratherapeutic INR, likely poor intakes contributing. Received Vit K 2 mg 10/10.  He is therapeutic today after holding warfarin this weekend, will dose coumadin through Weds and repeat INR then, would be reasonable to stop coumadin per goals of care if desired.  Plan: Coumadin 1 mg daily. Repeat INR 10/23. Monitor HR, continue metoprolol 100 mg daily      (R63.4) Weight loss  (E43) Severe protein-calorie malnutrition (H)  Comment: secondary to underlying malignancy. RD following. Will change  diet to regular texture per his preference   Plan: RD following, continue regular diet, offer food preferences    (E11.9) Type 2 diabetes mellitus without complication, without long-term current use of insulin (H)  Comment: chronic, fairly controlled. Avoid hypoglycemia due to patient age, frailty, and risk for complications. Metformin and glipizide stopped 2/2 CANDICE, placed on sliding scale insulin. BG 100s not requiring much sliding scale insulin and will stop insulin and BG monitoring to minimize pokes/sticks, per goals of care.                Hemoglobin A1C   Date Value Ref Range Status   10/04/2024 7.1 (H) <5.7 % Final       Comment:       Normal <5.7%   Prediabetes 5.7-6.4%    Diabetes 6.5% or higher     Note: Adopted from ADA consensus guidelines.   03/15/2021 7.2 (H) 0 - 5.6 % Final       Comment:       Normal <5.7% Prediabetes 5.7-6.4%  Diabetes 6.5% or higher - adopted from ADA   consensus guidelines.      Plan: discontinue metformin, glipizide, novolog, BG monitoring.      (K59.01) Slow transit constipation  Comment: opiates and immobility contributing.   Plan: continue miralax 17 g daily, continue to monitor bowel habits per nursing     (R41.89) Cognitive impairment  Comment: with STML. CPT 4.4/5.6 recommending 24/7 supervision. Son utilized as an independent historian today  Plan: OT following for formal cognitive testing, safe discharge planning     (J44.9) Chronic obstructive pulmonary disease, unspecified COPD type (H)  Comment: by history, not in acute exacerbation  Plan: continue albuterol MDI PRN     (K21.9) Chronic gastroesophageal reflux disease  Comment: chronic  Plan: continue PPI     (N40.1,  R35.0) Benign prostatic hyperplasia with urinary frequency  Comment: chronic  Plan: continue finasteride 5 mg daily, flomax 0.8 mg daily, monitor symptoms     (F41.9) Anxiety  (F33.0) Mild episode of recurrent major depressive disorder (H)  Comment: chronic, with risk for exacerbation related to end of  life planning, losses.   Plan: continue PTA Cymbalta, buspar. Monitor mood, symptoms. ACP PRN    MED REC REQUIRED  Post Medication Reconciliation Status: discharge medications reconciled and changed, per note/orders      Orders:  Coumadin 1 mg daily  INR 10/23  Discontinue metformin, glipizide, novolog, BG monitoring.   Hospice eval and treat    Total time spent with patient visit at the [facility] nursing facility was 64 minutes including patient visit, review of past records, and review of management with patient's son, nursing, PT, social work.       Electronically signed by: JIMBO Soni CNP         Sincerely,        JIMBO Soni CNP

## 2024-10-21 NOTE — TELEPHONE ENCOUNTER
Writer returned call to patient's son, Antoni (consent to communicate on file), who relayed to our team that patient has elected to enroll in hospice at this time. Writer canceled appointments for today, along with all future appointment requests by the oncology team. Writer relayed that our team would be available in the future if patient elects to un-enroll from hospice and would want to re-establish care with oncology. Antoni stated understanding, all questions answered.     Shayna Jennings RN, BSN, PHN, OCN     10/21/2024 at 12:32 PM  Nurse Care Coordinator  [facility] [place]  P: [phone]   F: [phone]

## 2024-10-21 NOTE — PROGRESS NOTES
"[facility] GERIATRICS    Chief Complaint   Patient presents with    Nursing Home Acute     HPI:  Toby Mcgrath is a 69 year old  (1955), who is being seen today for an episodic care visit at: [facility]  ([facility] [[id number]].     By chart review, patient was admitted to [facility] 10/4-10/9/24 with metabolic and toxic encephalopathy. In ED, labs remarkable for sodium 130, , , ALT 25, WBC 11.4, hgb 12.8, lactic acid 2.0, Ca 12.0. He received IVF and zometa for hypercalcemia. Lyrica was reduced. Mentation slowly improved. He was seen by oncology and not a candidate for palliative chemotherapy due to deconditioning. He was recommended TCU at discharge, admitted to current facility for ongoing medical management, rehab, nursing care.     Today's concern is: Seen today for follow-up up on the unit in LTC. Received 2 L NS over the weekend for CANDICE. PVRs have been 9-257 ml and has not required straight cath. Patient reports he is feeling unwell today, feels confused and like he is doing \"dumb stuff.\" Had a fall this morning where his chair got away from him. I share concern that he is not doing better in a more supportive setting and after receiving IVF this weekend. He is also worried, disappointed and tired of continued blood draws, hospitalizations, going \"back and forth.\" He feels like he gave recovery and rehab a good effort, and now he feels it might be time to stop interventions. He was planning to see oncology to discuss this afternoon but not sure he needs to go anymore. He is worried about what his son will think. Wants to tell with his brother and uncle. Pain has been fairly controlled. He had changed to a pureed diet due to dentition issues, but would like a regular diet again. He is denying CP, SOB, fever/chills.    Revistited patient his his room after discussion with son Antoni at his request, he would like to proceed with hospice and does not want to attend oncology follow-up " "this afternoon.     INR today is 2.6 after holding coumadin F/S/S.     Allergies, and PMH/PSH reviewed in EPIC today.  REVIEW OF SYSTEMS:  4 point ROS including Respiratory, CV, GI and , other than that noted in the HPI,  is negative    Objective:   /69   Pulse 91   Temp 97.5  F (36.4  C)   Resp 16   Ht 1.803 m (5' 11\")   Wt 95.7 kg (211 lb)   SpO2 98%   BMI 29.43 kg/m    GENERAL APPEARANCE:  Alert  RESP:  respiratory effort and palpation of chest normal, lungs clear to auscultation , no respiratory distress  CV:  regular rate and rhythm, no murmur, rub, or gallop, peripheral edema 2+ in BLE  ABDOMEN:  normal bowel sounds, soft, nontender, no hepatosplenomegaly or other masses  M/S:   Gait and station abnormal transfers with assist, wheelchair for mobility  SKIN:  Inspection of skin and subcutaneous tissue baseline, Palpation of skin and subcutaneous tissue baseline  NEURO:   quintana freely, follows commands  PSYCH:  oriented X 3, forgetful, mood appropriate for situation    Labs done in SNF are in [place] EPIC. Please refer to them using [employer] Everywhere. and Recent labs in EPIC reviewed by me today.     Assessment/Plan:  (G93.41) Metabolic encephalopathy  (primary encounter diagnosis)  (Z79.899) Polypharmacy  (W19.XXXA) Fall, initial encounter  Comment: acute encephalopathy with hypercalcemia, opiate and lyrica use. Required a sitter inpatient. Feeling more confused and had a fall today.   Plan: Monitor mentation. SNF for assist with ADLs, medication management, meals, activities    (N17.9) CANDICE (acute kidney injury) (H)  (R79.89) Elevated procalcitonin  Comment: acute, suspect related to poor PO intakes, cannot rule out obstructive cause such as metastasis. PVRs negative. Cr continues to be elevated despite receiving 2 L IVF over the weekend. Procalcitonin is elevated but WBC trending down, no fever/chills or localizing s/sx infection. Will defer additional workup per goals of care discussions as " below. Liberalizing diet as below.  Plan: treat to comfort     (E83.52) Hypercalcemia  Comment: secondary to malginancy. Received Zometa and IVF. Currently normalized  Plan: No additional monitoring per goals of care.     (C22.1) Cholangiocarcinoma (H)  (R62.7) Failure to thrive in adult  (W19.XXXD) Fall, subsequent encounter  (R53.81) Physical deconditioning  (Z71.89) ACP (advance care planning)  Comment: Known malignancy, not a candidate for chemo at present, which would only be offered as a palliative measure. Clinically he is declining despite the support of SNF and rehab, medical interventions. CANDICE as above with further elevation of LFTs. Long discussion with patient today regarding goals of care; we will consult hospice at this time by his preference. Management reviewed with patient's son via telephone, and  PT, nursing, social work on site today.  Plan: Hospice eval and treat. Treat to comfort.      (G89.4) Chronic pain syndrome  (M54.6,  G89.29) Chronic bilateral thoracic back pain  Comment: with chronic back pain and cancer-related pain. Pain is fairly controlled.   Plan: acetaminophen (TYLENOL) 325 MG tablet Q8H. Trolamine 10% TID. Continue lyrica 150 mg TID, oxycodone 5 mg Q4H PRN limit 4x/day, continue non pharmacological measures.      (I48.91) Atrial fibrillation with RVR (H)  Comment: chronic afib, with intermittently supratherapeutic INR, likely poor intakes contributing. Received Vit K 2 mg 10/10.  He is therapeutic today after holding warfarin this weekend, will dose coumadin through Weds and repeat INR then, would be reasonable to stop coumadin per goals of care if desired.  Plan: Coumadin 1 mg daily. Repeat INR 10/23. Monitor HR, continue metoprolol 100 mg daily      (R63.4) Weight loss  (E43) Severe protein-calorie malnutrition (H)  Comment: secondary to underlying malignancy. RD following. Will change diet to regular texture per his preference   Plan: RD following, continue regular diet, offer  food preferences    (E11.9) Type 2 diabetes mellitus without complication, without long-term current use of insulin (H)  Comment: chronic, fairly controlled. Avoid hypoglycemia due to patient age, frailty, and risk for complications. Metformin and glipizide stopped 2/2 CANDICE, placed on sliding scale insulin. BG 100s not requiring much sliding scale insulin and will stop insulin and BG monitoring to minimize pokes/sticks, per goals of care.                Hemoglobin A1C   Date Value Ref Range Status   10/04/2024 7.1 (H) <5.7 % Final       Comment:       Normal <5.7%   Prediabetes 5.7-6.4%    Diabetes 6.5% or higher     Note: Adopted from ADA consensus guidelines.   03/15/2021 7.2 (H) 0 - 5.6 % Final       Comment:       Normal <5.7% Prediabetes 5.7-6.4%  Diabetes 6.5% or higher - adopted from ADA   consensus guidelines.      Plan: discontinue metformin, glipizide, novolog, BG monitoring.      (K59.01) Slow transit constipation  Comment: opiates and immobility contributing.   Plan: continue miralax 17 g daily, continue to monitor bowel habits per nursing     (R41.89) Cognitive impairment  Comment: with STML. CPT 4.4/5.6 recommending 24/7 supervision. Son utilized as an independent historian today  Plan: OT following for formal cognitive testing, safe discharge planning     (J44.9) Chronic obstructive pulmonary disease, unspecified COPD type (H)  Comment: by history, not in acute exacerbation  Plan: continue albuterol MDI PRN     (K21.9) Chronic gastroesophageal reflux disease  Comment: chronic  Plan: continue PPI     (N40.1,  R35.0) Benign prostatic hyperplasia with urinary frequency  Comment: chronic  Plan: continue finasteride 5 mg daily, flomax 0.8 mg daily, monitor symptoms     (F41.9) Anxiety  (F33.0) Mild episode of recurrent major depressive disorder (H)  Comment: chronic, with risk for exacerbation related to end of life planning, losses.   Plan: continue PTA Cymbalta, buspar. Monitor mood, symptoms. ACP  PRN    MED REC REQUIRED  Post Medication Reconciliation Status: discharge medications reconciled and changed, per note/orders      Orders:  Coumadin 1 mg daily  INR 10/23  Discontinue metformin, glipizide, novolog, BG monitoring.   Hospice eval and treat    Total time spent with patient visit at the [facility] nursing facility was 64 minutes including patient visit, review of past records, and review of management with patient's son, nursing, PT, social work.       Electronically signed by: JIMBO Soni CNP

## 2024-10-23 NOTE — CONFIDENTIAL NOTE
[facility] INR         Caller: Shannon  Call Back Number: [phone]  Reason for call: INR  Diagnosis/Goal: 2-3 afib     Todays INR: 1.6  Last INR: 2.6  Previous dose: coumadin 1 mg daily  Date INR New Dose/Orders   10/14 2.67 Coumadin 3 mg daily   10/16 5.4 Hold coumadin   10/18 5.0 Hold coumadin   10/21 2.6 Coumadin 1 mg daily    10/23 1.6          Heparin/Lovenox:  No  Currently on ABX?: No  Other interacting medication:  No  Missed or refused doses: No  Other: patient is now comfort focus, will admit to hospice care        Orders  Toby Mcgrath  1955       1. Coumadin 2 mg daily  2. INR 10/25/24  Dx: JIMBO Mcadams CNP on 10/23/2024 at 11:45 AM

## 2024-10-25 NOTE — LETTER
10/25/2024      Toby Mcgrath  [address]  [facility] [place] [facility] [address]        No notes on file      Sincerely,        Neyda Lopez MD

## 2024-10-28 NOTE — TELEPHONE ENCOUNTER
Writer received FMLA for patient's some, Antoni Bryant. Dr. Squires agreeable to writing for 1 month from consultation date, 10/7/24. Writer called Antoni to updatet him that FMLA is completed and ready for pick-up.     Shayna Jennings, RN, BSN, PHN, OCN     10/28/2024 at 10:15 AM  Nurse Care Coordinator  [facility]  P: [phone]   F: [phone]

## 2024-11-06 ENCOUNTER — PATIENT OUTREACH (OUTPATIENT)
Dept: CARE COORDINATION | Facility: CLINIC | Age: 69
End: 2024-11-06
Payer: COMMERCIAL

## 2024-11-06 NOTE — PROGRESS NOTES
Clinic Care Coordination Contact  Care Coordination Clinician Chart Review    Situation: Patient chart reviewed by care coordinator.    Background: Patient was discharged from the hospital to [facility] TCU, and RN/SW CC has been monitoring for TCU discharge to identify any outstanding Clinic Care Coordination needs/concerns. Refer to initial patient outreach encounter by this writer dated 10/10/2024 for additional details.    Assessment: Upon chart review, patient is not a candidate for Primary Care Clinic Care Coordination enrollment due to reason stated below:  Patient has passed away.    Plan/Recommendations: Clinic Care Coordination Referral/order cancelled. RN/SW CC will perform no further monitoring/outreaches at this time and will remain available as needed. If new needs arise, a new Care Coordination Referral may be placed.    Eunice Padilla RN Care Coordinator  [facility]  Email: [email or web address]  Phone: [phone]

## 2024-11-07 ASSESSMENT — ANXIETY QUESTIONNAIRES: GAD7 TOTAL SCORE: 2

## 2024-11-15 NOTE — PROGRESS NOTES
Toby Mcgrath is a 69 year old male seen October 25, 2024 at [facility] where he has resided for one month (admit 10/2024) seen for initial visit.    Pt is seen on the unit up to , with two brothers visiting   Pt is quiet, short answers to questions.  Feels fatigued.   By chart review, pt has a history of past alcohol abuse and treated hepatitis C.  He presented with right sided back pain this past summer.  U/S showed innumerable masses throughout the liver with abdominal lymphadenopathy   Also had evidence of portal hypertension, mild splenomegaly and ascites.   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   He was given Zometa for hypercalcemia and mentation improved.   Given poor functional statue he was not a candidate for CTX, which would have been palliative only. Discharged to LT for ongoing cares  - With continued to decline he signed on to [facility] earlier this week.       Past Medical History:   Diagnosis Date    Acute gout of right elbow, unspecified cause 07/14/2016    Acute gouty arthritis 03/03/2016    Anxiety state, unspecified     BMI 32.0-32.9,adult 09/04/2015    Chronic pain syndrome 03/29/2007    Narcotic refill protocol Will return every 2-3 months for clinic visits Controlled substance aggreement on file from this  6/26/12 Documentation in problem list: no, appears to be compliant based on last visit He is responding best to Oxycontin 10 mg twice daily # 60 per month.  This is costly and looking into [place] for coverage.  Nausea with MS Contin Has meds refilled 16 of every month Last MNP website verification:  done on 7/8/2015  [email or web address]   2/10/2015:  PCP will take over narcotic prescription Tapering course: using 5 mg tablets 10 mg morning 15 mg noon, 15 mg night for 1 week then 10 mg  morning, 10 mg noon, 15 mg night for 1 week then 10 mg TID for 1 week then see me for refills  Then ongoing taper: 5 mg morning, 10 mg noon and 10 mg night for 1 week then  5 mg morning and noon and 10 mg night for 1 week then 5 mg tid for 1 week Then 5 mg morning no noon dose and 5 mg night for 1 week then No morning or non dose and 5 mg nightly for 1 week then off  Goal is co    Chronic rhinitis 03/29/2007    DDD (degenerative disc disease), cervical 02/08/2013    Normal.  C2-C3: Normal disc, facet joints, spinal canal and neural foramina.  C3-C4: Mild broad-based posterior disc bulge. Otherwise normal.  C4-C5: Normal disc, facet joints, spinal canal and neural foramina.  C5-C6: Moderate degenerative disc disease with loss of disc height, circumferential disc bulge and vertebral endplate osteophytes. Mild spinal canal stenosis and moderate left foraminal stenosis. Normal right foramen and facet joints.  C6-C7: Mild circumferential disc bulge. Slight impression on the thecal sac. Mild left foraminal stenosis. Normal right foramen and facet joints.  C7-T1: Normal disc, facet joints, spinal canal and neural foramina.  T1-T2: Mild central posterior disc protrusion.  T2-T3: Mild central posterior disc protrusion. Slight impression on the spinal cord.        DJD (degenerative joint disease), lumbar 01/10/2012    Esophageal reflux     ,refluxes on upper GI    Gout 04/08/2011    Gout, unspecified     Hyperlipidemia LDL goal <100 10/25/2012    Hypertension goal BP (blood pressure) < 140/90 10/21/2011    Hypertrophy of prostate with urinary obstruction 08/03/2006    Problem list name updated by automated process. Provider to review    HYPERTROPHY PROSTATE WITH OBST 08/03/2006    Idiopathic gout of left elbow, unspecified chronicity 01/03/2017    Impotence of organic origin     Left-sided low back pain with left-sided sciatica 08/13/2015    Lumbar radiculopathy 09/17/2014    Major depressive disorder, recurrent episode,  unspecified 07/09/2008    Osteoarthrosis, unspecified whether generalized or localized, pelvic region and thigh     Pure hyperglyceridemia     ROTATOR CUFF SYND NOS 04/17/2008    S/P lumbar fusion 10/14/2014    Thoracic or lumbosacral neuritis or radiculitis, unspecified     Tobacco use disorder     Type 2 diabetes, HbA1C goal < 8% (H) 09/09/2011       Past Surgical History:   Procedure Laterality Date    BACK SURGERY      both shoulder repair  2006, 2008    ESOPHAGOSCOPY, GASTROSCOPY, DUODENOSCOPY (EGD), COMBINED N/A 10/1/2024    Procedure: Esophagoscopy, gastroscopy, duodenoscopy (EGD), combined;  Surgeon: Camille Torres MD;  Location: [facility]    EXCISE MASS NECK Right 6/24/2024    Procedure: EXCISION, MASS, NECK;  Surgeon: Lucie Zamudio MD;  Location: [facility] OR    FUSION LUMBAR ANTERIOR, FUSION LUMBAR POSTERIOR TWO LEVELS, COMBINED N/A 9/17/2014    Procedure: COMBINED FUSION LUMBAR ANTERIOR, FUSION LUMBAR POSTERIOR TWO LEVELS;  Surgeon: Chris Lugo MD;  Location: [facility] OR     [facility] ENDOSCOPY DIAG W OR W/O BRUSH/WASH  3/04    ok    HEAD & NECK SURGERY      HEMILAMINECTOMY, DISCECTOMY LUMBAR ONE LEVEL, COMBINED Left 9/8/2015    Procedure: COMBINED HEMILAMINECTOMY, DISCECTOMY LUMBAR ONE LEVEL;  Surgeon: Jer Irby MD;  Location: [facility] OR    right hip replacement  10,2010    [facility] NONSPECIFIC PROCEDURE  1995    neck cyst    [facility] NONSPECIFIC PROCEDURE  1979    laminectomy L5-S1 x 2    [facility] SHOULDER SURG PROC UNLISTED  2005, '07    repairs,later manipulate,inject LT, RT    [facility] COLONOSCOPY THRU STOMA, DIAGNOSTIC  3/04    normal       Family History   Problem Relation Age of Onset    Diabetes Mother     Lung Cancer Mother     C.A.D. Father     Diabetes Father     Hypertension Father     Cancer Brother     Coronary Artery Disease Brother         Bypass surgery    Deep Vein Thrombosis (DVT) Brother         Following surgery    Colon Cancer No family hx of        Social History     Tobacco Use    Smoking status:  "Former     Current packs/day: 0.00     Average packs/day: 1 pack/day for 40.0 years (40.0 ttl pk-yrs)     Types: Cigarettes     Start date:      Quit date:      Years since quittin.8     Passive exposure: Never    Smokeless tobacco: Former     Quit date: 2013   Substance Use Topics    Alcohol use: Yes     Comment: beer occasionally      SH:  Previously lived in an apartment with his son Antoni     ROS:  Poor appetite   WC bound, frequent falls, generalized weakness    Wt Readings from Last 5 Encounters:   10/25/24 94.7 kg (208 lb 11.2 oz)   10/21/24 95.7 kg (211 lb)   10/17/24 95.7 kg (211 lb)   10/14/24 95.3 kg (210 lb)   10/10/24 104.3 kg (230 lb)      GENERAL APPEARANCE: alert and no distress, appears ill  /66   Pulse 89   Temp 98.1  F (36.7  C)   Resp 18   Ht 1.803 m (5' 11\")   Wt 94.7 kg (208 lb 11.2 oz)   SpO2 95%   BMI 29.11 kg/m     HEENT: normocephalic, no lesion or abnormalities  NECK: no adenopathy, no asymmetry, masses, or scars and thyroid normal to palpation  RESP: lungs clear to auscultation - no rales, rhonchi or wheezes  CV: regular rate and rhythm, normal S1 S2  ABDOMEN:  soft, nontender, no HSM or masses and bowel sounds normal  MS: extremities normal- no gross deformities noted, no evidence of inflammation in joints, FROM in all extremities.  SKIN: no suspicious lesions or rashes  NEURO: generalized weakness, WC bound, limited verbal skills   PSYCH: affect flat  LYMPHATICS: No cervical,  or supraclavicular nodes     Lab Results   Component Value Date     10/21/2024    POTASSIUM 4.0 10/21/2024    CHLORIDE 107 10/21/2024    CO2 17 (L) 10/21/2024    ANIONGAP 17 (H) 10/21/2024     (H) 10/21/2024    BUN 19.4 10/21/2024    CR 1.75 (H) 10/21/2024    GFRESTIMATED 41 (L) 10/21/2024    JOSE 7.8 (L) 10/21/2024     Lab Results   Component Value Date     10/21/2024      BILITOTAL 1.7 10/21/2024      ALBUMIN 2.8 10/21/2024      ALKPHOS 677 10/21/2024     Lab " Results   Component Value Date    WBC 7.1 10/21/2024      HGB 11.5 10/21/2024      MCV 85 10/21/2024       10/21/2024     CT ABDOMEN PELVIS W CONTRAST  9/14/2024  HEPATOBILIARY: Hepatomegaly. Mildly nodular hepatic contour.. Innumerable hypodense masses throughout the liver, indeterminate but suspicious for diffuse hepatic metastases. The largest ill-defined mass is centered in the anterior liver at the junction of the left and right lobes measuring 8.8 x 7.5 cm (series 4, image 55). Additional representative masses include a contiguous mass in the anterior-inferior right lobe in segment V measuring 4.2 x 3.8 cm (series 4, image 84), a mass in segment 6 in the inferior right lobe measuring 3.0 x 2.7 cm (4/102) and a mass in the left hepatic lobe at the dome measuring 6.0 x 4.0 cm (series 4, image 33).   Cholelithiasis. No biliary ductal dilatation. Recanalized umbilical vein.  PANCREAS: No pancreatic mass, peripancreatic inflammatory changes or pancreatic duct dilatation.  SPLEEN: Mild splenomegaly with the spleen measuring 17 cm in length. Indeterminate enhancing mass in the mid spleen measuring 2.0 cm (series 4, image 54) and mildly heterogeneous splenic parenchyma. Few punctate calcified granuloma in the spleen.  LYMPH NODES: Indeterminate heterogeneous and mildly enlarged upper abdominal lymph nodes. For example, there is a 1.7 cm portacaval lymph node (series 4, image 63) and 1.3 cm portacaval lymph node (4/68).  Small ascites in the abdomen and pelvis.  MUSCULOSKELETAL: Instrumented fusion in the lumbosacral spine and bilateral sacroiliac joints. A right hip arthroplasty. No destructive lesions in the bones.                                                           IMPRESSION:   1.  Innumerable masses throughout the liver, concerning for malignancy. This may either be due to diffuse hepatic metastases, multifocal hepatocellular carcinoma, or cholangiocarcinoma with intrahepatic metastases. Percutaneous  biopsy is recommended for further evaluation.  2.  Upper abdominal metastatic-appearing lymphadenopathy.  3.  A 2 cm indeterminate enhancing lesion in the spleen. Continued attention on follow-up.  4.  The stomach is decompressed which slightly limits its evaluation. Asymmetric wall thickening in the gastric fundus. Consider upper endoscopy to exclude gastric malignancy.  5.  Mildly nodular hepatic contour suggestive of cirrhosis. Evidence of portal hypertension with recanalized umbilical vein, mild splenomegaly and small ascites.       IMP/PLAN:   (I48.91) Atrial fibrillation with RVR (H)    Comment:   Pulse Readings from Last 4 Encounters:   10/25/24 89   10/21/24 91   10/17/24 63   10/14/24 87      Plan: metoprolol  mg/day for VR control   Warfarin per INR for stroke prophylaxis     (C22.1) Cholangiocarcinoma (H)  Comment: and metastatic adenocarcinoma   Plan: Hospice started this week   Pain management with PRN oxycodone 5 mg   Naloxone available     (E83.52) Hypercalcemia  Comment: improved after dose of Zometa   Plan: no further checks given goals of care     (E11.9) Type 2 diabetes mellitus without complication, without long-term current use of insulin (H)  Comment:   Lab Results   Component Value Date    A1C 7.1 10/04/2024     Plan: diet controlled   Pregabalin 150 mg bid for neuropathic symptoms     (N40.1,  R35.0) Benign prostatic hyperplasia with urinary frequency  Comment: b hx  Plan: tamsulosin 0.8 mg/day and finasteride 5 mg/day     (K21.9) Chronic gastroesophageal reflux disease  Comment: recent EGD  Plan: pantoprazole 40 mg/day and famotidine 20 mg bid PRN      (F41.9) Anxiety  Comment: in setting of rapid decline  Plan: buspirone 5 mg bid and duloxetine 120 mg/day     (Z51.5) Hospice care patient  Comment: PRNs available   Plan: treat to comfort      Neyda Lopez MD

## 2024-12-19 NOTE — MR AVS SNAPSHOT
"              After Visit Summary   11/29/2017    Toby Mcgrath    MRN: [id number]           Patient Information     Date Of Birth          1955        Visit Information        Provider Department      11/29/2017 9:10 AM Tate Moncada, DO [facility]        Today's Diagnoses     Erysipelas    -  1    Productive cough        Infected dental carries        Type 2 diabetes mellitus without complication, without long-term current use of insulin (H)           Follow-ups after your visit        Your next 10 appointments already scheduled     Dec 07, 2017 10:00 AM CST   New Visit with Alhaji Dhillon, DO   [facility]    [address]  [address]  [facility] [address]   [phone]              Who to contact     If you have questions or need follow up information about today's clinic visit or your schedule please contact [facility] directly at [phone].  Normal or non-critical lab and imaging results will be communicated to you by MyChart, letter or phone within 4 business days after the clinic has received the results. If you do not hear from us within 7 days, please contact the clinic through [facility] or phone. If you have a critical or abnormal lab result, we will notify you by phone as soon as possible.  Submit refill requests through [facility] or call your pharmacy and they will forward the refill request to us. Please allow 3 business days for your refill to be completed.          Additional Information About Your Visit        MyChart Information     [facility] lets you send messages to your doctor, view your test results, renew your prescriptions, schedule appointments and more. To sign up, go to [email or web address] . Click on \"Log in\" on the left side of the screen, which will take you to the Welcome page. Then click on \"Sign up Now\" on the right side of the page.     You will be asked " to enter the access code listed below, as well as some personal information. Please follow the directions to create your username and password.     Your access code is: [email or web address]  Expires: 2018  3:06 PM     Your access code will  in 90 days. If you need help or a new code, please call your [place] clinic or [phone].        Care EveryWhere ID     This is your Care EveryWhere ID. This could be used by other organizations to access your [facility] medical records  [id number]        Your Vitals Were     Pulse Temperature Pulse Oximetry BMI (Body Mass Index)          88 97.3  F (36.3  C) (Oral) 93% 33.52 kg/m2         Blood Pressure from Last 3 Encounters:   17 114/77   17 130/73   10/05/17 116/66    Weight from Last 3 Encounters:   17 247 lb 2 oz (112.1 kg)   17 247 lb (112 kg)   10/05/17 246 lb (111.6 kg)              Today, you had the following     No orders found for display         Today's Medication Changes          These changes are accurate as of: 17  9:44 AM.  If you have any questions, ask your nurse or doctor.               Start taking these medicines.        Dose/Directions    amoxicillin-clavulanate 875-125 MG per tablet   Commonly known as:  AUGMENTIN   Used for:  Erysipelas, Productive cough, Infected dental carries   Started by:  Tate Moncada,         Dose:  1 tablet   Take 1 tablet by mouth 2 times daily for 10 days   Quantity:  20 tablet   Refills:  0            Where to get your medicines      These medications were sent to [facility] [address] [place] [address]     Phone:  [phone]     amoxicillin-clavulanate 875-125 MG per tablet                Primary Care Provider Office Phone # Fax #    Connie Haskins [phone] [address] [address]        Equal Access to Services     ALMA WEBER AH: Nellie Ansari  waaxda luqadaha, qaybta kaalmada francis, gayle jacksonaan ah. So [facility] [phone].    ATENCIÓN: Si rodrigo burgess, tiene a holliday disposición servicios gratuitos de asistencia lingüística. Rylie al [phone].    We comply with applicable federal civil rights laws and [place] laws. We do not discriminate on the basis of race, color, national origin, age, disability, sex, sexual orientation, or gender identity.            Thank you!     Thank you for choosing [facility]  for your care. Our goal is always to provide you with excellent care. Hearing back from our patients is one way we can continue to improve our services. Please take a few minutes to complete the written survey that you may receive in the mail after your visit with us. Thank you!             Your Updated Medication List - Protect others around you: Learn how to safely use, store and throw away your medicines at www.disposemymeds.org.          This list is accurate as of: 11/29/17  9:44 AM.  Always use your most recent med list.                   Brand Name Dispense Instructions for use Diagnosis    allopurinol 100 MG tablet    ZYLOPRIM    270 tablet    Take 3 tablets (300 mg) by mouth daily    Acute gout of right elbow, unspecified cause       amoxicillin-clavulanate 875-125 MG per tablet    AUGMENTIN    20 tablet    Take 1 tablet by mouth 2 times daily for 10 days    Erysipelas, Productive cough, Infected dental carries       aspirin 81 MG tablet     100 tablet    Take 1 tablet (81 mg) by mouth daily    Type 2 diabetes, HbA1C goal < 8% (H)       blood glucose monitoring lancets     3 Box    Use to test blood sugars 2-3 times daily as directed (lena contour meter)    Type 2 diabetes, HbA1C goal < 8% (H)       blood glucose monitoring meter device kit    no brand specified    1 kit    Use to test blood sugar 2 times daily or as directed.    Type 2 diabetes mellitus without complication, without  long-term current use of insulin (H)       * blood glucose monitoring test strip    no brand specified    3 Box    Use to test blood sugar 2-3 times daily (lena contour meter)    Type 2 diabetes, HbA1C goal < 8% (H)       * blood glucose monitoring test strip    no brand specified    100 strip    Use to test blood sugars 2 times daily or as directed    Type 2 diabetes mellitus without complication, without long-term current use of insulin (H)       Colchicine 0.6 MG Caps    MITIGARE    20 capsule    Take 0.6 mg by mouth 2 times daily as needed    Acute pain of right knee       cyclobenzaprine 5 MG tablet    FLEXERIL    30 tablet    Take 1-2 tablets (5-10 mg) by mouth nightly as needed for muscle spasms    Back muscle spasm       * finasteride 5 MG tablet    PROSCAR    30 tablet    Take 1 tablet (5 mg) by mouth daily For prostate enlargement. Please fill on $4 list    Benign prostatic hyperplasia without lower urinary tract symptoms, unspecified morphology       * finasteride 5 MG tablet    PROSCAR    90 tablet    Take 1 tablet (5 mg) by mouth daily    Benign prostatic hyperplasia with urinary frequency       FLUoxetine 20 MG capsule    PROzac    270 capsule    TAKE THREE CAPSULES BY MOUTH ONCE DAILY    Mild episode of recurrent major depressive disorder (H)       fluticasone 50 MCG/ACT spray    FLONASE    3 Bottle    Spray 1-2 sprays into both nostrils daily    Chronic rhinitis       hydrOXYzine 25 MG tablet    ATARAX    30 tablet    Take 2-4 tablets ( mg) by mouth nightly as needed for anxiety (for panic at night)    Chronic pain syndrome       LYRICA 100 MG capsule   Generic drug:  pregabalin     360 capsule    Take 2 capsules (200 mg) by mouth 2 times daily    Chronic pain syndrome       metFORMIN 500 MG 24 hr tablet    GLUCOPHAGE-XR    180 tablet    Take 2 tablets (1,000 mg) by mouth daily (with dinner)    Type 2 diabetes mellitus without complication, without long-term current use of insulin (H)            Charge Justification:  (00126) THERAPEUTIC EXERCISE - Provided verbal/tactile cueing for activities related to strengthening, flexibility, endurance, ROM performed to prevent loss of range of motion, maintain or improve muscular strength or increase flexibility, following either an injury or surgery.   (36284) HOME EXERCISE PROGRAM - Reviewed/Progressed HEP activities related to strengthening, flexibility, endurance, ROM performed to prevent loss of range of motion, maintain or improve muscular strength or increase flexibility, following either an injury or surgery.    GOALS     Patient stated goal: get back to use the computer  [] Progressing: [] Met: [] Not Met: [] Adjusted    Therapist goals for Patient:   Short Term Goals: To be achieved in: 4 weeks  1. Independent in HEP and progression per patient tolerance, in order to prevent re-injury.   [] Progressing: [x] Met: [] Not Met: [] Adjusted  2. Patient will have a decrease in pain to <0/10 to facilitate improvement in movement, function, and ADLs as indicated by Functional Deficits.  [] Progressing: [x] Met: [] Not Met: [] Adjusted    Long Term Goals: To be achieved in: 12+ weeks  1. Disability index score of 10% or less for the Upper Extremity functional Scale to assist with reaching prior level of function with activities such as dressing.  [] Progressing: [] Met: [] Not Met: [] Adjusted  2. Patient will demonstrate increased AROM of LUE to WFL for rTSA without pain to allow for proper joint functioning to enable patient to get dishes from bottom shelf.   [] Progressing: [] Met: [] Not Met: [] Adjusted  3. Patient will demonstrate increased Strength of LUE to at least 4- or 4/5 throughout without pain to allow for proper functional mobility to enable patient to return to ADLs.   [] Progressing: [] Met: [] Not Met: [] Adjusted  4. Patient will return to using the computer without increased symptoms or restriction.   [] Progressing: [] Met: [] Not Met: []  oxyCODONE IR 5 MG tablet   Start taking on:  12/6/2017    ROXICODONE    180 tablet    1 tab q4h, PRN max 6 per day. May dispense on or after 12/6/17    Failed back syndrome of lumbar spine       ranitidine 300 MG tablet    ZANTAC    90 tablet    Take 1 tablet (300 mg) by mouth At Bedtime To reduce stomach acid    Gastroesophageal reflux disease without esophagitis       senna-docusate 8.6-50 MG per tablet    SENOKOT-S;PERICOLACE    60 tablet    Take 1 tablet by mouth 2 times daily    Lumbar radiculopathy       simvastatin 20 MG tablet    ZOCOR    90 tablet    Take 1 tablet (20 mg) by mouth At Bedtime    Hyperlipidemia LDL goal <100       tamsulosin 0.4 MG capsule    FLOMAX    180 capsule    TAKE TWO CAPSULES BY MOUTH ONCE DAILY    Hypertrophy of prostate with urinary obstruction       triamcinolone 0.1 % cream    KENALOG    30 g    Apply sparingly to affected area twice daily for 7 days. Then stop and use only for flares    Eczema, unspecified type       * Notice:  This list has 4 medication(s) that are the same as other medications prescribed for you. Read the directions carefully, and ask your doctor or other care provider to review them with you.

## (undated) DEVICE — GLOVE PROTEGRITY MICRO 6.5 LATEX

## (undated) DEVICE — ESU GROUND PAD ADULT W/CORD E7507

## (undated) DEVICE — GLOVE BIOGEL PI MICRO INDICATOR UNDERGLOVE SZ 7.0 48970

## (undated) DEVICE — LINEN TOWEL PACK X10 5473

## (undated) DEVICE — DECANTER VIAL 2006S

## (undated) DEVICE — SUCTION CANISTER MEDIVAC LINER 3000ML W/LID 65651-530

## (undated) DEVICE — KIT ENDO TURNOVER/PROCEDURE W/CLEAN A SCOPE LINERS 103888

## (undated) DEVICE — SU VICRYL 3-0 SH 27" UND J416H

## (undated) DEVICE — SU VICRYL 4-0 PS-2 18" UND J496H

## (undated) DEVICE — DRSG GAUZE 4X4" 8044

## (undated) DEVICE — SYR BULB IRRIG 50ML LATEX FREE 0035280

## (undated) DEVICE — SOL NACL 0.9% IRRIG 1000ML BOTTLE 2F7124

## (undated) DEVICE — LINEN FULL SHEET 5511

## (undated) DEVICE — SUCTION TIP YANKAUER W/O VENT K86

## (undated) DEVICE — BAG CLEAR TRASH 1.3M 39X33" P4040C

## (undated) DEVICE — LINEN HALF SHEET 5512

## (undated) DEVICE — DRAPE IOBAN INCISE 23X17" 6650EZ

## (undated) DEVICE — PACK MINOR CUSTOM RIDGES SBA32RMRMA

## (undated) RX ORDER — FENTANYL CITRATE 50 UG/ML
INJECTION, SOLUTION INTRAMUSCULAR; INTRAVENOUS
Status: DISPENSED
Start: 2024-01-01

## (undated) RX ORDER — ONDANSETRON 2 MG/ML
INJECTION INTRAMUSCULAR; INTRAVENOUS
Status: DISPENSED
Start: 2024-01-01

## (undated) RX ORDER — OXYCODONE HYDROCHLORIDE 5 MG/1
TABLET ORAL
Status: DISPENSED
Start: 2024-01-01

## (undated) RX ORDER — BUPIVACAINE HYDROCHLORIDE AND EPINEPHRINE 2.5; 5 MG/ML; UG/ML
INJECTION, SOLUTION EPIDURAL; INFILTRATION; INTRACAUDAL; PERINEURAL
Status: DISPENSED
Start: 2024-01-01

## (undated) RX ORDER — DEXAMETHASONE SODIUM PHOSPHATE 4 MG/ML
INJECTION, SOLUTION INTRA-ARTICULAR; INTRALESIONAL; INTRAMUSCULAR; INTRAVENOUS; SOFT TISSUE
Status: DISPENSED
Start: 2024-01-01

## (undated) RX ORDER — CEFAZOLIN SODIUM/WATER 2 G/20 ML
SYRINGE (ML) INTRAVENOUS
Status: DISPENSED
Start: 2024-01-01

## (undated) RX ORDER — PROPOFOL 10 MG/ML
INJECTION, EMULSION INTRAVENOUS
Status: DISPENSED
Start: 2024-01-01

## (undated) RX ORDER — REGADENOSON 0.08 MG/ML
INJECTION, SOLUTION INTRAVENOUS
Status: DISPENSED
Start: 2024-01-01

## (undated) RX ORDER — LIDOCAINE HYDROCHLORIDE 10 MG/ML
INJECTION, SOLUTION EPIDURAL; INFILTRATION; INTRACAUDAL; PERINEURAL
Status: DISPENSED
Start: 2024-01-01